# Patient Record
Sex: MALE | Race: WHITE | NOT HISPANIC OR LATINO | Employment: OTHER | URBAN - NONMETROPOLITAN AREA
[De-identification: names, ages, dates, MRNs, and addresses within clinical notes are randomized per-mention and may not be internally consistent; named-entity substitution may affect disease eponyms.]

---

## 2018-06-06 ENCOUNTER — OFFICE VISIT (OUTPATIENT)
Dept: INTERNAL MEDICINE | Facility: CLINIC | Age: 63
End: 2018-06-06

## 2018-06-06 VITALS
WEIGHT: 211.75 LBS | TEMPERATURE: 98.8 F | BODY MASS INDEX: 29.65 KG/M2 | RESPIRATION RATE: 17 BRPM | SYSTOLIC BLOOD PRESSURE: 138 MMHG | HEIGHT: 71 IN | OXYGEN SATURATION: 100 % | HEART RATE: 54 BPM | DIASTOLIC BLOOD PRESSURE: 70 MMHG

## 2018-06-06 DIAGNOSIS — E11.65 UNCONTROLLED TYPE 2 DIABETES MELLITUS WITH HYPERGLYCEMIA, WITH LONG-TERM CURRENT USE OF INSULIN (HCC): ICD-10-CM

## 2018-06-06 DIAGNOSIS — E78.2 MIXED HYPERLIPIDEMIA: ICD-10-CM

## 2018-06-06 DIAGNOSIS — M79.2 NEURALGIA: ICD-10-CM

## 2018-06-06 DIAGNOSIS — I25.10 CORONARY ARTERY DISEASE INVOLVING NATIVE CORONARY ARTERY OF NATIVE HEART WITHOUT ANGINA PECTORIS: ICD-10-CM

## 2018-06-06 DIAGNOSIS — Z79.4 UNCONTROLLED TYPE 2 DIABETES MELLITUS WITH HYPERGLYCEMIA, WITH LONG-TERM CURRENT USE OF INSULIN (HCC): ICD-10-CM

## 2018-06-06 DIAGNOSIS — M25.50 ARTHRALGIA OF MULTIPLE JOINTS: ICD-10-CM

## 2018-06-06 DIAGNOSIS — E03.8 ADULT ONSET HYPOTHYROIDISM: ICD-10-CM

## 2018-06-06 DIAGNOSIS — I10 BENIGN ESSENTIAL HYPERTENSION: Primary | ICD-10-CM

## 2018-06-06 DIAGNOSIS — F41.9 ANXIETY: ICD-10-CM

## 2018-06-06 DIAGNOSIS — R51.9 HEADACHE DISORDER: ICD-10-CM

## 2018-06-06 PROCEDURE — 99204 OFFICE O/P NEW MOD 45 MIN: CPT | Performed by: INTERNAL MEDICINE

## 2018-06-06 RX ORDER — HYDROCODONE BITARTRATE AND ACETAMINOPHEN 7.5; 325 MG/1; MG/1
1 TABLET ORAL 2 TIMES DAILY
COMMUNITY
Start: 2018-05-07 | End: 2018-06-06 | Stop reason: SDUPTHER

## 2018-06-06 RX ORDER — LOSARTAN POTASSIUM 100 MG/1
1 TABLET ORAL DAILY
Refills: 1 | COMMUNITY
Start: 2018-06-02 | End: 2019-10-25 | Stop reason: SDUPTHER

## 2018-06-06 RX ORDER — PANTOPRAZOLE SODIUM 40 MG/1
TABLET, DELAYED RELEASE ORAL
Refills: 2 | COMMUNITY
Start: 2018-04-09 | End: 2019-04-15 | Stop reason: SDUPTHER

## 2018-06-06 RX ORDER — ALPRAZOLAM 0.5 MG/1
0.5 TABLET ORAL DAILY
Qty: 30 TABLET | Refills: 0 | Status: SHIPPED | OUTPATIENT
Start: 2018-06-06 | End: 2018-06-18 | Stop reason: SDUPTHER

## 2018-06-06 RX ORDER — CITALOPRAM 20 MG/1
1 TABLET ORAL DAILY
Refills: 2 | COMMUNITY
Start: 2018-03-19 | End: 2019-03-26 | Stop reason: SDUPTHER

## 2018-06-06 RX ORDER — ATORVASTATIN CALCIUM 40 MG/1
40 TABLET, FILM COATED ORAL DAILY
Refills: 2 | COMMUNITY
Start: 2018-03-19 | End: 2019-04-15 | Stop reason: SDUPTHER

## 2018-06-06 RX ORDER — CETIRIZINE HYDROCHLORIDE 10 MG/1
10 TABLET ORAL DAILY
Refills: 11 | COMMUNITY
Start: 2018-06-02 | End: 2021-07-16

## 2018-06-06 RX ORDER — HYDROCODONE BITARTRATE AND ACETAMINOPHEN 7.5; 325 MG/1; MG/1
1 TABLET ORAL 2 TIMES DAILY
Qty: 60 TABLET | Refills: 0 | Status: SHIPPED | OUTPATIENT
Start: 2018-06-06 | End: 2018-06-18 | Stop reason: SDUPTHER

## 2018-06-06 RX ORDER — LEVOTHYROXINE SODIUM 0.07 MG/1
1 TABLET ORAL DAILY
Refills: 2 | COMMUNITY
Start: 2018-04-09 | End: 2019-04-15 | Stop reason: SDUPTHER

## 2018-06-06 RX ORDER — ALPRAZOLAM 0.5 MG/1
1 TABLET ORAL DAILY
COMMUNITY
Start: 2018-05-07 | End: 2018-06-06 | Stop reason: SDUPTHER

## 2018-06-06 RX ORDER — AMITRIPTYLINE HYDROCHLORIDE 10 MG/1
1 TABLET, FILM COATED ORAL NIGHTLY
COMMUNITY
Start: 2018-05-02 | End: 2018-06-20 | Stop reason: SDUPTHER

## 2018-06-06 RX ORDER — AMLODIPINE BESYLATE 5 MG/1
1 TABLET ORAL DAILY
COMMUNITY
Start: 2018-04-18 | End: 2019-09-17 | Stop reason: SDUPTHER

## 2018-06-06 NOTE — PROGRESS NOTES
Subjective   Ramsey Riley is a 63 y.o. male.     Chief Complaint   Patient presents with   • Establish Care   • Hypertension   • Hyperlipidemia   • Diabetes   • Hypothyroidism   • Anxiety   • Joint Pain   • Numbness   • Headache       History of Present Illness   Patient is here for an initial visit  .Patient is here to follow up  on the  blood pressure   The patient  is taking the blood pressure  medications as prescribed and  has had no side effects.  The patient  also needs refills on  medications . The patient  is also here to follow up  on  the  cholesterol  and  Sugar  is trying to follow a diet. The patient   Is also here to follow up on thyroid and is Due to get lab work done .  He complains of chronic pain in multiple joints and is on pain medications,  He also complains of numbness and is on elavil  , patient complains of anxiety and is on celexa and xanax, he complains of chronic headaches,  He has a history of cad/cabg  Hypertension   Pertinent negatives include no chest pain, headaches, palpitations or shortness of breath.   Hyperlipidemia   Pertinent negatives include no chest pain or shortness of breath    Review of Systems   Constitutional: Negative for chills, fatigue and fever.   HENT: Negative for ear pain, postnasal drip, rhinorrhea, sinus pain, sinus pressure and sore throat.    Eyes: Negative for pain and itching.   Respiratory: Negative for cough, chest tightness and shortness of breath.    Cardiovascular: Negative for chest pain and palpitations.   Gastrointestinal: Negative for blood in stool, constipation, diarrhea and nausea.   Endocrine: Negative for polydipsia, polyphagia and polyuria.   Genitourinary: Negative for dysuria, flank pain and frequency.   Musculoskeletal: Positive for arthralgias. Negative for back pain.   Skin: Negative for rash and wound.   Neurological: Positive for numbness and headaches. Negative for syncope and light-headedness.   Hematological: Negative for  "adenopathy.   Psychiatric/Behavioral: Negative for behavioral problems, decreased concentration, dysphoric mood and sleep disturbance. The patient is nervous/anxious.        Past Medical History:   Diagnosis Date   • Arthritis    • Diabetes mellitus    • Heart disease    • History of blood clots    • Hypertension    • Thyroid disease        Past Surgical History:   Procedure Laterality Date   • CARDIAC SURGERY      [facility] - 3 BY PASS    • CHOLECYSTECTOMY  2015   • COLONOSCOPY  2015   • ENDOSCOPY         History reviewed. No pertinent family history.     reports that he has never smoked. He has never used smokeless tobacco. He reports that he does not drink alcohol or use drugs.    No Known Allergies        Current Outpatient Prescriptions:   •  ALPRAZolam (XANAX) 0.5 MG tablet, Take 1 tablet by mouth Daily., Disp: 30 tablet, Rfl: 0  •  amitriptyline (ELAVIL) 10 MG tablet, Take 1 tablet by mouth Every Night., Disp: , Rfl:   •  amLODIPine (NORVASC) 5 MG tablet, Take 1 tablet by mouth Daily., Disp: , Rfl:   •  atorvastatin (LIPITOR) 40 MG tablet, Take 40 mg by mouth Daily., Disp: , Rfl: 2  •  cetirizine (zyrTEC) 10 MG tablet, Take 10 mg by mouth Daily., Disp: , Rfl: 11  •  citalopram (CeleXA) 20 MG tablet, Take 1 tablet by mouth Daily., Disp: , Rfl: 2  •  HYDROcodone-acetaminophen (NORCO) 7.5-325 MG per tablet, Take 1 tablet by mouth 2 (Two) Times a Day., Disp: 60 tablet, Rfl: 0  •  levothyroxine (SYNTHROID, LEVOTHROID) 75 MCG tablet, Take 1 tablet by mouth Daily., Disp: , Rfl: 2  •  losartan (COZAAR) 100 MG tablet, Take 1 tablet by mouth Daily., Disp: , Rfl: 1  •  pantoprazole (PROTONIX) 40 MG EC tablet, , Disp: , Rfl: 2  •  NOVOLOG 100 UNIT/ML injection, INJECT 15 UNITS UNDER SKIN 3 TIMES DAILY, Disp: , Rfl: 1      Objective   Blood pressure 138/70, pulse 54, temperature 98.8 °F (37.1 °C), temperature source Oral, resp. rate 17, height 180.3 cm (71\"), weight 96 kg (211 lb 12 oz), SpO2 100 %.    Physical Exam "   Constitutional: He is oriented to person, place, and time. He appears well-developed and well-nourished. No distress.   HENT:   Head: Normocephalic and atraumatic.   Right Ear: External ear normal.   Left Ear: External ear normal.   Nose: Nose normal.   Mouth/Throat: Oropharynx is clear and moist.   Eyes: Conjunctivae and EOM are normal. Pupils are equal, round, and reactive to light.   Neck: Normal range of motion. Neck supple. No thyromegaly present.   Cardiovascular: Normal rate, regular rhythm and normal heart sounds.    Pulmonary/Chest: Effort normal. No respiratory distress.   Abdominal: Soft. He exhibits no distension. There is no tenderness. There is no guarding.   Musculoskeletal: Normal range of motion. He exhibits no edema.   Lymphadenopathy:     He has no cervical adenopathy.   Neurological: He is alert and oriented to person, place, and time.   No gross motor or sensory deficits   Skin: Skin is warm and dry. He is not diaphoretic. No erythema.   Psychiatric: He has a normal mood and affect.   Nursing note and vitals reviewed.        Results for orders placed or performed in visit on 06/06/18   Comprehensive Metabolic Panel   Result Value Ref Range    Glucose 340 (H) 74 - 98 mg/dL    BUN 12 7 - 20 mg/dL    Creatinine 0.70 0.60 - 1.30 mg/dL    eGFR Non African Am 115 >60 mL/min/1.73    eGFR African Am 139 >60 mL/min/1.73    BUN/Creatinine Ratio 17.1 6.3 - 21.9    Sodium 139 137 - 145 mmol/L    Potassium 4.6 3.5 - 5.1 mmol/L    Chloride 100 98 - 107 mmol/L    Total CO2 28.0 26.0 - 30.0 mmol/L    Calcium 9.2 8.4 - 10.2 mg/dL    Total Protein 6.4 6.3 - 8.2 g/dL    Albumin 3.80 3.50 - 5.00 g/dL    Globulin 2.6 gm/dL    A/G Ratio 1.5 1.0 - 2.0 g/dL    Total Bilirubin 0.5 0.2 - 1.3 mg/dL    Alkaline Phosphatase 103 38 - 126 U/L    AST (SGOT) 34 15 - 46 U/L    ALT (SGPT) 39 13 - 69 U/L   Lipid Panel   Result Value Ref Range    Total Cholesterol 146 0 - 199 mg/dL    Triglycerides 127 <150 mg/dL    HDL  Cholesterol 44 40 - 60 mg/dL    VLDL Cholesterol 25.4 mg/dL    LDL Cholesterol  77 0 - 99 mg/dL   Hemoglobin A1c   Result Value Ref Range    Hemoglobin A1C 11.90 %   Microalbumin / Creatinine Urine Ratio - Urine, Clean Catch   Result Value Ref Range    Creatinine, Urine 62.7 Not Estab. mg/dL    Microalbumin, Urine 824.3 Not Estab. ug/mL    Microalbumin/Creatinine Ratio 1,314.7 (H) 0.0 - 30.0 mg/g creat   TSH   Result Value Ref Range    TSH 2.300 0.470 - 4.680 mIU/mL   CBC & Differential   Result Value Ref Range    WBC 6.01 4.80 - 10.80 10*3/mm3    RBC 5.10 4.70 - 6.10 10*6/mm3    Hemoglobin 15.2 14.0 - 18.0 g/dL    Hematocrit 44.5 42.0 - 52.0 %    MCV 87.3 80.0 - 94.0 fL    MCH 29.8 27.0 - 31.0 pg    MCHC 34.2 30.0 - 37.0 g/dL    RDW 12.3 11.5 - 14.5 %    Platelets 146 130 - 400 10*3/mm3    Neutrophil Rel % 56.6 37.0 - 80.0 %    Lymphocyte Rel % 34.8 10.0 - 50.0 %    Monocyte Rel % 6.5 0.0 - 12.0 %    Eosinophil Rel % 1.5 0.0 - 7.0 %    Basophil Rel % 0.3 0.0 - 2.5 %    Neutrophils Absolute 3.40 2.00 - 6.90 10*3/mm3    Lymphocytes Absolute 2.09 0.60 - 3.40 10*3/mm3    Monocytes Absolute 0.39 0.00 - 0.90 10*3/mm3    Eosinophils Absolute 0.09 0.00 - 0.70 10*3/mm3    Basophils Absolute 0.02 0.00 - 0.20 10*3/mm3    Immature Granulocyte Rel % 0.3 0.0 - 0.6 %    Immature Grans Absolute 0.02 0.00 - 0.06 10*3/mm3    nRBC 0.0 0.0 - 0.0 /100 WBC         Assessment/Plan   Ramsey was seen today for establish care, hypertension, hyperlipidemia, diabetes, hypothyroidism, anxiety, joint pain, numbness and headache.    Diagnoses and all orders for this visit:    Benign essential hypertension    Mixed hyperlipidemia  -     CBC & Differential  -     Comprehensive Metabolic Panel  -     Lipid Panel    Uncontrolled type 2 diabetes mellitus with hyperglycemia, with long-term current use of insulin  -     Hemoglobin A1c  -     Microalbumin / Creatinine Urine Ratio - Urine, Clean Catch    Adult onset hypothyroidism  -      TSH    Neuralgia    Anxiety  -     Urine Drug Screen - Urine, Clean Catch    Coronary artery disease involving native coronary artery of native heart without angina pectoris  -     Ambulatory Referral to Cardiology    Headache disorder  -     CT Head Without Contrast    Arthralgia of multiple joints  -     Urine Drug Screen - Urine, Clean Catch    Other orders  -     ALPRAZolam (XANAX) 0.5 MG tablet; Take 1 tablet by mouth Daily.  -     HYDROcodone-acetaminophen (NORCO) 7.5-325 MG per tablet; Take 1 tablet by mouth 2 (Two) Times a Day.        Plan:  1.  Benign essential hypertension: Will continue current medication, low-sodium diet advised  2.mixed hyperlipidemia: will obtain   fasting CMP and lipid panel.  Diet and excise counseled,    3. diabetes mellitus : will obtain   fasting CMP  and hba1c  , diet and exercise counseled , on insulin  4. Neuralgia: on elavil  5. Cad : will get labs , refer to cardiology  6. Headaches : on elavil , will get ct   7.multiple joint pain : on lortab  8. Anxiety : continue celexa 20 mg qd  9. Hypothyroid : will get tsh and continue levothyroxine  The patient has read and signed the [facility] Controlled Substance Contract.The patient is aware of the potential for addiction and dependence. This is a short-term use   A Enoc was reviewed and scanned into the chart today. Narcotic contract was signed by patient today . Will obtain Urine drug screen  [facility] Report   As part of this patient's treatment plan I am prescribing controlled substances. The patient has been made aware of appropriate use of such medications, including potential risk of somnolence, limited ability to drive and /or work safely, and potential for dependence or overdose. It has also been made clear that these medications are for use by this patient only, without concomitant use of alcohol or other substances unless prescribed.   Patient has completed prescribing agreement detailing terms of continued  prescribing of controlled substances, including monitoring [facility]LL reports, urine drug screening, and pill counts if necessary. The patient is aware that inappropriate use will result in cessation of prescribing such medications.   LA[facility]LL report has been reviewed    History and physical exam exhibit continued safe and appropriate use of controlled substances.                Margret Purvis MD

## 2018-06-07 LAB
ALBUMIN SERPL-MCNC: 3.8 G/DL (ref 3.5–5)
ALBUMIN/CREAT UR: 1314.7 MG/G CREAT (ref 0–30)
ALBUMIN/GLOB SERPL: 1.5 G/DL (ref 1–2)
ALP SERPL-CCNC: 103 U/L (ref 38–126)
ALT SERPL-CCNC: 39 U/L (ref 13–69)
AST SERPL-CCNC: 34 U/L (ref 15–46)
BASOPHILS # BLD AUTO: 0.02 10*3/MM3 (ref 0–0.2)
BASOPHILS NFR BLD AUTO: 0.3 % (ref 0–2.5)
BILIRUB SERPL-MCNC: 0.5 MG/DL (ref 0.2–1.3)
BUN SERPL-MCNC: 12 MG/DL (ref 7–20)
BUN/CREAT SERPL: 17.1 (ref 6.3–21.9)
CALCIUM SERPL-MCNC: 9.2 MG/DL (ref 8.4–10.2)
CHLORIDE SERPL-SCNC: 100 MMOL/L (ref 98–107)
CHOLEST SERPL-MCNC: 146 MG/DL (ref 0–199)
CO2 SERPL-SCNC: 28 MMOL/L (ref 26–30)
CREAT SERPL-MCNC: 0.7 MG/DL (ref 0.6–1.3)
CREAT UR-MCNC: 62.7 MG/DL
EOSINOPHIL # BLD AUTO: 0.09 10*3/MM3 (ref 0–0.7)
EOSINOPHIL NFR BLD AUTO: 1.5 % (ref 0–7)
ERYTHROCYTE [DISTWIDTH] IN BLOOD BY AUTOMATED COUNT: 12.3 % (ref 11.5–14.5)
GFR SERPLBLD CREATININE-BSD FMLA CKD-EPI: 115 ML/MIN/1.73
GFR SERPLBLD CREATININE-BSD FMLA CKD-EPI: 139 ML/MIN/1.73
GLOBULIN SER CALC-MCNC: 2.6 GM/DL
GLUCOSE SERPL-MCNC: 340 MG/DL (ref 74–98)
HBA1C MFR BLD: 11.9 %
HCT VFR BLD AUTO: 44.5 % (ref 42–52)
HDLC SERPL-MCNC: 44 MG/DL (ref 40–60)
HGB BLD-MCNC: 15.2 G/DL (ref 14–18)
IMM GRANULOCYTES # BLD: 0.02 10*3/MM3 (ref 0–0.06)
IMM GRANULOCYTES NFR BLD: 0.3 % (ref 0–0.6)
LDLC SERPL CALC-MCNC: 77 MG/DL (ref 0–99)
LYMPHOCYTES # BLD AUTO: 2.09 10*3/MM3 (ref 0.6–3.4)
LYMPHOCYTES NFR BLD AUTO: 34.8 % (ref 10–50)
MCH RBC QN AUTO: 29.8 PG (ref 27–31)
MCHC RBC AUTO-ENTMCNC: 34.2 G/DL (ref 30–37)
MCV RBC AUTO: 87.3 FL (ref 80–94)
MICROALBUMIN UR-MCNC: 824.3 UG/ML
MONOCYTES # BLD AUTO: 0.39 10*3/MM3 (ref 0–0.9)
MONOCYTES NFR BLD AUTO: 6.5 % (ref 0–12)
NEUTROPHILS # BLD AUTO: 3.4 10*3/MM3 (ref 2–6.9)
NEUTROPHILS NFR BLD AUTO: 56.6 % (ref 37–80)
NRBC BLD AUTO-RTO: 0 /100 WBC (ref 0–0)
PLATELET # BLD AUTO: 146 10*3/MM3 (ref 130–400)
POTASSIUM SERPL-SCNC: 4.6 MMOL/L (ref 3.5–5.1)
PROT SERPL-MCNC: 6.4 G/DL (ref 6.3–8.2)
RBC # BLD AUTO: 5.1 10*6/MM3 (ref 4.7–6.1)
SODIUM SERPL-SCNC: 139 MMOL/L (ref 137–145)
TRIGL SERPL-MCNC: 127 MG/DL
TSH SERPL DL<=0.005 MIU/L-ACNC: 2.3 MIU/ML (ref 0.47–4.68)
VLDLC SERPL CALC-MCNC: 25.4 MG/DL
WBC # BLD AUTO: 6.01 10*3/MM3 (ref 4.8–10.8)

## 2018-06-18 RX ORDER — HYDROCODONE BITARTRATE AND ACETAMINOPHEN 7.5; 325 MG/1; MG/1
1 TABLET ORAL 2 TIMES DAILY
Qty: 60 TABLET | Refills: 0 | Status: SHIPPED | OUTPATIENT
Start: 2018-06-18 | End: 2018-07-11 | Stop reason: SDUPTHER

## 2018-06-18 RX ORDER — ALPRAZOLAM 0.5 MG/1
0.5 TABLET ORAL DAILY
Qty: 30 TABLET | Refills: 0 | Status: SHIPPED | OUTPATIENT
Start: 2018-06-18 | End: 2018-07-06 | Stop reason: SDUPTHER

## 2018-06-19 ENCOUNTER — TELEPHONE (OUTPATIENT)
Dept: INTERNAL MEDICINE | Facility: CLINIC | Age: 63
End: 2018-06-19

## 2018-06-19 NOTE — TELEPHONE ENCOUNTER
[phone] Jacky in [place] calling . Stating his novolog has been changed. Patient told them it is not 15 units tid. I cant find any documentation that we changed it. He is out but insurance wont pay unless dose has been changed. Can you address this? thanks

## 2018-06-19 NOTE — TELEPHONE ENCOUNTER
Inform pt and pharmacy - changed to novolog pen 50 u tid and levemir pen 50 u bid , inform pt to increase dose slowly

## 2018-06-20 RX ORDER — AMITRIPTYLINE HYDROCHLORIDE 10 MG/1
10 TABLET, FILM COATED ORAL NIGHTLY
Qty: 30 TABLET | Refills: 2 | Status: SHIPPED | OUTPATIENT
Start: 2018-06-20 | End: 2018-11-17 | Stop reason: SDUPTHER

## 2018-06-21 ENCOUNTER — HOSPITAL ENCOUNTER (OUTPATIENT)
Dept: CT IMAGING | Facility: HOSPITAL | Age: 63
Discharge: HOME | End: 2018-06-21
Admitting: INTERNAL MEDICINE

## 2018-06-21 ENCOUNTER — CONSULT (OUTPATIENT)
Dept: CARDIOLOGY | Facility: CLINIC | Age: 63
End: 2018-06-21

## 2018-06-21 VITALS
BODY MASS INDEX: 29.65 KG/M2 | HEART RATE: 54 BPM | OXYGEN SATURATION: 98 % | SYSTOLIC BLOOD PRESSURE: 110 MMHG | DIASTOLIC BLOOD PRESSURE: 60 MMHG | HEIGHT: 71 IN | WEIGHT: 211.8 LBS

## 2018-06-21 DIAGNOSIS — I10 ESSENTIAL HYPERTENSION: ICD-10-CM

## 2018-06-21 DIAGNOSIS — I20.8 ANGINAL EQUIVALENT (HCC): ICD-10-CM

## 2018-06-21 DIAGNOSIS — I25.119 CORONARY ARTERY DISEASE INVOLVING NATIVE CORONARY ARTERY OF NATIVE HEART WITH ANGINA PECTORIS (HCC): Primary | ICD-10-CM

## 2018-06-21 DIAGNOSIS — R53.83 FATIGUE, UNSPECIFIED TYPE: ICD-10-CM

## 2018-06-21 DIAGNOSIS — R00.1 BRADYCARDIA, SINUS: ICD-10-CM

## 2018-06-21 DIAGNOSIS — R94.31 ABNORMAL ECG: ICD-10-CM

## 2018-06-21 PROBLEM — I20.89 ANGINAL EQUIVALENT: Status: ACTIVE | Noted: 2018-06-21

## 2018-06-21 PROCEDURE — 99204 OFFICE O/P NEW MOD 45 MIN: CPT | Performed by: INTERNAL MEDICINE

## 2018-06-21 PROCEDURE — 70450 CT HEAD/BRAIN W/O DYE: CPT

## 2018-06-21 PROCEDURE — 93000 ELECTROCARDIOGRAM COMPLETE: CPT | Performed by: INTERNAL MEDICINE

## 2018-06-21 NOTE — PROGRESS NOTES
Subjective:     Encounter Date:06/21/2018      Patient ID: Ramsey Riley is a 63 y.o. male.    Chief Complaint:Fatigue  HPI  This is a 63-year-old male patient who has known coronary disease with a previous 4 vessel bypass operation 12 years ago for angina pectoris.  The patient has no history of myocardial infarction.  He has had no ischemic evaluation since his initial revascularization procedure.  The patient has severe fatigue which he describes as limiting his overall physical activities.  This has been getting worse.  He reports that the fatigue is manifest as a weakness in his legs bilaterally.  He has a history of superficial thrombophlebitis of his lower extremities as well as fairly extensive varicose veins.  He has no history of DVT or pulmonary embolus.  The patient has no chest discomfort at rest or with activity.  There is no exertional chest arm neck jaw shoulder or back discomfort.  There is no orthopnea PND or lower extremity edema.  There is no dizziness palpitations or syncope.  He has a history of hypertension type 2 diabetes mellitus and elevated cholesterol all of which are appropriately treated.  His blood sugars have been running extremely high.  He is on insulin therapy.  He indicates it is not uncommon for his blood sugar to be greater than 600.  He also has a pins and needles sensation in his feet suggestive of diabetic neuropathy.  He has no intermittent claudication or rest pain.  There is no tissue loss.  There is no history of peripheral arterial occlusive disease, stroke or TIA.  He is a lifelong nonsmoker.  The following portions of the patient's history were reviewed and updated as appropriate: allergies, current medications, past family history, past medical history, past social history, past surgical history and problem  Review of Systems   Constitution: Positive for weakness and malaise/fatigue. Negative for chills, diaphoresis, fever, weight gain and weight loss.   HENT:  Negative for ear discharge, hearing loss, hoarse voice and nosebleeds.    Eyes: Negative for discharge, double vision, pain and photophobia.   Cardiovascular: Negative for chest pain, claudication, cyanosis, dyspnea on exertion, irregular heartbeat, leg swelling, near-syncope, orthopnea, palpitations, paroxysmal nocturnal dyspnea and syncope.   Respiratory: Negative for cough, hemoptysis, sputum production and wheezing.    Endocrine: Negative for cold intolerance, heat intolerance, polydipsia, polyphagia and polyuria.   Hematologic/Lymphatic: Negative for adenopathy and bleeding problem. Does not bruise/bleed easily.   Skin: Negative for color change, flushing, itching and rash.   Musculoskeletal: Negative for muscle cramps, muscle weakness, myalgias and stiffness.   Gastrointestinal: Negative for abdominal pain, diarrhea, hematemesis, hematochezia, nausea and vomiting.   Genitourinary: Negative for dysuria, frequency and nocturia.   Neurological: Negative for focal weakness, loss of balance, numbness, paresthesias and seizures.   Psychiatric/Behavioral: Negative for altered mental status, hallucinations and suicidal ideas.   Allergic/Immunologic: Negative for HIV exposure, hives and persistent infections.           Current Outpatient Prescriptions:   •  ALPRAZolam (XANAX) 0.5 MG tablet, Take 1 tablet by mouth Daily., Disp: 30 tablet, Rfl: 0  •  amitriptyline (ELAVIL) 10 MG tablet, Take 1 tablet by mouth Every Night., Disp: 30 tablet, Rfl: 2  •  amLODIPine (NORVASC) 5 MG tablet, Take 1 tablet by mouth Daily., Disp: , Rfl:   •  atorvastatin (LIPITOR) 40 MG tablet, Take 40 mg by mouth Daily., Disp: , Rfl: 2  •  cetirizine (zyrTEC) 10 MG tablet, Take 10 mg by mouth Daily., Disp: , Rfl: 11  •  citalopram (CeleXA) 20 MG tablet, Take 1 tablet by mouth Daily., Disp: , Rfl: 2  •  HYDROcodone-acetaminophen (NORCO) 7.5-325 MG per tablet, Take 1 tablet by mouth 2 (Two) Times a Day., Disp: 60 tablet, Rfl: 0  •  insulin aspart  "(novoLOG FLEXPEN) 100 UNIT/ML solution pen-injector sc pen, Inject 50 Units under the skin 3 (Three) Times a Day With Meals., Disp: 5 pen, Rfl: 11  •  insulin detemir (LEVEMIR FLEXPEN) 100 UNIT/ML injection, Inject 50 Units under the skin 2 (Two) Times a Day., Disp: 5 pen, Rfl: 11  •  levothyroxine (SYNTHROID, LEVOTHROID) 75 MCG tablet, Take 1 tablet by mouth Daily., Disp: , Rfl: 2  •  losartan (COZAAR) 100 MG tablet, Take 1 tablet by mouth Daily., Disp: , Rfl: 1  •  pantoprazole (PROTONIX) 40 MG EC tablet, , Disp: , Rfl: 2     Objective:     Physical Exam   Constitutional: He is oriented to person, place, and time. He appears well-developed and well-nourished.   HENT:   Head: Normocephalic and atraumatic.   Mouth/Throat: Oropharynx is clear and moist.   Eyes: Conjunctivae and EOM are normal. Pupils are equal, round, and reactive to light. No scleral icterus.   Neck: Normal range of motion. Neck supple. No JVD present. No tracheal deviation present. No thyromegaly present.   Cardiovascular: Normal rate, regular rhythm, S1 normal, S2 normal, normal heart sounds, intact distal pulses and normal pulses.  PMI is not displaced.  Exam reveals no gallop and no friction rub.    No murmur heard.  Pulmonary/Chest: Effort normal and breath sounds normal. No respiratory distress. He has no wheezes. He has no rales.   Abdominal: Soft. Bowel sounds are normal. He exhibits no distension and no mass. There is no tenderness. There is no rebound and no guarding.   Musculoskeletal: Normal range of motion. He exhibits no edema or deformity.   Neurological: He is alert and oriented to person, place, and time. He displays normal reflexes. No cranial nerve deficit. Coordination normal.   Skin: Skin is warm and dry. No rash noted. No erythema.   Psychiatric: He has a normal mood and affect. His behavior is normal. Thought content normal.     Blood pressure 110/60, pulse 54, height 180.3 cm (70.98\"), weight 96.1 kg (211 lb 12.8 oz), SpO2 98 " %.   Lab Review:       Assessment:         1. Coronary artery disease involving native coronary artery of native heart with angina pectoris  THE PATIENT INITIALLY PRESENTED WITH SHORTNESS OF BREATH WITH ACTIVITY.  HE IS NOW HAVING SEVERE FATIGUE WHICH COULD BE A MANIFESTATION OF ANGINA PECTORIS. He has not had an ischemic evaluation in 12 years.  He has multiple risk factors for atherosclerosis progression.  - Stress Test With Myocardial Perfusion One Day  - Adult Transthoracic Echo Complete W/ Cont if Necessary Per Protocol    2. Essential hypertension  Excellent blood pressure control.  - Adult Transthoracic Echo Complete W/ Cont if Necessary Per Protocol    3. Bradycardia, sinus  He is not on any medication that would cause bradycardia.  He indicates that he has had resting bradycardia since his bypass surgery.  - Stress Test With Myocardial Perfusion One Day  - Adult Transthoracic Echo Complete W/ Cont if Necessary Per Protocol    4. Fatigue, unspecified type  This is suspected to be an angina equivalent.  The patient may have developed an element of congestive heart failure.  - Stress Test With Myocardial Perfusion One Day  - Adult Transthoracic Echo Complete W/ Cont if Necessary Per Protocol    5. Anginal equivalent  Fatigue that is present with physical activities and gets better with rest is typical of the diagnosis of angina pectoris.  - Stress Test With Myocardial Perfusion One Day  - Adult Transthoracic Echo Complete W/ Cont if Necessary Per Protocol    6. Abnormal ECG  His 12-lead electrocardiogram is consistent with a diagnosis of an ischemic etiology.  - Stress Test With Myocardial Perfusion One Day  - Adult Transthoracic Echo Complete W/ Cont if Necessary Per Protocol      ECG 12 Lead  Date/Time: 6/21/2018 2:11 PM  Performed by: EMILY LACY  Authorized by: EMILY LACY   Rhythm: sinus bradycardia  Rate: bradycardic  BPM: 52  QRS axis: left  Clinical impression: abnormal ECG  Comments: QS  complexes are present in leads 3 and aVF as well as V1-V3.             Plan:       I have recommended a vasodilator nuclear stress test as well as an echocardiogram.  No changes in his medication therapy have been made at today's visit.  Further recommendations will be predicated on the results of his outpatient testing.

## 2018-07-06 RX ORDER — ALPRAZOLAM 0.5 MG/1
0.5 TABLET ORAL DAILY
Qty: 30 TABLET | Refills: 0 | Status: SHIPPED | OUTPATIENT
Start: 2018-07-06 | End: 2018-07-19 | Stop reason: SDUPTHER

## 2018-07-12 ENCOUNTER — APPOINTMENT (OUTPATIENT)
Dept: NUCLEAR MEDICINE | Facility: HOSPITAL | Age: 63
End: 2018-07-12
Attending: INTERNAL MEDICINE

## 2018-07-17 RX ORDER — HYDROCODONE BITARTRATE AND ACETAMINOPHEN 7.5; 325 MG/1; MG/1
1 TABLET ORAL 2 TIMES DAILY
Qty: 60 TABLET | Refills: 0 | Status: SHIPPED | OUTPATIENT
Start: 2018-07-17 | End: 2018-07-18 | Stop reason: SDUPTHER

## 2018-07-18 RX ORDER — HYDROCODONE BITARTRATE AND ACETAMINOPHEN 7.5; 325 MG/1; MG/1
1 TABLET ORAL 2 TIMES DAILY
Qty: 60 TABLET | Refills: 0 | Status: SHIPPED | OUTPATIENT
Start: 2018-07-18 | End: 2018-09-11 | Stop reason: SDUPTHER

## 2018-07-19 ENCOUNTER — HOSPITAL ENCOUNTER (OUTPATIENT)
Dept: NUCLEAR MEDICINE | Facility: HOSPITAL | Age: 63
Discharge: HOME | End: 2018-07-19
Attending: INTERNAL MEDICINE

## 2018-07-19 ENCOUNTER — OFFICE VISIT (OUTPATIENT)
Dept: INTERNAL MEDICINE | Facility: CLINIC | Age: 63
End: 2018-07-19

## 2018-07-19 VITALS
BODY MASS INDEX: 29.86 KG/M2 | TEMPERATURE: 98 F | OXYGEN SATURATION: 100 % | WEIGHT: 214 LBS | HEART RATE: 49 BPM | SYSTOLIC BLOOD PRESSURE: 112 MMHG | RESPIRATION RATE: 16 BRPM | DIASTOLIC BLOOD PRESSURE: 61 MMHG

## 2018-07-19 DIAGNOSIS — Z79.4 UNCONTROLLED TYPE 2 DIABETES MELLITUS WITH HYPERGLYCEMIA, WITH LONG-TERM CURRENT USE OF INSULIN (HCC): ICD-10-CM

## 2018-07-19 DIAGNOSIS — E11.65 UNCONTROLLED TYPE 2 DIABETES MELLITUS WITH HYPERGLYCEMIA, WITH LONG-TERM CURRENT USE OF INSULIN (HCC): ICD-10-CM

## 2018-07-19 DIAGNOSIS — B35.1 ONYCHOMYCOSIS: ICD-10-CM

## 2018-07-19 DIAGNOSIS — E78.2 MIXED HYPERLIPIDEMIA: ICD-10-CM

## 2018-07-19 DIAGNOSIS — E03.8 ADULT ONSET HYPOTHYROIDISM: ICD-10-CM

## 2018-07-19 DIAGNOSIS — I10 BENIGN ESSENTIAL HYPERTENSION: Primary | ICD-10-CM

## 2018-07-19 DIAGNOSIS — M25.50 ARTHRALGIA OF MULTIPLE JOINTS: ICD-10-CM

## 2018-07-19 DIAGNOSIS — F41.9 ANXIETY: ICD-10-CM

## 2018-07-19 PROCEDURE — 25010000002 REGADENOSON 0.4 MG/5ML SOLUTION: Performed by: INTERNAL MEDICINE

## 2018-07-19 PROCEDURE — 78452 HT MUSCLE IMAGE SPECT MULT: CPT | Performed by: INTERNAL MEDICINE

## 2018-07-19 PROCEDURE — 93018 CV STRESS TEST I&R ONLY: CPT | Performed by: INTERNAL MEDICINE

## 2018-07-19 PROCEDURE — 78452 HT MUSCLE IMAGE SPECT MULT: CPT

## 2018-07-19 PROCEDURE — 93017 CV STRESS TEST TRACING ONLY: CPT

## 2018-07-19 PROCEDURE — A9500 TC99M SESTAMIBI: HCPCS | Performed by: INTERNAL MEDICINE

## 2018-07-19 PROCEDURE — 0 TECHNETIUM SESTAMIBI: Performed by: INTERNAL MEDICINE

## 2018-07-19 PROCEDURE — 99214 OFFICE O/P EST MOD 30 MIN: CPT | Performed by: INTERNAL MEDICINE

## 2018-07-19 RX ORDER — ALPRAZOLAM 0.5 MG/1
0.5 TABLET ORAL DAILY
Qty: 30 TABLET | Refills: 3 | Status: SHIPPED | OUTPATIENT
Start: 2018-07-19 | End: 2018-09-11 | Stop reason: SDUPTHER

## 2018-07-19 RX ADMIN — TECHNETIUM TC 99M SESTAMIBI 1 DOSE: 1 INJECTION INTRAVENOUS at 07:15

## 2018-07-19 RX ADMIN — REGADENOSON 0.4 MG: 0.08 INJECTION, SOLUTION INTRAVENOUS at 08:38

## 2018-07-19 RX ADMIN — TECHNETIUM TC 99M SESTAMIBI 1 DOSE: 1 INJECTION INTRAVENOUS at 08:38

## 2018-07-19 NOTE — PROGRESS NOTES
Subjective   Ramsey Riley is a 63 y.o. male.     Chief Complaint   Patient presents with   • Hypertension   • Hyperlipidemia   • Diabetes   • Hypothyroidism   • Anxiety   • Joint Pain       History of Present Illness   HPI: Patient is here to follow up on the blood pressure  The patient is taking the blood pressure medications as prescribed and has had no side effects. The patient also needs refills on medications . The patient is also here to follow up on the cholesterol and is trying to follow a diet. The patient is also here to follow on sugar and thyroid and he  had lab work done .  He complains of anxiety and is on celexa and prn xanax,  He also complains of joint pain and is on prn lortab, he was seen by cardiology and had stress test today , he complains of toe nail - bilateral big toe infection , he sees nephrology - dr alejandra  Hyperlipidemia   Pertinent negatives include no chest pain or shortness of breath.   Hypertension   Pertinent negatives include no chest pain, palpitations or shortness of breath.   Diabetes   Pertinent negatives for hypoglycemia include no dizziness, nervousness/anxiousness or pallor. Pertinent negatives for diabetes include no chest pain, no fatigue and no weakness.    The following portions of the patient's history were reviewed and updated as appropriate: allergies, current medications, past family history, past medical history, past social history, past surgical history and problem list.    Review of Systems   Constitutional: Negative for appetite change, fatigue and fever.   HENT: Negative for congestion, ear discharge, ear pain, sinus pressure and sore throat.    Eyes: Negative for pain and discharge.   Respiratory: Negative for cough, shortness of breath and wheezing.    Cardiovascular: Negative for chest pain, palpitations and leg swelling.   Gastrointestinal: Negative for abdominal pain, constipation, diarrhea, nausea and vomiting.   Endocrine: Negative for cold  intolerance and heat intolerance.   Genitourinary: Negative for dysuria and flank pain.   Musculoskeletal: Positive for arthralgias. Negative for joint swelling.   Skin: Positive for color change. Negative for pallor and rash.   Allergic/Immunologic: Negative for environmental allergies and food allergies.   Neurological: Negative for dizziness and weakness.   Hematological: Negative for adenopathy. Does not bruise/bleed easily.   Psychiatric/Behavioral: Negative for behavioral problems and dysphoric mood. The patient is nervous/anxious.          Current Outpatient Prescriptions:   •  ALPRAZolam (XANAX) 0.5 MG tablet, Take 1 tablet by mouth Daily., Disp: 30 tablet, Rfl: 3  •  amitriptyline (ELAVIL) 10 MG tablet, Take 1 tablet by mouth Every Night., Disp: 30 tablet, Rfl: 2  •  amLODIPine (NORVASC) 5 MG tablet, Take 1 tablet by mouth Daily., Disp: , Rfl:   •  atorvastatin (LIPITOR) 40 MG tablet, Take 40 mg by mouth Daily., Disp: , Rfl: 2  •  cetirizine (zyrTEC) 10 MG tablet, Take 10 mg by mouth Daily., Disp: , Rfl: 11  •  citalopram (CeleXA) 20 MG tablet, Take 1 tablet by mouth Daily., Disp: , Rfl: 2  •  HYDROcodone-acetaminophen (NORCO) 7.5-325 MG per tablet, Take 1 tablet by mouth 2 (Two) Times a Day., Disp: 60 tablet, Rfl: 0  •  insulin aspart (novoLOG FLEXPEN) 100 UNIT/ML solution pen-injector sc pen, Inject 50 Units under the skin 3 (Three) Times a Day With Meals., Disp: 5 pen, Rfl: 11  •  insulin detemir (LEVEMIR FLEXPEN) 100 UNIT/ML injection, Inject 70 Units under the skin Every Night., Disp: 5 pen, Rfl: 11  •  levothyroxine (SYNTHROID, LEVOTHROID) 75 MCG tablet, Take 1 tablet by mouth Daily., Disp: , Rfl: 2  •  losartan (COZAAR) 100 MG tablet, Take 1 tablet by mouth Daily., Disp: , Rfl: 1  •  pantoprazole (PROTONIX) 40 MG EC tablet, , Disp: , Rfl: 2  No current facility-administered medications for this visit.     Objective     Blood pressure 112/61, pulse (!) 49, temperature 98 °F (36.7 °C), temperature  source Oral, resp. rate 16, weight 97.1 kg (214 lb), SpO2 100 %.    Physical Exam   Constitutional: He is oriented to person, place, and time. He appears well-developed and well-nourished. No distress.   HENT:   Head: Normocephalic and atraumatic.   Right Ear: External ear normal.   Left Ear: External ear normal.   Nose: Nose normal.   Mouth/Throat: Oropharynx is clear and moist.   Eyes: Pupils are equal, round, and reactive to light. Conjunctivae and EOM are normal.   Neck: Normal range of motion. Neck supple. No thyromegaly present.   Cardiovascular: Normal rate, regular rhythm and normal heart sounds.    Pulmonary/Chest: Effort normal. No respiratory distress.   Abdominal: Soft. He exhibits no distension. There is no tenderness. There is no guarding.   Musculoskeletal: He exhibits tenderness and deformity. He exhibits no edema.   Lymphadenopathy:     He has no cervical adenopathy.   Neurological: He is alert and oriented to person, place, and time.   No gross motor or sensory deficits   Skin: Skin is warm and dry. He is not diaphoretic. No erythema.   onychomycosis   Psychiatric: He has a normal mood and affect.   Nursing note and vitals reviewed.      Results for orders placed or performed in visit on 06/21/18   Stress Test With Myocardial Perfusion One Day   Result Value Ref Range    BH CV STRESS PROTOCOL 1 Pharmacologic     Stage 1 1     Duration Min Stage 1 0     Duration Sec Stage 1 10     Stress Dose Regadenoson Stage 1 0.4     Stress Comments Stage 1 10 sec bolus injection     Baseline HR 42 bpm    Baseline /75 mmHg    Peak HR 55 bpm    Percent Max Pred HR 34.59 %    Percent Target HR 41 %    Peak /75 mmHg    Recovery HR 45 bpm    Recovery /74 mmHg    Target HR (85%) 135 bpm    Max. Pred. HR (100%) 159 bpm    Nuclear Prior Study 3          Assessment/Plan   Ramsey was seen today for hypertension, hyperlipidemia, diabetes, hypothyroidism, anxiety and joint pain.    Diagnoses and all  orders for this visit:    Benign essential hypertension    Mixed hyperlipidemia  -     CBC & Differential  -     Comprehensive Metabolic Panel  -     Lipid Panel    Uncontrolled type 2 diabetes mellitus with hyperglycemia, with long-term current use of insulin (CMS/[facility])  -     Hemoglobin A1c  -     Microalbumin / Creatinine Urine Ratio - Urine, Clean Catch    Adult onset hypothyroidism    Anxiety  -     Urine Drug Screen - Urine, Clean Catch    Arthralgia of multiple joints  -     Urine Drug Screen - Urine, Clean Catch    Onychomycosis  -     Ambulatory Referral to Podiatry    Other orders  -     ALPRAZolam (XANAX) 0.5 MG tablet; Take 1 tablet by mouth Daily.  -     Discontinue: insulin detemir (LEVEMIR FLEXPEN) 100 UNIT/ML injection; Inject 70 Units under the skin 2 (Two) Times a Day.  -     insulin detemir (LEVEMIR FLEXPEN) 100 UNIT/ML injection; Inject 70 Units under the skin Every Night.        Plan:  1.  Benign essential hypertension: Will continue current medication, low-sodium diet advised  2.mixed hyperlipidemia:  Reviewed    fasting CMP and lipid panel.  Diet and excise counseled,    3. diabetes mellitus :  Reviewed    fasting CMP  and hba1c  , diet and exercise counseled , on insulin -  levemir 70 u qhs and novolo 25 u tid , he has seen nephrologist - dr alejandra  4.  .multiple joint pain : on lortab  5. Anxiety : continue celexa 20 mg qd , prn alprazolam , uds today , shaun reviewed   6. Hypothyroid :  Reviewed  tsh and continue levothyroxine  7. Onychomycosis  :  rfer to podiatry - also for diabetic foot exam   The patient has read and signed the [facility] Controlled Substance Contract.The patient is aware of the potential for addiction and dependence. This is a short-term use   A Shaun was reviewed and scanned into the chart today. Narcotic contract was signed by patient today . Will obtain Urine drug screen  Shaun Report   As part of this patient's treatment plan I am prescribing controlled  substances. The patient has been made aware of appropriate use of such medications, including potential risk of somnolence, limited ability to drive and /or work safely, and potential for dependence or overdose. It has also been made clear that these medications are for use by this patient only, without concomitant use of alcohol or other substances unless prescribed.   Patient has completed prescribing agreement detailing terms of continued prescribing of controlled substances, including monitoring LAVELL reports, urine drug screening, and pill counts if necessary. The patient is aware that inappropriate use will result in cessation of prescribing such medications.   LAVELL report has been reviewed    History and physical exam exhibit continued safe and appropriate use of controlled substances.          Margret Purvis MD

## 2018-07-20 LAB
BH CV NUCLEAR PRIOR STUDY: 3
BH CV STRESS COMMENTS STAGE 1: NORMAL
BH CV STRESS DOSE REGADENOSON STAGE 1: 0.4
BH CV STRESS DURATION MIN STAGE 1: 0
BH CV STRESS DURATION SEC STAGE 1: 10
BH CV STRESS PROTOCOL 1: NORMAL
BH CV STRESS RECOVERY BP: NORMAL MMHG
BH CV STRESS RECOVERY HR: 45 BPM
BH CV STRESS STAGE 1: 1
LV EF NUC BP: 68 %
MAXIMAL PREDICTED HEART RATE: 159 BPM
PERCENT MAX PREDICTED HR: 34.59 %
STRESS BASELINE BP: NORMAL MMHG
STRESS BASELINE HR: 42 BPM
STRESS PERCENT HR: 41 %
STRESS POST PEAK BP: NORMAL MMHG
STRESS POST PEAK HR: 55 BPM
STRESS TARGET HR: 135 BPM

## 2018-08-07 LAB
BH CV ECHO MEAS - % IVS THICK: 58.3 %
BH CV ECHO MEAS - % LVPW THICK: 26.3 %
BH CV ECHO MEAS - AO MAX PG (FULL): 4.3 MMHG
BH CV ECHO MEAS - AO MAX PG: 11 MMHG
BH CV ECHO MEAS - AO MEAN PG (FULL): 3 MMHG
BH CV ECHO MEAS - AO MEAN PG: 6 MMHG
BH CV ECHO MEAS - AO ROOT AREA: 8.6 CM^2
BH CV ECHO MEAS - AO ROOT DIAM: 3.3 CM
BH CV ECHO MEAS - AO V2 MAX: 165.5 CM/SEC
BH CV ECHO MEAS - AO V2 MEAN: 120 CM/SEC
BH CV ECHO MEAS - AO V2 VTI: 34.9 CM
BH CV ECHO MEAS - AVA(I,A): 3.3 CM^2
BH CV ECHO MEAS - AVA(I,D): 3.3 CM^2
BH CV ECHO MEAS - AVA(V,A): 3 CM^2
BH CV ECHO MEAS - AVA(V,D): 3 CM^2
BH CV ECHO MEAS - CONTRAST EF 4CH: 73.9 ML/M^2
BH CV ECHO MEAS - EDV(CUBED): 166.4 ML
BH CV ECHO MEAS - EDV(MOD-SP4): 119 ML
BH CV ECHO MEAS - EDV(TEICH): 147.4 ML
BH CV ECHO MEAS - EF(CUBED): 80.3 %
BH CV ECHO MEAS - EF(MOD-BP): 78 %
BH CV ECHO MEAS - EF(MOD-SP4): 73.9 %
BH CV ECHO MEAS - EF(TEICH): 72.2 %
BH CV ECHO MEAS - ESV(CUBED): 32.8 ML
BH CV ECHO MEAS - ESV(MOD-SP4): 31 ML
BH CV ECHO MEAS - ESV(TEICH): 41 ML
BH CV ECHO MEAS - FS: 41.8 %
BH CV ECHO MEAS - IVS/LVPW: 1.3
BH CV ECHO MEAS - IVSD: 1.2 CM
BH CV ECHO MEAS - IVSS: 1.9 CM
BH CV ECHO MEAS - LA DIMENSION: 4.2 CM
BH CV ECHO MEAS - LA/AO: 1.3
BH CV ECHO MEAS - LV MASS(C)D: 234.7 GRAMS
BH CV ECHO MEAS - LV MASS(C)S: 181.4 GRAMS
BH CV ECHO MEAS - LV MAX PG: 6.7 MMHG
BH CV ECHO MEAS - LV MEAN PG: 3 MMHG
BH CV ECHO MEAS - LV V1 MAX: 129 CM/SEC
BH CV ECHO MEAS - LV V1 MEAN: 85.5 CM/SEC
BH CV ECHO MEAS - LV V1 VTI: 30.4 CM
BH CV ECHO MEAS - LVIDD: 5.5 CM
BH CV ECHO MEAS - LVIDS: 3.2 CM
BH CV ECHO MEAS - LVLD AP4: 8.7 CM
BH CV ECHO MEAS - LVLS AP4: 6.5 CM
BH CV ECHO MEAS - LVOT AREA (M): 3.8 CM^2
BH CV ECHO MEAS - LVOT AREA: 3.8 CM^2
BH CV ECHO MEAS - LVOT DIAM: 2.2 CM
BH CV ECHO MEAS - LVPWD: 1.2 CM
BH CV ECHO MEAS - LVPWS: 1.2 CM
BH CV ECHO MEAS - MV A MAX VEL: 86.6 CM/SEC
BH CV ECHO MEAS - MV DEC SLOPE: 282.5 CM/SEC^2
BH CV ECHO MEAS - MV DEC TIME: 0.28 SEC
BH CV ECHO MEAS - MV E MAX VEL: 78.4 CM/SEC
BH CV ECHO MEAS - MV E/A: 0.91
BH CV ECHO MEAS - MV MAX PG: 4.8 MMHG
BH CV ECHO MEAS - MV MEAN PG: 1 MMHG
BH CV ECHO MEAS - MV P1/2T MAX VEL: 80.4 CM/SEC
BH CV ECHO MEAS - MV P1/2T: 83.4 MSEC
BH CV ECHO MEAS - MV V2 MAX: 109 CM/SEC
BH CV ECHO MEAS - MV V2 MEAN: 52.6 CM/SEC
BH CV ECHO MEAS - MV V2 VTI: 35.3 CM
BH CV ECHO MEAS - MVA P1/2T LCG: 2.7 CM^2
BH CV ECHO MEAS - MVA(P1/2T): 2.6 CM^2
BH CV ECHO MEAS - MVA(VTI): 3.3 CM^2
BH CV ECHO MEAS - PA MAX PG: 5.6 MMHG
BH CV ECHO MEAS - PA V2 MAX: 118 CM/SEC
BH CV ECHO MEAS - RAP SYSTOLE: 10 MMHG
BH CV ECHO MEAS - RVSP: 25 MMHG
BH CV ECHO MEAS - SV(AO): 298.5 ML
BH CV ECHO MEAS - SV(CUBED): 133.6 ML
BH CV ECHO MEAS - SV(LVOT): 115.6 ML
BH CV ECHO MEAS - SV(MOD-SP4): 88 ML
BH CV ECHO MEAS - SV(TEICH): 106.5 ML
BH CV ECHO MEAS - TR MAX VEL: 193 CM/SEC
BH CV ECHO MEAS - TV MAX PG: 1.3 MMHG
BH CV ECHO MEAS - TV V2 MAX: 57.1 CM/SEC
LV EF 2D ECHO EST: 80 %

## 2018-08-10 ENCOUNTER — OFFICE VISIT (OUTPATIENT)
Dept: CARDIOLOGY | Facility: CLINIC | Age: 63
End: 2018-08-10

## 2018-08-10 VITALS
WEIGHT: 214 LBS | SYSTOLIC BLOOD PRESSURE: 120 MMHG | HEART RATE: 52 BPM | HEIGHT: 70 IN | OXYGEN SATURATION: 94 % | RESPIRATION RATE: 18 BRPM | DIASTOLIC BLOOD PRESSURE: 60 MMHG | BODY MASS INDEX: 30.64 KG/M2

## 2018-08-10 DIAGNOSIS — I25.119 CORONARY ARTERY DISEASE INVOLVING NATIVE CORONARY ARTERY OF NATIVE HEART WITH ANGINA PECTORIS (HCC): ICD-10-CM

## 2018-08-10 DIAGNOSIS — I10 ESSENTIAL HYPERTENSION: ICD-10-CM

## 2018-08-10 DIAGNOSIS — I20.8 ANGINAL EQUIVALENT (HCC): ICD-10-CM

## 2018-08-10 DIAGNOSIS — R00.1 BRADYCARDIA, SINUS: ICD-10-CM

## 2018-08-10 DIAGNOSIS — I11.9 LVH (LEFT VENTRICULAR HYPERTROPHY) DUE TO HYPERTENSIVE DISEASE, WITHOUT HEART FAILURE: ICD-10-CM

## 2018-08-10 DIAGNOSIS — R07.2 PRECORDIAL PAIN: Primary | ICD-10-CM

## 2018-08-10 PROCEDURE — 99214 OFFICE O/P EST MOD 30 MIN: CPT | Performed by: INTERNAL MEDICINE

## 2018-08-10 NOTE — PROGRESS NOTES
"    Subjective:     Encounter Date:08/10/2018      Patient ID: Ramsey Riley is a 63 y.o. male.    Chief Complaint: Chest pain  HPI  This is a 63-year-old male patient who underwent 2 vessel coronary artery bypass surgery in 2009 in [facility].  The patient had a left internal mammary artery graft to the mid left anterior descending as well as a reverse saphenous vein graft to the first obtuse marginal branch.  The patient had a high-grade stenosis in the proximal right coronary artery which was not bypass graft protected due to no distal targets amenable for surgical revascularization.  The patient has been experiencing atypical chest pain.  He underwent a vasodilator nuclear stress test which showed no evidence of ischemia or infarction.  The calculated ejection fraction was normal.  The patient underwent an echocardiogram which showed normal left ventricular systolic function globally and regionally.  There was mild concentric left ventricular hypertrophy with grade 1 diastolic dysfunction and mild left atrial enlargement with mildly elevated luminary artery pressures all consistent with hypertensive cardiac disease.  His blood pressure has been very well controlled over the last several visits.  He reports compliance with his medications.  He has a mild resting bradycardia which has been asymptomatic.  He is not on any medications which would result in bradycardia.  He has no shortness of breath at rest or with activity.  There is no orthopnea PND or lower extremity edema.  There is no dizziness palpitations or syncope.  He is a nonsmoker.  He remains active mowing his yard, using a weedeater as well as a \"[employer]ush[employer]og\".  The following portions of the patient's history were reviewed and updated as appropriate: allergies, current medications, past family history, past medical history, past social history, past surgical history and problem  Review of Systems   Constitution: Negative for chills, diaphoresis, " fever, weakness, malaise/fatigue, weight gain and weight loss.   HENT: Negative for ear discharge, hearing loss, hoarse voice and nosebleeds.    Eyes: Negative for discharge, double vision, pain and photophobia.   Cardiovascular: Positive for chest pain. Negative for claudication, cyanosis, dyspnea on exertion, irregular heartbeat, leg swelling, near-syncope, orthopnea, palpitations, paroxysmal nocturnal dyspnea and syncope.   Respiratory: Negative for cough, hemoptysis, shortness of breath, sputum production and wheezing.    Endocrine: Negative for cold intolerance, heat intolerance, polydipsia, polyphagia and polyuria.   Hematologic/Lymphatic: Negative for adenopathy and bleeding problem. Does not bruise/bleed easily.   Skin: Negative for color change, flushing, itching and rash.   Musculoskeletal: Negative for muscle cramps, muscle weakness, myalgias and stiffness.   Gastrointestinal: Negative for abdominal pain, diarrhea, hematemesis, hematochezia, nausea and vomiting.   Genitourinary: Negative for dysuria, frequency and nocturia.   Neurological: Negative for focal weakness, loss of balance, numbness, paresthesias and seizures.   Psychiatric/Behavioral: Negative for altered mental status, hallucinations and suicidal ideas.   Allergic/Immunologic: Negative for HIV exposure, hives and persistent infections.           Current Outpatient Prescriptions:   •  ALPRAZolam (XANAX) 0.5 MG tablet, Take 1 tablet by mouth Daily., Disp: 30 tablet, Rfl: 3  •  amitriptyline (ELAVIL) 10 MG tablet, Take 1 tablet by mouth Every Night., Disp: 30 tablet, Rfl: 2  •  amLODIPine (NORVASC) 5 MG tablet, Take 1 tablet by mouth Daily., Disp: , Rfl:   •  atorvastatin (LIPITOR) 40 MG tablet, Take 40 mg by mouth Daily., Disp: , Rfl: 2  •  cetirizine (zyrTEC) 10 MG tablet, Take 10 mg by mouth Daily., Disp: , Rfl: 11  •  citalopram (CeleXA) 20 MG tablet, Take 1 tablet by mouth Daily., Disp: , Rfl: 2  •  HYDROcodone-acetaminophen (NORCO) 7.5-325  MG per tablet, Take 1 tablet by mouth 2 (Two) Times a Day., Disp: 60 tablet, Rfl: 0  •  insulin aspart (novoLOG FLEXPEN) 100 UNIT/ML solution pen-injector sc pen, Inject 50 Units under the skin 3 (Three) Times a Day With Meals., Disp: 5 pen, Rfl: 11  •  insulin detemir (LEVEMIR FLEXPEN) 100 UNIT/ML injection, Inject 70 Units under the skin Every Night., Disp: 5 pen, Rfl: 11  •  levothyroxine (SYNTHROID, LEVOTHROID) 75 MCG tablet, Take 1 tablet by mouth Daily., Disp: , Rfl: 2  •  losartan (COZAAR) 100 MG tablet, Take 1 tablet by mouth Daily., Disp: , Rfl: 1  •  pantoprazole (PROTONIX) 40 MG EC tablet, , Disp: , Rfl: 2     Objective:     Physical Exam   Constitutional: He is oriented to person, place, and time. He appears well-developed and well-nourished.   HENT:   Head: Normocephalic and atraumatic.   Mouth/Throat: Oropharynx is clear and moist.   Eyes: Pupils are equal, round, and reactive to light. Conjunctivae and EOM are normal. No scleral icterus.   Neck: Normal range of motion. Neck supple. No JVD present. No tracheal deviation present. No thyromegaly present.   Cardiovascular: Normal rate, regular rhythm, S1 normal, S2 normal, normal heart sounds, intact distal pulses and normal pulses.  PMI is not displaced.  Exam reveals no gallop and no friction rub.    No murmur heard.  Pulmonary/Chest: Effort normal and breath sounds normal. No respiratory distress. He has no wheezes. He has no rales.   Abdominal: Soft. Bowel sounds are normal. He exhibits no distension and no mass. There is no tenderness. There is no rebound and no guarding.   Musculoskeletal: Normal range of motion. He exhibits no edema or deformity.   Neurological: He is alert and oriented to person, place, and time. He displays normal reflexes. No cranial nerve deficit. Coordination normal.   Skin: Skin is warm and dry. No rash noted. No erythema.   Psychiatric: He has a normal mood and affect. His behavior is normal. Thought content normal.  "    Blood pressure 120/60, pulse 52, resp. rate 18, height 177.8 cm (70\"), weight 97.1 kg (214 lb), SpO2 94 %.   Lab Review:       Assessment:         1. Anginal equivalent (CMS/HCC)  Evidence of inducible ischemia by recent nuclear stress testing.    2. Precordial pain  Noncardiac chest pain    3. Essential hypertension  Good blood pressure control    4. Bradycardia, sinus  Mild and asymptomatic    5. LVH (left ventricular hypertrophy) due to hypertensive disease, without heart failure  No signs or symptoms of congestive heart failure    6. Coronary artery disease involving native coronary artery of native heart without angina pectoris (CMS/HCC)  Stable and angina free.    Procedures     Plan:       No changes in his medication profile have been made at today's visit.  No additional cardiovascular testing is warranted.  The importance of diet exercise and weight management have been reinforced with the patient.  The patient is encouraged to maintain his active lifestyle.  The patient is congratulated on his smoking cessation and cautioned to never again resumed cigarette smoking.         "

## 2018-09-11 ENCOUNTER — OFFICE VISIT (OUTPATIENT)
Dept: INTERNAL MEDICINE | Facility: CLINIC | Age: 63
End: 2018-09-11

## 2018-09-11 VITALS
RESPIRATION RATE: 17 BRPM | HEART RATE: 56 BPM | DIASTOLIC BLOOD PRESSURE: 78 MMHG | BODY MASS INDEX: 30.13 KG/M2 | OXYGEN SATURATION: 98 % | SYSTOLIC BLOOD PRESSURE: 140 MMHG | TEMPERATURE: 98 F | WEIGHT: 210 LBS

## 2018-09-11 DIAGNOSIS — Z79.4 UNCONTROLLED TYPE 2 DIABETES MELLITUS WITH HYPERGLYCEMIA, WITH LONG-TERM CURRENT USE OF INSULIN (HCC): ICD-10-CM

## 2018-09-11 DIAGNOSIS — M25.50 ARTHRALGIA OF MULTIPLE JOINTS: ICD-10-CM

## 2018-09-11 DIAGNOSIS — E11.65 UNCONTROLLED TYPE 2 DIABETES MELLITUS WITH HYPERGLYCEMIA, WITH LONG-TERM CURRENT USE OF INSULIN (HCC): ICD-10-CM

## 2018-09-11 DIAGNOSIS — E78.2 MIXED HYPERLIPIDEMIA: ICD-10-CM

## 2018-09-11 DIAGNOSIS — E03.8 ADULT ONSET HYPOTHYROIDISM: ICD-10-CM

## 2018-09-11 DIAGNOSIS — I10 BENIGN ESSENTIAL HYPERTENSION: Primary | ICD-10-CM

## 2018-09-11 DIAGNOSIS — F41.9 ANXIETY: ICD-10-CM

## 2018-09-11 PROCEDURE — 99214 OFFICE O/P EST MOD 30 MIN: CPT | Performed by: INTERNAL MEDICINE

## 2018-09-11 RX ORDER — ALPRAZOLAM 0.5 MG/1
0.5 TABLET ORAL DAILY
Qty: 30 TABLET | Refills: 3 | Status: SHIPPED | OUTPATIENT
Start: 2018-09-11 | End: 2018-10-11 | Stop reason: SDUPTHER

## 2018-09-11 RX ORDER — HYDROCODONE BITARTRATE AND ACETAMINOPHEN 7.5; 325 MG/1; MG/1
1 TABLET ORAL 2 TIMES DAILY
Qty: 60 TABLET | Refills: 0 | Status: SHIPPED | OUTPATIENT
Start: 2018-09-11 | End: 2018-10-11 | Stop reason: SDUPTHER

## 2018-09-11 NOTE — PROGRESS NOTES
Subjective   Ramsey Riley is a 63 y.o. male.     Chief Complaint   Patient presents with   • Hyperlipidemia   • Hypertension   • Diabetes   • Hypothyroidism   • Anxiety   • Joint Pain       History of Present Illness   HPI: Patient is here to follow up on the blood pressure  The patient is taking the blood pressure medications as prescribed and has had no side effects. The patient also needs refills on medications . The patient is also here to follow up on the cholesterol and  Sugar  And is trying to follow a diet. The patient is also here to follow on thyroid and  due to get lab work done . He complains of anxiety and is on celexa and prn xanax,  He also complains of joint pain and is on prn lortab,  Hyperlipidemia   Pertinent negatives include no chest pain or shortness of breath.   Hypertension   Pertinent negatives include no chest pain, palpitations or shortness of breath.   Diabetes   Pertinent negatives for hypoglycemia include no dizziness, nervousness/anxiousness or pallor. Pertinent negatives for diabetes include no chest pain, no fatigue and no weakness.    The following portions of the patient's history were reviewed and updated as appropriate: allergies, current medications, past family history, past medical history, past social history, past surgical history and problem list.    Review of Systems   Constitutional: Negative for appetite change, fatigue and fever.   HENT: Negative for congestion, ear discharge, ear pain, sinus pressure and sore throat.    Eyes: Negative for pain and discharge.   Respiratory: Negative for cough, shortness of breath and wheezing.    Cardiovascular: Negative for chest pain, palpitations and leg swelling.   Gastrointestinal: Negative for abdominal pain, constipation, diarrhea, nausea and vomiting.   Endocrine: Negative for cold intolerance and heat intolerance.   Genitourinary: Negative for dysuria and flank pain.   Musculoskeletal: Positive for arthralgias. Negative for  joint swelling.   Skin: Negative for pallor and rash.   Allergic/Immunologic: Negative for environmental allergies and food allergies.   Neurological: Negative for dizziness, weakness and numbness.   Hematological: Negative for adenopathy. Does not bruise/bleed easily.   Psychiatric/Behavioral: Negative for behavioral problems and dysphoric mood. The patient is nervous/anxious.          Current Outpatient Prescriptions:   •  ALPRAZolam (XANAX) 0.5 MG tablet, Take 1 tablet by mouth Daily., Disp: 30 tablet, Rfl: 3  •  amitriptyline (ELAVIL) 10 MG tablet, Take 1 tablet by mouth Every Night., Disp: 30 tablet, Rfl: 2  •  amLODIPine (NORVASC) 5 MG tablet, Take 1 tablet by mouth Daily., Disp: , Rfl:   •  atorvastatin (LIPITOR) 40 MG tablet, Take 40 mg by mouth Daily., Disp: , Rfl: 2  •  cetirizine (zyrTEC) 10 MG tablet, Take 10 mg by mouth Daily., Disp: , Rfl: 11  •  citalopram (CeleXA) 20 MG tablet, Take 1 tablet by mouth Daily., Disp: , Rfl: 2  •  HYDROcodone-acetaminophen (NORCO) 7.5-325 MG per tablet, Take 1 tablet by mouth 2 (Two) Times a Day., Disp: 60 tablet, Rfl: 0  •  insulin aspart (novoLOG FLEXPEN) 100 UNIT/ML solution pen-injector sc pen, Inject 50 Units under the skin 3 (Three) Times a Day With Meals., Disp: 5 pen, Rfl: 11  •  insulin detemir (LEVEMIR FLEXPEN) 100 UNIT/ML injection, Inject 70 Units under the skin Every Night., Disp: 5 pen, Rfl: 11  •  levothyroxine (SYNTHROID, LEVOTHROID) 75 MCG tablet, Take 1 tablet by mouth Daily., Disp: , Rfl: 2  •  losartan (COZAAR) 100 MG tablet, Take 1 tablet by mouth Daily., Disp: , Rfl: 1  •  pantoprazole (PROTONIX) 40 MG EC tablet, , Disp: , Rfl: 2    Objective     Blood pressure 140/78, pulse 56, temperature 98 °F (36.7 °C), temperature source Temporal Artery , resp. rate 17, weight 95.3 kg (210 lb), SpO2 98 %.    Physical Exam   Constitutional: He is oriented to person, place, and time. He appears well-developed and well-nourished. No distress.   HENT:   Head:  Normocephalic and atraumatic.   Right Ear: External ear normal.   Left Ear: External ear normal.   Nose: Nose normal.   Mouth/Throat: Oropharynx is clear and moist.   Eyes: Pupils are equal, round, and reactive to light. Conjunctivae and EOM are normal.   Neck: Normal range of motion. Neck supple. No thyromegaly present.   Cardiovascular: Normal rate, regular rhythm and normal heart sounds.    Pulmonary/Chest: Effort normal and breath sounds normal. No respiratory distress. He has no wheezes.   Abdominal: Soft. Bowel sounds are normal. He exhibits no distension. There is no tenderness. There is no guarding.   Musculoskeletal: He exhibits tenderness and deformity. He exhibits no edema.   Lymphadenopathy:     He has no cervical adenopathy.   Neurological: He is alert and oriented to person, place, and time.   No gross  motor or sensory deficits   Skin: Skin is warm. He is not diaphoretic. No erythema.   Psychiatric: He has a normal mood and affect.   Nursing note and vitals reviewed.      Results for orders placed or performed in visit on 06/21/18   Stress Test With Myocardial Perfusion One Day   Result Value Ref Range     CV STRESS PROTOCOL 1 Pharmacologic     Stage 1 1     Duration Min Stage 1 0     Duration Sec Stage 1 10     Stress Dose Regadenoson Stage 1 0.4     Stress Comments Stage 1 10 sec bolus injection     Baseline HR 42 bpm    Baseline /75 mmHg    Peak HR 55 bpm    Percent Max Pred HR 34.59 %    Percent Target HR 41 %    Peak /75 mmHg    Recovery HR 45 bpm    Recovery /74 mmHg    Target HR (85%) 135 bpm    Max. Pred. HR (100%) 159 bpm    Nuclear Prior Study 3     Nuc Stress EF 68 %   Adult Transthoracic Echo Complete W/ Cont if Necessary Per Protocol   Result Value Ref Range    IVSd 1.2 cm    IVSs 1.9 cm    LVIDd 5.5 cm    LVIDs 3.2 cm    LVPWd 1.2 cm     CV ECHO HASMUKH - LVPWS 1.2 cm    IVS/LVPW 1.3     FS 41.8 %    EDV(Teich) 147.4 ml    ESV(Teich) 41.0 ml    EF(Teich) 72.2 %     EDV(cubed) 166.4 ml    ESV(cubed) 32.8 ml    EF(cubed) 80.3 %    % IVS thick 58.3 %    % LVPW thick 26.3 %    LV mass(C)d 234.7 grams    LV mass(C)s 181.4 grams    SV(Teich) 106.5 ml    SV(cubed) 133.6 ml    Ao root diam 3.3 cm    Ao root area 8.6 cm^2    LA dimension 4.2 cm    LA/Ao 1.3     LVOT diam 2.2 cm    LVOT area 3.8 cm^2    LVOT area(traced) 3.8 cm^2    LVLd ap4 8.7 cm    EDV(MOD-sp4) 119.0 ml    LVLs ap4 6.5 cm    ESV(MOD-sp4) 31.0 ml    EF(MOD-sp4) 73.9 %    SV(MOD-sp4) 88.0 ml    EF - Contrast (4Ch) 73.9 ml/m^2    MV E max florence 78.4 cm/sec    MV A max florence 86.6 cm/sec    MV E/A 0.91     MV V2 max 109.0 cm/sec    MV max PG 4.8 mmHg    MV V2 mean 52.6 cm/sec    MV mean PG 1.0 mmHg    MV V2 VTI 35.3 cm    MVA(VTI) 3.3 cm^2    MV P1/2t max florence 80.4 cm/sec    MV P1/2t 83.4 msec    MVA(P1/2t) 2.6 cm^2    MV dec slope 282.5 cm/sec^2    MV dec time 0.28 sec    Ao pk florence 165.5 cm/sec    Ao max PG 11.0 mmHg    Ao max PG (full) 4.3 mmHg    Ao V2 mean 120.0 cm/sec    Ao mean PG 6.0 mmHg    Ao mean PG (full) 3.0 mmHg    Ao V2 VTI 34.9 cm    IRWIN(I,A) 3.3 cm^2    IRWIN(I,D) 3.3 cm^2    IRWIN(V,A) 3.0 cm^2    IRWIN(V,D) 3.0 cm^2    LV V1 max PG 6.7 mmHg    LV V1 mean PG 3.0 mmHg    LV V1 max 129.0 cm/sec    LV V1 mean 85.5 cm/sec    LV V1 VTI 30.4 cm    SV(Ao) 298.5 ml    SV(LVOT) 115.6 ml    TV V2 max 57.1 cm/sec    TV max PG 1.3 mmHg    PA V2 max 118.0 cm/sec    PA max PG 5.6 mmHg    TR max florence 193.0 cm/sec    RVSP(TR) 25.0 mmHg    RAP systole 10.0 mmHg    MVA P1/2T LCG 2.7 cm^2    Echo EF Estimated 80 %    EF(MOD-bp) 78 %         Assessment/Plan   Ramsey was seen today for hyperlipidemia, hypertension, diabetes, hypothyroidism, anxiety and joint pain.    Diagnoses and all orders for this visit:    Benign essential hypertension    Mixed hyperlipidemia    Uncontrolled type 2 diabetes mellitus with hyperglycemia, with long-term current use of insulin (CMS/[facility])    Adult onset hypothyroidism    Anxiety    Arthralgia of multiple  joints    Other orders  -     ALPRAZolam (XANAX) 0.5 MG tablet; Take 1 tablet by mouth Daily.  -     HYDROcodone-acetaminophen (NORCO) 7.5-325 MG per tablet; Take 1 tablet by mouth 2 (Two) Times a Day.        Plan:  1.  Benign essential hypertension: Will continue current medication, low-sodium diet advised  2.mixed hyperlipidemia: will obtain   fasting CMP and lipid panel.  Diet and excise counseled,  Will continue current medications  3. Diabetes  Mellitus  : will obtain   fasting CMP  and hba1c  , diet and exercise counseled , Will continue current medications - insulin novolog and levemir  4. hypothyroidism: will obtain tsh , and continue levothyroxine  5.multiple joint pain : on lortab  6. Anxiety : continue celexa 20 mg qd , prn alprazolam , uds today , lavell reviewed   The patient has read and signed the [facility] Controlled Substance Contract.The patient is aware of the potential for addiction and dependence. This is a short-term use   A Lavell was reviewed and scanned into the chart today. Narcotic contract was signed by patient ,reviewed Urine drug screen   Lavell Report   As part of this patient's treatment plan I am prescribing controlled substances. The patient has been made aware of appropriate use of such medications, including potential risk of somnolence, limited ability to drive and /or work safely, and potential for dependence or overdose. It has also been made clear that these medications are for use by this patient only, without concomitant use of alcohol or other substances unless prescribed.   Patient has completed prescribing agreement detailing terms of continued prescribing of controlled substances, including monitoring LAVELL reports, urine drug screening, and pill counts if necessary. The patient is aware that inappropriate use will result in cessation of prescribing such medications.   LAVELL report has been reviewed   History and physical exam exhibit continued safe and  appropriate use of controlled substances.              Margret Purvis MD

## 2018-10-11 ENCOUNTER — TELEPHONE (OUTPATIENT)
Dept: INTERNAL MEDICINE | Facility: CLINIC | Age: 63
End: 2018-10-11

## 2018-10-11 ENCOUNTER — OFFICE VISIT (OUTPATIENT)
Dept: INTERNAL MEDICINE | Facility: CLINIC | Age: 63
End: 2018-10-11

## 2018-10-11 VITALS
WEIGHT: 206 LBS | OXYGEN SATURATION: 97 % | SYSTOLIC BLOOD PRESSURE: 121 MMHG | RESPIRATION RATE: 16 BRPM | HEART RATE: 63 BPM | BODY MASS INDEX: 29.56 KG/M2 | TEMPERATURE: 97.9 F | DIASTOLIC BLOOD PRESSURE: 71 MMHG

## 2018-10-11 DIAGNOSIS — E03.8 ADULT ONSET HYPOTHYROIDISM: ICD-10-CM

## 2018-10-11 DIAGNOSIS — M25.50 ARTHRALGIA OF MULTIPLE JOINTS: ICD-10-CM

## 2018-10-11 DIAGNOSIS — Z79.4 UNCONTROLLED TYPE 2 DIABETES MELLITUS WITH HYPERGLYCEMIA, WITH LONG-TERM CURRENT USE OF INSULIN (HCC): ICD-10-CM

## 2018-10-11 DIAGNOSIS — F41.9 ANXIETY: ICD-10-CM

## 2018-10-11 DIAGNOSIS — E11.65 UNCONTROLLED TYPE 2 DIABETES MELLITUS WITH HYPERGLYCEMIA, WITH LONG-TERM CURRENT USE OF INSULIN (HCC): ICD-10-CM

## 2018-10-11 DIAGNOSIS — I10 BENIGN ESSENTIAL HYPERTENSION: Primary | ICD-10-CM

## 2018-10-11 DIAGNOSIS — E78.2 MIXED HYPERLIPIDEMIA: ICD-10-CM

## 2018-10-11 PROCEDURE — 99214 OFFICE O/P EST MOD 30 MIN: CPT | Performed by: INTERNAL MEDICINE

## 2018-10-11 RX ORDER — HYDROCODONE BITARTRATE AND ACETAMINOPHEN 7.5; 325 MG/1; MG/1
1 TABLET ORAL 2 TIMES DAILY
Qty: 60 TABLET | Refills: 0 | Status: SHIPPED | OUTPATIENT
Start: 2018-10-11 | End: 2018-10-11 | Stop reason: SDUPTHER

## 2018-10-11 RX ORDER — ALPRAZOLAM 0.5 MG/1
0.5 TABLET ORAL DAILY
Qty: 30 TABLET | Refills: 3 | Status: SHIPPED | OUTPATIENT
Start: 2018-10-11 | End: 2019-01-15 | Stop reason: SDUPTHER

## 2018-10-11 RX ORDER — HYDROCODONE BITARTRATE AND ACETAMINOPHEN 7.5; 325 MG/1; MG/1
1 TABLET ORAL 2 TIMES DAILY
Qty: 60 TABLET | Refills: 0 | Status: SHIPPED | OUTPATIENT
Start: 2018-10-11 | End: 2018-11-20 | Stop reason: SDUPTHER

## 2018-10-11 NOTE — PROGRESS NOTES
Subjective   Ramsey Riley is a 63 y.o. male.     Chief Complaint   Patient presents with   • Hyperlipidemia   • Hypertension   • Diabetes   • Hypothyroidism   • Joint Pain   • Anxiety       History of Present Illness   HPI: Patient is here to follow up on the blood pressure  The patient is taking the blood pressure medications as prescribed and has had no side effects. The patient is also here to follow up on the cholesterol and is trying to follow a diet. The patient is also here to follow on sugar and  due to get lab work done .  He also is here to follow op joint and anxiety  And The patient also needs refills on medications .   Hypertension   Pertinent negatives include no chest pain, palpitations or shortness of breath.   Hyperlipidemia   Pertinent negatives include no chest pain or shortness of breath.   Diabetes   Pertinent negatives for hypoglycemia include no dizziness, nervousness/anxiousness or pallor. Pertinent negatives for diabetes include no chest pain, no shortness of breath  Patient is on acei/arb       The following portions of the patient's history were reviewed and updated as appropriate: allergies, current medications, past family history, past medical history, past social history, past surgical history and problem list.    Review of Systems   Constitutional: Negative for appetite change, fatigue and fever.   HENT: Negative for congestion, ear discharge, ear pain, sinus pressure and sore throat.    Eyes: Negative for pain and discharge.   Respiratory: Negative for cough, shortness of breath and wheezing.    Cardiovascular: Negative for chest pain, palpitations and leg swelling.   Gastrointestinal: Negative for abdominal pain, constipation, diarrhea, nausea and vomiting.   Endocrine: Negative for cold intolerance and heat intolerance.   Genitourinary: Negative for dysuria and flank pain.   Musculoskeletal: Positive for arthralgias. Negative for joint swelling.   Skin: Negative for pallor and  rash.   Allergic/Immunologic: Negative for environmental allergies and food allergies.   Neurological: Negative for dizziness, weakness and numbness.   Hematological: Negative for adenopathy. Does not bruise/bleed easily.   Psychiatric/Behavioral: Negative for behavioral problems and dysphoric mood. The patient is nervous/anxious.          Current Outpatient Prescriptions:   •  ALPRAZolam (XANAX) 0.5 MG tablet, Take 1 tablet by mouth Daily., Disp: 30 tablet, Rfl: 3  •  amitriptyline (ELAVIL) 10 MG tablet, Take 1 tablet by mouth Every Night., Disp: 30 tablet, Rfl: 2  •  amLODIPine (NORVASC) 5 MG tablet, Take 1 tablet by mouth Daily., Disp: , Rfl:   •  atorvastatin (LIPITOR) 40 MG tablet, Take 40 mg by mouth Daily., Disp: , Rfl: 2  •  cetirizine (zyrTEC) 10 MG tablet, Take 10 mg by mouth Daily., Disp: , Rfl: 11  •  citalopram (CeleXA) 20 MG tablet, Take 1 tablet by mouth Daily., Disp: , Rfl: 2  •  HYDROcodone-acetaminophen (NORCO) 7.5-325 MG per tablet, Take 1 tablet by mouth 2 (Two) Times a Day., Disp: 60 tablet, Rfl: 0  •  insulin detemir (LEVEMIR FLEXPEN) 100 UNIT/ML injection, Inject 70 Units under the skin Every Night., Disp: 5 pen, Rfl: 11  •  levothyroxine (SYNTHROID, LEVOTHROID) 75 MCG tablet, Take 1 tablet by mouth Daily., Disp: , Rfl: 2  •  losartan (COZAAR) 100 MG tablet, Take 1 tablet by mouth Daily., Disp: , Rfl: 1  •  pantoprazole (PROTONIX) 40 MG EC tablet, , Disp: , Rfl: 2  •  insulin aspart (NOVOLOG) 100 UNIT/ML injection, Inject 50 Units under the skin into the appropriate area as directed 3 (Three) Times a Day Before Meals. Dispense 5 vials with 12r, Disp: 5 each, Rfl: 12    Objective     Blood pressure 121/71, pulse 63, temperature 97.9 °F (36.6 °C), temperature source Temporal Artery , resp. rate 16, weight 93.4 kg (206 lb), SpO2 97 %.    Physical Exam   Constitutional: He is oriented to person, place, and time. He appears well-developed and well-nourished. No distress.   HENT:   Head:  Normocephalic and atraumatic.   Right Ear: External ear normal.   Left Ear: External ear normal.   Nose: Nose normal.   Mouth/Throat: Oropharynx is clear and moist.   Eyes: Pupils are equal, round, and reactive to light. Conjunctivae and EOM are normal.   Neck: Normal range of motion. Neck supple. No thyromegaly present.   Cardiovascular: Normal rate, regular rhythm and normal heart sounds.    Pulmonary/Chest: Effort normal. No respiratory distress.   Abdominal: Soft. He exhibits no distension. There is no tenderness. There is no guarding.   Musculoskeletal: He exhibits tenderness and deformity. He exhibits no edema.   Lymphadenopathy:     He has no cervical adenopathy.   Neurological: He is alert and oriented to person, place, and time.   No gross motor or sensory deficits   Skin: Skin is warm and dry. He is not diaphoretic. No erythema.   Psychiatric: He has a normal mood and affect.   Nursing note and vitals reviewed.      Results for orders placed or performed in visit on 06/21/18   Stress Test With Myocardial Perfusion One Day   Result Value Ref Range     CV STRESS PROTOCOL 1 Pharmacologic     Stage 1 1     Duration Min Stage 1 0     Duration Sec Stage 1 10     Stress Dose Regadenoson Stage 1 0.4     Stress Comments Stage 1 10 sec bolus injection     Baseline HR 42 bpm    Baseline /75 mmHg    Peak HR 55 bpm    Percent Max Pred HR 34.59 %    Percent Target HR 41 %    Peak /75 mmHg    Recovery HR 45 bpm    Recovery /74 mmHg    Target HR (85%) 135 bpm    Max. Pred. HR (100%) 159 bpm    Nuclear Prior Study 3     Nuc Stress EF 68 %   Adult Transthoracic Echo Complete W/ Cont if Necessary Per Protocol   Result Value Ref Range    IVSd 1.2 cm    IVSs 1.9 cm    LVIDd 5.5 cm    LVIDs 3.2 cm    LVPWd 1.2 cm     CV ECHO HASMUKH - LVPWS 1.2 cm    IVS/LVPW 1.3     FS 41.8 %    EDV(Teich) 147.4 ml    ESV(Teich) 41.0 ml    EF(Teich) 72.2 %    EDV(cubed) 166.4 ml    ESV(cubed) 32.8 ml    EF(cubed) 80.3 %    %  IVS thick 58.3 %    % LVPW thick 26.3 %    LV mass(C)d 234.7 grams    LV mass(C)s 181.4 grams    SV(Teich) 106.5 ml    SV(cubed) 133.6 ml    Ao root diam 3.3 cm    Ao root area 8.6 cm^2    LA dimension 4.2 cm    LA/Ao 1.3     LVOT diam 2.2 cm    LVOT area 3.8 cm^2    LVOT area(traced) 3.8 cm^2    LVLd ap4 8.7 cm    EDV(MOD-sp4) 119.0 ml    LVLs ap4 6.5 cm    ESV(MOD-sp4) 31.0 ml    EF(MOD-sp4) 73.9 %    SV(MOD-sp4) 88.0 ml    EF - Contrast (4Ch) 73.9 ml/m^2    MV E max florence 78.4 cm/sec    MV A max florence 86.6 cm/sec    MV E/A 0.91     MV V2 max 109.0 cm/sec    MV max PG 4.8 mmHg    MV V2 mean 52.6 cm/sec    MV mean PG 1.0 mmHg    MV V2 VTI 35.3 cm    MVA(VTI) 3.3 cm^2    MV P1/2t max florence 80.4 cm/sec    MV P1/2t 83.4 msec    MVA(P1/2t) 2.6 cm^2    MV dec slope 282.5 cm/sec^2    MV dec time 0.28 sec    Ao pk florence 165.5 cm/sec    Ao max PG 11.0 mmHg    Ao max PG (full) 4.3 mmHg    Ao V2 mean 120.0 cm/sec    Ao mean PG 6.0 mmHg    Ao mean PG (full) 3.0 mmHg    Ao V2 VTI 34.9 cm    IRWIN(I,A) 3.3 cm^2    IRWIN(I,D) 3.3 cm^2    IRWIN(V,A) 3.0 cm^2    IRWIN(V,D) 3.0 cm^2    LV V1 max PG 6.7 mmHg    LV V1 mean PG 3.0 mmHg    LV V1 max 129.0 cm/sec    LV V1 mean 85.5 cm/sec    LV V1 VTI 30.4 cm    SV(Ao) 298.5 ml    SV(LVOT) 115.6 ml    TV V2 max 57.1 cm/sec    TV max PG 1.3 mmHg    PA V2 max 118.0 cm/sec    PA max PG 5.6 mmHg    TR max florence 193.0 cm/sec    RVSP(TR) 25.0 mmHg    RAP systole 10.0 mmHg    MVA P1/2T LCG 2.7 cm^2    Echo EF Estimated 80 %    EF(MOD-bp) 78 %         Assessment/Plan   Ramsey was seen today for hyperlipidemia, hypertension, diabetes, hypothyroidism, joint pain and anxiety.    Diagnoses and all orders for this visit:    Benign essential hypertension    Mixed hyperlipidemia  -     CBC & Differential  -     Comprehensive Metabolic Panel  -     Lipid Panel    Uncontrolled type 2 diabetes mellitus with hyperglycemia, with long-term current use of insulin (CMS/[facility])  -     Hemoglobin A1c  -     Microalbumin /  Creatinine Urine Ratio - Urine, Clean Catch    Adult onset hypothyroidism  -     TSH    Arthralgia of multiple joints    Anxiety    Other orders  -     insulin aspart (NOVOLOG) 100 UNIT/ML injection; Inject 50 Units under the skin into the appropriate area as directed 3 (Three) Times a Day Before Meals. Dispense 5 vials with 12r  -     Discontinue: HYDROcodone-acetaminophen (NORCO) 7.5-325 MG per tablet; Take 1 tablet by mouth 2 (Two) Times a Day.  -     Discontinue: HYDROcodone-acetaminophen (NORCO) 7.5-325 MG per tablet; Take 1 tablet by mouth 2 (Two) Times a Day.  -     Discontinue: HYDROcodone-acetaminophen (NORCO) 7.5-325 MG per tablet; Take 1 tablet by mouth 2 (Two) Times a Day.  -     HYDROcodone-acetaminophen (NORCO) 7.5-325 MG per tablet; Take 1 tablet by mouth 2 (Two) Times a Day.  -     ALPRAZolam (XANAX) 0.5 MG tablet; Take 1 tablet by mouth Daily.        Plan:  1.  Benign essential hypertension: Will continue current medication, low-sodium diet advised  2.mixed hyperlipidemia: will obtain   fasting CMP and lipid panel.  Diet and excise counseled,  Will continue current medications - lipitor   3. Diabetes  Mellitus  : will obtain   fasting CMP  and hba1c  , diet and exercise counseled , Will continue current medications - insulin , last hba1c 11.9   4. hypothyroidism: will obtain tsh , and continue levothyroxine  5. Multiple joint pain : refilled lortab  6. Anxiety : refilled alprazolam   The patient has read and signed the [facility] Controlled Substance Contract.The patient is aware of the potential for addiction and dependence. This is a short-term use   A Enoc and Urine drug screen was reviewed and scanned into the chart today. Narcotic contract was signed by patient   Enoc Report   As part of this patient's treatment plan I am prescribing controlled substances. The patient has been made aware of appropriate use of such medications, including potential risk of somnolence, limited ability to  drive and /or work safely, and potential for dependence or overdose. It has also been made clear that these medications are for use by this patient only, without concomitant use of alcohol or other substances unless prescribed.   Patient has completed prescribing agreement detailing terms of continued prescribing of controlled substances, including monitoring LAVELL reports, urine drug screening, and pill counts if necessary. The patient is aware that inappropriate use will result in cessation of prescribing such medications.   LAVELL report has been reviewed   History and physical exam exhibit continued safe and appropriate use of controlled substances.              Margret Purvis MD

## 2018-10-12 LAB
ALBUMIN SERPL-MCNC: 4.3 G/DL (ref 3.5–5)
ALBUMIN/CREAT UR: 1206.1 MG/G CREAT (ref 0–30)
ALBUMIN/GLOB SERPL: 1.6 G/DL (ref 1–2)
ALP SERPL-CCNC: 87 U/L (ref 38–126)
ALT SERPL-CCNC: 42 U/L (ref 13–69)
AST SERPL-CCNC: 38 U/L (ref 15–46)
BASOPHILS # BLD AUTO: 0.03 10*3/MM3 (ref 0–0.2)
BASOPHILS NFR BLD AUTO: 0.5 % (ref 0–2.5)
BILIRUB SERPL-MCNC: 1 MG/DL (ref 0.2–1.3)
BUN SERPL-MCNC: 15 MG/DL (ref 7–20)
BUN/CREAT SERPL: 18.8 (ref 6.3–21.9)
CALCIUM SERPL-MCNC: 9.6 MG/DL (ref 8.4–10.2)
CHLORIDE SERPL-SCNC: 102 MMOL/L (ref 98–107)
CHOLEST SERPL-MCNC: 164 MG/DL (ref 0–199)
CO2 SERPL-SCNC: 23 MMOL/L (ref 26–30)
CREAT SERPL-MCNC: 0.8 MG/DL (ref 0.6–1.3)
CREAT UR-MCNC: 55.5 MG/DL
EOSINOPHIL # BLD AUTO: 0.11 10*3/MM3 (ref 0–0.7)
EOSINOPHIL NFR BLD AUTO: 2 % (ref 0–7)
ERYTHROCYTE [DISTWIDTH] IN BLOOD BY AUTOMATED COUNT: 12.2 % (ref 11.5–14.5)
GLOBULIN SER CALC-MCNC: 2.7 GM/DL
GLUCOSE SERPL-MCNC: 393 MG/DL (ref 74–98)
HBA1C MFR BLD: 13.1 %
HCT VFR BLD AUTO: 45.9 % (ref 42–52)
HDLC SERPL-MCNC: 42 MG/DL (ref 40–60)
HGB BLD-MCNC: 15.9 G/DL (ref 14–18)
IMM GRANULOCYTES # BLD: 0.02 10*3/MM3 (ref 0–0.06)
IMM GRANULOCYTES NFR BLD: 0.4 % (ref 0–0.6)
LDLC SERPL CALC-MCNC: 85 MG/DL (ref 0–99)
LYMPHOCYTES # BLD AUTO: 2.34 10*3/MM3 (ref 0.6–3.4)
LYMPHOCYTES NFR BLD AUTO: 41.7 % (ref 10–50)
MCH RBC QN AUTO: 30.1 PG (ref 27–31)
MCHC RBC AUTO-ENTMCNC: 34.6 G/DL (ref 30–37)
MCV RBC AUTO: 86.9 FL (ref 80–94)
MICROALBUMIN UR-MCNC: 669.4 UG/ML
MONOCYTES # BLD AUTO: 0.46 10*3/MM3 (ref 0–0.9)
MONOCYTES NFR BLD AUTO: 8.2 % (ref 0–12)
NEUTROPHILS # BLD AUTO: 2.65 10*3/MM3 (ref 2–6.9)
NEUTROPHILS NFR BLD AUTO: 47.2 % (ref 37–80)
NRBC BLD AUTO-RTO: 0 /100 WBC (ref 0–0)
PLATELET # BLD AUTO: 147 10*3/MM3 (ref 130–400)
POTASSIUM SERPL-SCNC: 4.4 MMOL/L (ref 3.5–5.1)
PROT SERPL-MCNC: 7 G/DL (ref 6.3–8.2)
RBC # BLD AUTO: 5.28 10*6/MM3 (ref 4.7–6.1)
SODIUM SERPL-SCNC: 134 MMOL/L (ref 137–145)
TRIGL SERPL-MCNC: 185 MG/DL
TSH SERPL DL<=0.005 MIU/L-ACNC: 4.29 MIU/ML (ref 0.47–4.68)
VLDLC SERPL CALC-MCNC: 37 MG/DL
WBC # BLD AUTO: 5.61 10*3/MM3 (ref 4.8–10.8)

## 2018-11-19 RX ORDER — AMITRIPTYLINE HYDROCHLORIDE 10 MG/1
10 TABLET, FILM COATED ORAL NIGHTLY
Qty: 30 TABLET | Refills: 2 | Status: SHIPPED | OUTPATIENT
Start: 2018-11-19 | End: 2019-02-13 | Stop reason: SDUPTHER

## 2018-11-20 RX ORDER — HYDROCODONE BITARTRATE AND ACETAMINOPHEN 7.5; 325 MG/1; MG/1
TABLET ORAL
Qty: 60 TABLET | Refills: 0 | Status: SHIPPED | OUTPATIENT
Start: 2018-11-20 | End: 2019-01-15 | Stop reason: SDUPTHER

## 2018-11-27 ENCOUNTER — TELEPHONE (OUTPATIENT)
Dept: INTERNAL MEDICINE | Facility: CLINIC | Age: 63
End: 2018-11-27

## 2018-11-27 NOTE — TELEPHONE ENCOUNTER
Patients wife called requesting a refill on the patients pain medication.    She would like a call back when this has been completed.

## 2019-01-15 ENCOUNTER — OFFICE VISIT (OUTPATIENT)
Dept: INTERNAL MEDICINE | Facility: CLINIC | Age: 64
End: 2019-01-15

## 2019-01-15 ENCOUNTER — RESULTS ENCOUNTER (OUTPATIENT)
Dept: INTERNAL MEDICINE | Facility: CLINIC | Age: 64
End: 2019-01-15

## 2019-01-15 VITALS
WEIGHT: 206 LBS | SYSTOLIC BLOOD PRESSURE: 128 MMHG | BODY MASS INDEX: 29.56 KG/M2 | DIASTOLIC BLOOD PRESSURE: 75 MMHG | HEART RATE: 68 BPM | TEMPERATURE: 98.1 F | OXYGEN SATURATION: 100 % | RESPIRATION RATE: 16 BRPM

## 2019-01-15 DIAGNOSIS — M25.50 ARTHRALGIA OF MULTIPLE JOINTS: ICD-10-CM

## 2019-01-15 DIAGNOSIS — E03.8 ADULT ONSET HYPOTHYROIDISM: ICD-10-CM

## 2019-01-15 DIAGNOSIS — E11.65 UNCONTROLLED TYPE 2 DIABETES MELLITUS WITH HYPERGLYCEMIA, WITH LONG-TERM CURRENT USE OF INSULIN (HCC): ICD-10-CM

## 2019-01-15 DIAGNOSIS — Z79.4 UNCONTROLLED TYPE 2 DIABETES MELLITUS WITH HYPERGLYCEMIA, WITH LONG-TERM CURRENT USE OF INSULIN (HCC): ICD-10-CM

## 2019-01-15 DIAGNOSIS — F41.9 ANXIETY: ICD-10-CM

## 2019-01-15 DIAGNOSIS — I10 BENIGN ESSENTIAL HYPERTENSION: Primary | ICD-10-CM

## 2019-01-15 DIAGNOSIS — Z23 NEED FOR INFLUENZA VACCINATION: ICD-10-CM

## 2019-01-15 DIAGNOSIS — E78.2 MIXED HYPERLIPIDEMIA: ICD-10-CM

## 2019-01-15 DIAGNOSIS — Z23 NEED FOR VACCINATION: ICD-10-CM

## 2019-01-15 PROCEDURE — 90670 PCV13 VACCINE IM: CPT | Performed by: INTERNAL MEDICINE

## 2019-01-15 PROCEDURE — G0008 ADMIN INFLUENZA VIRUS VAC: HCPCS | Performed by: INTERNAL MEDICINE

## 2019-01-15 PROCEDURE — 99214 OFFICE O/P EST MOD 30 MIN: CPT | Performed by: INTERNAL MEDICINE

## 2019-01-15 PROCEDURE — G0009 ADMIN PNEUMOCOCCAL VACCINE: HCPCS | Performed by: INTERNAL MEDICINE

## 2019-01-15 PROCEDURE — 90674 CCIIV4 VAC NO PRSV 0.5 ML IM: CPT | Performed by: INTERNAL MEDICINE

## 2019-01-15 RX ORDER — HYDROCODONE BITARTRATE AND ACETAMINOPHEN 7.5; 325 MG/1; MG/1
1 TABLET ORAL 2 TIMES DAILY
Qty: 60 TABLET | Refills: 0 | Status: SHIPPED | OUTPATIENT
Start: 2019-01-15 | End: 2019-02-21 | Stop reason: SDUPTHER

## 2019-01-15 RX ORDER — ALPRAZOLAM 0.5 MG/1
0.5 TABLET ORAL NIGHTLY PRN
Qty: 30 TABLET | Refills: 4 | Status: SHIPPED | OUTPATIENT
Start: 2019-01-15 | End: 2019-04-15 | Stop reason: SDUPTHER

## 2019-01-15 NOTE — PROGRESS NOTES
Subjective   Ramsey Riley is a 63 y.o. male.     Chief Complaint   Patient presents with   • Hypertension   • Hyperlipidemia   • Diabetes   • Hypothyroidism   • Joint Pain   • Anxiety       History of Present Illness   HPI: Patient is here to follow up on the blood pressure  The patient is taking the blood pressure medications as prescribed and has had no side effects. The patient is also here to follow up on the cholesterol and is trying to follow a diet. The patient is also here to follow on sugar and thyroid  And   due to get lab work done . The patient also complains of chronic multiple joint pain and   Anxiety and needs refills on medications .  He also needs flu and prevnar shots   Hypertension   Pertinent negatives include no chest pain, palpitations or shortness of breath.   Hyperlipidemia   Pertinent negatives include no chest pain or shortness of breath.   Diabetes   Pertinent negatives for hypoglycemia include no dizziness, nervousness/anxiousness or pallor. Pertinent negatives for diabetes include no chest pain, no shortness of breath  Patient is on acei/arb     The following portions of the patient's history were reviewed and updated as appropriate: allergies, current medications, past family history, past medical history, past social history, past surgical history and problem list.    Review of Systems   Constitutional: Negative for appetite change, fatigue and fever.   HENT: Negative for congestion, ear discharge, ear pain, sinus pressure and sore throat.    Eyes: Negative for pain and discharge.   Respiratory: Negative for cough, shortness of breath and wheezing.    Cardiovascular: Negative for chest pain, palpitations and leg swelling.   Gastrointestinal: Negative for abdominal pain, constipation, diarrhea, nausea and vomiting.   Endocrine: Negative for cold intolerance and heat intolerance.   Genitourinary: Negative for dysuria and flank pain.   Musculoskeletal: Positive for arthralgias.  Negative for joint swelling.   Skin: Negative for pallor and rash.   Allergic/Immunologic: Negative for environmental allergies and food allergies.   Neurological: Negative for dizziness, weakness and numbness.   Hematological: Negative for adenopathy. Does not bruise/bleed easily.   Psychiatric/Behavioral: Negative for behavioral problems and dysphoric mood. The patient is nervous/anxious.          Current Outpatient Medications:   •  ALPRAZolam (XANAX) 0.5 MG tablet, Take 1 tablet by mouth At Night As Needed for Anxiety., Disp: 30 tablet, Rfl: 4  •  amitriptyline (ELAVIL) 10 MG tablet, TAKE 1 TABLET BY MOUTH EVERY NIGHT., Disp: 30 tablet, Rfl: 2  •  amLODIPine (NORVASC) 5 MG tablet, Take 1 tablet by mouth Daily., Disp: , Rfl:   •  atorvastatin (LIPITOR) 40 MG tablet, Take 40 mg by mouth Daily., Disp: , Rfl: 2  •  cetirizine (zyrTEC) 10 MG tablet, Take 10 mg by mouth Daily., Disp: , Rfl: 11  •  citalopram (CeleXA) 20 MG tablet, Take 1 tablet by mouth Daily., Disp: , Rfl: 2  •  HYDROcodone-acetaminophen (NORCO) 7.5-325 MG per tablet, Take 1 tablet by mouth 2 (Two) Times a Day., Disp: 60 tablet, Rfl: 0  •  insulin aspart (NOVOLOG) 100 UNIT/ML injection, Inject 50 Units under the skin into the appropriate area as directed 3 (Three) Times a Day Before Meals. Dispense 5 vials with 12r, Disp: 5 each, Rfl: 12  •  insulin detemir (LEVEMIR FLEXPEN) 100 UNIT/ML injection, Inject 70 Units under the skin Every Night., Disp: 5 pen, Rfl: 11  •  levothyroxine (SYNTHROID, LEVOTHROID) 75 MCG tablet, Take 1 tablet by mouth Daily., Disp: , Rfl: 2  •  losartan (COZAAR) 100 MG tablet, Take 1 tablet by mouth Daily., Disp: , Rfl: 1  •  pantoprazole (PROTONIX) 40 MG EC tablet, , Disp: , Rfl: 2    Objective     Blood pressure 128/75, pulse 68, temperature 98.1 °F (36.7 °C), temperature source Temporal, resp. rate 16, weight 93.4 kg (206 lb), SpO2 100 %.    Physical Exam   Constitutional: He is oriented to person, place, and time. He  appears well-developed and well-nourished. No distress.   HENT:   Head: Normocephalic and atraumatic.   Right Ear: External ear normal.   Left Ear: External ear normal.   Nose: Nose normal.   Mouth/Throat: Oropharynx is clear and moist.   Eyes: Conjunctivae and EOM are normal. Pupils are equal, round, and reactive to light.   Neck: Normal range of motion. Neck supple. No thyromegaly present.   Cardiovascular: Normal rate, regular rhythm and normal heart sounds.   Pulmonary/Chest: Effort normal. No respiratory distress.   Abdominal: Soft. He exhibits no distension. There is no tenderness. There is no guarding.   Musculoskeletal: He exhibits tenderness and deformity. He exhibits no edema.   Lymphadenopathy:     He has no cervical adenopathy.   Neurological: He is alert and oriented to person, place, and time.   No gross motor or sensory deficits   Skin: Skin is warm and dry. He is not diaphoretic. No erythema.   Psychiatric: He has a normal mood and affect.   Nursing note and vitals reviewed.      Results for orders placed or performed in visit on 10/11/18   Comprehensive Metabolic Panel   Result Value Ref Range    Glucose 393 (C) 74 - 98 mg/dL    BUN 15 7 - 20 mg/dL    Creatinine 0.80 0.60 - 1.30 mg/dL    eGFR Non African Am 98 >60 mL/min/1.73    eGFR African Am 119 >60 mL/min/1.73    BUN/Creatinine Ratio 18.8 6.3 - 21.9    Sodium 134 (L) 137 - 145 mmol/L    Potassium 4.4 3.5 - 5.1 mmol/L    Chloride 102 98 - 107 mmol/L    Total CO2 23.0 (L) 26.0 - 30.0 mmol/L    Calcium 9.6 8.4 - 10.2 mg/dL    Total Protein 7.0 6.3 - 8.2 g/dL    Albumin 4.30 3.50 - 5.00 g/dL    Globulin 2.7 gm/dL    A/G Ratio 1.6 1.0 - 2.0 g/dL    Total Bilirubin 1.0 0.2 - 1.3 mg/dL    Alkaline Phosphatase 87 38 - 126 U/L    AST (SGOT) 38 15 - 46 U/L    ALT (SGPT) 42 13 - 69 U/L   Lipid Panel   Result Value Ref Range    Total Cholesterol 164 0 - 199 mg/dL    Triglycerides 185 (H) <150 mg/dL    HDL Cholesterol 42 40 - 60 mg/dL    VLDL Cholesterol 37  mg/dL    LDL Cholesterol  85 0 - 99 mg/dL   Hemoglobin A1c   Result Value Ref Range    Hemoglobin A1C 13.10 %   Microalbumin / Creatinine Urine Ratio - Urine, Clean Catch   Result Value Ref Range    Creatinine, Urine 55.5 Not Estab. mg/dL    Microalbumin, Urine 669.4 Not Estab. ug/mL    Microalbumin/Creatinine Ratio 1,206.1 (H) 0.0 - 30.0 mg/g creat   TSH   Result Value Ref Range    TSH 4.290 0.470 - 4.680 mIU/mL   CBC & Differential   Result Value Ref Range    WBC 5.61 4.80 - 10.80 10*3/mm3    RBC 5.28 4.70 - 6.10 10*6/mm3    Hemoglobin 15.9 14.0 - 18.0 g/dL    Hematocrit 45.9 42.0 - 52.0 %    MCV 86.9 80.0 - 94.0 fL    MCH 30.1 27.0 - 31.0 pg    MCHC 34.6 30.0 - 37.0 g/dL    RDW 12.2 11.5 - 14.5 %    Platelets 147 130 - 400 10*3/mm3    Neutrophil Rel % 47.2 37.0 - 80.0 %    Lymphocyte Rel % 41.7 10.0 - 50.0 %    Monocyte Rel % 8.2 0.0 - 12.0 %    Eosinophil Rel % 2.0 0.0 - 7.0 %    Basophil Rel % 0.5 0.0 - 2.5 %    Neutrophils Absolute 2.65 2.00 - 6.90 10*3/mm3    Lymphocytes Absolute 2.34 0.60 - 3.40 10*3/mm3    Monocytes Absolute 0.46 0.00 - 0.90 10*3/mm3    Eosinophils Absolute 0.11 0.00 - 0.70 10*3/mm3    Basophils Absolute 0.03 0.00 - 0.20 10*3/mm3    Immature Granulocyte Rel % 0.4 0.0 - 0.6 %    Immature Grans Absolute 0.02 0.00 - 0.06 10*3/mm3    nRBC 0.0 0.0 - 0.0 /100 WBC         Assessment/Plan   Ramsey was seen today for hypertension, hyperlipidemia, diabetes, hypothyroidism, joint pain and anxiety.    Diagnoses and all orders for this visit:    Benign essential hypertension    Mixed hyperlipidemia  -     CBC & Differential  -     Comprehensive Metabolic Panel  -     Lipid Panel    Uncontrolled type 2 diabetes mellitus with hyperglycemia, with long-term current use of insulin (CMS/[facility])  -     Ambulatory Referral to Podiatry  -     Hemoglobin A1c    Adult onset hypothyroidism  -     TSH    Arthralgia of multiple joints  -     Urine Drug Screen - Urine, Clean Catch; Future    Anxiety    Need for  influenza vaccination  -     Flucelvax Quad (Vial) =>4 yrs ([id number])    Need for vaccination  -     pneumococcal conj. 13-valent (PREVNAR-13) vaccine 0.5 mL; Inject 0.5 mL into the appropriate muscle as directed by prescriber 1 (One) Time.    Other orders  -     HYDROcodone-acetaminophen (NORCO) 7.5-325 MG per tablet; Take 1 tablet by mouth 2 (Two) Times a Day.  -     ALPRAZolam (XANAX) 0.5 MG tablet; Take 1 tablet by mouth At Night As Needed for Anxiety.          Plan:  1.  Benign essential hypertension: Will continue current medication, low-sodium diet advised  2.mixed hyperlipidemia: will obtain   fasting CMP and lipid panel.  Diet and exercise counseled,  Will continue current medications  3. Diabetes  Mellitus  : will obtain   fasting CMP  and hba1c  , diet and exercise counseled , Will continue current medications , refer to podiatry for diabetic foot exam  4. hypothyroidism: will obtain tsh , and continue levothyroxine  5. Need for flu vaccine : given today  6. Need for prevnar : given today  7. Multiple joint pain :  Refilled lortab  8.  Anxiety : refilled alprazolam   The patient has read and signed the [facility] Controlled Substance Contract.The patient is aware of the potential for addiction and dependence. This is a short-term use   A Enoc was reviewed and scanned into the chart today. Narcotic contract was signed by patient today . Will obtain Urine drug screen   Enoc Report   As part of this patient's treatment plan I am prescribing controlled substances. The patient has been made aware of appropriate use of such medications, including potential risk of somnolence, limited ability to drive and /or work safely, and potential for dependence or overdose. It has also been made clear that these medications are for use by this patient only, without concomitant use of alcohol or other substances unless prescribed.   Patient has completed prescribing agreement detailing terms of continued  prescribing of controlled substances, including monitoring LAVELL reports, urine drug screening, and pill counts if necessary. The patient is aware that inappropriate use will result in cessation of prescribing such medications.   LAVELL report has been reviewed   History and physical exam exhibit continued safe and appropriate use of controlled substances.                Margret Purvis MD

## 2019-02-13 RX ORDER — AMITRIPTYLINE HYDROCHLORIDE 10 MG/1
10 TABLET, FILM COATED ORAL NIGHTLY
Qty: 30 TABLET | Refills: 2 | Status: SHIPPED | OUTPATIENT
Start: 2019-02-13 | End: 2019-10-14 | Stop reason: SDUPTHER

## 2019-02-19 ENCOUNTER — TELEPHONE (OUTPATIENT)
Dept: INTERNAL MEDICINE | Facility: CLINIC | Age: 64
End: 2019-02-19

## 2019-02-19 RX ORDER — AZITHROMYCIN 250 MG/1
TABLET, FILM COATED ORAL
Qty: 6 TABLET | Refills: 0 | Status: SHIPPED | OUTPATIENT
Start: 2019-02-19 | End: 2019-03-26

## 2019-02-19 NOTE — TELEPHONE ENCOUNTER
Patient called and states he has a sinus infection and is asking if he can get a zpack called in.

## 2019-02-20 RX ORDER — HYDROCODONE BITARTRATE AND ACETAMINOPHEN 7.5; 325 MG/1; MG/1
1 TABLET ORAL 2 TIMES DAILY
Qty: 60 TABLET | Refills: 0 | Status: CANCELLED | OUTPATIENT
Start: 2019-02-20

## 2019-02-21 ENCOUNTER — TELEPHONE (OUTPATIENT)
Dept: INTERNAL MEDICINE | Facility: CLINIC | Age: 64
End: 2019-02-21

## 2019-02-21 RX ORDER — HYDROCODONE BITARTRATE AND ACETAMINOPHEN 7.5; 325 MG/1; MG/1
1 TABLET ORAL 2 TIMES DAILY
Qty: 60 TABLET | Refills: 0 | Status: SHIPPED | OUTPATIENT
Start: 2019-02-21 | End: 2019-04-11 | Stop reason: SDUPTHER

## 2019-02-21 NOTE — TELEPHONE ENCOUNTER
Pt states that her pharmacy is telling her that they have not received a fax from our office to get her prescriptions refilled and is requesting that we refax this information back to the pharmacy. Please advise. Confirmed [facility]

## 2019-02-21 NOTE — TELEPHONE ENCOUNTER
Patients wife called back about this, I clarified with her that pt is checking about pain medication request, not zpack. I let her know we sent zpack and she was aware of that, but hydrocodone request from previous message does not look like it was sent yet. She will wait for this    Phone # [phone]

## 2019-03-26 ENCOUNTER — TELEPHONE (OUTPATIENT)
Dept: INTERNAL MEDICINE | Facility: CLINIC | Age: 64
End: 2019-03-26

## 2019-03-26 RX ORDER — CITALOPRAM 20 MG/1
20 TABLET ORAL DAILY
Qty: 90 TABLET | Refills: 1 | Status: SHIPPED | OUTPATIENT
Start: 2019-03-26 | End: 2019-10-02 | Stop reason: SDUPTHER

## 2019-03-26 NOTE — TELEPHONE ENCOUNTER
Patients wife called requesting a refill of his Celexa.  [facility] pharmacy verified.  Please call to update patient.  Thank you.

## 2019-04-11 RX ORDER — HYDROCODONE BITARTRATE AND ACETAMINOPHEN 7.5; 325 MG/1; MG/1
1 TABLET ORAL 2 TIMES DAILY
Qty: 60 TABLET | Refills: 0 | Status: SHIPPED | OUTPATIENT
Start: 2019-04-11 | End: 2019-04-15 | Stop reason: SDUPTHER

## 2019-04-15 ENCOUNTER — OFFICE VISIT (OUTPATIENT)
Dept: INTERNAL MEDICINE | Facility: CLINIC | Age: 64
End: 2019-04-15

## 2019-04-15 VITALS
BODY MASS INDEX: 30.64 KG/M2 | RESPIRATION RATE: 16 BRPM | HEART RATE: 68 BPM | HEIGHT: 70 IN | DIASTOLIC BLOOD PRESSURE: 80 MMHG | TEMPERATURE: 96.8 F | OXYGEN SATURATION: 96 % | WEIGHT: 214 LBS | SYSTOLIC BLOOD PRESSURE: 132 MMHG

## 2019-04-15 DIAGNOSIS — M25.50 ARTHRALGIA OF MULTIPLE JOINTS: ICD-10-CM

## 2019-04-15 DIAGNOSIS — I10 BENIGN ESSENTIAL HYPERTENSION: Primary | ICD-10-CM

## 2019-04-15 DIAGNOSIS — Z79.4 UNCONTROLLED TYPE 2 DIABETES MELLITUS WITH HYPERGLYCEMIA, WITH LONG-TERM CURRENT USE OF INSULIN (HCC): ICD-10-CM

## 2019-04-15 DIAGNOSIS — E78.2 MIXED HYPERLIPIDEMIA: ICD-10-CM

## 2019-04-15 DIAGNOSIS — M79.2 NEURALGIA: ICD-10-CM

## 2019-04-15 DIAGNOSIS — E11.65 UNCONTROLLED TYPE 2 DIABETES MELLITUS WITH HYPERGLYCEMIA, WITH LONG-TERM CURRENT USE OF INSULIN (HCC): ICD-10-CM

## 2019-04-15 DIAGNOSIS — E03.8 ADULT ONSET HYPOTHYROIDISM: ICD-10-CM

## 2019-04-15 PROCEDURE — 99214 OFFICE O/P EST MOD 30 MIN: CPT | Performed by: INTERNAL MEDICINE

## 2019-04-15 RX ORDER — ATORVASTATIN CALCIUM 40 MG/1
40 TABLET, FILM COATED ORAL NIGHTLY
Qty: 90 TABLET | Refills: 2 | Status: SHIPPED | OUTPATIENT
Start: 2019-04-15 | End: 2020-01-09 | Stop reason: SDUPTHER

## 2019-04-15 RX ORDER — PANTOPRAZOLE SODIUM 40 MG/1
40 TABLET, DELAYED RELEASE ORAL DAILY
Qty: 90 TABLET | Refills: 2 | Status: SHIPPED | OUTPATIENT
Start: 2019-04-15 | End: 2020-01-09 | Stop reason: SDUPTHER

## 2019-04-15 RX ORDER — LEVOTHYROXINE SODIUM 0.07 MG/1
75 TABLET ORAL DAILY
Qty: 90 TABLET | Refills: 2 | Status: SHIPPED | OUTPATIENT
Start: 2019-04-15 | End: 2019-10-25 | Stop reason: SDUPTHER

## 2019-04-15 RX ORDER — ALPRAZOLAM 0.5 MG/1
0.5 TABLET ORAL NIGHTLY PRN
Qty: 30 TABLET | Refills: 4 | Status: SHIPPED | OUTPATIENT
Start: 2019-04-15 | End: 2019-12-05 | Stop reason: SDUPTHER

## 2019-04-15 RX ORDER — GABAPENTIN 300 MG/1
1 CAPSULE ORAL 2 TIMES DAILY
Refills: 1 | COMMUNITY
Start: 2019-04-11 | End: 2022-06-01

## 2019-04-15 RX ORDER — HYDROCODONE BITARTRATE AND ACETAMINOPHEN 7.5; 325 MG/1; MG/1
1 TABLET ORAL 2 TIMES DAILY
Qty: 60 TABLET | Refills: 0 | Status: SHIPPED | OUTPATIENT
Start: 2019-04-15 | End: 2019-06-12 | Stop reason: SDUPTHER

## 2019-04-15 NOTE — PROGRESS NOTES
Subjective   Ramsey Riley is a 64 y.o. male.     Chief Complaint   Patient presents with   • Hypertension   • Diabetes   • Hyperlipidemia   • Numbness   • Joint Pain   • Hypothyroidism       History of Present Illness   HPI: Patient is here to follow up on the blood pressure  The patient is taking the blood pressure medications as prescribed and has had no side effects. The patient is also here to follow up on the cholesterol and is trying to follow a diet. The patient is also here to follow on sugar and thyroid and had due to get lab work done . The patient also complains of multiple joint pain and needs refills on medications .  He saw podiatry and is now on neurontin for DM neuropathy after he had a nerve biopsy 2 months ago .  Hypertension   Pertinent negatives include no chest pain, palpitations or shortness of breath.   Hyperlipidemia   Pertinent negatives include no chest pain or shortness of breath.   Diabetes   Pertinent negatives for hypoglycemia include no dizziness, nervousness/anxiousness or pallor. Pertinent negatives for diabetes include no chest pain, no shortness of breath  Patient is on acei/arb     The following portions of the patient's history were reviewed and updated as appropriate: allergies, current medications, past family history, past medical history, past social history, past surgical history and problem list.    Review of Systems   Constitutional: Negative for appetite change, fatigue and fever.   HENT: Negative for congestion, ear discharge, ear pain, sinus pressure and sore throat.    Eyes: Negative for pain and discharge.   Respiratory: Negative for cough, shortness of breath and wheezing.    Cardiovascular: Negative for chest pain, palpitations and leg swelling.   Gastrointestinal: Negative for abdominal pain, constipation, diarrhea, nausea and vomiting.   Endocrine: Negative for cold intolerance and heat intolerance.   Genitourinary: Negative for dysuria and flank pain.    Musculoskeletal: Positive for arthralgias. Negative for joint swelling.   Skin: Negative for pallor and rash.   Allergic/Immunologic: Negative for environmental allergies and food allergies.   Neurological: Positive for numbness. Negative for dizziness and weakness.   Hematological: Negative for adenopathy. Does not bruise/bleed easily.   Psychiatric/Behavioral: Negative for behavioral problems and dysphoric mood. The patient is not nervous/anxious.          Current Outpatient Medications:   •  amitriptyline (ELAVIL) 10 MG tablet, TAKE 1 TABLET BY MOUTH EVERY NIGHT., Disp: 30 tablet, Rfl: 2  •  amLODIPine (NORVASC) 5 MG tablet, Take 1 tablet by mouth Daily., Disp: , Rfl:   •  atorvastatin (LIPITOR) 40 MG tablet, Take 1 tablet by mouth Every Night., Disp: 90 tablet, Rfl: 2  •  cetirizine (zyrTEC) 10 MG tablet, Take 10 mg by mouth Daily., Disp: , Rfl: 11  •  citalopram (CeleXA) 20 MG tablet, Take 1 tablet by mouth Daily., Disp: 90 tablet, Rfl: 1  •  insulin aspart (NOVOLOG) 100 UNIT/ML injection, Inject 50 Units under the skin into the appropriate area as directed 3 (Three) Times a Day Before Meals. Dispense 5 vials with 12r, Disp: 5 each, Rfl: 12  •  insulin detemir (LEVEMIR FLEXPEN) 100 UNIT/ML injection, Inject 70 Units under the skin Every Night., Disp: 5 pen, Rfl: 11  •  levothyroxine (SYNTHROID, LEVOTHROID) 75 MCG tablet, Take 1 tablet by mouth Daily., Disp: 90 tablet, Rfl: 2  •  losartan (COZAAR) 100 MG tablet, Take 1 tablet by mouth Daily., Disp: , Rfl: 1  •  pantoprazole (PROTONIX) 40 MG EC tablet, Take 1 tablet by mouth Daily., Disp: 90 tablet, Rfl: 2  •  ALPRAZolam (XANAX) 0.5 MG tablet, Take 1 tablet by mouth At Night As Needed for Anxiety., Disp: 30 tablet, Rfl: 4  •  gabapentin (NEURONTIN) 300 MG capsule, Take 1 capsule by mouth 2 (Two) Times a Day., Disp: , Rfl: 1  •  HYDROcodone-acetaminophen (NORCO) 7.5-325 MG per tablet, Take 1 tablet by mouth 2 (Two) Times a Day., Disp: 60 tablet, Rfl:  "0    Objective     Blood pressure 132/80, pulse 68, temperature 96.8 °F (36 °C), resp. rate 16, height 177.8 cm (70\"), weight 97.1 kg (214 lb), SpO2 96 %.    Physical Exam   Constitutional: He is oriented to person, place, and time. He appears well-developed and well-nourished. No distress.   HENT:   Head: Normocephalic and atraumatic.   Right Ear: External ear normal.   Left Ear: External ear normal.   Nose: Nose normal.   Mouth/Throat: Oropharynx is clear and moist.   Eyes: Conjunctivae and EOM are normal. Pupils are equal, round, and reactive to light.   Neck: Normal range of motion. Neck supple. No thyromegaly present.   Cardiovascular: Normal rate, regular rhythm and normal heart sounds.   Pulmonary/Chest: Effort normal. No respiratory distress.   Abdominal: Soft. He exhibits no distension. There is no tenderness. There is no guarding.   Musculoskeletal: Normal range of motion. He exhibits no edema.   Lymphadenopathy:     He has no cervical adenopathy.   Neurological: He is alert and oriented to person, place, and time.   No gross motor or sensory deficits   Skin: Skin is warm and dry. He is not diaphoretic. No erythema.   Psychiatric: He has a normal mood and affect.   Nursing note and vitals reviewed.    Patient's Body mass index is 30.71 kg/m². BMI is above normal parameters. Recommendations include: educational material, exercise counseling and nutrition counseling.      Results for orders placed or performed in visit on 04/15/19   Compliance Drug Analysis, Ur - Urine, Clean Catch   Result Value Ref Range    Report Summary FINAL          Assessment/Plan   Ramsey was seen today for hypertension, diabetes, hyperlipidemia, numbness, joint pain and hypothyroidism.    Diagnoses and all orders for this visit:    Benign essential hypertension    Mixed hyperlipidemia  -     CBC & Differential  -     Comprehensive Metabolic Panel  -     Lipid Panel    Uncontrolled type 2 diabetes mellitus with hyperglycemia, with " long-term current use of insulin (CMS/[facility])  -     Hemoglobin A1c  -     Microalbumin / Creatinine Urine Ratio - Urine, Clean Catch    Adult onset hypothyroidism  -     TSH    Neuralgia    Arthralgia of multiple joints  -     Compliance Drug Analysis, Ur - Urine, Clean Catch    Other orders  -     pantoprazole (PROTONIX) 40 MG EC tablet; Take 1 tablet by mouth Daily.  -     atorvastatin (LIPITOR) 40 MG tablet; Take 1 tablet by mouth Every Night.  -     levothyroxine (SYNTHROID, LEVOTHROID) 75 MCG tablet; Take 1 tablet by mouth Daily.      Plan:  1.  Benign essential hypertension: Will continue current medication, low-sodium diet advised  2.mixed hyperlipidemia: will obtain   fasting CMP and lipid panel.  Diet and exercise counseled,  Will continue current medications  3. Diabetes  Mellitus ,uncontrolled  : will obtain   fasting CMP  and hba1c  13.1  , diet and exercise counseled , Will continue current medications  4. hypothyroidism: will obtain tsh , and continue levothyroxine  5. Neuralgia: on gabapentin   6. Multiple joint pain : refilled lortab  The patient has read and signed the [facility] Controlled Substance Contract.The patient is aware of the potential for addiction and dependence. This is a short-term use   A Enoc was reviewed and scanned into the chart today. Narcotic contract was signed by patient today . Will obtain Urine drug screen  [facility] Report   As part of this patient's treatment plan I am prescribing controlled substances. The patient has been made aware of appropriate use of such medications, including potential risk of somnolence, limited ability to drive and /or work safely, and potential for dependence or overdose. It has also been made clear that these medications are for use by this patient only, without concomitant use of alcohol or other substances unless prescribed.   Patient has completed prescribing agreement detailing terms of continued prescribing of controlled substances,  including monitoring LAVELL reports, urine drug screening, and pill counts if necessary. The patient is aware that inappropriate use will result in cessation of prescribing such medications.   LAVELL report has been reviewed   History and physical exam exhibit continued safe and appropriate use of controlled substances. Patient is advised to start a comprehensive exercise program including and not limited to physical therapy program for therapeutic exercise, upper body strengthening/posture correction, core strengthening, gait and balance training and  exercise program such as yoga, Maurisio Chi, water therapy and daily walks for fitness  The patient/ her family have been instructed to contact my office with any questions or difficulties. The patient understands the plan , patient has verbalized an understanding and agrees to proceed accordingly             Margret Purvis MD

## 2019-04-19 LAB — DRUGS UR: NORMAL

## 2019-05-02 LAB
ALBUMIN SERPL-MCNC: 3.9 G/DL (ref 3.5–5)
ALBUMIN/CREAT UR: 1457.4 MG/G CREAT (ref 0–30)
ALBUMIN/GLOB SERPL: 1.4 G/DL (ref 1–2)
ALP SERPL-CCNC: 107 U/L (ref 38–126)
ALT SERPL-CCNC: 41 U/L (ref 13–69)
AST SERPL-CCNC: 41 U/L (ref 15–46)
BASOPHILS # BLD AUTO: 0.03 10*3/MM3 (ref 0–0.2)
BASOPHILS NFR BLD AUTO: 0.6 % (ref 0–1.5)
BILIRUB SERPL-MCNC: 0.5 MG/DL (ref 0.2–1.3)
BUN SERPL-MCNC: 14 MG/DL (ref 7–20)
BUN/CREAT SERPL: 17.5 (ref 6.3–21.9)
CALCIUM SERPL-MCNC: 9.5 MG/DL (ref 8.4–10.2)
CHLORIDE SERPL-SCNC: 99 MMOL/L (ref 98–107)
CHOLEST SERPL-MCNC: 226 MG/DL (ref 0–199)
CO2 SERPL-SCNC: 28 MMOL/L (ref 26–30)
CREAT SERPL-MCNC: 0.8 MG/DL (ref 0.6–1.3)
CREAT UR-MCNC: 73.4 MG/DL
EOSINOPHIL # BLD AUTO: 0.12 10*3/MM3 (ref 0–0.4)
EOSINOPHIL NFR BLD AUTO: 2.4 % (ref 0.3–6.2)
ERYTHROCYTE [DISTWIDTH] IN BLOOD BY AUTOMATED COUNT: 11.9 % (ref 12.3–15.4)
GLOBULIN SER CALC-MCNC: 2.7 GM/DL
GLUCOSE SERPL-MCNC: 348 MG/DL (ref 74–98)
HBA1C MFR BLD: 12.2 % (ref 4.8–5.6)
HCT VFR BLD AUTO: 49.9 % (ref 37.5–51)
HDLC SERPL-MCNC: 46 MG/DL (ref 40–60)
HGB BLD-MCNC: 17.2 G/DL (ref 13–17.7)
IMM GRANULOCYTES # BLD AUTO: 0.02 10*3/MM3 (ref 0–0.05)
IMM GRANULOCYTES NFR BLD AUTO: 0.4 % (ref 0–0.5)
LDLC SERPL CALC-MCNC: 147 MG/DL (ref 0–99)
LYMPHOCYTES # BLD AUTO: 1.88 10*3/MM3 (ref 0.7–3.1)
LYMPHOCYTES NFR BLD AUTO: 37.8 % (ref 19.6–45.3)
MCH RBC QN AUTO: 30.3 PG (ref 26.6–33)
MCHC RBC AUTO-ENTMCNC: 34.5 G/DL (ref 31.5–35.7)
MCV RBC AUTO: 87.9 FL (ref 79–97)
MICROALBUMIN UR-MCNC: 1069.7 UG/ML
MONOCYTES # BLD AUTO: 0.37 10*3/MM3 (ref 0.1–0.9)
MONOCYTES NFR BLD AUTO: 7.4 % (ref 5–12)
NEUTROPHILS # BLD AUTO: 2.56 10*3/MM3 (ref 1.7–7)
NEUTROPHILS NFR BLD AUTO: 51.4 % (ref 42.7–76)
NRBC BLD AUTO-RTO: 0 /100 WBC (ref 0–0.2)
PLATELET # BLD AUTO: 143 10*3/MM3 (ref 140–450)
POTASSIUM SERPL-SCNC: 4.8 MMOL/L (ref 3.5–5.1)
PROT SERPL-MCNC: 6.6 G/DL (ref 6.3–8.2)
RBC # BLD AUTO: 5.68 10*6/MM3 (ref 4.14–5.8)
SODIUM SERPL-SCNC: 135 MMOL/L (ref 137–145)
TRIGL SERPL-MCNC: 166 MG/DL
TSH SERPL DL<=0.005 MIU/L-ACNC: 1.74 MIU/ML (ref 0.47–4.68)
VLDLC SERPL CALC-MCNC: 33.2 MG/DL
WBC # BLD AUTO: 4.98 10*3/MM3 (ref 3.4–10.8)

## 2019-06-12 ENCOUNTER — TELEPHONE (OUTPATIENT)
Dept: INTERNAL MEDICINE | Facility: CLINIC | Age: 64
End: 2019-06-12

## 2019-06-12 RX ORDER — HYDROCODONE BITARTRATE AND ACETAMINOPHEN 7.5; 325 MG/1; MG/1
1 TABLET ORAL 2 TIMES DAILY
Qty: 60 TABLET | Refills: 0 | Status: SHIPPED | OUTPATIENT
Start: 2019-06-12 | End: 2019-07-15 | Stop reason: SDUPTHER

## 2019-06-12 NOTE — TELEPHONE ENCOUNTER
Patient will be in town tomorrow and is wondering if his prescription for his pain medicine will be ready for .  Thank you.

## 2019-06-19 ENCOUNTER — TELEPHONE (OUTPATIENT)
Dept: INTERNAL MEDICINE | Facility: CLINIC | Age: 64
End: 2019-06-19

## 2019-06-19 DIAGNOSIS — E11.65 UNCONTROLLED TYPE 2 DIABETES MELLITUS WITH HYPERGLYCEMIA (HCC): Primary | ICD-10-CM

## 2019-06-19 RX ORDER — BLOOD-GLUCOSE METER
1 KIT MISCELLANEOUS DAILY
Qty: 1 EACH | Refills: 1 | Status: SHIPPED | OUTPATIENT
Start: 2019-06-19

## 2019-06-19 NOTE — TELEPHONE ENCOUNTER
Patient's wife called stating that the patient needed a new blood sugar test machine as it is not operating properly.    Also states the patient needs new test strips.    Please advise.

## 2019-07-15 ENCOUNTER — OFFICE VISIT (OUTPATIENT)
Dept: INTERNAL MEDICINE | Facility: CLINIC | Age: 64
End: 2019-07-15

## 2019-07-15 VITALS
BODY MASS INDEX: 30.92 KG/M2 | WEIGHT: 216 LBS | OXYGEN SATURATION: 97 % | DIASTOLIC BLOOD PRESSURE: 83 MMHG | TEMPERATURE: 96.6 F | SYSTOLIC BLOOD PRESSURE: 152 MMHG | RESPIRATION RATE: 16 BRPM | HEART RATE: 46 BPM | HEIGHT: 70 IN

## 2019-07-15 DIAGNOSIS — E03.8 ADULT ONSET HYPOTHYROIDISM: ICD-10-CM

## 2019-07-15 DIAGNOSIS — M25.50 ARTHRALGIA OF MULTIPLE JOINTS: ICD-10-CM

## 2019-07-15 DIAGNOSIS — Z79.4 UNCONTROLLED TYPE 2 DIABETES MELLITUS WITH HYPERGLYCEMIA, WITH LONG-TERM CURRENT USE OF INSULIN (HCC): ICD-10-CM

## 2019-07-15 DIAGNOSIS — E11.65 UNCONTROLLED TYPE 2 DIABETES MELLITUS WITH HYPERGLYCEMIA, WITH LONG-TERM CURRENT USE OF INSULIN (HCC): ICD-10-CM

## 2019-07-15 DIAGNOSIS — I10 BENIGN ESSENTIAL HYPERTENSION: Primary | ICD-10-CM

## 2019-07-15 DIAGNOSIS — E78.2 MIXED HYPERLIPIDEMIA: ICD-10-CM

## 2019-07-15 PROCEDURE — 99214 OFFICE O/P EST MOD 30 MIN: CPT | Performed by: INTERNAL MEDICINE

## 2019-07-15 RX ORDER — BLOOD-GLUCOSE METER
1 KIT MISCELLANEOUS DAILY
Qty: 1 EACH | Refills: 1 | Status: SHIPPED | OUTPATIENT
Start: 2019-07-15

## 2019-07-15 RX ORDER — HYDROCODONE BITARTRATE AND ACETAMINOPHEN 7.5; 325 MG/1; MG/1
1 TABLET ORAL 2 TIMES DAILY
Qty: 60 TABLET | Refills: 0 | Status: SHIPPED | OUTPATIENT
Start: 2019-07-15 | End: 2019-08-12 | Stop reason: SDUPTHER

## 2019-07-15 NOTE — PROGRESS NOTES
Subjective   Ramsey Riley is a 64 y.o. male.     Chief Complaint   Patient presents with   • Hypertension     fasting   • Hyperlipidemia   • Diabetes   • Hypothyroidism   • Joint Pain       History of Present Illness   HPI: Patient is here to follow up on the blood pressure  The patient is taking the blood pressure medications as prescribed and has had no side effects. The patient is also here to follow up on the cholesterol and is trying to follow a diet. The patient is also here to follow on sugar and thyroid and due to get lab work done . The patient also  Is here to follow up on multiple joint pain and  needs refills on medications .  He is taking the novolog once a day  Hypertension   Pertinent negatives include no chest pain, palpitations or shortness of breath.   Hyperlipidemia   Pertinent negatives include no chest pain or shortness of breath.   Diabetes   Pertinent negatives for hypoglycemia include no dizziness, nervousness/anxiousness or pallor. Pertinent negatives for diabetes include no chest pain, no shortness of breath  Patient is on acei/arb     The following portions of the patient's history were reviewed and updated as appropriate: allergies, current medications, past family history, past medical history, past social history, past surgical history and problem list.    Review of Systems   Constitutional: Negative for appetite change, fatigue and fever.   HENT: Negative for congestion, ear discharge, ear pain, sinus pressure and sore throat.    Eyes: Negative for pain and discharge.   Respiratory: Negative for cough, shortness of breath and wheezing.    Cardiovascular: Negative for chest pain, palpitations and leg swelling.   Gastrointestinal: Negative for abdominal pain, constipation, diarrhea, nausea and vomiting.   Endocrine: Negative for cold intolerance and heat intolerance.   Genitourinary: Negative for dysuria and flank pain.   Musculoskeletal: Positive for arthralgias. Negative for joint  swelling.   Skin: Negative for pallor and rash.   Allergic/Immunologic: Negative for environmental allergies and food allergies.   Neurological: Negative for dizziness, weakness and numbness.   Hematological: Negative for adenopathy. Does not bruise/bleed easily.   Psychiatric/Behavioral: Negative for behavioral problems and dysphoric mood. The patient is not nervous/anxious.          Current Outpatient Medications:   •  ALPRAZolam (XANAX) 0.5 MG tablet, Take 1 tablet by mouth At Night As Needed for Anxiety., Disp: 30 tablet, Rfl: 4  •  amitriptyline (ELAVIL) 10 MG tablet, TAKE 1 TABLET BY MOUTH EVERY NIGHT., Disp: 30 tablet, Rfl: 2  •  amLODIPine (NORVASC) 5 MG tablet, Take 1 tablet by mouth Daily., Disp: , Rfl:   •  atorvastatin (LIPITOR) 40 MG tablet, Take 1 tablet by mouth Every Night., Disp: 90 tablet, Rfl: 2  •  cetirizine (zyrTEC) 10 MG tablet, Take 10 mg by mouth Daily., Disp: , Rfl: 11  •  citalopram (CeleXA) 20 MG tablet, Take 1 tablet by mouth Daily., Disp: 90 tablet, Rfl: 1  •  gabapentin (NEURONTIN) 300 MG capsule, Take 1 capsule by mouth 2 (Two) Times a Day., Disp: , Rfl: 1  •  glucose blood test strip, Check sugar 3 times a day, Disp: 30 each, Rfl: 12  •  glucose monitor monitoring kit, 1 each Daily., Disp: 1 each, Rfl: 1  •  HYDROcodone-acetaminophen (NORCO) 7.5-325 MG per tablet, Take 1 tablet by mouth 2 (Two) Times a Day., Disp: 60 tablet, Rfl: 0  •  insulin aspart (NOVOLOG) 100 UNIT/ML injection, Inject 50 Units under the skin into the appropriate area as directed 3 (Three) Times a Day Before Meals. Dispense 5 vials with 12r, Disp: 5 each, Rfl: 12  •  insulin detemir (LEVEMIR FLEXPEN) 100 UNIT/ML injection, Inject 70 Units under the skin into the appropriate area as directed Every Night., Disp: 5 pen, Rfl: 11  •  Lancets 30G misc, Check sugar 3 times daily, Disp: 30 each, Rfl: 12  •  levothyroxine (SYNTHROID, LEVOTHROID) 75 MCG tablet, Take 1 tablet by mouth Daily., Disp: 90 tablet, Rfl: 2  •   "losartan (COZAAR) 100 MG tablet, Take 1 tablet by mouth Daily., Disp: , Rfl: 1  •  pantoprazole (PROTONIX) 40 MG EC tablet, Take 1 tablet by mouth Daily., Disp: 90 tablet, Rfl: 2  •  glucose monitor monitoring kit, 1 each Daily., Disp: 1 each, Rfl: 1    Objective     Blood pressure 152/83, pulse (!) 46, temperature 96.6 °F (35.9 °C), resp. rate 16, height 177.8 cm (70\"), weight 98 kg (216 lb), SpO2 97 %.    Physical Exam   Constitutional: He is oriented to person, place, and time. He appears well-developed and well-nourished. No distress.   HENT:   Head: Normocephalic and atraumatic.   Right Ear: External ear normal.   Left Ear: External ear normal.   Nose: Nose normal.   Mouth/Throat: Oropharynx is clear and moist.   Eyes: Conjunctivae and EOM are normal. Pupils are equal, round, and reactive to light.   Neck: Normal range of motion. Neck supple. No thyromegaly present.   Cardiovascular: Normal rate, regular rhythm and normal heart sounds.   Pulmonary/Chest: Effort normal. No respiratory distress.   Abdominal: Soft. He exhibits no distension. There is no tenderness. There is no guarding.   Musculoskeletal: He exhibits tenderness and deformity. He exhibits no edema.   Lymphadenopathy:     He has no cervical adenopathy.   Neurological: He is alert and oriented to person, place, and time.   No gross motor or sensory deficits   Skin: Skin is warm and dry. He is not diaphoretic. No erythema.   Psychiatric: He has a normal mood and affect.   Nursing note and vitals reviewed.    Patient's Body mass index is 30.99 kg/m². BMI is above normal parameters. Recommendations include: educational material, exercise counseling and nutrition counseling.      Results for orders placed or performed in visit on 04/15/19   Comprehensive Metabolic Panel   Result Value Ref Range    Glucose 348 (H) 74 - 98 mg/dL    BUN 14 7 - 20 mg/dL    Creatinine 0.80 0.60 - 1.30 mg/dL    eGFR Non African Am 98 >60 mL/min/1.73    eGFR African Am 119 >60 " mL/min/1.73    BUN/Creatinine Ratio 17.5 6.3 - 21.9    Sodium 135 (L) 137 - 145 mmol/L    Potassium 4.8 3.5 - 5.1 mmol/L    Chloride 99 98 - 107 mmol/L    Total CO2 28.0 26.0 - 30.0 mmol/L    Calcium 9.5 8.4 - 10.2 mg/dL    Total Protein 6.6 6.3 - 8.2 g/dL    Albumin 3.90 3.50 - 5.00 g/dL    Globulin 2.7 gm/dL    A/G Ratio 1.4 1.0 - 2.0 g/dL    Total Bilirubin 0.5 0.2 - 1.3 mg/dL    Alkaline Phosphatase 107 38 - 126 U/L    AST (SGOT) 41 15 - 46 U/L    ALT (SGPT) 41 13 - 69 U/L   Lipid Panel   Result Value Ref Range    Total Cholesterol 226 (H) 0 - 199 mg/dL    Triglycerides 166 (H) <150 mg/dL    HDL Cholesterol 46 40 - 60 mg/dL    VLDL Cholesterol 33.2 mg/dL    LDL Cholesterol  147 (H) 0 - 99 mg/dL   Hemoglobin A1c   Result Value Ref Range    Hemoglobin A1C 12.20 (H) 4.80 - 5.60 %   Microalbumin / Creatinine Urine Ratio - Urine, Clean Catch   Result Value Ref Range    Creatinine, Urine 73.4 Not Estab. mg/dL    Microalbumin, Urine 1,069.7 Not Estab. ug/mL    Microalbumin/Creatinine Ratio 1,457.4 (H) 0.0 - 30.0 mg/g creat   TSH   Result Value Ref Range    TSH 1.740 0.470 - 4.680 mIU/mL   Compliance Drug Analysis, Ur - Urine, Clean Catch   Result Value Ref Range    Report Summary FINAL    CBC & Differential   Result Value Ref Range    WBC 4.98 3.40 - 10.80 10*3/mm3    RBC 5.68 4.14 - 5.80 10*6/mm3    Hemoglobin 17.2 13.0 - 17.7 g/dL    Hematocrit 49.9 37.5 - 51.0 %    MCV 87.9 79.0 - 97.0 fL    MCH 30.3 26.6 - 33.0 pg    MCHC 34.5 31.5 - 35.7 g/dL    RDW 11.9 (L) 12.3 - 15.4 %    Platelets 143 140 - 450 10*3/mm3    Neutrophil Rel % 51.4 42.7 - 76.0 %    Lymphocyte Rel % 37.8 19.6 - 45.3 %    Monocyte Rel % 7.4 5.0 - 12.0 %    Eosinophil Rel % 2.4 0.3 - 6.2 %    Basophil Rel % 0.6 0.0 - 1.5 %    Neutrophils Absolute 2.56 1.70 - 7.00 10*3/mm3    Lymphocytes Absolute 1.88 0.70 - 3.10 10*3/mm3    Monocytes Absolute 0.37 0.10 - 0.90 10*3/mm3    Eosinophils Absolute 0.12 0.00 - 0.40 10*3/mm3    Basophils Absolute 0.03 0.00 -  0.20 10*3/mm3    Immature Granulocyte Rel % 0.4 0.0 - 0.5 %    Immature Grans Absolute 0.02 0.00 - 0.05 10*3/mm3    nRBC 0.0 0.0 - 0.2 /100 WBC         Assessment/Plan       Plan:  1.  Benign essential hypertension: Will continue current medication, low-sodium diet advised  2.mixed hyperlipidemia: will obtain   fasting CMP and lipid panel.  Diet and exercise counseled,  Will continue current medications  3. Diabetes  Mellitus  : will obtain   fasting CMP  and hba1c 12.2   , diet and exercise counseled , Will continue current medications - novolog 50 u tid and levemir 70 u qhs   4. hypothyroidism: will obtain tsh , and continue levothyroxine  5. Multiple joint pain : prn lortab , uds today  The patient has read and signed the [facility] Controlled Substance Contract.The patient is aware of the potential for addiction and dependence. This is a short-term use   A Lavell was reviewed and scanned into the chart today. Narcotic contract was signed by patient . Will obtain Urine drug screen  Lavell Report   As part of this patient's treatment plan I am prescribing controlled substances. The patient has been made aware of appropriate use of such medications, including potential risk of somnolence, limited ability to drive and /or work safely, and potential for dependence or overdose. It has also been made clear that these medications are for use by this patient only, without concomitant use of alcohol or other substances unless prescribed.   Patient has completed prescribing agreement detailing terms of continued prescribing of controlled substances, including monitoring LAVELL reports, urine drug screening, and pill counts if necessary. The patient is aware that inappropriate use will result in cessation of prescribing such medications.   LAVELL report has been reviewed   History and physical exam exhibit continued safe and appropriate use of controlled substances. Patient is advised to start a comprehensive exercise  program including and not limited to physical therapy program for therapeutic exercise, upper body strengthening/posture correction, core strengthening, gait and balance training and  exercise program such as yoga, Maurisio Chi, water therapy and daily walks for fitness  The patient/ her family have been instructed to contact my office with any questions or difficulties. The patient understands the plan , patient has verbalized an understanding and agrees to proceed accordingly           Margret Purvis MD

## 2019-07-17 LAB — DRUGS UR: NORMAL

## 2019-08-12 ENCOUNTER — OFFICE VISIT (OUTPATIENT)
Dept: INTERNAL MEDICINE | Facility: CLINIC | Age: 64
End: 2019-08-12

## 2019-08-12 VITALS
OXYGEN SATURATION: 98 % | WEIGHT: 214 LBS | HEART RATE: 50 BPM | TEMPERATURE: 97.1 F | RESPIRATION RATE: 16 BRPM | HEIGHT: 70 IN | DIASTOLIC BLOOD PRESSURE: 79 MMHG | SYSTOLIC BLOOD PRESSURE: 170 MMHG | BODY MASS INDEX: 30.64 KG/M2

## 2019-08-12 DIAGNOSIS — E03.8 ADULT ONSET HYPOTHYROIDISM: ICD-10-CM

## 2019-08-12 DIAGNOSIS — M25.50 ARTHRALGIA OF MULTIPLE JOINTS: ICD-10-CM

## 2019-08-12 DIAGNOSIS — I10 BENIGN ESSENTIAL HYPERTENSION: Primary | ICD-10-CM

## 2019-08-12 DIAGNOSIS — E78.2 MIXED HYPERLIPIDEMIA: ICD-10-CM

## 2019-08-12 DIAGNOSIS — R25.1 TREMOR: ICD-10-CM

## 2019-08-12 DIAGNOSIS — E11.65 UNCONTROLLED TYPE 2 DIABETES MELLITUS WITH HYPERGLYCEMIA (HCC): ICD-10-CM

## 2019-08-12 PROCEDURE — 99214 OFFICE O/P EST MOD 30 MIN: CPT | Performed by: INTERNAL MEDICINE

## 2019-08-12 RX ORDER — HYDROCODONE BITARTRATE AND ACETAMINOPHEN 7.5; 325 MG/1; MG/1
1 TABLET ORAL 2 TIMES DAILY
Qty: 60 TABLET | Refills: 0 | Status: SHIPPED | OUTPATIENT
Start: 2019-08-12 | End: 2019-09-17 | Stop reason: SDUPTHER

## 2019-08-12 NOTE — PROGRESS NOTES
Subjective   Ramsey Riley is a 64 y.o. male.     Chief Complaint   Patient presents with   • Hypertension   • Diabetes   • Hyperlipidemia   • Tremors   • Joint Pain       History of Present Illness   HPI: Patient is here to follow up on the blood pressure  The patient is taking the blood pressure medications as prescribed and has had no side effects. The patient is also here to follow up on the cholesterol and is trying to follow a diet. The patient is also here to follow on sugar and  due to get lab work done . The patient also complains of multiple joint pain and  needs refills on medications .  He also complains of tremors which is has noticed for the past few months intermittent , not associated  With any other symptoms or activities   Hypertension   Pertinent negatives include no chest pain, palpitations or shortness of breath.   Hyperlipidemia   Pertinent negatives include no chest pain or shortness of breath.   Diabetes   Pertinent negatives for hypoglycemia include no dizziness, nervousness/anxiousness or pallor. Pertinent negatives for diabetes include no chest pain, no shortness of breath  Patient is on acei/arb     The following portions of the patient's history were reviewed and updated as appropriate: allergies, current medications, past family history, past medical history, past social history, past surgical history and problem list.    Review of Systems   Constitutional: Negative for appetite change, fatigue and fever.   HENT: Negative for congestion, ear discharge, ear pain, sinus pressure and sore throat.    Eyes: Negative for pain and discharge.   Respiratory: Negative for cough, shortness of breath and wheezing.    Cardiovascular: Negative for chest pain, palpitations and leg swelling.   Gastrointestinal: Negative for abdominal pain, constipation, diarrhea, nausea and vomiting.   Endocrine: Negative for cold intolerance and heat intolerance.   Genitourinary: Negative for dysuria and flank  pain.   Musculoskeletal: Positive for arthralgias. Negative for joint swelling.   Skin: Negative for pallor and rash.   Allergic/Immunologic: Negative for environmental allergies and food allergies.   Neurological: Positive for tremors. Negative for dizziness, weakness and numbness.   Hematological: Negative for adenopathy. Does not bruise/bleed easily.   Psychiatric/Behavioral: Negative for behavioral problems and dysphoric mood. The patient is not nervous/anxious.          Current Outpatient Medications:   •  ALPRAZolam (XANAX) 0.5 MG tablet, Take 1 tablet by mouth At Night As Needed for Anxiety., Disp: 30 tablet, Rfl: 4  •  amitriptyline (ELAVIL) 10 MG tablet, TAKE 1 TABLET BY MOUTH EVERY NIGHT., Disp: 30 tablet, Rfl: 2  •  amLODIPine (NORVASC) 5 MG tablet, Take 1 tablet by mouth Daily., Disp: , Rfl:   •  atorvastatin (LIPITOR) 40 MG tablet, Take 1 tablet by mouth Every Night., Disp: 90 tablet, Rfl: 2  •  cetirizine (zyrTEC) 10 MG tablet, Take 10 mg by mouth Daily., Disp: , Rfl: 11  •  citalopram (CeleXA) 20 MG tablet, Take 1 tablet by mouth Daily., Disp: 90 tablet, Rfl: 1  •  gabapentin (NEURONTIN) 300 MG capsule, Take 1 capsule by mouth 2 (Two) Times a Day., Disp: , Rfl: 1  •  glucose blood test strip, Check sugar 3 times a day, Disp: 30 each, Rfl: 12  •  glucose monitor monitoring kit, 1 each Daily., Disp: 1 each, Rfl: 1  •  glucose monitor monitoring kit, 1 each Daily., Disp: 1 each, Rfl: 1  •  HYDROcodone-acetaminophen (NORCO) 7.5-325 MG per tablet, Take 1 tablet by mouth 2 (Two) Times a Day., Disp: 60 tablet, Rfl: 0  •  insulin aspart (NOVOLOG) 100 UNIT/ML injection, Inject 50 Units under the skin into the appropriate area as directed 3 (Three) Times a Day Before Meals. Dispense 5 vials with 12r, Disp: 5 each, Rfl: 12  •  insulin detemir (LEVEMIR FLEXPEN) 100 UNIT/ML injection, Inject 70 Units under the skin into the appropriate area as directed Every Night., Disp: 5 pen, Rfl: 11  •  Lancets 30G misc, Check  "sugar 3 times daily, Disp: 30 each, Rfl: 12  •  levothyroxine (SYNTHROID, LEVOTHROID) 75 MCG tablet, Take 1 tablet by mouth Daily., Disp: 90 tablet, Rfl: 2  •  losartan (COZAAR) 100 MG tablet, Take 1 tablet by mouth Daily., Disp: , Rfl: 1  •  pantoprazole (PROTONIX) 40 MG EC tablet, Take 1 tablet by mouth Daily., Disp: 90 tablet, Rfl: 2    Objective     Blood pressure 170/79, pulse 50, temperature 97.1 °F (36.2 °C), resp. rate 16, height 177.8 cm (70\"), weight 97.1 kg (214 lb), SpO2 98 %.    Physical Exam   Constitutional: He is oriented to person, place, and time. He appears well-developed and well-nourished. No distress.   HENT:   Head: Normocephalic and atraumatic.   Right Ear: External ear normal.   Left Ear: External ear normal.   Nose: Nose normal.   Mouth/Throat: Oropharynx is clear and moist.   Eyes: Conjunctivae and EOM are normal. Pupils are equal, round, and reactive to light.   Neck: Normal range of motion. Neck supple. No thyromegaly present.   Cardiovascular: Normal rate, regular rhythm and normal heart sounds.   Pulmonary/Chest: Effort normal. No respiratory distress.   Abdominal: Soft. He exhibits no distension. There is no tenderness. There is no guarding.   Musculoskeletal: He exhibits deformity. He exhibits no edema.   Lymphadenopathy:     He has no cervical adenopathy.   Neurological: He is alert and oriented to person, place, and time.   No gross motor or sensory deficits   Skin: Skin is warm and dry. He is not diaphoretic. No erythema.   Psychiatric: He has a normal mood and affect.   Nursing note and vitals reviewed.    Patient's Body mass index is 30.71 kg/m². BMI is within normal parameters. No follow-up required..      Results for orders placed or performed in visit on 08/12/19   Comprehensive Metabolic Panel   Result Value Ref Range    Glucose 221 (H) 65 - 99 mg/dL    BUN 14 8 - 23 mg/dL    Creatinine 0.95 0.76 - 1.27 mg/dL    eGFR Non African Am 80 >60 mL/min/1.73    eGFR African Am 97 " >60 mL/min/1.73    BUN/Creatinine Ratio 14.7 7.0 - 25.0    Sodium 141 136 - 145 mmol/L    Potassium 4.6 3.5 - 5.2 mmol/L    Chloride 99 98 - 107 mmol/L    Total CO2 26.2 22.0 - 29.0 mmol/L    Calcium 9.0 8.6 - 10.5 mg/dL    Total Protein 6.8 6.0 - 8.5 g/dL    Albumin 4.40 3.50 - 5.20 g/dL    Globulin 2.4 gm/dL    A/G Ratio 1.8 g/dL    Total Bilirubin 0.7 0.2 - 1.2 mg/dL    Alkaline Phosphatase 65 39 - 117 U/L    AST (SGOT) 30 1 - 40 U/L    ALT (SGPT) 27 1 - 41 U/L   Lipid Panel   Result Value Ref Range    Total Cholesterol 176 0 - 200 mg/dL    Triglycerides 172 (H) 0 - 150 mg/dL    HDL Cholesterol 42 40 - 60 mg/dL    VLDL Cholesterol 34.4 mg/dL    LDL Cholesterol  100 0 - 100 mg/dL   Microalbumin / Creatinine Urine Ratio - Urine, Clean Catch   Result Value Ref Range    Creatinine, Urine 179.9 Not Estab. mg/dL    Microalbumin, Urine 3,540.1 Not Estab. ug/mL    Microalbumin/Creatinine Ratio 1,967.8 (H) 0.0 - 30.0 mg/g creat   TSH   Result Value Ref Range    TSH 2.950 0.270 - 4.200 mIU/mL   Hemoglobin A1c   Result Value Ref Range    Hemoglobin A1C 10.10 (H) 4.80 - 5.60 %   Manual Differential   Result Value Ref Range    Neutrophil Rel % 58.3 42.7 - 76.0 %    Lymphocyte Rel % 36.1 19.6 - 45.3 %    Monocyte Rel % 4.6 (L) 5.0 - 12.0 %    Eosinophil Rel % 0.9 0.3 - 6.2 %    Neutrophils Absolute 3.86 1.70 - 7.00 10*3/mm3    Lymphocytes Absolute 2.39 0.70 - 3.10 10*3/mm3    Monocytes Absolute 0.30 0.10 - 0.90 10*3/mm3    Eosinophil Abs 0.06 0.00 - 0.40 10*3/mm3    Differential Comment Comment     Comment Comment     Plt Comment Comment    CBC & Differential   Result Value Ref Range    WBC 6.62 3.40 - 10.80 10*3/mm3    RBC 5.78 4.14 - 5.80 10*6/mm3    Hemoglobin 16.9 13.0 - 17.7 g/dL    Hematocrit 52.6 (H) 37.5 - 51.0 %    MCV 91.0 79.0 - 97.0 fL    MCH 29.2 26.6 - 33.0 pg    MCHC 32.1 31.5 - 35.7 g/dL    RDW 13.1 12.3 - 15.4 %    Platelets 132 (L) 140 - 450 10*3/mm3         Assessment/Plan   Ramsey was seen today for  hypertension, diabetes, hyperlipidemia, tremors and joint pain.    Diagnoses and all orders for this visit:    Benign essential hypertension    Mixed hyperlipidemia  -     CBC & Differential  -     Comprehensive Metabolic Panel  -     Lipid Panel    Uncontrolled type 2 diabetes mellitus with hyperglycemia (CMS/[facility])  -     Microalbumin / Creatinine Urine Ratio - Urine, Clean Catch  -     Hemoglobin A1c    Adult onset hypothyroidism  -     TSH    Tremor  -     CT Head Without Contrast    Arthralgia of multiple joints    Other orders  -     HYDROcodone-acetaminophen (NORCO) 7.5-325 MG per tablet; Take 1 tablet by mouth 2 (Two) Times a Day.  -     Manual Differential      Plan:  1.  Benign essential hypertension: Will continue current medication, low-sodium diet advised  2.mixed hyperlipidemia: will obtain   fasting CMP and lipid panel.  Diet and exercise counseled,  Will continue current medications  3. Diabetes  Mellitus  : will obtain   fasting CMP  and hba1c  , diet and exercise counseled , Will continue current medications  4. hypothyroidism: will obtain tsh , and continue levothyroxine  5. Tremors : will get ct head and labs  6. Multiple joint pain : refilled lortab  The patient has read and signed the [facility] Controlled Substance Contract.The patient is aware of the potential for addiction and dependence. This is a short-term use   A Enoc was reviewed and scanned into the chart today. Narcotic contract was signed by patient   . Will obtain Urine drug screen  [facility] Report   As part of this patient's treatment plan I am prescribing controlled substances. The patient has been made aware of appropriate use of such medications, including potential risk of somnolence, limited ability to drive and /or work safely, and potential for dependence or overdose. It has also been made clear that these medications are for use by this patient only, without concomitant use of alcohol or other substances unless  prescribed.   Patient has completed prescribing agreement detailing terms of continued prescribing of controlled substances, including monitoring LAVELL reports, urine drug screening, and pill counts if necessary. The patient is aware that inappropriate use will result in cessation of prescribing such medications.   LAVELL report has been reviewed   History and physical exam exhibit continued safe and appropriate use of controlled substances. Patient is advised to start a comprehensive exercise program including and not limited to physical therapy program for therapeutic exercise, upper body strengthening/posture correction, core strengthening, gait and balance training and  exercise program such as yoga, Maurisio Chi, water therapy and daily walks for fitness  The patient/ her family have been instructed to contact my office with any questions or difficulties. The patient understands the plan , patient has verbalized an understanding and agrees to proceed accordingly               Margret Purvis MD

## 2019-08-13 LAB
ALBUMIN SERPL-MCNC: 4.4 G/DL (ref 3.5–5.2)
ALBUMIN/CREAT UR: 1967.8 MG/G CREAT (ref 0–30)
ALBUMIN/GLOB SERPL: 1.8 G/DL
ALP SERPL-CCNC: 65 U/L (ref 39–117)
ALT SERPL-CCNC: 27 U/L (ref 1–41)
AST SERPL-CCNC: 30 U/L (ref 1–40)
BILIRUB SERPL-MCNC: 0.7 MG/DL (ref 0.2–1.2)
BUN SERPL-MCNC: 14 MG/DL (ref 8–23)
BUN/CREAT SERPL: 14.7 (ref 7–25)
CALCIUM SERPL-MCNC: 9 MG/DL (ref 8.6–10.5)
CHLORIDE SERPL-SCNC: 99 MMOL/L (ref 98–107)
CHOLEST SERPL-MCNC: 176 MG/DL (ref 0–200)
CO2 SERPL-SCNC: 26.2 MMOL/L (ref 22–29)
CREAT SERPL-MCNC: 0.95 MG/DL (ref 0.76–1.27)
CREAT UR-MCNC: 179.9 MG/DL
DIFFERENTIAL COMMENT: ABNORMAL
EOSINOPHIL # BLD MANUAL: 0.06 10*3/MM3 (ref 0–0.4)
EOSINOPHIL NFR BLD MANUAL: 0.9 % (ref 0.3–6.2)
ERYTHROCYTE [DISTWIDTH] IN BLOOD BY AUTOMATED COUNT: 13.1 % (ref 12.3–15.4)
GLOBULIN SER CALC-MCNC: 2.4 GM/DL
GLUCOSE SERPL-MCNC: 221 MG/DL (ref 65–99)
HBA1C MFR BLD: 10.1 % (ref 4.8–5.6)
HCT VFR BLD AUTO: 52.6 % (ref 37.5–51)
HDLC SERPL-MCNC: 42 MG/DL (ref 40–60)
HGB BLD-MCNC: 16.9 G/DL (ref 13–17.7)
LDLC SERPL CALC-MCNC: 100 MG/DL (ref 0–100)
LYMPHOCYTES # BLD MANUAL: 2.39 10*3/MM3 (ref 0.7–3.1)
LYMPHOCYTES NFR BLD MANUAL: 36.1 % (ref 19.6–45.3)
MCH RBC QN AUTO: 29.2 PG (ref 26.6–33)
MCHC RBC AUTO-ENTMCNC: 32.1 G/DL (ref 31.5–35.7)
MCV RBC AUTO: 91 FL (ref 79–97)
MICROALBUMIN UR-MCNC: 3540.1 UG/ML
MONOCYTES # BLD MANUAL: 0.3 10*3/MM3 (ref 0.1–0.9)
MONOCYTES NFR BLD MANUAL: 4.6 % (ref 5–12)
NEUTROPHILS # BLD MANUAL: 3.86 10*3/MM3 (ref 1.7–7)
NEUTROPHILS NFR BLD MANUAL: 58.3 % (ref 42.7–76)
PLATELET # BLD AUTO: 132 10*3/MM3 (ref 140–450)
PLATELET BLD QL SMEAR: ABNORMAL
POTASSIUM SERPL-SCNC: 4.6 MMOL/L (ref 3.5–5.2)
PROT SERPL-MCNC: 6.8 G/DL (ref 6–8.5)
RBC # BLD AUTO: 5.78 10*6/MM3 (ref 4.14–5.8)
RBC MORPH BLD: ABNORMAL
SODIUM SERPL-SCNC: 141 MMOL/L (ref 136–145)
TRIGL SERPL-MCNC: 172 MG/DL (ref 0–150)
TSH SERPL DL<=0.005 MIU/L-ACNC: 2.95 MIU/ML (ref 0.27–4.2)
VLDLC SERPL CALC-MCNC: 34.4 MG/DL
WBC # BLD AUTO: 6.62 10*3/MM3 (ref 3.4–10.8)

## 2019-08-23 ENCOUNTER — HOSPITAL ENCOUNTER (OUTPATIENT)
Dept: CT IMAGING | Facility: HOSPITAL | Age: 64
Discharge: HOME | End: 2019-08-23
Admitting: INTERNAL MEDICINE

## 2019-08-23 PROCEDURE — 70450 CT HEAD/BRAIN W/O DYE: CPT

## 2019-09-12 ENCOUNTER — TELEPHONE (OUTPATIENT)
Dept: INTERNAL MEDICINE | Facility: CLINIC | Age: 64
End: 2019-09-12

## 2019-09-12 NOTE — TELEPHONE ENCOUNTER
Patient's wife called to move around some appts for her  because he had an appt that she was unaware of today.    She rescheduled the patient's Wellness visit to 10/23/19, and she was wanting to know if they could have labs sent ahead of time to a different location because they live in [place].    Patient has active labs from 07/15/19 in case those can be transferred.      Location:  [facility]  [address] [place] [address]      Please advise.

## 2019-09-17 RX ORDER — AMLODIPINE BESYLATE 5 MG/1
5 TABLET ORAL DAILY
Qty: 90 TABLET | Refills: 2 | Status: SHIPPED | OUTPATIENT
Start: 2019-09-17 | End: 2020-01-09 | Stop reason: SDUPTHER

## 2019-09-17 RX ORDER — HYDROCODONE BITARTRATE AND ACETAMINOPHEN 7.5; 325 MG/1; MG/1
1 TABLET ORAL 2 TIMES DAILY
Qty: 60 TABLET | Refills: 0 | Status: SHIPPED | OUTPATIENT
Start: 2019-09-17 | End: 2019-10-23 | Stop reason: SDUPTHER

## 2019-09-30 ENCOUNTER — TELEPHONE (OUTPATIENT)
Dept: INTERNAL MEDICINE | Facility: CLINIC | Age: 64
End: 2019-09-30

## 2019-10-04 RX ORDER — CITALOPRAM 20 MG/1
20 TABLET ORAL DAILY
Qty: 90 TABLET | Refills: 1 | Status: SHIPPED | OUTPATIENT
Start: 2019-10-04 | End: 2019-10-25 | Stop reason: SDUPTHER

## 2019-10-15 RX ORDER — AMITRIPTYLINE HYDROCHLORIDE 10 MG/1
10 TABLET, FILM COATED ORAL NIGHTLY
Qty: 30 TABLET | Refills: 2 | Status: SHIPPED | OUTPATIENT
Start: 2019-10-15 | End: 2020-03-18

## 2019-10-23 ENCOUNTER — OFFICE VISIT (OUTPATIENT)
Dept: INTERNAL MEDICINE | Facility: CLINIC | Age: 64
End: 2019-10-23

## 2019-10-23 VITALS
RESPIRATION RATE: 16 BRPM | HEIGHT: 70 IN | SYSTOLIC BLOOD PRESSURE: 148 MMHG | BODY MASS INDEX: 29.92 KG/M2 | DIASTOLIC BLOOD PRESSURE: 83 MMHG | TEMPERATURE: 97.5 F | OXYGEN SATURATION: 97 % | WEIGHT: 209 LBS | HEART RATE: 53 BPM

## 2019-10-23 DIAGNOSIS — Z00.00 ROUTINE GENERAL MEDICAL EXAMINATION AT A HEALTH CARE FACILITY: Primary | ICD-10-CM

## 2019-10-23 DIAGNOSIS — Z79.4 UNCONTROLLED TYPE 2 DIABETES MELLITUS WITH HYPERGLYCEMIA, WITH LONG-TERM CURRENT USE OF INSULIN (HCC): ICD-10-CM

## 2019-10-23 DIAGNOSIS — R19.7 DIARRHEA, UNSPECIFIED TYPE: ICD-10-CM

## 2019-10-23 DIAGNOSIS — N40.1 BPH ASSOCIATED WITH NOCTURIA: ICD-10-CM

## 2019-10-23 DIAGNOSIS — M25.50 ARTHRALGIA OF MULTIPLE JOINTS: ICD-10-CM

## 2019-10-23 DIAGNOSIS — R35.1 BPH ASSOCIATED WITH NOCTURIA: ICD-10-CM

## 2019-10-23 DIAGNOSIS — I10 BENIGN ESSENTIAL HYPERTENSION: ICD-10-CM

## 2019-10-23 DIAGNOSIS — Z23 NEED FOR INFLUENZA VACCINATION: ICD-10-CM

## 2019-10-23 DIAGNOSIS — E11.65 UNCONTROLLED TYPE 2 DIABETES MELLITUS WITH HYPERGLYCEMIA, WITH LONG-TERM CURRENT USE OF INSULIN (HCC): ICD-10-CM

## 2019-10-23 DIAGNOSIS — E78.2 MIXED HYPERLIPIDEMIA: ICD-10-CM

## 2019-10-23 PROCEDURE — G0439 PPPS, SUBSEQ VISIT: HCPCS | Performed by: INTERNAL MEDICINE

## 2019-10-23 PROCEDURE — 90674 CCIIV4 VAC NO PRSV 0.5 ML IM: CPT | Performed by: INTERNAL MEDICINE

## 2019-10-23 PROCEDURE — G0008 ADMIN INFLUENZA VIRUS VAC: HCPCS | Performed by: INTERNAL MEDICINE

## 2019-10-23 PROCEDURE — 96160 PT-FOCUSED HLTH RISK ASSMT: CPT | Performed by: INTERNAL MEDICINE

## 2019-10-23 PROCEDURE — 99396 PREV VISIT EST AGE 40-64: CPT | Performed by: INTERNAL MEDICINE

## 2019-10-23 RX ORDER — HYDROCODONE BITARTRATE AND ACETAMINOPHEN 7.5; 325 MG/1; MG/1
1 TABLET ORAL 2 TIMES DAILY
Qty: 60 TABLET | Refills: 0 | Status: SHIPPED | OUTPATIENT
Start: 2019-10-23 | End: 2019-11-22 | Stop reason: SDUPTHER

## 2019-10-23 NOTE — PROGRESS NOTES
The ABCs of the Annual Wellness Visit  Initial Medicare Wellness Visit    Chief Complaint   Patient presents with   • Medicare Wellness-Initial Visit   • Hypertension   • Hyperlipidemia   • Diabetes   • Joint Pain   • Diarrhea       Subjective   History of Present Illness:  Ramsey Riley is a 64 y.o. male who presents for an Initial Medicare Wellness Visit  And  physical  And human[employer] paf .  Patient is here to follow up on the blood pressure  The patient is taking the blood pressure medications as prescribed and has had no side effects. The patient is also here to follow up on the cholesterol and is trying to follow a diet. The patient is also here to follow on sugar and  due to get lab work done . The patient also complains of chronic multiple joint pain and needs refills on medications and flu shot  .  He complains of diarrhea since several weeks, he also complains of bph symptoms with nocturua  Hypertension   Pertinent negatives include no chest pain, palpitations or shortness of breath.   Hyperlipidemia   Pertinent negatives include no chest pain or shortness of breath.   Diabetes   Pertinent negatives for hypoglycemia include no dizziness, nervousness/anxiousness or pallor. Pertinent negatives for diabetes include no chest pain, no shortness of breath  Patient is on acei/arb       HEALTH RISK ASSESSMENT    Recent Hospitalizations:  No hospitalization(s) within the last year.    Current Medical Providers:  Patient Care Team:  Margret Purvis MD as PCP - General (Internal Medicine)    Smoking Status:  Social History     Tobacco Use   Smoking Status Never Smoker   Smokeless Tobacco Never Used       Alcohol Consumption:  Social History     Substance and Sexual Activity   Alcohol Use No       Depression Screen:   PHQ-2/PHQ-9 Depression Screening 10/23/2019   Little interest or pleasure in doing things 0   Feeling down, depressed, or hopeless 0   Total Score 0       Fall Risk Screen:  JONATANADI Fall Risk Assessment has  not been completed.    Health Habits and Functional and Cognitive Screening:  Functional & Cognitive Status 10/23/2019   Do you have difficulty preparing food and eating? No   Do you have difficulty bathing yourself, getting dressed or grooming yourself? No   Do you have difficulty using the toilet? No   Do you have difficulty moving around from place to place? No   Do you have trouble with steps or getting out of a bed or a chair? No   Current Diet Well Balanced Diet   Dental Exam Up to date   Eye Exam Not up to date   Exercise (times per week) 6 times per week   Current Exercise Activities Include Yard Work   Do you need help using the phone?  No   Are you deaf or do you have serious difficulty hearing?  No   Do you need help with transportation? No   Do you need help shopping? No   Do you need help preparing meals?  No   Do you need help with housework?  No   Do you need help with laundry? No   Do you need help taking your medications? No   Do you need help managing money? No   Do you ever drive or ride in a car without wearing a seat belt? No   Have you felt unusual stress, anger or loneliness in the last month? No   Who do you live with? Spouse   If you need help, do you have trouble finding someone available to you? No   Have you been bothered in the last four weeks by sexual problems? No   Do you have difficulty concentrating, remembering or making decisions? No         Does the patient have evidence of cognitive impairment? No    Asprin use counseling:Taking ASA appropriately as indicated    Age-appropriate Screening Schedule:  Refer to the list below for future screening recommendations based on patient's age, sex and/or medical conditions. Orders for these recommended tests are listed in the plan section. The patient has been provided with a written plan.    Health Maintenance   Topic Date Due   • TDAP/TD VACCINES (1 - Tdap) 03/11/1976   • ZOSTER VACCINE (1 of 2) 03/11/2007   • DIABETIC FOOT EXAM   06/06/2018   • DIABETIC EYE EXAM  06/06/2018   • COLONOSCOPY  06/06/2018   • PNEUMOCOCCAL VACCINE (19-64 MEDIUM RISK) (1 of 1 - PPSV23) 03/12/2019   • HEMOGLOBIN A1C  02/12/2020   • LIPID PANEL  08/12/2020   • URINE MICROALBUMIN  08/12/2020   • INFLUENZA VACCINE  Completed          The following portions of the patient's history were reviewed and updated as appropriate: allergies, current medications, past family history, past medical history, past social history, past surgical history and problem list.    Outpatient Medications Prior to Visit   Medication Sig Dispense Refill   • ALPRAZolam (XANAX) 0.5 MG tablet Take 1 tablet by mouth At Night As Needed for Anxiety. 30 tablet 4   • amitriptyline (ELAVIL) 10 MG tablet TAKE 1 TABLET BY MOUTH EVERY NIGHT. 30 tablet 2   • amLODIPine (NORVASC) 5 MG tablet Take 1 tablet by mouth Daily. 90 tablet 2   • atorvastatin (LIPITOR) 40 MG tablet Take 1 tablet by mouth Every Night. 90 tablet 2   • cetirizine (zyrTEC) 10 MG tablet Take 10 mg by mouth Daily.  11   • gabapentin (NEURONTIN) 300 MG capsule Take 1 capsule by mouth 2 (Two) Times a Day.  1   • glucose blood test strip Check sugar 3 times a day 30 each 12   • glucose monitor monitoring kit 1 each Daily. 1 each 1   • glucose monitor monitoring kit 1 each Daily. 1 each 1   • insulin aspart (NOVOLOG) 100 UNIT/ML injection Inject 50 Units under the skin into the appropriate area as directed 3 (Three) Times a Day Before Meals. Dispense 5 vials with 12r 5 each 12   • insulin detemir (LEVEMIR FLEXPEN) 100 UNIT/ML injection Inject 70 Units under the skin into the appropriate area as directed Every Night. 5 pen 11   • Lancets 30G misc Check sugar 3 times daily 30 each 12   • pantoprazole (PROTONIX) 40 MG EC tablet Take 1 tablet by mouth Daily. 90 tablet 2   • citalopram (CeleXA) 20 MG tablet Take 1 tablet by mouth Daily. 90 tablet 1   • HYDROcodone-acetaminophen (NORCO) 7.5-325 MG per tablet Take 1 tablet by mouth 2 (Two) Times a  Day. 60 tablet 0   • levothyroxine (SYNTHROID, LEVOTHROID) 75 MCG tablet Take 1 tablet by mouth Daily. 90 tablet 2   • losartan (COZAAR) 100 MG tablet Take 1 tablet by mouth Daily.  1     No facility-administered medications prior to visit.        Patient Active Problem List   Diagnosis   • Coronary artery disease involving native coronary artery of native heart with angina pectoris (CMS/HCC)   • Essential hypertension   • Bradycardia, sinus   • Fatigue   • Anginal equivalent (CMS/HCC)   • Abnormal ECG   • Precordial pain   • LVH (left ventricular hypertrophy) due to hypertensive disease, without heart failure       Advanced Care Planning:  Patient does not have an advance directive - information provided to the patient today    Review of Systems   Constitutional: Negative for appetite change, fatigue and fever.   HENT: Negative for congestion, ear discharge, ear pain, sinus pressure and sore throat.    Eyes: Negative for pain and discharge.   Respiratory: Negative for cough, shortness of breath and wheezing.    Cardiovascular: Negative for chest pain, palpitations and leg swelling.   Gastrointestinal: Positive for diarrhea. Negative for abdominal pain, constipation, nausea and vomiting.   Endocrine: Negative for cold intolerance and heat intolerance.   Genitourinary: Negative for dysuria and flank pain.        Nocturia   Musculoskeletal: Positive for arthralgias. Negative for joint swelling.   Skin: Negative for pallor and rash.   Allergic/Immunologic: Negative for environmental allergies and food allergies.   Neurological: Negative for dizziness, weakness and numbness.   Hematological: Negative for adenopathy. Does not bruise/bleed easily.   Psychiatric/Behavioral: Negative for behavioral problems and dysphoric mood. The patient is not nervous/anxious.        Compared to one year ago, the patient feels his physical health is the same.  Compared to one year ago, the patient feels his mental health is  "better.    Reviewed chart for potential of high risk medication in the elderly: no  Reviewed chart for potential of harmful drug interactions in the elderly:no    Objective         Vitals:    10/23/19 1606 10/23/19 1640   BP: 148/83    Pulse: 53    Resp: 16    Temp: 97.5 °F (36.4 °C)    SpO2: 97%    Weight: 94.8 kg (209 lb)    Height: 177.8 cm (70\")    PainSc: 0-No pain   8       Body mass index is 29.99 kg/m².  Discussed the patient's BMI with him. The BMI is in the acceptable range.    Physical Exam   Constitutional: He is oriented to person, place, and time. He appears well-developed and well-nourished. No distress.   HENT:   Head: Normocephalic and atraumatic.   Right Ear: External ear normal.   Left Ear: External ear normal.   Nose: Nose normal.   Mouth/Throat: Oropharynx is clear and moist.   Eyes: Conjunctivae and EOM are normal. Pupils are equal, round, and reactive to light.   Neck: Normal range of motion. Neck supple. No thyromegaly present.   Cardiovascular: Normal rate, regular rhythm and normal heart sounds.   Pulmonary/Chest: Effort normal. No respiratory distress.   Abdominal: Soft. He exhibits no distension. There is no tenderness. There is no guarding.   Musculoskeletal: Normal range of motion. He exhibits deformity. He exhibits no edema.   Lymphadenopathy:     He has no cervical adenopathy.   Neurological: He is alert and oriented to person, place, and time.   No gross motor or sensory deficits   Skin: Skin is warm and dry. He is not diaphoretic. No erythema.   Psychiatric: He has a normal mood and affect.   Nursing note and vitals reviewed.      Lab Results   Component Value Date     (H) 08/12/2019    CHLPL 176 08/12/2019    TRIG 172 (H) 08/12/2019    HDL 42 08/12/2019     08/12/2019    VLDL 34.4 08/12/2019    HGBA1C 10.10 (H) 08/12/2019        Assessment/Plan   Medicare Risks and Personalized Health Plan  CMS Preventative Services Quick Reference  Advance Directive " Discussion  Cardiovascular risk  Diabetic Lab Screening   Fall Risk  Glaucoma Risk  Immunizations Discussed/Encouraged (specific immunizations; Influenza )    The above risks/problems have been discussed with the patient.  Pertinent information has been shared with the patient in the After Visit Summary.  Follow up plans and orders are seen below in the Assessment/Plan Section.    Diagnoses and all orders for this visit:    1. Routine general medical examination at a health care facility (Primary)    2. Benign essential hypertension    3. Mixed hyperlipidemia    4. Uncontrolled type 2 diabetes mellitus with hyperglycemia, with long-term current use of insulin (CMS/[facility])    5. Diarrhea, unspecified type  -     Ambulatory Referral to Gastroenterology    6. BPH associated with nocturia  -     Ambulatory Referral to Urology    7. Arthralgia of multiple joints    8. Need for influenza vaccination  -     Flucelvax Quad=>4Years (0128-3421)    Other orders  -     HYDROcodone-acetaminophen (NORCO) 7.5-325 MG per tablet; Take 1 tablet by mouth 2 (Two) Times a Day.  Dispense: 60 tablet; Refill: 0      Plan:  1.physical: Physical exam conducted today, reviewed fasting lab work,   Impression: healthy adult Patient. Currently, patient eats a healthy diet. Screening lab work includes glucose, lipid profile and complete blood count . The risks and benefits of immunizations were discussed and immunizations are up to date. Advice and education were given regarding nutrition and aerobic exercise. Patient discussion: discussed with the patient.  Annual Wellness Visit  , physical  And humana paf  with preventive exam as well as age and risk appropriate counseling completed.   Advance Directive Planning: paperwork and instructions were given to the patient.   Patient Discussion: plan discussed with the patient, follow-up visit needed in one year.   Medication Review and medication list updated  2.  Benign essential hypertension: Will  continue current medication, low-sodium diet advised  3.mixed hyperlipidemia: will obtain   fasting CMP and lipid panel.  Diet and exercise counseled,  Will continue current medications  4. Diabetes  Mellitus  : will obtain   fasting CMP  and hba1c  , diet and exercise counseled , Will continue current medications  5. Diarrhea : refer to GI  6. Multiple joint pain : refilled lortab  7. bph with nocturia : refer to urology  8. Need for flu vaccine : given today   The patient has read and signed the [facility] Controlled Substance Contract.The patient is aware of the potential for addiction and dependence. This is a short-term use   A Lavell was reviewed and scanned into the chart today. Narcotic contract was signed by patient   . Will obtain Urine drug screen  Lavell Report   As part of this patient's treatment plan I am prescribing controlled substances. The patient has been made aware of appropriate use of such medications, including potential risk of somnolence, limited ability to drive and /or work safely, and potential for dependence or overdose. It has also been made clear that these medications are for use by this patient only, without concomitant use of alcohol or other substances unless prescribed.   Patient has completed prescribing agreement detailing terms of continued prescribing of controlled substances, including monitoring LAVELL reports, urine drug screening, and pill counts if necessary. The patient is aware that inappropriate use will result in cessation of prescribing such medications.   LAVELL report has been reviewed   History and physical exam exhibit continued safe and appropriate use of controlled substances. Patient is advised to start a comprehensive exercise program including and not limited to physical therapy program for therapeutic exercise, upper body strengthening/posture correction, core strengthening, gait and balance training and  exercise program such as yoga, Maurisio Chi, water  therapy and daily walks for fitness  The patient/ her family have been instructed to contact my office with any questions or difficulties. The patient understands the plan , patient has verbalized an understanding and agrees to proceed accordingly      Follow Up:  Return in 1 year (on 10/23/2020).     An After Visit Summary and PPPS were given to the patient.

## 2019-10-23 NOTE — PATIENT INSTRUCTIONS
Medicare Wellness  Personal Prevention Plan of Service     Date of Office Visit:  10/23/2019  Encounter Provider:  Margret Purvis MD  Place of Service:  [facility]  Patient Name: Ramsey Riley  :  1955    As part of the Medicare Wellness portion of your visit today, we are providing you with this personalized preventive plan of services (PPPS). This plan is based upon recommendations of the [place] Preventive Services Task Force (USPSTF) and the Advisory Committee on Immunization Practices (ACIP).    This lists the preventive care services that should be considered, and provides dates of when you are due. Items listed as completed are up-to-date and do not require any further intervention.    Health Maintenance   Topic Date Due   • TDAP/TD VACCINES (1 - Tdap) 1976   • ZOSTER VACCINE (1 of 2) 2007   • HEPATITIS C SCREENING  2018   • MEDICARE ANNUAL WELLNESS  2018   • DIABETIC FOOT EXAM  2018   • DIABETIC EYE EXAM  2018   • COLONOSCOPY  2018   • PNEUMOCOCCAL VACCINE (19-64 MEDIUM RISK) (1 of 1 - PPSV23) 2019   • INFLUENZA VACCINE  2019   • HEMOGLOBIN A1C  2020   • LIPID PANEL  2020   • URINE MICROALBUMIN  2020       Orders Placed This Encounter   Procedures   • Ambulatory Referral to Urology     Referral Priority:   Routine     Referral Type:   Consultation     Referral Reason:   Specialty Services Required     Requested Specialty:   Urology     Number of Visits Requested:   1   • Ambulatory Referral to Gastroenterology     Referral Priority:   Routine     Referral Type:   Consultation     Referral Reason:   Specialty Services Required     Requested Specialty:   Gastroenterology     Number of Visits Requested:   1       No Follow-up on file.          Exercising to Stay Healthy  To become healthy and stay healthy, it is recommended that you do moderate-intensity and vigorous-intensity exercise. You  can tell that you are exercising at a moderate intensity if your heart starts beating faster and you start breathing faster but can still hold a conversation. You can tell that you are exercising at a vigorous intensity if you are breathing much harder and faster and cannot hold a conversation while exercising.  Exercising regularly is important. It has many health benefits, such as:  · Improving overall fitness, flexibility, and endurance.  · Increasing bone density.  · Helping with weight control.  · Decreasing body fat.  · Increasing muscle strength.  · Reducing stress and tension.  · Improving overall health.  How often should I exercise?  Choose an activity that you enjoy, and set realistic goals. Your health care provider can help you make an activity plan that works for you.  Exercise regularly as told by your health care provider. This may include:  · Doing strength training two times a week, such as:  ? Lifting weights.  ? Using resistance bands.  ? Push-ups.  ? Sit-ups.  ? Yoga.  · Doing a certain intensity of exercise for a given amount of time. Choose from these options:  ? A total of 150 minutes of moderate-intensity exercise every week.  ? A total of 75 minutes of vigorous-intensity exercise every week.  ? A mix of moderate-intensity and vigorous-intensity exercise every week.  Children, pregnant women, people who have not exercised regularly, people who are overweight, and older adults may need to talk with a health care provider about what activities are safe to do. If you have a medical condition, be sure to talk with your health care provider before you start a new exercise program.  What are some exercise ideas?  Moderate-intensity exercise ideas include:  · Walking 1 mile (1.6 km) in about 15 minutes.  · Biking.  · Hiking.  · Golfing.  · Dancing.  · Water aerobics.  Vigorous-intensity exercise ideas include:  · Walking 4.5 miles (7.2 km) or more in about 1 hour.  · Jogging or running 5 miles (8  km) in about 1 hour.  · Biking 10 miles (16.1 km) or more in about 1 hour.  · Lap swimming.  · Roller-skating or in-line skating.  · Cross-country skiing.  · Vigorous competitive sports, such as football, basketball, and soccer.  · Jumping rope.  · Aerobic dancing.  What are some everyday activities that can help me to get exercise?  · Yard work, such as:  ? Pushing a .  ? Raking and bagging leaves.  · Washing your car.  · Pushing a stroller.  · Shoveling snow.  · Gardening.  · Washing windows or floors.  How can I be more active in my day-to-day activities?  · Use stairs instead of an elevator.  · Take a walk during your lunch break.  · If you drive, park your car farther away from your work or school.  · If you take public transportation, get off one stop early and walk the rest of the way.  · Stand up or walk around during all of your indoor phone calls.  · Get up, stretch, and walk around every 30 minutes throughout the day.  · Enjoy exercise with a friend. Support to continue exercising will help you keep a regular routine of activity.  What guidelines can I follow while exercising?  · Before you start a new exercise program, talk with your health care provider.  · Do not exercise so much that you hurt yourself, feel dizzy, or get very short of breath.  · Wear comfortable clothes and wear shoes with good support.  · Drink plenty of water while you exercise to prevent dehydration or heat stroke.  · Work out until your breathing and your heartbeat get faster.  Where to find more information  · [place] Department of Health and Human Services: www.hhs.gov  · Centers for Disease Control and Prevention (CDC): www.cdc.gov  Summary  · Exercising regularly is important. It will improve your overall fitness, flexibility, and endurance.  · Regular exercise also will improve your overall health. It can help you control your weight, reduce stress, and improve your bone density.  · Do not exercise so much that you  hurt yourself, feel dizzy, or get very short of breath.  · Before you start a new exercise program, talk with your health care provider.  This information is not intended to replace advice given to you by your health care provider. Make sure you discuss any questions you have with your health care provider.  Document Released: 01/20/2012 Document Revised: 11/08/2018 Document Reviewed: 11/08/2018  [employer] Interactive Patient Education © 2019 [employer]

## 2019-10-25 ENCOUNTER — TELEPHONE (OUTPATIENT)
Dept: INTERNAL MEDICINE | Facility: CLINIC | Age: 64
End: 2019-10-25

## 2019-10-25 DIAGNOSIS — E03.8 ADULT ONSET HYPOTHYROIDISM: Primary | ICD-10-CM

## 2019-10-25 RX ORDER — CITALOPRAM 40 MG/1
40 TABLET ORAL DAILY
Qty: 30 TABLET | Refills: 5 | Status: SHIPPED | OUTPATIENT
Start: 2019-10-25 | End: 2020-01-09 | Stop reason: SDUPTHER

## 2019-10-25 RX ORDER — LOSARTAN POTASSIUM 100 MG/1
100 TABLET ORAL DAILY
Qty: 90 TABLET | Refills: 1 | Status: SHIPPED | OUTPATIENT
Start: 2019-10-25 | End: 2020-01-09 | Stop reason: SDUPTHER

## 2019-10-25 RX ORDER — LEVOTHYROXINE SODIUM 88 UG/1
88 TABLET ORAL DAILY
Qty: 30 TABLET | Refills: 0 | Status: SHIPPED | OUTPATIENT
Start: 2019-10-25 | End: 2019-11-23 | Stop reason: SDUPTHER

## 2019-10-25 NOTE — TELEPHONE ENCOUNTER
Patients wife called in wanting to know if her  is supposed to continue taking the losartan medication. She noticed her  hasn't been taking it for a while, and didn't know if he was supposed to. If he is, patients wife would like a refill sent to [facility], in [place], [place]. Also, patients wife is requesting an increase in the dosage for the CELEXA medication. She stated her  has been taking it for a while, but she doesn't believe it is helping. If there are any issues, please contact patients wife.

## 2019-11-22 ENCOUNTER — TELEPHONE (OUTPATIENT)
Dept: INTERNAL MEDICINE | Facility: CLINIC | Age: 64
End: 2019-11-22

## 2019-11-22 RX ORDER — HYDROCODONE BITARTRATE AND ACETAMINOPHEN 7.5; 325 MG/1; MG/1
1 TABLET ORAL 2 TIMES DAILY
Qty: 60 TABLET | Refills: 0 | Status: SHIPPED | OUTPATIENT
Start: 2019-11-22 | End: 2020-01-09 | Stop reason: SDUPTHER

## 2019-11-22 NOTE — TELEPHONE ENCOUNTER
Patients wife called requesting a refill. Stated she has to  written Rx and would like it done as soon as possible to be picked up today.    Pt call back   [phone]

## 2019-11-22 NOTE — TELEPHONE ENCOUNTER
Attempted to contact pt to notify per Dr. NATHANIEL Rock that Rx will not be ready until Monday due to her not being in office today, unable to speak with anyone no answer and VM full could not leave a message

## 2019-11-25 RX ORDER — LEVOTHYROXINE SODIUM 88 UG/1
88 TABLET ORAL DAILY
Qty: 30 TABLET | Refills: 0 | Status: SHIPPED | OUTPATIENT
Start: 2019-11-25 | End: 2019-12-26

## 2019-12-02 ENCOUNTER — TELEPHONE (OUTPATIENT)
Dept: INTERNAL MEDICINE | Facility: CLINIC | Age: 64
End: 2019-12-02

## 2019-12-05 RX ORDER — ALPRAZOLAM 0.5 MG/1
0.5 TABLET ORAL NIGHTLY PRN
Qty: 30 TABLET | Refills: 2 | Status: SHIPPED | OUTPATIENT
Start: 2019-12-05 | End: 2020-01-09 | Stop reason: SDUPTHER

## 2019-12-10 NOTE — TELEPHONE ENCOUNTER
Patient was wondering why his prescription has not been called in.  He says the pharmacy has been calling to get his refill.    [facility]

## 2019-12-12 ENCOUNTER — OFFICE VISIT (OUTPATIENT)
Dept: UROLOGY | Facility: CLINIC | Age: 64
End: 2019-12-12

## 2019-12-12 VITALS
TEMPERATURE: 97.6 F | HEART RATE: 57 BPM | BODY MASS INDEX: 29.92 KG/M2 | OXYGEN SATURATION: 99 % | RESPIRATION RATE: 18 BRPM | WEIGHT: 209 LBS | HEIGHT: 70 IN | SYSTOLIC BLOOD PRESSURE: 125 MMHG | DIASTOLIC BLOOD PRESSURE: 78 MMHG

## 2019-12-12 DIAGNOSIS — R79.89 LOW TESTOSTERONE: Primary | ICD-10-CM

## 2019-12-12 DIAGNOSIS — N52.9 ERECTILE DYSFUNCTION, UNSPECIFIED ERECTILE DYSFUNCTION TYPE: ICD-10-CM

## 2019-12-12 DIAGNOSIS — R35.1 NOCTURIA: ICD-10-CM

## 2019-12-12 PROCEDURE — 99204 OFFICE O/P NEW MOD 45 MIN: CPT | Performed by: UROLOGY

## 2019-12-12 RX ORDER — ALPRAZOLAM 0.5 MG/1
TABLET ORAL
Qty: 30 TABLET | Refills: 4 | OUTPATIENT
Start: 2019-12-12

## 2019-12-12 RX ORDER — SILDENAFIL CITRATE 20 MG/1
100 TABLET ORAL DAILY PRN
Qty: 60 TABLET | Refills: 11 | Status: SHIPPED | OUTPATIENT
Start: 2019-12-12 | End: 2020-12-13

## 2019-12-12 NOTE — PROGRESS NOTES
Chief Complaint  Fatigue  Low libido    Referring Provider  Margret Purvis MD    HPI  Mr. Riley is a 64 y.o. male with history of severe DM who presents with low testosterone.  The serum test was collected due to the following complaints: low libido.    Low libido   yes  Low energy   yes  Poor sleep   yes  Mental clarity issues  no    History of depression? no  Erectile dysfunction?  yes   + CABG 15 yrs ago  Erections are slightly hard enough to penetrate  No nitroglycerin usage    Any potential interest in conception?  no     + Hx of DVTs 10 yrs ago    Dm is poorly controlled; Hgb A1c 10 recently    + Hx of difficulty sleeping, unsure if he snores. He had a sleep test many years ago    Past Medical History  Past Medical History:   Diagnosis Date   • Arthritis    • Coronary artery disease    • Diabetes mellitus (CMS/HCC)    • Heart disease    • History of blood clots    • Hypertension    • Thyroid disease        Past Surgical History  Past Surgical History:   Procedure Laterality Date   • CHOLECYSTECTOMY  2015   • COLONOSCOPY  2015    dr srivastava   • CORONARY ARTERY BYPASS GRAFT      Smilax -  BY PASS    • ENDOSCOPY         Medications    Current Outpatient Medications:   •  ALPRAZolam (XANAX) 0.5 MG tablet, Take 1 tablet by mouth At Night As Needed for Anxiety., Disp: 30 tablet, Rfl: 2  •  amitriptyline (ELAVIL) 10 MG tablet, TAKE 1 TABLET BY MOUTH EVERY NIGHT., Disp: 30 tablet, Rfl: 2  •  amLODIPine (NORVASC) 5 MG tablet, Take 1 tablet by mouth Daily., Disp: 90 tablet, Rfl: 2  •  atorvastatin (LIPITOR) 40 MG tablet, Take 1 tablet by mouth Every Night., Disp: 90 tablet, Rfl: 2  •  cetirizine (zyrTEC) 10 MG tablet, Take 10 mg by mouth Daily., Disp: , Rfl: 11  •  citalopram (CeleXA) 40 MG tablet, Take 1 tablet by mouth Daily., Disp: 30 tablet, Rfl: 5  •  gabapentin (NEURONTIN) 300 MG capsule, Take 1 capsule by mouth 2 (Two) Times a Day., Disp: , Rfl: 1  •  glucose blood test strip, Check sugar 3 times a day,  Disp: 30 each, Rfl: 12  •  glucose monitor monitoring kit, 1 each Daily., Disp: 1 each, Rfl: 1  •  glucose monitor monitoring kit, 1 each Daily., Disp: 1 each, Rfl: 1  •  HYDROcodone-acetaminophen (NORCO) 7.5-325 MG per tablet, Take 1 tablet by mouth 2 (Two) Times a Day., Disp: 60 tablet, Rfl: 0  •  insulin aspart (NOVOLOG) 100 UNIT/ML injection, Inject 50 Units under the skin into the appropriate area as directed 3 (Three) Times a Day Before Meals. Dispense 5 vials with 12r, Disp: 5 each, Rfl: 12  •  insulin detemir (LEVEMIR FLEXPEN) 100 UNIT/ML injection, Inject 70 Units under the skin into the appropriate area as directed Every Night., Disp: 5 pen, Rfl: 11  •  Lancets 30G misc, Check sugar 3 times daily, Disp: 30 each, Rfl: 12  •  levothyroxine (SYNTHROID, LEVOTHROID) 88 MCG tablet, TAKE 1 TABLET BY MOUTH DAILY., Disp: 30 tablet, Rfl: 0  •  losartan (COZAAR) 100 MG tablet, Take 1 tablet by mouth Daily., Disp: 90 tablet, Rfl: 1  •  pantoprazole (PROTONIX) 40 MG EC tablet, Take 1 tablet by mouth Daily., Disp: 90 tablet, Rfl: 2    Allergies  No Known Allergies    Social History  Social History     Socioeconomic History   • Marital status:      Spouse name: Not on file   • Number of children: Not on file   • Years of education: Not on file   • Highest education level: Not on file   Tobacco Use   • Smoking status: Never Smoker   • Smokeless tobacco: Never Used   Substance and Sexual Activity   • Alcohol use: No   • Drug use: No   • Sexual activity: Defer     Family History  He has no family history of prostate cancer  Family History   Problem Relation Age of Onset   • Diabetes Mother    • Heart disease Mother         STENTING   • COPD Mother    • Heart attack Father    • Diabetes Father      Review of Systems  Constitutional: No fevers or chills  Skin: Negative for rash  Endocrine: No heat/cold intolerance   Cardiovascular: Negative for chest pain or dyspnea on exertion  Respiratory: Negative for shortness of  "breath or wheezing  Gastrointestinal: No constipation, nausea or vomiting  Genitourinary: Negative for new lower urinary tract symptoms, current gross hematuria or dysuria.  Musculoskeletal: No flank pain  Neurological:  Negative for frequent headaches or dizziness  Lymph/Heme: Negative for leg swelling or calf pain.    Physical Exam  Visit Vitals  /78   Pulse 57   Temp 97.6 °F (36.4 °C)   Resp 18   Ht 177.8 cm (70\")   Wt 94.8 kg (209 lb)   SpO2 99%   BMI 29.99 kg/m²     Constitutional: NAD, WDWN.   HEENT: NCAT. Conjunctivae normal.  MMM.    Cardiovascular: Regular rate.  Pulmonary/Chest: Respirations are even and non-labored bilaterally.  Abdominal: Soft. No distension, tenderness, masses or guarding. No CVA tenderness.  Neurological: A + O x 3.  Cranial Nerves II-XII grossly intact. Normal gait.  Extremities: ARINA x 4, Warm. No clubbing.  No cyanosis.    Skin: Pink, warm and dry.  No rashes noted.  Psychiatric:  Normal mood and affect    Genitourinary  Penis: uncircumcised penis, glans normal, no penile discharge.  No rashes/lesions.    Testes: descended bilaterally, no masses, nontender to palpation. Remainder of scrotal contents normal  Perineum:  No perineal pain with palpation  Rectal:  Normal tone, no masses  Prostate:  40 grams, symmetric, non-tender, anodular and no induration    Labs  No results found for: TESTOSTERONE    No results found for: PSA    Lab Results   Component Value Date    WBC 6.62 08/12/2019    HGB 16.9 08/12/2019    HCT 52.6 (H) 08/12/2019    MCV 91.0 08/12/2019     (L) 08/12/2019     Assessment  Mr. Riley is a 64 y.o. male with low libido and fatigue, as well as erectile dysfunction.  He has multiple medical problems.    Notably he has a history of DVT, which is a relative contraindication to perform testosterone replacement therapy.  I told him that I was uncomfortable performing testosterone replacement therapy for him given this history.  Regardless, he wanted to check his " testosterone.    Regarding his erectile dysfunction, we discussed that his diabetes and coronary artery disease play a large role in this.  He does not take a nitroglycerin.  I warned him of the potential side effects of sildenafil and told him to seek medical care for chest pain or erections lasting longer than 3 hours.    Plan  1. Sildenafil   2. T labs, even though I told him I would not replace it           Peewee Larsen MD

## 2019-12-26 RX ORDER — LEVOTHYROXINE SODIUM 88 UG/1
88 TABLET ORAL DAILY
Qty: 90 TABLET | Refills: 2 | Status: SHIPPED | OUTPATIENT
Start: 2019-12-26 | End: 2020-07-08 | Stop reason: SDUPTHER

## 2020-01-09 ENCOUNTER — OFFICE VISIT (OUTPATIENT)
Dept: INTERNAL MEDICINE | Facility: CLINIC | Age: 65
End: 2020-01-09

## 2020-01-09 VITALS
HEIGHT: 70 IN | RESPIRATION RATE: 16 BRPM | WEIGHT: 221 LBS | TEMPERATURE: 97.5 F | SYSTOLIC BLOOD PRESSURE: 123 MMHG | HEART RATE: 59 BPM | BODY MASS INDEX: 31.64 KG/M2 | DIASTOLIC BLOOD PRESSURE: 76 MMHG | OXYGEN SATURATION: 98 %

## 2020-01-09 DIAGNOSIS — M25.50 ARTHRALGIA OF MULTIPLE JOINTS: ICD-10-CM

## 2020-01-09 DIAGNOSIS — E78.2 MIXED HYPERLIPIDEMIA: ICD-10-CM

## 2020-01-09 DIAGNOSIS — I10 BENIGN ESSENTIAL HYPERTENSION: Primary | ICD-10-CM

## 2020-01-09 DIAGNOSIS — E11.65 UNCONTROLLED TYPE 2 DIABETES MELLITUS WITH HYPERGLYCEMIA, WITH LONG-TERM CURRENT USE OF INSULIN (HCC): ICD-10-CM

## 2020-01-09 DIAGNOSIS — R19.7 DIARRHEA, UNSPECIFIED TYPE: ICD-10-CM

## 2020-01-09 DIAGNOSIS — F41.9 ANXIETY: ICD-10-CM

## 2020-01-09 DIAGNOSIS — E03.8 ADULT ONSET HYPOTHYROIDISM: ICD-10-CM

## 2020-01-09 DIAGNOSIS — Z79.4 UNCONTROLLED TYPE 2 DIABETES MELLITUS WITH HYPERGLYCEMIA, WITH LONG-TERM CURRENT USE OF INSULIN (HCC): ICD-10-CM

## 2020-01-09 PROCEDURE — 99214 OFFICE O/P EST MOD 30 MIN: CPT | Performed by: INTERNAL MEDICINE

## 2020-01-09 RX ORDER — ATORVASTATIN CALCIUM 40 MG/1
40 TABLET, FILM COATED ORAL NIGHTLY
Qty: 90 TABLET | Refills: 2 | Status: SHIPPED | OUTPATIENT
Start: 2020-01-09 | End: 2020-07-08 | Stop reason: SDUPTHER

## 2020-01-09 RX ORDER — AMLODIPINE BESYLATE 5 MG/1
5 TABLET ORAL DAILY
Qty: 90 TABLET | Refills: 2 | Status: SHIPPED | OUTPATIENT
Start: 2020-01-09 | End: 2020-07-08 | Stop reason: SDUPTHER

## 2020-01-09 RX ORDER — CITALOPRAM 40 MG/1
40 TABLET ORAL DAILY
Qty: 90 TABLET | Refills: 3 | Status: SHIPPED | OUTPATIENT
Start: 2020-01-09 | End: 2020-07-08 | Stop reason: SDUPTHER

## 2020-01-09 RX ORDER — HYDROCODONE BITARTRATE AND ACETAMINOPHEN 7.5; 325 MG/1; MG/1
1 TABLET ORAL 2 TIMES DAILY
Qty: 60 TABLET | Refills: 0 | Status: SHIPPED | OUTPATIENT
Start: 2020-01-09 | End: 2020-02-12 | Stop reason: SDUPTHER

## 2020-01-09 RX ORDER — ALPRAZOLAM 0.5 MG/1
0.5 TABLET ORAL NIGHTLY PRN
Qty: 30 TABLET | Refills: 3 | Status: SHIPPED | OUTPATIENT
Start: 2020-01-09 | End: 2020-07-08 | Stop reason: SDUPTHER

## 2020-01-09 RX ORDER — PANTOPRAZOLE SODIUM 40 MG/1
40 TABLET, DELAYED RELEASE ORAL DAILY
Qty: 90 TABLET | Refills: 2 | Status: SHIPPED | OUTPATIENT
Start: 2020-01-09 | End: 2020-07-08 | Stop reason: SDUPTHER

## 2020-01-09 RX ORDER — LOSARTAN POTASSIUM 100 MG/1
100 TABLET ORAL DAILY
Qty: 90 TABLET | Refills: 2 | Status: SHIPPED | OUTPATIENT
Start: 2020-01-09 | End: 2020-07-08 | Stop reason: SDUPTHER

## 2020-01-09 NOTE — PROGRESS NOTES
Subjective   Ramsey Riley is a 64 y.o. male.     Chief Complaint   Patient presents with   • Hypertension   • Diabetes   • Hyperlipidemia   • Hypothyroidism   • Diarrhea   • Anxiety   • Joint Pain       History of Present Illness   HPI: Patient is here to follow up on the blood pressure  The patient is taking the blood pressure medications as prescribed and has had no side effects. The patient is also here to follow up on the cholesterol and is trying to follow a diet. The patient is also here to follow on sugar and thyroid and due to get lab work done . The patient also complains of multiple joint pain and chronic anxiety and needs refills on medications .  Patient also complains of chronic diarrhea and requests GI referral  Hypertension   Pertinent negatives include no chest pain, palpitations or shortness of breath.   Hyperlipidemia   Pertinent negatives include no chest pain or shortness of breath.   Diabetes   Pertinent negatives for hypoglycemia include no dizziness, nervousness/anxiousness or pallor. Pertinent negatives for diabetes include no chest pain, no shortness of breath  Patient is on acei/arb     The following portions of the patient's history were reviewed and updated as appropriate: allergies, current medications, past family history, past medical history, past social history, past surgical history and problem list.    Review of Systems   Constitutional: Negative for appetite change, fatigue and fever.   HENT: Negative for congestion, ear discharge, ear pain, sinus pressure and sore throat.    Eyes: Negative for pain and discharge.   Respiratory: Negative for cough, shortness of breath and wheezing.    Cardiovascular: Negative for chest pain, palpitations and leg swelling.   Gastrointestinal: Positive for diarrhea. Negative for abdominal pain, constipation, nausea and vomiting.   Endocrine: Negative for cold intolerance and heat intolerance.   Genitourinary: Negative for dysuria and flank pain.    Musculoskeletal: Positive for arthralgias. Negative for joint swelling.   Skin: Negative for pallor and rash.   Allergic/Immunologic: Negative for environmental allergies and food allergies.   Neurological: Negative for dizziness, weakness and numbness.   Hematological: Negative for adenopathy. Does not bruise/bleed easily.   Psychiatric/Behavioral: Negative for behavioral problems and dysphoric mood. The patient is nervous/anxious.          Current Outpatient Medications:   •  ALPRAZolam (XANAX) 0.5 MG tablet, Take 1 tablet by mouth At Night As Needed for Anxiety., Disp: 30 tablet, Rfl: 3  •  amitriptyline (ELAVIL) 10 MG tablet, TAKE 1 TABLET BY MOUTH EVERY NIGHT., Disp: 30 tablet, Rfl: 2  •  amLODIPine (NORVASC) 5 MG tablet, Take 1 tablet by mouth Daily., Disp: 90 tablet, Rfl: 2  •  atorvastatin (LIPITOR) 40 MG tablet, Take 1 tablet by mouth Every Night., Disp: 90 tablet, Rfl: 2  •  cetirizine (zyrTEC) 10 MG tablet, Take 10 mg by mouth Daily., Disp: , Rfl: 11  •  citalopram (CeleXA) 40 MG tablet, Take 1 tablet by mouth Daily., Disp: 90 tablet, Rfl: 3  •  gabapentin (NEURONTIN) 300 MG capsule, Take 1 capsule by mouth 2 (Two) Times a Day., Disp: , Rfl: 1  •  glucose blood test strip, Check sugar 3 times a day, Disp: 30 each, Rfl: 12  •  glucose monitor monitoring kit, 1 each Daily., Disp: 1 each, Rfl: 1  •  glucose monitor monitoring kit, 1 each Daily., Disp: 1 each, Rfl: 1  •  HYDROcodone-acetaminophen (NORCO) 7.5-325 MG per tablet, Take 1 tablet by mouth 2 (Two) Times a Day., Disp: 60 tablet, Rfl: 0  •  insulin aspart (NOVOLOG) 100 UNIT/ML injection, Inject 50 Units under the skin into the appropriate area as directed 3 (Three) Times a Day Before Meals. Dispense 5 vials with 12r, Disp: 5 each, Rfl: 12  •  insulin detemir (LEVEMIR FLEXPEN) 100 UNIT/ML injection, Inject 70 Units under the skin into the appropriate area as directed Every Night., Disp: 5 pen, Rfl: 11  •  Lancets 30G misc, Check sugar 3 times daily,  "Disp: 30 each, Rfl: 12  •  levothyroxine (SYNTHROID, LEVOTHROID) 88 MCG tablet, TAKE 1 TABLET BY MOUTH DAILY., Disp: 90 tablet, Rfl: 2  •  losartan (COZAAR) 100 MG tablet, Take 1 tablet by mouth Daily., Disp: 90 tablet, Rfl: 2  •  pantoprazole (PROTONIX) 40 MG EC tablet, Take 1 tablet by mouth Daily., Disp: 90 tablet, Rfl: 2  •  sildenafil (REVATIO) 20 MG tablet, Take 5 tablets by mouth Daily As Needed (1 hr prior to sexual activity). 1 hr prior to sexual activity, Disp: 60 tablet, Rfl: 11    Objective     Blood pressure 123/76, pulse 59, temperature 97.5 °F (36.4 °C), resp. rate 16, height 177.8 cm (70\"), weight 100 kg (221 lb), SpO2 98 %.    Physical Exam   Constitutional: He is oriented to person, place, and time. He appears well-developed and well-nourished. No distress.   HENT:   Head: Normocephalic and atraumatic.   Right Ear: External ear normal.   Left Ear: External ear normal.   Nose: Nose normal.   Mouth/Throat: Oropharynx is clear and moist.   Eyes: Pupils are equal, round, and reactive to light. Conjunctivae and EOM are normal.   Neck: Normal range of motion. Neck supple. No thyromegaly present.   Cardiovascular: Normal rate, regular rhythm and normal heart sounds.   Pulmonary/Chest: Effort normal. No respiratory distress.   Abdominal: Soft. He exhibits no distension. There is no tenderness. There is no guarding.   Musculoskeletal: Normal range of motion. He exhibits no edema.   Lymphadenopathy:     He has no cervical adenopathy.   Neurological: He is alert and oriented to person, place, and time.   No gross motor or sensory deficits   Skin: Skin is warm and dry. He is not diaphoretic. No erythema.   Psychiatric: He has a normal mood and affect.   Nursing note and vitals reviewed.    Patient's Body mass index is 31.71 kg/m². BMI is above normal parameters. Recommendations include: educational material, exercise counseling and nutrition counseling.      Results for orders placed or performed in visit on " 08/12/19   Comprehensive Metabolic Panel   Result Value Ref Range    Glucose 221 (H) 65 - 99 mg/dL    BUN 14 8 - 23 mg/dL    Creatinine 0.95 0.76 - 1.27 mg/dL    eGFR Non African Am 80 >60 mL/min/1.73    eGFR African Am 97 >60 mL/min/1.73    BUN/Creatinine Ratio 14.7 7.0 - 25.0    Sodium 141 136 - 145 mmol/L    Potassium 4.6 3.5 - 5.2 mmol/L    Chloride 99 98 - 107 mmol/L    Total CO2 26.2 22.0 - 29.0 mmol/L    Calcium 9.0 8.6 - 10.5 mg/dL    Total Protein 6.8 6.0 - 8.5 g/dL    Albumin 4.40 3.50 - 5.20 g/dL    Globulin 2.4 gm/dL    A/G Ratio 1.8 g/dL    Total Bilirubin 0.7 0.2 - 1.2 mg/dL    Alkaline Phosphatase 65 39 - 117 U/L    AST (SGOT) 30 1 - 40 U/L    ALT (SGPT) 27 1 - 41 U/L   Lipid Panel   Result Value Ref Range    Total Cholesterol 176 0 - 200 mg/dL    Triglycerides 172 (H) 0 - 150 mg/dL    HDL Cholesterol 42 40 - 60 mg/dL    VLDL Cholesterol 34.4 mg/dL    LDL Cholesterol  100 0 - 100 mg/dL   Microalbumin / Creatinine Urine Ratio - Urine, Clean Catch   Result Value Ref Range    Creatinine, Urine 179.9 Not Estab. mg/dL    Microalbumin, Urine 3,540.1 Not Estab. ug/mL    Microalbumin/Creatinine Ratio 1,967.8 (H) 0.0 - 30.0 mg/g creat   TSH   Result Value Ref Range    TSH 2.950 0.270 - 4.200 mIU/mL   Hemoglobin A1c   Result Value Ref Range    Hemoglobin A1C 10.10 (H) 4.80 - 5.60 %   Manual Differential   Result Value Ref Range    Neutrophil Rel % 58.3 42.7 - 76.0 %    Lymphocyte Rel % 36.1 19.6 - 45.3 %    Monocyte Rel % 4.6 (L) 5.0 - 12.0 %    Eosinophil Rel % 0.9 0.3 - 6.2 %    Neutrophils Absolute 3.86 1.70 - 7.00 10*3/mm3    Lymphocytes Absolute 2.39 0.70 - 3.10 10*3/mm3    Monocytes Absolute 0.30 0.10 - 0.90 10*3/mm3    Eosinophil Abs 0.06 0.00 - 0.40 10*3/mm3    Differential Comment Comment     Comment Comment     Plt Comment Comment    CBC & Differential   Result Value Ref Range    WBC 6.62 3.40 - 10.80 10*3/mm3    RBC 5.78 4.14 - 5.80 10*6/mm3    Hemoglobin 16.9 13.0 - 17.7 g/dL    Hematocrit 52.6 (H)  37.5 - 51.0 %    MCV 91.0 79.0 - 97.0 fL    MCH 29.2 26.6 - 33.0 pg    MCHC 32.1 31.5 - 35.7 g/dL    RDW 13.1 12.3 - 15.4 %    Platelets 132 (L) 140 - 450 10*3/mm3         Assessment/Plan   Ramsey was seen today for hypertension, diabetes, hyperlipidemia, hypothyroidism, diarrhea, anxiety and joint pain.    Diagnoses and all orders for this visit:    Benign essential hypertension    Mixed hyperlipidemia  -     CBC & Differential  -     Comprehensive Metabolic Panel  -     Lipid Panel    Uncontrolled type 2 diabetes mellitus with hyperglycemia, with long-term current use of insulin (CMS/[facility])  -     Hemoglobin A1c  -     Microalbumin / Creatinine Urine Ratio - Urine, Clean Catch    Adult onset hypothyroidism  -     TSH    Diarrhea, unspecified type  -     Ambulatory Referral to Gastroenterology    Arthralgia of multiple joints  -     Urine Drug Screen - Urine, Clean Catch; Future    Anxiety  -     Urine Drug Screen - Urine, Clean Catch; Future    Other orders  -     pantoprazole (PROTONIX) 40 MG EC tablet; Take 1 tablet by mouth Daily.  -     losartan (COZAAR) 100 MG tablet; Take 1 tablet by mouth Daily.  -     atorvastatin (LIPITOR) 40 MG tablet; Take 1 tablet by mouth Every Night.  -     amLODIPine (NORVASC) 5 MG tablet; Take 1 tablet by mouth Daily.  -     citalopram (CeleXA) 40 MG tablet; Take 1 tablet by mouth Daily.  -     insulin aspart (NOVOLOG) 100 UNIT/ML injection; Inject 50 Units under the skin into the appropriate area as directed 3 (Three) Times a Day Before Meals. Dispense 5 vials with 12r  -     insulin detemir (LEVEMIR FLEXPEN) 100 UNIT/ML injection; Inject 70 Units under the skin into the appropriate area as directed Every Night.  -     ALPRAZolam (XANAX) 0.5 MG tablet; Take 1 tablet by mouth At Night As Needed for Anxiety.  -     HYDROcodone-acetaminophen (NORCO) 7.5-325 MG per tablet; Take 1 tablet by mouth 2 (Two) Times a Day.        Plan:  1.  Benign essential hypertension: Will continue current  medication, low-sodium diet advised  2.mixed hyperlipidemia: will obtain   fasting CMP and lipid panel.  Diet and exercise counseled,  Will continue current medications  3. Diabetes  Mellitus  : will obtain   fasting CMP  and hba1c  , diet and exercise counseled , Will continue current medications  4. hypothyroidism: will obtain tsh , and continue levothyroxine  5.  Multiple joint pain: Refilled hydrocodone, UDS and Lavell today  6.  Anxiety disorder: Refilled Celexa, PRN alprazolam  7.  Diarrhea: We will refer patient to GI  The patient has read and signed the [facility] Controlled Substance Contract.The patient is aware of the potential for addiction and dependence. This is a short-term use   A Lavell was reviewed and scanned into the chart today. Narcotic contract was signed by patient today . Will obtain Urine drug screen  Lavell Report   As part of this patient's treatment plan I am prescribing controlled substances. The patient has been made aware of appropriate use of such medications, including potential risk of somnolence, limited ability to drive and /or work safely, and potential for dependence or overdose. It has also been made clear that these medications are for use by this patient only, without concomitant use of alcohol or other substances unless prescribed.   Patient has completed prescribing agreement detailing terms of continued prescribing of controlled substances, including monitoring LAVELL reports, urine drug screening, and pill counts if necessary. The patient is aware that inappropriate use will result in cessation of prescribing such medications.   LAVELL report has been reviewed   History and physical exam exhibit continued safe and appropriate use of controlled substances. Patient is advised to start a comprehensive exercise program including and not limited to physical therapy program for therapeutic exercise, upper body strengthening/posture correction, core strengthening, gait and  balance training and  exercise program such as yoga, Maurisio Chi, water therapy and daily walks for fitness  The patient/ her family have been instructed to contact my office with any questions or difficulties. The patient understands the plan , patient has verbalized an understanding and agrees to proceed accordingly           Margret Purvis MD

## 2020-01-16 DIAGNOSIS — F41.9 ANXIETY: ICD-10-CM

## 2020-01-16 DIAGNOSIS — M25.50 ARTHRALGIA OF MULTIPLE JOINTS: ICD-10-CM

## 2020-02-13 ENCOUNTER — TELEPHONE (OUTPATIENT)
Dept: INTERNAL MEDICINE | Facility: CLINIC | Age: 65
End: 2020-02-13

## 2020-02-13 RX ORDER — HYDROCODONE BITARTRATE AND ACETAMINOPHEN 7.5; 325 MG/1; MG/1
1 TABLET ORAL 2 TIMES DAILY
Qty: 60 TABLET | Refills: 0 | Status: SHIPPED | OUTPATIENT
Start: 2020-02-13 | End: 2020-02-13 | Stop reason: SDUPTHER

## 2020-02-13 RX ORDER — HYDROCODONE BITARTRATE AND ACETAMINOPHEN 7.5; 325 MG/1; MG/1
1 TABLET ORAL 2 TIMES DAILY
Qty: 60 TABLET | Refills: 0 | Status: SHIPPED | OUTPATIENT
Start: 2020-02-13 | End: 2020-03-05 | Stop reason: SDUPTHER

## 2020-02-13 NOTE — TELEPHONE ENCOUNTER
Patient's wife requesting refill on HYDROcodone-acetaminophen (NORCO) 7.5-325 MG per tablet    She stated that it is a written prescription, please call and advise. [phone]

## 2020-03-03 ENCOUNTER — TELEPHONE (OUTPATIENT)
Dept: INTERNAL MEDICINE | Facility: CLINIC | Age: 65
End: 2020-03-03

## 2020-03-03 NOTE — TELEPHONE ENCOUNTER
Patient's wife Jina requested lab orders be sent to [facility] for patient prior to his appointment on 3/5/20.     Please call and advise. Jina's call back  [phone] or [phone]

## 2020-03-05 ENCOUNTER — OFFICE VISIT (OUTPATIENT)
Dept: INTERNAL MEDICINE | Facility: CLINIC | Age: 65
End: 2020-03-05

## 2020-03-05 VITALS
HEART RATE: 55 BPM | DIASTOLIC BLOOD PRESSURE: 78 MMHG | BODY MASS INDEX: 31.78 KG/M2 | SYSTOLIC BLOOD PRESSURE: 137 MMHG | OXYGEN SATURATION: 98 % | TEMPERATURE: 97.1 F | WEIGHT: 222 LBS | RESPIRATION RATE: 16 BRPM | HEIGHT: 70 IN

## 2020-03-05 DIAGNOSIS — E03.8 ADULT ONSET HYPOTHYROIDISM: ICD-10-CM

## 2020-03-05 DIAGNOSIS — E78.2 MIXED HYPERLIPIDEMIA: ICD-10-CM

## 2020-03-05 DIAGNOSIS — E11.65 UNCONTROLLED TYPE 2 DIABETES MELLITUS WITH HYPERGLYCEMIA, WITH LONG-TERM CURRENT USE OF INSULIN (HCC): ICD-10-CM

## 2020-03-05 DIAGNOSIS — R74.8 ELEVATED LIVER ENZYMES: ICD-10-CM

## 2020-03-05 DIAGNOSIS — I10 BENIGN ESSENTIAL HYPERTENSION: Primary | ICD-10-CM

## 2020-03-05 DIAGNOSIS — Z79.4 UNCONTROLLED TYPE 2 DIABETES MELLITUS WITH HYPERGLYCEMIA, WITH LONG-TERM CURRENT USE OF INSULIN (HCC): ICD-10-CM

## 2020-03-05 PROCEDURE — 99214 OFFICE O/P EST MOD 30 MIN: CPT | Performed by: INTERNAL MEDICINE

## 2020-03-05 RX ORDER — HYDROCODONE BITARTRATE AND ACETAMINOPHEN 7.5; 325 MG/1; MG/1
1 TABLET ORAL 2 TIMES DAILY
Qty: 60 TABLET | Refills: 0 | Status: SHIPPED | OUTPATIENT
Start: 2020-03-05 | End: 2020-04-20

## 2020-03-05 NOTE — PROGRESS NOTES
Subjective   Ramsey Riley is a 64 y.o. male.     Chief Complaint   Patient presents with   • Hypertension   • Hyperlipidemia   • Diabetes   • Hypothyroidism   • Joint Pain       History of Present Illness   HPI: Patient is here to follow up on the blood pressure  The patient is taking the blood pressure medications as prescribed and has had no side effects. The patient is also here to follow up on the cholesterol and is trying to follow a diet. The patient is also here to follow on sugar and thyroid and had lab work done .  He was seen by GI and underwent colonoscopy, the patient also complains of multiple joint pain and needs refills on medications .   Hypertension   Pertinent negatives include no chest pain, palpitations or shortness of breath.   Hyperlipidemia   Pertinent negatives include no chest pain or shortness of breath.   Diabetes   Pertinent negatives for hypoglycemia include no dizziness, nervousness/anxiousness or pallor. Pertinent negatives for diabetes include no chest pain, no shortness of breath  Patient is on acei/arb     The following portions of the patient's history were reviewed and updated as appropriate: allergies, current medications, past family history, past medical history, past social history, past surgical history and problem list.    Review of Systems   Constitutional: Negative for appetite change, fatigue and fever.   HENT: Negative for congestion, ear discharge, ear pain, sinus pressure and sore throat.    Eyes: Negative for pain and discharge.   Respiratory: Negative for cough, shortness of breath and wheezing.    Cardiovascular: Negative for chest pain, palpitations and leg swelling.   Gastrointestinal: Negative for abdominal pain, constipation, diarrhea, nausea and vomiting.   Endocrine: Negative for cold intolerance and heat intolerance.   Genitourinary: Negative for dysuria and flank pain.   Musculoskeletal: Positive for arthralgias. Negative for joint swelling.   Skin:  Negative for pallor and rash.   Allergic/Immunologic: Negative for environmental allergies and food allergies.   Neurological: Negative for dizziness, weakness and numbness.   Hematological: Negative for adenopathy. Does not bruise/bleed easily.   Psychiatric/Behavioral: Negative for behavioral problems and dysphoric mood. The patient is not nervous/anxious.          Current Outpatient Medications:   •  ALPRAZolam (XANAX) 0.5 MG tablet, Take 1 tablet by mouth At Night As Needed for Anxiety., Disp: 30 tablet, Rfl: 3  •  amitriptyline (ELAVIL) 10 MG tablet, TAKE 1 TABLET BY MOUTH EVERY NIGHT., Disp: 30 tablet, Rfl: 2  •  amLODIPine (NORVASC) 5 MG tablet, Take 1 tablet by mouth Daily., Disp: 90 tablet, Rfl: 2  •  atorvastatin (LIPITOR) 40 MG tablet, Take 1 tablet by mouth Every Night., Disp: 90 tablet, Rfl: 2  •  cetirizine (zyrTEC) 10 MG tablet, Take 10 mg by mouth Daily., Disp: , Rfl: 11  •  citalopram (CeleXA) 40 MG tablet, Take 1 tablet by mouth Daily., Disp: 90 tablet, Rfl: 3  •  gabapentin (NEURONTIN) 300 MG capsule, Take 1 capsule by mouth 2 (Two) Times a Day., Disp: , Rfl: 1  •  glucose blood test strip, Check sugar 3 times a day, Disp: 30 each, Rfl: 12  •  glucose monitor monitoring kit, 1 each Daily., Disp: 1 each, Rfl: 1  •  glucose monitor monitoring kit, 1 each Daily., Disp: 1 each, Rfl: 1  •  HYDROcodone-acetaminophen (NORCO) 7.5-325 MG per tablet, Take 1 tablet by mouth 2 (Two) Times a Day., Disp: 60 tablet, Rfl: 0  •  insulin aspart (NOVOLOG) 100 UNIT/ML injection, Inject 50 Units under the skin into the appropriate area as directed 3 (Three) Times a Day Before Meals. Dispense 5 vials with 12r, Disp: 5 each, Rfl: 12  •  insulin detemir (LEVEMIR FLEXPEN) 100 UNIT/ML injection, Inject 70 Units under the skin into the appropriate area as directed Every Night., Disp: 5 pen, Rfl: 11  •  Lancets 30G misc, Check sugar 3 times daily, Disp: 30 each, Rfl: 12  •  levothyroxine (SYNTHROID, LEVOTHROID) 88 MCG  "tablet, TAKE 1 TABLET BY MOUTH DAILY., Disp: 90 tablet, Rfl: 2  •  losartan (COZAAR) 100 MG tablet, Take 1 tablet by mouth Daily., Disp: 90 tablet, Rfl: 2  •  pantoprazole (PROTONIX) 40 MG EC tablet, Take 1 tablet by mouth Daily., Disp: 90 tablet, Rfl: 2  •  sildenafil (REVATIO) 20 MG tablet, Take 5 tablets by mouth Daily As Needed (1 hr prior to sexual activity). 1 hr prior to sexual activity, Disp: 60 tablet, Rfl: 11    Objective     Blood pressure 137/78, pulse 55, temperature 97.1 °F (36.2 °C), resp. rate 16, height 177.8 cm (70\"), weight 101 kg (222 lb), SpO2 98 %.    Physical Exam   Constitutional: He is oriented to person, place, and time. He appears well-developed and well-nourished. No distress.   HENT:   Head: Normocephalic and atraumatic.   Right Ear: External ear normal.   Left Ear: External ear normal.   Nose: Nose normal.   Mouth/Throat: Oropharynx is clear and moist.   Eyes: Pupils are equal, round, and reactive to light. Conjunctivae and EOM are normal.   Neck: Normal range of motion. Neck supple. No thyromegaly present.   Cardiovascular: Normal rate, regular rhythm and normal heart sounds.   Pulmonary/Chest: Effort normal. No respiratory distress.   Abdominal: Soft. He exhibits no distension. There is no tenderness. There is no guarding.   Musculoskeletal: He exhibits deformity. He exhibits no edema.   Lymphadenopathy:     He has no cervical adenopathy.   Neurological: He is alert and oriented to person, place, and time.   No gross motor or sensory deficits   Skin: Skin is warm and dry. He is not diaphoretic. No erythema.   Psychiatric: He has a normal mood and affect.   Nursing note and vitals reviewed.    Patient's Body mass index is 31.85 kg/m². BMI is above normal parameters. Recommendations include: educational material, exercise counseling and nutrition counseling.      Results for orders placed or performed in visit on 08/12/19   Comprehensive Metabolic Panel   Result Value Ref Range    " Glucose 221 (H) 65 - 99 mg/dL    BUN 14 8 - 23 mg/dL    Creatinine 0.95 0.76 - 1.27 mg/dL    eGFR Non African Am 80 >60 mL/min/1.73    eGFR African Am 97 >60 mL/min/1.73    BUN/Creatinine Ratio 14.7 7.0 - 25.0    Sodium 141 136 - 145 mmol/L    Potassium 4.6 3.5 - 5.2 mmol/L    Chloride 99 98 - 107 mmol/L    Total CO2 26.2 22.0 - 29.0 mmol/L    Calcium 9.0 8.6 - 10.5 mg/dL    Total Protein 6.8 6.0 - 8.5 g/dL    Albumin 4.40 3.50 - 5.20 g/dL    Globulin 2.4 gm/dL    A/G Ratio 1.8 g/dL    Total Bilirubin 0.7 0.2 - 1.2 mg/dL    Alkaline Phosphatase 65 39 - 117 U/L    AST (SGOT) 30 1 - 40 U/L    ALT (SGPT) 27 1 - 41 U/L   Lipid Panel   Result Value Ref Range    Total Cholesterol 176 0 - 200 mg/dL    Triglycerides 172 (H) 0 - 150 mg/dL    HDL Cholesterol 42 40 - 60 mg/dL    VLDL Cholesterol 34.4 mg/dL    LDL Cholesterol  100 0 - 100 mg/dL   Microalbumin / Creatinine Urine Ratio - Urine, Clean Catch   Result Value Ref Range    Creatinine, Urine 179.9 Not Estab. mg/dL    Microalbumin, Urine 3,540.1 Not Estab. ug/mL    Microalbumin/Creatinine Ratio 1,967.8 (H) 0.0 - 30.0 mg/g creat   TSH   Result Value Ref Range    TSH 2.950 0.270 - 4.200 mIU/mL   Hemoglobin A1c   Result Value Ref Range    Hemoglobin A1C 10.10 (H) 4.80 - 5.60 %   Manual Differential   Result Value Ref Range    Neutrophil Rel % 58.3 42.7 - 76.0 %    Lymphocyte Rel % 36.1 19.6 - 45.3 %    Monocyte Rel % 4.6 (L) 5.0 - 12.0 %    Eosinophil Rel % 0.9 0.3 - 6.2 %    Neutrophils Absolute 3.86 1.70 - 7.00 10*3/mm3    Lymphocytes Absolute 2.39 0.70 - 3.10 10*3/mm3    Monocytes Absolute 0.30 0.10 - 0.90 10*3/mm3    Eosinophil Abs 0.06 0.00 - 0.40 10*3/mm3    Differential Comment Comment     Comment Comment     Plt Comment Comment    CBC & Differential   Result Value Ref Range    WBC 6.62 3.40 - 10.80 10*3/mm3    RBC 5.78 4.14 - 5.80 10*6/mm3    Hemoglobin 16.9 13.0 - 17.7 g/dL    Hematocrit 52.6 (H) 37.5 - 51.0 %    MCV 91.0 79.0 - 97.0 fL    MCH 29.2 26.6 - 33.0 pg     MCHC 32.1 31.5 - 35.7 g/dL    RDW 13.1 12.3 - 15.4 %    Platelets 132 (L) 140 - 450 10*3/mm3         Assessment/Plan   Ramsey was seen today for hypertension, hyperlipidemia, diabetes, hypothyroidism and joint pain.    Diagnoses and all orders for this visit:    Benign essential hypertension    Mixed hyperlipidemia  -     CBC & Differential  -     Comprehensive Metabolic Panel  -     Lipid Panel    Uncontrolled type 2 diabetes mellitus with hyperglycemia, with long-term current use of insulin (CMS/[facility])  -     Hemoglobin A1c  -     Microalbumin / Creatinine Urine Ratio - Urine, Clean Catch  -     Ambulatory Referral to Endocrinology    Adult onset hypothyroidism  -     TSH    Elevated liver enzymes    Other orders  -     HYDROcodone-acetaminophen (NORCO) 7.5-325 MG per tablet; Take 1 tablet by mouth 2 (Two) Times a Day.      Plan:  1.  Benign essential hypertension: Will continue current medication, low-sodium diet advised  2.mixed hyperlipidemia: Reviewed fasting CMP and lipid panel.  Diet and exercise counseled,  Will continue current medications, LDL 63  3. Diabetes  Mellitus  : Reviewed   fasting CMP  and hba1c 8.4, diet and exercise counseled , Will continue current medications  4. hypothyroidism: will obtain tsh , and continue levothyroxine  5. Elevated LFT : We will monitor, labs reviewed with patient  6. Multiple joint pain : refilled lortab, UDS and Enoc reviewed  The patient has read and signed the [facility] Controlled Substance Contract.The patient is aware of the potential for addiction and dependence. This is a short-term use   A Enoc was reviewed and scanned into the chart today. Narcotic contract was signed by patient , reviewed Enoc and urine drug screen  Enoc Report   As part of this patient's treatment plan I am prescribing controlled substances. The patient has been made aware of appropriate use of such medications, including potential risk of somnolence, limited ability to drive  and /or work safely, and potential for dependence or overdose. It has also been made clear that these medications are for use by this patient only, without concomitant use of alcohol or other substances unless prescribed.   Patient has completed prescribing agreement detailing terms of continued prescribing of controlled substances, including monitoring LAVELL reports, urine drug screening, and pill counts if necessary. The patient is aware that inappropriate use will result in cessation of prescribing such medications.   LAVELL report has been reviewed   History and physical exam exhibit continued safe and appropriate use of controlled substances. Patient is advised to start a comprehensive exercise program including and not limited to physical therapy program for therapeutic exercise, upper body strengthening/posture correction, core strengthening, gait and balance training and  exercise program such as yoga, Maurisio Chi, water therapy and daily walks for fitness  The patient/ her family have been instructed to contact my office with any questions or difficulties. The patient understands the plan , patient has verbalized an understanding and agrees to proceed accordingly           Margret Purvis MD

## 2020-03-09 ENCOUNTER — TELEPHONE (OUTPATIENT)
Dept: INTERNAL MEDICINE | Facility: CLINIC | Age: 65
End: 2020-03-09

## 2020-03-09 DIAGNOSIS — E11.65 UNCONTROLLED TYPE 2 DIABETES MELLITUS WITH HYPERGLYCEMIA (HCC): ICD-10-CM

## 2020-03-09 NOTE — TELEPHONE ENCOUNTER
Pt needs a refill of     glucose blood test strip      Pharmacy: [facility]  PH; [phone]  FAX: [phone]

## 2020-03-11 ENCOUNTER — PRIOR AUTHORIZATION (OUTPATIENT)
Dept: INTERNAL MEDICINE | Facility: CLINIC | Age: 65
End: 2020-03-11

## 2020-03-11 NOTE — TELEPHONE ENCOUNTER
Patient's insurance is needing PA on [employer] Blood Glucose Test Strips or change. Please advise      KEY: [id number]   Last name: Rosemary  : 1955

## 2020-03-12 DIAGNOSIS — R79.89 LOW TESTOSTERONE: Primary | ICD-10-CM

## 2020-03-16 DIAGNOSIS — R79.89 LOW TESTOSTERONE: ICD-10-CM

## 2020-03-18 RX ORDER — AMITRIPTYLINE HYDROCHLORIDE 10 MG/1
10 TABLET, FILM COATED ORAL NIGHTLY
Qty: 30 TABLET | Refills: 2 | Status: SHIPPED | OUTPATIENT
Start: 2020-03-18 | End: 2020-07-08 | Stop reason: SDUPTHER

## 2020-04-20 RX ORDER — HYDROCODONE BITARTRATE AND ACETAMINOPHEN 7.5; 325 MG/1; MG/1
TABLET ORAL
Qty: 60 TABLET | Refills: 0 | Status: SHIPPED | OUTPATIENT
Start: 2020-04-20 | End: 2020-06-12 | Stop reason: SDUPTHER

## 2020-06-12 NOTE — TELEPHONE ENCOUNTER
PATIENT WIFE CALLED BECAUSE THEY DIDN'T REALISE THEY MISSED AN APPT ON 6/9 AND HE IS OUT OF HIS    HYDROcodone-acetaminophen (NORCO) 7.5-325 MG per tablet    CAN THAT BE CALLED IN UNTIL HE CAN BE SEEN AGAIN ON 7/8    [phone]    PATIENT CALL BACK [phone]

## 2020-06-15 RX ORDER — HYDROCODONE BITARTRATE AND ACETAMINOPHEN 7.5; 325 MG/1; MG/1
1 TABLET ORAL 2 TIMES DAILY
Qty: 60 TABLET | Refills: 0 | Status: SHIPPED | OUTPATIENT
Start: 2020-06-15 | End: 2020-07-08 | Stop reason: SDUPTHER

## 2020-07-08 ENCOUNTER — OFFICE VISIT (OUTPATIENT)
Dept: INTERNAL MEDICINE | Facility: CLINIC | Age: 65
End: 2020-07-08

## 2020-07-08 ENCOUNTER — RESULTS ENCOUNTER (OUTPATIENT)
Dept: INTERNAL MEDICINE | Facility: CLINIC | Age: 65
End: 2020-07-08

## 2020-07-08 VITALS
WEIGHT: 231 LBS | HEIGHT: 70 IN | TEMPERATURE: 97.5 F | RESPIRATION RATE: 16 BRPM | BODY MASS INDEX: 33.07 KG/M2 | HEART RATE: 50 BPM | SYSTOLIC BLOOD PRESSURE: 139 MMHG | OXYGEN SATURATION: 98 % | DIASTOLIC BLOOD PRESSURE: 74 MMHG

## 2020-07-08 DIAGNOSIS — F51.04 INSOMNIA, PSYCHOPHYSIOLOGICAL: ICD-10-CM

## 2020-07-08 DIAGNOSIS — M25.50 ARTHRALGIA OF MULTIPLE JOINTS: ICD-10-CM

## 2020-07-08 DIAGNOSIS — E78.2 MIXED HYPERLIPIDEMIA: ICD-10-CM

## 2020-07-08 DIAGNOSIS — Z79.4 UNCONTROLLED TYPE 2 DIABETES MELLITUS WITH HYPERGLYCEMIA, WITH LONG-TERM CURRENT USE OF INSULIN (HCC): ICD-10-CM

## 2020-07-08 DIAGNOSIS — F41.9 ANXIETY: ICD-10-CM

## 2020-07-08 DIAGNOSIS — E11.65 UNCONTROLLED TYPE 2 DIABETES MELLITUS WITH HYPERGLYCEMIA, WITH LONG-TERM CURRENT USE OF INSULIN (HCC): ICD-10-CM

## 2020-07-08 DIAGNOSIS — I10 BENIGN ESSENTIAL HYPERTENSION: Primary | ICD-10-CM

## 2020-07-08 DIAGNOSIS — E03.8 ADULT ONSET HYPOTHYROIDISM: ICD-10-CM

## 2020-07-08 PROCEDURE — 99214 OFFICE O/P EST MOD 30 MIN: CPT | Performed by: INTERNAL MEDICINE

## 2020-07-08 RX ORDER — AMLODIPINE BESYLATE 5 MG/1
5 TABLET ORAL DAILY
Qty: 90 TABLET | Refills: 2 | Status: SHIPPED | OUTPATIENT
Start: 2020-07-08 | End: 2021-01-04 | Stop reason: SDUPTHER

## 2020-07-08 RX ORDER — ALPRAZOLAM 0.5 MG/1
0.5 TABLET ORAL NIGHTLY PRN
Qty: 30 TABLET | Refills: 3 | Status: SHIPPED | OUTPATIENT
Start: 2020-07-08 | End: 2020-12-14 | Stop reason: SDUPTHER

## 2020-07-08 RX ORDER — LOSARTAN POTASSIUM 100 MG/1
100 TABLET ORAL DAILY
Qty: 90 TABLET | Refills: 2 | Status: SHIPPED | OUTPATIENT
Start: 2020-07-08 | End: 2021-01-04 | Stop reason: SDUPTHER

## 2020-07-08 RX ORDER — PANTOPRAZOLE SODIUM 40 MG/1
40 TABLET, DELAYED RELEASE ORAL DAILY
Qty: 90 TABLET | Refills: 2 | Status: SHIPPED | OUTPATIENT
Start: 2020-07-08 | End: 2021-06-11

## 2020-07-08 RX ORDER — LEVOTHYROXINE SODIUM 88 UG/1
88 TABLET ORAL DAILY
Qty: 90 TABLET | Refills: 2 | Status: SHIPPED | OUTPATIENT
Start: 2020-07-08 | End: 2021-01-04 | Stop reason: SDUPTHER

## 2020-07-08 RX ORDER — AMITRIPTYLINE HYDROCHLORIDE 10 MG/1
10 TABLET, FILM COATED ORAL NIGHTLY
Qty: 30 TABLET | Refills: 2 | Status: SHIPPED | OUTPATIENT
Start: 2020-07-08 | End: 2021-03-01

## 2020-07-08 RX ORDER — CITALOPRAM 40 MG/1
40 TABLET ORAL DAILY
Qty: 90 TABLET | Refills: 2 | Status: SHIPPED | OUTPATIENT
Start: 2020-07-08 | End: 2021-03-24 | Stop reason: SDUPTHER

## 2020-07-08 RX ORDER — HYDROCODONE BITARTRATE AND ACETAMINOPHEN 7.5; 325 MG/1; MG/1
1 TABLET ORAL 2 TIMES DAILY
Qty: 60 TABLET | Refills: 0 | Status: SHIPPED | OUTPATIENT
Start: 2020-07-08 | End: 2020-08-18 | Stop reason: SDUPTHER

## 2020-07-08 RX ORDER — ATORVASTATIN CALCIUM 40 MG/1
40 TABLET, FILM COATED ORAL NIGHTLY
Qty: 90 TABLET | Refills: 2 | Status: SHIPPED | OUTPATIENT
Start: 2020-07-08 | End: 2021-01-04 | Stop reason: SDUPTHER

## 2020-07-08 NOTE — PROGRESS NOTES
Subjective   Ramsey Riley is a 65 y.o. male.     Chief Complaint   Patient presents with   • Hypertension   • Hyperlipidemia   • Diabetes   • Hypothyroidism   • Anxiety   • Joint Pain   • Insomnia       History of Present Illness   HPI: Patient is here to follow up on the blood pressure  The patient is taking the blood pressure medications as prescribed and has had no side effects. The patient is also here to follow up on the cholesterol and is trying to follow a diet. The patient is also here to follow on sugar and   Thyroid and had  lab work done . The patient also  Complains of anxiety   And sleep And  multiple joint pain and needs refills on medications .   Hypertension   Pertinent negatives include no chest pain, palpitations or shortness of breath.   Hyperlipidemia   Pertinent negatives include no chest pain or shortness of breath.   Diabetes   Pertinent negatives for hypoglycemia include no dizziness, nervousness/anxiousness or pallor. Pertinent negatives for diabetes include no chest pain, no shortness of breath  Patient is on acei/arb     The following portions of the patient's history were reviewed and updated as appropriate: allergies, current medications, past family history, past medical history, past social history, past surgical history and problem list.    Review of Systems   Constitutional: Negative for appetite change, fatigue and fever.   HENT: Negative for congestion, ear discharge, ear pain, sinus pressure and sore throat.    Eyes: Negative for pain and discharge.   Respiratory: Negative for cough, shortness of breath and wheezing.    Cardiovascular: Negative for chest pain, palpitations and leg swelling.   Gastrointestinal: Negative for abdominal pain, constipation, diarrhea, nausea and vomiting.   Endocrine: Negative for cold intolerance and heat intolerance.   Genitourinary: Negative for dysuria and flank pain.   Musculoskeletal: Positive for arthralgias. Negative for joint swelling.    Skin: Negative for pallor and rash.   Allergic/Immunologic: Negative for environmental allergies and food allergies.   Neurological: Negative for dizziness, weakness and numbness.   Hematological: Negative for adenopathy. Does not bruise/bleed easily.   Psychiatric/Behavioral: Positive for sleep disturbance. Negative for behavioral problems and dysphoric mood. The patient is nervous/anxious.          Current Outpatient Medications:   •  ALPRAZolam (XANAX) 0.5 MG tablet, Take 1 tablet by mouth At Night As Needed for Anxiety., Disp: 30 tablet, Rfl: 3  •  amitriptyline (ELAVIL) 10 MG tablet, Take 1 tablet by mouth Every Night., Disp: 30 tablet, Rfl: 2  •  amLODIPine (NORVASC) 5 MG tablet, Take 1 tablet by mouth Daily., Disp: 90 tablet, Rfl: 2  •  atorvastatin (LIPITOR) 40 MG tablet, Take 1 tablet by mouth Every Night., Disp: 90 tablet, Rfl: 2  •  cetirizine (zyrTEC) 10 MG tablet, Take 10 mg by mouth Daily., Disp: , Rfl: 11  •  citalopram (CeleXA) 40 MG tablet, Take 1 tablet by mouth Daily., Disp: 90 tablet, Rfl: 2  •  gabapentin (NEURONTIN) 300 MG capsule, Take 1 capsule by mouth 2 (Two) Times a Day., Disp: , Rfl: 1  •  glucose blood test strip, Check sugar 3 times a day, Disp: 30 each, Rfl: 12  •  glucose monitor monitoring kit, 1 each Daily., Disp: 1 each, Rfl: 1  •  glucose monitor monitoring kit, 1 each Daily., Disp: 1 each, Rfl: 1  •  HYDROcodone-acetaminophen (NORCO) 7.5-325 MG per tablet, Take 1 tablet by mouth 2 (Two) Times a Day., Disp: 60 tablet, Rfl: 0  •  insulin aspart (NovoLOG) 100 UNIT/ML injection, Inject 50 Units under the skin into the appropriate area as directed 3 (Three) Times a Day Before Meals. Dispense 5 vials with 12r, Disp: 5 each, Rfl: 12  •  insulin detemir (Levemir FlexPen) 100 UNIT/ML injection, Inject 70 Units under the skin into the appropriate area as directed Every Night., Disp: 5 pen, Rfl: 11  •  Lancets 30G misc, Check sugar 3 times daily, Disp: 30 each, Rfl: 12  •  levothyroxine  "(SYNTHROID, LEVOTHROID) 88 MCG tablet, Take 1 tablet by mouth Daily., Disp: 90 tablet, Rfl: 2  •  losartan (COZAAR) 100 MG tablet, Take 1 tablet by mouth Daily., Disp: 90 tablet, Rfl: 2  •  pantoprazole (PROTONIX) 40 MG EC tablet, Take 1 tablet by mouth Daily., Disp: 90 tablet, Rfl: 2  •  sildenafil (REVATIO) 20 MG tablet, Take 5 tablets by mouth Daily As Needed (1 hr prior to sexual activity). 1 hr prior to sexual activity, Disp: 60 tablet, Rfl: 11    Objective     Blood pressure 139/74, pulse 50, temperature 97.5 °F (36.4 °C), resp. rate 16, height 177.8 cm (70\"), weight 105 kg (231 lb), SpO2 98 %.    Physical Exam   Constitutional: He is oriented to person, place, and time. He appears well-developed and well-nourished. No distress.   HENT:   Head: Normocephalic and atraumatic.   Right Ear: External ear normal.   Left Ear: External ear normal.   Nose: Nose normal.   Mouth/Throat: Oropharynx is clear and moist.   Eyes: Pupils are equal, round, and reactive to light. Conjunctivae and EOM are normal.   Neck: Normal range of motion. Neck supple. No thyromegaly present.   Cardiovascular: Normal rate, regular rhythm and normal heart sounds.   Pulmonary/Chest: Effort normal. No respiratory distress.   Abdominal: Soft. He exhibits no distension. There is no tenderness. There is no guarding.   Musculoskeletal: Normal range of motion. He exhibits no edema.   Lymphadenopathy:     He has no cervical adenopathy.   Neurological: He is alert and oriented to person, place, and time.   No gross motor or sensory deficits   Skin: Skin is warm and dry. He is not diaphoretic. No erythema.   Psychiatric: He has a normal mood and affect.   Nursing note and vitals reviewed.    Patient's Body mass index is 33.15 kg/m². BMI is above normal parameters. Recommendations include: educational material, exercise counseling and nutrition counseling.      Results for orders placed or performed in visit on 08/12/19   Comprehensive Metabolic Panel "   Result Value Ref Range    Glucose 221 (H) 65 - 99 mg/dL    BUN 14 8 - 23 mg/dL    Creatinine 0.95 0.76 - 1.27 mg/dL    eGFR Non African Am 80 >60 mL/min/1.73    eGFR African Am 97 >60 mL/min/1.73    BUN/Creatinine Ratio 14.7 7.0 - 25.0    Sodium 141 136 - 145 mmol/L    Potassium 4.6 3.5 - 5.2 mmol/L    Chloride 99 98 - 107 mmol/L    Total CO2 26.2 22.0 - 29.0 mmol/L    Calcium 9.0 8.6 - 10.5 mg/dL    Total Protein 6.8 6.0 - 8.5 g/dL    Albumin 4.40 3.50 - 5.20 g/dL    Globulin 2.4 gm/dL    A/G Ratio 1.8 g/dL    Total Bilirubin 0.7 0.2 - 1.2 mg/dL    Alkaline Phosphatase 65 39 - 117 U/L    AST (SGOT) 30 1 - 40 U/L    ALT (SGPT) 27 1 - 41 U/L   Lipid Panel   Result Value Ref Range    Total Cholesterol 176 0 - 200 mg/dL    Triglycerides 172 (H) 0 - 150 mg/dL    HDL Cholesterol 42 40 - 60 mg/dL    VLDL Cholesterol 34.4 mg/dL    LDL Cholesterol  100 0 - 100 mg/dL   Microalbumin / Creatinine Urine Ratio - Urine, Clean Catch   Result Value Ref Range    Creatinine, Urine 179.9 Not Estab. mg/dL    Microalbumin, Urine 3,540.1 Not Estab. ug/mL    Microalbumin/Creatinine Ratio 1,967.8 (H) 0.0 - 30.0 mg/g creat   TSH   Result Value Ref Range    TSH 2.950 0.270 - 4.200 mIU/mL   Hemoglobin A1c   Result Value Ref Range    Hemoglobin A1C 10.10 (H) 4.80 - 5.60 %   Manual Differential   Result Value Ref Range    Neutrophil Rel % 58.3 42.7 - 76.0 %    Lymphocyte Rel % 36.1 19.6 - 45.3 %    Monocyte Rel % 4.6 (L) 5.0 - 12.0 %    Eosinophil Rel % 0.9 0.3 - 6.2 %    Neutrophils Absolute 3.86 1.70 - 7.00 10*3/mm3    Lymphocytes Absolute 2.39 0.70 - 3.10 10*3/mm3    Monocytes Absolute 0.30 0.10 - 0.90 10*3/mm3    Eosinophil Abs 0.06 0.00 - 0.40 10*3/mm3    Differential Comment Comment     Comment Comment     Plt Comment Comment    CBC & Differential   Result Value Ref Range    WBC 6.62 3.40 - 10.80 10*3/mm3    RBC 5.78 4.14 - 5.80 10*6/mm3    Hemoglobin 16.9 13.0 - 17.7 g/dL    Hematocrit 52.6 (H) 37.5 - 51.0 %    MCV 91.0 79.0 - 97.0 fL     MCH 29.2 26.6 - 33.0 pg    MCHC 32.1 31.5 - 35.7 g/dL    RDW 13.1 12.3 - 15.4 %    Platelets 132 (L) 140 - 450 10*3/mm3         Assessment/Plan   Ramsey was seen today for hypertension, hyperlipidemia, diabetes, hypothyroidism, anxiety, joint pain and insomnia.    Diagnoses and all orders for this visit:    Benign essential hypertension    Mixed hyperlipidemia  -     CBC & Differential  -     Comprehensive Metabolic Panel  -     Lipid Panel    Uncontrolled type 2 diabetes mellitus with hyperglycemia, with long-term current use of insulin (CMS/[facility])  -     Hemoglobin A1c  -     Microalbumin / Creatinine Urine Ratio - Urine, Clean Catch    Adult onset hypothyroidism  -     TSH    Anxiety  -     Urine Drug Screen - Urine, Clean Catch; Future    Arthralgia of multiple joints  -     Urine Drug Screen - Urine, Clean Catch; Future    Insomnia, psychophysiological  -     Urine Drug Screen - Urine, Clean Catch; Future    Other orders  -     amitriptyline (ELAVIL) 10 MG tablet; Take 1 tablet by mouth Every Night.  -     amLODIPine (NORVASC) 5 MG tablet; Take 1 tablet by mouth Daily.  -     atorvastatin (LIPITOR) 40 MG tablet; Take 1 tablet by mouth Every Night.  -     citalopram (CeleXA) 40 MG tablet; Take 1 tablet by mouth Daily.  -     insulin aspart (NovoLOG) 100 UNIT/ML injection; Inject 50 Units under the skin into the appropriate area as directed 3 (Three) Times a Day Before Meals. Dispense 5 vials with 12r  -     insulin detemir (Levemir FlexPen) 100 UNIT/ML injection; Inject 70 Units under the skin into the appropriate area as directed Every Night.  -     levothyroxine (SYNTHROID, LEVOTHROID) 88 MCG tablet; Take 1 tablet by mouth Daily.  -     losartan (COZAAR) 100 MG tablet; Take 1 tablet by mouth Daily.  -     pantoprazole (PROTONIX) 40 MG EC tablet; Take 1 tablet by mouth Daily.  -     HYDROcodone-acetaminophen (NORCO) 7.5-325 MG per tablet; Take 1 tablet by mouth 2 (Two) Times a Day.  -     ALPRAZolam (XANAX)  0.5 MG tablet; Take 1 tablet by mouth At Night As Needed for Anxiety.    Plan:  1.  Benign essential hypertension: Will continue current medication, low-sodium diet advised, Counseled to regularly check BP at home with goal averaging <130/80.   2.mixed hyperlipidemia: Reviewed   fasting CMP and lipid panel.  Diet and exercise counseled,  Will continue current medications , ldl 68  3. Diabetes  Mellitus  : Reviewed fasting CMP  and hba1c  7.8  , diet and exercise counseled , Will continue current medications   4. hypothyroidism: Reviewed Tsh , and continue levothyroxine  5.  Anxiety disorder: Refilled alprazolam and Celexa  6.  Insomnia: Refilled alprazolam, UDS and Lavell today  7.  Multiple joint pain: Refilled hydrocodone, UDS and Lavell today  The patient has read and signed the [facility] Controlled Substance Contract.The patient is aware of the potential for addiction and dependence. This is a short-term use   A Lavell was reviewed and scanned into the chart today. Narcotic contract was signed by patient  . Will obtain Urine drug screen  Lavell Report   As part of this patient's treatment plan I am prescribing controlled substances. The patient has been made aware of appropriate use of such medications, including potential risk of somnolence, limited ability to drive and /or work safely, and potential for dependence or overdose. It has also been made clear that these medications are for use by this patient only, without concomitant use of alcohol or other substances unless prescribed.   Patient has completed prescribing agreement detailing terms of continued prescribing of controlled substances, including monitoring LAVELL reports, urine drug screening, and pill counts if necessary. The patient is aware that inappropriate use will result in cessation of prescribing such medications.   LAVELL report has been reviewed   History and physical exam exhibit continued safe and appropriate use of controlled  substances. Patient is advised to start a comprehensive exercise program including and not limited to physical therapy program for therapeutic exercise, upper body strengthening/posture correction, core strengthening, gait and balance training and  exercise program such as yoga, Maurisio Chi, water therapy and daily walks for fitness  The patient/ her family have been instructed to contact my office with any questions or difficulties. The patient understands the plan , patient has verbalized an understanding and agrees to proceed accordingly             Margret Purvis MD

## 2020-07-17 RX ORDER — HYDROCODONE BITARTRATE AND ACETAMINOPHEN 7.5; 325 MG/1; MG/1
1 TABLET ORAL 2 TIMES DAILY
Qty: 60 TABLET | Refills: 0 | OUTPATIENT
Start: 2020-07-17

## 2020-07-17 NOTE — TELEPHONE ENCOUNTER
.    Caller: Ramsey Riley    Relationship: Self    Best call back number: [phone]    Medication needed:   Requested Prescriptions     Pending Prescriptions Disp Refills   • HYDROcodone-acetaminophen (NORCO) 7.5-325 MG per tablet 60 tablet 0     Sig: Take 1 tablet by mouth 2 (Two) Times a Day.       When do you need the refill by: As soon as possible    What details did the patient provide when requesting the medication: he is out of the medication    Does the patient have less than a 3 day supply:  [x] Yes  [] No    What is the patient's preferred pharmacy: [address] [phone] [facility] [phone]

## 2020-08-18 RX ORDER — HYDROCODONE BITARTRATE AND ACETAMINOPHEN 7.5; 325 MG/1; MG/1
1 TABLET ORAL 2 TIMES DAILY
Qty: 60 TABLET | Refills: 0 | Status: SHIPPED | OUTPATIENT
Start: 2020-08-18 | End: 2020-09-21 | Stop reason: SDUPTHER

## 2020-08-18 NOTE — TELEPHONE ENCOUNTER
Caller: Ramsey Riley    Relationship: Self    Best call back number: [phone]    Medication needed:   Requested Prescriptions     Pending Prescriptions Disp Refills   • HYDROcodone-acetaminophen (NORCO) 7.5-325 MG per tablet 60 tablet 0     Sig: Take 1 tablet by mouth 2 (Two) Times a Day.       When do you need the refill by: 08/21/20    What details did the patient provide when requesting the medication: Not sure how many are available    Does the patient have less than a 3 day supply:  [] Yes  [x] No    What is the patient's preferred pharmacy: [address] [phone] [facility] [phone]

## 2020-09-11 ENCOUNTER — APPOINTMENT (OUTPATIENT)
Dept: GENERAL RADIOLOGY | Facility: HOSPITAL | Age: 65
End: 2020-09-11

## 2020-09-11 ENCOUNTER — APPOINTMENT (OUTPATIENT)
Dept: ULTRASOUND IMAGING | Facility: HOSPITAL | Age: 65
End: 2020-09-11

## 2020-09-11 ENCOUNTER — HOSPITAL ENCOUNTER (EMERGENCY)
Facility: HOSPITAL | Age: 65
Discharge: HOME | End: 2020-09-11
Attending: EMERGENCY MEDICINE | Admitting: EMERGENCY MEDICINE

## 2020-09-11 VITALS
RESPIRATION RATE: 18 BRPM | WEIGHT: 227.6 LBS | HEART RATE: 73 BPM | HEIGHT: 71 IN | TEMPERATURE: 99.5 F | DIASTOLIC BLOOD PRESSURE: 116 MMHG | BODY MASS INDEX: 31.86 KG/M2 | SYSTOLIC BLOOD PRESSURE: 180 MMHG | OXYGEN SATURATION: 95 %

## 2020-09-11 DIAGNOSIS — L03.90 CELLULITIS, UNSPECIFIED CELLULITIS SITE: Primary | ICD-10-CM

## 2020-09-11 LAB
ALBUMIN SERPL-MCNC: 3.9 G/DL (ref 3.5–5.2)
ALBUMIN/GLOB SERPL: 1.3 G/DL
ALP SERPL-CCNC: 95 U/L (ref 39–117)
ALT SERPL W P-5'-P-CCNC: 33 U/L (ref 1–41)
ANION GAP SERPL CALCULATED.3IONS-SCNC: 15.1 MMOL/L (ref 5–15)
AST SERPL-CCNC: 30 U/L (ref 1–40)
BACTERIA UR QL AUTO: ABNORMAL /HPF
BASOPHILS # BLD AUTO: 0.02 10*3/MM3 (ref 0–0.2)
BASOPHILS NFR BLD AUTO: 0.2 % (ref 0–1.5)
BILIRUB SERPL-MCNC: 0.7 MG/DL (ref 0–1.2)
BILIRUB UR QL STRIP: NEGATIVE
BUN SERPL-MCNC: 21 MG/DL (ref 8–23)
BUN/CREAT SERPL: 18.8 (ref 7–25)
CALCIUM SPEC-SCNC: 9.3 MG/DL (ref 8.6–10.5)
CHLORIDE SERPL-SCNC: 94 MMOL/L (ref 98–107)
CLARITY UR: CLEAR
CO2 SERPL-SCNC: 22.9 MMOL/L (ref 22–29)
COLOR UR: YELLOW
CREAT SERPL-MCNC: 1.12 MG/DL (ref 0.76–1.27)
D-LACTATE SERPL-SCNC: 2 MMOL/L (ref 0.5–2)
DEPRECATED RDW RBC AUTO: 38 FL (ref 37–54)
EOSINOPHIL # BLD AUTO: 0.02 10*3/MM3 (ref 0–0.4)
EOSINOPHIL NFR BLD AUTO: 0.2 % (ref 0.3–6.2)
ERYTHROCYTE [DISTWIDTH] IN BLOOD BY AUTOMATED COUNT: 11.9 % (ref 12.3–15.4)
GFR SERPL CREATININE-BSD FRML MDRD: 66 ML/MIN/1.73
GLOBULIN UR ELPH-MCNC: 3.1 GM/DL
GLUCOSE BLDC GLUCOMTR-MCNC: 376 MG/DL (ref 70–130)
GLUCOSE BLDC GLUCOMTR-MCNC: 535 MG/DL (ref 70–130)
GLUCOSE SERPL-MCNC: 569 MG/DL (ref 65–99)
GLUCOSE UR STRIP-MCNC: ABNORMAL MG/DL
HCT VFR BLD AUTO: 42.6 % (ref 37.5–51)
HGB BLD-MCNC: 14.7 G/DL (ref 13–17.7)
HGB UR QL STRIP.AUTO: ABNORMAL
HYALINE CASTS UR QL AUTO: ABNORMAL /LPF
IMM GRANULOCYTES # BLD AUTO: 0.05 10*3/MM3 (ref 0–0.05)
IMM GRANULOCYTES NFR BLD AUTO: 0.6 % (ref 0–0.5)
KETONES UR QL STRIP: ABNORMAL
LEUKOCYTE ESTERASE UR QL STRIP.AUTO: NEGATIVE
LYMPHOCYTES # BLD AUTO: 0.69 10*3/MM3 (ref 0.7–3.1)
LYMPHOCYTES NFR BLD AUTO: 7.9 % (ref 19.6–45.3)
MCH RBC QN AUTO: 30.3 PG (ref 26.6–33)
MCHC RBC AUTO-ENTMCNC: 34.5 G/DL (ref 31.5–35.7)
MCV RBC AUTO: 87.8 FL (ref 79–97)
MONOCYTES # BLD AUTO: 0.55 10*3/MM3 (ref 0.1–0.9)
MONOCYTES NFR BLD AUTO: 6.3 % (ref 5–12)
NEUTROPHILS NFR BLD AUTO: 7.37 10*3/MM3 (ref 1.7–7)
NEUTROPHILS NFR BLD AUTO: 84.8 % (ref 42.7–76)
NITRITE UR QL STRIP: NEGATIVE
NRBC BLD AUTO-RTO: 0 /100 WBC (ref 0–0.2)
PH UR STRIP.AUTO: 5.5 [PH] (ref 5–8)
PLATELET # BLD AUTO: 158 10*3/MM3 (ref 140–450)
PMV BLD AUTO: 10.3 FL (ref 6–12)
POTASSIUM SERPL-SCNC: 4.7 MMOL/L (ref 3.5–5.2)
PROCALCITONIN SERPL-MCNC: 0.14 NG/ML (ref 0–0.25)
PROT SERPL-MCNC: 7 G/DL (ref 6–8.5)
PROT UR QL STRIP: ABNORMAL
RBC # BLD AUTO: 4.85 10*6/MM3 (ref 4.14–5.8)
RBC # UR: ABNORMAL /HPF
REF LAB TEST METHOD: ABNORMAL
SODIUM SERPL-SCNC: 132 MMOL/L (ref 136–145)
SP GR UR STRIP: >=1.03 (ref 1–1.03)
SQUAMOUS #/AREA URNS HPF: ABNORMAL /HPF
UROBILINOGEN UR QL STRIP: ABNORMAL
WBC # BLD AUTO: 8.7 10*3/MM3 (ref 3.4–10.8)
WBC UR QL AUTO: ABNORMAL /HPF

## 2020-09-11 PROCEDURE — 73630 X-RAY EXAM OF FOOT: CPT

## 2020-09-11 PROCEDURE — 81001 URINALYSIS AUTO W/SCOPE: CPT | Performed by: EMERGENCY MEDICINE

## 2020-09-11 PROCEDURE — 83605 ASSAY OF LACTIC ACID: CPT | Performed by: EMERGENCY MEDICINE

## 2020-09-11 PROCEDURE — 85025 COMPLETE CBC W/AUTO DIFF WBC: CPT | Performed by: EMERGENCY MEDICINE

## 2020-09-11 PROCEDURE — 99284 EMERGENCY DEPT VISIT MOD MDM: CPT

## 2020-09-11 PROCEDURE — 87040 BLOOD CULTURE FOR BACTERIA: CPT | Performed by: EMERGENCY MEDICINE

## 2020-09-11 PROCEDURE — 84145 PROCALCITONIN (PCT): CPT | Performed by: EMERGENCY MEDICINE

## 2020-09-11 PROCEDURE — 25010000002 MORPHINE PER 10 MG: Performed by: EMERGENCY MEDICINE

## 2020-09-11 PROCEDURE — 63710000001 INSULIN REGULAR HUMAN PER 5 UNITS: Performed by: EMERGENCY MEDICINE

## 2020-09-11 PROCEDURE — 82962 GLUCOSE BLOOD TEST: CPT

## 2020-09-11 PROCEDURE — 63710000001 INSULIN DETEMIR PER 5 UNITS: Performed by: EMERGENCY MEDICINE

## 2020-09-11 PROCEDURE — 96374 THER/PROPH/DIAG INJ IV PUSH: CPT

## 2020-09-11 PROCEDURE — 93971 EXTREMITY STUDY: CPT

## 2020-09-11 PROCEDURE — 96375 TX/PRO/DX INJ NEW DRUG ADDON: CPT

## 2020-09-11 PROCEDURE — 80053 COMPREHEN METABOLIC PANEL: CPT | Performed by: EMERGENCY MEDICINE

## 2020-09-11 RX ORDER — MORPHINE SULFATE 4 MG/ML
4 INJECTION, SOLUTION INTRAMUSCULAR; INTRAVENOUS ONCE
Status: COMPLETED | OUTPATIENT
Start: 2020-09-11 | End: 2020-09-11

## 2020-09-11 RX ORDER — HYDROCODONE BITARTRATE AND ACETAMINOPHEN 5; 325 MG/1; MG/1
1 TABLET ORAL EVERY 6 HOURS PRN
Qty: 10 TABLET | Refills: 0 | Status: SHIPPED | OUTPATIENT
Start: 2020-09-11 | End: 2020-09-21

## 2020-09-11 RX ORDER — CLINDAMYCIN HYDROCHLORIDE 150 MG/1
450 CAPSULE ORAL 3 TIMES DAILY
Qty: 90 CAPSULE | Refills: 0 | Status: SHIPPED | OUTPATIENT
Start: 2020-09-11 | End: 2020-09-21

## 2020-09-11 RX ADMIN — HUMAN INSULIN 10 UNITS: 100 INJECTION, SOLUTION SUBCUTANEOUS at 17:10

## 2020-09-11 RX ADMIN — INSULIN DETEMIR 70 UNITS: 100 INJECTION, SOLUTION SUBCUTANEOUS at 17:10

## 2020-09-11 RX ADMIN — SODIUM CHLORIDE 1000 ML: 9 INJECTION, SOLUTION INTRAVENOUS at 14:32

## 2020-09-11 RX ADMIN — SODIUM CHLORIDE 450 MG: 9 INJECTION, SOLUTION INTRAVENOUS at 16:31

## 2020-09-11 RX ADMIN — MORPHINE SULFATE 4 MG: 4 INJECTION, SOLUTION INTRAMUSCULAR; INTRAVENOUS at 14:32

## 2020-09-11 RX ADMIN — SODIUM CHLORIDE 1000 ML: 9 INJECTION, SOLUTION INTRAVENOUS at 14:00

## 2020-09-11 RX ADMIN — HUMAN INSULIN 10 UNITS: 100 INJECTION, SOLUTION SUBCUTANEOUS at 14:34

## 2020-09-11 NOTE — ED PROVIDER NOTES
"TRIAGE CHIEF COMPLAINT:     Nursing and triage notes reviewed    Chief Complaint   Patient presents with   • Leg Swelling      HPI: Ramsey Riley is a 65 y.o. male who presents to the emergency department complaining of swelling and redness in his right lower extremity.  Symptoms have been ongoing for the past month however he states it is been more painful during the last few days.  Patient saw a \"foot doctor\" several days previously and had a biopsy of his toe on Tuesday of this week, 3 days ago.  Patient states the redness and pain have worsened in that area.  He denies any fevers.  His wife states he has been slightly confused over the past few days.  He has not had any chest pain or shortness of breath.    REVIEW OF SYSTEMS: All other systems reviewed and are negative     PAST MEDICAL HISTORY:   Past Medical History:   Diagnosis Date   • Arthritis    • Coronary artery disease    • Diabetes mellitus (CMS/HCC)    • Heart disease    • History of blood clots    • Hypertension    • Thyroid disease         FAMILY HISTORY:   Family History   Problem Relation Age of Onset   • Diabetes Mother    • Heart disease Mother         STENTING   • COPD Mother    • Heart attack Father    • Diabetes Father         SOCIAL HISTORY:   Social History     Socioeconomic History   • Marital status:      Spouse name: Not on file   • Number of children: Not on file   • Years of education: Not on file   • Highest education level: Not on file   Tobacco Use   • Smoking status: Never Smoker   • Smokeless tobacco: Never Used   Substance and Sexual Activity   • Alcohol use: No   • Drug use: No   • Sexual activity: Defer        SURGICAL HISTORY:   Past Surgical History:   Procedure Laterality Date   • CHOLECYSTECTOMY  2015   • COLONOSCOPY  2015    dr srivastava   • CORONARY ARTERY BYPASS GRAFT      [place] [id number] BY PASS    • ENDOSCOPY          CURRENT MEDICATIONS:      Medication List      CONTINUE taking these medications    glucose " monitor monitoring kit  1 each Daily.     glucose monitor monitoring kit  1 each Daily.     Lancets 30G misc  Check sugar 3 times daily        ASK your doctor about these medications    ALPRAZolam 0.5 MG tablet  Commonly known as:  XANAX  Take 1 tablet by mouth At Night As Needed for Anxiety.     amitriptyline 10 MG tablet  Commonly known as:  ELAVIL  Take 1 tablet by mouth Every Night.     amLODIPine 5 MG tablet  Commonly known as:  NORVASC  Take 1 tablet by mouth Daily.     atorvastatin 40 MG tablet  Commonly known as:  LIPITOR  Take 1 tablet by mouth Every Night.     cetirizine 10 MG tablet  Commonly known as:  zyrTEC     citalopram 40 MG tablet  Commonly known as:  CeleXA  Take 1 tablet by mouth Daily.     gabapentin 300 MG capsule  Commonly known as:  NEURONTIN     glucose blood test strip  Check sugar 3 times a day     HYDROcodone-acetaminophen 7.5-325 MG per tablet  Commonly known as:  NORCO  Take 1 tablet by mouth 2 (Two) Times a Day.     insulin aspart 100 UNIT/ML injection  Commonly known as:  NovoLOG  Inject 50 Units under the skin into the appropriate area as directed 3   (Three) Times a Day Before Meals. Dispense 5 vials with 12r     insulin detemir 100 UNIT/ML injection  Commonly known as:  Levemir FlexPen  Inject 70 Units under the skin into the appropriate area as directed Every   Night.     levothyroxine 88 MCG tablet  Commonly known as:  SYNTHROID, LEVOTHROID  Take 1 tablet by mouth Daily.     losartan 100 MG tablet  Commonly known as:  COZAAR  Take 1 tablet by mouth Daily.     pantoprazole 40 MG EC tablet  Commonly known as:  PROTONIX  Take 1 tablet by mouth Daily.     sildenafil 20 MG tablet  Commonly known as:  REVATIO  Take 5 tablets by mouth Daily As Needed (1 hr prior to sexual activity). 1   hr prior to sexual activity           ALLERGIES: Patient has no known allergies.     PHYSICAL EXAM:   VITAL SIGNS:   Vitals:    09/11/20 1222   BP: 159/86   Pulse: 83   Resp: 18   Temp: 99.5 °F (37.5 °C)    SpO2: 93%      CONSTITUTIONAL: Awake, oriented, appears non-toxic   HENT: Atraumatic, normocephalic, oral mucosa pink and moist, airway patent. Nares patent without drainage. External ears normal.   EYES: Conjunctiva clear   NECK: Trachea midline   CARDIOVASCULAR: Normal heart rate, Normal rhythm, No murmurs, rubs, gallops   PULMONARY/CHEST: Clear to auscultation, no rhonchi, wheezes, or rales. Symmetrical breath sounds.  ABDOMINAL: Non-distended, soft, non-tender - no rebound or guarding.  NEUROLOGIC: Non-focal, moving all four extremities, no gross sensory or motor deficits.   EXTREMITIES: No clubbing, cyanosis.  There is mild redness on the dorsum of the right foot extending up midway through the right shin.  There is a small puncture wound on the pad of the right great toe where a biopsy had been taken.  Minimal erythema in this area.  SKIN: Warm, Dry, No erythema, No rash     ED COURSE / MEDICAL DECISION MAKING:   Ramsey Riley is a 65 y.o. male who presents to the emergency department for evaluation of pain and swelling in his right lower extremity.  Patient has a low-grade temperature of 99.5 on arrival in the emergency department.  Other vital signs are stable.  Exam does reveal some redness the dorsum of the right foot extending up the right leg.  The area is most concerning for cellulitis.  Patient does have a history of DVT.    Ultrasound per radiology interpretation does not reveal any obvious evidence of a DVT.  X-ray did not show any acute abnormalities.  Patient's laboratory testing did reveal elevated blood glucose but otherwise was largely unremarkable.  White blood cell count within normal limits as well as lactic acid and procalcitonin.  Did initiate antibiotic therapy as well as some IV fluids and insulin as patient admits he did not take any insulin today.  He did have improvement in his pain and his blood glucose level.  Patient requesting discharge home at this point time.  Will begin  antibiotic therapy at home but advised him to follow-up to ensure that he does not have any worsening infection as I am concerned this initiated from his toe.  DECISION TO DISCHARGE/ADMIT: see ED care timeline     FINAL IMPRESSION:   1 --cellulitis  2 --   3 --     Electronically signed by: Jeana Oden MD, 9/11/2020 13:25       Jeana Oden MD  09/11/20 [id number]

## 2020-09-11 NOTE — ED NOTES
Normal saline still infusing; patient encouraged to keep arm straight. Fluids on tall IV pole to promote infusion. . Dr. Oden notified.      Cuca Alvarado RN  09/11/20 2645

## 2020-09-14 ENCOUNTER — EPISODE CHANGES (OUTPATIENT)
Dept: CASE MANAGEMENT | Facility: OTHER | Age: 65
End: 2020-09-14

## 2020-09-16 LAB
BACTERIA SPEC AEROBE CULT: NORMAL
BACTERIA SPEC AEROBE CULT: NORMAL

## 2020-09-17 ENCOUNTER — EPISODE CHANGES (OUTPATIENT)
Dept: CASE MANAGEMENT | Facility: OTHER | Age: 65
End: 2020-09-17

## 2020-09-18 ENCOUNTER — TELEPHONE (OUTPATIENT)
Dept: INTERNAL MEDICINE | Facility: CLINIC | Age: 65
End: 2020-09-18

## 2020-09-18 ENCOUNTER — EPISODE CHANGES (OUTPATIENT)
Dept: CASE MANAGEMENT | Facility: OTHER | Age: 65
End: 2020-09-18

## 2020-09-18 NOTE — TELEPHONE ENCOUNTER
Caller: Jina Riley    Relationship: Emergency Contact    Best call back number: [phone]    Medication needed: HYDROcodone-acetaminophen (NORCO) 7.5-325 MG per tablet   HYDROcodone-acetaminophen (NORCO) 5-325 MG per tablet       When do you need the refill by: 09/18/2020    What details did the patient provide when requesting the medication: Patient is completely out of medication.    Does the patient have less than a 3 day supply:  [x] Yes  [] No    What is the patient's preferred pharmacy: [address] [phone] [facility] [phone]

## 2020-09-21 RX ORDER — HYDROCODONE BITARTRATE AND ACETAMINOPHEN 7.5; 325 MG/1; MG/1
1 TABLET ORAL 2 TIMES DAILY
Qty: 60 TABLET | Refills: 0 | Status: SHIPPED | OUTPATIENT
Start: 2020-09-21 | End: 2020-11-02 | Stop reason: SDUPTHER

## 2020-09-24 ENCOUNTER — PATIENT OUTREACH (OUTPATIENT)
Dept: CASE MANAGEMENT | Facility: OTHER | Age: 65
End: 2020-09-24

## 2020-09-24 NOTE — OUTREACH NOTE
"Patient Outreach Note    Called patient, 3rd attempt since ED visit 9-11-20.  Harrison, \"like a son\", answered patient's phone, said patient was not at home currently.  Left RN-ACM contact information for patient to return the call.      Debbie Kapoor RN  Ambulatory     9/24/2020, 14:40 EDT      "

## 2020-09-26 DIAGNOSIS — E11.65 UNCONTROLLED TYPE 2 DIABETES MELLITUS WITH HYPERGLYCEMIA (HCC): ICD-10-CM

## 2020-09-27 ENCOUNTER — EPISODE CHANGES (OUTPATIENT)
Dept: CASE MANAGEMENT | Facility: OTHER | Age: 65
End: 2020-09-27

## 2020-09-28 RX ORDER — BLOOD SUGAR DIAGNOSTIC
STRIP MISCELLANEOUS
Qty: 100 EACH | Refills: 12 | Status: SHIPPED | OUTPATIENT
Start: 2020-09-28

## 2020-11-02 RX ORDER — CLOPIDOGREL BISULFATE 75 MG/1
75 TABLET ORAL DAILY
COMMUNITY
Start: 2020-09-26 | End: 2020-11-02 | Stop reason: SDUPTHER

## 2020-11-02 NOTE — TELEPHONE ENCOUNTER
Patient's wife called back, states a doctor placed him on the medication when he was in the hospital.   He needs a refill on that, his syringes and his hydrocodone.

## 2020-11-03 RX ORDER — HYDROCODONE BITARTRATE AND ACETAMINOPHEN 7.5; 325 MG/1; MG/1
1 TABLET ORAL 2 TIMES DAILY
Qty: 60 TABLET | Refills: 0 | Status: SHIPPED | OUTPATIENT
Start: 2020-11-03 | End: 2020-12-02 | Stop reason: SDUPTHER

## 2020-11-03 RX ORDER — CLOPIDOGREL BISULFATE 75 MG/1
75 TABLET ORAL DAILY
Qty: 90 TABLET | Refills: 1 | Status: SHIPPED | OUTPATIENT
Start: 2020-11-03 | End: 2021-06-24 | Stop reason: SDUPTHER

## 2020-12-02 ENCOUNTER — OFFICE VISIT (OUTPATIENT)
Dept: INTERNAL MEDICINE | Facility: CLINIC | Age: 65
End: 2020-12-02

## 2020-12-02 DIAGNOSIS — M54.2 CERVICALGIA: ICD-10-CM

## 2020-12-02 DIAGNOSIS — Z79.4 UNCONTROLLED TYPE 2 DIABETES MELLITUS WITH HYPERGLYCEMIA, WITH LONG-TERM CURRENT USE OF INSULIN (HCC): ICD-10-CM

## 2020-12-02 DIAGNOSIS — I10 BENIGN ESSENTIAL HYPERTENSION: Primary | ICD-10-CM

## 2020-12-02 DIAGNOSIS — E78.2 MIXED HYPERLIPIDEMIA: ICD-10-CM

## 2020-12-02 DIAGNOSIS — F09 MILD COGNITIVE DISORDER: ICD-10-CM

## 2020-12-02 DIAGNOSIS — E03.8 ADULT ONSET HYPOTHYROIDISM: ICD-10-CM

## 2020-12-02 DIAGNOSIS — E11.65 UNCONTROLLED TYPE 2 DIABETES MELLITUS WITH HYPERGLYCEMIA, WITH LONG-TERM CURRENT USE OF INSULIN (HCC): ICD-10-CM

## 2020-12-02 PROCEDURE — 99443 PR PHYS/QHP TELEPHONE EVALUATION 21-30 MIN: CPT | Performed by: INTERNAL MEDICINE

## 2020-12-02 RX ORDER — HYDROCODONE BITARTRATE AND ACETAMINOPHEN 7.5; 325 MG/1; MG/1
1 TABLET ORAL 2 TIMES DAILY
Qty: 60 TABLET | Refills: 0 | Status: SHIPPED | OUTPATIENT
Start: 2020-12-02 | End: 2021-01-04 | Stop reason: SDUPTHER

## 2020-12-02 NOTE — PROGRESS NOTES
Subjective   Ramsey Riley is a 65 y.o. male.   you have chosen to receive care through a telephone visit. Do you consent to use a telephone visit for your medical care today? Yes  Margret Peterson CMA  Ramsey Riley Patient  Jina Riley Spouse    Chief Complaint   Patient presents with   • Hypertension   • Hyperlipidemia   • Diabetes   • Memory Loss   • Neck Pain       History of Present Illness   HPI: Patient is following up on the blood pressure  The patient is taking the blood pressure medications as prescribed and has had no side effects. The patient is following up on the cholesterol and is trying to follow a diet. The patient is also following on sugar and thyroid and is  due to get lab work done . The patient also needs refills on medications .  Patient's wife Jina states that patient was seen recently by podiatry in [place] and he has chronic osteomyelitis of the right ankle and foot and was hospitalized at [facility] for 8 days and was put on IV antibiotics, he had follows with podiatry, he also needs diabetic shoes, he states also since his hospitalization she has noticed that he has been having issues with his memory, he also has chronic neck pain and is on pain medications,telephone visit with patient today  Hypertension   Pertinent negatives include no chest pain, palpitations or shortness of breath.   Hyperlipidemia   Pertinent negatives include no chest pain or shortness of breath.   Diabetes   Pertinent negatives for hypoglycemia include no dizziness, nervousness/anxiousness or pallor. Pertinent negatives for diabetes include no chest pain, no shortness of breath  Patient is on acei/arb   The following portions of the patient's history were reviewed and updated as appropriate: allergies, current medications, past family history, past medical history, past social history, past surgical history and problem list.    Review of Systems  Memory issues  Neck  pain  Afebrile      Current Outpatient Medications:   •  ALPRAZolam (XANAX) 0.5 MG tablet, Take 1 tablet by mouth At Night As Needed for Anxiety., Disp: 30 tablet, Rfl: 3  •  amitriptyline (ELAVIL) 10 MG tablet, Take 1 tablet by mouth Every Night., Disp: 30 tablet, Rfl: 2  •  atorvastatin (LIPITOR) 40 MG tablet, Take 1 tablet by mouth Every Night., Disp: 90 tablet, Rfl: 2  •  cetirizine (zyrTEC) 10 MG tablet, Take 10 mg by mouth Daily., Disp: , Rfl: 11  •  citalopram (CeleXA) 40 MG tablet, Take 1 tablet by mouth Daily., Disp: 90 tablet, Rfl: 2  •  clopidogrel (PLAVIX) 75 MG tablet, Take 1 tablet by mouth Daily., Disp: 90 tablet, Rfl: 1  •  gabapentin (NEURONTIN) 300 MG capsule, Take 1 capsule by mouth 2 (Two) Times a Day., Disp: , Rfl: 1  •  glucose blood (Accu-Chek Caryn Plus) test strip, USE AS DIRECTED 3 TIMES DAILY, Disp: 100 each, Rfl: 12  •  glucose monitor monitoring kit, 1 each Daily., Disp: 1 each, Rfl: 1  •  glucose monitor monitoring kit, 1 each Daily., Disp: 1 each, Rfl: 1  •  HYDROcodone-acetaminophen (NORCO) 7.5-325 MG per tablet, Take 1 tablet by mouth 2 (Two) Times a Day., Disp: 60 tablet, Rfl: 0  •  insulin aspart (NovoLOG) 100 UNIT/ML injection, Inject 50 Units under the skin into the appropriate area as directed 3 (Three) Times a Day Before Meals. Dispense 5 vials with 12r, Disp: 5 each, Rfl: 12  •  insulin detemir (Levemir FlexPen) 100 UNIT/ML injection, Inject 70 Units under the skin into the appropriate area as directed Every Night., Disp: 5 pen, Rfl: 11  •  Insulin Syringes, Disposable, U-100 1 ML misc, 50 Units 3 (Three) Times a Day As Needed (50 units in the morning, and then with meals PRN)., Disp: 200 each, Rfl: 2  •  Lancets 30G misc, Check sugar 3 times daily, Disp: 30 each, Rfl: 12  •  levothyroxine (SYNTHROID, LEVOTHROID) 88 MCG tablet, Take 1 tablet by mouth Daily., Disp: 90 tablet, Rfl: 2  •  losartan (COZAAR) 100 MG tablet, Take 1 tablet by mouth Daily., Disp: 90 tablet, Rfl: 2  •   pantoprazole (PROTONIX) 40 MG EC tablet, Take 1 tablet by mouth Daily., Disp: 90 tablet, Rfl: 2  •  sildenafil (REVATIO) 20 MG tablet, Take 5 tablets by mouth Daily As Needed (1 hr prior to sexual activity). 1 hr prior to sexual activity, Disp: 60 tablet, Rfl: 11  •  amLODIPine (NORVASC) 5 MG tablet, Take 1 tablet by mouth Daily., Disp: 90 tablet, Rfl: 2    Objective        Physical Exam       Results for orders placed or performed during the hospital encounter of 09/11/20   Blood Culture - Blood, Arm, Right    Specimen: Arm, Right; Blood   Result Value Ref Range    Blood Culture No growth at 5 days    Blood Culture - Blood, Arm, Left    Specimen: Arm, Left; Blood   Result Value Ref Range    Blood Culture No growth at 5 days    Comprehensive Metabolic Panel    Specimen: Blood   Result Value Ref Range    Glucose 569 (C) 65 - 99 mg/dL    BUN 21 8 - 23 mg/dL    Creatinine 1.12 0.76 - 1.27 mg/dL    Sodium 132 (L) 136 - 145 mmol/L    Potassium 4.7 3.5 - 5.2 mmol/L    Chloride 94 (L) 98 - 107 mmol/L    CO2 22.9 22.0 - 29.0 mmol/L    Calcium 9.3 8.6 - 10.5 mg/dL    Total Protein 7.0 6.0 - 8.5 g/dL    Albumin 3.90 3.50 - 5.20 g/dL    ALT (SGPT) 33 1 - 41 U/L    AST (SGOT) 30 1 - 40 U/L    Alkaline Phosphatase 95 39 - 117 U/L    Total Bilirubin 0.7 0.0 - 1.2 mg/dL    eGFR Non African Amer 66 >60 mL/min/1.73    Globulin 3.1 gm/dL    A/G Ratio 1.3 g/dL    BUN/Creatinine Ratio 18.8 7.0 - 25.0    Anion Gap 15.1 (H) 5.0 - 15.0 mmol/L   Lactic Acid, Plasma    Specimen: Blood   Result Value Ref Range    Lactate 2.0 0.5 - 2.0 mmol/L   Procalcitonin    Specimen: Blood   Result Value Ref Range    Procalcitonin 0.14 0.00 - 0.25 ng/mL   CBC Auto Differential    Specimen: Blood   Result Value Ref Range    WBC 8.70 3.40 - 10.80 10*3/mm3    RBC 4.85 4.14 - 5.80 10*6/mm3    Hemoglobin 14.7 13.0 - 17.7 g/dL    Hematocrit 42.6 37.5 - 51.0 %    MCV 87.8 79.0 - 97.0 fL    MCH 30.3 26.6 - 33.0 pg    MCHC 34.5 31.5 - 35.7 g/dL    RDW 11.9 (L)  12.3 - 15.4 %    RDW-SD 38.0 37.0 - 54.0 fl    MPV 10.3 6.0 - 12.0 fL    Platelets 158 140 - 450 10*3/mm3    Neutrophil % 84.8 (H) 42.7 - 76.0 %    Lymphocyte % 7.9 (L) 19.6 - 45.3 %    Monocyte % 6.3 5.0 - 12.0 %    Eosinophil % 0.2 (L) 0.3 - 6.2 %    Basophil % 0.2 0.0 - 1.5 %    Immature Grans % 0.6 (H) 0.0 - 0.5 %    Neutrophils, Absolute 7.37 (H) 1.70 - 7.00 10*3/mm3    Lymphocytes, Absolute 0.69 (L) 0.70 - 3.10 10*3/mm3    Monocytes, Absolute 0.55 0.10 - 0.90 10*3/mm3    Eosinophils, Absolute 0.02 0.00 - 0.40 10*3/mm3    Basophils, Absolute 0.02 0.00 - 0.20 10*3/mm3    Immature Grans, Absolute 0.05 0.00 - 0.05 10*3/mm3    nRBC 0.0 0.0 - 0.2 /100 WBC   Urinalysis With Culture If Indicated - Urine, Clean Catch    Specimen: Urine, Clean Catch   Result Value Ref Range    Color, UA Yellow Yellow, Straw    Appearance, UA Clear Clear    pH, UA 5.5 5.0 - 8.0    Specific Gravity, UA >=1.030 1.005 - 1.030    Glucose, UA >=1000 mg/dL (3+) (A) Negative    Ketones, UA Trace (A) Negative    Bilirubin, UA Negative Negative    Blood, UA Trace (A) Negative    Protein,  mg/dL (2+) (A) Negative    Leuk Esterase, UA Negative Negative    Nitrite, UA Negative Negative    Urobilinogen, UA 0.2 E.U./dL 0.2 - 1.0 E.U./dL   Urinalysis, Microscopic Only - Urine, Clean Catch    Specimen: Urine, Clean Catch   Result Value Ref Range    RBC, UA 0-2 (A) None Seen /HPF    WBC, UA None Seen None Seen /HPF    Bacteria, UA Trace (A) None Seen /HPF    Squamous Epithelial Cells, UA 0-2 None Seen, 0-2 /HPF    Hyaline Casts, UA None Seen None Seen /LPF    Methodology Manual Light Microscopy    POC Glucose Once    Specimen: Blood   Result Value Ref Range    Glucose 535 (C) 70 - 130 mg/dL   POC Glucose Once    Specimen: Blood   Result Value Ref Range    Glucose 376 (H) 70 - 130 mg/dL         Assessment/Plan   Diagnoses and all orders for this visit:    1. Benign essential hypertension (Primary)    2. Mixed hyperlipidemia  -     CBC &  Differential  -     Comprehensive Metabolic Panel  -     Lipid Panel    3. Uncontrolled type 2 diabetes mellitus with hyperglycemia, with long-term current use of insulin (CMS/[facility])  -     Hemoglobin A1c  -     Microalbumin / Creatinine Urine Ratio - Urine, Clean Catch  -     XR Spine Cervical 2 or 3 View    4. Adult onset hypothyroidism  -     TSH    5. Mild cognitive disorder  -     CT Head Without Contrast    6. Cervicalgia    Other orders  -     HYDROcodone-acetaminophen (NORCO) 7.5-325 MG per tablet; Take 1 tablet by mouth 2 (Two) Times a Day.  Dispense: 60 tablet; Refill: 0        Plan:  1.  Benign essential hypertension: Will continue current medication, low-sodium diet advised, Counseled to regularly check BP at home with goal averaging <130/80.   2.mixed hyperlipidemia: will obtain   fasting CMP and lipid panel.  Diet and exercise counseled,  Will continue current medications  3. Diabetes  Mellitus  : will obtain   fasting CMP  and hba1c  , diet and exercise counseled , Will continue current medications, patient is under my care for comprehensive diabetic plan of care and he will benefit from wearing diabetic shoes and diabetic inserts per podiatry  4. Cervicalgia: will get xrays, refilled lortab , uds and shaun reviewed  5. Cognitive dysfunction : will get labs and ct head  6. hypothyroidism: will obtain tsh , and continue levothyroxine  This was an audio   enabled telemedicine encounter.  This visit has been rescheduled as a phone visit to comply with patient safety concerns in accordance with CDC recommendations. Total time of discussion was 22 minutes.             Margret Purvis MD

## 2020-12-13 DIAGNOSIS — R79.89 LOW TESTOSTERONE: ICD-10-CM

## 2020-12-13 RX ORDER — SILDENAFIL CITRATE 20 MG/1
TABLET ORAL
Qty: 60 TABLET | Refills: 10 | Status: SHIPPED | OUTPATIENT
Start: 2020-12-13 | End: 2021-07-16

## 2020-12-14 ENCOUNTER — HOSPITAL ENCOUNTER (OUTPATIENT)
Dept: CT IMAGING | Facility: HOSPITAL | Age: 65
Discharge: HOME | End: 2020-12-14
Admitting: INTERNAL MEDICINE

## 2020-12-14 PROCEDURE — 70450 CT HEAD/BRAIN W/O DYE: CPT

## 2020-12-14 NOTE — TELEPHONE ENCOUNTER
Caller: Jina Riley    Relationship: Emergency Contact    Best call back number: [phone]    Medication needed:   Requested Prescriptions     Pending Prescriptions Disp Refills   • ALPRAZolam (XANAX) 0.5 MG tablet 30 tablet 3     Sig: Take 1 tablet by mouth At Night As Needed for Anxiety.       When do you need the refill by: 12/14/2020    Does the patient have less than a 3 day supply:  [x] Yes  [] No    What is the patient's preferred pharmacy: [address] [phone] [facility] [phone]

## 2020-12-15 ENCOUNTER — TELEPHONE (OUTPATIENT)
Dept: INTERNAL MEDICINE | Facility: CLINIC | Age: 65
End: 2020-12-15

## 2020-12-15 DIAGNOSIS — F09 MILD COGNITIVE DISORDER: Primary | ICD-10-CM

## 2020-12-15 RX ORDER — ALPRAZOLAM 0.5 MG/1
0.5 TABLET ORAL NIGHTLY PRN
Qty: 30 TABLET | Refills: 3 | Status: SHIPPED | OUTPATIENT
Start: 2020-12-15 | End: 2021-01-04 | Stop reason: SDUPTHER

## 2020-12-28 ENCOUNTER — OFFICE VISIT (OUTPATIENT)
Dept: NEUROLOGY | Facility: CLINIC | Age: 65
End: 2020-12-28

## 2020-12-28 ENCOUNTER — TELEPHONE (OUTPATIENT)
Dept: INTERNAL MEDICINE | Facility: CLINIC | Age: 65
End: 2020-12-28

## 2020-12-28 ENCOUNTER — LAB (OUTPATIENT)
Dept: LAB | Facility: HOSPITAL | Age: 65
End: 2020-12-28

## 2020-12-28 VITALS
HEART RATE: 76 BPM | DIASTOLIC BLOOD PRESSURE: 72 MMHG | TEMPERATURE: 97.1 F | HEIGHT: 70 IN | BODY MASS INDEX: 32.73 KG/M2 | WEIGHT: 228.6 LBS | SYSTOLIC BLOOD PRESSURE: 138 MMHG | OXYGEN SATURATION: 98 %

## 2020-12-28 DIAGNOSIS — G31.84 MILD COGNITIVE IMPAIRMENT WITH MEMORY LOSS: Primary | ICD-10-CM

## 2020-12-28 DIAGNOSIS — G31.84 MILD COGNITIVE IMPAIRMENT WITH MEMORY LOSS: ICD-10-CM

## 2020-12-28 DIAGNOSIS — G25.0 BENIGN ESSENTIAL TREMOR: ICD-10-CM

## 2020-12-28 LAB
FOLATE SERPL-MCNC: 11.6 NG/ML (ref 4.78–24.2)
VIT B12 BLD-MCNC: 313 PG/ML (ref 211–946)

## 2020-12-28 PROCEDURE — 83921 ORGANIC ACID SINGLE QUANT: CPT

## 2020-12-28 PROCEDURE — 36415 COLL VENOUS BLD VENIPUNCTURE: CPT

## 2020-12-28 PROCEDURE — 86592 SYPHILIS TEST NON-TREP QUAL: CPT

## 2020-12-28 PROCEDURE — 99203 OFFICE O/P NEW LOW 30 MIN: CPT | Performed by: NURSE PRACTITIONER

## 2020-12-28 PROCEDURE — 82607 VITAMIN B-12: CPT

## 2020-12-28 PROCEDURE — 82746 ASSAY OF FOLIC ACID SERUM: CPT

## 2020-12-28 RX ORDER — ASPIRIN 81 MG/1
81 TABLET, CHEWABLE ORAL DAILY
COMMUNITY

## 2020-12-28 NOTE — PROGRESS NOTES
New Neurology Patient Office Visit      Patient Name: Ramsey Riley    Referring Physician: Margret Purvis MD    Chief Complaint:    Chief Complaint   Patient presents with   • Consult     NP, in office today for memory loss.  Patient has had memory loss since September with no changes.  MMSE score today is 25/30.       History of Present Illness: Ramsey Riley is a 65 y.o. male who is here today to establish care with Neurology.  He is accompanied by his wife today, c/o frequent confusion and occasional short term forgetfulness. Wife notes that it became worse after hospitalization in September, reports that he had infection in his toe in September. He was noted to have 'clogged arteries' in his leg at the time, and is scheduled to have a carotid ultrasound on 1/12/2021 at [facility] center in [place].   Taking Asa and Plavix due to cardiac disease, Plavix was initiated in September by Dr. Huffman.    Wife also notes shaking in head, hands.  Denies difficulty walking or falls.    The following portions of the patient's history were reviewed and updated as appropriate: allergies, current medications, past family history, past medical history, past social history, past surgical history and problem list.    Subjective      Review of Systems:   Review of Systems   Constitutional: Negative for activity change and fatigue.   HENT: Negative for drooling and voice change.    Eyes: Negative for blurred vision, double vision, photophobia and visual disturbance.   Respiratory: Negative for shortness of breath.    Cardiovascular: Negative for chest pain and palpitations.   Gastrointestinal: Negative for nausea and vomiting.   Genitourinary: Negative for urinary incontinence.   Musculoskeletal: Negative for arthralgias, back pain, gait problem, myalgias and neck pain.   Allergic/Immunologic: Negative for immunocompromised state.   Neurological: Positive for memory problem. Negative for dizziness, tremors, seizures,  syncope, facial asymmetry, speech difficulty, weakness, light-headedness, numbness, headache and confusion.   Hematological: Does not bruise/bleed easily.   Psychiatric/Behavioral: Negative for decreased concentration, dysphoric mood, hallucinations, sleep disturbance, depressed mood and stress. The patient is not nervous/anxious.        Past Medical History:   Past Medical History:   Diagnosis Date   • Arthritis    • Coronary artery disease    • Diabetes mellitus (CMS/HCC)    • Heart disease    • History of blood clots    • Hypertension    • Thyroid disease        Past Surgical History:   Past Surgical History:   Procedure Laterality Date   • CHOLECYSTECTOMY  2015   • COLONOSCOPY  2015    dr srivastava   • CORONARY ARTERY BYPASS GRAFT      [place] [id number] BY PASS    • ENDOSCOPY         Family History:   Family History   Problem Relation Age of Onset   • Diabetes Mother    • Heart disease Mother         STENTING   • COPD Mother    • Heart attack Father    • Diabetes Father        Social History:   Social History     Socioeconomic History   • Marital status:      Spouse name: Not on file   • Number of children: Not on file   • Years of education: Not on file   • Highest education level: Not on file   Tobacco Use   • Smoking status: Never Smoker   • Smokeless tobacco: Never Used   Substance and Sexual Activity   • Alcohol use: No   • Drug use: No   • Sexual activity: Defer       Medications:     Current Outpatient Medications:   •  ALPRAZolam (XANAX) 0.5 MG tablet, Take 1 tablet by mouth At Night As Needed for Anxiety., Disp: 30 tablet, Rfl: 3  •  amitriptyline (ELAVIL) 10 MG tablet, Take 1 tablet by mouth Every Night., Disp: 30 tablet, Rfl: 2  •  amLODIPine (NORVASC) 5 MG tablet, Take 1 tablet by mouth Daily., Disp: 90 tablet, Rfl: 2  •  aspirin 81 MG chewable tablet, Chew 81 mg Daily., Disp: , Rfl:   •  atorvastatin (LIPITOR) 40 MG tablet, Take 1 tablet by mouth Every Night., Disp: 90 tablet, Rfl: 2  •  cetirizine  (zyrTEC) 10 MG tablet, Take 10 mg by mouth Daily., Disp: , Rfl: 11  •  citalopram (CeleXA) 40 MG tablet, Take 1 tablet by mouth Daily., Disp: 90 tablet, Rfl: 2  •  clopidogrel (PLAVIX) 75 MG tablet, Take 1 tablet by mouth Daily., Disp: 90 tablet, Rfl: 1  •  gabapentin (NEURONTIN) 300 MG capsule, Take 1 capsule by mouth 2 (Two) Times a Day., Disp: , Rfl: 1  •  glucose blood (Accu-Chek Caryn Plus) test strip, USE AS DIRECTED 3 TIMES DAILY, Disp: 100 each, Rfl: 12  •  glucose monitor monitoring kit, 1 each Daily., Disp: 1 each, Rfl: 1  •  glucose monitor monitoring kit, 1 each Daily., Disp: 1 each, Rfl: 1  •  HYDROcodone-acetaminophen (NORCO) 7.5-325 MG per tablet, Take 1 tablet by mouth 2 (Two) Times a Day., Disp: 60 tablet, Rfl: 0  •  insulin aspart (NovoLOG) 100 UNIT/ML injection, Inject 50 Units under the skin into the appropriate area as directed 3 (Three) Times a Day Before Meals. Dispense 5 vials with 12r, Disp: 5 each, Rfl: 12  •  insulin detemir (Levemir FlexPen) 100 UNIT/ML injection, Inject 70 Units under the skin into the appropriate area as directed Every Night., Disp: 5 pen, Rfl: 11  •  Insulin Syringes, Disposable, U-100 1 ML misc, 50 Units 3 (Three) Times a Day As Needed (50 units in the morning, and then with meals PRN)., Disp: 200 each, Rfl: 2  •  Lancets 30G misc, Check sugar 3 times daily, Disp: 30 each, Rfl: 12  •  levothyroxine (SYNTHROID, LEVOTHROID) 88 MCG tablet, Take 1 tablet by mouth Daily., Disp: 90 tablet, Rfl: 2  •  losartan (COZAAR) 100 MG tablet, Take 1 tablet by mouth Daily., Disp: 90 tablet, Rfl: 2  •  pantoprazole (PROTONIX) 40 MG EC tablet, Take 1 tablet by mouth Daily., Disp: 90 tablet, Rfl: 2  •  sildenafil (REVATIO) 20 MG tablet, TAKE FIVE TABLETS BY MOUTH DAILY AS NEEDED ONE HOUR PRIOR TO SEXUAL ACTIVITY, Disp: 60 tablet, Rfl: 10    Allergies:   No Known Allergies    Objective     Physical Exam:  Vital Signs:   Vitals:    12/28/20 1259   BP: 138/72   BP Location: Left arm  "  Patient Position: Sitting   Cuff Size: Adult   Pulse: 76   Temp: 97.1 °F (36.2 °C)   TempSrc: Temporal   SpO2: 98%   Weight: 104 kg (228 lb 9.6 oz)   Height: 177.8 cm (70\")   PainSc: 0-No pain       Physical Exam  Vitals signs and nursing note reviewed. Chaperone present: wife Nery in exam room.   Eyes:      Extraocular Movements: EOM normal.      Pupils: Pupils are equal, round, and reactive to light.   Neck:      Vascular: No carotid bruit.   Pulmonary:      Effort: Pulmonary effort is normal.      Breath sounds: Normal breath sounds.   Skin:     General: Skin is warm.   Neurological:      General: No focal deficit present.      Mental Status: He is oriented to person, place, and time.      Coordination: Finger-Nose-Finger Test, Heel to Shin Test and Romberg Test normal.      Gait: Gait is intact.      Deep Tendon Reflexes: Strength normal.   Psychiatric:         Speech: Speech normal.         Thought Content: Thought content normal.         Judgment: Judgment normal.         Neurologic Exam     Mental Status   Oriented to person, place, and time.   Recall at 5 minutes: recalls 1 of 3 objects. Follows 3 step commands.   Attention: normal. Concentration: normal.   Speech: speech is normal   Level of consciousness: alert  Knowledge: consistent with education.   Able to name object. Able to read. Able to repeat. Able to write.     MMSE 25/30 (poor baseline spelling)     Cranial Nerves     CN II   Visual fields full to confrontation.   Visual acuity: normal    CN III, IV, VI   Pupils are equal, round, and reactive to light.  Extraocular motions are normal.   Right pupil: Shape: regular. Reactivity: brisk.   Left pupil: Shape: regular. Reactivity: brisk.   Diplopia: none  Ophthalmoparesis: none  Upgaze: normal  Downgaze: normal    CN V   Facial sensation intact.     CN VII   Facial expression full, symmetric.     CN VIII   CN VIII normal.     CN IX, X   CN IX normal.   CN X normal.     CN XI   CN XI normal.     CN " XII   CN XII normal.     Motor Exam   Muscle bulk: normal  Overall muscle tone: normal    Strength   Strength 5/5 throughout.     Sensory Exam   Light touch normal.     Gait, Coordination, and Reflexes     Gait  Gait: normal    Coordination   Romberg: negative  Finger to nose coordination: normal  Heel to shin coordination: normal    Tremor   Resting tremor: absent    Reflexes   Reflexes 2+ except as noted.      PROCEDURE: CT HEAD WO CONTRAST December 2020     HISTORY: memory loss; F09-Unspecified mental disorder due to known  physiological condition     TECHNIQUE: Noncontrast exam     FINDINGS:  Mild atrophy and chronic ischemic white matter changes are  noted. Chronic lacunar infarct is seen of the right basal ganglia.     No cortical edema is present. There is no mass or hemorrhage. Ventricles  are normal.     Bone windows show no skull fracture or obvious destructive lesion.     IMPRESSION:  1. No acute intracranial abnormality or obvious mass.   2. Atrophy and chronic ischemic white matter changes as above.   3. Interval development of a lacunar infarct of the right basal ganglia,  chronic in appearance    Results Review:   I reviewed the patient's new clinical results.  I have reviewed the patient's other medical records to include, labs, radiology and referrals.     Assessment / Plan      Assessment/Plan:   Diagnoses and all orders for this visit:    1. Mild cognitive impairment with memory loss (Primary)  -     Methylmalonic Acid, Serum; Future  -     Vitamin B12; Future  -     Folate; Future  -     RPR; Future    2. Benign essential tremor    Discussed with patient and his wife that his MMSE does show some mild cognitive impairment, likely due to underlying vascular risk factors.  Agree with plan for carotid ultrasound, I have requested that patient provide copy of results.  We spoke at length about cognitive impairment, patient was offered use of memory aids such as donepezil.  He is functioning very well  and is independent with ADLs, he declined this at this visit but may reconsider in the future.  I have reassured him that this is completely appropriate and if he becomes worse or has experiences new symptoms he should contact provider and we can reevaluate.  He will undergo B vitamin studies and RPR to assess for metabolic or infectious causes of impaired memory.  Patient is wife decayed that they are unaware of stroke warning signs, and have reviewed this with them today.  Discussed signs and symptoms of stroke and when to call 911. Instructed to follow a low fat diet including the Mediterranean diet. Instructed to take all medications daily as prescribed. Encouraged 30 minutes of exercise 3-4 times a week as tolerated. Stay well hydrated. Discussed potential side effects of new medications and signs and symptoms to report. If patient is currently using tobacco products, smoking cessation was encouraged. Reviewed stroke risk factors and stroke prevention plan. Patient and/or family verbalizes understanding and agrees with plan.     No tremor or head shaking observed on exam today, patient has no other associated symptoms suspicious for Parkinson's or other movement disorder.  I discussed possible medication for tremor with patient and his wife, they declined this today.  I have encouraged him to contact this provider if he experiences any new or worsening movement symptoms.    Follow Up:   Return in about 6 months (around 6/28/2021).    Jossie Spangler APRADAMA  [facility]   AS THE PROVIDER, I PERSONALLY WORE PPE DURING ENTIRE FACE TO FACE ENCOUNTER IN CLINIC WITH THE PATIENT. PATIENT ALSO WORE PPE DURING ENTIRE FACE TO FACE ENCOUNTER EXCEPT FOR A MAX OF 30 SECONDS DURING NEUROLOGICAL EVALUATION OF CRANIAL NERVES AND THEN MASK WAS PLACED BACK OVER PATIENT FACE FOR REMAINDER OF VISIT. I WASHED MY HANDS BEFORE AND AFTER VISIT.    Please note that portions of this note may have been completed with  a voice recognition program. Efforts were made to edit the dictations, but occasionally words are mistranscribed.

## 2020-12-28 NOTE — PATIENT INSTRUCTIONS
Mild Neurocognitive Disorder  Mild neurocognitive disorder, formerly known as mild cognitive impairment, is a disorder in which memory does not work as well as it should. This disorder may also cause problems with other mental functions, including thought, communication, behavior, and completion of tasks. These problems can be noticed and measured, but they usually do not interfere with daily activities or the ability to live independently.  Mild neurocognitive disorder typically develops after 60 years of age, but it can also develop at younger ages. It is not as serious as major neurocognitive disorder, formerly known as dementia, but it may be the first sign of it. Generally, symptoms of this condition get worse over time. In rare cases, symptoms can get better.  What are the causes?  This condition may be caused by:  · Brain disorders like Alzheimer's disease, Parkinson's disease, and other conditions that gradually damage nerve cells (neurodegenerative conditions).  · Diseases that affect blood vessels in the brain and result in small strokes.  · Certain infections, such as HIV.  · Traumatic brain injury.  · Other medical conditions, such as brain tumors, underactive thyroid (hypothyroidism), and vitamin B12 deficiency.  · Use of certain drugs or prescription medicines.  What increases the risk?  The following factors may make you more likely to develop this condition:  · Being older than 65.  · Being male.  · Low education level.  · Diabetes, high blood pressure, high cholesterol, and other conditions that increase the risk for blood vessel diseases.  · Untreated or undertreated sleep apnea.  · Having a certain type of gene that can be passed from parent to child (inherited).  · Chronic health problems such as heart disease, lung disease, liver disease, kidney disease, or depression.  What are the signs or symptoms?  Symptoms of this condition include:  · Difficulty remembering. You may:  ? Forget names, phone  numbers, or details of recent events.  ? Forget social events and appointments.  ? Repeatedly forget where you put your car keys or other items.  · Difficulty thinking and solving problems. You may have trouble with complex tasks, such as:  ? Paying bills.  ? Driving in unfamiliar places.  · Difficulty communicating. You may have trouble:  ? Finding the right word or naming an object.  ? Forming a sentence that makes sense, or understanding what you read or hear.  · Changes in your behavior or personality. When this happens, you may:  ? Lose interest in the things that you used to enjoy.  ? Withdraw from social situations.  ? Get angry more easily than usual.  ? Act before thinking.  How is this diagnosed?  This condition is diagnosed based on:  · Your symptoms. Your health care provider may ask you and the people you spend time with, such as family and friends, about your symptoms.  · Evaluation of mental functions (neuropsychological testing). Your health care provider may refer you to a neurologist or mental health specialist to evaluate your mental functions in detail.  To identify the cause of your condition, your health care provider may:  · Get a detailed medical history.  · Ask about use of alcohol, drugs, and prescription medicines.  · Do a physical exam.  · Order blood tests and brain imaging exams.  How is this treated?  Mild neurocognitive disorder that is caused by medicine use, drug use, infection, or another medical condition may improve when the cause is treated, or when medicines or drugs are stopped. If this disorder has another cause, it generally does not improve and may get worse. In these cases, the goal of treatment is to help you manage the loss of mental function. Treatments in these cases include:  · Medicine. Medicine mainly helps memory and behavior symptoms.  · Talk therapy. Talk therapy provides education, emotional support, memory aids, and other ways of making up for problems with  mental function.  · Lifestyle changes, including:  ? Getting regular exercise.  ? Eating a healthy diet that includes omega-3 fatty acids.  ? Challenging your thinking and memory skills.  ? Having more social interaction.  Follow these instructions at home:  Eating and drinking    · Drink enough fluid to keep your urine pale yellow.  · Eat a healthy diet that includes omega-3 fatty acids. These can be found in:  ? Fish.  ? Nuts.  ? Leafy vegetables.  ? Vegetable oils.  · If you drink alcohol:  ? Limit how much you use to:  § 0-1 drink a day for women.  § 0-2 drinks a day for men.  ? Be aware of how much alcohol is in your drink. In the [place] one drink equals one 12 oz bottle of beer (355 mL), one 5 oz glass of wine (148 mL), or one 1½ oz glass of hard liquor (44 mL).  Lifestyle    · Get regular exercise as told by your health care provider.  · Do not use any products that contain nicotine or tobacco, such as cigarettes, e-cigarettes, and chewing tobacco. If you need help quitting, ask your health care provider.  · Practice ways to manage stress. If you need help managing stress, ask your health care provider.  · Continue to have social interaction.  · Keep your mind active with stimulating activities you enjoy, such as reading or playing games.  · Make sure to get quality sleep. Follow these tips:  ? Avoid napping during the day.  ? Keep your sleeping area dark and cool.  ? Avoid exercising during the few hours before you go to bed.  ? Avoid caffeine products in the evening.  General instructions  · Take over-the-counter and prescription medicines only as told by your health care provider. Your health care provider may recommend that you avoid taking medicines that can affect thinking, such as pain medicines or sleep medicines.  · Work with your health care provider to find out what you need help with and what your safety needs are.  · Keep all follow-up visits as told by your health care provider. This is  "important.  Where to find more information  · National [place] [employer] Aging: www.valery.nih.gov  Contact a health care provider if:  · You have any new symptoms.  Get help right away if:  · You develop new confusion or your confusion gets worse.  · You act in ways that place you or your family in danger.  Summary  · Mild neurocognitive disorder is a disorder in which memory does not work as well as it should.  · Mild neurocognitive disorder can have many causes. It may be the first stage of dementia.  · To manage your condition, get regular exercise, keep your mind active, get quality sleep, and eat a healthy diet.  This information is not intended to replace advice given to you by your health care provider. Make sure you discuss any questions you have with your health care provider.  Document Revised: 07/20/2020 Document Reviewed: 07/20/2020  Elsevier Patient Education © 2020 [employer]    Warning Signs of a Stroke    A stroke is a medical emergency and should be treated right away--every second counts. A stroke is caused by a decrease or block in blood flow to the brain. When this occurs, certain areas of the brain do not get enough oxygen, and brain cells begin to die.  A stroke can lead to brain damage and can sometimes be life-threatening. However, if someone having a stroke gets medical treatment right away, he or she has better chances of surviving and recovering from the stroke. Being able to recognize the symptoms of a stroke is very important.  Types of strokes  There are two main types of strokes:  · Ischemic strokes. This is the most common type of stroke. These strokes happen when a blood vessel that supplies blood to the brain is being blocked.  · Hemorrhagic strokes. These strokes result from bleeding in the brain due to a blood vessel leaking or bursting (rupturing).  A transient ischemic attack (TIA) is a \"warning stroke\" that causes stroke-like symptoms that go away quickly. Unlike a stroke, a TIA " "does not cause permanent damage to the brain. However, the symptoms of a TIA are the same as a stroke, and they also require medical treatment right away. Having a TIA is a sign that you are at higher risk for a permanent stroke.  Warning signs of a stroke  The symptoms of stroke may vary and will reflect the part of the brain that is involved. Symptoms usually happen suddenly. \"BE FAST\" is an easy way to remember the main warning signs of a stroke.  B - Balance  Signs are dizziness, sudden trouble walking, or loss of balance.  E - Eyes  Signs are trouble seeing or a sudden change in vision.  F - Face  Signs are sudden weakness or numbness of the face, or the face or eyelid drooping on one side.  A - Arms  Signs are weakness or numbness in an arm. This happens suddenly and usually on one side of the body.  S - Speech  Signs are sudden trouble speaking, slurred speech, or trouble understanding what people say.  T - Time  Time to call emergency services. Write down what time symptoms started.  Other signs of a stroke  Some less common signs of a stroke include:  · A sudden, severe headache with no known cause.  · Nausea or vomiting.  · Seizure.  A stroke may be happening even if only one \"BE FAST\" symptoms is present.  These symptoms may represent a serious problem that is an emergency. Do not wait to see if the symptoms will go away. Get medical help right away. Call your local emergency services (911 in the [place] Do not drive yourself to the hospital.  Summary  · A stroke is a medical emergency and should be treated right away--every second counts.  · \"BE FAST\" is an easy way to remember the main warning signs of a stroke.  · Call local emergency services right away if you or someone else has any stroke symptoms, even if the symptoms go away.  · Make note of what time the first symptoms appeared. Emergency responders or emergency room staff will need to know this information.  · Do not wait to see if symptoms will " "go away. Call 911 even if only one of the \"BE FAST\" symptoms appears.  This information is not intended to replace advice given to you by your health care provider. Make sure you discuss any questions you have with your health care provider.  Document Revised: 11/30/2018 Document Reviewed: 04/06/2018  Elsevier Patient Education © 2020 [employer] Inc.    "

## 2020-12-28 NOTE — TELEPHONE ENCOUNTER
PT WIFE CALLED  AND WOULD LIKE TO KNOW IF PT XRAY RESULTS ARE IN YET.    PLEASE ADVISE.  CALL BACK:[phone]

## 2020-12-29 LAB — RPR SER QL: NORMAL

## 2021-01-04 ENCOUNTER — OFFICE VISIT (OUTPATIENT)
Dept: INTERNAL MEDICINE | Facility: CLINIC | Age: 66
End: 2021-01-04

## 2021-01-04 ENCOUNTER — OFFICE VISIT (OUTPATIENT)
Dept: CARDIOLOGY | Facility: CLINIC | Age: 66
End: 2021-01-04

## 2021-01-04 ENCOUNTER — HOSPITAL ENCOUNTER (OUTPATIENT)
Dept: GENERAL RADIOLOGY | Facility: HOSPITAL | Age: 66
Discharge: HOME | End: 2021-01-04
Admitting: INTERNAL MEDICINE

## 2021-01-04 VITALS
RESPIRATION RATE: 16 BRPM | SYSTOLIC BLOOD PRESSURE: 162 MMHG | TEMPERATURE: 97.5 F | WEIGHT: 229 LBS | DIASTOLIC BLOOD PRESSURE: 98 MMHG | BODY MASS INDEX: 32.78 KG/M2 | OXYGEN SATURATION: 97 % | HEIGHT: 70 IN | HEART RATE: 72 BPM

## 2021-01-04 VITALS
SYSTOLIC BLOOD PRESSURE: 140 MMHG | DIASTOLIC BLOOD PRESSURE: 80 MMHG | WEIGHT: 229 LBS | HEIGHT: 70 IN | HEART RATE: 71 BPM | OXYGEN SATURATION: 96 % | BODY MASS INDEX: 32.78 KG/M2

## 2021-01-04 DIAGNOSIS — R91.8 OPACITY OF LUNG ON IMAGING STUDY: ICD-10-CM

## 2021-01-04 DIAGNOSIS — I11.9 LVH (LEFT VENTRICULAR HYPERTROPHY) DUE TO HYPERTENSIVE DISEASE, WITHOUT HEART FAILURE: ICD-10-CM

## 2021-01-04 DIAGNOSIS — Z79.4 UNCONTROLLED TYPE 2 DIABETES MELLITUS WITH HYPERGLYCEMIA, WITH LONG-TERM CURRENT USE OF INSULIN (HCC): ICD-10-CM

## 2021-01-04 DIAGNOSIS — I73.9 PAD (PERIPHERAL ARTERY DISEASE) (HCC): ICD-10-CM

## 2021-01-04 DIAGNOSIS — E03.8 ADULT ONSET HYPOTHYROIDISM: ICD-10-CM

## 2021-01-04 DIAGNOSIS — E78.2 MIXED HYPERLIPIDEMIA: ICD-10-CM

## 2021-01-04 DIAGNOSIS — F51.04 INSOMNIA, PSYCHOPHYSIOLOGICAL: ICD-10-CM

## 2021-01-04 DIAGNOSIS — M54.2 CERVICALGIA: ICD-10-CM

## 2021-01-04 DIAGNOSIS — F09 MILD COGNITIVE DISORDER: ICD-10-CM

## 2021-01-04 DIAGNOSIS — Z79.4 TYPE 2 DIABETES MELLITUS WITH HYPERGLYCEMIA, WITH LONG-TERM CURRENT USE OF INSULIN (HCC): ICD-10-CM

## 2021-01-04 DIAGNOSIS — I25.10 CORONARY ARTERY DISEASE INVOLVING NATIVE CORONARY ARTERY OF NATIVE HEART WITHOUT ANGINA PECTORIS: ICD-10-CM

## 2021-01-04 DIAGNOSIS — E11.65 TYPE 2 DIABETES MELLITUS WITH HYPERGLYCEMIA, WITH LONG-TERM CURRENT USE OF INSULIN (HCC): ICD-10-CM

## 2021-01-04 DIAGNOSIS — I10 BENIGN ESSENTIAL HYPERTENSION: Primary | ICD-10-CM

## 2021-01-04 DIAGNOSIS — I10 ESSENTIAL HYPERTENSION: ICD-10-CM

## 2021-01-04 DIAGNOSIS — I20.8 ANGINAL EQUIVALENT (HCC): Primary | ICD-10-CM

## 2021-01-04 DIAGNOSIS — E11.65 UNCONTROLLED TYPE 2 DIABETES MELLITUS WITH HYPERGLYCEMIA, WITH LONG-TERM CURRENT USE OF INSULIN (HCC): ICD-10-CM

## 2021-01-04 DIAGNOSIS — Z51.81 ENCOUNTER FOR THERAPEUTIC DRUG LEVEL MONITORING: ICD-10-CM

## 2021-01-04 DIAGNOSIS — M25.50 ARTHRALGIA OF MULTIPLE JOINTS: ICD-10-CM

## 2021-01-04 PROBLEM — I25.119 CORONARY ARTERY DISEASE INVOLVING NATIVE CORONARY ARTERY OF NATIVE HEART WITH ANGINA PECTORIS (HCC): Status: RESOLVED | Noted: 2018-06-21 | Resolved: 2021-01-04

## 2021-01-04 PROBLEM — I20.89 ANGINAL EQUIVALENT: Status: RESOLVED | Noted: 2018-06-21 | Resolved: 2021-01-04

## 2021-01-04 PROCEDURE — 99214 OFFICE O/P EST MOD 30 MIN: CPT | Performed by: INTERNAL MEDICINE

## 2021-01-04 PROCEDURE — 99215 OFFICE O/P EST HI 40 MIN: CPT | Performed by: INTERNAL MEDICINE

## 2021-01-04 PROCEDURE — 71046 X-RAY EXAM CHEST 2 VIEWS: CPT

## 2021-01-04 PROCEDURE — 93000 ELECTROCARDIOGRAM COMPLETE: CPT | Performed by: INTERNAL MEDICINE

## 2021-01-04 RX ORDER — AMLODIPINE BESYLATE 5 MG/1
5 TABLET ORAL DAILY
Qty: 90 TABLET | Refills: 2 | Status: SHIPPED | OUTPATIENT
Start: 2021-01-04 | End: 2021-12-03

## 2021-01-04 RX ORDER — LOSARTAN POTASSIUM 100 MG/1
100 TABLET ORAL DAILY
Qty: 90 TABLET | Refills: 2 | Status: SHIPPED | OUTPATIENT
Start: 2021-01-04 | End: 2021-10-02

## 2021-01-04 RX ORDER — ATORVASTATIN CALCIUM 40 MG/1
40 TABLET, FILM COATED ORAL NIGHTLY
Qty: 90 TABLET | Refills: 2 | Status: SHIPPED | OUTPATIENT
Start: 2021-01-04 | End: 2021-08-17

## 2021-01-04 RX ORDER — ALPRAZOLAM 0.5 MG/1
0.5 TABLET ORAL NIGHTLY PRN
Qty: 30 TABLET | Refills: 3 | Status: SHIPPED | OUTPATIENT
Start: 2021-01-04 | End: 2021-04-29 | Stop reason: SDUPTHER

## 2021-01-04 RX ORDER — LEVOTHYROXINE SODIUM 88 UG/1
88 TABLET ORAL DAILY
Qty: 90 TABLET | Refills: 2 | Status: SHIPPED | OUTPATIENT
Start: 2021-01-04 | End: 2021-10-28 | Stop reason: SDUPTHER

## 2021-01-04 RX ORDER — HYDROCODONE BITARTRATE AND ACETAMINOPHEN 7.5; 325 MG/1; MG/1
1 TABLET ORAL 2 TIMES DAILY
Qty: 60 TABLET | Refills: 0 | Status: SHIPPED | OUTPATIENT
Start: 2021-01-04 | End: 2021-01-07 | Stop reason: SDUPTHER

## 2021-01-04 NOTE — PROGRESS NOTES
"Chief Complaint  Hypertension, Hyperlipidemia, Diabetes, Memory Loss, Joint Pain, Insomnia, Neck Pain, and Hypothyroidism    Subjective    History of Present Illness      Ramsey Riley presents to [facility] for   History of Present Illness   History from wife Jina   HPI: Patient is here to follow up on the blood pressure which is noted be elevated, the patient is taking the blood pressure medications as prescribed and has had no side effects.  While in the hospital in [place] his Norvasc was discontinued, the patient is also here to follow up on the cholesterol and is trying to follow a diet. The patient is also here to follow on sugar and thyroid and had  lab work done .  He is currently only taking NovoLin as he cannot afford Levemir, the patient also complains of anxiety and multiple joint pain and needs refills on medications .  He also complains of neck pain and had x-rays done in [place], he states his pain is now worsening, he was seen by Dr. Huffman in [place] for his peripheral vascular disease and underwent balloon angioplasty and is currently on Plavix per wife, he was also seen for old stroke on CAT scan and cognitive dysfunction by neurology and had labs ordered  Hypertension   Pertinent negatives include no chest pain, palpitations or shortness of breath.   Hyperlipidemia   Pertinent negatives include no chest pain or shortness of breath.   Diabetes   Pertinent negatives for hypoglycemia include no dizziness, nervousness/anxiousness or pallor. Pertinent negatives for diabetes include no chest pain, no shortness of breath  Patient is on acei/arb     Objective   Vital Signs:   /98   Pulse 72   Temp 97.5 °F (36.4 °C)   Resp 16   Ht 177.8 cm (70\")   Wt 104 kg (229 lb)   SpO2 97%   BMI 32.86 kg/m²     Physical Exam  Vitals signs and nursing note reviewed.   Constitutional:       General: He is not in acute distress.     Appearance: Normal appearance. He is not " diaphoretic.   HENT:      Head: Normocephalic and atraumatic.      Right Ear: External ear normal.      Left Ear: External ear normal.      Nose: Nose normal.   Eyes:      Extraocular Movements: Extraocular movements intact.      Conjunctiva/sclera: Conjunctivae normal.   Neck:      Musculoskeletal: Neck supple.      Trachea: Trachea normal.   Cardiovascular:      Rate and Rhythm: Normal rate and regular rhythm.      Heart sounds: Normal heart sounds.   Pulmonary:      Effort: Pulmonary effort is normal. No respiratory distress.   Abdominal:      General: Abdomen is flat.   Musculoskeletal:         General: Tenderness and deformity present.      Comments: Moves all limbs   Skin:     General: Skin is warm and dry.      Findings: No erythema.   Neurological:      Mental Status: He is alert. Mental status is at baseline.      Comments: No gross motor or sensory deficits        Result Review :     CMP    CMP 9/11/20   BUN 21   Creatinine 1.12   eGFR Non African Am 66   Sodium 132 (A)   Potassium 4.7   Chloride 94 (A)   Calcium 9.3   Albumin 3.90   Total Bilirubin 0.7   Alkaline Phosphatase 95   AST (SGOT) 30   ALT (SGPT) 33   (A) Abnormal value            CBC    CBC 9/11/20   WBC 8.70   RBC 4.85   Hemoglobin 14.7   Hematocrit 42.6   MCV 87.8   MCH 30.3   MCHC 34.5   RDW 11.9 (A)   Platelets 158   (A) Abnormal value                    Data reviewed: Radiologic studies ct head   CT Head Without Contrast (12/14/2020 15:43)  Neck xray - [facility] -12-9-2020 reviewed, copy made and scanned into chart  Labs done [facility] on December 9, 2020 reviewed made and scanned into chart         Assessment and Plan    Problem List Items Addressed This Visit     None      Visit Diagnoses     Benign essential hypertension    -  Primary    Relevant Medications    amLODIPine (NORVASC) 5 MG tablet    losartan (COZAAR) 100 MG tablet    Mixed hyperlipidemia        Relevant Medications    atorvastatin (LIPITOR) 40 MG tablet     Other Relevant Orders    CBC & Differential    Comprehensive Metabolic Panel    Lipid Panel    Uncontrolled type 2 diabetes mellitus with hyperglycemia, with long-term current use of insulin (CMS/[facility])        Relevant Medications    insulin NPH-insulin regular (humuLIN 70/30,novoLIN 70/30) (70-30) 100 UNIT/ML injection    Other Relevant Orders    Hemoglobin A1c    Microalbumin / Creatinine Urine Ratio - Urine, Clean Catch    Adult onset hypothyroidism        Relevant Medications    levothyroxine (SYNTHROID, LEVOTHROID) 88 MCG tablet    Other Relevant Orders    TSH    Cervicalgia        Relevant Orders    MRI Cervical Spine Without Contrast    Arthralgia of multiple joints        Relevant Medications    HYDROcodone-acetaminophen (NORCO) 7.5-325 MG per tablet    Other Relevant Orders    Compliance Drug Analysis, Ur - Urine, Clean Catch    Insomnia, psychophysiological        Relevant Medications    ALPRAZolam (XANAX) 0.5 MG tablet    Other Relevant Orders    Compliance Drug Analysis, Ur - Urine, Clean Catch    Encounter for therapeutic drug level monitoring         Relevant Orders    Compliance Drug Analysis, Ur - Urine, Clean Catch    Opacity of lung on imaging study        Relevant Orders    XR Chest PA & Lateral (Completed)    Mild cognitive disorder          Plan:  1.  Benign essential hypertension: Will continue current medication, low-sodium diet advised, Counseled to regularly check BP at home with goal averaging <130/80.   2.mixed hyperlipidemia: Reviewed fasting CMP and lipid panel.  Diet and exercise counseled,  Will continue current medications  3. Diabetes  [place] reviewed fasting CMP  and hba1c  , diet and exercise counseled , Will start NovoLog and Levemir as patient cannot afford the same and is not covered on his insurance, he is willing to get NPH 70/30 vials from [facility], will prescribe this insulin at 70 units twice daily  4. hypothyroidism: Reviewed tsh , and continue levothyroxine  5.  Insomnia:  Refilled alprazolam, UDS and narcotic contract signed today, Lavell reviewed  6.  Multiple joint pain: Refilled hydrocodone: UDS and Lavell reviewed, narcotic contract signed today  7.  Mild cognitive dysfunction: Per neurology, labs reviewed  8 left lung opacity: Opacity noted on cervical spine x-ray, this has been reviewed with patient and wife today, non-smoker but worked [place] the [place], will obtain chest x-ray  9. Cervicalgia : X-ray reviewed, will obtain MRI  The patient has read and signed the [facility] Controlled Substance Contract.The patient is aware of the potential for addiction and dependence. This is a short-term use   A Lavell was reviewed  in the chart today. Narcotic contract was signed by patient today . Will obtain Urine drug screen  Lavell Report   As part of this patient's treatment plan I am prescribing controlled substances. The patient has been made aware of appropriate use of such medications, including potential risk of somnolence, limited ability to drive and /or work safely, and potential for dependence or overdose. It has also been made clear that these medications are for use by this patient only, without concomitant use of alcohol or other substances unless prescribed.   Patient has completed prescribing agreement detailing terms of continued prescribing of controlled substances, including monitoring LAVELL reports, urine drug screening, and pill counts if necessary. The patient is aware that inappropriate use will result in cessation of prescribing such medications.   LAVELL report has been reviewed   History and physical exam exhibit continued safe and appropriate use of controlled substances.  The patient/ her family have been instructed to contact my office with any questions or difficulties. The patient understands the plan , patient has verbalized an understanding and agrees to proceed accordingly    I spent 50  minutes caring for Ramsey on this date of service.  This time includes time spent by me in the following activities:preparing for the visit, reviewing tests, obtaining and/or reviewing a separately obtained history, performing a medically appropriate examination and/or evaluation , counseling and educating the patient/family/caregiver, ordering medications, tests, or procedures, documenting information in the medical record and care coordination  Follow Up   Return in about 4 weeks (around 2/1/2021).  Patient was given instructions and counseling regarding his condition or for health maintenance advice. Please see specific information pulled into the AVS if appropriate.

## 2021-01-04 NOTE — PATIENT INSTRUCTIONS
MyPlate from USDA    MyPlate is an outline of a general healthy diet based on the 2010 Dietary Guidelines for Americans, from the [place] Department of Agriculture (USDA). It sets guidelines for how much food you should eat from each food group based on your age, sex, and level of physical activity.  What are tips for following MyPlate?  To follow MyPlate recommendations:  · Eat a wide variety of fruits and vegetables, grains, and protein foods.  · Serve smaller portions and eat less food throughout the day.  · Limit portion sizes to avoid overeating.  · Enjoy your food.  · Get at least 150 minutes of exercise every week. This is about 30 minutes each day, 5 or more days per week.  It can be difficult to have every meal look like MyPlate. Think about MyPlate as eating guidelines for an entire day, rather than each individual meal.  Fruits and vegetables  · Make half of your plate fruits and vegetables.  · Eat many different colors of fruits and vegetables each day.  · For a 2,000 calorie daily food plan, eat:  ? 2½ cups of vegetables every day.  ? 2 cups of fruit every day.  · 1 cup is equal to:  ? 1 cup raw or cooked vegetables.  ? 1 cup raw fruit.  ? 1 medium-sized orange, apple, or banana.  ? 1 cup 100% fruit or vegetable juice.  ? 2 cups raw leafy greens, such as lettuce, spinach, or kale.  ? ½ cup dried fruit.  Grains  · One fourth of your plate should be grains.  · Make at least half of the grains you eat each day whole grains.  · For a 2,000 calorie daily food plan, eat 6 oz of grains every day.  · 1 oz is equal to:  ? 1 slice bread.  ? 1 cup cereal.  ? ½ cup cooked rice, cereal, or pasta.  Protein  · One fourth of your plate should be protein.  · Eat a wide variety of protein foods, including meat, poultry, fish, eggs, beans, nuts, and tofu.  · For a 2,000 calorie daily food plan, eat 5½ oz of protein every day.  · 1 oz is equal to:  ? 1 oz meat, poultry, or fish.  ? ¼ cup cooked beans.  ? 1 egg.  ? ½ oz nuts  or seeds.  ? 1 Tbsp peanut butter.  Dairy  · Drink fat-free or low-fat (1%) milk.  · Eat or drink dairy as a side to meals.  · For a 2,000 calorie daily food plan, eat or drink 3 cups of dairy every day.  · 1 cup is equal to:  ? 1 cup milk, yogurt, cottage cheese, or soy milk (soy beverage).  ? 2 oz processed cheese.  ? 1½ oz natural cheese.  Fats, oils, salt, and sugars  · Only small amounts of oils are recommended.  · Avoid foods that are high in calories and low in nutritional value (empty calories), like foods high in fat or added sugars.  · Choose foods that are low in salt (sodium). Choose foods that have less than 140 milligrams (mg) of sodium per serving.  · Drink water instead of sugary drinks. Drink enough water each day to keep your urine pale yellow.  Where to find support  · Work with your health care provider or a nutrition specialist (dietitian) to develop a customized eating plan that is right for you.  · Download an frandy (mobile application) to help you track your daily food intake.  Where to find more information  · Go to ChooseMyPlate.gov for more information.  Summary  · MyPlate is a general guideline for healthy eating from the USDA. It is based on the 2010 Dietary Guidelines for Americans.  · In general, fruits and vegetables should take up ½ of your plate, grains should take up ¼ of your plate, and protein should take up ¼ of your plate.  This information is not intended to replace advice given to you by your health care provider. Make sure you discuss any questions you have with your health care provider.  Document Revised: 05/21/2020 Document Reviewed: 03/19/2018  Elsevier Patient Education © 2020 [employer]

## 2021-01-04 NOTE — PROGRESS NOTES
"    Subjective:     Encounter Date:01/04/2021      Patient ID: Ramsey Riley is a 65 y.o. male.    Chief Complaint: Fatigue  HPI  This is a 65-year-old male patient with known coronary artery disease who presents to cardiology clinic for follow-up after not being seen for over 2 years.  The patient indicates that he recently underwent right lower extremity catheter-based revascularization by vascular specialist in [facility] for a nonhealing ischemic wound on his right great toe.  At one point there was consideration for amputation.  The patient's daughter indicates that 2 out of 3 arteries to the right lower extremity below the knee were blocked and were opened with catheter-based techniques.  His right toe wound has subsequently healed.  The patient has severe diabetes.  His blood sugar was previously extremely high with a hemoglobin A1c of 13%.  He was able to get his hemoglobin A1c down to 7% but this is now increased back to 10%.  He has typical bilateral lower extremity neuropathy symptoms with burning as well as a \"pins-and-needles\" sensation in both feet.  The patient complains of worsening fatigue.  He reports profound exhaustion after doing light physical activities.  He reports effort intolerance.  He has had no chest discomfort per se.  He has had no orthopnea PND or lower extremity edema.  There is no dizziness palpitations or syncope.  He is a non-smoker.  He reports compliance with his medications with no perceived side effects.  He has not had an ischemic evaluation in over 2 years.  The following portions of the patient's history were reviewed and updated as appropriate: allergies, current medications, past family history, past medical history, past social history, past surgical history and problem  Review of Systems   Constitution: Positive for malaise/fatigue. Negative for chills, diaphoresis, fever, weight gain and weight loss.   HENT: Negative for ear discharge, hearing loss, hoarse " voice and nosebleeds.    Eyes: Negative for discharge, double vision, pain and photophobia.   Cardiovascular: Negative for chest pain, claudication, cyanosis, dyspnea on exertion, irregular heartbeat, leg swelling, near-syncope, orthopnea, palpitations, paroxysmal nocturnal dyspnea and syncope.   Respiratory: Negative for cough, hemoptysis, shortness of breath, sputum production and wheezing.    Endocrine: Negative for cold intolerance, heat intolerance, polydipsia, polyphagia and polyuria.   Hematologic/Lymphatic: Negative for adenopathy and bleeding problem. Does not bruise/bleed easily.   Skin: Negative for color change, flushing, itching and rash.   Musculoskeletal: Negative for muscle cramps, muscle weakness, myalgias and stiffness.   Gastrointestinal: Negative for abdominal pain, diarrhea, hematemesis, hematochezia, nausea and vomiting.   Genitourinary: Negative for dysuria, frequency and nocturia.   Neurological: Negative for focal weakness, loss of balance, numbness, paresthesias and seizures.   Psychiatric/Behavioral: Negative for altered mental status, hallucinations and suicidal ideas.   Allergic/Immunologic: Negative for HIV exposure, hives and persistent infections.           Current Outpatient Medications:   •  ALPRAZolam (XANAX) 0.5 MG tablet, Take 1 tablet by mouth At Night As Needed for Anxiety., Disp: 30 tablet, Rfl: 3  •  amitriptyline (ELAVIL) 10 MG tablet, Take 1 tablet by mouth Every Night., Disp: 30 tablet, Rfl: 2  •  amLODIPine (NORVASC) 5 MG tablet, Take 1 tablet by mouth Daily., Disp: 90 tablet, Rfl: 2  •  aspirin 81 MG chewable tablet, Chew 81 mg Daily., Disp: , Rfl:   •  atorvastatin (LIPITOR) 40 MG tablet, Take 1 tablet by mouth Every Night., Disp: 90 tablet, Rfl: 2  •  cetirizine (zyrTEC) 10 MG tablet, Take 10 mg by mouth Daily., Disp: , Rfl: 11  •  citalopram (CeleXA) 40 MG tablet, Take 1 tablet by mouth Daily., Disp: 90 tablet, Rfl: 2  •  clopidogrel (PLAVIX) 75 MG tablet, Take 1  tablet by mouth Daily., Disp: 90 tablet, Rfl: 1  •  gabapentin (NEURONTIN) 300 MG capsule, Take 1 capsule by mouth 2 (Two) Times a Day., Disp: , Rfl: 1  •  glucose blood (Accu-Chek Caryn Plus) test strip, USE AS DIRECTED 3 TIMES DAILY, Disp: 100 each, Rfl: 12  •  glucose monitor monitoring kit, 1 each Daily., Disp: 1 each, Rfl: 1  •  glucose monitor monitoring kit, 1 each Daily., Disp: 1 each, Rfl: 1  •  HYDROcodone-acetaminophen (NORCO) 7.5-325 MG per tablet, Take 1 tablet by mouth 2 (Two) Times a Day., Disp: 60 tablet, Rfl: 0  •  insulin NPH-insulin regular (humuLIN 70/30,novoLIN 70/30) (70-30) 100 UNIT/ML injection, Inject 70 Units under the skin into the appropriate area as directed 2 (Two) Times a Day With Meals., Disp: 5 each, Rfl: 12  •  Insulin Syringes, Disposable, U-100 1 ML misc, 50 Units 3 (Three) Times a Day As Needed (50 units in the morning, and then with meals PRN)., Disp: 200 each, Rfl: 2  •  Lancets 30G misc, Check sugar 3 times daily, Disp: 30 each, Rfl: 12  •  levothyroxine (SYNTHROID, LEVOTHROID) 88 MCG tablet, Take 1 tablet by mouth Daily., Disp: 90 tablet, Rfl: 2  •  losartan (COZAAR) 100 MG tablet, Take 1 tablet by mouth Daily., Disp: 90 tablet, Rfl: 2  •  pantoprazole (PROTONIX) 40 MG EC tablet, Take 1 tablet by mouth Daily., Disp: 90 tablet, Rfl: 2  •  sildenafil (REVATIO) 20 MG tablet, TAKE FIVE TABLETS BY MOUTH DAILY AS NEEDED ONE HOUR PRIOR TO SEXUAL ACTIVITY, Disp: 60 tablet, Rfl: 10    Objective:   Vitals signs and nursing note reviewed.   Constitutional:       Appearance: Healthy appearance. Not in distress.   Neck:      Vascular: No JVR. JVD normal.   Pulmonary:      Effort: Pulmonary effort is normal.      Breath sounds: Normal breath sounds. No wheezing. No rhonchi. No rales.   Chest:      Chest wall: Not tender to palpatation.   Cardiovascular:      PMI at left midclavicular line. Normal rate. Regular rhythm. Normal S1. Normal S2.      Murmurs: There is no murmur.      No gallop.  "No click. No rub.   Pulses:     Intact distal pulses.   Edema:     Peripheral edema absent.   Abdominal:      General: Bowel sounds are normal.      Palpations: Abdomen is soft.      Tenderness: There is no abdominal tenderness.   Musculoskeletal: Normal range of motion.         General: No tenderness.   Skin:     General: Skin is warm and dry.   Neurological:      General: No focal deficit present.      Mental Status: Alert and oriented to person, place and time.       Blood pressure 140/80, pulse 71, height 177.8 cm (70\"), weight 104 kg (229 lb), SpO2 96 %.   Lab Review:     Assessment:       1. Coronary artery disease involving native coronary artery of native heart without angina pectoris  Possible angina equivalent.  Four-vessel coronary artery bypass surgery in 2001 in [facility]    2. Anginal equivalent (CMS/HCC)  Post exertion exhaustion and fatigue.  Poor effort tolerance.  Decreasing stamina.  These are well-recognized angina equivalents.    3. Essential hypertension  Less than ideal blood pressure control.    4. LVH (left ventricular hypertrophy) due to hypertensive disease, without heart failure  Current symptoms could be explained by worsening hypertension.    5. Type 2 diabetes mellitus with hyperglycemia, with long-term current use of insulin (CMS/HCC)  The patient's hemoglobin A1c has increased to 10%.    6. PAD (peripheral artery disease) (CMS/HCC)  The patient recently underwent below the knee right lower extremity revascularization for a nonhealing vascular wound on his right great toe.  He is followed by vascular specialist in [facility].  He is scheduled to have evaluation of the circulation to his left lower extremity as well as bilateral carotid artery ultrasound with duplex Doppler at that location.      ECG 12 Lead    Date/Time: 1/4/2021 2:07 PM  Performed by: Adilson Whittaker MD  Authorized by: Adilson Whittaker MD   Comparison: compared with previous ECG   Similar to " previous ECG  Rhythm: sinus rhythm  Rate: normal  QRS axis: normal  Other findings: poor R wave progression    Clinical impression: abnormal EKG            Plan:     All PAD management considerations will be deferred to his vascular specialist in [facility].  I have recommended a vasodilator nuclear stress test utilizing a 2-day imaging protocol with both supine and prone stress images in order to mitigate for attenuation artifact which is anticipated given his body habitus.  I have recommended an echocardiogram.  Further recommendations will be predicated on the results of his outpatient testing.

## 2021-01-05 DIAGNOSIS — F51.04 INSOMNIA, PSYCHOPHYSIOLOGICAL: ICD-10-CM

## 2021-01-05 DIAGNOSIS — M25.50 ARTHRALGIA OF MULTIPLE JOINTS: ICD-10-CM

## 2021-01-05 RX ORDER — ALPRAZOLAM 0.5 MG/1
0.5 TABLET ORAL NIGHTLY PRN
Qty: 30 TABLET | Refills: 3 | OUTPATIENT
Start: 2021-01-05

## 2021-01-05 RX ORDER — HYDROCODONE BITARTRATE AND ACETAMINOPHEN 7.5; 325 MG/1; MG/1
1 TABLET ORAL 2 TIMES DAILY
Qty: 60 TABLET | Refills: 0 | OUTPATIENT
Start: 2021-01-05

## 2021-01-05 NOTE — TELEPHONE ENCOUNTER
Caller: Ramsey Riley    Relationship: Self    Best call back number: [phone]  Medication needed:   Requested Prescriptions     Pending Prescriptions Disp Refills   • HYDROcodone-acetaminophen (NORCO) 7.5-325 MG per tablet 60 tablet 0     Sig: Take 1 tablet by mouth 2 (Two) Times a Day.   • ALPRAZolam (XANAX) 0.5 MG tablet 30 tablet 3     Sig: Take 1 tablet by mouth At Night As Needed for Anxiety.       When do you need the refill by: ASAP    What details did the patient provide when requesting the medication: OUT OF MEDICATION, WIFE SAID  TOLD THEM SHE WOULD CALL IN WHILE THEY WERE IN OFFICE YESTERDAY. MAKE SURE IT GOES TO [facility].    Does the patient have less than a 3 day supply:  [x] Yes  [] No    What is the patient's preferred pharmacy: [address] [phone] [facility] [phone]

## 2021-01-06 ENCOUNTER — TELEPHONE (OUTPATIENT)
Dept: INTERNAL MEDICINE | Facility: CLINIC | Age: 66
End: 2021-01-06

## 2021-01-06 DIAGNOSIS — M25.50 ARTHRALGIA OF MULTIPLE JOINTS: ICD-10-CM

## 2021-01-06 RX ORDER — HYDROCODONE BITARTRATE AND ACETAMINOPHEN 7.5; 325 MG/1; MG/1
1 TABLET ORAL 2 TIMES DAILY
Qty: 60 TABLET | Refills: 0 | Status: CANCELLED | OUTPATIENT
Start: 2021-01-06

## 2021-01-06 RX ORDER — HYDROCODONE BITARTRATE AND ACETAMINOPHEN 7.5; 325 MG/1; MG/1
TABLET ORAL
Qty: 60 TABLET | Refills: 0 | OUTPATIENT
Start: 2021-01-06

## 2021-01-06 NOTE — TELEPHONE ENCOUNTER
Caller: Jina Riley    Relationship: Emergency Contact    Best call back number: [phone]    Caller requesting test results: SPOUSE    What test was performed: XRAY     When was the test performed:     Where was the test performed:     Additional notes:     AND     Caller: Jina Riley    Relationship: Emergency Contact    Best call back number: [phone]    Medication needed:   Requested Prescriptions     Pending Prescriptions Disp Refills   • HYDROcodone-acetaminophen (NORCO) 7.5-325 MG per tablet 60 tablet 0     Sig: Take 1 tablet by mouth 2 (Two) Times a Day.       When do you need the refill by: 1/6/21    What details did the patient provide when requesting the medication: [facility] does not have any of this prescription.  Patient's spouse is asking that this be sent to [facility]    Does the patient have less than a 3 day supply:  [x] Yes  [] No    What is the patient's preferred pharmacy: [facility] [address] [phone] [facility] [phone] FX

## 2021-01-07 DIAGNOSIS — M25.50 ARTHRALGIA OF MULTIPLE JOINTS: ICD-10-CM

## 2021-01-07 LAB
DRUGS UR: NORMAL
Lab: NORMAL
METHYLMALONATE SERPL-SCNC: 205 NMOL/L (ref 0–378)

## 2021-01-07 RX ORDER — HYDROCODONE BITARTRATE AND ACETAMINOPHEN 7.5; 325 MG/1; MG/1
1 TABLET ORAL 2 TIMES DAILY
Qty: 60 TABLET | Refills: 0 | Status: SHIPPED | OUTPATIENT
Start: 2021-01-07 | End: 2021-02-05 | Stop reason: SDUPTHER

## 2021-01-08 NOTE — TELEPHONE ENCOUNTER
Patients wife would like to know what the next steps are or what happens now?  Could you please give her a call back when you get a chance.  Thank you.

## 2021-01-11 ENCOUNTER — TELEPHONE (OUTPATIENT)
Dept: INTERNAL MEDICINE | Facility: CLINIC | Age: 66
End: 2021-01-11

## 2021-01-11 NOTE — TELEPHONE ENCOUNTER
Caller: Ramsey Riley    Relationship: Self    Best call back number: [phone]  What orders are you requesting (i.e. lab or imaging): MRI    In what timeframe would the patient need to come in:    Where will you receive your lab/imaging services:     Additional notes: PATIENT HAS MRI SCHEDULED AT [facility] IN [place] ON 01/25.  REQUESTING ORDERS TO BE SENT TO IMAGE CENTER IN [place] INSTEAD      PATIENT IS ALSO REQUESTING A CALLED REGARDING XRAY RESULTS

## 2021-01-11 NOTE — TELEPHONE ENCOUNTER
"Xray results relayed to wife  Wants to Know what the \"spot\" is that was seen on previous Xray  "

## 2021-01-18 ENCOUNTER — APPOINTMENT (OUTPATIENT)
Dept: LAB | Facility: HOSPITAL | Age: 66
End: 2021-01-18

## 2021-01-21 ENCOUNTER — HOSPITAL ENCOUNTER (OUTPATIENT)
Dept: NUCLEAR MEDICINE | Facility: HOSPITAL | Age: 66
Discharge: HOME | End: 2021-01-21

## 2021-01-21 LAB
BH CV ECHO MEAS - % IVS THICK: 63.6 %
BH CV ECHO MEAS - AO MAX PG (FULL): 4 MMHG
BH CV ECHO MEAS - AO MAX PG: 11 MMHG
BH CV ECHO MEAS - AO MEAN PG (FULL): 2 MMHG
BH CV ECHO MEAS - AO MEAN PG: 6 MMHG
BH CV ECHO MEAS - AO ROOT AREA (BSA CORRECTED): 0.95
BH CV ECHO MEAS - AO ROOT AREA: 3.5 CM^2
BH CV ECHO MEAS - AO ROOT DIAM: 2.1 CM
BH CV ECHO MEAS - AO V2 MAX: 165 CM/SEC
BH CV ECHO MEAS - AO V2 MEAN: 118 CM/SEC
BH CV ECHO MEAS - AO V2 VTI: 28.8 CM
BH CV ECHO MEAS - AVA(I,A): 3.1 CM^2
BH CV ECHO MEAS - AVA(I,D): 3.1 CM^2
BH CV ECHO MEAS - AVA(V,A): 2.5 CM^2
BH CV ECHO MEAS - AVA(V,D): 2.5 CM^2
BH CV ECHO MEAS - BSA(HAYCOCK): 2.3 M^2
BH CV ECHO MEAS - BSA: 2.2 M^2
BH CV ECHO MEAS - BZI_BMI: 32.9 KILOGRAMS/M^2
BH CV ECHO MEAS - BZI_METRIC_HEIGHT: 177.8 CM
BH CV ECHO MEAS - BZI_METRIC_WEIGHT: 103.9 KG
BH CV ECHO MEAS - EDV(CUBED): 195.1 ML
BH CV ECHO MEAS - EDV(MOD-SP4): 140 ML
BH CV ECHO MEAS - EDV(TEICH): 166.6 ML
BH CV ECHO MEAS - EF(CUBED): 79.9 %
BH CV ECHO MEAS - EF(MOD-SP4): 69.3 %
BH CV ECHO MEAS - EF(TEICH): 71.5 %
BH CV ECHO MEAS - ESV(CUBED): 39.3 ML
BH CV ECHO MEAS - ESV(MOD-SP4): 43 ML
BH CV ECHO MEAS - ESV(TEICH): 47.4 ML
BH CV ECHO MEAS - FS: 41.4 %
BH CV ECHO MEAS - IVS/LVPW: 1.2
BH CV ECHO MEAS - IVSD: 1.1 CM
BH CV ECHO MEAS - IVSS: 1.8 CM
BH CV ECHO MEAS - LA DIMENSION: 4.1 CM
BH CV ECHO MEAS - LA/AO: 2
BH CV ECHO MEAS - LAD MAJOR: 6 CM
BH CV ECHO MEAS - LAT PEAK E' VEL: 10.3 CM/SEC
BH CV ECHO MEAS - LATERAL E/E' RATIO: 7.2
BH CV ECHO MEAS - LV DIASTOLIC VOL/BSA (35-75): 63.3 ML/M^2
BH CV ECHO MEAS - LV MASS(C)D: 233.1 GRAMS
BH CV ECHO MEAS - LV MASS(C)DI: 105.4 GRAMS/M^2
BH CV ECHO MEAS - LV MAX PG: 7 MMHG
BH CV ECHO MEAS - LV MEAN PG: 4 MMHG
BH CV ECHO MEAS - LV SYSTOLIC VOL/BSA (12-30): 19.4 ML/M^2
BH CV ECHO MEAS - LV V1 MAX: 132.5 CM/SEC
BH CV ECHO MEAS - LV V1 MEAN: 94.8 CM/SEC
BH CV ECHO MEAS - LV V1 VTI: 28.5 CM
BH CV ECHO MEAS - LVIDD: 5.8 CM
BH CV ECHO MEAS - LVIDS: 3.4 CM
BH CV ECHO MEAS - LVLD AP4: 9.2 CM
BH CV ECHO MEAS - LVLS AP4: 7.3 CM
BH CV ECHO MEAS - LVOT AREA (M): 3.1 CM^2
BH CV ECHO MEAS - LVOT AREA: 3.1 CM^2
BH CV ECHO MEAS - LVOT DIAM: 2 CM
BH CV ECHO MEAS - LVPWD: 0.9 CM
BH CV ECHO MEAS - MED PEAK E' VEL: 8.1 CM/SEC
BH CV ECHO MEAS - MEDIAL E/E' RATIO: 9.2
BH CV ECHO MEAS - MV A MAX VEL: 100 CM/SEC
BH CV ECHO MEAS - MV DEC SLOPE: 305.5 CM/SEC^2
BH CV ECHO MEAS - MV DEC TIME: 0.23 SEC
BH CV ECHO MEAS - MV E MAX VEL: 74.3 CM/SEC
BH CV ECHO MEAS - MV E/A: 0.74
BH CV ECHO MEAS - MV MAX PG: 8.1 MMHG
BH CV ECHO MEAS - MV MEAN PG: 2 MMHG
BH CV ECHO MEAS - MV P1/2T MAX VEL: 72.9 CM/SEC
BH CV ECHO MEAS - MV P1/2T: 69.9 MSEC
BH CV ECHO MEAS - MV V2 MAX: 142 CM/SEC
BH CV ECHO MEAS - MV V2 MEAN: 66.1 CM/SEC
BH CV ECHO MEAS - MV V2 VTI: 28.4 CM
BH CV ECHO MEAS - MVA P1/2T LCG: 3 CM^2
BH CV ECHO MEAS - MVA(P1/2T): 3.1 CM^2
BH CV ECHO MEAS - MVA(VTI): 3.2 CM^2
BH CV ECHO MEAS - PA MAX PG: 7.2 MMHG
BH CV ECHO MEAS - PA V2 MAX: 134 CM/SEC
BH CV ECHO MEAS - RAP SYSTOLE: 3 MMHG
BH CV ECHO MEAS - RVSP: 27 MMHG
BH CV ECHO MEAS - SI(AO): 45.1 ML/M^2
BH CV ECHO MEAS - SI(CUBED): 70.5 ML/M^2
BH CV ECHO MEAS - SI(LVOT): 40.5 ML/M^2
BH CV ECHO MEAS - SI(MOD-SP4): 43.9 ML/M^2
BH CV ECHO MEAS - SI(TEICH): 53.9 ML/M^2
BH CV ECHO MEAS - SV(AO): 99.8 ML
BH CV ECHO MEAS - SV(CUBED): 155.8 ML
BH CV ECHO MEAS - SV(LVOT): 89.5 ML
BH CV ECHO MEAS - SV(MOD-SP4): 97 ML
BH CV ECHO MEAS - SV(TEICH): 119.1 ML
BH CV ECHO MEAS - TR MAX PG: 17 MMHG
BH CV ECHO MEAS - TR MAX VEL: 206 CM/SEC
BH CV ECHO MEAS - TV MAX PG: 1.5 MMHG
BH CV ECHO MEAS - TV V2 MAX: 61.9 CM/SEC
BH CV ECHO MEASUREMENTS AVERAGE E/E' RATIO: 8.08
LEFT ATRIUM VOLUME INDEX: 33 ML/M^2
LEFT ATRIUM VOLUME: 73 ML
LV EF 2D ECHO EST: 66 %

## 2021-01-21 PROCEDURE — 78452 HT MUSCLE IMAGE SPECT MULT: CPT

## 2021-01-21 PROCEDURE — 78452 HT MUSCLE IMAGE SPECT MULT: CPT | Performed by: INTERNAL MEDICINE

## 2021-01-21 PROCEDURE — 25010000002 REGADENOSON 0.4 MG/5ML SOLUTION: Performed by: INTERNAL MEDICINE

## 2021-01-21 PROCEDURE — 93018 CV STRESS TEST I&R ONLY: CPT | Performed by: INTERNAL MEDICINE

## 2021-01-21 PROCEDURE — A9500 TC99M SESTAMIBI: HCPCS | Performed by: INTERNAL MEDICINE

## 2021-01-21 PROCEDURE — 93017 CV STRESS TEST TRACING ONLY: CPT

## 2021-01-21 PROCEDURE — 0 TECHNETIUM SESTAMIBI: Performed by: INTERNAL MEDICINE

## 2021-01-21 RX ADMIN — REGADENOSON 0.4 MG: 0.08 INJECTION, SOLUTION INTRAVENOUS at 09:30

## 2021-01-21 RX ADMIN — TECHNETIUM TC 99M SESTAMIBI 1 DOSE: 1 INJECTION INTRAVENOUS at 09:30

## 2021-01-22 ENCOUNTER — HOSPITAL ENCOUNTER (OUTPATIENT)
Dept: NUCLEAR MEDICINE | Facility: HOSPITAL | Age: 66
Discharge: HOME | End: 2021-01-22

## 2021-01-22 LAB
BH CV STRESS COMMENTS STAGE 1: NORMAL
BH CV STRESS DOSE REGADENOSON STAGE 1: 0.4
BH CV STRESS DURATION MIN STAGE 1: 0
BH CV STRESS DURATION SEC STAGE 1: 10
BH CV STRESS PROTOCOL 1: NORMAL
BH CV STRESS RECOVERY BP: NORMAL MMHG
BH CV STRESS RECOVERY HR: 67 BPM
BH CV STRESS STAGE 1: 1
LV EF NUC BP: 76 %
MAXIMAL PREDICTED HEART RATE: 157 BPM
PERCENT MAX PREDICTED HR: 45.22 %
STRESS BASELINE BP: NORMAL MMHG
STRESS BASELINE HR: 65 BPM
STRESS PERCENT HR: 53 %
STRESS POST PEAK BP: NORMAL MMHG
STRESS POST PEAK HR: 71 BPM
STRESS TARGET HR: 133 BPM

## 2021-01-22 PROCEDURE — A9500 TC99M SESTAMIBI: HCPCS | Performed by: INTERNAL MEDICINE

## 2021-01-22 PROCEDURE — 0 TECHNETIUM SESTAMIBI: Performed by: INTERNAL MEDICINE

## 2021-01-22 RX ADMIN — TECHNETIUM TC 99M SESTAMIBI 1 DOSE: 1 INJECTION INTRAVENOUS at 07:50

## 2021-01-25 ENCOUNTER — HOSPITAL ENCOUNTER (OUTPATIENT)
Dept: MRI IMAGING | Facility: HOSPITAL | Age: 66
End: 2021-01-25

## 2021-01-29 ENCOUNTER — TELEPHONE (OUTPATIENT)
Dept: CARDIOLOGY | Facility: CLINIC | Age: 66
End: 2021-01-29

## 2021-02-05 DIAGNOSIS — M25.50 ARTHRALGIA OF MULTIPLE JOINTS: ICD-10-CM

## 2021-02-05 NOTE — TELEPHONE ENCOUNTER
Caller: Ramsey Riley    Relationship: Self    Best call back number: [phone] (M)    Medication needed:   Requested Prescriptions     Pending Prescriptions Disp Refills   • HYDROcodone-acetaminophen (NORCO) 7.5-325 MG per tablet 60 tablet 0     Sig: Take 1 tablet by mouth 2 (Two) Times a Day.       When do you need the refill by: TODAY    What details did the patient provide when requesting the medication: PATIENT HAS (1) DAY LEFT OF MEDICINE    Does the patient have less than a 3 day supply:  [x] Yes  [] No    What is the patient's preferred pharmacy: [address] [phone] [facility] [phone]

## 2021-02-08 RX ORDER — HYDROCODONE BITARTRATE AND ACETAMINOPHEN 7.5; 325 MG/1; MG/1
1 TABLET ORAL 2 TIMES DAILY
Qty: 60 TABLET | Refills: 0 | Status: SHIPPED | OUTPATIENT
Start: 2021-02-08 | End: 2021-03-08 | Stop reason: SDUPTHER

## 2021-02-17 ENCOUNTER — OFFICE VISIT (OUTPATIENT)
Dept: INTERNAL MEDICINE | Facility: CLINIC | Age: 66
End: 2021-02-17

## 2021-02-17 ENCOUNTER — LAB (OUTPATIENT)
Dept: LAB | Facility: HOSPITAL | Age: 66
End: 2021-02-17

## 2021-02-17 VITALS
DIASTOLIC BLOOD PRESSURE: 81 MMHG | SYSTOLIC BLOOD PRESSURE: 126 MMHG | RESPIRATION RATE: 16 BRPM | TEMPERATURE: 97.5 F | BODY MASS INDEX: 32.64 KG/M2 | OXYGEN SATURATION: 95 % | WEIGHT: 228 LBS | HEART RATE: 79 BPM | HEIGHT: 70 IN

## 2021-02-17 DIAGNOSIS — Z79.4 UNCONTROLLED TYPE 2 DIABETES MELLITUS WITH HYPERGLYCEMIA, WITH LONG-TERM CURRENT USE OF INSULIN (HCC): ICD-10-CM

## 2021-02-17 DIAGNOSIS — E11.65 UNCONTROLLED TYPE 2 DIABETES MELLITUS WITH HYPERGLYCEMIA, WITH LONG-TERM CURRENT USE OF INSULIN (HCC): ICD-10-CM

## 2021-02-17 DIAGNOSIS — I10 BENIGN ESSENTIAL HYPERTENSION: Primary | ICD-10-CM

## 2021-02-17 DIAGNOSIS — Z20.822 SUSPECTED COVID-19 VIRUS INFECTION: ICD-10-CM

## 2021-02-17 DIAGNOSIS — M54.2 CERVICALGIA: ICD-10-CM

## 2021-02-17 DIAGNOSIS — J06.0 ACUTE LARYNGOPHARYNGITIS: ICD-10-CM

## 2021-02-17 DIAGNOSIS — J01.01 ACUTE RECURRENT MAXILLARY SINUSITIS: ICD-10-CM

## 2021-02-17 PROCEDURE — 99215 OFFICE O/P EST HI 40 MIN: CPT | Performed by: INTERNAL MEDICINE

## 2021-02-17 PROCEDURE — C9803 HOPD COVID-19 SPEC COLLECT: HCPCS

## 2021-02-17 PROCEDURE — U0004 COV-19 TEST NON-CDC HGH THRU: HCPCS | Performed by: INTERNAL MEDICINE

## 2021-02-17 RX ORDER — AZITHROMYCIN 250 MG/1
TABLET, FILM COATED ORAL
Qty: 6 TABLET | Refills: 0 | Status: SHIPPED | OUTPATIENT
Start: 2021-02-17 | End: 2021-03-24

## 2021-02-17 NOTE — PROGRESS NOTES
"Chief Complaint  Hypertension, Diabetes, Cough, Sore Throat, Nasal Congestion, and Sinus Problem    Subjective          Ramsey Riley presents to [facility]  History of Present Illness  HPI: Patient is here to follow up on the blood pressure  The patient is taking the blood pressure medications as prescribed and has had no side effects. The patient is also here to follow up on sugar and is trying to follow a diet. The patient is  also here complaining of sore throat, nasal congestion, sinus drainage and cough since the past 4 days, denies any fever, he also complains of increased pain in his neck area and had an x-ray done which has been reviewed with him today  Hypertension   Pertinent negatives include no chest pain, palpitations or shortness of breath.       Objective   Vital Signs:   /81   Pulse 79   Temp 97.5 °F (36.4 °C)   Resp 16   Ht 177.8 cm (70\")   Wt 103 kg (228 lb)   SpO2 95%   BMI 32.71 kg/m²     Physical Exam  Vitals signs and nursing note reviewed.   Constitutional:       General: He is not in acute distress.     Appearance: Normal appearance. He is not diaphoretic.   HENT:      Head: Normocephalic and atraumatic.      Right Ear: External ear normal.      Left Ear: External ear normal.      Nose:      Right Sinus: Maxillary sinus tenderness present.      Left Sinus: Maxillary sinus tenderness present.   Eyes:      Extraocular Movements: Extraocular movements intact.      Conjunctiva/sclera: Conjunctivae normal.   Neck:      Musculoskeletal: Neck supple.      Trachea: Trachea normal.   Cardiovascular:      Rate and Rhythm: Normal rate and regular rhythm.      Heart sounds: Normal heart sounds.   Pulmonary:      Effort: Pulmonary effort is normal. No respiratory distress.   Abdominal:      General: Abdomen is flat.   Musculoskeletal:         General: Tenderness present.      Comments: Moves all limbs  Mid cervical spine   Skin:     General: Skin is warm and " dry.      Findings: No erythema.   Neurological:      Mental Status: He is alert and oriented to person, place, and time.      Comments: No gross motor or sensory deficits          Result Review :     Common labs    Common Labsle 9/11/20 9/11/20    1322 1322   Glucose  569 (A)   BUN  21   Creatinine  1.12   eGFR Non African Am  66   Sodium  132 (A)   Potassium  4.7   Chloride  94 (A)   Calcium  9.3   Albumin  3.90   Total Bilirubin  0.7   Alkaline Phosphatase  95   AST (SGOT)  30   ALT (SGPT)  33   WBC 8.70    Hemoglobin 14.7    Hematocrit 42.6    Platelets 158    (A) Abnormal value                      Assessment and Plan    Diagnoses and all orders for this visit:    1. Benign essential hypertension (Primary)    2. Uncontrolled type 2 diabetes mellitus with hyperglycemia, with long-term current use of insulin (CMS/[facility])    3. Suspected COVID-19 virus infection  -     COVID-19 PCR, [facility], NP SWAB IN [employer] VIRAL TRANSPORT MEDIA 24-30 HR TAT - Swab, Nasopharynx    4. Acute recurrent maxillary sinusitis  -     azithromycin (Zithromax Z-Aaron) 250 MG tablet; Take 2 tablets the first day, then 1 tablet daily for 4 days.  Dispense: 6 tablet; Refill: 0    5. Acute laryngopharyngitis  -     azithromycin (Zithromax Z-Aaron) 250 MG tablet; Take 2 tablets the first day, then 1 tablet daily for 4 days.  Dispense: 6 tablet; Refill: 0    6. Cervicalgia  -     Ambulatory Referral to Neurosurgery  -     MRI Cervical Spine Without Contrast    Plan:  1.  Benign essential hypertension: Will continue current medication, low-sodium diet advised, Counseled to regularly check BP at home with goal averaging <130/80.   2.  Diabetes  Mellitus  : Reviewed fasting CMP  and hba1c  , diet and exercise counseled , Will continue current medications   3.  Suspected Covid: We will obtain Covid test  4.Acute maxillary sinusitis:   will   start oral antibiotics   5. Acute laryngopharyngitis:  will   Start  oral antibiotics  6.cervicalgia : X-ray  reviewed, will obtain MRI and refer patient to neurosurgery    I spent 43  minutes caring for Ramsey on this date of service. This time includes time spent by me in the following activities:preparing for the visit, reviewing tests, performing a medically appropriate examination and/or evaluation , counseling and educating the patient/family/caregiver, ordering medications, tests, or procedures and documenting information in the medical record  Follow Up    Patient was given instructions and counseling regarding his condition or for health maintenance advice. Please see specific information pulled into the AVS if appropriate.

## 2021-02-18 LAB — SARS-COV-2 RNA RESP QL NAA+PROBE: DETECTED

## 2021-02-18 NOTE — PROGRESS NOTES
Please inform patient and family-wife Jina mcfadden [facility] and completed paperwork and fax it to them as patient is Covid positive

## 2021-02-22 DIAGNOSIS — Z23 IMMUNIZATION DUE: ICD-10-CM

## 2021-03-01 RX ORDER — AMITRIPTYLINE HYDROCHLORIDE 10 MG/1
10 TABLET, FILM COATED ORAL NIGHTLY
Qty: 30 TABLET | Refills: 2 | Status: SHIPPED | OUTPATIENT
Start: 2021-03-01 | End: 2021-03-24

## 2021-03-08 DIAGNOSIS — M25.50 ARTHRALGIA OF MULTIPLE JOINTS: ICD-10-CM

## 2021-03-09 NOTE — TELEPHONE ENCOUNTER
PATIENT CALLED REQUESTING REFILL  FOR PAIN MEDICATION.  WAS OUT AS OF YESTERDAY WHEN HE CALLED IN TO REQUEST.    Ramsey Riley     [phone]       HYDROcodone-acetaminophen (NORCO) 7.5-325 MG per tablet        [address] [phone] [facility] [phone] FX

## 2021-03-10 RX ORDER — HYDROCODONE BITARTRATE AND ACETAMINOPHEN 7.5; 325 MG/1; MG/1
1 TABLET ORAL 2 TIMES DAILY
Qty: 60 TABLET | Refills: 0 | Status: SHIPPED | OUTPATIENT
Start: 2021-03-10 | End: 2021-04-07 | Stop reason: SDUPTHER

## 2021-03-19 ENCOUNTER — APPOINTMENT (OUTPATIENT)
Dept: MRI IMAGING | Facility: HOSPITAL | Age: 66
End: 2021-03-19

## 2021-03-19 ENCOUNTER — HOSPITAL ENCOUNTER (OUTPATIENT)
Dept: MRI IMAGING | Facility: HOSPITAL | Age: 66
Discharge: HOME | End: 2021-03-19
Admitting: INTERNAL MEDICINE

## 2021-03-19 PROCEDURE — 72141 MRI NECK SPINE W/O DYE: CPT

## 2021-03-24 ENCOUNTER — OFFICE VISIT (OUTPATIENT)
Dept: INTERNAL MEDICINE | Facility: CLINIC | Age: 66
End: 2021-03-24

## 2021-03-24 VITALS
HEIGHT: 70 IN | RESPIRATION RATE: 14 BRPM | HEART RATE: 58 BPM | SYSTOLIC BLOOD PRESSURE: 148 MMHG | OXYGEN SATURATION: 97 % | TEMPERATURE: 98 F | WEIGHT: 230 LBS | DIASTOLIC BLOOD PRESSURE: 74 MMHG | BODY MASS INDEX: 32.93 KG/M2

## 2021-03-24 DIAGNOSIS — E11.65 UNCONTROLLED TYPE 2 DIABETES MELLITUS WITH HYPERGLYCEMIA, WITH LONG-TERM CURRENT USE OF INSULIN (HCC): ICD-10-CM

## 2021-03-24 DIAGNOSIS — M54.2 CERVICALGIA: ICD-10-CM

## 2021-03-24 DIAGNOSIS — I10 BENIGN ESSENTIAL HYPERTENSION: Primary | ICD-10-CM

## 2021-03-24 DIAGNOSIS — F41.9 ANXIETY: ICD-10-CM

## 2021-03-24 DIAGNOSIS — G31.84 MILD COGNITIVE IMPAIRMENT: ICD-10-CM

## 2021-03-24 DIAGNOSIS — E78.2 MIXED HYPERLIPIDEMIA: ICD-10-CM

## 2021-03-24 DIAGNOSIS — Z79.4 UNCONTROLLED TYPE 2 DIABETES MELLITUS WITH HYPERGLYCEMIA, WITH LONG-TERM CURRENT USE OF INSULIN (HCC): ICD-10-CM

## 2021-03-24 DIAGNOSIS — F33.0 MILD EPISODE OF RECURRENT MAJOR DEPRESSIVE DISORDER (HCC): ICD-10-CM

## 2021-03-24 DIAGNOSIS — E03.8 ADULT ONSET HYPOTHYROIDISM: ICD-10-CM

## 2021-03-24 PROCEDURE — 99215 OFFICE O/P EST HI 40 MIN: CPT | Performed by: INTERNAL MEDICINE

## 2021-03-24 RX ORDER — CITALOPRAM 40 MG/1
40 TABLET ORAL DAILY
Qty: 90 TABLET | Refills: 2 | Status: SHIPPED | OUTPATIENT
Start: 2021-03-24 | End: 2022-02-10 | Stop reason: SDUPTHER

## 2021-03-24 NOTE — PROGRESS NOTES
"Chief Complaint  Hypertension, Hyperlipidemia, Diabetes, Hypothyroidism, Anxiety, Depression, and Neck Pain    Subjective          Ramsey Riley presents to [facility]  History of Present Illness  HPI:   History is from patient and his wife Jina who accompanies him today, patient is here to follow up on the blood pressure  The patient is taking the blood pressure medications as prescribed and has had no side effects. The patient is also here to follow up on the cholesterol and is trying to follow a diet. The patient is also here to follow on sugar and thyroid and is  due to get lab work done . The patient also complains of anxiety and depression and needs refills on medications .  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   Hyperlipidemia   Pertinent negatives include no chest pain or shortness of breath.   Diabetes   Pertinent negatives for hypoglycemia include no dizziness, nervousness/anxiousness or pallor. Pertinent negatives for diabetes include no chest pain, no shortness of breath  Patient is on acei/arb     Objective   Vital Signs:   /74   Pulse 58   Temp 98 °F (36.7 °C)   Resp 14   Ht 177.8 cm (70\")   Wt 104 kg (230 lb)   SpO2 97%   BMI 33.00 kg/m²     Physical Exam  Vitals and nursing note reviewed.   Constitutional:       General: He is not in acute distress.     Appearance: Normal appearance. He is not diaphoretic.   HENT:      Head: Normocephalic and atraumatic.      Right Ear: External ear normal.    "   Left Ear: External ear normal.      Nose: Nose normal.   Eyes:      Extraocular Movements: Extraocular movements intact.      Conjunctiva/sclera: Conjunctivae normal.   Neck:      Trachea: Trachea normal.   Cardiovascular:      Rate and Rhythm: Normal rate and regular rhythm.      Heart sounds: Normal heart sounds.   Pulmonary:      Effort: Pulmonary effort is normal. No respiratory distress.   Abdominal:      General: Abdomen is flat.   Musculoskeletal:         General: Deformity present.      Cervical back: Neck supple.      Comments: Moves all limbs   Skin:     General: Skin is warm and dry.      Findings: No erythema.   Neurological:      Mental Status: He is alert and oriented to person, place, and time.      Comments: No gross motor or sensory deficits        Result Review :     Common labs    Common Labsle 9/11/20 9/11/20    1322 1322   Glucose  569 (A)   BUN  21   Creatinine  1.12   eGFR Non African Am  66   Sodium  132 (A)   Potassium  4.7   Chloride  94 (A)   Calcium  9.3   Albumin  3.90   Total Bilirubin  0.7   Alkaline Phosphatase  95   AST (SGOT)  30   ALT (SGPT)  33   WBC 8.70    Hemoglobin 14.7    Hematocrit 42.6    Platelets 158    (A) Abnormal value                      Assessment and Plan    Diagnoses and all orders for this visit:    1. Benign essential hypertension (Primary)    2. Mixed hyperlipidemia  -     Cancel: CBC & Differential  -     Cancel: Comprehensive Metabolic Panel  -     Cancel: Lipid Panel  -     CBC & Differential  -     Comprehensive Metabolic Panel  -     Lipid Panel    3. Uncontrolled type 2 diabetes mellitus with hyperglycemia, with long-term current use of insulin (CMS/[facility])  -     Cancel: Microalbumin / Creatinine Urine Ratio - Urine, Clean Catch  -     Cancel: Hemoglobin A1c  -     Hemoglobin A1c  -     Microalbumin / Creatinine Urine Ratio - Urine, Clean Catch    4. Adult onset hypothyroidism  -     TSH    5. Anxiety  -     citalopram (CeleXA) 40 MG tablet; Take 1 tablet  by mouth Daily.  Dispense: 90 tablet; Refill: 2    6. Mild episode of recurrent major depressive disorder (CMS/HCC)  -     citalopram (CeleXA) 40 MG tablet; Take 1 tablet by mouth Daily.  Dispense: 90 tablet; Refill: 2    7. Mild cognitive impairment    8. Cervicalgia      Plan:  1.  Benign essential hypertension: Will continue current medication, low-sodium diet advised, Counseled to regularly check BP at home with goal averaging <130/80.   2.mixed hyperlipidemia: will obtain   fasting CMP and lipid panel.  Diet and exercise counseled,  Will continue current medications  3. Diabetes  Mellitus  : will obtain   fasting CMP  and hba1c  , diet and exercise counseled , Will continue current medications  4. hypothyroidism: will obtain tsh , and continue levothyroxine  5. Anxiety : Refill Celexa 40 mg daily  6. Major depression : Refill Celexa 40 mg daily  7.  Mild cognitive impairment: Seen by neurology, stop Elavil and alprazolam  8.cervicalgia: MRI reviewed, advised to follow-up with neurosurgery  I spent 40 minutes caring for Ramsey on this date of service. This time includes time spent by me in the following activities:preparing for the visit, reviewing tests, performing a medically appropriate examination and/or evaluation , counseling and educating the patient/family/caregiver, ordering medications, tests, or procedures and documenting information in the medical record  Follow Up   Return in about 3 months (around 6/24/2021).  Patient was given instructions and counseling regarding his condition or for health maintenance advice. Please see specific information pulled into the AVS if appropriate.

## 2021-03-26 ENCOUNTER — OFFICE VISIT (OUTPATIENT)
Dept: CARDIOLOGY | Facility: CLINIC | Age: 66
End: 2021-03-26

## 2021-03-26 VITALS
HEART RATE: 68 BPM | WEIGHT: 228 LBS | BODY MASS INDEX: 32.64 KG/M2 | DIASTOLIC BLOOD PRESSURE: 60 MMHG | OXYGEN SATURATION: 93 % | SYSTOLIC BLOOD PRESSURE: 154 MMHG | HEIGHT: 70 IN

## 2021-03-26 DIAGNOSIS — I10 ESSENTIAL HYPERTENSION: ICD-10-CM

## 2021-03-26 DIAGNOSIS — I11.9 LVH (LEFT VENTRICULAR HYPERTROPHY) DUE TO HYPERTENSIVE DISEASE, WITHOUT HEART FAILURE: ICD-10-CM

## 2021-03-26 DIAGNOSIS — I25.10 CORONARY ARTERY DISEASE INVOLVING NATIVE CORONARY ARTERY OF NATIVE HEART WITHOUT ANGINA PECTORIS: Primary | ICD-10-CM

## 2021-03-26 DIAGNOSIS — I73.9 PAD (PERIPHERAL ARTERY DISEASE) (HCC): ICD-10-CM

## 2021-03-26 PROCEDURE — 99214 OFFICE O/P EST MOD 30 MIN: CPT | Performed by: INTERNAL MEDICINE

## 2021-03-26 NOTE — PROGRESS NOTES
Subjective:     Encounter Date:03/26/2021      Patient ID: Ramsey Riley is a 66 y.o. male.    Chief Complaint: Coronary artery disease  HPI  This is a 66-year-old male patient who underwent a battery of outpatient testing to evaluate shortness of breath following Covid-19 associated pneumonia.  His echocardiogram shows normal left ventricular systolic function globally and regionally.  There was no evidence of valvular disease, pericardial disease or pulmonary hypertension.  The patient underwent a vasodilator nuclear stress test which showed no evidence of ischemia or infarction.  His symptoms have remained unchanged in quality, frequency, duration and intensity since his last visit.  He has had no orthopnea PND or lower extremity edema.  He has no chest discomfort at rest or with activity.  There is no exertional chest arm neck jaw shoulder back discomfort.  He is a non-smoker.  He reports compliance with his medications with no perceived side effects.  His lower extremity ulceration has healed after catheter-based revascularization in [facility].  He has no lifestyle limiting claudication, rest pain or new tissue loss.  The following portions of the patient's history were reviewed and updated as appropriate: allergies, current medications, past family history, past medical history, past social history, past surgical history and problem  Review of Systems   Constitutional: Positive for malaise/fatigue. Negative for chills, diaphoresis, fever, weight gain and weight loss.   HENT: Negative for ear discharge, hearing loss, hoarse voice and nosebleeds.    Eyes: Negative for discharge, double vision, pain and photophobia.   Cardiovascular: Positive for dyspnea on exertion. Negative for chest pain, claudication, cyanosis, irregular heartbeat, leg swelling, near-syncope, orthopnea, palpitations, paroxysmal nocturnal dyspnea and syncope.   Respiratory: Negative for cough, hemoptysis, shortness of breath,  sputum production and wheezing.    Endocrine: Negative for cold intolerance, heat intolerance, polydipsia, polyphagia and polyuria.   Hematologic/Lymphatic: Negative for adenopathy and bleeding problem. Does not bruise/bleed easily.   Skin: Negative for color change, flushing, itching and rash.   Musculoskeletal: Negative for muscle cramps, muscle weakness, myalgias and stiffness.   Gastrointestinal: Negative for abdominal pain, diarrhea, hematemesis, hematochezia, nausea and vomiting.   Genitourinary: Negative for dysuria, frequency and nocturia.   Neurological: Negative for focal weakness, loss of balance, numbness, paresthesias and seizures.   Psychiatric/Behavioral: Negative for altered mental status, hallucinations and suicidal ideas.   Allergic/Immunologic: Negative for HIV exposure, hives and persistent infections.           Current Outpatient Medications:   •  ALPRAZolam (XANAX) 0.5 MG tablet, Take 1 tablet by mouth At Night As Needed for Anxiety., Disp: 30 tablet, Rfl: 3  •  amLODIPine (NORVASC) 5 MG tablet, Take 1 tablet by mouth Daily., Disp: 90 tablet, Rfl: 2  •  aspirin 81 MG chewable tablet, Chew 81 mg Daily., Disp: , Rfl:   •  atorvastatin (LIPITOR) 40 MG tablet, Take 1 tablet by mouth Every Night., Disp: 90 tablet, Rfl: 2  •  cetirizine (zyrTEC) 10 MG tablet, Take 10 mg by mouth Daily., Disp: , Rfl: 11  •  citalopram (CeleXA) 40 MG tablet, Take 1 tablet by mouth Daily., Disp: 90 tablet, Rfl: 2  •  clopidogrel (PLAVIX) 75 MG tablet, Take 1 tablet by mouth Daily., Disp: 90 tablet, Rfl: 1  •  gabapentin (NEURONTIN) 300 MG capsule, Take 1 capsule by mouth 2 (Two) Times a Day., Disp: , Rfl: 1  •  glucose blood (Accu-Chek Caryn Plus) test strip, USE AS DIRECTED 3 TIMES DAILY, Disp: 100 each, Rfl: 12  •  glucose monitor monitoring kit, 1 each Daily., Disp: 1 each, Rfl: 1  •  glucose monitor monitoring kit, 1 each Daily., Disp: 1 each, Rfl: 1  •  HYDROcodone-acetaminophen (NORCO) 7.5-325 MG per tablet, Take  1 tablet by mouth 2 (Two) Times a Day., Disp: 60 tablet, Rfl: 0  •  insulin NPH-insulin regular (humuLIN 70/30,novoLIN 70/30) (70-30) 100 UNIT/ML injection, Inject 70 Units under the skin into the appropriate area as directed 2 (Two) Times a Day With Meals., Disp: 5 each, Rfl: 12  •  Insulin Syringes, Disposable, U-100 1 ML misc, 50 Units 3 (Three) Times a Day As Needed (50 units in the morning, and then with meals PRN)., Disp: 200 each, Rfl: 2  •  Lancets 30G misc, Check sugar 3 times daily, Disp: 30 each, Rfl: 12  •  levothyroxine (SYNTHROID, LEVOTHROID) 88 MCG tablet, Take 1 tablet by mouth Daily., Disp: 90 tablet, Rfl: 2  •  losartan (COZAAR) 100 MG tablet, Take 1 tablet by mouth Daily., Disp: 90 tablet, Rfl: 2  •  pantoprazole (PROTONIX) 40 MG EC tablet, Take 1 tablet by mouth Daily., Disp: 90 tablet, Rfl: 2  •  sildenafil (REVATIO) 20 MG tablet, TAKE FIVE TABLETS BY MOUTH DAILY AS NEEDED ONE HOUR PRIOR TO SEXUAL ACTIVITY, Disp: 60 tablet, Rfl: 10    Objective:   Vitals and nursing note reviewed.   Constitutional:       Appearance: Healthy appearance. Not in distress.   Neck:      Vascular: No JVR. JVD normal.   Pulmonary:      Effort: Pulmonary effort is normal.      Breath sounds: Normal breath sounds. No wheezing. No rhonchi. No rales.   Chest:      Chest wall: Not tender to palpatation.   Cardiovascular:      PMI at left midclavicular line. Normal rate. Regular rhythm. Normal S1. Normal S2.      Murmurs: There is no murmur.      No gallop. No click. No rub.   Pulses:     Intact distal pulses.   Edema:     Peripheral edema absent.   Abdominal:      General: Bowel sounds are normal.      Palpations: Abdomen is soft.      Tenderness: There is no abdominal tenderness.   Musculoskeletal: Normal range of motion.         General: No tenderness. Skin:     General: Skin is warm and dry.   Neurological:      General: No focal deficit present.      Mental Status: Alert and oriented to person, place and time.  "      Blood pressure 154/60, pulse 68, height 177.8 cm (70\"), weight 103 kg (228 lb), SpO2 93 %.   Lab Review:     Assessment:       1. Coronary artery disease involving native coronary artery of native heart without angina pectoris  No evidence of ischemic heart disease.    2. Essential hypertension  Acceptable blood pressure control.    3. LVH (left ventricular hypertrophy) due to hypertensive disease, without heart failure  Normal left ventricular systolic function.  Mild concentric left ventricular hypertrophy with grade 1 diastolic dysfunction typical of hypertensive cardiac disease.    4. PAD (peripheral artery disease) (CMS/[facility])  No evidence of chronic critical limb ischemia.  No lifestyle limiting claudication.    Procedures    Plan:     I suspect the patient has the Covid-19 associated \"Long-Haul\" syndrome.  No further cardiovascular testing is indicated.  No changes in his medications have been made at today's visit.      "

## 2021-04-07 ENCOUNTER — TRANSCRIBE ORDERS (OUTPATIENT)
Dept: ADMINISTRATIVE | Facility: HOSPITAL | Age: 66
End: 2021-04-07

## 2021-04-07 DIAGNOSIS — Z01.818 PRE-OPERATIVE CLEARANCE: Primary | ICD-10-CM

## 2021-04-07 DIAGNOSIS — M25.50 ARTHRALGIA OF MULTIPLE JOINTS: ICD-10-CM

## 2021-04-07 RX ORDER — HYDROCODONE BITARTRATE AND ACETAMINOPHEN 7.5; 325 MG/1; MG/1
1 TABLET ORAL 2 TIMES DAILY
Qty: 60 TABLET | Refills: 0 | Status: SHIPPED | OUTPATIENT
Start: 2021-04-07 | End: 2021-05-13 | Stop reason: SDUPTHER

## 2021-04-07 NOTE — TELEPHONE ENCOUNTER
Caller: Jina Riley    Relationship: Emergency Contact    Best call back number: [phone]    Medication needed:   Requested Prescriptions     Pending Prescriptions Disp Refills   • HYDROcodone-acetaminophen (NORCO) 7.5-325 MG per tablet 60 tablet 0     Sig: Take 1 tablet by mouth 2 (Two) Times a Day.       When do you need the refill by: 04-07-21    What additional details did the patient provide when requesting the medication: JINA STATED THAT PATIENT WAS OUT OF MEDICATION AND WOULD NEED A REFILL    Does the patient have less than a 3 day supply:  [x] Yes  [] No    What is the patient's preferred pharmacy: [address] [phone] [facility] [phone] FX

## 2021-04-07 NOTE — TELEPHONE ENCOUNTER
Last UDS completed on 7/8/2020, and controlled substance contract signed on 1/4/2021.    LOV 3/24/2021  Upcoming 4/27/2021

## 2021-04-09 ENCOUNTER — LAB (OUTPATIENT)
Dept: LAB | Facility: HOSPITAL | Age: 66
End: 2021-04-09

## 2021-04-09 DIAGNOSIS — Z01.818 PRE-OPERATIVE CLEARANCE: ICD-10-CM

## 2021-04-09 LAB — SARS-COV-2 RNA NOSE QL NAA+PROBE: NOT DETECTED

## 2021-04-09 PROCEDURE — C9803 HOPD COVID-19 SPEC COLLECT: HCPCS

## 2021-04-09 PROCEDURE — U0004 COV-19 TEST NON-CDC HGH THRU: HCPCS

## 2021-04-29 DIAGNOSIS — F51.04 INSOMNIA, PSYCHOPHYSIOLOGICAL: ICD-10-CM

## 2021-04-29 RX ORDER — ALPRAZOLAM 0.5 MG/1
0.5 TABLET ORAL NIGHTLY PRN
Qty: 30 TABLET | Refills: 3 | Status: SHIPPED | OUTPATIENT
Start: 2021-04-29 | End: 2021-06-24 | Stop reason: SDUPTHER

## 2021-04-29 NOTE — TELEPHONE ENCOUNTER
LOV 3/24/2021  UPCOMING 5/20/2021    UDS completed on 1/1/2021, controlled substance agreement signed on 1/4/2021

## 2021-04-29 NOTE — TELEPHONE ENCOUNTER
Caller: Jina Rilye    Relationship: Emergency Contact    Best call back number: [phone]    Medication needed:   Requested Prescriptions     Pending Prescriptions Disp Refills   • ALPRAZolam (XANAX) 0.5 MG tablet 30 tablet 3     Sig: Take 1 tablet by mouth At Night As Needed for Anxiety.       When do you need the refill by: TOMORROW    Does the patient have less than a 3 day supply:  [x] Yes  [] No    What is the patient's preferred pharmacy: [address] [phone] [facility] [phone]

## 2021-05-10 DIAGNOSIS — M25.50 ARTHRALGIA OF MULTIPLE JOINTS: ICD-10-CM

## 2021-05-10 NOTE — TELEPHONE ENCOUNTER
Caller: Jina Riley    Relationship: Emergency Contact    Best call back number: [phone]    Medication needed:   Requested Prescriptions     Pending Prescriptions Disp Refills   • HYDROcodone-acetaminophen (NORCO) 7.5-325 MG per tablet 60 tablet 0     Sig: Take 1 tablet by mouth 2 (Two) Times a Day.       When do you need the refill by: ASAP    Does the patient have less than a 3 day supply:  [x] Yes  [] No    What is the patient's preferred pharmacy:      [facility]

## 2021-05-11 RX ORDER — HYDROCODONE BITARTRATE AND ACETAMINOPHEN 7.5; 325 MG/1; MG/1
1 TABLET ORAL 2 TIMES DAILY
Qty: 60 TABLET | Refills: 0 | OUTPATIENT
Start: 2021-05-11

## 2021-05-11 NOTE — TELEPHONE ENCOUNTER
Caller: Jina Riley    Relationship: Emergency Contact    Best call back number: [phone]    Medication needed:   Requested Prescriptions     Refused Prescriptions Disp Refills   • HYDROcodone-acetaminophen (NORCO) 7.5-325 MG per tablet 60 tablet 0     Sig: Take 1 tablet by mouth 2 (Two) Times a Day.     Refused By: ABIGAIL COOK     Reason for Refusal: Patient needs an appointment       PATIENT IS OUT OF MEDICATION

## 2021-05-12 ENCOUNTER — TELEPHONE (OUTPATIENT)
Dept: INTERNAL MEDICINE | Facility: CLINIC | Age: 66
End: 2021-05-12

## 2021-05-12 ENCOUNTER — TRANSCRIBE ORDERS (OUTPATIENT)
Dept: ADMINISTRATIVE | Facility: HOSPITAL | Age: 66
End: 2021-05-12

## 2021-05-12 DIAGNOSIS — Z01.818 PREOPERATIVE CLEARANCE: Primary | ICD-10-CM

## 2021-05-12 DIAGNOSIS — M25.50 ARTHRALGIA OF MULTIPLE JOINTS: ICD-10-CM

## 2021-05-12 NOTE — TELEPHONE ENCOUNTER
Patients wife is wondering why his refill request was denied.  He has an up coming appointment on 5/20/2021.  Please advise.  Phone number verified.

## 2021-05-12 NOTE — TELEPHONE ENCOUNTER
Caller: RosemaryJina    Relationship: Emergency Contact    Best call back number: [phone] (M)    Medication needed:   Requested Prescriptions     Refused Prescriptions Disp Refills   • HYDROcodone-acetaminophen (NORCO) 7.5-325 MG per tablet 60 tablet 0     Sig: Take 1 tablet by mouth 2 (Two) Times a Day.     Refused By: ABIGAIL COOK     Reason for Refusal: Patient needs an appointment       When do you need the refill by: TODAY    What additional details did the patient provide when requesting the medication: PATIENT'S WIFE CALLED BACK TO CHECK THE STATUS OF THE LISTED MEDICATION REQUEST. PATIENT IS COMPLETELY OUT. PATIENT HAS BEEN OUT OPF THE MEDICATION SINCE 5/8/21.    Does the patient have less than a 3 day supply:  [x] Yes  [] No    What is the patient's preferred pharmacy:      [address] [phone] [place] [phone] FX    PATIENT HAS BEEN ADVISED THAT THIS REQUEST HAS BEEN MARKED AS A HIGH PRIORITY TO ALLOW 48 HOURS FOR THE CLINICAL TEAM TO FOLLOW UP ON THIS REQUEST,  IF SYMPTOMS WORSENS TO SEEK OUT EMERGENT CARE. PATIENT  FULLY UNDERSTANDS.

## 2021-05-13 RX ORDER — HYDROCODONE BITARTRATE AND ACETAMINOPHEN 7.5; 325 MG/1; MG/1
1 TABLET ORAL 2 TIMES DAILY
Qty: 60 TABLET | Refills: 0 | Status: SHIPPED | OUTPATIENT
Start: 2021-05-13 | End: 2021-06-10 | Stop reason: SDUPTHER

## 2021-05-20 ENCOUNTER — OFFICE VISIT (OUTPATIENT)
Dept: INTERNAL MEDICINE | Facility: CLINIC | Age: 66
End: 2021-05-20

## 2021-05-20 VITALS
RESPIRATION RATE: 14 BRPM | DIASTOLIC BLOOD PRESSURE: 75 MMHG | SYSTOLIC BLOOD PRESSURE: 135 MMHG | WEIGHT: 227 LBS | TEMPERATURE: 97.7 F | HEART RATE: 59 BPM | BODY MASS INDEX: 32.5 KG/M2 | OXYGEN SATURATION: 97 % | HEIGHT: 70 IN

## 2021-05-20 DIAGNOSIS — F41.9 ANXIETY: Primary | ICD-10-CM

## 2021-05-20 DIAGNOSIS — Z00.00 ROUTINE GENERAL MEDICAL EXAMINATION AT A HEALTH CARE FACILITY: Primary | ICD-10-CM

## 2021-05-20 DIAGNOSIS — E78.2 MIXED HYPERLIPIDEMIA: ICD-10-CM

## 2021-05-20 DIAGNOSIS — E11.65 TYPE 2 DIABETES MELLITUS WITH HYPERGLYCEMIA, WITH LONG-TERM CURRENT USE OF INSULIN (HCC): ICD-10-CM

## 2021-05-20 DIAGNOSIS — Z51.81 ENCOUNTER FOR THERAPEUTIC DRUG LEVEL MONITORING: ICD-10-CM

## 2021-05-20 DIAGNOSIS — I10 BENIGN ESSENTIAL HYPERTENSION: ICD-10-CM

## 2021-05-20 DIAGNOSIS — Z79.4 TYPE 2 DIABETES MELLITUS WITH HYPERGLYCEMIA, WITH LONG-TERM CURRENT USE OF INSULIN (HCC): ICD-10-CM

## 2021-05-20 PROCEDURE — 1160F RVW MEDS BY RX/DR IN RCRD: CPT | Performed by: INTERNAL MEDICINE

## 2021-05-20 PROCEDURE — 1125F AMNT PAIN NOTED PAIN PRSNT: CPT | Performed by: INTERNAL MEDICINE

## 2021-05-20 PROCEDURE — 1170F FXNL STATUS ASSESSED: CPT | Performed by: INTERNAL MEDICINE

## 2021-05-20 PROCEDURE — 99396 PREV VISIT EST AGE 40-64: CPT | Performed by: INTERNAL MEDICINE

## 2021-05-20 PROCEDURE — G0439 PPPS, SUBSEQ VISIT: HCPCS | Performed by: INTERNAL MEDICINE

## 2021-05-20 NOTE — PATIENT INSTRUCTIONS
Exercising to Stay Healthy  To become healthy and stay healthy, it is recommended that you do moderate-intensity and vigorous-intensity exercise. You can tell that you are exercising at a moderate intensity if your heart starts beating faster and you start breathing faster but can still hold a conversation. You can tell that you are exercising at a vigorous intensity if you are breathing much harder and faster and cannot hold a conversation while exercising.  Exercising regularly is important. It has many health benefits, such as:  · Improving overall fitness, flexibility, and endurance.  · Increasing bone density.  · Helping with weight control.  · Decreasing body fat.  · Increasing muscle strength.  · Reducing stress and tension.  · Improving overall health.  How often should I exercise?  Choose an activity that you enjoy, and set realistic goals. Your health care provider can help you make an activity plan that works for you.  Exercise regularly as told by your health care provider. This may include:  · Doing strength training two times a week, such as:  ? Lifting weights.  ? Using resistance bands.  ? Push-ups.  ? Sit-ups.  ? Yoga.  · Doing a certain intensity of exercise for a given amount of time. Choose from these options:  ? A total of 150 minutes of moderate-intensity exercise every week.  ? A total of 75 minutes of vigorous-intensity exercise every week.  ? A mix of moderate-intensity and vigorous-intensity exercise every week.  Children, pregnant women, people who have not exercised regularly, people who are overweight, and older adults may need to talk with a health care provider about what activities are safe to do. If you have a medical condition, be sure to talk with your health care provider before you start a new exercise program.  What are some exercise ideas?  Moderate-intensity exercise ideas include:  · Walking 1 mile (1.6 km) in about 15  minutes.  · Biking.  · Hiking.  · Golfing.  · Dancing.  · Water aerobics.  Vigorous-intensity exercise ideas include:  · Walking 4.5 miles (7.2 km) or more in about 1 hour.  · Jogging or running 5 miles (8 km) in about 1 hour.  · Biking 10 miles (16.1 km) or more in about 1 hour.  · Lap swimming.  · Roller-skating or in-line skating.  · Cross-country skiing.  · Vigorous competitive sports, such as football, basketball, and soccer.  · Jumping rope.  · Aerobic dancing.  What are some everyday activities that can help me to get exercise?  · Yard work, such as:  ? Pushing a .  ? Raking and bagging leaves.  · Washing your car.  · Pushing a stroller.  · Shoveling snow.  · Gardening.  · Washing windows or floors.  How can I be more active in my day-to-day activities?  · Use stairs instead of an elevator.  · Take a walk during your lunch break.  · If you drive, park your car farther away from your work or school.  · If you take public transportation, get off one stop early and walk the rest of the way.  · Stand up or walk around during all of your indoor phone calls.  · Get up, stretch, and walk around every 30 minutes throughout the day.  · Enjoy exercise with a friend. Support to continue exercising will help you keep a regular routine of activity.  What guidelines can I follow while exercising?  · Before you start a new exercise program, talk with your health care provider.  · Do not exercise so much that you hurt yourself, feel dizzy, or get very short of breath.  · Wear comfortable clothes and wear shoes with good support.  · Drink plenty of water while you exercise to prevent dehydration or heat stroke.  · Work out until your breathing and your heartbeat get faster.  Where to find more information  · [place] Department of Health and Human Services: www.hhs.gov  · Centers for Disease Control and Prevention (CDC): www.cdc.gov  Summary  · Exercising regularly is important. It will improve your overall fitness,  flexibility, and endurance.  · Regular exercise also will improve your overall health. It can help you control your weight, reduce stress, and improve your bone density.  · Do not exercise so much that you hurt yourself, feel dizzy, or get very short of breath.  · Before you start a new exercise program, talk with your health care provider.  This information is not intended to replace advice given to you by your health care provider. Make sure you discuss any questions you have with your health care provider.  Document Revised: 2018 Document Reviewed: 2018  [employer] Patient Education ©  [employer]    Medicare Wellness  Personal Prevention Plan of Service     Date of Office Visit:  2021  Encounter Provider:  Margret Purvis MD  Place of Service:  [facility]  Patient Name: Ramsey Riley  :  1955    As part of the Medicare Wellness portion of your visit today, we are providing you with this personalized preventive plan of services (PPPS). This plan is based upon recommendations of the [place] Preventive Services Task Force (USPSTF) and the Advisory Committee on Immunization Practices (ACIP).    This lists the preventive care services that should be considered, and provides dates of when you are due. Items listed as completed are up-to-date and do not require any further intervention.    Health Maintenance   Topic Date Due   • COLORECTAL CANCER SCREENING  Never done   • COVID-19 Vaccine (1) Never done   • TDAP/TD VACCINES (1 - Tdap) Never done   • ZOSTER VACCINE (1 of 2) Never done   • HEPATITIS C SCREENING  Never done   • DIABETIC EYE EXAM  Never done   • HEMOGLOBIN A1C  2020   • LIPID PANEL  2020   • URINE MICROALBUMIN  2020   • INFLUENZA VACCINE  2021   • DIABETIC FOOT EXAM  10/19/2021   • ANNUAL WELLNESS VISIT  2022   • Pneumococcal Vaccine 0-64  Completed       No orders of the defined types were placed in this  encounter.      Return in 1 year (on 5/20/2022) for Medicare Wellness.

## 2021-05-20 NOTE — PROGRESS NOTES
The ABCs of the Annual Wellness Visit  Subsequent Medicare Wellness Visit    Chief Complaint   Patient presents with   • Medicare Wellness-subsequent   • Hypertension   • Hyperlipidemia   • Diabetes       Subjective   History of Present Illness:  Ramsey Riley is a 66 y.o. male who presents for a Subsequent Medicare Wellness Visit and physical, Patient is here to follow up on the blood pressure  The patient is taking the blood pressure medications as prescribed and has had no side effects. The patient is also here to follow up on the cholesterol and is trying to follow a diet. The patient is also here to follow on sugar and is  due to get lab work done . The patient also needs refills on medications .   Hypertension   Pertinent negatives include no chest pain, palpitations or shortness of breath.   Hyperlipidemia   Pertinent negatives include no chest pain or shortness of breath.   Diabetes   Pertinent negatives for hypoglycemia include no dizziness, nervousness/anxiousness or pallor. Pertinent negatives for diabetes include no chest pain, no shortness of breath  Patient is on acei/arb       HEALTH RISK ASSESSMENT    Recent Hospitalizations:  No hospitalization(s) within the last year.    Current Medical Providers:  Patient Care Team:  Margret Purvis MD as PCP - General (Internal Medicine)    Smoking Status:  Social History     Tobacco Use   Smoking Status Never Smoker   Smokeless Tobacco Never Used       Alcohol Consumption:  Social History     Substance and Sexual Activity   Alcohol Use No       Depression Screen:   PHQ-2/PHQ-9 Depression Screening 5/20/2021   Little interest or pleasure in doing things 0   Feeling down, depressed, or hopeless 0   Total Score 0       Fall Risk Screen:  STEADI Fall Risk Assessment has not been completed.    Health Habits and Functional and Cognitive Screening:  Functional & Cognitive Status 5/20/2021   Do you have difficulty preparing food and eating? No   Do you have difficulty  bathing yourself, getting dressed or grooming yourself? No   Do you have difficulty using the toilet? No   Do you have difficulty moving around from place to place? No   Do you have trouble with steps or getting out of a bed or a chair? No   Current Diet Well Balanced Diet   Dental Exam Up to date   Eye Exam Up to date   Exercise (times per week) 5 times per week   Current Exercise Activities Include Yard Work   Do you need help using the phone?  No   Are you deaf or do you have serious difficulty hearing?  No   Do you need help with transportation? No   Do you need help shopping? No   Do you need help preparing meals?  No   Do you need help with housework?  No   Do you need help with laundry? No   Do you need help taking your medications? No   Do you need help managing money? No   Do you ever drive or ride in a car without wearing a seat belt? No   Have you felt unusual stress, anger or loneliness in the last month? No   Who do you live with? Spouse   If you need help, do you have trouble finding someone available to you? No   Have you been bothered in the last four weeks by sexual problems? No   Do you have difficulty concentrating, remembering or making decisions? No         Does the patient have evidence of cognitive impairment? No    Asprin use counseling:Taking ASA appropriately as indicated    Age-appropriate Screening Schedule:  Refer to the list below for future screening recommendations based on patient's age, sex and/or medical conditions. Orders for these recommended tests are listed in the plan section. The patient has been provided with a written plan.    Health Maintenance   Topic Date Due   • TDAP/TD VACCINES (1 - Tdap) Never done   • ZOSTER VACCINE (1 of 2) Never done   • DIABETIC EYE EXAM  Never done   • HEMOGLOBIN A1C  02/12/2020   • LIPID PANEL  08/12/2020   • URINE MICROALBUMIN  08/12/2020   • INFLUENZA VACCINE  08/01/2021   • DIABETIC FOOT EXAM  10/19/2021          The following portions of the  patient's history were reviewed and updated as appropriate: allergies, current medications, past family history, past medical history, past social history, past surgical history and problem list.    Outpatient Medications Prior to Visit   Medication Sig Dispense Refill   • ALPRAZolam (XANAX) 0.5 MG tablet Take 1 tablet by mouth At Night As Needed for Anxiety. 30 tablet 3   • amLODIPine (NORVASC) 5 MG tablet Take 1 tablet by mouth Daily. 90 tablet 2   • aspirin 81 MG chewable tablet Chew 81 mg Daily.     • atorvastatin (LIPITOR) 40 MG tablet Take 1 tablet by mouth Every Night. 90 tablet 2   • cetirizine (zyrTEC) 10 MG tablet Take 10 mg by mouth Daily.  11   • citalopram (CeleXA) 40 MG tablet Take 1 tablet by mouth Daily. 90 tablet 2   • clopidogrel (PLAVIX) 75 MG tablet Take 1 tablet by mouth Daily. 90 tablet 1   • gabapentin (NEURONTIN) 300 MG capsule Take 1 capsule by mouth 2 (Two) Times a Day.  1   • glucose blood (Accu-Chek Caryn Plus) test strip USE AS DIRECTED 3 TIMES DAILY 100 each 12   • glucose monitor monitoring kit 1 each Daily. 1 each 1   • glucose monitor monitoring kit 1 each Daily. 1 each 1   • HYDROcodone-acetaminophen (NORCO) 7.5-325 MG per tablet Take 1 tablet by mouth 2 (Two) Times a Day. 60 tablet 0   • insulin NPH-insulin regular (humuLIN 70/30,novoLIN 70/30) (70-30) 100 UNIT/ML injection Inject 70 Units under the skin into the appropriate area as directed 2 (Two) Times a Day With Meals. 5 each 12   • Insulin Syringes, Disposable, U-100 1 ML misc 50 Units 3 (Three) Times a Day As Needed (50 units in the morning, and then with meals PRN). 200 each 2   • Lancets 30G misc Check sugar 3 times daily 30 each 12   • levothyroxine (SYNTHROID, LEVOTHROID) 88 MCG tablet Take 1 tablet by mouth Daily. 90 tablet 2   • losartan (COZAAR) 100 MG tablet Take 1 tablet by mouth Daily. 90 tablet 2   • pantoprazole (PROTONIX) 40 MG EC tablet Take 1 tablet by mouth Daily. 90 tablet 2   • sildenafil (REVATIO) 20 MG  tablet TAKE FIVE TABLETS BY MOUTH DAILY AS NEEDED ONE HOUR PRIOR TO SEXUAL ACTIVITY 60 tablet 10     No facility-administered medications prior to visit.       Patient Active Problem List   Diagnosis   • Coronary artery disease involving native coronary artery of native heart without angina pectoris   • Essential hypertension   • Bradycardia, sinus   • Fatigue   • Anginal equivalent (CMS/[facility])   • Abnormal ECG   • Precordial pain   • LVH (left ventricular hypertrophy) due to hypertensive disease, without heart failure   • Diabetes mellitus (CMS/[facility])   • PAD (peripheral artery disease) (CMS/[facility])       Advanced Care Planning:  ACP discussion was held with the patient during this visit. Patient does not have an advance directive, declines further assistance.    Review of Systems   Constitutional: Negative for appetite change, fatigue and fever.   HENT: Negative for congestion, ear discharge, ear pain, sinus pressure and sore throat.    Eyes: Negative for pain and discharge.   Respiratory: Negative for cough, shortness of breath and wheezing.    Cardiovascular: Negative for chest pain, palpitations and leg swelling.   Gastrointestinal: Negative for abdominal pain, constipation, diarrhea, nausea and vomiting.   Endocrine: Negative for cold intolerance and heat intolerance.   Genitourinary: Negative for dysuria and flank pain.   Musculoskeletal: Negative for arthralgias and joint swelling.   Skin: Negative for pallor and rash.   Allergic/Immunologic: Negative for environmental allergies and food allergies.   Neurological: Negative for dizziness, weakness and numbness.   Hematological: Negative for adenopathy. Does not bruise/bleed easily.   Psychiatric/Behavioral: Negative for behavioral problems and dysphoric mood. The patient is not nervous/anxious.        Compared to one year ago, the patient feels his physical health is better.  Compared to one year ago, the patient feels his mental health is better.    Reviewed chart  "for potential of high risk medication in the elderly: not applicable  Reviewed chart for potential of harmful drug interactions in the elderly:not applicable    Objective         Vitals:    05/20/21 1447   BP: 135/75   Pulse: 59   Resp: 14   Temp: 97.7 °F (36.5 °C)   SpO2: 97%   Weight: 103 kg (227 lb)   Height: 177.8 cm (70\")   PainSc: 0-No pain       Body mass index is 32.57 kg/m².  Discussed the patient's BMI with him. The BMI is in the acceptable range.    Physical Exam  Vitals and nursing note reviewed.   Constitutional:       General: He is not in acute distress.     Appearance: Normal appearance. He is not diaphoretic.   HENT:      Head: Normocephalic and atraumatic.      Right Ear: External ear normal.      Left Ear: External ear normal.      Nose: Nose normal.   Eyes:      Extraocular Movements: Extraocular movements intact.      Conjunctiva/sclera: Conjunctivae normal.   Neck:      Trachea: Trachea normal.   Cardiovascular:      Rate and Rhythm: Normal rate and regular rhythm.      Heart sounds: Normal heart sounds.   Pulmonary:      Effort: Pulmonary effort is normal. No respiratory distress.   Abdominal:      General: Abdomen is flat.   Musculoskeletal:         General: Deformity present.      Cervical back: Neck supple.      Comments: Moves all limbs   Skin:     General: Skin is warm and dry.      Findings: No erythema.   Neurological:      Mental Status: He is alert and oriented to person, place, and time.      Comments: No gross motor or sensory deficits               Assessment/Plan   Medicare Risks and Personalized Health Plan  CMS Preventative Services Quick Reference  Advance Directive Discussion  Cardiovascular risk  Chronic Pain   Dementia/Memory   Diabetic Lab Screening   Fall Risk  Glaucoma Risk  Hearing Problem  Immunizations Discussed/Encouraged (specific immunizations; Pneumococcal 23 )    The above risks/problems have been discussed with the patient.  Pertinent information has been shared " with the patient in the After Visit Summary.  Follow up plans and orders are seen below in the Assessment/Plan Section.    Diagnoses and all orders for this visit:    1. Routine general medical examination at a health care facility (Primary)    2. Benign essential hypertension    3. Mixed hyperlipidemia    4. Type 2 diabetes mellitus with hyperglycemia, with long-term current use of insulin (CMS/[facility])       Plan:  1.physical: Physical exam conducted today, obtain fasting lab work,   Impression: healthy adult Patient. Currently, patient eats a healthy diet. Screening lab work includes glucose, lipid profile and complete blood count . The risks and benefits of immunizations were discussed and immunizations are up to date. Advice and education were given regarding nutrition and aerobic exercise. Patient discussion: discussed with the patient.  Annual Wellness Visit ,physical with preventive exam as well as age and risk appropriate counseling completed.   Advance Directive Planning: paperwork and instructions were given to the patient.   Patient Discussion: plan discussed with the patient, follow-up visit needed in one year. Medication Review and medication list updated  2.  Benign essential hypertension: Will continue current medication, low-sodium diet advised, Counseled to regularly check BP at home with goal averaging <130/80.   3.mixed hyperlipidemia: will obtain   fasting CMP and lipid panel.  Diet and exercise counseled,  Will continue current medications  4. Diabetes  Mellitus  : will obtain   fasting CMP  and hba1c  , diet and exercise counseled , Will continue current medications       Follow Up:  Return in 1 year (on 5/20/2022) for Medicare Wellness.     An After Visit Summary and PPPS were given to the patient.

## 2021-05-27 LAB — DRUGS UR: NORMAL

## 2021-06-10 DIAGNOSIS — M25.50 ARTHRALGIA OF MULTIPLE JOINTS: ICD-10-CM

## 2021-06-10 RX ORDER — HYDROCODONE BITARTRATE AND ACETAMINOPHEN 7.5; 325 MG/1; MG/1
1 TABLET ORAL 2 TIMES DAILY
Qty: 60 TABLET | Refills: 0 | Status: SHIPPED | OUTPATIENT
Start: 2021-06-10 | End: 2021-06-24 | Stop reason: SDUPTHER

## 2021-06-13 RX ORDER — PANTOPRAZOLE SODIUM 40 MG/1
40 TABLET, DELAYED RELEASE ORAL DAILY
Qty: 90 TABLET | Refills: 3 | Status: SHIPPED | OUTPATIENT
Start: 2021-06-13 | End: 2022-07-06

## 2021-06-24 ENCOUNTER — OFFICE VISIT (OUTPATIENT)
Dept: INTERNAL MEDICINE | Facility: CLINIC | Age: 66
End: 2021-06-24

## 2021-06-24 VITALS
TEMPERATURE: 97.1 F | WEIGHT: 233 LBS | SYSTOLIC BLOOD PRESSURE: 124 MMHG | HEART RATE: 55 BPM | HEIGHT: 70 IN | RESPIRATION RATE: 16 BRPM | OXYGEN SATURATION: 95 % | BODY MASS INDEX: 33.36 KG/M2 | DIASTOLIC BLOOD PRESSURE: 73 MMHG

## 2021-06-24 DIAGNOSIS — M54.50 LUMBOSACRAL PAIN: ICD-10-CM

## 2021-06-24 DIAGNOSIS — E11.65 TYPE 2 DIABETES MELLITUS WITH HYPERGLYCEMIA, WITH LONG-TERM CURRENT USE OF INSULIN (HCC): ICD-10-CM

## 2021-06-24 DIAGNOSIS — I10 BENIGN ESSENTIAL HYPERTENSION: Primary | ICD-10-CM

## 2021-06-24 DIAGNOSIS — E03.8 ADULT ONSET HYPOTHYROIDISM: ICD-10-CM

## 2021-06-24 DIAGNOSIS — F51.04 INSOMNIA, PSYCHOPHYSIOLOGICAL: ICD-10-CM

## 2021-06-24 DIAGNOSIS — E78.2 MIXED HYPERLIPIDEMIA: ICD-10-CM

## 2021-06-24 DIAGNOSIS — Z79.4 TYPE 2 DIABETES MELLITUS WITH HYPERGLYCEMIA, WITH LONG-TERM CURRENT USE OF INSULIN (HCC): ICD-10-CM

## 2021-06-24 PROCEDURE — 99214 OFFICE O/P EST MOD 30 MIN: CPT | Performed by: INTERNAL MEDICINE

## 2021-06-24 RX ORDER — CLOPIDOGREL BISULFATE 75 MG/1
75 TABLET ORAL DAILY
Qty: 90 TABLET | Refills: 2 | Status: SHIPPED | OUTPATIENT
Start: 2021-06-24 | End: 2021-10-28

## 2021-06-24 RX ORDER — HYDROCODONE BITARTRATE AND ACETAMINOPHEN 7.5; 325 MG/1; MG/1
1 TABLET ORAL 2 TIMES DAILY
Qty: 60 TABLET | Refills: 0 | Status: SHIPPED | OUTPATIENT
Start: 2021-06-24 | End: 2021-08-13 | Stop reason: SDUPTHER

## 2021-06-24 RX ORDER — ALPRAZOLAM 0.5 MG/1
0.5 TABLET ORAL NIGHTLY PRN
Qty: 30 TABLET | Refills: 4 | Status: SHIPPED | OUTPATIENT
Start: 2021-06-24 | End: 2021-10-28 | Stop reason: SDUPTHER

## 2021-06-24 NOTE — PATIENT INSTRUCTIONS
MyPlate from USDA    MyPlate is an outline of a general healthy diet based on the 2010 Dietary Guidelines for Americans, from the U.S. Department of Agriculture (USDA). It sets guidelines for how much food you should eat from each food group based on your age, sex, and level of physical activity.  What are tips for following MyPlate?  To follow MyPlate recommendations:  · Eat a wide variety of fruits and vegetables, grains, and protein foods.  · Serve smaller portions and eat less food throughout the day.  · Limit portion sizes to avoid overeating.  · Enjoy your food.  · Get at least 150 minutes of exercise every week. This is about 30 minutes each day, 5 or more days per week.  It can be difficult to have every meal look like MyPlate. Think about MyPlate as eating guidelines for an entire day, rather than each individual meal.  Fruits and vegetables  · Make half of your plate fruits and vegetables.  · Eat many different colors of fruits and vegetables each day.  · For a 2,000 calorie daily food plan, eat:  ? 2½ cups of vegetables every day.  ? 2 cups of fruit every day.  · 1 cup is equal to:  ? 1 cup raw or cooked vegetables.  ? 1 cup raw fruit.  ? 1 medium-sized orange, apple, or banana.  ? 1 cup 100% fruit or vegetable juice.  ? 2 cups raw leafy greens, such as lettuce, spinach, or kale.  ? ½ cup dried fruit.  Grains  · One fourth of your plate should be grains.  · Make at least half of the grains you eat each day whole grains.  · For a 2,000 calorie daily food plan, eat 6 oz of grains every day.  · 1 oz is equal to:  ? 1 slice bread.  ? 1 cup cereal.  ? ½ cup cooked rice, cereal, or pasta.  Protein  · One fourth of your plate should be protein.  · Eat a wide variety of protein foods, including meat, poultry, fish, eggs, beans, nuts, and tofu.  · For a 2,000 calorie daily food plan, eat 5½ oz of protein every day.  · 1 oz is equal to:  ? 1 oz meat, poultry, or fish.  ? ¼ cup cooked beans.  ? 1 egg.  ? ½ oz nuts  or seeds.  ? 1 Tbsp peanut butter.  Dairy  · Drink fat-free or low-fat (1%) milk.  · Eat or drink dairy as a side to meals.  · For a 2,000 calorie daily food plan, eat or drink 3 cups of dairy every day.  · 1 cup is equal to:  ? 1 cup milk, yogurt, cottage cheese, or soy milk (soy beverage).  ? 2 oz processed cheese.  ? 1½ oz natural cheese.  Fats, oils, salt, and sugars  · Only small amounts of oils are recommended.  · Avoid foods that are high in calories and low in nutritional value (empty calories), like foods high in fat or added sugars.  · Choose foods that are low in salt (sodium). Choose foods that have less than 140 milligrams (mg) of sodium per serving.  · Drink water instead of sugary drinks. Drink enough water each day to keep your urine pale yellow.  Where to find support  · Work with your health care provider or a nutrition specialist (dietitian) to develop a customized eating plan that is right for you.  · Download an frandy (mobile application) to help you track your daily food intake.  Where to find more information  · Go to ChooseMyPlate.gov for more information.  Summary  · MyPlate is a general guideline for healthy eating from the USDA. It is based on the 2010 Dietary Guidelines for Americans.  · In general, fruits and vegetables should take up ½ of your plate, grains should take up ¼ of your plate, and protein should take up ¼ of your plate.  This information is not intended to replace advice given to you by your health care provider. Make sure you discuss any questions you have with your health care provider.  Document Revised: 05/21/2020 Document Reviewed: 03/19/2018  Elsevier Patient Education © 2021 [employer] Inc.

## 2021-06-24 NOTE — PROGRESS NOTES
"Chief Complaint  Hypertension, Hyperlipidemia, Hypothyroidism, and Diabetes    Subjective          Ramsey Riley presents to [facility]  History of Present Illness  HPI: Patient is here to follow up on the blood pressure  The patient is taking the blood pressure medications as prescribed and has had no side effects. The patient is also here to follow up on the cholesterol and is trying to follow a diet. The patient is also here to follow on sugar and thyroid and is  due to get lab work done . The patient also complains of chronic back pain and insomnia and needs refills on medications .   Hypertension   Pertinent negatives include no chest pain, palpitations or shortness of breath.   Hyperlipidemia   Pertinent negatives include no chest pain or shortness of breath.   Diabetes   Pertinent negatives for hypoglycemia include no dizziness, nervousness/anxiousness or pallor. Pertinent negatives for diabetes include no chest pain, no shortness of breath  Patient is on acei/arb     Objective   Vital Signs:   /73   Pulse 55   Temp 97.1 °F (36.2 °C)   Resp 16   Ht 177.8 cm (70\")   Wt 106 kg (233 lb)   SpO2 95%   BMI 33.43 kg/m²     Physical Exam  Vitals and nursing note reviewed.   Constitutional:       General: He is not in acute distress.     Appearance: Normal appearance. He is not diaphoretic.   HENT:      Head: Normocephalic and atraumatic.      Right Ear: External ear normal.      Left Ear: External ear normal.      Nose: Nose normal.   Eyes:      Extraocular Movements: Extraocular movements intact.      Conjunctiva/sclera: Conjunctivae normal.   Neck:      Trachea: Trachea normal.   Cardiovascular:      Rate and Rhythm: Normal rate and regular rhythm.      Heart sounds: Normal heart sounds.   Pulmonary:      Effort: Pulmonary effort is normal. No respiratory distress.   Abdominal:      General: Abdomen is flat.   Musculoskeletal:         General: Deformity present.      " Cervical back: Neck supple.      Comments: Moves all limbs   Skin:     General: Skin is warm and dry.      Findings: No erythema.   Neurological:      Mental Status: He is alert and oriented to person, place, and time.      Comments: No gross motor or sensory deficits        Result Review :     Common labs    Common Labsle 9/11/20 9/11/20    1322 1322   Glucose  569 (A)   BUN  21   Creatinine  1.12   eGFR Non African Am  66   Sodium  132 (A)   Potassium  4.7   Chloride  94 (A)   Calcium  9.3   Albumin  3.90   Total Bilirubin  0.7   Alkaline Phosphatase  95   AST (SGOT)  30   ALT (SGPT)  33   WBC 8.70    Hemoglobin 14.7    Hematocrit 42.6    Platelets 158    (A) Abnormal value                      Assessment and Plan    Diagnoses and all orders for this visit:    1. Benign essential hypertension (Primary)    2. Mixed hyperlipidemia  -     CBC & Differential  -     Comprehensive Metabolic Panel  -     Lipid Panel    3. Type 2 diabetes mellitus with hyperglycemia, with long-term current use of insulin (CMS/[facility])  -     Hemoglobin A1c  -     Microalbumin / Creatinine Urine Ratio - Urine, Clean Catch    4. Adult onset hypothyroidism  -     TSH    5. Lumbosacral pain  -     HYDROcodone-acetaminophen (NORCO) 7.5-325 MG per tablet; Take 1 tablet by mouth 2 (Two) Times a Day.  Dispense: 60 tablet; Refill: 0    6. Insomnia, psychophysiological  -     ALPRAZolam (XANAX) 0.5 MG tablet; Take 1 tablet by mouth At Night As Needed for Anxiety.  Dispense: 30 tablet; Refill: 4    Other orders  -     clopidogrel (PLAVIX) 75 MG tablet; Take 1 tablet by mouth Daily.  Dispense: 90 tablet; Refill: 2    Plan:  1.  Benign essential hypertension: Will continue current medication, low-sodium diet advised, Counseled to regularly check BP at home with goal averaging <130/80.   2.mixed hyperlipidemia: will obtain   fasting CMP and lipid panel.  Diet and exercise counseled,  Will continue current medications  3. Diabetes  Mellitus  : will obtain    fasting CMP  and hba1c  , diet and exercise counseled , Will continue current medications  4. hypothyroidism: will obtain tsh , and continue levothyroxine  5.  Insomnia: Refill alprazolam as needed ,UDS and Lavell reviewed and obtained  6.  Chronic back pain: Refill Lortab, UDS and Lavell reviewed and obtained  The patient has read and signed the [facility] Controlled Substance Contract.The patient is aware of the potential for addiction and dependence. This is a short-term use   A Lavell was reviewed  in the chart today. Narcotic contract was signed by patient   . Will obtain Urine drug screen  Lavell Report   As part of this patient's treatment plan I am prescribing controlled substances. The patient has been made aware of appropriate use of such medications, including potential risk of somnolence, limited ability to drive and /or work safely, and potential for dependence or overdose. It has also been made clear that these medications are for use by this patient only, without concomitant use of alcohol or other substances unless prescribed.   Patient has completed prescribing agreement detailing terms of continued prescribing of controlled substances, including monitoring LAVELL reports, urine drug screening, and pill counts if necessary. The patient is aware that inappropriate use will result in cessation of prescribing such medications.   LAVELL report has been reviewed   History and physical exam exhibit continued safe and appropriate use of controlled substances. Patient is advised to start a comprehensive exercise program including and not limited to physical therapy program for therapeutic exercise, upper body strengthening/posture correction, core strengthening, gait and balance training and  exercise program such as yoga, Maurisio Chi, water therapy and daily walks for fitness  The patient has been instructed to contact my office with any questions or difficulties. The patient understands the plan , patient  has verbalized an understanding and agrees to proceed accordingly  I spent 30 minutes caring for Ramsey on this date of service. This time includes time spent by me in the following activities:preparing for the visit, reviewing tests, performing a medically appropriate examination and/or evaluation , counseling and educating the patient/family/caregiver, ordering medications, tests, or procedures and documenting information in the medical record  Follow Up   Return in about 3 months (around 9/27/2021).  Patient was given instructions and counseling regarding his condition or for health maintenance advice. Please see specific information pulled into the AVS if appropriate.

## 2021-07-13 DIAGNOSIS — M54.50 LUMBOSACRAL PAIN: ICD-10-CM

## 2021-07-13 RX ORDER — HYDROCODONE BITARTRATE AND ACETAMINOPHEN 7.5; 325 MG/1; MG/1
1 TABLET ORAL 2 TIMES DAILY
Qty: 60 TABLET | Refills: 0 | Status: CANCELLED | OUTPATIENT
Start: 2021-07-13

## 2021-07-13 NOTE — TELEPHONE ENCOUNTER
Caller: Jina Riley    Relationship: Emergency Contact    Best call back number: [phone]     Medication needed:   Requested Prescriptions     Pending Prescriptions Disp Refills   • HYDROcodone-acetaminophen (NORCO) 7.5-325 MG per tablet 60 tablet 0     Sig: Take 1 tablet by mouth 2 (Two) Times a Day.       When do you need the refill by: TODAY    What additional details did the patient provide when requesting the medication: PATIENT IS OUT OF MEDICATION    Does the patient have less than a 3 day supply:  [x] Yes  [] No    What is the patient's preferred pharmacy: [address] [phone] [facility] [phone]

## 2021-07-13 NOTE — TELEPHONE ENCOUNTER
Script sent to pharmacy 6/24/21, confirmed with pharmacy. Jina advised script on file at pharmacy.

## 2021-07-16 ENCOUNTER — OFFICE VISIT (OUTPATIENT)
Dept: NEUROSURGERY | Facility: CLINIC | Age: 66
End: 2021-07-16

## 2021-07-16 VITALS
BODY MASS INDEX: 32.31 KG/M2 | HEIGHT: 71 IN | SYSTOLIC BLOOD PRESSURE: 160 MMHG | TEMPERATURE: 97.1 F | DIASTOLIC BLOOD PRESSURE: 90 MMHG | WEIGHT: 230.8 LBS

## 2021-07-16 DIAGNOSIS — M48.02 SPINAL STENOSIS IN CERVICAL REGION: Primary | ICD-10-CM

## 2021-07-16 PROCEDURE — 99204 OFFICE O/P NEW MOD 45 MIN: CPT | Performed by: PHYSICIAN ASSISTANT

## 2021-07-16 NOTE — PROGRESS NOTES
Patient: Ramsey Riley  : 1955  Gender: male    Primary Care Provider: Margret Purvis MD    Requesting Provider: As above    Chief Complaint: Neck pain    History of Present Illness:  Ramsey Riley is a 66-year-old disabled gentleman with a PMH difficult for diabetes mellitus, CAD (s/p 4x bypass), hypertension who presents today for evaluation of neck pain.  Patient reports onset of neck pain around 7 months ago without preceding event.  He notes pain that is in his neck with some radiation to his right shoulder.  He states that he had a fall in his right shoulder several years ago which may be the cause of this.  He also notes numbness into his right arm that is quite diffuse.  He has occasional numbness of the LUE in a nondermatomal distribution.  He has no radicular arm pain.  Patient's wife notes some difficulty with his balance as of late.  He is also having difficulty with his memory which she is concerned with.  Patient denies saddle distribution numbness or bowel or bladder dysfunction.  He has numbness of his feet that is attributed to diabetic peripheral neuropathy.  He has not been to any formal therapy or tried any pain management modalities.  He does take Norco.  He presents today with a cervical MRI.      Past Medical and Surgical History:  Past Medical History:   Diagnosis Date   • Arthritis    • Coronary artery disease    • Diabetes mellitus (CMS/HCC)    • Heart disease    • History of blood clots    • Hypertension    • Low back pain    • Thyroid disease      Past Surgical History:   Procedure Laterality Date   • CHOLECYSTECTOMY     • COLONOSCOPY      dr srivastava   • CORONARY ARTERY BYPASS GRAFT      [place] -  BY PASS    • ENDOSCOPY         Current Medications:    Current Outpatient Medications:   •  ALPRAZolam (XANAX) 0.5 MG tablet, Take 1 tablet by mouth At Night As Needed for Anxiety., Disp: 30 tablet, Rfl: 4  •  amLODIPine (NORVASC) 5 MG tablet, Take 1 tablet by mouth  Daily., Disp: 90 tablet, Rfl: 2  •  aspirin 81 MG chewable tablet, Chew 81 mg Daily., Disp: , Rfl:   •  atorvastatin (LIPITOR) 40 MG tablet, Take 1 tablet by mouth Every Night., Disp: 90 tablet, Rfl: 2  •  citalopram (CeleXA) 40 MG tablet, Take 1 tablet by mouth Daily., Disp: 90 tablet, Rfl: 2  •  clopidogrel (PLAVIX) 75 MG tablet, Take 1 tablet by mouth Daily., Disp: 90 tablet, Rfl: 2  •  gabapentin (NEURONTIN) 300 MG capsule, Take 1 capsule by mouth 2 (Two) Times a Day., Disp: , Rfl: 1  •  glucose blood (Accu-Chek Caryn Plus) test strip, USE AS DIRECTED 3 TIMES DAILY, Disp: 100 each, Rfl: 12  •  glucose monitor monitoring kit, 1 each Daily., Disp: 1 each, Rfl: 1  •  glucose monitor monitoring kit, 1 each Daily., Disp: 1 each, Rfl: 1  •  HYDROcodone-acetaminophen (NORCO) 7.5-325 MG per tablet, Take 1 tablet by mouth 2 (Two) Times a Day., Disp: 60 tablet, Rfl: 0  •  insulin NPH-insulin regular (humuLIN 70/30,novoLIN 70/30) (70-30) 100 UNIT/ML injection, Inject 70 Units under the skin into the appropriate area as directed 2 (Two) Times a Day With Meals., Disp: 5 each, Rfl: 12  •  Insulin Syringes, Disposable, U-100 1 ML misc, 50 Units 3 (Three) Times a Day As Needed (50 units in the morning, and then with meals PRN)., Disp: 200 each, Rfl: 2  •  Lancets 30G misc, Check sugar 3 times daily, Disp: 30 each, Rfl: 12  •  levothyroxine (SYNTHROID, LEVOTHROID) 88 MCG tablet, Take 1 tablet by mouth Daily., Disp: 90 tablet, Rfl: 2  •  losartan (COZAAR) 100 MG tablet, Take 1 tablet by mouth Daily., Disp: 90 tablet, Rfl: 2  •  pantoprazole (PROTONIX) 40 MG EC tablet, TAKE 1 TABLET BY MOUTH DAILY., Disp: 90 tablet, Rfl: 3  •  cetirizine (zyrTEC) 10 MG tablet, Take 10 mg by mouth Daily., Disp: , Rfl: 11  •  sildenafil (REVATIO) 20 MG tablet, TAKE FIVE TABLETS BY MOUTH DAILY AS NEEDED ONE HOUR PRIOR TO SEXUAL ACTIVITY, Disp: 60 tablet, Rfl: 10    Allergies:  No Known Allergies      Review of Systems   Constitutional: Negative for  activity change, appetite change, chills, diaphoresis, fatigue, fever, unexpected weight gain and unexpected weight loss.   HENT: Negative for congestion, dental problem, drooling, ear discharge, ear pain, facial swelling, hearing loss, mouth sores, nosebleeds, postnasal drip, rhinorrhea, sinus pressure, sneezing, sore throat, swollen glands, tinnitus, trouble swallowing and voice change.    Eyes: Negative for blurred vision, double vision, photophobia, pain, discharge, redness, itching and visual disturbance.   Respiratory: Negative for apnea, cough, choking, chest tightness, shortness of breath, wheezing and stridor.    Cardiovascular: Negative for chest pain, palpitations and leg swelling.   Gastrointestinal: Negative for abdominal distention, abdominal pain, anal bleeding, blood in stool, constipation, diarrhea, nausea, rectal pain, vomiting, GERD and indigestion.   Endocrine: Negative for cold intolerance, heat intolerance, polydipsia, polyphagia and polyuria.   Genitourinary: Negative for breast discharge, decreased libido, decreased urine volume, difficulty urinating, discharge, dysuria, flank pain, frequency, genital sores, hematuria, nocturia, penile pain, erectile dysfunction, penile swelling, scrotal swelling, testicular pain, urgency and urinary incontinence.   Musculoskeletal: Positive for back pain, neck pain and neck stiffness. Negative for arthralgias, gait problem, joint swelling, myalgias and bursitis.   Skin: Negative for color change, dry skin, pallor, rash, skin lesions and bruise.   Allergic/Immunologic: Negative for environmental allergies, food allergies and immunocompromised state.   Neurological: Positive for headache. Negative for dizziness, tremors, seizures, syncope, facial asymmetry, speech difficulty, weakness, light-headedness, numbness, memory problem and confusion.        Balance-losing   Hematological: Negative for adenopathy. Does not bruise/bleed easily.   Psychiatric/Behavioral:  "Positive for decreased concentration and sleep disturbance. Negative for agitation, behavioral problems, dysphoric mood, hallucinations, self-injury, suicidal ideas, negative for hyperactivity, depressed mood and stress. The patient is not nervous/anxious.    All other systems reviewed and are negative.        Physical Exam  Constitutional:       Appearance: Normal appearance.   HENT:      Head: Normocephalic and atraumatic.   Musculoskeletal:         General: Normal range of motion.      Cervical back: Normal range of motion and neck supple.   Skin:     General: Skin is warm and dry.   Neurological:      Mental Status: He is alert and oriented to person, place, and time.      Sensory: Sensation is intact.      Motor: Motor function is intact.      Coordination: Coordination is intact.      Gait: Gait is intact.      Deep Tendon Reflexes:      Reflex Scores:       Bicep reflexes are 0 on the right side and 0 on the left side.       Brachioradialis reflexes are 0 on the right side and 0 on the left side.       Patellar reflexes are 0 on the right side and 0 on the left side.       Achilles reflexes are 0 on the right side and 0 on the left side.     Comments: Jose Angel sign is negative bilaterally  Pinprick sensation intact throughout upper and lower extremities  Gait is normal   Psychiatric:         Mood and Affect: Mood normal.         Behavior: Behavior normal.           Vitals:    07/16/21 1053   BP: 160/90   BP Location: Right arm   Patient Position: Sitting   Cuff Size: Adult   Temp: 97.1 °F (36.2 °C)   Weight: 105 kg (230 lb 12.8 oz)   Height: 180.3 cm (71\")       Patient's Body mass index is 32.19 kg/m². indicating that he is obese (BMI >30).     Independent Review of Diagnostic Imaging:  MRI cervical spine performed 3/19/2021 demonstrates multilevel degenerative changes.  At C3-4 there is moderate-severe canal narrowing secondary to bilateral disc osteophyte complexes and facet hypertrophy.  There is " otherwise moderate narrowing diffusely C4-C7.  There is no abnormal cord signal.    Assessment:  1.  Cervical stenosis without myelopathy  2.  Neck pain    Plan:  Mr. Riley is seen today for evaluation of of neck pain.  Cervical MRI reviewed Dr. Chapin was notable for multilevel canal narrowing, most notable at C3-4.  There is no evidence of cord signal change at this time.  Given the degree of stenosis we are going to continue to monitor this.  He has no evidence of long track signs at this time.  He has not done much in the way of conservatism for his neck pain.  I have placed an order for physical therapy for him.  I discussed the signs and symptoms of cervical myelopathy with patient and his wife.  I discussed the importance of reducing his fall risk. They will call us with any new or progressive symptoms.  Otherwise we will arrange follow-up in around 3 months.        Jossy Glasgow PA-C

## 2021-07-23 ENCOUNTER — OFFICE VISIT (OUTPATIENT)
Dept: UROLOGY | Facility: CLINIC | Age: 66
End: 2021-07-23

## 2021-07-23 VITALS
TEMPERATURE: 98.7 F | SYSTOLIC BLOOD PRESSURE: 146 MMHG | DIASTOLIC BLOOD PRESSURE: 80 MMHG | HEART RATE: 66 BPM | OXYGEN SATURATION: 96 % | RESPIRATION RATE: 16 BRPM

## 2021-07-23 DIAGNOSIS — N52.9 ERECTILE DYSFUNCTION, UNSPECIFIED ERECTILE DYSFUNCTION TYPE: Primary | ICD-10-CM

## 2021-07-23 DIAGNOSIS — E11.65 POORLY CONTROLLED DIABETES MELLITUS (HCC): ICD-10-CM

## 2021-07-23 LAB
BILIRUB BLD-MCNC: NEGATIVE MG/DL
CLARITY, POC: CLEAR
COLOR UR: YELLOW
GLUCOSE UR STRIP-MCNC: ABNORMAL MG/DL
KETONES UR QL: NEGATIVE
LEUKOCYTE EST, POC: NEGATIVE
NITRITE UR-MCNC: NEGATIVE MG/ML
PH UR: 6.5 [PH] (ref 5–8)
PROT UR STRIP-MCNC: ABNORMAL MG/DL
RBC # UR STRIP: ABNORMAL /UL
SP GR UR: 1.02 (ref 1–1.03)
UROBILINOGEN UR QL: NORMAL

## 2021-07-23 PROCEDURE — 99214 OFFICE O/P EST MOD 30 MIN: CPT | Performed by: UROLOGY

## 2021-07-23 PROCEDURE — 81003 URINALYSIS AUTO W/O SCOPE: CPT | Performed by: UROLOGY

## 2021-07-23 NOTE — PROGRESS NOTES
Chief Complaint  ED    HPI  Mr. Riley is a 66 y.o. male with history of severe DM who presents with ED.  He was seen by me last in 2019.    Sildenafil did not work.     Low libido   yes  Low energy   yes  Poor sleep   yes  Mental clarity issues  no    History of depression? no  Erectile dysfunction?  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 He had a sleep test many years ago    Past Medical History  Past Medical History:   Diagnosis Date   • Arthritis    • Coronary artery disease    • Diabetes mellitus (CMS/HCC)    • Heart disease    • History of blood clots    • Hypertension    • Low back pain    • Thyroid disease        Past Surgical History  Past Surgical History:   Procedure Laterality Date   • CHOLECYSTECTOMY  2015   • COLONOSCOPY  2015    dr srivastava   • CORONARY ARTERY BYPASS GRAFT      [place] - 3 BY PASS    • ENDOSCOPY         Medications    Current Outpatient Medications:   •  ALPRAZolam (XANAX) 0.5 MG tablet, Take 1 tablet by mouth At Night As Needed for Anxiety., Disp: 30 tablet, Rfl: 4  •  amLODIPine (NORVASC) 5 MG tablet, Take 1 tablet by mouth Daily., Disp: 90 tablet, Rfl: 2  •  aspirin 81 MG chewable tablet, Chew 81 mg Daily., Disp: , Rfl:   •  atorvastatin (LIPITOR) 40 MG tablet, Take 1 tablet by mouth Every Night., Disp: 90 tablet, Rfl: 2  •  citalopram (CeleXA) 40 MG tablet, Take 1 tablet by mouth Daily., Disp: 90 tablet, Rfl: 2  •  clopidogrel (PLAVIX) 75 MG tablet, Take 1 tablet by mouth Daily., Disp: 90 tablet, Rfl: 2  •  gabapentin (NEURONTIN) 300 MG capsule, Take 1 capsule by mouth 2 (Two) Times a Day., Disp: , Rfl: 1  •  glucose blood (Accu-Chek Caryn Plus) test strip, USE AS DIRECTED 3 TIMES DAILY, Disp: 100 each, Rfl: 12  •  glucose monitor monitoring kit, 1 each Daily., Disp: 1 each, Rfl: 1  •  glucose monitor  monitoring kit, 1 each Daily., Disp: 1 each, Rfl: 1  •  HYDROcodone-acetaminophen (NORCO) 7.5-325 MG per tablet, Take 1 tablet by mouth 2 (Two) Times a Day., Disp: 60 tablet, Rfl: 0  •  insulin NPH-insulin regular (humuLIN 70/30,novoLIN 70/30) (70-30) 100 UNIT/ML injection, Inject 70 Units under the skin into the appropriate area as directed 2 (Two) Times a Day With Meals., Disp: 5 each, Rfl: 12  •  Insulin Syringes, Disposable, U-100 1 ML misc, 50 Units 3 (Three) Times a Day As Needed (50 units in the morning, and then with meals PRN)., Disp: 200 each, Rfl: 2  •  Lancets 30G misc, Check sugar 3 times daily, Disp: 30 each, Rfl: 12  •  levothyroxine (SYNTHROID, LEVOTHROID) 88 MCG tablet, Take 1 tablet by mouth Daily., Disp: 90 tablet, Rfl: 2  •  losartan (COZAAR) 100 MG tablet, Take 1 tablet by mouth Daily., Disp: 90 tablet, Rfl: 2  •  pantoprazole (PROTONIX) 40 MG EC tablet, TAKE 1 TABLET BY MOUTH DAILY., Disp: 90 tablet, Rfl: 3    Allergies  No Known Allergies    Social History  Social History     Socioeconomic History   • Marital status:      Spouse name: Not on file   • Number of children: Not on file   • Years of education: Not on file   • Highest education level: Not on file   Tobacco Use   • Smoking status: Never Smoker   • Smokeless tobacco: Current User     Types: Chew   Vaping Use   • Vaping Use: Never used   Substance and Sexual Activity   • Alcohol use: No   • Drug use: No   • Sexual activity: Defer     Family History  He has no family history of prostate cancer  Family History   Problem Relation Age of Onset   • Diabetes Mother    • Heart disease Mother         STENTING   • COPD Mother    • Heart attack Father    • Diabetes Father      Review of Systems  Constitutional: No fevers or chills  Skin: Negative for rash  Endocrine: No heat/cold intolerance   Cardiovascular: Negative for chest pain or dyspnea on exertion  Respiratory: Negative for shortness of breath or wheezing  Gastrointestinal: No  constipation, nausea or vomiting  Genitourinary: Negative for new lower urinary tract symptoms, current gross hematuria or dysuria.  Musculoskeletal: No flank pain  Neurological:  Negative for frequent headaches or dizziness  Lymph/Heme: Negative for leg swelling or calf pain.    Physical Exam  Visit Vitals  /80   Pulse 66   Temp 98.7 °F (37.1 °C) (Temporal)   Resp 16   SpO2 96%     Constitutional: NAD, WDWN.   HEENT: NCAT. Conjunctivae normal.  MMM.    Cardiovascular: Regular rate.  Pulmonary/Chest: Respirations are even and non-labored bilaterally.  Abdominal: Soft. No distension, tenderness, masses or guarding. No CVA tenderness.  Neurological: A + O x 3.  Cranial Nerves II-XII grossly intact. Normal gait.  Extremities: ARINA x 4, Warm. No clubbing.  No cyanosis.    Skin: Pink, warm and dry.  No rashes noted.  Psychiatric:  Normal mood and affect    Labs  No results found for: TESTOSTERONE    No results found for: PSA    Lab Results   Component Value Date    WBC 8.70 09/11/2020    HGB 14.7 09/11/2020    HCT 42.6 09/11/2020    MCV 87.8 09/11/2020     09/11/2020     Lab Results   Component Value Date    HGBA1C 10.10 (H) 08/12/2019       Assessment  Mr. Riley is a 66 y.o. male with low libido and fatigue, as well as erectile dysfunction.      Notably he has a history of DVT, which is a relative contraindication to perform testosterone replacement therapy.  I told him that I was uncomfortable performing testosterone replacement therapy for him given this history.  Regardless, he wanted to check his testosterone.    Regarding his erectile dysfunction, we discussed that his diabetes and coronary artery disease play a large role in this.  He does not take a nitroglycerin.  He has failed sildenafil.    Plan  1. A1c today  2. FU for ICI -risk factors are poorly controlled diabetes mellitus         Peewee Larsen MD

## 2021-07-24 LAB — HBA1C MFR BLD: 11.8 % (ref 4.8–5.6)

## 2021-08-12 ENCOUNTER — OFFICE VISIT (OUTPATIENT)
Dept: UROLOGY | Facility: CLINIC | Age: 66
End: 2021-08-12

## 2021-08-12 VITALS
HEIGHT: 71 IN | DIASTOLIC BLOOD PRESSURE: 74 MMHG | HEART RATE: 74 BPM | RESPIRATION RATE: 20 BRPM | WEIGHT: 230 LBS | SYSTOLIC BLOOD PRESSURE: 146 MMHG | BODY MASS INDEX: 32.2 KG/M2 | OXYGEN SATURATION: 98 %

## 2021-08-12 DIAGNOSIS — N52.9 ERECTILE DYSFUNCTION, UNSPECIFIED ERECTILE DYSFUNCTION TYPE: Primary | ICD-10-CM

## 2021-08-12 PROCEDURE — 99212 OFFICE O/P EST SF 10 MIN: CPT | Performed by: UROLOGY

## 2021-08-12 NOTE — PROGRESS NOTES
Chief Complaint  ED    HPI  Mr. Riley is a 66 y.o. male with history of severe DM who presents with ED.  He was seen by me last in 2019.    Sildenafil did not work.  Patient presents today for follow-up.  He did not get the Trimix medication.  His wife is now with him.  His A1c is below.      Past Medical History  Past Medical History:   Diagnosis Date   • Arthritis    • Coronary artery disease    • Diabetes mellitus (CMS/HCC)    • Heart disease    • History of blood clots    • Hypertension    • Low back pain    • Thyroid disease        Past Surgical History  Past Surgical History:   Procedure Laterality Date   • CHOLECYSTECTOMY  2015   • COLONOSCOPY  2015    dr srivastava   • CORONARY ARTERY BYPASS GRAFT      [place] -  BY PASS    • ENDOSCOPY         Medications    Current Outpatient Medications:   •  ALPRAZolam (XANAX) 0.5 MG tablet, Take 1 tablet by mouth At Night As Needed for Anxiety., Disp: 30 tablet, Rfl: 4  •  amLODIPine (NORVASC) 5 MG tablet, Take 1 tablet by mouth Daily., Disp: 90 tablet, Rfl: 2  •  aspirin 81 MG chewable tablet, Chew 81 mg Daily., Disp: , Rfl:   •  atorvastatin (LIPITOR) 40 MG tablet, Take 1 tablet by mouth Every Night., Disp: 90 tablet, Rfl: 2  •  citalopram (CeleXA) 40 MG tablet, Take 1 tablet by mouth Daily., Disp: 90 tablet, Rfl: 2  •  clopidogrel (PLAVIX) 75 MG tablet, Take 1 tablet by mouth Daily., Disp: 90 tablet, Rfl: 2  •  gabapentin (NEURONTIN) 300 MG capsule, Take 1 capsule by mouth 2 (Two) Times a Day., Disp: , Rfl: 1  •  glucose blood (Accu-Chek Caryn Plus) test strip, USE AS DIRECTED 3 TIMES DAILY, Disp: 100 each, Rfl: 12  •  glucose monitor monitoring kit, 1 each Daily., Disp: 1 each, Rfl: 1  •  glucose monitor monitoring kit, 1 each Daily., Disp: 1 each, Rfl: 1  •  HYDROcodone-acetaminophen (NORCO) 7.5-325 MG per tablet, Take 1 tablet by mouth 2 (Two) Times a Day., Disp: 60 tablet, Rfl: 0  •  insulin NPH-insulin regular (humuLIN 70/30,novoLIN 70/30) (70-30) 100 UNIT/ML  injection, Inject 70 Units under the skin into the appropriate area as directed 2 (Two) Times a Day With Meals., Disp: 5 each, Rfl: 12  •  Insulin Syringes, Disposable, U-100 1 ML misc, 50 Units 3 (Three) Times a Day As Needed (50 units in the morning, and then with meals PRN)., Disp: 200 each, Rfl: 2  •  Lancets 30G misc, Check sugar 3 times daily, Disp: 30 each, Rfl: 12  •  levothyroxine (SYNTHROID, LEVOTHROID) 88 MCG tablet, Take 1 tablet by mouth Daily., Disp: 90 tablet, Rfl: 2  •  losartan (COZAAR) 100 MG tablet, Take 1 tablet by mouth Daily., Disp: 90 tablet, Rfl: 2  •  pantoprazole (PROTONIX) 40 MG EC tablet, TAKE 1 TABLET BY MOUTH DAILY., Disp: 90 tablet, Rfl: 3    Allergies  No Known Allergies    Social History  Social History     Socioeconomic History   • Marital status:      Spouse name: Not on file   • Number of children: Not on file   • Years of education: Not on file   • Highest education level: Not on file   Tobacco Use   • Smoking status: Never Smoker   • Smokeless tobacco: Current User     Types: Chew   Vaping Use   • Vaping Use: Never used   Substance and Sexual Activity   • Alcohol use: No   • Drug use: No   • Sexual activity: Defer     Family History  He has no family history of prostate cancer  Family History   Problem Relation Age of Onset   • Diabetes Mother    • Heart disease Mother         STENTING   • COPD Mother    • Heart attack Father    • Diabetes Father      Review of Systems  Constitutional: No fevers or chills  Skin: Negative for rash  Endocrine: No heat/cold intolerance   Cardiovascular: Negative for chest pain or dyspnea on exertion  Respiratory: Negative for shortness of breath or wheezing  Gastrointestinal: No constipation, nausea or vomiting  Genitourinary: Negative for new lower urinary tract symptoms, current gross hematuria or dysuria.  Musculoskeletal: No flank pain  Neurological:  Negative for frequent headaches or dizziness  Lymph/Heme: Negative for leg swelling or  "calf pain.    Physical Exam  Visit Vitals  /74   Pulse 74   Resp 20   Ht 180.3 cm (70.98\")   Wt 104 kg (230 lb)   SpO2 98%   BMI 32.09 kg/m²     Constitutional: NAD, WDWN.   HEENT: NCAT. Conjunctivae normal.  MMM.    Cardiovascular: Regular rate.  Pulmonary/Chest: Respirations are even and non-labored bilaterally.  Abdominal: Soft. No distension, tenderness, masses or guarding. No CVA tenderness.  Neurological: A + O x 3.  Cranial Nerves II-XII grossly intact. Normal gait.  Extremities: ARINA x 4, Warm. No clubbing.  No cyanosis.    Skin: Pink, warm and dry.  No rashes noted.  Psychiatric:  Normal mood and affect    Labs  No results found for: TESTOSTERONE    No results found for: PSA    Lab Results   Component Value Date    WBC 8.70 09/11/2020    HGB 14.7 09/11/2020    HCT 42.6 09/11/2020    MCV 87.8 09/11/2020     09/11/2020     Lab Results   Component Value Date    HGBA1C 11.80 (H) 07/23/2021     I reviewed his updated lab studies.    Assessment  Mr. Riley is a 66 y.o. male with low libido and fatigue, as well as erectile dysfunction.      Notably he has a history of DVT, which is a relative contraindication to perform testosterone replacement therapy.  I told him that I was uncomfortable performing testosterone replacement therapy for him given this history.  Regardless, he wanted to check his testosterone.    Regarding his erectile dysfunction, we discussed that his diabetes and coronary artery disease play a large role in this.  He does not take  nitroglycerin.  He has failed sildenafil.    Plan  1. FU for ICI -risk factors are poorly controlled diabetes mellitus     I spent a total of 12 minutes with the patient and the chart engaging in data gathering and interpretation, patient interaction, as well as counseling on the risks, benefits, and alternatives of the therapy and coordinating care.     "

## 2021-08-13 DIAGNOSIS — M54.50 LUMBOSACRAL PAIN: ICD-10-CM

## 2021-08-13 RX ORDER — HYDROCODONE BITARTRATE AND ACETAMINOPHEN 7.5; 325 MG/1; MG/1
1 TABLET ORAL 2 TIMES DAILY
Qty: 60 TABLET | Refills: 0 | Status: SHIPPED | OUTPATIENT
Start: 2021-08-13 | End: 2021-09-13

## 2021-08-13 NOTE — TELEPHONE ENCOUNTER
Caller: Jina Riley    Relationship: Emergency Contact     Best call back number:  [phone]    Medication needed:   Requested Prescriptions     Pending Prescriptions Disp Refills   • HYDROcodone-acetaminophen (NORCO) 7.5-325 MG per tablet 60 tablet 0     Sig: Take 1 tablet by mouth 2 (Two) Times a Day.       When do you need the refill by: TODAY     What additional details did the patient provide when requesting the medication:   PATIENT IS OUT OF MEDICATION       Does the patient have less than a 3 day supply:  [x] Yes  [] No    What is the patient's preferred pharmacy: [address] [phone] [facility] [phone]

## 2021-08-13 NOTE — TELEPHONE ENCOUNTER
Rx Refill Note  Requested Prescriptions     Pending Prescriptions Disp Refills   • HYDROcodone-acetaminophen (NORCO) 7.5-325 MG per tablet 60 tablet 0     Sig: Take 1 tablet by mouth 2 (Two) Times a Day.      Last office visit with prescribing clinician: 6/24/2021      Next office visit with prescribing clinician: 9/30/2021  Last UDS: 05/21/2021  Last CSA:  01/10/2020            Joleen Faye MA  08/13/21, 08:56 EDT

## 2021-08-17 RX ORDER — ATORVASTATIN CALCIUM 40 MG/1
40 TABLET, FILM COATED ORAL NIGHTLY
Qty: 90 TABLET | Refills: 2 | Status: SHIPPED | OUTPATIENT
Start: 2021-08-17 | End: 2022-02-10 | Stop reason: SDUPTHER

## 2021-08-26 ENCOUNTER — OFFICE VISIT (OUTPATIENT)
Dept: UROLOGY | Facility: CLINIC | Age: 66
End: 2021-08-26

## 2021-08-26 VITALS
DIASTOLIC BLOOD PRESSURE: 82 MMHG | HEIGHT: 71 IN | WEIGHT: 230 LBS | OXYGEN SATURATION: 95 % | BODY MASS INDEX: 32.2 KG/M2 | SYSTOLIC BLOOD PRESSURE: 134 MMHG | HEART RATE: 69 BPM

## 2021-08-26 DIAGNOSIS — N52.9 ERECTILE DYSFUNCTION, UNSPECIFIED ERECTILE DYSFUNCTION TYPE: Primary | ICD-10-CM

## 2021-08-26 PROCEDURE — 54235 NJX CORPORA CAVERNOSA RX AGT: CPT | Performed by: UROLOGY

## 2021-08-26 NOTE — PROGRESS NOTES
Patient underwent ICI injection with 0.2 mL of Trimix.  He received 60% erection, though it was not quite hard enough to penetrate.    I therefore told him to try 0.3 mL of Trimix at home with sexual stimulation.  He was educated about how to inject properly.  He will call us if it works well for him and we will order a larger vial.

## 2021-09-11 DIAGNOSIS — M54.50 LUMBOSACRAL PAIN: ICD-10-CM

## 2021-09-13 ENCOUNTER — HOSPITAL ENCOUNTER (EMERGENCY)
Facility: HOSPITAL | Age: 66
Discharge: ANOTHER HOSPITAL | End: 2021-09-13
Attending: EMERGENCY MEDICINE | Admitting: EMERGENCY MEDICINE

## 2021-09-13 VITALS
BODY MASS INDEX: 32.14 KG/M2 | OXYGEN SATURATION: 98 % | SYSTOLIC BLOOD PRESSURE: 136 MMHG | DIASTOLIC BLOOD PRESSURE: 84 MMHG | HEART RATE: 78 BPM | RESPIRATION RATE: 18 BRPM | WEIGHT: 229.6 LBS | HEIGHT: 71 IN | TEMPERATURE: 98.6 F

## 2021-09-13 DIAGNOSIS — H40.212 ACUTE ANGLE-CLOSURE GLAUCOMA OF LEFT EYE: Primary | ICD-10-CM

## 2021-09-13 PROCEDURE — 99283 EMERGENCY DEPT VISIT LOW MDM: CPT

## 2021-09-13 RX ORDER — DORZOLAMIDE HYDROCHLORIDE AND TIMOLOL MALEATE 20; 5 MG/ML; MG/ML
SOLUTION/ DROPS OPHTHALMIC ONCE
Status: DISCONTINUED | OUTPATIENT
Start: 2021-09-13 | End: 2021-09-13

## 2021-09-13 RX ORDER — HYDROCODONE BITARTRATE AND ACETAMINOPHEN 7.5; 325 MG/1; MG/1
TABLET ORAL
Qty: 60 TABLET | Refills: 0 | Status: SHIPPED | OUTPATIENT
Start: 2021-09-13 | End: 2021-10-14 | Stop reason: SDUPTHER

## 2021-09-13 RX ORDER — DORZOLAMIDE HYDROCHLORIDE AND TIMOLOL MALEATE 20; 5 MG/ML; MG/ML
1 SOLUTION/ DROPS OPHTHALMIC ONCE
Status: COMPLETED | OUTPATIENT
Start: 2021-09-13 | End: 2021-09-13

## 2021-09-13 RX ADMIN — DORZOLAMIDE HYDROCHLORIDE AND TIMOLOL MALEATE 1 DROP: 20; 5 SOLUTION/ DROPS OPHTHALMIC at 11:05

## 2021-09-13 NOTE — ED NOTES
Opthalmology was called per Dr. Gusman. Dr. Perdue was transferred at this time.     Harini Zeng  09/13/21 1048

## 2021-09-16 NOTE — ED PROVIDER NOTES
Subjective   66-year-old male presents to the ED with chief complaint of left eye pain.  Patient is complaining of severe left eye pain and headache that has been present for 3 days.  The pain has been worsening since onset.  He has visual deficits in the left eye but states they are no worse than they have been for the last 5 months since he had his cataract surgery on that eye.  He notes redness to the left eye.  Also complains of some nausea with 1 episode of vomiting.  No chest pain or shortness of breath.  No cough or wheeze.  No prior treatments or limiting factors.  No other complaints at this time.          Review of Systems   Constitutional: Negative for fever.   Eyes: Positive for pain and redness.   Gastrointestinal: Positive for nausea and vomiting.   Neurological: Positive for headaches.   All other systems reviewed and are negative.      Past Medical History:   Diagnosis Date   • Arthritis    • Coronary artery disease    • Diabetes mellitus (CMS/HCC)    • Heart disease    • History of blood clots    • Hypertension    • Low back pain    • Thyroid disease        No Known Allergies    Past Surgical History:   Procedure Laterality Date   • CHOLECYSTECTOMY  2015   • COLONOSCOPY  2015    dr srivastava   • CORONARY ARTERY BYPASS GRAFT      [place] [id number] BY PASS    • ENDOSCOPY         Family History   Problem Relation Age of Onset   • Diabetes Mother    • Heart disease Mother         STENTING   • COPD Mother    • Heart attack Father    • Diabetes Father        Social History     Socioeconomic History   • Marital status:      Spouse name: Not on file   • Number of children: Not on file   • Years of education: Not on file   • Highest education level: Not on file   Tobacco Use   • Smoking status: Never Smoker   • Smokeless tobacco: Current User     Types: Chew   Vaping Use   • Vaping Use: Never used   Substance and Sexual Activity   • Alcohol use: No   • Drug use: No   • Sexual activity: Defer            Objective   Physical Exam  Vitals and nursing note reviewed.   Constitutional:       General: He is not in acute distress.     Appearance: He is well-developed. He is not diaphoretic.   HENT:      Head: Normocephalic and atraumatic.      Nose: Nose normal.   Eyes:      General:         Left eye: No foreign body, discharge or hordeolum.      Extraocular Movements:      Left eye: Left eye nystagmus: Left eye injected erythematous tender to palpation.      Conjunctiva/sclera:      Left eye: Left conjunctiva is injected.      Pupils: Pupils are equal, round, and reactive to light.      Comments: Left eye injected erythematous tender to palpation.   Cardiovascular:      Rate and Rhythm: Normal rate and regular rhythm.   Pulmonary:      Effort: Pulmonary effort is normal. No respiratory distress.      Breath sounds: Normal breath sounds.   Abdominal:      General: There is no distension.      Palpations: Abdomen is soft.      Tenderness: There is no abdominal tenderness.   Musculoskeletal:         General: No deformity.   Neurological:      Mental Status: He is alert and oriented to person, place, and time.      Cranial Nerves: No cranial nerve deficit.      Coordination: Coordination normal.         Procedures           ED Course  ED Course as of Sep 16 0715   Mon Sep 13, 2021   1041 Left intraocular pressure measurements were 71, 55, 48, 51, 51.  This gives an average pressure of 55.    [CG]   1047 Spoke with Dr. Perdue, ophthalmology at [facility].  They accept the patient for transfer/discharge and referral to immediately follow-up in the  ophthalmology clinic for possible laser procedure.  [facility] was on divert so they were unable to accept the patient to the ED but the clinic is aware that the patient is being sent over for immediate follow-up.    [CG]      ED Course User Index  [CG] Juan Alberto Gusman,                                             MDM      Patient presented to the ED with left eye pain, headache and nausea and vomiting.  Concern for glaucoma on initial evaluation.  His left eye was tender to palpation.  Intraocular pressures were significant elevated.  Spoke with Dr. Perdue at the [facility] who accepts patient for transfer to their clinic.  Drops were given that she recommended.  He was transferred to their facility via private vehicle.  His wife drove him.      Final diagnoses:   Acute angle-closure glaucoma of left eye       ED Disposition  ED Disposition     ED Disposition Condition Comment    Transfer to Another Facility             No follow-up provider specified.       Medication List      No changes were made to your prescriptions during this visit.          Juan Alberto Gusman, DO  09/16/21 0715

## 2021-10-02 RX ORDER — LOSARTAN POTASSIUM 100 MG/1
100 TABLET ORAL DAILY
Qty: 90 TABLET | Refills: 0 | Status: SHIPPED | OUTPATIENT
Start: 2021-10-02 | End: 2022-01-31

## 2021-10-14 ENCOUNTER — READMISSION MANAGEMENT (OUTPATIENT)
Dept: CALL CENTER | Facility: HOSPITAL | Age: 66
End: 2021-10-14

## 2021-10-14 DIAGNOSIS — M54.50 LUMBOSACRAL PAIN: ICD-10-CM

## 2021-10-14 NOTE — TELEPHONE ENCOUNTER
Rx Refill Note  Requested Prescriptions     Pending Prescriptions Disp Refills   • HYDROcodone-acetaminophen (NORCO) 7.5-325 MG per tablet 60 tablet 0     Sig: Take 1 tablet by mouth 2 (Two) Times a Day.      Last office visit with prescribing clinician: 6/24/2021      Next office visit with prescribing clinician: 10/18/2021        05/21/21  {TIP  Is Refill Pharmacy correct?:23}  Idalia Man MA  10/14/21, 12:51 EDT

## 2021-10-14 NOTE — TELEPHONE ENCOUNTER
/Incoming Refill Request      Medication requested (name and dose): HYDROcodone-acetaminophen (NORCO) 7.5-325 MG per tablet    Pharmacy where request should be sent: [facility]    Additional details provided by patient: PATIENT IS COMPLETELY OUT OF MEDICATION   Best call back number: [phone]    Does the patient have less than a 3 day supply:  [x] Yes  [] No    Neal Hayden Rep  10/14/21, 12:34 EDT  746}

## 2021-10-14 NOTE — OUTREACH NOTE
Prep Survey      Responses   [facility] facility patient discharged from? Non-BH   Is LACE score < 7 ? Non-BH Discharge   Emergency Room discharge w/ pulse ox? No   Eligibility TCM [facility]   Date of Admission 10/12/21   Date of Discharge 10/14/21   Discharge diagnosis DVT   Does the patient have one of the following disease processes/diagnoses(primary or secondary)? Non-BH Discharge   Prep survey completed? Yes          Joleen Santoyo RN

## 2021-10-15 ENCOUNTER — TRANSITIONAL CARE MANAGEMENT TELEPHONE ENCOUNTER (OUTPATIENT)
Dept: CALL CENTER | Facility: HOSPITAL | Age: 66
End: 2021-10-15

## 2021-10-15 ENCOUNTER — TELEPHONE (OUTPATIENT)
Dept: INTERNAL MEDICINE | Facility: CLINIC | Age: 66
End: 2021-10-15

## 2021-10-15 RX ORDER — HYDROCODONE BITARTRATE AND ACETAMINOPHEN 7.5; 325 MG/1; MG/1
1 TABLET ORAL 2 TIMES DAILY
Qty: 60 TABLET | Refills: 0 | Status: SHIPPED | OUTPATIENT
Start: 2021-10-15 | End: 2021-10-28 | Stop reason: SDUPTHER

## 2021-10-15 NOTE — TELEPHONE ENCOUNTER
Called, spoke to Felisha expressed it was fine to see patient on 10/19/2021 and that Dr. Purvis will follow orders.

## 2021-10-15 NOTE — TELEPHONE ENCOUNTER
Felisha with [facility] at home left  on referral line that they have a referral to see patient for pt/ot. Their first visit they can see patient is on Tuesday 10/19/21. They want to make sure this is ok and if dr kinney will follow orders.

## 2021-10-15 NOTE — OUTREACH NOTE
Call Center TCM Note      Responses   [facility] patient discharged from? Non-   Does the patient have one of the following disease processes/diagnoses(primary or secondary)? Non- Discharge   TCM attempt successful? Yes   Call start time 1057   Call end time 1100   Discharge diagnosis DVT   Meds reviewed with patient/caregiver? Yes   Is the patient having any side effects they believe may be caused by any medication additions or changes? No   Does the patient have all medications ordered at discharge? Yes   Is the patient taking all medications as directed (includes completed medication regime)? Yes   Comments regarding appointments EYE appt 10/15/21   Does the patient have a primary care provider?  Yes   Does the patient have an appointment with their PCP within 7 days of discharge? Yes   Comments regarding PCP HOSP DC FU APPT 10/18/21 @ 3pm.    Has the patient kept scheduled appointments due by today? N/A   Psychosocial issues? No   Did the patient receive a copy of their discharge instructions? Yes   Nursing interventions Reviewed instructions with patient   What is the patient's perception of their health status since discharge? Same  [Vomiting yesterday none today. Wife aware to call PCP office if vomiting continues. ]   Is the patient/caregiver able to teach back signs and symptoms related to disease process for when to call PCP? Yes   Is the patient/caregiver able to teach back signs and symptoms related to disease process for when to call 911? Yes   Is the patient/caregiver able to teach back the hierarchy of who to call/visit for symptoms/problems? PCP, Specialist, Home health nurse, Urgent Care, ED, 911 Yes   TCM call completed? Yes   Wrap up additional comments Wife reports he is about the same. Reports that he did get dc instructions and has all meds and taking as ordered. No needs at this time. Has a eye appt today.           Brook Marrero RN    10/15/2021, 11:01 EDT

## 2021-10-28 ENCOUNTER — OFFICE VISIT (OUTPATIENT)
Dept: INTERNAL MEDICINE | Facility: CLINIC | Age: 66
End: 2021-10-28

## 2021-10-28 VITALS
OXYGEN SATURATION: 96 % | RESPIRATION RATE: 16 BRPM | HEART RATE: 56 BPM | WEIGHT: 234 LBS | SYSTOLIC BLOOD PRESSURE: 157 MMHG | DIASTOLIC BLOOD PRESSURE: 81 MMHG | BODY MASS INDEX: 32.76 KG/M2 | TEMPERATURE: 97.5 F | HEIGHT: 71 IN

## 2021-10-28 DIAGNOSIS — E11.65 TYPE 2 DIABETES MELLITUS WITH HYPERGLYCEMIA, WITH LONG-TERM CURRENT USE OF INSULIN (HCC): ICD-10-CM

## 2021-10-28 DIAGNOSIS — M54.50 LUMBOSACRAL PAIN: ICD-10-CM

## 2021-10-28 DIAGNOSIS — I82.403 DEEP VEIN THROMBOSIS (DVT) OF BOTH LOWER EXTREMITIES, UNSPECIFIED CHRONICITY, UNSPECIFIED VEIN (HCC): ICD-10-CM

## 2021-10-28 DIAGNOSIS — F51.04 INSOMNIA, PSYCHOPHYSIOLOGICAL: ICD-10-CM

## 2021-10-28 DIAGNOSIS — E78.2 MIXED HYPERLIPIDEMIA: ICD-10-CM

## 2021-10-28 DIAGNOSIS — Z51.81 ENCOUNTER FOR THERAPEUTIC DRUG LEVEL MONITORING: ICD-10-CM

## 2021-10-28 DIAGNOSIS — I10 BENIGN ESSENTIAL HYPERTENSION: Primary | ICD-10-CM

## 2021-10-28 DIAGNOSIS — Z23 NEED FOR INFLUENZA VACCINATION: ICD-10-CM

## 2021-10-28 DIAGNOSIS — E03.8 ADULT ONSET HYPOTHYROIDISM: ICD-10-CM

## 2021-10-28 DIAGNOSIS — I26.99 PULMONARY EMBOLISM WITHOUT ACUTE COR PULMONALE, UNSPECIFIED CHRONICITY, UNSPECIFIED PULMONARY EMBOLISM TYPE (HCC): ICD-10-CM

## 2021-10-28 DIAGNOSIS — Z79.4 TYPE 2 DIABETES MELLITUS WITH HYPERGLYCEMIA, WITH LONG-TERM CURRENT USE OF INSULIN (HCC): ICD-10-CM

## 2021-10-28 PROCEDURE — G0008 ADMIN INFLUENZA VIRUS VAC: HCPCS | Performed by: INTERNAL MEDICINE

## 2021-10-28 PROCEDURE — 1111F DSCHRG MED/CURRENT MED MERGE: CPT | Performed by: INTERNAL MEDICINE

## 2021-10-28 PROCEDURE — 99495 TRANSJ CARE MGMT MOD F2F 14D: CPT | Performed by: INTERNAL MEDICINE

## 2021-10-28 PROCEDURE — 90662 IIV NO PRSV INCREASED AG IM: CPT | Performed by: INTERNAL MEDICINE

## 2021-10-28 RX ORDER — HYDROCODONE BITARTRATE AND ACETAMINOPHEN 7.5; 325 MG/1; MG/1
1 TABLET ORAL 2 TIMES DAILY
Qty: 60 TABLET | Refills: 0 | Status: SHIPPED | OUTPATIENT
Start: 2021-10-28 | End: 2022-01-04 | Stop reason: SDUPTHER

## 2021-10-28 RX ORDER — LEVOTHYROXINE SODIUM 0.1 MG/1
100 TABLET ORAL DAILY
Qty: 30 TABLET | Refills: 0 | Status: SHIPPED | OUTPATIENT
Start: 2021-10-28 | End: 2022-01-31

## 2021-10-28 RX ORDER — APIXABAN 5 MG/1
1 TABLET, FILM COATED ORAL EVERY 12 HOURS
COMMUNITY
Start: 2021-10-14 | End: 2022-04-25

## 2021-10-28 RX ORDER — ALPRAZOLAM 0.5 MG/1
0.5 TABLET ORAL NIGHTLY PRN
Qty: 30 TABLET | Refills: 4 | Status: SHIPPED | OUTPATIENT
Start: 2021-10-28 | End: 2022-02-10 | Stop reason: SDUPTHER

## 2021-10-28 NOTE — PROGRESS NOTES
Transitional Care Follow Up Visit  Subjective     Ramsey Riley is a 66 y.o. male who presents for a transitional care management visit.    Within 48 business hours after discharge our office contacted him via telephone to coordinate his care and needs.      I reviewed and discussed the details of that call along with the discharge summary, hospital problems, inpatient lab results, inpatient diagnostic studies, and consultation reports with Ramsey.     Current outpatient and discharge medications have been reconciled for the patient.  Reviewed by: Margret Purvis MD      Date of TCM Phone Call 10/14/2021   Hospital [facility]   Date of Admission 10/12/2021   Date of Discharge 10/14/2021     Risk for Readmission (LACE) No data recorded    Chief Complaint   Patient presents with   • Hypertension   • Hyperlipidemia   • Diabetes       History of Present Illness   Course During Hospital Stay: Patient is here to follow-up on hospitalization, he was admitted to  [facility]  On 10- and discharge don 10- , he had PE with both lower leg DVT and is currently taking Eliquis  5MG PO bid , he was seen by hematology  In the hospital  And then  Follows up with hematology on 1-, Patient is here to follow up on the blood pressure  The patient is taking the blood pressure medications as prescribed and has had no side effects. The patient is also here to follow up on sugar  and cholesterol and is trying to follow a diet.   He is also to follow-up on thyroid and had lab work done, the patient also complains of chronic back pain and insomnia and    needs refills on medications .   Hyperlipidemia   Pertinent negatives include no chest pain or shortness of breath.   Hypertension   Pertinent negatives include no chest pain, palpitations or shortness of breath.    The following portions of the patient's history were reviewed and updated as appropriate: allergies, current medications, past family  history, past medical history, past social history, past surgical history and problem list.    Review of Systems   Constitutional: Negative for appetite change, fatigue and fever.   HENT: Negative for congestion, ear discharge, ear pain, sinus pressure and sore throat.    Eyes: Negative for pain and discharge.   Respiratory: Negative for cough, shortness of breath and wheezing.    Cardiovascular: Negative for chest pain, palpitations and leg swelling.   Gastrointestinal: Negative for abdominal pain, constipation, diarrhea, nausea and vomiting.   Endocrine: Negative for cold intolerance and heat intolerance.   Genitourinary: Negative for dysuria and flank pain.   Musculoskeletal: Positive for back pain. Negative for arthralgias and joint swelling.   Skin: Negative for pallor and rash.   Allergic/Immunologic: Negative for environmental allergies and food allergies.   Neurological: Negative for dizziness, weakness and numbness.   Hematological: Negative for adenopathy. Does not bruise/bleed easily.   Psychiatric/Behavioral: Positive for sleep disturbance. Negative for behavioral problems and dysphoric mood. The patient is not nervous/anxious.        Objective   Physical Exam  Vitals and nursing note reviewed.   Constitutional:       General: He is not in acute distress.     Appearance: Normal appearance. He is not diaphoretic.   HENT:      Head: Normocephalic and atraumatic.      Right Ear: External ear normal.      Left Ear: External ear normal.      Nose: Nose normal.   Eyes:      Extraocular Movements: Extraocular movements intact.      Conjunctiva/sclera: Conjunctivae normal.   Neck:      Trachea: Trachea normal.   Cardiovascular:      Rate and Rhythm: Normal rate and regular rhythm.      Heart sounds: Normal heart sounds.   Pulmonary:      Effort: Pulmonary effort is normal. No respiratory distress.   Abdominal:      General: Abdomen is flat.   Musculoskeletal:      Cervical back: Neck supple.      Comments: Moves  all limbs   Skin:     General: Skin is warm and dry.      Findings: No erythema.   Neurological:      Mental Status: He is alert and oriented to person, place, and time.      Comments: No gross motor or sensory deficits         Assessment/Plan   Diagnoses and all orders for this visit:    1. Benign essential hypertension (Primary)    2. Mixed hyperlipidemia  -     CBC & Differential  -     Comprehensive Metabolic Panel  -     Lipid Panel    3. Type 2 diabetes mellitus with hyperglycemia, with long-term current use of insulin (HCC)  -     Ambulatory Referral to Endocrinology  -     Hemoglobin A1c  -     Microalbumin / Creatinine Urine Ratio - Urine, Clean Catch    4. Adult onset hypothyroidism  -     levothyroxine (SYNTHROID, LEVOTHROID) 100 MCG tablet; Take 1 tablet by mouth Daily.  Dispense: 30 tablet; Refill: 0  -     TSH  -     TSH    5. Pulmonary embolism without acute cor pulmonale, unspecified chronicity, unspecified pulmonary embolism type ([facility])    6. Deep vein thrombosis (DVT) of both lower extremities, unspecified chronicity, unspecified vein ([facility])    7. Insomnia, psychophysiological  -     ALPRAZolam (XANAX) 0.5 MG tablet; Take 1 tablet by mouth At Night As Needed for Anxiety.  Dispense: 30 tablet; Refill: 4    8. Lumbosacral pain  -     HYDROcodone-acetaminophen (NORCO) 7.5-325 MG per tablet; Take 1 tablet by mouth 2 (Two) Times a Day.  Dispense: 60 tablet; Refill: 0  -     Compliance Drug Analysis, Ur - Urine, Clean Catch    9. Encounter for therapeutic drug level monitoring   -     Compliance Drug Analysis, Ur - Urine, Clean Catch    10. Need for influenza vaccination  -     Fluzone High-Dose 65+yrs (5127-4370)    Plan:  1.  Benign essential hypertension: Will continue current medication, low-sodium diet advised, Counseled to regularly check BP at home with goal averaging <130/80.   2.mixed hyperlipidemia: Reviewed    fasting CMP and lipid panel.  Diet and exercise counseled,  Will continue current  medications  3. Diabetes  Mellitus  : Reviewed   fasting CMP  and hba1c 9.8 , diet and exercise counseled , Will continue current medications, refer patient to endocrinology  4. hypothyroidism: Reviewed tsh , and continue levothyroxine  5.  Pulmonary embolism: On Eliquis , keep follow-up with hematology  6.  DVT of bilateral lower extremities: On Eliquis, to follow-up with hematology  7.  Insomnia: UDS and Lavell reviewed and obtained and alprazolam refilled  8.  Lumbosacral pain: UDS and Lavell reviewed and obtained and Lortab refilled  9.  Need for flu vaccine: Given today  The patient has read and signed the [facility] Controlled Substance Contract.The patient is aware of the potential for addiction and dependence. This is a short-term use   A Lavell was reviewed  in the chart today. Narcotic contract was signed by patient today . Will obtain Urine drug screen  Lavell Report   As part of this patient's treatment plan I am prescribing controlled substances. The patient has been made aware of appropriate use of such medications, including potential risk of somnolence, limited ability to drive and /or work safely, and potential for dependence or overdose. It has also been made clear that these medications are for use by this patient only, without concomitant use of alcohol or other substances unless prescribed.   Patient has completed prescribing agreement detailing terms of continued prescribing of controlled substances, including monitoring LAVELL reports, urine drug screening, and pill counts if necessary. The patient is aware that inappropriate use will result in cessation of prescribing such medications.   LAVELL report has been reviewed   History and physical exam exhibit continued safe and appropriate use of controlled substances. Patient is advised to start a comprehensive exercise program   The patient has been instructed to contact my office with any questions or difficulties. The patient understands  the plan , patient has verbalized an understanding and agrees to proceed accordingly

## 2021-11-02 LAB — DRUGS UR: NORMAL

## 2021-11-08 ENCOUNTER — TELEPHONE (OUTPATIENT)
Dept: ONCOLOGY | Facility: OTHER | Age: 66
End: 2021-11-08

## 2021-11-08 NOTE — TELEPHONE ENCOUNTER
SPOUSE CALLED TO INQUIRE IF PATIENT HAD APPT THIS Thursday IN OUR OFFICE. PATIENT WAS IN HOSPITAL WHEN APPT WAS MADE. INFORMED SPOUSE THAT THERE ARE NO APPTS OR REFERRALS FOR PATIENT.

## 2021-11-16 ENCOUNTER — TELEPHONE (OUTPATIENT)
Dept: INTERNAL MEDICINE | Facility: CLINIC | Age: 66
End: 2021-11-16

## 2021-11-16 DIAGNOSIS — M54.50 LUMBOSACRAL PAIN: ICD-10-CM

## 2021-11-16 RX ORDER — HYDROCODONE BITARTRATE AND ACETAMINOPHEN 7.5; 325 MG/1; MG/1
1 TABLET ORAL 2 TIMES DAILY
Qty: 60 TABLET | Refills: 0 | Status: CANCELLED | OUTPATIENT
Start: 2021-11-16

## 2021-11-16 NOTE — TELEPHONE ENCOUNTER
Rx Refill Note  Requested Prescriptions     Pending Prescriptions Disp Refills   • HYDROcodone-acetaminophen (NORCO) 7.5-325 MG per tablet 60 tablet 0     Sig: Take 1 tablet by mouth 2 (Two) Times a Day.      Last office visit with prescribing clinician: 10/28/2021      Next office visit with prescribing clinician: 2/3/2022            Carlos Peterson CMA  11/16/21, 16:02 EST   Contract up to date, last UDS 10/28/2021

## 2021-11-16 NOTE — TELEPHONE ENCOUNTER
Caller: Jina Riley    Relationship: Emergency Contact    Best call back number:    [phone]    Requested Prescriptions:   Requested Prescriptions     Pending Prescriptions Disp Refills   • HYDROcodone-acetaminophen (NORCO) 7.5-325 MG per tablet 60 tablet 0     Sig: Take 1 tablet by mouth 2 (Two) Times a Day.      Pharmacy where request should be sent:      [place], [place]    TELEPHONE CONTACT:    [phone]    Additional details provided by patient:     CALLER STATED PATIENT IS OUT OF THE MEDICATION    Does the patient have less than a 3 day supply:  [x] Yes  [] No    Neal Arrington Rep   11/16/21 15:07 EST     DR COOK

## 2021-12-03 RX ORDER — AMLODIPINE BESYLATE 5 MG/1
5 TABLET ORAL DAILY
Qty: 90 TABLET | Refills: 2 | Status: SHIPPED | OUTPATIENT
Start: 2021-12-03 | End: 2022-02-10 | Stop reason: SDUPTHER

## 2021-12-03 NOTE — TELEPHONE ENCOUNTER
Rx Refill Note  Requested Prescriptions     Pending Prescriptions Disp Refills   • amLODIPine (NORVASC) 5 MG tablet [Pharmacy Med Name: AMLODIPINE BESYLATE 5 MG TA 5 Tablet] 90 tablet 2     Sig: TAKE 1 TABLET BY MOUTH DAILY.      Last office visit with prescribing clinician: 10/28/2021      Next office visit with prescribing clinician: 2/3/2022            RAN GRAHAM MA  12/03/21, 09:05 EST

## 2021-12-22 ENCOUNTER — PATIENT OUTREACH (OUTPATIENT)
Dept: CASE MANAGEMENT | Facility: OTHER | Age: 66
End: 2021-12-22

## 2021-12-22 NOTE — OUTREACH NOTE
Ambulatory Case Management Note    Patient Outreach    RN-ACM proactive outreach to patient, unsuccessful X4 attempts.  [facility] d/c for this episode.     Mia Fuller RN  Ambulatory Case Management    12/22/2021, 15:35 EST

## 2022-01-04 DIAGNOSIS — M54.50 LUMBOSACRAL PAIN: ICD-10-CM

## 2022-01-04 RX ORDER — HYDROCODONE BITARTRATE AND ACETAMINOPHEN 7.5; 325 MG/1; MG/1
1 TABLET ORAL 2 TIMES DAILY
Qty: 60 TABLET | Refills: 0 | Status: SHIPPED | OUTPATIENT
Start: 2022-01-04 | End: 2022-02-10 | Stop reason: SDUPTHER

## 2022-01-04 NOTE — TELEPHONE ENCOUNTER
Caller: Jina Riley    Relationship: Emergency Contact    Best call back number: [phone]    Requested Prescriptions:   Requested Prescriptions     Pending Prescriptions Disp Refills   • HYDROcodone-acetaminophen (NORCO) 7.5-325 MG per tablet 60 tablet 0     Sig: Take 1 tablet by mouth 2 (Two) Times a Day.        Pharmacy where request should be sent: [address] [phone] [facility] [phone] [facility]     Additional details provided by patient: PATIENT IS COMPLETELY OUT     Does the patient have less than a 3 day supply:  [x] Yes  [] No    Neal Thurston Rep   01/04/22 11:39 EST

## 2022-01-04 NOTE — TELEPHONE ENCOUNTER
Rx Refill Note  Requested Prescriptions     Pending Prescriptions Disp Refills   • HYDROcodone-acetaminophen (NORCO) 7.5-325 MG per tablet 60 tablet 0     Sig: Take 1 tablet by mouth 2 (Two) Times a Day.      Last office visit with prescribing clinician: 10/28/2021      Next office visit with prescribing clinician: 2/3/2022            RAN GRAHAM MA  01/04/22, 12:03 EST     CSA: 7/16/2021  UDS: 10/28/2021

## 2022-01-31 DIAGNOSIS — E03.8 ADULT ONSET HYPOTHYROIDISM: ICD-10-CM

## 2022-01-31 RX ORDER — LEVOTHYROXINE SODIUM 0.1 MG/1
100 TABLET ORAL DAILY
Qty: 30 TABLET | Refills: 0 | Status: SHIPPED | OUTPATIENT
Start: 2022-01-31 | End: 2022-02-10 | Stop reason: SDUPTHER

## 2022-01-31 RX ORDER — LOSARTAN POTASSIUM 100 MG/1
100 TABLET ORAL DAILY
Qty: 30 TABLET | Refills: 0 | Status: SHIPPED | OUTPATIENT
Start: 2022-01-31 | End: 2022-02-10 | Stop reason: SDUPTHER

## 2022-02-10 ENCOUNTER — OFFICE VISIT (OUTPATIENT)
Dept: INTERNAL MEDICINE | Facility: CLINIC | Age: 67
End: 2022-02-10

## 2022-02-10 VITALS
DIASTOLIC BLOOD PRESSURE: 101 MMHG | OXYGEN SATURATION: 95 % | HEART RATE: 67 BPM | RESPIRATION RATE: 14 BRPM | SYSTOLIC BLOOD PRESSURE: 177 MMHG | WEIGHT: 242 LBS | TEMPERATURE: 97.5 F | HEIGHT: 71 IN | BODY MASS INDEX: 33.88 KG/M2

## 2022-02-10 DIAGNOSIS — F33.0 MILD EPISODE OF RECURRENT MAJOR DEPRESSIVE DISORDER: ICD-10-CM

## 2022-02-10 DIAGNOSIS — E78.2 MIXED HYPERLIPIDEMIA: ICD-10-CM

## 2022-02-10 DIAGNOSIS — F41.9 ANXIETY: ICD-10-CM

## 2022-02-10 DIAGNOSIS — I10 BENIGN ESSENTIAL HYPERTENSION: Primary | ICD-10-CM

## 2022-02-10 DIAGNOSIS — Z79.4 TYPE 2 DIABETES MELLITUS WITH HYPERGLYCEMIA, WITH LONG-TERM CURRENT USE OF INSULIN: ICD-10-CM

## 2022-02-10 DIAGNOSIS — M54.50 LUMBOSACRAL PAIN: ICD-10-CM

## 2022-02-10 DIAGNOSIS — E11.65 TYPE 2 DIABETES MELLITUS WITH HYPERGLYCEMIA, WITH LONG-TERM CURRENT USE OF INSULIN: ICD-10-CM

## 2022-02-10 DIAGNOSIS — F51.04 INSOMNIA, PSYCHOPHYSIOLOGICAL: ICD-10-CM

## 2022-02-10 DIAGNOSIS — E03.8 ADULT ONSET HYPOTHYROIDISM: ICD-10-CM

## 2022-02-10 DIAGNOSIS — Z51.81 ENCOUNTER FOR THERAPEUTIC DRUG LEVEL MONITORING: ICD-10-CM

## 2022-02-10 PROCEDURE — 99214 OFFICE O/P EST MOD 30 MIN: CPT | Performed by: INTERNAL MEDICINE

## 2022-02-10 RX ORDER — HYDROCODONE BITARTRATE AND ACETAMINOPHEN 7.5; 325 MG/1; MG/1
1 TABLET ORAL 2 TIMES DAILY
Qty: 60 TABLET | Refills: 0 | Status: SHIPPED | OUTPATIENT
Start: 2022-02-10 | End: 2022-03-09 | Stop reason: SDUPTHER

## 2022-02-10 RX ORDER — CITALOPRAM 40 MG/1
40 TABLET ORAL DAILY
Qty: 90 TABLET | Refills: 2 | Status: SHIPPED | OUTPATIENT
Start: 2022-02-10 | End: 2022-09-28 | Stop reason: SDUPTHER

## 2022-02-10 RX ORDER — ATORVASTATIN CALCIUM 40 MG/1
40 TABLET, FILM COATED ORAL NIGHTLY
Qty: 90 TABLET | Refills: 2 | Status: SHIPPED | OUTPATIENT
Start: 2022-02-10 | End: 2022-06-22 | Stop reason: HOSPADM

## 2022-02-10 RX ORDER — AMLODIPINE BESYLATE 5 MG/1
5 TABLET ORAL DAILY
Qty: 90 TABLET | Refills: 2 | Status: SHIPPED | OUTPATIENT
Start: 2022-02-10 | End: 2022-07-06

## 2022-02-10 RX ORDER — LOSARTAN POTASSIUM 100 MG/1
100 TABLET ORAL DAILY
Qty: 90 TABLET | Refills: 2 | Status: SHIPPED | OUTPATIENT
Start: 2022-02-10 | End: 2022-09-28 | Stop reason: SDUPTHER

## 2022-02-10 RX ORDER — ALPRAZOLAM 0.5 MG/1
0.5 TABLET ORAL NIGHTLY PRN
Qty: 30 TABLET | Refills: 4 | Status: SHIPPED | OUTPATIENT
Start: 2022-02-10 | End: 2022-04-04 | Stop reason: SDUPTHER

## 2022-02-10 RX ORDER — LEVOTHYROXINE SODIUM 0.1 MG/1
100 TABLET ORAL DAILY
Qty: 90 TABLET | Refills: 2 | Status: SHIPPED | OUTPATIENT
Start: 2022-02-10 | End: 2022-06-23 | Stop reason: SDUPTHER

## 2022-02-10 NOTE — PROGRESS NOTES
"Chief Complaint  Hypertension, Hyperlipidemia, and Diabetes    Subjective          Ramsey Riley presents to [facility] PRIMARY [facility]ARE  History of Present Illness  HPI: Patient is here to follow up on the blood pressure which is noted to be elevated but he has not taken his medication today, the patient is taking the blood pressure medications as prescribed and has had no side effects. The patient is also here to follow up on the cholesterol and sugar is trying to follow a diet.  He gets Novolin R and Humulin 70/30 from [facility] vials as the pens expensive, he was not able to see the endocrinologist and he will be calling them back, the patient is  also here to follow up on thyroid and is  due to get lab work done .  The patient also complains of anxiety and insomnia and needs refills on medications .  He also complains of chronic back pain  Hyperlipidemia   Pertinent negatives include no chest pain or shortness of breath.   Hypertension   Pertinent negatives include no chest pain, palpitations or shortness of breath.    Objective   Vital Signs:   BP (!) 177/101   Pulse 67   Temp 97.5 °F (36.4 °C)   Resp 14   Ht 180.3 cm (70.98\")   Wt 110 kg (242 lb)   SpO2 95%   BMI 33.77 kg/m²     Physical Exam  Vitals and nursing note reviewed.   Constitutional:       General: He is not in acute distress.     Appearance: Normal appearance. He is not diaphoretic.   HENT:      Head: Normocephalic and atraumatic.      Right Ear: External ear normal.      Left Ear: External ear normal.      Nose: Nose normal.   Eyes:      Extraocular Movements: Extraocular movements intact.      Conjunctiva/sclera: Conjunctivae normal.   Neck:      Trachea: Trachea normal.   Cardiovascular:      Rate and Rhythm: Normal rate and regular rhythm.      Heart sounds: Normal heart sounds.   Pulmonary:      Effort: Pulmonary effort is normal. No respiratory distress.   Abdominal:      General: Abdomen is flat.   Musculoskeletal:     "  Cervical back: Neck supple.      Comments: Moves all limbs   Skin:     General: Skin is warm and dry.      Findings: No erythema.   Neurological:      Mental Status: He is alert and oriented to person, place, and time.      Comments: No gross motor or sensory deficits        Result Review :     Common labs    Common Labsle 7/23/21   Hemoglobin A1C 11.80 (A)   (A) Abnormal value       Comments are available for some flowsheets but are not being displayed.                     Assessment and Plan    Diagnoses and all orders for this visit:    1. Benign essential hypertension (Primary)  -     losartan (COZAAR) 100 MG tablet; Take 1 tablet by mouth Daily.  Dispense: 90 tablet; Refill: 2  -     amLODIPine (NORVASC) 5 MG tablet; Take 1 tablet by mouth Daily.  Dispense: 90 tablet; Refill: 2    2. Mixed hyperlipidemia  -     atorvastatin (LIPITOR) 40 MG tablet; Take 1 tablet by mouth Every Night.  Dispense: 90 tablet; Refill: 2    3. Type 2 diabetes mellitus with hyperglycemia, with long-term current use of insulin ([facility])    4. Adult onset hypothyroidism  -     levothyroxine (SYNTHROID, LEVOTHROID) 100 MCG tablet; Take 1 tablet by mouth Daily.  Dispense: 90 tablet; Refill: 2    5. Anxiety  -     citalopram (CeleXA) 40 MG tablet; Take 1 tablet by mouth Daily.  Dispense: 90 tablet; Refill: 2  -     Compliance Drug Analysis, Ur - Urine, Clean Catch  -     ALPRAZolam (XANAX) 0.5 MG tablet; Take 1 tablet by mouth At Night As Needed for Anxiety.  Dispense: 30 tablet; Refill: 4    6. Mild episode of recurrent major depressive disorder (HCC)  -     citalopram (CeleXA) 40 MG tablet; Take 1 tablet by mouth Daily.  Dispense: 90 tablet; Refill: 2    7. Insomnia, psychophysiological  -     ALPRAZolam (XANAX) 0.5 MG tablet; Take 1 tablet by mouth At Night As Needed for Anxiety.  Dispense: 30 tablet; Refill: 4    8. Lumbosacral pain  -     Compliance Drug Analysis, Ur - Urine, Clean Catch  -     HYDROcodone-acetaminophen (NORCO) 7.5-325 MG  per tablet; Take 1 tablet by mouth 2 (Two) Times a Day.  Dispense: 60 tablet; Refill: 0    9. Encounter for therapeutic drug level monitoring   -     Compliance Drug Analysis, Ur - Urine, Clean Catch    Plan:  1.  Benign essential hypertension: Will continue current medication, low-sodium diet advised, Counseled to regularly check BP at home with goal averaging <130/80.   2.mixed hyperlipidemia: will obtain   fasting CMP and lipid panel.  Diet and exercise counseled,  Will continue current medications  3. Diabetes  Mellitus  : will obtain   fasting CMP  and hba1c  , diet and exercise counseled , Will continue current medications, follow-up with endocrinology  4. hypothyroidism: will obtain tsh , and continue levothyroxine  5.  Anxiety disorder: Refill Celexa 40 mg daily, obtain labs, UDS and Enoc reviewed and obtained and narcotic contract signed, alprazolam refilled  6.  Major depression: Refill Celexa 40 mg daily  7.  Insomnia: Refill alprazolam as needed  8.  Lumbosacral pain: UDS and Enoc reviewed and obtained and narcotic contract signed, refill as needed Lortab  The patient has read and signed the [facility] Controlled Substance Contract.The patient is aware of the potential for addiction and dependence. This is a short-term use   A Enoc was reviewed  in the chart today. Narcotic contract was signed by patient today . Will obtain Urine drug screen  Enoc Report   As part of this patient's treatment plan I am prescribing controlled substances. The patient has been made aware of appropriate use of such medications, including potential risk of somnolence, limited ability to drive and /or work safely, and potential for dependence or overdose. It has also been made clear that these medications are for use by this patient only, without concomitant use of alcohol or other substances unless prescribed.   Patient has completed prescribing agreement detailing terms of continued prescribing of controlled  substances, including monitoring LAVELL reports, urine drug screening, and pill counts if necessary. The patient is aware that inappropriate use will result in cessation of prescribing such medications.   LAVELL report has been reviewed   History and physical exam exhibit continued safe and appropriate use of controlled substances. Patient is advised to start a comprehensive exercise program including   The patient has been instructed to contact my office with any questions or difficulties. The patient understands the plan , patient has verbalized an understanding and agrees to proceed accordingly    I spent 30 minutes caring for Ramsey on this date of service. This time includes time spent by me in the following activities:preparing for the visit, reviewing tests, performing a medically appropriate examination and/or evaluation , counseling and educating the patient/family/caregiver, ordering medications, tests, or procedures and documenting information in the medical record  Follow Up   Return in about 3 months (around 5/24/2022).  Patient was given instructions and counseling regarding his condition or for health maintenance advice. Please see specific information pulled into the AVS if appropriate.

## 2022-02-17 LAB — DRUGS UR: NORMAL

## 2022-03-09 DIAGNOSIS — M54.50 LUMBOSACRAL PAIN: ICD-10-CM

## 2022-03-09 RX ORDER — HYDROCODONE BITARTRATE AND ACETAMINOPHEN 7.5; 325 MG/1; MG/1
1 TABLET ORAL 2 TIMES DAILY
Qty: 60 TABLET | Refills: 0 | Status: SHIPPED | OUTPATIENT
Start: 2022-03-09 | End: 2022-04-04 | Stop reason: SDUPTHER

## 2022-03-09 NOTE — TELEPHONE ENCOUNTER
Caller: Jina Riley    Relationship: Emergency Contact    Best call back number: [phone]    Requested Prescriptions:   Requested Prescriptions     Pending Prescriptions Disp Refills   • HYDROcodone-acetaminophen (NORCO) 7.5-325 MG per tablet 60 tablet 0     Sig: Take 1 tablet by mouth 2 (Two) Times a Day.        Pharmacy where request should be sent: [address] [phone] [facility] [phone] [facility]     Additional details provided by patient: patient has been out for a day    Does the patient have less than a 3 day supply:  [x] Yes  [] No    Neal Horowitz Rep   03/09/22 11:22 EST

## 2022-03-25 ENCOUNTER — TELEPHONE (OUTPATIENT)
Dept: CARDIOLOGY | Facility: CLINIC | Age: 67
End: 2022-03-25

## 2022-04-04 ENCOUNTER — TELEPHONE (OUTPATIENT)
Dept: INTERNAL MEDICINE | Facility: CLINIC | Age: 67
End: 2022-04-04

## 2022-04-04 ENCOUNTER — OFFICE VISIT (OUTPATIENT)
Dept: INTERNAL MEDICINE | Facility: CLINIC | Age: 67
End: 2022-04-04

## 2022-04-04 VITALS
SYSTOLIC BLOOD PRESSURE: 157 MMHG | OXYGEN SATURATION: 98 % | BODY MASS INDEX: 33.46 KG/M2 | WEIGHT: 239 LBS | HEART RATE: 51 BPM | TEMPERATURE: 97.5 F | RESPIRATION RATE: 16 BRPM | DIASTOLIC BLOOD PRESSURE: 81 MMHG | HEIGHT: 71 IN

## 2022-04-04 DIAGNOSIS — M54.50 LUMBOSACRAL PAIN: ICD-10-CM

## 2022-04-04 DIAGNOSIS — F41.9 ANXIETY: ICD-10-CM

## 2022-04-04 DIAGNOSIS — I10 BENIGN ESSENTIAL HYPERTENSION: Primary | ICD-10-CM

## 2022-04-04 DIAGNOSIS — Z79.4 TYPE 2 DIABETES MELLITUS WITH HYPERGLYCEMIA, WITH LONG-TERM CURRENT USE OF INSULIN: ICD-10-CM

## 2022-04-04 DIAGNOSIS — F51.04 INSOMNIA, PSYCHOPHYSIOLOGICAL: ICD-10-CM

## 2022-04-04 DIAGNOSIS — E11.65 TYPE 2 DIABETES MELLITUS WITH HYPERGLYCEMIA, WITH LONG-TERM CURRENT USE OF INSULIN: ICD-10-CM

## 2022-04-04 PROCEDURE — 99214 OFFICE O/P EST MOD 30 MIN: CPT | Performed by: INTERNAL MEDICINE

## 2022-04-04 RX ORDER — ALPRAZOLAM 0.5 MG/1
0.5 TABLET ORAL NIGHTLY PRN
Qty: 30 TABLET | Refills: 4 | Status: SHIPPED | OUTPATIENT
Start: 2022-04-04 | End: 2022-09-28 | Stop reason: SDUPTHER

## 2022-04-04 RX ORDER — HYDROCODONE BITARTRATE AND ACETAMINOPHEN 7.5; 325 MG/1; MG/1
1 TABLET ORAL 2 TIMES DAILY
Qty: 60 TABLET | Refills: 0 | Status: SHIPPED | OUTPATIENT
Start: 2022-04-04 | End: 2022-05-10 | Stop reason: SDUPTHER

## 2022-04-04 NOTE — PROGRESS NOTES
"Chief Complaint  Hypertension and Diabetes    Subjective          Ramsey Riley presents to [facility]  History of Present Illness  Patient is here to follow-up on his blood pressure and sugar and is taking the but he is yet unable to get to the endocrinologist because of his other acute issues and specialist appointments wife who states she needs to take off and needs, he also needs refills on his medications for anxiety, sleep and chronic joint pain  Objective   Vital Signs:   /81   Pulse 51   Temp 97.5 °F (36.4 °C)   Resp 16   Ht 180.3 cm (70.98\")   Wt 108 kg (239 lb)   SpO2 98%   BMI 33.35 kg/m²     BMI is above normal parameters. Recommendations: educational material discussed/shared in after visit summary, exercise counseling/recommendations and nutrition counseling/recommendations       Physical Exam  Vitals and nursing note reviewed.   Constitutional:       General: He is not in acute distress.     Appearance: Normal appearance. He is not diaphoretic.   HENT:      Head: Normocephalic and atraumatic.      Right Ear: External ear normal.      Left Ear: External ear normal.      Nose: Nose normal.   Eyes:      Extraocular Movements: Extraocular movements intact.      Conjunctiva/sclera: Conjunctivae normal.   Neck:      Trachea: Trachea normal.   Cardiovascular:      Rate and Rhythm: Normal rate and regular rhythm.      Heart sounds: Normal heart sounds.   Pulmonary:      Effort: Pulmonary effort is normal. No respiratory distress.   Abdominal:      General: Abdomen is flat.   Musculoskeletal:         General: Deformity present.      Cervical back: Neck supple.      Comments: Moves all limbs   Skin:     General: Skin is warm and dry.      Findings: No erythema.   Neurological:      Mental Status: He is alert and oriented to person, place, and time.      Comments: No gross motor or sensory deficits        Result Review :                   Assessment and Plan  "   Diagnoses and all orders for this visit:    1. Benign essential hypertension (Primary)    2. Type 2 diabetes mellitus with hyperglycemia, with long-term current use of insulin (HCC)    3. Anxiety  -     ALPRAZolam (XANAX) 0.5 MG tablet; Take 1 tablet by mouth At Night As Needed for Anxiety.  Dispense: 30 tablet; Refill: 4    4. Insomnia, psychophysiological  -     ALPRAZolam (XANAX) 0.5 MG tablet; Take 1 tablet by mouth At Night As Needed for Anxiety.  Dispense: 30 tablet; Refill: 4    5. Lumbosacral pain  -     HYDROcodone-acetaminophen (NORCO) 7.5-325 MG per tablet; Take 1 tablet by mouth 2 (Two) Times a Day.  Dispense: 60 tablet; Refill: 0    Plan:  1.  Benign essential hypertension: Will continue current medication, low-sodium diet advised, Counseled to regularly check BP at home with goal averaging <130/80.   2.  Diabetes  Mellitus  : Reviewed   fasting CMP  and hba1c 11.6, diet and exercise counseled , Will continue current medications, advised to follow-up with endocrinology  3.  Insomnia: ANNAMARIA and Enoc reviewed and obtained and narcotic contract signed, refilled alprazolam  4.  Anxiety:ANNAMARIA and Enoc reviewed and obtained and narcotic contract signed, refilled alprazolam  5.  Lumbosacral pain: ANNAMARIA and Enoc reviewed and obtained and narcotic contract signed, refill Lortab  The patient has read and signed the [facility] Controlled Substance Contract.The patient is aware of the potential for addiction and dependence. This is a short-term use   A Enoc was reviewed  in the chart today. Narcotic contract was signed by patient today . Will obtain Urine drug screen  Enoc Report   As part of this patient's treatment plan I am prescribing controlled substances. The patient has been made aware of appropriate use of such medications, including potential risk of somnolence, limited ability to drive and /or work safely, and potential for dependence or overdose. It has also been made clear that these  medications are for use by this patient only, without concomitant use of alcohol or other substances unless prescribed.   Patient has completed prescribing agreement detailing terms of continued prescribing of controlled substances, including monitoring LAVELL reports, urine drug screening, and pill counts if necessary. The patient is aware that inappropriate use will result in cessation of prescribing such medications.   LAVELL report has been reviewed   History and physical exam exhibit continued safe and appropriate use of controlled substances. Patient is advised to start a comprehensive exercise program   The patient has been instructed to contact my office with any questions or difficulties. The patient understands the plan , patient has verbalized an understanding and agrees to proceed accordingly  Work excuse provided to wife, await [place] papers    I spent 30 minutes caring for Ramsey on this date of service. This time includes time spent by me in the following activities:preparing for the visit, reviewing tests, performing a medically appropriate examination and/or evaluation , counseling and educating the patient/family/caregiver, ordering medications, tests, or procedures and documenting information in the medical record  Follow Up   No follow-ups on file.  Patient was given instructions and counseling regarding his condition or for health maintenance advice. Please see specific information pulled into the AVS if appropriate.

## 2022-04-04 NOTE — TELEPHONE ENCOUNTER
Caller: Jina Riley    Relationship to patient: Emergency Contact    Best call back number: [phone]    Patient is needing: JINA STATED THAT PATIENT JUST LEFT OFFICE BUT WANTED TO GIVE MORE INFORMATION TO ROBI NURSE    PLEASE ADVISE

## 2022-04-25 ENCOUNTER — OFFICE VISIT (OUTPATIENT)
Dept: CARDIOLOGY | Facility: CLINIC | Age: 67
End: 2022-04-25

## 2022-04-25 VITALS
RESPIRATION RATE: 18 BRPM | BODY MASS INDEX: 32.2 KG/M2 | DIASTOLIC BLOOD PRESSURE: 60 MMHG | HEART RATE: 77 BPM | OXYGEN SATURATION: 98 % | SYSTOLIC BLOOD PRESSURE: 98 MMHG | HEIGHT: 71 IN | WEIGHT: 230 LBS

## 2022-04-25 DIAGNOSIS — I25.10 CORONARY ARTERY DISEASE INVOLVING NATIVE CORONARY ARTERY OF NATIVE HEART WITHOUT ANGINA PECTORIS: Primary | ICD-10-CM

## 2022-04-25 DIAGNOSIS — R06.83 SNORING: ICD-10-CM

## 2022-04-25 DIAGNOSIS — R40.0 DAYTIME SOMNOLENCE: ICD-10-CM

## 2022-04-25 DIAGNOSIS — I10 ESSENTIAL HYPERTENSION: ICD-10-CM

## 2022-04-25 DIAGNOSIS — R06.09 DOE (DYSPNEA ON EXERTION): ICD-10-CM

## 2022-04-25 DIAGNOSIS — I11.9 LVH (LEFT VENTRICULAR HYPERTROPHY) DUE TO HYPERTENSIVE DISEASE, WITHOUT HEART FAILURE: ICD-10-CM

## 2022-04-25 DIAGNOSIS — E78.2 MIXED HYPERLIPIDEMIA: ICD-10-CM

## 2022-04-25 DIAGNOSIS — I73.9 PAD (PERIPHERAL ARTERY DISEASE): ICD-10-CM

## 2022-04-25 PROCEDURE — 99214 OFFICE O/P EST MOD 30 MIN: CPT | Performed by: NURSE PRACTITIONER

## 2022-04-25 NOTE — PROGRESS NOTES
[facility] Follow Up Note    Ramsey Riley  [id number]  04/25/2022    Primary Care Provider: Margret Purvis MD   Referring Provider: No ref. provider found    Chief Complaint: Regular follow-up    History of Present Illness:   Mr. Ramsey Riley is a 67 y.o. male who presents to the Cardiology Clinic for regular follow-up.  The patient has a past medical history of coronary artery disease with previous three-vessel CABG, hypertension, left ventricular hypertrophy, peripheral artery disease, diabetes mellitus, and current snuff tobacco use.  He presents today for regular follow-up.  The patient reports a several month history of worsening shortness of breath.  The patient reports that this has limited his activity to the point where he needs assistance dressing himself and performing his activities of daily living.  He has been using a walker to get around, his wife states, but he also has a wheelchair at home.  He also reports marked fatigue and generalized weakness.  In addition, he reports a several month history of nausea and unintentional weight loss (12-14 pounds in the past couple months).  The patient states he does get hungry, but he just becomes nauseated when he eats.  He denies difficulty swallowing.  Finally, he reports bilateral leg pain and numbness.  A review of records demonstrates a HgA1c of 11.6 which was drawn last month.  The patient is a lifelong nonsmoker.  He denies any wounds or ulcerations to his legs or feet.  He continues to take his medications as prescribed.  He offers no other complaints or concerns at this time.       Review of Systems:   Review of Systems   Constitutional: Positive for fatigue and unexpected weight loss. Negative for activity change, chills, diaphoresis, fever and unexpected weight gain.   Eyes: Negative for blurred vision and visual disturbance.   Respiratory: Positive for shortness of breath. Negative for apnea,  cough, chest tightness and wheezing.    Cardiovascular: Negative for chest pain, palpitations and leg swelling.   Gastrointestinal: Positive for nausea. Negative for abdominal distention, blood in stool, GERD and indigestion.   Endocrine: Negative for cold intolerance and heat intolerance.   Genitourinary: Negative for hematuria.   Musculoskeletal: Negative for gait problem, joint swelling and myalgias.   Skin: Negative for color change, pallor and wound.   Neurological: Positive for weakness. Negative for dizziness, seizures, syncope, light-headedness, numbness, headache and confusion.   Hematological: Does not bruise/bleed easily.   Psychiatric/Behavioral: Negative for behavioral problems, sleep disturbance, suicidal ideas and depressed mood.     I have reviewed and confirmed the accuracy of the ROS as documented by the MA/LPN/RN BOUCHRA Colon    I have reviewed and/or updated the patient's past medical, past surgical, family, social history, problem list and allergies as appropriate.     Medications:     Current Outpatient Medications:   •  ALPRAZolam (XANAX) 0.5 MG tablet, Take 1 tablet by mouth At Night As Needed for Anxiety., Disp: 30 tablet, Rfl: 4  •  amLODIPine (NORVASC) 5 MG tablet, Take 1 tablet by mouth Daily., Disp: 90 tablet, Rfl: 2  •  aspirin 81 MG chewable tablet, Chew 81 mg Daily., Disp: , Rfl:   •  atorvastatin (LIPITOR) 40 MG tablet, Take 1 tablet by mouth Every Night., Disp: 90 tablet, Rfl: 2  •  citalopram (CeleXA) 40 MG tablet, Take 1 tablet by mouth Daily., Disp: 90 tablet, Rfl: 2  •  gabapentin (NEURONTIN) 300 MG capsule, Take 1 capsule by mouth 2 (Two) Times a Day., Disp: , Rfl: 1  •  glucose blood (Accu-Chek Caryn Plus) test strip, USE AS DIRECTED 3 TIMES DAILY, Disp: 100 each, Rfl: 12  •  glucose monitor monitoring kit, 1 each Daily., Disp: 1 each, Rfl: 1  •  glucose monitor monitoring kit, 1 each Daily., Disp: 1 each, Rfl: 1  •  HYDROcodone-acetaminophen (NORCO) 7.5-325 MG  "per tablet, Take 1 tablet by mouth 2 (Two) Times a Day., Disp: 60 tablet, Rfl: 0  •  insulin NPH-insulin regular (humuLIN 70/30,novoLIN 70/30) (70-30) 100 UNIT/ML injection, Inject 70 Units under the skin into the appropriate area as directed 2 (Two) Times a Day With Meals., Disp: 5 each, Rfl: 12  •  Insulin Syringes, Disposable, U-100 1 ML misc, 50 Units 3 (Three) Times a Day As Needed (50 units in the morning, and then with meals PRN)., Disp: 200 each, Rfl: 2  •  Lancets 30G misc, Check sugar 3 times daily, Disp: 30 each, Rfl: 12  •  levothyroxine (SYNTHROID, LEVOTHROID) 100 MCG tablet, Take 1 tablet by mouth Daily., Disp: 90 tablet, Rfl: 2  •  losartan (COZAAR) 100 MG tablet, Take 1 tablet by mouth Daily., Disp: 90 tablet, Rfl: 2  •  pantoprazole (PROTONIX) 40 MG EC tablet, TAKE 1 TABLET BY MOUTH DAILY., Disp: 90 tablet, Rfl: 3    Physical Exam:  Vital Signs:   Vitals:    04/25/22 1538   BP: 98/60   BP Location: Left arm   Patient Position: Sitting   Pulse: 77   Resp: 18   SpO2: 98%   Weight: 104 kg (230 lb)   Height: 180.3 cm (71\")     Body mass index is 32.08 kg/m².    Physical Exam  Vitals and nursing note reviewed.   Constitutional:       General: He is not in acute distress.     Appearance: He is well-developed. He is obese. He is ill-appearing.      Comments: Patient is using a bench walker   HENT:      Head: Normocephalic and atraumatic.   Eyes:      General: No scleral icterus.     Extraocular Movements: Extraocular movements intact.   Neck:      Trachea: Trachea normal.   Cardiovascular:      Rate and Rhythm: Normal rate and regular rhythm.      Pulses: Normal pulses.      Heart sounds: Normal heart sounds. No murmur heard.    No friction rub. No gallop.   Pulmonary:      Effort: Pulmonary effort is normal.      Breath sounds: Normal breath sounds. No wheezing, rhonchi or rales.   Abdominal:      Palpations: Abdomen is soft.      Tenderness: There is no abdominal tenderness.   Musculoskeletal:         " General: Normal range of motion.      Cervical back: Neck supple.      Right lower leg: Edema present.      Left lower leg: Edema present.      Comments: Trace to 1+ edema to BLE   Skin:     General: Skin is warm and dry.      Findings: No bruising, lesion or rash.   Neurological:      Mental Status: He is alert and oriented to person, place, and time.      Motor: Weakness present.   Psychiatric:         Mood and Affect: Mood normal.         Behavior: Behavior normal. Behavior is cooperative.         Thought Content: Thought content does not include suicidal ideation.         Results Review:   I reviewed the patient's new clinical results.      ECG 12 Lead    Date/Time: 4/27/2022 10:20 AM  Performed by: Farrah Mcdermott APRN  Authorized by: Farrah Mcdermott APRN   Comparison: compared with previous ECG from 1/4/2021  Rhythm: sinus rhythm  Rate: normal  BPM: 74  QRS axis: normal  Other findings: non-specific ST-T wave changes and left ventricular hypertrophy    Clinical impression: abnormal EKG        Assessment / Plan:     1. Coronary artery disease involving native coronary artery of native heart without angina pectoris (Primary)  --Currently denies chest pain, but dyspnea on exertion is concerning for anginal equivalent  --EKG today shows sinus rhythm  --1/21, Normal MPS  --Plan for 2-day vasodilator nuclear stress test  --Continue daily low-dose aspirin  --Continue statin  --Follow-up with Dr. Whittaker in 1 month or sooner if needed    2. Essential hypertension  --Mildly hypotensive, but appears asymptomatic with this    3. LVH (left ventricular hypertrophy) due to hypertensive disease, without heart failure  --1/21, Estimated left ventricular EF = 66% and grade I diastolic dysfunction  --Patient reports debilitating shortness of breath  --Plan for echocardiogram to reassess systolic and diastolic function     4. Hyperlipidemia  --3/22, , triglycerides 206, HDL 47, LDL 95  --LDL goal <  70  --Continue statin and consider up-titration on follow-up if patient can tolerate it    5. PAD (peripheral artery disease) (HCC)  --No evidence of critical limb ischemia    6. History of blood clots?  --Previously anticoagulated with Eliquis, but patient states another provider discontinued this because it was no longer needed    7. At risk for sleep apnea  --BMI 32 kg/m²  --Daytime somnolence and snoring  --Referral to Dr. Brown for sleep evaluation      Preventative Cardiology:   Tobacco Cessation: Cessation Counseling Provided    Advance Care Planning: ACP discussion was declined by the patient. Patient does not have an advance directive, declines further assistance.     Follow Up:   Return in about 1 month (around 5/25/2022) for Follow-up with Dr. Whittaker.      Thank you for allowing me to participate in the care of your patient. Please to not hesitate to contact me with additional questions or concerns.     Farrah Mcdermott APRN

## 2022-04-27 PROCEDURE — 93000 ELECTROCARDIOGRAM COMPLETE: CPT | Performed by: NURSE PRACTITIONER

## 2022-05-09 ENCOUNTER — APPOINTMENT (OUTPATIENT)
Dept: NUCLEAR MEDICINE | Facility: HOSPITAL | Age: 67
End: 2022-05-09

## 2022-05-09 ENCOUNTER — HOSPITAL ENCOUNTER (OUTPATIENT)
Dept: NUCLEAR MEDICINE | Facility: HOSPITAL | Age: 67
End: 2022-05-09

## 2022-05-10 ENCOUNTER — APPOINTMENT (OUTPATIENT)
Dept: NUCLEAR MEDICINE | Facility: HOSPITAL | Age: 67
End: 2022-05-10

## 2022-05-10 DIAGNOSIS — M54.50 LUMBOSACRAL PAIN: ICD-10-CM

## 2022-05-10 NOTE — TELEPHONE ENCOUNTER
Rx Refill Note  Requested Prescriptions     Pending Prescriptions Disp Refills   • HYDROcodone-acetaminophen (NORCO) 7.5-325 MG per tablet 60 tablet 0     Sig: Take 1 tablet by mouth 2 (Two) Times a Day.      Last office visit with prescribing clinician: 4/4/2022      Next office visit with prescribing clinician: 5/23/2022            RAN GRAHAM MA  05/10/22, 11:15 EDT     CSA: 2/10/2022  UDS: 2/11/2022

## 2022-05-10 NOTE — TELEPHONE ENCOUNTER
Caller: Jina Riley    Relationship: Emergency Contact    Best call back number: [phone]    Requested Prescriptions:   Requested Prescriptions     Pending Prescriptions Disp Refills   • HYDROcodone-acetaminophen (NORCO) 7.5-325 MG per tablet 60 tablet 0     Sig: Take 1 tablet by mouth 2 (Two) Times a Day.        Pharmacy where request should be sent: [address] [phone] [facility] [phone] FX     Additional details provided by patient: [facility] HAS NO DAYS LEFT     Does the patient have less than a 3 day supply:  [x] Yes  [] No    Neal Jhaveri Rep   05/10/22 10:53 EDT

## 2022-05-11 RX ORDER — HYDROCODONE BITARTRATE AND ACETAMINOPHEN 7.5; 325 MG/1; MG/1
1 TABLET ORAL 2 TIMES DAILY
Qty: 60 TABLET | Refills: 0 | Status: SHIPPED | OUTPATIENT
Start: 2022-05-11 | End: 2022-06-16 | Stop reason: SDUPTHER

## 2022-06-01 ENCOUNTER — OFFICE VISIT (OUTPATIENT)
Dept: NEUROLOGY | Facility: CLINIC | Age: 67
End: 2022-06-01

## 2022-06-01 VITALS
DIASTOLIC BLOOD PRESSURE: 60 MMHG | HEART RATE: 52 BPM | TEMPERATURE: 97.1 F | HEIGHT: 71 IN | BODY MASS INDEX: 32.9 KG/M2 | WEIGHT: 235 LBS | SYSTOLIC BLOOD PRESSURE: 122 MMHG | OXYGEN SATURATION: 98 %

## 2022-06-01 DIAGNOSIS — R26.9 GAIT DIFFICULTY: ICD-10-CM

## 2022-06-01 DIAGNOSIS — G31.84 MILD COGNITIVE IMPAIRMENT WITH MEMORY LOSS: Primary | ICD-10-CM

## 2022-06-01 DIAGNOSIS — M48.02 NEURAL FORAMINAL STENOSIS OF CERVICAL SPINE: ICD-10-CM

## 2022-06-01 PROCEDURE — 99214 OFFICE O/P EST MOD 30 MIN: CPT | Performed by: NURSE PRACTITIONER

## 2022-06-01 RX ORDER — LATANOPROST 50 UG/ML
SOLUTION/ DROPS OPHTHALMIC
COMMUNITY
Start: 2022-04-28 | End: 2022-06-22 | Stop reason: HOSPADM

## 2022-06-01 RX ORDER — DONEPEZIL HYDROCHLORIDE 5 MG/1
5 TABLET, FILM COATED ORAL NIGHTLY
Qty: 30 TABLET | Refills: 0 | Status: SHIPPED | OUTPATIENT
Start: 2022-06-01 | End: 2023-06-01

## 2022-06-01 RX ORDER — CLOPIDOGREL BISULFATE 75 MG/1
75 TABLET ORAL DAILY
COMMUNITY
Start: 2022-05-18 | End: 2022-08-24

## 2022-06-01 RX ORDER — DONEPEZIL HYDROCHLORIDE 10 MG/1
10 TABLET, FILM COATED ORAL NIGHTLY
Qty: 90 TABLET | Refills: 3 | Status: SHIPPED | OUTPATIENT
Start: 2022-06-29 | End: 2022-06-22 | Stop reason: HOSPADM

## 2022-06-01 NOTE — PROGRESS NOTES
Follow Up Neurology Office Visit      Patient Name: Ramsey Riley    Referring Physician: No ref. provider found    Chief Complaint:    Chief Complaint   Patient presents with   • Follow-up     Pt in office to follow up on memory loss       History of Present Illness: Ramsey Riley is a 67 y.o. male who is here to follow up with Neurology for cognitive impairment.  He is accompanied by his wife today.  'He just stays confused a lot'    He has had several blood clots, in his lungs and his legs. Stopped Eliquis in March. Has poorly controlled DM, on ASA and Plavix d/t     No longer taking gabapentin. Takes Xanax every other night. Also taking Norco prn d/t back pain.   Also endorses chronic insomnia. Admits to sleeping during daytime for long stretches due to difficulty falling asleep at night.     The following portions of the patient's history were reviewed and updated as appropriate: allergies, current medications, past family history, past medical history, past social history, past surgical history and problem list.    Subjective     Review of Systems:   Review of Systems   Musculoskeletal: Positive for gait problem.   Neurological: Positive for memory problem and confusion.   Psychiatric/Behavioral: Positive for behavioral problems, decreased concentration and sleep disturbance.   All other systems reviewed and are negative.    Medications:     Current Outpatient Medications:   •  ALPRAZolam (XANAX) 0.5 MG tablet, Take 1 tablet by mouth At Night As Needed for Anxiety., Disp: 30 tablet, Rfl: 4  •  amLODIPine (NORVASC) 5 MG tablet, Take 1 tablet by mouth Daily., Disp: 90 tablet, Rfl: 2  •  aspirin 81 MG chewable tablet, Chew 81 mg Daily., Disp: , Rfl:   •  atorvastatin (LIPITOR) 40 MG tablet, Take 1 tablet by mouth Every Night., Disp: 90 tablet, Rfl: 2  •  citalopram (CeleXA) 40 MG tablet, Take 1 tablet by mouth Daily., Disp: 90 tablet, Rfl: 2  •  clopidogrel (PLAVIX) 75 MG tablet, Take 75 mg by mouth  "Daily., Disp: , Rfl:   •  glucose blood (Accu-Chek Caryn Plus) test strip, USE AS DIRECTED 3 TIMES DAILY, Disp: 100 each, Rfl: 12  •  glucose monitor monitoring kit, 1 each Daily., Disp: 1 each, Rfl: 1  •  glucose monitor monitoring kit, 1 each Daily., Disp: 1 each, Rfl: 1  •  HYDROcodone-acetaminophen (NORCO) 7.5-325 MG per tablet, Take 1 tablet by mouth 2 (Two) Times a Day., Disp: 60 tablet, Rfl: 0  •  insulin NPH-insulin regular (humuLIN 70/30,novoLIN 70/30) (70-30) 100 UNIT/ML injection, Inject 70 Units under the skin into the appropriate area as directed 2 (Two) Times a Day With Meals., Disp: 5 each, Rfl: 12  •  Insulin Syringes, Disposable, U-100 1 ML misc, 50 Units 3 (Three) Times a Day As Needed (50 units in the morning, and then with meals PRN)., Disp: 200 each, Rfl: 2  •  Lancets 30G misc, Check sugar 3 times daily, Disp: 30 each, Rfl: 12  •  latanoprost (XALATAN) 0.005 % ophthalmic solution, USE 1 DROP IN THE AFFECTED EYE AT BEDTIME, Disp: , Rfl:   •  levothyroxine (SYNTHROID, LEVOTHROID) 100 MCG tablet, Take 1 tablet by mouth Daily., Disp: 90 tablet, Rfl: 2  •  losartan (COZAAR) 100 MG tablet, Take 1 tablet by mouth Daily., Disp: 90 tablet, Rfl: 2  •  pantoprazole (PROTONIX) 40 MG EC tablet, TAKE 1 TABLET BY MOUTH DAILY., Disp: 90 tablet, Rfl: 3  •  [START ON 6/29/2022] donepezil (Aricept) 10 MG tablet, Take 1 tablet by mouth Every Night., Disp: 90 tablet, Rfl: 3  •  donepezil (Aricept) 5 MG tablet, Take 1 tablet by mouth Every Night., Disp: 30 tablet, Rfl: 0    Allergies:   No Known Allergies    Objective     Physical Exam:  Vital Signs:   Vitals:    06/01/22 1548   BP: 122/60   Pulse: 52   Temp: 97.1 °F (36.2 °C)   SpO2: 98%   Weight: 107 kg (235 lb)   Height: 180.3 cm (71\")   PainSc: 0-No pain     Physical Exam  Vitals and nursing note reviewed. Exam conducted with a chaperone present (wife).   Neck:      Vascular: No carotid bruit.   Cardiovascular:      Rate and Rhythm: Regular rhythm.      Heart " sounds: Normal heart sounds.   Pulmonary:      Effort: Pulmonary effort is normal.      Breath sounds: Normal breath sounds.   Skin:     General: Skin is warm.   Neurological:      Mental Status: He is oriented to person, place, and time.      Coordination: Romberg Test abnormal. Heel to Page Test normal.      Gait: Tandem walk abnormal.      Deep Tendon Reflexes: Strength normal.   Psychiatric:         Mood and Affect: Mood normal.         Speech: Speech normal.         Behavior: Behavior normal.       Neurologic Exam     Mental Status   Oriented to person, place, and time.   Disoriented to city.   Disoriented to year, month, date, day and season.   Registration: recalls 3 of 3 objects. Recall at 5 minutes: recalls 2 of 3 objects. Follows 3 step commands.   Speech: speech is normal   Level of consciousness: alert  Knowledge: consistent with education. Unable to perform simple calculations: did not attempt.   Able to name object. Able to read. Able to repeat. Able to write ('I went to doctor today'). Normal comprehension.     Cranial Nerves   Cranial nerves II through XII intact.     Motor Exam   Muscle bulk: normal  Overall muscle tone: normal  Right arm pronator drift: absent  Left arm pronator drift: absent    Strength   Strength 5/5 throughout.     Sensory Exam   Light touch normal.   Right leg vibration: decreased from ankle  Left leg vibration: decreased from ankle    Gait, Coordination, and Reflexes     Gait  Gait: (antalgic d/t back pain)    Coordination   Romberg: positive  Heel to shin coordination: normal  Tandem walking coordination: abnormal    Tremor   Resting tremor: absent    CT HEAD WO CONTRAST-   HISTORY: memory loss; F09-Unspecified mental disorder due to known  physiological condition     TECHNIQUE: Noncontrast exam     FINDINGS:  Mild atrophy and chronic ischemic white matter changes are  noted. Chronic lacunar infarct is seen of the right basal ganglia.     No cortical edema is present. There  is no mass or hemorrhage. Ventricles  are normal.     Bone windows show no skull fracture or obvious destructive lesion.     IMPRESSION:  1. No acute intracranial abnormality or obvious mass.   2. Atrophy and chronic ischemic white matter changes as above.   3. Interval development of a lacunar infarct of the right basal ganglia,  chronic in appearance.    MRI CERVICAL SPINE WO CONTRAST-   HISTORY: pain; M54.2-Cervicalgia     COMPARISON: None.     TECHNIQUE: Multiplanar multisequence imaging of the cervical spine was  performed without intravenous contrast.     FINDINGS: The cervical vertebral bodies maintain a normal height,  alignment and signal intensity. The posterior elements are intact.      At the C2/3 level there is mild right facet arthropathy, however there  is no significant spinal stenosis or neural foraminal narrowing.     At the C3/4 level there is a posterior disc osteophyte complex and facet  arthropathy producing moderate to severe central stenosis and mild  bilateral neural foraminal narrowing.     At the C4/5 level there is a posterior disc osteophyte complex and facet  arthropathy which produces mild central stenosis, mild left neural  foraminal narrowing and moderate right neural foraminal narrowing.     At the C5/6 level there is a posterior disc osteophyte complex and facet  arthropathy which produces moderate central stenosis and moderate  bilateral neural foraminal narrowing.     At the C6/7 level there is a posterior disc osteophyte complex and facet  arthropathy which produces moderate central stenosis and severe left  neural foraminal narrowing.     The cervical portions of the spinal cord maintains a normal caliber and  signal intensity. The visualized posterior fossa is normal. The  paraspinal soft tissues are unremarkable.     IMPRESSION:  Multilevel degenerative change as discussed above.    Results Review:   I reviewed the patient's new clinical results.  I have reviewed the  patient's other medical records to include, labs, radiology and referrals.     Assessment / Plan      Assessment/Plan:   Diagnoses and all orders for this visit:    1. Mild cognitive impairment with memory loss (Primary)  -     donepezil (Aricept) 5 MG tablet; Take 1 tablet by mouth Every Night.  Dispense: 30 tablet; Refill: 0  -     donepezil (Aricept) 10 MG tablet; Take 1 tablet by mouth Every Night.  Dispense: 90 tablet; Refill: 3  -     MRI Brain Without Contrast; Future    2. Neural foraminal stenosis of cervical spine  -     Ambulatory Referral to Neurosurgery  -     Ambulatory Referral to Physical Therapy Evaluate and treat    3. Gait difficulty  -     Ambulatory Referral to Physical Therapy Evaluate and treat    Patient has been experiencing increasing difficulty with cognition since previous visit, I have discussed with him that I believe it is time to initiate medication and patient and his wife agree.  We reviewed dosing schedule for donepezil.  He has multiple vascular risk factors including hyperlipidemia, diabetes, and hypertension, I have ordered brain MRI upon consideration of remote infarct as well as other vascular or structural abnormalities which may be contributing to cognitive decline.    He is complaining of difficulty with walking and balance today, previously had C-spine MRI which did show significant multilevel degenerative changes as well as moderate canal stenosis, I have referred him to our neurosurgery team for further evaluation and possible treatment.  We discussed that his diabetic neuropathy may also be contributing to his difficulties with gait and balance, and I have referred him to physical therapy for evaluation and treatment.    Follow Up:   Return in about 3 months (around 9/1/2022).     BOUCHRA Lopez  [facility]   AS THE PROVIDER, I PERSONALLY WORE PPE DURING ENTIRE FACE TO FACE ENCOUNTER IN CLINIC WITH THE PATIENT. PATIENT ALSO WORE PPE DURING  ENTIRE FACE TO FACE ENCOUNTER EXCEPT FOR A MAX OF 30 SECONDS DURING NEUROLOGICAL EVALUATION OF CRANIAL NERVES AND THEN MASK WAS PLACED BACK OVER PATIENT FACE FOR REMAINDER OF VISIT. I WASHED MY HANDS BEFORE AND AFTER VISIT.    Please note that portions of this note may have been completed with a voice recognition program. Efforts were made to edit the dictations, but occasionally words are mistranscribed.

## 2022-06-02 ENCOUNTER — TELEPHONE (OUTPATIENT)
Dept: UROLOGY | Facility: CLINIC | Age: 67
End: 2022-06-02

## 2022-06-02 NOTE — TELEPHONE ENCOUNTER
Caller: AISLINN LOPEZ      Relationship: WIFE    Best call back number: [phone]    What is your medical concern? PT WIFE CALLED SAME DAY CANCELLATION, WOULD ALSO LIKE TO SPEAK TO THE NURSE RE: MEDICAL QUESTIONS.

## 2022-06-09 ENCOUNTER — TELEPHONE (OUTPATIENT)
Dept: UROLOGY | Facility: CLINIC | Age: 67
End: 2022-06-09

## 2022-06-09 NOTE — TELEPHONE ENCOUNTER
Caller: AISLINN LOPEZ    Relationship: WIFE    Best call back number: [phone]    What is the best time to reach you: ANYTIME    Who are you requesting to speak with (clinical staff, provider,  specific staff member): CLINICAL     Do you know the name of the person who called:     What was the call regarding: RECEIVED A CALL FORM PT'S WIFE MS. LOPEZ. SHE INFORMED THAT PT NEEDS TO BE SHOWN HOW TO USE THE 0.3ML OF INJECTION WITH A LARGER VIAL. PT WOULD NEED A REFILL FOR THE FOLLOW UP APPT ON 7/25/22 OR SOONER.

## 2022-06-09 NOTE — TELEPHONE ENCOUNTER
I left VM with pt's wife to let her know we called in refill of Trimix and to call us back with instructions on larger vial.  Please forward to clinical staff when patients call back.

## 2022-06-14 ENCOUNTER — APPOINTMENT (OUTPATIENT)
Dept: NUCLEAR MEDICINE | Facility: HOSPITAL | Age: 67
End: 2022-06-14

## 2022-06-14 ENCOUNTER — HOSPITAL ENCOUNTER (OUTPATIENT)
Dept: NUCLEAR MEDICINE | Facility: HOSPITAL | Age: 67
End: 2022-06-14

## 2022-06-15 ENCOUNTER — APPOINTMENT (OUTPATIENT)
Dept: NUCLEAR MEDICINE | Facility: HOSPITAL | Age: 67
End: 2022-06-15

## 2022-06-16 DIAGNOSIS — M54.50 LUMBOSACRAL PAIN: ICD-10-CM

## 2022-06-16 RX ORDER — HYDROCODONE BITARTRATE AND ACETAMINOPHEN 7.5; 325 MG/1; MG/1
1 TABLET ORAL 2 TIMES DAILY
Qty: 60 TABLET | Refills: 0 | Status: SHIPPED | OUTPATIENT
Start: 2022-06-16 | End: 2022-07-26 | Stop reason: SDUPTHER

## 2022-06-16 NOTE — TELEPHONE ENCOUNTER
Caller: Jina Riley    Relationship: Emergency Contact    Best call back number: [phone]    Requested Prescriptions:   Requested Prescriptions     Pending Prescriptions Disp Refills   • HYDROcodone-acetaminophen (NORCO) 7.5-325 MG per tablet 60 tablet 0     Sig: Take 1 tablet by mouth 2 (Two) Times a Day.        Pharmacy where request should be sent: [address] [phone] [facility] [phone] [facility]     Additional details provided by patient: WIFE OF PATIENT HAS CALLED REQUESTING A REFILL ON ABOVE MEDICATION    Does the patient have less than a 3 day supply:  [x] Yes  [] No    Chrissy Singh   06/16/22 10:36 EDT

## 2022-06-21 ENCOUNTER — APPOINTMENT (OUTPATIENT)
Dept: GENERAL RADIOLOGY | Facility: HOSPITAL | Age: 67
End: 2022-06-21

## 2022-06-21 ENCOUNTER — APPOINTMENT (OUTPATIENT)
Dept: CT IMAGING | Facility: HOSPITAL | Age: 67
End: 2022-06-21

## 2022-06-21 ENCOUNTER — HOSPITAL ENCOUNTER (OUTPATIENT)
Facility: HOSPITAL | Age: 67
Setting detail: OBSERVATION
Discharge: HOME | End: 2022-06-22
Attending: EMERGENCY MEDICINE | Admitting: FAMILY MEDICINE

## 2022-06-21 ENCOUNTER — HOSPITAL ENCOUNTER (OUTPATIENT)
Dept: MRI IMAGING | Facility: HOSPITAL | Age: 67
Discharge: HOME | End: 2022-06-21

## 2022-06-21 DIAGNOSIS — G31.84 MILD COGNITIVE IMPAIRMENT WITH MEMORY LOSS: ICD-10-CM

## 2022-06-21 DIAGNOSIS — I63.9 ISCHEMIC STROKE: Primary | ICD-10-CM

## 2022-06-21 DIAGNOSIS — I67.850 CEREBRAL AUTOSOMAL DOMINANT ARTERIOPATHY WITH SUBCORTICAL INFARCTS AND LEUKOENCEPHALOPATHY: ICD-10-CM

## 2022-06-21 LAB
ALBUMIN SERPL-MCNC: 3.7 G/DL (ref 3.5–5.2)
ALBUMIN/GLOB SERPL: 1.7 G/DL
ALP SERPL-CCNC: 88 U/L (ref 39–117)
ALT SERPL W P-5'-P-CCNC: 22 U/L (ref 1–41)
ANION GAP SERPL CALCULATED.3IONS-SCNC: 12.6 MMOL/L (ref 5–15)
AST SERPL-CCNC: 23 U/L (ref 1–40)
BACTERIA UR QL AUTO: ABNORMAL /HPF
BASOPHILS # BLD AUTO: 0.02 10*3/MM3 (ref 0–0.2)
BASOPHILS NFR BLD AUTO: 0.4 % (ref 0–1.5)
BILIRUB SERPL-MCNC: 0.4 MG/DL (ref 0–1.2)
BILIRUB UR QL STRIP: NEGATIVE
BUN SERPL-MCNC: 18 MG/DL (ref 8–23)
BUN/CREAT SERPL: 15.7 (ref 7–25)
CALCIUM SPEC-SCNC: 9 MG/DL (ref 8.6–10.5)
CHLORIDE SERPL-SCNC: 99 MMOL/L (ref 98–107)
CLARITY UR: CLEAR
CO2 SERPL-SCNC: 22.4 MMOL/L (ref 22–29)
COLOR UR: YELLOW
CREAT SERPL-MCNC: 1.15 MG/DL (ref 0.76–1.27)
DEPRECATED RDW RBC AUTO: 40.2 FL (ref 37–54)
EGFRCR SERPLBLD CKD-EPI 2021: 70.6 ML/MIN/1.73
EOSINOPHIL # BLD AUTO: 0.16 10*3/MM3 (ref 0–0.4)
EOSINOPHIL NFR BLD AUTO: 3.3 % (ref 0.3–6.2)
ERYTHROCYTE [DISTWIDTH] IN BLOOD BY AUTOMATED COUNT: 12.4 % (ref 12.3–15.4)
GLOBULIN UR ELPH-MCNC: 2.2 GM/DL
GLUCOSE BLDC GLUCOMTR-MCNC: 243 MG/DL (ref 70–130)
GLUCOSE SERPL-MCNC: 304 MG/DL (ref 65–99)
GLUCOSE UR STRIP-MCNC: ABNORMAL MG/DL
HBA1C MFR BLD: 10.4 % (ref 4.8–5.6)
HCT VFR BLD AUTO: 39.4 % (ref 37.5–51)
HGB BLD-MCNC: 13.5 G/DL (ref 13–17.7)
HGB UR QL STRIP.AUTO: NEGATIVE
HYALINE CASTS UR QL AUTO: ABNORMAL /LPF
IMM GRANULOCYTES # BLD AUTO: 0.02 10*3/MM3 (ref 0–0.05)
IMM GRANULOCYTES NFR BLD AUTO: 0.4 % (ref 0–0.5)
KETONES UR QL STRIP: NEGATIVE
LEUKOCYTE ESTERASE UR QL STRIP.AUTO: NEGATIVE
LYMPHOCYTES # BLD AUTO: 1.68 10*3/MM3 (ref 0.7–3.1)
LYMPHOCYTES NFR BLD AUTO: 34.5 % (ref 19.6–45.3)
MCH RBC QN AUTO: 30.2 PG (ref 26.6–33)
MCHC RBC AUTO-ENTMCNC: 34.3 G/DL (ref 31.5–35.7)
MCV RBC AUTO: 88.1 FL (ref 79–97)
MONOCYTES # BLD AUTO: 0.41 10*3/MM3 (ref 0.1–0.9)
MONOCYTES NFR BLD AUTO: 8.4 % (ref 5–12)
MUCOUS THREADS URNS QL MICRO: ABNORMAL /HPF
NEUTROPHILS NFR BLD AUTO: 2.58 10*3/MM3 (ref 1.7–7)
NEUTROPHILS NFR BLD AUTO: 53 % (ref 42.7–76)
NITRITE UR QL STRIP: NEGATIVE
NRBC BLD AUTO-RTO: 0 /100 WBC (ref 0–0.2)
NT-PROBNP SERPL-MCNC: 134.4 PG/ML (ref 0–900)
PH UR STRIP.AUTO: 6 [PH] (ref 5–8)
PLATELET # BLD AUTO: 136 10*3/MM3 (ref 140–450)
PMV BLD AUTO: 10.5 FL (ref 6–12)
POTASSIUM SERPL-SCNC: 4 MMOL/L (ref 3.5–5.2)
PROT SERPL-MCNC: 5.9 G/DL (ref 6–8.5)
PROT UR QL STRIP: ABNORMAL
RBC # BLD AUTO: 4.47 10*6/MM3 (ref 4.14–5.8)
RBC # UR STRIP: ABNORMAL /HPF
REF LAB TEST METHOD: ABNORMAL
SARS-COV-2 RNA PNL SPEC NAA+PROBE: NOT DETECTED
SODIUM SERPL-SCNC: 134 MMOL/L (ref 136–145)
SP GR UR STRIP: >=1.03 (ref 1–1.03)
SQUAMOUS #/AREA URNS HPF: ABNORMAL /HPF
TROPONIN T SERPL-MCNC: <0.01 NG/ML (ref 0–0.03)
TSH SERPL DL<=0.05 MIU/L-ACNC: 5.41 UIU/ML (ref 0.27–4.2)
UROBILINOGEN UR QL STRIP: ABNORMAL
WBC # UR STRIP: ABNORMAL /HPF
WBC NRBC COR # BLD: 4.87 10*3/MM3 (ref 3.4–10.8)

## 2022-06-21 PROCEDURE — 25010000002 ENOXAPARIN PER 10 MG: Performed by: FAMILY MEDICINE

## 2022-06-21 PROCEDURE — 70498 CT ANGIOGRAPHY NECK: CPT

## 2022-06-21 PROCEDURE — G0378 HOSPITAL OBSERVATION PER HR: HCPCS

## 2022-06-21 PROCEDURE — C9803 HOPD COVID-19 SPEC COLLECT: HCPCS

## 2022-06-21 PROCEDURE — 99285 EMERGENCY DEPT VISIT HI MDM: CPT

## 2022-06-21 PROCEDURE — 81001 URINALYSIS AUTO W/SCOPE: CPT | Performed by: EMERGENCY MEDICINE

## 2022-06-21 PROCEDURE — 99220 PR INITIAL OBSERVATION CARE/DAY 70 MINUTES: CPT | Performed by: FAMILY MEDICINE

## 2022-06-21 PROCEDURE — 93005 ELECTROCARDIOGRAM TRACING: CPT | Performed by: EMERGENCY MEDICINE

## 2022-06-21 PROCEDURE — 83880 ASSAY OF NATRIURETIC PEPTIDE: CPT | Performed by: EMERGENCY MEDICINE

## 2022-06-21 PROCEDURE — 96372 THER/PROPH/DIAG INJ SC/IM: CPT

## 2022-06-21 PROCEDURE — 85025 COMPLETE CBC W/AUTO DIFF WBC: CPT | Performed by: EMERGENCY MEDICINE

## 2022-06-21 PROCEDURE — 84484 ASSAY OF TROPONIN QUANT: CPT | Performed by: EMERGENCY MEDICINE

## 2022-06-21 PROCEDURE — 84443 ASSAY THYROID STIM HORMONE: CPT | Performed by: FAMILY MEDICINE

## 2022-06-21 PROCEDURE — 87635 SARS-COV-2 COVID-19 AMP PRB: CPT | Performed by: FAMILY MEDICINE

## 2022-06-21 PROCEDURE — 71045 X-RAY EXAM CHEST 1 VIEW: CPT

## 2022-06-21 PROCEDURE — 80053 COMPREHEN METABOLIC PANEL: CPT | Performed by: EMERGENCY MEDICINE

## 2022-06-21 PROCEDURE — 70496 CT ANGIOGRAPHY HEAD: CPT

## 2022-06-21 PROCEDURE — 82962 GLUCOSE BLOOD TEST: CPT

## 2022-06-21 PROCEDURE — 63710000001 INSULIN DETEMIR PER 5 UNITS: Performed by: FAMILY MEDICINE

## 2022-06-21 PROCEDURE — 83036 HEMOGLOBIN GLYCOSYLATED A1C: CPT | Performed by: FAMILY MEDICINE

## 2022-06-21 PROCEDURE — 70551 MRI BRAIN STEM W/O DYE: CPT

## 2022-06-21 PROCEDURE — 25010000002 IOPAMIDOL 61 % SOLUTION: Performed by: EMERGENCY MEDICINE

## 2022-06-21 RX ORDER — CLOPIDOGREL BISULFATE 75 MG/1
75 TABLET ORAL DAILY
Status: DISCONTINUED | OUTPATIENT
Start: 2022-06-22 | End: 2022-06-22 | Stop reason: HOSPADM

## 2022-06-21 RX ORDER — HYDROCODONE BITARTRATE AND ACETAMINOPHEN 7.5; 325 MG/1; MG/1
1 TABLET ORAL 2 TIMES DAILY PRN
Status: DISCONTINUED | OUTPATIENT
Start: 2022-06-21 | End: 2022-06-22 | Stop reason: HOSPADM

## 2022-06-21 RX ORDER — AMLODIPINE BESYLATE 5 MG/1
5 TABLET ORAL DAILY
Status: DISCONTINUED | OUTPATIENT
Start: 2022-06-22 | End: 2022-06-22 | Stop reason: HOSPADM

## 2022-06-21 RX ORDER — ENOXAPARIN SODIUM 100 MG/ML
60 INJECTION SUBCUTANEOUS EVERY 24 HOURS
Status: DISCONTINUED | OUTPATIENT
Start: 2022-06-21 | End: 2022-06-22 | Stop reason: HOSPADM

## 2022-06-21 RX ORDER — ACETAMINOPHEN 325 MG/1
650 TABLET ORAL EVERY 4 HOURS PRN
Status: DISCONTINUED | OUTPATIENT
Start: 2022-06-21 | End: 2022-06-22 | Stop reason: HOSPADM

## 2022-06-21 RX ORDER — ACETAMINOPHEN 650 MG/1
650 SUPPOSITORY RECTAL EVERY 4 HOURS PRN
Status: DISCONTINUED | OUTPATIENT
Start: 2022-06-21 | End: 2022-06-22 | Stop reason: HOSPADM

## 2022-06-21 RX ORDER — ATORVASTATIN CALCIUM 40 MG/1
40 TABLET, FILM COATED ORAL NIGHTLY
Status: DISCONTINUED | OUTPATIENT
Start: 2022-06-21 | End: 2022-06-22 | Stop reason: HOSPADM

## 2022-06-21 RX ORDER — DEXTROSE MONOHYDRATE 25 G/50ML
25 INJECTION, SOLUTION INTRAVENOUS
Status: DISCONTINUED | OUTPATIENT
Start: 2022-06-21 | End: 2022-06-22 | Stop reason: HOSPADM

## 2022-06-21 RX ORDER — CITALOPRAM 20 MG/1
40 TABLET ORAL DAILY
Status: DISCONTINUED | OUTPATIENT
Start: 2022-06-22 | End: 2022-06-22 | Stop reason: HOSPADM

## 2022-06-21 RX ORDER — ASPIRIN 81 MG/1
81 TABLET, CHEWABLE ORAL DAILY
Status: DISCONTINUED | OUTPATIENT
Start: 2022-06-22 | End: 2022-06-22

## 2022-06-21 RX ORDER — PANTOPRAZOLE SODIUM 40 MG/1
40 TABLET, DELAYED RELEASE ORAL DAILY
Status: DISCONTINUED | OUTPATIENT
Start: 2022-06-22 | End: 2022-06-22 | Stop reason: HOSPADM

## 2022-06-21 RX ORDER — NICOTINE POLACRILEX 4 MG
1 LOZENGE BUCCAL
Status: DISCONTINUED | OUTPATIENT
Start: 2022-06-21 | End: 2022-06-22 | Stop reason: HOSPADM

## 2022-06-21 RX ORDER — INSULIN ASPART 100 [IU]/ML
0-14 INJECTION, SOLUTION INTRAVENOUS; SUBCUTANEOUS
Status: DISCONTINUED | OUTPATIENT
Start: 2022-06-22 | End: 2022-06-22 | Stop reason: HOSPADM

## 2022-06-21 RX ORDER — SODIUM CHLORIDE 0.9 % (FLUSH) 0.9 %
10 SYRINGE (ML) INJECTION EVERY 12 HOURS SCHEDULED
Status: DISCONTINUED | OUTPATIENT
Start: 2022-06-21 | End: 2022-06-22 | Stop reason: HOSPADM

## 2022-06-21 RX ORDER — ASPIRIN 81 MG/1
324 TABLET, CHEWABLE ORAL ONCE
Status: COMPLETED | OUTPATIENT
Start: 2022-06-21 | End: 2022-06-21

## 2022-06-21 RX ORDER — ALPRAZOLAM 0.5 MG/1
0.5 TABLET ORAL NIGHTLY PRN
Status: DISCONTINUED | OUTPATIENT
Start: 2022-06-21 | End: 2022-06-22 | Stop reason: HOSPADM

## 2022-06-21 RX ORDER — SODIUM CHLORIDE 0.9 % (FLUSH) 0.9 %
10 SYRINGE (ML) INJECTION AS NEEDED
Status: DISCONTINUED | OUTPATIENT
Start: 2022-06-21 | End: 2022-06-22 | Stop reason: HOSPADM

## 2022-06-21 RX ORDER — LEVOTHYROXINE SODIUM 0.1 MG/1
100 TABLET ORAL DAILY
Status: DISCONTINUED | OUTPATIENT
Start: 2022-06-22 | End: 2022-06-22 | Stop reason: HOSPADM

## 2022-06-21 RX ADMIN — INSULIN DETEMIR 20 UNITS: 100 INJECTION, SOLUTION SUBCUTANEOUS at 23:25

## 2022-06-21 RX ADMIN — ATORVASTATIN CALCIUM 40 MG: 40 TABLET, FILM COATED ORAL at 23:23

## 2022-06-21 RX ADMIN — IOPAMIDOL 100 ML: 612 INJECTION, SOLUTION INTRAVENOUS at 19:21

## 2022-06-21 RX ADMIN — ASPIRIN 324 MG: 81 TABLET, CHEWABLE ORAL at 19:08

## 2022-06-21 RX ADMIN — ENOXAPARIN SODIUM 60 MG: 100 INJECTION SUBCUTANEOUS at 23:22

## 2022-06-21 RX ADMIN — Medication 10 ML: at 23:24

## 2022-06-21 RX ADMIN — ALPRAZOLAM 0.5 MG: 0.5 TABLET ORAL at 23:23

## 2022-06-21 NOTE — ED PROVIDER NOTES
Subjective   67-year-old male presents to the ED with a chief complaint of abnormal imaging and altered mental status.  Patient presents to the ED with his wife who provides most the history.  She states that the patient has been having waxing and waning mental/neurologic issues for at least 6 months.  She states that he has intermittent episodes of confusion difficulty with ambulation and difficulty with remembering things.  Again this is been ongoing for months he saw his neurologist few weeks ago and they had an MRI scheduled that was performed today.  On the MRI today there was a subacute lacunar infarct noted they were called and instructed to come to the ED immediately.  On arrival to the ED the patient and wife deny any acute changes in the last few days.  No focal neurological deficits.  No chest pain or shortness of breath.  No cough or wheeze.  No other complaints at this time.          Review of Systems   Neurological: Negative for speech difficulty.        Difficulty walking, confusion   All other systems reviewed and are negative.      Past Medical History:   Diagnosis Date   • Arthritis    • Clot    • Coronary artery disease    • Diabetes mellitus (HCC)    • Heart disease    • History of blood clots    • Hypertension    • Low back pain    • Thyroid disease        No Known Allergies    Past Surgical History:   Procedure Laterality Date   • CHOLECYSTECTOMY  2015   • COLONOSCOPY  2015    dr srivastava   • CORONARY ARTERY BYPASS GRAFT      [place] [id number] BY PASS    • ENDOSCOPY         Family History   Problem Relation Age of Onset   • Diabetes Mother    • Heart disease Mother         STENTING   • COPD Mother    • Heart attack Father    • Diabetes Father        Social History     Socioeconomic History   • Marital status:    Tobacco Use   • Smoking status: Never Smoker   • Smokeless tobacco: Current User     Types: Chew   Vaping Use   • Vaping Use: Never used   Substance and Sexual Activity   • Alcohol  use: No   • Drug use: No   • Sexual activity: Defer           Objective   Physical Exam  Vitals and nursing note reviewed.   Constitutional:       General: He is not in acute distress.     Appearance: He is well-developed. He is not diaphoretic.   HENT:      Head: Normocephalic and atraumatic.      Nose: Nose normal.   Eyes:      Conjunctiva/sclera: Conjunctivae normal.      Pupils: Pupils are equal, round, and reactive to light.   Cardiovascular:      Rate and Rhythm: Normal rate and regular rhythm.   Pulmonary:      Effort: Pulmonary effort is normal. No respiratory distress.      Breath sounds: Normal breath sounds.   Abdominal:      General: There is no distension.      Palpations: Abdomen is soft.      Tenderness: There is no abdominal tenderness.   Musculoskeletal:         General: No deformity.   Neurological:      Mental Status: He is alert and oriented to person, place, and time.      Cranial Nerves: No cranial nerve deficit.      Coordination: Coordination normal.      Comments: Oriented x3.  No focal neurological deficits.  Strength and sensation in tact in all 4 extremities.         Procedures           ED Course  ED Course as of 06/21/22 2103   e Jun 21, 2022 1940 EKG interpreted by me reveals sinus bradycardia rate of 54.  Low voltage EKG with nonspecific T wave change.  No ectopy no ischemic changes. [PF]   2101 Troponin T: <0.010 [PF]   2101 proBNP: 134.4  Glucose 304   BUN 18   Creatinine 1.15   Sodium 134   Potassium 4.0   Chloride 99   CO2 22.4   Calcium 9.0   Total Protein 5.9   Albumin 3.70   ALT (SGPT) 22   AST (SGOT) 23   Alkaline Phosphatase 88   Total Bilirubin 0.4   RBC, UA 3-5   WBC, UA 0-2   Bacteria, UA Trace   Squamous Epithelial Cells, UA 0-2   Hyaline Casts, UA None Seen   Mucus, UA Trace   Methodology: Manual Light     [PF]   2102 WBC 4.87   RBC 4.47   Hemoglobin 13.5   Hematocrit 39.4   MCV 88.1   MCH 30.2   MCHC 34.3   RDW 12.4   RDW-SD 40.2   MPV 10.5   Platelets 136    [PF]    2102 Color, UA Yellow   Appearance, UA Clear   pH, UA 6.0   Specific Gravity, UA >=1.030   Glucose >=1000 mg/dL (3+)   Ketones, UA Negative   Bilirubin, UA Negative   Blood, UA Negative   Protein, UA >=300 mg/dL (3+)   Leukocytes, UA Negative   Nitrite, UA Negative   Urobilinogen, UA 1.0 E.U./dL    [PF]      ED Course User Index  [PF] Zia Hall W, DO          CT Angiogram Neck    Result Date: 6/21/2022  FINAL REPORT TECHNIQUE: Axial images through the neck were obtained following IV contrast administration. CLINICAL HISTORY: ams FINDINGS: The aortic arch and the carotid arteries are normal as are the bilateral vertebral arteries.     Impression: Unremarkable. Authenticated and Electronically Signed by Shine Enriquez M.D. on 06/21/2022 08:32:02 PM    MRI Brain Without Contrast    Result Date: 6/21/2022  PROCEDURE: MRI BRAIN WO CONTRAST-  HISTORY: Dementia, vascular etiology suspected; G31.84-Mild cognitive impairment, so stated  TECHNIQUE: Multiplanar imaging was performed of the brain without gadolinium infusion.  Diffusion sequences show a small focus of restricted diffusion involving the subcortical white matter of the left temporal lobe best seen on image 15 of series 9. This is compatible with a tiny acute lacunar infarct of the white matter. Periventricular white matter signal changes are present. These are likely due to mild chronic microvascular change. Chronic lacunar infarct is noted of the right anterior thalamus. Moderate generalized atrophy is noted. There is no evidence of mass or hemorrhage. No extra-axial abnormal findings are identified. The ventricles and cisterns appear normal.      Impression: 1. Tiny acute/subacute lacunar infarct of the subcortical posterior left temporal lobe white matter. No associated hemorrhage. 2. Mild chronic microvascular changes.  This report was signed and finalized on 6/21/2022 3:51 PM by Golden Gonzalez MD.    CT Angiogram Head    Result Date: 6/21/2022  FINAL REPORT  CLINICAL HISTORY: cva FINDINGS: The noncontrast CT of the head is unremarkable. The intracranial portions of the internal carotid arteries are unremarkable. The anterior and middle cerebral arteries are unremarkable. The vertebral and basilar arteries are within normal limits.     Impression: No acute arterial abnormality. Authenticated and Electronically Signed by Shine Enriquez M.D. on 2022 08:32:00 PM      Preliminary Report  NAME: JESSICACICI / SEX: 1955 / Male  MRN / ACC#: [id number] / [id number]  ORDERING PHYSICIAN: TA [facility] Physician,   EXAM REQUESTED: [id number]--SVM HEAD/BRAIN   FACILITY: [facility]  DATE: 2022  RADIOLOGIST NAME: Shine Enriquez  CLINICAL HISTORY:  cva  FINDINGS:  The noncontrast CT of the head is unremarkable. The intracranial portions of the internal carotid arteries are   unremarkable. The anterior and middle cerebral arteries are unremarkable. The vertebral and basilar arteries are   within normal limits.  IMPRESSION:  No acute arterial abnormality.  Reviewed, Interpreted and Dictated by Shine Enriquez, Transcribed by Jalen Mayfield  Electronically signed by Shine Enriquez on 2022 7:55:08 PM, Eastern Time JL  This is a preliminary report. This report is not an official interpretation. This report may contain limited or incomplete information and it is subject to   review and change by the interpreting physician. A final report with the official interpretation of the imaging examination or procedure ordered will   be communicated at a later time. The final report may contain additional or different information, findings, and conclusions than this preliminary   repor      Preliminary Report  NAME: CICI LOPEZ / SEX: 1955 / Male  MRN / ACC#: [id number] / [id number]  ORDERING PHYSICIAN: TA Arreola  Physician,   EXAM REQUESTED: [id number]--BAW NECK SOFT TISSUE   FACILITY: [facility]  DATE: 2022  RADIOLOGIST NAME: Mina  Shine  TECHNIQUE:  Axial images through the neck were obtained following IV contrast administration.  CLINICAL HISTORY:  ams  FINDINGS:  The aortic arch and the carotid arteries are normal as are the bilateral vertebral arteries.  IMPRESSION:  Unremarkable.                       LAVELL reviewed by Rafael Andre MD, Juan Alberto Gusman, , Zia Hall, DO       [facility]  67-year-old male presents to the ED with MRI that showed up on outpatient imaging.  Discussed with neurology on-call recommends CTA head and neck.  Recommends admission for echo and further testing.  Recommends aspirin.  Patient will be signed out to the oncoming physician with labs and further imaging pending.  Disposition will likely to be admitted.      21:03 EDT  I received care from Dr. Gusman in regards to follow-up of CT angiogram of the head neck.  These are without acute findings per radiologist.  Discussed with Dr. Yeh, [facility] stroke neurology service,to determine who to place consult to for rounding.     I discussed with Dr. Andre, hospitalist will admit.  Final diagnoses:   Ischemic stroke (HCC)       ED Disposition  ED Disposition     ED Disposition   Decision to Admit    Condition   --    Comment   Level of Care: Telemetry [5]   Diagnosis: Ischemic stroke (HCC) [2799067]   Admitting Physician: RAFAEL ANDRE [[id number]]               No follow-up provider specified.       Medication List      No changes were made to your prescriptions during this visit.          Zia Hall DO  06/21/22 6265

## 2022-06-22 ENCOUNTER — APPOINTMENT (OUTPATIENT)
Dept: CARDIOLOGY | Facility: HOSPITAL | Age: 67
End: 2022-06-22

## 2022-06-22 ENCOUNTER — READMISSION MANAGEMENT (OUTPATIENT)
Dept: CALL CENTER | Facility: HOSPITAL | Age: 67
End: 2022-06-22

## 2022-06-22 ENCOUNTER — APPOINTMENT (OUTPATIENT)
Dept: MRI IMAGING | Facility: HOSPITAL | Age: 67
End: 2022-06-22

## 2022-06-22 VITALS
BODY MASS INDEX: 33.64 KG/M2 | TEMPERATURE: 98.5 F | HEIGHT: 71 IN | RESPIRATION RATE: 16 BRPM | SYSTOLIC BLOOD PRESSURE: 152 MMHG | HEART RATE: 54 BPM | WEIGHT: 240.3 LBS | OXYGEN SATURATION: 95 % | DIASTOLIC BLOOD PRESSURE: 91 MMHG

## 2022-06-22 LAB
BH CV ECHO MEAS - ACS: 1.9 CM
BH CV ECHO MEAS - AO MAX PG: 8.1 MMHG
BH CV ECHO MEAS - AO MEAN PG: 4 MMHG
BH CV ECHO MEAS - AO ROOT DIAM: 3.1 CM
BH CV ECHO MEAS - AO V2 MAX: 142 CM/SEC
BH CV ECHO MEAS - AO V2 VTI: 32.2 CM
BH CV ECHO MEAS - AVA(I,D): 2.6 CM2
BH CV ECHO MEAS - EDV(CUBED): 146.4 ML
BH CV ECHO MEAS - EDV(MOD-SP2): 77.6 ML
BH CV ECHO MEAS - EDV(MOD-SP4): 94.6 ML
BH CV ECHO MEAS - EF(MOD-BP): 59.4 %
BH CV ECHO MEAS - EF(MOD-SP2): 51.5 %
BH CV ECHO MEAS - EF(MOD-SP4): 66.4 %
BH CV ECHO MEAS - ESV(CUBED): 58.4 ML
BH CV ECHO MEAS - ESV(MOD-SP2): 37.6 ML
BH CV ECHO MEAS - ESV(MOD-SP4): 31.8 ML
BH CV ECHO MEAS - FS: 26.4 %
BH CV ECHO MEAS - IVS/LVPW: 1.24 CM
BH CV ECHO MEAS - IVSD: 1.47 CM
BH CV ECHO MEAS - LA DIMENSION: 4.4 CM
BH CV ECHO MEAS - LAT PEAK E' VEL: 7.1 CM/SEC
BH CV ECHO MEAS - LV DIASTOLIC VOL/BSA (35-75): 41.4 CM2
BH CV ECHO MEAS - LV MASS(C)D: 293.7 GRAMS
BH CV ECHO MEAS - LV MAX PG: 6.4 MMHG
BH CV ECHO MEAS - LV MEAN PG: 3 MMHG
BH CV ECHO MEAS - LV SYSTOLIC VOL/BSA (12-30): 13.9 CM2
BH CV ECHO MEAS - LV V1 MAX: 126 CM/SEC
BH CV ECHO MEAS - LV V1 VTI: 29.5 CM
BH CV ECHO MEAS - LVIDD: 5.3 CM
BH CV ECHO MEAS - LVIDS: 3.9 CM
BH CV ECHO MEAS - LVOT AREA: 2.9 CM2
BH CV ECHO MEAS - LVOT DIAM: 1.91 CM
BH CV ECHO MEAS - LVPWD: 1.4 CM
BH CV ECHO MEAS - MED PEAK E' VEL: 4.1 CM/SEC
BH CV ECHO MEAS - MV A MAX VEL: 87.8 CM/SEC
BH CV ECHO MEAS - MV DEC SLOPE: 320 CM/SEC2
BH CV ECHO MEAS - MV DEC TIME: 0.22 MSEC
BH CV ECHO MEAS - MV E MAX VEL: 94.1 CM/SEC
BH CV ECHO MEAS - MV E/A: 1.07
BH CV ECHO MEAS - MV MAX PG: 4.3 MMHG
BH CV ECHO MEAS - MV MEAN PG: 2 MMHG
BH CV ECHO MEAS - MV P1/2T: 81 MSEC
BH CV ECHO MEAS - MV V2 VTI: 39.1 CM
BH CV ECHO MEAS - MVA(P1/2T): 2.7 CM2
BH CV ECHO MEAS - MVA(VTI): 2.16 CM2
BH CV ECHO MEAS - PA ACC TIME: 0.09 SEC
BH CV ECHO MEAS - PA PR(ACCEL): 40.8 MMHG
BH CV ECHO MEAS - PA V2 MAX: 118 CM/SEC
BH CV ECHO MEAS - RV MAX PG: 3.6 MMHG
BH CV ECHO MEAS - RV V1 MAX: 95.3 CM/SEC
BH CV ECHO MEAS - RV V1 VTI: 19.8 CM
BH CV ECHO MEAS - SI(MOD-SP2): 17.5 ML/M2
BH CV ECHO MEAS - SI(MOD-SP4): 27.5 ML/M2
BH CV ECHO MEAS - SV(LVOT): 84.5 ML
BH CV ECHO MEAS - SV(MOD-SP2): 40 ML
BH CV ECHO MEAS - SV(MOD-SP4): 62.8 ML
BH CV ECHO MEAS - TAPSE (>1.6): 3 CM
BH CV ECHO MEASUREMENTS AVERAGE E/E' RATIO: 16.8
BH CV XLRA - RV BASE: 4.1 CM
BH CV XLRA - RV MID: 3.6 CM
BH CV XLRA - TDI S': 10.4 CM/SEC
CHOLEST SERPL-MCNC: 159 MG/DL (ref 0–200)
GLUCOSE BLDC GLUCOMTR-MCNC: 106 MG/DL (ref 70–130)
GLUCOSE BLDC GLUCOMTR-MCNC: 201 MG/DL (ref 70–130)
GLUCOSE BLDC GLUCOMTR-MCNC: 312 MG/DL (ref 70–130)
HDLC SERPL-MCNC: 33 MG/DL (ref 40–60)
LDLC SERPL CALC-MCNC: 85 MG/DL (ref 0–100)
LDLC/HDLC SERPL: 2.32 {RATIO}
LV EF 2D ECHO EST: 66 %
MAXIMAL PREDICTED HEART RATE: 155 BPM
STRESS TARGET HR: 132 BPM
TRIGL SERPL-MCNC: 248 MG/DL (ref 0–150)
VLDLC SERPL-MCNC: 41 MG/DL (ref 5–40)

## 2022-06-22 PROCEDURE — 93306 TTE W/DOPPLER COMPLETE: CPT | Performed by: INTERNAL MEDICINE

## 2022-06-22 PROCEDURE — G0378 HOSPITAL OBSERVATION PER HR: HCPCS

## 2022-06-22 PROCEDURE — 82962 GLUCOSE BLOOD TEST: CPT

## 2022-06-22 PROCEDURE — 70553 MRI BRAIN STEM W/O & W/DYE: CPT

## 2022-06-22 PROCEDURE — 92610 EVALUATE SWALLOWING FUNCTION: CPT

## 2022-06-22 PROCEDURE — 93306 TTE W/DOPPLER COMPLETE: CPT

## 2022-06-22 PROCEDURE — 99217 PR OBSERVATION CARE DISCHARGE MANAGEMENT: CPT | Performed by: NURSE PRACTITIONER

## 2022-06-22 PROCEDURE — 63710000001 INSULIN ASPART PER 5 UNITS: Performed by: FAMILY MEDICINE

## 2022-06-22 PROCEDURE — 97161 PT EVAL LOW COMPLEX 20 MIN: CPT

## 2022-06-22 PROCEDURE — 0 GADOBENATE DIMEGLUMINE 529 MG/ML SOLUTION: Performed by: FAMILY MEDICINE

## 2022-06-22 PROCEDURE — 97165 OT EVAL LOW COMPLEX 30 MIN: CPT

## 2022-06-22 PROCEDURE — 80061 LIPID PANEL: CPT | Performed by: FAMILY MEDICINE

## 2022-06-22 PROCEDURE — A9577 INJ MULTIHANCE: HCPCS | Performed by: FAMILY MEDICINE

## 2022-06-22 PROCEDURE — 92523 SPEECH SOUND LANG COMPREHEN: CPT

## 2022-06-22 RX ORDER — ATORVASTATIN CALCIUM 80 MG/1
80 TABLET, FILM COATED ORAL DAILY
Qty: 30 TABLET | Refills: 0 | Status: SHIPPED | OUTPATIENT
Start: 2022-06-22 | End: 2022-07-22

## 2022-06-22 RX ORDER — DORZOLAMIDE HYDROCHLORIDE AND TIMOLOL MALEATE 20; 5 MG/ML; MG/ML
1 SOLUTION/ DROPS OPHTHALMIC 2 TIMES DAILY
COMMUNITY

## 2022-06-22 RX ADMIN — LEVOTHYROXINE SODIUM 100 MCG: 100 TABLET ORAL at 06:44

## 2022-06-22 RX ADMIN — Medication 10 ML: at 10:26

## 2022-06-22 RX ADMIN — GADOBENATE DIMEGLUMINE 21 ML: 529 INJECTION, SOLUTION INTRAVENOUS at 11:33

## 2022-06-22 RX ADMIN — AMLODIPINE BESYLATE 5 MG: 5 TABLET ORAL at 10:26

## 2022-06-22 RX ADMIN — CLOPIDOGREL BISULFATE 75 MG: 75 TABLET ORAL at 10:26

## 2022-06-22 RX ADMIN — CITALOPRAM HYDROBROMIDE 40 MG: 20 TABLET ORAL at 10:26

## 2022-06-22 RX ADMIN — INSULIN ASPART 10 UNITS: 100 INJECTION, SOLUTION INTRAVENOUS; SUBCUTANEOUS at 16:56

## 2022-06-22 RX ADMIN — PANTOPRAZOLE SODIUM 40 MG: 40 TABLET, DELAYED RELEASE ORAL at 06:44

## 2022-06-22 RX ADMIN — INSULIN ASPART 5 UNITS: 100 INJECTION, SOLUTION INTRAVENOUS; SUBCUTANEOUS at 11:46

## 2022-06-22 NOTE — CONSULTS
Teleneurology Phone Consultation Note    Date of Consultation: 6/21/2022  Requesting Attending: Seth Andre MD  Chief Complaint: Imaging finding +Outpatient MRI  Location of patient: Emergency Room  Last known well date/Time if applicable: N/A  Date/Time Telestroke Doctor on phone: 6/21/2022 @ 1816    Assessment and Plan:  TNK decision: No Outside window for tPA  Intervention Decision: No No LVO    Impression: Patient with HTN, DM, DLD, CAD, and hypothyroidism presenting to the ED for further evaluation and management after outpatient MRI ordered for cognitive decline / encephalopathy demonstrated an area of acute diffusion abnormality in the left posterior temporal region (periatrial WM) c/w acute to subacute stroke. There are scattered WM abnormalities. GRE sequences remarkable for multiple focal areas of susceptibility abnormality in the bilateral cerebral hemispheres (20-25 lesions w/o superficial siderosis) that are predominantly in the cortex. Findings of multiple CMBs are concerning for the possibility of smoldering cerebral amyloid angiopathy (CAA) in a patient > 60 years of age with progressive cognitive decline. While less likely, radiographic differential considerations include metastatic disease - would benefit from post-contrast MRI evaluation. Patient off Eliquis on Plavix monotherapy for the past two months for unclear reasons. Single dose ASA in ED. Given concerns for possible CAA with > 10 CMBs, would refrain from resuming systemic AC therapy for now pending further diagnostic evaluation and risk/benefit conversation.     Recommendations:  - Continue neuro assessments and vital signs Q4 hrs with continuous telemetry. Please notify on-call Neurology with any abrupt change in exam.  - Recommend repeat brain MRI w/ contrast to r/o clustered CMBs due to metastatic disease.    - Mild permissive HTN to SBPs 160s with MAP > 65 mmHg over 24 hours followed by slow titration of oral antihypertensive  medications titrated to goal BP < 130/80 mmHg as tolerated.   - Given concerns for possible CAA, recommend single antiplatelet therapy with either  mg OR home Plavix 75 mg PO daily with atorvastatin 80 mg PO QHS for the secondary prevention of stroke.   - Confirm prior indication for Eliquis.   - Stroke labs: Hgb A1c / lipid panel / TSH, free T4 / Vit B12 / folate / Vit D level.   - Transthoracic echo pending.   - MADONNA screen, referral to Sleep Medicine Clinic as indicated.   - Delirium precautions: Lights on, shades open from 0700 to 1900 daily with frequent reorientation. Typical risk factors for delirium include advanced age, premorbid neurological disease, significant impairment in hearing or vision, critical illness, or prolonged hospital admission.   - Consider Seroquel 12.5 to 25 mg QHS (scheduled at 1800) for any nocturnal agitation, day-night reversal, or further concern for delirium complicating admission. Trend QTc on EKG, telemetry with prolonged use, dose escalation.   - Continued PT/OT/SLP evaluation for any post-discharge rehab needs. Recommend MOCA Cognitive Screen with SLP if time permits.   - Will need follow-up evaluation in Neurology Clinic within 4-6 weeks of discharge.     Thank you for the opportunity to participate in the care of this patient. We will follow along with you.     If you have any questions about the patient recommendations, please call [phone] at anytime and ask to speak with the neurologist on call. Press 1 for an emergent consult and 2 for a non-emergent consult.    Lucia Snyder MD    I, Lucia Snyder MD, was consulted about this patient on 6/21/2022 for discussion over the phone regarding management of the patient's care via a provider to provider discussion for 10 minutes. The location of the patient was at [facility] My location was [place], [place]. The recommendations are based off information I received from the consulting  provider.

## 2022-06-22 NOTE — DISCHARGE INSTRUCTIONS
You are being discharged home today.    Take your insulin as prescribed.  Keep a blood sugar log and check your sugar three times daily.  Take log to PCP follow up.  You may need an endocrinology referral.    Check your blood pressure twice daily and take this log to your PCP follow up as well.    Continue with ASA and Plavix.  Start taking Lipitor 80mg daily.    Follow up with Neurology in 4-6 weeks for a recheck    Follow up with Dr. Whittaker to discuss anticoagulation, and cardiology clinic should call to place a holter monitor to check for any rhythm abnormalities.    Follow up with PCP as scheduled tomorrow    Return to the hospital immediately with any unilateral weakness, trouble talking, trouble swallowing, worsening trouble walking or gait issues.  Will order speech therapy at discharge as it was recommended for cognition with evaluation.

## 2022-06-22 NOTE — CASE MANAGEMENT/SOCIAL WORK
Discharge Planning Assessment  [facility]     Patient Name: Ramsey Camp  MRN: [id number]  Today's Date: 6/22/2022    Admit Date: 6/21/2022     Discharge Needs Assessment     Row Name 06/22/22 0917       Living Environment    People in Home spouse    Name(s) of People in Home Jina camp spouse    Current Living Arrangements home    Potentially Unsafe Housing Conditions --  no issues    Primary Care Provided by self    Family Caregiver if Needed spouse    Family Caregiver Names spouse   Bey       Resource/Environmental Concerns    Resource/Environmental Concerns none    Transportation Concerns none       Transition Planning    Patient/Family Anticipates Transition to home    Patient/Family Anticipated Services at Transition none    Transportation Anticipated family or friend will provide       Discharge Needs Assessment    Readmission Within the Last 30 Days no previous admission in last 30 days    Equipment Currently Used at Home cane, straight    Concerns Comments rehab   home or outpatient    Anticipated Changes Related to Illness none    Equipment Needed After Discharge none    Outpatient/Agency/Support Group Needs outpatient therapy    Provided Post Acute Provider List? Yes    Post Acute Provider List Home Health    Delivered To Patient    Method of Delivery In person    Patient's Choice of Community Agency(s) lives in [facility]               Discharge Plan     Row Name 06/22/22 0921       Plan    Plan home with family    Plan Comments independent of ADL's,  lives with spouse  Jina Camp  [phone],   no oxygen, no HH,  own POA, no advance directives,  plan s on returning home at discharge,  spouse will transport,   discussed   with patient   HH PT/OT  versus   outpaitent  PT   pt states will decide later              Continued Care and Services - Admitted Since 6/21/2022    Coordination has not been started for this encounter.       Expected Discharge Date and Time     Expected Discharge Date  Expected Discharge Time    Jun 23, 2022          Demographic Summary     Row Name 06/22/22 0916       General Information    Admission Type observation    Required Notices Provided Observation Status Notice    Referral Source admission list    Reason for Consult discharge planning    General Information Comments Jina Riley  spouse               Functional Status     Row Name 06/22/22 0917       Functional Status    Usual Activity Tolerance good    Current Activity Tolerance moderate       Assessment of Health Literacy    How often do you have someone help you read hospital materials? Sometimes    How often do you have problems learning about your medical condition because of difficulty understanding written information? Sometimes    How often do you have a problem understanding what is told to you about your medical condition? Sometimes    How confident are you filling out medical forms by yourself? A little bit    Health Literacy Good       Functional Status, IADL    Medications independent    Meal Preparation independent    Housekeeping independent    Laundry independent    Shopping independent       Employment/    Employment Status disabled               Psychosocial    No documentation.                Abuse/Neglect    No documentation.                Legal     Row Name 06/22/22 0917       Financial/Legal    Source of Income disability    Financial/Environmental Concerns --  denie issues               Substance Abuse    No documentation.                Patient Forms    No documentation.                   Nery Hunter RN

## 2022-06-22 NOTE — DISCHARGE SUMMARY
[facility]      Name:  Ramsey Riley   Age:  67 y.o.  Sex:  male  :  1955  MRN:  [id number]   Visit Number:  [id number]    Admission Date:  2022  Date of Discharge:  2022  Primary Care Physician:  Margret Purvis MD    Important issues to note:    1.  Admitted to the hospital for acute CVA.  Neurology consulted on admission.      2.  PT/OT consulted with no discharge needs noted.  Speech therapy did note patient would benefit from cognition therapy at discharge.      3.  Repeat MRI requested by neurology was negative for any metastatic diseases.  Noted same acute/ subacute lacunar infarct of subcortical posterior left temporal lobe.      4.  Stable for discharge home today.  Will continue with home ASA 81mg and Plavix 75mg at discharge.  Blood pressure in 150s currently.  Will have patient keep blood pressure log and follow up with PCP Friday.  Will discharge with Lipitor 80mg for secondary prevention.  Echo noted EF-66% with normal LV systolic function and LV diastolic function consistent with grade II pseudonormalization.  Hemoglobin A1c noted to be >10 on admission which is less than last check.  Would consider Endocrinology referral with PCP to get A1c down.  Neurology recommends screening for MADONNA with referral to Sleep Medicine if indicated.  Follow up with Neurology in 4-6 weeks for a recheck.  Neurology recommends follow up MRI in 4-6 weeks.  Recommends holter monitor with ambulatory order placed at discharge.  Recommends checking B12 and thiamine level with supplements as needed.  Would recommend increasing Levothyroxine as well with elevated TSH.        Discharge Diagnoses:     Acute/subacute lacunar infarct of the subcortical posterior left  temporal lobe, POA  Sinus bradycardia, Asymptomatic  Hypertension  Hyperlipidemia  Diabetes mellitus insulin-dependent  CAD status post CABG  Hypothyroidism    Problem List:     Active Hospital  "Problems    Diagnosis  POA   • **Ischemic stroke (HCC) [I63.9]  Yes   • Diabetes mellitus (HCC) [E11.9]  Yes   • Essential hypertension [I10]  Yes   • Bradycardia, sinus [R00.1]  Yes   • Coronary artery disease involving native coronary artery of native heart without angina pectoris [I25.10]  Yes      Resolved Hospital Problems   No resolved problems to display.     Presenting Problem:    Chief Complaint   Patient presents with   • Altered Mental Status      Consults:     Consulting Physician(s)  Chat With All Active Members    Provider Relationship Specialty    Soila Wilhelm MD  Consulting Physician Neurology    Lucia Snyder MD  Consulting Physician Neurology        History of presenting illness/Hospital Course:    Patient is a 67 year old male with a past medical history of CAD status post CABG, diabetes mellitus insulin-dependent, hypertension and hypothyroidism who presented to the ER with his wife 6/21/2022 with a chief complaint of memory and balance issues that has been going on for \"months\".  Patient and his wife does not really remember exactly for how long the patient has had those symptoms however reports that it is probably less than a year.  Patient has been having issues while walking with balance as well as having episodes of memory loss. Patient was seen by neurology around a month ago for further evaluation on those symptoms and he was referred for an MRI. Patient had an MRI done as an outpatient 6/21 and came back as tiny acute/subacute lacunar infarct of the subcortical posterior left temporal lobe white matter with no associated hemorrhage. Patient was notified about the result and was told to go to the ER for further evaluation.      Upon ER evaluation, Patient was bradycardic with a rate of 40s however asymptomatic, blood pressure of 169/77.  Patient otherwise afebrile.  Labs significant for glucose of 304, sodium 134 otherwise unremarkable CBC and CMP. Troponin and proBNP " WNL.  Chest x-ray with chronic changes and no acute disease.  Neurology were consulted by ER provider, recommended CTA head and neck.  CTA head with no acute arterial abnormality.  CTA neck unremarkable.  Patient received aspirin 324 mg x 1 in the ER.     Patient admitted for observation.  No overnight events noted.  No focal neurological deficits present.  Discussed with neurology who advised patient was stable for discharge home.   Will continue with home ASA 81mg and Plavix 75mg at discharge.  Blood pressure in 150s systolic currently.  Will have patient keep blood pressure log and follow up with PCP Friday.  Patient may need additional anti-hypertensive medication started at that time.  Will discharge with Lipitor 80mg for secondary prevention.  Echo noted EF-66% with normal LV systolic function and LV diastolic function consistent with grade II pseudonormalization.  Hemoglobin A1c noted to be >10 on admission which is less than last check but still quite elevated with patient noted to be taking a lot of insulin at home when he remembers.  Would consider Endocrinology referral with PCP to get A1c down.  Neurology recommends screening for MADONNA with referral to Sleep Medicine if indicated.  Follow up with Neurology in 4-6 weeks for a recheck.  Return to the hospital with any unilateral weakness, trouble talking, trouble swallowing, worsening trouble walking or gait issues.  Will order speech therapy at discharge as it was recommended for cognition with evaluation.  No further therapy needs identified.  Neurology recommends holter monitor at discharge which is ordered ambulatory today.  Recommends follow up with cardiology to discuss anticoagulation risks vs benefits.  Recommends checking B12 and thiamine level.  Would recommend increasing Levothyroxine as well.      Vital Signs:    Temp:  [97.6 °F (36.4 °C)-98.6 °F (37 °C)] 98.5 °F (36.9 °C)  Heart Rate:  [43-70] 54  Resp:  [16-18] 16  BP: (128-173)/()  152/91    Physical Exam:    General Appearance:  Alert and cooperative, pleasant middle aged male, no acute distress on exam.   Head:  Atraumatic and normocephalic.   Eyes: Conjunctivae and sclerae normal, no icterus. No pallor.   Ears:  Ears with no abnormalities noted.   Throat: No oral lesions, no thrush, oral mucosa moist.   Neck: Supple, trachea midline   Back:   No kyphoscoliosis present. No tenderness to palpation.   Lungs:   Breath sounds heard bilaterally equally.  No crackles or wheezing. Unlabored on room air.   Heart:  Normal S1 and S2, no murmur, no gallop, no rub. No JVD.   Abdomen:   Normal bowel sounds, no masses, no organomegaly. Soft, nontender, nondistended, no rebound tenderness.   Extremities: Supple, no edema, no cyanosis, no clubbing.   Pulses: Pulses palpable bilaterally.   Skin: No bleeding or rash.   Neurologic: Alert and oriented x 3. No focal neurological symptoms.  No facial asymmetry. Speech is clear.  Equal .  Moves all four limbs. No tremors.     Pertinent Lab Results:     Results from last 7 days   Lab Units 06/21/22  1837   SODIUM mmol/L 134*   POTASSIUM mmol/L 4.0   CHLORIDE mmol/L 99   CO2 mmol/L 22.4   BUN mg/dL 18   CREATININE mg/dL 1.15   CALCIUM mg/dL 9.0   BILIRUBIN mg/dL 0.4   ALK PHOS U/L 88   ALT (SGPT) U/L 22   AST (SGOT) U/L 23   GLUCOSE mg/dL 304*     Results from last 7 days   Lab Units 06/21/22  1837   WBC 10*3/mm3 4.87   HEMOGLOBIN g/dL 13.5   HEMATOCRIT % 39.4   PLATELETS 10*3/mm3 136*         Results from last 7 days   Lab Units 06/21/22  1837   TROPONIN T ng/mL <0.010     Results from last 7 days   Lab Units 06/21/22  1837   PROBNP pg/mL 134.4                       Pertinent Radiology Results:    Imaging Results (All)     Procedure Component Value Units Date/Time    MRI Brain With & Without Contrast [[id number]] Collected: 06/22/22 1145     Updated: 06/22/22 1228    Narrative:      MRI BRAIN WITH/WITHOUT CONTRAST     HISTORY: Rule out clustered CMB due to  metastatic disease;  I63.9-Cerebral infarction, unspecified.      COMPARISON: 06/21/2022     TECHNIQUE: Multiplanar imaging was performed of the brain with and  without Gadolinium infusion.     FINDINGS: Diffusion sequences show tiny rounded focus of restricted  diffusion in the subcortical posterior left temporal lobe, similar from  prior exam. This is compatible with acute/early subacute lacunar  infarct.     Brain parenchyma displays mild chronic microvascular change. Chronic  lacunar infarct is again noted involving the anterior right thalamus. No  mass or hemorrhage is identified. No extra-axial abnormal findings are  seen. The ventricles and cisterns appear normal. No abnormal enhancing  lesions are identified on the post infusion images.       Impression:      1. Stable appearance of tiny acute/subacute nonhemorrhagic lacunar  infarct in the subcortical left temporal lobe white matter.  2. Mild chronic marked vascular changes.  3. No mass or abnormal enhancement or evidence of metastatic disease.     This report was signed and finalized on 6/22/2022 12:26 PM by Golden Gonzalez MD.    XR Chest 1 View [[id number]] Collected: 06/22/22 0746     Updated: 06/22/22 0801    Narrative:      PROCEDURE: XR CHEST 1 VW-     HISTORY: ams, hypertension. Abnormal cardiovascular exam.     COMPARISON: None     FINDINGS: No acute pulmonary opacity is present. There is no evidence of  effusion or pneumothorax.        There is evidence of prior median sternotomy, presumably from CABG.   Otherwise, mediastinum is unremarkable.        Heart size is normal.       Impression:      Unremarkable chest, status post CABG.           This report was signed and finalized on 6/22/2022 7:59 AM by Golden Gonzalez MD.    CT Angiogram Neck [[id number]] Collected: 06/21/22 2032     Updated: 06/21/22 2033    Narrative:      FINAL REPORT    TECHNIQUE:  Axial images through the neck were obtained following IV  contrast administration.    CLINICAL  HISTORY:  ams    FINDINGS:  The aortic arch and the carotid arteries are normal as are the  bilateral vertebral arteries.      Impression:      Unremarkable.    Authenticated and Electronically Signed by Shine Enriquez M.D. on  06/21/2022 08:32:02 PM    CT Angiogram Head [[id number]] Collected: 06/21/22 2032     Updated: 06/21/22 2032    Narrative:      FINAL REPORT    CLINICAL HISTORY:  cva    FINDINGS:  The noncontrast CT of the head is unremarkable. The intracranial  portions of the internal carotid arteries are unremarkable. The  anterior and middle cerebral arteries are unremarkable. The  vertebral and basilar arteries are within normal limits.      Impression:      No acute arterial abnormality.    Authenticated and Electronically Signed by Shine Enriquez M.D. on  06/21/2022 08:32:00 PM          Echo:    Results for orders placed during the hospital encounter of 06/21/22    Adult Transthoracic Echo Complete W/ Cont if Necessary Per Protocol (With Agitated Saline)    Interpretation Summary  · Saline test results are negative.  · Left ventricular wall thickness is consistent with mild concentric hypertrophy.  · Estimated left ventricular EF = 66% Left ventricular ejection fraction appears to be 66 - 70%. Left ventricular systolic function is normal.  · Left ventricular diastolic function is consistent with (grade II w/high LAP) pseudonormalization.    Condition on Discharge:      Stable.    Code status during the hospital stay:    Code Status and Medical Interventions:   Ordered at: 06/21/22 2139     Code Status (Patient has no pulse and is not breathing):    CPR (Attempt to Resuscitate)     Medical Interventions (Patient has pulse or is breathing):    Full Support     Discharge Disposition:    Home     Discharge Medications:       Discharge Medications      Changes to Medications      Instructions Start Date   atorvastatin 80 MG tablet  Commonly known as: Lipitor  What changed:   · medication strength  · how much to  take  · when to take this   80 mg, Oral, Daily      donepezil 5 MG tablet  Commonly known as: Aricept  What changed: Another medication with the same name was removed. Continue taking this medication, and follow the directions you see here.   5 mg, Oral, Nightly         Continue These Medications      Instructions Start Date   Accu-Chek Caryn Plus test strip  Generic drug: glucose blood   USE AS DIRECTED 3 TIMES DAILY      ALPRAZolam 0.5 MG tablet  Commonly known as: XANAX   0.5 mg, Oral, Nightly PRN      amLODIPine 5 MG tablet  Commonly known as: NORVASC   5 mg, Oral, Daily      aspirin 81 MG chewable tablet   81 mg, Oral, Daily      citalopram 40 MG tablet  Commonly known as: CeleXA   40 mg, Oral, Daily      clopidogrel 75 MG tablet  Commonly known as: PLAVIX   75 mg, Oral, Daily      dorzolamide-timolol 22.3-6.8 MG/ML ophthalmic solution  Commonly known as: COSOPT   1 drop, Both Eyes, 2 Times Daily, Per wife      glucose monitor monitoring kit   1 each, Does not apply, Daily      glucose monitor monitoring kit   1 each, Does not apply, Daily      HYDROcodone-acetaminophen 7.5-325 MG per tablet  Commonly known as: NORCO   1 tablet, Oral, 2 Times Daily      insulin NPH-insulin regular (70-30) 100 UNIT/ML injection  Commonly known as: humuLIN 70/30,novoLIN 70/30   70 Units, Subcutaneous, 2 Times Daily With Meals      Insulin Syringes (Disposable) U-100 1 ML misc   50 Units, Does not apply, 3 Times Daily PRN      Lancets 30G misc   Check sugar 3 times daily      levothyroxine 100 MCG tablet  Commonly known as: SYNTHROID, LEVOTHROID   100 mcg, Oral, Daily      losartan 100 MG tablet  Commonly known as: COZAAR   100 mg, Oral, Daily      pantoprazole 40 MG EC tablet  Commonly known as: PROTONIX   40 mg, Oral, Daily         Stop These Medications    latanoprost 0.005 % ophthalmic solution  Commonly known as: XALATAN          Discharge Diet:     Diet Instructions     Advance Diet As Tolerated          Activity at  Discharge:     Activity Instructions     Activity as Tolerated      Up WIth Assist          Follow-up Appointments:    Additional Instructions for the Follow-ups that You Need to Schedule     Ambulatory Referral to Speech Therapy   As directed         Follow-up Information     Margret Purvis MD Follow up.    Specialties: Internal Medicine, Geriatric Medicine  Why: Hospital Follow up on Thursday June 23, 2022 at 3:45PM.  Contact information:  [address]  [address]  [facility] [id number] [phone]             [facility] NEUROLOGY Follow up.    Specialty: Neurology  Why: Please schedule appointment prior to d/c for 4-6 weeks for stroke follow up  Contact information:  [address]  [address]  [facility] [address] [phone]  Additional information:  Phone Number: [phone]      Back Entrance of Main Hospital- Take elevators to the right located upon entrance to the second floor.                     Future Appointments   Date Time Provider Department Center   6/23/2022  3:45 PM Margret Purvis MD [facility] PC RI MR CORY   7/14/2022  2:30 PM Ben Brown MD [facility] PCC MAR MAR   7/25/2022 11:40 AM Peewee Larsen MD [facility] U MA[facility]INA [facility] (Cl   7/27/2022  2:00 PM [facility] BG MAR ECHO 16 [facility] BG C R P None   7/28/2022  3:00 PM Adilson Whittaker MD [facility] CD BG R MAR   9/1/2022  3:00 PM Jossie Spangler APRN [facility] N [facility] BOUCHRA Matos  06/22/22  16:32 EDT    Time: I spent 33 minutes on this discharge activity which included: face-to-face encounter with the patient, reviewing the data in the system, coordination of the care with the nursing staff as well as consultants, documentation, and entering orders.     Dictated utilizing Dragon dictation.

## 2022-06-22 NOTE — PLAN OF CARE
Goal Outcome Evaluation:              Outcome Evaluation: Pt admitted this shift from ER.  VSS.  NIH stroke scale per protocol.  Pt alert and oriented x 4.  FSBS with insulin given per protocol.

## 2022-06-22 NOTE — PLAN OF CARE
Goal Outcome Evaluation:  Plan of Care Reviewed With: patient, spouse        Progress: improving  Outcome Evaluation: Bedside eval of swallow completed and communication assessment completed per stroke protocol.  Regarding swallowing, oral phase was WFL with all trials with no overt s/s aspiration or other pharyngeal phase dysphagia exhibited.  Pt. denied dysphagia.  Communication assessment completed with pt. exhibiting mild auditory comprehension deficit with following 3 step directions, comprehension of more more complex sentences and paragraphs. Mild memory impairment affecting immediate memory and memory with a distractor.  Semantic cues were effective  in 2/3 trials and reducing distrators was somewhat effective as well.  Pt. would benefit from skilled ST for auditory comprehension tx and memory re-training.  Would recommend he also receive once discharged from hospital.  Recommend:  1. regular diet with thin liq as jasiel,  2. meds whole with thin liq as jasiel,  3. aspiration precautions, 4. ST for auditory comprehension and memory re-training.  D/W pt., spouse, and RN.

## 2022-06-22 NOTE — CASE MANAGEMENT/SOCIAL WORK
Discharge Planning Assessment   [facility]     Patient Name: Ramsey Riley  MRN: [id number]  Today's Date: 6/22/2022    Admit Date: 6/21/2022     Discharge Needs Assessment    No documentation.                Discharge Plan     Row Name 06/22/22 1534       Plan    Plan Comments Spoke with Dr Snyder    states still doing  teley rounds for [place]  that it would be later,  states the nurse or Dr may contact her for any concerns   and wishes family in room when she  does contact  patient later today.   Pt nurse notified              Continued Care and Services - Admitted Since 6/21/2022    Coordination has not been started for this encounter.       Expected Discharge Date and Time     Expected Discharge Date Expected Discharge Time    Jun 23, 2022          Demographic Summary    No documentation.                Functional Status    No documentation.                Psychosocial    No documentation.                Abuse/Neglect    No documentation.                Legal    No documentation.                Substance Abuse    No documentation.                Patient Forms    No documentation.                   Nery Hunter RN

## 2022-06-22 NOTE — PROGRESS NOTES
"Adult Nutrition  Assessment/PES    Patient Name:  Ramsey Riley  YOB: 1955  MRN: [id number]  Admit Date:  6/21/2022    Assessment Date:  6/22/2022    Comments:    Pt A1C 10.4%, RD to provide DM diet education. Pt current diet order regular w/ cardiac diet restriction. RD to add consistent carbohydrate diet restriction to diet order to help better manage blood sugar levels.     1. Add consistent carbohydrate diet restriction to diet order.   2. Encourage PO intake to meet 50% of estimated needs.   3. RD to provide DM diet education.   4. Consider a MVI w/ minerals daily.   5. Monitor and replace electrolytes PRN.     RD to follow-up and available PRN.      Reason for Assessment     Row Name 06/22/22 1303          Reason for Assessment    Reason For Assessment diagnosis/disease state;per organizational policy     Diagnosis diabetes diagnosis/complications;cardiac disease     Identified At Risk by Screening Criteria need for education                  Anthropometrics     Row Name 06/22/22 1327 06/22/22 1325       Anthropometrics    Height 180.3 cm (71\") 180.3 cm (71\")    Weight -- 109 kg (240 lb 4.8 oz)    Age for Calculations -- 67    Height for Calculation -- 1.803 m (5' 11\")    Weight for Calculation -- 109 kg (240 lb 4.8 oz)    Row Name 06/22/22 0857          Anthropometrics    Height 180.3 cm (71\")     Weight 109 kg (241 lb)                Labs/Tests/Procedures/Meds     Row Name 06/22/22 1303          Labs/Procedures/Meds    Lab Results Reviewed reviewed, pertinent     Lab Results Comments Low: Na  High: A1C, glucose, triglycerides            Medications    Pertinent Medications Reviewed reviewed, pertinent     Pertinent Medications Comments lipitor, lovenox, insulin, levothyroxine, protonix                Physical Findings     Row Name 06/22/22 1308          Physical Findings    Overall Physical Appearance obese, 1+ trace L&R ankle edema                Estimated/Assessed Needs - Anthropometrics " "    Row Name 06/22/22 1327 06/22/22 1325       Anthropometrics    Height 180.3 cm (71\") 180.3 cm (71\")    Weight -- 109 kg (240 lb 4.8 oz)    Age for Calculations -- 67    Height for Calculation -- 1.803 m (5' 11\")    Weight for Calculation -- 109 kg (240 lb 4.8 oz)       Estimated/Assessed Needs    Additional Documentation -- Estimated Calorie Needs (Group);KCAL/KG (Group);Protein Requirements (Group);Fluid Requirements (Group);Robins-St. Jeor Equation (Group)       Estimated Calorie Needs    Estimated Calorie Requirement (kcal/day) -- 2276    Estimated Calorie Need Method -- Robins-St Jeor       KCAL/KG    KCAL/KG -- 14 Kcal/Kg (kcal);Kcal/KG (kcal) comment  11 kcal/kg    14 Kcal/Kg (kcal) -- 1526    KCAL/KG (kcal) -- 1199       Robins-St. Jeor Equation    RMR (Robins-St. Jeor Equation) -- 1897.125    Robins-St. Jeor Activity Factors -- 1.2    Activity Factors (Robins-St. Jeor) -- 2276.55       Protein Requirements    Weight Used For Protein Calculations -- 109 kg (241 lb)    Est Protein Requirement Amount (gms/kg) -- 1.0 gm protein    Estimated Protein Requirements (gms/day) -- 109.32       Fluid Requirements    Fluid Requirements (mL/day) -- 2276  1mL/kcal    RDA Method (mL) -- 2276    Row Name 06/22/22 0857          Anthropometrics    Height 180.3 cm (71\")     Weight 109 kg (241 lb)                Nutrition Prescription Ordered     Row Name 06/22/22 1327          Nutrition Prescription PO    Current PO Diet Regular     Supplement Frequency Snack time     Common Modifiers Cardiac                Evaluation of Received Nutrient/Fluid Intake     Row Name 06/22/22 1327          PO Evaluation    Number of Days PO Intake Evaluated Insufficient Data     Number of Meals 1     % PO Intake 25            Vitals    Height 180.3 cm (71\")                     Problem/Interventions:   Problem 1     Row Name 06/22/22 1327          Nutrition Diagnoses Problem 1    Problem 1 Altered Nutrition Related to Labs     Etiology " (related to) Medical Diagnosis     Endocrine DM2     Signs/Symptoms (evidenced by) Other (comment)  A1C of 10.4%                Problem 2     Row Name 06/22/22 1328          Nutrition Diagnoses Problem 2    Problem 2 Overweight/Obesity     Etiology (related to) Other (comment)  diet and physical activity imbalance     Signs/Symptoms (evidenced by) BMI     BMI 30 - 34.9                    Intervention Goal     Row Name 06/22/22 1329          Intervention Goal    General Maintain nutrition;Improved nutrition related lab(s);Meet nutritional needs for age/condition;Disease management/therapy;Provide information regarding MNT for treatment/condition     PO Establish PO;Tolerate PO;Increase intake;Maintain intake;Meet estimated needs     Weight Maintain weight                Nutrition Intervention     Row Name 06/22/22 1329          Nutrition Intervention    RD/Tech Action Follow Tx progress;Care plan reviewd;Await begin PO;Encourage intake;Recommend/ordered;Advise available snack     Recommended/Ordered Snack                Nutrition Prescription     Row Name 06/22/22 1327          Nutrition Prescription PO    PO Prescription Begin/change diet     Common Modifiers Consistent Carbohydrate     New PO Prescription Ordered? Yes            Other Orders    Obtain Weight Daily     Obtain Weight Ordered? No, recommended     Supplement Vitamin mineral supplement     Supplement Ordered? No, recommended     Labs Mg++;Na+;K+     Labs Ordered? No, recommended     Other Monitor and replace electrolytes PRN.                Education/Evaluation     Row Name 06/22/22 2130          Education    Education Will Instruct as appropriate            Monitor/Evaluation    Monitor Per protocol;PO intake;Pertinent labs;Weight;Skin status;Symptoms                 Electronically signed by:  SILVIA KIRBY RD  06/22/22 13:30 EDT

## 2022-06-22 NOTE — H&P
"  [facility]   [facility]      Name:  Ramsey Riley   Age:  67 y.o.  Sex:  male  :  1955  MRN:  [id number]   Visit Number:  [id number]  Admission Date:  2022  Date Of Service:  22  Primary Care Physician:  Margret Purvis MD    Chief Complaint:     Abnormal imaging, balance issues/memory issues    History Of Presenting Illness:      Patient is a 67 years old male with a past medical history of CAD status post CABG, diabetes mellitus insulin-dependent, hypertension and hypothyroidism who presented to the ER with his wife with a chief complaint of memory and balance issues that has been going on for \"months\".  Patient and his wife does not really remember exactly for how long the patient has had those symptoms however reports that it is probably less than a year.  Patient has been having issues while walking with balance.  And he has been having episodes of memory loss. Patient was seen by neurology around a month ago for further evaluation on those symptoms and he was referred for an MRI. Patient had an MRI done as an outpatient today came back as Tiny acute/subacute lacunar infarct of the subcortical posterior left temporal lobe white matter with no associated hemorrhage. Patient was notified about the result and was told to go to the ER for further evaluation. Patient otherwise denies any recent neurological symptoms including weaknesses or numbness.  He also denied any recent sickness or illness.  He denies headaches, vision changes, chest pain, cough, fever, chills, abdominal pain, nausea, vomiting or urinary symptoms.    Upon ER evaluation, Patient was bradycardic with a rate of 40s however asymptomatic, blood pressure of 169/77.  Patient otherwise afebrile.  Labs significant for glucose of 304, sodium 134 otherwise unremarkable CBC and CMP. Troponin and proBNP WNL.  Chest x-ray with chronic changes and no acute disease.  Neurology were consulted by ER " provider, recommended CTA head and neck.  CTA head with no acute arterial abnormality.  CTA neck unremarkable.  Patient received aspirin 324 mg x 1 in the ER. Case discussed with neurology by ER provider again and recommended admission for observation.  Hospitalist consulted for admission, further evaluation and treatment.    Review Of Systems:    All systems were reviewed and negative except as mentioned in history of presenting illness, assessment and plan.    Past Medical History: Patient  has a past medical history of Arthritis, Clot, Coronary artery disease, Diabetes mellitus (HCC), Heart disease, History of blood clots, Hypertension, Low back pain, and Thyroid disease.    Past Surgical History: Patient  has a past surgical history that includes Esophagogastroduodenoscopy; Colonoscopy (2015); Cholecystectomy (2015); and Coronary artery bypass graft.    Social History: Patient  reports that he has never smoked. His smokeless tobacco use includes chew. He reports that he does not drink alcohol and does not use drugs.    Family History: Patient's family history includes COPD in his mother; Diabetes in his father and mother; Heart attack in his father; Heart disease in his mother.    Allergies:      Patient has no known allergies.    Home Medications:    Prior to Admission Medications     Prescriptions Last Dose Informant Patient Reported? Taking?    ALPRAZolam (XANAX) 0.5 MG tablet   No No    Take 1 tablet by mouth At Night As Needed for Anxiety.    amLODIPine (NORVASC) 5 MG tablet   No No    Take 1 tablet by mouth Daily.    aspirin 81 MG chewable tablet   Yes No    Chew 81 mg Daily.    atorvastatin (LIPITOR) 40 MG tablet   No No    Take 1 tablet by mouth Every Night.    citalopram (CeleXA) 40 MG tablet   No No    Take 1 tablet by mouth Daily.    clopidogrel (PLAVIX) 75 MG tablet   Yes No    Take 75 mg by mouth Daily.    donepezil (Aricept) 10 MG tablet   No No    Take 1 tablet by mouth Every Night.    donepezil  "(Aricept) 5 MG tablet   No No    Take 1 tablet by mouth Every Night.    glucose blood (Accu-Chek Caryn Plus) test strip   No No    USE AS DIRECTED 3 TIMES DAILY    glucose monitor monitoring kit   No No    1 each Daily.    glucose monitor monitoring kit   No No    1 each Daily.    HYDROcodone-acetaminophen (NORCO) 7.5-325 MG per tablet   No No    Take 1 tablet by mouth 2 (Two) Times a Day.    insulin NPH-insulin regular (humuLIN 70/30,novoLIN 70/30) (70-30) 100 UNIT/ML injection   No No    Inject 70 Units under the skin into the appropriate area as directed 2 (Two) Times a Day With Meals.    Insulin Syringes, Disposable, U-100 1 ML misc   No No    50 Units 3 (Three) Times a Day As Needed (50 units in the morning, and then with meals PRN).    Lancets 30G misc   No No    Check sugar 3 times daily    latanoprost (XALATAN) 0.005 % ophthalmic solution   Yes No    USE 1 DROP IN THE AFFECTED EYE AT BEDTIME    levothyroxine (SYNTHROID, LEVOTHROID) 100 MCG tablet   No No    Take 1 tablet by mouth Daily.    losartan (COZAAR) 100 MG tablet   No No    Take 1 tablet by mouth Daily.    pantoprazole (PROTONIX) 40 MG EC tablet   No No    TAKE 1 TABLET BY MOUTH DAILY.        ED Medications:    Medications   aspirin chewable tablet 324 mg (324 mg Oral Given 6/21/22 1908)   iopamidol (ISOVUE-300) 61 % injection 100 mL (100 mL Intravenous Given 6/21/22 1921)     Vital Signs:  Temp:  [97.6 °F (36.4 °C)] 97.6 °F (36.4 °C)  Heart Rate:  [46-59] 50  Resp:  [18] 18  BP: (128-169)/() 128/82        06/21/22  1749   Weight: 109 kg (241 lb)     Body mass index is 33.61 kg/m².    Physical Exam:     Most recent vital Signs: /82   Pulse 50   Temp 97.6 °F (36.4 °C) (Oral)   Resp 18   Ht 180.3 cm (71\")   Wt 109 kg (241 lb)   SpO2 97%   BMI 33.61 kg/m²     Physical Exam  Vitals and nursing note reviewed.   Constitutional:       General: He is not in acute distress.     Appearance: Normal appearance. He is obese.   HENT:      Head: " Normocephalic and atraumatic.      Right Ear: External ear normal.      Left Ear: External ear normal.      Nose: Nose normal.      Mouth/Throat:      Mouth: Mucous membranes are moist.   Eyes:      Extraocular Movements: Extraocular movements intact.      Conjunctiva/sclera: Conjunctivae normal.      Pupils: Pupils are equal, round, and reactive to light.   Cardiovascular:      Rate and Rhythm: Normal rate and regular rhythm.      Pulses: Normal pulses.      Heart sounds: Normal heart sounds.   Pulmonary:      Effort: Pulmonary effort is normal.      Breath sounds: Normal breath sounds. No wheezing or rhonchi.   Abdominal:      General: Bowel sounds are normal. There is no distension.      Palpations: Abdomen is soft.      Tenderness: There is no abdominal tenderness.   Musculoskeletal:         General: Normal range of motion.      Cervical back: Normal range of motion and neck supple.   Skin:     General: Skin is warm and dry.      Findings: No rash.   Neurological:      General: No focal deficit present.      Mental Status: He is alert and oriented to person, place, and time. Mental status is at baseline.      Cranial Nerves: No cranial nerve deficit.      Sensory: No sensory deficit.      Motor: No weakness.   Psychiatric:         Mood and Affect: Mood normal.         Behavior: Behavior normal.         Laboratory data:    I have reviewed the labs done in the emergency room.    Results from last 7 days   Lab Units 06/21/22  1837   SODIUM mmol/L 134*   POTASSIUM mmol/L 4.0   CHLORIDE mmol/L 99   CO2 mmol/L 22.4   BUN mg/dL 18   CREATININE mg/dL 1.15   CALCIUM mg/dL 9.0   BILIRUBIN mg/dL 0.4   ALK PHOS U/L 88   ALT (SGPT) U/L 22   AST (SGOT) U/L 23   GLUCOSE mg/dL 304*     Results from last 7 days   Lab Units 06/21/22  1837   WBC 10*3/mm3 4.87   HEMOGLOBIN g/dL 13.5   HEMATOCRIT % 39.4   PLATELETS 10*3/mm3 136*         Results from last 7 days   Lab Units 06/21/22  1837   TROPONIN T ng/mL <0.010     Results from  last 7 days   Lab Units 06/21/22  1837   PROBNP pg/mL 134.4                 Results from last 7 days   Lab Units 06/21/22 2005   COLOR UA  Yellow   GLUCOSE UA  >=1000 mg/dL (3+)*   KETONES UA  Negative   LEUKOCYTES UA  Negative   PH, URINE  6.0   BILIRUBIN UA  Negative   UROBILINOGEN UA  1.0 E.U./dL   RBC UA /HPF 3-5*   WBC UA /HPF 0-2*       Pain Management Panel     Pain Management Panel Latest Ref Rng & Units 8/12/2019 5/1/2019    CREATININE UR Not Estab. mg/dL 179.9 73.4          EKG:      Sinus bradycardia, heart rate 54, nonspecific ST/T wave changes.    Radiology:    CT Angiogram Neck    Result Date: 6/21/2022  FINAL REPORT TECHNIQUE: Axial images through the neck were obtained following IV contrast administration. CLINICAL HISTORY: ams FINDINGS: The aortic arch and the carotid arteries are normal as are the bilateral vertebral arteries.     Unremarkable. Authenticated and Electronically Signed by Shine Enriquez M.D. on 06/21/2022 08:32:02 PM    MRI Brain Without Contrast    Result Date: 6/21/2022  PROCEDURE: MRI BRAIN WO CONTRAST-  HISTORY: Dementia, vascular etiology suspected; G31.84-Mild cognitive impairment, so stated  TECHNIQUE: Multiplanar imaging was performed of the brain without gadolinium infusion.  Diffusion sequences show a small focus of restricted diffusion involving the subcortical white matter of the left temporal lobe best seen on image 15 of series 9. This is compatible with a tiny acute lacunar infarct of the white matter. Periventricular white matter signal changes are present. These are likely due to mild chronic microvascular change. Chronic lacunar infarct is noted of the right anterior thalamus. Moderate generalized atrophy is noted. There is no evidence of mass or hemorrhage. No extra-axial abnormal findings are identified. The ventricles and cisterns appear normal.      1. Tiny acute/subacute lacunar infarct of the subcortical posterior left temporal lobe white matter. No associated  hemorrhage. 2. Mild chronic microvascular changes.  This report was signed and finalized on 6/21/2022 3:51 PM by Golden Gonzalez MD.    CT Angiogram Head    Result Date: 6/21/2022  FINAL REPORT CLINICAL HISTORY: cva FINDINGS: The noncontrast CT of the head is unremarkable. The intracranial portions of the internal carotid arteries are unremarkable. The anterior and middle cerebral arteries are unremarkable. The vertebral and basilar arteries are within normal limits.     No acute arterial abnormality. Authenticated and Electronically Signed by Shine Enriquez M.D. on 06/21/2022 08:32:00 PM      Assessment:    Acute/subacute lacunar infarct of the subcortical posterior left  temporal lobe, POA  Sinus bradycardia, Asymptomatic  Hypertension  Hyperlipidemia  Diabetes mellitus insulin-dependent  CAD status post CABG  Hypothyroidism    Plan:    Patient is admitted for observation telemetry for further evaluation treatment.    Acute/subacute lacunar infarct of the subcortical posterior left  temporal lobe  -Appreciate Neurology consulting and making recommendations.  -Telemetry monitoring  -On aspirin, Plavix and statin.  Secondary  - ECHO ordered  - A1c, Lipid panel, TSH ordered.  -PT/OT consulted.  -Neuro checks.     Insulin-dependent diabetes mellitus  -Sliding scale insulin  -Levemir 20 units nightly  -Carbohydrate Diet  -Check hemoglobin A1c    Hypertension  Hyperlipidemia  Hypothyroidism  CAD  -Continue home meds    Risk Assessment: Moderate to high  DVT Prophylaxis: Lovenox prophylaxis (benefit> risk)  Code Status: Full  Diet: Cardiac    Advance Care Planning   ACP discussion was held with the patient during this visit. Patient has an advance directive in EMR which is still valid.            Seth Andre MD  06/21/22  21:00 EDT    Dictated utilizing Dragon dictation.

## 2022-06-22 NOTE — THERAPY DISCHARGE NOTE
Patient Name: Ramsey Riley  : 1955    MRN: [id number]                              Today's Date: 2022       Admit Date: 2022    Visit Dx:     ICD-10-CM ICD-9-CM   1. Ischemic stroke (HCC)  I63.9 434.91     Patient Active Problem List   Diagnosis   • Coronary artery disease involving native coronary artery of native heart without angina pectoris   • Essential hypertension   • Bradycardia, sinus   • Fatigue   • Anginal equivalent (HCC)   • Abnormal ECG   • Precordial pain   • LVH (left ventricular hypertrophy) due to hypertensive disease, without heart failure   • Diabetes mellitus (HCC)   • PAD (peripheral artery disease) (HCC)   • Ischemic stroke (HCC)     Past Medical History:   Diagnosis Date   • Arthritis    • Clot    • Coronary artery disease    • Diabetes mellitus (HCC)    • Heart disease    • History of blood clots    • Hypertension    • Low back pain    • Thyroid disease      Past Surgical History:   Procedure Laterality Date   • CHOLECYSTECTOMY     • COLONOSCOPY      dr srivastava   • CORONARY ARTERY BYPASS GRAFT      [place] [id number] BY PASS    • ENDOSCOPY        General Information     Row Name 22 1033          Physical Therapy Time and Intention    Document Type discharge evaluation/summary  -JR     Mode of Treatment physical therapy  -     Row Name 22 1033          General Information    Patient Profile Reviewed yes  -JR     Prior Level of Function independent:;all household mobility;community mobility  -JR     Existing Precautions/Restrictions fall;other (see comments)  vision deficit  -JR     Barriers to Rehab visual deficit  -JR     Row Name 22 1033          Living Environment    People in Home spouse  -JR     Row Name 22 1033          Home Main Entrance    Number of Stairs, Main Entrance none  -JR     Row Name 22 1033          Stairs Within Home, Primary    Number of Stairs, Within Home, Primary twelve  -JR     Stair Railings, Within Home,  Primary railings on both sides of stairs  -JR     Row Name 06/22/22 1033          Cognition    Orientation Status (Cognition) oriented x 4  -JR     Row Name 06/22/22 1033          Safety Issues, Functional Mobility    Safety Issues Affecting Function (Mobility) safety precautions follow-through/compliance;awareness of need for assistance  -JR     Impairments Affecting Function (Mobility) visual/perceptual  -JR           User Key  (r) = Recorded By, (t) = Taken By, (c) = Cosigned By    Initials Name Provider Type    Poonam Costello, PT Physical Therapist               Mobility     Row Name 06/22/22 1033          Bed Mobility    Bed Mobility bed mobility (all) activities  -JR     All Activities, Rooks (Bed Mobility) independent  -JR     Row Name 06/22/22 1033          Sit-Stand Transfer    Sit-Stand Rooks (Transfers) supervision  -     Row Name 06/22/22 1033          Gait/Stairs (Locomotion)    Rooks Level (Gait) supervision;verbal cues  -JR     Assistive Device (Gait) walker, front-wheeled  -JR     Distance in Feet (Gait) 412  -JR     Comment, (Gait/Stairs) patient had vision deficits and requires cues in unfamiliar setting  -JR           User Key  (r) = Recorded By, (t) = Taken By, (c) = Cosigned By    Initials Name Provider Type    Poonam Costello, PT Physical Therapist               Obj/Interventions     Row Name 06/22/22 1033          Range of Motion Comprehensive    General Range of Motion bilateral lower extremity ROM WFL  -     Row Name 06/22/22 1033          Strength Comprehensive (MMT)    General Manual Muscle Testing (MMT) Assessment no strength deficits identified  -     Row Name 06/22/22 1033          Balance    Balance Assessment sitting static balance;sitting dynamic balance;sit to stand dynamic balance;standing static balance;standing dynamic balance  -JR     Static Sitting Balance independent  -JR     Dynamic Sitting Balance independent  -JR     Position, Sitting Balance  unsupported;sitting edge of bed  -JR     Sit to Stand Dynamic Balance independent  -JR     Static Standing Balance independent  -JR     Dynamic Standing Balance supervision  due to vision  -JR     Position/Device Used, Standing Balance unsupported  sighted guide  -           User Key  (r) = Recorded By, (t) = Taken By, (c) = Cosigned By    Initials Name Provider Type    Poonam Costello, PT Physical Therapist               Goals/Plan    No documentation.                Clinical Impression     Row Name 06/22/22 1033          Pain    Pretreatment Pain Rating 0/10 - no pain  -JR     Posttreatment Pain Rating 0/10 - no pain  -JR     Additional Documentation Pain Scale: Numbers Pre/Post-Treatment (Group)  -     Row Name 06/22/22 1033          Plan of Care Review    Plan of Care Reviewed With patient  -     Progress no change  -     Outcome Evaluation Patient participates in PT evaluation and demonstrates safe functional mobility.  He has vision deficits, that were present prior to this hospitalization, that cause him to require verbal cues for safety.  He is able to walk 412 feet with no more than supervision and verbal cues due to vision.  He reports he thinks he is at his baseline of mobility and does not require the skills of PT at this time.  -     Row Name 06/22/22 1033          Therapy Assessment/Plan (PT)    Patient/Family Therapy Goals Statement (PT) Patient is eager to go home  -     Criteria for Skilled Interventions Met (PT) no;no problems identified which require skilled intervention  -     Therapy Frequency (PT) evaluation only  -     Row Name 06/22/22 1033          Positioning and Restraints    Pre-Treatment Position in bed  -JR     Post Treatment Position chair  -JR     In Chair sitting;call light within reach;encouraged to call for assist;with family/caregiver  -           User Key  (r) = Recorded By, (t) = Taken By, (c) = Cosigned By    Initials Name Provider Type    Poonam Costello, PT  Physical Therapist               Outcome Measures     Row Name 06/22/22 1333          Functional Assessment    Outcome Measure Options AM-PAC 6 Clicks Daily Activity (OT)  -SD           User Key  (r) = Recorded By, (t) = Taken By, (c) = Cosigned By    Initials Name Provider Type    La Nena Dial, OT Occupational Therapist              Physical Therapy Education                 Title: PT OT SLP Therapies (In Progress)     Topic: Physical Therapy (In Progress)     Point: Mobility training (Done)     Learning Progress Summary           Patient Acceptance, E,TB, VU by  at 6/22/2022 4123    Comment: Importance of mobility to recovery                   Point: Home exercise program (Not Started)     Learner Progress:  Not documented in this visit.          Point: Body mechanics (Not Started)     Learner Progress:  Not documented in this visit.          Point: Precautions (Not Started)     Learner Progress:  Not documented in this visit.                      User Key     Initials Effective Dates Name Provider Type Alondra     03/23/22 -  Poonam Cortez, PT Physical Therapist PT              PT Recommendation and Plan     Plan of Care Reviewed With: patient  Progress: no change  Outcome Evaluation: Patient participates in PT evaluation and demonstrates safe functional mobility.  He has vision deficits, that were present prior to this hospitalization, that cause him to require verbal cues for safety.  He is able to walk 412 feet with no more than supervision and verbal cues due to vision.  He reports he thinks he is at his baseline of mobility and does not require the skills of PT at this time.     Time Calculation:    PT Charges     Row Name 06/22/22 1521             Time Calculation    Start Time 1033  -JR      PT Received On 06/22/22  -JR              Untimed Charges    PT Eval/Re-eval Minutes 40  -JR              Total Minutes    Untimed Charges Total Minutes 40  -JR       Total Minutes 40  -JR            User  Key  (r) = Recorded By, (t) = Taken By, (c) = Cosigned By    Initials Name Provider Type    JR Poonam Cortez, PT Physical Therapist              Therapy Charges for Today     Code Description Service Date Service Provider Modifiers Qty    [id number]  PT EVAL LOW COMPLEXITY 3 6/22/2022 Poonam Cortez, PT GP 1          PT G-Codes  Outcome Measure Options: AM-PAC 6 Clicks Daily Activity (OT)  AM-PAC 6 Clicks Score (OT): 23    PT Discharge Summary  Anticipated Discharge Disposition (PT): home    Poonam Cortez, PT  6/22/2022

## 2022-06-22 NOTE — PLAN OF CARE
Goal Outcome Evaluation:  Plan of Care Reviewed With: patient        Progress: improving  Outcome Evaluation: OT eval completed. Patient demonstrates adequate strength, balance, coordination during all functional mobility and ADLs. Patient completed bed mobility ind, transfer and functional mobility x 412' without AD, sup for VCs for safety d/t visual deficits, near vision impaired more than distance vision. Patient can perform all ADLs with S/U-Sup. No further OT needs identified at this time.

## 2022-06-22 NOTE — PLAN OF CARE
Goal Outcome Evaluation:  Plan of Care Reviewed With: patient        Progress: no change  Outcome Evaluation: Patient participates in PT evaluation and demonstrates safe functional mobility.  He has vision deficits, that were present prior to this hospitalization, that cause him to require verbal cues for safety.  He is able to walk 412 feet with no more than supervision and verbal cues due to vision.  He reports he thinks he is at his baseline of mobility and does not require the skills of PT at this time.

## 2022-06-22 NOTE — PROGRESS NOTES
"Teleneurology Progress Note    Date of follow up: 6/22/2022  Attending Physician: Seth Andre MD  Current Hospital day: Hospital Day: 2  Location: Floor  Date/Time Telestroke doctor on video: 6/22/2022       Impression: Patient with HTN, DM, DLD, CAD s/p CABG, and hypothyroidism presenting to the ED for further evaluation and management after outpatient MRI ordered for cognitive decline and encephalopathy demonstrated an area of acute diffusion abnormality in the left posterior temporal region (periatrial WM) c/w acute to subacute stroke. Embolic source > SVID mechanistically by imaging evaluation. There are scattered WM abnormalities. GRE sequences remarkable for multiple focal areas of susceptibility abnormality in the bilateral cerebral hemispheres (20-25 lesions w/o superficial siderosis) that are predominantly in the cortex. Findings of multiple CMBs are concerning for the possibility of smoldering cerebral amyloid angiopathy (CAA) in a patient > 60 years of age with progressive cognitive decline. CMBs may also be seen after open-heart surgery and incidentally in patients on chronic systemic AC therapy (although this number of CMBs is unexpected for these alt etiologies). Patient off Eliquis on Plavix monotherapy for the past two months for unclear reasons. Prior history of \"blood clots.\" Single dose ASA in ED. Given concerns for possible CAA with > 10 CMBs, would refrain from resuming systemic AC therapy for now pending further diagnostic evaluation and risk/benefit conversation.    Recommendations  - Continue neuro assessments and vital signs Q4 hrs with continuous telemetry. Please notify on-call Neurology with any abrupt change in exam.  - Recommend repeat brain MRI w/ contrast to r/o clustered CMBs due to metastatic disease.    - Mild permissive HTN to SBPs 160s with MAP > 65 mmHg over 24 hours followed by slow titration of oral antihypertensive medications titrated to goal BP < 130/80 mmHg as " tolerated.   - Given concerns for possible CAA, antiplatelet monotherapy (ASA or Plavix alone) is the safest antithrombotic approach, but given significant CAD, plan to continue DAPT with ASA and Plavix with atorvastatin 80 mg PO QHS for the secondary prevention of stroke, CAD pending further outpatient evaluation with PCP, Neurology (as per primary team).    - Stroke labs: Hgb A1c 10.40% (goal < 7.0%) / LDL 85 / TSH 5.41. Would also send Vit B12 & Vit D levels. This may be done in the outpatient setting if preferred.   - Transthoracic echo this admission with LVEF 66-70%, grade II DD with elevated left atrial pressures, bubble study negative. LA diameter 4.4 cm.   - Patient with a significant degree of LA cardiopathy (grade II DD, increased LAP, and enlarged LA with LAD > 4 cm), which is strongly associated with occult atrial dysrhythmia (and possibly an independent etiology for CE stroke). Patient would benefit from prolonged outpatient cardiac telemetry for at least 30 days (MCOT/EVENT) to evaluate for occult atrial dysrhythmia if no prior history of PAF. If PAF is identified or prior history established, patient may benefit from referral to Cardiology to evaluate candidacy for LA appendage closure (Watchman device) to reduce risk for recurrent stroke due to PAF, LA cardiopathy.   - MADONNA screen, referral to Sleep Medicine Clinic as indicated.   - Delirium precautions: Lights on, shades open from 0700 to 1900 daily with frequent reorientation. Typical risk factors for delirium include advanced age, premorbid neurological disease, significant impairment in hearing or vision, critical illness, or prolonged hospital admission.   - Continued PT/OT/SLP evaluation for any post-discharge rehab needs. Recommend MOCA Cognitive Screen with SLP if time permits.   - Will need follow-up evaluation in Neurology Clinic within 4-6 weeks of discharge.      Thank you for the opportunity to participate in the care of this patient. We  will follow along with you.      If you have any questions about the patient recommendations, please call [phone] at anytime and ask to speak with the neurologist on call. Press 1 for an emergent consult and 2 for a non-emergent consult.    Lucia Snyder MD      Interval History:   HD #2. No acute events overnight. No acute complaints. Tolerating diet and medications w/o issues.   - Post-contrast brain MRI w/o evidence for abnormal contrast enhancement to suggest metastatic disease.       Medication:   Current Facility-Administered Medications   Medication Dose Route Frequency Provider Last Rate Last Admin   • acetaminophen (TYLENOL) tablet 650 mg  650 mg Oral Q4H PRN Seth Andre MD        Or   • acetaminophen (TYLENOL) suppository 650 mg  650 mg Rectal Q4H PRN Seth Andre MD       • ALPRAZolam (XANAX) tablet 0.5 mg  0.5 mg Oral Nightly PRN Seth Andre MD   0.5 mg at 06/21/22 2323   • amLODIPine (NORVASC) tablet 5 mg  5 mg Oral Daily Seth Andre MD   5 mg at 06/22/22 1026   • atorvastatin (LIPITOR) tablet 40 mg  40 mg Oral Nightly Seth Andre MD   40 mg at 06/21/22 2323   • citalopram (CeleXA) tablet 40 mg  40 mg Oral Daily Seth Andre MD   40 mg at 06/22/22 1026   • clopidogrel (PLAVIX) tablet 75 mg  75 mg Oral Daily Seth Andre MD   75 mg at 06/22/22 1026   • dextrose (D50W) (25 g/50 mL) IV injection 25 g  25 g Intravenous Q15 Min PRN Seth Andre MD       • dextrose (GLUTOSE) oral gel 1 tube  1 tube Oral Q15 Min PRN Seth Andre MD       • Enoxaparin Sodium (LOVENOX) syringe 60 mg  60 mg Subcutaneous Q24H Seth Andre MD   60 mg at 06/21/22 2322   • glucagon (human recombinant) (GLUCAGEN DIAGNOSTIC) injection 1 mg  1 mg Subcutaneous PRN Seth Andre MD       • HYDROcodone-acetaminophen (NORCO) 7.5-325 MG per tablet 1 tablet  1 tablet Oral BID PRN Seth Andre MD       • Insulin Aspart (novoLOG) injection 0-14 Units  0-14 Units Subcutaneous TID AC  "Seth Andre MD   10 Units at 22 1656   • insulin detemir (LEVEMIR) injection 20 Units  20 Units Subcutaneous Nightly Seth Andre MD   20 Units at 22 2325   • levothyroxine (SYNTHROID, LEVOTHROID) tablet 100 mcg  100 mcg Oral Daily Seth Andre MD   100 mcg at 22 0644   • pantoprazole (PROTONIX) EC tablet 40 mg  40 mg Oral Daily Seth Andre MD   40 mg at 22 0644   • Pharmacy to Dose enoxaparin (LOVENOX)   Does not apply Continuous PRN Seth Andre MD       • Pharmacy to Dose enoxaparin (LOVENOX)   Does not apply Continuous PRN Seth Andre MD       • sodium chloride 0.9 % flush 10 mL  10 mL Intravenous Q12H Seth Andre MD   10 mL at 22 1026   • sodium chloride 0.9 % flush 10 mL  10 mL Intravenous PRN Seth Andre MD           EXAM    /91 (BP Location: Left arm, Patient Position: Lying)   Pulse 54   Temp 98.5 °F (36.9 °C) (Oral)   Resp 16   Ht 180.3 cm (71\")   Wt 109 kg (240 lb 4.8 oz)   SpO2 95%   BMI 33.52 kg/m²      General: No acute distress. Semi-upright in bed at the time of evaluation.   Pulm: Good inspiratory effort.     Neuro   - Mental Status: Awake, alert. Oriented to self, age, , month, hospital, and partially to reason for admission.    - Speech: Speech fluent, no dysarthria. Occasional paraphasic errors. Follows simple commands without visual cues. Using stroke cards, patient is able to read phrases, name objects (6/6 items correct), and describe cookie theft scene without significant difficulty. Repeats a complex sentence with several errors.    - CN: EOMI. Visual fields full to confrontation (counts fingers correctly in four quadrants). Sensation intact to LT  in V1-V3 distribution. Asymmetric face w/o marino weakness on activation. Tongue midline.  - Motor: There is normal antigravity effort w/o drift.   - Sensory: Reports symmetric sensation to LT bilaterally.   - Coordination: Adequate FTN x 2. No dysmetria. "     Results:  Lab Results   Component Value Date    CREATININE 1.15 06/21/2022     Lab Results   Component Value Date    WBC 4.87 06/21/2022     No components found for: INR;3,  PROTIME   Lab Results   Component Value Date    LDL 85 06/22/2022      Lab Results   Component Value Date    HGBA1C 10.40 (H) 06/21/2022        Teleneurology Patient Verification & Consent    I have proceeded with this evaluation at the direct request of the referring practitioner as there is felt to be no appropriate specialist available at bedside to perform these evaluations. The [facility] ([facility]) practitioner has reported to me this is the correct patient and has obtained verbal consent from the patient/surrogate to perform his voluntary telemedicine evaluation (including obtaining history, performing examination and reviewing data provided by the patient and/or [facility] care provider). I attest that I introduced myself to the patient, provided my credentials, disclosed my location, and determined that, based on review of the patient's chart and discussion with the patient's primary team, telemedicine via a HIPAA-compliant, real-time, face-to-face, two-way, interactive audio and video platform is an appropriate and effective means of providing this service.  The patient/surrogate has the right to refuse this evaluation as they have been explained risks (including potential loss of confidentiality), benefits, alternatives, and the potential need for subsequent face to face care. Patient/surrogate understands that there is a risk of medical inaccuracies given that our recommendations will be made based on reported data (and we must therefore assume this information is accurate). Knowing that there is a risk that this information is not reported accurately, and that the telemedicine video, audio, or data feed may be incomplete, the patient agrees to proceed with evaluation and holds us harmless  knowing these risks. In this evaluation, we will be providing recommendations only. The ultimate decision to follow, or not follow, these recommendations will be left to the bedside treating/requesting practitioner. The patient/surrogate has been notified that other healthcare professionals (including technical personnel) may be involved in this audio-video evaluation. All laws concerning confidentiality and patient access to medical records and copies of medical records apply to telemedicine. The patient/surrogate has received the originating site's Health Notice of Privacy Practices.    Medical Data Reviewed:   1. Data reviewed include clinical labs, radiology, medical test;  2. Obtaining/reviewing old medical records;  3. Obtaining case history from another source;  4. Independent review of CNS images    ILucia MD, saw patient on 6/22/2022 for an initial in-patient or emergency room telemedicine face-to-face consult using interactive technology for 50 minutes. The location of the patient was at [facility]. My location was [place], [place]. I was assisted with the exam by RN.

## 2022-06-22 NOTE — PROGRESS NOTES
"Pharmacy Consult - Enoxaparin Dosing    Ramsey Riley is a 67 y.o. male who has been consulted to dose enoxaparin for VTE prophylaxis.     Allergies    Patient has no known allergies.    Relevant clinical data and objective history reviewed:     [Ht: 180.3 cm (71\"); Wt: 109 kg (241 lb 2.9 oz)]  Body mass index is 33.64 kg/m².    Estimated Creatinine Clearance: 80.4 mL/min (by C-G formula based on SCr of 1.15 mg/dL).    Results from last 7 days   Lab Units 06/21/22  1837   HEMOGLOBIN g/dL 13.5   HEMATOCRIT % 39.4   PLATELETS 10*3/mm3 136*   CREATININE mg/dL 1.15       Asessment/Plan    Initiate Enoxaparin 60 mg SQ every 24 hours  Pharmacy will monitor Mr. Riley's renal function and clinical status and adjust the enoxaparin dose and/or frequency as needed.    Thank you,    Unique Rodriguez RPH, PharmD  6/21/2022  22:59 EDT      "

## 2022-06-22 NOTE — THERAPY EVALUATION
Acute Care - Speech Language Pathology Initial Evaluation  [facility]     Patient Name: Ramsey Riley  : 1955  MRN: [id number]  Today's Date: 2022               Admit Date: 2022     Visit Dx:    ICD-10-CM ICD-9-CM   1. Ischemic stroke (HCC)  I63.9 434.91     Patient Active Problem List   Diagnosis   • Coronary artery disease involving native coronary artery of native heart without angina pectoris   • Essential hypertension   • Bradycardia, sinus   • Fatigue   • Anginal equivalent (HCC)   • Abnormal ECG   • Precordial pain   • LVH (left ventricular hypertrophy) due to hypertensive disease, without heart failure   • Diabetes mellitus (HCC)   • PAD (peripheral artery disease) (HCC)   • Ischemic stroke (HCC)     Past Medical History:   Diagnosis Date   • Arthritis    • Clot    • Coronary artery disease    • Diabetes mellitus (HCC)    • Heart disease    • History of blood clots    • Hypertension    • Low back pain    • Thyroid disease      Past Surgical History:   Procedure Laterality Date   • CHOLECYSTECTOMY     • COLONOSCOPY      dr srivastava   • CORONARY ARTERY BYPASS GRAFT      [place] [id number] BY PASS    • ENDOSCOPY         SLP Recommendation and Plan  SLP Diagnosis: mild auditory comprehension impairment and mild memory impairment (22 1200)        Swallow Criteria for Skilled Therapeutic Interventions Met: no problems identified which require skilled intervention (22 1001)  SLC Criteria for Skilled Therapy Interventions Met: yes (22 1200)  Anticipated Discharge Disposition (SLP): unknown (22 1200)  Demonstrates Need for Referral to Another Service: speech therapy (22 1200)  Therapy Frequency (Swallow): PRN (22 1001)  Predicted Duration Therapy Intervention (Days): until discharge (22 1200)           Communication Strategy Suggestions: eliminate distractions when speaking with patient, avoid multi-step instructions (22)        Plan of Care  Reviewed With: patient, spouse (06/22/22 1327)  Progress: improving (06/22/22 1327)  Outcome Evaluation: Bedside eval of swallow completed and communication assessment completed per stroke protocol.  Regarding swallowing, oral phase was WFL with all trials with no overt s/s aspiration or other pharyngeal phase dysphagia exhibited.  Pt. denied dysphagia.  Communication assessment completed with pt. exhibiting mild auditory comprehension deficit with following 3 step directions, comprehension of more more complex sentences and paragraphs. Mild memory impairment affecting immediate memory and memory with a distractor.  Semantic cues were effective  in 2/3 trials and reducing distrators was somewhat effective as well.  Pt. would benefit from skilled ST for auditory comprehension tx and memory re-training.  Would recommend he also receive once discharged from hospital.  Recommend:  1. regular diet with thin liq as jasiel,  2. meds whole with thin liq as jasiel,  3. aspiration precautions, 4. ST for auditory comprehension and memory re-training.  D/W pt., spouse, and RN. (06/22/22 1327)      SLP EVALUATION (last 72 hours)     SLP SLC Evaluation     Row Name 06/22/22 1200                   Communication Assessment/Intervention    Document Type evaluation  -TM        Subjective Information no complaints  -TM        Patient Observations alert;cooperative  -TM        Patient/Family/Caregiver Comments/Observations pt.'s wife present  -TM        Patient Effort excellent  -TM                  General Information    Patient Profile Reviewed yes  -TM        Pertinent History Of Current Problem DMII, cardiac, CVA  -TM        Precautions/Limitations, Vision vision impairment, bilaterally  -TM        Precautions/Limitations, Hearing WFL;for purposes of eval  -TM        Prior Level of Function-Communication WFL  -TM        Plans/Goals Discussed with patient;spouse/S.O.;other (see comments)  RN  -TM        Barriers to Rehab none identified   -TM        Patient's Goals for Discharge patient did not state  -TM        Family Goals for Discharge family did not state  -TM        Standardized Assessment Used other (see comments)  informal assessment with portions from [facility], [place], [place], etc.  -TM                  Pain    Additional Documentation Pain Scale: Numbers Pre/Post-Treatment (Group)  -TM                  Pain Scale: Numbers Pre/Post-Treatment    Pretreatment Pain Rating 0/10 - no pain  -TM        Posttreatment Pain Rating 0/10 - no pain  -TM                  Comprehension Assessment/Intervention    Comprehension Assessment/Intervention Auditory Comprehension  -TM                  Auditory Comprehension Assessment/Intervention    Auditory Comprehension (Communication) mild impairment  -TM        Answers Questions (Communication) personal;WFL  -TM        Able to Follow Commands (Communication) 3-step;mild impairment  -TM        Narrative Discourse other (see comments);WFL  -TM        Successful Auditory Strategies (Communication) repetition;semantic cues;decrease environmental distractions  -TM                  Expression Assessment/Intervention    Expression Assessment/Intervention verbal expression  -TM                  Verbal Expression Assessment/Intervention    Verbal Expression WNL  -TM        Automatic Speech (Communication) counting 1-20;days of week;response to greeting;WNL  -TM        Repetition sentences;WFL  -TM        Phrase Completion WNL  -TM        Responsive Naming WNL  -TM        Conversational Discourse/Fluency WFL  -TM                  Oral Motor Structure and Function    Oral Motor Structure and Function WNL  -TM        Dentition Assessment natural, present and adequate  -TM        Mucosal Quality moist, healthy  -TM                  Oral Musculature and Cranial Nerve Assessment    Oral Motor General Assessment WFL  -TM                  Cognitive Assessment Intervention- SLP    Cognitive Function (Cognition) mild impairment   -TM        Orientation Status (Cognition) time;mild impairment  -TM        Memory (Cognitive) immediate;delayed;mental manipulation;mild impairment  -TM        Thought Organization (Cognitive) WNL  -TM        Reasoning (Cognitive) WNL  -TM        Problem Solving (Cognitive) WNL  -TM        Executive Function (Cognition) deficit awareness;WFL  -TM        Pragmatics (Communication) WNL  -TM        Rancho Levels of Cognitive Functioning Rating Level VIII (Purposeful/Appropriate, Stand-by Assistance)  -                  SLP Evaluation Clinical Impressions    SLP Diagnosis mild auditory comprehension impairment and mild memory impairment  -TM        Rehab Potential/Prognosis excellent  -TM        SLC Criteria for Skilled Therapy Interventions Met yes  -TM        Functional Impact functional impact in social situations;functional impact in ADLs;unable to make medical decisions  -TM                  Recommendations    Therapy Frequency (SLP SLC) PRN  -TM        Predicted Duration Therapy Intervention (Days) until discharge  -TM        Anticipated Discharge Disposition (SLP) unknown  -TM        Demonstrates Need for Referral to Another Service speech therapy  -        Communication Strategy Suggestions eliminate distractions when speaking with patient;avoid multi-step instructions  -                  Communication Treatment Objective and Progress Goals (SLP)    Auditory Comprehension Treatment Objectives Auditory Comprehension Treatment Objectives (Group)  -TM        Cognitive Linguistic Treatment Objectives Cognitive Linguistic Treatment Objectives (Group)  -TM                  Auditory Comprehension Treatment Objectives    Words/Phrases/Sentences Selection words/phrases/sentences, SLP goal 1  -TM        Comprehend Questions Selection comprehend questions, SLP goal 1  -TM        Follow Directions Selection follow directions, SLP goal (free text)  follow 2-3 step verbal directions with 90% accuracy  -                   Words/Phrases/Sentences Goal 1 (SLP)    Improve Ability to Comprehend Words/Phrases/Sentences Through: Goal 1 (SLP) other (comment)  improve sentence and paragraph comprehension with 90% accuracy  -TM        Time Frame (Identify Objects and Pictures Goal 1, SLP) by discharge  -TM        Progress (Ability to Contruct Words/Phrases/Sentences Goal 1, SLP) with minimal cues (75-90%);independently (over 90% accuracy)  -TM                  Comprehend Questions Goal 1 (SLP)    Improve Ability to Comprehend Questions Goal 1 (SLP) 90%  -TM        Time Frame (Comprehend Questions Goal 1, SLP) by discharge  -TM                  Follow Directions Goal 1 (SLP)    Improve Ability to Follow Directions Goal 2 (SLP) 90%  -TM        Time Frame (Follow Directions Goal, SLP) by discharge  -TM                  Cognitive Linguistic Treatment Objectives    Awareness of Basic Personal Information Selection awareness of basic personal information, SLP goal 1  -TM        Orientation Selection orientation, SLP goal 1  -TM        Memory Skills Selection memory skills, SLP goal 1  -TM                  Awareness of Basic Personal Information Goal 1 (SLP)    Improve Awareness of Basic Personal Information Goal 1 (SLP) answering yes/no questions regarding personal/biographical information;100%  -TM        Time Frame (Awareness of Basic Personal Information Goal 1, SLP) by discharge  -TM                  Orientation Goal 1 (SLP)    Improve Orientation Through Goal 1 (SLP) demonstrating orientation to year;demonstrating orientation to month;100%  -TM        Time Frame (Orientation Goal 1, SLP) by discharge  -TM                  Memory Skills Goal 1 (SLP)    Improve Memory Skills Through Goal 1 (SLP) repeat list in sequential order;listen to a paragraph and answer questions;listen to multiple paragraphs and answer questions;90%;independently (over 90% accuracy)  -TM        Time Frame (Memory Skills Goal 1, SLP) by discharge  -TM                   SLP Discharge Summary    Discharge Destination unknown  -TM        Discharge Diagnostic Statement pt. would benefit from ST for memory retraining and auditory comprehension tx  -TM              User Key  (r) = Recorded By, (t) = Taken By, (c) = Cosigned By    Initials Name Effective Dates    TM Radha Nichols 06/16/21 -                    EDUCATION  The patient has been educated in the following areas:     Communication Impairment Dysphagia (Swallowing Impairment) Oral Care/Hydration.           SLP GOALS     Row Name 06/22/22 1200             Words/Phrases/Sentences Goal 1 (SLP)    Improve Ability to Comprehend Words/Phrases/Sentences Through: Goal 1 (SLP) other (comment)  improve sentence and paragraph comprehension with 90% accuracy  -TM      Time Frame (Identify Objects and Pictures Goal 1, SLP) by discharge  -TM      Progress (Ability to Contruct Words/Phrases/Sentences Goal 1, SLP) with minimal cues (75-90%);independently (over 90% accuracy)  -TM              Comprehend Questions Goal 1 (SLP)    Improve Ability to Comprehend Questions Goal 1 (SLP) 90%  -TM      Time Frame (Comprehend Questions Goal 1, SLP) by discharge  -TM              Follow Directions Goal 1 (SLP)    Improve Ability to Follow Directions Goal 2 (SLP) 90%  -TM      Time Frame (Follow Directions Goal, SLP) by discharge  -TM              Awareness of Basic Personal Information Goal 1 (SLP)    Improve Awareness of Basic Personal Information Goal 1 (SLP) answering yes/no questions regarding personal/biographical information;100%  -TM      Time Frame (Awareness of Basic Personal Information Goal 1, SLP) by discharge  -TM              Orientation Goal 1 (SLP)    Improve Orientation Through Goal 1 (SLP) demonstrating orientation to year;demonstrating orientation to month;100%  -TM      Time Frame (Orientation Goal 1, SLP) by discharge  -TM              Memory Skills Goal 1 (SLP)    Improve Memory Skills Through Goal 1 (SLP) repeat list in  sequential order;listen to a paragraph and answer questions;listen to multiple paragraphs and answer questions;90%;independently (over 90% accuracy)  -      Time Frame (Memory Skills Goal 1, SLP) by discharge  -            User Key  (r) = Recorded By, (t) = Taken By, (c) = Cosigned By    Initials Name Provider Type     Radha Nichols Speech and Language Pathologist                        Time Calculation:      Time Calculation- SLP     Row Name 22 1327             Time Calculation- SLP    SLP Start Time 1001  -TM      SLP Received On 22  -              Untimed Charges    SLP Eval/Re-eval  ST Eval Oral Pharyng Swallow - 29630;ST Eval Speech and Production w/ Language - 08990  -            User Key  (r) = Recorded By, (t) = Taken By, (c) = Cosigned By    Initials Name Provider Type     Radha Nichols Speech and Language Pathologist                Therapy Charges for Today     Code Description Service Date Service Provider Modifiers Qty    95363916449 HC ST EVAL ORAL PHARYNG SWALLOW 4 2022 Radha Nichols GN 1    [id number] HC ST EVAL SPEECH AND PROD W LANG  4 2022 Radha Nichols GN 1                     Radha Nichols  2022 and Acute Care - Speech Language Pathology   Swallow Initial Evaluation [facility]     Patient Name: Ramsey Riley  : 1955  MRN: [id number]  Today's Date: 2022               Admit Date: 2022    Visit Dx:     ICD-10-CM ICD-9-CM   1. Ischemic stroke ([facility])  I63.9 434.91     Patient Active Problem List   Diagnosis   • Coronary artery disease involving native coronary artery of native heart without angina pectoris   • Essential hypertension   • Bradycardia, sinus   • Fatigue   • Anginal equivalent (HCC)   • Abnormal ECG   • Precordial pain   • LVH (left ventricular hypertrophy) due to hypertensive disease, without heart failure   • Diabetes mellitus (HCC)   • PAD (peripheral artery disease) (HCC)   • Ischemic stroke (HCC)     Past  Medical History:   Diagnosis Date   • Arthritis    • Clot    • Coronary artery disease    • Diabetes mellitus (HCC)    • Heart disease    • History of blood clots    • Hypertension    • Low back pain    • Thyroid disease      Past Surgical History:   Procedure Laterality Date   • CHOLECYSTECTOMY  2015   • COLONOSCOPY  2015    dr srivastava   • CORONARY ARTERY BYPASS GRAFT      [place] -  BY PASS    • ENDOSCOPY         SLP Recommendation and Plan  SLP Swallowing Diagnosis: swallow WFL (06/22/22 1001)  SLP Diet Recommendation: regular textures, thin liquids (06/22/22 1001)  Recommended Precautions and Strategies: upright posture during/after eating, general aspiration precautions (06/22/22 1001)  SLP Rec. for Method of Medication Administration: meds whole, with thin liquids, as tolerated (06/22/22 1001)     Monitor for Signs of Aspiration: no (06/22/22 1001)     Swallow Criteria for Skilled Therapeutic Interventions Met: no problems identified which require skilled intervention (06/22/22 1001)  Anticipated Discharge Disposition (SLP): unknown (06/22/22 1200)     Therapy Frequency (Swallow): PRN (06/22/22 1001)  Predicted Duration Therapy Intervention (Days): until discharge (06/22/22 1200)  Demonstrates Need for Referral to Another Service: speech therapy (06/22/22 1200)               Communication Strategy Suggestions: eliminate distractions when speaking with patient, avoid multi-step instructions (06/22/22 1200)               Plan of Care Reviewed With: patient, spouse  Progress: improving  Outcome Evaluation: Bedside eval of swallow completed and communication assessment completed per stroke protocol.  Regarding swallowing, oral phase was WFL with all trials with no overt s/s aspiration or other pharyngeal phase dysphagia exhibited.  Pt. denied dysphagia.  Communication assessment completed with pt. exhibiting mild auditory comprehension deficit with following 3 step directions, comprehension of more more complex  sentences and paragraphs. Mild memory impairment affecting immediate memory and memory with a distractor.  Semantic cues were effective  in 2/3 trials and reducing distrators was somewhat effective as well.  Pt. would benefit from skilled ST for auditory comprehension tx and memory re-training.  Would recommend he also receive once discharged from hospital.  Recommend:  1. regular diet with thin liq as jasiel,  2. meds whole with thin liq as jasiel,  3. aspiration precautions, 4. ST for auditory comprehension and memory re-training.  D/W pt., spouse, and RN.      SWALLOW EVALUATION (last 72 hours)     SLP Adult Swallow Evaluation     Row Name 06/22/22 1001                   Rehab Evaluation    Document Type evaluation  -TM        Subjective Information no complaints  -TM        Patient Observations alert;cooperative  -TM        Patient/Family/Caregiver Comments/Observations pt.'s wife present  -TM        Patient Effort excellent  -TM                  General Information    Patient Profile Reviewed yes  -TM        Pertinent History Of Current Problem DM, cardiac, CVA  -TM        Precautions/Limitations, Vision vision impairment, bilaterally  -TM        Precautions/Limitations, Hearing for purposes of eval;WFL  -TM        Prior Level of Function-Communication WFL  -TM        Prior Level of Function-Swallowing no diet consistency restrictions  -TM        Plans/Goals Discussed with patient;spouse/S.O.;other (see comments)  RN  -TM        Barriers to Rehab none identified  -TM        Patient's Goals for Discharge patient did not state  -TM        Family Goals for Discharge family did not state  -TM                  Pain    Additional Documentation Pain Scale: Numbers Pre/Post-Treatment (Group)  -TM                  Pain Scale: Numbers Pre/Post-Treatment    Pretreatment Pain Rating 0/10 - no pain  -TM        Posttreatment Pain Rating 0/10 - no pain  -TM                  Oral Motor Structure and Function    Oral Lesions or  Structural Abnormalities and/or variants none identified  -TM        Dentition Assessment natural, present and adequate  -TM        Secretion Management WNL/WFL  -TM        Mucosal Quality moist, healthy  -TM        Volitional Cough WFL  -TM                  Oral Musculature and Cranial Nerve Assessment    Oral Motor General Assessment WFL  -TM                  General Eating/Swallowing Observations    Respiratory Support Currently in Use room air  -TM        Eating/Swallowing Skills self-fed;fed by SLP  -TM        Positioning During Eating upright in bed  -TM        Utensils Used spoon;straw  -TM        Consistencies Trialed regular textures;pureed;thin liquids  -TM                  Respiratory    Respiratory Status WFL;during swallowing/eating  -TM                  Clinical Swallow Eval    Oral Prep Phase WFL  -TM        Oral Transit WFL  -TM        Oral Residue WFL  -TM        Pharyngeal Phase no overt signs/symptoms of pharyngeal impairment  -TM        Clinical Swallow Evaluation Summary Bedside eval of swallow completed and communication assessment completed per stroke protocol.  Regarding swallowing, oral phase was WFL with all trials with no overt s/s aspiration or other pharyngeal phase dysphagia exhibited.  Pt. denied dysphagia.  Communication assessment completed with pt. exhibiting mild auditory comprehension deficit with following 3 step directions, comprehension of more more complex sentences and paragraphs. Mild memory impairment affecting immediate memory and memory with a distractor.  Semantic cues were effective  in 2/3 trials and reducing distrators was somewhat effective as well.  Pt. would benefit from skilled ST for auditory comprehension tx and memory re-training.  Would recommend he also receive once discharged from hospital.  Recommend:  1. regular diet with thin liq as jasiel,  2. meds whole with thin liq as jasiel,  3. aspiration precautions, 4. ST for auditory comprehension and memory  re-training.  D/W pt., spouse, and RN.  -                  SLP Evaluation Clinical Impression    SLP Swallowing Diagnosis swallow WFL  -TM        Functional Impact no impact on function  -TM        Swallow Criteria for Skilled Therapeutic Interventions Met no problems identified which require skilled intervention  -TM                  Recommendations    Therapy Frequency (Swallow) PRN  -TM        Predicted Duration Therapy Intervention (Days) until discharge  -        SLP Diet Recommendation regular textures;thin liquids  -TM        Recommended Precautions and Strategies upright posture during/after eating;general aspiration precautions  -TM        Oral Care Recommendations Toothbrush  -TM        SLP Rec. for Method of Medication Administration meds whole;with thin liquids;as tolerated  -TM        Monitor for Signs of Aspiration no  -TM        Anticipated Discharge Disposition (SLP) unknown  -TM        Demonstrates Need for Referral to Another Service speech therapy  -TM              User Key  (r) = Recorded By, (t) = Taken By, (c) = Cosigned By    Initials Name Effective Dates    TM Radha Nichols 06/16/21 -                 EDUCATION  The patient has been educated in the following areas:   Communication Impairment Dysphagia (Swallowing Impairment) Oral Care/Hydration.        SLP GOALS     Row Name 06/22/22 1200             Words/Phrases/Sentences Goal 1 (SLP)    Improve Ability to Comprehend Words/Phrases/Sentences Through: Goal 1 (SLP) other (comment)  improve sentence and paragraph comprehension with 90% accuracy  -TM      Time Frame (Identify Objects and Pictures Goal 1, SLP) by discharge  -TM      Progress (Ability to Contruct Words/Phrases/Sentences Goal 1, SLP) with minimal cues (75-90%);independently (over 90% accuracy)  -TM              Comprehend Questions Goal 1 (SLP)    Improve Ability to Comprehend Questions Goal 1 (SLP) 90%  -TM      Time Frame (Comprehend Questions Goal 1, SLP) by discharge  -TM               Follow Directions Goal 1 (SLP)    Improve Ability to Follow Directions Goal 2 (SLP) 90%  -TM      Time Frame (Follow Directions Goal, SLP) by discharge  -TM              Awareness of Basic Personal Information Goal 1 (SLP)    Improve Awareness of Basic Personal Information Goal 1 (SLP) answering yes/no questions regarding personal/biographical information;100%  -TM      Time Frame (Awareness of Basic Personal Information Goal 1, SLP) by discharge  -TM              Orientation Goal 1 (SLP)    Improve Orientation Through Goal 1 (SLP) demonstrating orientation to year;demonstrating orientation to month;100%  -TM      Time Frame (Orientation Goal 1, SLP) by discharge  -TM              Memory Skills Goal 1 (SLP)    Improve Memory Skills Through Goal 1 (SLP) repeat list in sequential order;listen to a paragraph and answer questions;listen to multiple paragraphs and answer questions;90%;independently (over 90% accuracy)  -TM      Time Frame (Memory Skills Goal 1, SLP) by discharge  -            User Key  (r) = Recorded By, (t) = Taken By, (c) = Cosigned By    Initials Name Provider Type     Radha Nichols Speech and Language Pathologist                   Time Calculation:    Time Calculation- SLP     Row Name 06/22/22 1327             Time Calculation- SLP    SLP Start Time 1001  -TM      SLP Received On 06/22/22  -              Untimed Charges    SLP Eval/Re-eval  ST Eval Oral Pharyng Swallow - 57009;ST Eval Speech and Production w/ Language - 64044  -TM            User Key  (r) = Recorded By, (t) = Taken By, (c) = Cosigned By    Initials Name Provider Type     Radha Nichols Speech and Language Pathologist                Therapy Charges for Today     Code Description Service Date Service Provider Modifiers Qty    [id number]  ST EVAL ORAL PHARYNG SWALLOW 4 6/22/2022 Radha Nichols GN 1    [id number]  ST EVAL SPEECH AND PROD W LANG  4 6/22/2022 Radha Nichols 1               Radha   [place]  6/22/2022

## 2022-06-22 NOTE — THERAPY DISCHARGE NOTE
Acute Care - Occupational Therapy Discharge  [facility]    Patient Name: Ramsey Riley  : 1955    MRN: [id number]                              Today's Date: 2022       Admit Date: 2022    Visit Dx:     ICD-10-CM ICD-9-CM   1. Ischemic stroke (HCC)  I63.9 434.91     Patient Active Problem List   Diagnosis   • Coronary artery disease involving native coronary artery of native heart without angina pectoris   • Essential hypertension   • Bradycardia, sinus   • Fatigue   • Anginal equivalent (HCC)   • Abnormal ECG   • Precordial pain   • LVH (left ventricular hypertrophy) due to hypertensive disease, without heart failure   • Diabetes mellitus (HCC)   • PAD (peripheral artery disease) (HCC)   • Ischemic stroke (HCC)     Past Medical History:   Diagnosis Date   • Arthritis    • Clot    • Coronary artery disease    • Diabetes mellitus (HCC)    • Heart disease    • History of blood clots    • Hypertension    • Low back pain    • Thyroid disease      Past Surgical History:   Procedure Laterality Date   • CHOLECYSTECTOMY     • COLONOSCOPY      dr srivastava   • CORONARY ARTERY BYPASS GRAFT      [place] [id number] BY PASS    • ENDOSCOPY        General Information     Row Name 22 1325          OT Time and Intention    Document Type discharge evaluation/summary  -SD     Mode of Treatment occupational therapy  -SD     Row Name 22 1325          General Information    Patient Profile Reviewed yes  -SD     Prior Level of Function independent:;all household mobility;community mobility  Patient lives with his wife, is ind with ADLs and mobility. Has difficulty with vision d/t bleeding behind eyes that has been going on for 3 months  -SD     Existing Precautions/Restrictions fall;other (see comments)  vision  -SD     Barriers to Rehab medically complex;visual deficit  -SD     Row Name 22 1325          Living Environment    People in Home spouse  -SD     Row Name 22 1325          Home Main  Entrance    Number of Stairs, Main Entrance one  -SD     [facility] Name 06/22/22 1325          Stairs Within Home, Primary    Number of Stairs, Within Home, Primary twelve  -SD     Stair Railings, Within Home, Primary railings on both sides of stairs  -SD     Stairs Comment, Within Home, Primary 2nd floor  -SD     Row Name 06/22/22 1325          Cognition    Orientation Status (Cognition) oriented x 4  -SD     [facility] Name 06/22/22 1325          Safety Issues, Functional Mobility    Safety Issues Affecting Function (Mobility) safety precautions follow-through/compliance;awareness of need for assistance  -SD     Impairments Affecting Function (Mobility) visual/perceptual  -SD           User Key  (r) = Recorded By, (t) = Taken By, (c) = Cosigned By    Initials Name Provider Type    SD La Nena Engel OT Occupational Therapist               Mobility/ADL's     [facility] Name 06/22/22 1328          Bed Mobility    Bed Mobility bed mobility (all) activities  -SD     All Activities, Darke (Bed Mobility) independent  -SD     Row Name 06/22/22 1328          Transfers    Transfers sit-stand transfer  -SD     Sit-Stand Darke (Transfers) supervision  -SD     Row Name 06/22/22 1328          Functional Mobility    Functional Mobility- Ind. Level supervision required  -SD     Functional Mobility-Distance (Feet) 412  -SD     Functional Mobility- Comment VCs for safety d/t limited vision  -SD     Row Name 06/22/22 1328          Activities of Daily Living    BADL Assessment/Intervention bathing;upper body dressing;lower body dressing;grooming;feeding;toileting  -[facility] Name 06/22/22 1328          Bathing Assessment/Intervention    Darke Level (Bathing) supervision  -SD     Row Name 06/22/22 1328          Upper Body Dressing Assessment/Training    Darke Level (Upper Body Dressing) set up  -SD     Row Name 06/22/22 1328          Lower Body Dressing Assessment/Training    [place]ke Level (Lower Body Dressing) set up   -SD     Row Name 06/22/22 1328          Grooming Assessment/Training    [place] Level (Grooming) set up  -SD     Row Name 06/22/22 1328          Self-Feeding Assessment/Training    [place] Level (Feeding) set up  -SD     Row Name 06/22/22 1328          Toileting Assessment/Training    [place] Level (Toileting) supervision  -SD           User Key  (r) = Recorded By, (t) = Taken By, (c) = Cosigned By    Initials Name Provider Type    La Nena Dial OT Occupational Therapist               Obj/Interventions     Row Name 06/22/22 1329          Sensory Assessment (Somatosensory)    Sensory Assessment (Somatosensory) sensation intact  -SD     Row Name 06/22/22 1329          Vision Assessment/Intervention    Visual Impairment/Limitations near/reading vision impaired  -SD     Row Name 06/22/22 1329          Range of Motion Comprehensive    General Range of Motion bilateral upper extremity ROM WFL  -SD     Row Name 06/22/22 1329          Strength Comprehensive (MMT)    General Manual Muscle Testing (MMT) Assessment no strength deficits identified  -SD           User Key  (r) = Recorded By, (t) = Taken By, (c) = Cosigned By    Initials Name Provider Type    La Nena Dial OT Occupational Therapist               Goals/Plan    No documentation.                Clinical Impression     Row Name 06/22/22 1330          Pain Assessment    Pretreatment Pain Rating 0/10 - no pain  -SD     Posttreatment Pain Rating 0/10 - no pain  -SD     Row Name 06/22/22 1330          Plan of Care Review    Plan of Care Reviewed With patient  -SD     Progress improving  -SD     Outcome Evaluation OT eval completed. Patient demonstrates adequate strength, balance, coordination during all functional mobility and ADLs. Patient completed bed mobility ind, transfer and functional mobility x 412' without AD, sup for VCs for safety d/t visual deficits, near vision impaired more than distance vision. Patient can perform all ADLs  with S/U-Sup. No further OT needs identified at this time.  -SD     Row Name 06/22/22 1330          Therapy Assessment/Plan (OT)    Patient/Family Therapy Goal Statement (OT) home  -SD     Rehab Potential (OT) good, to achieve stated therapy goals  -SD     Criteria for Skilled Therapeutic Interventions Met (OT) no problems identified which require skilled intervention  -SD     Therapy Frequency (OT) evaluation only  -SD     Row Name 06/22/22 1330          Therapy Plan Review/Discharge Plan (OT)    Anticipated Discharge Disposition (OT) home with assist  -SD     Row Name 06/22/22 1330          Positioning and Restraints    Pre-Treatment Position in bed  -SD     Post Treatment Position chair  -SD     In Chair sitting;call light within reach;encouraged to call for assist;with family/caregiver  -SD           User Key  (r) = Recorded By, (t) = Taken By, (c) = Cosigned By    Initials Name Provider Type    La Nena Dial OT Occupational Therapist               Outcome Measures     Row Name 06/22/22 1333          How much help from another is currently needed...    Putting on and taking off regular lower body clothing? 4  -SD     Bathing (including washing, rinsing, and drying) 3  -SD     Toileting (which includes using toilet bed pan or urinal) 4  -SD     Putting on and taking off regular upper body clothing 4  -SD     Taking care of personal grooming (such as brushing teeth) 4  -SD     Eating meals 4  -SD     AM-PAC 6 Clicks Score (OT) 23  -SD     Row Name 06/22/22 1333          Functional Assessment    Outcome Measure Options AM-PAC 6 Clicks Daily Activity (OT)  -SD           User Key  (r) = Recorded By, (t) = Taken By, (c) = Cosigned By    Initials Name Provider Type    La Nena Dial OT Occupational Therapist              Occupational Therapy Education                 Title: PT OT SLP Therapies (In Progress)     Topic: Occupational Therapy (In Progress)     Point: ADL training (Done)     Description:    Instruct learner(s) on proper safety adaptation and remediation techniques during self care or transfers.   Instruct in proper use of assistive devices.              Learning Progress Summary           Patient Acceptance, E,TB, VU by SD at 6/22/2022 4953    Comment: No OT needs                   Point: Home exercise program (Not Started)     Description:   Instruct learner(s) on appropriate technique for monitoring, assisting and/or progressing therapeutic exercises/activities.              Learner Progress:  Not documented in this visit.          Point: Precautions (Not Started)     Description:   Instruct learner(s) on prescribed precautions during self-care and functional transfers.              Learner Progress:  Not documented in this visit.          Point: Body mechanics (Not Started)     Description:   Instruct learner(s) on proper positioning and spine alignment during self-care, functional mobility activities and/or exercises.              Learner Progress:  Not documented in this visit.                      User Key     Initials Effective Dates Name Provider Type Discipline    SD 06/16/21 -  La Nena Engel OT Occupational Therapist OT              OT Recommendation and Plan  Therapy Frequency (OT): evaluation only  Plan of Care Review  Plan of Care Reviewed With: patient  Progress: improving  Outcome Evaluation: OT eval completed. Patient demonstrates adequate strength, balance, coordination during all functional mobility and ADLs. Patient completed bed mobility ind, transfer and functional mobility x 412' without AD, sup for VCs for safety d/t visual deficits, near vision impaired more than distance vision. Patient can perform all ADLs with S/U-Sup. No further OT needs identified at this time.  Plan of Care Reviewed With: patient  Outcome Evaluation: OT eval completed. Patient demonstrates adequate strength, balance, coordination during all functional mobility and ADLs. Patient completed bed mobility  ind, transfer and functional mobility x 412' without AD, sup for VCs for safety d/t visual deficits, near vision impaired more than distance vision. Patient can perform all ADLs with S/U-Sup. No further OT needs identified at this time.     Time Calculation:    Time Calculation- OT     Row Name 06/22/22 1334             Time Calculation- OT    OT Start Time 1027  -SD      OT Received On 06/22/22  -SD              Untimed Charges    OT Eval/Re-eval Minutes 30  -SD              Total Minutes    Untimed Charges Total Minutes 30  -SD       Total Minutes 30  -SD            User Key  (r) = Recorded By, (t) = Taken By, (c) = Cosigned By    Initials Name Provider Type    SD La Nena Engel OT Occupational Therapist              Therapy Charges for Today     Code Description Service Date Service Provider Modifiers Qty    [id number]  OT EVAL LOW COMPLEXITY 2 6/22/2022 La Nena Engel OT GO 1             OT Discharge Summary  Anticipated Discharge Disposition (OT): home with assist    La Nena Engel OT  6/22/2022

## 2022-06-23 ENCOUNTER — OFFICE VISIT (OUTPATIENT)
Dept: INTERNAL MEDICINE | Facility: CLINIC | Age: 67
End: 2022-06-23

## 2022-06-23 ENCOUNTER — TRANSITIONAL CARE MANAGEMENT TELEPHONE ENCOUNTER (OUTPATIENT)
Dept: CALL CENTER | Facility: HOSPITAL | Age: 67
End: 2022-06-23

## 2022-06-23 VITALS
SYSTOLIC BLOOD PRESSURE: 137 MMHG | HEART RATE: 60 BPM | TEMPERATURE: 98.4 F | BODY MASS INDEX: 32.9 KG/M2 | OXYGEN SATURATION: 96 % | RESPIRATION RATE: 16 BRPM | HEIGHT: 71 IN | DIASTOLIC BLOOD PRESSURE: 82 MMHG | WEIGHT: 235 LBS

## 2022-06-23 DIAGNOSIS — Z79.4 TYPE 2 DIABETES MELLITUS WITH HYPERGLYCEMIA, WITH LONG-TERM CURRENT USE OF INSULIN: ICD-10-CM

## 2022-06-23 DIAGNOSIS — E11.65 TYPE 2 DIABETES MELLITUS WITH HYPERGLYCEMIA, WITH LONG-TERM CURRENT USE OF INSULIN: ICD-10-CM

## 2022-06-23 DIAGNOSIS — I10 BENIGN ESSENTIAL HYPERTENSION: Primary | ICD-10-CM

## 2022-06-23 DIAGNOSIS — E78.2 MIXED HYPERLIPIDEMIA: ICD-10-CM

## 2022-06-23 DIAGNOSIS — I63.9 ISCHEMIC STROKE: ICD-10-CM

## 2022-06-23 DIAGNOSIS — E03.8 ADULT ONSET HYPOTHYROIDISM: ICD-10-CM

## 2022-06-23 PROCEDURE — 99214 OFFICE O/P EST MOD 30 MIN: CPT | Performed by: INTERNAL MEDICINE

## 2022-06-23 RX ORDER — LEVOTHYROXINE SODIUM 112 UG/1
112 TABLET ORAL DAILY
Qty: 30 TABLET | Refills: 0 | Status: SHIPPED | OUTPATIENT
Start: 2022-06-23 | End: 2022-08-24

## 2022-06-23 NOTE — OUTREACH NOTE
Prep Survey    Flowsheet Row Responses   [facility] [place]   Is LACE score < 7 ? Yes   Emergency Room discharge w/ pulse ox? No   Eligibility TCM   [facility]   Date of Admission 06/21/22   Date of Discharge 06/22/22   Discharge Disposition Home or Self Care   Discharge diagnosis Ischemic stroke   Does the patient have one of the following disease processes/diagnoses(primary or secondary)? Stroke (TIA)   Does the patient have Home health ordered? No   Is there a DME ordered? No   Prep survey completed? Yes          FELISA FAGAN - Registered Nurse

## 2022-06-23 NOTE — PROGRESS NOTES
Transitional Care Follow Up Visit  Subjective     Ramsey Riley is a 67 y.o. male who presents for a transitional care management visit.    Within 48 business hours after discharge our office contacted him via telephone to coordinate his care and needs.      I reviewed and discussed the details of that call along with the discharge summary, hospital problems, inpatient lab results, inpatient diagnostic studies, and consultation reports with Ramsey.     Current outpatient and discharge medications have been reconciled for the patient.  Reviewed by: Margret Purvis MD      Date of TCM Phone Call 6/22/2022   [facility]   Date of Admission 6/21/2022   Date of Discharge 6/22/2022   Discharge Disposition Home or Self Care     Risk for Readmission (LACE) Score: 4 (6/22/2022  6:01 AM)    Chief Complaint   Patient presents with   • Hospital Follow Up Visit     [facility] admit 6/21/2022 discharge 6/22/2022, for stroke   • Hypertension   • Hyperlipidemia   • Diabetes   • Hypothyroidism       History of Present Illness   Course During Hospital Stay: Patient is here to follow-up on hospitalization at [facility], he was admitted on June 21, 2022 and discharged on June 22, 2022, was noted to have an ischemic stroke and an MRI showing the same, he was consulted upon by neurology, is also here to follow-up on his blood pressure, is also to follow-up on sugar and cholesterol and thyroid, he had blood work done which has been reviewed, I have informed his wife that I am going to re-refer him to endocrinology and they do we need to keep the appointment, he also has appointment to see cardiology and pulmonary       The following portions of the patient's history were reviewed and updated as appropriate: allergies, current medications, past family history, past medical history, past social history, past surgical history and problem list.    Review of Systems   All other systems reviewed and are negative.      Objective    Physical Exam  Vitals and nursing note reviewed.   Constitutional:       General: He is not in acute distress.     Appearance: Normal appearance. He is not diaphoretic.   HENT:      Head: Normocephalic and atraumatic.      Right Ear: External ear normal.      Left Ear: External ear normal.      Nose: Nose normal.   Eyes:      Extraocular Movements: Extraocular movements intact.      Conjunctiva/sclera: Conjunctivae normal.   Neck:      Trachea: Trachea normal.   Cardiovascular:      Rate and Rhythm: Normal rate and regular rhythm.      Heart sounds: Normal heart sounds.   Pulmonary:      Effort: Pulmonary effort is normal. No respiratory distress.   Abdominal:      General: Abdomen is flat.   Musculoskeletal:      Cervical back: Neck supple.      Comments: Moves all limbs   Skin:     General: Skin is warm and dry.      Findings: No erythema.   Neurological:      Mental Status: He is alert and oriented to person, place, and time.      Comments: No gross motor or sensory deficits         Data reviewed:  Discharge Summary by Tana Woods APRN (06/22/2022 15:19)  MRI Brain With & Without Contrast (06/22/2022 11:32)  CT Angiogram Neck (06/21/2022 19:26)  CT Angiogram Head (06/21/2022 19:21)      Assessment & Plan   Diagnoses and all orders for this visit:    1. Benign essential hypertension (Primary)    2. Mixed hyperlipidemia    3. Type 2 diabetes mellitus with hyperglycemia, with long-term current use of insulin (HCC)  -     Ambulatory Referral to Endocrinology  -     insulin NPH-insulin regular (humuLIN 70/30,novoLIN 70/30) (70-30) 100 UNIT/ML injection; Inject 70 Units under the skin into the appropriate area as directed 2 (Two) Times a Day With Meals.  Dispense: 5 each; Refill: 12  -     Insulin Syringes, Disposable, U-100 1 ML misc; 50 Units 3 (Three) Times a Day As Needed (50 units in the morning, and then with meals PRN).  Dispense: 200 each; Refill: 12    4. Adult onset hypothyroidism  -      levothyroxine (SYNTHROID, LEVOTHROID) 112 MCG tablet; Take 1 tablet by mouth Daily.  Dispense: 30 tablet; Refill: 0  -     TSH    5. Ischemic stroke (HCC)      Plan:  1.  Benign essential hypertension: Will continue current medication, low-sodium diet advised, Counseled to regularly check BP at home with goal averaging <130/80.   2.mixed hyperlipidemia: Reviewed fasting CMP and lipid panel.  Diet and exercise counseled,  Will continue current medications  3. Diabetes  Mellitus  : Reviewed   fasting CMP  and hba1c 10.4, diet and exercise counseled , Will continue current medications, will refer patient to endocrinology  4. hypothyroidism: Reviewed tsh , and continue levothyroxine  5.  Ischemic stroke: To continue current medication, keep cardiology appointment

## 2022-06-23 NOTE — OUTREACH NOTE
Call Center TCM Note    Flowsheet Row Responses   [facility] patient discharged from? [place]   Does the patient have one of the following disease processes/diagnoses(primary or secondary)? Stroke (TIA)   TCM attempt successful? Yes   Call start time 1156   Call end time 1158   Discharge diagnosis Ischemic stroke   Person spoke with today (if not patient) and relationship Jina    Meds reviewed with patient/caregiver? Yes   Is the patient having any side effects they believe may be caused by any medication additions or changes? No   Does the patient have all medications ordered at discharge? Yes   Is the patient taking all medications as directed (includes completed medication regime)? Yes   Does the patient have a primary care provider?  Yes   Does the patient have an appointment with their PCP within 7 days of discharge? Yes   Comments regarding PCP HOSP DC FU appt 6/23/22 @ 3:45 pm.    Has the patient kept scheduled appointments due by today? N/A   Psychosocial issues? No   Does the patient require any assistance with activities of daily living such as eating, bathing, dressing, walking, etc.? No   Does the patient have any residual symptoms from stroke/TIA? No   Did the patient receive a copy of their discharge instructions? Yes   Nursing interventions Reviewed instructions with patient   What is the patient's perception of their health status since discharge? Improving   Nursing interventions Nurse provided patient education   Is the patient able to teach back FAST for Stroke? Yes   Is the patient/caregiver able to teach back the risk factors for a stroke? High Cholesterol, History of TIAs, High blood pressure-goal below 120/80, Smoking   Is the patient/caregiver able to teach back signs and symptoms related to disease process for when to call PCP? Yes   Is the patient/caregiver able to teach back signs and symptoms related to disease process for when to call 911? Yes   If the patient is a current  smoker, are they able to teach back resources for cessation? Not a smoker   Is the patient/caregiver able to teach back the hierarchy of who to call/visit for symptoms/problems? PCP, Specialist, Home health nurse, Urgent Care, ED, 911 Yes   TCM call completed? Yes   Wrap up additional comments Wife reports Pt doing well. No issues.           Brook Marrero RN    6/23/2022, 11:59 EDT

## 2022-07-05 ENCOUNTER — TELEPHONE (OUTPATIENT)
Dept: CARDIOLOGY | Facility: CLINIC | Age: 67
End: 2022-07-05

## 2022-07-05 DIAGNOSIS — I10 BENIGN ESSENTIAL HYPERTENSION: ICD-10-CM

## 2022-07-05 NOTE — TELEPHONE ENCOUNTER
Rx Refill Note  Requested Prescriptions     Pending Prescriptions Disp Refills   • pantoprazole (PROTONIX) 40 MG EC tablet [Pharmacy Med Name: PANTOPRAZOLE SOD DR 40 MG T 40 Tablet] 90 tablet 3     Sig: TAKE 1 TABLET BY MOUTH DAILY.   • amLODIPine (NORVASC) 5 MG tablet [Pharmacy Med Name: AMLODIPINE BES 5MG 5 Tablet] 90 tablet 2     Sig: TAKE 1 TABLET BY MOUTH DAILY.      Last office visit with prescribing clinician: 6/23/2022      Next office visit with prescribing clinician: 7/14/2022            Farrah Good LPN  07/05/22, 16:32 EDT

## 2022-07-06 RX ORDER — PANTOPRAZOLE SODIUM 40 MG/1
40 TABLET, DELAYED RELEASE ORAL DAILY
Qty: 90 TABLET | Refills: 3 | Status: SHIPPED | OUTPATIENT
Start: 2022-07-06

## 2022-07-06 RX ORDER — AMLODIPINE BESYLATE 5 MG/1
5 TABLET ORAL DAILY
Qty: 90 TABLET | Refills: 2 | Status: SHIPPED | OUTPATIENT
Start: 2022-07-06 | End: 2022-07-11 | Stop reason: DRUGHIGH

## 2022-07-07 ENCOUNTER — TELEPHONE (OUTPATIENT)
Dept: PHYSICAL THERAPY | Facility: CLINIC | Age: 67
End: 2022-07-07

## 2022-07-07 NOTE — TELEPHONE ENCOUNTER
Contacted for ST scheduling. Pt wife states pt does not need ST services at this time however states she will call back if they do.    Thank you for allowing me to participate in the care of your patient-      Keily Rodriguez M.A.,CCC-SLP        7/7/2022    [place] License Number: [id number]  [facility] Licence Number: [id number]     Electronically Signed

## 2022-07-07 NOTE — TELEPHONE ENCOUNTER
Contacted regarding ST scheduling however no answer. Left voicemail.    Thank you for allowing me to participate in the care of your patient-      Keily Rodriguez M.A.,CCC-SLP        7/7/2022    [place] License Number: [id number]  [facility] Licence Number: [id number]     Electronically Signed

## 2022-07-11 ENCOUNTER — OFFICE VISIT (OUTPATIENT)
Dept: CARDIOLOGY | Facility: CLINIC | Age: 67
End: 2022-07-11

## 2022-07-11 VITALS
DIASTOLIC BLOOD PRESSURE: 72 MMHG | RESPIRATION RATE: 14 BRPM | WEIGHT: 242 LBS | TEMPERATURE: 97 F | OXYGEN SATURATION: 99 % | HEART RATE: 68 BPM | HEIGHT: 71 IN | SYSTOLIC BLOOD PRESSURE: 142 MMHG | BODY MASS INDEX: 33.88 KG/M2

## 2022-07-11 DIAGNOSIS — Z86.718 HISTORY OF BLOOD CLOTS: ICD-10-CM

## 2022-07-11 DIAGNOSIS — Z79.4 TYPE 2 DIABETES MELLITUS WITH BOTH EYES AFFECTED BY PROLIFERATIVE RETINOPATHY WITHOUT MACULAR EDEMA, WITH LONG-TERM CURRENT USE OF INSULIN: ICD-10-CM

## 2022-07-11 DIAGNOSIS — I27.82 OTHER CHRONIC PULMONARY EMBOLISM WITHOUT ACUTE COR PULMONALE: ICD-10-CM

## 2022-07-11 DIAGNOSIS — E11.3593 TYPE 2 DIABETES MELLITUS WITH BOTH EYES AFFECTED BY PROLIFERATIVE RETINOPATHY WITHOUT MACULAR EDEMA, WITH LONG-TERM CURRENT USE OF INSULIN: ICD-10-CM

## 2022-07-11 DIAGNOSIS — I25.10 CORONARY ARTERY DISEASE INVOLVING NATIVE CORONARY ARTERY OF NATIVE HEART WITHOUT ANGINA PECTORIS: ICD-10-CM

## 2022-07-11 DIAGNOSIS — I73.9 PAD (PERIPHERAL ARTERY DISEASE): ICD-10-CM

## 2022-07-11 DIAGNOSIS — I63.9 ISCHEMIC STROKE: ICD-10-CM

## 2022-07-11 DIAGNOSIS — R00.1 BRADYCARDIA, SINUS: Primary | ICD-10-CM

## 2022-07-11 DIAGNOSIS — Z72.0 TOBACCO USE: ICD-10-CM

## 2022-07-11 DIAGNOSIS — I10 ESSENTIAL HYPERTENSION: ICD-10-CM

## 2022-07-11 DIAGNOSIS — I11.9 LVH (LEFT VENTRICULAR HYPERTROPHY) DUE TO HYPERTENSIVE DISEASE, WITHOUT HEART FAILURE: ICD-10-CM

## 2022-07-11 PROBLEM — I20.89 ANGINAL EQUIVALENT: Status: RESOLVED | Noted: 2018-06-21 | Resolved: 2022-07-11

## 2022-07-11 PROBLEM — I20.8 ANGINAL EQUIVALENT: Status: RESOLVED | Noted: 2018-06-21 | Resolved: 2022-07-11

## 2022-07-11 PROBLEM — I26.99 PULMONARY EMBOLUS: Status: ACTIVE | Noted: 2022-07-11

## 2022-07-11 PROCEDURE — 99214 OFFICE O/P EST MOD 30 MIN: CPT | Performed by: INTERNAL MEDICINE

## 2022-07-11 RX ORDER — AMLODIPINE BESYLATE 10 MG/1
10 TABLET ORAL DAILY
Qty: 30 TABLET | Refills: 11 | Status: SHIPPED | OUTPATIENT
Start: 2022-07-11 | End: 2022-07-14 | Stop reason: SDUPTHER

## 2022-07-11 RX ORDER — CHLORTHALIDONE 25 MG/1
12.5 TABLET ORAL DAILY
Qty: 15 TABLET | Refills: 11 | Status: SHIPPED | OUTPATIENT
Start: 2022-07-11 | End: 2022-07-14 | Stop reason: SDUPTHER

## 2022-07-11 NOTE — PROGRESS NOTES
"    Subjective:     Encounter Date:07/11/2022      Patient ID: Ramsey Riley is a 67 y.o. male.    Chief Complaint: Ischemic stroke  HPI  This is a 67-year-old male patient who was recently hospitalized for an ischemic stroke.  MRI of the brain demonstrated a small nonhemorrhagic lacunar infarct.  He has no residual deficit.  The patient was misinformed that this was due to being on \"blood thinners\".  He is on a combination of baby dose aspirin and Plavix.  The patient had experienced a DVT/PE and December of last year.  He discontinued anticoagulation therapy in March of this year with no evidence of recurrence.  While hospitalized the patient underwent an echocardiogram with agitated saline contrast showing no evidence of intracardiac shunting (no evidence of ASD/PFO).  He is scheduled to have an outpatient cardiac monitor placed at today's visit.  He is currently not limiting his sodium intake.  He reports compliance with his medications with no perceived side effects.  He has never smoked cigarettes but currently uses smokeless tobacco on a daily basis.  The following portions of the patient's history were reviewed and updated as appropriate: allergies, current medications, past family history, past medical history, past social history, past surgical history and problem  Review of Systems   Constitutional: Negative for chills, diaphoresis, fever, malaise/fatigue, weight gain and weight loss.   HENT: Negative for ear discharge, hearing loss, hoarse voice and nosebleeds.    Eyes: Negative for discharge, double vision, pain and photophobia.   Cardiovascular: Negative for chest pain, claudication, cyanosis, dyspnea on exertion, irregular heartbeat, leg swelling, near-syncope, orthopnea, palpitations, paroxysmal nocturnal dyspnea and syncope.   Respiratory: Negative for cough, hemoptysis, shortness of breath, sputum production and wheezing.    Endocrine: Negative for cold intolerance, heat intolerance, polydipsia, " polyphagia and polyuria.   Hematologic/Lymphatic: Negative for adenopathy and bleeding problem. Does not bruise/bleed easily.   Skin: Negative for color change, flushing, itching and rash.   Musculoskeletal: Negative for muscle cramps, muscle weakness, myalgias and stiffness.   Gastrointestinal: Negative for abdominal pain, diarrhea, hematemesis, hematochezia, nausea and vomiting.   Genitourinary: Negative for dysuria, frequency and nocturia.   Neurological: Negative for focal weakness, loss of balance, numbness, paresthesias and seizures.   Psychiatric/Behavioral: Negative for altered mental status, hallucinations and suicidal ideas.   Allergic/Immunologic: Negative for HIV exposure, hives and persistent infections.           Current Outpatient Medications:   •  ALPRAZolam (XANAX) 0.5 MG tablet, Take 1 tablet by mouth At Night As Needed for Anxiety., Disp: 30 tablet, Rfl: 4  •  aspirin 81 MG chewable tablet, Chew 81 mg Daily., Disp: , Rfl:   •  atorvastatin (Lipitor) 80 MG tablet, Take 1 tablet by mouth Daily, Disp: 30 tablet, Rfl: 0  •  citalopram (CeleXA) 40 MG tablet, Take 1 tablet by mouth Daily., Disp: 90 tablet, Rfl: 2  •  clopidogrel (PLAVIX) 75 MG tablet, Take 75 mg by mouth Daily., Disp: , Rfl:   •  donepezil (Aricept) 5 MG tablet, Take 1 tablet by mouth Every Night., Disp: 30 tablet, Rfl: 0  •  dorzolamide-timolol (COSOPT) 22.3-6.8 MG/ML ophthalmic solution, Administer 1 drop to both eyes 2 (Two) Times a Day. Per wife, Disp: , Rfl:   •  glucose blood (Accu-Chek Caryn Plus) test strip, USE AS DIRECTED 3 TIMES DAILY, Disp: 100 each, Rfl: 12  •  glucose monitor monitoring kit, 1 each Daily., Disp: 1 each, Rfl: 1  •  glucose monitor monitoring kit, 1 each Daily., Disp: 1 each, Rfl: 1  •  HYDROcodone-acetaminophen (NORCO) 7.5-325 MG per tablet, Take 1 tablet by mouth 2 (Two) Times a Day., Disp: 60 tablet, Rfl: 0  •  insulin NPH-insulin regular (humuLIN 70/30,novoLIN 70/30) (70-30) 100 UNIT/ML injection, Inject  "70 Units under the skin into the appropriate area as directed 2 (Two) Times a Day With Meals., Disp: 5 each, Rfl: 12  •  Insulin Syringes, Disposable, U-100 1 ML misc, 50 Units 3 (Three) Times a Day As Needed (50 units in the morning, and then with meals PRN)., Disp: 200 each, Rfl: 12  •  Lancets 30G misc, Check sugar 3 times daily, Disp: 30 each, Rfl: 12  •  levothyroxine (SYNTHROID, LEVOTHROID) 112 MCG tablet, Take 1 tablet by mouth Daily., Disp: 30 tablet, Rfl: 0  •  losartan (COZAAR) 100 MG tablet, Take 1 tablet by mouth Daily., Disp: 90 tablet, Rfl: 2  •  pantoprazole (PROTONIX) 40 MG EC tablet, TAKE 1 TABLET BY MOUTH DAILY., Disp: 90 tablet, Rfl: 3  •  amLODIPine (NORVASC) 10 MG tablet, Take 1 tablet by mouth Daily., Disp: 30 tablet, Rfl: 11  •  chlorthalidone (HYGROTON) 25 MG tablet, Take 0.5 tablets by mouth Daily., Disp: 15 tablet, Rfl: 11    Objective:   Vitals and nursing note reviewed.   Constitutional:       Appearance: Healthy appearance. Not in distress.   Neck:      Vascular: No JVR. JVD normal.   Pulmonary:      Effort: Pulmonary effort is normal.      Breath sounds: Normal breath sounds. No wheezing. No rhonchi. No rales.   Chest:      Chest wall: Not tender to palpatation.   Cardiovascular:      PMI at left midclavicular line. Normal rate. Regular rhythm. Normal S1. Normal S2.      Murmurs: There is no murmur.      No gallop. No click. No rub.   Pulses:     Intact distal pulses.   Edema:     Peripheral edema absent.   Abdominal:      General: Bowel sounds are normal.      Palpations: Abdomen is soft.      Tenderness: There is no abdominal tenderness.   Musculoskeletal: Normal range of motion.         General: No tenderness. Skin:     General: Skin is warm and dry.   Neurological:      General: No focal deficit present.      Mental Status: Alert and oriented to person, place and time.       Blood pressure 142/72, pulse 68, temperature 97 °F (36.1 °C), resp. rate 14, height 180.3 cm (71\"), weight " 110 kg (242 lb), SpO2 99 %.   Lab Review:     Assessment:       1. Bradycardia, sinus  Resolved with discontinuation of beta-blocker therapy.    2. Coronary artery disease involving native coronary artery of native heart without angina pectoris  Stable and angina free.    3. Essential hypertension  Poor blood pressure control.    4. LVH (left ventricular hypertrophy) due to hypertensive disease, without heart failure      5. PAD (peripheral artery disease) ([facility])  No lifestyle limiting claudication or evidence of chronic critical limb ischemia.    6. Ischemic stroke ([facility])  Small lacunar infarct.  The patient was misinformed that this was due to antiplatelet therapy.  This is the consequence of poorly controlled hypertension and poorly controlled diabetes.    7. History of blood clots  No evidence of recurrence.    8. Other chronic pulmonary embolism without acute cor pulmonale ([facility])  The patient was treated with anticoagulation therapy from December of last year to March of this year.    9. Type 2 diabetes mellitus with both eyes affected by proliferative retinopathy without macular edema, with long-term current use of insulin ([facility])  Typical obesity related insulin resistance.    10. Tobacco use  Ongoing daily use of smokeless tobacco.    Procedures    Plan:     Advance Care Planning   ACP discussion was held with the patient during this visit. Patient has an advance directive (not in EMR), copy requested.     The patient has been advised to continue his aspirin and Plavix as this is considered drug of choice for lacunar infarct.  The patient has been educated that he was misinformed regarding the fact that aspirin and/or Plavix resulted in his ischemic lacunar infarct.    No indication to restart anticoagulation therapy at this time.    I have recommended increasing his amlodipine to 10 mg orally once per day.  I have recommended starting chlorthalidone 12.5 mg orally once in the a.m.  The patient has been counseled  regarding the importance of fluid and sodium restriction when using diuretic therapy to treat hypertension.  I have recommended a 1.5 L/day fluid restriction and less than 1500 mg/day sodium restriction.  The patient has been educated that diuretic therapy for treatment of hypertension does not work except in the context of fluid and sodium restriction.  The patient has been educated as to foods and beverages that are high in sodium content with instructions to avoid these.  We are making arrangements for the patient to meet with an outpatient dietitian to discuss strategies as to how to maintain these restrictions at home.    The patient has been advised to discontinue all forms of tobacco use.  The patient has been educated that smokeless tobacco use is associated with worsening hypertension.    The importance of diet exercise and weight management has been stressed to the patient.  The patient has been advised to adopt either a DASH diet or Mediterranean-style diet.  The patient has been educated that as little as a 10% reduction in body weight could lead to significant improvement in blood pressure.    The patient has been counseled to adopt a regular walking program.  He has been advised to ultimately achieve at minimum 150 minutes/week of moderate intensity activity which includes walking at a brisk but comfortable pace.    We are making arrangements for the patient to wear an outpatient cardiac monitor to screen for possible atrial fibrillation/atrial flutter as an etiology to his lacunar infarct/ischemic stroke.

## 2022-07-14 RX ORDER — CHLORTHALIDONE 25 MG/1
12.5 TABLET ORAL DAILY
Qty: 15 TABLET | Refills: 11 | Status: SHIPPED | OUTPATIENT
Start: 2022-07-14

## 2022-07-14 RX ORDER — AMLODIPINE BESYLATE 10 MG/1
10 TABLET ORAL DAILY
Qty: 30 TABLET | Refills: 11 | Status: SHIPPED | OUTPATIENT
Start: 2022-07-14

## 2022-07-25 ENCOUNTER — OFFICE VISIT (OUTPATIENT)
Dept: UROLOGY | Facility: CLINIC | Age: 67
End: 2022-07-25

## 2022-07-25 VITALS
HEART RATE: 63 BPM | DIASTOLIC BLOOD PRESSURE: 86 MMHG | HEIGHT: 71 IN | OXYGEN SATURATION: 96 % | TEMPERATURE: 97.2 F | WEIGHT: 242 LBS | RESPIRATION RATE: 18 BRPM | BODY MASS INDEX: 33.88 KG/M2 | SYSTOLIC BLOOD PRESSURE: 128 MMHG

## 2022-07-25 DIAGNOSIS — N52.1 ERECTILE DYSFUNCTION DUE TO DISEASES CLASSIFIED ELSEWHERE: ICD-10-CM

## 2022-07-25 DIAGNOSIS — N47.1 PHIMOSIS: Primary | ICD-10-CM

## 2022-07-25 PROCEDURE — 99214 OFFICE O/P EST MOD 30 MIN: CPT | Performed by: PHYSICIAN ASSISTANT

## 2022-07-25 RX ORDER — CLOTRIMAZOLE AND BETAMETHASONE DIPROPIONATE 10; .64 MG/G; MG/G
CREAM TOPICAL
Qty: 45 G | Refills: 1 | Status: SHIPPED | OUTPATIENT
Start: 2022-07-25

## 2022-07-25 NOTE — PROGRESS NOTES
Chief Complaint   Patient presents with   • Erectile Dysfunction        HPI  Mr. Riley is a 67 y.o. male with history of DM, CAD, ED who presents for follow up.     At this visit, no longer sees erectile response from Trimix.  He is curious if his technique is incorrect and has brought a new vial of Trimix with him today.    Past Medical History:   Diagnosis Date   • Arthritis    • Clot    • Coronary artery disease    • Diabetes mellitus (HCC)    • Heart disease    • History of blood clots    • Hypertension    • Low back pain    • Thyroid disease        Past Surgical History:   Procedure Laterality Date   • CHOLECYSTECTOMY  2015   • COLONOSCOPY  2015    dr srivastava   • CORONARY ARTERY BYPASS GRAFT      [facility] - 3 BY PASS    • ENDOSCOPY           Current Outpatient Medications:   •  ALPRAZolam (XANAX) 0.5 MG tablet, Take 1 tablet by mouth At Night As Needed for Anxiety., Disp: 30 tablet, Rfl: 4  •  amLODIPine (NORVASC) 10 MG tablet, Take 1 tablet by mouth Daily., Disp: 30 tablet, Rfl: 11  •  aspirin 81 MG chewable tablet, Chew 81 mg Daily., Disp: , Rfl:   •  chlorthalidone (HYGROTON) 25 MG tablet, Take 0.5 tablets by mouth Daily., Disp: 15 tablet, Rfl: 11  •  citalopram (CeleXA) 40 MG tablet, Take 1 tablet by mouth Daily., Disp: 90 tablet, Rfl: 2  •  clopidogrel (PLAVIX) 75 MG tablet, Take 75 mg by mouth Daily., Disp: , Rfl:   •  donepezil (Aricept) 5 MG tablet, Take 1 tablet by mouth Every Night., Disp: 30 tablet, Rfl: 0  •  dorzolamide-timolol (COSOPT) 22.3-6.8 MG/ML ophthalmic solution, Administer 1 drop to both eyes 2 (Two) Times a Day. Per wife, Disp: , Rfl:   •  glucose blood (Accu-Chek Caryn Plus) test strip, USE AS DIRECTED 3 TIMES DAILY, Disp: 100 each, Rfl: 12  •  glucose monitor monitoring kit, 1 each Daily., Disp: 1 each, Rfl: 1  •  glucose monitor monitoring kit, 1 each Daily., Disp: 1 each, Rfl: 1  •  HYDROcodone-acetaminophen (NORCO) 7.5-325 MG per tablet, Take 1 tablet by mouth 2 (Two) Times a  "Day., Disp: 60 tablet, Rfl: 0  •  insulin NPH-insulin regular (humuLIN 70/30,novoLIN 70/30) (70-30) 100 UNIT/ML injection, Inject 70 Units under the skin into the appropriate area as directed 2 (Two) Times a Day With Meals., Disp: 5 each, Rfl: 12  •  Insulin Syringes, Disposable, U-100 1 ML misc, 50 Units 3 (Three) Times a Day As Needed (50 units in the morning, and then with meals PRN)., Disp: 200 each, Rfl: 12  •  Lancets 30G misc, Check sugar 3 times daily, Disp: 30 each, Rfl: 12  •  levothyroxine (SYNTHROID, LEVOTHROID) 112 MCG tablet, Take 1 tablet by mouth Daily., Disp: 30 tablet, Rfl: 0  •  losartan (COZAAR) 100 MG tablet, Take 1 tablet by mouth Daily., Disp: 90 tablet, Rfl: 2  •  pantoprazole (PROTONIX) 40 MG EC tablet, TAKE 1 TABLET BY MOUTH DAILY., Disp: 90 tablet, Rfl: 3     Physical Exam  Visit Vitals  /86   Pulse 63   Temp 97.2 °F (36.2 °C)   Resp 18   Ht 180.3 cm (70.98\")   Wt 110 kg (242 lb)   SpO2 96%   BMI 33.77 kg/m²       Labs  Brief Urine Lab Results  (Last result in the past 365 days)      Color   Clarity   Blood   Leuk Est   Nitrite   Protein   CREAT   Urine HCG        06/21/22 2005 Yellow   Clear   Negative   Negative   Negative   >=300 mg/dL (3+)                 Lab Results   Component Value Date    GLUCOSE 304 (H) 06/21/2022    CALCIUM 9.0 06/21/2022     (L) 06/21/2022    K 4.0 06/21/2022    CO2 22.4 06/21/2022    CL 99 06/21/2022    BUN 18 06/21/2022    CREATININE 1.15 06/21/2022    EGFRIFAFRI 97 08/12/2019    EGFRIFNONA 66 09/11/2020    BCR 15.7 06/21/2022    ANIONGAP 12.6 06/21/2022       Lab Results   Component Value Date    WBC 4.87 06/21/2022    HGB 13.5 06/21/2022    HCT 39.4 06/21/2022    MCV 88.1 06/21/2022     (L) 06/21/2022            Assessment  67 y.o. male with history of CAD, DM, ED presenting for follow-up of Trimix.  He had previously seen benefit from Trimix in office, but did not see any benefit after 3 tries at home.  He states that the medication was " new and kept cold prior to use.  0.3 cc of Trimix is injected today with mild to modest erectile response.  Notably, the patient has tight phimosis and the meatus is not able to be visualized.  There is cracking of the foreskin with scant bleeding and trapped cloudy urine present.  I will treat him for phimosis with Lotrisone and we will increase Trimix concentration to 30-2-20.    Plan  1.  Start Lotrisone, applied under the foreskin to relieve phimosis  2.  Test dose Trimix as above

## 2022-07-26 DIAGNOSIS — M54.50 LUMBOSACRAL PAIN: ICD-10-CM

## 2022-07-26 RX ORDER — HYDROCODONE BITARTRATE AND ACETAMINOPHEN 7.5; 325 MG/1; MG/1
1 TABLET ORAL 2 TIMES DAILY
Qty: 60 TABLET | Refills: 0 | Status: SHIPPED | OUTPATIENT
Start: 2022-07-26 | End: 2022-08-29

## 2022-07-26 NOTE — TELEPHONE ENCOUNTER
Caller: Jina Riley    Relationship: Emergency Contact    Best call back number: [phone]    Requested Prescriptions:   Requested Prescriptions     Pending Prescriptions Disp Refills   • HYDROcodone-acetaminophen (NORCO) 7.5-325 MG per tablet 60 tablet 0     Sig: Take 1 tablet by mouth 2 (Two) Times a Day.        Pharmacy where request should be sent: [address] [phone] [facility] [phone] FX     Does the patient have less than a 3 day supply:  [x] Yes  [] No    Neal Hampton Rep   07/26/22 10:25 EDT

## 2022-07-26 NOTE — TELEPHONE ENCOUNTER
Rx Refill Note  Requested Prescriptions     Pending Prescriptions Disp Refills   • HYDROcodone-acetaminophen (NORCO) 7.5-325 MG per tablet 60 tablet 0     Sig: Take 1 tablet by mouth 2 (Two) Times a Day.      Last office visit with prescribing clinician: 6/23/2022      Next office visit with prescribing clinician: Visit date not found            Carlos Peterson CMA  07/26/22, 11:16 EDT

## 2022-08-13 DIAGNOSIS — E03.8 ADULT ONSET HYPOTHYROIDISM: ICD-10-CM

## 2022-08-15 RX ORDER — LEVOTHYROXINE SODIUM 112 UG/1
112 TABLET ORAL DAILY
Qty: 30 TABLET | Refills: 0 | OUTPATIENT
Start: 2022-08-15

## 2022-08-16 ENCOUNTER — TELEPHONE (OUTPATIENT)
Dept: INTERNAL MEDICINE | Facility: CLINIC | Age: 67
End: 2022-08-16

## 2022-08-16 DIAGNOSIS — E78.2 MIXED HYPERLIPIDEMIA: Primary | ICD-10-CM

## 2022-08-16 RX ORDER — ATORVASTATIN CALCIUM 80 MG/1
80 TABLET, FILM COATED ORAL DAILY
Qty: 90 TABLET | Refills: 1 | Status: SHIPPED | OUTPATIENT
Start: 2022-08-16 | End: 2022-09-28 | Stop reason: SDUPTHER

## 2022-08-16 NOTE — TELEPHONE ENCOUNTER
Incoming Refill Request      Medication requested (name and dose): ATORVASTATIN    Pharmacy where request should be sent: [facility], [place]    Additional details provided by patient: PATIENT IS OUT OF THE MEDICATION    Best call back number: [phone]    Does the patient have less than a 3 day supply:  [x] Yes  [] No    Neal Johnson Rep  08/16/22, 15:09 EDT

## 2022-08-24 DIAGNOSIS — E03.8 ADULT ONSET HYPOTHYROIDISM: ICD-10-CM

## 2022-08-24 RX ORDER — CLOPIDOGREL BISULFATE 75 MG/1
TABLET ORAL
Qty: 90 TABLET | Refills: 2 | Status: SHIPPED | OUTPATIENT
Start: 2022-08-24

## 2022-08-24 RX ORDER — LEVOTHYROXINE SODIUM 112 UG/1
112 TABLET ORAL DAILY
Qty: 7 TABLET | Refills: 0 | Status: SHIPPED | OUTPATIENT
Start: 2022-08-24 | End: 2022-09-22 | Stop reason: SDUPTHER

## 2022-08-24 NOTE — TELEPHONE ENCOUNTER
Inform wife that he needs to recheck tsh tomorrow when he sees cariology [facility] the hospital tomorrow   I sent 7 days for thyroid and I need the labs  Sent plavix

## 2022-08-25 ENCOUNTER — OFFICE VISIT (OUTPATIENT)
Dept: UROLOGY | Facility: CLINIC | Age: 67
End: 2022-08-25

## 2022-08-25 VITALS
SYSTOLIC BLOOD PRESSURE: 138 MMHG | TEMPERATURE: 96.5 F | DIASTOLIC BLOOD PRESSURE: 80 MMHG | HEART RATE: 60 BPM | OXYGEN SATURATION: 94 %

## 2022-08-25 DIAGNOSIS — M54.50 LUMBOSACRAL PAIN: ICD-10-CM

## 2022-08-25 DIAGNOSIS — N52.9 ERECTILE DYSFUNCTION, UNSPECIFIED ERECTILE DYSFUNCTION TYPE: Primary | ICD-10-CM

## 2022-08-25 PROCEDURE — 99214 OFFICE O/P EST MOD 30 MIN: CPT | Performed by: PHYSICIAN ASSISTANT

## 2022-08-25 NOTE — TELEPHONE ENCOUNTER
Caller: Jina Riley    Relationship: Emergency Contact    Best call back number: [phone]    Requested Prescriptions:   Requested Prescriptions     Pending Prescriptions Disp Refills   • HYDROcodone-acetaminophen (NORCO) 7.5-325 MG per tablet 60 tablet 0     Sig: Take 1 tablet by mouth 2 (Two) Times a Day.        Pharmacy where request should be sent: [address] [phone] [facility] [phone] FX     Additional details provided by patient: [facility] HAS NO DAYS LEFT.    Does the patient have less than a 3 day supply:  [x] Yes  [] No    Neal Jhaveri Rep   08/25/22 15:10 EDT

## 2022-08-25 NOTE — PROGRESS NOTES
Chief Complaint   Patient presents with   • Follow-up     FU Phimosis, ED        HPI  Mr. Riley is a 67 y.o. male with history of DM, CAD, ED, phimosis who presents for follow up.     At this visit, has used Lotrisone every night and believes his phimosis has improved.  He states that the skin feels quite a bit more comfortable, is no longer cracking or bleeding.  He presents with a new dose of Trimix.    Past Medical History:   Diagnosis Date   • Arthritis    • Clot    • Coronary artery disease    • Diabetes mellitus (HCC)    • Heart disease    • History of blood clots    • Hypertension    • Low back pain    • Thyroid disease        Past Surgical History:   Procedure Laterality Date   • CHOLECYSTECTOMY  2015   • COLONOSCOPY  2015    dr srivastava   • CORONARY ARTERY BYPASS GRAFT      [facility] -  BY PASS    • ENDOSCOPY           Current Outpatient Medications:   •  ALPRAZolam (XANAX) 0.5 MG tablet, Take 1 tablet by mouth At Night As Needed for Anxiety., Disp: 30 tablet, Rfl: 4  •  amLODIPine (NORVASC) 10 MG tablet, Take 1 tablet by mouth Daily., Disp: 30 tablet, Rfl: 11  •  aspirin 81 MG chewable tablet, Chew 81 mg Daily., Disp: , Rfl:   •  atorvastatin (Lipitor) 80 MG tablet, Take 1 tablet by mouth Daily., Disp: 90 tablet, Rfl: 1  •  chlorthalidone (HYGROTON) 25 MG tablet, Take 0.5 tablets by mouth Daily., Disp: 15 tablet, Rfl: 11  •  citalopram (CeleXA) 40 MG tablet, Take 1 tablet by mouth Daily., Disp: 90 tablet, Rfl: 2  •  clopidogrel (PLAVIX) 75 MG tablet, TAKE 1 TABLET BY MOUTH DAILY, Disp: 90 tablet, Rfl: 2  •  clotrimazole-betamethasone (Lotrisone) 1-0.05 % cream, Apply to affected area 2 times daily, Disp: 45 g, Rfl: 1  •  donepezil (Aricept) 5 MG tablet, Take 1 tablet by mouth Every Night., Disp: 30 tablet, Rfl: 0  •  dorzolamide-timolol (COSOPT) 22.3-6.8 MG/ML ophthalmic solution, Administer 1 drop to both eyes 2 (Two) Times a Day. Per wife, Disp: , Rfl:   •  glucose blood (Accu-Chek Caryn Plus) test  strip, USE AS DIRECTED 3 TIMES DAILY, Disp: 100 each, Rfl: 12  •  glucose monitor monitoring kit, 1 each Daily., Disp: 1 each, Rfl: 1  •  glucose monitor monitoring kit, 1 each Daily., Disp: 1 each, Rfl: 1  •  HYDROcodone-acetaminophen (NORCO) 7.5-325 MG per tablet, Take 1 tablet by mouth 2 (Two) Times a Day., Disp: 60 tablet, Rfl: 0  •  insulin NPH-insulin regular (humuLIN 70/30,novoLIN 70/30) (70-30) 100 UNIT/ML injection, Inject 70 Units under the skin into the appropriate area as directed 2 (Two) Times a Day With Meals., Disp: 5 each, Rfl: 12  •  Insulin Syringes, Disposable, U-100 1 ML misc, 50 Units 3 (Three) Times a Day As Needed (50 units in the morning, and then with meals PRN)., Disp: 200 each, Rfl: 12  •  Lancets 30G misc, Check sugar 3 times daily, Disp: 30 each, Rfl: 12  •  levothyroxine (SYNTHROID, LEVOTHROID) 112 MCG tablet, TAKE 1 TABLET BY MOUTH DAILY, Disp: 7 tablet, Rfl: 0  •  losartan (COZAAR) 100 MG tablet, Take 1 tablet by mouth Daily., Disp: 90 tablet, Rfl: 2  •  pantoprazole (PROTONIX) 40 MG EC tablet, TAKE 1 TABLET BY MOUTH DAILY., Disp: 90 tablet, Rfl: 3     Physical Exam  Visit Vitals  /80 (BP Location: Right arm, Patient Position: Sitting, Cuff Size: Adult)   Pulse 60   Temp 96.5 °F (35.8 °C) (Temporal)   SpO2 94%       Labs  Brief Urine Lab Results  (Last result in the past 365 days)      Color   Clarity   Blood   Leuk Est   Nitrite   Protein   CREAT   Urine HCG        06/21/22 2005 Yellow   Clear   Negative   Negative   Negative   >=300 mg/dL (3+)                 Lab Results   Component Value Date    GLUCOSE 304 (H) 06/21/2022    CALCIUM 9.0 06/21/2022     (L) 06/21/2022    K 4.0 06/21/2022    CO2 22.4 06/21/2022    CL 99 06/21/2022    BUN 18 06/21/2022    CREATININE 1.15 06/21/2022    EGFRIFAFRI 97 08/12/2019    EGFRIFNONA 66 09/11/2020    BCR 15.7 06/21/2022    ANIONGAP 12.6 06/21/2022       Lab Results   Component Value Date    WBC 4.87 06/21/2022    HGB 13.5 06/21/2022     HCT 39.4 06/21/2022    MCV 88.1 06/21/2022     (L) 06/21/2022         Assessment  67 y.o. male with history of erectile dysfunction and phimosis presenting for follow-up.  New Trimix of 30/2/20 has been brought with the patient.  Upon initial inspection, the patient's phimosis is markedly improved.  The foreskin is no longer tight, with debris or cracking of the skin.  0.3 cc of Trimix injected with modest erectile response.  It is repeated for a total of 0.5 cc, no change in modest erectile response.  There is not enough axial rigidity to safely penetrate and he will require further dose titration.    Plan  1.  Phimosis   -Continue Lotrisone nightly   -Continue retracting the foreskin daily as tolerated for urination and bathing  2.  Erectile dysfunction   -New Trimix 30/3/30

## 2022-08-26 DIAGNOSIS — M54.50 LUMBOSACRAL PAIN: ICD-10-CM

## 2022-08-26 NOTE — TELEPHONE ENCOUNTER
Caller: Jina Riley    Relationship to patient: Emergency Contact    Best call back number: [phone]    Patient is needing: PATIENTS WIFE CALLED BACK ABOUT HUSBANDS HYDROCODONE  EXPLAINED THEY HAVE 48 HOURS TO RESPOND

## 2022-08-29 LAB
Lab: 31
MAXIMAL PREDICTED HEART RATE: 155 BPM
STRESS TARGET HR: 132 BPM
TOAL ENROLLMENT DAYS: 30

## 2022-08-29 RX ORDER — HYDROCODONE BITARTRATE AND ACETAMINOPHEN 7.5; 325 MG/1; MG/1
TABLET ORAL
Qty: 60 TABLET | Refills: 0 | Status: SHIPPED | OUTPATIENT
Start: 2022-08-29 | End: 2022-09-28 | Stop reason: SDUPTHER

## 2022-08-29 RX ORDER — HYDROCODONE BITARTRATE AND ACETAMINOPHEN 7.5; 325 MG/1; MG/1
1 TABLET ORAL 2 TIMES DAILY
Qty: 60 TABLET | Refills: 0 | OUTPATIENT
Start: 2022-08-29

## 2022-08-29 NOTE — TELEPHONE ENCOUNTER
...Rx Refill Note  Requested Prescriptions     Pending Prescriptions Disp Refills    HYDROcodone-acetaminophen (NORCO) 7.5-325 MG per tablet [Pharmacy Med Name: HYDROCODON-APAP 7.5-325 7.5-325 Tablet] 60 tablet 0     Sig: TAKE 1 TABLET BY MOUTH TWICE DAILY      Last office visit with prescribing clinician: 6/23/2022      Next office visit with prescribing clinician: 9/28/2022            Kingsley Hess MA  08/29/22, 14:12 EDT

## 2022-08-30 DIAGNOSIS — E03.8 ADULT ONSET HYPOTHYROIDISM: ICD-10-CM

## 2022-09-02 ENCOUNTER — HOSPITAL ENCOUNTER (EMERGENCY)
Dept: HOSPITAL 79 - ER1 | Age: 67
Discharge: HOME | End: 2022-09-02
Payer: MEDICARE

## 2022-09-02 DIAGNOSIS — I11.9: ICD-10-CM

## 2022-09-02 DIAGNOSIS — Z95.1: ICD-10-CM

## 2022-09-02 DIAGNOSIS — S32.029A: ICD-10-CM

## 2022-09-02 DIAGNOSIS — S30.1XXA: ICD-10-CM

## 2022-09-02 DIAGNOSIS — S32.019A: ICD-10-CM
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
## 2022-09-02 DIAGNOSIS — R91.1: ICD-10-CM
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
## 2022-09-02 DIAGNOSIS — E11.9: ICD-10-CM

## 2022-09-02 DIAGNOSIS — Z86.73: ICD-10-CM

## 2022-09-02 DIAGNOSIS — S22.31XA: Primary | ICD-10-CM

## 2022-09-02 LAB
BUN/CREATININE RATIO: 15 (ref 0–10)
HGB BLD-MCNC: 15.6 GM/DL (ref 14–17.5)
RED BLOOD COUNT: 5.19 M/UL (ref 4.2–5.5)
WHITE BLOOD COUNT: 8.6 K/UL (ref 4.5–11)

## 2022-09-03 ENCOUNTER — HOSPITAL ENCOUNTER (EMERGENCY)
Dept: HOSPITAL 79 - ER1 | Age: 67
Discharge: HOME | End: 2022-09-03
Payer: MEDICARE

## 2022-09-03 DIAGNOSIS — N39.0: ICD-10-CM

## 2022-09-03 DIAGNOSIS — Z79.02: ICD-10-CM

## 2022-09-03 DIAGNOSIS — Z95.1: ICD-10-CM

## 2022-09-03 DIAGNOSIS — E03.9: ICD-10-CM

## 2022-09-03 DIAGNOSIS — N18.9: ICD-10-CM

## 2022-09-03 DIAGNOSIS — U07.1: Primary | ICD-10-CM

## 2022-09-03 DIAGNOSIS — E11.22: ICD-10-CM

## 2022-09-03 DIAGNOSIS — I13.10: ICD-10-CM

## 2022-09-03 DIAGNOSIS — Z86.711: ICD-10-CM

## 2022-09-03 LAB
BUN/CREATININE RATIO: 18 (ref 0–10)
HGB BLD-MCNC: 15.2 GM/DL (ref 14–17.5)
RED BLOOD COUNT: 5.02 M/UL (ref 4.2–5.5)
WHITE BLOOD COUNT: 7.1 K/UL (ref 4.5–11)

## 2022-09-03 PROCEDURE — U0002 COVID-19 LAB TEST NON-CDC: HCPCS

## 2022-09-21 ENCOUNTER — HOSPITAL ENCOUNTER (OUTPATIENT)
Dept: HOSPITAL 79 - LAB | Age: 67
End: 2022-09-21
Attending: INTERNAL MEDICINE
Payer: MEDICARE

## 2022-09-21 DIAGNOSIS — E11.65: Primary | ICD-10-CM

## 2022-09-21 DIAGNOSIS — R06.00: ICD-10-CM

## 2022-09-21 DIAGNOSIS — E11.51: ICD-10-CM

## 2022-09-21 LAB
BUN/CREATININE RATIO: 14 (ref 0–10)
HGB BLD-MCNC: 14.8 GM/DL (ref 14–17.5)
RED BLOOD COUNT: 4.73 M/UL (ref 4.2–5.5)
WHITE BLOOD COUNT: 7.5 K/UL (ref 4.5–11)

## 2022-09-22 ENCOUNTER — TELEPHONE (OUTPATIENT)
Dept: INTERNAL MEDICINE | Facility: CLINIC | Age: 67
End: 2022-09-22

## 2022-09-22 DIAGNOSIS — F41.9 ANXIETY: ICD-10-CM

## 2022-09-22 DIAGNOSIS — F51.04 INSOMNIA, PSYCHOPHYSIOLOGICAL: ICD-10-CM

## 2022-09-22 LAB
CREATININE, URINE: 122.8 MG/DL
MICROALB/CREAT RATIO: 2493 (ref 0–29)

## 2022-09-22 RX ORDER — LEVOTHYROXINE SODIUM 112 UG/1
112 TABLET ORAL DAILY
Qty: 7 TABLET | Refills: 0 | Status: SHIPPED | OUTPATIENT
Start: 2022-09-22 | End: 2022-09-28 | Stop reason: SDUPTHER

## 2022-09-22 RX ORDER — LEVOTHYROXINE SODIUM 112 UG/1
112 TABLET ORAL DAILY
Qty: 7 TABLET | Refills: 0 | OUTPATIENT
Start: 2022-09-22

## 2022-09-22 RX ORDER — ALPRAZOLAM 0.5 MG/1
0.5 TABLET ORAL NIGHTLY PRN
Qty: 30 TABLET | Refills: 4 | Status: CANCELLED | OUTPATIENT
Start: 2022-09-22

## 2022-09-22 NOTE — TELEPHONE ENCOUNTER
Caller: CICI LOPEZ    Relationship: SELF    Best call back number: [phone]    Requested Prescriptions:   Requested Prescriptions     Pending Prescriptions Disp Refills   • levothyroxine (SYNTHROID, LEVOTHROID) 112 MCG tablet [Pharmacy Med Name: LEVOTHYROXINE   Tablet] 7 tablet 0     Sig: TAKE 1 TABLET BY MOUTH DAILY        Pharmacy where request should be sent: [facility] [place], [address] [phone] [facility] [phone]     Additional details provided by patient: PATIENT HAD LAB WORK DONE ON 9/21/2022 AT [facility].     Does the patient have less than a 3 day supply:  [x] Yes  [] No    Neal Martinez Rep   09/22/22 10:39 EDT

## 2022-09-22 NOTE — TELEPHONE ENCOUNTER
Rx Refill Note  Requested Prescriptions     Pending Prescriptions Disp Refills   • levothyroxine (SYNTHROID, LEVOTHROID) 112 MCG tablet [Pharmacy Med Name: LEVOTHYROXINE   Tablet] 7 tablet 0     Sig: TAKE 1 TABLET BY MOUTH DAILY      Last office visit with prescribing clinician: 6/23/2022      Next office visit with prescribing clinician: 9/22/2022            Farrah Good LPN  09/22/22, 15:46 EDT

## 2022-09-22 NOTE — TELEPHONE ENCOUNTER
Caller: Ramsey Riley    Relationship: Self    Best call back number: [phone]    Requested Prescriptions:   Requested Prescriptions     Pending Prescriptions Disp Refills   • ALPRAZolam (XANAX) 0.5 MG tablet 30 tablet 4     Sig: Take 1 tablet by mouth At Night As Needed for Anxiety.        Pharmacy where request should be sent: [address] [phone] [facility] [phone]      Additional details provided by patient: PATIENT IS OUT OF MEDICATION.    Does the patient have less than a 3 day supply:  [x] Yes  [] No    Neal Martinez Rep   09/22/22 10:43 EDT

## 2022-09-22 NOTE — TELEPHONE ENCOUNTER
Rx Refill Note  Requested Prescriptions     Pending Prescriptions Disp Refills   • ALPRAZolam (XANAX) 0.5 MG tablet 30 tablet 4     Sig: Take 1 tablet by mouth At Night As Needed for Anxiety.      Last office visit with prescribing clinician: 6/23/2022      Next office visit with prescribing clinician: 9/28/2022            Farrah Good LPN  09/22/22, 10:56 EDT

## 2022-09-22 NOTE — TELEPHONE ENCOUNTER
Patient's wife called to find out why levothyroxine 112 MCG was discontinued. She states that patient has been out of medication for several days and needs it refilled today. She states that patient attempted to have TSH lab work done but PCP did not sign order so it could not be done, so patient did TSH lab at [facility] on 9/21/22 and states that the hospital faxed the lab results to PCP. Phone number verified.

## 2022-09-28 ENCOUNTER — OFFICE VISIT (OUTPATIENT)
Dept: INTERNAL MEDICINE | Facility: CLINIC | Age: 67
End: 2022-09-28

## 2022-09-28 VITALS
OXYGEN SATURATION: 97 % | SYSTOLIC BLOOD PRESSURE: 139 MMHG | TEMPERATURE: 97.3 F | RESPIRATION RATE: 16 BRPM | WEIGHT: 244 LBS | HEIGHT: 71 IN | BODY MASS INDEX: 34.16 KG/M2 | DIASTOLIC BLOOD PRESSURE: 75 MMHG | HEART RATE: 46 BPM

## 2022-09-28 DIAGNOSIS — F33.0 MILD EPISODE OF RECURRENT MAJOR DEPRESSIVE DISORDER: ICD-10-CM

## 2022-09-28 DIAGNOSIS — E11.65 TYPE 2 DIABETES MELLITUS WITH HYPERGLYCEMIA, WITH LONG-TERM CURRENT USE OF INSULIN: ICD-10-CM

## 2022-09-28 DIAGNOSIS — I10 BENIGN ESSENTIAL HYPERTENSION: Primary | ICD-10-CM

## 2022-09-28 DIAGNOSIS — Z79.4 TYPE 2 DIABETES MELLITUS WITH HYPERGLYCEMIA, WITH LONG-TERM CURRENT USE OF INSULIN: ICD-10-CM

## 2022-09-28 DIAGNOSIS — M54.50 LUMBOSACRAL PAIN: ICD-10-CM

## 2022-09-28 DIAGNOSIS — E78.2 MIXED HYPERLIPIDEMIA: ICD-10-CM

## 2022-09-28 DIAGNOSIS — M13.0 CHRONIC POLYARTHRITIS: ICD-10-CM

## 2022-09-28 DIAGNOSIS — E03.8 ADULT ONSET HYPOTHYROIDISM: ICD-10-CM

## 2022-09-28 DIAGNOSIS — F41.9 ANXIETY: ICD-10-CM

## 2022-09-28 DIAGNOSIS — Z23 NEED FOR IMMUNIZATION AGAINST INFLUENZA: ICD-10-CM

## 2022-09-28 DIAGNOSIS — Z51.81 ENCOUNTER FOR THERAPEUTIC DRUG LEVEL MONITORING: ICD-10-CM

## 2022-09-28 PROCEDURE — G0008 ADMIN INFLUENZA VIRUS VAC: HCPCS | Performed by: INTERNAL MEDICINE

## 2022-09-28 PROCEDURE — 90662 IIV NO PRSV INCREASED AG IM: CPT | Performed by: INTERNAL MEDICINE

## 2022-09-28 PROCEDURE — 99214 OFFICE O/P EST MOD 30 MIN: CPT | Performed by: INTERNAL MEDICINE

## 2022-09-28 RX ORDER — HYDROCODONE BITARTRATE AND ACETAMINOPHEN 7.5; 325 MG/1; MG/1
1 TABLET ORAL 2 TIMES DAILY
Qty: 60 TABLET | Refills: 0 | Status: SHIPPED | OUTPATIENT
Start: 2022-09-28 | End: 2022-10-27 | Stop reason: SDUPTHER

## 2022-09-28 RX ORDER — LOSARTAN POTASSIUM 100 MG/1
100 TABLET ORAL DAILY
Qty: 90 TABLET | Refills: 2 | Status: SHIPPED | OUTPATIENT
Start: 2022-09-28

## 2022-09-28 RX ORDER — ATORVASTATIN CALCIUM 80 MG/1
80 TABLET, FILM COATED ORAL DAILY
Qty: 90 TABLET | Refills: 1 | Status: SHIPPED | OUTPATIENT
Start: 2022-09-28

## 2022-09-28 RX ORDER — CITALOPRAM 40 MG/1
40 TABLET ORAL DAILY
Qty: 90 TABLET | Refills: 2 | Status: SHIPPED | OUTPATIENT
Start: 2022-09-28

## 2022-09-28 RX ORDER — LEVOTHYROXINE SODIUM 0.12 MG/1
125 TABLET ORAL DAILY
Qty: 90 TABLET | Refills: 2 | Status: SHIPPED | OUTPATIENT
Start: 2022-09-28

## 2022-09-28 RX ORDER — ALPRAZOLAM 0.5 MG/1
0.5 TABLET ORAL NIGHTLY PRN
Qty: 30 TABLET | Refills: 4 | Status: SHIPPED | OUTPATIENT
Start: 2022-09-28 | End: 2023-01-12 | Stop reason: SDUPTHER

## 2022-09-28 NOTE — PROGRESS NOTES
"Chief Complaint  Hypertension, Hyperlipidemia, Diabetes, and Hypothyroidism    Subjective        Ramsey Riley presents to [facility]  History of Present Illness  HPI: Patient is here to follow up on the blood pressure  The patient is taking the blood pressure medications as prescribed and has had no side effects. The patient is also here to follow up on the cholesterol and is trying to follow a diet. The patient is also here to follow on sugar and thyroid and is  due to get lab work done . The patient also complains of chronic joint pain and anxiety , he needs refills on medications and flu vaccine.  Patient is accompanied by his wife today who is also the historian, he saw the pulmonologist and the endocrinologist recently at [facility]  Hypertension   Pertinent negatives include no chest pain, palpitations or shortness of breath.   Hyperlipidemia   Pertinent negatives include no chest pain or shortness of breath.   Diabetes   Pertinent negatives for hypoglycemia include no dizziness, nervousness/anxiousness or pallor. Pertinent negatives for diabetes include no chest pain, no shortness of breath  Patient is on acei/arb     Objective   Vital Signs:  /75   Pulse (!) 46   Temp 97.3 °F (36.3 °C)   Resp 16   Ht 180.3 cm (70.98\")   Wt 111 kg (244 lb)   SpO2 97%   BMI 34.05 kg/m²   Estimated body mass index is 34.05 kg/m² as calculated from the following:    Height as of this encounter: 180.3 cm (70.98\").    Weight as of this encounter: 111 kg (244 lb).    BMI is >= 30 and <35. (Class 1 Obesity). The following options were offered after discussion;: weight loss educational material (shared in after visit summary), exercise counseling/recommendations and nutrition counseling/recommendations      Physical Exam  Vitals and nursing note reviewed.   Constitutional:       General: He is not in acute distress.     Appearance: Normal appearance. He is not diaphoretic.   HENT:      " Head: Normocephalic and atraumatic.      Right Ear: External ear normal.      Left Ear: External ear normal.      Nose: Nose normal.   Eyes:      Extraocular Movements: Extraocular movements intact.      Conjunctiva/sclera: Conjunctivae normal.   Neck:      Trachea: Trachea normal.   Cardiovascular:      Rate and Rhythm: Normal rate and regular rhythm.      Heart sounds: Normal heart sounds.   Pulmonary:      Effort: Pulmonary effort is normal. No respiratory distress.   Abdominal:      General: Abdomen is flat.   Musculoskeletal:         General: Deformity present.      Cervical back: Neck supple.      Comments: Moves all limbs   Skin:     General: Skin is warm and dry.      Findings: No erythema.   Neurological:      Mental Status: He is alert and oriented to person, place, and time.      Comments: No gross motor or sensory deficits        Result Review :    Common labs    Common Labs 6/21/22 6/21/22 6/21/22 6/22/22    1837 1837 1837    Glucose  304 (A)     BUN  18     Creatinine  1.15     Sodium  134 (A)     Potassium  4.0     Chloride  99     Calcium  9.0     Albumin  3.70     Total Bilirubin  0.4     Alkaline Phosphatase  88     AST (SGOT)  23     ALT (SGPT)  22     WBC 4.87      Hemoglobin 13.5      Hematocrit 39.4      Platelets 136 (A)      Total Cholesterol    159   Triglycerides    248 (A)   HDL Cholesterol    33 (A)   LDL Cholesterol     85   Hemoglobin A1C   10.40 (A)    (A) Abnormal value       Comments are available for some flowsheets but are not being displayed.                     Assessment and Plan   Diagnoses and all orders for this visit:    1. Benign essential hypertension (Primary)  -     losartan (COZAAR) 100 MG tablet; Take 1 tablet by mouth Daily.  Dispense: 90 tablet; Refill: 2    2. Mixed hyperlipidemia  -     atorvastatin (Lipitor) 80 MG tablet; Take 1 tablet by mouth Daily.  Dispense: 90 tablet; Refill: 1  -     CBC & Differential  -     Comprehensive Metabolic Panel  -     Lipid  Panel    3. Type 2 diabetes mellitus with hyperglycemia, with long-term current use of insulin (HCC)  -     Hemoglobin A1c  -     Microalbumin / Creatinine Urine Ratio - Urine, Clean Catch    4. Adult onset hypothyroidism  -     TSH  -     levothyroxine (SYNTHROID, LEVOTHROID) 125 MCG tablet; Take 1 tablet by mouth Daily.  Dispense: 90 tablet; Refill: 2    5. Anxiety  -     Compliance Drug Analysis, Ur - Urine, Clean Catch  -     citalopram (CeleXA) 40 MG tablet; Take 1 tablet by mouth Daily.  Dispense: 90 tablet; Refill: 2  -     ALPRAZolam (XANAX) 0.5 MG tablet; Take 1 tablet by mouth At Night As Needed for Anxiety.  Dispense: 30 tablet; Refill: 4    6. Mild episode of recurrent major depressive disorder (HCC)  -     citalopram (CeleXA) 40 MG tablet; Take 1 tablet by mouth Daily.  Dispense: 90 tablet; Refill: 2    7. Chronic polyarthritis  -     Compliance Drug Analysis, Ur - Urine, Clean Catch    8. Need for immunization against influenza  -     Fluzone High-Dose 65+yrs (2735-6289)    9. Encounter for therapeutic drug level monitoring   -     Compliance Drug Analysis, Ur - Urine, Clean Catch    10. Lumbosacral pain  -     HYDROcodone-acetaminophen (NORCO) 7.5-325 MG per tablet; Take 1 tablet by mouth 2 (Two) Times a Day.  Dispense: 60 tablet; Refill: 0    Plan:  1.  Benign essential hypertension: Will continue current medication, low-sodium diet advised, Counseled to regularly check BP at home with goal averaging <130/80.   2.mixed hyperlipidemia: will obtain   fasting CMP and lipid panel.  Diet and exercise counseled,  Will continue current medications  3. Diabetes  Mellitus  : will obtain   fasting CMP  and hba1c  , diet and exercise counseled , Will continue current medications, per endocrinologist  4. hypothyroidism: Reviewed tsh , and refilled levothyroxine 125 mcg daily  5.  Chronic polyarthritis: UDS and Enoc reviewed and obtained and refill hydrocodone  6.  Anxiety disorder: Refill Celexa and as needed  alprazolam  7.  Major depression: Refill Celexa 40 mg daily  8.  Need for flu vaccine: Given today  The patient has read and signed the [facility] Controlled Substance Contract.The patient is aware of the potential for addiction and dependence. This is a short-term use   A Lavell was reviewed  in the chart today. Narcotic contract was signed by patient  . Will obtain Urine drug screen  Lavell Report   As part of this patient's treatment plan I am prescribing controlled substances. The patient has been made aware of appropriate use of such medications, including potential risk of somnolence, limited ability to drive and /or work safely, and potential for dependence or overdose. It has also been made clear that these medications are for use by this patient only, without concomitant use of alcohol or other substances unless prescribed.   Patient has completed prescribing agreement detailing terms of continued prescribing of controlled substances, including monitoring LAVELL reports, urine drug screening, and pill counts if necessary. The patient is aware that inappropriate use will result in cessation of prescribing such medications.   LAVELL report has been reviewed   History and physical exam exhibit continued safe and appropriate use of controlled substances. Patient is advised to start a comprehensive exercise    The patient has been instructed to contact my office with any questions or difficulties. The patient understands the plan , patient has verbalized an understanding and agrees to proceed accordingly         I spent 30 minutes caring for Ramsey on this date of service. This time includes time spent by me in the following activities:preparing for the visit, reviewing tests, performing a medically appropriate examination and/or evaluation , counseling and educating the patient/family/caregiver, ordering medications, tests, or procedures and documenting information in the medical record  Follow Up   No  follow-ups on file.  Patient was given instructions and counseling regarding his condition or for health maintenance advice. Please see specific information pulled into the AVS if appropriate.

## 2022-10-05 ENCOUNTER — APPOINTMENT (OUTPATIENT)
Dept: NUTRITION | Facility: HOSPITAL | Age: 67
End: 2022-10-05

## 2022-10-05 LAB — DRUGS UR: NORMAL

## 2022-10-27 ENCOUNTER — TELEPHONE (OUTPATIENT)
Dept: INTERNAL MEDICINE | Facility: CLINIC | Age: 67
End: 2022-10-27

## 2022-10-27 DIAGNOSIS — M54.50 LUMBOSACRAL PAIN: ICD-10-CM

## 2022-10-27 RX ORDER — HYDROCODONE BITARTRATE AND ACETAMINOPHEN 7.5; 325 MG/1; MG/1
1 TABLET ORAL 2 TIMES DAILY
Qty: 60 TABLET | Refills: 0 | Status: SHIPPED | OUTPATIENT
Start: 2022-10-27 | End: 2022-11-09 | Stop reason: SDUPTHER

## 2022-10-27 NOTE — TELEPHONE ENCOUNTER
Jina states that Ramsey has a spot on the left arm, wanting to know what dermatologist you recommend?

## 2022-10-27 NOTE — TELEPHONE ENCOUNTER
Rx Refill Note  Requested Prescriptions     Pending Prescriptions Disp Refills   • HYDROcodone-acetaminophen (NORCO) 7.5-325 MG per tablet 60 tablet 0     Sig: Take 1 tablet by mouth 2 (Two) Times a Day.      Last office visit with prescribing clinician: 9/28/2022      Next office visit with prescribing clinician: 1/12/2023            Kartik Santos MA  10/27/22, 10:55 EDT

## 2022-10-27 NOTE — TELEPHONE ENCOUNTER
Caller: Jina Riley    Relationship: Emergency Contact    Best call back number: [phone]    Requested Prescriptions:   Requested Prescriptions     Pending Prescriptions Disp Refills   • HYDROcodone-acetaminophen (NORCO) 7.5-325 MG per tablet 60 tablet 0     Sig: Take 1 tablet by mouth 2 (Two) Times a Day.        Pharmacy where request should be sent: [address] [phone] [facility] [phone]      Additional details provided by patient: PATIENT IS COMPLETELY OUT OF THE MEDICATION AFTER TODAY     Does the patient have less than a 3 day supply:  [x] Yes  [] No    Neal Good Rep   10/27/22 10:43 EDT

## 2022-11-09 DIAGNOSIS — M54.50 LUMBOSACRAL PAIN: ICD-10-CM

## 2022-11-09 RX ORDER — HYDROCODONE BITARTRATE AND ACETAMINOPHEN 7.5; 325 MG/1; MG/1
1 TABLET ORAL 2 TIMES DAILY
Qty: 60 TABLET | Refills: 0 | Status: SHIPPED | OUTPATIENT
Start: 2022-11-09 | End: 2022-12-02 | Stop reason: SDUPTHER

## 2022-11-28 ENCOUNTER — TELEPHONE (OUTPATIENT)
Dept: UROLOGY | Facility: CLINIC | Age: 67
End: 2022-11-28

## 2022-11-28 ENCOUNTER — TELEPHONE (OUTPATIENT)
Dept: INTERNAL MEDICINE | Facility: CLINIC | Age: 67
End: 2022-11-28

## 2022-11-28 DIAGNOSIS — M54.50 LUMBOSACRAL PAIN: ICD-10-CM

## 2022-11-28 RX ORDER — HYDROCODONE BITARTRATE AND ACETAMINOPHEN 7.5; 325 MG/1; MG/1
1 TABLET ORAL 2 TIMES DAILY
Qty: 60 TABLET | Refills: 0 | Status: CANCELLED | OUTPATIENT
Start: 2022-11-28

## 2022-11-28 NOTE — TELEPHONE ENCOUNTER
Caller: AISLINN    Relationship to patient: WIFE    Best call back number: [phone]    Patient is needing: WIFE IS STATING THAT MEDICATION WAS TO BE DELIVERED TO THE OFFICE FOR THE PT APPT TOMORROW AND SHE IS WANTED TO CHECK IF IT IS AT THE OFFICE.

## 2022-12-01 NOTE — TELEPHONE ENCOUNTER
Rx Refill Note  Requested Prescriptions     Pending Prescriptions Disp Refills   • HYDROcodone-acetaminophen (NORCO) 7.5-325 MG per tablet 60 tablet 0     Sig: Take 1 tablet by mouth 2 (Two) Times a Day.      Last office visit with prescribing clinician: 9/28/2022   Last telemedicine visit with prescribing clinician: 1/12/2023   Next office visit with prescribing clinician: 1/12/2023                         Would you like a call back once the refill request has been completed: [] Yes [] No    If the office needs to give you a call back, can they leave a voicemail: [] Yes [] No    Kelsea Hanna LPN  12/01/22, 13:59 EST

## 2022-12-02 DIAGNOSIS — M54.50 LUMBOSACRAL PAIN: ICD-10-CM

## 2022-12-02 RX ORDER — HYDROCODONE BITARTRATE AND ACETAMINOPHEN 7.5; 325 MG/1; MG/1
1 TABLET ORAL 2 TIMES DAILY
Qty: 30 TABLET | Refills: 0 | Status: SHIPPED | OUTPATIENT
Start: 2022-12-02 | End: 2022-12-05 | Stop reason: SDUPTHER

## 2022-12-02 RX ORDER — HYDROCODONE BITARTRATE AND ACETAMINOPHEN 7.5; 325 MG/1; MG/1
1 TABLET ORAL 2 TIMES DAILY
Qty: 30 TABLET | Refills: 0 | OUTPATIENT
Start: 2022-12-02

## 2022-12-02 NOTE — TELEPHONE ENCOUNTER
Caller: Jina Riley    Relationship: Emergency Contact    Best call back number: [phone]    Requested Prescriptions:   Requested Prescriptions     Pending Prescriptions Disp Refills   • HYDROcodone-acetaminophen (NORCO) 7.5-325 MG per tablet 30 tablet 0     Sig: Take 1 tablet by mouth 2 (Two) Times a Day.      [place] [facility] [place] [address] [phone] [facility] [phone] FX     Additional details provided by patient:     CALLER ADVISED PATIENT HAS BEEN OUT OF THE MEDICATION.     CALLER ADVISED PATIENT HAS BEEN TRYING TO GET THIS MEDICATION REFILLED FOR ONE WEEK.    Does the patient have less than a 3 day supply:  [x] Yes  [] No    Would you like a call back once the refill request has been completed: [x] Yes [] No    If the office needs to give you a call back, can they leave a voicemail: [x] Yes [] No    Neal Stratton Rep   12/02/22 15:59 EST

## 2022-12-05 DIAGNOSIS — M54.50 LUMBOSACRAL PAIN: ICD-10-CM

## 2022-12-05 RX ORDER — HYDROCODONE BITARTRATE AND ACETAMINOPHEN 7.5; 325 MG/1; MG/1
1 TABLET ORAL 2 TIMES DAILY
Qty: 30 TABLET | Refills: 0 | Status: SHIPPED | OUTPATIENT
Start: 2022-12-05 | End: 2022-12-12 | Stop reason: SDUPTHER

## 2022-12-07 ENCOUNTER — TELEPHONE (OUTPATIENT)
Dept: INTERNAL MEDICINE | Facility: CLINIC | Age: 67
End: 2022-12-07

## 2022-12-07 NOTE — TELEPHONE ENCOUNTER
Caller: Jina Riley    Relationship: Emergency Contact    Best call back number:   [phone]          What is the best time to reach you: ANY TIME    Who are you requesting to speak with (clinical staff, provider,  specific staff member): CLINICAL  What was the call regarding:     REGARDING THE HYDROCODONE  PAIN MEDICATION ONLY BEING CALLED IN FOR 30 TABLETS,   Do you require a callback: YES

## 2022-12-07 NOTE — TELEPHONE ENCOUNTER
Attempted to contact patient, left message for patient to call the office back to clarify if he is in need of medication now or if he can wait until Dr. ANTON Rock returns in office on 12/12/2022

## 2022-12-12 DIAGNOSIS — M54.50 LUMBOSACRAL PAIN: ICD-10-CM

## 2022-12-12 RX ORDER — HYDROCODONE BITARTRATE AND ACETAMINOPHEN 7.5; 325 MG/1; MG/1
1 TABLET ORAL 2 TIMES DAILY
Qty: 30 TABLET | Refills: 0 | Status: SHIPPED | OUTPATIENT
Start: 2022-12-12 | End: 2022-12-19 | Stop reason: SDUPTHER

## 2022-12-19 ENCOUNTER — OFFICE VISIT (OUTPATIENT)
Dept: UROLOGY | Facility: CLINIC | Age: 67
End: 2022-12-19

## 2022-12-19 VITALS
BODY MASS INDEX: 34.16 KG/M2 | SYSTOLIC BLOOD PRESSURE: 122 MMHG | OXYGEN SATURATION: 94 % | DIASTOLIC BLOOD PRESSURE: 78 MMHG | TEMPERATURE: 97.3 F | WEIGHT: 244 LBS | HEART RATE: 59 BPM | HEIGHT: 71 IN

## 2022-12-19 DIAGNOSIS — N47.1 PHIMOSIS: ICD-10-CM

## 2022-12-19 DIAGNOSIS — N52.9 ERECTILE DYSFUNCTION, UNSPECIFIED ERECTILE DYSFUNCTION TYPE: Primary | ICD-10-CM

## 2022-12-19 DIAGNOSIS — M54.50 LUMBOSACRAL PAIN: ICD-10-CM

## 2022-12-19 PROCEDURE — 54235 NJX CORPORA CAVERNOSA RX AGT: CPT | Performed by: PHYSICIAN ASSISTANT

## 2022-12-19 PROCEDURE — 99214 OFFICE O/P EST MOD 30 MIN: CPT | Performed by: PHYSICIAN ASSISTANT

## 2022-12-19 RX ORDER — PREDNISOLONE SODIUM PHOSPHATE 10 MG/ML
SOLUTION/ DROPS OPHTHALMIC
COMMUNITY
Start: 2022-12-05

## 2022-12-19 RX ORDER — LATANOPROST 50 UG/ML
SOLUTION/ DROPS OPHTHALMIC
COMMUNITY
Start: 2022-11-04 | End: 2022-12-22

## 2022-12-19 RX ORDER — LATANOPROST 50 UG/ML
SOLUTION/ DROPS OPHTHALMIC
COMMUNITY
Start: 2022-12-05 | End: 2022-12-22

## 2022-12-19 RX ORDER — TIRZEPATIDE 2.5 MG/.5ML
INJECTION, SOLUTION SUBCUTANEOUS
COMMUNITY
Start: 2022-09-06

## 2022-12-19 RX ORDER — BRIMONIDINE TARTRATE 2 MG/ML
SOLUTION/ DROPS OPHTHALMIC
COMMUNITY
Start: 2022-12-05

## 2022-12-19 NOTE — TELEPHONE ENCOUNTER
Caller: Nimo Rileyty    Relationship: Emergency Contact    Best call back number: [phone]    Requested Prescriptions:   Requested Prescriptions     Pending Prescriptions Disp Refills   • HYDROcodone-acetaminophen (NORCO) 7.5-325 MG per tablet 30 tablet 0     Sig: Take 1 tablet by mouth 2 (Two) Times a Day.        [place] where [place]  [facility] [place] [address] [place] [address] - [phone]  - [phone]  P: [phone]      Additional details provided by patient: COMPLETELY OUT    Does the patient have less than a 3 day supply:  [x] Yes  [] No    Would you like a call back once the refill request has been completed: [x] Yes [] No    If the office needs to give you a call back, can they leave a voicemail: [x] Yes [] No    Neal Muñoz Rep   12/19/22 15:19 EST

## 2022-12-19 NOTE — PROGRESS NOTES
Chief Complaint   Patient presents with   • Erectile Dysfunction     3 month Follow-up ED        HPI  Mr. Riley is a 67 y.o. male with history of DM, CAD, ED, phimosis who presents for follow up.     At this visit, is ready to continue Trimix titration.    Past Medical History:   Diagnosis Date   • Arthritis    • Clot    • Coronary artery disease    • Diabetes mellitus (HCC)    • Heart disease    • History of blood clots    • Hypertension    • Low back pain    • Thyroid disease        Past Surgical History:   Procedure Laterality Date   • CHOLECYSTECTOMY  2015   • COLONOSCOPY  2015    dr srivastava   • CORONARY ARTERY BYPASS GRAFT      [place] -  BY PASS    • ENDOSCOPY           Current Outpatient Medications:   •  ALPRAZolam (XANAX) 0.5 MG tablet, Take 1 tablet by mouth At Night As Needed for Anxiety., Disp: 30 tablet, Rfl: 4  •  amLODIPine (NORVASC) 10 MG tablet, Take 1 tablet by mouth Daily., Disp: 30 tablet, Rfl: 11  •  aspirin 81 MG chewable tablet, Chew 81 mg Daily., Disp: , Rfl:   •  atorvastatin (Lipitor) 80 MG tablet, Take 1 tablet by mouth Daily., Disp: 90 tablet, Rfl: 1  •  brimonidine (ALPHAGAN) 0.2 % ophthalmic solution, USE 1 DROP IN RIGHT EYE 2 TIMES A DAY, Disp: , Rfl:   •  chlorthalidone (HYGROTON) 25 MG tablet, Take 0.5 tablets by mouth Daily., Disp: 15 tablet, Rfl: 11  •  citalopram (CeleXA) 40 MG tablet, Take 1 tablet by mouth Daily., Disp: 90 tablet, Rfl: 2  •  clopidogrel (PLAVIX) 75 MG tablet, TAKE 1 TABLET BY MOUTH DAILY, Disp: 90 tablet, Rfl: 2  •  clotrimazole-betamethasone (Lotrisone) 1-0.05 % cream, Apply to affected area 2 times daily, Disp: 45 g, Rfl: 1  •  dorzolamide-timolol (COSOPT) 22.3-6.8 MG/ML ophthalmic solution, Administer 1 drop to both eyes 2 (Two) Times a Day. Per wife, Disp: , Rfl:   •  glucose blood (Accu-Chek Caryn Plus) test strip, USE AS DIRECTED 3 TIMES DAILY, Disp: 100 each, Rfl: 12  •  glucose monitor monitoring kit, 1 each Daily., Disp: 1 each, Rfl: 1  •  glucose  "monitor monitoring kit, 1 each Daily., Disp: 1 each, Rfl: 1  •  HYDROcodone-acetaminophen (NORCO) 7.5-325 MG per tablet, Take 1 tablet by mouth 2 (Two) Times a Day., Disp: 30 tablet, Rfl: 0  •  insulin NPH-insulin regular (humuLIN 70/30,novoLIN 70/30) (70-30) 100 UNIT/ML injection, Inject 70 Units under the skin into the appropriate area as directed 2 (Two) Times a Day With Meals., Disp: 5 each, Rfl: 12  •  Insulin Syringes, Disposable, U-100 1 ML misc, 50 Units 3 (Three) Times a Day As Needed (50 units in the morning, and then with meals PRN)., Disp: 200 each, Rfl: 12  •  Lancets 30G misc, Check sugar 3 times daily, Disp: 30 each, Rfl: 12  •  latanoprost (XALATAN) 0.005 % ophthalmic solution, USE 1 DROP IN RIGHT EYE AT BEDTIME, Disp: , Rfl:   •  latanoprost (XALATAN) 0.005 % ophthalmic solution, USE 1 DROP IN RIGHT EYE AT BEDTIME, Disp: , Rfl:   •  levothyroxine (SYNTHROID, LEVOTHROID) 125 MCG tablet, Take 1 tablet by mouth Daily., Disp: 90 tablet, Rfl: 2  •  losartan (COZAAR) 100 MG tablet, Take 1 tablet by mouth Daily., Disp: 90 tablet, Rfl: 2  •  pantoprazole (PROTONIX) 40 MG EC tablet, TAKE 1 TABLET BY MOUTH DAILY., Disp: 90 tablet, Rfl: 3  •  prednisoLONE sodium phosphate (INFLAMASE FORTE) 1 % ophthalmic solution, USE 1 DROP IN LEFT EYE 4 TIMES A DAY, Disp: , Rfl:   •  Tirzepatide (Mounjaro) 2.5 MG/0.5ML solution pen-injector, , Disp: , Rfl:   •  donepezil (Aricept) 5 MG tablet, Take 1 tablet by mouth Every Night., Disp: 30 tablet, Rfl: 0     Physical Exam  Visit Vitals  /78   Pulse 59   Temp 97.3 °F (36.3 °C)   Ht 180.3 cm (71\")   Wt 111 kg (244 lb)   SpO2 94%   BMI 34.03 kg/m²       Labs  Brief Urine Lab Results  (Last result in the past 365 days)      Color   Clarity   Blood   Leuk Est   Nitrite   Protein   CREAT   Urine HCG        06/21/22 2005 Yellow   Clear   Negative   Negative   Negative   >=300 mg/dL (3+)                 Lab Results   Component Value Date    GLUCOSE 304 (H) 06/21/2022    CALCIUM " 9.0 06/21/2022     (L) 06/21/2022    K 4.0 06/21/2022    CO2 22.4 06/21/2022    CL 99 06/21/2022    BUN 18 06/21/2022    CREATININE 1.15 06/21/2022    EGFRIFAFRI 97 08/12/2019    EGFRIFNONA 66 09/11/2020    BCR 15.7 06/21/2022    ANIONGAP 12.6 06/21/2022       Lab Results   Component Value Date    WBC 4.87 06/21/2022    HGB 13.5 06/21/2022    HCT 39.4 06/21/2022    MCV 88.1 06/21/2022     (L) 06/21/2022         Assessment  67 y.o. male with DM, CAD, ED, phimosis presenting for Trimix.    His new dose is 30/3/30.  0.35 cc is injected into the shaft with modest erectile response after 15 minutes.  His phimosis remains quite tight with purulent and cloudy discharge present.  The patient denies any pain, but does admit that he stopped using the cream to help with phimosis sometime ago.  I did advise him that he should consider definitive procedures such as dorsal slit or circumcision in order to fully relieve the glans as continuing improvement in erectile function may become painful with phimosis.  He states understanding and will resume topical agent for now.  He would like to continue Trimix dosing.    Plan  1.  Trimix 30/4/50 prescribed  2.  Patient to continue Lotrisone at least twice per day

## 2022-12-20 RX ORDER — HYDROCODONE BITARTRATE AND ACETAMINOPHEN 7.5; 325 MG/1; MG/1
1 TABLET ORAL 2 TIMES DAILY
Qty: 30 TABLET | Refills: 0 | Status: SHIPPED | OUTPATIENT
Start: 2022-12-20 | End: 2023-01-09 | Stop reason: SDUPTHER

## 2022-12-20 NOTE — TELEPHONE ENCOUNTER
Rx Refill Note  Requested Prescriptions     Pending Prescriptions Disp Refills   • HYDROcodone-acetaminophen (NORCO) 7.5-325 MG per tablet 30 tablet 0     Sig: Take 1 tablet by mouth 2 (Two) Times a Day.      Last office visit with prescribing clinician: 9/28/2022   Last telemedicine visit with prescribing clinician: 1/12/2023   Next office visit with prescribing clinician: 1/12/2023                         Would you like a call back once the refill request has been completed: [] Yes [] No    If the office needs to give you a call back, can they leave a voicemail: [] Yes [] No    Brenda Peres LPN  12/20/22, 10:28 EST

## 2022-12-22 ENCOUNTER — LAB (OUTPATIENT)
Dept: LAB | Facility: HOSPITAL | Age: 67
End: 2022-12-22

## 2022-12-22 ENCOUNTER — OFFICE VISIT (OUTPATIENT)
Dept: NEUROLOGY | Facility: CLINIC | Age: 67
End: 2022-12-22

## 2022-12-22 VITALS
DIASTOLIC BLOOD PRESSURE: 70 MMHG | WEIGHT: 248.2 LBS | BODY MASS INDEX: 34.75 KG/M2 | OXYGEN SATURATION: 94 % | HEART RATE: 57 BPM | HEIGHT: 71 IN | SYSTOLIC BLOOD PRESSURE: 138 MMHG | TEMPERATURE: 97.5 F

## 2022-12-22 DIAGNOSIS — R51.9 HEADACHE, TEMPORAL: ICD-10-CM

## 2022-12-22 DIAGNOSIS — I63.9 ISCHEMIC STROKE: Primary | ICD-10-CM

## 2022-12-22 DIAGNOSIS — I63.9 ISCHEMIC STROKE: ICD-10-CM

## 2022-12-22 LAB
25(OH)D3 SERPL-MCNC: 10.9 NG/ML (ref 30–100)
CRP SERPL-MCNC: <0.3 MG/DL (ref 0–0.5)
ERYTHROCYTE [SEDIMENTATION RATE] IN BLOOD: 12 MM/HR (ref 0–20)
VIT B12 BLD-MCNC: 292 PG/ML (ref 211–946)

## 2022-12-22 PROCEDURE — 86140 C-REACTIVE PROTEIN: CPT

## 2022-12-22 PROCEDURE — 99215 OFFICE O/P EST HI 40 MIN: CPT | Performed by: NURSE PRACTITIONER

## 2022-12-22 PROCEDURE — 36415 COLL VENOUS BLD VENIPUNCTURE: CPT

## 2022-12-22 PROCEDURE — 85652 RBC SED RATE AUTOMATED: CPT

## 2022-12-22 PROCEDURE — 82306 VITAMIN D 25 HYDROXY: CPT

## 2022-12-22 PROCEDURE — 82607 VITAMIN B-12: CPT

## 2022-12-22 RX ORDER — TOPIRAMATE 25 MG/1
TABLET ORAL
Qty: 70 TABLET | Refills: 0 | Status: SHIPPED | OUTPATIENT
Start: 2022-12-22 | End: 2023-01-12

## 2022-12-22 NOTE — PROGRESS NOTES
Follow Up Neurology Office Visit      Patient Name: Ramsey Riley    Referring Physician: No ref. provider found    Chief Complaint:    Chief Complaint   Patient presents with   • Follow-up     Patient in office to follow up on stroke. Patient c/o increased headaches        History of Present Illness: Ramsey Riley is a 67 y.o. male who is here to follow up with Neurology following stroke.  This was noted on brain MRI ordered as outpatient, acute/subacute infarcts noted and patient was called at home by this provider and instructed to present to ER.  He was hospitalized for approximately 2 days, stroke work-up completed.  He has been compliant with aspirin and statin, has been following with cardiology      Chief complaint: headaches. These have been present since July 2022. He describes this as 'deep compression' headache, 'not like regular headache'. He typically takes Tylenol, which does not provide adequate relief.  Bitemporal.  He states this has been present since hospitalization, but has not mentioned it to any other healthcare providers who has been seeing regularly.    He has been followed closely with ophthalmology, he has been experiencing significantly increased IOP. Using drops, no PO Carbonic Anhydrase Inhibitor    Seen cardiology? Breeding, 7/11  Outpatient tele? yes    Watchman? no    The following portions of the patient's history were reviewed and updated as appropriate: allergies, current medications, past family history, past medical history, past social history, past surgical history and problem list.    Subjective     Review of Systems:   Review of Systems   Eyes: Positive for blurred vision and visual disturbance.   Musculoskeletal: Positive for back pain.   Neurological: Positive for headache and memory problem. Negative for weakness.     Medications:     Current Outpatient Medications:   •  ALPRAZolam (XANAX) 0.5 MG tablet, Take 1 tablet by mouth At Night As Needed for Anxiety.,  Disp: 30 tablet, Rfl: 4  •  amLODIPine (NORVASC) 10 MG tablet, Take 1 tablet by mouth Daily., Disp: 30 tablet, Rfl: 11  •  aspirin 81 MG chewable tablet, Chew 81 mg Daily., Disp: , Rfl:   •  atorvastatin (Lipitor) 80 MG tablet, Take 1 tablet by mouth Daily., Disp: 90 tablet, Rfl: 1  •  brimonidine (ALPHAGAN) 0.2 % ophthalmic solution, USE 1 DROP IN RIGHT EYE 2 TIMES A DAY, Disp: , Rfl:   •  chlorthalidone (HYGROTON) 25 MG tablet, Take 0.5 tablets by mouth Daily., Disp: 15 tablet, Rfl: 11  •  citalopram (CeleXA) 40 MG tablet, Take 1 tablet by mouth Daily., Disp: 90 tablet, Rfl: 2  •  clopidogrel (PLAVIX) 75 MG tablet, TAKE 1 TABLET BY MOUTH DAILY, Disp: 90 tablet, Rfl: 2  •  clotrimazole-betamethasone (Lotrisone) 1-0.05 % cream, Apply to affected area 2 times daily, Disp: 45 g, Rfl: 1  •  dorzolamide-timolol (COSOPT) 22.3-6.8 MG/ML ophthalmic solution, Administer 1 drop to both eyes 2 (Two) Times a Day. Per wife, Disp: , Rfl:   •  glucose blood (Accu-Chek Caryn Plus) test strip, USE AS DIRECTED 3 TIMES DAILY, Disp: 100 each, Rfl: 12  •  glucose monitor monitoring kit, 1 each Daily., Disp: 1 each, Rfl: 1  •  glucose monitor monitoring kit, 1 each Daily., Disp: 1 each, Rfl: 1  •  HYDROcodone-acetaminophen (NORCO) 7.5-325 MG per tablet, Take 1 tablet by mouth 2 (Two) Times a Day., Disp: 30 tablet, Rfl: 0  •  insulin NPH-insulin regular (humuLIN 70/30,novoLIN 70/30) (70-30) 100 UNIT/ML injection, Inject 70 Units under the skin into the appropriate area as directed 2 (Two) Times a Day With Meals., Disp: 5 each, Rfl: 12  •  Insulin Syringes, Disposable, U-100 1 ML misc, 50 Units 3 (Three) Times a Day As Needed (50 units in the morning, and then with meals PRN)., Disp: 200 each, Rfl: 12  •  Lancets 30G misc, Check sugar 3 times daily, Disp: 30 each, Rfl: 12  •  levothyroxine (SYNTHROID, LEVOTHROID) 125 MCG tablet, Take 1 tablet by mouth Daily., Disp: 90 tablet, Rfl: 2  •  losartan (COZAAR) 100 MG tablet, Take 1 tablet by  "mouth Daily., Disp: 90 tablet, Rfl: 2  •  pantoprazole (PROTONIX) 40 MG EC tablet, TAKE 1 TABLET BY MOUTH DAILY., Disp: 90 tablet, Rfl: 3  •  prednisoLONE sodium phosphate (INFLAMASE FORTE) 1 % ophthalmic solution, USE 1 DROP IN LEFT EYE 4 TIMES A DAY, Disp: , Rfl:   •  Tirzepatide (Mounjaro) 2.5 MG/0.5ML solution pen-injector, , Disp: , Rfl:   •  donepezil (Aricept) 5 MG tablet, Take 1 tablet by mouth Every Night., Disp: 30 tablet, Rfl: 0  •  topiramate (Topamax) 25 MG tablet, Week 1: 1 tab night Week 2: 1 tab AM, 1 tab PM Week 3: 1 tab AM, 2 tabs PM Week 4: 2 tabs twice daily, Disp: 70 tablet, Rfl: 0    Allergies:   No Known Allergies    Objective     Physical Exam:  Vital Signs:   Vitals:    12/22/22 1054   BP: 138/70   BP Location: Left arm   Patient Position: Sitting   Cuff Size: Adult   Pulse: 57   Temp: 97.5 °F (36.4 °C)   SpO2: 94%   Weight: 113 kg (248 lb 3.2 oz)   Height: 180.3 cm (71\")   PainSc:   7   PainLoc: Head       Physical Exam  Vitals and nursing note reviewed. Exam conducted with a chaperone present (Wife).   HENT:      Head:      Jaw: Tenderness present. No trismus or pain on movement.   Eyes:      Extraocular Movements: EOM normal.      Pupils: Pupils are equal, round, and reactive to light.   Neck:      Vascular: No carotid bruit.   Cardiovascular:      Rate and Rhythm: Regular rhythm.      Heart sounds: Normal heart sounds.   Pulmonary:      Effort: Pulmonary effort is normal.   Neurological:      Mental Status: He is oriented to person, place, and time. Mental status is at baseline.      Motor: Motor strength is normal.      Gait: Gait is intact.   Psychiatric:         Mood and Affect: Mood normal.         Speech: Speech normal.         Behavior: Behavior normal.         Thought Content: Thought content normal.         Judgment: Judgment normal.         Neurologic Exam     Mental Status   Oriented to person, place, and time.   Attention: normal. Concentration: decreased (Due to discomfort). "   Speech: speech is normal   Level of consciousness: alert  Normal comprehension.     Cranial Nerves     CN II   Visual acuity: decreased    CN III, IV, VI   Pupils are equal, round, and reactive to light.  Extraocular motions are normal.   CN III: no CN III palsy  CN VI: no CN VI palsy    CN V   Facial sensation intact.     CN VII   Facial expression full, symmetric.     CN VIII   CN VIII normal.     CN IX, X   CN IX normal.   CN X normal.     CN XI   CN XI normal.     CN XII   CN XII normal.     Motor Exam   Muscle bulk: normal  Overall muscle tone: normal  Right arm pronator drift: absent  Left arm pronator drift: absent    Strength   Strength 5/5 throughout.     Gait, Coordination, and Reflexes     Gait  Gait: normal    Tremor   Action tremor: left arm and right arm     Results Review:   I reviewed the patient's new clinical results.  I have reviewed the patient's other medical records to include, labs, radiology and referrals.   I reviewed the patient's other test results and agree with the interpretation  Interval medical Hx from June 2022 through present reviewed    Assessment / Plan      Assessment/Plan:   Diagnoses and all orders for this visit:    1. Ischemic stroke (HCC) (Primary)  -     Vitamin B12; Future  -     Vitamin D,25-Hydroxy; Future    2. Headache, temporal  -     Sedimentation Rate; Future  -     C-reactive Protein; Future  -     topiramate (Topamax) 25 MG tablet; Week 1: 1 tab night Week 2: 1 tab AM, 1 tab PM Week 3: 1 tab AM, 2 tabs PM Week 4: 2 tabs twice daily  Dispense: 70 tablet; Refill: 0    3. Body mass index (BMI) 34.0-34.9, adult  -     Vitamin D,25-Hydroxy; Future      Recommendations  - Continue neuro assessments and vital signs Q4 hrs with continuous telemetry. Please notify on-call Neurology with any abrupt change in exam.  - Recommend repeat brain MRI w/ contrast to r/o clustered CMBs due to metastatic disease.    - Mild permissive HTN to SBPs 160s with MAP > 65 mmHg over 24  hours followed by slow titration of oral antihypertensive medications titrated to goal BP < 130/80 mmHg as tolerated.   - Given concerns for possible CAA, antiplatelet monotherapy (ASA or Plavix alone) is the safest antithrombotic approach, but given significant CAD, plan to continue DAPT with ASA and Plavix with atorvastatin 80 mg PO QHS for the secondary prevention of stroke, CAD pending further outpatient evaluation with PCP, Neurology (as per primary team).    - Stroke labs: Hgb A1c 10.40% (goal < 7.0%) / LDL 85 / TSH 5.41. Would also send Vit B12 & Vit D levels. This may be done in the outpatient setting if preferred.   - Transthoracic echo this admission with LVEF 66-70%, grade II DD with elevated left atrial pressures, bubble study negative. LA diameter 4.4 cm.   - Patient with a significant degree of LA cardiopathy (grade II DD, increased LAP, and enlarged LA with LAD > 4 cm), which is strongly associated with occult atrial dysrhythmia (and possibly an independent etiology for CE stroke). Patient would benefit from prolonged outpatient cardiac telemetry for at least 30 days (MCOT/EVENT) to evaluate for occult atrial dysrhythmia if no prior history of PAF. If PAF is identified or prior history established, patient may benefit from referral to Cardiology to evaluate candidacy for LA appendage closure (Watchman device) to reduce risk for recurrent stroke due to PAF, LA cardiopathy.   - MADONNA screen, referral to Sleep Medicine Clinic as indicated.   - Delirium precautions: Lights on, shades open from 0700 to 1900 daily with frequent reorientation. Typical risk factors for delirium include advanced age, premorbid neurological disease, significant impairment in hearing or vision, critical illness, or prolonged hospital admission.   - Continued PT/OT/SLP evaluation for any post-discharge rehab needs. Recommend MOCA Cognitive Screen with SLP if time permits.   - Will need follow-up evaluation in Neurology  Clinic within 4-6 weeks of discharge.       Follow Up:   Return in about 4 weeks (around 1/19/2023) for Next scheduled follow up,tele ok.     BOUCHRA Lopez  [facility]   AS THE PROVIDER, I PERSONALLY WORE PPE DURING ENTIRE FACE TO FACE ENCOUNTER IN CLINIC WITH THE PATIENT. PATIENT ALSO WORE PPE DURING ENTIRE FACE TO FACE ENCOUNTER EXCEPT FOR A MAX OF 30 SECONDS DURING NEUROLOGICAL EVALUATION OF CRANIAL NERVES AND THEN MASK WAS PLACED BACK OVER PATIENT FACE FOR REMAINDER OF VISIT. I WASHED MY HANDS BEFORE AND AFTER VISIT.  > 40 minutes total time spent in visit today reviewing interval medical history, stroke records, obtaining HPI, examining patient, discussing diagnoses, and developing plan of care.  Please note that portions of this note may have been completed with a voice recognition program. Efforts were made to edit the dictations, but occasionally words are mistranscribed.

## 2022-12-27 DIAGNOSIS — E55.9 VITAMIN D DEFICIENCY: Primary | ICD-10-CM

## 2022-12-27 RX ORDER — ERGOCALCIFEROL 1.25 MG/1
50000 CAPSULE ORAL WEEKLY
Qty: 8 CAPSULE | Refills: 0 | Status: SHIPPED | OUTPATIENT
Start: 2022-12-27 | End: 2023-02-15

## 2023-01-09 DIAGNOSIS — M54.50 LUMBOSACRAL PAIN: ICD-10-CM

## 2023-01-09 RX ORDER — HYDROCODONE BITARTRATE AND ACETAMINOPHEN 7.5; 325 MG/1; MG/1
1 TABLET ORAL 2 TIMES DAILY
Qty: 60 TABLET | Refills: 0 | Status: SHIPPED | OUTPATIENT
Start: 2023-01-09 | End: 2023-02-09 | Stop reason: SDUPTHER

## 2023-01-09 NOTE — TELEPHONE ENCOUNTER
Rx Refill Note  Requested Prescriptions     Pending Prescriptions Disp Refills   • HYDROcodone-acetaminophen (NORCO) 7.5-325 MG per tablet 30 tablet 0     Sig: Take 1 tablet by mouth 2 (Two) Times a Day.      Last office visit with prescribing clinician: 9/28/2022   Last telemedicine visit with prescribing clinician: 1/12/2023   Next office visit with prescribing clinician: 1/12/2023                         Would you like a call back once the refill request has been completed: [] Yes [] No    If the office needs to give you a call back, can they leave a voicemail: [] Yes [] No    Ruby Huerta MA  01/09/23, 14:31 EST

## 2023-01-12 ENCOUNTER — OFFICE VISIT (OUTPATIENT)
Dept: INTERNAL MEDICINE | Facility: CLINIC | Age: 68
End: 2023-01-12
Payer: MEDICARE

## 2023-01-12 VITALS
BODY MASS INDEX: 34.72 KG/M2 | HEART RATE: 67 BPM | HEIGHT: 71 IN | DIASTOLIC BLOOD PRESSURE: 80 MMHG | SYSTOLIC BLOOD PRESSURE: 130 MMHG | OXYGEN SATURATION: 96 % | RESPIRATION RATE: 15 BRPM | TEMPERATURE: 97.5 F | WEIGHT: 248 LBS

## 2023-01-12 DIAGNOSIS — M13.0 CHRONIC POLYARTHRITIS: ICD-10-CM

## 2023-01-12 DIAGNOSIS — F51.04 INSOMNIA, PSYCHOPHYSIOLOGICAL: ICD-10-CM

## 2023-01-12 DIAGNOSIS — F33.0 MILD EPISODE OF RECURRENT MAJOR DEPRESSIVE DISORDER: ICD-10-CM

## 2023-01-12 DIAGNOSIS — E11.65 TYPE 2 DIABETES MELLITUS WITH HYPERGLYCEMIA, WITH LONG-TERM CURRENT USE OF INSULIN: ICD-10-CM

## 2023-01-12 DIAGNOSIS — E03.8 ADULT ONSET HYPOTHYROIDISM: ICD-10-CM

## 2023-01-12 DIAGNOSIS — R29.6 RECURRENT FALLS: ICD-10-CM

## 2023-01-12 DIAGNOSIS — I10 BENIGN ESSENTIAL HYPERTENSION: Primary | ICD-10-CM

## 2023-01-12 DIAGNOSIS — F41.9 ANXIETY: ICD-10-CM

## 2023-01-12 DIAGNOSIS — E78.2 MIXED HYPERLIPIDEMIA: ICD-10-CM

## 2023-01-12 DIAGNOSIS — Z51.81 ENCOUNTER FOR THERAPEUTIC DRUG LEVEL MONITORING: ICD-10-CM

## 2023-01-12 DIAGNOSIS — Z79.4 TYPE 2 DIABETES MELLITUS WITH HYPERGLYCEMIA, WITH LONG-TERM CURRENT USE OF INSULIN: ICD-10-CM

## 2023-01-12 PROCEDURE — 99214 OFFICE O/P EST MOD 30 MIN: CPT | Performed by: INTERNAL MEDICINE

## 2023-01-12 RX ORDER — TRAZODONE HYDROCHLORIDE 50 MG/1
50 TABLET ORAL NIGHTLY
Qty: 90 TABLET | Refills: 1 | Status: SHIPPED | OUTPATIENT
Start: 2023-01-12 | End: 2023-04-12

## 2023-01-12 RX ORDER — ALPRAZOLAM 0.5 MG/1
0.5 TABLET ORAL NIGHTLY PRN
Qty: 30 TABLET | Refills: 4 | Status: SHIPPED | OUTPATIENT
Start: 2023-01-12

## 2023-01-12 NOTE — PROGRESS NOTES
"Chief Complaint  Hypertension, Hyperlipidemia, Diabetes, and Fall (Needs a shower chair. He is terrible at his balance. Fell at zaheer time in the shower. /Not happy with Neurology (katina) was not happy at all. He is weak. And swim headed. History of strokes )    Subjective        Ramsey Riley presents to [facility]  History of Present Illness  HPI: Patient is here to follow up on the blood pressure  The patient is taking the blood pressure medications as prescribed and has had no side effects. The patient is also here to follow up on the cholesterol and is trying to follow a diet. The patient is also here to follow on sugar and thyroid and is  due to get lab work done . The patient also complains of chronic multiple joint pain needs refills on medications .  He is accompanied by his wife today who is also the historian who states that he fell down recently and he has issues with his balance, he does need a shower chair, patient also complains of anxiety with depression and insomnia   hypertension   Pertinent negatives include no chest pain, palpitations or shortness of breath.   Hyperlipidemia   Pertinent negatives include no chest pain or shortness of breath.   Diabetes   Pertinent negatives for hypoglycemia include no dizziness, nervousness/anxiousness or pallor. Pertinent negatives for diabetes include no chest pain, no shortness of breath  Patient is on acei/arb     Objective   Vital Signs:  /80   Pulse 67   Temp 97.5 °F (36.4 °C)   Resp 15   Ht 180.3 cm (70.98\")   Wt 112 kg (248 lb)   SpO2 96%   BMI 34.60 kg/m²   Estimated body mass index is 34.6 kg/m² as calculated from the following:    Height as of this encounter: 180.3 cm (70.98\").    Weight as of this encounter: 112 kg (248 lb).       BMI is >= 30 and <35. (Class 1 Obesity). The following options were offered after discussion;: weight loss educational material (shared in after visit summary), exercise " counseling/recommendations and nutrition counseling/recommendations      Physical Exam  Vitals and nursing note reviewed.   Constitutional:       General: He is not in acute distress.     Appearance: Normal appearance. He is not diaphoretic.   HENT:      Head: Normocephalic and atraumatic.      Right Ear: External ear normal.      Left Ear: External ear normal.      Nose: Nose normal.   Eyes:      Extraocular Movements: Extraocular movements intact.      Conjunctiva/sclera: Conjunctivae normal.   Neck:      Trachea: Trachea normal.   Cardiovascular:      Rate and Rhythm: Normal rate and regular rhythm.      Heart sounds: Normal heart sounds.   Pulmonary:      Effort: Pulmonary effort is normal. No respiratory distress.   Abdominal:      General: Abdomen is flat.   Musculoskeletal:      Cervical back: Neck supple.      Comments: Moves all limbs   Skin:     General: Skin is warm and dry.      Findings: No erythema.   Neurological:      Mental Status: He is alert and oriented to person, place, and time.      Comments: No gross motor or sensory deficits        Result Review :    Common labs    Common Labs 6/21/22 6/21/22 6/21/22 6/22/22    1837 1837 1837    Glucose  304 (A)     BUN  18     Creatinine  1.15     Sodium  134 (A)     Potassium  4.0     Chloride  99     Calcium  9.0     Albumin  3.70     Total Bilirubin  0.4     Alkaline Phosphatase  88     AST (SGOT)  23     ALT (SGPT)  22     WBC 4.87      Hemoglobin 13.5      Hematocrit 39.4      Platelets 136 (A)      Total Cholesterol    159   Triglycerides    248 (A)   HDL Cholesterol    33 (A)   LDL Cholesterol     85   Hemoglobin A1C   10.40 (A)    (A) Abnormal value       Comments are available for some flowsheets but are not being displayed.                        Assessment and Plan   Diagnoses and all orders for this visit:    1. Benign essential hypertension (Primary)    2. Mixed hyperlipidemia    3. Type 2 diabetes mellitus with hyperglycemia, with long-term  current use of insulin (HCC)    4. Adult onset hypothyroidism    5. Anxiety  -     Compliance Drug Analysis, Ur - Urine, Clean Catch  -     ALPRAZolam (XANAX) 0.5 MG tablet; Take 1 tablet by mouth At Night As Needed for Anxiety.  Dispense: 30 tablet; Refill: 4    6. Mild episode of recurrent major depressive disorder (HCC)    7. Insomnia, psychophysiological  -     traZODone (DESYREL) 50 MG tablet; Take 1 tablet by mouth Every Night for 90 days.  Dispense: 90 tablet; Refill: 1    8. Chronic polyarthritis  -     Compliance Drug Analysis, Ur - Urine, Clean Catch    9. Recurrent falls  -     MRI Brain Without Contrast  -     Ambulatory Referral to Physical Therapy  -     Ambulatory Referral to Neurology    10. Encounter for therapeutic drug level monitoring  -     Compliance Drug Analysis, Ur - Urine, Clean Catch    Plan:  1.  Benign essential hypertension: Will continue current medication, low-sodium diet advised, Counseled to regularly check BP at home with goal averaging <130/80.   2.mixed hyperlipidemia: will obtain   fasting CMP and lipid panel.  Diet and exercise counseled,  Will continue current medications  3. Diabetes  Mellitus  : will obtain   fasting CMP  and hba1c  , diet and exercise counseled , Will continue current medications  4. hypothyroidism: will obtain tsh , and continue levothyroxine  5.  Anxiety disorder: Refilled as needed alprazolam, ANNAMARIA and Enoc reviewed and obtained  6. Major  Depression: continue celexa  7.  Insomnia: Refill trazodone  8.  Chronic polyarthritis: ARIESS and Enoc reviewed and obtained and refill hydrocodone  9.  Recurrent falls: MRI brain scheduled, shower chair prescribed, will refer patient to neurology and physical therapy  The patient has read and signed the [facility] Controlled Substance Contract.The patient is aware of the potential for addiction and dependence. This is a short-term use   A Enoc was reviewed  in the chart today. Narcotic contract was signed by  patient today . Will obtain Urine drug screen  Lvaell Report   As part of this patient's treatment plan I am prescribing controlled substances. The patient has been made aware of appropriate use of such medications, including potential risk of somnolence, limited ability to drive and /or work safely, and potential for dependence or overdose. It has also been made clear that these medications are for use by this patient only, without concomitant use of alcohol or other substances unless prescribed.   Patient has completed prescribing agreement detailing terms of continued prescribing of controlled substances, including monitoring LAVELL reports, urine drug screening, and pill counts if necessary. The patient is aware that inappropriate use will result in cessation of prescribing such medications.   LAVELL report has been reviewed   History and physical exam exhibit continued safe and appropriate use of controlled substances. Patient is advised to start a comprehensive exercise program including and not limited to physical therapy program for therapeutic exercise, upper body strengthening/posture correction, core strengthening, gait and balance training and  exercise program such as yoga, Maurisio Chi, water therapy and daily walks for fitness  The patient has been instructed to contact my office with any questions or difficulties. The patient understands the plan , patient has verbalized an understanding and agrees to proceed accordingly         I spent 30 minutes caring for Ramsey on this date of service. This time includes time spent by me in the following activities:preparing for the visit, reviewing tests, performing a medically appropriate examination and/or evaluation , counseling and educating the patient/family/caregiver, ordering medications, tests, or procedures and documenting information in the medical record  Follow Up   Return in about 15 weeks (around 4/27/2023) for Medicare Wellness.  Patient was given  instructions and counseling regarding his condition or for health maintenance advice. Please see specific information pulled into the AVS if appropriate.

## 2023-01-23 LAB — DRUGS UR: NORMAL

## 2023-02-09 ENCOUNTER — HOSPITAL ENCOUNTER (OUTPATIENT)
Dept: MRI IMAGING | Facility: HOSPITAL | Age: 68
Discharge: HOME | End: 2023-02-09
Admitting: INTERNAL MEDICINE
Payer: MEDICARE

## 2023-02-09 DIAGNOSIS — M54.50 LUMBOSACRAL PAIN: ICD-10-CM

## 2023-02-09 PROCEDURE — 70551 MRI BRAIN STEM W/O DYE: CPT

## 2023-02-09 RX ORDER — HYDROCODONE BITARTRATE AND ACETAMINOPHEN 7.5; 325 MG/1; MG/1
1 TABLET ORAL 2 TIMES DAILY
Qty: 60 TABLET | Refills: 0 | Status: SHIPPED | OUTPATIENT
Start: 2023-02-09 | End: 2023-03-13 | Stop reason: SDUPTHER

## 2023-02-09 NOTE — TELEPHONE ENCOUNTER
Rx Refill Note  Requested Prescriptions     Pending Prescriptions Disp Refills   • HYDROcodone-acetaminophen (NORCO) 7.5-325 MG per tablet 60 tablet 0     Sig: Take 1 tablet by mouth 2 (Two) Times a Day.      Last office visit with prescribing clinician: 1/12/2023   Last telemedicine visit with prescribing clinician: 4/12/2023   Next office visit with prescribing clinician: 4/12/2023                         Would you like a call back once the refill request has been completed: [] Yes [] No    If the office needs to give you a call back, can they leave a voicemail: [] Yes [] No    Carlos Peterson, [facility]  02/09/23, 16:42 EST

## 2023-02-09 NOTE — TELEPHONE ENCOUNTER
Caller: Jina Riley    Relationship: Emergency Contact    Best call back number: [phone]    Requested Prescriptions:   Requested Prescriptions     Pending Prescriptions Disp Refills   • HYDROcodone-acetaminophen (NORCO) 7.5-325 MG per tablet 60 tablet 0     Sig: Take 1 tablet by mouth 2 (Two) Times a Day.        Pharmacy where request should be sent: [address] [phone] [facility] [phone]      Additional details provided by patient: PATIENT IS OUT OF MEDICATION    Does the patient have less than a 3 day supply:  [x] Yes  [] No      Neal Vargas Rep   02/09/23 15:53 EST

## 2023-02-24 ENCOUNTER — TELEPHONE (OUTPATIENT)
Dept: INTERNAL MEDICINE | Facility: CLINIC | Age: 68
End: 2023-02-24
Payer: MEDICARE

## 2023-02-24 NOTE — TELEPHONE ENCOUNTER
Caller: Jina Riley    Relationship to patient: Emergency Contact    Best call back number: [phone]    Patient is needing: JINA STATED THAT SHE WOULD LIKE TO SPEAK TO CLINICAL ABOUT PATIENT'S MEDICATION    PLEASE ADVISE

## 2023-02-24 NOTE — TELEPHONE ENCOUNTER
Spoke with patients wife who states that patients anxiety and depression has gotten worse since eye sight has gotten worse. Wife is wanting to know if celexa can be changed to something else? Upcoming appt in April.

## 2023-02-27 NOTE — TELEPHONE ENCOUNTER
Left vm for wife asking if patient can come in on wed march 1st at 3pm. Ok for hub to read message.

## 2023-03-13 ENCOUNTER — TELEPHONE (OUTPATIENT)
Dept: INTERNAL MEDICINE | Facility: CLINIC | Age: 68
End: 2023-03-13

## 2023-03-13 DIAGNOSIS — M54.50 LUMBOSACRAL PAIN: ICD-10-CM

## 2023-03-13 RX ORDER — HYDROCODONE BITARTRATE AND ACETAMINOPHEN 7.5; 325 MG/1; MG/1
1 TABLET ORAL 2 TIMES DAILY
Qty: 60 TABLET | Refills: 0 | Status: SHIPPED | OUTPATIENT
Start: 2023-03-13

## 2023-03-13 NOTE — TELEPHONE ENCOUNTER
Caller: [facility], [place] - [address] [address] - [phone]  - [phone] FX    Relationship: Pharmacy    Best call back number: [phone]    What is the best time to reach you: ANYTIME AND THEY CLOSE AT 6PM EST.    Who are you requesting to speak with (clinical staff, provider,  specific staff member): CLINICAL STAFF    Do you know the name of the person who called: GERTRUDIS    What was the call regarding: GERTRUDIS ADVISES THAT THE NORCO 7.5 THAT WAS SENT IN SAID THAT IT WAS A 2 WEEKS SUPPLY ON THE INSTRUCTIONS, BUT THEY QUANTITY IS FOR 60 WHICH I S A 30 DAY SUPPLE FOR THE PATIENT. PLEASE CALL BACK AND VERIFY IF THE PATIENT NEEDS A 30 DAY SUPPLY OR ONLY A TWO WEEK SUPPLY.    Do you require a callback: YES

## 2023-03-13 NOTE — TELEPHONE ENCOUNTER
Caller: Jina Riley    Relationship: Emergency Contact    Best call back number: [phone]    Requested Prescriptions:   Requested Prescriptions     Pending Prescriptions Disp Refills   • HYDROcodone-acetaminophen (NORCO) 7.5-325 MG per tablet 60 tablet 0     Sig: Take 1 tablet by mouth 2 (Two) Times a Day.        Pharmacy where request should be sent: [address] [phone] [facility] [phone] FX     Additional details provided by patient: COMPLETELY OUT OF MEDICATION    Does the patient have less than a 3 day supply:  [x] Yes  [] No    Would you like a call back once the refill request has been completed: [x] Yes [] No    If the office needs to give you a call back, can they leave a voicemail: [x] Yes [] No    Neal Tavares Rep   03/13/23 09:23 EDT

## 2023-03-13 NOTE — TELEPHONE ENCOUNTER
Rx Refill Note  Requested Prescriptions     Pending Prescriptions Disp Refills   • HYDROcodone-acetaminophen (NORCO) 7.5-325 MG per tablet 60 tablet 0     Sig: Take 1 tablet by mouth 2 (Two) Times a Day.      Last office visit with prescribing clinician: 1/12/2023   Last telemedicine visit with prescribing clinician: 4/12/2023   Next office visit with prescribing clinician: 4/12/2023                         Would you like a call back once the refill request has been completed: [] Yes [] No    If the office needs to give you a call back, can they leave a voicemail: [] Yes [] No    Carlos Peterson, [facility]  03/13/23, 09:33 EDT

## 2023-03-17 ENCOUNTER — TELEPHONE (OUTPATIENT)
Dept: CARDIOLOGY | Facility: CLINIC | Age: 68
End: 2023-03-17

## 2023-04-12 DIAGNOSIS — M54.50 LUMBOSACRAL PAIN: ICD-10-CM

## 2023-04-12 RX ORDER — HYDROCODONE BITARTRATE AND ACETAMINOPHEN 7.5; 325 MG/1; MG/1
1 TABLET ORAL 2 TIMES DAILY
Qty: 60 TABLET | Refills: 0 | Status: SHIPPED | OUTPATIENT
Start: 2023-04-12 | End: 2023-05-24 | Stop reason: SDUPTHER

## 2023-04-12 NOTE — TELEPHONE ENCOUNTER
Rx Refill Note  Requested Prescriptions     Pending Prescriptions Disp Refills   • HYDROcodone-acetaminophen (NORCO) 7.5-325 MG per tablet 60 tablet 0     Sig: Take 1 tablet by mouth 2 (Two) Times a Day.      Last office visit with prescribing clinician: 1/12/2023   Last telemedicine visit with prescribing clinician:   Next office visit with prescribing clinician: 4/27/2023     Ruby Huerta MA  04/12/23, 13:23 EDT

## 2023-04-12 NOTE — TELEPHONE ENCOUNTER
Caller: Jina Riley    Relationship: Emergency Contact    Best call back number:      [phone]      Requested Prescriptions:   Requested Prescriptions     Pending Prescriptions Disp Refills   • HYDROcodone-acetaminophen (NORCO) 7.5-325 MG per tablet 60 tablet 0     Sig: Take 1 tablet by mouth 2 (Two) Times a Day.      Pharmacy where request should be sent: [address] [phone] [facility] [phone]      Last office visit with prescribing clinician: 1/12/2023   Last telemedicine visit with prescribing clinician: 4/27/2023   Next office visit with prescribing clinician: 4/27/2023     Additional details provided by patient:     CALLER STATED PATIENT IS OUT OF THE MEDICATION    Does the patient have less than a 3 day supply:  [x] Yes  [] No    Would you like a call back once the refill request has been completed: [] Yes [] No    If the office needs to give you a call back, can they leave a voicemail: [] Yes [] No    Neal Arrington Rep   04/12/23 12:00 EDT     DR COOK

## 2023-04-27 ENCOUNTER — OFFICE VISIT (OUTPATIENT)
Dept: INTERNAL MEDICINE | Facility: CLINIC | Age: 68
End: 2023-04-27
Payer: MEDICARE

## 2023-04-27 VITALS
OXYGEN SATURATION: 94 % | HEART RATE: 67 BPM | SYSTOLIC BLOOD PRESSURE: 133 MMHG | DIASTOLIC BLOOD PRESSURE: 81 MMHG | HEIGHT: 71 IN | TEMPERATURE: 97.9 F | RESPIRATION RATE: 16 BRPM | BODY MASS INDEX: 33.18 KG/M2 | WEIGHT: 237 LBS

## 2023-04-27 DIAGNOSIS — Z00.00 MEDICARE ANNUAL WELLNESS VISIT, SUBSEQUENT: Primary | ICD-10-CM

## 2023-04-27 DIAGNOSIS — F41.9 ANXIETY: ICD-10-CM

## 2023-04-27 DIAGNOSIS — E11.65 TYPE 2 DIABETES MELLITUS WITH HYPERGLYCEMIA, WITH LONG-TERM CURRENT USE OF INSULIN: ICD-10-CM

## 2023-04-27 DIAGNOSIS — E78.2 MIXED HYPERLIPIDEMIA: ICD-10-CM

## 2023-04-27 DIAGNOSIS — Z91.81 AT HIGH RISK FOR FALLS: ICD-10-CM

## 2023-04-27 DIAGNOSIS — I10 BENIGN ESSENTIAL HYPERTENSION: ICD-10-CM

## 2023-04-27 DIAGNOSIS — E03.8 ADULT ONSET HYPOTHYROIDISM: ICD-10-CM

## 2023-04-27 DIAGNOSIS — Z79.4 TYPE 2 DIABETES MELLITUS WITH HYPERGLYCEMIA, WITH LONG-TERM CURRENT USE OF INSULIN: ICD-10-CM

## 2023-04-27 DIAGNOSIS — F51.04 INSOMNIA, PSYCHOPHYSIOLOGICAL: ICD-10-CM

## 2023-04-27 DIAGNOSIS — Z51.81 ENCOUNTER FOR THERAPEUTIC DRUG LEVEL MONITORING: ICD-10-CM

## 2023-04-27 RX ORDER — QUETIAPINE FUMARATE 25 MG/1
25 TABLET, FILM COATED ORAL NIGHTLY
Qty: 30 TABLET | Refills: 4 | Status: SHIPPED | OUTPATIENT
Start: 2023-04-27

## 2023-04-27 NOTE — PATIENT INSTRUCTIONS
Advance Care Planning and Advance Directives     You make decisions on a daily basis - decisions about where you want to live, your career, your home, your life. Perhaps one of the most important decisions you face is your choice for future medical care. Take time to talk with your family and your healthcare team and start planning today.  Advance Care Planning is a process that can help you:  · Understand possible future healthcare decisions in light of your own experiences  · Reflect on those decision in light of your goals and values  · Discuss your decisions with those closest to you and the healthcare professionals that care for you  · Make a plan by creating a document that reflects your wishes    Surrogate Decision Maker  In the event of a medical emergency, which has left you unable to communicate or to make your own decisions, you would need someone to make decisions for you.  It is important to discuss your preferences for medical treatment with this person while you are in good health.     Qualities of a surrogate decision maker:  • Willing to take on this role and responsibility  • Knows what you want for future medical care  • Willing to follow your wishes even if they don't agree with them  • Able to make difficult medical decisions under stressful circumstances    Advance Directives  These are legal documents you can create that will guide your healthcare team and decision maker(s) when needed. These documents can be stored in the electronic medical record.    · Living Will - a legal document to guide your care if you have a terminal condition or a serious illness and are unable to communicate. States vary by statute in document names/types, but most forms may include one or more of the following:        -  Directions regarding life-prolonging treatments        -  Directions regarding artificially provided nutrition/hydration        -  Choosing a healthcare decision maker        -  Direction  regarding organ/tissue donation    · Durable Power of  for Healthcare - this document names an -in-fact to make medical decisions for you, but it may also allow this person to make personal and financial decisions for you. Please seek the advice of an  if you need this type of document.    **Advance Directives are not required and no one may discriminate against you if you do not sign one.    Medical Orders  Many states allow specific forms/orders signed by your physician to record your wishes for medical treatment in your current state of health. This form, signed in personal communication with your physician, addresses resuscitation and other medical interventions that you may or may not want.      For more information or to schedule a time with a [facility] Advance Care Planning Facilitator contact: [facility] or call [phone] and someone will contact you directly.    Fall Prevention in the Home, Adult  Falls can cause injuries and affect people of all ages. There are many simple things that you can do to make your home safe and to help prevent falls. Ask for help when making these changes, if needed.  What actions can I take to prevent falls?  General instructions  • Use good lighting in all rooms. Replace any light bulbs that burn out, turn on lights if it is dark, and use night-lights.  • Place frequently used items in easy-to-reach places. Lower the shelves around your home if necessary.  • Set up furniture so that there are clear paths around it. Avoid moving your furniture around.  • Remove throw rugs and other tripping hazards from the floor.  • Avoid walking on wet floors.  • Fix any uneven floor surfaces.  • Add color or contrast paint or tape to grab bars and handrails in your home. Place contrasting color strips on the first and last steps of staircases.  • When you use a stepladder, make sure that it is completely opened and that the sides and supports are  firmly locked. Have someone hold the ladder while you are using it. Do not climb a closed stepladder.  • Know where your pets are when moving through your home.  What can I do in the bathroom?     • Keep the floor dry. Immediately clean up any water that is on the floor.  • Remove soap buildup in the tub or shower regularly.  • Use nonskid mats or decals on the floor of the tub or shower.  • Attach bath mats securely with double-sided, nonslip rug tape.  • If you need to sit down while you are in the shower, use a plastic, nonslip stool.  • Install grab bars by the toilet and in the tub and shower. Do not use towel bars as grab bars.  What can I do in the bedroom?  • Make sure that a bedside light is easy to reach.  • Do not use oversized bedding that reaches the floor.  • Have a firm chair that has side arms to use for getting dressed.  What can I do in the kitchen?  • Clean up any spills right away.  • If you need to reach for something above you, use a sturdy step stool that has a grab bar.  • Keep electrical cables out of the way.  • Do not use floor polish or wax that makes floors slippery. If you must use wax, make sure that it is non-skid floor wax.  What can I do with my stairs?  • Do not leave any items on the stairs.  • Make sure that you have a light switch at the top and the bottom of the stairs. Have them installed if you do not have them.  • Make sure that there are handrails on both sides of the stairs. Fix handrails that are broken or loose. Make sure that handrails are as long as the staircases.  • Install non-slip stair treads on all stairs in your home.  • Avoid having throw rugs at the top or bottom of stairs, or secure the rugs with carpet tape to prevent them from moving.  • Choose a carpet design that does not hide the edge of steps on the stairs.  • Check any carpeting to make sure that it is firmly attached to the stairs. Fix any carpet that is loose or worn.  What can I do on the outside of  my home?  • Use bright outdoor lighting.  • Regularly repair the edges of walkways and driveways and fix any cracks.  • Remove high doorway thresholds.  • Trim any shrubbery on the main path into your home.  • Regularly check that handrails are securely fastened and in good repair. Both sides of all steps should have handrails.  • Install guardrails along the edges of any raised decks or porches.  • Clear walkways of debris and clutter, including tools and rocks.  • Have leaves, snow, and ice cleared regularly.  • Use sand or salt on walkways during winter months.  • In the garage, clean up any spills right away, including grease or oil spills.  What other actions can I take?  • Wear closed-toe shoes that fit well and support your feet. Wear shoes that have rubber soles or low heels.  • Use mobility aids as needed, such as canes, walkers, scooters, and crutches.  • Review your medicines with your health care provider. Some medicines can cause dizziness or changes in blood pressure, which increase your risk of falling.  Talk with your health care provider about other ways that you can decrease your risk of falls. This may include working with a physical therapist or  to improve your strength, balance, and endurance.  Where to find more information  • Centers for Disease Control and Prevention, [place]: www.cdc.gov  • National [place] [employer] Aging: www.valery.nih.gov  Contact a health care provider if:  • You are afraid of falling at home.  • You feel weak, drowsy, or dizzy at home.  • You fall at home.  Summary  • There are many simple things that you can do to make your home safe and to help prevent falls.  • Ways to make your home safe include removing tripping hazards and installing grab bars in the bathroom.  • Ask for help when making these changes in your home.  This information is not intended to replace advice given to you by your health care provider. Make sure you discuss any questions you have with your  health care provider.  Document Revised: 09/19/2022 Document Reviewed: 07/21/2021  Elsevier Patient Education © 2022 [employer]      Sit-to-Stand Exercise  The sit-to-stand exercise (also known as the chair stand or chair rise exercise) strengthens your lower body and helps you maintain or improve your mobility and independence. The end goal is to do the sit-to-stand exercise without using your hands. This will be easier as you become stronger. You should always talk with your health care provider before starting any exercise program, especially if you have had recent surgery.  Do the exercise exactly as told by your health care provider and adjust it as directed. It is normal to feel mild stretching, pulling, tightness, or discomfort as you do this exercise, but you should stop right away if you feel sudden pain or your pain gets worse. Do not begin doing this exercise until told by your health care provider.  What the sit-to-stand exercise does  The sit-to-stand exercise helps to strengthen the muscles in your thighs and the muscles in the center of your body that give you stability (core muscles). This exercise is especially helpful if:  • You have had knee or hip surgery.  • You have trouble getting up from a chair, out of a car, or off the toilet due to muscle weakness.  How to do the sit-to-stand exercise  1. Sit toward the front edge of a sturdy chair without armrests. Your knees should be bent and your feet should be flat on the floor and shoulder-width apart and underneath your hips.  2. Place your hands lightly on each side of the seat. Keep your back and neck as straight as possible, with your chest slightly forward.  3. Breathe in slowly. Lean forward and slightly shift your weight to the front of your feet.  4. Breathe out as you slowly stand up. Try not to support any weight with your hands.  5. Stand and pause for a full breath in and out.  6. Breathe in as you sit down slowly. Tighten your core  and abdominal muscles to control your lowering as much as possible. You should lower yourself back to the chair slowly, not just drop back into the seat.  7. Breathe out slowly.  8. Do this exercise 10-15 times. If needed, do it fewer times until you build up strength.  9. Rest for 1 minute, then do another set of 10-15 repetitions.  To change the difficulty of the sit-to-stand exercise  • If the exercise is too difficult, use a chair with sturdy armrests, and push off the armrests to help you come to the standing position. You can also use the armrests to help slowly lower yourself back to sitting. As this gets easier, try to use your arms less. You can also place a firm cushion or pillow on the chair to make the surface higher.  • If this exercise is too easy, do not use your arms to help raise or lower yourself. You can also wear a weighted vest, use hand weights, increase your repetitions, or try a lower chair.  General tips  • You may feel tired when starting an exercise routine. This is normal.  • You may have muscle soreness that lasts a few days. This is normal. As you get stronger, you may not feel muscle soreness.  • Use smooth, steady movements.  • Do not  hold your breath during strength exercises. This can cause unsafe changes in your blood pressure.  • Breathe in slowly through your nose, and breathe out slowly through your mouth.  Summary  • Strengthening your lower body is an important step to help you move safely and independently.  • The sit-to-stand exercise helps strengthen the muscles in your thighs and core.  • You should always talk with your health care provider before starting any exercise program, especially if you have had recent surgery.  This information is not intended to replace advice given to you by your health care provider. Make sure you discuss any questions you have with your health care provider.  Document Revised: 04/10/2022 Document Reviewed: 04/10/2022  Elsevier Patient  Education ©  [employer]    You are due for adacel Tdap vaccination. (provides protection against tetanus, diptheria and whooping cough) Please  get the immunization at your local pharmacy at your earliest convenience. This immunization will next be due in 10 years. Please click on the link for more information about this vaccine.    [facility] Tdap Vaccine Information    You are due for Shingrix vaccination series ( the newest shingles vaccine).  It is a two shot series spaced 2-6 months apart. Please get this vaccine series started at your earliest convenience at your local pharmacy to help avoid shingles outbreak. It is more effective than the old Zostavax vaccine and is recommended even if you have had the Zostavax vaccine in the past.  Once the Shingrix series is completed, it does not need to be repeated.   For more information, please look at the website below:  [facility] Shingrix Vaccine Information      Medicare Wellness  Personal Prevention Plan of Service     Date of Office Visit:    Encounter Provider:  Margret Purvis MD  Place of Service:  [facility]  Patient Name: Ramsey Riley  :  1955    As part of the Medicare Wellness portion of your visit today, we are providing you with this personalized preventive plan of services (PPPS). This plan is based upon recommendations of the [place] Preventive Services Task Force (USPSTF) and the Advisory Committee on Immunization Practices (ACIP).    This lists the preventive care services that should be considered, and provides dates of when you are due. Items listed as completed are up-to-date and do not require any further intervention.    Health Maintenance   Topic Date Due   • COLORECTAL CANCER SCREENING  Never done   • TDAP/TD VACCINES (1 - Tdap) Never done   • ZOSTER VACCINE (1 of 2) Never done   • HEPATITIS C SCREENING  Never done   • DIABETIC EYE EXAM  Never done   • URINE MICROALBUMIN  2020   • DIABETIC FOOT EXAM   10/19/2021   • Pneumococcal Vaccine 65+ (3 - PPSV23 if available, else PCV20) 03/11/2022   • COVID-19 Vaccine (1) 04/29/2023 (Originally 1955)   • HEMOGLOBIN A1C  07/12/2023   • INFLUENZA VACCINE  08/01/2023   • LIPID PANEL  01/12/2024   • ANNUAL WELLNESS VISIT  04/27/2024       No orders of the defined types were placed in this encounter.      Return in about 1 year (around 4/27/2024) for Medicare Wellness.

## 2023-04-27 NOTE — PROGRESS NOTES
The ABCs of the Annual Wellness Visit  Subsequent Medicare Wellness Visit    Chief Complaint   Patient presents with   • Medicare Wellness-subsequent   • Hypertension   • Hyperlipidemia   • Diabetes       Subjective      Ramsey Riley is a 68 y.o. male who presents for a Subsequent Medicare Wellness Visit  and physical ,  Patient is here to follow up on the blood pressure  The patient is taking the blood pressure medications as prescribed and has had no side effects. The patient is also here to follow up on the cholesterol and is trying to follow a diet. The patient is also here to follow on sugar and thyroid and had lab work done  And sees endocrinology . The patient also complains of insomnia and  needs refills on medications .  He is with his wfe today  Hypertension   Pertinent negatives include no chest pain, palpitations or shortness of breath.   Hyperlipidemia   Pertinent negatives include no chest pain or shortness of breath.   Diabetes   Pertinent negatives for hypoglycemia include no dizziness, nervousness/anxiousness or pallor. Pertinent negatives for diabetes include no chest pain, no shortness of breath  Patient is on acei/arb       The following portions of the patient's history were reviewed and   updated as appropriate: allergies, current medications, past family history, past medical history, past social history, past surgical history and problem list.    Compared to one year ago, the patient feels his physical   health is worse.    Compared to one year ago, the patient feels his mental   health is the same.    Recent Hospitalizations:  This patient has had a [facility] admission record on file within the last 365 days.    Current Medical Providers:  Patient Care Team:  Margret Purvis MD as PCP - General (Internal Medicine)  Margret Purvis MD as Referring Physician (Internal Medicine)    Outpatient Medications Prior to Visit   Medication Sig Dispense Refill   • amLODIPine (NORVASC) 10 MG  tablet Take 1 tablet by mouth Daily. 30 tablet 11   • aspirin 81 MG chewable tablet Chew 1 tablet Daily.     • atorvastatin (Lipitor) 80 MG tablet Take 1 tablet by mouth Daily. 90 tablet 1   • brimonidine (ALPHAGAN) 0.2 % ophthalmic solution USE 1 DROP IN RIGHT EYE 2 TIMES A DAY     • chlorthalidone (HYGROTON) 25 MG tablet Take 0.5 tablets by mouth Daily. 15 tablet 11   • citalopram (CeleXA) 40 MG tablet Take 1 tablet by mouth Daily. 90 tablet 2   • clopidogrel (PLAVIX) 75 MG tablet TAKE 1 TABLET BY MOUTH DAILY 90 tablet 2   • clotrimazole-betamethasone (Lotrisone) 1-0.05 % cream Apply to affected area 2 times daily 45 g 1   • donepezil (Aricept) 5 MG tablet Take 1 tablet by mouth Every Night. 30 tablet 0   • dorzolamide-timolol (COSOPT) 22.3-6.8 MG/ML ophthalmic solution Administer 1 drop to both eyes 2 (Two) Times a Day. Per wife     • glucose blood (Accu-Chek Caryn Plus) test strip USE AS DIRECTED 3 TIMES DAILY 100 each 12   • glucose monitor monitoring kit 1 each Daily. 1 each 1   • glucose monitor monitoring kit 1 each Daily. 1 each 1   • HYDROcodone-acetaminophen (NORCO) 7.5-325 MG per tablet Take 1 tablet by mouth 2 (Two) Times a Day. 60 tablet 0   • insulin NPH-insulin regular (humuLIN 70/30,novoLIN 70/30) (70-30) 100 UNIT/ML injection Inject 70 Units under the skin into the appropriate area as directed 2 (Two) Times a Day With Meals. 5 each 12   • Insulin Syringes, Disposable, U-100 1 ML misc 50 Units 3 (Three) Times a Day As Needed (50 units in the morning, and then with meals PRN). 200 each 12   • Lancets 30G misc Check sugar 3 times daily 30 each 12   • levothyroxine (SYNTHROID, LEVOTHROID) 125 MCG tablet Take 1 tablet by mouth Daily. 90 tablet 2   • losartan (COZAAR) 100 MG tablet Take 1 tablet by mouth Daily. 90 tablet 2   • pantoprazole (PROTONIX) 40 MG EC tablet TAKE 1 TABLET BY MOUTH DAILY. 90 tablet 3   • prednisoLONE sodium phosphate (INFLAMASE FORTE) 1 % ophthalmic solution USE 1 DROP IN LEFT EYE  "4 TIMES A DAY     • Tirzepatide (Mounjaro) 2.5 MG/0.5ML solution pen-injector      • ALPRAZolam (XANAX) 0.5 MG tablet Take 1 tablet by mouth At Night As Needed for Anxiety. 30 tablet 4   • traZODone (DESYREL) 50 MG tablet Take 1 tablet by mouth Every Night for 90 days. 90 tablet 1     No facility-administered medications prior to visit.       Opioid medication/s are on active medication list.  and I have evaluated his active treatment plan and pain score trends (see table).  Vitals:    04/27/23 1625   PainSc: 0-No pain     I have reviewed the chart for potential of high risk medication and harmful drug interactions in the elderly.            Aspirin is on active medication list. Aspirin use is indicated based on review of current medical condition/s. Pros and cons of this therapy have been discussed today. Benefits of this medication outweigh potential harm.  Patient has been encouraged to continue taking this medication.  .      Patient Active Problem List   Diagnosis   • Coronary artery disease involving native coronary artery of native heart without angina pectoris   • Essential hypertension   • Bradycardia, sinus   • Fatigue   • Abnormal ECG   • Precordial pain   • LVH (left ventricular hypertrophy) due to hypertensive disease, without heart failure   • Diabetes mellitus (HCC)   • PAD (peripheral artery disease)   • Ischemic stroke   • History of blood clots   • Pulmonary embolus     Advance Care Planning   Advance Care Planning     Advance Directive is not on file.  ACP discussion was held with the patient during this visit. Patient has an advance directive (not in EMR), copy requested.     Objective    Vitals:    04/27/23 1625   BP: 133/81   Pulse: 67   Resp: 16   Temp: 97.9 °F (36.6 °C)   SpO2: 94%   Weight: 108 kg (237 lb)   Height: 180.3 cm (70.98\")   PainSc: 0-No pain     Estimated body mass index is 33.07 kg/m² as calculated from the following:    Height as of this encounter: 180.3 cm (70.98\").    Weight " as of this encounter: 108 kg (237 lb).    BMI is >= 30 and <35. (Class 1 Obesity). The following options were offered after discussion;: weight loss educational material (shared in after visit summary), exercise counseling/recommendations and nutrition counseling/recommendations       Physical Examination  Vitals and nursing note reviewed  General appearance: Normocephalic and nontraumatic  HEENT: External inspection of eyes, ears and nose appears benign, hearing appears intact  Neck: Neck appears supple, trachea in midline, thyroid appears not enlarged  Respiratory: Respiratory effort appears normal  Musculoskeletal: Moving all limbs  Range of motion of visible joints appears within normal  CNS: No gross motor or sensory deficits  No gross cranial nerve deficits  No tremors  Psychiatry: Alert and oriented x3  Memory appears intact  Mood and affect appears normal  Insight appears normal    Does the patient have evidence of cognitive impairment?   No            HEALTH RISK ASSESSMENT    Smoking Status:  Social History     Tobacco Use   Smoking Status Never   Smokeless Tobacco Current   • Types: Chew     Alcohol Consumption:  Social History     Substance and Sexual Activity   Alcohol Use No     Fall Risk Screen:    JONATANADI Fall Risk Assessment was completed, and patient is at HIGH risk for falls. Assessment completed on:2023    Depression Screenin/27/2023     4:32 PM   PHQ-2/PHQ-9 Depression Screening   Little Interest or Pleasure in Doing Things 0-->not at all   Feeling Down, Depressed or Hopeless 0-->not at all   PHQ-9: Brief Depression Severity Measure Score 0       Health Habits and Functional and Cognitive Screenin/27/2023     4:30 PM   Functional & Cognitive Status   Do you have difficulty preparing food and eating? No   Do you have difficulty bathing yourself, getting dressed or grooming yourself? No   Do you have difficulty using the toilet? No   Do you have difficulty moving around from  place to place? No   Do you have trouble with steps or getting out of a bed or a chair? No   Current Diet Well Balanced Diet   Dental Exam Up to date   Eye Exam Up to date   Exercise (times per week) 5 times per week   Current Exercises Include Gardening;Walking;Yard Work   Do you need help using the phone?  No   Are you deaf or do you have serious difficulty hearing?  No   Do you need help with transportation? Yes   Do you need help shopping? No   Do you need help preparing meals?  No   Do you need help with housework?  No   Do you need help with laundry? No   Do you need help taking your medications? No   Do you need help managing money? No   Do you ever drive or ride in a car without wearing a seat belt? No   Have you felt unusual stress, anger or loneliness in the last month? No   Who do you live with? Spouse   If you need help, do you have trouble finding someone available to you? No   Have you been bothered in the last four weeks by sexual problems? No   Do you have difficulty concentrating, remembering or making decisions? No       Age-appropriate Screening Schedule:  Refer to the list below for future screening recommendations based on patient's age, sex and/or medical conditions. Orders for these recommended tests are listed in the plan section. The patient has been provided with a written plan.    Health Maintenance   Topic Date Due   • COLORECTAL CANCER SCREENING  Never done   • TDAP/TD VACCINES (1 - Tdap) Never done   • ZOSTER VACCINE (1 of 2) Never done   • HEPATITIS C SCREENING  Never done   • DIABETIC EYE EXAM  Never done   • URINE MICROALBUMIN  08/12/2020   • DIABETIC FOOT EXAM  10/19/2021   • Pneumococcal Vaccine 65+ (3 - PPSV23 if available, else PCV20) 03/11/2022   • COVID-19 Vaccine (1) 04/29/2023 (Originally 1955)   • HEMOGLOBIN A1C  07/12/2023   • INFLUENZA VACCINE  08/01/2023   • LIPID PANEL  01/12/2024   • ANNUAL WELLNESS VISIT  04/27/2024                  CMS Preventative Services Quick  Reference  Risk Factors Identified During Encounter:    Fall Risk-High or Moderate: Discussed Fall Prevention in the home, Information on Fall Prevention Shared in After Visit Summary and Sit to Stand Exercise Information Shared in After Visit Summary    The above risks/problems have been discussed with the patient.  Pertinent information has been shared with the patient in the After Visit Summary.    Diagnoses and all orders for this visit:    1. Medicare annual wellness visit, subsequent (Primary)    2. At high risk for falls    3. Benign essential hypertension    4. Mixed hyperlipidemia  -     CBC & Differential  -     Comprehensive Metabolic Panel  -     Lipid Panel    5. Type 2 diabetes mellitus with hyperglycemia, with long-term current use of insulin    6. Adult onset hypothyroidism  -     Cancel: TSH    7. Insomnia, psychophysiological  -     QUEtiapine (SEROquel) 25 MG tablet; Take 1 tablet by mouth Every Night.  Dispense: 30 tablet; Refill: 4    8. Anxiety  -     Compliance Drug Analysis, Ur - Urine, Clean Catch  -     ALPRAZolam (XANAX) 0.5 MG tablet; Take 1 tablet by mouth At Night As Needed for Anxiety.  Dispense: 30 tablet; Refill: 5    9. Encounter for therapeutic drug level monitoring  -     Compliance Drug Analysis, Ur - Urine, Clean Catch    Plan:  1.physical: Physical exam conducted today, reviewed fasting lab work,   Impression: healthy adult Patient. Currently, patient eats a healthy diet. Screening lab work includes glucose, lipid profile and complete blood count . The risks and benefits of immunizations were discussed and immunizations are up to date. Advice and education were given regarding nutrition and aerobic exercise. Patient discussion: discussed with the patient.  Annual Wellness Visit  ,physical with preventive exam as well as age and risk appropriate counseling completed.   Advance Directive Planning: paperwork and instructions were given to the patient.   Patient Discussion: plan  discussed with the patient, follow-up visit needed in one year. Medication Review and medication list updated  2.  Benign essential hypertension: Will continue current medication, low-sodium diet advised, Counseled to regularly check BP at home with goal averaging <130/80.   3.mixed hyperlipidemia:   fasting CMP and lipid panel.  Diet and exercise counseled,  Will continue current medications  4. Diabetes  Mellitus  :   Reviewed fasting CMP  and hba1c  , diet and exercise counseled , Will continue current medications  5 . hypothyroidism: reviewed   tsh , and continue levothyroxine  6.  Insomnia : trial with seroquel  7.anxiety : uds and lavell reviewed and obtained, refill alprazolam  The patient has read and signed the [facility] Controlled Substance Contract.The patient is aware of the potential for addiction and dependence. This is a short-term use   A Lavell was reviewed  in the chart today. Narcotic contract was signed by patient today . Will obtain Urine drug screen  Lavell Report   As part of this patient's treatment plan I am prescribing controlled substances. The patient has been made aware of appropriate use of such medications, including potential risk of somnolence, limited ability to drive and /or work safely, and potential for dependence or overdose. It has also been made clear that these medications are for use by this patient only, without concomitant use of alcohol or other substances unless prescribed.   Patient has completed prescribing agreement detailing terms of continued prescribing of controlled substances, including monitoring LAVELL reports, urine drug screening, and pill counts if necessary. The patient is aware that inappropriate use will result in cessation of prescribing such medications.   LAVELL report has been reviewed   History and physical exam exhibit continued safe and appropriate use of controlled substances. Patient is advised to start a comprehensive exercise program The  patient has been instructed to contact my office with any questions or difficulties. The patient understands the plan , patient has verbalized an understanding and agrees to proceed accordingly      Follow Up:   Next Medicare Wellness visit to be scheduled in 1 year.      An After Visit Summary and PPPS were made available to the patient.

## 2023-04-28 RX ORDER — ALPRAZOLAM 0.5 MG/1
0.5 TABLET ORAL NIGHTLY PRN
Qty: 30 TABLET | Refills: 5 | Status: SHIPPED | OUTPATIENT
Start: 2023-04-28

## 2023-05-07 LAB — DRUGS UR: NORMAL

## 2023-05-24 DIAGNOSIS — M54.50 LUMBOSACRAL PAIN: ICD-10-CM

## 2023-05-24 NOTE — TELEPHONE ENCOUNTER
Caller: Jina Riley    Relationship: Emergency Contact    Best call back number: [phone]    Requested Prescriptions:   Requested Prescriptions     Pending Prescriptions Disp Refills   • HYDROcodone-acetaminophen (NORCO) 7.5-325 MG per tablet 60 tablet 0     Sig: Take 1 tablet by mouth 2 (Two) Times a Day.        Pharmacy where request should be sent:      Last office visit with prescribing clinician: 4/27/2023   Last telemedicine visit with prescribing clinician: Visit date not found   Next office visit with prescribing clinician: 7/24/2023     Additional details provided by patient: HAS NONE LEFT    Does the patient have less than a 3 day supply:  [x] Yes  [] No    Would you like a call back once the refill request has been completed: [x] Yes [] No    If the office needs to give you a call back, can they leave a voicemail: [] Yes [] No    Neal Jhaveri Rep   05/24/23 11:24 EDT

## 2023-05-24 NOTE — TELEPHONE ENCOUNTER
Rx Refill Note  Requested Prescriptions     Pending Prescriptions Disp Refills   • HYDROcodone-acetaminophen (NORCO) 7.5-325 MG per tablet 60 tablet 0     Sig: Take 1 tablet by mouth 2 (Two) Times a Day.      Last office visit with prescribing clinician: 4/27/2023   Last telemedicine visit with prescribing clinician: Visit date not found   Next office visit with prescribing clinician: 7/24/2023

## 2023-05-25 RX ORDER — HYDROCODONE BITARTRATE AND ACETAMINOPHEN 7.5; 325 MG/1; MG/1
1 TABLET ORAL 2 TIMES DAILY
Qty: 60 TABLET | Refills: 0 | Status: SHIPPED | OUTPATIENT
Start: 2023-05-25 | End: 2023-05-26 | Stop reason: SDUPTHER

## 2023-05-26 ENCOUNTER — APPOINTMENT (OUTPATIENT)
Dept: MRI IMAGING | Facility: HOSPITAL | Age: 68
End: 2023-05-26
Payer: MEDICARE

## 2023-05-26 ENCOUNTER — HOSPITAL ENCOUNTER (EMERGENCY)
Facility: HOSPITAL | Age: 68
Discharge: HOME | End: 2023-05-26
Attending: EMERGENCY MEDICINE
Payer: MEDICARE

## 2023-05-26 ENCOUNTER — APPOINTMENT (OUTPATIENT)
Dept: CT IMAGING | Facility: HOSPITAL | Age: 68
End: 2023-05-26
Payer: MEDICARE

## 2023-05-26 VITALS
TEMPERATURE: 97.1 F | BODY MASS INDEX: 33.6 KG/M2 | SYSTOLIC BLOOD PRESSURE: 109 MMHG | OXYGEN SATURATION: 92 % | HEART RATE: 46 BPM | HEIGHT: 71 IN | RESPIRATION RATE: 18 BRPM | DIASTOLIC BLOOD PRESSURE: 72 MMHG | WEIGHT: 240 LBS

## 2023-05-26 DIAGNOSIS — M48.02 CERVICAL SPINAL STENOSIS: ICD-10-CM

## 2023-05-26 DIAGNOSIS — M50.30 DDD (DEGENERATIVE DISC DISEASE), CERVICAL: ICD-10-CM

## 2023-05-26 DIAGNOSIS — M54.2 NECK PAIN: Primary | ICD-10-CM

## 2023-05-26 DIAGNOSIS — M54.50 LUMBOSACRAL PAIN: ICD-10-CM

## 2023-05-26 LAB
ALBUMIN SERPL-MCNC: 3.6 G/DL (ref 3.5–5.2)
ALBUMIN/GLOB SERPL: 1.3 G/DL
ALP SERPL-CCNC: 103 U/L (ref 39–117)
ALT SERPL W P-5'-P-CCNC: 17 U/L (ref 1–41)
ANION GAP SERPL CALCULATED.3IONS-SCNC: 7.9 MMOL/L (ref 5–15)
AST SERPL-CCNC: 18 U/L (ref 1–40)
BASOPHILS # BLD AUTO: 0.03 10*3/MM3 (ref 0–0.2)
BASOPHILS NFR BLD AUTO: 0.4 % (ref 0–1.5)
BILIRUB SERPL-MCNC: 0.5 MG/DL (ref 0–1.2)
BUN SERPL-MCNC: 19 MG/DL (ref 8–23)
BUN/CREAT SERPL: 16.4 (ref 7–25)
CALCIUM SPEC-SCNC: 9.1 MG/DL (ref 8.6–10.5)
CHLORIDE SERPL-SCNC: 103 MMOL/L (ref 98–107)
CO2 SERPL-SCNC: 27.1 MMOL/L (ref 22–29)
CREAT SERPL-MCNC: 1.16 MG/DL (ref 0.76–1.27)
CRP SERPL-MCNC: <0.3 MG/DL (ref 0–0.5)
DEPRECATED RDW RBC AUTO: 42.9 FL (ref 37–54)
EGFRCR SERPLBLD CKD-EPI 2021: 69.5 ML/MIN/1.73
EOSINOPHIL # BLD AUTO: 0.15 10*3/MM3 (ref 0–0.4)
EOSINOPHIL NFR BLD AUTO: 1.8 % (ref 0.3–6.2)
ERYTHROCYTE [DISTWIDTH] IN BLOOD BY AUTOMATED COUNT: 12.9 % (ref 12.3–15.4)
ERYTHROCYTE [SEDIMENTATION RATE] IN BLOOD: 10 MM/HR (ref 0–20)
GLOBULIN UR ELPH-MCNC: 2.7 GM/DL
GLUCOSE SERPL-MCNC: 272 MG/DL (ref 65–99)
HCT VFR BLD AUTO: 45.6 % (ref 37.5–51)
HGB BLD-MCNC: 15.1 G/DL (ref 13–17.7)
IMM GRANULOCYTES # BLD AUTO: 0.03 10*3/MM3 (ref 0–0.05)
IMM GRANULOCYTES NFR BLD AUTO: 0.4 % (ref 0–0.5)
LYMPHOCYTES # BLD AUTO: 1.67 10*3/MM3 (ref 0.7–3.1)
LYMPHOCYTES NFR BLD AUTO: 20.4 % (ref 19.6–45.3)
MCH RBC QN AUTO: 30.1 PG (ref 26.6–33)
MCHC RBC AUTO-ENTMCNC: 33.1 G/DL (ref 31.5–35.7)
MCV RBC AUTO: 90.8 FL (ref 79–97)
MONOCYTES # BLD AUTO: 0.72 10*3/MM3 (ref 0.1–0.9)
MONOCYTES NFR BLD AUTO: 8.8 % (ref 5–12)
NEUTROPHILS NFR BLD AUTO: 5.59 10*3/MM3 (ref 1.7–7)
NEUTROPHILS NFR BLD AUTO: 68.2 % (ref 42.7–76)
NRBC BLD AUTO-RTO: 0 /100 WBC (ref 0–0.2)
PLATELET # BLD AUTO: 128 10*3/MM3 (ref 140–450)
PMV BLD AUTO: 11.1 FL (ref 6–12)
POTASSIUM SERPL-SCNC: 4.3 MMOL/L (ref 3.5–5.2)
PROT SERPL-MCNC: 6.3 G/DL (ref 6–8.5)
RBC # BLD AUTO: 5.02 10*6/MM3 (ref 4.14–5.8)
SODIUM SERPL-SCNC: 138 MMOL/L (ref 136–145)
WBC NRBC COR # BLD: 8.19 10*3/MM3 (ref 3.4–10.8)

## 2023-05-26 PROCEDURE — 72125 CT NECK SPINE W/O DYE: CPT | Performed by: RADIOLOGY

## 2023-05-26 PROCEDURE — 0 GADOBENATE DIMEGLUMINE 529 MG/ML SOLUTION: Performed by: EMERGENCY MEDICINE

## 2023-05-26 PROCEDURE — 87040 BLOOD CULTURE FOR BACTERIA: CPT | Performed by: EMERGENCY MEDICINE

## 2023-05-26 PROCEDURE — 96375 TX/PRO/DX INJ NEW DRUG ADDON: CPT

## 2023-05-26 PROCEDURE — 80053 COMPREHEN METABOLIC PANEL: CPT | Performed by: EMERGENCY MEDICINE

## 2023-05-26 PROCEDURE — 25010000002 MORPHINE PER 10 MG: Performed by: EMERGENCY MEDICINE

## 2023-05-26 PROCEDURE — 72156 MRI NECK SPINE W/O & W/DYE: CPT | Performed by: RADIOLOGY

## 2023-05-26 PROCEDURE — 72125 CT NECK SPINE W/O DYE: CPT

## 2023-05-26 PROCEDURE — 99283 EMERGENCY DEPT VISIT LOW MDM: CPT

## 2023-05-26 PROCEDURE — 85652 RBC SED RATE AUTOMATED: CPT | Performed by: EMERGENCY MEDICINE

## 2023-05-26 PROCEDURE — 70450 CT HEAD/BRAIN W/O DYE: CPT | Performed by: RADIOLOGY

## 2023-05-26 PROCEDURE — 25010000002 ONDANSETRON PER 1 MG: Performed by: EMERGENCY MEDICINE

## 2023-05-26 PROCEDURE — 72156 MRI NECK SPINE W/O & W/DYE: CPT

## 2023-05-26 PROCEDURE — 25010000002 KETOROLAC TROMETHAMINE PER 15 MG: Performed by: EMERGENCY MEDICINE

## 2023-05-26 PROCEDURE — 70450 CT HEAD/BRAIN W/O DYE: CPT

## 2023-05-26 PROCEDURE — 36415 COLL VENOUS BLD VENIPUNCTURE: CPT

## 2023-05-26 PROCEDURE — A9577 INJ MULTIHANCE: HCPCS | Performed by: EMERGENCY MEDICINE

## 2023-05-26 PROCEDURE — 86140 C-REACTIVE PROTEIN: CPT | Performed by: EMERGENCY MEDICINE

## 2023-05-26 PROCEDURE — 96374 THER/PROPH/DIAG INJ IV PUSH: CPT

## 2023-05-26 PROCEDURE — 85025 COMPLETE CBC W/AUTO DIFF WBC: CPT | Performed by: EMERGENCY MEDICINE

## 2023-05-26 PROCEDURE — 25010000002 ORPHENADRINE CITRATE PER 60 MG: Performed by: EMERGENCY MEDICINE

## 2023-05-26 RX ORDER — NALOXONE HYDROCHLORIDE 4 MG/.1ML
SPRAY NASAL
Qty: 2 EACH | Refills: 0 | Status: SHIPPED | OUTPATIENT
Start: 2023-05-26

## 2023-05-26 RX ORDER — KETOROLAC TROMETHAMINE 30 MG/ML
15 INJECTION, SOLUTION INTRAMUSCULAR; INTRAVENOUS ONCE
Status: COMPLETED | OUTPATIENT
Start: 2023-05-26 | End: 2023-05-26

## 2023-05-26 RX ORDER — SODIUM CHLORIDE 0.9 % (FLUSH) 0.9 %
10 SYRINGE (ML) INJECTION AS NEEDED
Status: DISCONTINUED | OUTPATIENT
Start: 2023-05-26 | End: 2023-05-26 | Stop reason: HOSPADM

## 2023-05-26 RX ORDER — ONDANSETRON 2 MG/ML
4 INJECTION INTRAMUSCULAR; INTRAVENOUS ONCE
Status: COMPLETED | OUTPATIENT
Start: 2023-05-26 | End: 2023-05-26

## 2023-05-26 RX ORDER — HYDROCODONE BITARTRATE AND ACETAMINOPHEN 7.5; 325 MG/1; MG/1
1 TABLET ORAL EVERY 6 HOURS PRN
Qty: 12 TABLET | Refills: 0 | Status: SHIPPED | OUTPATIENT
Start: 2023-05-26

## 2023-05-26 RX ORDER — MORPHINE SULFATE 2 MG/ML
2 INJECTION, SOLUTION INTRAMUSCULAR; INTRAVENOUS ONCE
Status: COMPLETED | OUTPATIENT
Start: 2023-05-26 | End: 2023-05-26

## 2023-05-26 RX ORDER — ORPHENADRINE CITRATE 30 MG/ML
60 INJECTION INTRAMUSCULAR; INTRAVENOUS ONCE
Status: COMPLETED | OUTPATIENT
Start: 2023-05-26 | End: 2023-05-26

## 2023-05-26 RX ADMIN — MORPHINE SULFATE 2 MG: 2 INJECTION, SOLUTION INTRAMUSCULAR; INTRAVENOUS at 11:50

## 2023-05-26 RX ADMIN — GADOBENATE DIMEGLUMINE 20 ML: 529 INJECTION, SOLUTION INTRAVENOUS at 12:55

## 2023-05-26 RX ADMIN — ORPHENADRINE CITRATE 60 MG: 60 INJECTION INTRAMUSCULAR; INTRAVENOUS at 08:56

## 2023-05-26 RX ADMIN — KETOROLAC TROMETHAMINE 15 MG: 30 INJECTION, SOLUTION INTRAMUSCULAR; INTRAVENOUS at 08:56

## 2023-05-26 RX ADMIN — ONDANSETRON 4 MG: 2 INJECTION INTRAMUSCULAR; INTRAVENOUS at 11:50

## 2023-05-26 RX ADMIN — SODIUM CHLORIDE 1000 ML: 9 INJECTION, SOLUTION INTRAVENOUS at 08:55

## 2023-05-26 NOTE — ED PROVIDER NOTES
Subjective   History of Present Illness  68-year-old white male complains of neck pain.  Patient states that yesterday his neck felt somewhat stiff.  Today his neck is much more painful and the pain radiates from his posterior neck to the top of his head.  He denied any trauma.  He also complains of dry mouth.  No fever, chills.  He had a tick bite yesterday.  Pain is worse with movement.  He has a history of CAD, CVA, diabetes.        Review of Systems   All other systems reviewed and are negative.      Past Medical History:   Diagnosis Date   • Arthritis    • Clot    • Coronary artery disease    • Diabetes mellitus    • Heart disease    • History of blood clots    • Hypertension    • Low back pain    • Thyroid disease        Allergies   Allergen Reactions   • Trazodone Other (See Comments)     agitation       Past Surgical History:   Procedure Laterality Date   • CHOLECYSTECTOMY  2015   • COLONOSCOPY  2015    dr srivastava   • CORONARY ARTERY BYPASS GRAFT      [place] [id number] BY PASS    • ENDOSCOPY         Family History   Problem Relation Age of Onset   • Diabetes Mother    • Heart disease Mother         STENTING   • COPD Mother    • Heart attack Father    • Diabetes Father        Social History     Socioeconomic History   • Marital status:    Tobacco Use   • Smoking status: Never   • Smokeless tobacco: Current     Types: Chew   Vaping Use   • Vaping Use: Never used   Substance and Sexual Activity   • Alcohol use: No   • Drug use: No   • Sexual activity: Defer           Objective   Physical Exam  Vitals and nursing note reviewed. Exam conducted with a chaperone present.   Constitutional:       Appearance: Normal appearance. He is normal weight.   HENT:      Head: Normocephalic and atraumatic.   Cardiovascular:      Rate and Rhythm: Normal rate and regular rhythm.      Heart sounds: Normal heart sounds. No murmur heard.    No friction rub. No gallop.   Pulmonary:      Effort: Pulmonary effort is normal. No  respiratory distress.      Breath sounds: Normal breath sounds. No wheezing, rhonchi or rales.   Abdominal:      General: Abdomen is flat. Bowel sounds are normal. There is no distension.      Palpations: Abdomen is soft.      Tenderness: There is no abdominal tenderness.   Musculoskeletal:      Cervical back: Tenderness present. No swelling, deformity or bony tenderness. Decreased range of motion.      Right lower leg: No edema.      Left lower leg: No edema.   Skin:     General: Skin is warm and dry.   Neurological:      General: No focal deficit present.      Mental Status: He is alert and oriented to person, place, and time.      Comments: Negative Kernig's and Brudzinski signs.   Psychiatric:         Mood and Affect: Mood normal.         Behavior: Behavior normal.         Procedures  Results for orders placed or performed during the hospital encounter of 05/26/23   Comprehensive Metabolic Panel    Specimen: Blood   Result Value Ref Range    Glucose 272 (H) 65 - 99 mg/dL    BUN 19 8 - 23 mg/dL    Creatinine 1.16 0.76 - 1.27 mg/dL    Sodium 138 136 - 145 mmol/L    Potassium 4.3 3.5 - 5.2 mmol/L    Chloride 103 98 - 107 mmol/L    CO2 27.1 22.0 - 29.0 mmol/L    Calcium 9.1 8.6 - 10.5 mg/dL    Total Protein 6.3 6.0 - 8.5 g/dL    Albumin 3.6 3.5 - 5.2 g/dL    ALT (SGPT) 17 1 - 41 U/L    AST (SGOT) 18 1 - 40 U/L    Alkaline Phosphatase 103 39 - 117 U/L    Total Bilirubin 0.5 0.0 - 1.2 mg/dL    Globulin 2.7 gm/dL    A/G Ratio 1.3 g/dL    BUN/Creatinine Ratio 16.4 7.0 - 25.0    Anion Gap 7.9 5.0 - 15.0 mmol/L    eGFR 69.5 >60.0 mL/min/1.73   C-reactive Protein    Specimen: Blood   Result Value Ref Range    C-Reactive Protein <0.30 0.00 - 0.50 mg/dL   Sedimentation Rate    Specimen: Blood   Result Value Ref Range    Sed Rate 10 0 - 20 mm/hr   CBC Auto Differential    Specimen: Blood   Result Value Ref Range    WBC 8.19 3.40 - 10.80 10*3/mm3    RBC 5.02 4.14 - 5.80 10*6/mm3    Hemoglobin 15.1 13.0 - 17.7 g/dL     Hematocrit 45.6 37.5 - 51.0 %    MCV 90.8 79.0 - 97.0 fL    MCH 30.1 26.6 - 33.0 pg    MCHC 33.1 31.5 - 35.7 g/dL    RDW 12.9 12.3 - 15.4 %    RDW-SD 42.9 37.0 - 54.0 fl    MPV 11.1 6.0 - 12.0 fL    Platelets 128 (L) 140 - 450 10*3/mm3    Neutrophil % 68.2 42.7 - 76.0 %    Lymphocyte % 20.4 19.6 - 45.3 %    Monocyte % 8.8 5.0 - 12.0 %    Eosinophil % 1.8 0.3 - 6.2 %    Basophil % 0.4 0.0 - 1.5 %    Immature Grans % 0.4 0.0 - 0.5 %    Neutrophils, Absolute 5.59 1.70 - 7.00 10*3/mm3    Lymphocytes, Absolute 1.67 0.70 - 3.10 10*3/mm3    Monocytes, Absolute 0.72 0.10 - 0.90 10*3/mm3    Eosinophils, Absolute 0.15 0.00 - 0.40 10*3/mm3    Basophils, Absolute 0.03 0.00 - 0.20 10*3/mm3    Immature Grans, Absolute 0.03 0.00 - 0.05 10*3/mm3    nRBC 0.0 0.0 - 0.2 /100 WBC     CT Head Without Contrast    Result Date: 5/26/2023  Narrative: EXAM:   CT Head Without Intravenous Contrast  EXAM DATE:   5/26/2023 8:10 AM  CLINICAL HISTORY:   HA  TECHNIQUE:   Axial computed tomography images of the head/brain without intravenous contrast.  Sagittal and coronal reformatted images were created and reviewed.  This CT exam was performed using one or more of the following dose reduction techniques:  automated exposure control, adjustment of the mA and/or kV according to patient size, and/or use of iterative reconstruction technique.  COMPARISON:   No relevant prior studies available.  FINDINGS:   BRAIN AND EXTRA-AXIAL SPACES:  Unremarkable.  No hemorrhage.  No significant white matter disease.  No edema.  No ventriculomegaly.   BONES/JOINTS:  Unremarkable.  No acute fracture.   SOFT TISSUES:  Unremarkable.   SINUSES:  Unremarkable as visualized.  No acute sinusitis.   MASTOID AIR CELLS:  Unremarkable as visualized.  No mastoid effusion.      Impression:   Unremarkable exam demonstrating no CT evidence of acute intracranial findings.  This report was finalized on 5/26/2023 8:42 AM by Dr. Chadd Mcconnell MD.      CT Cervical Spine Without  Contrast    Result Date: 5/26/2023  Narrative: EXAM:   CT Cervical Spine Without Intravenous Contrast  EXAM DATE:   5/26/2023 8:11 AM  CLINICAL HISTORY:   neck pain  TECHNIQUE:   Axial computed tomography images of the cervical spine without intravenous contrast.  Sagittal and coronal reformatted images were created and reviewed.  This CT exam was performed using one or more of the following dose reduction techniques:  automated exposure control, adjustment of the mA and/or kV according to patient size, and/or use of iterative reconstruction technique.  COMPARISON:   No relevant prior studies available.  FINDINGS:   VERTEBRAE:  Degenerative facet arthropathy throughout the cervical spine.  No acute fracture.   DISCS/SPINAL CANAL/NEURAL FORAMINA:  Degenerative disc disease throughout the cervical spine.  No spinal canal stenosis.   SOFT TISSUES:  Unremarkable.      Impression:   Degenerative changes cervical spine as described.  This report was finalized on 5/26/2023 8:41 AM by Dr. Chadd Mcconnell MD.      MRI Cervical Spine With & Without Contrast    Result Date: 5/26/2023  Narrative: EXAM:   MR Cervical Spine Without and With Intravenous Contrast  EXAM DATE:   5/26/2023 12:13 PM  CLINICAL HISTORY:   Cervical pain  TECHNIQUE:   Magnetic resonance images of the cervical spine without and with intravenous contrast in multiple planes.  COMPARISON:   03/19/2021  FINDINGS:   VERTEBRAE:  Again there is multilevel disc disease of the cervical spine causing variable level varying degrees of stenosis.  No acute fracture.   SPINAL CORD:  Unremarkable.  Normal signal.  No abnormal enhancement.   SOFT TISSUES:  Unremarkable.   DISCS/SPINAL CANAL/NEURAL FORAMINA:   C2-C3:  Unremarkable.  No significant disc disease.  No stenosis.   C3-C4:  C3-C4 disc desiccation with broad-based herniation causing moderate to severe central canal stenosis and bilateral neural foraminal stenosis.   C4-C5:  C4-C5 disc desiccation with broad-based  protrusion causing moderate central canal stenosis and right greater than left neural foraminal stenosis.   C5-C6:  C5-C6 disc desiccation with broad-based posterior protrusion causing moderate central canal stenosis and right greater than left neural foraminal stenosis.   C6-C7:  C6-C7 disc desiccation with posterior protrusion and osteophyte formation that is left paracentral causing moderate to severe central canal stenosis and bilateral left greater than right neural foraminal stenosis.   C7-T1:  Unremarkable.  No significant disc disease.  No stenosis.      Impression: 1.  C3-C4 disc desiccation with broad-based herniation causing moderate to severe central canal stenosis and bilateral neural foraminal stenosis. 2.  C4-C5 disc desiccation with broad-based protrusion causing moderate central canal stenosis and right greater than left neural foraminal stenosis. 3.  C5-C6 disc desiccation with broad-based posterior protrusion causing moderate central canal stenosis and right greater than left neural foraminal stenosis. 4.  C6-C7 disc desiccation with posterior protrusion and osteophyte formation that is left paracentral causing moderate to severe central canal stenosis and bilateral left greater than right neural foraminal stenosis. 5.  Again there is multilevel disc disease of the cervical spine causing variable level varying degrees of stenosis.  This report was finalized on 5/26/2023 1:02 PM by Dr. Chadd Mcconnell MD.      Intravitreal Injection, Pharmacologic Agent - OU - Both Eyes    Result Date: 5/2/2023  Narrative: Time Out 5/2/2023. 10:16 AM. Confirmed correct patient, procedure, site, and patient consented. Anesthesia Right Eye Subconjunctival anesthesia was used. Anesthetic medications included Lidocaine 2%. Left Eye Subconjunctival anesthesia was used. Anesthetic medications included Lidocaine 2%. Procedure Right Eye Preparation included 5% betadine to ocular surface. Injection: 1.25 mg bevacizumab 1.25  MG/0.05ML   Route: Intravitreal, Site: Right Eye   NDC: 79405-191-13, Lot: see consent, Waste: 3.95 mL Left Eye Preparation included 5% betadine to ocular surface. Injection: 1.25 mg bevacizumab 1.25 MG/0.05ML   Route: Intravitreal, Site: Left Eye   NDC: 43692-405-09, Lot: see consent, Waste: 3.95 mL Post-op Right Eye Post injection exam found visual acuity of at least counting fingers, no retinal detachment, perfused optic nerve. The patient tolerated the procedure well. There were no complications. The patient received written and verbal post procedure care education. Left Eye Post injection exam found visual acuity of at least counting fingers, no retinal detachment, perfused optic nerve. The patient tolerated the procedure well. There were no complications. The patient received written and verbal post procedure care education.     OCT, Retina - OU - Both Eyes    Result Date: 5/2/2023  Narrative: Right Eye Progression has worsened. Left Eye Quality was good. Scan locations included subfoveal. Progression has been stable. Findings include epiretinal membrane. Notes Right eye (OD): moderate thickening Left eye (OS): stable ERM             ED Course  ED Course as of 05/26/23 1416   Fri May 26, 2023   1351 Discussed results of exam with patient including MRI.  Have also discussed MRI findings with Dr. Mcconnell.  He does have significant multilevel disc disease and spinal stenosis.  This is not significantly changed since 2021 according to radiology.  This is likely etiology of his neck pain.  Does not have any evidence of meningitis by exam afebrile (no nuchal rigidity, negative Kernig's and Brudzinski signs), or labs, normal WBC, normal inflammatory markers).  Discussed with him regarding LP for confirmation.  The patient states he does not want to have an LP done at this time, and understands the risk of this including meningitis, neurologic disability, death.  He agrees to return for worsening or new symptoms a  fever, confusion, nausea, rash, etc.  His wife is present during this discussion. [BC]      ED Course User Index  [BC] Mikhail Turner MD                                           [facility]    Final diagnoses:   Neck pain   Cervical spinal stenosis   DDD (degenerative disc disease), cervical       ED Disposition  ED Disposition     ED Disposition   Discharge    Condition   Stable    Comment   --             Margret Purvis MD  [address] [facility] [address]  [place] [id number] [phone]    In 1 week           Medication List      New Prescriptions    naloxone 4 MG/0.1ML nasal spray  Commonly known as: NARCAN  Call 911. Don't prime. Carlton in 1 nostril for overdose. Repeat in 2-3 minutes in other nostril if no or minimal breathing/responsiveness.        Changed    HYDROcodone-acetaminophen 7.5-325 MG per tablet  Commonly known as: NORCO  Take 1 tablet by mouth Every 6 (Six) Hours As Needed for Moderate Pain.  What changed:   · when to take this  · reasons to take this           Where to Get Your Medications      These medications were sent to [address] [phone] [facility] [phone] [place] [id number]    Phone: [phone]   · HYDROcodone-acetaminophen 7.5-325 MG per tablet  · naloxone 4 MG/0.1ML nasal spray          Mikhail Turner MD  05/26/23 5668

## 2023-05-31 LAB
BACTERIA SPEC AEROBE CULT: NORMAL
BACTERIA SPEC AEROBE CULT: NORMAL

## 2023-07-24 ENCOUNTER — OFFICE VISIT (OUTPATIENT)
Dept: INTERNAL MEDICINE | Facility: CLINIC | Age: 68
End: 2023-07-24
Payer: MEDICARE

## 2023-07-24 VITALS
OXYGEN SATURATION: 97 % | HEART RATE: 49 BPM | WEIGHT: 236 LBS | SYSTOLIC BLOOD PRESSURE: 132 MMHG | RESPIRATION RATE: 18 BRPM | DIASTOLIC BLOOD PRESSURE: 81 MMHG | HEIGHT: 71 IN | TEMPERATURE: 96.9 F | BODY MASS INDEX: 33.04 KG/M2

## 2023-07-24 DIAGNOSIS — E11.65 TYPE 2 DIABETES MELLITUS WITH HYPERGLYCEMIA, WITH LONG-TERM CURRENT USE OF INSULIN: ICD-10-CM

## 2023-07-24 DIAGNOSIS — F51.04 INSOMNIA, PSYCHOPHYSIOLOGICAL: ICD-10-CM

## 2023-07-24 DIAGNOSIS — Z79.4 TYPE 2 DIABETES MELLITUS WITH HYPERGLYCEMIA, WITH LONG-TERM CURRENT USE OF INSULIN: ICD-10-CM

## 2023-07-24 DIAGNOSIS — E78.2 MIXED HYPERLIPIDEMIA: ICD-10-CM

## 2023-07-24 DIAGNOSIS — F41.9 ANXIETY: ICD-10-CM

## 2023-07-24 DIAGNOSIS — F33.0 MILD EPISODE OF RECURRENT MAJOR DEPRESSIVE DISORDER: ICD-10-CM

## 2023-07-24 DIAGNOSIS — M54.50 LUMBOSACRAL PAIN: ICD-10-CM

## 2023-07-24 DIAGNOSIS — E03.8 ADULT ONSET HYPOTHYROIDISM: ICD-10-CM

## 2023-07-24 DIAGNOSIS — Z51.81 ENCOUNTER FOR THERAPEUTIC DRUG LEVEL MONITORING: ICD-10-CM

## 2023-07-24 DIAGNOSIS — I10 BENIGN ESSENTIAL HYPERTENSION: Primary | ICD-10-CM

## 2023-07-24 RX ORDER — CITALOPRAM 40 MG/1
40 TABLET ORAL DAILY
Qty: 90 TABLET | Refills: 1 | Status: SHIPPED | OUTPATIENT
Start: 2023-07-24

## 2023-07-24 RX ORDER — ATORVASTATIN CALCIUM 80 MG/1
80 TABLET, FILM COATED ORAL DAILY
Qty: 90 TABLET | Refills: 1 | Status: SHIPPED | OUTPATIENT
Start: 2023-07-24

## 2023-07-24 RX ORDER — CLOPIDOGREL BISULFATE 75 MG/1
75 TABLET ORAL DAILY
Qty: 90 TABLET | Refills: 3 | Status: SHIPPED | OUTPATIENT
Start: 2023-07-24

## 2023-07-24 RX ORDER — LOSARTAN POTASSIUM 100 MG/1
100 TABLET ORAL DAILY
Qty: 90 TABLET | Refills: 2 | Status: SHIPPED | OUTPATIENT
Start: 2023-07-24

## 2023-07-24 RX ORDER — QUETIAPINE FUMARATE 25 MG/1
25 TABLET, FILM COATED ORAL NIGHTLY
Qty: 90 TABLET | Refills: 1 | Status: SHIPPED | OUTPATIENT
Start: 2023-07-24

## 2023-07-24 NOTE — PROGRESS NOTES
"Chief Complaint  Hypertension, Hyperlipidemia, and Diabetes    Subjective        Ramsey Riley presents to [facility]  HPI: Patient is here to follow up on the blood pressure  The patient is taking the blood pressure medications as prescribed and has had no side effects. The patient is also here to follow up on the cholesterol and is trying to follow a diet. The patient is also here to follow on sugar and thyroid and is  due to get lab work done . He sees endocrinology ,  The patient also complains of chronic back pain, patient also complains of anxiety and depression and insomnia and needs refills on medications .   Hypertension   Pertinent negatives include no chest pain, palpitations or shortness of breath.   Hyperlipidemia   Pertinent negatives include no chest pain or shortness of breath.   Diabetes   Pertinent negatives for hypoglycemia include no dizziness, nervousness/anxiousness or pallor. Pertinent negatives for diabetes include no chest pain, no shortness of breath  Patient is on acei/arb   Hypertension    Hyperlipidemia    Diabetes      Objective   Vital Signs:  /81   Pulse (!) 49   Temp 96.9 °F (36.1 °C)   Resp 18   Ht 180.3 cm (70.98\")   Wt 107 kg (236 lb)   SpO2 97%   BMI 32.93 kg/m²   Estimated body mass index is 32.93 kg/m² as calculated from the following:    Height as of this encounter: 180.3 cm (70.98\").    Weight as of this encounter: 107 kg (236 lb).               Physical Exam  Vitals and nursing note reviewed.   Constitutional:       General: He is not in acute distress.     Appearance: Normal appearance. He is obese. He is not diaphoretic.   HENT:      Head: Normocephalic and atraumatic.      Right Ear: External ear normal.      Left Ear: External ear normal.      Nose: Nose normal.   Eyes:      Extraocular Movements: Extraocular movements intact.      Conjunctiva/sclera: Conjunctivae normal.   Neck:      Trachea: Trachea normal. "   Cardiovascular:      Rate and Rhythm: Normal rate and regular rhythm.      Heart sounds: Normal heart sounds.   Pulmonary:      Effort: Pulmonary effort is normal. No respiratory distress.   Abdominal:      General: Abdomen is flat.   Musculoskeletal:         General: Deformity present.      Cervical back: Neck supple.      Comments: Moves all limbs   Skin:     General: Skin is warm and dry.      Findings: No erythema.   Neurological:      Mental Status: He is alert and oriented to person, place, and time.      Comments: No gross motor or sensory deficits      Result Review :  The following data was reviewed by: Margret Purvis MD on 07/24/2023:  Common labs          5/26/2023    08:30 7/24/2023    12:14   Common Labs   Glucose 272  338    BUN 19  20    Creatinine 1.16  1.51    Sodium 138  138    Potassium 4.3  4.4    Chloride 103  100    Calcium 9.1  9.0    Total Protein  5.8    Albumin 3.6  4.1    Total Bilirubin 0.5  0.3    Alkaline Phosphatase 103  103    AST (SGOT) 18  23    ALT (SGPT) 17  32    WBC 8.19  6.08    Hemoglobin 15.1  15.4    Hematocrit 45.6  45.5    Platelets 128  141    Total Cholesterol  247    Triglycerides  476    HDL Cholesterol  38    LDL Cholesterol   124                   Assessment and Plan   Diagnoses and all orders for this visit:    1. Benign essential hypertension (Primary)  -     losartan (COZAAR) 100 MG tablet; Take 1 tablet by mouth Daily.  Dispense: 90 tablet; Refill: 2    2. Mixed hyperlipidemia  -     atorvastatin (Lipitor) 80 MG tablet; Take 1 tablet by mouth Daily.  Dispense: 90 tablet; Refill: 1  -     CBC & Differential  -     Comprehensive Metabolic Panel  -     Lipid Panel    3. Type 2 diabetes mellitus with hyperglycemia, with long-term current use of insulin    4. Adult onset hypothyroidism    5. Insomnia, psychophysiological  -     QUEtiapine (SEROquel) 25 MG tablet; Take 1 tablet by mouth Every Night.  Dispense: 90 tablet; Refill: 1    6. Lumbosacral pain  -      Compliance Drug Analysis, Ur - Urine, Clean Catch  -     HYDROcodone-acetaminophen (NORCO) 7.5-325 MG per tablet; Take 1 tablet by mouth Every 12 (Twelve) Hours As Needed for Moderate Pain.  Dispense: 60 tablet; Refill: 0    7. Anxiety  -     Compliance Drug Analysis, Ur - Urine, Clean Catch  -     citalopram (CeleXA) 40 MG tablet; Take 1 tablet by mouth Daily.  Dispense: 90 tablet; Refill: 1  -     ALPRAZolam (XANAX) 0.5 MG tablet; Take 1 tablet by mouth At Night As Needed for Anxiety.  Dispense: 30 tablet; Refill: 5    8. Mild episode of recurrent major depressive disorder  -     citalopram (CeleXA) 40 MG tablet; Take 1 tablet by mouth Daily.  Dispense: 90 tablet; Refill: 1    9. Encounter for therapeutic drug level monitoring  -     Compliance Drug Analysis, Ur - Urine, Clean Catch    Other orders  -     clopidogrel (PLAVIX) 75 MG tablet; Take 1 tablet by mouth Daily.  Dispense: 90 tablet; Refill: 3    Plan:  1.  Benign essential hypertension: Will continue current medication, low-sodium diet advised, Counseled to regularly check BP at home with goal averaging <130/80.   2.mixed hyperlipidemia: will obtain   fasting CMP and lipid panel.  Diet and exercise counseled,  Will continue current medications  3. Diabetes  Mellitus  : will obtain   fasting CMP  and hba1c  , diet and exercise counseled , Will continue current medications -per endocrinology  4. hypothyroidism: will obtain tsh , and continue levothyroxine, follow-up with endocrinology  5.  Anxiety disorder: Refill Celexa, as needed alprazolam, ARIESS and Enoc reviewed and obtained  6.  Major depression: Refill Celexa, will obtain lab  7.  Insomnia: Refill Seroquel  8.  Lumbosacral pain: Refill Lortab, UDS and Enoc reviewed and obtained  The patient has read and signed the [facility] Controlled Substance Contract.The patient is aware of the potential for addiction and dependence. This is a short-term use   A Enoc was reviewed  in the chart today.  Narcotic contract was signed by patient  . Will obtain Urine drug screen  Lavell Report   As part of this patient's treatment plan I am prescribing controlled substances. The patient has been made aware of appropriate use of such medications, including potential risk of somnolence, limited ability to drive and /or work safely, and potential for dependence or overdose. It has also been made clear that these medications are for use by this patient only, without concomitant use of alcohol or other substances unless prescribed.   Patient has completed prescribing agreement detailing terms of continued prescribing of controlled substances, including monitoring LAVELL reports, urine drug screening, and pill counts if necessary. The patient is aware that inappropriate use will result in cessation of prescribing such medications.   LAVELL report has been reviewed   History and physical exam exhibit continued safe and appropriate use of controlled substances. Patient is advised to start a comprehensive exercise program I   The patient has been instructed to contact my office with any questions or difficulties. The patient understands the plan , patient has verbalized an understanding and agrees to proceed accordingly         Follow Up   Return in about 15 weeks (around 11/6/2023).  Patient was given instructions and counseling regarding his condition or for health maintenance advice. Please see specific information pulled into the AVS if appropriate.

## 2023-07-25 LAB
ALBUMIN SERPL-MCNC: 4.1 G/DL (ref 3.5–5.2)
ALBUMIN/GLOB SERPL: 2.4 G/DL
ALP SERPL-CCNC: 103 U/L (ref 39–117)
ALT SERPL-CCNC: 32 U/L (ref 1–41)
AST SERPL-CCNC: 23 U/L (ref 1–40)
BASOPHILS # BLD AUTO: 0.03 10*3/MM3 (ref 0–0.2)
BASOPHILS NFR BLD AUTO: 0.5 % (ref 0–1.5)
BILIRUB SERPL-MCNC: 0.3 MG/DL (ref 0–1.2)
BUN SERPL-MCNC: 20 MG/DL (ref 8–23)
BUN/CREAT SERPL: 13.2 (ref 7–25)
CALCIUM SERPL-MCNC: 9 MG/DL (ref 8.6–10.5)
CHLORIDE SERPL-SCNC: 100 MMOL/L (ref 98–107)
CHOLEST SERPL-MCNC: 247 MG/DL (ref 0–200)
CO2 SERPL-SCNC: 26.4 MMOL/L (ref 22–29)
CREAT SERPL-MCNC: 1.51 MG/DL (ref 0.76–1.27)
EGFRCR SERPLBLD CKD-EPI 2021: 50.6 ML/MIN/1.73
EOSINOPHIL # BLD AUTO: 0.14 10*3/MM3 (ref 0–0.4)
EOSINOPHIL NFR BLD AUTO: 2.3 % (ref 0.3–6.2)
ERYTHROCYTE [DISTWIDTH] IN BLOOD BY AUTOMATED COUNT: 12.5 % (ref 12.3–15.4)
GLOBULIN SER CALC-MCNC: 1.7 GM/DL
GLUCOSE SERPL-MCNC: 338 MG/DL (ref 65–99)
HCT VFR BLD AUTO: 45.5 % (ref 37.5–51)
HDLC SERPL-MCNC: 38 MG/DL (ref 40–60)
HGB BLD-MCNC: 15.4 G/DL (ref 13–17.7)
IMM GRANULOCYTES # BLD AUTO: 0.03 10*3/MM3 (ref 0–0.05)
IMM GRANULOCYTES NFR BLD AUTO: 0.5 % (ref 0–0.5)
LDLC SERPL CALC-MCNC: 124 MG/DL (ref 0–100)
LYMPHOCYTES # BLD AUTO: 2.79 10*3/MM3 (ref 0.7–3.1)
LYMPHOCYTES NFR BLD AUTO: 45.9 % (ref 19.6–45.3)
MCH RBC QN AUTO: 30.6 PG (ref 26.6–33)
MCHC RBC AUTO-ENTMCNC: 33.8 G/DL (ref 31.5–35.7)
MCV RBC AUTO: 90.3 FL (ref 79–97)
MONOCYTES # BLD AUTO: 0.48 10*3/MM3 (ref 0.1–0.9)
MONOCYTES NFR BLD AUTO: 7.9 % (ref 5–12)
NEUTROPHILS # BLD AUTO: 2.61 10*3/MM3 (ref 1.7–7)
NEUTROPHILS NFR BLD AUTO: 42.9 % (ref 42.7–76)
PLATELET # BLD AUTO: 141 10*3/MM3 (ref 140–450)
POTASSIUM SERPL-SCNC: 4.4 MMOL/L (ref 3.5–5.2)
PROT SERPL-MCNC: 5.8 G/DL (ref 6–8.5)
RBC # BLD AUTO: 5.04 10*6/MM3 (ref 4.14–5.8)
SODIUM SERPL-SCNC: 138 MMOL/L (ref 136–145)
TRIGL SERPL-MCNC: 476 MG/DL (ref 0–150)
VLDLC SERPL CALC-MCNC: 85 MG/DL (ref 5–40)
WBC # BLD AUTO: 6.08 10*3/MM3 (ref 3.4–10.8)

## 2023-07-30 RX ORDER — HYDROCODONE BITARTRATE AND ACETAMINOPHEN 7.5; 325 MG/1; MG/1
1 TABLET ORAL EVERY 12 HOURS PRN
Qty: 60 TABLET | Refills: 0 | Status: SHIPPED | OUTPATIENT
Start: 2023-07-30

## 2023-07-30 RX ORDER — ALPRAZOLAM 0.5 MG/1
0.5 TABLET ORAL NIGHTLY PRN
Qty: 30 TABLET | Refills: 5 | Status: SHIPPED | OUTPATIENT
Start: 2023-07-30

## 2023-07-31 LAB — DRUGS UR: NORMAL

## 2023-08-10 DIAGNOSIS — E03.8 ADULT ONSET HYPOTHYROIDISM: ICD-10-CM

## 2023-08-10 RX ORDER — PANTOPRAZOLE SODIUM 40 MG/1
40 TABLET, DELAYED RELEASE ORAL DAILY
Qty: 90 TABLET | Refills: 1 | Status: SHIPPED | OUTPATIENT
Start: 2023-08-10

## 2023-08-10 RX ORDER — LEVOTHYROXINE SODIUM 0.12 MG/1
125 TABLET ORAL DAILY
Qty: 90 TABLET | Refills: 2 | OUTPATIENT
Start: 2023-08-10

## 2023-08-10 NOTE — TELEPHONE ENCOUNTER
Rx Refill Note  Requested Prescriptions     Pending Prescriptions Disp Refills    levothyroxine (SYNTHROID, LEVOTHROID) 125 MCG tablet [Pharmacy Med Name: LEVOTHY SODIUM 125  Tablet] 90 tablet 2     Sig: TAKE 1 TABLET BY MOUTH DAILY    pantoprazole (PROTONIX) 40 MG EC tablet [Pharmacy Med Name: PANTOPRAZOLE 40 MG TAB 40 Tablet] 90 tablet 3     Sig: TAKE 1 TABLET BY MOUTH DAILY.      Last office visit with prescribing clinician: 7/24/2023   Last telemedicine visit with prescribing clinician: Visit date not found   Next office visit with prescribing clinician: 11/6/2023   Last lab 07/2023  Last UDS 07/2023

## 2023-08-12 DIAGNOSIS — E03.8 ADULT ONSET HYPOTHYROIDISM: ICD-10-CM

## 2023-08-14 RX ORDER — LEVOTHYROXINE SODIUM 0.12 MG/1
125 TABLET ORAL DAILY
Qty: 90 TABLET | Refills: 2 | OUTPATIENT
Start: 2023-08-14

## 2023-08-17 ENCOUNTER — TELEPHONE (OUTPATIENT)
Dept: INTERNAL MEDICINE | Facility: CLINIC | Age: 68
End: 2023-08-17
Payer: MEDICARE

## 2023-08-17 NOTE — TELEPHONE ENCOUNTER
Left VM per verbal release advising Jina to reach out to [facility] for further refills of Levothyroxine.   
Levothyroxine not sent with the Pantoprazole. Okay to send?   
Patient wife called and states they have requested levothyroxine twice now and gets a message back saying refill not appropriate. She would like a call back from a nurse to explain.   
Wife is aware that patient sees endo and that is the prescriber  , they need to contact endo  
No

## 2023-08-21 ENCOUNTER — TELEPHONE (OUTPATIENT)
Dept: INTERNAL MEDICINE | Facility: CLINIC | Age: 68
End: 2023-08-21
Payer: MEDICARE

## 2023-08-21 DIAGNOSIS — E03.8 ADULT ONSET HYPOTHYROIDISM: ICD-10-CM

## 2023-08-21 RX ORDER — LEVOTHYROXINE SODIUM 0.12 MG/1
125 TABLET ORAL DAILY
Qty: 30 TABLET | Refills: 1 | Status: SHIPPED | OUTPATIENT
Start: 2023-08-21

## 2023-08-21 NOTE — TELEPHONE ENCOUNTER
Patients wife states that ENDO does not prescribe his thyroid medication, they only see them for his diabetes.  Dr. Purvis has always prescribed it in the past.  Please advise.  Phone number verified.

## 2023-09-05 DIAGNOSIS — M54.50 LUMBOSACRAL PAIN: ICD-10-CM

## 2023-09-05 RX ORDER — AMLODIPINE BESYLATE 10 MG/1
10 TABLET ORAL DAILY
Qty: 90 TABLET | Refills: 1 | Status: SHIPPED | OUTPATIENT
Start: 2023-09-05

## 2023-09-05 RX ORDER — HYDROCODONE BITARTRATE AND ACETAMINOPHEN 7.5; 325 MG/1; MG/1
1 TABLET ORAL EVERY 12 HOURS PRN
Qty: 60 TABLET | Refills: 0 | Status: SHIPPED | OUTPATIENT
Start: 2023-09-05

## 2023-09-05 NOTE — TELEPHONE ENCOUNTER
Caller: Jina Riley    Relationship: Emergency Contact    Best call back number: [phone]     Requested Prescriptions:   Requested Prescriptions     Pending Prescriptions Disp Refills    HYDROcodone-acetaminophen (NORCO) 7.5-325 MG per tablet 60 tablet 0     Sig: Take 1 tablet by mouth Every 12 (Twelve) Hours As Needed for Moderate Pain.    amLODIPine (NORVASC) 10 MG tablet 30 tablet 11     Sig: Take 1 tablet by mouth Daily.        Pharmacy where request should be sent: [address] [phone] [place] [phone]      Last office visit with prescribing clinician: 7/24/2023   Last telemedicine visit with prescribing clinician: Visit date not found   Next office visit with prescribing clinician: 11/6/2023     Additional details provided by patient: HAS NONE LEFT.    Does the patient have less than a 3 day supply:  [x] Yes  [] No    Would you like a call back once the refill request has been completed: [x] Yes [] No    If the office needs to give you a call back, can they leave a voicemail: [] Yes [] No    Neal Jhaveri Rep   09/05/23 12:30 EDT

## 2023-09-05 NOTE — TELEPHONE ENCOUNTER
Rx Refill Note  Requested Prescriptions     Pending Prescriptions Disp Refills    HYDROcodone-acetaminophen (NORCO) 7.5-325 MG per tablet 60 tablet 0     Sig: Take 1 tablet by mouth Every 12 (Twelve) Hours As Needed for Moderate Pain.    amLODIPine (NORVASC) 10 MG tablet 30 tablet 11     Sig: Take 1 tablet by mouth Daily.      Last office visit with prescribing clinician: 7/24/2023   Last telemedicine visit with prescribing clinician: Visit date not found   Next office visit with prescribing clinician: 11/6/2023   Last lab 07/24/2023  Last UDS 07/2023

## 2023-09-14 NOTE — PROGRESS NOTES
[facility] Cardiology Office Follow Up Note    Ramsey Riley  [id number]  2023    Primary Care Provider: Margret Purvis MD   Referring Provider: No ref. provider found    Chief Complaint: Routine follow-up     History of Present Illness:   Mr. Ramsey Riley is a 68 y.o. male who presents to the Cardiology Clinic for routine follow-up.  The patient has a past medical history of coronary artery disease with previous three-vessel CABG, hypertension, left ventricular hypertrophy, peripheral artery disease, diabetes mellitus, and current snuff tobacco use.  He presents today for regular follow-up.  The patient reports feeling well from a cardiac standpoint.  He specifically denies chest pain but does report dyspnea on occasion with walking long distances.  He denies palpitations, dizziness, syncope.  He does struggle with balance post a stroke and has fallen a few times.  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  He denies orthopnea, PND, but reports intermittent lower extremity edema due to prolonged sitting.  He continues to take aspirin and Plavix without significant bruising or bleeding.  This is currently on hold per oral surgeon for the extraction of several teeth.  He offers no other complaints or concerns at this time.    Past Cardiac Testin. Last Coronary Angio: None  2. Prior Stress Testin2021   Normal Lexiscan   3. Last Echo: 2022  Saline test results are negative.  Left ventricular wall thickness is consistent with mild concentric hypertrophy.  Estimated left ventricular EF = 66% Left ventricular ejection fraction appears to be 66 - 70%. Left ventricular systolic function is normal.  Left ventricular diastolic function is consistent with (grade II w/high LAP) pseudonormalization.  4. Prior Holter Monitor: 2022   A relatively benign monitor study.  Symptoms show inconsistent (approximately 50% concordance) correlation to presence of  isolated monomorphic PVCs  1 brief 17 beat of nonsustained ventricular tachycardia.  Asymptomatic.  Overall PVC burden 1%.  1 asymptomatic episode of sinus bradycardia with 3-second pause during sleeping hours.  No sustained supraventricular ventricular tachycardia.    Review of Systems:   Review of Systems  As Noted in HPI.   I have reviewed and confirmed the accuracy of the ROS as documented by the MA/LPN/RN BOUCHRA Colon    I have reviewed and/or updated the patient's past medical, past surgical, family, social history, problem list and allergies as appropriate.     Medications:     Current Outpatient Medications:     ALPRAZolam (XANAX) 0.5 MG tablet, Take 1 tablet by mouth At Night As Needed for Anxiety., Disp: 30 tablet, Rfl: 5    amLODIPine (NORVASC) 10 MG tablet, Take 1 tablet by mouth Daily., Disp: 90 tablet, Rfl: 3    aspirin 81 MG chewable tablet, Chew 1 tablet Daily., Disp: , Rfl:     atorvastatin (Lipitor) 80 MG tablet, Take 1 tablet by mouth Daily., Disp: 90 tablet, Rfl: 3    brimonidine (ALPHAGAN) 0.2 % ophthalmic solution, USE 1 DROP IN RIGHT EYE 2 TIMES A DAY, Disp: , Rfl:     chlorthalidone (HYGROTON) 25 MG tablet, Take 0.5 tablets by mouth Daily., Disp: 45 tablet, Rfl: 3    citalopram (CeleXA) 40 MG tablet, Take 1 tablet by mouth Daily., Disp: 90 tablet, Rfl: 1    clopidogrel (PLAVIX) 75 MG tablet, Take 1 tablet by mouth Daily., Disp: 90 tablet, Rfl: 3    clotrimazole-betamethasone (Lotrisone) 1-0.05 % cream, Apply to affected area 2 times daily, Disp: 45 g, Rfl: 1    cyclopentolate (CYCLOGYL) 1 % ophthalmic solution, Apply 1 drop to eye(s) as directed by provider 3 (Three) Times a Day., Disp: , Rfl:     dorzolamide-timolol (COSOPT) 22.3-6.8 MG/ML ophthalmic solution, Administer 1 drop to both eyes 2 (Two) Times a Day. Per wife, Disp: , Rfl:     glucose blood (Accu-Chek Caryn Plus) test strip, USE AS DIRECTED 3 TIMES DAILY, Disp: 100 each, Rfl: 12    glucose monitor monitoring kit, 1  "each Daily., Disp: 1 each, Rfl: 1    glucose monitor monitoring kit, 1 each Daily., Disp: 1 each, Rfl: 1    HYDROcodone-acetaminophen (NORCO) 7.5-325 MG per tablet, Take 1 tablet by mouth Every 12 (Twelve) Hours As Needed for Moderate Pain., Disp: 60 tablet, Rfl: 0    insulin NPH-insulin regular (HumuLIN 70/30) (70-30) 100 UNIT/ML injection, INJECT 70 UNITS SUBCUTANEOUSLY TWICE DAILY WITH MEALS AS DIRECTED, Disp: 30 mL, Rfl: 5    Insulin Syringes, Disposable, U-100 1 ML misc, 50 Units 3 (Three) Times a Day As Needed (50 units in the morning, and then with meals PRN)., Disp: 200 each, Rfl: 12    Lancets 30G misc, Check sugar 3 times daily, Disp: 30 each, Rfl: 12    levothyroxine (SYNTHROID, LEVOTHROID) 125 MCG tablet, Take 1 tablet by mouth Daily., Disp: 30 tablet, Rfl: 1    losartan (COZAAR) 100 MG tablet, Take 1 tablet by mouth Daily., Disp: 90 tablet, Rfl: 3    pantoprazole (PROTONIX) 40 MG EC tablet, TAKE 1 TABLET BY MOUTH DAILY., Disp: 90 tablet, Rfl: 1    prednisoLONE acetate (PRED FORTE) 1 % ophthalmic suspension, Apply 1 drop to eye(s) as directed by provider 4 (Four) Times a Day., Disp: 3 mL, Rfl: 0    prednisoLONE sodium phosphate (INFLAMASE FORTE) 1 % ophthalmic solution, USE 1 DROP IN LEFT EYE 4 TIMES A DAY, Disp: , Rfl:     QUEtiapine (SEROquel) 25 MG tablet, Take 1 tablet by mouth Every Night., Disp: 90 tablet, Rfl: 1    donepezil (Aricept) 5 MG tablet, Take 1 tablet by mouth Every Night., Disp: 30 tablet, Rfl: 0    Physical Exam:  Vital Signs:   Vitals:    09/27/23 1251   BP: 122/70   BP Location: Right arm   Patient Position: Sitting   Cuff Size: Adult   Pulse: (!) 42   SpO2: 95%   Weight: 111 kg (244 lb)   Height: 177.8 cm (70\")     Body mass index is 35.01 kg/m².    Physical Exam  Vitals and nursing note reviewed.   Constitutional:       General: He is not in acute distress.     Appearance: He is obese.   HENT:      Head: Normocephalic and atraumatic.   Neck:      Trachea: Trachea normal. "   Cardiovascular:      Rate and Rhythm: Normal rate and regular rhythm. Bradycardia present.      Pulses: Normal pulses.      Heart sounds: Normal heart sounds. No murmur heard.    No friction rub. No gallop.   Pulmonary:      Effort: Pulmonary effort is normal.      Breath sounds: Normal breath sounds.   Musculoskeletal:      Cervical back: Neck supple.      Right lower leg: No edema.      Left lower leg: No edema.   Skin:     General: Skin is warm and dry.   Neurological:      Mental Status: He is alert and oriented to person, place, and time.   Psychiatric:         Mood and Affect: Mood normal.         Behavior: Behavior normal. Behavior is cooperative.         Thought Content: Thought content does not include suicidal ideation.       Results Review:   I reviewed the patient's new clinical results.    Procedures    Assessment / Plan:     1. Coronary artery disease involving native coronary artery of native heart without angina pectoris (Primary)  Stable and angina free     2. Essential hypertension  Acceptable blood pressure control     3. LVH (left ventricular hypertrophy) due to hypertensive disease, without heart failure  6/22, Estimated left ventricular EF = 66% and grade II diastolic dysfunction    4. Hyperlipidemia  7/23, triglycerides 476, HDL 38,   LDL goal less than 70 mg/dL  Ongoing management by PCP    5. PAD (peripheral artery disease) ([facility])  No evidence of critical limb ischemia    6. History of ischemic stroke  Patient understands he will resume aspirin and Plavix after oral surgery  He and his wife would like referral to neurology to resume care    Preventative Cardiology:   Tobacco Cessation: N/A   Advance Care Planning: ACP discussion was declined by the patient. Patient does not have an advance directive, declines further assistance.     Follow Up:   Return in about 1 year (around 9/27/2024) for Follow-up with Dr. Whittaker.      Thank you for allowing me to participate in the care of your  patient. Please do not hesitate to contact me with additional questions or concerns.     BOUCHRA Sherwood

## 2023-09-27 ENCOUNTER — OFFICE VISIT (OUTPATIENT)
Dept: CARDIOLOGY | Facility: CLINIC | Age: 68
End: 2023-09-27
Payer: MEDICARE

## 2023-09-27 VITALS
HEIGHT: 70 IN | WEIGHT: 244 LBS | BODY MASS INDEX: 34.93 KG/M2 | DIASTOLIC BLOOD PRESSURE: 70 MMHG | OXYGEN SATURATION: 95 % | SYSTOLIC BLOOD PRESSURE: 122 MMHG | HEART RATE: 42 BPM

## 2023-09-27 DIAGNOSIS — I10 BENIGN ESSENTIAL HYPERTENSION: ICD-10-CM

## 2023-09-27 DIAGNOSIS — E78.2 MIXED HYPERLIPIDEMIA: ICD-10-CM

## 2023-09-27 DIAGNOSIS — I63.9 ISCHEMIC STROKE: Primary | ICD-10-CM

## 2023-09-27 RX ORDER — CLOPIDOGREL BISULFATE 75 MG/1
75 TABLET ORAL DAILY
Qty: 90 TABLET | Refills: 3 | Status: SHIPPED | OUTPATIENT
Start: 2023-09-27

## 2023-09-27 RX ORDER — PREDNISOLONE ACETATE 10 MG/ML
1 SUSPENSION/ DROPS OPHTHALMIC 4 TIMES DAILY
Qty: 3 ML | Refills: 0 | COMMUNITY
Start: 2023-09-18 | End: 2023-10-02

## 2023-09-27 RX ORDER — CYCLOPENTOLATE HYDROCHLORIDE 10 MG/ML
1 SOLUTION/ DROPS OPHTHALMIC 3 TIMES DAILY
COMMUNITY
Start: 2023-09-18

## 2023-09-27 RX ORDER — LOSARTAN POTASSIUM 100 MG/1
100 TABLET ORAL DAILY
Qty: 90 TABLET | Refills: 3 | Status: SHIPPED | OUTPATIENT
Start: 2023-09-27

## 2023-09-27 RX ORDER — CHLORTHALIDONE 25 MG/1
12.5 TABLET ORAL DAILY
Qty: 45 TABLET | Refills: 3 | Status: SHIPPED | OUTPATIENT
Start: 2023-09-27

## 2023-09-27 RX ORDER — ATORVASTATIN CALCIUM 80 MG/1
80 TABLET, FILM COATED ORAL DAILY
Qty: 90 TABLET | Refills: 3 | Status: SHIPPED | OUTPATIENT
Start: 2023-09-27

## 2023-09-27 RX ORDER — AMLODIPINE BESYLATE 10 MG/1
10 TABLET ORAL DAILY
Qty: 90 TABLET | Refills: 3 | Status: SHIPPED | OUTPATIENT
Start: 2023-09-27

## 2023-10-05 DIAGNOSIS — M54.50 LUMBOSACRAL PAIN: ICD-10-CM

## 2023-10-05 NOTE — TELEPHONE ENCOUNTER
Caller: Jina Riley    Relationship: Emergency Contact    Best call back number: [phone]     Requested Prescriptions:   Requested Prescriptions     Pending Prescriptions Disp Refills    HYDROcodone-acetaminophen (NORCO) 7.5-325 MG per tablet 60 tablet 0     Sig: Take 1 tablet by mouth Every 12 (Twelve) Hours As Needed for Moderate Pain.        Pharmacy where request should be sent: [facility] [address] [place] [address] [phone] [facility] [phone]      Last office visit with prescribing clinician: 7/24/2023   Last telemedicine visit with prescribing clinician: Visit date not found   Next office visit with prescribing clinician: 11/6/2023     Additional details provided by patient:  PATIENT IS COMPLETELY OUT OF MEDICATION     Does the patient have less than a 3 day supply:  [x] Yes  [] No    Would you like a call back once the refill request has been completed: [] Yes [x] No    If the office needs to give you a call back, can they leave a voicemail: [] Yes [x] No    Neal Solis Rep   10/05/23 13:05 EDT

## 2023-10-06 RX ORDER — HYDROCODONE BITARTRATE AND ACETAMINOPHEN 7.5; 325 MG/1; MG/1
1 TABLET ORAL EVERY 12 HOURS PRN
Qty: 60 TABLET | Refills: 0 | Status: SHIPPED | OUTPATIENT
Start: 2023-10-06

## 2023-11-01 NOTE — TELEPHONE ENCOUNTER
Caller: Jina Riley    Relationship: Emergency Contact    Best call back number: [phone]    What is the best time to reach you: ANYTIME OK TO LEAVE VM  Who are you requesting to speak with (clinical staff, provider,  specific staff member): CLINICAL        What was the call regarding:  PATIENT IS NEEDING DENTAL WORK DONE AND DENTIST WANTS A CARDIAC CLEARANCE AND WANTS TO KNOW HOW LONG HE CAN BE OFF BLOOD THINNER MEDS.  PLEASE REACH OUT TO PATIENT.  Do you require a callback: YES           Rooming staff notes reviewed and agree    HPI:  Johnson Jones presents for his routine physical exam. He also wishes refills on his psych meds , as unable to see psych for 6 mon     Current Outpatient Medications   Medication Sig Dispense Refill   • ARIPiprazole (Abilify) 10 MG tablet Take 1 tablet by mouth daily. 90 tablet 1   • rosuvastatin (CRESTOR) 5 MG tablet Take 1 tablet by mouth daily. 90 tablet 0   • sertraline (ZOLOFT) 100 MG tablet Take 1 tablet by mouth daily. 90 tablet 0   • traZODone (DESYREL) 50 MG tablet Take 1 tablet by mouth nightly. 90 tablet 0   • Sodium Sulfate-Mag Sulfate-KCl 7891-934-484 MG Tab Take 12 tablets by mouth as directed. See Colonoscopy Instructions. 24 tablet 0     No current facility-administered medications for this visit.       Past Medical History:   Diagnosis Date   • Depression, major 2019       Past Surgical History:   Procedure Laterality Date   • Excise varicocele         Social History     Socioeconomic History   • Marital status: /Civil Union     Spouse name: Not on file   • Number of children: Not on file   • Years of education: Not on file   • Highest education level: Not on file   Occupational History   • Not on file   Tobacco Use   • Smoking status: Former     Current packs/day: 0.00     Types: Cigarettes     Quit date:      Years since quittin.8   • Smokeless tobacco: Never   Substance and Sexual Activity   • Alcohol use: Yes     Comment: occ   • Drug use: Not on file   • Sexual activity: Not on file   Other Topics Concern   • Not on file   Social History Narrative   • Not on file     Social Determinants of Health     Financial Resource Strain: Not on file   Food Insecurity: Not on file   Transportation Needs: Not on file   Physical Activity: Not on file   Stress: Not on file   Social Connections: Not on file   Intimate Partner Violence: Not on file           Family History   Problem Relation Age of Onset   • Hypertension Mother           Review of Systems:  ALL OTHER NEG       Immunization History   Administered Date(s) Administered   • COVID Pfizer 12Y+ (Requires Dilution) 04/12/2021, 05/03/2021   • Influenza, Unspecified Formulation 10/03/2014, 10/20/2015, 10/27/2016, 11/16/2017   • Influenza, quadrivalent, preserve-free 09/25/2021, 09/12/2022   • Tdap 10/03/2014         PHYSICAL EXAM:    VITAL SIGNS:   Visit Vitals  /77   Pulse 78   Temp 98.1 °F (36.7 °C)   Resp 18   Ht 5' 9\" (1.753 m)   Wt 96.2 kg (212 lb)   BMI 31.31 kg/m²     GENERAL APPEARANCE:Healthy, alert, in no distress.  Affect is varied and appropriate   SKIN:Skin color, texture, turgor are normal. There are no bruises, rashes or lesions.  HEAD:normocephalic, atraumatic  EARS:Negative  EYES:EOMI  THROAT:Negative  NECK:symmetric and supple  BACK:straight and symmetric  LUNGS:clear to auscultation  HEART: regular rate and rhythm  ABDOMEN: Soft, non-tender, bowel sounds normal,   EXTREMITIES:no  edema     NEUROLOGIC:Negative    ASSESSMENT: Healthy 48 year old male.                                1. Well adult exam    2. ASTON (generalized anxiety disorder)    3. Other hyperlipidemia          Diagnoses and all orders for this visit:  Well adult exam  -     rosuvastatin (CRESTOR) 5 MG tablet; Take 1 tablet by mouth daily.  ASTON (generalized anxiety disorder)  -     ARIPiprazole (Abilify) 10 MG tablet; Take 1 tablet by mouth daily.  Other hyperlipidemia  -     rosuvastatin (CRESTOR) 5 MG tablet; Take 1 tablet by mouth daily.          Discussed his current diet and exercise requirements and encouraged healthy lifestyle to reduce potential for serious health complications.    RTC in  3 mon / prn as needed   Please call back if not better or getting worse.       Call or return to clinic as needed if these symptoms worsen, fail to improve as anticipated, or if new symptoms develop.    PLAN: Follow up in 1 year for cpx.

## 2023-11-06 ENCOUNTER — OFFICE VISIT (OUTPATIENT)
Dept: INTERNAL MEDICINE | Facility: CLINIC | Age: 68
End: 2023-11-06
Payer: MEDICARE

## 2023-11-06 VITALS
SYSTOLIC BLOOD PRESSURE: 145 MMHG | OXYGEN SATURATION: 96 % | WEIGHT: 235 LBS | DIASTOLIC BLOOD PRESSURE: 77 MMHG | RESPIRATION RATE: 16 BRPM | HEIGHT: 70 IN | BODY MASS INDEX: 33.64 KG/M2 | TEMPERATURE: 97.8 F | HEART RATE: 57 BPM

## 2023-11-06 DIAGNOSIS — F33.0 MILD EPISODE OF RECURRENT MAJOR DEPRESSIVE DISORDER: ICD-10-CM

## 2023-11-06 DIAGNOSIS — F41.9 ANXIETY: ICD-10-CM

## 2023-11-06 DIAGNOSIS — I10 BENIGN ESSENTIAL HYPERTENSION: Primary | ICD-10-CM

## 2023-11-06 DIAGNOSIS — F51.04 INSOMNIA, PSYCHOPHYSIOLOGICAL: ICD-10-CM

## 2023-11-06 DIAGNOSIS — E03.8 ADULT ONSET HYPOTHYROIDISM: ICD-10-CM

## 2023-11-06 DIAGNOSIS — Z51.81 ENCOUNTER FOR THERAPEUTIC DRUG LEVEL MONITORING: ICD-10-CM

## 2023-11-06 DIAGNOSIS — E11.9 DIABETES MELLITUS WITHOUT COMPLICATION: ICD-10-CM

## 2023-11-06 DIAGNOSIS — M54.50 LUMBOSACRAL PAIN: ICD-10-CM

## 2023-11-06 DIAGNOSIS — M13.0 CHRONIC POLYARTHRITIS: ICD-10-CM

## 2023-11-06 DIAGNOSIS — E78.2 MIXED HYPERLIPIDEMIA: ICD-10-CM

## 2023-11-06 DIAGNOSIS — R29.6 RECURRENT FALLS: ICD-10-CM

## 2023-11-06 DIAGNOSIS — Z23 NEED FOR IMMUNIZATION AGAINST INFLUENZA: ICD-10-CM

## 2023-11-06 DIAGNOSIS — K21.00 GASTROESOPHAGEAL REFLUX DISEASE WITH ESOPHAGITIS WITHOUT HEMORRHAGE: ICD-10-CM

## 2023-11-06 PROCEDURE — 1160F RVW MEDS BY RX/DR IN RCRD: CPT | Performed by: INTERNAL MEDICINE

## 2023-11-06 PROCEDURE — 99214 OFFICE O/P EST MOD 30 MIN: CPT | Performed by: INTERNAL MEDICINE

## 2023-11-06 PROCEDURE — 3078F DIAST BP <80 MM HG: CPT | Performed by: INTERNAL MEDICINE

## 2023-11-06 PROCEDURE — 3077F SYST BP >= 140 MM HG: CPT | Performed by: INTERNAL MEDICINE

## 2023-11-06 PROCEDURE — 1159F MED LIST DOCD IN RCRD: CPT | Performed by: INTERNAL MEDICINE

## 2023-11-06 RX ORDER — HYDROCODONE BITARTRATE AND ACETAMINOPHEN 7.5; 325 MG/1; MG/1
1 TABLET ORAL EVERY 12 HOURS PRN
Qty: 60 TABLET | Refills: 0 | Status: SHIPPED | OUTPATIENT
Start: 2023-11-06

## 2023-11-06 RX ORDER — QUETIAPINE FUMARATE 50 MG/1
50 TABLET, FILM COATED ORAL NIGHTLY
Qty: 90 TABLET | Refills: 1 | Status: SHIPPED | OUTPATIENT
Start: 2023-11-06

## 2023-11-06 RX ORDER — PANTOPRAZOLE SODIUM 40 MG/1
40 TABLET, DELAYED RELEASE ORAL DAILY
Qty: 90 TABLET | Refills: 1 | Status: SHIPPED | OUTPATIENT
Start: 2023-11-06

## 2023-11-06 RX ORDER — CITALOPRAM 40 MG/1
40 TABLET ORAL DAILY
Qty: 90 TABLET | Refills: 1 | Status: SHIPPED | OUTPATIENT
Start: 2023-11-06

## 2023-11-06 RX ORDER — ALPRAZOLAM 0.5 MG/1
0.5 TABLET ORAL NIGHTLY PRN
Qty: 30 TABLET | Refills: 5 | Status: SHIPPED | OUTPATIENT
Start: 2023-11-06

## 2023-11-06 RX ORDER — LEVOTHYROXINE SODIUM 0.12 MG/1
125 TABLET ORAL DAILY
Qty: 90 TABLET | Refills: 1 | Status: SHIPPED | OUTPATIENT
Start: 2023-11-06

## 2023-11-06 NOTE — PROGRESS NOTES
"Chief Complaint  Hypertension, Hyperlipidemia, and Diabetes    Subjective        Ramsey Riley presents to [facility] PRIMARY CARE    HPI: Patient is here to follow up on the blood pressure  The patient is taking the blood pressure medications as prescribed and has had no side effects. The patient is also here to follow up on the cholesterol and is trying to follow a diet. The patient is also here to follow on sugar and thyroid and is  due to get lab work done .  He was following up with endocrinologist but has not recently, he is currently taking regular NovoLog vials from [facility] at 30u and NPH 70 units twice daily, he was given Mounjaro by endocrinology but it was too expensive, the patient also complains of anxiety and depression and insomnia and needs refills on medications .  He complains of recurrent falls, he also has chronic lumbosacral and multiple joint pain and needs refills on medication, he is accompanied by his wife who is also the historian  Hypertension   Pertinent negatives include no chest pain, palpitations or shortness of breath.   Hyperlipidemia   Pertinent negatives include no chest pain or shortness of breath.   Diabetes   Pertinent negatives for hypoglycemia include no dizziness, nervousness/anxiousness or pallor. Pertinent negatives for diabetes include no chest pain, no shortness of breath  Patient is on acei/arb     Objective   Vital Signs:  /77   Pulse 57   Temp 97.8 °F (36.6 °C)   Resp 16   Ht 177.8 cm (70\")   Wt 107 kg (235 lb)   SpO2 96%   BMI 33.72 kg/m²   Estimated body mass index is 33.72 kg/m² as calculated from the following:    Height as of this encounter: 177.8 cm (70\").    Weight as of this encounter: 107 kg (235 lb).               Physical Exam  Vitals and nursing note reviewed.   Constitutional:       General: He is not in acute distress.     Appearance: Normal appearance. He is obese. He is not diaphoretic.   HENT:      Head: Normocephalic " and atraumatic.      Right Ear: External ear normal.      Left Ear: External ear normal.      Nose: Nose normal.   Eyes:      Extraocular Movements: Extraocular movements intact.      Conjunctiva/sclera: Conjunctivae normal.   Neck:      Trachea: Trachea normal.   Cardiovascular:      Rate and Rhythm: Normal rate and regular rhythm.      Heart sounds: Normal heart sounds.   Pulmonary:      Effort: Pulmonary effort is normal. No respiratory distress.   Abdominal:      General: Abdomen is flat.   Musculoskeletal:         General: Deformity present.      Cervical back: Neck supple.      Comments: Moves all limbs   Skin:     General: Skin is warm and dry.      Findings: No erythema.   Neurological:      Mental Status: He is alert and oriented to person, place, and time.      Comments: No gross motor or sensory deficits        Result Review :  The following data was reviewed by: Margret Purvis MD on 11/06/2023:  Common labs          5/26/2023    08:30 7/24/2023    12:14   Common Labs   Glucose 272  338    BUN 19  20    Creatinine 1.16  1.51    Sodium 138  138    Potassium 4.3  4.4    Chloride 103  100    Calcium 9.1  9.0    Total Protein  5.8    Albumin 3.6  4.1    Total Bilirubin 0.5  0.3    Alkaline Phosphatase 103  103    AST (SGOT) 18  23    ALT (SGPT) 17  32    WBC 8.19  6.08    Hemoglobin 15.1  15.4    Hematocrit 45.6  45.5    Platelets 128  141    Total Cholesterol  247    Triglycerides  476    HDL Cholesterol  38    LDL Cholesterol   124                   Assessment and Plan   Diagnoses and all orders for this visit:    1. Benign essential hypertension (Primary)    2. Mixed hyperlipidemia  -     CBC & Differential  -     Comprehensive Metabolic Panel  -     Lipid Panel    3. Diabetes mellitus without complication  -     Hemoglobin A1c  -     Microalbumin / Creatinine Urine Ratio - Urine, Clean Catch  -     Ambulatory Referral to Endocrinology    4. Adult onset hypothyroidism  -     TSH  -     Ambulatory Referral  to Endocrinology  -     levothyroxine (SYNTHROID, LEVOTHROID) 125 MCG tablet; Take 1 tablet by mouth Daily.  Dispense: 90 tablet; Refill: 1    5. Insomnia, psychophysiological  -     QUEtiapine (SEROquel) 50 MG tablet; Take 1 tablet by mouth Every Night.  Dispense: 90 tablet; Refill: 1    6. Anxiety  -     citalopram (CeleXA) 40 MG tablet; Take 1 tablet by mouth Daily.  Dispense: 90 tablet; Refill: 1  -     Compliance Drug Analysis, Ur - Urine, Clean Catch  -     ALPRAZolam (XANAX) 0.5 MG tablet; Take 1 tablet by mouth At Night As Needed for Anxiety.  Dispense: 30 tablet; Refill: 5    7. Mild episode of recurrent major depressive disorder  -     citalopram (CeleXA) 40 MG tablet; Take 1 tablet by mouth Daily.  Dispense: 90 tablet; Refill: 1    8. Recurrent falls  -     Ambulatory Referral to Neurology    9. Gastroesophageal reflux disease with esophagitis without hemorrhage    10. Chronic polyarthritis  -     Compliance Drug Analysis, Ur - Urine, Clean Catch    11. Lumbosacral pain  -     Compliance Drug Analysis, Ur - Urine, Clean Catch  -     HYDROcodone-acetaminophen (NORCO) 7.5-325 MG per tablet; Take 1 tablet by mouth Every 12 (Twelve) Hours As Needed for Moderate Pain.  Dispense: 60 tablet; Refill: 0    12. Encounter for therapeutic drug level monitoring  -     Compliance Drug Analysis, Ur - Urine, Clean Catch    Other orders  -     pantoprazole (PROTONIX) 40 MG EC tablet; Take 1 tablet by mouth Daily.  Dispense: 90 tablet; Refill: 1      Plan:  1.  Benign essential hypertension: Will continue current medication, low-sodium diet advised, Counseled to regularly check BP at home with goal averaging <130/80.   2.mixed hyperlipidemia: will obtain   fasting CMP and lipid panel.  Diet and exercise counseled,  Will continue current medications  3. Diabetes  Mellitus  : will obtain   fasting CMP  and hba1c  , diet and exercise counseled , Will continue current medications.  Referred to endocrinology  4. hypothyroidism:  will obtain tsh , and continue levothyroxine, refer to endocrinology  5.  Insomnia: We will increase to Seroquel 50 mg daily  6.  Anxiety disorder: We will continue Celexa and as needed alprazolam, UDS and Lavell reviewed and obtained  7.  Major depression: Refill Celexa 40 mg daily and obtain labs  8.  Chronic polyarthritis: Refill Lortab, UDS and Lavell reviewed and obtained  9.  Lumbosacral pain: Refill Lortab, UDS and Lavell reviewed and obtained  10. GERD: Refill Protonix  11.  Recurrent falls: We will refer patient to neurology  The patient has read and signed the [facility] Controlled Substance Contract.The patient is aware of the potential for addiction and dependence. This is a short-term use   A Lavell was reviewed  in the chart  . Narcotic contract was signed by patient  . Will obtain Urine drug screen  Lavell Report   As part of this patient's treatment plan I am prescribing controlled substances. The patient has been made aware of appropriate use of such medications, including potential risk of somnolence, limited ability to drive and /or work safely, and potential for dependence or overdose. It has also been made clear that these medications are for use by this patient only, without concomitant use of alcohol or other substances unless prescribed.   Patient has completed prescribing agreement detailing terms of continued prescribing of controlled substances, including monitoring LAVELL reports, urine drug screening, and pill counts if necessary. The patient is aware that inappropriate use will result in cessation of prescribing such medications.   LAVELL report has been reviewed   History and physical exam exhibit continued safe and appropriate use of controlled substances. Patient is advised to start a comprehensive exercise    The patient has been instructed to contact my office with any questions or difficulties. The patient understands the plan , patient has verbalized an understanding and  agrees to proceed accordingly         Follow Up   Return in about 3 months (around 2/13/2024).  Patient was given instructions and counseling regarding his condition or for health maintenance advice. Please see specific information pulled into the AVS if appropriate.

## 2023-11-07 PROCEDURE — 90662 IIV NO PRSV INCREASED AG IM: CPT | Performed by: INTERNAL MEDICINE

## 2023-11-07 PROCEDURE — G0008 ADMIN INFLUENZA VIRUS VAC: HCPCS | Performed by: INTERNAL MEDICINE

## 2023-11-14 LAB — DRUGS UR: NORMAL

## 2023-12-07 ENCOUNTER — TELEPHONE (OUTPATIENT)
Dept: INTERNAL MEDICINE | Facility: CLINIC | Age: 68
End: 2023-12-07

## 2023-12-07 DIAGNOSIS — M54.50 LUMBOSACRAL PAIN: ICD-10-CM

## 2023-12-07 RX ORDER — HYDROCODONE BITARTRATE AND ACETAMINOPHEN 7.5; 325 MG/1; MG/1
1 TABLET ORAL EVERY 12 HOURS PRN
Qty: 60 TABLET | Refills: 0 | Status: SHIPPED | OUTPATIENT
Start: 2023-12-07

## 2023-12-07 NOTE — TELEPHONE ENCOUNTER
Caller: Jina Riley    Relationship: Emergency Contact    Best call back number: [phone]     What is the best time to reach you: ANY    Who are you requesting to speak with (clinical staff, provider,  specific staff member): CLINICAL STAFF    What was the call regarding: PATIENTS WIFE WOULD LIKE A CALL TO FURTHER DISCUSS PAPERWORK SHE WILL BE DROPPING OFF AT OFFICE.

## 2023-12-07 NOTE — TELEPHONE ENCOUNTER
Caller: Jina Riley    Relationship: Emergency Contact    Best call back number:       [phone]       Requested Prescriptions:   Requested Prescriptions     Pending Prescriptions Disp Refills    HYDROcodone-acetaminophen (NORCO) 7.5-325 MG per tablet 60 tablet 0     Sig: Take 1 tablet by mouth Every 12 (Twelve) Hours As Needed for Moderate Pain.        Pharmacy where request should be sent: [address] [phone] [facility] [phone]      Last office visit with prescribing clinician: 11/6/2023   Last telemedicine visit with prescribing clinician: Visit date not found   Next office visit with prescribing clinician: 2/14/2024     Additional details provided by patient:      Does the patient have less than a 3 day supply:  [x] Yes  [] No

## 2023-12-07 NOTE — TELEPHONE ENCOUNTER
Rx Refill Note  Requested Prescriptions     Pending Prescriptions Disp Refills    HYDROcodone-acetaminophen (NORCO) 7.5-325 MG per tablet 60 tablet 0     Sig: Take 1 tablet by mouth Every 12 (Twelve) Hours As Needed for Moderate Pain.      Last office visit with prescribing clinician: 11/6/2023   Last telemedicine visit with prescribing clinician: Visit date not found   Next office visit with prescribing clinician: 2/14/2024                         Would you like a call back once the refill request has been completed: [] Yes [] No    If the office needs to give you a call back, can they leave a voicemail: [] Yes [] No    Michelle Gilliland MA  12/07/23, 13:28 EST

## 2023-12-08 NOTE — TELEPHONE ENCOUNTER
Patient wife is returning a call about paperwork,Pt wife Jina would like a call back at [phone]

## 2023-12-15 ENCOUNTER — TELEPHONE (OUTPATIENT)
Dept: INTERNAL MEDICINE | Facility: CLINIC | Age: 68
End: 2023-12-15

## 2023-12-15 NOTE — TELEPHONE ENCOUNTER
Patient wife is wanting a call back regarding paperwork that was filled out would like a call on [phone]

## 2023-12-18 NOTE — TELEPHONE ENCOUNTER
Needs to get from his outpatient provider, let me know if he doesn't have access   Pts wife called and wants to know if paperwork is ready for pickup or if it can be mailed to her. Please advise.

## 2024-01-08 DIAGNOSIS — M54.50 LUMBOSACRAL PAIN: ICD-10-CM

## 2024-01-08 RX ORDER — HYDROCODONE BITARTRATE AND ACETAMINOPHEN 7.5; 325 MG/1; MG/1
1 TABLET ORAL EVERY 12 HOURS PRN
Qty: 60 TABLET | Refills: 0 | Status: SHIPPED | OUTPATIENT
Start: 2024-01-08

## 2024-01-08 NOTE — TELEPHONE ENCOUNTER
Caller: RosemaryJina    Relationship: Emergency Contact    Best call back number: [phone]    Requested Prescriptions:   Requested Prescriptions     Pending Prescriptions Disp Refills    HYDROcodone-acetaminophen (NORCO) 7.5-325 MG per tablet 60 tablet 0     Sig: Take 1 tablet by mouth Every 12 (Twelve) Hours As Needed for Moderate Pain.        Pharmacy where request should be sent: [address] [phone] [facility] [phone]      Last office visit with prescribing clinician: 11/6/2023   Last telemedicine visit with prescribing clinician: Visit date not found   Next office visit with prescribing clinician: 2/14/2024     Additional details provided by patient: OUT OF MEDICATION SINCE LAST WEEK    Does the patient have less than a 3 day supply:  [x] Yes  [] No    Would you like a call back once the refill request has been completed: [] Yes [x] No    If the office needs to give you a call back, can they leave a voicemail: [x] Yes [] No    iNurka Esteban MA   01/08/24 12:57 EST

## 2024-01-15 DIAGNOSIS — E11.65 TYPE 2 DIABETES MELLITUS WITH HYPERGLYCEMIA, WITH LONG-TERM CURRENT USE OF INSULIN: ICD-10-CM

## 2024-01-15 DIAGNOSIS — Z79.4 TYPE 2 DIABETES MELLITUS WITH HYPERGLYCEMIA, WITH LONG-TERM CURRENT USE OF INSULIN: ICD-10-CM

## 2024-01-15 RX ORDER — INSULIN HUMAN 100 [IU]/ML
INJECTION, SUSPENSION SUBCUTANEOUS
Qty: 30 ML | Refills: 0 | Status: SHIPPED | OUTPATIENT
Start: 2024-01-15

## 2024-01-19 ENCOUNTER — TRANSITIONAL CARE MANAGEMENT TELEPHONE ENCOUNTER (OUTPATIENT)
Dept: CALL CENTER | Facility: HOSPITAL | Age: 69
End: 2024-01-19
Payer: MEDICARE

## 2024-01-19 ENCOUNTER — READMISSION MANAGEMENT (OUTPATIENT)
Dept: CALL CENTER | Facility: HOSPITAL | Age: 69
End: 2024-01-19
Payer: MEDICARE

## 2024-01-19 NOTE — OUTREACH NOTE
Call Center TCM Note      Flowsheet Row Responses   [facility] patient discharged from? Non-BH   Does the patient have one of the following disease processes/diagnoses(primary or secondary)? Other   TCM attempt successful? No   Unsuccessful attempts Attempt 1            Clover Loza LPN    1/19/2024, 13:57 EST

## 2024-01-19 NOTE — OUTREACH NOTE
Call Center TCM Note      Flowsheet Row Responses   [facility] patient discharged from? Non-BH   Does the patient have one of the following disease processes/diagnoses(primary or secondary)? Other   TCM attempt successful? No   Unsuccessful attempts Attempt 2            Clover Loza LPN    1/19/2024, 15:42 EST

## 2024-01-19 NOTE — OUTREACH NOTE
Prep Survey      Flowsheet Row Responses   [facility] facility patient discharged from? Non-BH   Is LACE score < 7 ? Non-BH Discharge   Eligibility TCM [facility]   Date of Discharge 01/18/24   Discharge Disposition Home or Self Care   Discharge diagnosis pulmonary embolism   Does the patient have one of the following disease processes/diagnoses(primary or secondary)? Other   Prep survey completed? Yes            Soila DIEZ - Registered Nurse

## 2024-01-20 ENCOUNTER — TRANSITIONAL CARE MANAGEMENT TELEPHONE ENCOUNTER (OUTPATIENT)
Dept: CALL CENTER | Facility: HOSPITAL | Age: 69
End: 2024-01-20
Payer: MEDICARE

## 2024-01-20 NOTE — OUTREACH NOTE
Call Center TCM Note      Flowsheet Row Responses   [facility] facility patient discharged from? Non-BH   Does the patient have one of the following disease processes/diagnoses(primary or secondary)? Other   TCM attempt successful? No   Unsuccessful attempts Attempt 3  [no answer.]            Ramila Alan RN    1/20/2024, 11:49 EST

## 2024-02-08 ENCOUNTER — OUTSIDE FACILITY SERVICE (OUTPATIENT)
Dept: INTERNAL MEDICINE | Facility: CLINIC | Age: 69
End: 2024-02-08
Payer: MEDICARE

## 2024-02-12 ENCOUNTER — TELEPHONE (OUTPATIENT)
Dept: INTERNAL MEDICINE | Facility: CLINIC | Age: 69
End: 2024-02-12
Payer: MEDICARE

## 2024-02-12 DIAGNOSIS — M54.50 LUMBOSACRAL PAIN: ICD-10-CM

## 2024-02-12 RX ORDER — HYDROCODONE BITARTRATE AND ACETAMINOPHEN 7.5; 325 MG/1; MG/1
1 TABLET ORAL EVERY 12 HOURS PRN
Qty: 60 TABLET | Refills: 0 | Status: SHIPPED | OUTPATIENT
Start: 2024-02-12

## 2024-02-14 ENCOUNTER — OFFICE VISIT (OUTPATIENT)
Dept: INTERNAL MEDICINE | Facility: CLINIC | Age: 69
End: 2024-02-14
Payer: MEDICARE

## 2024-02-14 VITALS
HEART RATE: 60 BPM | RESPIRATION RATE: 18 BRPM | TEMPERATURE: 97.3 F | HEIGHT: 70 IN | WEIGHT: 234 LBS | OXYGEN SATURATION: 97 % | SYSTOLIC BLOOD PRESSURE: 150 MMHG | BODY MASS INDEX: 33.5 KG/M2 | DIASTOLIC BLOOD PRESSURE: 81 MMHG

## 2024-02-14 DIAGNOSIS — E03.8 ADULT ONSET HYPOTHYROIDISM: ICD-10-CM

## 2024-02-14 DIAGNOSIS — M25.512 CHRONIC LEFT SHOULDER PAIN: ICD-10-CM

## 2024-02-14 DIAGNOSIS — Z79.4 TYPE 2 DIABETES MELLITUS WITH HYPERGLYCEMIA, WITH LONG-TERM CURRENT USE OF INSULIN: ICD-10-CM

## 2024-02-14 DIAGNOSIS — I26.99 ACUTE PULMONARY EMBOLISM, UNSPECIFIED PULMONARY EMBOLISM TYPE, UNSPECIFIED WHETHER ACUTE COR PULMONALE PRESENT: ICD-10-CM

## 2024-02-14 DIAGNOSIS — M13.0 CHRONIC POLYARTHRITIS: ICD-10-CM

## 2024-02-14 DIAGNOSIS — E78.2 MIXED HYPERLIPIDEMIA: ICD-10-CM

## 2024-02-14 DIAGNOSIS — E11.65 TYPE 2 DIABETES MELLITUS WITH HYPERGLYCEMIA, WITH LONG-TERM CURRENT USE OF INSULIN: ICD-10-CM

## 2024-02-14 DIAGNOSIS — G89.29 CHRONIC LEFT SHOULDER PAIN: ICD-10-CM

## 2024-02-14 DIAGNOSIS — F33.0 MILD EPISODE OF RECURRENT MAJOR DEPRESSIVE DISORDER: ICD-10-CM

## 2024-02-14 DIAGNOSIS — F41.9 ANXIETY: ICD-10-CM

## 2024-02-14 DIAGNOSIS — Z51.81 ENCOUNTER FOR THERAPEUTIC DRUG LEVEL MONITORING: ICD-10-CM

## 2024-02-14 DIAGNOSIS — F51.04 INSOMNIA, PSYCHOPHYSIOLOGICAL: ICD-10-CM

## 2024-02-14 DIAGNOSIS — I10 BENIGN ESSENTIAL HYPERTENSION: Primary | ICD-10-CM

## 2024-02-14 RX ORDER — LOSARTAN POTASSIUM 100 MG/1
100 TABLET ORAL DAILY
Qty: 90 TABLET | Refills: 1 | Status: SHIPPED | OUTPATIENT
Start: 2024-02-14

## 2024-02-14 RX ORDER — CITALOPRAM 40 MG/1
40 TABLET ORAL DAILY
Qty: 90 TABLET | Refills: 1 | Status: SHIPPED | OUTPATIENT
Start: 2024-02-14

## 2024-02-14 RX ORDER — LEVOTHYROXINE SODIUM 0.12 MG/1
125 TABLET ORAL DAILY
Qty: 90 TABLET | Refills: 1 | Status: SHIPPED | OUTPATIENT
Start: 2024-02-14

## 2024-02-14 RX ORDER — QUETIAPINE FUMARATE 50 MG/1
50 TABLET, FILM COATED ORAL 2 TIMES DAILY
Qty: 180 TABLET | Refills: 1 | Status: SHIPPED | OUTPATIENT
Start: 2024-02-14

## 2024-02-14 RX ORDER — ATORVASTATIN CALCIUM 80 MG/1
80 TABLET, FILM COATED ORAL NIGHTLY
Qty: 90 TABLET | Refills: 1 | Status: SHIPPED | OUTPATIENT
Start: 2024-02-14

## 2024-02-17 RX ORDER — ALPRAZOLAM 0.5 MG/1
0.5 TABLET ORAL NIGHTLY PRN
Qty: 30 TABLET | Refills: 5 | Status: SHIPPED | OUTPATIENT
Start: 2024-02-17

## 2024-02-20 LAB — DRUGS UR: NORMAL

## 2024-03-06 DIAGNOSIS — E11.65 TYPE 2 DIABETES MELLITUS WITH HYPERGLYCEMIA, WITH LONG-TERM CURRENT USE OF INSULIN: ICD-10-CM

## 2024-03-06 DIAGNOSIS — Z79.4 TYPE 2 DIABETES MELLITUS WITH HYPERGLYCEMIA, WITH LONG-TERM CURRENT USE OF INSULIN: ICD-10-CM

## 2024-03-07 RX ORDER — INSULIN HUMAN 100 [IU]/ML
INJECTION, SUSPENSION SUBCUTANEOUS
Qty: 30 ML | Refills: 0 | OUTPATIENT
Start: 2024-03-07

## 2024-03-07 NOTE — TELEPHONE ENCOUNTER
Rx Refill Note  Requested Prescriptions     Pending Prescriptions Disp Refills    HumuLIN 70/30 (70-30) 100 UNIT/ML injection [Pharmacy Med Name: HumuLIN 70/30 (70-30) 100 UNIT/ML Subcutaneous Suspension] 30 mL 0     Sig: INJECT 70 UNITS SUBCUTANEOUSLY TWICE DAILY WITH MEALS AS DIRECTED      Last office visit with prescribing clinician: 2/14/2024   Last telemedicine visit with prescribing clinician: Visit date not found   Next office visit with prescribing clinician: 4/30/2024                         Would you like a call back once the refill request has been completed: [] Yes [] No    If the office needs to give you a call back, can they leave a voicemail: [] Yes [] No    Carlos Peterson, [facility]  03/07/24, 15:04 EST

## 2024-03-08 RX ORDER — INSULIN HUMAN 100 [IU]/ML
70 INJECTION, SUSPENSION SUBCUTANEOUS 2 TIMES DAILY WITH MEALS
Qty: 30 ML | Refills: 1 | Status: SHIPPED | OUTPATIENT
Start: 2024-03-08

## 2024-03-11 DIAGNOSIS — M54.50 LUMBOSACRAL PAIN: ICD-10-CM

## 2024-03-11 NOTE — TELEPHONE ENCOUNTER
Rx Refill Note  Requested Prescriptions     Pending Prescriptions Disp Refills    HYDROcodone-acetaminophen (NORCO) 7.5-325 MG per tablet 60 tablet 0     Sig: Take 1 tablet by mouth Every 12 (Twelve) Hours As Needed for Moderate Pain.      Last office visit with prescribing clinician: 2/14/2024   Last telemedicine visit with prescribing clinician: Visit date not found   Next office visit with prescribing clinician: 4/30/2024                         Would you like a call back once the refill request has been completed: [] Yes [] No    If the office needs to give you a call back, can they leave a voicemail: [] Yes [] No    Carlos Peterson, T[facility]IR  03/11/24, 13:04 EDT

## 2024-03-11 NOTE — TELEPHONE ENCOUNTER
Caller: RosemaryJina    Relationship: Emergency Contact    Best call back number: [phone]    Requested Prescriptions:   Requested Prescriptions     Pending Prescriptions Disp Refills    HYDROcodone-acetaminophen (NORCO) 7.5-325 MG per tablet 60 tablet 0     Sig: Take 1 tablet by mouth Every 12 (Twelve) Hours As Needed for Moderate Pain.        [place] [facility] [place] [address] [place] [address] [phone] [facility] [phone]      Last office visit with prescribing clinician: 2/14/2024   Last telemedicine visit with prescribing clinician: Visit date not found   Next office visit with prescribing clinician: 4/30/2024     Additional details provided by patient: PATIENT HAS ENOUGH FOR TODAY    Does the patient have less than a 3 day supply:  [x] Yes  [] No    Would you like a call back once the refill request has been completed: [x] Yes [] No    If the office needs to give you a call back, can they leave a voicemail: [x] Yes [] No    Neal Carl Rep   03/11/24 09:46 EDT

## 2024-03-12 RX ORDER — HYDROCODONE BITARTRATE AND ACETAMINOPHEN 7.5; 325 MG/1; MG/1
1 TABLET ORAL EVERY 12 HOURS PRN
Qty: 60 TABLET | Refills: 0 | Status: SHIPPED | OUTPATIENT
Start: 2024-03-12

## 2024-03-15 DIAGNOSIS — E11.65 TYPE 2 DIABETES MELLITUS WITH HYPERGLYCEMIA, WITH LONG-TERM CURRENT USE OF INSULIN: ICD-10-CM

## 2024-03-15 DIAGNOSIS — Z79.4 TYPE 2 DIABETES MELLITUS WITH HYPERGLYCEMIA, WITH LONG-TERM CURRENT USE OF INSULIN: ICD-10-CM

## 2024-03-15 RX ORDER — INSULIN HUMAN 100 [IU]/ML
INJECTION, SUSPENSION SUBCUTANEOUS
Qty: 30 ML | Refills: 2 | Status: SHIPPED | OUTPATIENT
Start: 2024-03-15

## 2024-04-16 LAB
ALBUMIN SERPL-MCNC: 3.7 G/DL (ref 3.9–4.9)
ALBUMIN/CREAT UR: 2794 MG/G CREAT (ref 0–29)
ALBUMIN/GLOB SERPL: 1.6 {RATIO} (ref 1.2–2.2)
ALP SERPL-CCNC: 112 IU/L (ref 44–121)
ALT SERPL-CCNC: 22 IU/L (ref 0–44)
AST SERPL-CCNC: 24 IU/L (ref 0–40)
BASOPHILS # BLD AUTO: 0 X10E3/UL (ref 0–0.2)
BASOPHILS NFR BLD AUTO: 0 %
BILIRUB SERPL-MCNC: 0.4 MG/DL (ref 0–1.2)
BUN SERPL-MCNC: 15 MG/DL (ref 8–27)
BUN/CREAT SERPL: 11 (ref 10–24)
CALCIUM SERPL-MCNC: 8.9 MG/DL (ref 8.6–10.2)
CHLORIDE SERPL-SCNC: 100 MMOL/L (ref 96–106)
CHOLEST SERPL-MCNC: 153 MG/DL (ref 100–199)
CO2 SERPL-SCNC: 24 MMOL/L (ref 20–29)
CREAT SERPL-MCNC: 1.39 MG/DL (ref 0.76–1.27)
CREAT UR-MCNC: 126.1 MG/DL
EGFRCR SERPLBLD CKD-EPI 2021: 56 ML/MIN/1.73
EOSINOPHIL # BLD AUTO: 0.2 X10E3/UL (ref 0–0.4)
EOSINOPHIL NFR BLD AUTO: 4 %
ERYTHROCYTE [DISTWIDTH] IN BLOOD BY AUTOMATED COUNT: 12.6 % (ref 11.6–15.4)
GLOBULIN SER CALC-MCNC: 2.3 G/DL (ref 1.5–4.5)
GLUCOSE SERPL-MCNC: 298 MG/DL (ref 70–99)
HBA1C MFR BLD: 10.5 % (ref 4.8–5.6)
HCT VFR BLD AUTO: 45.5 % (ref 37.5–51)
HDLC SERPL-MCNC: 36 MG/DL
HGB BLD-MCNC: 14.6 G/DL (ref 13–17.7)
IMM GRANULOCYTES # BLD AUTO: 0 X10E3/UL (ref 0–0.1)
IMM GRANULOCYTES NFR BLD AUTO: 0 %
LDLC SERPL CALC-MCNC: 82 MG/DL (ref 0–99)
LYMPHOCYTES # BLD AUTO: 2.2 X10E3/UL (ref 0.7–3.1)
LYMPHOCYTES NFR BLD AUTO: 42 %
MCH RBC QN AUTO: 29 PG (ref 26.6–33)
MCHC RBC AUTO-ENTMCNC: 32.1 G/DL (ref 31.5–35.7)
MCV RBC AUTO: 91 FL (ref 79–97)
MICROALBUMIN UR-MCNC: 3522.7 UG/ML
MONOCYTES # BLD AUTO: 0.4 X10E3/UL (ref 0.1–0.9)
MONOCYTES NFR BLD AUTO: 8 %
NEUTROPHILS # BLD AUTO: 2.3 X10E3/UL (ref 1.4–7)
NEUTROPHILS NFR BLD AUTO: 46 %
PLATELET # BLD AUTO: 147 X10E3/UL (ref 150–450)
POTASSIUM SERPL-SCNC: 4.1 MMOL/L (ref 3.5–5.2)
PROT SERPL-MCNC: 6 G/DL (ref 6–8.5)
RBC # BLD AUTO: 5.03 X10E6/UL (ref 4.14–5.8)
SODIUM SERPL-SCNC: 138 MMOL/L (ref 134–144)
TRIGL SERPL-MCNC: 210 MG/DL (ref 0–149)
TSH SERPL DL<=0.005 MIU/L-ACNC: 3.09 UIU/ML (ref 0.45–4.5)
VLDLC SERPL CALC-MCNC: 35 MG/DL (ref 5–40)
WBC # BLD AUTO: 5.2 X10E3/UL (ref 3.4–10.8)

## 2024-04-17 DIAGNOSIS — M54.50 LUMBOSACRAL PAIN: ICD-10-CM

## 2024-04-17 RX ORDER — HYDROCODONE BITARTRATE AND ACETAMINOPHEN 7.5; 325 MG/1; MG/1
1 TABLET ORAL EVERY 12 HOURS PRN
Qty: 60 TABLET | Refills: 0 | Status: SHIPPED | OUTPATIENT
Start: 2024-04-17

## 2024-04-17 NOTE — TELEPHONE ENCOUNTER
Caller: Jina Riley    Relationship: Emergency Contact    Best call back number:       [phone] (Mobile)     Requested Prescriptions:   Requested Prescriptions     Pending Prescriptions Disp Refills    HYDROcodone-acetaminophen (NORCO) 7.5-325 MG per tablet 60 tablet 0     Sig: Take 1 tablet by mouth Every 12 (Twelve) Hours As Needed for Moderate Pain.      [place]     [facility] [place] [address] [place] [address] [phone] [facility] [phone]      Last office visit with prescribing clinician: 2/14/2024   Last telemedicine visit with prescribing clinician: Visit date not found   Next office visit with prescribing clinician: 4/30/2024     Additional details provided by patient:     CALLER STATED PATIENT IS OUT OF THE MEDICATION CURRENTLY    Does the patient have less than a 3 day supply:  [x] Yes  [] No    Would you like a call back once the refill request has been completed: [] Yes [] No    If the office needs to give you a call back, can they leave a voicemail: [] Yes [] No    Neal Arrington Rep   04/17/24 12:12 EDT

## 2024-04-17 NOTE — TELEPHONE ENCOUNTER
Rx Refill Note  Requested Prescriptions     Pending Prescriptions Disp Refills    HYDROcodone-acetaminophen (NORCO) 7.5-325 MG per tablet 60 tablet 0     Sig: Take 1 tablet by mouth Every 12 (Twelve) Hours As Needed for Moderate Pain.      Last office visit with prescribing clinician: 2/14/2024   Last telemedicine visit with prescribing clinician: Visit date not found   Next office visit with prescribing clinician: 4/30/2024  UDS: 02/14/2024    Virginia Harrell MA  04/17/24, 12:23 EDT

## 2024-04-30 ENCOUNTER — OFFICE VISIT (OUTPATIENT)
Dept: INTERNAL MEDICINE | Facility: CLINIC | Age: 69
End: 2024-04-30
Payer: MEDICARE

## 2024-04-30 VITALS
HEART RATE: 65 BPM | WEIGHT: 243 LBS | SYSTOLIC BLOOD PRESSURE: 146 MMHG | DIASTOLIC BLOOD PRESSURE: 91 MMHG | HEIGHT: 70 IN | BODY MASS INDEX: 34.79 KG/M2 | OXYGEN SATURATION: 95 % | TEMPERATURE: 97.5 F | RESPIRATION RATE: 20 BRPM

## 2024-04-30 DIAGNOSIS — Z00.00 MEDICARE ANNUAL WELLNESS VISIT, SUBSEQUENT: Primary | ICD-10-CM

## 2024-04-30 DIAGNOSIS — I10 BENIGN ESSENTIAL HYPERTENSION: ICD-10-CM

## 2024-04-30 DIAGNOSIS — E78.2 MIXED HYPERLIPIDEMIA: ICD-10-CM

## 2024-04-30 DIAGNOSIS — E11.65 TYPE 2 DIABETES MELLITUS WITH HYPERGLYCEMIA, WITH LONG-TERM CURRENT USE OF INSULIN: ICD-10-CM

## 2024-04-30 DIAGNOSIS — Z79.4 TYPE 2 DIABETES MELLITUS WITH HYPERGLYCEMIA, WITH LONG-TERM CURRENT USE OF INSULIN: ICD-10-CM

## 2024-04-30 DIAGNOSIS — Z91.81 AT MODERATE RISK FOR FALL: ICD-10-CM

## 2024-04-30 DIAGNOSIS — Z12.11 ENCOUNTER FOR SCREENING FOR MALIGNANT NEOPLASM OF COLON: ICD-10-CM

## 2024-04-30 DIAGNOSIS — Z23 NEED FOR PNEUMOCOCCAL VACCINATION: ICD-10-CM

## 2024-04-30 PROCEDURE — G0439 PPPS, SUBSEQ VISIT: HCPCS | Performed by: INTERNAL MEDICINE

## 2024-04-30 PROCEDURE — 3046F HEMOGLOBIN A1C LEVEL >9.0%: CPT | Performed by: INTERNAL MEDICINE

## 2024-04-30 PROCEDURE — 1160F RVW MEDS BY RX/DR IN RCRD: CPT | Performed by: INTERNAL MEDICINE

## 2024-04-30 PROCEDURE — 3080F DIAST BP >= 90 MM HG: CPT | Performed by: INTERNAL MEDICINE

## 2024-04-30 PROCEDURE — 3077F SYST BP >= 140 MM HG: CPT | Performed by: INTERNAL MEDICINE

## 2024-04-30 PROCEDURE — 90677 PCV20 VACCINE IM: CPT | Performed by: INTERNAL MEDICINE

## 2024-04-30 PROCEDURE — 1170F FXNL STATUS ASSESSED: CPT | Performed by: INTERNAL MEDICINE

## 2024-04-30 PROCEDURE — G0009 ADMIN PNEUMOCOCCAL VACCINE: HCPCS | Performed by: INTERNAL MEDICINE

## 2024-04-30 PROCEDURE — 99397 PER PM REEVAL EST PAT 65+ YR: CPT | Performed by: INTERNAL MEDICINE

## 2024-04-30 PROCEDURE — 1159F MED LIST DOCD IN RCRD: CPT | Performed by: INTERNAL MEDICINE

## 2024-04-30 NOTE — PROGRESS NOTES
The ABCs of the Annual Wellness Visit  Subsequent Medicare Wellness Visit    Chief Complaint   Patient presents with    Medicare Wellness-subsequent    Hypertension    Hyperlipidemia    Diabetes       Subjective      Ramsey Riley is a 69 y.o. male who presents for a Subsequent Medicare Wellness Visit and physical.  Patient is here to follow up on the blood pressure  The patient is taking the blood pressure medications as prescribed and has had no side effects. The patient is also here to follow up on the cholesterol and   sugar and had lab work done .  The patient also is due for Cologuard and Prevnar 20.  He is accompanied by his wife today  Hyperlipidemia   Pertinent negatives include no chest pain or shortness of breath.   Hypertension   Pertinent negatives include no chest pain, palpitations or shortness of breath.      The following portions of the patient's history were reviewed and   updated as appropriate: allergies, current medications, past family history, past medical history, past social history, past surgical history, and problem list.    Compared to one year ago, the patient feels his physical   health is worse.    Compared to one year ago, the patient feels his mental   health is the same.    Recent Hospitalizations:  He was admitted within the past 365 days at [facility].       Current Medical Providers:  Patient Care Team:  Margret Purvis MD as PCP - General (Internal Medicine)  Margret Purvis MD as Referring Physician (Internal Medicine)  Adilson Whittaker MD as Consulting Physician (Cardiology)    Outpatient Medications Prior to Visit   Medication Sig Dispense Refill    ALPRAZolam (XANAX) 0.5 MG tablet Take 1 tablet by mouth At Night As Needed for Anxiety. 30 tablet 5    amLODIPine (NORVASC) 10 MG tablet Take 1 tablet by mouth Daily. 90 tablet 3    apixaban (ELIQUIS) 5 MG tablet tablet Take 1 tablet by mouth Every 12 (Twelve) Hours. 60 tablet 5    aspirin 81 MG chewable tablet Chew 1  tablet Daily.      atorvastatin (Lipitor) 80 MG tablet Take 1 tablet by mouth Every Night. 90 tablet 1    chlorthalidone (HYGROTON) 25 MG tablet Take 0.5 tablets by mouth Daily. 45 tablet 3    citalopram (CeleXA) 40 MG tablet Take 1 tablet by mouth Daily. 90 tablet 1    clotrimazole-betamethasone (Lotrisone) 1-0.05 % cream Apply to affected area 2 times daily 45 g 1    cyclopentolate (CYCLOGYL) 1 % ophthalmic solution Apply 1 drop to eye(s) as directed by provider 3 (Three) Times a Day.      glucose blood (Accu-Chek Caryn Plus) test strip USE AS DIRECTED 3 TIMES DAILY 100 each 12    glucose monitor monitoring kit 1 each Daily. 1 each 1    glucose monitor monitoring kit 1 each Daily. 1 each 1    HumuLIN 70/30 (70-30) 100 UNIT/ML injection INJECT 70 UNITS SUBCUTANEOUSLY TWICE DAILY WITH MEALS AS DIRECTED 30 mL 2    HYDROcodone-acetaminophen (NORCO) 7.5-325 MG per tablet Take 1 tablet by mouth Every 12 (Twelve) Hours As Needed for Moderate Pain. 60 tablet 0    Insulin Syringes, Disposable, U-100 1 ML misc 50 Units 3 (Three) Times a Day As Needed (50 units in the morning, and then with meals PRN). 200 each 12    Lancets 30G misc Check sugar 3 times daily 30 each 12    levothyroxine (SYNTHROID, LEVOTHROID) 125 MCG tablet Take 1 tablet by mouth Daily. 90 tablet 1    losartan (COZAAR) 100 MG tablet Take 1 tablet by mouth Daily. 90 tablet 1    pantoprazole (PROTONIX) 40 MG EC tablet Take 1 tablet by mouth Daily. 90 tablet 1    prednisoLONE sodium phosphate (INFLAMASE FORTE) 1 % ophthalmic solution USE 1 DROP IN LEFT EYE 4 TIMES A DAY      QUEtiapine (SEROquel) 50 MG tablet Take 1 tablet by mouth 2 (Two) Times a Day. 180 tablet 1    donepezil (Aricept) 5 MG tablet Take 1 tablet by mouth Every Night. 30 tablet 0     No facility-administered medications prior to visit.       Opioid medication/s are on active medication list.  and I have evaluated his active treatment plan and pain score trends (see table).  Vitals:    04/30/24  "1502   PainSc:   9   PainLoc: Leg     I have reviewed the chart for potential of high risk medication and harmful drug interactions in the elderly.          Aspirin is on active medication list. Aspirin use is indicated based on review of current medical condition/s. Pros and cons of this therapy have been discussed today. Benefits of this medication outweigh potential harm.  Patient has been encouraged to continue taking this medication.  .      Patient Active Problem List   Diagnosis    Coronary artery disease involving native coronary artery of native heart without angina pectoris    Essential hypertension    Bradycardia, sinus    Fatigue    Abnormal ECG    Precordial pain    LVH (left ventricular hypertrophy) due to hypertensive disease, without heart failure    Diabetes mellitus    PAD (peripheral artery disease)    Ischemic stroke    History of blood clots    Pulmonary embolus     Advance Care Planning   Advance Care Planning     Advance Directive is not on file.  ACP discussion was declined by the patient. Patient does not have an advance directive, declines further assistance.     Objective    Vitals:    04/30/24 1502   BP: 146/91  Comment: patient has not taken medication today   Pulse: 65   Resp: 20   Temp: 97.5 °F (36.4 °C)   SpO2: 95%   Weight: 110 kg (243 lb)   Height: 177.8 cm (70\")   PainSc:   9   PainLoc: Leg     Estimated body mass index is 34.87 kg/m² as calculated from the following:    Height as of this encounter: 177.8 cm (70\").    Weight as of this encounter: 110 kg (243 lb).    BMI is >= 30 and <35. (Class 1 Obesity). The following options were offered after discussion;: weight loss educational material (shared in after visit summary), exercise counseling/recommendations, and nutrition counseling/recommendations    All vitals recorded within this visit are reported by the patient.  Physical Examination  Vitals and nursing note reviewed  General appearance: Normocephalic and nontraumatic " obese,  HEENT: External inspection of eyes, ears and nose appears benign, hearing appears intact  Neck: Neck appears supple, trachea in midline, thyroid appears not enlarged  Respiratory: Respiratory effort appears normal  Musculoskeletal: Moving all limbs, walks with cane  Range of motion of visible joints appears within normal  CNS: No gross motor or sensory deficits  No gross cranial nerve deficits  No tremors  Psychiatry: Alert and oriented x3  Memory appears intact  Mood and affect appears normal  Insight appears normal    Does the patient have evidence of cognitive impairment?   No    Lab Results   Component Value Date    CHLPL 153 04/15/2024    TRIG 210 (H) 04/15/2024    HDL 36 (L) 04/15/2024    LDL 82 04/15/2024    VLDL 35 04/15/2024    HGBA1C 10.5 (H) 04/15/2024          HEALTH RISK ASSESSMENT    Smoking Status:  Social History     Tobacco Use   Smoking Status Never   Smokeless Tobacco Current    Types: Chew     Alcohol Consumption:  Social History     Substance and Sexual Activity   Alcohol Use No     Fall Risk Screen:    STEADI Fall Risk Assessment was completed, and patient is at MODERATE risk for falls. Assessment completed on:2024    Depression Screenin/30/2024     3:11 PM   PHQ-2/PHQ-9 Depression Screening   Little Interest or Pleasure in Doing Things 0-->not at all   Feeling Down, Depressed or Hopeless 0-->not at all   PHQ-9: Brief Depression Severity Measure Score 0       Health Habits and Functional and Cognitive Screenin/30/2024     3:09 PM   Functional & Cognitive Status   Do you have difficulty preparing food and eating? No   Do you have difficulty bathing yourself, getting dressed or grooming yourself? No   Do you have difficulty using the toilet? No   Do you have difficulty moving around from place to place? Yes   Do you have trouble with steps or getting out of a bed or a chair? Yes   Current Diet Well Balanced Diet   Dental Exam Not up to date   Eye Exam Up to date    Current Exercises Include Other;House Cleaning;Walking   Do you need help using the phone?  No   Are you deaf or do you have serious difficulty hearing?  No   Do you need help to go to places out of walking distance? Yes   Do you need help shopping? No   Do you need help preparing meals?  No   Do you need help with housework?  No   Do you need help with laundry? No   Do you need help taking your medications? Yes   Do you need help managing money? Yes   Do you ever drive or ride in a car without wearing a seat belt? No   Have you felt unusual stress, anger or loneliness in the last month? No   Who do you live with? Spouse   If you need help, do you have trouble finding someone available to you? No   Have you been bothered in the last four weeks by sexual problems? No   Do you have difficulty concentrating, remembering or making decisions? Yes       Age-appropriate Screening Schedule:  Refer to the list below for future screening recommendations based on patient's age, sex and/or medical conditions. Orders for these recommended tests are listed in the plan section. The patient has been provided with a written plan.    Health Maintenance   Topic Date Due    COLORECTAL CANCER SCREENING  Never done    DIABETIC EYE EXAM  Never done    ZOSTER VACCINE (1 of 2) Never done    HEPATITIS C SCREENING  Never done    DIABETIC FOOT EXAM  10/19/2021    COVID-19 Vaccine (1 - 2023-24 season) 07/20/2024 (Originally 9/1/2023)    RSV Vaccine - Adults (1 - 1-dose 60+ series) 02/14/2025 (Originally 3/11/2017)    TDAP/TD VACCINES (1 - Tdap) 02/14/2025 (Originally 3/11/1974)    INFLUENZA VACCINE  08/01/2024    HEMOGLOBIN A1C  10/15/2024    LIPID PANEL  04/15/2025    URINE MICROALBUMIN  04/15/2025    ANNUAL WELLNESS VISIT  04/30/2025    BMI FOLLOWUP  04/30/2025    Pneumococcal Vaccine 65+  Completed                  CMS Preventative Services Quick Reference  Risk Factors Identified During Encounter:    Fall Risk-High or Moderate: Discussed  Fall Prevention in the home, Information on Fall Prevention Shared in After Visit Summary, and Sit to Stand Exercise Information Shared in After Visit Summary    The above risks/problems have been discussed with the patient.  Pertinent information has been shared with the patient in the After Visit Summary.    Diagnoses and all orders for this visit:    1. Medicare annual wellness visit, subsequent (Primary)    2. Encounter for screening for malignant neoplasm of colon  -     Cologuard - Stool, Per Rectum; Future    3. At moderate risk for fall    4. Benign essential hypertension    5. Mixed hyperlipidemia    6. Type 2 diabetes mellitus with hyperglycemia, with long-term current use of insulin    7. Need for pneumococcal vaccination  -     Pneumococcal Conjugate Vaccine 20-Valent (PCV20)    Plan:  1.physical: Physical exam conducted today, reviewed fasting lab work,   Impression: healthy adult Patient. Currently, patient eats a healthy diet. Screening lab work includes glucose, lipid profile and complete blood count . The risks and benefits of immunizations were discussed and immunizations are up to date. Advice and education were given regarding nutrition and aerobic exercise. Patient discussion: discussed with the patient.  Annual Wellness Visit , physical  with preventive exam as well as age and risk appropriate counseling completed.   Advance Directive Planning: paperwork and instructions were given to the patient.   Patient Discussion: plan discussed with the patient, follow-up visit needed in one year. Medication Review and medication list updated  2.  Benign essential hypertension: Will continue current medication, low-sodium diet advised, Counseled to regularly check BP at home with goal averaging <130/80.   3.mixed hyperlipidemia: Reviewed fasting CMP and lipid panel.  Diet and exercise counseled,  Will continue current medications  4. Diabetes  Mellitus  : Reviewed   fasting CMP  and hba1c  , diet and  exercise counseled , Will continue current medications  5.  Need for Prevnar 20: Given today and well-tolerated  6.  Screening for colon cancer: We will obtain Cologuard      Follow Up:   Next Medicare Wellness visit to be scheduled in 1 year.      An After Visit Summary and PPPS were made available to the patient.

## 2024-04-30 NOTE — PATIENT INSTRUCTIONS
Advance Care Planning and Advance Directives     You make decisions on a daily basis - decisions about where you want to live, your career, your home, your life. Perhaps one of the most important decisions you face is your choice for future medical care. Take time to talk with your family and your healthcare team and start planning today.  Advance Care Planning is a process that can help you:  Understand possible future healthcare decisions in light of your own experiences  Reflect on those decision in light of your goals and values  Discuss your decisions with those closest to you and the healthcare professionals that care for you  Make a plan by creating a document that reflects your wishes    Surrogate Decision Maker  In the event of a medical emergency, which has left you unable to communicate or to make your own decisions, you would need someone to make decisions for you.  It is important to discuss your preferences for medical treatment with this person while you are in good health.     Qualities of a surrogate decision maker:  Willing to take on this role and responsibility  Knows what you want for future medical care  Willing to follow your wishes even if they don't agree with them  Able to make difficult medical decisions under stressful circumstances    Advance Directives  These are legal documents you can create that will guide your healthcare team and decision maker(s) when needed. These documents can be stored in the electronic medical record.    Living Will - a legal document to guide your care if you have a terminal condition or a serious illness and are unable to communicate. States vary by statute in document names/types, but most forms may include one or more of the following:        -  Directions regarding life-prolonging treatments        -  Directions regarding artificially provided nutrition/hydration        -  Choosing a healthcare decision maker        -  Direction regarding organ/tissue  donation    Durable Power of  for Healthcare - this document names an -in-fact to make medical decisions for you, but it may also allow this person to make personal and financial decisions for you. Please seek the advice of an  if you need this type of document.    **Advance Directives are not required and no one may discriminate against you if you do not sign one.    Medical Orders  Many states allow specific forms/orders signed by your physician to record your wishes for medical treatment in your current state of health. This form, signed in personal communication with your physician, addresses resuscitation and other medical interventions that you may or may not want.      For more information or to schedule a time with a [facility] Advance Care Planning Facilitator contact: [facility] or call [phone] and someone will contact you directly.  Fall Prevention in the Home, Adult  Falls can cause injuries and affect people of all ages. There are many simple things that you can do to make your home safe and to help prevent falls.  If you need it, ask for help making these changes.  What actions can I take to prevent falls?  General information  Use good lighting in all rooms. Make sure to:  Replace any light bulbs that burn out.  Turn on lights if it is dark and use night-lights.  Keep items that you use often in easy-to-reach places. Lower the shelves around your home if needed.  Move furniture so that there are clear paths around it.  Do not keep throw rugs or other things on the floor that can make you trip.  If any of your floors are uneven, fix them.  Add color or contrast paint or tape to clearly dameon and help you see:  Grab bars or handrails.  First and last steps of staircases.  Where the edge of each step is.  If you use a ladder or stepladder:  Make sure that it is fully opened. Do not climb a closed ladder.  Make sure the sides of the ladder are locked in  place.  Have someone hold the ladder while you use it.  Know where your pets are as you move through your home.  What can I do in the bathroom?         Keep the floor dry. Clean up any water that is on the floor right away.  Remove soap buildup in the bathtub or shower. Buildup makes bathtubs and showers slippery.  Use non-skid mats or decals on the floor of the bathtub or shower.  Attach bath mats securely with double-sided, non-slip rug tape.  If you need to sit down while you are in the shower, use a non-slip stool.  Install grab bars by the toilet and in the bathtub and shower. Do not use towel bars as grab bars.  What can I do in the bedroom?  Make sure that you have a light by your bed that is easy to reach.  Do not use any sheets or blankets on your bed that hang to the floor.  Have a firm bench or chair with side arms that you can use for support when you get dressed.  What can I do in the kitchen?  Clean up any spills right away.  If you need to reach something above you, use a sturdy step stool that has a grab bar.  Keep electrical cables out of the way.  Do not use floor polish or wax that makes floors slippery.  What can I do with my stairs?  Do not leave anything on the stairs.  Make sure that you have a light switch at the top and the bottom of the stairs. Have them installed if you do not have them.  Make sure that there are handrails on both sides of the stairs. Fix handrails that are broken or loose. Make sure that handrails are as long as the staircases.  Install non-slip stair treads on all stairs in your home if they do not have carpet.  Avoid having throw rugs at the top or bottom of stairs, or secure the rugs with carpet tape to prevent them from moving.  Choose a carpet design that does not hide the edge of steps on the stairs. Make sure that carpet is firmly attached to the stairs. Fix any carpet that is loose or worn.  What can I do on the outside of my home?  Use bright outdoor  lighting.  Repair the edges of walkways and driveways and fix any cracks. Clear paths of anything that can make you trip, such as tools or rocks.  Add color or contrast paint or tape to clearly dameon and help you see high doorway thresholds.  Trim any bushes or trees on the main path into your home.  Check that handrails are securely fastened and in good repair. Both sides of all steps should have handrails.  Install guardrails along the edges of any raised decks or porches.  Have leaves, snow, and ice cleared regularly. Use sand, salt, or ice melt on walkways during winter months if you live where there is ice and snow.  In the garage, clean up any spills right away, including grease or oil spills.  What other actions can I take?  Review your medicines with your health care provider. Some medicines can make you confused or feel dizzy. This can increase your chance of falling.  Wear closed-toe shoes that fit well and support your feet. Wear shoes that have rubber soles and low heels.  Use a cane, walker, scooter, or crutches that help you move around if needed.  Talk with your provider about other ways that you can decrease your risk of falls. This may include seeing a physical therapist to learn to do exercises to improve movement and strength.  Where to find more information  Centers for Disease Control and PreventionSHEREEN: cdc.gov  National [place] on Aging: valery.nih.gov  [employer] [place] on Aging: valery.nih.gov  Contact a health care provider if:  You are afraid of falling at home.  You feel weak, drowsy, or dizzy at home.  You fall at home.  Get help right away if you:  Lose consciousness or have trouble moving after a fall.  Have a fall that causes a head injury.  These symptoms may be an emergency. Get help right away. Call 911.  Do not wait to see if the symptoms will go away.  Do not drive yourself to the hospital.  This information is not intended to replace advice given to you by your health care  provider. Make sure you discuss any questions you have with your health care provider.  Document Revised: 08/21/2023 Document Reviewed: 08/21/2023  Elsevier Patient Education © 2024 [employer]    Sit-to-Stand Exercise    The sit-to-stand exercise (also known as the chair stand or chair rise exercise) strengthens your lower body and helps you maintain or improve your mobility and independence. The end goal is to do the sit-to-stand exercise without using your hands. This will be easier as you become stronger. You should always talk with your health care provider before starting any exercise program, especially if you have had recent surgery.  Do the exercise exactly as told by your health care provider and adjust it as directed. It is normal to feel mild stretching, pulling, tightness, or discomfort as you do this exercise, but you should stop right away if you feel sudden pain or your pain gets worse. Do not begin doing this exercise until told by your health care provider.  What the sit-to-stand exercise does  The sit-to-stand exercise helps to strengthen the muscles in your thighs and the muscles in the center of your body that give you stability (core muscles). This exercise is especially helpful if:  You have had knee or hip surgery.  You have trouble getting up from a chair, out of a car, or off the toilet due to muscle weakness.  How to do the sit-to-stand exercise  Sit toward the front edge of a sturdy chair without armrests. Your knees should be bent and your feet should be flat on the floor and shoulder-width apart and underneath your hips.  Place your hands lightly on each side of the seat. Keep your back and neck as straight as possible, with your chest slightly forward.  Breathe in slowly. Lean forward and slightly shift your weight to the front of your feet.  Breathe out as you slowly stand up. Try not to support any weight with your hands.  Stand and pause for a full breath in and out.  Breathe in  as you sit down slowly. Tighten your core and abdominal muscles to control your lowering as much as possible. You should lower yourself back to the chair slowly, not just drop back into the seat.  Breathe out slowly.  Do this exercise 10-15 times. If needed, do it fewer times until you build up strength.  Rest for 1 minute, then do another set of 10-15 repetitions.  To change the difficulty of the sit-to-stand exercise  If the exercise is too difficult, use a chair with sturdy armrests, and push off the armrests to help you come to the standing position. You can also use the armrests to help slowly lower yourself back to sitting. As this gets easier, try to use your arms less. You can also place a firm cushion or pillow on the chair to make the surface higher.  If this exercise is too easy, do not use your arms to help raise or lower yourself. You can also wear a weighted vest, use hand weights, increase your repetitions, or try a lower chair.  General tips  You may feel tired when starting an exercise routine. This is normal.  You may have muscle soreness that lasts a few days. This is normal. As you get stronger, you may not feel muscle soreness.  Use smooth, steady movements.  Do not  hold your breath during strength exercises. This can cause unsafe changes in your blood pressure.  Breathe in slowly through your nose, and breathe out slowly through your mouth.  Summary  Strengthening your lower body is an important step to help you move safely and independently.  The sit-to-stand exercise helps strengthen the muscles in your thighs and core.  You should always talk with your health care provider before starting any exercise program, especially if you have had recent surgery.  This information is not intended to replace advice given to you by your health care provider. Make sure you discuss any questions you have with your health care provider.  Document Revised: 04/10/2022 Document Reviewed: 04/10/2022  Jennie  Patient Education 2024 Elsevier Inc.      Medicare Wellness  Personal Prevention Plan of Service     Date of Office Visit:    Encounter Provider:  Margret Purvis MD  Place of Service:  [facility]  Patient Name: Ramsey Riley  :  1955    As part of the Medicare Wellness portion of your visit today, we are providing you with this personalized preventive plan of services (PPPS). This plan is based upon recommendations of the [place] Preventive Services Task Force (USPSTF) and the Advisory Committee on Immunization Practices (ACIP).    This lists the preventive care services that should be considered, and provides dates of when you are due. Items listed as completed are up-to-date and do not require any further intervention.    Health Maintenance   Topic Date Due   • COLORECTAL CANCER SCREENING  Never done   • DIABETIC EYE EXAM  Never done   • HEPATITIS C SCREENING  Never done   • DIABETIC FOOT EXAM  10/19/2021   • BMI FOLLOWUP  2024   • ZOSTER VACCINE (1 of 2) 2024 (Originally 3/11/2007)   • COVID-19 Vaccine (1 - -24 season) 2024 (Originally 2023)   • RSV Vaccine - Adults (1 - 1-dose 60+ series) 2025 (Originally 3/11/2017)   • Pneumococcal Vaccine 65+ (3 of 3 - PPSV23 or PCV20) 2025 (Originally 3/11/2022)   • TDAP/TD VACCINES (1 - Tdap) 2025 (Originally 3/11/1976)   • INFLUENZA VACCINE  2024   • HEMOGLOBIN A1C  10/15/2024   • LIPID PANEL  04/15/2025   • URINE MICROALBUMIN  04/15/2025   • ANNUAL WELLNESS VISIT  2025       No orders of the defined types were placed in this encounter.      Return in about 1 year (around 2025) for Medicare Wellness.

## 2024-05-23 DIAGNOSIS — M54.50 LUMBOSACRAL PAIN: ICD-10-CM

## 2024-05-23 RX ORDER — HYDROCODONE BITARTRATE AND ACETAMINOPHEN 7.5; 325 MG/1; MG/1
1 TABLET ORAL EVERY 12 HOURS PRN
Qty: 60 TABLET | Refills: 0 | Status: SHIPPED | OUTPATIENT
Start: 2024-05-23

## 2024-05-23 NOTE — TELEPHONE ENCOUNTER
Caller: RosemaryJina    Relationship: Emergency Contact    Best call back number: [phone]    Requested Prescriptions:   Requested Prescriptions     Pending Prescriptions Disp Refills    HYDROcodone-acetaminophen (NORCO) 7.5-325 MG per tablet 60 tablet 0     Sig: Take 1 tablet by mouth Every 12 (Twelve) Hours As Needed for Moderate Pain.        Pharmacy where request should be sent: [facility] [place] [address] [place] [address] [phone] [facility] [phone]      Last office visit with prescribing clinician: 4/30/2024   Last telemedicine visit with prescribing clinician: Visit date not found   Next office visit with prescribing clinician: 6/3/2024     Additional details provided by patient: PATIENT IS OUT OF MEDICATION    Does the patient have less than a 3 day supply:  [x] Yes  [] No    Would you like a call back once the refill request has been completed: [] Yes [x] No    If the office needs to give you a call back, can they leave a voicemail: [] Yes [x] No    Neal Carl Rep   05/23/24 12:45 EDT

## 2024-05-27 DIAGNOSIS — Z79.4 TYPE 2 DIABETES MELLITUS WITH HYPERGLYCEMIA, WITH LONG-TERM CURRENT USE OF INSULIN: ICD-10-CM

## 2024-05-27 DIAGNOSIS — E11.65 TYPE 2 DIABETES MELLITUS WITH HYPERGLYCEMIA, WITH LONG-TERM CURRENT USE OF INSULIN: ICD-10-CM

## 2024-05-28 RX ORDER — INSULIN HUMAN 100 [IU]/ML
INJECTION, SUSPENSION SUBCUTANEOUS
Qty: 30 ML | Refills: 0 | Status: SHIPPED | OUTPATIENT
Start: 2024-05-28

## 2024-06-11 ENCOUNTER — OFFICE VISIT (OUTPATIENT)
Dept: INTERNAL MEDICINE | Facility: CLINIC | Age: 69
End: 2024-06-11
Payer: MEDICARE

## 2024-06-11 VITALS
DIASTOLIC BLOOD PRESSURE: 84 MMHG | HEIGHT: 70 IN | BODY MASS INDEX: 34.07 KG/M2 | OXYGEN SATURATION: 95 % | SYSTOLIC BLOOD PRESSURE: 137 MMHG | TEMPERATURE: 97.3 F | RESPIRATION RATE: 20 BRPM | WEIGHT: 238 LBS | HEART RATE: 52 BPM

## 2024-06-11 DIAGNOSIS — F33.0 MILD EPISODE OF RECURRENT MAJOR DEPRESSIVE DISORDER: ICD-10-CM

## 2024-06-11 DIAGNOSIS — I26.99 ACUTE PULMONARY EMBOLISM, UNSPECIFIED PULMONARY EMBOLISM TYPE, UNSPECIFIED WHETHER ACUTE COR PULMONALE PRESENT: ICD-10-CM

## 2024-06-11 DIAGNOSIS — Z51.81 THERAPEUTIC DRUG MONITORING: ICD-10-CM

## 2024-06-11 DIAGNOSIS — E03.8 ADULT ONSET HYPOTHYROIDISM: ICD-10-CM

## 2024-06-11 DIAGNOSIS — I10 BENIGN ESSENTIAL HYPERTENSION: Primary | ICD-10-CM

## 2024-06-11 DIAGNOSIS — K21.00 GASTROESOPHAGEAL REFLUX DISEASE WITH ESOPHAGITIS WITHOUT HEMORRHAGE: ICD-10-CM

## 2024-06-11 DIAGNOSIS — F51.04 INSOMNIA, PSYCHOPHYSIOLOGICAL: ICD-10-CM

## 2024-06-11 DIAGNOSIS — F41.9 ANXIETY: ICD-10-CM

## 2024-06-11 DIAGNOSIS — E78.2 MIXED HYPERLIPIDEMIA: ICD-10-CM

## 2024-06-11 DIAGNOSIS — N18.31 STAGE 3A CHRONIC KIDNEY DISEASE: ICD-10-CM

## 2024-06-11 DIAGNOSIS — Z79.4 TYPE 2 DIABETES MELLITUS WITH HYPERGLYCEMIA, WITH LONG-TERM CURRENT USE OF INSULIN: ICD-10-CM

## 2024-06-11 DIAGNOSIS — E11.65 TYPE 2 DIABETES MELLITUS WITH HYPERGLYCEMIA, WITH LONG-TERM CURRENT USE OF INSULIN: ICD-10-CM

## 2024-06-11 PROCEDURE — 3046F HEMOGLOBIN A1C LEVEL >9.0%: CPT | Performed by: INTERNAL MEDICINE

## 2024-06-11 PROCEDURE — 3079F DIAST BP 80-89 MM HG: CPT | Performed by: INTERNAL MEDICINE

## 2024-06-11 PROCEDURE — 1159F MED LIST DOCD IN RCRD: CPT | Performed by: INTERNAL MEDICINE

## 2024-06-11 PROCEDURE — 3075F SYST BP GE 130 - 139MM HG: CPT | Performed by: INTERNAL MEDICINE

## 2024-06-11 PROCEDURE — 1125F AMNT PAIN NOTED PAIN PRSNT: CPT | Performed by: INTERNAL MEDICINE

## 2024-06-11 PROCEDURE — 99214 OFFICE O/P EST MOD 30 MIN: CPT | Performed by: INTERNAL MEDICINE

## 2024-06-11 PROCEDURE — G2211 COMPLEX E/M VISIT ADD ON: HCPCS | Performed by: INTERNAL MEDICINE

## 2024-06-11 PROCEDURE — 1160F RVW MEDS BY RX/DR IN RCRD: CPT | Performed by: INTERNAL MEDICINE

## 2024-06-11 RX ORDER — ATORVASTATIN CALCIUM 80 MG/1
80 TABLET, FILM COATED ORAL NIGHTLY
Qty: 90 TABLET | Refills: 1 | Status: SHIPPED | OUTPATIENT
Start: 2024-06-11

## 2024-06-11 RX ORDER — INSULIN HUMAN 100 [IU]/ML
30 INJECTION, SUSPENSION SUBCUTANEOUS 2 TIMES DAILY WITH MEALS
Qty: 30 ML | Refills: 5 | Status: SHIPPED | OUTPATIENT
Start: 2024-06-11

## 2024-06-11 RX ORDER — QUETIAPINE FUMARATE 50 MG/1
50 TABLET, FILM COATED ORAL 2 TIMES DAILY
Qty: 180 TABLET | Refills: 1 | Status: SHIPPED | OUTPATIENT
Start: 2024-06-11

## 2024-06-11 RX ORDER — FAMOTIDINE 20 MG/1
20 TABLET, FILM COATED ORAL 2 TIMES DAILY
Qty: 180 TABLET | Refills: 1 | Status: SHIPPED | OUTPATIENT
Start: 2024-06-11

## 2024-06-11 RX ORDER — CITALOPRAM 40 MG/1
40 TABLET ORAL DAILY
Qty: 90 TABLET | Refills: 1 | Status: SHIPPED | OUTPATIENT
Start: 2024-06-11

## 2024-06-11 RX ORDER — LOSARTAN POTASSIUM 100 MG/1
100 TABLET ORAL DAILY
Qty: 90 TABLET | Refills: 1 | Status: SHIPPED | OUTPATIENT
Start: 2024-06-11

## 2024-06-11 RX ORDER — PANTOPRAZOLE SODIUM 40 MG/1
40 TABLET, DELAYED RELEASE ORAL DAILY
Qty: 90 TABLET | Refills: 1 | Status: SHIPPED | OUTPATIENT
Start: 2024-06-11

## 2024-06-11 RX ORDER — ALPRAZOLAM 0.5 MG/1
0.5 TABLET ORAL NIGHTLY PRN
Qty: 30 TABLET | Refills: 5 | Status: SHIPPED | OUTPATIENT
Start: 2024-06-11

## 2024-06-11 RX ORDER — LEVOTHYROXINE SODIUM 0.12 MG/1
125 TABLET ORAL DAILY
Qty: 90 TABLET | Refills: 1 | Status: SHIPPED | OUTPATIENT
Start: 2024-06-11

## 2024-06-22 LAB — DRUGS UR: NORMAL

## 2024-06-24 DIAGNOSIS — M54.50 LUMBOSACRAL PAIN: ICD-10-CM

## 2024-06-24 DIAGNOSIS — E11.65 TYPE 2 DIABETES MELLITUS WITH HYPERGLYCEMIA, WITH LONG-TERM CURRENT USE OF INSULIN: ICD-10-CM

## 2024-06-24 DIAGNOSIS — Z79.4 TYPE 2 DIABETES MELLITUS WITH HYPERGLYCEMIA, WITH LONG-TERM CURRENT USE OF INSULIN: ICD-10-CM

## 2024-06-24 RX ORDER — INSULIN HUMAN 100 [IU]/ML
INJECTION, SUSPENSION SUBCUTANEOUS
Qty: 30 ML | Refills: 0 | OUTPATIENT
Start: 2024-06-24

## 2024-06-24 RX ORDER — HYDROCODONE BITARTRATE AND ACETAMINOPHEN 7.5; 325 MG/1; MG/1
1 TABLET ORAL EVERY 12 HOURS PRN
Qty: 14 TABLET | Refills: 0 | Status: SHIPPED | OUTPATIENT
Start: 2024-06-24

## 2024-06-24 NOTE — TELEPHONE ENCOUNTER
Rx Refill Note  Requested Prescriptions     Pending Prescriptions Disp Refills    HYDROcodone-acetaminophen (NORCO) 7.5-325 MG per tablet 60 tablet 0     Sig: Take 1 tablet by mouth Every 12 (Twelve) Hours As Needed for Moderate Pain.      Last office visit with prescribing clinician: 6/11/2024   Last telemedicine visit with prescribing clinician: Visit date not found   Next office visit with prescribing clinician: 10/1/2024  UDS: 06/11/2024- appropriate.     Virginia Harrell MA  06/24/24, 12:02 EDT

## 2024-06-24 NOTE — TELEPHONE ENCOUNTER
Caller: Jina Riley    Relationship: Emergency Contact    Best call back number: [phone]     Requested Prescriptions:   Requested Prescriptions     Pending Prescriptions Disp Refills    HYDROcodone-acetaminophen (NORCO) 7.5-325 MG per tablet 60 tablet 0     Sig: Take 1 tablet by mouth Every 12 (Twelve) Hours As Needed for Moderate Pain.        Pharmacy where request should be sent: [facility] [place] [address] [place] [address] [phone] [facility] [phone]      Last office visit with prescribing clinician: 6/11/2024   Last telemedicine visit with prescribing clinician: Visit date not found   Next office visit with prescribing clinician: 10/1/2024     Additional details provided by patient: SPOUSE OF PATIENT HAS CALLED REQUESTING A REFILL ON ABOVE MEDICATION.    Does the patient have less than a 3 day supply:  [x] Yes  [] No    Would you like a call back once the refill request has been completed: [] Yes [x] No    If the office needs to give you a call back, can they leave a voicemail: [] Yes [x] No    Chrissy Singh   06/24/24 11:38 EDT

## 2024-06-28 ENCOUNTER — OFFICE VISIT (OUTPATIENT)
Dept: ENDOCRINOLOGY | Facility: CLINIC | Age: 69
End: 2024-06-28
Payer: MEDICARE

## 2024-06-28 VITALS
WEIGHT: 239 LBS | OXYGEN SATURATION: 95 % | DIASTOLIC BLOOD PRESSURE: 84 MMHG | HEART RATE: 60 BPM | SYSTOLIC BLOOD PRESSURE: 158 MMHG | BODY MASS INDEX: 34.22 KG/M2 | HEIGHT: 70 IN

## 2024-06-28 DIAGNOSIS — E78.5 HYPERLIPIDEMIA, UNSPECIFIED HYPERLIPIDEMIA TYPE: ICD-10-CM

## 2024-06-28 DIAGNOSIS — Z79.4 TYPE 2 DIABETES MELLITUS WITH HYPERGLYCEMIA, WITH LONG-TERM CURRENT USE OF INSULIN: Primary | ICD-10-CM

## 2024-06-28 DIAGNOSIS — E11.65 TYPE 2 DIABETES MELLITUS WITH HYPERGLYCEMIA, WITH LONG-TERM CURRENT USE OF INSULIN: Primary | ICD-10-CM

## 2024-06-28 LAB
EXPIRATION DATE: ABNORMAL
GLUCOSE BLDC GLUCOMTR-MCNC: 450 MG/DL (ref 70–130)
Lab: ABNORMAL

## 2024-06-28 PROCEDURE — 3079F DIAST BP 80-89 MM HG: CPT | Performed by: INTERNAL MEDICINE

## 2024-06-28 PROCEDURE — 3046F HEMOGLOBIN A1C LEVEL >9.0%: CPT | Performed by: INTERNAL MEDICINE

## 2024-06-28 PROCEDURE — 3077F SYST BP >= 140 MM HG: CPT | Performed by: INTERNAL MEDICINE

## 2024-06-28 PROCEDURE — 99204 OFFICE O/P NEW MOD 45 MIN: CPT | Performed by: INTERNAL MEDICINE

## 2024-06-28 PROCEDURE — 82947 ASSAY GLUCOSE BLOOD QUANT: CPT | Performed by: INTERNAL MEDICINE

## 2024-06-28 RX ORDER — BLOOD-GLUCOSE SENSOR
1 EACH MISCELLANEOUS
Qty: 3 EACH | Refills: 11 | Status: SHIPPED | OUTPATIENT
Start: 2024-06-28

## 2024-06-28 RX ORDER — INSULIN HUMAN 100 [IU]/ML
70 INJECTION, SUSPENSION SUBCUTANEOUS 2 TIMES DAILY
Qty: 45 ML | Refills: 5 | Status: CANCELLED | OUTPATIENT
Start: 2024-06-28

## 2024-06-28 RX ORDER — KETOROLAC TROMETHAMINE 30 MG/ML
1 INJECTION, SOLUTION INTRAMUSCULAR; INTRAVENOUS ONCE
Qty: 1 EACH | Refills: 0 | Status: SHIPPED | OUTPATIENT
Start: 2024-06-28 | End: 2024-06-28

## 2024-06-28 NOTE — PROGRESS NOTES
Chief Complaint   Patient presents with    Diabetes        New patient who is being seen in consultation regarding diabetes at the request of Margret Purvis MD     HPI   Ramsey Riley is a 69 y.o. male who presents for evaluation of diabetes.    Patient has a history of type 2 diabetes.      Of note, patient reported medication is significantly different from what is listed in the chart.  He reports he is taking 70/30 70 units twice daily.  He does take this generally after meals.  He does report that he is running out of insulin per prescription and when he does this he buys regular insulin over-the-counter at [facility] and generally will use 50 units twice daily when he is using only regular insulin.  He does report this generally results in poor glucose control.  He does report missed doses of medication.    This regimen was last changed a few years ago.      Patient has also previously taken the following medications which were discontinued:  Metformin which was stopped due to poor control    Patient reports no hypoglycemia or symptoms of hypoglycemia.  Patient monitors blood glucose intermittently via fingerstick, no home data is available for review.  He inquires about CGM  Patient does not follow a diabetic diet.  Patient does not exercise regularly. Patient is sedentary.    History of dyslipidemia: Yes, taking atorvastatin 80 mg daily  History of renal dysfunction: Yes    Patient reports that eye exam is up-to-date.  He is seeing a retina specialist at .      Glucose was noted to be elevated during office visit.  Patient reports he has not taken any medication today.  He reports he feels like he is in his usual state of health, no acute symptoms.  Past Medical History:   Diagnosis Date    Arthritis     Clot     Coronary artery disease     Diabetes mellitus     Heart disease     History of blood clots     Hypertension     Low back pain     Thyroid disease     Type 2 diabetes mellitus      Past Surgical  History:   Procedure Laterality Date    CHOLECYSTECTOMY  2015    COLONOSCOPY  2015    dr srivastava    CORONARY ARTERY BYPASS GRAFT      [place] - 3 BY PASS     ENDOSCOPY        Family History   Problem Relation Age of Onset    Diabetes Mother     Heart disease Mother         STENTING    COPD Mother     Heart attack Father     Diabetes Father     Heart defect Son         Ross procedure    Heart murmur Son     Hypertension Son       Social History     Socioeconomic History    Marital status:    Tobacco Use    Smoking status: Never     Passive exposure: Never    Smokeless tobacco: Current     Types: Chew   Vaping Use    Vaping status: Never Used   Substance and Sexual Activity    Alcohol use: No    Drug use: No    Sexual activity: Defer      Allergies   Allergen Reactions    Trazodone Other (See Comments)     agitation      Current Outpatient Medications on File Prior to Visit   Medication Sig Dispense Refill    ALPRAZolam (XANAX) 0.5 MG tablet Take 1 tablet by mouth At Night As Needed for Anxiety. 30 tablet 5    amLODIPine (NORVASC) 10 MG tablet Take 1 tablet by mouth Daily. 90 tablet 3    apixaban (ELIQUIS) 5 MG tablet tablet Take 1 tablet by mouth Every 12 (Twelve) Hours. 60 tablet 5    aspirin 81 MG chewable tablet Chew 1 tablet Daily.      atorvastatin (Lipitor) 80 MG tablet Take 1 tablet by mouth Every Night. 90 tablet 1    chlorthalidone (HYGROTON) 25 MG tablet Take 0.5 tablets by mouth Daily. 45 tablet 3    citalopram (CeleXA) 40 MG tablet Take 1 tablet by mouth Daily. 90 tablet 1    clotrimazole-betamethasone (Lotrisone) 1-0.05 % cream Apply to affected area 2 times daily 45 g 1    cyclopentolate (CYCLOGYL) 1 % ophthalmic solution Apply 1 drop to eye(s) as directed by provider 3 (Three) Times a Day.      famotidine (Pepcid) 20 MG tablet Take 1 tablet by mouth 2 (Two) Times a Day. 180 tablet 1    glucose blood (Accu-Chek Caryn Plus) test strip USE AS DIRECTED 3 TIMES DAILY 100 each 12    glucose monitor  "monitoring kit 1 each Daily. 1 each 1    glucose monitor monitoring kit 1 each Daily. 1 each 1    HYDROcodone-acetaminophen (NORCO) 7.5-325 MG per tablet Take 1 tablet by mouth Every 12 (Twelve) Hours As Needed for Moderate Pain. 14 tablet 0    Insulin Syringes, Disposable, U-100 1 ML misc 50 Units 3 (Three) Times a Day As Needed (50 units in the morning, and then with meals PRN). 200 each 12    Lancets 30G misc Check sugar 3 times daily 30 each 12    levothyroxine (SYNTHROID, LEVOTHROID) 125 MCG tablet Take 1 tablet by mouth Daily. 90 tablet 1    losartan (COZAAR) 100 MG tablet Take 1 tablet by mouth Daily. 90 tablet 1    pantoprazole (PROTONIX) 40 MG EC tablet Take 1 tablet by mouth Daily. 90 tablet 1    prednisoLONE sodium phosphate (INFLAMASE FORTE) 1 % ophthalmic solution USE 1 DROP IN LEFT EYE 4 TIMES A DAY      QUEtiapine (SEROquel) 50 MG tablet Take 1 tablet by mouth 2 (Two) Times a Day. 180 tablet 1    [DISCONTINUED] insulin NPH-insulin regular (HumuLIN 70/30) (70-30) 100 UNIT/ML injection Inject 30 Units under the skin into the appropriate area as directed 2 (Two) Times a Day With Meals. 30 mL 5     No current facility-administered medications on file prior to visit.        Review of Systems   Constitutional:  Positive for activity change and fatigue.   Eyes:  Positive for visual disturbance.   Endocrine: Positive for polydipsia and polyuria.        Vitals:    06/28/24 1407   BP: 158/84   BP Location: Left arm   Patient Position: Sitting   Cuff Size: Adult   Pulse: 60   SpO2: 95%   Weight: 108 kg (239 lb)   Height: 177.8 cm (70\")   Body mass index is 34.29 kg/m².     Physical Exam  Vitals reviewed.   Cardiovascular:      Rate and Rhythm: Normal rate and regular rhythm.   Pulmonary:      Effort: Pulmonary effort is normal.      Breath sounds: Normal breath sounds.   Neurological:      Mental Status: He is alert.   Psychiatric:         Mood and Affect: Mood and affect normal.         Behavior: Behavior is " cooperative.         Labs/Imaging  Glucose:  Lab Results   Component Value Date    POCGLU 450 (A) 06/28/2024     HbA1c:  Lab Results   Component Value Date    HGBA1C 10.5 (H) 04/15/2024    HGBA1C 10.40 (H) 06/21/2022     Microalbumin:  Lab Results   Component Value Date    MICROALBUR 3,522.7 04/15/2024     Lipid Panel:  Lab Results   Component Value Date    CHOL 159 06/22/2022    TRIG 210 (H) 04/15/2024    HDL 36 (L) 04/15/2024    LDL 82 04/15/2024    VLDL 35 04/15/2024    LDLHDL 2.32 06/22/2022     CMP:  Lab Results   Component Value Date    BUN 15 04/15/2024    CREATININE 1.39 (H) 04/15/2024    EGFRIFNONA 66 09/11/2020    EGFRIFAFRI 97 08/12/2019    BCR 11 04/15/2024     04/15/2024    K 4.1 04/15/2024    CO2 24 04/15/2024    CALCIUM 8.9 04/15/2024    PROTENTOTREF 6.0 04/15/2024    ALBUMIN 3.7 (L) 04/15/2024    LABGLOBREF 2.3 04/15/2024    LABIL2 1.6 04/15/2024    BILITOT 0.4 04/15/2024    ALKPHOS 112 04/15/2024    AST 24 04/15/2024    ALT 22 04/15/2024        Assessment and Plan    Diagnoses and all orders for this visit:    1. Type 2 diabetes mellitus with hyperglycemia, with long-term current use of insulin (Primary)  -     POC Glucose, Blood  Diabetes is poorly controlled with hemoglobin A1c 10.5%, glucose is too high to read on point-of-care testing indicating value greater than 450.  Discussed complications that could occur due to hyperglycemia.  Discussed the importance of maintaining hydration when glucose is elevated and when to seek care if patient were to develop symptoms in the setting of hyperglycemia.  Discussed importance of taking medications regularly without missed doses.  Discussed that 70/30 should be taken prior to and not following meals.  Discussed with patient that it is difficult to safely make changes to insulin regimen in the absence of glycemic data and if he is not taking medications routinely.  Prescription was sent for 70/30 70 units twice daily to be taken prior to meals.   Patient was instructed to log glucose data and contact the clinic with values after 2 weeks for potential adjustment.  We discussed recommendations for dietary changes including avoiding sugary drinks and limitation of dietary carbohydrates.  CMP, lipid panel, urine microalbumin reviewed from April 2024  I discussed with patient that he is currently at high risk of complications secondary to poorly controlled diabetes.  We discussed potential complications of suboptimal glycemic control including organ dysfunction and death.    2. Hyperlipidemia, unspecified hyperlipidemia type  Most recent LDL 82 in April 2024, patient will continue current atorvastatin    Other orders  -     Continuous Glucose Sensor (FreeStyle Rina 3 Sensor) misc; Use 1 each Every 14 (Fourteen) Days.  Dispense: 3 each; Refill: 11  -     Continuous Glucose  (FreeStyle Rina 3 [place]) device; Use 1 each 1 (One) Time for 1 dose.  Dispense: 1 each; Refill: 0  -     Insulin Pen Needle 32G X 4 MM misc; For use with insulin injections, daily  Dispense: 100 each; Refill: 11         Return in about 3 months (around 9/28/2024) for Next scheduled follow up. The patient was instructed to contact the clinic with any interval questions or concerns.    Electronically signed by: Michelle King MD     Dictated Utilizing Dragon Dictation

## 2024-07-10 NOTE — TELEPHONE ENCOUNTER
Caller: Ramsey Riley    Relationship: Self    Best call back number: [phone]    Medication needed:   Requested Prescriptions     Pending Prescriptions Disp Refills   • HYDROcodone-acetaminophen (NORCO) 7.5-325 MG per tablet 60 tablet 0     Sig: Take 1 tablet by mouth 2 (Two) Times a Day.       When do you need the refill by: 3/8/21    What details did the patient provide when requesting the medication:     Does the patient have less than a 3 day supply:  [x] Yes  [] No    What is the patient's preferred pharmacy: [address] [phone] [facility] [phone]                 The patient location is: work  The chief complaint leading to consultation is: follow up for weight loss    Visit type: audiovisual    Face to Face time with patient: 15 minutes  20 minutes of total time spent on the encounter, which includes face to face time and non-face to face time preparing to see the patient (eg, review of tests), Obtaining and/or reviewing separately obtained history, Documenting clinical information in the electronic or other health record, Independently interpreting results (not separately reported) and communicating results to the patient/family/caregiver, or Care coordination (not separately reported).         Each patient to whom he or she provides medical services by telemedicine is:  (1) informed of the relationship between the physician and patient and the respective role of any other health care provider with respect to management of the patient; and (2) notified that he or she may decline to receive medical services by telemedicine and may withdraw from such care at any time.    Notes:    Subjective:      Teagan Griffith is a 47 y.o. female with history of renal cell carcinoma s/p right nephrectomy on 9/14/22 who presents for weight loss follow up. She has been working on dietary changes. Increasing her protein throughout the day. However, she continues to struggle with sugar cravings. She is not gaining weight but has not lost either. Does feel like her clothes are fitting better. She now ready to try a medication to help. Interested in GLP-1.    Routine Screening Labs: 3/27/2024    Admission on 04/29/2024, Discharged on 04/29/2024   Component Date Value Ref Range Status    QRS Duration 04/29/2024 108  ms Final    OHS QTC Calculation 04/29/2024 442  ms Final    WBC 04/29/2024 9.80  3.90 - 12.70 K/uL Final    RBC 04/29/2024 3.97 (L)  4.00 - 5.40 M/uL Final    Hemoglobin 04/29/2024 11.6 (L)  12.0 - 16.0 g/dL Final    Hematocrit 04/29/2024 36.1 (L)  37.0 - 48.5 % Final    MCV  04/29/2024 91  82 - 98 fL Final    MCH 04/29/2024 29.2  27.0 - 31.0 pg Final    MCHC 04/29/2024 32.1  32.0 - 36.0 g/dL Final    RDW 04/29/2024 12.8  11.5 - 14.5 % Final    Platelets 04/29/2024 346  150 - 450 K/uL Final    MPV 04/29/2024 10.4  9.2 - 12.9 fL Final    Immature Granulocytes 04/29/2024 0.4  0.0 - 0.5 % Final    Gran # (ANC) 04/29/2024 6.1  1.8 - 7.7 K/uL Final    Immature Grans (Abs) 04/29/2024 0.04  0.00 - 0.04 K/uL Final    Lymph # 04/29/2024 2.7  1.0 - 4.8 K/uL Final    Mono # 04/29/2024 0.6  0.3 - 1.0 K/uL Final    Eos # 04/29/2024 0.3  0.0 - 0.5 K/uL Final    Baso # 04/29/2024 0.06  0.00 - 0.20 K/uL Final    nRBC 04/29/2024 0  0 /100 WBC Final    Gran % 04/29/2024 62.6  38.0 - 73.0 % Final    Lymph % 04/29/2024 27.4  18.0 - 48.0 % Final    Mono % 04/29/2024 6.0  4.0 - 15.0 % Final    Eosinophil % 04/29/2024 3.0  0.0 - 8.0 % Final    Basophil % 04/29/2024 0.6  0.0 - 1.9 % Final    Differential Method 04/29/2024 Automated   Final    Sodium 04/29/2024 136  136 - 145 mmol/L Final    Potassium 04/29/2024 4.7  3.5 - 5.1 mmol/L Final    Chloride 04/29/2024 105  95 - 110 mmol/L Final    CO2 04/29/2024 23  23 - 29 mmol/L Final    Glucose 04/29/2024 96  70 - 110 mg/dL Final    BUN 04/29/2024 21 (H)  6 - 20 mg/dL Final    Creatinine 04/29/2024 1.1  0.5 - 1.4 mg/dL Final    Calcium 04/29/2024 9.9  8.7 - 10.5 mg/dL Final    Total Protein 04/29/2024 6.8  6.0 - 8.4 g/dL Final    Albumin 04/29/2024 3.6  3.5 - 5.2 g/dL Final    Total Bilirubin 04/29/2024 0.2  0.1 - 1.0 mg/dL Final    Alkaline Phosphatase 04/29/2024 74  55 - 135 U/L Final    AST 04/29/2024 18  10 - 40 U/L Final    ALT 04/29/2024 26  10 - 44 U/L Final    eGFR 04/29/2024 >60.0  >60 mL/min/1.73 m^2 Final    Anion Gap 04/29/2024 8  8 - 16 mmol/L Final    Specimen UA 04/29/2024 Urine, Clean Catch   Final    Color, UA 04/29/2024 Yellow  Yellow, Straw, Lucero Final    Appearance, UA 04/29/2024 Clear  Clear Final    pH, UA 04/29/2024 5.0  5.0 - 8.0 Final     Specific Gravity, UA 04/29/2024 1.020  1.005 - 1.030 Final    Protein, UA 04/29/2024 Negative  Negative Final    Glucose, UA 04/29/2024 Negative  Negative Final    Ketones, UA 04/29/2024 Negative  Negative Final    Bilirubin (UA) 04/29/2024 Negative  Negative Final    Occult Blood UA 04/29/2024 Negative  Negative Final    Nitrite, UA 04/29/2024 Negative  Negative Final    Leukocytes, UA 04/29/2024 Negative  Negative Final    Preg Test, Ur 04/29/2024 Negative   Final    Lipase 04/29/2024 27  4 - 60 U/L Final    Magnesium 04/29/2024 1.9  1.6 - 2.6 mg/dL Final    Blood Culture, Routine 04/29/2024 No growth after 5 days.   Final    Blood Culture, Routine 04/29/2024 No growth after 5 days.   Final   Lab Visit on 04/24/2024   Component Date Value Ref Range Status    Specimen UA 04/24/2024 Urine, Clean Catch   Final    Color, UA 04/24/2024 Yellow  Yellow, Straw, Lucero Final    Appearance, UA 04/24/2024 Hazy (A)  Clear Final    pH, UA 04/24/2024 5.0  5.0 - 8.0 Final    Specific Gravity, UA 04/24/2024 1.015  1.005 - 1.030 Final    Protein, UA 04/24/2024 Negative  Negative Final    Glucose, UA 04/24/2024 Negative  Negative Final    Ketones, UA 04/24/2024 Negative  Negative Final    Bilirubin (UA) 04/24/2024 Negative  Negative Final    Occult Blood UA 04/24/2024 1+ (A)  Negative Final    Nitrite, UA 04/24/2024 Negative  Negative Final    Leukocytes, UA 04/24/2024 3+ (A)  Negative Final    Urine Culture, Routine 04/24/2024  (A)   Final                    Value:STREPTOCOCCUS AGALACTIAE (GROUP B)  > 100,000 cfu/ml  Susceptibility testing not routinely performed.  In case of Penicillin allergy, call lab for further testing.  Beta-hemolytic streptococci are routinely susceptible to   penicillins,cephalosporins and carbapenems.      RBC, UA 04/24/2024 1  0 - 4 /hpf Final    WBC, UA 04/24/2024 33 (H)  0 - 5 /hpf Final    Bacteria 04/24/2024 Occasional  None-Occ /hpf Final    Squam Epithel, UA 04/24/2024 2  /hpf Final    Microscopic  Comment 2024 SEE COMMENT   Final   Lab Visit on 2024   Component Date Value Ref Range Status    Drug Study Test Name 2024 Drug Study   Final    Drug Study Specimen Type 2024 blood   Final    Drug Study Test Result 2024 Result sent directly to physician   Final    Sodium 2024 138  136 - 145 mmol/L Final    Potassium 2024 4.4  3.5 - 5.1 mmol/L Final    Chloride 2024 105  95 - 110 mmol/L Final    CO2 2024 25  23 - 29 mmol/L Final    Glucose 2024 124 (H)  70 - 110 mg/dL Final    BUN 2024 15  6 - 20 mg/dL Final    Creatinine 2024 1.1  0.5 - 1.4 mg/dL Final    Calcium 2024 9.4  8.7 - 10.5 mg/dL Final    Total Protein 2024 6.9  6.0 - 8.4 g/dL Final    Albumin 2024 3.7  3.5 - 5.2 g/dL Final    Total Bilirubin 2024 0.2  0.1 - 1.0 mg/dL Final    Alkaline Phosphatase 2024 71  55 - 135 U/L Final    AST 2024 20  10 - 40 U/L Final    ALT 2024 29  10 - 44 U/L Final    eGFR 2024 >60.0  >60 mL/min/1.73 m^2 Final    Anion Gap 2024 8  8 - 16 mmol/L Final       Past Medical History:   Diagnosis Date    JEN (acute kidney injury) 2022    Depression     Renal cell carcinoma 2022     Past Surgical History:   Procedure Laterality Date     SECTION      LAPAROSCOPIC ROBOT-ASSISTED SURGICAL REMOVAL OF KIDNEY USING DA CAESAR XI Right 2022    Procedure: XI ROBOTIC NEPHRECTOMY;  Surgeon: Justin Riley MD;  Location: [address];  Service: Urology;  Laterality: Right;  4 hours     Social History     Tobacco Use    Smoking status: Former     Current packs/day: 0.00     Types: Cigarettes     Start date: 1998     Quit date: 2018     Years since quittin.5    Smokeless tobacco: Never   Substance Use Topics    Alcohol use: Yes     Alcohol/week: 42.0 standard drinks of alcohol     Types: 42 Cans of beer per week     Family History   Problem Relation Name Age of Onset    Drug abuse Mother       Prostate cancer Father      Lymphoma Paternal Grandfather      Kidney cancer Neg Hx       OB History   No obstetric history on file.       Current Outpatient Medications:     acetaminophen (TYLENOL) 500 MG tablet, Take 2 tablets (1,000 mg total) by mouth every 8 (eight) hours as needed for Pain., Disp: 90 tablet, Rfl: 0    ALPRAZolam (XANAX) 1 MG tablet, Take 1 tablet (1 mg total) by mouth 2 (two) times a day., Disp: 60 tablet, Rfl: 0    cariprazine (VRAYLAR) 4.5 mg Cap, Take 1 capsule (4.5 mg total) by mouth once daily., Disp: 30 capsule, Rfl: 1    cloNIDine (CATAPRES) 0.1 MG tablet, Take 1 tablet (0.1 mg total) by mouth 2 (two) times daily., Disp: 60 tablet, Rfl: 1    dextroamphetamine-amphetamine 30 mg Tab, Take 1 tablet (30 mg total) by mouth 2 (two) times a day., Disp: 60 tablet, Rfl: 0    lamoTRIgine (LAMICTAL) 150 MG Tab, Take 1 tablet (150 mg total) by mouth once daily., Disp: 30 tablet, Rfl: 1    semaglutide, weight loss, (WEGOVY) 0.25 mg/0.5 mL PnIj, Inject 0.25 mg into the skin every 7 days., Disp: 2 mL, Rfl: 0    sertraline (ZOLOFT) 100 MG tablet, Take 1 tablet (100 mg total) by mouth once daily., Disp: 30 tablet, Rfl: 1    sertraline (ZOLOFT) 50 MG tablet, Take 1 tablet (50 mg total) by mouth once daily., Disp: 30 tablet, Rfl: 1    simvastatin (ZOCOR) 10 MG tablet, Take 1 tablet (10 mg total) by mouth every evening., Disp: 30 tablet, Rfl: 11  No current facility-administered medications for this visit.    Facility-Administered Medications Ordered in Other Visits:     diphenhydrAMINE injection 25 mg, 25 mg, Intravenous, Q6H PRN, Elias Doss MD    fentaNYL 50 mcg/mL injection 25 mcg, 25 mcg, Intravenous, Q5 Min PRN, Elias Doss MD    fentaNYL 50 mcg/mL injection  mcg,  mcg, Intravenous, PRN, Matt Ellison DO, 50 mcg at 09/14/22 0735    HYDROmorphone injection 0.2 mg, 0.2 mg, Intravenous, Q5 Min PRN, Elias Doss MD, 0.2 mg at 09/14/22 1786    midazolam (VERSED) 1 mg/mL  "injection 2 mg, 2 mg, Intravenous, PRN, Matt Ellison, DO, 2 mg at 09/14/22 0735    prochlorperazine injection Soln 5 mg, 5 mg, Intravenous, Q30 Min PRN, Elias Doss MD    sodium chloride 0.9% flush 10 mL, 10 mL, Intravenous, PRN, Elias Doss MD    Review of Systems:  General: No fever, chills.  Chest: No chest pain, shortness of breath, or palpitations.  Breast: No pain, masses, or nipple discharge.  Vulva: No pain, lesions, or itching.  Vagina: No relaxation, itching, discharge, or lesions.  Abdomen: No pain, nausea, vomiting, diarrhea, or constipation.  Urinary: No incontinence, nocturia, frequency, or dysuria.  Extremities:  No leg cramps, edema, or calf pain.  Neurologic: No headaches, dizziness, or visual changes.    Objective:     Vitals:    07/10/24 1002   Weight: 117.9 kg (260 lb)   Height: 5' 5" (1.651 m)     Body mass index is 43.27 kg/m².    PHYSICAL EXAM:  APPEARANCE: Well nourished, well developed, in no acute distress.  AFFECT: WNL, alert and oriented x 3      Assessment:      Renal cell carcinoma of right kidney    Morbid obesity with BMI of 40.0-44.9, adult  -     semaglutide, weight loss, (WEGOVY) 0.25 mg/0.5 mL PnIj; Inject 0.25 mg into the skin every 7 days.  Dispense: 2 mL; Refill: 0    Pre-diabetes  -     semaglutide, weight loss, (WEGOVY) 0.25 mg/0.5 mL PnIj; Inject 0.25 mg into the skin every 7 days.  Dispense: 2 mL; Refill: 0        Plan:   Continue low glycemic diet with lean protein at each meal.  Maintain hydration.  Stressed the importance of maintaining hydration and how these medications can effect thirst.  Start Wegovy 2.5mg SC weekly. Counseled on use  Reviewed side effects and risks of medications. No family or personal history of thyroid cancer. Denies personal history of pancreatitis or underlying problems with gallbladder.  Discussed that these are long term options for weight loss and risk of gaining weight back if discontinued.   Follow up in   We did discuss the " option of compounded semaglutide if not covered by her insurance.  Reviewed the differences between Wegovy/Ozempic and compounded Semaglutide-L-carnitine and that these medications are not FDA regulated.  Follow up in 6 weeks.    Instructed patient to call if she experiences any side effects or has any questions.    I spent a total of 20 minutes on the day of the visit.This includes face to face time and non-face to face time preparing to see the patient (eg, review of tests), obtaining and/or reviewing separately obtained history, documenting clinical information in the electronic or other health record, independently interpreting results and communicating results to the patient/family/caregiver, or care coordinator.

## 2024-07-23 DIAGNOSIS — M54.50 LUMBOSACRAL PAIN: ICD-10-CM

## 2024-07-23 RX ORDER — HYDROCODONE BITARTRATE AND ACETAMINOPHEN 7.5; 325 MG/1; MG/1
1 TABLET ORAL EVERY 12 HOURS PRN
Qty: 60 TABLET | Refills: 0 | Status: SHIPPED | OUTPATIENT
Start: 2024-07-23

## 2024-07-23 NOTE — TELEPHONE ENCOUNTER
Rx Refill Note  Requested Prescriptions     Pending Prescriptions Disp Refills    HYDROcodone-acetaminophen (NORCO) 7.5-325 MG per tablet 14 tablet 0     Sig: Take 1 tablet by mouth Every 12 (Twelve) Hours As Needed for Moderate Pain.      Last office visit with prescribing clinician: 6/11/2024   Last telemedicine visit with prescribing clinician: Visit date not found   Next office visit with prescribing clinician: 10/1/2024                         Would you like a call back once the refill request has been completed: [] Yes [] No    If the office needs to give you a call back, can they leave a voicemail: [] Yes [] No    Carlos Peterson, TAHIR  07/23/24, 10:48 EDT

## 2024-07-23 NOTE — TELEPHONE ENCOUNTER
Caller: RosemaryJina    Relationship: Emergency Contact    Best call back number: [phone]     Requested Prescriptions:   Requested Prescriptions     Pending Prescriptions Disp Refills    HYDROcodone-acetaminophen (NORCO) 7.5-325 MG per tablet 14 tablet 0     Sig: Take 1 tablet by mouth Every 12 (Twelve) Hours As Needed for Moderate Pain.        Pharmacy where request should be sent: [facility] [address] [place] [address] [phone] [facility] [phone]      Last office visit with prescribing clinician: 6/11/2024   Last telemedicine visit with prescribing clinician: Visit date not found   Next office visit with prescribing clinician: 10/1/2024     Additional details provided by patient: PATIENT IS OUT OF THE MEDICATION    Does the patient have less than a 3 day supply:  [] Yes  [] No    Would you like a call back once the refill request has been completed: [x] Yes [] No    If the office needs to give you a call back, can they leave a voicemail: [x] Yes [] No    Neal Johnson Rep   07/23/24 10:31 EDT

## 2024-08-22 RX ORDER — INSULIN HUMAN 100 [IU]/ML
INJECTION, SUSPENSION SUBCUTANEOUS
Qty: 45 ML | Refills: 1 | Status: SHIPPED | OUTPATIENT
Start: 2024-08-22

## 2024-08-22 NOTE — TELEPHONE ENCOUNTER
PATIENT NEEDS PRESCRIPTION FOR HUMALIN 70/30 MIX. PATIENT IS COMPLETELY OUT OF INSULIN AND NEEDS THIS AS SOON AS POSSIBLE.

## 2024-08-22 NOTE — TELEPHONE ENCOUNTER
Rx Refill Note    Requested Prescriptions      No prescriptions requested or ordered in this encounter        Last office visit with prescribing clinician: 6/28/2024       Next office visit with prescribing clinician: 10/8/2024     {    Charisse Ordonez MA  08/22/24, 15:54 EDT

## 2024-09-06 DIAGNOSIS — I26.99 ACUTE PULMONARY EMBOLISM, UNSPECIFIED PULMONARY EMBOLISM TYPE, UNSPECIFIED WHETHER ACUTE COR PULMONALE PRESENT: ICD-10-CM

## 2024-09-06 RX ORDER — APIXABAN 5 MG/1
5 TABLET, FILM COATED ORAL EVERY 12 HOURS
Qty: 60 TABLET | Refills: 5 | Status: SHIPPED | OUTPATIENT
Start: 2024-09-06

## 2024-09-10 ENCOUNTER — TELEPHONE (OUTPATIENT)
Dept: INTERNAL MEDICINE | Facility: CLINIC | Age: 69
End: 2024-09-10
Payer: MEDICARE

## 2024-09-10 DIAGNOSIS — M54.50 LUMBOSACRAL PAIN: ICD-10-CM

## 2024-09-12 ENCOUNTER — TRANSCRIBE ORDERS (OUTPATIENT)
Dept: ADMINISTRATIVE | Facility: HOSPITAL | Age: 69
End: 2024-09-12
Payer: MEDICARE

## 2024-09-12 DIAGNOSIS — N18.31 STAGE 3A CHRONIC KIDNEY DISEASE: Primary | ICD-10-CM

## 2024-09-19 DIAGNOSIS — M54.50 LUMBOSACRAL PAIN: ICD-10-CM

## 2024-09-19 RX ORDER — HYDROCODONE BITARTRATE AND ACETAMINOPHEN 7.5; 325 MG/1; MG/1
1 TABLET ORAL EVERY 12 HOURS PRN
Qty: 60 TABLET | Refills: 0 | OUTPATIENT
Start: 2024-09-19

## 2024-09-19 RX ORDER — HYDROCODONE BITARTRATE AND ACETAMINOPHEN 7.5; 325 MG/1; MG/1
1 TABLET ORAL EVERY 12 HOURS PRN
Qty: 60 TABLET | Refills: 0 | Status: SHIPPED | OUTPATIENT
Start: 2024-09-19

## 2024-10-01 ENCOUNTER — OFFICE VISIT (OUTPATIENT)
Dept: INTERNAL MEDICINE | Facility: CLINIC | Age: 69
End: 2024-10-01
Payer: MEDICARE

## 2024-10-01 VITALS
SYSTOLIC BLOOD PRESSURE: 123 MMHG | BODY MASS INDEX: 34.65 KG/M2 | HEIGHT: 70 IN | HEART RATE: 54 BPM | RESPIRATION RATE: 20 BRPM | DIASTOLIC BLOOD PRESSURE: 79 MMHG | TEMPERATURE: 97.1 F | OXYGEN SATURATION: 97 % | WEIGHT: 242 LBS

## 2024-10-01 DIAGNOSIS — I26.99 ACUTE PULMONARY EMBOLISM, UNSPECIFIED PULMONARY EMBOLISM TYPE, UNSPECIFIED WHETHER ACUTE COR PULMONALE PRESENT: ICD-10-CM

## 2024-10-01 DIAGNOSIS — M25.562 CHRONIC PAIN OF BOTH KNEES: ICD-10-CM

## 2024-10-01 DIAGNOSIS — Z51.81 THERAPEUTIC DRUG MONITORING: ICD-10-CM

## 2024-10-01 DIAGNOSIS — E03.8 ADULT ONSET HYPOTHYROIDISM: ICD-10-CM

## 2024-10-01 DIAGNOSIS — N18.31 STAGE 3A CHRONIC KIDNEY DISEASE: ICD-10-CM

## 2024-10-01 DIAGNOSIS — M25.561 CHRONIC PAIN OF BOTH KNEES: ICD-10-CM

## 2024-10-01 DIAGNOSIS — M54.50 LUMBOSACRAL PAIN: ICD-10-CM

## 2024-10-01 DIAGNOSIS — Z23 NEED FOR INFLUENZA VACCINATION: ICD-10-CM

## 2024-10-01 DIAGNOSIS — F33.0 MILD EPISODE OF RECURRENT MAJOR DEPRESSIVE DISORDER: ICD-10-CM

## 2024-10-01 DIAGNOSIS — G89.29 CHRONIC PAIN OF BOTH KNEES: ICD-10-CM

## 2024-10-01 DIAGNOSIS — F41.9 ANXIETY: ICD-10-CM

## 2024-10-01 DIAGNOSIS — E11.65 TYPE 2 DIABETES MELLITUS WITH HYPERGLYCEMIA, WITH LONG-TERM CURRENT USE OF INSULIN: ICD-10-CM

## 2024-10-01 DIAGNOSIS — I10 BENIGN ESSENTIAL HYPERTENSION: Primary | ICD-10-CM

## 2024-10-01 DIAGNOSIS — Z79.4 TYPE 2 DIABETES MELLITUS WITH HYPERGLYCEMIA, WITH LONG-TERM CURRENT USE OF INSULIN: ICD-10-CM

## 2024-10-01 DIAGNOSIS — K21.00 GASTROESOPHAGEAL REFLUX DISEASE WITH ESOPHAGITIS WITHOUT HEMORRHAGE: ICD-10-CM

## 2024-10-01 DIAGNOSIS — E78.2 MIXED HYPERLIPIDEMIA: ICD-10-CM

## 2024-10-01 DIAGNOSIS — F51.04 INSOMNIA, PSYCHOPHYSIOLOGICAL: ICD-10-CM

## 2024-10-01 RX ORDER — LEVOTHYROXINE SODIUM 125 UG/1
125 TABLET ORAL DAILY
Qty: 90 TABLET | Refills: 1 | Status: SHIPPED | OUTPATIENT
Start: 2024-10-01

## 2024-10-01 RX ORDER — ATORVASTATIN CALCIUM 80 MG/1
80 TABLET, FILM COATED ORAL NIGHTLY
Qty: 90 TABLET | Refills: 1 | Status: SHIPPED | OUTPATIENT
Start: 2024-10-01

## 2024-10-01 RX ORDER — QUETIAPINE FUMARATE 50 MG/1
50 TABLET, FILM COATED ORAL 2 TIMES DAILY
Qty: 180 TABLET | Refills: 1 | Status: SHIPPED | OUTPATIENT
Start: 2024-10-01

## 2024-10-01 RX ORDER — HYDROCODONE BITARTRATE AND ACETAMINOPHEN 7.5; 325 MG/1; MG/1
1 TABLET ORAL EVERY 12 HOURS PRN
Qty: 60 TABLET | Refills: 0 | Status: SHIPPED | OUTPATIENT
Start: 2024-10-01

## 2024-10-01 RX ORDER — ALPRAZOLAM 0.5 MG
0.5 TABLET ORAL NIGHTLY PRN
Qty: 30 TABLET | Refills: 5 | Status: SHIPPED | OUTPATIENT
Start: 2024-10-01

## 2024-10-01 RX ORDER — FAMOTIDINE 20 MG/1
20 TABLET, FILM COATED ORAL 2 TIMES DAILY
Qty: 180 TABLET | Refills: 1 | Status: SHIPPED | OUTPATIENT
Start: 2024-10-01

## 2024-10-01 RX ORDER — LOSARTAN POTASSIUM 100 MG/1
100 TABLET ORAL DAILY
Qty: 90 TABLET | Refills: 1 | Status: SHIPPED | OUTPATIENT
Start: 2024-10-01

## 2024-10-01 RX ORDER — PANTOPRAZOLE SODIUM 40 MG/1
40 TABLET, DELAYED RELEASE ORAL DAILY
Qty: 90 TABLET | Refills: 1 | Status: SHIPPED | OUTPATIENT
Start: 2024-10-01

## 2024-10-01 RX ORDER — CITALOPRAM HYDROBROMIDE 40 MG/1
40 TABLET ORAL DAILY
Qty: 90 TABLET | Refills: 1 | Status: SHIPPED | OUTPATIENT
Start: 2024-10-01

## 2024-10-01 NOTE — PROGRESS NOTES
"Chief Complaint  Hypertension (Follow up), Hyperlipidemia, Diabetes, Hypothyroidism, and Joint Pain    Subjective        Ramsey Riley presents to [facility]  HPI: Patient is here to follow up on the blood pressure  and pulmonary embolism and   is taking   medications as prescribed and has had no side effects. The patient is also here to follow up on the cholesterol and on sugar and thyroid and is  due to get lab work done .  He sees endocrinology , he complains of chronic knee pain  and back pain,The patient also complains of anxiety and depression and insomnia and  needs refills on medications .  He also complains of gerd, he is due for a flu shot , history is also from wife who accompanies him today  Hypertension   Pertinent negatives include no chest pain, palpitations or shortness of breath.   Hyperlipidemia   Pertinent negatives include no chest pain or shortness of breath.   Diabetes   Pertinent negatives for hypoglycemia include no dizziness, nervousness/anxiousness or pallor. Pertinent negatives for diabetes include no chest pain, no shortness of breath  Patient is on acei/arb       Objective   Vital Signs:  /79   Pulse 54   Temp 97.1 °F (36.2 °C)   Resp 20   Ht 177.8 cm (70\")   Wt 110 kg (242 lb)   SpO2 97%   BMI 34.72 kg/m²   Estimated body mass index is 34.72 kg/m² as calculated from the following:    Height as of this encounter: 177.8 cm (70\").    Weight as of this encounter: 110 kg (242 lb).            Physical Exam  Vitals and nursing note reviewed.   Constitutional:       General: He is not in acute distress.     Appearance: Normal appearance. He is obese. He is not diaphoretic.   HENT:      Head: Normocephalic and atraumatic.      Right Ear: External ear normal.      Left Ear: External ear normal.      Nose: Nose normal.   Eyes:      Extraocular Movements: Extraocular movements intact.      Conjunctiva/sclera: Conjunctivae normal.   Neck:      Trachea: " Trachea normal.   Cardiovascular:      Rate and Rhythm: Normal rate and regular rhythm.      Heart sounds: Normal heart sounds.   Pulmonary:      Effort: Pulmonary effort is normal. No respiratory distress.   Abdominal:      General: Abdomen is flat.   Musculoskeletal:         General: Deformity present.      Cervical back: Neck supple.      Comments: Moves all limbs   Skin:     General: Skin is warm and dry.      Findings: No erythema.   Neurological:      Mental Status: He is alert and oriented to person, place, and time.      Comments: No gross motor or sensory deficits        Result Review :  The following data was reviewed by: Margret Purvis MD on 10/01/2024:  Common labs          4/15/2024    14:40 7/25/2024    21:29 9/3/2024    13:31   Common Labs   Glucose 298      BUN 15      Creatinine 1.39      Sodium 138      Potassium 4.1      Chloride 100      Calcium 8.9      Total Protein 6.0      Albumin 3.7      Total Bilirubin 0.4      Alkaline Phosphatase 112      AST (SGOT) 24      ALT (SGPT) 22      WBC 5.2  6.17        Hemoglobin 14.6  15.5        Hematocrit 45.5  45.9        Platelets 147  174        Total Cholesterol 153      Triglycerides 210      HDL Cholesterol 36      LDL Cholesterol  82      Hemoglobin A1C 10.5   10.8       Microalbumin, Urine 3,522.7      Uric Acid   5.7          Details          This result is from an external source.                       Assessment and Plan   Diagnoses and all orders for this visit:    1. Benign essential hypertension (Primary)  -     losartan (COZAAR) 100 MG tablet; Take 1 tablet by mouth Daily.  Dispense: 90 tablet; Refill: 1    2. Mixed hyperlipidemia  -     atorvastatin (Lipitor) 80 MG tablet; Take 1 tablet by mouth Every Night.  Dispense: 90 tablet; Refill: 1  -     CBC (No Diff)  -     Comprehensive Metabolic Panel  -     Lipid Panel    3. Type 2 diabetes mellitus with hyperglycemia, with long-term current use of insulin    4. Adult onset hypothyroidism  -      levothyroxine (SYNTHROID, LEVOTHROID) 125 MCG tablet; Take 1 tablet by mouth Daily.  Dispense: 90 tablet; Refill: 1    5. Stage 3a chronic kidney disease  -     Comprehensive Metabolic Panel    6. Acute pulmonary embolism, unspecified pulmonary embolism type, unspecified whether acute cor pulmonale present  -     apixaban (Eliquis) 5 MG tablet tablet; Take 1 tablet by mouth Every 12 (Twelve) Hours.  Dispense: 60 tablet; Refill: 5    7. Anxiety  -     citalopram (CeleXA) 40 MG tablet; Take 1 tablet by mouth Daily.  Dispense: 90 tablet; Refill: 1  -     ALPRAZolam (XANAX) 0.5 MG tablet; Take 1 tablet by mouth At Night As Needed for Anxiety.  Dispense: 30 tablet; Refill: 5  -     Compliance Drug Analysis, Ur - Urine, Clean Catch    8. Mild episode of recurrent major depressive disorder  -     citalopram (CeleXA) 40 MG tablet; Take 1 tablet by mouth Daily.  Dispense: 90 tablet; Refill: 1    9. Insomnia, psychophysiological  -     QUEtiapine (SEROquel) 50 MG tablet; Take 1 tablet by mouth 2 (Two) Times a Day.  Dispense: 180 tablet; Refill: 1    10. Gastroesophageal reflux disease with esophagitis without hemorrhage  -     famotidine (Pepcid) 20 MG tablet; Take 1 tablet by mouth 2 (Two) Times a Day.  Dispense: 180 tablet; Refill: 1  -     pantoprazole (PROTONIX) 40 MG EC tablet; Take 1 tablet by mouth Daily.  Dispense: 90 tablet; Refill: 1    11. Chronic pain of both knees  -     Ambulatory Referral to Orthopedic Surgery    12. Lumbosacral pain  -     HYDROcodone-acetaminophen (NORCO) 7.5-325 MG per tablet; Take 1 tablet by mouth Every 12 (Twelve) Hours As Needed for Moderate Pain.  Dispense: 60 tablet; Refill: 0  -     Compliance Drug Analysis, Ur - Urine, Clean Catch    13. Therapeutic drug monitoring  -     Compliance Drug Analysis, Ur - Urine, Clean Catch    14. Need for influenza vaccination  -     Fluzone High-Dose 65+yrs ([id number])    Plan:  1.  Benign essential hypertension: Will continue current medication,  low-sodium diet advised, Counseled to regularly check BP at home with goal averaging <130/80.   2.mixed hyperlipidemia: will obtain   fasting CMP and lipid panel.  Diet and exercise counseled,  Will continue current medications  3. Diabetes  Mellitus  : will obtain   fasting CMP  and hba1c  10.8  , diet and exercise counseled , Will continue current medications- per endocrinology  4. hypothyroidism: will obtain tsh , and continue levothyroxine  5. Ckd stage 3 b : oral hydration , monitor bmp  6. Anxiety : refill celexa 40 mg qd and seroquel 50 mg qhs and prn alprazolam , lyndas and shaun reviewed and obtained  7. Major depression : refill celexa 40 mg qd and seroquel  8. Insomnia :refill  seroquel 50 mg qhs    9. Lumbosacral pain : refill lortab  prn   , jennifer and shaun reviewed and obtained  10.  Chronic knee pain: refer to orthopedist  11. Pulmonary embolism  : refill eliquis  12. Gerd  :refill pantoprazole and pepcid  13. Need for flu vaccine : given today and well tolerated           Follow Up   Return in about 3 months (around 12/19/2024).  Patient was given instructions and counseling regarding his condition or for health maintenance advice. Please see specific information pulled into the AVS if appropriate.

## 2024-10-09 LAB — DRUGS UR: NORMAL

## 2024-10-17 ENCOUNTER — OFFICE VISIT (OUTPATIENT)
Dept: CARDIOLOGY | Facility: CLINIC | Age: 69
End: 2024-10-17
Payer: MEDICARE

## 2024-10-17 VITALS
HEART RATE: 61 BPM | SYSTOLIC BLOOD PRESSURE: 120 MMHG | OXYGEN SATURATION: 95 % | DIASTOLIC BLOOD PRESSURE: 70 MMHG | HEIGHT: 70 IN | BODY MASS INDEX: 33.93 KG/M2 | WEIGHT: 237 LBS

## 2024-10-17 DIAGNOSIS — Z79.4 TYPE 2 DIABETES MELLITUS WITHOUT COMPLICATION, WITH LONG-TERM CURRENT USE OF INSULIN: ICD-10-CM

## 2024-10-17 DIAGNOSIS — I73.9 PAD (PERIPHERAL ARTERY DISEASE): ICD-10-CM

## 2024-10-17 DIAGNOSIS — E11.9 TYPE 2 DIABETES MELLITUS WITHOUT COMPLICATION, WITH LONG-TERM CURRENT USE OF INSULIN: ICD-10-CM

## 2024-10-17 DIAGNOSIS — E66.811 CLASS 1 OBESITY DUE TO EXCESS CALORIES WITH SERIOUS COMORBIDITY AND BODY MASS INDEX (BMI) OF 34.0 TO 34.9 IN ADULT: ICD-10-CM

## 2024-10-17 DIAGNOSIS — I10 ESSENTIAL HYPERTENSION: ICD-10-CM

## 2024-10-17 DIAGNOSIS — I27.82 OTHER CHRONIC PULMONARY EMBOLISM WITHOUT ACUTE COR PULMONALE: ICD-10-CM

## 2024-10-17 DIAGNOSIS — E66.09 CLASS 1 OBESITY DUE TO EXCESS CALORIES WITH SERIOUS COMORBIDITY AND BODY MASS INDEX (BMI) OF 34.0 TO 34.9 IN ADULT: ICD-10-CM

## 2024-10-17 DIAGNOSIS — I25.10 CORONARY ARTERY DISEASE INVOLVING NATIVE CORONARY ARTERY OF NATIVE HEART WITHOUT ANGINA PECTORIS: Primary | ICD-10-CM

## 2024-10-17 DIAGNOSIS — E78.5 DYSLIPIDEMIA: ICD-10-CM

## 2024-10-17 DIAGNOSIS — I11.9 LVH (LEFT VENTRICULAR HYPERTROPHY) DUE TO HYPERTENSIVE DISEASE, WITHOUT HEART FAILURE: ICD-10-CM

## 2024-10-17 NOTE — PROGRESS NOTES
Subjective:     Encounter Date:10/17/2024      Patient ID: Ramsey Riley is a 69 y.o. male.    Chief Complaint: Coronary artery disease  HPI  This is a 69-year-old male patient who presents to cardiology clinic for routine follow-up.  Since his last visit he has had no active cardiovascular issues, symptoms or hospitalizations.  He reports compliance with his medications with no perceived side effects.  He is a non-smoker.  He has had no bleeding complications related to anticoagulation therapy with Eliquis.  The following portions of the patient's history were reviewed and updated as appropriate: allergies, current medications, past family history, past medical history, past social history, past surgical history and problem  Review of Systems   Constitutional: Negative for chills, diaphoresis, fever, malaise/fatigue, weight gain and weight loss.   HENT:  Negative for ear discharge, hearing loss, hoarse voice and nosebleeds.    Eyes:  Negative for discharge, double vision, pain and photophobia.   Cardiovascular:  Negative for chest pain, claudication, cyanosis, dyspnea on exertion, irregular heartbeat, leg swelling, near-syncope, orthopnea, palpitations, paroxysmal nocturnal dyspnea and syncope.   Respiratory:  Negative for cough, hemoptysis, sputum production and wheezing.    Endocrine: Negative for cold intolerance, heat intolerance, polydipsia, polyphagia and polyuria.   Hematologic/Lymphatic: Negative for adenopathy and bleeding problem. Does not bruise/bleed easily.   Skin:  Negative for color change, flushing, itching and rash.   Musculoskeletal:  Negative for muscle cramps, muscle weakness, myalgias and stiffness.   Gastrointestinal:  Negative for abdominal pain, diarrhea, hematemesis, hematochezia, nausea and vomiting.   Genitourinary:  Negative for dysuria, frequency and nocturia.   Neurological:  Negative for focal weakness, loss of balance, numbness, paresthesias and seizures.    Psychiatric/Behavioral:  Negative for altered mental status, hallucinations and suicidal ideas.    Allergic/Immunologic: Negative for HIV exposure, hives and persistent infections.           Current Outpatient Medications:     ALPRAZolam (XANAX) 0.5 MG tablet, Take 1 tablet by mouth At Night As Needed for Anxiety., Disp: 30 tablet, Rfl: 5    amLODIPine (NORVASC) 10 MG tablet, Take 1 tablet by mouth Daily., Disp: 90 tablet, Rfl: 3    apixaban (Eliquis) 5 MG tablet tablet, Take 1 tablet by mouth Every 12 (Twelve) Hours., Disp: 60 tablet, Rfl: 5    aspirin 81 MG chewable tablet, Chew 1 tablet Daily., Disp: , Rfl:     atorvastatin (Lipitor) 80 MG tablet, Take 1 tablet by mouth Every Night., Disp: 90 tablet, Rfl: 1    chlorthalidone (HYGROTON) 25 MG tablet, Take 0.5 tablets by mouth Daily., Disp: 45 tablet, Rfl: 3    citalopram (CeleXA) 40 MG tablet, Take 1 tablet by mouth Daily., Disp: 90 tablet, Rfl: 1    Continuous Glucose Sensor (FreeStyle Rina 3 Sensor) misc, Use 1 each Every 14 (Fourteen) Days., Disp: 3 each, Rfl: 11    cyclopentolate (CYCLOGYL) 1 % ophthalmic solution, Apply 1 drop to eye(s) as directed by provider 3 (Three) Times a Day., Disp: , Rfl:     famotidine (Pepcid) 20 MG tablet, Take 1 tablet by mouth 2 (Two) Times a Day., Disp: 180 tablet, Rfl: 1    glucose blood (Accu-Chek Caryn Plus) test strip, USE AS DIRECTED 3 TIMES DAILY, Disp: 100 each, Rfl: 12    glucose monitor monitoring kit, 1 each Daily., Disp: 1 each, Rfl: 1    glucose monitor monitoring kit, 1 each Daily., Disp: 1 each, Rfl: 1    HYDROcodone-acetaminophen (NORCO) 7.5-325 MG per tablet, Take 1 tablet by mouth Every 12 (Twelve) Hours As Needed for Moderate Pain., Disp: 60 tablet, Rfl: 0    Insulin NPH Isophane & Regular (HumuLIN 70/30 KwikPen) (70-30) 100 UNIT/ML suspension pen-injector, Inject 70 units twice daily E11.65, Disp: 45 mL, Rfl: 1    Insulin Pen Needle 32G X 4 MM misc, For use with insulin injections, daily, Disp: 100 each,  "Rfl: 11    Insulin Syringes, Disposable, U-100 1 ML misc, 50 Units 3 (Three) Times a Day As Needed (50 units in the morning, and then with meals PRN)., Disp: 200 each, Rfl: 12    Lancets 30G misc, Check sugar 3 times daily, Disp: 30 each, Rfl: 12    levothyroxine (SYNTHROID, LEVOTHROID) 125 MCG tablet, Take 1 tablet by mouth Daily., Disp: 90 tablet, Rfl: 1    losartan (COZAAR) 100 MG tablet, Take 1 tablet by mouth Daily., Disp: 90 tablet, Rfl: 1    pantoprazole (PROTONIX) 40 MG EC tablet, Take 1 tablet by mouth Daily., Disp: 90 tablet, Rfl: 1    QUEtiapine (SEROquel) 50 MG tablet, Take 1 tablet by mouth 2 (Two) Times a Day., Disp: 180 tablet, Rfl: 1    Objective:   Vitals and nursing note reviewed.   Constitutional:       Appearance: Healthy appearance. Not in distress.   Eyes:      Conjunctiva/sclera: Conjunctivae normal.   HENT:      Ears: Tympanic membranes normal.   Neck:      Thyroid: No thyromegaly.      Vascular: No JVR. JVD normal.      Lymphadenopathy: No cervical adenopathy.   Pulmonary:      Effort: Pulmonary effort is normal.      Breath sounds: Normal breath sounds. No wheezing. No rhonchi. No rales.   Chest:      Chest wall: Not tender to palpatation.   Cardiovascular:      PMI at left midclavicular line. Normal rate. Regular rhythm. Normal S1. Normal S2.       Murmurs: There is no murmur.      No gallop.  No click. No rub.   Pulses:     Intact distal pulses.   Edema:     Peripheral edema absent.   Abdominal:      General: Bowel sounds are normal.      Palpations: Abdomen is soft.      Tenderness: There is no abdominal tenderness.   Musculoskeletal: Normal range of motion.         General: No tenderness. Skin:     General: Skin is warm and dry.   Neurological:      General: No focal deficit present.      Mental Status: Alert and oriented to person, place and time.       Blood pressure 120/70, pulse 61, height 177.8 cm (70\"), weight 108 kg (237 lb), SpO2 95%.   Lab Review:     Assessment:       1. " Coronary artery disease involving native coronary artery of native heart without angina pectoris      2. Essential hypertension  Acceptable blood pressure control.    3. LVH (left ventricular hypertrophy) due to hypertensive disease, without heart failure  Asymptomatic.    4. PAD (peripheral artery disease)  No lifestyle limiting claudication or evidence of chronic critical limb ischemia.    5. Other chronic pulmonary embolism without acute cor pulmonale  Lifelong anticoagulation therapy with Eliquis.    6. Type 2 diabetes mellitus without complication, with long-term current use of insulin  Typical obesity related insulin resistance.    7. Dyslipidemia  Tolerating statin based cholesterol-lowering therapy without side effects.    8. Class 1 obesity due to excess calories with serious comorbidity and body mass index (BMI) of 34.0 to 34.9 in adult  Central obesity pattern.  The metabolic syndrome is present.    Procedures    Plan:     Advance Care Planning   ACP discussion was held with the patient during this visit. Patient has an advance directive (not in EMR), copy requested.     No changes in medications made at today's visit.    No cardiovascular testing indicated at this time.

## 2024-10-24 RX ORDER — INSULIN HUMAN 100 [IU]/ML
INJECTION, SUSPENSION SUBCUTANEOUS
Qty: 45 ML | Refills: 1 | Status: SHIPPED | OUTPATIENT
Start: 2024-10-24

## 2024-10-24 NOTE — TELEPHONE ENCOUNTER
Rx request    Insulin NPH Isophane & Regular (HumuLIN 70/30 KwikPen) (70-30) 100 UNIT/ML suspension pen-injector     To send to:  [facility], [place] - [address] [place] [address] [phone] [facility] [phone] FX

## 2024-10-24 NOTE — TELEPHONE ENCOUNTER
Last office visit with prescribing clinician: 6/28/2024       Next office visit with prescribing clinician: 2/6/2025     {

## 2024-11-01 ENCOUNTER — HOSPITAL ENCOUNTER (OUTPATIENT)
Dept: ULTRASOUND IMAGING | Facility: HOSPITAL | Age: 69
Discharge: HOME | End: 2024-11-01
Payer: MEDICARE

## 2024-11-01 DIAGNOSIS — N18.31 STAGE 3A CHRONIC KIDNEY DISEASE: ICD-10-CM

## 2024-11-01 PROCEDURE — 76775 US EXAM ABDO BACK WALL LIM: CPT

## 2024-11-01 PROCEDURE — 76775 US EXAM ABDO BACK WALL LIM: CPT | Performed by: RADIOLOGY

## 2024-11-01 NOTE — ED NOTES
Patient due for BP meds; will take at home and reassess blood pressure tonight. Will follow up with PCP.      Cuca Alvarado RN  09/11/20 [id number]     [FreeTextEntry1] : Ms. RODRIGUEZ REEDER is a 68-year-old female, referred by Dr. Brar, who presents for consultation regarding hiatal hernia.   Past medical history includes CAD s/p PCI last 3/2022, HTN, HLD, obesity, pre-DM 2, asthma/COPD overlap with environmental allergies, microcytic anemia, hernia. Prior history of sleeve gastrectomy and hiatal hernia repair.  2/27/24 Xray UGI single contrast study through [facility] - No acute findings and no change, in particular normal configuration of the stomach post sleeve and post hiatal hernia repair.  10/18/24 Office note - Dr. Brar - Consider for hiatal hernia repair

## 2024-12-10 ENCOUNTER — APPOINTMENT (OUTPATIENT)
Dept: CT IMAGING | Facility: HOSPITAL | Age: 69
End: 2024-12-10
Payer: MEDICARE

## 2024-12-10 ENCOUNTER — APPOINTMENT (OUTPATIENT)
Dept: MRI IMAGING | Facility: HOSPITAL | Age: 69
End: 2024-12-10
Payer: MEDICARE

## 2024-12-10 ENCOUNTER — HOSPITAL ENCOUNTER (OUTPATIENT)
Facility: HOSPITAL | Age: 69
Setting detail: OBSERVATION
Discharge: HOME HEALTH | End: 2024-12-18
Attending: STUDENT IN AN ORGANIZED HEALTH CARE EDUCATION/TRAINING PROGRAM | Admitting: FAMILY MEDICINE
Payer: MEDICARE

## 2024-12-10 ENCOUNTER — APPOINTMENT (OUTPATIENT)
Dept: CARDIOLOGY | Facility: HOSPITAL | Age: 69
End: 2024-12-10
Payer: MEDICARE

## 2024-12-10 ENCOUNTER — APPOINTMENT (OUTPATIENT)
Dept: GENERAL RADIOLOGY | Facility: HOSPITAL | Age: 69
End: 2024-12-10
Payer: MEDICARE

## 2024-12-10 ENCOUNTER — APPOINTMENT (OUTPATIENT)
Dept: SLEEP MEDICINE | Facility: HOSPITAL | Age: 69
End: 2024-12-10
Payer: MEDICARE

## 2024-12-10 DIAGNOSIS — I68.0 CEREBRAL AMYLOID ANGIOPATHY: ICD-10-CM

## 2024-12-10 DIAGNOSIS — R53.81 DEBILITY: ICD-10-CM

## 2024-12-10 DIAGNOSIS — G45.9 TIA (TRANSIENT ISCHEMIC ATTACK): Primary | ICD-10-CM

## 2024-12-10 DIAGNOSIS — E85.4 CEREBRAL AMYLOID ANGIOPATHY: ICD-10-CM

## 2024-12-10 DIAGNOSIS — R53.1 WEAKNESS: ICD-10-CM

## 2024-12-10 LAB
ALBUMIN SERPL-MCNC: 3.4 G/DL (ref 3.5–5.2)
ALBUMIN/GLOB SERPL: 1.1 G/DL
ALP SERPL-CCNC: 89 U/L (ref 39–117)
ALT SERPL W P-5'-P-CCNC: 20 U/L (ref 1–41)
AMMONIA BLD-SCNC: 16 UMOL/L (ref 16–60)
AMPHET+METHAMPHET UR QL: NEGATIVE
AMPHETAMINES UR QL: NEGATIVE
ANION GAP SERPL CALCULATED.3IONS-SCNC: 10.8 MMOL/L (ref 5–15)
AST SERPL-CCNC: 29 U/L (ref 1–40)
BACTERIA UR QL AUTO: NORMAL /HPF
BARBITURATES UR QL SCN: NEGATIVE
BASOPHILS # BLD AUTO: 0.03 10*3/MM3 (ref 0–0.2)
BASOPHILS NFR BLD AUTO: 0.3 % (ref 0–1.5)
BENZODIAZ UR QL SCN: POSITIVE
BH CV ECHO MEAS - AO MAX PG: 8.9 MMHG
BH CV ECHO MEAS - AO MEAN PG: 5 MMHG
BH CV ECHO MEAS - AO ROOT DIAM: 3.1 CM
BH CV ECHO MEAS - AO V2 MAX: 149 CM/SEC
BH CV ECHO MEAS - AO V2 VTI: 31.1 CM
BH CV ECHO MEAS - AVA(I,D): 2.6 CM2
BH CV ECHO MEAS - EDV(CUBED): 74.6 ML
BH CV ECHO MEAS - EDV(MOD-SP2): 86.2 ML
BH CV ECHO MEAS - EDV(MOD-SP4): 87 ML
BH CV ECHO MEAS - EF(MOD-BP): 67.8 %
BH CV ECHO MEAS - EF(MOD-SP2): 71.7 %
BH CV ECHO MEAS - EF(MOD-SP4): 62.4 %
BH CV ECHO MEAS - EF_3D-VOL: 67 %
BH CV ECHO MEAS - ESV(CUBED): 19.5 ML
BH CV ECHO MEAS - ESV(MOD-SP2): 24.4 ML
BH CV ECHO MEAS - ESV(MOD-SP4): 32.7 ML
BH CV ECHO MEAS - FS: 36.1 %
BH CV ECHO MEAS - IVS/LVPW: 0.91 CM
BH CV ECHO MEAS - IVSD: 1.09 CM
BH CV ECHO MEAS - LA DIMENSION: 4.5 CM
BH CV ECHO MEAS - LAT PEAK E' VEL: 8.3 CM/SEC
BH CV ECHO MEAS - LV DIASTOLIC VOL/BSA (35-75): 38.2 CM2
BH CV ECHO MEAS - LV MASS(C)D: 167 GRAMS
BH CV ECHO MEAS - LV MAX PG: 4.8 MMHG
BH CV ECHO MEAS - LV MEAN PG: 3 MMHG
BH CV ECHO MEAS - LV SYSTOLIC VOL/BSA (12-30): 14.4 CM2
BH CV ECHO MEAS - LV V1 MAX: 109 CM/SEC
BH CV ECHO MEAS - LV V1 VTI: 24.7 CM
BH CV ECHO MEAS - LVIDD: 4.2 CM
BH CV ECHO MEAS - LVIDS: 2.7 CM
BH CV ECHO MEAS - LVOT AREA: 3.2 CM2
BH CV ECHO MEAS - LVOT DIAM: 2.03 CM
BH CV ECHO MEAS - LVPWD: 1.2 CM
BH CV ECHO MEAS - MED PEAK E' VEL: 6 CM/SEC
BH CV ECHO MEAS - MV A MAX VEL: 110 CM/SEC
BH CV ECHO MEAS - MV DEC SLOPE: 155 CM/SEC2
BH CV ECHO MEAS - MV DEC TIME: 0.35 SEC
BH CV ECHO MEAS - MV E MAX VEL: 53.4 CM/SEC
BH CV ECHO MEAS - MV E/A: 0.49
BH CV ECHO MEAS - MV MAX PG: 7 MMHG
BH CV ECHO MEAS - MV MEAN PG: 2 MMHG
BH CV ECHO MEAS - MV V2 VTI: 26.9 CM
BH CV ECHO MEAS - MVA(VTI): 3 CM2
BH CV ECHO MEAS - PA ACC TIME: 0.12 SEC
BH CV ECHO MEAS - PA V2 MAX: 120 CM/SEC
BH CV ECHO MEAS - RV MAX PG: 3.9 MMHG
BH CV ECHO MEAS - RV V1 MAX: 99.2 CM/SEC
BH CV ECHO MEAS - RV V1 VTI: 20.5 CM
BH CV ECHO MEAS - SV(LVOT): 79.9 ML
BH CV ECHO MEAS - SV(MOD-SP2): 61.8 ML
BH CV ECHO MEAS - SV(MOD-SP4): 54.3 ML
BH CV ECHO MEAS - SVI(LVOT): 35.1 ML/M2
BH CV ECHO MEAS - SVI(MOD-SP2): 27.2 ML/M2
BH CV ECHO MEAS - SVI(MOD-SP4): 23.9 ML/M2
BH CV ECHO MEAS - TAPSE (>1.6): 1.58 CM
BH CV ECHO MEASUREMENTS AVERAGE E/E' RATIO: 7.47
BH CV ECHO SHUNT ASSESSMENT PERFORMED (HIDDEN SCRIPTING): 1
BH CV XLRA - RV BASE: 3.5 CM
BH CV XLRA - RV LENGTH: 8.5 CM
BH CV XLRA - RV MID: 3.1 CM
BH CV XLRA - TDI S': 9.7 CM/SEC
BILIRUB SERPL-MCNC: 0.8 MG/DL (ref 0–1.2)
BILIRUB UR QL STRIP: NEGATIVE
BUN SERPL-MCNC: 16 MG/DL (ref 8–23)
BUN/CREAT SERPL: 10.3 (ref 7–25)
BUPRENORPHINE SERPL-MCNC: NEGATIVE NG/ML
CALCIUM SPEC-SCNC: 8.9 MG/DL (ref 8.6–10.5)
CANNABINOIDS SERPL QL: NEGATIVE
CHLORIDE SERPL-SCNC: 101 MMOL/L (ref 98–107)
CK SERPL-CCNC: 437 U/L (ref 20–200)
CLARITY UR: CLEAR
CO2 SERPL-SCNC: 26.2 MMOL/L (ref 22–29)
COCAINE UR QL: NEGATIVE
COLOR UR: YELLOW
CREAT SERPL-MCNC: 1.55 MG/DL (ref 0.76–1.27)
CRP SERPL-MCNC: 0.73 MG/DL (ref 0–0.5)
D-LACTATE SERPL-SCNC: 1.6 MMOL/L (ref 0.5–2)
DEPRECATED RDW RBC AUTO: 41.3 FL (ref 37–54)
EGFRCR SERPLBLD CKD-EPI 2021: 48.8 ML/MIN/1.73
EOSINOPHIL # BLD AUTO: 0.06 10*3/MM3 (ref 0–0.4)
EOSINOPHIL NFR BLD AUTO: 0.7 % (ref 0.3–6.2)
ERYTHROCYTE [DISTWIDTH] IN BLOOD BY AUTOMATED COUNT: 12.7 % (ref 12.3–15.4)
FENTANYL UR-MCNC: NEGATIVE NG/ML
GEN 5 1HR TROPONIN T REFLEX: 50 NG/L
GLOBULIN UR ELPH-MCNC: 3 GM/DL
GLUCOSE BLDC GLUCOMTR-MCNC: 164 MG/DL (ref 70–130)
GLUCOSE BLDC GLUCOMTR-MCNC: 254 MG/DL (ref 70–130)
GLUCOSE SERPL-MCNC: 175 MG/DL (ref 65–99)
GLUCOSE UR STRIP-MCNC: ABNORMAL MG/DL
HCT VFR BLD AUTO: 48.1 % (ref 37.5–51)
HGB BLD-MCNC: 16 G/DL (ref 13–17.7)
HGB UR QL STRIP.AUTO: ABNORMAL
HYALINE CASTS UR QL AUTO: NORMAL /LPF
IMM GRANULOCYTES # BLD AUTO: 0.02 10*3/MM3 (ref 0–0.05)
IMM GRANULOCYTES NFR BLD AUTO: 0.2 % (ref 0–0.5)
KETONES UR QL STRIP: NEGATIVE
LEUKOCYTE ESTERASE UR QL STRIP.AUTO: NEGATIVE
LYMPHOCYTES # BLD AUTO: 1.99 10*3/MM3 (ref 0.7–3.1)
LYMPHOCYTES NFR BLD AUTO: 23 % (ref 19.6–45.3)
MAGNESIUM SERPL-MCNC: 1.4 MG/DL (ref 1.6–2.4)
MCH RBC QN AUTO: 29.6 PG (ref 26.6–33)
MCHC RBC AUTO-ENTMCNC: 33.3 G/DL (ref 31.5–35.7)
MCV RBC AUTO: 89.1 FL (ref 79–97)
METHADONE UR QL SCN: NEGATIVE
MONOCYTES # BLD AUTO: 0.71 10*3/MM3 (ref 0.1–0.9)
MONOCYTES NFR BLD AUTO: 8.2 % (ref 5–12)
MUCOUS THREADS URNS QL MICRO: NORMAL /HPF
NEUTROPHILS NFR BLD AUTO: 5.84 10*3/MM3 (ref 1.7–7)
NEUTROPHILS NFR BLD AUTO: 67.6 % (ref 42.7–76)
NITRITE UR QL STRIP: NEGATIVE
NRBC BLD AUTO-RTO: 0 /100 WBC (ref 0–0.2)
NT-PROBNP SERPL-MCNC: 290.6 PG/ML (ref 0–900)
OPIATES UR QL: POSITIVE
OXYCODONE UR QL SCN: NEGATIVE
PCP UR QL SCN: NEGATIVE
PH UR STRIP.AUTO: 7 [PH] (ref 5–8)
PLATELET # BLD AUTO: 146 10*3/MM3 (ref 140–450)
PMV BLD AUTO: 11 FL (ref 6–12)
POTASSIUM SERPL-SCNC: 3.8 MMOL/L (ref 3.5–5.2)
PROCALCITONIN SERPL-MCNC: 0.11 NG/ML (ref 0–0.25)
PROT SERPL-MCNC: 6.4 G/DL (ref 6–8.5)
PROT UR QL STRIP: ABNORMAL
RBC # BLD AUTO: 5.4 10*6/MM3 (ref 4.14–5.8)
RBC # UR STRIP: NORMAL /HPF
REF LAB TEST METHOD: NORMAL
SODIUM SERPL-SCNC: 138 MMOL/L (ref 136–145)
SP GR UR STRIP: >=1.03 (ref 1–1.03)
SQUAMOUS #/AREA URNS HPF: NORMAL /HPF
T4 FREE SERPL-MCNC: 1.24 NG/DL (ref 0.92–1.68)
TRICYCLICS UR QL SCN: POSITIVE
TROPONIN T % DELTA: -4 %
TROPONIN T NUMERIC DELTA: -2 NG/L
TROPONIN T SERPL HS-MCNC: 52 NG/L
TSH SERPL DL<=0.05 MIU/L-ACNC: 5.45 UIU/ML (ref 0.27–4.2)
UROBILINOGEN UR QL STRIP: ABNORMAL
WBC # UR STRIP: NORMAL /HPF
WBC NRBC COR # BLD AUTO: 8.65 10*3/MM3 (ref 3.4–10.8)

## 2024-12-10 PROCEDURE — 73060 X-RAY EXAM OF HUMERUS: CPT

## 2024-12-10 PROCEDURE — 63710000001 INSULIN GLARGINE PER 5 UNITS: Performed by: FAMILY MEDICINE

## 2024-12-10 PROCEDURE — 82550 ASSAY OF CK (CPK): CPT | Performed by: STUDENT IN AN ORGANIZED HEALTH CARE EDUCATION/TRAINING PROGRAM

## 2024-12-10 PROCEDURE — 96375 TX/PRO/DX INJ NEW DRUG ADDON: CPT

## 2024-12-10 PROCEDURE — 84439 ASSAY OF FREE THYROXINE: CPT | Performed by: STUDENT IN AN ORGANIZED HEALTH CARE EDUCATION/TRAINING PROGRAM

## 2024-12-10 PROCEDURE — 84484 ASSAY OF TROPONIN QUANT: CPT | Performed by: PHYSICIAN ASSISTANT

## 2024-12-10 PROCEDURE — 86140 C-REACTIVE PROTEIN: CPT | Performed by: STUDENT IN AN ORGANIZED HEALTH CARE EDUCATION/TRAINING PROGRAM

## 2024-12-10 PROCEDURE — 70498 CT ANGIOGRAPHY NECK: CPT

## 2024-12-10 PROCEDURE — 85025 COMPLETE CBC W/AUTO DIFF WBC: CPT | Performed by: PHYSICIAN ASSISTANT

## 2024-12-10 PROCEDURE — G0378 HOSPITAL OBSERVATION PER HR: HCPCS

## 2024-12-10 PROCEDURE — 95816 EEG AWAKE AND DROWSY: CPT

## 2024-12-10 PROCEDURE — 81001 URINALYSIS AUTO W/SCOPE: CPT | Performed by: PHYSICIAN ASSISTANT

## 2024-12-10 PROCEDURE — 99214 OFFICE O/P EST MOD 30 MIN: CPT | Performed by: PSYCHIATRY & NEUROLOGY

## 2024-12-10 PROCEDURE — 71045 X-RAY EXAM CHEST 1 VIEW: CPT

## 2024-12-10 PROCEDURE — 82948 REAGENT STRIP/BLOOD GLUCOSE: CPT | Performed by: FAMILY MEDICINE

## 2024-12-10 PROCEDURE — 93306 TTE W/DOPPLER COMPLETE: CPT

## 2024-12-10 PROCEDURE — 63710000001 INSULIN LISPRO (HUMAN) PER 5 UNITS: Performed by: FAMILY MEDICINE

## 2024-12-10 PROCEDURE — 83735 ASSAY OF MAGNESIUM: CPT | Performed by: PHYSICIAN ASSISTANT

## 2024-12-10 PROCEDURE — 80307 DRUG TEST PRSMV CHEM ANLYZR: CPT | Performed by: FAMILY MEDICINE

## 2024-12-10 PROCEDURE — 73030 X-RAY EXAM OF SHOULDER: CPT

## 2024-12-10 PROCEDURE — 25010000002 LABETALOL 5 MG/ML SOLUTION: Performed by: FAMILY MEDICINE

## 2024-12-10 PROCEDURE — 84145 PROCALCITONIN (PCT): CPT | Performed by: STUDENT IN AN ORGANIZED HEALTH CARE EDUCATION/TRAINING PROGRAM

## 2024-12-10 PROCEDURE — 96365 THER/PROPH/DIAG IV INF INIT: CPT

## 2024-12-10 PROCEDURE — 82948 REAGENT STRIP/BLOOD GLUCOSE: CPT

## 2024-12-10 PROCEDURE — 80053 COMPREHEN METABOLIC PANEL: CPT | Performed by: PHYSICIAN ASSISTANT

## 2024-12-10 PROCEDURE — 99285 EMERGENCY DEPT VISIT HI MDM: CPT

## 2024-12-10 PROCEDURE — 83605 ASSAY OF LACTIC ACID: CPT | Performed by: STUDENT IN AN ORGANIZED HEALTH CARE EDUCATION/TRAINING PROGRAM

## 2024-12-10 PROCEDURE — 72125 CT NECK SPINE W/O DYE: CPT

## 2024-12-10 PROCEDURE — 25510000001 IOPAMIDOL 61 % SOLUTION: Performed by: STUDENT IN AN ORGANIZED HEALTH CARE EDUCATION/TRAINING PROGRAM

## 2024-12-10 PROCEDURE — 70496 CT ANGIOGRAPHY HEAD: CPT

## 2024-12-10 PROCEDURE — 99291 CRITICAL CARE FIRST HOUR: CPT | Performed by: STUDENT IN AN ORGANIZED HEALTH CARE EDUCATION/TRAINING PROGRAM

## 2024-12-10 PROCEDURE — 82140 ASSAY OF AMMONIA: CPT | Performed by: PHYSICIAN ASSISTANT

## 2024-12-10 PROCEDURE — 84443 ASSAY THYROID STIM HORMONE: CPT | Performed by: STUDENT IN AN ORGANIZED HEALTH CARE EDUCATION/TRAINING PROGRAM

## 2024-12-10 PROCEDURE — 70450 CT HEAD/BRAIN W/O DYE: CPT

## 2024-12-10 PROCEDURE — 36415 COLL VENOUS BLD VENIPUNCTURE: CPT

## 2024-12-10 PROCEDURE — 93306 TTE W/DOPPLER COMPLETE: CPT | Performed by: INTERNAL MEDICINE

## 2024-12-10 PROCEDURE — 70551 MRI BRAIN STEM W/O DYE: CPT

## 2024-12-10 PROCEDURE — 95816 EEG AWAKE AND DROWSY: CPT | Performed by: PSYCHIATRY & NEUROLOGY

## 2024-12-10 PROCEDURE — 99223 1ST HOSP IP/OBS HIGH 75: CPT | Performed by: FAMILY MEDICINE

## 2024-12-10 PROCEDURE — 51701 INSERT BLADDER CATHETER: CPT

## 2024-12-10 PROCEDURE — 83880 ASSAY OF NATRIURETIC PEPTIDE: CPT | Performed by: STUDENT IN AN ORGANIZED HEALTH CARE EDUCATION/TRAINING PROGRAM

## 2024-12-10 PROCEDURE — 93005 ELECTROCARDIOGRAM TRACING: CPT | Performed by: PHYSICIAN ASSISTANT

## 2024-12-10 PROCEDURE — 25010000002 MAGNESIUM SULFATE 2 GM/50ML SOLUTION: Performed by: STUDENT IN AN ORGANIZED HEALTH CARE EDUCATION/TRAINING PROGRAM

## 2024-12-10 RX ORDER — INSULIN LISPRO 100 [IU]/ML
3-14 INJECTION, SOLUTION INTRAVENOUS; SUBCUTANEOUS
Status: DISCONTINUED | OUTPATIENT
Start: 2024-12-10 | End: 2024-12-18 | Stop reason: HOSPADM

## 2024-12-10 RX ORDER — LOSARTAN POTASSIUM 50 MG/1
100 TABLET ORAL DAILY
Status: DISCONTINUED | OUTPATIENT
Start: 2024-12-10 | End: 2024-12-18 | Stop reason: HOSPADM

## 2024-12-10 RX ORDER — CYCLOPENTOLATE HYDROCHLORIDE 10 MG/ML
1 SOLUTION/ DROPS OPHTHALMIC 3 TIMES DAILY
Status: DISCONTINUED | OUTPATIENT
Start: 2024-12-10 | End: 2024-12-18 | Stop reason: HOSPADM

## 2024-12-10 RX ORDER — IOPAMIDOL 612 MG/ML
100 INJECTION, SOLUTION INTRAVASCULAR
Status: COMPLETED | OUTPATIENT
Start: 2024-12-10 | End: 2024-12-10

## 2024-12-10 RX ORDER — ACETAMINOPHEN 325 MG/1
650 TABLET ORAL ONCE
Status: COMPLETED | OUTPATIENT
Start: 2024-12-10 | End: 2024-12-10

## 2024-12-10 RX ORDER — MAGNESIUM SULFATE HEPTAHYDRATE 40 MG/ML
2 INJECTION, SOLUTION INTRAVENOUS ONCE
Status: COMPLETED | OUTPATIENT
Start: 2024-12-10 | End: 2024-12-10

## 2024-12-10 RX ORDER — CHLORTHALIDONE 25 MG/1
12.5 TABLET ORAL DAILY
Status: DISCONTINUED | OUTPATIENT
Start: 2024-12-10 | End: 2024-12-18 | Stop reason: HOSPADM

## 2024-12-10 RX ORDER — DEXTROSE MONOHYDRATE 25 G/50ML
25 INJECTION, SOLUTION INTRAVENOUS
Status: DISCONTINUED | OUTPATIENT
Start: 2024-12-10 | End: 2024-12-18 | Stop reason: HOSPADM

## 2024-12-10 RX ORDER — CITALOPRAM HYDROBROMIDE 20 MG/1
20 TABLET ORAL DAILY
Status: DISCONTINUED | OUTPATIENT
Start: 2024-12-11 | End: 2024-12-18 | Stop reason: HOSPADM

## 2024-12-10 RX ORDER — ALPRAZOLAM 0.5 MG
0.5 TABLET ORAL NIGHTLY PRN
Status: DISCONTINUED | OUTPATIENT
Start: 2024-12-10 | End: 2024-12-12

## 2024-12-10 RX ORDER — FAMOTIDINE 20 MG/1
20 TABLET, FILM COATED ORAL 2 TIMES DAILY
Status: DISCONTINUED | OUTPATIENT
Start: 2024-12-10 | End: 2024-12-18 | Stop reason: HOSPADM

## 2024-12-10 RX ORDER — NICOTINE POLACRILEX 4 MG
15 LOZENGE BUCCAL
Status: DISCONTINUED | OUTPATIENT
Start: 2024-12-10 | End: 2024-12-18 | Stop reason: HOSPADM

## 2024-12-10 RX ORDER — QUETIAPINE FUMARATE 25 MG/1
25 TABLET, FILM COATED ORAL 2 TIMES DAILY
Status: DISCONTINUED | OUTPATIENT
Start: 2024-12-10 | End: 2024-12-11

## 2024-12-10 RX ORDER — AMLODIPINE BESYLATE 5 MG/1
10 TABLET ORAL DAILY
Status: DISCONTINUED | OUTPATIENT
Start: 2024-12-10 | End: 2024-12-18 | Stop reason: HOSPADM

## 2024-12-10 RX ORDER — LABETALOL HYDROCHLORIDE 5 MG/ML
10 INJECTION, SOLUTION INTRAVENOUS ONCE
Status: COMPLETED | OUTPATIENT
Start: 2024-12-10 | End: 2024-12-10

## 2024-12-10 RX ORDER — ATORVASTATIN CALCIUM 80 MG/1
80 TABLET, FILM COATED ORAL NIGHTLY
Status: DISCONTINUED | OUTPATIENT
Start: 2024-12-10 | End: 2024-12-18 | Stop reason: HOSPADM

## 2024-12-10 RX ORDER — PANTOPRAZOLE SODIUM 40 MG/1
40 TABLET, DELAYED RELEASE ORAL DAILY
Status: DISCONTINUED | OUTPATIENT
Start: 2024-12-11 | End: 2024-12-18 | Stop reason: HOSPADM

## 2024-12-10 RX ORDER — LEVOTHYROXINE SODIUM 125 UG/1
125 TABLET ORAL
Status: DISCONTINUED | OUTPATIENT
Start: 2024-12-11 | End: 2024-12-18 | Stop reason: HOSPADM

## 2024-12-10 RX ORDER — HYDROCODONE BITARTRATE AND ACETAMINOPHEN 7.5; 325 MG/1; MG/1
1 TABLET ORAL EVERY 12 HOURS PRN
Status: DISCONTINUED | OUTPATIENT
Start: 2024-12-10 | End: 2024-12-11

## 2024-12-10 RX ADMIN — MAGNESIUM SULFATE HEPTAHYDRATE 2 G: 40 INJECTION, SOLUTION INTRAVENOUS at 11:32

## 2024-12-10 RX ADMIN — HYDROCODONE BITARTRATE AND ACETAMINOPHEN 1 TABLET: 7.5; 325 TABLET ORAL at 18:39

## 2024-12-10 RX ADMIN — INSULIN LISPRO 8 UNITS: 100 INJECTION, SOLUTION INTRAVENOUS; SUBCUTANEOUS at 21:00

## 2024-12-10 RX ADMIN — CHLORTHALIDONE 12.5 MG: 25 TABLET ORAL at 18:36

## 2024-12-10 RX ADMIN — LOSARTAN POTASSIUM 100 MG: 50 TABLET, FILM COATED ORAL at 18:35

## 2024-12-10 RX ADMIN — CYCLOPENTOLATE HYDROCHLORIDE 1 DROP: 10 SOLUTION/ DROPS OPHTHALMIC at 18:36

## 2024-12-10 RX ADMIN — INSULIN LISPRO 3 UNITS: 100 INJECTION, SOLUTION INTRAVENOUS; SUBCUTANEOUS at 17:43

## 2024-12-10 RX ADMIN — QUETIAPINE FUMARATE 25 MG: 25 TABLET ORAL at 20:59

## 2024-12-10 RX ADMIN — IOPAMIDOL 100 ML: 612 INJECTION, SOLUTION INTRAVENOUS at 10:47

## 2024-12-10 RX ADMIN — DICLOFENAC SODIUM 4 G: 10 GEL TOPICAL at 14:26

## 2024-12-10 RX ADMIN — APIXABAN 5 MG: 5 TABLET, FILM COATED ORAL at 21:04

## 2024-12-10 RX ADMIN — ACETAMINOPHEN 650 MG: 325 TABLET, FILM COATED ORAL at 13:54

## 2024-12-10 RX ADMIN — ATORVASTATIN CALCIUM 80 MG: 80 TABLET, FILM COATED ORAL at 20:59

## 2024-12-10 RX ADMIN — LABETALOL HYDROCHLORIDE 10 MG: 5 INJECTION, SOLUTION INTRAVENOUS at 17:42

## 2024-12-10 RX ADMIN — APIXABAN 5 MG: 5 TABLET, FILM COATED ORAL at 13:54

## 2024-12-10 RX ADMIN — ALPRAZOLAM 0.5 MG: 0.5 TABLET ORAL at 20:59

## 2024-12-10 RX ADMIN — AMLODIPINE BESYLATE 10 MG: 5 TABLET ORAL at 18:36

## 2024-12-10 RX ADMIN — FAMOTIDINE 20 MG: 20 TABLET, FILM COATED ORAL at 20:59

## 2024-12-10 RX ADMIN — DICLOFENAC SODIUM 4 G: 10 GEL TOPICAL at 21:00

## 2024-12-10 RX ADMIN — INSULIN GLARGINE 15 UNITS: 100 INJECTION, SOLUTION SUBCUTANEOUS at 21:00

## 2024-12-10 NOTE — SIGNIFICANT NOTE
Patient currently having EEG and other procedures completed.  Unable to talk with patient at this time.  Spoke with patient's wife to initiate DC plans.  Will attempt SDOH assessment at later time.

## 2024-12-10 NOTE — THERAPY EVALUATION
Attempted to see pt for OT evaluation, however held today awaiting further testing.  Will follow up with pt tomorrow.

## 2024-12-10 NOTE — CASE MANAGEMENT/SOCIAL WORK
Discharge Planning Assessment   [place]     Patient Name: Ramsey Riley  MRN: [id number]  Today's Date: 12/10/2024    Admit Date: 12/10/2024    Plan: Patient currently having EEG and other procedures completed. Unable to talk with patient at this time. Spoke with patient's wife to initiate DC plans.  He is a current patient of Dr. Purvis and gets his medications from [facility].  He uses a cane and shower chair at home.  Patient's wife denies need for additional DME.  Wife does request referral for STR (#1 [facility] in [place], #2  [facility]). At the time of DC the patient's wife plans for the patient to transfer to a facility for STR prior to returning home with her. Questions and concerns were addressed, SUMMERS delivered at the time of this phone conversation with the patient's wife.  Will provide additional resources and information upon request.   Discharge Needs Assessment       Row Name 12/10/24 1556       Living Environment    People in Home spouse    Name(s) of People in Home Jina Riley, Wife    Current Living Arrangements home    Duration at Residence 2 1/2 years    Potentially Unsafe Housing Conditions patient unable to answer    In the past 12 months has the electric, gas, oil, or water company threatened to shut off services in your home? Pt Unable    Primary Care Provided by self;spouse/significant other    Provides Primary Care For no one, unable/limited ability to care for self    Family Caregiver if Needed none    Quality of Family Relationships helpful;involved    Able to Return to Prior Arrangements other (see comments)    Living Arrangement Comments Patient's wife requests STR prior to patient returning home       Resource/Environmental Concerns    Resource/Environmental Concerns none    Transportation Concerns none       Transportation Needs    In the past 12 months, has lack of transportation kept you from medical appointments or from getting medications? Pt Unable     In the past 12 months, has lack of transportation kept you from meetings, work, or from getting things needed for daily living? Pt Unable       Food Insecurity    Within the past 12 months, you worried that your food would run out before you got the money to buy more. Pt Unable    Within the past 12 months, the food you bought just didn't last and you didn't have money to get more. Pt Unable       Transition Planning    Patient/Family Anticipates Transition to long-term care facility    Patient/Family Anticipated Services at Transition skilled nursing    Transportation Anticipated family or friend will provide       Discharge Needs Assessment    Readmission Within the Last 30 Days no previous admission in last 30 days    Equipment Currently Used at Home cane, straight;shower chair    Concerns to be Addressed discharge planning    Do you want help finding or keeping work or a job? Patient unable to answer    Do you want help with school or training? For example, starting or completing job training or getting a high school diploma, GED or equivalent Patient unable to answer    Anticipated Changes Related to Illness inability to care for self    Equipment Needed After Discharge none    Outpatient/Agency/Support Group Needs skilled nursing facility    Discharge Facility/Level of Care Needs nursing facility, skilled    Provided Post Acute Provider List? Yes    Post Acute Provider List Nursing Home    Provided Post Acute Provider Quality & Resource List? Yes    Post Acute Provider Quality and Resource List Nursing Home    Delivered To Support Person    Support Person Jina Riley, Wife    Method of Delivery Telephone    Offered/Gave Vendor List yes    Patient's Choice of Community Agency(s) #1 [facility] in [place].  #2 [facility]    Current Discharge Risk physical impairment                   Discharge Plan       Row Name 12/10/24 0470       Plan    Plan Patient currently having EEG and other procedures  completed. Unable to talk with patient at this time. Spoke with patient's wife to initiate DC plans.  He is a current patient of Dr. Purvis and gets his medications from [facility].  He uses a cane and shower chair at home.  Patient's wife denies need for additional DME.  Wife does request referral for STR (#1 [facility] in [place], #2 [facility]). At the time of DC the patient's wife plans for the patient to transfer to a facility for STR prior to returning home with her. Questions and concerns were addressed, SUMMERS delivered at the time of this phone conversation with the patient's wife.  Will provide additional resources and information upon request.    Patient/Family in Agreement with Plan yes    Provided Post Acute Provider List? Yes    Post Acute Provider List Nursing Home    Provided Post Acute Provider Quality & Resource List? Yes    Post Acute Provider Quality and Resource List Nursing Home    Delivered To Support Person    Support Person Jina Riley, Wife    Method of Delivery Telephone  [phone]    Plan Comments Referral for STR needed.  #1 [facility] in [place], #2 [facility]    Final Discharge Disposition Code 03 - skilled nursing facility (SNF)    Final Note Plans for patient to transfer to facility for STR prior to returning home with wife                  Continued Care and Services - Admitted Since 12/10/2024    No active coordination exists for this encounter.          Demographic Summary       Row Name 12/10/24 1322       General Information    Admission Type observation    Arrived From emergency department    Required Notices Provided Observation Status Notice    Referral Source admission list    Reason for Consult discharge planning    Preferred Language English       Contact Information    Contact Information Comments Patient currently having EEG and other procedures completed.  Unable to talk with patient at this time.  Spoke with patient's wife to initiate  DC plans.  Will attempt SDOH assessment at later time.                   Functional Status       Row Name 12/10/24 1555       Functional Status    Usual Activity Tolerance good    Current Activity Tolerance fair       Physical Activity    On average, how many days per week do you engage in moderate to strenuous exercise (like a brisk walk)? Pt Unable    On average, how many minutes do you engage in exercise at this level? Pt Unable       Assessment of Health Literacy    How often do you have someone help you read hospital materials? --  Patient currently having EEG and other procedures completed.  Unable to talk with patient at this time.  Spoke with patient's wife to initiate DC plans.       Functional Status, IADL    Medications assistive person    Meal Preparation assistive person    Housekeeping assistive person    Laundry assistive person    Shopping assistive person    If for any reason you need help with day-to-day activities such as bathing, preparing meals, shopping, managing finances, etc., do you get the help you need? Patient unable to answer       Mental Status    General Appearance WDL WDL       Mental Status Summary    Recent Changes in Mental Status/Cognitive Functioning unable to assess    Mental Status Comments Patient currently having EEG and other procedures completed. Unable to talk with patient at this time. Spoke with patient's wife to initiate DC plans.                   Psychosocial    No documentation.                  Abuse/Neglect       Row Name 12/10/24 9289       Personal Safety    Feels Unsafe at Home or Work/School unable to answer (comment required)    Feels Threatened by Someone unable to answer (comment required)    Does Anyone Try to Keep You From Having Contact with Others or Doing Things Outside Your Home? unable to answer (comment required)    Physical Signs of Abuse Present patient unable to answer                   Legal       Row Name 12/10/24 1080       Financial Resource  Strain    How hard is it for you to pay for the very basics like food, housing, medical care, and heating? Pt Unable                   Substance Abuse       Row Name 12/10/24 1915       Substance Use    Substance Use Status current tobacco use    Last Tobacco Use Date --  Patient's wife reports the patient chews smokeless tobacco daily                   Patient Forms       Row Name 12/10/24 1602       Patient Forms    Patient Observation Letter Delivered    Delivered to Support person  Jina Riley, Wife    Method of delivery Telephone  [phone]                      Lolita Singh RN

## 2024-12-10 NOTE — CONSULTS
DOS: 12/10/2024  NAME: Ramsey Riley   : 1955  PCP: Margret Purvis MD  CC: New onset confusion that was more prominent today but started yesterday after a nasty fall.  Referring MD: Rinku Mckeon MD    Neurological Problem and Interval History:  69 y.o. right-handed white male with a Hx of poorly controlled diabetes with severe peripheral diabetic neuropathy who tends to fall numerous times in his home and he only walks with a cane and outside his home his wife usually takes in a wheelchair had a nasty fall yesterday and struck the upper part of his right shoulder.  His wife denies any closed head injury as his hat on his head was still on.  However since this fall he has been in a lot of pain and has been acting strange and today he was appearing more confused and sleepy.  He has not been throwing up.  No seizure activity has been noted so far.  He was not incontinent.  He was able to tell me his name and the date of birth and he was able to recognize his wife at the bedside and tell me her name.  He knew that he was in the hospital but could not tell me the name of the place and after some cueing he was able to tell me it was December but could not tell me the year.  When requested to read aloud words and phrases because of his problems with peripheral vision including diabetic retinopathy he could tell me most of the simple pictures but had some difficulty with interpreting the complex picture.  He read aloud a few words and phrases but difficulty in reading because of lack of vision from diabetic retinopathy was present.  He followed one-step and two-step commands well.  He has very limited range of motion of the right upper extremity because of the severe shoulder pain from the recent fall.  Although the x-ray of the right shoulder did not show any fracture it commented on severe rotator cuff tear.  No facial asymmetry noted and no drift in the upper and lower extremities noted although there is  limited range of motion of the right upper extremity from the recent fall.  No sensory loss noted anywhere except for the length-dependent sensory neuropathy in both lower extremities and no extinction of double simultaneous presentation of noxious stimulation bilaterally.  His vision is compromised from diabetic retinopathy.  He passed the bedside swallow.  Gait and Romberg could not be tested because of his balance issues and falls.  The initial CT angiogram of the head and neck with contrast was unremarkable and the MRI of the brain did not show any acute stroke but did show chronic subcortical white matter disease and possible central amyloid angiopathy with the number of microhemorrhages which have increased in number compared to the 1 done in February 2023.    Past Medical/Surgical Hx:  Past Medical History:   Diagnosis Date    Arthritis     Clot     Coronary artery disease     Diabetes mellitus     Heart disease     History of blood clots     Hypertension     Low back pain     Thyroid disease     Type 2 diabetes mellitus      Past Surgical History:   Procedure Laterality Date    CHOLECYSTECTOMY  2015    COLONOSCOPY  2015    dr srivastava    CORONARY ARTERY BYPASS GRAFT      [facility] [place] - 3 BY PASS     ENDOSCOPY         Review of Systems:    Constitutional: Obese gentleman currently laying in bed in a lot of pain from the recent fall affecting the right shoulder  Cardiovascular: No chest pain or palpitations noted.  However he was found to be hypertensive.  Respiratory: No shortness of breath noted.  Gastrointestinal: No nausea and vomiting noted.  Genitourinary: No bladder incontinence noted.  Musculoskeletal: Limited range of motion of the right shoulder from the recent fall  Dermatological: Has bruises in his legs because of the recent fall  Neurological: Except for the confusion noted no other neurological deficits noted with NIH stroke scale of 0  Psychiatric: No major anxiety or depression  noted.  Ophthalmological: Severe visual problems secondary to diabetic retinopathy          Medications On Admission  (Not in a hospital admission)      Prior to Admission medications    Medication Sig Start Date End Date Taking? Authorizing Provider   ALPRAZolam (XANAX) 0.5 MG tablet Take 1 tablet by mouth At Night As Needed for Anxiety. 10/1/24   Margret Purvis MD   amLODIPine (NORVASC) 10 MG tablet Take 1 tablet by mouth Daily. 9/27/23   Farrah Mcdermott APRN   apixaban (Eliquis) 5 MG tablet tablet Take 1 tablet by mouth Every 12 (Twelve) Hours. 10/1/24   Margret Purvis MD   aspirin 81 MG chewable tablet Chew 1 tablet Daily.    ProviderMiri MD   atorvastatin (Lipitor) 80 MG tablet Take 1 tablet by mouth Every Night. 10/1/24   Margret Purvis MD   chlorthalidone (HYGROTON) 25 MG tablet Take 0.5 tablets by mouth Daily. 9/27/23   Farrah Mcdermott APRN   citalopram (CeleXA) 40 MG tablet Take 1 tablet by mouth Daily. 10/1/24   Margret Purvis MD   Continuous Glucose Sensor (FreeStyle Rina 3 Sensor) [facility] 1 each Every 14 (Fourteen) Days. 6/28/24   Michelle King MD   cyclopentolate (CYCLOGYL) 1 % ophthalmic solution Apply 1 drop to eye(s) as directed by provider 3 (Three) Times a Day. 9/18/23   Miri Marion MD   famotidine (Pepcid) 20 MG tablet Take 1 tablet by mouth 2 (Two) Times a Day. 10/1/24   Margret Purvis MD   glucose blood (Accu-Chek Caryn Plus) test strip USE AS DIRECTED 3 TIMES DAILY 9/28/20   Margret Purvis MD   glucose monitor monitoring kit 1 each Daily. 6/19/19   Margret Purvis MD   glucose monitor monitoring kit 1 each Daily. 7/15/19   Margret Purvis MD   HYDROcodone-acetaminophen (NORCO) 7.5-325 MG per tablet Take 1 tablet by mouth Every 12 (Twelve) Hours As Needed for Moderate Pain. 10/1/24   Margret Purvis MD   Insulin NPH Isophane & Regular (HumuLIN 70/30 KwikPen) (70-30) 100 UNIT/ML suspension pen-injector Inject 70 units twice daily E11.65 10/24/24   Michelle King,  MD   Insulin Pen Needle 32G X 4 MM misc For use with insulin injections, daily 6/28/24   Michelle King MD   Insulin Syringes, Disposable, U-100 1 ML misc 50 Units 3 (Three) Times a Day As Needed (50 units in the morning, and then with meals PRN). 6/23/22   Margret Purvis MD   Lancets 30G misc Check sugar 3 times daily 6/19/19   Margret Purvis MD   levothyroxine (SYNTHROID, LEVOTHROID) 125 MCG tablet Take 1 tablet by mouth Daily. 10/1/24   Margret Purvis MD   losartan (COZAAR) 100 MG tablet Take 1 tablet by mouth Daily. 10/1/24   Margret Purvis MD   pantoprazole (PROTONIX) 40 MG EC tablet Take 1 tablet by mouth Daily. 10/1/24   Margret Purvis MD   QUEtiapine (SEROquel) 50 MG tablet Take 1 tablet by mouth 2 (Two) Times a Day. 10/1/24   Margret Purvis MD        Allergies:  Allergies   Allergen Reactions    Trazodone Other (See Comments)     agitation       Social Hx:  Social History     Socioeconomic History    Marital status:    Tobacco Use    Smoking status: Never     Passive exposure: Never    Smokeless tobacco: Current     Types: Chew   Vaping Use    Vaping status: Never Used   Substance and Sexual Activity    Alcohol use: No    Drug use: No    Sexual activity: Defer       Family Hx:  Family History   Problem Relation Age of Onset    Diabetes Mother     Heart disease Mother         STENTING    COPD Mother     Heart attack Father     Diabetes Father     Heart defect Son         Ross procedure    Heart murmur Son     Hypertension Son        Review of Imaging (Interpretation of images not reports): The MRI of the brain performed on February 9, 2023 was reported as follows:    HISTORY: Recurrent falls; R29.6-Repeated falls, abnormal MRI. Abnormal  gait.     COMPARISON: 06/22/2022     TECHNIQUE: Multiplanar imaging was performed of the brain without  gadolinium infusion.     Diffusion sequences show no signal abnormalities to indicate acute  infarct or other process.     Scattered white matter signal changes  are present. These are likely due  to mild chronic microvascular change. Chronic lacunar infarct is seen of  the right thalamus. Moderate generalized atrophy is noted. There is no  evidence of mass or hemorrhage. No extra-axial abnormal findings are  identified. The ventricles and cisterns appear normal.      IMPRESSION:  1. Chronic microvascular changes.  2. No acute findings.     This report was signed and finalized on 2/9/2023 2:04 PM by Golden Gonzalez MD.    CT Head Without Contrast    Result Date: 12/10/2024  PROCEDURE: CT HEAD WO CONTRAST-  [facility]  COMPARISON: May 2023.  TECHNIQUE: Multiple axial CT images were performed from the foramen magnum to the vertex without enhancement.  FINDINGS: There is no CT evidence of acute intracranial hemorrhage. Moderate atrophy, pronounced for the patient's age, is stable. There is a stable small area of encephalomalacia in the right basal ganglia consistent with remote CVA. There is no mass, mass effect or midline shift.  There is no hydrocephalus. The paranasal sinuses are clear. Bone windows reveal no acute osseous abnormalities.      Impression: Stable chronic changes without acute intracranial process.     DLP: 749.41 mGy.cm,748.94 mGy.cm CTDI: 22.7 mGy   This study was performed with techniques to keep radiation doses as low as reasonably achievable (ALARA). Individualized dose reduction techniques using automated exposure control or adjustment of mA and/or kV according to the patient size were employed.     Images were reviewed, interpreted, and dictated by Dr. Kristi Roblero MD Transcribed by Anna Beasley PA-C.  This report was signed and finalized on 12/10/2024 11:20 AM by Kristi Roblero MD.        CT Angiogram Head    Result Date: 12/10/2024  PROCEDURE: CT ANGIOGRAM NECK-, CT ANGIOGRAM HEAD-  HISTORY: Concern for stroke, altered mental status  TECHNIQUE: Thin section axial CT with IV contrast supplemented  with multiplanar reconstruction of the head and neck.  FINDINGS: Exam is limited by significant patient motion artifact.  CTA:  Aortic arch:  Arch shows no significant narrowing. Great vessel origins are widely patent.  Right carotid: There is mild plaque in the right carotid bulb with less than 50% stenosis. Right common carotid artery is unremarkable..  Left carotid: There is minimal calcified plaque in the left carotid bulb with less than 50% stenosis. Carotid artery is unremarkable.  Vertebrals: Left vertebral artery is dominant. No significant stenosis is present. Distal vertebral arteries are suboptimally visualized.  The cranial circulation is grossly unremarkable. ACAs are unremarkable. Motion artifact limits visualization of MCAs and PCAs. No definite stenosis or aneurysm is seen, but small lesions could be missed. Basilar arteries are unremarkable.  Calcification in the right palatine tonsil suggests previous tonsillitis.      Impression: Suboptimal exam secondary to motion artifact on multiple images.  Less than 50% stenosis of the internal carotid arteries bilaterally.  No significant intracranial stenosis or aneurysm identified, however, small lesions could be missed secondary to motion artifact.    DLP: 749.41 mGy.cm,748.94 mGy.cm CTDI: 22.7 mGy  This study was performed with techniques to keep radiation doses as low as reasonably achievable (ALARA). Individualized dose reduction techniques using automated exposure control or adjustment of mA and/or kV according to the patient size were employed.      Images were reviewed, interpreted, and dictated by Dr. Kristi Roblero MD Transcribed by Anna Beasley PA-C.  This report was signed and finalized on 12/10/2024 11:28 AM by Kristi Roblero MD.       MRI Brain Without Contrast    Result Date: 12/10/2024  PROCEDURE: MRI BRAIN WO CONTRAST-  HISTORY: concern for possible posterior stroke,  PROCEDURE: Multiplanar MR imaging of the brain was performed in multiple  MR sequences .  FINDINGS: Brain parenchyma displays normal signal without evidence of mass, hemorrhage or edema.  Limited images the proximal cord are unremarkable. The ventricles are normal in size. There is a small area of encephalomalacia in the right basal ganglia consistent with small remote CVA. There is no extra-axial fluid or midline shift. Flow-voids are appropriate. Diffusion weighted images demonstrate no evidence of acute CVA although a small area could be missed secondary to the motion artifact. Limited images of the paranasal sinuses are unremarkable. Motion artifact noted on multiple images significantly decreasing the sensitivity of this exam. There is mild small vessel ischemic disease. T2 coronal images are almost nondiagnostic. Same is true of the T1 axial images.      Impression: Gross motion artifact on multiple images and motion artifact present on all images.  No acute CVA identified though a small area could be missed secondary to motion artifact.  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            Additional Tests Performed: X-ray of the right shoulder shows the following:    PROCEDURE: XR SHOULDER 2+ VW RIGHT-     HISTORY: Fall, yesterday, knot noted to right shoulder     COMPARISON: March 24, 2016.     FINDINGS:  A three view exam demonstrates no acute fracture or  dislocation. The joint spaces demonstrate moderate narrowing of the  right acromioclavicular joint. There is also decreased subacromial  distance suggesting chronic rotator cuff tear. No soft tissue  abnormality is seen. On a single view there is a linear lucency in the  superior lateral right acromion. Borders appear smooth and mildly  sclerotic. If this is a fracture appears to be remote. This is not  identified on the prior study.        IMPRESSION:  No acute bony abnormality.     Degenerative changes described.    Results for orders placed during the hospital encounter of 06/21/22    Adult  Transthoracic Echo Complete W/ Cont if Necessary Per Protocol (With Agitated Saline)    Interpretation Summary  · Saline test results are negative.  · Left ventricular wall thickness is consistent with mild concentric hypertrophy.  · Estimated left ventricular EF = 66% Left ventricular ejection fraction appears to be 66 - 70%. Left ventricular systolic function is normal.  · Left ventricular diastolic function is consistent with (grade II w/high LAP) pseudonormalization.     The transthoracic echocardiogram with bubble study performed today showed the following:        Left ventricular systolic function is normal. Calculated left ventricular EF = 67.8% Left ventricular ejection fraction appears to be 66 - 70%.    Left ventricular wall thickness is consistent with mild concentric hypertrophy. Left ventricular diastolic function was indeterminate.    The right ventricular cavity is borderline dilated with borderline systolic function.    The left atrial cavity is mildly dilated.    Mild aortic valve regurgitation is present.    Saline test results are negative for right to left atrial level shunt.      The EEG performed in the emergency room at the bedside and interpreted by my colleague Dr. Dony Hope is as follows:    Technical Summary:      A 19 channel digital EEG was performed using the international 10-20 placement system, including eye leads and EKG leads.     Duration: 20 minutes     Findings:     The patient is drowsy at the beginning of the study.  The background shows diffuse low to medium amplitude 3-5 Hz intermixed delta and theta activity present symmetrically over both hemispheres.  Photic stimulation does not change the background.  Hyperventilation was attempted but the patient was too drowsy to complete this.  No focal features or epileptiform activity are present.      Video: None     Technical quality: Good     EKG: Regular, 60 bpm     SUMMARY:     Excess drowsiness     Moderate generalized slow    "  No focal features or epileptiform activity     IMPRESSION:     Diffuse cerebral dysfunction of moderate degree, nonspecific.  This is most commonly seen due to toxic/metabolic cause     No evidence for epilepsy is seen    Laboratory Results:   Lab Results   Component Value Date    GLUCOSE 175 (H) 12/10/2024    CALCIUM 8.9 12/10/2024     12/10/2024    K 3.8 12/10/2024    CO2 26.2 12/10/2024     12/10/2024    BUN 16 12/10/2024    CREATININE 1.55 (H) 12/10/2024    EGFRIFAFRI 97 08/12/2019    EGFRIFNONA 66 09/11/2020    BCR 10.3 12/10/2024    ANIONGAP 10.8 12/10/2024     Lab Results   Component Value Date    WBC 8.65 12/10/2024    HGB 16.0 12/10/2024    HCT 48.1 12/10/2024    MCV 89.1 12/10/2024     12/10/2024     Lab Results   Component Value Date    CHOL 159 06/22/2022     Lab Results   Component Value Date    HDL 36 (L) 04/15/2024    HDL 38 (L) 07/24/2023    HDL 33 (L) 06/22/2022     Lab Results   Component Value Date    LDL 82 04/15/2024     (H) 07/24/2023    LDL 85 06/22/2022     Lab Results   Component Value Date    TRIG 210 (H) 04/15/2024    TRIG 476 (H) 07/24/2023    TRIG 248 (H) 06/22/2022     Lab Results   Component Value Date    HGBA1C 10.8 (H) 09/03/2024     Lab Results   Component Value Date    INR 1.12 (H) 07/27/2024    INR 1.2 (H) 07/25/2024    INR 1.08 01/17/2024    PROTIME 12.0 07/27/2024    PROTIME 11.7 01/17/2024    PROTIME 12.1 01/15/2024     Lab Results   Component Value Date    AOUPMRYR00 292 12/22/2022     Lab Results   Component Value Date    FOLATE 11.60 12/28/2020     TSH          4/15/2024    14:40 12/10/2024    09:29   TSH   TSH 3.090  5.450           Physical Examination:  /95   Pulse 76   Temp 98.3 °F (36.8 °C) (Oral)   Resp 20   Ht 180.3 cm (71\")   Wt 108 kg (239 lb)   SpO2 98%   BMI 33.33 kg/m²   General Appearance:   Well developed, well nourished, well groomed, alert, and cooperative.  HEENT: Normocephalic.    Neck and Spine: Normal range of " motion.  Normal alignment. No mass or tenderness. No bruits.    Extremities:    No edema or deformities. Normal joint ROM.   Skin:    No rashes or birth marks.    Neurological examination:  Higher Integrative  Function: Oriented to time, place and person. Normal registration, recall, attention span and concentration. Normal language including comprehension, spontaneous speech, repetition, reading, writing, naming and vocabulary. No neglect with normal visual-spatial function and construction. Normal fund of knowledge and higher integrative function.  CN II:  Pupils are equal, round, and reactive to light. Normal visual acuity and visual fields.    CN III IV VI: Extraocular movements are full without nystagmus.   CN V:  Normal facial sensation and strength of muscles of mastication.  CN VII:  Facial movements are symmetric. No weakness.  CN VIII: Auditory acuity is normal.  CN IX & X: Symmetric palatal movement.  CN XI:  Sternocleidomastoid and trapezius are normal.  No weakness.  CN XII:  The tongue is midline.  No atrophy or fasciculations.  Motor:  Normal muscle strength, bulk and tone in upper and lower extremities, except for the right upper extremity which has limited range of motion because of the severe injury to the right shoulder from the recent fall.  No fasciculations, rigidity, spasticity, or abnormal movements.  Sensation: Normal to light touch, pinprick, vibration, temperature, and proprioception in arms and legs. Normal graphesthesia and no extinction on DSS.  Station and Gait: Could not be tested as he tends to fall so often..    Coordination:  Finger to nose test shows no dysmetria.   Heel to shin normal.    NIHSS:    Baseline  0-->Alert: keenly responsive  0-->Answers both questions correctly  0-->Performs both tasks correctly  0=normal  0=No visual loss  0=Normal symmetric movement  0-->No drift: limb holds 90 (or 45) degrees for full 10 secs  0-->No drift: limb holds 90 (or 45) degrees for full  10 secs  0-->No drift: limb holds 90 (or 45) degrees for full 10 secs  0-->No drift: limb holds 90 (or 45) degrees for full 10 secs  0=Absent  0=Normal; no sensory loss  0=No aphasia, normal  0=Normal  0=No abnormality    Total score: 0    Diagnoses / Discussion:  69 y.o. who presents with Sx of postconcussive syndrome from the recent fall along with possible progressive central amyloid angiopathy.  Metabolic encephalopathy has also been entertained from the EEG report.  Patient might be in severe pain injuring his right shoulder when he fell resulting in hypertensive encephalopathy.  Patient has history of numerous blood clots in his lungs for which he is on Eliquis 5 mg twice daily with food.    Plan:  Continue the Eliquis at 5 mg twice daily with food and not to add any further antiplatelet agents as the patient is falling numerous times at home and has a high risk of intracerebral hemorrhage from this plus he has progressive central amyloid angiopathy.  Hydrate  Neuro checks  Check lipid panel, Hgb A1C, TSH, sed rate, vitamin B12, folic acid levels  Lipitor 80 mg, continue the home dose as before  Non-pharmacological DVT prophylaxis  MRI of the right shoulder to look for acute tear of the right rotator cuff as well as acromial joint displacement  Telemetry Unit admission/observation to look for cardiac arrhythmias  PT/OT/ST  Blood pressure control : Keep the systolic blood pressure between 1 20-1 40 and the diastolic between 70-80.  The patient needs to restart his home medications as he passed his bedside swallow.  Goal LDL <70-recommend high dose statins-   Serum glucose < 140  Body mass index: 33.33 kg/m² which puts him at a high risk for underlying obstructive sleep apnea.  Many years ago he had an overnight polysomnogram study which was unremarkable but since then he has gained weight and has not had a repeat study.  Obstructive sleep apnea screening with STOP-BANG questionnaire.  Voltaren gel for the right  shoulder joint.     I have discussed the above with the patient, Dr. Rinku Mckeon, the emergency room physician and family.  He will be followed up by our inpatient teleneurology service tomorrow.  Time spent with patient: 70 minutes in face-to-face evaluation and management of the patient using the dedicated telemedicine device without any interruption with the help of the emergency room nurse with the patient located at the [facility] and myself at a remote location.    Electronically signed by Asuncion Cruz MD, 12/10/24, 1:38 PM EST.    Dictated using Dragon dictation.

## 2024-12-10 NOTE — H&P
[facility]   HISTORY AND PHYSICAL      Name:  Ramsey Riley   Age:  69 y.o.  Sex:  male  :  1955  MRN:  [id number]   Visit Number:  [id number]  Admission Date:  12/10/2024  Date Of Service:  12/10/24  Primary Care Physician:  Margret Purvis MD    Chief Complaint:     Fall, confusion    History Of Presenting Illness:      Chronically ill 69-year-old with a history of peripheral neuropathy, diabetes, hypothyroidism, CAD, prior CVA, vision loss, diabetic retinopathy, chronic pain, history of multiple falls, who had presented to the emergency room due to concerns of confusion.  History obtained primarily from ER record, somewhat from the patient.  No family currently at bedside at time of visit.  Patient notes he had had a fall he thinks yesterday, had hurt his shoulder, actually went to an outlying emergency room where he had no fractures.  He had taken some pain medicine at home, cannot remember the name, apparently his wife had noticed him being more confused overnight into today.  He is currently awake and alert that he is in the emergency room, however he is overall fairly poor historian.  Notes shoulder is feeling better.  He denies any dizziness or headache.    In the ER the patient is overall stable other than mild hypertension.  His labs were largely nonactionable.  X-rays unremarkable for any acute fracture.  CT angiogram of the head neck with contrast was unremarkable, MRI of the brain did not show any acute CVA, showed white matter disease.  Patient was seen by teleneurology who recommended admission for observation.    Review Of Systems:    All systems were reviewed and negative except as mentioned in history of presenting illness, assessment and plan.    Past Medical History: Patient  has a past medical history of Arthritis, Clot, Coronary artery disease, Diabetes mellitus, Heart disease, History of blood clots, Hypertension, Low back pain, Thyroid disease, and Type 2  diabetes mellitus.    Past Surgical History: Patient  has a past surgical history that includes Esophagogastroduodenoscopy; Colonoscopy (2015); Cholecystectomy (2015); and Coronary artery bypass graft.    Social History: Patient  reports that he has never smoked. He has never been exposed to tobacco smoke. His smokeless tobacco use includes chew. He reports that he does not drink alcohol and does not use drugs.    Family History:  Patient's family history has been reviewed and found to be noncontributory.     Allergies:      Trazodone    Home Medications:    Prior to Admission Medications       Prescriptions Last Dose Informant Patient Reported? Taking?    ALPRAZolam (XANAX) 0.5 MG tablet   No No    Take 1 tablet by mouth At Night As Needed for Anxiety.    amLODIPine (NORVASC) 10 MG tablet   No No    Take 1 tablet by mouth Daily.    apixaban (Eliquis) 5 MG tablet tablet   No No    Take 1 tablet by mouth Every 12 (Twelve) Hours.    aspirin 81 MG chewable tablet   Yes No    Chew 1 tablet Daily.    atorvastatin (Lipitor) 80 MG tablet   No No    Take 1 tablet by mouth Every Night.    chlorthalidone (HYGROTON) 25 MG tablet   No No    Take 0.5 tablets by mouth Daily.    citalopram (CeleXA) 40 MG tablet   No No    Take 1 tablet by mouth Daily.    Continuous Glucose Sensor (FreeStyle Rina 3 Sensor) misc   No No    Use 1 each Every 14 (Fourteen) Days.    cyclopentolate (CYCLOGYL) 1 % ophthalmic solution   Yes No    Apply 1 drop to eye(s) as directed by provider 3 (Three) Times a Day.    famotidine (Pepcid) 20 MG tablet   No No    Take 1 tablet by mouth 2 (Two) Times a Day.    glucose blood (Accu-Chek Caryn Plus) test strip   No No    USE AS DIRECTED 3 TIMES DAILY    glucose monitor monitoring kit   No No    1 each Daily.    glucose monitor monitoring kit   No No    1 each Daily.    HYDROcodone-acetaminophen (NORCO) 7.5-325 MG per tablet   No No    Take 1 tablet by mouth Every 12 (Twelve) Hours As Needed for Moderate Pain.  "   Insulin NPH Isophane & Regular (HumuLIN 70/30 KwikPen) (70-30) 100 UNIT/ML suspension pen-injector   No No    Inject 70 units twice daily E11.65    Insulin Pen Needle 32G X 4 MM misc   No No    For use with insulin injections, daily    Insulin Syringes, Disposable, U-100 1 ML misc   No No    50 Units 3 (Three) Times a Day As Needed (50 units in the morning, and then with meals PRN).    Lancets 30G misc   No No    Check sugar 3 times daily    levothyroxine (SYNTHROID, LEVOTHROID) 125 MCG tablet   No No    Take 1 tablet by mouth Daily.    losartan (COZAAR) 100 MG tablet   No No    Take 1 tablet by mouth Daily.    pantoprazole (PROTONIX) 40 MG EC tablet   No No    Take 1 tablet by mouth Daily.    QUEtiapine (SEROquel) 50 MG tablet   No No    Take 1 tablet by mouth 2 (Two) Times a Day.          ED Medications:    Medications   apixaban (ELIQUIS) tablet 5 mg (5 mg Oral Given 12/10/24 1354)   atorvastatin (LIPITOR) tablet 80 mg (has no administration in time range)   labetalol (NORMODYNE,TRANDATE) injection 10 mg (has no administration in time range)   Diclofenac Sodium (VOLTAREN) 1 % gel 4 g (4 g Topical Given 12/10/24 1426)   iopamidol (ISOVUE-300) 61 % injection 100 mL (100 mL Intravenous Given 12/10/24 1047)   magnesium sulfate 2g/50 mL (PREMIX) infusion (0 g Intravenous Stopped 12/10/24 1233)   acetaminophen (TYLENOL) tablet 650 mg (650 mg Oral Given 12/10/24 1354)     Vital Signs:  Temp:  [98.3 °F (36.8 °C)] 98.3 °F (36.8 °C)  Heart Rate:  [75-86] 77  Resp:  [20-22] 20  BP: (129-180)/() 157/97        12/10/24  0822   Weight: 108 kg (239 lb)     Body mass index is 33.33 kg/m².    Physical Exam:     Most recent vital Signs: /97   Pulse 77   Temp 98.3 °F (36.8 °C) (Oral)   Resp 20   Ht 180.3 cm (71\")   Wt 108 kg (239 lb)   SpO2 95%   BMI 33.33 kg/m²     Physical Exam  Constitutional:       General: He is not in acute distress.     Appearance: He is not toxic-appearing.   HENT:      Ears:      " Comments: Hearing loss     Mouth/Throat:      Mouth: Mucous membranes are moist.      Pharynx: Oropharynx is clear.   Eyes:      Pupils: Pupils are equal, round, and reactive to light.   Cardiovascular:      Rate and Rhythm: Normal rate and regular rhythm.      Pulses: Normal pulses.      Heart sounds: No murmur heard.     No gallop.   Pulmonary:      Effort: No respiratory distress.      Breath sounds: No wheezing or rales.   Abdominal:      General: Bowel sounds are normal. There is no distension.      Tenderness: There is no abdominal tenderness. There is no guarding.   Skin:     General: Skin is warm.      Capillary Refill: Capillary refill takes less than 2 seconds.   Neurological:      General: No focal deficit present.      Mental Status: He is alert.      Motor: Weakness present.   Psychiatric:         Mood and Affect: Mood normal.         Thought Content: Thought content normal.         Laboratory data:    I have reviewed the labs done in the emergency room.    Results from last 7 days   Lab Units 12/10/24  0929   SODIUM mmol/L 138   POTASSIUM mmol/L 3.8   CHLORIDE mmol/L 101   CO2 mmol/L 26.2   BUN mg/dL 16   CREATININE mg/dL 1.55*   CALCIUM mg/dL 8.9   BILIRUBIN mg/dL 0.8   ALK PHOS U/L 89   ALT (SGPT) U/L 20   AST (SGOT) U/L 29   GLUCOSE mg/dL 175*     Results from last 7 days   Lab Units 12/10/24  1128   WBC 10*3/mm3 8.65   HEMOGLOBIN g/dL 16.0   HEMATOCRIT % 48.1   PLATELETS 10*3/mm3 146         Results from last 7 days   Lab Units 12/10/24  1128 12/10/24  0929   CK TOTAL U/L  --  437*   HSTROP T ng/L 50* 52*     Results from last 7 days   Lab Units 12/10/24  0929   PROBNP pg/mL 290.6                 Results from last 7 days   Lab Units 12/10/24  1119   COLOR UA  Yellow   GLUCOSE UA  250 mg/dL (1+)*   KETONES UA  Negative   BLOOD UA  Trace*   LEUKOCYTES UA  Negative   PH, URINE  7.0   BILIRUBIN UA  Negative   UROBILINOGEN UA  1.0 E.U./dL   RBC UA /HPF 0-2   WBC UA /HPF None Seen       Pain Management  Panel  More data exists         Latest Ref Rng & Units 12/10/2024 4/15/2024   Pain Management Panel   Creatinine, Urine Not Estab. mg/dL - 126.1    Amphetamine, Urine Qual Negative Negative  -   Barbiturates Screen, Urine Negative Negative  -   Benzodiazepine Screen, Urine Negative Positive  -   Buprenorphine, Screen, Urine Negative Negative  -   Cocaine Screen, Urine Negative Negative  -   Methadone Screen , Urine Negative Negative  -   Methamphetamine, Ur Negative Negative  -      Details                   EKG:          Radiology:    MRI Brain Without Contrast    Result Date: 12/10/2024  PROCEDURE: MRI BRAIN WO CONTRAST-  HISTORY: concern for possible posterior stroke,  PROCEDURE: Multiplanar MR imaging of the brain was performed in multiple MR sequences .  FINDINGS: Brain parenchyma displays normal signal without evidence of mass, hemorrhage or edema.  Limited images the proximal cord are unremarkable. The ventricles are normal in size. There is a small area of encephalomalacia in the right basal ganglia consistent with small remote CVA. There is no extra-axial fluid or midline shift. Flow-voids are appropriate. Diffusion weighted images demonstrate no evidence of acute CVA although a small area could be missed secondary to the motion artifact. Limited images of the paranasal sinuses are unremarkable. Motion artifact noted on multiple images significantly decreasing the sensitivity of this exam. There is mild small vessel ischemic disease. T2 coronal images are almost nondiagnostic. Same is true of the T1 axial images.      Gross motion artifact on multiple images and motion artifact present on all images.  No acute CVA identified though a small area could be missed secondary to motion artifact.  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      This report was signed and finalized on 12/10/2024 1:38 PM by Kristi Roblero MD.      CT Angiogram Head    Result Date: 12/10/2024  PROCEDURE: CT  ANGIOGRAM NECK-, CT ANGIOGRAM HEAD-  HISTORY: Concern for stroke, altered mental status  TECHNIQUE: Thin section axial CT with IV contrast supplemented with multiplanar reconstruction of the head and neck.  FINDINGS: Exam is limited by significant patient motion artifact.  CTA:  Aortic arch:  Arch shows no significant narrowing. Great vessel origins are widely patent.  Right carotid: There is mild plaque in the right carotid bulb with less than 50% stenosis. Right common carotid artery is unremarkable..  Left carotid: There is minimal calcified plaque in the left carotid bulb with less than 50% stenosis. Carotid artery is unremarkable.  Vertebrals: Left vertebral artery is dominant. No significant stenosis is present. Distal vertebral arteries are suboptimally visualized.  The cranial circulation is grossly unremarkable. ACAs are unremarkable. Motion artifact limits visualization of MCAs and PCAs. No definite stenosis or aneurysm is seen, but small lesions could be missed. Basilar arteries are unremarkable.  Calcification in the right palatine tonsil suggests previous tonsillitis.      Suboptimal exam secondary to motion artifact on multiple images.  Less than 50% stenosis of the internal carotid arteries bilaterally.  No significant intracranial stenosis or aneurysm identified, however, small lesions could be missed secondary to motion artifact.    DLP: 749.41 mGy.cm,748.94 mGy.cm CTDI: 22.7 mGy  This study was performed with techniques to keep radiation doses as low as reasonably achievable (ALARA). Individualized dose reduction techniques using automated exposure control or adjustment of mA and/or kV according to the patient size were employed.      Images were reviewed, interpreted, and dictated by Dr. Kristi Roblero MD Transcribed by Anna Beasley PA-C.  This report was signed and finalized on 12/10/2024 11:28 AM by Kristi Roblero MD.      CT Angiogram Neck    Result Date: 12/10/2024  PROCEDURE: CT ANGIOGRAM  NECK-, CT ANGIOGRAM HEAD-  HISTORY: Concern for stroke, altered mental status  TECHNIQUE: Thin section axial CT with IV contrast supplemented with multiplanar reconstruction of the head and neck.  FINDINGS: Exam is limited by significant patient motion artifact.  CTA:  Aortic arch:  Arch shows no significant narrowing. Great vessel origins are widely patent.  Right carotid: There is mild plaque in the right carotid bulb with less than 50% stenosis. Right common carotid artery is unremarkable..  Left carotid: There is minimal calcified plaque in the left carotid bulb with less than 50% stenosis. Carotid artery is unremarkable.  Vertebrals: Left vertebral artery is dominant. No significant stenosis is present. Distal vertebral arteries are suboptimally visualized.  The cranial circulation is grossly unremarkable. ACAs are unremarkable. Motion artifact limits visualization of MCAs and PCAs. No definite stenosis or aneurysm is seen, but small lesions could be missed. Basilar arteries are unremarkable.  Calcification in the right palatine tonsil suggests previous tonsillitis.      Suboptimal exam secondary to motion artifact on multiple images.  Less than 50% stenosis of the internal carotid arteries bilaterally.  No significant intracranial stenosis or aneurysm identified, however, small lesions could be missed secondary to motion artifact.    DLP: 749.41 mGy.cm,748.94 mGy.cm CTDI: 22.7 mGy  This study was performed with techniques to keep radiation doses as low as reasonably achievable (ALARA). Individualized dose reduction techniques using automated exposure control or adjustment of mA and/or kV according to the patient size were employed.      Images were reviewed, interpreted, and dictated by Dr. Kristi Roblero MD Transcribed by Anna Beasley PA-C.  This report was signed and finalized on 12/10/2024 11:28 AM by Kristi Roblero MD.      CT Cervical Spine Without Contrast    Result Date: 12/10/2024  PROCEDURE: CT  CERVICAL SPINE WO CONTRAST-  HISTORY: Fall yesterday, AMS, neck pain  COMPARISON: May 2023.  PROCEDURE: Multiple axial CT images were obtained through the cervical spine using bone window algorithms. Coronal and sagittal images were reconstructed from the original axial data set.  FINDINGS: Facets are normally aligned. There is no acute fracture.. There is straightening of the normal cervical adenosis. There is minimal retrolisthesis of C3 on C4, stable. There is mild to moderate disc space narrowing at all levels with small osteophytes. Multiple levels of spinal stenosis and/or neuroforaminal narrowing secondary to degenerative change, but not secondary to acute abnormality. Bilateral carotid artery calcifications are noted.  Limited images of the lung apices are unremarkable.      No acute fracture or malalignment.  Degenerative changes.   CTDI: 22.7 mGy DLP: 749.41 mGy.cm,748.94 mGy.cm   This study was performed with techniques to keep radiation doses as low as reasonably achievable (ALARA). Individualized dose reduction techniques using automated exposure control or adjustment of mA and/or kV according to the patient size were employed.     Images were reviewed, interpreted, and dictated by Dr. Kristi Roblero MD Transcribed by Anna Beasley PA-C.  This report was signed and finalized on 12/10/2024 11:20 AM by Kristi Roblero MD.      CT Head Without Contrast    Result Date: 12/10/2024  PROCEDURE: CT HEAD WO CONTRAST-  HISTORY: ground level fall yesterday on Eliquis, [facility] since fall  COMPARISON: May 2023.  TECHNIQUE: Multiple axial CT images were performed from the foramen magnum to the vertex without enhancement.  FINDINGS: There is no CT evidence of acute intracranial hemorrhage. Moderate atrophy, pronounced for the patient's age, is stable. There is a stable small area of encephalomalacia in the right basal ganglia consistent with remote CVA. There is no mass, mass effect or midline shift.  There is no  hydrocephalus. The paranasal sinuses are clear. Bone windows reveal no acute osseous abnormalities.      Stable chronic changes without acute intracranial process.     DLP: 749.41 mGy.cm,748.94 mGy.cm CTDI: 22.7 mGy   This study was performed with techniques to keep radiation doses as low as reasonably achievable (ALARA). Individualized dose reduction techniques using automated exposure control or adjustment of mA and/or kV according to the patient size were employed.     Images were reviewed, interpreted, and dictated by Dr. Kristi Roblero MD Transcribed by Anna Beasley PA-C.  This report was signed and finalized on 12/10/2024 11:20 AM by Kristi Roblero MD.      XR Shoulder 2+ View Right    Result Date: 12/10/2024   PROCEDURE: XR SHOULDER 2+ VW RIGHT-  HISTORY: Fall, yesterday, knot noted to right shoulder  COMPARISON: March 24, 2016.  FINDINGS:  A three view exam demonstrates no acute fracture or dislocation. The joint spaces demonstrate moderate narrowing of the right acromioclavicular joint. There is also decreased subacromial distance suggesting chronic rotator cuff tear. No soft tissue abnormality is seen. On a single view there is a linear lucency in the superior lateral right acromion. Borders appear smooth and mildly sclerotic. If this is a fracture appears to be remote. This is not identified on the prior study.       No acute bony abnormality.  Degenerative changes described.      This report was signed and finalized on 12/10/2024 11:03 AM by Kristi Roblero MD.      XR Humerus Right    Result Date: 12/10/2024  PROCEDURE: XR HUMERUS RIGHT-  HISTORY: Fall yesterday, pain, inability to abduct arm  COMPARISON: None.  FINDINGS:  A two view exam demonstrates no acute fracture or dislocation. The joint spaces appear unremarkable. No soft tissue abnormality is seen.      No acute bony abnormality.      This report was signed and finalized on 12/10/2024 10:56 AM by Kristi Roblero MD.      XR Chest 1 View    Result  Date: 12/10/2024  PROCEDURE: XR CHEST 1 VW-  HISTORY: AMS, fell last night, right shoulder pain.  COMPARISON: June 21, 2022..  FINDINGS: The heart is normal in size. The lungs are clear. The mediastinum is unremarkable. There is no pneumothorax.  There are no acute osseous abnormalities. Status post median sternotomy. Apical lordotic positioning noted. Small density is again projected over the left apex, stable. There is evidence of old calcified granulomatous disease.      No acute cardiopulmonary process.     This report was signed and finalized on 12/10/2024 10:56 AM by Kristi Roblero MD.       Assessment:    Weakness, POA  Encephalopathy, POA  Diabetes with neuropathy and retinopathy  Vision loss  Hearing loss  CAD  History of prior PE on Eliquis  Impaired mobility and ADL    Plan:    Admitted as observation    Weakness/confusion:  Appears to have chronic weakness, history of falls, lot of cares provided by spouse.  In regards to confusion, may have been exacerbated by pain medication needed been taking for the shoulder.  He is also on benzodiazepines and Seroquel.  Echocardiogram was ordered.  Lipitor has been ordered.  Telemetry monitoring.  Neurology to follow-up.  Will continue his Eliquis.    Will have PT and OT evaluations.    Diabetes/vision loss/hearing loss/CAD/prior PE/impaired mobility:  Will resume home medications once able to reconcile with spouse.    Likely home in 24 to 48 hours.  Plan of care discussed with the patient.    Risk Assessment: High  DVT Prophylaxis: Eliquis  Code Status: Full  Diet: As tolerated    Advance Care Planning   ACP discussion was held with the patient during this visit. Patient does not have an advance directive, declines further assistance.           Ines Sim DO  12/10/24  15:11 EST    Dictated utilizing Dragon dictation.

## 2024-12-10 NOTE — ED PROVIDER NOTES
Subjective:  History of Present Illness:    Patient is a six 9-year-[age] male with history of CAD, diabetes mellitus, hypertension, type 2 diabetes who presents today with altered mental status and right shoulder pain.  Reports that he was evaluated outside ER yesterday after having recurrent falls.  Reports that he has inability to walk last night and now has had inability to speak prior to arrival.  Is having intermittent speech but is following commands.  Reports has been ongoing since last night.  Patient does take Eliquis.  No fevers, no dysuria.  He denies any chest pain or shortness of breath.  Reports that he did fall last night.  Having pain to the right shoulder.  No focal weakness on my exam.      Nurses Notes reviewed and agree, including vitals, allergies, social history and prior medical history.     REVIEW OF SYSTEMS: All systems reviewed and not pertinent unless noted.  Review of Systems   Constitutional:  Positive for activity change. Negative for appetite change, chills, fatigue and fever.   HENT:  Negative for congestion, sinus pressure, sneezing and trouble swallowing.    Eyes:  Negative for discharge and itching.   Respiratory:  Negative for cough and shortness of breath.    Cardiovascular:  Negative for chest pain and palpitations.   Gastrointestinal:  Negative for abdominal distention and abdominal pain.   Endocrine: Negative for cold intolerance and heat intolerance.   Genitourinary:  Negative for decreased urine volume, dysuria and urgency.   Musculoskeletal:  Negative for gait problem, neck pain and neck stiffness.   Skin:  Negative for color change and rash.   Allergic/Immunologic: Negative for immunocompromised state.   Neurological:  Positive for speech difficulty. Negative for facial asymmetry and headaches.   Hematological:  Negative for adenopathy.   Psychiatric/Behavioral:  Positive for confusion. Negative for self-injury and suicidal ideas.        Past Medical History:   Diagnosis  "Date    Arthritis     Clot     Coronary artery disease     Diabetes mellitus     Heart disease     History of blood clots     Hypertension     Low back pain     Thyroid disease     Type 2 diabetes mellitus        Allergies:    Trazodone      Past Surgical History:   Procedure Laterality Date    CHOLECYSTECTOMY  2015    COLONOSCOPY  2015    dr srivastava    CORONARY ARTERY BYPASS GRAFT      [place] -  BY PASS     ENDOSCOPY           Social History     Socioeconomic History    Marital status:    Tobacco Use    Smoking status: Never     Passive exposure: Never    Smokeless tobacco: Current     Types: Chew   Vaping Use    Vaping status: Never Used   Substance and Sexual Activity    Alcohol use: No    Drug use: No    Sexual activity: Defer         Family History   Problem Relation Age of Onset    Diabetes Mother     Heart disease Mother         STENTING    COPD Mother     Heart attack Father     Diabetes Father     Heart defect Son         Ross procedure    Heart murmur Son     Hypertension Son        Objective  Physical Exam:  /97   Pulse 62   Temp 98.3 °F (36.8 °C) (Oral)   Resp 20   Ht 180.3 cm (70.98\")   Wt 108 kg (238 lb 1.6 oz)   SpO2 94%   BMI 33.22 kg/m²      Physical Exam  Constitutional:       General: He is not in acute distress.     Appearance: Normal appearance. He is normal weight. He is not ill-appearing.   HENT:      Head: Normocephalic and atraumatic.      Nose: Nose normal. No congestion or rhinorrhea.      Mouth/Throat:      Mouth: Mucous membranes are moist.      Pharynx: Oropharynx is clear.   Eyes:      Extraocular Movements: Extraocular movements intact.      Conjunctiva/sclera: Conjunctivae normal.      Pupils: Pupils are equal, round, and reactive to light.   Cardiovascular:      Rate and Rhythm: Normal rate and regular rhythm.      Pulses: Normal pulses.   Pulmonary:      Effort: Pulmonary effort is normal. No respiratory distress.      Breath sounds: Normal breath sounds. "   Abdominal:      General: Abdomen is flat. Bowel sounds are normal. There is no distension.      Palpations: Abdomen is soft.      Tenderness: There is no abdominal tenderness.   Musculoskeletal:         General: No swelling or tenderness. Normal range of motion.      Cervical back: Normal range of motion and neck supple. No rigidity or tenderness.   Skin:     General: Skin is warm and dry.      Capillary Refill: Capillary refill takes less than 2 seconds.   Neurological:      General: No focal deficit present.      Mental Status: He is disoriented.      GCS: GCS eye subscore is 4. GCS verbal subscore is 4. GCS motor subscore is 6.      Cranial Nerves: No cranial nerve deficit.      Sensory: No sensory deficit.      Motor: No weakness.   Psychiatric:         Mood and Affect: Mood normal.         Behavior: Behavior normal.         Thought Content: Thought content normal.         Judgment: Judgment normal.         Critical Care    Performed by: Rinku Mckeon MD  Authorized by: Ines Sim DO    Critical care provider statement:     Critical care time (minutes):  30    Critical care time was exclusive of:  Separately billable procedures and treating other patients    Critical care was necessary to treat or prevent imminent or life-threatening deterioration of the following conditions:  CNS failure or compromise (TIA)    Critical care was time spent personally by me on the following activities:  Blood draw for specimens, discussions with consultants, discussions with primary provider, examination of patient, obtaining history from patient or surrogate, ordering and review of laboratory studies, ordering and review of radiographic studies, pulse oximetry, re-evaluation of patient's condition and review of old charts    Care discussed with: admitting provider        ED Course:    ED Course as of 12/10/24 1627   Tue Dec 10, 2024   1004 EKG interpreted by me, normal sinus rhythm with no concerning ST  changes noted, rate of 81 [JE]      ED Course User Index  [JE] Rinku Mckeon MD       Lab Results (last 24 hours)       Procedure Component Value Units Date/Time    Comprehensive Metabolic Panel [[id number]]  (Abnormal) Collected: 12/10/24 0929    Specimen: Blood Updated: 12/10/24 1006     Glucose 175 mg/dL      BUN 16 mg/dL      Creatinine 1.55 mg/dL      Sodium 138 mmol/L      Potassium 3.8 mmol/L      Chloride 101 mmol/L      CO2 26.2 mmol/L      Calcium 8.9 mg/dL      Total Protein 6.4 g/dL      Albumin 3.4 g/dL      ALT (SGPT) 20 U/L      AST (SGOT) 29 U/L      Alkaline Phosphatase 89 U/L      Total Bilirubin 0.8 mg/dL      Globulin 3.0 gm/dL      A/G Ratio 1.1 g/dL      BUN/Creatinine Ratio 10.3     Anion Gap 10.8 mmol/L      eGFR 48.8 mL/min/1.73     Narrative:      GFR Categories in Chronic Kidney Disease (CKD)      GFR Category          GFR (mL/min/1.73)    Interpretation  G1                     90 or greater         Normal or high (1)  G2                      60-89                Mild decrease (1)  G3a                   45-59                Mild to moderate decrease  G3b                   30-44                Moderate to severe decrease  G4                    15-29                Severe decrease  G5                    14 or less           Kidney failure          (1)In the absence of evidence of kidney disease, neither GFR category G1 or G2 fulfill the criteria for CKD.    eGFR calculation 2021 CKD-EPI creatinine equation, which does not include race as a factor    High Sensitivity Troponin T [[id number]]  (Abnormal) Collected: 12/10/24 0929    Specimen: Blood Updated: 12/10/24 1011     HS Troponin T 52 ng/L     Narrative:      High Sensitive Troponin T Reference Range:  <14.0 ng/L- Negative Female for AMI  <22.0 ng/L- Negative Male for AMI  >=14 - Abnormal Female indicating possible myocardial injury.  >=22 - Abnormal Male indicating possible myocardial injury.   Clinicians would have to utilize  clinical acumen, EKG, Troponin, and serial changes to determine if it is an Acute Myocardial Infarction or myocardial injury due to an underlying chronic condition.         Magnesium [[id number]]  (Abnormal) Collected: 12/10/24 0929    Specimen: Blood Updated: 12/10/24 1006     Magnesium 1.4 mg/dL     Ammonia [[id number]]  (Normal) Collected: 12/10/24 0929    Specimen: Blood Updated: 12/10/24 1000     Ammonia 16 umol/L     BNP [[id number]]  (Normal) Collected: 12/10/24 0929    Specimen: Blood Updated: 12/10/24 1011     proBNP 290.6 pg/mL     Narrative:      This assay is used as an aid in the diagnosis of individuals suspected of having heart failure. It can be used as an aid in the diagnosis of acute decompensated heart failure (ADHF) in patients presenting with signs and symptoms of ADHF to the emergency department (ED). In addition, NT-proBNP of <300 pg/mL indicates ADHF is not likely.    Age Range Result Interpretation  NT-proBNP Concentration (pg/mL:      <50             Positive            >450                   Gray                 300-450                    Negative             <300    50-75           Positive            >900                  Gray                300-900                  Negative            <300      >75             Positive            >1800                  Gray                300-1800                  Negative            <300    C-reactive Protein [[id number]]  (Abnormal) Collected: 12/10/24 0929    Specimen: Blood Updated: 12/10/24 1006     C-Reactive Protein 0.73 mg/dL     Procalcitonin [[id number]]  (Normal) Collected: 12/10/24 0929    Specimen: Blood Updated: 12/10/24 1013     Procalcitonin 0.11 ng/mL     Narrative:      As a Marker for Sepsis (Non-Neonates):    1. <0.5 ng/mL represents a low risk of severe sepsis and/or septic shock.  2. >2 ng/mL represents a high risk of severe sepsis and/or septic shock.    As a Marker for Lower Respiratory Tract Infections that require antibiotic  "therapy:    PCT on Admission    Antibiotic Therapy       6-12 Hrs later    >0.5                Strongly Recommended  >0.25 - <0.5        Recommended   0.1 - 0.25          Discouraged              Remeasure/reassess PCT  <0.1                Strongly Discouraged     Remeasure/reassess PCT    As 28 day mortality risk marker: \"Change in Procalcitonin Result\" (>80% or <=80%) if Day 0 (or Day 1) and Day 4 values are available. Refer to [email or web address]    Change in PCT <=80%  A decrease of PCT levels below or equal to 80% defines a positive change in PCT test result representing a higher risk for 28-day all-cause mortality of patients diagnosed with severe sepsis for septic shock.    Change in PCT >80%  A decrease of PCT levels of more than 80% defines a negative change in PCT result representing a lower risk for 28-day all-cause mortality of patients diagnosed with severe sepsis or septic shock.       Lactic Acid, Plasma [[id number]]  (Normal) Collected: 12/10/24 0929    Specimen: Blood Updated: 12/10/24 1002     Lactate 1.6 mmol/L     CK [[id number]]  (Abnormal) Collected: 12/10/24 0929    Specimen: Blood Updated: 12/10/24 1006     Creatine Kinase 437 U/L     TSH Rfx On Abnormal To Free T4 [[id number]]  (Abnormal) Collected: 12/10/24 0929    Specimen: Blood Updated: 12/10/24 1011     TSH 5.450 uIU/mL     T4, Free [[id number]]  (Normal) Collected: 12/10/24 0929    Specimen: Blood Updated: 12/10/24 1148     Free T4 1.24 ng/dL     Urinalysis With Microscopic If Indicated (No Culture) - Urine, Clean Catch [[id number]]  (Abnormal) Collected: 12/10/24 1119    Specimen: Urine, Clean Catch Updated: 12/10/24 1145     Color, UA Yellow     Appearance, UA Clear     pH, UA 7.0     Specific Gravity, UA >=1.030     Glucose,  mg/dL (1+)     Ketones, UA Negative     Bilirubin, UA Negative     Blood, UA Trace     Protein, UA >=300 mg/dL (3+)     Leuk Esterase, UA Negative     Nitrite, UA Negative     Urobilinogen, UA " 1.0 E.U./dL    Urinalysis, Microscopic Only - Urine, Clean Catch [[id number]] Collected: 12/10/24 1119    Specimen: Urine, Clean Catch Updated: 12/10/24 1155     RBC, UA 0-2 /HPF      WBC, UA None Seen /HPF      Bacteria, UA None Seen /HPF      Squamous Epithelial Cells, UA None Seen /HPF      Hyaline Casts, UA None Seen /LPF      Mucus, UA Trace /HPF      Methodology Manual Light Microscopy    Urine Drug Screen - Urine, Clean Catch [[id number]]  (Abnormal) Collected: 12/10/24 1119    Specimen: Urine, Clean Catch Updated: 12/10/24 1500     THC, Screen, Urine Negative     Phencyclidine (PCP), Urine Negative     Cocaine Screen, Urine Negative     Methamphetamine, Ur Negative     Opiate Screen Positive     Amphetamine Screen, Urine Negative     Benzodiazepine Screen, Urine Positive     Tricyclic Antidepressants Screen Positive     Methadone Screen, Urine Negative     Barbiturates Screen, Urine Negative     Oxycodone Screen, Urine Negative     Buprenorphine, Screen, Urine Negative    Narrative:      Cutoff For Drugs Screened:    Amphetamines               500 ng/ml  Barbiturates               200 ng/ml  Benzodiazepines            150 ng/ml  Cocaine                    150 ng/ml  Methadone                  200 ng/ml  Opiates                    100 ng/ml  Phencyclidine               25 ng/ml  THC                         50 ng/ml  Methamphetamine            500 ng/ml  Tricyclic Antidepressants  300 ng/ml  Oxycodone                  100 ng/ml  Buprenorphine               10 ng/ml    The normal value for all drugs tested is negative. This report includes unconfirmed screening results, with the cutoff values listed, to be used for medical treatment purposes only.  Unconfirmed results must not be used for non-medical purposes such as employment or legal testing.  Clinical consideration should be applied to any drug of abuse test, particularly when unconfirmed results are used.      Fentanyl, Urine - Urine, Clean Catch [[id number]]   (Normal) Collected: 12/10/24 1119    Specimen: Urine, Clean Catch Updated: 12/10/24 1514     Fentanyl, Urine Negative    Narrative:      Negative Threshold:      Fentanyl 5 ng/mL     The normal value for the drug tested is negative. This report includes final unconfirmed screening results to be used for medical treatment purposes only. Unconfirmed results must not be used for non-medical purposes such as employment or legal testing. Clinical consideration should be applied to any drug of abuse test, particularly when unconfirmed results are used.           CBC & Differential [[id number]] Collected: 12/10/24 1128    Specimen: Blood Updated: 12/10/24 1146    Narrative:      The following orders were created for panel order CBC & Differential.  Procedure                               Abnormality         Status                     ---------                               -----------         ------                     CBC Auto Differential[[id number]]        Normal              Final result               Scan Slide[[id number]]                                                                    Please view results for these tests on the individual orders.    CBC Auto Differential [[id number]]  (Normal) Collected: 12/10/24 1128    Specimen: Blood Updated: 12/10/24 1146     WBC 8.65 10*3/mm3      RBC 5.40 10*6/mm3      Hemoglobin 16.0 g/dL      Hematocrit 48.1 %      MCV 89.1 fL      MCH 29.6 pg      MCHC 33.3 g/dL      RDW 12.7 %      RDW-SD 41.3 fl      MPV 11.0 fL      Platelets 146 10*3/mm3      Neutrophil % 67.6 %      Lymphocyte % 23.0 %      Monocyte % 8.2 %      Eosinophil % 0.7 %      Basophil % 0.3 %      Immature Grans % 0.2 %      Neutrophils, Absolute 5.84 10*3/mm3      Lymphocytes, Absolute 1.99 10*3/mm3      Monocytes, Absolute 0.71 10*3/mm3      Eosinophils, Absolute 0.06 10*3/mm3      Basophils, Absolute 0.03 10*3/mm3      Immature Grans, Absolute 0.02 10*3/mm3      nRBC 0.0 /100 WBC     High Sensitivity  Troponin T 1Hr [[id number]]  (Abnormal) Collected: 12/10/24 1128    Specimen: Blood Updated: 12/10/24 1200     HS Troponin T 50 ng/L      Troponin T Delta -2 ng/L      Troponin T % Change -4 %     Narrative:      High Sensitive Troponin T Reference Range:  <14.0 ng/L- Negative Female for AMI  <22.0 ng/L- Negative Male for AMI  >=14 - Abnormal Female indicating possible myocardial injury.  >=22 - Abnormal Male indicating possible myocardial injury.   Clinicians would have to utilize clinical acumen, EKG, Troponin, and serial changes to determine if it is an Acute Myocardial Infarction or myocardial injury due to an underlying chronic condition.                  EEG    Result Date: 12/10/2024  Reason for referral: 69 y.o.male with altered mental status Technical Summary:  A 19 channel digital EEG was performed using the international 10-20 placement system, including eye leads and EKG leads. Duration: 20 minutes Findings: The patient is drowsy at the beginning of the study.  The background shows diffuse low to medium amplitude 3-5 Hz intermixed delta and theta activity present symmetrically over both hemispheres.  Photic stimulation does not change the background.  Hyperventilation was attempted but the patient was too drowsy to complete this.  No focal features or epileptiform activity are present. Video: None Technical quality: Good EKG: Regular, 60 bpm SUMMARY: Excess drowsiness Moderate generalized slow No focal features or epileptiform activity     Impression: Diffuse cerebral dysfunction of moderate degree, nonspecific.  This is most commonly seen due to toxic/metabolic cause No evidence for epilepsy is seen This repo most commonly seen due to toxic/metabolic causeThis isrt is transcribed using the Dragon dictation system.      MRI Brain Without Contrast    Result Date: 12/10/2024  PROCEDURE: MRI BRAIN WO CONTRAST-  HISTORY: concern for possible posterior stroke,  PROCEDURE: Multiplanar MR imaging of the brain  was performed in multiple MR sequences .  FINDINGS: Brain parenchyma displays normal signal without evidence of mass, hemorrhage or edema.  Limited images the proximal cord are unremarkable. The ventricles are normal in size. There is a small area of encephalomalacia in the right basal ganglia consistent with small remote CVA. There is no extra-axial fluid or midline shift. Flow-voids are appropriate. Diffusion weighted images demonstrate no evidence of acute CVA although a small area could be missed secondary to the motion artifact. Limited images of the paranasal sinuses are unremarkable. Motion artifact noted on multiple images significantly decreasing the sensitivity of this exam. There is mild small vessel ischemic disease. T2 coronal images are almost nondiagnostic. Same is true of the T1 axial images.      Impression: Gross motion artifact on multiple images and motion artifact present on all images.  No acute CVA identified though a small area could be missed secondary to motion artifact.  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      This report was signed and finalized on 12/10/2024 1:38 PM by Kristi Roblero MD.      CT Angiogram Head    Result Date: 12/10/2024  PROCEDURE: CT ANGIOGRAM NECK-, CT ANGIOGRAM HEAD-  HISTORY: Concern for stroke, altered mental status  TECHNIQUE: Thin section axial CT with IV contrast supplemented with multiplanar reconstruction of the head and neck.  FINDINGS: Exam is limited by significant patient motion artifact.  CTA:  Aortic arch:  Arch shows no significant narrowing. Great vessel origins are widely patent.  Right carotid: There is mild plaque in the right carotid bulb with less than 50% stenosis. Right common carotid artery is unremarkable..  Left carotid: There is minimal calcified plaque in the left carotid bulb with less than 50% stenosis. Carotid artery is unremarkable.  Vertebrals: Left vertebral artery is dominant. No significant stenosis is  present. Distal vertebral arteries are suboptimally visualized.  The cranial circulation is grossly unremarkable. ACAs are unremarkable. Motion artifact limits visualization of MCAs and PCAs. No definite stenosis or aneurysm is seen, but small lesions could be missed. Basilar arteries are unremarkable.  Calcification in the right palatine tonsil suggests previous tonsillitis.      Impression: Suboptimal exam secondary to motion artifact on multiple images.  Less than 50% stenosis of the internal carotid arteries bilaterally.  No significant intracranial stenosis or aneurysm identified, however, small lesions could be missed secondary to motion artifact.    DLP: 749.41 mGy.cm,748.94 mGy.cm CTDI: 22.7 mGy  This study was performed with techniques to keep radiation doses as low as reasonably achievable (ALARA). Individualized dose reduction techniques using automated exposure control or adjustment of mA and/or kV according to the patient size were employed.      Images were reviewed, interpreted, and dictated by Dr. Kristi Roblero MD Transcribed by Anna Beasley PA-C.  This report was signed and finalized on 12/10/2024 11:28 AM by Kristi Roblero MD.      CT Angiogram Neck    Result Date: 12/10/2024  PROCEDURE: CT ANGIOGRAM NECK-, CT ANGIOGRAM HEAD-  HISTORY: Concern for stroke, altered mental status  TECHNIQUE: Thin section axial CT with IV contrast supplemented with multiplanar reconstruction of the head and neck.  FINDINGS: Exam is limited by significant patient motion artifact.  CTA:  Aortic arch:  Arch shows no significant narrowing. Great vessel origins are widely patent.  Right carotid: There is mild plaque in the right carotid bulb with less than 50% stenosis. Right common carotid artery is unremarkable..  Left carotid: There is minimal calcified plaque in the left carotid bulb with less than 50% stenosis. Carotid artery is unremarkable.  Vertebrals: Left vertebral artery is dominant. No significant stenosis is  present. Distal vertebral arteries are suboptimally visualized.  The cranial circulation is grossly unremarkable. ACAs are unremarkable. Motion artifact limits visualization of MCAs and PCAs. No definite stenosis or aneurysm is seen, but small lesions could be missed. Basilar arteries are unremarkable.  Calcification in the right palatine tonsil suggests previous tonsillitis.      Impression: Suboptimal exam secondary to motion artifact on multiple images.  Less than 50% stenosis of the internal carotid arteries bilaterally.  No significant intracranial stenosis or aneurysm identified, however, small lesions could be missed secondary to motion artifact.    DLP: 749.41 mGy.cm,748.94 mGy.cm CTDI: 22.7 mGy  This study was performed with techniques to keep radiation doses as low as reasonably achievable (ALARA). Individualized dose reduction techniques using automated exposure control or adjustment of mA and/or kV according to the patient size were employed.      Images were reviewed, interpreted, and dictated by Dr. Kristi Roblero MD Transcribed by Anna Beasley PA-C.  This report was signed and finalized on 12/10/2024 11:28 AM by Kristi Roblero MD.      CT Cervical Spine Without Contrast    Result Date: 12/10/2024  PROCEDURE: CT CERVICAL SPINE WO CONTRAST-  HISTORY: Fall yesterday, AMS, neck pain  COMPARISON: May 2023.  PROCEDURE: Multiple axial CT images were obtained through the cervical spine using bone window algorithms. Coronal and sagittal images were reconstructed from the original axial data set.  FINDINGS: Facets are normally aligned. There is no acute fracture.. There is straightening of the normal cervical adenosis. There is minimal retrolisthesis of C3 on C4, stable. There is mild to moderate disc space narrowing at all levels with small osteophytes. Multiple levels of spinal stenosis and/or neuroforaminal narrowing secondary to degenerative change, but not secondary to acute abnormality. Bilateral carotid  artery calcifications are noted.  Limited images of the lung apices are unremarkable.      Impression: No acute fracture or malalignment.  Degenerative changes.   CTDI: 22.7 mGy DLP: 749.41 mGy.cm,748.94 mGy.cm   This study was performed with techniques to keep radiation doses as low as reasonably achievable (ALARA). Individualized dose reduction techniques using automated exposure control or adjustment of mA and/or kV according to the patient size were employed.     Images were reviewed, interpreted, and dictated by Dr. Kristi Roblero MD Transcribed by Anna Beasley PA-C.  This report was signed and finalized on 12/10/2024 11:20 AM by Kristi Roblero MD.      CT Head Without Contrast    Result Date: 12/10/2024  PROCEDURE: CT HEAD WO CONTRAST-  HISTORY: ground level fall yesterday on [facility] since fall  COMPARISON: May 2023.  TECHNIQUE: Multiple axial CT images were performed from the foramen magnum to the vertex without enhancement.  FINDINGS: There is no CT evidence of acute intracranial hemorrhage. Moderate atrophy, pronounced for the patient's age, is stable. There is a stable small area of encephalomalacia in the right basal ganglia consistent with remote CVA. There is no mass, mass effect or midline shift.  There is no hydrocephalus. The paranasal sinuses are clear. Bone windows reveal no acute osseous abnormalities.      Impression: Stable chronic changes without acute intracranial process.     DLP: 749.41 mGy.cm,748.94 mGy.cm CTDI: 22.7 mGy   This study was performed with techniques to keep radiation doses as low as reasonably achievable (ALARA). Individualized dose reduction techniques using automated exposure control or adjustment of mA and/or kV according to the patient size were employed.     Images were reviewed, interpreted, and dictated by Dr. Kristi Roblero MD Transcribed by Anna Beasley PA-C.  This report was signed and finalized on 12/10/2024 11:20 AM by Kristi Roblero MD.      XR Shoulder 2+  View Right    Result Date: 12/10/2024   PROCEDURE: XR SHOULDER 2+ VW RIGHT-  HISTORY: Fall, yesterday, knot noted to right shoulder  COMPARISON: March 24, 2016.  FINDINGS:  A three view exam demonstrates no acute fracture or dislocation. The joint spaces demonstrate moderate narrowing of the right acromioclavicular joint. There is also decreased subacromial distance suggesting chronic rotator cuff tear. No soft tissue abnormality is seen. On a single view there is a linear lucency in the superior lateral right acromion. Borders appear smooth and mildly sclerotic. If this is a fracture appears to be remote. This is not identified on the prior study.       Impression: No acute bony abnormality.  Degenerative changes described.      This report was signed and finalized on 12/10/2024 11:03 AM by Kristi Roblero MD.      XR Humerus Right    Result Date: 12/10/2024  PROCEDURE: XR HUMERUS RIGHT-  HISTORY: Fall yesterday, pain, inability to abduct arm  COMPARISON: None.  FINDINGS:  A two view exam demonstrates no acute fracture or dislocation. The joint spaces appear unremarkable. No soft tissue abnormality is seen.      Impression: No acute bony abnormality.      This report was signed and finalized on 12/10/2024 10:56 AM by Kristi Roblero MD.      XR Chest 1 View    Result Date: 12/10/2024  PROCEDURE: XR CHEST 1 VW-  HISTORY: AMS, fell last night, right shoulder pain.  COMPARISON: June 21, 2022..  FINDINGS: The heart is normal in size. The lungs are clear. The mediastinum is unremarkable. There is no pneumothorax.  There are no acute osseous abnormalities. Status post median sternotomy. Apical lordotic positioning noted. Small density is again projected over the left apex, stable. There is evidence of old calcified granulomatous disease.      Impression: No acute cardiopulmonary process.     This report was signed and finalized on 12/10/2024 10:56 AM by Kristi Roblero MD.          MDM      Initial impression of presenting  illness: Altered mental status, ambulatory difficulty, difficulty with speech    DDX: includes but is not limited to: Intracranial hemorrhage, CVA, TIA, urinary tract infection, sepsis    Patient arrives guarded with vitals interpreted by myself.     Pertinent features from physical exam: Patient with difficulties with speech but is following commands, no focal weakness noted but diffuse weakness, clear to auscultation, regular rate and rhythm with no murmur, nontender to abdominal palpation.    Initial diagnostic plan: CBC, CMP, troponin, BNP, EKG, chest x-ray, CRP, Pro-Rafael, lactic acid, magnesium, x-ray right shoulder, CT head, CTA head neck    Results from initial plan were reviewed and interpreted by me revealing concern for possible amyloid process on MRI per Dr. Cruz.  No concern for intracranial hemorrhage or vascular occlusion, no concern for recrudescence given lack of infectious etiology on workup    Diagnostic information from other sources: Reviewed past medical records and discussed with wife at bedside    Interventions / Re-evaluation: Observed in emergency department for several hours with no change in vital signs, patient's mental status is improved during his ER course and he is back to baseline    Results/clinical rationale were discussed with patient's wife at bedside    Consultations/Discussion of results with other physicians: Given my concern for possible TIA discussed with Dr. Cruz who recommends admission for continued workup and treatment.  Given this, discussed with Dr. Kerley who accepts patient onto his service for further management.    Disposition plan: Admit  -----        Final diagnoses:   TIA (transient ischemic attack)          Rinku Mckeon MD  12/10/24 7811

## 2024-12-11 LAB
ANION GAP SERPL CALCULATED.3IONS-SCNC: 8.3 MMOL/L (ref 5–15)
BUN SERPL-MCNC: 18 MG/DL (ref 8–23)
BUN/CREAT SERPL: 12.6 (ref 7–25)
CALCIUM SPEC-SCNC: 8.5 MG/DL (ref 8.6–10.5)
CHLORIDE SERPL-SCNC: 101 MMOL/L (ref 98–107)
CHOLEST SERPL-MCNC: 149 MG/DL (ref 0–200)
CO2 SERPL-SCNC: 26.7 MMOL/L (ref 22–29)
CREAT SERPL-MCNC: 1.43 MG/DL (ref 0.76–1.27)
EGFRCR SERPLBLD CKD-EPI 2021: 53.7 ML/MIN/1.73
ERYTHROCYTE [SEDIMENTATION RATE] IN BLOOD: 23 MM/HR (ref 0–20)
FOLATE SERPL-MCNC: 8.53 NG/ML (ref 4.78–24.2)
GLUCOSE BLDC GLUCOMTR-MCNC: 211 MG/DL (ref 70–130)
GLUCOSE BLDC GLUCOMTR-MCNC: 269 MG/DL (ref 70–130)
GLUCOSE BLDC GLUCOMTR-MCNC: 281 MG/DL (ref 70–130)
GLUCOSE BLDC GLUCOMTR-MCNC: 284 MG/DL (ref 70–130)
GLUCOSE SERPL-MCNC: 258 MG/DL (ref 65–99)
HBA1C MFR BLD: 9.8 % (ref 4.8–5.6)
HDLC SERPL-MCNC: 37 MG/DL (ref 40–60)
LDLC SERPL CALC-MCNC: 81 MG/DL (ref 0–100)
LDLC/HDLC SERPL: 2.06 {RATIO}
MAGNESIUM SERPL-MCNC: 1.8 MG/DL (ref 1.6–2.4)
POTASSIUM SERPL-SCNC: 4.1 MMOL/L (ref 3.5–5.2)
SODIUM SERPL-SCNC: 136 MMOL/L (ref 136–145)
TRIGL SERPL-MCNC: 179 MG/DL (ref 0–150)
VIT B12 BLD-MCNC: 252 PG/ML (ref 211–946)
VLDLC SERPL-MCNC: 31 MG/DL (ref 5–40)

## 2024-12-11 PROCEDURE — 99232 SBSQ HOSP IP/OBS MODERATE 35: CPT | Performed by: FAMILY MEDICINE

## 2024-12-11 PROCEDURE — 85652 RBC SED RATE AUTOMATED: CPT | Performed by: FAMILY MEDICINE

## 2024-12-11 PROCEDURE — 99233 SBSQ HOSP IP/OBS HIGH 50: CPT | Performed by: STUDENT IN AN ORGANIZED HEALTH CARE EDUCATION/TRAINING PROGRAM

## 2024-12-11 PROCEDURE — 83036 HEMOGLOBIN GLYCOSYLATED A1C: CPT | Performed by: FAMILY MEDICINE

## 2024-12-11 PROCEDURE — 63710000001 INSULIN LISPRO (HUMAN) PER 5 UNITS: Performed by: FAMILY MEDICINE

## 2024-12-11 PROCEDURE — 82607 VITAMIN B-12: CPT | Performed by: FAMILY MEDICINE

## 2024-12-11 PROCEDURE — 83735 ASSAY OF MAGNESIUM: CPT | Performed by: FAMILY MEDICINE

## 2024-12-11 PROCEDURE — 80048 BASIC METABOLIC PNL TOTAL CA: CPT | Performed by: FAMILY MEDICINE

## 2024-12-11 PROCEDURE — 82746 ASSAY OF FOLIC ACID SERUM: CPT | Performed by: FAMILY MEDICINE

## 2024-12-11 PROCEDURE — 80061 LIPID PANEL: CPT | Performed by: FAMILY MEDICINE

## 2024-12-11 PROCEDURE — 63710000001 INSULIN GLARGINE PER 5 UNITS: Performed by: FAMILY MEDICINE

## 2024-12-11 PROCEDURE — 82948 REAGENT STRIP/BLOOD GLUCOSE: CPT

## 2024-12-11 PROCEDURE — G0378 HOSPITAL OBSERVATION PER HR: HCPCS

## 2024-12-11 PROCEDURE — 82948 REAGENT STRIP/BLOOD GLUCOSE: CPT | Performed by: FAMILY MEDICINE

## 2024-12-11 RX ORDER — ACETAMINOPHEN 325 MG/1
650 TABLET ORAL EVERY 6 HOURS PRN
Status: DISCONTINUED | OUTPATIENT
Start: 2024-12-11 | End: 2024-12-18 | Stop reason: HOSPADM

## 2024-12-11 RX ORDER — NITROGLYCERIN 0.4 MG/1
0.4 TABLET SUBLINGUAL
Status: DISCONTINUED | OUTPATIENT
Start: 2024-12-11 | End: 2024-12-18 | Stop reason: HOSPADM

## 2024-12-11 RX ORDER — HYDROCODONE BITARTRATE AND ACETAMINOPHEN 5; 325 MG/1; MG/1
1 TABLET ORAL EVERY 12 HOURS PRN
Status: DISCONTINUED | OUTPATIENT
Start: 2024-12-11 | End: 2024-12-16

## 2024-12-11 RX ORDER — QUETIAPINE FUMARATE 25 MG/1
25 TABLET, FILM COATED ORAL NIGHTLY
Status: DISCONTINUED | OUTPATIENT
Start: 2024-12-11 | End: 2024-12-12

## 2024-12-11 RX ORDER — DORZOLAMIDE HYDROCHLORIDE AND TIMOLOL MALEATE 20; 5 MG/ML; MG/ML
1 SOLUTION/ DROPS OPHTHALMIC 2 TIMES DAILY
COMMUNITY

## 2024-12-11 RX ADMIN — ALPRAZOLAM 0.5 MG: 0.5 TABLET ORAL at 21:51

## 2024-12-11 RX ADMIN — CHLORTHALIDONE 12.5 MG: 25 TABLET ORAL at 08:03

## 2024-12-11 RX ADMIN — CYCLOPENTOLATE HYDROCHLORIDE 1 DROP: 10 SOLUTION/ DROPS OPHTHALMIC at 21:52

## 2024-12-11 RX ADMIN — CYCLOPENTOLATE HYDROCHLORIDE 1 DROP: 10 SOLUTION/ DROPS OPHTHALMIC at 08:04

## 2024-12-11 RX ADMIN — CYCLOPENTOLATE HYDROCHLORIDE 1 DROP: 10 SOLUTION/ DROPS OPHTHALMIC at 16:35

## 2024-12-11 RX ADMIN — APIXABAN 5 MG: 5 TABLET, FILM COATED ORAL at 21:51

## 2024-12-11 RX ADMIN — INSULIN GLARGINE 15 UNITS: 100 INJECTION, SOLUTION SUBCUTANEOUS at 21:51

## 2024-12-11 RX ADMIN — APIXABAN 5 MG: 5 TABLET, FILM COATED ORAL at 08:03

## 2024-12-11 RX ADMIN — FAMOTIDINE 20 MG: 20 TABLET, FILM COATED ORAL at 08:03

## 2024-12-11 RX ADMIN — FAMOTIDINE 20 MG: 20 TABLET, FILM COATED ORAL at 21:51

## 2024-12-11 RX ADMIN — QUETIAPINE FUMARATE 25 MG: 25 TABLET ORAL at 21:51

## 2024-12-11 RX ADMIN — QUETIAPINE FUMARATE 25 MG: 25 TABLET ORAL at 08:03

## 2024-12-11 RX ADMIN — AMLODIPINE BESYLATE 10 MG: 5 TABLET ORAL at 08:03

## 2024-12-11 RX ADMIN — DICLOFENAC SODIUM 4 G: 10 GEL TOPICAL at 08:04

## 2024-12-11 RX ADMIN — INSULIN GLARGINE 15 UNITS: 100 INJECTION, SOLUTION SUBCUTANEOUS at 08:03

## 2024-12-11 RX ADMIN — CITALOPRAM HYDROBROMIDE 20 MG: 20 TABLET ORAL at 08:03

## 2024-12-11 RX ADMIN — ATORVASTATIN CALCIUM 80 MG: 80 TABLET, FILM COATED ORAL at 21:51

## 2024-12-11 RX ADMIN — DICLOFENAC SODIUM 4 G: 10 GEL TOPICAL at 21:51

## 2024-12-11 RX ADMIN — INSULIN LISPRO 8 UNITS: 100 INJECTION, SOLUTION INTRAVENOUS; SUBCUTANEOUS at 08:03

## 2024-12-11 RX ADMIN — LOSARTAN POTASSIUM 100 MG: 50 TABLET, FILM COATED ORAL at 08:03

## 2024-12-11 RX ADMIN — HYDROCODONE BITARTRATE AND ACETAMINOPHEN 1 TABLET: 5; 325 TABLET ORAL at 16:35

## 2024-12-11 RX ADMIN — INSULIN LISPRO 8 UNITS: 100 INJECTION, SOLUTION INTRAVENOUS; SUBCUTANEOUS at 17:23

## 2024-12-11 RX ADMIN — DICLOFENAC SODIUM 4 G: 10 GEL TOPICAL at 17:47

## 2024-12-11 RX ADMIN — INSULIN LISPRO 5 UNITS: 100 INJECTION, SOLUTION INTRAVENOUS; SUBCUTANEOUS at 12:17

## 2024-12-11 RX ADMIN — DICLOFENAC SODIUM 4 G: 10 GEL TOPICAL at 12:19

## 2024-12-11 RX ADMIN — PANTOPRAZOLE SODIUM 40 MG: 40 TABLET, DELAYED RELEASE ORAL at 08:03

## 2024-12-11 RX ADMIN — INSULIN LISPRO 8 UNITS: 100 INJECTION, SOLUTION INTRAVENOUS; SUBCUTANEOUS at 21:51

## 2024-12-11 RX ADMIN — LEVOTHYROXINE SODIUM 125 MCG: 125 TABLET ORAL at 06:16

## 2024-12-11 NOTE — CASE MANAGEMENT/SOCIAL WORK
DCP; Family would like STR. Pt has not been able to work with P.T. as of yet. Met with wife at bedside, pt slept through our conversation. [facility] [place] or  [facility] [place] are pts choices for STR. Wife educated that referrals will be send over once he has been evaluated by PT and recommendations are made. No immediate needs. CM continues to follow.

## 2024-12-11 NOTE — PROGRESS NOTES
Stroke Progress Note       Chief Complaint:  confusion    Subjective    Subjective     Subjective:  Feels sleepy        Objective      Temp:  [97 °F (36.1 °C)-98.7 °F (37.1 °C)] 97 °F (36.1 °C)  Heart Rate:  [50-82] 56  Resp:  [18-20] 18  BP: (131-180)/() 131/75        Equal  bilaterally   Not holding legs up- no antigravity strength     Results Review:    I reviewed the patient's new clinical results.    Lab Results (last 24 hours)       Procedure Component Value Units Date/Time    POC Glucose 4x Daily Before Meals & at Bedtime [[id number]]  (Abnormal) Collected: 12/11/24 0740    Specimen: Blood Updated: 12/11/24 0742     Glucose 281 mg/dL      Comment: Serial Number: [id number]:  [id number]       POC Glucose Once [[id number]]  (Abnormal) Collected: 12/10/24 2008    Specimen: Blood Updated: 12/10/24 2010     Glucose 254 mg/dL      Comment: Serial Number: [id number]:  [id number]       POC Glucose 4x Daily Before Meals & at Bedtime [[id number]]  (Abnormal) Collected: 12/10/24 1732    Specimen: Blood Updated: 12/10/24 1737     Glucose 164 mg/dL      Comment: Serial Number: [id number]:  [id number]       Fentanyl, Urine - Urine, Clean Catch [[id number]]  (Normal) Collected: 12/10/24 1119    Specimen: Urine, Clean Catch Updated: 12/10/24 1514     Fentanyl, Urine Negative    Narrative:      Negative Threshold:      Fentanyl 5 ng/mL     The normal value for the drug tested is negative. This report includes final unconfirmed screening results to be used for medical treatment purposes only. Unconfirmed results must not be used for non-medical purposes such as employment or legal testing. Clinical consideration should be applied to any drug of abuse test, particularly when unconfirmed results are used.           Urine Drug Screen - Urine, Clean Catch [[id number]]  (Abnormal) Collected: 12/10/24 1119    Specimen: Urine, Clean Catch Updated: 12/10/24 1500     THC, Screen, Urine Negative     Phencyclidine (PCP),  Urine Negative     Cocaine Screen, Urine Negative     Methamphetamine, Ur Negative     Opiate Screen Positive     Amphetamine Screen, Urine Negative     Benzodiazepine Screen, Urine Positive     Tricyclic Antidepressants Screen Positive     Methadone Screen, Urine Negative     Barbiturates Screen, Urine Negative     Oxycodone Screen, Urine Negative     Buprenorphine, Screen, Urine Negative    Narrative:      Cutoff For Drugs Screened:    Amphetamines               500 ng/ml  Barbiturates               200 ng/ml  Benzodiazepines            150 ng/ml  Cocaine                    150 ng/ml  Methadone                  200 ng/ml  Opiates                    100 ng/ml  Phencyclidine               25 ng/ml  THC                         50 ng/ml  Methamphetamine            500 ng/ml  Tricyclic Antidepressants  300 ng/ml  Oxycodone                  100 ng/ml  Buprenorphine               10 ng/ml    The normal value for all drugs tested is negative. This report includes unconfirmed screening results, with the cutoff values listed, to be used for medical treatment purposes only.  Unconfirmed results must not be used for non-medical purposes such as employment or legal testing.  Clinical consideration should be applied to any drug of abuse test, particularly when unconfirmed results are used.      High Sensitivity Troponin T 1Hr [[id number]]  (Abnormal) Collected: 12/10/24 1128    Specimen: Blood Updated: 12/10/24 1200     HS Troponin T 50 ng/L      Troponin T Delta -2 ng/L      Troponin T % Change -4 %     Narrative:      High Sensitive Troponin T Reference Range:  <14.0 ng/L- Negative Female for AMI  <22.0 ng/L- Negative Male for AMI  >=14 - Abnormal Female indicating possible myocardial injury.  >=22 - Abnormal Male indicating possible myocardial injury.   Clinicians would have to utilize clinical acumen, EKG, Troponin, and serial changes to determine if it is an Acute Myocardial Infarction or myocardial injury due to an  underlying chronic condition.         Urinalysis, Microscopic Only - Urine, Clean Catch [[id number]] Collected: 12/10/24 1119    Specimen: Urine, Clean Catch Updated: 12/10/24 1155     RBC, UA 0-2 /HPF      WBC, UA None Seen /HPF      Bacteria, UA None Seen /HPF      Squamous Epithelial Cells, UA None Seen /HPF      Hyaline Casts, UA None Seen /LPF      Mucus, UA Trace /HPF      Methodology Manual Light Microscopy    T4, Free [[id number]]  (Normal) Collected: 12/10/24 0929    Specimen: Blood Updated: 12/10/24 1148     Free T4 1.24 ng/dL     CBC & Differential [[id number]] Collected: 12/10/24 1128    Specimen: Blood Updated: 12/10/24 1146    Narrative:      The following orders were created for panel order CBC & Differential.  Procedure                               Abnormality         Status                     ---------                               -----------         ------                     CBC Auto Differential[[id number]]        Normal              Final result               Scan Slide[[id number]]                                                                    Please view results for these tests on the individual orders.    CBC Auto Differential [[id number]]  (Normal) Collected: 12/10/24 1128    Specimen: Blood Updated: 12/10/24 1146     WBC 8.65 10*3/mm3      RBC 5.40 10*6/mm3      Hemoglobin 16.0 g/dL      Hematocrit 48.1 %      MCV 89.1 fL      MCH 29.6 pg      MCHC 33.3 g/dL      RDW 12.7 %      RDW-SD 41.3 fl      MPV 11.0 fL      Platelets 146 10*3/mm3      Neutrophil % 67.6 %      Lymphocyte % 23.0 %      Monocyte % 8.2 %      Eosinophil % 0.7 %      Basophil % 0.3 %      Immature Grans % 0.2 %      Neutrophils, Absolute 5.84 10*3/mm3      Lymphocytes, Absolute 1.99 10*3/mm3      Monocytes, Absolute 0.71 10*3/mm3      Eosinophils, Absolute 0.06 10*3/mm3      Basophils, Absolute 0.03 10*3/mm3      Immature Grans, Absolute 0.02 10*3/mm3      nRBC 0.0 /100 WBC     Urinalysis With Microscopic If Indicated  "(No Culture) - Urine, Clean Catch [[id number]]  (Abnormal) Collected: 12/10/24 1119    Specimen: Urine, Clean Catch Updated: 12/10/24 1145     Color, UA Yellow     Appearance, UA Clear     pH, UA 7.0     Specific Gravity, UA >=1.030     Glucose,  mg/dL (1+)     Ketones, UA Negative     Bilirubin, UA Negative     Blood, UA Trace     Protein, UA >=300 mg/dL (3+)     Leuk Esterase, UA Negative     Nitrite, UA Negative     Urobilinogen, UA 1.0 E.U./dL    Procalcitonin [[id number]]  (Normal) Collected: 12/10/24 0929    Specimen: Blood Updated: 12/10/24 1013     Procalcitonin 0.11 ng/mL     Narrative:      As a Marker for Sepsis (Non-Neonates):    1. <0.5 ng/mL represents a low risk of severe sepsis and/or septic shock.  2. >2 ng/mL represents a high risk of severe sepsis and/or septic shock.    As a Marker for Lower Respiratory Tract Infections that require antibiotic therapy:    PCT on Admission    Antibiotic Therapy       6-12 Hrs later    >0.5                Strongly Recommended  >0.25 - <0.5        Recommended   0.1 - 0.25          Discouraged              Remeasure/reassess PCT  <0.1                Strongly Discouraged     Remeasure/reassess PCT    As 28 day mortality risk marker: \"Change in Procalcitonin Result\" (>80% or <=80%) if Day 0 (or Day 1) and Day 4 values are available. Refer to http://www.[facility]-pct-calculator.com    Change in PCT <=80%  A decrease of PCT levels below or equal to 80% defines a positive change in PCT test result representing a higher risk for 28-day all-cause mortality of patients diagnosed with severe sepsis for septic shock.    Change in PCT >80%  A decrease of PCT levels of more than 80% defines a negative change in PCT result representing a lower risk for 28-day all-cause mortality of patients diagnosed with severe sepsis or septic shock.       High Sensitivity Troponin T [[id number]]  (Abnormal) Collected: 12/10/24 0929    Specimen: Blood Updated: 12/10/24 1011     HS Troponin T 52 " ng/L     Narrative:      High Sensitive Troponin T Reference Range:  <14.0 ng/L- Negative Female for AMI  <22.0 ng/L- Negative Male for AMI  >=14 - Abnormal Female indicating possible myocardial injury.  >=22 - Abnormal Male indicating possible myocardial injury.   Clinicians would have to utilize clinical acumen, EKG, Troponin, and serial changes to determine if it is an Acute Myocardial Infarction or myocardial injury due to an underlying chronic condition.         BNP [[id number]]  (Normal) Collected: 12/10/24 0929    Specimen: Blood Updated: 12/10/24 1011     proBNP 290.6 pg/mL     Narrative:      This assay is used as an aid in the diagnosis of individuals suspected of having heart failure. It can be used as an aid in the diagnosis of acute decompensated heart failure (ADHF) in patients presenting with signs and symptoms of ADHF to the emergency department (ED). In addition, NT-proBNP of <300 pg/mL indicates ADHF is not likely.    Age Range Result Interpretation  NT-proBNP Concentration (pg/mL:      <50             Positive            >450                   Gray                 300-450                    Negative             <300    50-75           Positive            >900                  Gray                300-900                  Negative            <300      >75             Positive            >1800                  Gray                300-1800                  Negative            <300    TSH Rfx On Abnormal To Free T4 [[id number]]  (Abnormal) Collected: 12/10/24 0929    Specimen: Blood Updated: 12/10/24 1011     TSH 5.450 uIU/mL     Comprehensive Metabolic Panel [[id number]]  (Abnormal) Collected: 12/10/24 0929    Specimen: Blood Updated: 12/10/24 1006     Glucose 175 mg/dL      BUN 16 mg/dL      Creatinine 1.55 mg/dL      Sodium 138 mmol/L      Potassium 3.8 mmol/L      Chloride 101 mmol/L      CO2 26.2 mmol/L      Calcium 8.9 mg/dL      Total Protein 6.4 g/dL      Albumin 3.4 g/dL      ALT (SGPT) 20 U/L       AST (SGOT) 29 U/L      Alkaline Phosphatase 89 U/L      Total Bilirubin 0.8 mg/dL      Globulin 3.0 gm/dL      A/G Ratio 1.1 g/dL      BUN/Creatinine Ratio 10.3     Anion Gap 10.8 mmol/L      eGFR 48.8 mL/min/1.73     Narrative:      GFR Categories in Chronic Kidney Disease (CKD)      GFR Category          GFR (mL/min/1.73)    Interpretation  G1                     90 or greater         Normal or high (1)  G2                      60-89                Mild decrease (1)  G3a                   45-59                Mild to moderate decrease  G3b                   30-44                Moderate to severe decrease  G4                    15-29                Severe decrease  G5                    14 or less           Kidney failure          (1)In the absence of evidence of kidney disease, neither GFR category G1 or G2 fulfill the criteria for CKD.    eGFR calculation 2021 CKD-EPI creatinine equation, which does not include race as a factor    Magnesium [[id number]]  (Abnormal) Collected: 12/10/24 0929    Specimen: Blood Updated: 12/10/24 1006     Magnesium 1.4 mg/dL     C-reactive Protein [[id number]]  (Abnormal) Collected: 12/10/24 0929    Specimen: Blood Updated: 12/10/24 1006     C-Reactive Protein 0.73 mg/dL     CK [[id number]]  (Abnormal) Collected: 12/10/24 0929    Specimen: Blood Updated: 12/10/24 1006     Creatine Kinase 437 U/L     Lactic Acid, Plasma [[id number]]  (Normal) Collected: 12/10/24 0929    Specimen: Blood Updated: 12/10/24 1002     Lactate 1.6 mmol/L     Ammonia [[id number]]  (Normal) Collected: 12/10/24 0929    Specimen: Blood Updated: 12/10/24 1000     Ammonia 16 umol/L           EEG    Result Date: 12/10/2024  Diffuse cerebral dysfunction of moderate degree, nonspecific.  This is most commonly seen due to toxic/metabolic cause No evidence for epilepsy is seen This repo most commonly seen due to toxic/metabolic causeThis isrt is transcribed using the Dragon dictation system.      MRI Brain Without  Contrast    Result Date: 12/10/2024  Gross motion artifact on multiple images and motion artifact present on all images.  No acute CVA identified though a small area could be missed secondary to motion artifact.  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      This report was signed and finalized on 12/10/2024 1:38 PM by Kristi Roblero MD.      CT Angiogram Head    Result Date: 12/10/2024  Suboptimal exam secondary to motion artifact on multiple images.  Less than 50% stenosis of the internal carotid arteries bilaterally.  No significant intracranial stenosis or aneurysm identified, however, small lesions could be missed secondary to motion artifact.    DLP: 749.41 mGy.cm,748.94 mGy.cm CTDI: 22.7 mGy  This study was performed with techniques to keep radiation doses as low as reasonably achievable (ALARA). Individualized dose reduction techniques using automated exposure control or adjustment of mA and/or kV according to the patient size were employed.      Images were reviewed, interpreted, and dictated by Dr. Kristi Roblero MD Transcribed by Anna Beasley PA-C.  This report was signed and finalized on 12/10/2024 11:28 AM by Kristi Roblero MD.      CT Angiogram Neck    Result Date: 12/10/2024  Suboptimal exam secondary to motion artifact on multiple images.  Less than 50% stenosis of the internal carotid arteries bilaterally.  No significant intracranial stenosis or aneurysm identified, however, small lesions could be missed secondary to motion artifact.    DLP: 749.41 mGy.cm,748.94 mGy.cm CTDI: 22.7 mGy  This study was performed with techniques to keep radiation doses as low as reasonably achievable (ALARA). Individualized dose reduction techniques using automated exposure control or adjustment of mA and/or kV according to the patient size were employed.      Images were reviewed, interpreted, and dictated by Dr. Kristi Roblero MD Transcribed by Anna Beasley PA-C.  This report was signed and  finalized on 12/10/2024 11:28 AM by Kristi Roblero MD.      CT Cervical Spine Without Contrast    Result Date: 12/10/2024  No acute fracture or malalignment.  Degenerative changes.   CTDI: 22.7 mGy DLP: 749.41 mGy.cm,748.94 mGy.cm   This study was performed with techniques to keep radiation doses as low as reasonably achievable (ALARA). Individualized dose reduction techniques using automated exposure control or adjustment of mA and/or kV according to the patient size were employed.     Images were reviewed, interpreted, and dictated by Dr. Kristi Roblero MD Transcribed by Anna Beasley PA-C.  This report was signed and finalized on 12/10/2024 11:20 AM by Kristi Roblero MD.      CT Head Without Contrast    Result Date: 12/10/2024  Stable chronic changes without acute intracranial process.     DLP: 749.41 mGy.cm,748.94 mGy.cm CTDI: 22.7 mGy   This study was performed with techniques to keep radiation doses as low as reasonably achievable (ALARA). Individualized dose reduction techniques using automated exposure control or adjustment of mA and/or kV according to the patient size were employed.     Images were reviewed, interpreted, and dictated by Dr. Kristi Roblero MD Transcribed by Anna Beasley PA-C.  This report was signed and finalized on 12/10/2024 11:20 AM by Kristi Roblero MD.      XR Shoulder 2+ View Right    Result Date: 12/10/2024  No acute bony abnormality.  Degenerative changes described.      This report was signed and finalized on 12/10/2024 11:03 AM by Kristi Roblero MD.      XR Humerus Right    Result Date: 12/10/2024  No acute bony abnormality.      This report was signed and finalized on 12/10/2024 10:56 AM by Kristi Roblero MD.      XR Chest 1 View    Result Date: 12/10/2024  No acute cardiopulmonary process.     This report was signed and finalized on 12/10/2024 10:56 AM by Kristi Roblero MD.     Results for orders placed during the hospital encounter of 12/10/24    Adult Transthoracic Echo Complete W/ Cont  if Necessary Per Protocol    Interpretation Summary    Left ventricular systolic function is normal. Calculated left ventricular EF = 67.8% Left ventricular ejection fraction appears to be 66 - 70%.    Left ventricular wall thickness is consistent with mild concentric hypertrophy. Left ventricular diastolic function was indeterminate.    The right ventricular cavity is borderline dilated with borderline systolic function.    The left atrial cavity is mildly dilated.    Mild aortic valve regurgitation is present.    Saline test results are negative for right to left atrial level shunt.            Assessment/Plan     Assessment/Plan:  69 y.o. who presents with Sx of postconcussive syndrome from the recent fall along with possible progressive central amyloid angiopathy.      Weakness in his legs for month   No bowel bladder issues  Wife states he is day sleeper.     Diagnostics   EEG - no seizures, diffuse cerebral dysfunction.   MRI irina - limited- no acute infarct   CTAs- no flow limiting stenosis extracranially and intracranially         Hypertensive encephalopathy  Falls secondary to Polypharmacy   Cerebral amyloid angiopathy   Pulmonary embolism- taking eliquis  Chronic bilateral leg weakness      Plan  -eliquis continue   -supplement vitamin B12 and folate.   -need PT/OT, and rehab   -nerve conduction studies outpatient for chronic bilateral leg weakness.     Neurology will be available for any questions.     Pete Ibanez MD  12/11/24  08:49 EST

## 2024-12-11 NOTE — PROGRESS NOTES
[facility]    Name:  Ramsey Riley   Age:  69 y.o.  Sex:  male  :  1955  MRN:  [id number]   Visit Number:  [id number]  Admission Date:  12/10/2024  Date Of Service:  24  Primary Care Physician:  Margret Purvis MD     LOS: 0 days :    Chief Complaint:      Follow-up weakness    Subjective:    Patient seen at bedside.  Somewhat lethargic but arousable.  No family currently at bedside, I did try to call wife on phone number listed in chart but no response.  He has been seen by neurology.  PT and OT is pending    Hospital Course:    Chronically ill 69-year-old with a history of peripheral neuropathy, diabetes, hypothyroidism, CAD, prior CVA, vision loss, diabetic retinopathy, chronic pain, history of multiple falls, who had presented to the emergency room due to concerns of confusion.  History obtained primarily from ER record, somewhat from the patient.  No family currently at bedside at time of visit.  Patient notes he had had a fall he thinks yesterday, had hurt his shoulder, actually went to an outlying emergency room where he had no fractures.  He had taken some pain medicine at home, cannot remember the name, apparently his wife had noticed him being more confused overnight into today.  He is currently awake and alert that he is in the emergency room, however he is overall fairly poor historian.  Notes shoulder is feeling better.  He denies any dizziness or headache.     In the ER the patient is overall stable other than mild hypertension.  His labs were largely nonactionable.  X-rays unremarkable for any acute fracture.  CT angiogram of the head neck with contrast was unremarkable, MRI of the brain did not show any acute CVA, showed white matter disease.  Patient was seen by teleneurology who recommended admission for observation.    Review of Systems:     All systems were reviewed and negative except as mentioned in subjective, assessment and  "plan.    Vital Signs:    Temp:  [97 °F (36.1 °C)-98.7 °F (37.1 °C)] 97.3 °F (36.3 °C)  Heart Rate:  [50-66] 56  Resp:  [18-20] 18  BP: (131-168)/() 137/73    Intake and output:    No intake/output data recorded.  No intake/output data recorded.    Physical Examination:    General Appearance:  Alert and cooperative.  Somewhat lethargic   Head:  Atraumatic and normocephalic.   Eyes: Conjunctivae and sclerae normal, no icterus. No pallor.   Throat: No oral lesions, no thrush, oral mucosa moist.   Neck: Supple, trachea midline, no thyromegaly.   Lungs:   Breath sounds heard bilaterally equally.  No wheezing or crackles. No Pleural rub or bronchial breathing.   Heart:  Normal S1 and S2, no murmur, no gallop, no rub. No JVD.   Abdomen:   Normal bowel sounds, no masses, no organomegaly. Soft, nontender, nondistended, no rebound tenderness.   Extremities: Supple, no edema, no cyanosis, no clubbing.  Decreased range of motion right shoulder   Skin: No bleeding or rash.   Neurologic: Alert and oriented x person and place.  Weakness     Laboratory results:    Results from last 7 days   Lab Units 12/11/24  0907 12/10/24  0929   SODIUM mmol/L 136 138   POTASSIUM mmol/L 4.1 3.8   CHLORIDE mmol/L 101 101   CO2 mmol/L 26.7 26.2   BUN mg/dL 18 16   CREATININE mg/dL 1.43* 1.55*   CALCIUM mg/dL 8.5* 8.9   BILIRUBIN mg/dL  --  0.8   ALK PHOS U/L  --  89   ALT (SGPT) U/L  --  20   AST (SGOT) U/L  --  29   GLUCOSE mg/dL 258* 175*     Results from last 7 days   Lab Units 12/10/24  1128   WBC 10*3/mm3 8.65   HEMOGLOBIN g/dL 16.0   HEMATOCRIT % 48.1   PLATELETS 10*3/mm3 146         Results from last 7 days   Lab Units 12/10/24  1128 12/10/24  0929   CK TOTAL U/L  --  437*   HSTROP T ng/L 50* 52*         No results for input(s): \"PHART\", \"HIZ4QZH\", \"PO2ART\", \"CRG6UTZ\", \"BASEEXCESS\" in the last 8760 hours.   I have reviewed the patient's laboratory results.    Radiology results:    EEG    Result Date: 12/10/2024  Reason for referral: 69 " y.o.male with altered mental status Technical Summary:  A 19 channel digital EEG was performed using the international 10-20 placement system, including eye leads and EKG leads. Duration: 20 minutes Findings: The patient is drowsy at the beginning of the study.  The background shows diffuse low to medium amplitude 3-5 Hz intermixed delta and theta activity present symmetrically over both hemispheres.  Photic stimulation does not change the background.  Hyperventilation was attempted but the patient was too drowsy to complete this.  No focal features or epileptiform activity are present. Video: None Technical quality: Good EKG: Regular, 60 bpm SUMMARY: Excess drowsiness Moderate generalized slow No focal features or epileptiform activity     Impression: Diffuse cerebral dysfunction of moderate degree, nonspecific.  This is most commonly seen due to toxic/metabolic cause No evidence for epilepsy is seen This repo most commonly seen due to toxic/metabolic causeThis isrt is transcribed using the Dragon dictation system.      MRI Brain Without Contrast    Result Date: 12/10/2024  PROCEDURE: MRI BRAIN WO CONTRAST-  HISTORY: concern for possible posterior stroke,  PROCEDURE: Multiplanar MR imaging of the brain was performed in multiple MR sequences .  FINDINGS: Brain parenchyma displays normal signal without evidence of mass, hemorrhage or edema.  Limited images the proximal cord are unremarkable. The ventricles are normal in size. There is a small area of encephalomalacia in the right basal ganglia consistent with small remote CVA. There is no extra-axial fluid or midline shift. Flow-voids are appropriate. Diffusion weighted images demonstrate no evidence of acute CVA although a small area could be missed secondary to the motion artifact. Limited images of the paranasal sinuses are unremarkable. Motion artifact noted on multiple images significantly decreasing the sensitivity of this exam. There is mild small vessel  ischemic disease. T2 coronal images are almost nondiagnostic. Same is true of the T1 axial images.      Impression: Gross motion artifact on multiple images and motion artifact present on all images.  No acute CVA identified though a small area could be missed secondary to motion artifact.  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      This report was signed and finalized on 12/10/2024 1:38 PM by Kristi Roblero MD.      CT Angiogram Head    Result Date: 12/10/2024  PROCEDURE: CT ANGIOGRAM NECK-, CT ANGIOGRAM HEAD-  HISTORY: Concern for stroke, altered mental status  TECHNIQUE: Thin section axial CT with IV contrast supplemented with multiplanar reconstruction of the head and neck.  FINDINGS: Exam is limited by significant patient motion artifact.  CTA:  Aortic arch:  Arch shows no significant narrowing. Great vessel origins are widely patent.  Right carotid: There is mild plaque in the right carotid bulb with less than 50% stenosis. Right common carotid artery is unremarkable..  Left carotid: There is minimal calcified plaque in the left carotid bulb with less than 50% stenosis. Carotid artery is unremarkable.  Vertebrals: Left vertebral artery is dominant. No significant stenosis is present. Distal vertebral arteries are suboptimally visualized.  The cranial circulation is grossly unremarkable. ACAs are unremarkable. Motion artifact limits visualization of MCAs and PCAs. No definite stenosis or aneurysm is seen, but small lesions could be missed. Basilar arteries are unremarkable.  Calcification in the right palatine tonsil suggests previous tonsillitis.      Impression: Suboptimal exam secondary to motion artifact on multiple images.  Less than 50% stenosis of the internal carotid arteries bilaterally.  No significant intracranial stenosis or aneurysm identified, however, small lesions could be missed secondary to motion artifact.    DLP: 749.41 mGy.cm,748.94 mGy.cm CTDI: 22.7 mGy  This study  was performed with techniques to keep radiation doses as low as reasonably achievable (ALARA). Individualized dose reduction techniques using automated exposure control or adjustment of mA and/or kV according to the patient size were employed.      Images were reviewed, interpreted, and dictated by Dr. Kristi Roblero MD Transcribed by Anna Beasley PA-C.  This report was signed and finalized on 12/10/2024 11:28 AM by Kristi Roblero MD.      CT Angiogram Neck    Result Date: 12/10/2024  PROCEDURE: CT ANGIOGRAM NECK-, CT ANGIOGRAM HEAD-  HISTORY: Concern for stroke, altered mental status  TECHNIQUE: Thin section axial CT with IV contrast supplemented with multiplanar reconstruction of the head and neck.  FINDINGS: Exam is limited by significant patient motion artifact.  CTA:  Aortic arch:  Arch shows no significant narrowing. Great vessel origins are widely patent.  Right carotid: There is mild plaque in the right carotid bulb with less than 50% stenosis. Right common carotid artery is unremarkable..  Left carotid: There is minimal calcified plaque in the left carotid bulb with less than 50% stenosis. Carotid artery is unremarkable.  Vertebrals: Left vertebral artery is dominant. No significant stenosis is present. Distal vertebral arteries are suboptimally visualized.  The cranial circulation is grossly unremarkable. ACAs are unremarkable. Motion artifact limits visualization of MCAs and PCAs. No definite stenosis or aneurysm is seen, but small lesions could be missed. Basilar arteries are unremarkable.  Calcification in the right palatine tonsil suggests previous tonsillitis.      Impression: Suboptimal exam secondary to motion artifact on multiple images.  Less than 50% stenosis of the internal carotid arteries bilaterally.  No significant intracranial stenosis or aneurysm identified, however, small lesions could be missed secondary to motion artifact.    DLP: 749.41 mGy.cm,748.94 mGy.cm CTDI: 22.7 mGy  This study  was performed with techniques to keep radiation doses as low as reasonably achievable (ALARA). Individualized dose reduction techniques using automated exposure control or adjustment of mA and/or kV according to the patient size were employed.      Images were reviewed, interpreted, and dictated by Dr. Kristi Roblero MD Transcribed by Anna Beasley PA-C.  This report was signed and finalized on 12/10/2024 11:28 AM by Kristi Roblero MD.      XR Chest 1 View    Result Date: 12/10/2024  PORTABLE CHEST HISTORY: Precordial chest pain, fall injury. COMPARISON: July 2024. FINDINGS: The patient is rotated to the left. The heart is normal in size. The mediastinum is unremarkable. The lungs are clear. There is no pneumothorax. The patient is status post median sternotomy for prior CABG.    Impression: No acute cardiopulmonary process. Images reviewed, interpreted, and dictated by Dr. Osiel Eng. Transcribed by Tatiana Bennett PA-C.    CT Cervical Spine Without Contrast    Result Date: 12/10/2024  PROCEDURE: CT CERVICAL SPINE WO CONTRAST-  HISTORY: Fall yesterday, AMS, neck pain  COMPARISON: May 2023.  PROCEDURE: Multiple axial CT images were obtained through the cervical spine using bone window algorithms. Coronal and sagittal images were reconstructed from the original axial data set.  FINDINGS: Facets are normally aligned. There is no acute fracture.. There is straightening of the normal cervical adenosis. There is minimal retrolisthesis of C3 on C4, stable. There is mild to moderate disc space narrowing at all levels with small osteophytes. Multiple levels of spinal stenosis and/or neuroforaminal narrowing secondary to degenerative change, but not secondary to acute abnormality. Bilateral carotid artery calcifications are noted.  Limited images of the lung apices are unremarkable.      Impression: No acute fracture or malalignment.  Degenerative changes.   CTDI: 22.7 mGy DLP: 749.41 mGy.cm,748.94 mGy.cm   This  study was performed with techniques to keep radiation doses as low as reasonably achievable (ALARA). Individualized dose reduction techniques using automated exposure control or adjustment of mA and/or kV according to the patient size were employed.     Images were reviewed, interpreted, and dictated by Dr. Kristi Roblero MD Transcribed by Anna Beasley PA-C.  This report was signed and finalized on 12/10/2024 11:20 AM by Kristi Roblero MD.      CT Head Without Contrast    Result Date: 12/10/2024  PROCEDURE: CT HEAD WO CONTRAST-  HISTORY: ground level fall yesterday on [facility] since fall  COMPARISON: May 2023.  TECHNIQUE: Multiple axial CT images were performed from the foramen magnum to the vertex without enhancement.  FINDINGS: There is no CT evidence of acute intracranial hemorrhage. Moderate atrophy, pronounced for the patient's age, is stable. There is a stable small area of encephalomalacia in the right basal ganglia consistent with remote CVA. There is no mass, mass effect or midline shift.  There is no hydrocephalus. The paranasal sinuses are clear. Bone windows reveal no acute osseous abnormalities.      Impression: Stable chronic changes without acute intracranial process.     DLP: 749.41 mGy.cm,748.94 mGy.cm CTDI: 22.7 mGy   This study was performed with techniques to keep radiation doses as low as reasonably achievable (ALARA). Individualized dose reduction techniques using automated exposure control or adjustment of mA and/or kV according to the patient size were employed.     Images were reviewed, interpreted, and dictated by Dr. Kristi Roblero MD Transcribed by Anna Beasley PA-C.  This report was signed and finalized on 12/10/2024 11:20 AM by Kristi Roblero MD.      XR Shoulder 2+ View Right    Result Date: 12/10/2024   PROCEDURE: XR SHOULDER 2+ VW RIGHT-  HISTORY: Fall, yesterday, knot noted to right shoulder  COMPARISON: March 24, 2016.  FINDINGS:  A three view exam demonstrates no acute  fracture or dislocation. The joint spaces demonstrate moderate narrowing of the right acromioclavicular joint. There is also decreased subacromial distance suggesting chronic rotator cuff tear. No soft tissue abnormality is seen. On a single view there is a linear lucency in the superior lateral right acromion. Borders appear smooth and mildly sclerotic. If this is a fracture appears to be remote. This is not identified on the prior study.       Impression: No acute bony abnormality.  Degenerative changes described.      This report was signed and finalized on 12/10/2024 11:03 AM by Kristi Roblero MD.      XR Humerus Right    Result Date: 12/10/2024  PROCEDURE: XR HUMERUS RIGHT-  HISTORY: Fall yesterday, pain, inability to abduct arm  COMPARISON: None.  FINDINGS:  A two view exam demonstrates no acute fracture or dislocation. The joint spaces appear unremarkable. No soft tissue abnormality is seen.      Impression: No acute bony abnormality.      This report was signed and finalized on 12/10/2024 10:56 AM by Kristi Roblero MD.      XR Chest 1 View    Result Date: 12/10/2024  PROCEDURE: XR CHEST 1 VW-  HISTORY: AMS, fell last night, right shoulder pain.  COMPARISON: June 21, 2022..  FINDINGS: The heart is normal in size. The lungs are clear. The mediastinum is unremarkable. There is no pneumothorax.  There are no acute osseous abnormalities. Status post median sternotomy. Apical lordotic positioning noted. Small density is again projected over the left apex, stable. There is evidence of old calcified granulomatous disease.      Impression: No acute cardiopulmonary process.     This report was signed and finalized on 12/10/2024 10:56 AM by Kristi Roblero MD.      XR Humerus Right    Result Date: 12/10/2024  RIGHT SHOULDER SERIES HISTORY: Right shoulder pain, fall. FINDINGS: Three views show no evidence of an acute, displaced fracture or dislocation of the visualized bony architecture.   The joint spaces appear normal. No  soft tissue abnormality is seen.     Impression: No acute fracture. RIGHT HUMERUS SERIES HISTORY: Right arm pain, fall. FINDINGS: Two views of the humerus show no evidence of an acute, displaced fracture or dislocation of the visualized bony architecture. The joint spaces appear normal. No soft tissue abnormality is seen. No effusion identified. IMPRESSION: No acute fracture. Images reviewed, interpreted, and dictated by Dr. Golden Gonzalez. Transcribed by Cooper Siddiqi PA-C.    XR Shoulder 2+ View Right    Result Date: 12/10/2024  RIGHT SHOULDER SERIES HISTORY: Right shoulder pain, fall. FINDINGS: Three views show no evidence of an acute, displaced fracture or dislocation of the visualized bony architecture.   The joint spaces appear normal. No soft tissue abnormality is seen.     Impression: No acute fracture. RIGHT HUMERUS SERIES HISTORY: Right arm pain, fall. FINDINGS: Two views of the humerus show no evidence of an acute, displaced fracture or dislocation of the visualized bony architecture. The joint spaces appear normal. No soft tissue abnormality is seen. No effusion identified. IMPRESSION: No acute fracture. Images reviewed, interpreted, and dictated by Dr. Golden Gonzalez. Transcribed by Cooper Siddiqi PA-C.   I have reviewed the patient's radiology reports.    Medication Review:     I have reviewed the patient's active and prn medications.     Problem List:      Weakness      Assessment:    Weakness, POA  Encephalopathy, POA  Diabetes with neuropathy and retinopathy  Vision loss  Hearing loss  CAD  History of prior PE on Eliquis  Impaired mobility and ADL    Plan:    Weakness/confusion:  Appears to have chronic weakness, history of falls, lot of cares provided by spouse.  Suspect confusion related to polypharmacy as he is on pain medication, benzodiazepines and Seroquel.  I am going to decrease his Seroquel to only nightly dosing on the hospital.  Will cut back on pain medication and avoid high doses  of benzos.    In addition, some concern for postconcussive syndrome with a recent fall.    Cerebral amyloid angiopathy  Appears to have been progressive on imaging when compared to prior MRIs.  Based off symptomatology with the weakness, confusion, is fairly consistent with this.  Elevated blood pressures may have contributed and or fall exacerbated.  Would like to benefit from PT and OT and rehab.  Will need outpatient conduction studies as well.     Diabetes/vision loss/hearing loss/CAD/prior PE/impaired mobility:  Continue home medications.  Continue with Eliquis.    Will likely need short-term rehab    DVT Prophylaxis: Eliquis  Code Status: Full  Diet: As tolerated  Discharge Plan: RIGOBERTO Sim DO  12/11/24  14:38 EST    Dictated utilizing Dragon dictation.

## 2024-12-11 NOTE — THERAPY EVALUATION
Attempted OT evaluation today, pt unable to stay awake enough to safely participate in skilled eval.  Will follow up with pt as able.

## 2024-12-11 NOTE — PAYOR COMM NOTE
"TO:BC  FROM:ISSAC MCCANN RN PHONE [phone]  OBSERVATION NOTIFICATION  TAX ID [id number] [facility] [id number]    Cici Lopez (69 y.o. Male)       Date of Birth   1955    Social Security Number       Address   [address] [place] [address]    Home Phone   [phone]    MRN   [id number]       Buddhism   Patient Refused    Marital Status                               Admission Date   12/10/24    Admission Type   Emergency    Admitting Provider   Ines Sim DO    Attending Provider   Ines Sim DO    Department, Room/Bed   [facility] TELEMETRY 3, 304/1       Discharge Date       Discharge Disposition       Discharge Destination                                 Attending Provider: Ines Sim DO    Allergies: Penicillins, Trazodone    Isolation: None   Infection: None   Code Status: CPR    Ht: 180.3 cm (70.98\")   Wt: 106 kg (233 lb 7.5 oz)    Admission Cmt: None   Principal Problem: Weakness [R53.1]                   Active Insurance as of 12/10/2024       Primary Coverage       Payor Plan Insurance Group Employer/Plan Group    ANTHEM MEDICARE REPLACEMENT ANTHEM MED ADV HMO [id number]       Payor Plan Address Payor Plan Phone Number Payor Plan Fax Number Effective Dates    [address] [phone]  2024 - None Entered    [facility] [phone]         Subscriber Name Subscriber Birth Date Member ID       CICI LOPEZ 1955 [id number]                     Emergency Contacts        (Rel.) Home Phone Work Phone Mobile Phone    Jina Lopez (Spouse) [phone] -- [phone]                 History & Physical        Ines Sim DO at 12/10/24 [facility]            [facility]   HISTORY AND PHYSICAL      Name:  Cici Lopez   Age:  69 y.o.  Sex:  male  :  1955  MRN:  [id number]   Visit Number:  [id number]  Admission Date:  12/10/2024  Date Of Service:  " 12/10/24  Primary Care Physician:  Margret Purvis MD    Chief Complaint:     Fall, confusion    History Of Presenting Illness:      Chronically ill 69-year-old with a history of peripheral neuropathy, diabetes, hypothyroidism, CAD, prior CVA, vision loss, diabetic retinopathy, chronic pain, history of multiple falls, who had presented to the emergency room due to concerns of confusion.  History obtained primarily from ER record, somewhat from the patient.  No family currently at bedside at time of visit.  Patient notes he had had a fall he thinks yesterday, had hurt his shoulder, actually went to an outlying emergency room where he had no fractures.  He had taken some pain medicine at home, cannot remember the name, apparently his wife had noticed him being more confused overnight into today.  He is currently awake and alert that he is in the emergency room, however he is overall fairly poor historian.  Notes shoulder is feeling better.  He denies any dizziness or headache.    In the ER the patient is overall stable other than mild hypertension.  His labs were largely nonactionable.  X-rays unremarkable for any acute fracture.  CT angiogram of the head neck with contrast was unremarkable, MRI of the brain did not show any acute CVA, showed white matter disease.  Patient was seen by teleneurology who recommended admission for observation.    Review Of Systems:    All systems were reviewed and negative except as mentioned in history of presenting illness, assessment and plan.    Past Medical History: Patient  has a past medical history of Arthritis, Clot, Coronary artery disease, Diabetes mellitus, Heart disease, History of blood clots, Hypertension, Low back pain, Thyroid disease, and Type 2 diabetes mellitus.    Past Surgical History: Patient  has a past surgical history that includes Esophagogastroduodenoscopy; Colonoscopy (2015); Cholecystectomy (2015); and Coronary artery bypass graft.    Social History: Patient   reports that he has never smoked. He has never been exposed to tobacco smoke. His smokeless tobacco use includes chew. He reports that he does not drink alcohol and does not use drugs.    Family History:  Patient's family history has been reviewed and found to be noncontributory.     Allergies:      Trazodone    Home Medications:    Prior to Admission Medications       Prescriptions Last Dose Informant Patient Reported? Taking?    ALPRAZolam (XANAX) 0.5 MG tablet   No No    Take 1 tablet by mouth At Night As Needed for Anxiety.    amLODIPine (NORVASC) 10 MG tablet   No No    Take 1 tablet by mouth Daily.    apixaban (Eliquis) 5 MG tablet tablet   No No    Take 1 tablet by mouth Every 12 (Twelve) Hours.    aspirin 81 MG chewable tablet   Yes No    Chew 1 tablet Daily.    atorvastatin (Lipitor) 80 MG tablet   No No    Take 1 tablet by mouth Every Night.    chlorthalidone (HYGROTON) 25 MG tablet   No No    Take 0.5 tablets by mouth Daily.    citalopram (CeleXA) 40 MG tablet   No No    Take 1 tablet by mouth Daily.    Continuous Glucose Sensor (FreeStyle Rina 3 Sensor) misc   No No    Use 1 each Every 14 (Fourteen) Days.    cyclopentolate (CYCLOGYL) 1 % ophthalmic solution   Yes No    Apply 1 drop to eye(s) as directed by provider 3 (Three) Times a Day.    famotidine (Pepcid) 20 MG tablet   No No    Take 1 tablet by mouth 2 (Two) Times a Day.    glucose blood (Accu-Chek Caryn Plus) test strip   No No    USE AS DIRECTED 3 TIMES DAILY    glucose monitor monitoring kit   No No    1 each Daily.    glucose monitor monitoring kit   No No    1 each Daily.    HYDROcodone-acetaminophen (NORCO) 7.5-325 MG per tablet   No No    Take 1 tablet by mouth Every 12 (Twelve) Hours As Needed for Moderate Pain.    Insulin NPH Isophane & Regular (HumuLIN 70/30 KwikPen) (70-30) 100 UNIT/ML suspension pen-injector   No No    Inject 70 units twice daily E11.65    Insulin Pen Needle 32G X 4 MM misc   No No    For use with insulin injections,  "daily    Insulin Syringes, Disposable, U-100 1 ML misc   No No    50 Units 3 (Three) Times a Day As Needed (50 units in the morning, and then with meals PRN).    Lancets 30G misc   No No    Check sugar 3 times daily    levothyroxine (SYNTHROID, LEVOTHROID) 125 MCG tablet   No No    Take 1 tablet by mouth Daily.    losartan (COZAAR) 100 MG tablet   No No    Take 1 tablet by mouth Daily.    pantoprazole (PROTONIX) 40 MG EC tablet   No No    Take 1 tablet by mouth Daily.    QUEtiapine (SEROquel) 50 MG tablet   No No    Take 1 tablet by mouth 2 (Two) Times a Day.          ED Medications:    Medications   apixaban (ELIQUIS) tablet 5 mg (5 mg Oral Given 12/10/24 1354)   atorvastatin (LIPITOR) tablet 80 mg (has no administration in time range)   labetalol (NORMODYNE,TRANDATE) injection 10 mg (has no administration in time range)   Diclofenac Sodium (VOLTAREN) 1 % gel 4 g (4 g Topical Given 12/10/24 1426)   iopamidol (ISOVUE-300) 61 % injection 100 mL (100 mL Intravenous Given 12/10/24 1047)   magnesium sulfate 2g/50 mL (PREMIX) infusion (0 g Intravenous Stopped 12/10/24 1233)   acetaminophen (TYLENOL) tablet 650 mg (650 mg Oral Given 12/10/24 1354)     Vital Signs:  Temp:  [98.3 °F (36.8 °C)] 98.3 °F (36.8 °C)  Heart Rate:  [75-86] 77  Resp:  [20-22] 20  BP: (129-180)/() 157/97        12/10/24  0822   Weight: 108 kg (239 lb)     Body mass index is 33.33 kg/m².    Physical Exam:     Most recent vital Signs: /97   Pulse 77   Temp 98.3 °F (36.8 °C) (Oral)   Resp 20   Ht 180.3 cm (71\")   Wt 108 kg (239 lb)   SpO2 95%   BMI 33.33 kg/m²     Physical Exam  Constitutional:       General: He is not in acute distress.     Appearance: He is not toxic-appearing.   HENT:      Ears:      Comments: Hearing loss     Mouth/Throat:      Mouth: Mucous membranes are moist.      Pharynx: Oropharynx is clear.   Eyes:      Pupils: Pupils are equal, round, and reactive to light.   Cardiovascular:      Rate and Rhythm: Normal " rate and regular rhythm.      Pulses: Normal pulses.      Heart sounds: No murmur heard.     No gallop.   Pulmonary:      Effort: No respiratory distress.      Breath sounds: No wheezing or rales.   Abdominal:      General: Bowel sounds are normal. There is no distension.      Tenderness: There is no abdominal tenderness. There is no guarding.   Skin:     General: Skin is warm.      Capillary Refill: Capillary refill takes less than 2 seconds.   Neurological:      General: No focal deficit present.      Mental Status: He is alert.      Motor: Weakness present.   Psychiatric:         Mood and Affect: Mood normal.         Thought Content: Thought content normal.         Laboratory data:    I have reviewed the labs done in the emergency room.    Results from last 7 days   Lab Units 12/10/24  0929   SODIUM mmol/L 138   POTASSIUM mmol/L 3.8   CHLORIDE mmol/L 101   CO2 mmol/L 26.2   BUN mg/dL 16   CREATININE mg/dL 1.55*   CALCIUM mg/dL 8.9   BILIRUBIN mg/dL 0.8   ALK PHOS U/L 89   ALT (SGPT) U/L 20   AST (SGOT) U/L 29   GLUCOSE mg/dL 175*     Results from last 7 days   Lab Units 12/10/24  1128   WBC 10*3/mm3 8.65   HEMOGLOBIN g/dL 16.0   HEMATOCRIT % 48.1   PLATELETS 10*3/mm3 146         Results from last 7 days   Lab Units 12/10/24  1128 12/10/24  0929   CK TOTAL U/L  --  437*   HSTROP T ng/L 50* 52*     Results from last 7 days   Lab Units 12/10/24  0929   PROBNP pg/mL 290.6                 Results from last 7 days   Lab Units 12/10/24  1119   COLOR UA  Yellow   GLUCOSE UA  250 mg/dL (1+)*   KETONES UA  Negative   BLOOD UA  Trace*   LEUKOCYTES UA  Negative   PH, URINE  7.0   BILIRUBIN UA  Negative   UROBILINOGEN UA  1.0 E.U./dL   RBC UA /HPF 0-2   WBC UA /HPF None Seen       Pain Management Panel  More data exists         Latest Ref Rng & Units 12/10/2024 4/15/2024   Pain Management Panel   Creatinine, Urine Not Estab. mg/dL - 126.1    Amphetamine, Urine Qual Negative Negative  -   Barbiturates Screen, Urine Negative  Negative  -   Benzodiazepine Screen, Urine Negative Positive  -   Buprenorphine, Screen, Urine Negative Negative  -   Cocaine Screen, Urine Negative Negative  -   Methadone Screen , Urine Negative Negative  -   Methamphetamine, Ur Negative Negative  -      Details                   EKG:          Radiology:    MRI Brain Without Contrast    Result Date: 12/10/2024  PROCEDURE: MRI BRAIN WO CONTRAST-  HISTORY: concern for possible posterior stroke,  PROCEDURE: Multiplanar MR imaging of the brain was performed in multiple MR sequences .  FINDINGS: Brain parenchyma displays normal signal without evidence of mass, hemorrhage or edema.  Limited images the proximal cord are unremarkable. The ventricles are normal in size. There is a small area of encephalomalacia in the right basal ganglia consistent with small remote CVA. There is no extra-axial fluid or midline shift. Flow-voids are appropriate. Diffusion weighted images demonstrate no evidence of acute CVA although a small area could be missed secondary to the motion artifact. Limited images of the paranasal sinuses are unremarkable. Motion artifact noted on multiple images significantly decreasing the sensitivity of this exam. There is mild small vessel ischemic disease. T2 coronal images are almost nondiagnostic. Same is true of the T1 axial images.      Gross motion artifact on multiple images and motion artifact present on all images.  No acute CVA identified though a small area could be missed secondary to motion artifact.  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      This report was signed and finalized on 12/10/2024 1:38 PM by Kristi Roblero MD.      CT Angiogram Head    Result Date: 12/10/2024  PROCEDURE: CT ANGIOGRAM NECK-, CT ANGIOGRAM HEAD-  HISTORY: Concern for stroke, altered mental status  TECHNIQUE: Thin section axial CT with IV contrast supplemented with multiplanar reconstruction of the head and neck.  FINDINGS: Exam is limited by  significant patient motion artifact.  CTA:  Aortic arch:  Arch shows no significant narrowing. Great vessel origins are widely patent.  Right carotid: There is mild plaque in the right carotid bulb with less than 50% stenosis. Right common carotid artery is unremarkable..  Left carotid: There is minimal calcified plaque in the left carotid bulb with less than 50% stenosis. Carotid artery is unremarkable.  Vertebrals: Left vertebral artery is dominant. No significant stenosis is present. Distal vertebral arteries are suboptimally visualized.  The cranial circulation is grossly unremarkable. ACAs are unremarkable. Motion artifact limits visualization of MCAs and PCAs. No definite stenosis or aneurysm is seen, but small lesions could be missed. Basilar arteries are unremarkable.  Calcification in the right palatine tonsil suggests previous tonsillitis.      Suboptimal exam secondary to motion artifact on multiple images.  Less than 50% stenosis of the internal carotid arteries bilaterally.  No significant intracranial stenosis or aneurysm identified, however, small lesions could be missed secondary to motion artifact.    DLP: 749.41 mGy.cm,748.94 mGy.cm CTDI: 22.7 mGy  This study was performed with techniques to keep radiation doses as low as reasonably achievable (ALARA). Individualized dose reduction techniques using automated exposure control or adjustment of mA and/or kV according to the patient size were employed.      Images were reviewed, interpreted, and dictated by Dr. Kristi Roblero MD Transcribed by Anna Beasley PA-C.  This report was signed and finalized on 12/10/2024 11:28 AM by Kristi Roblero MD.      CT Angiogram Neck    Result Date: 12/10/2024  PROCEDURE: CT ANGIOGRAM NECK-, CT ANGIOGRAM HEAD-  HISTORY: Concern for stroke, altered mental status  TECHNIQUE: Thin section axial CT with IV contrast supplemented with multiplanar reconstruction of the head and neck.  FINDINGS: Exam is limited by significant  patient motion artifact.  CTA:  Aortic arch:  Arch shows no significant narrowing. Great vessel origins are widely patent.  Right carotid: There is mild plaque in the right carotid bulb with less than 50% stenosis. Right common carotid artery is unremarkable..  Left carotid: There is minimal calcified plaque in the left carotid bulb with less than 50% stenosis. Carotid artery is unremarkable.  Vertebrals: Left vertebral artery is dominant. No significant stenosis is present. Distal vertebral arteries are suboptimally visualized.  The cranial circulation is grossly unremarkable. ACAs are unremarkable. Motion artifact limits visualization of MCAs and PCAs. No definite stenosis or aneurysm is seen, but small lesions could be missed. Basilar arteries are unremarkable.  Calcification in the right palatine tonsil suggests previous tonsillitis.      Suboptimal exam secondary to motion artifact on multiple images.  Less than 50% stenosis of the internal carotid arteries bilaterally.  No significant intracranial stenosis or aneurysm identified, however, small lesions could be missed secondary to motion artifact.    DLP: 749.41 mGy.cm,748.94 mGy.cm CTDI: 22.7 mGy  This study was performed with techniques to keep radiation doses as low as reasonably achievable (ALARA). Individualized dose reduction techniques using automated exposure control or adjustment of mA and/or kV according to the patient size were employed.      Images were reviewed, interpreted, and dictated by Dr. Kristi Roblero MD Transcribed by Anna Beasley PA-C.  This report was signed and finalized on 12/10/2024 11:28 AM by Kristi Roblero MD.      CT Cervical Spine Without Contrast    Result Date: 12/10/2024  PROCEDURE: CT CERVICAL SPINE WO CONTRAST-  HISTORY: Fall yesterday, AMS, neck pain  COMPARISON: May 2023.  PROCEDURE: Multiple axial CT images were obtained through the cervical spine using bone window algorithms. Coronal and sagittal images were  reconstructed from the original axial data set.  FINDINGS: Facets are normally aligned. There is no acute fracture.. There is straightening of the normal cervical adenosis. There is minimal retrolisthesis of C3 on C4, stable. There is mild to moderate disc space narrowing at all levels with small osteophytes. Multiple levels of spinal stenosis and/or neuroforaminal narrowing secondary to degenerative change, but not secondary to acute abnormality. Bilateral carotid artery calcifications are noted.  Limited images of the lung apices are unremarkable.      No acute fracture or malalignment.  Degenerative changes.   CTDI: 22.7 mGy DLP: 749.41 mGy.cm,748.94 mGy.cm   This study was performed with techniques to keep radiation doses as low as reasonably achievable (ALARA). Individualized dose reduction techniques using automated exposure control or adjustment of mA and/or kV according to the patient size were employed.     Images were reviewed, interpreted, and dictated by Dr. Kristi Roblero MD Transcribed by Anna Beasley PA-C.  This report was signed and finalized on 12/10/2024 11:20 AM by Kristi Roblero MD.      CT Head Without Contrast    Result Date: 12/10/2024  PROCEDURE: CT HEAD WO CONTRAST-  HISTORY: ground level fall yesterday on [facility] since fall  COMPARISON: May 2023.  TECHNIQUE: Multiple axial CT images were performed from the foramen magnum to the vertex without enhancement.  FINDINGS: There is no CT evidence of acute intracranial hemorrhage. Moderate atrophy, pronounced for the patient's age, is stable. There is a stable small area of encephalomalacia in the right basal ganglia consistent with remote CVA. There is no mass, mass effect or midline shift.  There is no hydrocephalus. The paranasal sinuses are clear. Bone windows reveal no acute osseous abnormalities.      Stable chronic changes without acute intracranial process.     DLP: 749.41 mGy.cm,748.94 mGy.cm CTDI: 22.7 mGy   This study was performed  with techniques to keep radiation doses as low as reasonably achievable (ALARA). Individualized dose reduction techniques using automated exposure control or adjustment of mA and/or kV according to the patient size were employed.     Images were reviewed, interpreted, and dictated by Dr. Kristi Roblero MD Transcribed by Anna Beasley PA-C.  This report was signed and finalized on 12/10/2024 11:20 AM by Kristi Roblero MD.      XR Shoulder 2+ View Right    Result Date: 12/10/2024   PROCEDURE: XR SHOULDER 2+ VW RIGHT-  HISTORY: Fall, yesterday, knot noted to right shoulder  COMPARISON: March 24, 2016.  FINDINGS:  A three view exam demonstrates no acute fracture or dislocation. The joint spaces demonstrate moderate narrowing of the right acromioclavicular joint. There is also decreased subacromial distance suggesting chronic rotator cuff tear. No soft tissue abnormality is seen. On a single view there is a linear lucency in the superior lateral right acromion. Borders appear smooth and mildly sclerotic. If this is a fracture appears to be remote. This is not identified on the prior study.       No acute bony abnormality.  Degenerative changes described.      This report was signed and finalized on 12/10/2024 11:03 AM by Kristi Roblero MD.      XR Humerus Right    Result Date: 12/10/2024  PROCEDURE: XR HUMERUS RIGHT-  HISTORY: Fall yesterday, pain, inability to abduct arm  COMPARISON: None.  FINDINGS:  A two view exam demonstrates no acute fracture or dislocation. The joint spaces appear unremarkable. No soft tissue abnormality is seen.      No acute bony abnormality.      This report was signed and finalized on 12/10/2024 10:56 AM by Kristi Roblero MD.      XR Chest 1 View    Result Date: 12/10/2024  PROCEDURE: XR CHEST 1 VW-  HISTORY: AMS, fell last night, right shoulder pain.  COMPARISON: June 21, 2022..  FINDINGS: The heart is normal in size. The lungs are clear. The mediastinum is unremarkable. There is no pneumothorax.   There are no acute osseous abnormalities. Status post median sternotomy. Apical lordotic positioning noted. Small density is again projected over the left apex, stable. There is evidence of old calcified granulomatous disease.      No acute cardiopulmonary process.     This report was signed and finalized on 12/10/2024 10:56 AM by Kristi Roblero MD.       Assessment:    Weakness, POA  Encephalopathy, POA  Diabetes with neuropathy and retinopathy  Vision loss  Hearing loss  CAD  History of prior PE on Eliquis  Impaired mobility and ADL    Plan:    Admitted as observation    Weakness/confusion:  Appears to have chronic weakness, history of falls, lot of cares provided by spouse.  In regards to confusion, may have been exacerbated by pain medication needed been taking for the shoulder.  He is also on benzodiazepines and Seroquel.  Echocardiogram was ordered.  Lipitor has been ordered.  Telemetry monitoring.  Neurology to follow-up.  Will continue his Eliquis.    Will have PT and OT evaluations.    Diabetes/vision loss/hearing loss/CAD/prior PE/impaired mobility:  Will resume home medications once able to reconcile with spouse.    Likely home in 24 to 48 hours.  Plan of care discussed with the patient.    Risk Assessment: High  DVT Prophylaxis: Eliquis  Code Status: Full  Diet: As tolerated    Advance Care Planning  ACP discussion was held with the patient during this visit. Patient does not have an advance directive, declines further assistance.           Ines Sim DO  12/10/24  15:11 EST    Dictated utilizing Dragon dictation.    Electronically signed by Ines Sim DO at 12/10/24 1653          Emergency Department Notes        Rinku Mckeon MD at 12/10/24 1111        Procedure Orders    1. Critical Care [[id number]] ordered by Rinku Mckeon MD                 Subjective:  History of Present Illness:    Patient is a six 9-year-old male with history of CAD, diabetes mellitus,  hypertension, type 2 diabetes who presents today with altered mental status and right shoulder pain.  Reports that he was evaluated outside ER yesterday after having recurrent falls.  Reports that he has inability to walk last night and now has had inability to speak prior to arrival.  Is having intermittent speech but is following commands.  Reports has been ongoing since last night.  Patient does take Eliquis.  No fevers, no dysuria.  He denies any chest pain or shortness of breath.  Reports that he did fall last night.  Having pain to the right shoulder.  No focal weakness on my exam.      Nurses Notes reviewed and agree, including vitals, allergies, social history and prior medical history.     REVIEW OF SYSTEMS: All systems reviewed and not pertinent unless noted.  Review of Systems   Constitutional:  Positive for activity change. Negative for appetite change, chills, fatigue and fever.   HENT:  Negative for congestion, sinus pressure, sneezing and trouble swallowing.    Eyes:  Negative for discharge and itching.   Respiratory:  Negative for cough and shortness of breath.    Cardiovascular:  Negative for chest pain and palpitations.   Gastrointestinal:  Negative for abdominal distention and abdominal pain.   Endocrine: Negative for cold intolerance and heat intolerance.   Genitourinary:  Negative for decreased urine volume, dysuria and urgency.   Musculoskeletal:  Negative for gait problem, neck pain and neck stiffness.   Skin:  Negative for color change and rash.   Allergic/Immunologic: Negative for immunocompromised state.   Neurological:  Positive for speech difficulty. Negative for facial asymmetry and headaches.   Hematological:  Negative for adenopathy.   Psychiatric/Behavioral:  Positive for confusion. Negative for self-injury and suicidal ideas.        Past Medical History:   Diagnosis Date    Arthritis     Clot     Coronary artery disease     Diabetes mellitus     Heart disease     History of blood  "clots     Hypertension     Low back pain     Thyroid disease     Type 2 diabetes mellitus        Allergies:    Trazodone      Past Surgical History:   Procedure Laterality Date    CHOLECYSTECTOMY  2015    COLONOSCOPY  2015    dr srivastava    CORONARY ARTERY BYPASS GRAFT      [place] -  BY PASS     ENDOSCOPY           Social History     Socioeconomic History    Marital status:    Tobacco Use    Smoking status: Never     Passive exposure: Never    Smokeless tobacco: Current     Types: Chew   Vaping Use    Vaping status: Never Used   Substance and Sexual Activity    Alcohol use: No    Drug use: No    Sexual activity: Defer         Family History   Problem Relation Age of Onset    Diabetes Mother     Heart disease Mother         STENTING    COPD Mother     Heart attack Father     Diabetes Father     Heart defect Son         Ross procedure    Heart murmur Son     Hypertension Son        Objective  Physical Exam:  /97   Pulse 62   Temp 98.3 °F (36.8 °C) (Oral)   Resp 20   Ht 180.3 cm (70.98\")   Wt 108 kg (238 lb 1.6 oz)   SpO2 94%   BMI 33.22 kg/m²      Physical Exam  Constitutional:       General: He is not in acute distress.     Appearance: Normal appearance. He is normal weight. He is not ill-appearing.   HENT:      Head: Normocephalic and atraumatic.      Nose: Nose normal. No congestion or rhinorrhea.      Mouth/Throat:      Mouth: Mucous membranes are moist.      Pharynx: Oropharynx is clear.   Eyes:      Extraocular Movements: Extraocular movements intact.      Conjunctiva/sclera: Conjunctivae normal.      Pupils: Pupils are equal, round, and reactive to light.   Cardiovascular:      Rate and Rhythm: Normal rate and regular rhythm.      Pulses: Normal pulses.   Pulmonary:      Effort: Pulmonary effort is normal. No respiratory distress.      Breath sounds: Normal breath sounds.   Abdominal:      General: Abdomen is flat. Bowel sounds are normal. There is no distension.      Palpations: Abdomen " is soft.      Tenderness: There is no abdominal tenderness.   Musculoskeletal:         General: No swelling or tenderness. Normal range of motion.      Cervical back: Normal range of motion and neck supple. No rigidity or tenderness.   Skin:     General: Skin is warm and dry.      Capillary Refill: Capillary refill takes less than 2 seconds.   Neurological:      General: No focal deficit present.      Mental Status: He is disoriented.      GCS: GCS eye subscore is 4. GCS verbal subscore is 4. GCS motor subscore is 6.      Cranial Nerves: No cranial nerve deficit.      Sensory: No sensory deficit.      Motor: No weakness.   Psychiatric:         Mood and Affect: Mood normal.         Behavior: Behavior normal.         Thought Content: Thought content normal.         Judgment: Judgment normal.         Critical Care    Performed by: Rinku Mckeon MD  Authorized by: Ines Sim DO    Critical care provider statement:     Critical care time (minutes):  30    Critical care time was exclusive of:  Separately billable procedures and treating other patients    Critical care was necessary to treat or prevent imminent or life-threatening deterioration of the following conditions:  CNS failure or compromise (TIA)    Critical care was time spent personally by me on the following activities:  Blood draw for specimens, discussions with consultants, discussions with primary provider, examination of patient, obtaining history from patient or surrogate, ordering and review of laboratory studies, ordering and review of radiographic studies, pulse oximetry, re-evaluation of patient's condition and review of old charts    Care discussed with: admitting provider        ED Course:    ED Course as of 12/10/24 1627   Tue Dec 10, 2024   1004 EKG interpreted by me, normal sinus rhythm with no concerning ST changes noted, rate of 81 [JE]      ED Course User Index  [JE] Rinku Mckeon MD       Lab Results (last 24 hours)        Procedure Component Value Units Date/Time    Comprehensive Metabolic Panel [[id number]]  (Abnormal) Collected: 12/10/24 0929    Specimen: Blood Updated: 12/10/24 1006     Glucose 175 mg/dL      BUN 16 mg/dL      Creatinine 1.55 mg/dL      Sodium 138 mmol/L      Potassium 3.8 mmol/L      Chloride 101 mmol/L      CO2 26.2 mmol/L      Calcium 8.9 mg/dL      Total Protein 6.4 g/dL      Albumin 3.4 g/dL      ALT (SGPT) 20 U/L      AST (SGOT) 29 U/L      Alkaline Phosphatase 89 U/L      Total Bilirubin 0.8 mg/dL      Globulin 3.0 gm/dL      A/G Ratio 1.1 g/dL      BUN/Creatinine Ratio 10.3     Anion Gap 10.8 mmol/L      eGFR 48.8 mL/min/1.73     Narrative:      GFR Categories in Chronic Kidney Disease (CKD)      GFR Category          GFR (mL/min/1.73)    Interpretation  G1                     90 or greater         Normal or high (1)  G2                      60-89                Mild decrease (1)  G3a                   45-59                Mild to moderate decrease  G3b                   30-44                Moderate to severe decrease  G4                    15-29                Severe decrease  G5                    14 or less           Kidney failure          (1)In the absence of evidence of kidney disease, neither GFR category G1 or G2 fulfill the criteria for CKD.    eGFR calculation 2021 CKD-EPI creatinine equation, which does not include race as a factor    High Sensitivity Troponin T [[id number]]  (Abnormal) Collected: 12/10/24 0929    Specimen: Blood Updated: 12/10/24 1011     HS Troponin T 52 ng/L     Narrative:      High Sensitive Troponin T Reference Range:  <14.0 ng/L- Negative Female for AMI  <22.0 ng/L- Negative Male for AMI  >=14 - Abnormal Female indicating possible myocardial injury.  >=22 - Abnormal Male indicating possible myocardial injury.   Clinicians would have to utilize clinical acumen, EKG, Troponin, and serial changes to determine if it is an Acute Myocardial Infarction or myocardial injury  due to an underlying chronic condition.         Magnesium [[id number]]  (Abnormal) Collected: 12/10/24 0929    Specimen: Blood Updated: 12/10/24 1006     Magnesium 1.4 mg/dL     Ammonia [[id number]]  (Normal) Collected: 12/10/24 0929    Specimen: Blood Updated: 12/10/24 1000     Ammonia 16 umol/L     BNP [[id number]]  (Normal) Collected: 12/10/24 0929    Specimen: Blood Updated: 12/10/24 1011     proBNP 290.6 pg/mL     Narrative:      This assay is used as an aid in the diagnosis of individuals suspected of having heart failure. It can be used as an aid in the diagnosis of acute decompensated heart failure (ADHF) in patients presenting with signs and symptoms of ADHF to the emergency department (ED). In addition, NT-proBNP of <300 pg/mL indicates ADHF is not likely.    Age Range Result Interpretation  NT-proBNP Concentration (pg/mL:      <50             Positive            >450                   Gray                 300-450                    Negative             <300    50-75           Positive            >900                  Gray                300-900                  Negative            <300      >75             Positive            >1800                  Gray                300-1800                  Negative            <300    C-reactive Protein [[id number]]  (Abnormal) Collected: 12/10/24 0929    Specimen: Blood Updated: 12/10/24 1006     C-Reactive Protein 0.73 mg/dL     Procalcitonin [[id number]]  (Normal) Collected: 12/10/24 0929    Specimen: Blood Updated: 12/10/24 1013     Procalcitonin 0.11 ng/mL     Narrative:      As a Marker for Sepsis (Non-Neonates):    1. <0.5 ng/mL represents a low risk of severe sepsis and/or septic shock.  2. >2 ng/mL represents a high risk of severe sepsis and/or septic shock.    As a Marker for Lower Respiratory Tract Infections that require antibiotic therapy:    PCT on Admission    Antibiotic Therapy       6-12 Hrs later    >0.5                Strongly Recommended  >0.25 - <0.5     "    Recommended   0.1 - 0.25          Discouraged              Remeasure/reassess PCT  <0.1                Strongly Discouraged     Remeasure/reassess PCT    As 28 day mortality risk marker: \"Change in Procalcitonin Result\" (>80% or <=80%) if Day 0 (or Day 1) and Day 4 values are available. Refer to http://[email or web address]-pct-calculator.com    Change in PCT <=80%  A decrease of PCT levels below or equal to 80% defines a positive change in PCT test result representing a higher risk for 28-day all-cause mortality of patients diagnosed with severe sepsis for septic shock.    Change in PCT >80%  A decrease of PCT levels of more than 80% defines a negative change in PCT result representing a lower risk for 28-day all-cause mortality of patients diagnosed with severe sepsis or septic shock.       Lactic Acid, Plasma [[id number]]  (Normal) Collected: 12/10/24 0929    Specimen: Blood Updated: 12/10/24 1002     Lactate 1.6 mmol/L     CK [[id number]]  (Abnormal) Collected: 12/10/24 0929    Specimen: Blood Updated: 12/10/24 1006     Creatine Kinase 437 U/L     TSH Rfx On Abnormal To Free T4 [[id number]]  (Abnormal) Collected: 12/10/24 0929    Specimen: Blood Updated: 12/10/24 1011     TSH 5.450 uIU/mL     T4, Free [[id number]]  (Normal) Collected: 12/10/24 0929    Specimen: Blood Updated: 12/10/24 1148     Free T4 1.24 ng/dL     Urinalysis With Microscopic If Indicated (No Culture) - Urine, Clean Catch [[id number]]  (Abnormal) Collected: 12/10/24 1119    Specimen: Urine, Clean Catch Updated: 12/10/24 1145     Color, UA Yellow     Appearance, UA Clear     pH, UA 7.0     Specific Gravity, UA >=1.030     Glucose,  mg/dL (1+)     Ketones, UA Negative     Bilirubin, UA Negative     Blood, UA Trace     Protein, UA >=300 mg/dL (3+)     Leuk Esterase, UA Negative     Nitrite, UA Negative     Urobilinogen, UA 1.0 E.U./dL    Urinalysis, Microscopic Only - Urine, Clean Catch [[id number]] Collected: 12/10/24 1119    Specimen: Urine, Clean " Catch Updated: 12/10/24 1155     RBC, UA 0-2 /HPF      WBC, UA None Seen /HPF      Bacteria, UA None Seen /HPF      Squamous Epithelial Cells, UA None Seen /HPF      Hyaline Casts, UA None Seen /LPF      Mucus, UA Trace /HPF      Methodology Manual Light Microscopy    Urine Drug Screen - Urine, Clean Catch [[id number]]  (Abnormal) Collected: 12/10/24 1119    Specimen: Urine, Clean Catch Updated: 12/10/24 1500     THC, Screen, Urine Negative     Phencyclidine (PCP), Urine Negative     Cocaine Screen, Urine Negative     Methamphetamine, Ur Negative     Opiate Screen Positive     Amphetamine Screen, Urine Negative     Benzodiazepine Screen, Urine Positive     Tricyclic Antidepressants Screen Positive     Methadone Screen, Urine Negative     Barbiturates Screen, Urine Negative     Oxycodone Screen, Urine Negative     Buprenorphine, Screen, Urine Negative    Narrative:      Cutoff For Drugs Screened:    Amphetamines               500 ng/ml  Barbiturates               200 ng/ml  Benzodiazepines            150 ng/ml  Cocaine                    150 ng/ml  Methadone                  200 ng/ml  Opiates                    100 ng/ml  Phencyclidine               25 ng/ml  THC                         50 ng/ml  Methamphetamine            500 ng/ml  Tricyclic Antidepressants  300 ng/ml  Oxycodone                  100 ng/ml  Buprenorphine               10 ng/ml    The normal value for all drugs tested is negative. This report includes unconfirmed screening results, with the cutoff values listed, to be used for medical treatment purposes only.  Unconfirmed results must not be used for non-medical purposes such as employment or legal testing.  Clinical consideration should be applied to any drug of abuse test, particularly when unconfirmed results are used.      Fentanyl, Urine - Urine, Clean Catch [[id number]]  (Normal) Collected: 12/10/24 1119    Specimen: Urine, Clean Catch Updated: 12/10/24 1514     Fentanyl, Urine Negative     Narrative:      Negative Threshold:      Fentanyl 5 ng/mL     The normal value for the drug tested is negative. This report includes final unconfirmed screening results to be used for medical treatment purposes only. Unconfirmed results must not be used for non-medical purposes such as employment or legal testing. Clinical consideration should be applied to any drug of abuse test, particularly when unconfirmed results are used.           CBC & Differential [[id number]] Collected: 12/10/24 1128    Specimen: Blood Updated: 12/10/24 1146    Narrative:      The following orders were created for panel order CBC & Differential.  Procedure                               Abnormality         Status                     ---------                               -----------         ------                     CBC Auto Differential[[id number]]        Normal              Final result               Scan Slide[[id number]]                                                                    Please view results for these tests on the individual orders.    CBC Auto Differential [[id number]]  (Normal) Collected: 12/10/24 1128    Specimen: Blood Updated: 12/10/24 1146     WBC 8.65 10*3/mm3      RBC 5.40 10*6/mm3      Hemoglobin 16.0 g/dL      Hematocrit 48.1 %      MCV 89.1 fL      MCH 29.6 pg      MCHC 33.3 g/dL      RDW 12.7 %      RDW-SD 41.3 fl      MPV 11.0 fL      Platelets 146 10*3/mm3      Neutrophil % 67.6 %      Lymphocyte % 23.0 %      Monocyte % 8.2 %      Eosinophil % 0.7 %      Basophil % 0.3 %      Immature Grans % 0.2 %      Neutrophils, Absolute 5.84 10*3/mm3      Lymphocytes, Absolute 1.99 10*3/mm3      Monocytes, Absolute 0.71 10*3/mm3      Eosinophils, Absolute 0.06 10*3/mm3      Basophils, Absolute 0.03 10*3/mm3      Immature Grans, Absolute 0.02 10*3/mm3      nRBC 0.0 /100 WBC     High Sensitivity Troponin T 1Hr [[id number]]  (Abnormal) Collected: 12/10/24 1128    Specimen: Blood Updated: 12/10/24 1200     HS Troponin T 50  ng/L      Troponin T Delta -2 ng/L      Troponin T % Change -4 %     Narrative:      High Sensitive Troponin T Reference Range:  <14.0 ng/L- Negative Female for AMI  <22.0 ng/L- Negative Male for AMI  >=14 - Abnormal Female indicating possible myocardial injury.  >=22 - Abnormal Male indicating possible myocardial injury.   Clinicians would have to utilize clinical acumen, EKG, Troponin, and serial changes to determine if it is an Acute Myocardial Infarction or myocardial injury due to an underlying chronic condition.                  EEG    Result Date: 12/10/2024  Reason for referral: 69 y.o.male with altered mental status Technical Summary:  A 19 channel digital EEG was performed using the international 10-20 placement system, including eye leads and EKG leads. Duration: 20 minutes Findings: The patient is drowsy at the beginning of the study.  The background shows diffuse low to medium amplitude 3-5 Hz intermixed delta and theta activity present symmetrically over both hemispheres.  Photic stimulation does not change the background.  Hyperventilation was attempted but the patient was too drowsy to complete this.  No focal features or epileptiform activity are present. Video: None Technical quality: Good EKG: Regular, 60 bpm SUMMARY: Excess drowsiness Moderate generalized slow No focal features or epileptiform activity     Impression: Diffuse cerebral dysfunction of moderate degree, nonspecific.  This is most commonly seen due to toxic/metabolic cause No evidence for epilepsy is seen This repo most commonly seen due to toxic/metabolic causeThis isrt is transcribed using the Dragon dictation system.      MRI Brain Without Contrast    Result Date: 12/10/2024  PROCEDURE: MRI BRAIN WO CONTRAST-  HISTORY: concern for possible posterior stroke,  PROCEDURE: Multiplanar MR imaging of the brain was performed in multiple MR sequences .  FINDINGS: Brain parenchyma displays normal signal without evidence of mass, hemorrhage  or edema.  Limited images the proximal cord are unremarkable. The ventricles are normal in size. There is a small area of encephalomalacia in the right basal ganglia consistent with small remote CVA. There is no extra-axial fluid or midline shift. Flow-voids are appropriate. Diffusion weighted images demonstrate no evidence of acute CVA although a small area could be missed secondary to the motion artifact. Limited images of the paranasal sinuses are unremarkable. Motion artifact noted on multiple images significantly decreasing the sensitivity of this exam. There is mild small vessel ischemic disease. T2 coronal images are almost nondiagnostic. Same is true of the T1 axial images.      Impression: Gross motion artifact on multiple images and motion artifact present on all images.  No acute CVA identified though a small area could be missed secondary to motion artifact.  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      This report was signed and finalized on 12/10/2024 1:38 PM by Kristi Roblero MD.      CT Angiogram Head    Result Date: 12/10/2024  PROCEDURE: CT ANGIOGRAM NECK-, CT ANGIOGRAM HEAD-  HISTORY: Concern for stroke, altered mental status  TECHNIQUE: Thin section axial CT with IV contrast supplemented with multiplanar reconstruction of the head and neck.  FINDINGS: Exam is limited by significant patient motion artifact.  CTA:  Aortic arch:  Arch shows no significant narrowing. Great vessel origins are widely patent.  Right carotid: There is mild plaque in the right carotid bulb with less than 50% stenosis. Right common carotid artery is unremarkable..  Left carotid: There is minimal calcified plaque in the left carotid bulb with less than 50% stenosis. Carotid artery is unremarkable.  Vertebrals: Left vertebral artery is dominant. No significant stenosis is present. Distal vertebral arteries are suboptimally visualized.  The cranial circulation is grossly unremarkable. ACAs are  unremarkable. Motion artifact limits visualization of MCAs and PCAs. No definite stenosis or aneurysm is seen, but small lesions could be missed. Basilar arteries are unremarkable.  Calcification in the right palatine tonsil suggests previous tonsillitis.      Impression: Suboptimal exam secondary to motion artifact on multiple images.  Less than 50% stenosis of the internal carotid arteries bilaterally.  No significant intracranial stenosis or aneurysm identified, however, small lesions could be missed secondary to motion artifact.    DLP: 749.41 mGy.cm,748.94 mGy.cm CTDI: 22.7 mGy  This study was performed with techniques to keep radiation doses as low as reasonably achievable (ALARA). Individualized dose reduction techniques using automated exposure control or adjustment of mA and/or kV according to the patient size were employed.      Images were reviewed, interpreted, and dictated by Dr. Kristi Roblero MD Transcribed by Anna Beasley PA-C.  This report was signed and finalized on 12/10/2024 11:28 AM by Kristi Roblero MD.      CT Angiogram Neck    Result Date: 12/10/2024  PROCEDURE: CT ANGIOGRAM NECK-, CT ANGIOGRAM HEAD-  HISTORY: Concern for stroke, altered mental status  TECHNIQUE: Thin section axial CT with IV contrast supplemented with multiplanar reconstruction of the head and neck.  FINDINGS: Exam is limited by significant patient motion artifact.  CTA:  Aortic arch:  Arch shows no significant narrowing. Great vessel origins are widely patent.  Right carotid: There is mild plaque in the right carotid bulb with less than 50% stenosis. Right common carotid artery is unremarkable..  Left carotid: There is minimal calcified plaque in the left carotid bulb with less than 50% stenosis. Carotid artery is unremarkable.  Vertebrals: Left vertebral artery is dominant. No significant stenosis is present. Distal vertebral arteries are suboptimally visualized.  The cranial circulation is grossly unremarkable. ACAs are  unremarkable. Motion artifact limits visualization of MCAs and PCAs. No definite stenosis or aneurysm is seen, but small lesions could be missed. Basilar arteries are unremarkable.  Calcification in the right palatine tonsil suggests previous tonsillitis.      Impression: Suboptimal exam secondary to motion artifact on multiple images.  Less than 50% stenosis of the internal carotid arteries bilaterally.  No significant intracranial stenosis or aneurysm identified, however, small lesions could be missed secondary to motion artifact.    DLP: 749.41 mGy.cm,748.94 mGy.cm CTDI: 22.7 mGy  This study was performed with techniques to keep radiation doses as low as reasonably achievable (ALARA). Individualized dose reduction techniques using automated exposure control or adjustment of mA and/or kV according to the patient size were employed.      Images were reviewed, interpreted, and dictated by Dr. Kristi Roblero MD Transcribed by Anna Beasley PA-C.  This report was signed and finalized on 12/10/2024 11:28 AM by Kristi Roblero MD.      CT Cervical Spine Without Contrast    Result Date: 12/10/2024  PROCEDURE: CT CERVICAL SPINE WO CONTRAST-  HISTORY: Fall yesterday, AMS, neck pain  COMPARISON: May 2023.  PROCEDURE: Multiple axial CT images were obtained through the cervical spine using bone window algorithms. Coronal and sagittal images were reconstructed from the original axial data set.  FINDINGS: Facets are normally aligned. There is no acute fracture.. There is straightening of the normal cervical adenosis. There is minimal retrolisthesis of C3 on C4, stable. There is mild to moderate disc space narrowing at all levels with small osteophytes. Multiple levels of spinal stenosis and/or neuroforaminal narrowing secondary to degenerative change, but not secondary to acute abnormality. Bilateral carotid artery calcifications are noted.  Limited images of the lung apices are unremarkable.      Impression: No acute fracture or  malalignment.  Degenerative changes.   CTDI: 22.7 mGy DLP: 749.41 mGy.cm,748.94 mGy.cm   This study was performed with techniques to keep radiation doses as low as reasonably achievable (ALARA). Individualized dose reduction techniques using automated exposure control or adjustment of mA and/or kV according to the patient size were employed.     Images were reviewed, interpreted, and dictated by Dr. Kristi Roblero MD Transcribed by Anna Beasley PA-C.  This report was signed and finalized on 12/10/2024 11:20 AM by Kristi Roblero MD.      CT Head Without Contrast    Result Date: 12/10/2024  PROCEDURE: CT HEAD WO CONTRAST-  HISTORY: ground level fall yesterday on [facility] since fall  COMPARISON: May 2023.  TECHNIQUE: Multiple axial CT images were performed from the foramen magnum to the vertex without enhancement.  FINDINGS: There is no CT evidence of acute intracranial hemorrhage. Moderate atrophy, pronounced for the patient's age, is stable. There is a stable small area of encephalomalacia in the right basal ganglia consistent with remote CVA. There is no mass, mass effect or midline shift.  There is no hydrocephalus. The paranasal sinuses are clear. Bone windows reveal no acute osseous abnormalities.      Impression: Stable chronic changes without acute intracranial process.     DLP: 749.41 mGy.cm,748.94 mGy.cm CTDI: 22.7 mGy   This study was performed with techniques to keep radiation doses as low as reasonably achievable (ALARA). Individualized dose reduction techniques using automated exposure control or adjustment of mA and/or kV according to the patient size were employed.     Images were reviewed, interpreted, and dictated by Dr. Kristi Roblero MD Transcribed by Anna Beasley PA-C.  This report was signed and finalized on 12/10/2024 11:20 AM by Kristi Roblero MD.      XR Shoulder 2+ View Right    Result Date: 12/10/2024   PROCEDURE: XR SHOULDER 2+ VW RIGHT-  HISTORY: Fall, yesterday, knot noted to right  shoulder  COMPARISON: March 24, 2016.  FINDINGS:  A three view exam demonstrates no acute fracture or dislocation. The joint spaces demonstrate moderate narrowing of the right acromioclavicular joint. There is also decreased subacromial distance suggesting chronic rotator cuff tear. No soft tissue abnormality is seen. On a single view there is a linear lucency in the superior lateral right acromion. Borders appear smooth and mildly sclerotic. If this is a fracture appears to be remote. This is not identified on the prior study.       Impression: No acute bony abnormality.  Degenerative changes described.      This report was signed and finalized on 12/10/2024 11:03 AM by Kristi Roblero MD.      XR Humerus Right    Result Date: 12/10/2024  PROCEDURE: XR HUMERUS RIGHT-  HISTORY: Fall yesterday, pain, inability to abduct arm  COMPARISON: None.  FINDINGS:  A two view exam demonstrates no acute fracture or dislocation. The joint spaces appear unremarkable. No soft tissue abnormality is seen.      Impression: No acute bony abnormality.      This report was signed and finalized on 12/10/2024 10:56 AM by Kristi Roblero MD.      XR Chest 1 View    Result Date: 12/10/2024  PROCEDURE: XR CHEST 1 VW-  HISTORY: AMS, fell last night, right shoulder pain.  COMPARISON: June 21, 2022..  FINDINGS: The heart is normal in size. The lungs are clear. The mediastinum is unremarkable. There is no pneumothorax.  There are no acute osseous abnormalities. Status post median sternotomy. Apical lordotic positioning noted. Small density is again projected over the left apex, stable. There is evidence of old calcified granulomatous disease.      Impression: No acute cardiopulmonary process.     This report was signed and finalized on 12/10/2024 10:56 AM by Kristi Roblero MD.          MDM      Initial impression of presenting illness: Altered mental status, ambulatory difficulty, difficulty with speech    DDX: includes but is not limited to:  Intracranial hemorrhage, CVA, TIA, urinary tract infection, sepsis    Patient arrives guarded with vitals interpreted by myself.     Pertinent features from physical exam: Patient with difficulties with speech but is following commands, no focal weakness noted but diffuse weakness, clear to auscultation, regular rate and rhythm with no murmur, nontender to abdominal palpation.    Initial diagnostic plan: CBC, CMP, troponin, BNP, EKG, chest x-ray, CRP, Pro-Rafael, lactic acid, magnesium, x-ray right shoulder, CT head, CTA head neck    Results from initial plan were reviewed and interpreted by me revealing concern for possible amyloid process on MRI per Dr. Cruz.  No concern for intracranial hemorrhage or vascular occlusion, no concern for recrudescence given lack of infectious etiology on workup    Diagnostic information from other sources: Reviewed past medical records and discussed with wife at bedside    Interventions / Re-evaluation: Observed in emergency department for several hours with no change in vital signs, patient's mental status is improved during his ER course and he is back to baseline    Results/clinical rationale were discussed with patient's wife at bedside    Consultations/Discussion of results with other physicians: Given my concern for possible TIA discussed with Dr. Cruz who recommends admission for continued workup and treatment.  Given this, discussed with Dr. Kerley who accepts patient onto his service for further management.    Disposition plan: Admit  -----        Final diagnoses:   TIA (transient ischemic attack)          Rinku Mckeon MD  12/10/24 1628      Electronically signed by Rinku Mckeon MD at 12/10/24 1628       Vital Signs (last day)       Date/Time Temp Temp src Pulse Resp BP Patient Position SpO2    12/11/24 0700 97 (36.1) Oral -- 18 131/75 Lying --    12/11/24 0315 97.8 (36.6) Oral 56 18 147/85 Lying 91    12/10/24 2308 97.6 (36.4) Oral 51 20 168/86 Lying 90     12/10/24 2003 98.7 (37.1) Oral 54 20 160/80 Lying 92    12/10/24 1835 -- -- 53 -- 162/78 -- --    12/10/24 1825 -- -- 60 -- -- -- 92    12/10/24 1800 -- -- 50 -- 147/86 -- 91    12/10/24 1742 -- -- 66 -- 165/94 -- --    12/10/24 1719 -- -- 61 -- -- -- 95    12/10/24 1659 -- -- 64 -- 167/119 -- 93    12/10/24 1618 -- -- 62 -- -- -- 94    12/10/24 1614 -- -- 65 -- 157/97 -- 93    12/10/24 1413 -- -- 77 -- -- -- 95    12/10/24 1400 -- -- 80 -- 157/97 -- 96    12/10/24 1358 -- -- 76 -- 180/97 -- 97    12/10/24 1331 -- -- 75 -- 164/101 -- 95    12/10/24 1327 -- -- 75 -- 153/112 -- 97    12/10/24 1257 -- -- 78 -- 165/121 -- 96    12/10/24 1231 -- -- 76 -- -- -- --    12/10/24 1205 -- -- 78 -- -- -- --    12/10/24 11:37:53 -- -- 78 20 156/95 -- 98    12/10/24 1100 -- -- 82 -- 178/98 -- 94    12/10/24 0822 98.3 (36.8) Oral 86 22 129/85 Sitting 93          Current Facility-Administered Medications   Medication Dose Route Frequency Provider Last Rate Last Admin    acetaminophen (TYLENOL) tablet 650 mg  650 mg Oral Q6H PRN Ines Sim DO        ALPRAZolam (XANAX) tablet 0.5 mg  0.5 mg Oral Nightly PRN Ines Sim DO   0.5 mg at 12/10/24 2059    amLODIPine (NORVASC) tablet 10 mg  10 mg Oral Daily Ines Sim DO   10 mg at 12/11/24 0803    apixaban (ELIQUIS) tablet 5 mg  5 mg Oral Q12H Ines Sim DO   5 mg at 12/11/24 0803    atorvastatin (LIPITOR) tablet 80 mg  80 mg Oral Nightly Ines Sim DO   80 mg at 12/10/24 2059    chlorthalidone (HYGROTON) tablet 12.5 mg  12.5 mg Oral Daily Ines Sim DO   12.5 mg at 12/11/24 0803    citalopram (CeleXA) tablet 20 mg  20 mg Oral Daily Ines Sim DO   20 mg at 12/11/24 0803    cyclopentolate (CYCLOGYL) 1 % ophthalmic solution 1 drop  1 drop Both Eyes TID Ines Sim DO   1 drop at 12/11/24 0804    dextrose (D50W) (25 g/50 mL) IV injection 25 g  25 g Intravenous Q15 Min PRN Ines Sim  Ryley,         dextrose (GLUTOSE) oral gel 15 g  15 g Oral Q15 Min PRN Ines Sim DO        Diclofenac Sodium (VOLTAREN) 1 % gel 4 g  4 g Topical 4x Daily Ines Sim DO   4 g at 12/11/24 0804    famotidine (PEPCID) tablet 20 mg  20 mg Oral BID Ines Sim, DO   20 mg at 12/11/24 0803    glucagon (GLUCAGEN) injection 1 mg  1 mg Intramuscular Q15 Min PRN Ines Sim DO        HYDROcodone-acetaminophen (NORCO) 7.5-325 MG per tablet 1 tablet  1 tablet Oral Q12H PRN Ines Sim DO   1 tablet at 12/10/24 1839    insulin glargine (LANTUS, SEMGLEE) injection 15 Units  15 Units Subcutaneous Q12H Ines Sim DO   15 Units at 12/11/24 0803    Insulin Lispro (humaLOG) injection 3-14 Units  3-14 Units Subcutaneous 4x Daily AC & at Bedtime Ines Sim DO   8 Units at 12/11/24 0803    levothyroxine (SYNTHROID, LEVOTHROID) tablet 125 mcg  125 mcg Oral Q AM Ines Sim DO   125 mcg at 12/11/24 0616    losartan (COZAAR) tablet 100 mg  100 mg Oral Daily Ines Sim, DO   100 mg at 12/11/24 0803    nitroglycerin (NITROSTAT) SL tablet 0.4 mg  0.4 mg Sublingual Q5 Min PRN Ines Sim DO        pantoprazole (PROTONIX) EC tablet 40 mg  40 mg Oral Daily Ines Sim DO   40 mg at 12/11/24 0803    QUEtiapine (SEROquel) tablet 25 mg  25 mg Oral BID Ines Sim, DO   25 mg at 12/11/24 0803     Lab Results (last 24 hours)       Procedure Component Value Units Date/Time    POC Glucose 4x Daily Before Meals & at Bedtime [[id number]]  (Abnormal) Collected: 12/11/24 0740    Specimen: Blood Updated: 12/11/24 0742     Glucose 281 mg/dL      Comment: Serial Number: [id number]:  [id number]       POC Glucose Once [[id number]]  (Abnormal) Collected: 12/10/24 2008    Specimen: Blood Updated: 12/10/24 2010     Glucose 254 mg/dL      Comment: Serial Number: [id number]:  [id number]       POC Glucose 4x Daily  Before Meals & at Bedtime [[id number]]  (Abnormal) Collected: 12/10/24 1732    Specimen: Blood Updated: 12/10/24 1737     Glucose 164 mg/dL      Comment: Serial Number: [id number]:  [id number]       Fentanyl, Urine - Urine, Clean Catch [[id number]]  (Normal) Collected: 12/10/24 1119    Specimen: Urine, Clean Catch Updated: 12/10/24 1514     Fentanyl, Urine Negative    Narrative:      Negative Threshold:      Fentanyl 5 ng/mL     The normal value for the drug tested is negative. This report includes final unconfirmed screening results to be used for medical treatment purposes only. Unconfirmed results must not be used for non-medical purposes such as employment or legal testing. Clinical consideration should be applied to any drug of abuse test, particularly when unconfirmed results are used.           Urine Drug Screen - Urine, Clean Catch [[id number]]  (Abnormal) Collected: 12/10/24 1119    Specimen: Urine, Clean Catch Updated: 12/10/24 1500     THC, Screen, Urine Negative     Phencyclidine (PCP), Urine Negative     Cocaine Screen, Urine Negative     Methamphetamine, Ur Negative     Opiate Screen Positive     Amphetamine Screen, Urine Negative     Benzodiazepine Screen, Urine Positive     Tricyclic Antidepressants Screen Positive     Methadone Screen, Urine Negative     Barbiturates Screen, Urine Negative     Oxycodone Screen, Urine Negative     Buprenorphine, Screen, Urine Negative    Narrative:      Cutoff For Drugs Screened:    Amphetamines               500 ng/ml  Barbiturates               200 ng/ml  Benzodiazepines            150 ng/ml  Cocaine                    150 ng/ml  Methadone                  200 ng/ml  Opiates                    100 ng/ml  Phencyclidine               25 ng/ml  THC                         50 ng/ml  Methamphetamine            500 ng/ml  Tricyclic Antidepressants  300 ng/ml  Oxycodone                  100 ng/ml  Buprenorphine               10 ng/ml    The normal value for all drugs  tested is negative. This report includes unconfirmed screening results, with the cutoff values listed, to be used for medical treatment purposes only.  Unconfirmed results must not be used for non-medical purposes such as employment or legal testing.  Clinical consideration should be applied to any drug of abuse test, particularly when unconfirmed results are used.      High Sensitivity Troponin T 1Hr [[id number]]  (Abnormal) Collected: 12/10/24 1128    Specimen: Blood Updated: 12/10/24 1200     HS Troponin T 50 ng/L      Troponin T Delta -2 ng/L      Troponin T % Change -4 %     Narrative:      High Sensitive Troponin T Reference Range:  <14.0 ng/L- Negative Female for AMI  <22.0 ng/L- Negative Male for AMI  >=14 - Abnormal Female indicating possible myocardial injury.  >=22 - Abnormal Male indicating possible myocardial injury.   Clinicians would have to utilize clinical acumen, EKG, Troponin, and serial changes to determine if it is an Acute Myocardial Infarction or myocardial injury due to an underlying chronic condition.         Urinalysis, Microscopic Only - Urine, Clean Catch [[id number]] Collected: 12/10/24 1119    Specimen: Urine, Clean Catch Updated: 12/10/24 1155     RBC, UA 0-2 /HPF      WBC, UA None Seen /HPF      Bacteria, UA None Seen /HPF      Squamous Epithelial Cells, UA None Seen /HPF      Hyaline Casts, UA None Seen /LPF      Mucus, UA Trace /HPF      Methodology Manual Light Microscopy    T4, Free [[id number]]  (Normal) Collected: 12/10/24 0929    Specimen: Blood Updated: 12/10/24 1148     Free T4 1.24 ng/dL     CBC & Differential [[id number]] Collected: 12/10/24 1128    Specimen: Blood Updated: 12/10/24 1146    Narrative:      The following orders were created for panel order CBC & Differential.  Procedure                               Abnormality         Status                     ---------                               -----------         ------                     CBC Auto  Differential[[id number]]        Normal              Final result               Scan Slide[[id number]]                                                                    Please view results for these tests on the individual orders.    CBC Auto Differential [[id number]]  (Normal) Collected: 12/10/24 1128    Specimen: Blood Updated: 12/10/24 1146     WBC 8.65 10*3/mm3      RBC 5.40 10*6/mm3      Hemoglobin 16.0 g/dL      Hematocrit 48.1 %      MCV 89.1 fL      MCH 29.6 pg      MCHC 33.3 g/dL      RDW 12.7 %      RDW-SD 41.3 fl      MPV 11.0 fL      Platelets 146 10*3/mm3      Neutrophil % 67.6 %      Lymphocyte % 23.0 %      Monocyte % 8.2 %      Eosinophil % 0.7 %      Basophil % 0.3 %      Immature Grans % 0.2 %      Neutrophils, Absolute 5.84 10*3/mm3      Lymphocytes, Absolute 1.99 10*3/mm3      Monocytes, Absolute 0.71 10*3/mm3      Eosinophils, Absolute 0.06 10*3/mm3      Basophils, Absolute 0.03 10*3/mm3      Immature Grans, Absolute 0.02 10*3/mm3      nRBC 0.0 /100 WBC     Urinalysis With Microscopic If Indicated (No Culture) - Urine, Clean Catch [[id number]]  (Abnormal) Collected: 12/10/24 1119    Specimen: Urine, Clean Catch Updated: 12/10/24 1145     Color, UA Yellow     Appearance, UA Clear     pH, UA 7.0     Specific Gravity, UA >=1.030     Glucose,  mg/dL (1+)     Ketones, UA Negative     Bilirubin, UA Negative     Blood, UA Trace     Protein, UA >=300 mg/dL (3+)     Leuk Esterase, UA Negative     Nitrite, UA Negative     Urobilinogen, UA 1.0 E.U./dL    Procalcitonin [[id number]]  (Normal) Collected: 12/10/24 0929    Specimen: Blood Updated: 12/10/24 1013     Procalcitonin 0.11 ng/mL     Narrative:      As a Marker for Sepsis (Non-Neonates):    1. <0.5 ng/mL represents a low risk of severe sepsis and/or septic shock.  2. >2 ng/mL represents a high risk of severe sepsis and/or septic shock.    As a Marker for Lower Respiratory Tract Infections that require antibiotic therapy:    PCT on Admission     "Antibiotic Therapy       6-12 Hrs later    >0.5                Strongly Recommended  >0.25 - <0.5        Recommended   0.1 - 0.25          Discouraged              Remeasure/reassess PCT  <0.1                Strongly Discouraged     Remeasure/reassess PCT    As 28 day mortality risk marker: \"Change in Procalcitonin Result\" (>80% or <=80%) if Day 0 (or Day 1) and Day 4 values are available. Refer to [email or web address]    Change in PCT <=80%  A decrease of PCT levels below or equal to 80% defines a positive change in PCT test result representing a higher risk for 28-day all-cause mortality of patients diagnosed with severe sepsis for septic shock.    Change in PCT >80%  A decrease of PCT levels of more than 80% defines a negative change in PCT result representing a lower risk for 28-day all-cause mortality of patients diagnosed with severe sepsis or septic shock.       High Sensitivity Troponin T [[id number]]  (Abnormal) Collected: 12/10/24 0929    Specimen: Blood Updated: 12/10/24 1011     HS Troponin T 52 ng/L     Narrative:      High Sensitive Troponin T Reference Range:  <14.0 ng/L- Negative Female for AMI  <22.0 ng/L- Negative Male for AMI  >=14 - Abnormal Female indicating possible myocardial injury.  >=22 - Abnormal Male indicating possible myocardial injury.   Clinicians would have to utilize clinical acumen, EKG, Troponin, and serial changes to determine if it is an Acute Myocardial Infarction or myocardial injury due to an underlying chronic condition.         BNP [[id number]]  (Normal) Collected: 12/10/24 0929    Specimen: Blood Updated: 12/10/24 1011     proBNP 290.6 pg/mL     Narrative:      This assay is used as an aid in the diagnosis of individuals suspected of having heart failure. It can be used as an aid in the diagnosis of acute decompensated heart failure (ADHF) in patients presenting with signs and symptoms of ADHF to the emergency department (ED). In addition, NT-proBNP of <300 " pg/mL indicates ADHF is not likely.    Age Range Result Interpretation  NT-proBNP Concentration (pg/mL:      <50             Positive            >450                   Gray                 300-450                    Negative             <300    50-75           Positive            >900                  Gray                300-900                  Negative            <300      >75             Positive            >1800                  Gray                300-1800                  Negative            <300    TSH Rfx On Abnormal To Free T4 [[id number]]  (Abnormal) Collected: 12/10/24 0929    Specimen: Blood Updated: 12/10/24 1011     TSH 5.450 uIU/mL     Comprehensive Metabolic Panel [[id number]]  (Abnormal) Collected: 12/10/24 0929    Specimen: Blood Updated: 12/10/24 1006     Glucose 175 mg/dL      BUN 16 mg/dL      Creatinine 1.55 mg/dL      Sodium 138 mmol/L      Potassium 3.8 mmol/L      Chloride 101 mmol/L      CO2 26.2 mmol/L      Calcium 8.9 mg/dL      Total Protein 6.4 g/dL      Albumin 3.4 g/dL      ALT (SGPT) 20 U/L      AST (SGOT) 29 U/L      Alkaline Phosphatase 89 U/L      Total Bilirubin 0.8 mg/dL      Globulin 3.0 gm/dL      A/G Ratio 1.1 g/dL      BUN/Creatinine Ratio 10.3     Anion Gap 10.8 mmol/L      eGFR 48.8 mL/min/1.73     Narrative:      GFR Categories in Chronic Kidney Disease (CKD)      GFR Category          GFR (mL/min/1.73)    Interpretation  G1                     90 or greater         Normal or high (1)  G2                      60-89                Mild decrease (1)  G3a                   45-59                Mild to moderate decrease  G3b                   30-44                Moderate to severe decrease  G4                    15-29                Severe decrease  G5                    14 or less           Kidney failure          (1)In the absence of evidence of kidney disease, neither GFR category G1 or G2 fulfill the criteria for CKD.    eGFR calculation 2021 CKD-EPI creatinine equation,  which does not include race as a factor    Magnesium [[id number]]  (Abnormal) Collected: 12/10/24 0929    Specimen: Blood Updated: 12/10/24 1006     Magnesium 1.4 mg/dL     C-reactive Protein [[id number]]  (Abnormal) Collected: 12/10/24 0929    Specimen: Blood Updated: 12/10/24 1006     C-Reactive Protein 0.73 mg/dL     CK [[id number]]  (Abnormal) Collected: 12/10/24 0929    Specimen: Blood Updated: 12/10/24 1006     Creatine Kinase 437 U/L     Lactic Acid, Plasma [[id number]]  (Normal) Collected: 12/10/24 0929    Specimen: Blood Updated: 12/10/24 1002     Lactate 1.6 mmol/L     Ammonia [[id number]]  (Normal) Collected: 12/10/24 0929    Specimen: Blood Updated: 12/10/24 1000     Ammonia 16 umol/L           Imaging Results (Last 24 Hours)       Procedure Component Value Units Date/Time    MRI Brain Without Contrast [[id number]] Collected: 12/10/24 1335     Updated: 12/10/24 1340    Narrative:      PROCEDURE: MRI BRAIN WO CONTRAST-     HISTORY: concern for possible posterior stroke,     PROCEDURE: Multiplanar MR imaging of the brain was performed in multiple  MR sequences .     FINDINGS: Brain parenchyma displays normal signal without evidence of  mass, hemorrhage or edema.  Limited images the proximal cord are  unremarkable. The ventricles are normal in size. There is a small area  of encephalomalacia in the right basal ganglia consistent with small  remote CVA. There is no extra-axial fluid or midline shift. Flow-voids  are appropriate. Diffusion weighted images demonstrate no evidence of  acute CVA although a small area could be missed secondary to the motion  artifact. Limited images of the paranasal sinuses are unremarkable.  Motion artifact noted on multiple images significantly decreasing the  sensitivity of this exam. There is mild small vessel ischemic disease.  T2 coronal images are almost nondiagnostic. Same is true of the T1 axial  images.       Impression:      Gross motion artifact on multiple images and  motion artifact  present on all images.     No acute CVA identified though a small area could be missed secondary to  motion artifact.     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                 This report was signed and finalized on 12/10/2024 1:38 PM by Kristi Roblero MD.       CT Angiogram Neck [[id number]] Collected: 12/10/24 1124     Updated: 12/10/24 1130    Narrative:      PROCEDURE: CT ANGIOGRAM NECK-, CT ANGIOGRAM HEAD-     HISTORY: Concern for stroke, altered mental status     TECHNIQUE: Thin section axial CT with IV contrast supplemented with  multiplanar reconstruction of the head and neck.     FINDINGS: Exam is limited by significant patient motion artifact.     CTA:     Aortic arch:  Arch shows no significant narrowing. Great vessel origins  are widely patent.     Right carotid: There is mild plaque in the right carotid bulb with less  than 50% stenosis. Right common carotid artery is unremarkable..     Left carotid: There is minimal calcified plaque in the left carotid bulb  with less than 50% stenosis. Carotid artery is unremarkable.     Vertebrals: Left vertebral artery is dominant. No significant stenosis  is present. Distal vertebral arteries are suboptimally visualized.     The cranial circulation is grossly unremarkable. ACAs are unremarkable.  Motion artifact limits visualization of MCAs and PCAs. No definite  stenosis or aneurysm is seen, but small lesions could be missed. Basilar  arteries are unremarkable.     Calcification in the right palatine tonsil suggests previous  tonsillitis.       Impression:      Suboptimal exam secondary to motion artifact on multiple  images.     Less than 50% stenosis of the internal carotid arteries bilaterally.     No significant intracranial stenosis or aneurysm identified, however,  small lesions could be missed secondary to motion artifact.           DLP: 749.41 mGy.cm,748.94 mGy.cm  CTDI: 22.7 mGy     This study was performed with  techniques to keep radiation doses as low  as reasonably achievable (ALARA). Individualized dose reduction  techniques using automated exposure control or adjustment of mA and/or  kV according to the patient size were employed.                 Images were reviewed, interpreted, and dictated by Dr. Kristi Roblero MD  Transcribed by Anna Beasley PA-C.     This report was signed and finalized on 12/10/2024 11:28 AM by Kristi Roblero MD.       CT Angiogram Head [[id number]] Collected: 12/10/24 1124     Updated: 12/10/24 1130    Narrative:      PROCEDURE: CT ANGIOGRAM NECK-, CT ANGIOGRAM HEAD-     HISTORY: Concern for stroke, altered mental status     TECHNIQUE: Thin section axial CT with IV contrast supplemented with  multiplanar reconstruction of the head and neck.     FINDINGS: Exam is limited by significant patient motion artifact.     CTA:     Aortic arch:  Arch shows no significant narrowing. Great vessel origins  are widely patent.     Right carotid: There is mild plaque in the right carotid bulb with less  than 50% stenosis. Right common carotid artery is unremarkable..     Left carotid: There is minimal calcified plaque in the left carotid bulb  with less than 50% stenosis. Carotid artery is unremarkable.     Vertebrals: Left vertebral artery is dominant. No significant stenosis  is present. Distal vertebral arteries are suboptimally visualized.     The cranial circulation is grossly unremarkable. ACAs are unremarkable.  Motion artifact limits visualization of MCAs and PCAs. No definite  stenosis or aneurysm is seen, but small lesions could be missed. Basilar  arteries are unremarkable.     Calcification in the right palatine tonsil suggests previous  tonsillitis.       Impression:      Suboptimal exam secondary to motion artifact on multiple  images.     Less than 50% stenosis of the internal carotid arteries bilaterally.     No significant intracranial stenosis or aneurysm identified, however,  small lesions  could be missed secondary to motion artifact.           DLP: 749.41 mGy.cm,748.94 mGy.cm  CTDI: 22.7 mGy     This study was performed with techniques to keep radiation doses as low  as reasonably achievable (ALARA). Individualized dose reduction  techniques using automated exposure control or adjustment of mA and/or  kV according to the patient size were employed.                 Images were reviewed, interpreted, and dictated by Dr. Kristi Roblero MD  Transcribed by Anna Beasley PA-C.     This report was signed and finalized on 12/10/2024 11:28 AM by Kristi Roblero MD.       CT Cervical Spine Without Contrast [[id number]] Collected: 12/10/24 1120     Updated: 12/10/24 1122    Narrative:      PROCEDURE: CT CERVICAL SPINE WO CONTRAST-     HISTORY: Fall yesterday, AMS, neck pain     COMPARISON: May 2023.     PROCEDURE: Multiple axial CT images were obtained through the cervical  spine using bone window algorithms. Coronal and sagittal images were  reconstructed from the original axial data set.     FINDINGS: Facets are normally aligned. There is no acute fracture..  There is straightening of the normal cervical adenosis. There is minimal  retrolisthesis of C3 on C4, stable. There is mild to moderate disc space  narrowing at all levels with small osteophytes. Multiple levels of  spinal stenosis and/or neuroforaminal narrowing secondary to  degenerative change, but not secondary to acute abnormality. Bilateral  carotid artery calcifications are noted.  Limited images of the lung  apices are unremarkable.       Impression:      No acute fracture or malalignment.     Degenerative changes.        CTDI: 22.7 mGy  DLP: 749.41 mGy.cm,748.94 mGy.cm        This study was performed with techniques to keep radiation doses as low  as reasonably achievable (ALARA). Individualized dose reduction  techniques using automated exposure control or adjustment of mA and/or  kV according to the patient size were employed.               Images were reviewed, interpreted, and dictated by Dr. Kristi Roblero MD  Transcribed by Anna Beasley PA-C.     This report was signed and finalized on 12/10/2024 11:20 AM by Kristi Roblero MD.       CT Head Without Contrast [[id number]] Collected: 12/10/24 1118     Updated: 12/10/24 1122    Narrative:      PROCEDURE: CT HEAD WO CONTRAST-     HISTORY: ground level fall yesterday on [facility] since fall     COMPARISON: May 2023.     TECHNIQUE: Multiple axial CT images were performed from the foramen  magnum to the vertex without enhancement.     FINDINGS: There is no CT evidence of acute intracranial hemorrhage.  Moderate atrophy, pronounced for the patient's age, is stable. There is  a stable small area of encephalomalacia in the right basal ganglia  consistent with remote CVA. There is no mass, mass effect or midline  shift.  There is no hydrocephalus. The paranasal sinuses are clear. Bone  windows reveal no acute osseous abnormalities.       Impression:      Stable chronic changes without acute intracranial process.              DLP: 749.41 mGy.cm,748.94 mGy.cm  CTDI: 22.7 mGy        This study was performed with techniques to keep radiation doses as low  as reasonably achievable (ALARA). Individualized dose reduction  techniques using automated exposure control or adjustment of mA and/or  kV according to the patient size were employed.              Images were reviewed, interpreted, and dictated by Dr. Kristi Roblero MD  Transcribed by Anna Beasley PA-C.     This report was signed and finalized on 12/10/2024 11:20 AM by Kristi Roblero MD.       XR Shoulder 2+ View Right [[id number]] Collected: 12/10/24 1059     Updated: 12/10/24 1105    Narrative:         PROCEDURE: XR SHOULDER 2+ VW RIGHT-     HISTORY: Fall, yesterday, knot noted to right shoulder     COMPARISON: March 24, 2016.     FINDINGS:  A three view exam demonstrates no acute fracture or  dislocation. The joint spaces demonstrate moderate narrowing  of the  right acromioclavicular joint. There is also decreased subacromial  distance suggesting chronic rotator cuff tear. No soft tissue  abnormality is seen. On a single view there is a linear lucency in the  superior lateral right acromion. Borders appear smooth and mildly  sclerotic. If this is a fracture appears to be remote. This is not  identified on the prior study.          Impression:      No acute bony abnormality.     Degenerative changes described.                 This report was signed and finalized on 12/10/2024 11:03 AM by Kristi Roblero MD.       XR Humerus Right [[id number]] Collected: 12/10/24 1056     Updated: 12/10/24 1058    Narrative:      PROCEDURE: XR HUMERUS RIGHT-     HISTORY: Fall yesterday, pain, inability to abduct arm     COMPARISON: None.     FINDINGS:  A two view exam demonstrates no acute fracture or  dislocation. The joint spaces appear unremarkable. No soft tissue  abnormality is seen.       Impression:      No acute bony abnormality.                 This report was signed and finalized on 12/10/2024 10:56 AM by Kristi Roblero MD.       XR Chest 1 View [[id number]] Collected: 12/10/24 1055     Updated: 12/10/24 1058    Narrative:      PROCEDURE: XR CHEST 1 VW-     HISTORY: AMS, fell last night, right shoulder pain.     COMPARISON: June 21, 2022..     FINDINGS: The heart is normal in size. The lungs are clear. The  mediastinum is unremarkable. There is no pneumothorax.  There are no  acute osseous abnormalities. Status post median sternotomy. Apical  lordotic positioning noted. Small density is again projected over the  left apex, stable. There is evidence of old calcified granulomatous  disease.       Impression:      No acute cardiopulmonary process.              This report was signed and finalized on 12/10/2024 10:56 AM by Kristi Roblero MD.             Physician Progress Notes (last 24 hours)  Notes from 12/10/24 0931 through 12/11/24 0931   No notes of this type exist for this  encounter.          Consult Notes (last 24 hours)        Asuncion Cruz MD at 12/10/24 1338          DOS: 12/10/2024  NAME: Ramsey Riley   : 1955  PCP: Margret Purvis MD  CC: New onset confusion that was more prominent today but started yesterday after a nasty fall.  Referring MD: Rinku Mckeon MD    Neurological Problem and Interval History:  69 y.o. right-handed white male with a Hx of poorly controlled diabetes with severe peripheral diabetic neuropathy who tends to fall numerous times in his home and he only walks with a cane and outside his home his wife usually takes in a wheelchair had a nasty fall yesterday and struck the upper part of his right shoulder.  His wife denies any closed head injury as his hat on his head was still on.  However since this fall he has been in a lot of pain and has been acting strange and today he was appearing more confused and sleepy.  He has not been throwing up.  No seizure activity has been noted so far.  He was not incontinent.  He was able to tell me his name and the date of birth and he was able to recognize his wife at the bedside and tell me her name.  He knew that he was in the hospital but could not tell me the name of the place and after some cueing he was able to tell me it was December but could not tell me the year.  When requested to read aloud words and phrases because of his problems with peripheral vision including diabetic retinopathy he could tell me most of the simple pictures but had some difficulty with interpreting the complex picture.  He read aloud a few words and phrases but difficulty in reading because of lack of vision from diabetic retinopathy was present.  He followed one-step and two-step commands well.  He has very limited range of motion of the right upper extremity because of the severe shoulder pain from the recent fall.  Although the x-ray of the right shoulder did not show any fracture it commented on severe rotator cuff  tear.  No facial asymmetry noted and no drift in the upper and lower extremities noted although there is limited range of motion of the right upper extremity from the recent fall.  No sensory loss noted anywhere except for the length-dependent sensory neuropathy in both lower extremities and no extinction of double simultaneous presentation of noxious stimulation bilaterally.  His vision is compromised from diabetic retinopathy.  He passed the bedside swallow.  Gait and Romberg could not be tested because of his balance issues and falls.  The initial CT angiogram of the head and neck with contrast was unremarkable and the MRI of the brain did not show any acute stroke but did show chronic subcortical white matter disease and possible central amyloid angiopathy with the number of microhemorrhages which have increased in number compared to the 1 done in February 2023.    Past Medical/Surgical Hx:  Past Medical History:   Diagnosis Date    Arthritis     Clot     Coronary artery disease     Diabetes mellitus     Heart disease     History of blood clots     Hypertension     Low back pain     Thyroid disease     Type 2 diabetes mellitus      Past Surgical History:   Procedure Laterality Date    CHOLECYSTECTOMY  2015    COLONOSCOPY  2015    dr srivastava    CORONARY ARTERY BYPASS GRAFT      Mouthcard - 3 BY PASS     ENDOSCOPY         Review of Systems:    Constitutional: Obese gentleman currently laying in bed in a lot of pain from the recent fall affecting the right shoulder  Cardiovascular: No chest pain or palpitations noted.  However he was found to be hypertensive.  Respiratory: No shortness of breath noted.  Gastrointestinal: No nausea and vomiting noted.  Genitourinary: No bladder incontinence noted.  Musculoskeletal: Limited range of motion of the right shoulder from the recent fall  Dermatological: Has bruises in his legs because of the recent fall  Neurological: Except for the confusion noted no other neurological  deficits noted with NIH stroke scale of 0  Psychiatric: No major anxiety or depression noted.  Ophthalmological: Severe visual problems secondary to diabetic retinopathy          Medications On Admission  (Not in a hospital admission)      Prior to Admission medications    Medication Sig Start Date End Date Taking? Authorizing Provider   ALPRAZolam (XANAX) 0.5 MG tablet Take 1 tablet by mouth At Night As Needed for Anxiety. 10/1/24   Margret Purvis MD   amLODIPine (NORVASC) 10 MG tablet Take 1 tablet by mouth Daily. 9/27/23   Farrah Mcdermott APRN   apixaban (Eliquis) 5 MG tablet tablet Take 1 tablet by mouth Every 12 (Twelve) Hours. 10/1/24   Margret Purvis MD   aspirin 81 MG chewable tablet Chew 1 tablet Daily.    ProviderMiri MD   atorvastatin (Lipitor) 80 MG tablet Take 1 tablet by mouth Every Night. 10/1/24   Margret Purvis MD   chlorthalidone (HYGROTON) 25 MG tablet Take 0.5 tablets by mouth Daily. 9/27/23   Farrah Mcdermott APRN   citalopram (CeleXA) 40 MG tablet Take 1 tablet by mouth Daily. 10/1/24   Margret Purvis MD   Continuous Glucose Sensor (FreeStyle Rina 3 Sensor) [facility] Use 1 each Every 14 (Fourteen) Days. 6/28/24   Michelle King MD   cyclopentolate (CYCLOGYL) 1 % ophthalmic solution Apply 1 drop to eye(s) as directed by provider 3 (Three) Times a Day. 9/18/23   Miri Marion MD   famotidine (Pepcid) 20 MG tablet Take 1 tablet by mouth 2 (Two) Times a Day. 10/1/24   Margret Purvis MD   glucose blood (Accu-Chek Caryn Plus) test strip USE AS DIRECTED 3 TIMES DAILY 9/28/20   Margret Purvis MD   glucose monitor monitoring kit 1 each Daily. 6/19/19   Margret Purvis MD   glucose monitor monitoring kit 1 each Daily. 7/15/19   Margret Purvis MD   HYDROcodone-acetaminophen (NORCO) 7.5-325 MG per tablet Take 1 tablet by mouth Every 12 (Twelve) Hours As Needed for Moderate Pain. 10/1/24   Margret Purvis MD   Insulin NPH Isophane & Regular (HumuLIN 70/30 KwikPen) (70-30) 100 UNIT/ML  suspension pen-injector Inject 70 units twice daily E11.65 10/24/24   Michelle King MD   Insulin Pen Needle 32G X 4 MM misc For use with insulin injections, daily 6/28/24   Michelle King MD   Insulin Syringes, Disposable, U-100 1 ML misc 50 Units 3 (Three) Times a Day As Needed (50 units in the morning, and then with meals PRN). 6/23/22   Margret Purvis MD   Lancets 30G misc Check sugar 3 times daily 6/19/19   Margret Purvis MD   levothyroxine (SYNTHROID, LEVOTHROID) 125 MCG tablet Take 1 tablet by mouth Daily. 10/1/24   Margret Purvis MD   losartan (COZAAR) 100 MG tablet Take 1 tablet by mouth Daily. 10/1/24   Margret Purvis MD   pantoprazole (PROTONIX) 40 MG EC tablet Take 1 tablet by mouth Daily. 10/1/24   Margret Purvis MD   QUEtiapine (SEROquel) 50 MG tablet Take 1 tablet by mouth 2 (Two) Times a Day. 10/1/24   Margret Purvis MD        Allergies:  Allergies   Allergen Reactions    Trazodone Other (See Comments)     agitation       Social Hx:  Social History     Socioeconomic History    Marital status:    Tobacco Use    Smoking status: Never     Passive exposure: Never    Smokeless tobacco: Current     Types: Chew   Vaping Use    Vaping status: Never Used   Substance and Sexual Activity    Alcohol use: No    Drug use: No    Sexual activity: Defer       Family Hx:  Family History   Problem Relation Age of Onset    Diabetes Mother     Heart disease Mother         STENTING    COPD Mother     Heart attack Father     Diabetes Father     Heart defect Son         Ross procedure    Heart murmur Son     Hypertension Son        Review of Imaging (Interpretation of images not reports): The MRI of the brain performed on February 9, 2023 was reported as follows:    HISTORY: Recurrent falls; R29.6-Repeated falls, abnormal MRI. Abnormal  gait.     COMPARISON: 06/22/2022     TECHNIQUE: Multiplanar imaging was performed of the brain without  gadolinium infusion.     Diffusion sequences show no signal abnormalities to  indicate acute  infarct or other process.     Scattered white matter signal changes are present. These are likely due  to mild chronic microvascular change. Chronic lacunar infarct is seen of  the right thalamus. Moderate generalized atrophy is noted. There is no  evidence of mass or hemorrhage. No extra-axial abnormal findings are  identified. The ventricles and cisterns appear normal.      IMPRESSION:  1. Chronic microvascular changes.  2. No acute findings.     This report was signed and finalized on 2/9/2023 2:04 PM by Golden Gonzalez MD.    CT Head Without Contrast    Result Date: 12/10/2024  PROCEDURE: CT HEAD WO CONTRAST-  [facility]  COMPARISON: May 2023.  TECHNIQUE: Multiple axial CT images were performed from the foramen magnum to the vertex without enhancement.  FINDINGS: There is no CT evidence of acute intracranial hemorrhage. Moderate atrophy, pronounced for the patient's age, is stable. There is a stable small area of encephalomalacia in the right basal ganglia consistent with remote CVA. There is no mass, mass effect or midline shift.  There is no hydrocephalus. The paranasal sinuses are clear. Bone windows reveal no acute osseous abnormalities.      Impression: Stable chronic changes without acute intracranial process.     DLP: 749.41 mGy.cm,748.94 mGy.cm CTDI: 22.7 mGy   This study was performed with techniques to keep radiation doses as low as reasonably achievable (ALARA). Individualized dose reduction techniques using automated exposure control or adjustment of mA and/or kV according to the patient size were employed.     Images were reviewed, interpreted, and dictated by Dr. Kristi Roblero MD Transcribed by Anna Beasley PA-C.  This report was signed and finalized on 12/10/2024 11:20 AM by Kristi Roblero MD.        CT Angiogram Head    Result Date: 12/10/2024  PROCEDURE: CT ANGIOGRAM NECK-, CT ANGIOGRAM HEAD-  HISTORY: Concern for stroke,  altered mental status  TECHNIQUE: Thin section axial CT with IV contrast supplemented with multiplanar reconstruction of the head and neck.  FINDINGS: Exam is limited by significant patient motion artifact.  CTA:  Aortic arch:  Arch shows no significant narrowing. Great vessel origins are widely patent.  Right carotid: There is mild plaque in the right carotid bulb with less than 50% stenosis. Right common carotid artery is unremarkable..  Left carotid: There is minimal calcified plaque in the left carotid bulb with less than 50% stenosis. Carotid artery is unremarkable.  Vertebrals: Left vertebral artery is dominant. No significant stenosis is present. Distal vertebral arteries are suboptimally visualized.  The cranial circulation is grossly unremarkable. ACAs are unremarkable. Motion artifact limits visualization of MCAs and PCAs. No definite stenosis or aneurysm is seen, but small lesions could be missed. Basilar arteries are unremarkable.  Calcification in the right palatine tonsil suggests previous tonsillitis.      Impression: Suboptimal exam secondary to motion artifact on multiple images.  Less than 50% stenosis of the internal carotid arteries bilaterally.  No significant intracranial stenosis or aneurysm identified, however, small lesions could be missed secondary to motion artifact.    DLP: 749.41 mGy.cm,748.94 mGy.cm CTDI: 22.7 mGy  This study was performed with techniques to keep radiation doses as low as reasonably achievable (ALARA). Individualized dose reduction techniques using automated exposure control or adjustment of mA and/or kV according to the patient size were employed.      Images were reviewed, interpreted, and dictated by Dr. Kristi Roblero MD Transcribed by Anna Beasley PA-C.  This report was signed and finalized on 12/10/2024 11:28 AM by Kristi Roblero MD.       MRI Brain Without Contrast    Result Date: 12/10/2024  PROCEDURE: MRI BRAIN WO CONTRAST-  HISTORY: concern for possible  posterior stroke,  PROCEDURE: Multiplanar MR imaging of the brain was performed in multiple MR sequences .  FINDINGS: Brain parenchyma displays normal signal without evidence of mass, hemorrhage or edema.  Limited images the proximal cord are unremarkable. The ventricles are normal in size. There is a small area of encephalomalacia in the right basal ganglia consistent with small remote CVA. There is no extra-axial fluid or midline shift. Flow-voids are appropriate. Diffusion weighted images demonstrate no evidence of acute CVA although a small area could be missed secondary to the motion artifact. Limited images of the paranasal sinuses are unremarkable. Motion artifact noted on multiple images significantly decreasing the sensitivity of this exam. There is mild small vessel ischemic disease. T2 coronal images are almost nondiagnostic. Same is true of the T1 axial images.      Impression: Gross motion artifact on multiple images and motion artifact present on all images.  No acute CVA identified though a small area could be missed secondary to motion artifact.  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            Additional Tests Performed: X-ray of the right shoulder shows the following:    PROCEDURE: XR SHOULDER 2+ VW RIGHT-     HISTORY: Fall, yesterday, knot noted to right shoulder     COMPARISON: March 24, 2016.     FINDINGS:  A three view exam demonstrates no acute fracture or  dislocation. The joint spaces demonstrate moderate narrowing of the  right acromioclavicular joint. There is also decreased subacromial  distance suggesting chronic rotator cuff tear. No soft tissue  abnormality is seen. On a single view there is a linear lucency in the  superior lateral right acromion. Borders appear smooth and mildly  sclerotic. If this is a fracture appears to be remote. This is not  identified on the prior study.        IMPRESSION:  No acute bony abnormality.     Degenerative changes  described.    Results for orders placed during the hospital encounter of 06/21/22    Adult Transthoracic Echo Complete W/ Cont if Necessary Per Protocol (With Agitated Saline)    Interpretation Summary  · Saline test results are negative.  · Left ventricular wall thickness is consistent with mild concentric hypertrophy.  · Estimated left ventricular EF = 66% Left ventricular ejection fraction appears to be 66 - 70%. Left ventricular systolic function is normal.  · Left ventricular diastolic function is consistent with (grade II w/high LAP) pseudonormalization.     The transthoracic echocardiogram with bubble study performed today showed the following:        Left ventricular systolic function is normal. Calculated left ventricular EF = 67.8% Left ventricular ejection fraction appears to be 66 - 70%.    Left ventricular wall thickness is consistent with mild concentric hypertrophy. Left ventricular diastolic function was indeterminate.    The right ventricular cavity is borderline dilated with borderline systolic function.    The left atrial cavity is mildly dilated.    Mild aortic valve regurgitation is present.    Saline test results are negative for right to left atrial level shunt.      The EEG performed in the emergency room at the bedside and interpreted by my colleague Dr. Dony Hope is as follows:    Technical Summary:      A 19 channel digital EEG was performed using the international 10-20 placement system, including eye leads and EKG leads.     Duration: 20 minutes     Findings:     The patient is drowsy at the beginning of the study.  The background shows diffuse low to medium amplitude 3-5 Hz intermixed delta and theta activity present symmetrically over both hemispheres.  Photic stimulation does not change the background.  Hyperventilation was attempted but the patient was too drowsy to complete this.  No focal features or epileptiform activity are present.      Video: None     Technical quality:  "Good     EKG: Regular, 60 bpm     SUMMARY:     Excess drowsiness     Moderate generalized slow     No focal features or epileptiform activity     IMPRESSION:     Diffuse cerebral dysfunction of moderate degree, nonspecific.  This is most commonly seen due to toxic/metabolic cause     No evidence for epilepsy is seen    Laboratory Results:   Lab Results   Component Value Date    GLUCOSE 175 (H) 12/10/2024    CALCIUM 8.9 12/10/2024     12/10/2024    K 3.8 12/10/2024    CO2 26.2 12/10/2024     12/10/2024    BUN 16 12/10/2024    CREATININE 1.55 (H) 12/10/2024    EGFRIFAFRI 97 08/12/2019    EGFRIFNONA 66 09/11/2020    BCR 10.3 12/10/2024    ANIONGAP 10.8 12/10/2024     Lab Results   Component Value Date    WBC 8.65 12/10/2024    HGB 16.0 12/10/2024    HCT 48.1 12/10/2024    MCV 89.1 12/10/2024     12/10/2024     Lab Results   Component Value Date    CHOL 159 06/22/2022     Lab Results   Component Value Date    HDL 36 (L) 04/15/2024    HDL 38 (L) 07/24/2023    HDL 33 (L) 06/22/2022     Lab Results   Component Value Date    LDL 82 04/15/2024     (H) 07/24/2023    LDL 85 06/22/2022     Lab Results   Component Value Date    TRIG 210 (H) 04/15/2024    TRIG 476 (H) 07/24/2023    TRIG 248 (H) 06/22/2022     Lab Results   Component Value Date    HGBA1C 10.8 (H) 09/03/2024     Lab Results   Component Value Date    INR 1.12 (H) 07/27/2024    INR 1.2 (H) 07/25/2024    INR 1.08 01/17/2024    PROTIME 12.0 07/27/2024    PROTIME 11.7 01/17/2024    PROTIME 12.1 01/15/2024     Lab Results   Component Value Date    ECDLFOFG69 292 12/22/2022     Lab Results   Component Value Date    FOLATE 11.60 12/28/2020     TSH          4/15/2024    14:40 12/10/2024    09:29   TSH   TSH 3.090  5.450           Physical Examination:  /95   Pulse 76   Temp 98.3 °F (36.8 °C) (Oral)   Resp 20   Ht 180.3 cm (71\")   Wt 108 kg (239 lb)   SpO2 98%   BMI 33.33 kg/m²   General Appearance:   Well developed, well nourished, " well groomed, alert, and cooperative.  HEENT: Normocephalic.    Neck and Spine: Normal range of motion.  Normal alignment. No mass or tenderness. No bruits.    Extremities:    No edema or deformities. Normal joint ROM.   Skin:    No rashes or birth marks.    Neurological examination:  Higher Integrative  Function: Oriented to time, place and person. Normal registration, recall, attention span and concentration. Normal language including comprehension, spontaneous speech, repetition, reading, writing, naming and vocabulary. No neglect with normal visual-spatial function and construction. Normal fund of knowledge and higher integrative function.  CN II:  Pupils are equal, round, and reactive to light. Normal visual acuity and visual fields.    CN III IV VI: Extraocular movements are full without nystagmus.   CN V:  Normal facial sensation and strength of muscles of mastication.  CN VII:  Facial movements are symmetric. No weakness.  CN VIII: Auditory acuity is normal.  CN IX & X: Symmetric palatal movement.  CN XI:  Sternocleidomastoid and trapezius are normal.  No weakness.  CN XII:  The tongue is midline.  No atrophy or fasciculations.  Motor:  Normal muscle strength, bulk and tone in upper and lower extremities, except for the right upper extremity which has limited range of motion because of the severe injury to the right shoulder from the recent fall.  No fasciculations, rigidity, spasticity, or abnormal movements.  Sensation: Normal to light touch, pinprick, vibration, temperature, and proprioception in arms and legs. Normal graphesthesia and no extinction on DSS.  Station and Gait: Could not be tested as he tends to fall so often..    Coordination:  Finger to nose test shows no dysmetria.   Heel to shin normal.    NIHSS:    Baseline  0-->Alert: keenly responsive  0-->Answers both questions correctly  0-->Performs both tasks correctly  0=normal  0=No visual loss  0=Normal symmetric movement  0-->No drift: limb  holds 90 (or 45) degrees for full 10 secs  0-->No drift: limb holds 90 (or 45) degrees for full 10 secs  0-->No drift: limb holds 90 (or 45) degrees for full 10 secs  0-->No drift: limb holds 90 (or 45) degrees for full 10 secs  0=Absent  0=Normal; no sensory loss  0=No aphasia, normal  0=Normal  0=No abnormality    Total score: 0    Diagnoses / Discussion:  69 y.o. who presents with Sx of postconcussive syndrome from the recent fall along with possible progressive central amyloid angiopathy.  Metabolic encephalopathy has also been entertained from the EEG report.  Patient might be in severe pain injuring his right shoulder when he fell resulting in hypertensive encephalopathy.  Patient has history of numerous blood clots in his lungs for which he is on Eliquis 5 mg twice daily with food.    Plan:  Continue the Eliquis at 5 mg twice daily with food and not to add any further antiplatelet agents as the patient is falling numerous times at home and has a high risk of intracerebral hemorrhage from this plus he has progressive central amyloid angiopathy.  Hydrate  Neuro checks  Check lipid panel, Hgb A1C, TSH, sed rate, vitamin B12, folic acid levels  Lipitor 80 mg, continue the home dose as before  Non-pharmacological DVT prophylaxis  MRI of the right shoulder to look for acute tear of the right rotator cuff as well as acromial joint displacement  Telemetry Unit admission/observation to look for cardiac arrhythmias  PT/OT/ST  Blood pressure control : Keep the systolic blood pressure between 1 20-1 40 and the diastolic between 70-80.  The patient needs to restart his home medications as he passed his bedside swallow.  Goal LDL <70-recommend high dose statins-   Serum glucose < 140  Body mass index: 33.33 kg/m² which puts him at a high risk for underlying obstructive sleep apnea.  Many years ago he had an overnight polysomnogram study which was unremarkable but since then he has gained weight and has not had a repeat  study.  Obstructive sleep apnea screening with STOP-BANG questionnaire.  Voltaren gel for the right shoulder joint.     I have discussed the above with the patient, Dr. Rinku Mckeon, the emergency room physician and family.  He will be followed up by our inpatient teleneurology service tomorrow.  Time spent with patient: 70 minutes in face-to-face evaluation and management of the patient using the dedicated telemedicine device without any interruption with the help of the emergency room nurse with the patient located at the [facility] and myself at a remote location.    Electronically signed by Asuncion Cruz MD, 12/10/24, 1:38 PM EST.    Dictated using Dragon dictation.                Electronically signed by Asuncion Cruz MD at 12/10/24 4910

## 2024-12-11 NOTE — THERAPY EVALUATION
PT lisa held this date, pt was too drowsy and unable to stay awake during conversation. PT will follow-up as appropriate.

## 2024-12-11 NOTE — PLAN OF CARE
Goal Outcome Evaluation:   New admit to floor this shift. VSS. No neuro changes this shift. Patient unable to lift right arm due to soreness from fall.

## 2024-12-12 LAB
GLUCOSE BLDC GLUCOMTR-MCNC: 251 MG/DL (ref 70–130)
GLUCOSE BLDC GLUCOMTR-MCNC: 265 MG/DL (ref 70–130)
GLUCOSE BLDC GLUCOMTR-MCNC: 301 MG/DL (ref 70–130)
GLUCOSE BLDC GLUCOMTR-MCNC: 328 MG/DL (ref 70–130)

## 2024-12-12 PROCEDURE — G0378 HOSPITAL OBSERVATION PER HR: HCPCS

## 2024-12-12 PROCEDURE — 82948 REAGENT STRIP/BLOOD GLUCOSE: CPT | Performed by: FAMILY MEDICINE

## 2024-12-12 PROCEDURE — 63710000001 INSULIN LISPRO (HUMAN) PER 5 UNITS: Performed by: FAMILY MEDICINE

## 2024-12-12 PROCEDURE — 97166 OT EVAL MOD COMPLEX 45 MIN: CPT

## 2024-12-12 PROCEDURE — 82948 REAGENT STRIP/BLOOD GLUCOSE: CPT

## 2024-12-12 PROCEDURE — 63710000001 INSULIN GLARGINE PER 5 UNITS: Performed by: FAMILY MEDICINE

## 2024-12-12 PROCEDURE — 97161 PT EVAL LOW COMPLEX 20 MIN: CPT

## 2024-12-12 PROCEDURE — 25010000002 LORAZEPAM PER 2 MG: Performed by: INTERNAL MEDICINE

## 2024-12-12 PROCEDURE — 96375 TX/PRO/DX INJ NEW DRUG ADDON: CPT

## 2024-12-12 PROCEDURE — 99232 SBSQ HOSP IP/OBS MODERATE 35: CPT | Performed by: FAMILY MEDICINE

## 2024-12-12 RX ORDER — QUETIAPINE FUMARATE 25 MG/1
50 TABLET, FILM COATED ORAL NIGHTLY
Status: DISCONTINUED | OUTPATIENT
Start: 2024-12-12 | End: 2024-12-18 | Stop reason: HOSPADM

## 2024-12-12 RX ORDER — LORAZEPAM 2 MG/ML
0.5 INJECTION INTRAMUSCULAR EVERY 4 HOURS PRN
Status: DISCONTINUED | OUTPATIENT
Start: 2024-12-12 | End: 2024-12-13

## 2024-12-12 RX ORDER — QUETIAPINE FUMARATE 25 MG/1
25 TABLET, FILM COATED ORAL ONCE
Status: COMPLETED | OUTPATIENT
Start: 2024-12-12 | End: 2024-12-12

## 2024-12-12 RX ORDER — ALPRAZOLAM 0.5 MG
0.5 TABLET ORAL NIGHTLY
Status: DISCONTINUED | OUTPATIENT
Start: 2024-12-12 | End: 2024-12-18 | Stop reason: HOSPADM

## 2024-12-12 RX ADMIN — FAMOTIDINE 20 MG: 20 TABLET, FILM COATED ORAL at 08:17

## 2024-12-12 RX ADMIN — INSULIN GLARGINE 25 UNITS: 100 INJECTION, SOLUTION SUBCUTANEOUS at 20:45

## 2024-12-12 RX ADMIN — HYDROCODONE BITARTRATE AND ACETAMINOPHEN 1 TABLET: 5; 325 TABLET ORAL at 20:44

## 2024-12-12 RX ADMIN — AMLODIPINE BESYLATE 10 MG: 5 TABLET ORAL at 08:18

## 2024-12-12 RX ADMIN — INSULIN LISPRO 10 UNITS: 100 INJECTION, SOLUTION INTRAVENOUS; SUBCUTANEOUS at 08:17

## 2024-12-12 RX ADMIN — APIXABAN 5 MG: 5 TABLET, FILM COATED ORAL at 08:18

## 2024-12-12 RX ADMIN — QUETIAPINE FUMARATE 50 MG: 25 TABLET ORAL at 20:45

## 2024-12-12 RX ADMIN — DICLOFENAC SODIUM 4 G: 10 GEL TOPICAL at 08:18

## 2024-12-12 RX ADMIN — ALPRAZOLAM 0.5 MG: 0.5 TABLET ORAL at 20:45

## 2024-12-12 RX ADMIN — DICLOFENAC SODIUM 4 G: 10 GEL TOPICAL at 20:46

## 2024-12-12 RX ADMIN — ATORVASTATIN CALCIUM 80 MG: 80 TABLET, FILM COATED ORAL at 20:45

## 2024-12-12 RX ADMIN — CYCLOPENTOLATE HYDROCHLORIDE 1 DROP: 10 SOLUTION/ DROPS OPHTHALMIC at 15:00

## 2024-12-12 RX ADMIN — INSULIN LISPRO 8 UNITS: 100 INJECTION, SOLUTION INTRAVENOUS; SUBCUTANEOUS at 17:24

## 2024-12-12 RX ADMIN — DICLOFENAC SODIUM 4 G: 10 GEL TOPICAL at 12:23

## 2024-12-12 RX ADMIN — CYCLOPENTOLATE HYDROCHLORIDE 1 DROP: 10 SOLUTION/ DROPS OPHTHALMIC at 20:46

## 2024-12-12 RX ADMIN — LORAZEPAM 0.5 MG: 2 INJECTION INTRAMUSCULAR; INTRAVENOUS at 23:56

## 2024-12-12 RX ADMIN — INSULIN LISPRO 10 UNITS: 100 INJECTION, SOLUTION INTRAVENOUS; SUBCUTANEOUS at 12:23

## 2024-12-12 RX ADMIN — CYCLOPENTOLATE HYDROCHLORIDE 1 DROP: 10 SOLUTION/ DROPS OPHTHALMIC at 08:19

## 2024-12-12 RX ADMIN — INSULIN GLARGINE 15 UNITS: 100 INJECTION, SOLUTION SUBCUTANEOUS at 08:17

## 2024-12-12 RX ADMIN — CHLORTHALIDONE 12.5 MG: 25 TABLET ORAL at 08:18

## 2024-12-12 RX ADMIN — QUETIAPINE FUMARATE 25 MG: 25 TABLET ORAL at 14:59

## 2024-12-12 RX ADMIN — APIXABAN 5 MG: 5 TABLET, FILM COATED ORAL at 20:44

## 2024-12-12 RX ADMIN — INSULIN LISPRO 8 UNITS: 100 INJECTION, SOLUTION INTRAVENOUS; SUBCUTANEOUS at 20:45

## 2024-12-12 RX ADMIN — LEVOTHYROXINE SODIUM 125 MCG: 125 TABLET ORAL at 05:54

## 2024-12-12 RX ADMIN — LOSARTAN POTASSIUM 100 MG: 50 TABLET, FILM COATED ORAL at 08:17

## 2024-12-12 RX ADMIN — FAMOTIDINE 20 MG: 20 TABLET, FILM COATED ORAL at 20:44

## 2024-12-12 RX ADMIN — DICLOFENAC SODIUM 4 G: 10 GEL TOPICAL at 17:25

## 2024-12-12 RX ADMIN — CITALOPRAM HYDROBROMIDE 20 MG: 20 TABLET ORAL at 08:18

## 2024-12-12 RX ADMIN — PANTOPRAZOLE SODIUM 40 MG: 40 TABLET, DELAYED RELEASE ORAL at 08:18

## 2024-12-12 NOTE — CASE MANAGEMENT/SOCIAL WORK
Case Management/Social Work    Patient Name:  Ramsey Riley  YOB: 1955  MRN: [id number]  Admit Date:  12/10/2024        SW sent referrals for STR to facilities in [place] per request. SW following.       Electronically signed by:  TAN Cheng  12/12/24 13:39 EST

## 2024-12-12 NOTE — THERAPY EVALUATION
"Attempted/initiated communication eval, but pt. Was holding blanket over his mouth and nose upon SLP arrival and throughout time in room reasoning that there was \"too much dust\" in the room.  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  He was articulate, fluent, and exhibited functional expressive language for sentences and simple conversation, but confusion significantly affected orientation, oriented to self only, and cognition.  Pt.'s wife reported that he has had some intermittent occurrences of cognitive changes at home, but today is a greater extent.  Will f/u tomorrow to see if pt. able to participate more effectively with eval.   "

## 2024-12-12 NOTE — PLAN OF CARE
Goal Outcome Evaluation:  Plan of Care Reviewed With: patient        Progress: no change  Outcome Evaluation: Pt participated in PT initial evaluation this date. Pt presents supine in bed with wife at side, agreeable to PT evaluation. Pt AO to person. Pt reports 8/10 R shoulder pain at rest. Pt's wife assisted to provide subjective history. At baseline, pt lives at home with his wife. Pt's wife reports that there are 3 JONATAN home and 2 JONATAN pt's bedroom within home. Pt normally ambulates household distances with a walking stick and uses a manual w/c for community mobility. Pt's wife reports frequent falls at home. Pt performed supine > sit on EOB with mod A to assist with placing LE's off EOB and moving trunk upright. Pt performed STS transfer with min A x2 and use of RW. Pt was able to take x2 sidesteps toward HOB with min A x2 and RW. Pt required frequent VC for safe management of the RW during. Pt required max A to perform sit to supine. Following evaluation, pt left supine in bed with bed alarm active, call light and all needs within reach. Wife at side. Recommend skilled PT intervention during IP admission to address identified deficits, reduce risk of falls and prevent functional decline. Once medically stable, recommend pt to d/c to STR to address remaining deficits.    Anticipated Discharge Disposition (PT): inpatient rehabilitation facility

## 2024-12-12 NOTE — PLAN OF CARE
"Goal Outcome Evaluation: VSS. See communication flowsheet. Patient found on floor beside recliner chair after chair alarm went off. Patient stated he \"slid\" off the chair. Patient reported no pain or injury. Dr. Blackwood notified.                                             "

## 2024-12-12 NOTE — THERAPY EVALUATION
Patient Name: Ramsey Riley  : 1955    MRN: [id number]                              Today's Date: 2024       Admit Date: 12/10/2024    Visit Dx:     ICD-10-CM ICD-9-CM   1. TIA (transient ischemic attack)  G45.9 435.9     Patient Active Problem List   Diagnosis    Coronary artery disease involving native coronary artery of native heart without angina pectoris    Essential hypertension    Bradycardia, sinus    Fatigue    Abnormal ECG    Precordial pain    LVH (left ventricular hypertrophy) due to hypertensive disease, without heart failure    Diabetes mellitus    PAD (peripheral artery disease)    Ischemic stroke    History of blood clots    Pulmonary embolus    Dyslipidemia    Class 1 obesity due to excess calories with serious comorbidity and body mass index (BMI) of 34.0 to 34.9 in adult    Weakness     Past Medical History:   Diagnosis Date    Arthritis     Clot     Coronary artery disease     Diabetes mellitus     Heart disease     History of blood clots     Hypertension     Impaired mobility     Low back pain     Thyroid disease     Type 2 diabetes mellitus      Past Surgical History:   Procedure Laterality Date    CHOLECYSTECTOMY      COLONOSCOPY      dr srivastava    CORONARY ARTERY BYPASS GRAFT      [place] -  BY PASS     ENDOSCOPY        General Information       Row Name 24 1154          Physical Therapy Time and Intention    Document Type evaluation  -     Mode of Treatment physical therapy  -       Row Name 24 1154          General Information    Patient Profile Reviewed yes  -HW     Prior Level of Function min assist:;all household mobility;community mobility  -     Existing Precautions/Restrictions fall  -     Barriers to Rehab medically complex;previous functional deficit;cognitive status;visual deficit  -       Row Name 24 1154          Living Environment    People in Home spouse  -       Row Name 24 1154          Home Main Entrance     Number of Stairs, Main Entrance three  -     Stair Railings, Main Entrance railings on both sides of stairs  -       Row Name 12/12/24 1154          Stairs Within Home, Primary    Stairs, Within Home, Primary 2 JONATAN bedroom  -     Number of Stairs, Within Home, Primary two  -       Row Name 12/12/24 1154          Cognition    Orientation Status (Cognition) oriented to;person  -       Row Name 12/12/24 1255 12/12/24 1154       Safety Issues/Impairments Affecting Functional Mobility    Safety Issues Affecting Function (Mobility) -- ability to follow commands;awareness of need for assistance;impulsivity;insight into deficits/self-awareness;judgment;positioning of assistive device;safety precautions follow-through/compliance;safety precaution awareness  -    Impairments Affecting Function (Mobility) visual/perceptual  - balance;cognition;endurance/activity tolerance;pain;range of motion (ROM);postural/trunk control;strength  -    Cognitive Impairments, Mobility Safety/Performance -- attention;insight into deficits/self-awareness;safety precaution awareness;safety precaution follow-through  -              User Key  (r) = Recorded By, (t) = Taken By, (c) = Cosigned By      Initials Name Provider Type     Kay Castano PT Physical Therapist                   Mobility       Row Name 12/12/24 1156          Bed Mobility    Bed Mobility supine-sit;sit-supine  -     Supine-Sit Vaughn (Bed Mobility) moderate assist (50% patient effort);verbal cues;nonverbal cues (demo/gesture)  -     Sit-Supine Vaughn (Bed Mobility) maximum assist (25% patient effort);verbal cues;nonverbal cues (demo/gesture)  -     Assistive Device (Bed Mobility) head of bed elevated;repositioning sheet  -       Row Name 12/12/24 1156          Sit-Stand Transfer    Sit-Stand Vaughn (Transfers) minimum assist (75% patient effort);verbal cues;nonverbal cues (demo/gesture)  -     Assistive Device (Sit-Stand Transfers)  walker, front-wheeled  -HW       Row Name 12/12/24 1156          Gait/Stairs (Locomotion)    Green Level (Gait) minimum assist (75% patient effort);verbal cues;nonverbal cues (demo/gesture)  -     Assistive Device (Gait) walker, front-wheeled  -HW     Patient was able to Ambulate yes  -HW     Distance in Feet (Gait) 2  -HW     Deviations/Abnormal Patterns (Gait) bilateral deviations;nraa decreased;festinating/shuffling;base of support, wide  -HW     Bilateral Gait Deviations heel strike decreased;forward flexed posture  -HW     Green Level (Stairs) not tested  -               User Key  (r) = Recorded By, (t) = Taken By, (c) = Cosigned By      Initials Name Provider Type    Kay Evans PT Physical Therapist                   Obj/Interventions       Row Name 12/12/24 1243          Range of Motion Comprehensive    General Range of Motion bilateral lower extremity ROM WFL  -       Row Name 12/12/24 1243          Strength Comprehensive (MMT)    Comment, General Manual Muscle Testing (MMT) Assessment BLE MMT grossly 4-/5  -HW       Row Name 12/12/24 1243          Balance    Balance Assessment sitting static balance;sitting dynamic balance;standing static balance;standing dynamic balance  -HW     Static Sitting Balance contact guard  -HW     Dynamic Sitting Balance minimal assist  -HW     Position, Sitting Balance supported;sitting edge of bed  -HW     Static Standing Balance minimal assist  -HW     Dynamic Standing Balance minimal assist  -HW     Position/Device Used, Standing Balance supported;walker, front-wheeled  -HW       Row Name 12/12/24 1243          Sensory Assessment (Somatosensory)    Sensory Assessment (Somatosensory) other (see comments)  impaired light touch sensation to BLE's  -               User Key  (r) = Recorded By, (t) = Taken By, (c) = Cosigned By      Initials Name Provider Type    Kay Evans PT Physical Therapist                   Goals/Plan       Row Name  12/12/24 1300          Bed Mobility Goal 1 (PT)    Activity/Assistive Device (Bed Mobility Goal 1, PT) bed mobility activities, all  -HW     [place] Level/Cues Needed (Bed Mobility Goal 1, PT) minimum assist (75% or more patient effort)  -HW     Time Frame (Bed Mobility Goal 1, PT) long term goal (LTG);10 days  -HW     Progress/Outcomes (Bed Mobility Goal 1, PT) new goal  -HW       Row Name 12/12/24 1300          Transfer Goal 1 (PT)    Activity/Assistive Device (Transfer Goal 1, PT) transfers, all  -HW     [place] Level/Cues Needed (Transfer Goal 1, PT) contact guard required  -HW     Time Frame (Transfer Goal 1, PT) long term goal (LTG);10 days  -HW     Progress/Outcome (Transfer Goal 1, PT) new goal  -HW       Row Name 12/12/24 1300          Gait Training Goal 1 (PT)    Activity/Assistive Device (Gait Training Goal 1, PT) gait (walking locomotion)  -HW     [place] Level (Gait Training Goal 1, PT) contact guard required  -HW     Distance (Gait Training Goal 1, PT) 100  -HW     Time Frame (Gait Training Goal 1, PT) long term goal (LTG);10 days  -HW     Progress/Outcome (Gait Training Goal 1, PT) new goal  -HW       Row Name 12/12/24 1300          Patient Education Goal (PT)    Activity (Patient Education Goal, PT) Pt will perform 1x10 BLE therex with min A to allow for improved BLE strength and endurance.  -HW     [place]/Cues/Accuracy (Memory Goal 2, PT) demonstrates adequately  -HW     Time Frame (Patient Education Goal, PT) short term goal (STG);5 days  -HW     Progress/Outcome (Patient Education Goal, PT) new goal  -HW       Row Name 12/12/24 1300          Therapy Assessment/Plan (PT)    Planned Therapy Interventions (PT) balance training;bed mobility training;gait training;home exercise program;patient/family education;postural re-education;stretching;strengthening;ROM (range of motion);transfer training;neuromuscular re-education  -HW               User Key  (r) = Recorded By, (t) = Taken  By, (c) = Cosigned By      Initials Name Provider Type     Kay Castano, PT Physical Therapist                   Clinical Impression       Row Name 12/12/24 1247          Pain    Pretreatment Pain Rating 8/10  -     Posttreatment Pain Rating 8/10  -     Pain Location shoulder  -     Pain Side/Orientation right  -HW     Pain Management Interventions exercise or physical activity utilized  -     Response to Pain Interventions activity level improved;mobility function improved;activity participation with tolerable pain  -       Row Name 12/12/24 1247          Plan of Care Review    Plan of Care Reviewed With patient  -     Progress no change  -     Outcome Evaluation Pt participated in PT initial evaluation this date. Pt presents supine in bed with wife at side, agreeable to PT evaluation. Pt AO to person. Pt reports 8/10 R shoulder pain at rest. Pt's wife assisted to provide subjective history. At baseline, pt lives at home with his wife. Pt's wife reports that there are 3 JONATAN home and 2 JONATAN pt's bedroom within home. Pt normally ambulates household distances with a walking stick and uses a manual w/c for community mobility. Pt's wife reports frequent falls at home. Pt performed supine > sit on EOB with mod A to assist with placing LE's off EOB and moving trunk upright. Pt performed STS transfer with min A x2 and use of RW. Pt was able to take x2 sidesteps toward HOB with min A x2 and RW. Pt required frequent VC for safe management of the RW during. Pt required max A to perform sit to supine. Following evaluation, pt left supine in bed with bed alarm active, call light and all needs within reach. Wife at side. Recommend skilled PT intervention during IP admission to address identified deficits, reduce risk of falls and prevent functional decline. Once medically stable, recommend pt to d/c to STR to address remaining deficits.  -       Row Name 12/12/24 1247          Therapy Assessment/Plan (PT)     Patient/Family Therapy Goals Statement (PT) Pt's wife would like pt to get stronger before returning home.  -HW     Rehab Potential (PT) good  -HW     Criteria for Skilled Interventions Met (PT) yes  -HW     Therapy Frequency (PT) 5 times/wk  -HW     Predicted Duration of Therapy Intervention (PT) 10 days  -       Row Name 12/12/24 1247          Vital Signs    Pre Systolic BP Rehab 168  -HW     Pre Treatment Diastolic BP 93  -HW     Pretreatment Heart Rate (beats/min) 87  -HW     Pre SpO2 (%) 92  -HW     O2 Delivery Pre Treatment room air  -HW     O2 Delivery Intra Treatment room air  -HW     O2 Delivery Post Treatment room air  -HW     Pre Patient Position Supine  -HW     Intra Patient Position Standing  -HW     Post Patient Position Supine  -HW       Row Name 12/12/24 1247          Positioning and Restraints    Pre-Treatment Position in bed  -HW     Post Treatment Position bed  -HW     In Bed supine;call light within reach;encouraged to call for assist;exit alarm on;with family/caregiver  -               User Key  (r) = Recorded By, (t) = Taken By, (c) = Cosigned By      Initials Name Provider Type     Kay Castano, PT Physical Therapist                   Outcome Measures       Row Name 12/12/24 1301 12/12/24 0817       How much help from another person do you currently need...    Turning from your back to your side while in flat bed without using bedrails? 3  -HW 3  -HH    Moving from lying on back to sitting on the side of a flat bed without bedrails? 2  -HW 3  -HH    Moving to and from a bed to a chair (including a wheelchair)? 3  -HW 2  -HH    Standing up from a chair using your arms (e.g., wheelchair, bedside chair)? 3  -HW 2  -HH    Climbing 3-5 steps with a railing? 2  -HW 1  -HH    To walk in hospital room? 2  -HW 2  -HH    AM-PAC 6 Clicks Score (PT) 15  -HW 13  -HH    Highest Level of Mobility Goal 4 --> Transfer to chair/commode  - 4 --> Transfer to chair/commode  -HH      Row Name 12/12/24 1301  12/12/24 1205       Functional Assessment    Outcome Measure Options AM-PAC 6 Clicks Basic Mobility (PT)  - AM-PAC 6 Clicks Daily Activity (OT)  -              User Key  (r) = Recorded By, (t) = Taken By, (c) = Cosigned By      Initials Name Provider Type     Marsha Morrow Occupational Therapist    Kay Gomez, RN Registered Nurse     Kay Castano PT Physical Therapist                                 Physical Therapy Education       Title: PT OT SLP Therapies (In Progress)       Topic: Physical Therapy (In Progress)       Point: Mobility training (Done)       Learning Progress Summary            Patient Acceptance, E,TB, VU by  at 12/12/2024 1301    Comment: role of PT during IP admission                      Point: Home exercise program (Not Started)       Learner Progress:  Not documented in this visit.              Point: Body mechanics (Not Started)       Learner Progress:  Not documented in this visit.              Point: Precautions (Not Started)       Learner Progress:  Not documented in this visit.                              User Key       Initials Effective Dates Name Provider Type Discipline     11/29/23 -  Kay Castano PT Physical Therapist PT                  PT Recommendation and Plan  Planned Therapy Interventions (PT): balance training, bed mobility training, gait training, home exercise program, patient/family education, postural re-education, stretching, strengthening, ROM (range of motion), transfer training, neuromuscular re-education  Progress: no change  Outcome Evaluation: Pt participated in PT initial evaluation this date. Pt presents supine in bed with wife at side, agreeable to PT evaluation. Pt AO to person. Pt reports 8/10 R shoulder pain at rest. Pt's wife assisted to provide subjective history. At baseline, pt lives at home with his wife. Pt's wife reports that there are 3 JONATAN home and 2 JONATAN pt's bedroom within home. Pt normally ambulates household distances with a  walking stick and uses a manual w/c for community mobility. Pt's wife reports frequent falls at home. Pt performed supine > sit on EOB with mod A to assist with placing LE's off EOB and moving trunk upright. Pt performed STS transfer with min A x2 and use of RW. Pt was able to take x2 sidesteps toward HOB with min A x2 and RW. Pt required frequent VC for safe management of the RW during. Pt required max A to perform sit to supine. Following evaluation, pt left supine in bed with bed alarm active, call light and all needs within reach. Wife at side. Recommend skilled PT intervention during IP admission to address identified deficits, reduce risk of falls and prevent functional decline. Once medically stable, recommend pt to d/c to STR to address remaining deficits.     Time Calculation:   PT Evaluation Complexity  History, PT Evaluation Complexity: 1-2 personal factors and/or comorbidities  Examination of Body Systems (PT Eval Complexity): 1-2 elements  Clinical Presentation (PT Evaluation Complexity): stable  Clinical Decision Making (PT Evaluation Complexity): low complexity  Overall Complexity (PT Evaluation Complexity): low complexity     PT Charges       Row Name 12/12/24 1302             Time Calculation    Start Time 0940  -HW      PT Received On 12/12/24  -      PT Goal Re-Cert Due Date 12/22/24  -         Untimed Charges    PT Eval/Re-eval Minutes 57  -HW         Total Minutes    Untimed Charges Total Minutes 57  -HW       Total Minutes 57  -HW                User Key  (r) = Recorded By, (t) = Taken By, (c) = Cosigned By      Initials Name Provider Type     Kay Castano, CARLOS Physical Therapist                  Therapy Charges for Today       Code Description Service Date Service Provider Modifiers Qty    [id number]  PT EVAL LOW COMPLEXITY 4 12/12/2024 Kay Castano PT  1            PT G-Codes  Outcome Measure Options: AM-PAC 6 Clicks Basic Mobility (PT)  AM-PAC 6 Clicks Score (PT): 15  AM-PAC 6  Clicks Score (OT): 12  PT Discharge Summary  Anticipated Discharge Disposition (PT): inpatient rehabilitation facility    Kay Castano, CARLOS  12/12/2024

## 2024-12-12 NOTE — PLAN OF CARE
Goal Outcome Evaluation:  Plan of Care Reviewed With: patient        Progress: no change  Outcome Evaluation: Pt seen for OT evaluation today.  Pt presents with increased confusion and decreased independence with self care and functional mobility tasks.  Pt lives at home with his wife.  He has been requiring assistance with dressing and bathing from his wife for a little while now.  Pt uses a cane at home, but has had frequent falls.  Wife reports worsening vision in B eyes.  Pt able to sit eob with mod assist, stood with min assist of 2 and walked 2' to head of bed with min assist of 2.  Pt required max assist to lay back down.  Pt is expected to benefit from skilled OT to improve his strength and independence with ADL tasks.    Anticipated Discharge Disposition (OT): sub acute care setting (sub acute rehab)

## 2024-12-12 NOTE — PLAN OF CARE
Goal Outcome Evaluation:     Problem: Adult Inpatient Plan of Care  Goal: Plan of Care Review  12/11/2024 1940 by Kay Chen RN  Outcome: Progressing  12/11/2024 1933 by Kay Chen RN  Outcome: Progressing  Goal: Patient-Specific Goal (Individualized)  12/11/2024 1940 by Kay Chen RN  Outcome: Progressing  12/11/2024 1933 by Kay Chen RN  Outcome: Progressing  Goal: Absence of Hospital-Acquired Illness or Injury  12/11/2024 1940 by Kay Chen RN  Outcome: Progressing  12/11/2024 1933 by Kay Chen RN  Outcome: Progressing  Intervention: Identify and Manage Fall Risk  Recent Flowsheet Documentation  Taken 12/11/2024 1800 by Kay Chen RN  Safety Promotion/Fall Prevention: safety round/check completed  Taken 12/11/2024 1600 by Kay Chen RN  Safety Promotion/Fall Prevention:   assistive device/personal items within reach   clutter free environment maintained   nonskid shoes/slippers when out of bed   room organization consistent   safety round/check completed  Taken 12/11/2024 1400 by Kay Chen RN  Safety Promotion/Fall Prevention: safety round/check completed  Taken 12/11/2024 1219 by Kay Chen RN  Safety Promotion/Fall Prevention: safety round/check completed  Taken 12/11/2024 1000 by Kay Chen RN  Safety Promotion/Fall Prevention: safety round/check completed  Taken 12/11/2024 0803 by Kay Chen RN  Safety Promotion/Fall Prevention:   activity supervised   assistive device/personal items within reach   clutter free environment maintained   fall prevention program maintained   nonskid shoes/slippers when out of bed   room organization consistent  Intervention: Prevent Skin Injury  Recent Flowsheet Documentation  Taken 12/11/2024 1800 by Kay Chen RN  Body Position:   supine   position changed independently  Taken 12/11/2024 1600 by Kay Chen RN  Body Position:   supine   tilted   right   position changed independently  Taken 12/11/2024 1400  by Kay Chen RN  Body Position:   supine   position changed independently   neutral body alignment   neutral head position  Taken 12/11/2024 1219 by Kay Chen RN  Body Position:   supine   neutral body alignment   neutral head position  Taken 12/11/2024 1000 by Kay Chen RN  Body Position:   supine   tilted   right  Taken 12/11/2024 0803 by Kay Chen RN  Body Position:   supine   neutral body alignment   neutral head position  Intervention: Prevent Infection  Recent Flowsheet Documentation  Taken 12/11/2024 1800 by Kay Chen RN  Infection Prevention: environmental surveillance performed  Taken 12/11/2024 1600 by Kay Chen RN  Infection Prevention:   cohorting utilized   environmental surveillance performed   hand hygiene promoted   rest/sleep promoted   single patient room provided  Taken 12/11/2024 1400 by Kay Chen RN  Infection Prevention: environmental surveillance performed  Taken 12/11/2024 1000 by Kay Chen RN  Infection Prevention: environmental surveillance performed  Taken 12/11/2024 0803 by Kay Chen RN  Infection Prevention: environmental surveillance performed  Goal: Optimal Comfort and Wellbeing  12/11/2024 1940 by Kay Chen RN  Outcome: Progressing  12/11/2024 1933 by Kay Chen RN  Outcome: Progressing  Intervention: Monitor Pain and Promote Comfort  Recent Flowsheet Documentation  Taken 12/11/2024 1800 by Kay Chen RN  Pain Management Interventions: around-the-clock dosing utilized  Taken 12/11/2024 1219 by Kay Chen RN  Pain Management Interventions:   pain medication given   unnecessary movement minimized  Taken 12/11/2024 0803 by Kay Chen RN  Pain Management Interventions: no interventions per patient request  Goal: Readiness for Transition of Care  12/11/2024 1940 by Kay Chen RN  Outcome: Progressing  12/11/2024 1933 by Kay Chen RN  Outcome: Progressing

## 2024-12-12 NOTE — PROGRESS NOTES
[facility]    Name:  Ramsey Riley   Age:  69 y.o.  Sex:  male  :  1955  MRN:  [id number]   Visit Number:  [id number]  Admission Date:  12/10/2024  Date Of Service:  24  Primary Care Physician:  Margret Purvis MD     LOS: 0 days :    Chief Complaint:      Follow-up weakness    Subjective:    Patient seen today.  He was awake and alert, does have some confused speech at times.  Wife was at bedside we reviewed further the plan of care, current workup.    Hospital Course:    Chronically ill 69-year-old with a history of peripheral neuropathy, diabetes, hypothyroidism, CAD, prior CVA, vision loss, diabetic retinopathy, chronic pain, history of multiple falls, who had presented to the emergency room due to concerns of confusion.  History obtained primarily from ER record, somewhat from the patient.  No family currently at bedside at time of visit.  Patient notes he had had a fall he thinks yesterday, had hurt his shoulder, actually went to an outlying emergency room where he had no fractures.  He had taken some pain medicine at home, cannot remember the name, apparently his wife had noticed him being more confused overnight into today.  He is currently awake and alert that he is in the emergency room, however he is overall fairly poor historian.  Notes shoulder is feeling better.  He denies any dizziness or headache.     In the ER the patient is overall stable other than mild hypertension.  His labs were largely nonactionable.  X-rays unremarkable for any acute fracture.  CT angiogram of the head neck with contrast was unremarkable, MRI of the brain did not show any acute CVA, showed white matter disease.  Patient was seen by teleneurology who recommended admission for observation.    Concern is for underlying cerebral amyloid angiopathy, possible dementia contributing.  Would likely benefit from short-term rehab after discussion with PT and OT.    Review of  "Systems:     All systems were reviewed and negative except as mentioned in subjective, assessment and plan.    Vital Signs:    Temp:  [97.7 °F (36.5 °C)-98.7 °F (37.1 °C)] 98.7 °F (37.1 °C)  Heart Rate:  [77-94] 84  Resp:  [16-18] 16  BP: (136-172)/(76-93) 145/83    Intake and output:    I/O last 3 completed shifts:  In: -   Out: 1350 [Urine:1350]  I/O this shift:  In: 360 [P.O.:360]  Out: 600 [Urine:600]    Physical Examination:    General Appearance:  Alert and cooperative.     Head:  Atraumatic and normocephalic.   Eyes: Conjunctivae and sclerae normal, no icterus. No pallor.   Throat: No oral lesions, no thrush, oral mucosa moist.   Neck: Supple, trachea midline, no thyromegaly.   Lungs:   Breath sounds heard bilaterally equally.  No wheezing or crackles. No Pleural rub or bronchial breathing.   Heart:  Normal S1 and S2, no murmur, no gallop, no rub. No JVD.   Abdomen:   Normal bowel sounds, no masses, no organomegaly. Soft, nontender, nondistended, no rebound tenderness.   Extremities: Supple, no edema, no cyanosis, no clubbing.  Decreased range of motion right shoulder   Skin: No bleeding or rash.   Neurologic: Alert and oriented x person and place.  Weakness noted     Laboratory results:    Results from last 7 days   Lab Units 12/11/24  0907 12/10/24  0929   SODIUM mmol/L 136 138   POTASSIUM mmol/L 4.1 3.8   CHLORIDE mmol/L 101 101   CO2 mmol/L 26.7 26.2   BUN mg/dL 18 16   CREATININE mg/dL 1.43* 1.55*   CALCIUM mg/dL 8.5* 8.9   BILIRUBIN mg/dL  --  0.8   ALK PHOS U/L  --  89   ALT (SGPT) U/L  --  20   AST (SGOT) U/L  --  29   GLUCOSE mg/dL 258* 175*     Results from last 7 days   Lab Units 12/10/24  1128   WBC 10*3/mm3 8.65   HEMOGLOBIN g/dL 16.0   HEMATOCRIT % 48.1   PLATELETS 10*3/mm3 146         Results from last 7 days   Lab Units 12/10/24  1128 12/10/24  0929   CK TOTAL U/L  --  437*   HSTROP T ng/L 50* 52*         No results for input(s): \"PHART\", \"IVL4YZI\", \"PO2ART\", \"NZY0OGI\", \"BASEEXCESS\" in the " last 8760 hours.   I have reviewed the patient's laboratory results.    Radiology results:    EEG    Result Date: 12/10/2024  Reason for referral: 69 y.o.male with altered mental status Technical Summary:  A 19 channel digital EEG was performed using the international 10-20 placement system, including eye leads and EKG leads. Duration: 20 minutes Findings: The patient is drowsy at the beginning of the study.  The background shows diffuse low to medium amplitude 3-5 Hz intermixed delta and theta activity present symmetrically over both hemispheres.  Photic stimulation does not change the background.  Hyperventilation was attempted but the patient was too drowsy to complete this.  No focal features or epileptiform activity are present. Video: None Technical quality: Good EKG: Regular, 60 bpm SUMMARY: Excess drowsiness Moderate generalized slow No focal features or epileptiform activity     Impression: Diffuse cerebral dysfunction of moderate degree, nonspecific.  This is most commonly seen due to toxic/metabolic cause No evidence for epilepsy is seen This repo most commonly seen due to toxic/metabolic causeThis isrt is transcribed using the Dragon dictation system.     I have reviewed the patient's radiology reports.    Medication Review:     I have reviewed the patient's active and prn medications.     Problem List:      Weakness      Assessment:    Weakness, POA  Encephalopathy, POA  Diabetes with neuropathy and retinopathy  Vision loss  Hearing loss  CAD  History of prior PE on Eliquis  Impaired mobility and ADL    Plan:    Weakness/confusion:  Appears to have off-and-on confusion and weakness at home.  However recent increased amount of falls, not sleeping well, wife thinks some medication issues may be at play.  Notes patient sometimes doubles up on medicines because he sleeps throughout the morning and takes them all at nighttime.  Will schedule to Seroquel and Xanax only at night for now to help with mixing of  nights and days.    In addition, discussed MRI findings and neurology thoughts about possible protein/plaque issues that have been progressive in nature which could be contributing.    Cerebral amyloid angiopathy  Appears to have been progressive on imaging when compared to prior MRIs.  Based off symptomatology with the weakness, confusion, is fairly consistent with this.  Elevated blood pressures may have contributed and or fall exacerbated.  He will benefit from short-term rehab after discussion with PT and OT.  Will need outpatient nerve conduction testing.  Does have diabetic neuropathy     Diabetes/vision loss/hearing loss/CAD/prior PE/impaired mobility:  Continue home medications.  Continue with Eliquis.    Short-term rehab    DVT Prophylaxis: Eliquis  Code Status: Full  Diet: As tolerated  Discharge Plan: RIGOBERTO Sim DO  12/12/24  12:36 EST    Dictated utilizing Dragon dictation.

## 2024-12-12 NOTE — DISCHARGE PLACEMENT REQUEST
"STR Referral     Cici Lopez (69 y.o. Male)       Date of Birth   1955    Social Security Number       Address   [address] [place] [address]    Home Phone   [phone]    MRN   [id number]       Holiness   Patient Refused    Marital Status                               Admission Date   12/10/24    Admission Type   Emergency    Admitting Provider   Ines Sim DO    Attending Provider   Ines Sim DO    Department, Room/Bed   [facility] TELEMETRY 3, 304/1       Discharge Date       Discharge Disposition       Discharge Destination                                 Attending Provider: Ines Sim DO    Allergies: Penicillins, Trazodone    Isolation: None   Infection: None   Code Status: CPR    Ht: 180.3 cm (70.98\")   Wt: 106 kg (233 lb 7.5 oz)    Admission Cmt: None   Principal Problem: Weakness [R53.1]                   Active Insurance as of 12/10/2024       Primary Coverage       Payor Plan Insurance Group Employer/Plan Group    ANTHEM MEDICARE REPLACEMENT ANTHEM MED ADV HMO [id number]       Payor Plan Address Payor Plan Phone Number Payor Plan Fax Number Effective Dates    [address] [phone]  2024 - None Entered    [facility] [id number]         Subscriber Name Subscriber Birth Date Member ID       CICI LOPEZ 1955 [id number]                     Emergency Contacts        (Rel.) Home Phone Work Phone Mobile Phone    Jina Lopez (Spouse) [phone] -- [phone]                 History & Physical        Ines Sim DO at 12/10/24 [address] [place] [address] HOSPITALIST   HISTORY AND PHYSICAL      Name:  Cici Lopez   Age:  69 y.o.  Sex:  male  :  1955  MRN:  [id number]   Visit Number:  [id number]  Admission Date:  12/10/2024  Date Of Service:  12/10/24  Primary Care Physician:  Margret Purvis MD    Chief Complaint:     Fall, confusion    History Of " Presenting Illness:      Chronically ill 69-year-old with a history of peripheral neuropathy, diabetes, hypothyroidism, CAD, prior CVA, vision loss, diabetic retinopathy, chronic pain, history of multiple falls, who had presented to the emergency room due to concerns of confusion.  History obtained primarily from ER record, somewhat from the patient.  No family currently at bedside at time of visit.  Patient notes he had had a fall he thinks yesterday, had hurt his shoulder, actually went to an outlying emergency room where he had no fractures.  He had taken some pain medicine at home, cannot remember the name, apparently his wife had noticed him being more confused overnight into today.  He is currently awake and alert that he is in the emergency room, however he is overall fairly poor historian.  Notes shoulder is feeling better.  He denies any dizziness or headache.    In the ER the patient is overall stable other than mild hypertension.  His labs were largely nonactionable.  X-rays unremarkable for any acute fracture.  CT angiogram of the head neck with contrast was unremarkable, MRI of the brain did not show any acute CVA, showed white matter disease.  Patient was seen by teleneurology who recommended admission for observation.    Review Of Systems:    All systems were reviewed and negative except as mentioned in history of presenting illness, assessment and plan.    Past Medical History: Patient  has a past medical history of Arthritis, Clot, Coronary artery disease, Diabetes mellitus, Heart disease, History of blood clots, Hypertension, Low back pain, Thyroid disease, and Type 2 diabetes mellitus.    Past Surgical History: Patient  has a past surgical history that includes Esophagogastroduodenoscopy; Colonoscopy (2015); Cholecystectomy (2015); and Coronary artery bypass graft.    Social History: Patient  reports that he has never smoked. He has never been exposed to tobacco smoke. His smokeless tobacco use  includes chew. He reports that he does not drink alcohol and does not use drugs.    Family History:  Patient's family history has been reviewed and found to be noncontributory.     Allergies:      Trazodone    Home Medications:    Prior to Admission Medications       Prescriptions Last Dose Informant Patient Reported? Taking?    ALPRAZolam (XANAX) 0.5 MG tablet   No No    Take 1 tablet by mouth At Night As Needed for Anxiety.    amLODIPine (NORVASC) 10 MG tablet   No No    Take 1 tablet by mouth Daily.    apixaban (Eliquis) 5 MG tablet tablet   No No    Take 1 tablet by mouth Every 12 (Twelve) Hours.    aspirin 81 MG chewable tablet   Yes No    Chew 1 tablet Daily.    atorvastatin (Lipitor) 80 MG tablet   No No    Take 1 tablet by mouth Every Night.    chlorthalidone (HYGROTON) 25 MG tablet   No No    Take 0.5 tablets by mouth Daily.    citalopram (CeleXA) 40 MG tablet   No No    Take 1 tablet by mouth Daily.    Continuous Glucose Sensor (FreeStyle Rina 3 Sensor) misc   No No    Use 1 each Every 14 (Fourteen) Days.    cyclopentolate (CYCLOGYL) 1 % ophthalmic solution   Yes No    Apply 1 drop to eye(s) as directed by provider 3 (Three) Times a Day.    famotidine (Pepcid) 20 MG tablet   No No    Take 1 tablet by mouth 2 (Two) Times a Day.    glucose blood (Accu-Chek Caryn Plus) test strip   No No    USE AS DIRECTED 3 TIMES DAILY    glucose monitor monitoring kit   No No    1 each Daily.    glucose monitor monitoring kit   No No    1 each Daily.    HYDROcodone-acetaminophen (NORCO) 7.5-325 MG per tablet   No No    Take 1 tablet by mouth Every 12 (Twelve) Hours As Needed for Moderate Pain.    Insulin NPH Isophane & Regular (HumuLIN 70/30 KwikPen) (70-30) 100 UNIT/ML suspension pen-injector   No No    Inject 70 units twice daily E11.65    Insulin Pen Needle 32G X 4 MM misc   No No    For use with insulin injections, daily    Insulin Syringes, Disposable, U-100 1 ML [facility]   No No    50 Units 3 (Three) Times a Day As  "Needed (50 units in the morning, and then with meals PRN).    Lancets 30G misc   No No    Check sugar 3 times daily    levothyroxine (SYNTHROID, LEVOTHROID) 125 MCG tablet   No No    Take 1 tablet by mouth Daily.    losartan (COZAAR) 100 MG tablet   No No    Take 1 tablet by mouth Daily.    pantoprazole (PROTONIX) 40 MG EC tablet   No No    Take 1 tablet by mouth Daily.    QUEtiapine (SEROquel) 50 MG tablet   No No    Take 1 tablet by mouth 2 (Two) Times a Day.          ED Medications:    Medications   apixaban (ELIQUIS) tablet 5 mg (5 mg Oral Given 12/10/24 1354)   atorvastatin (LIPITOR) tablet 80 mg (has no administration in time range)   labetalol (NORMODYNE,TRANDATE) injection 10 mg (has no administration in time range)   Diclofenac Sodium (VOLTAREN) 1 % gel 4 g (4 g Topical Given 12/10/24 1426)   iopamidol (ISOVUE-300) 61 % injection 100 mL (100 mL Intravenous Given 12/10/24 1047)   magnesium sulfate 2g/50 mL (PREMIX) infusion (0 g Intravenous Stopped 12/10/24 1233)   acetaminophen (TYLENOL) tablet 650 mg (650 mg Oral Given 12/10/24 1354)     Vital Signs:  Temp:  [98.3 °F (36.8 °C)] 98.3 °F (36.8 °C)  Heart Rate:  [75-86] 77  Resp:  [20-22] 20  BP: (129-180)/() 157/97        12/10/24  0822   Weight: 108 kg (239 lb)     Body mass index is 33.33 kg/m².    Physical Exam:     Most recent vital Signs: /97   Pulse 77   Temp 98.3 °F (36.8 °C) (Oral)   Resp 20   Ht 180.3 cm (71\")   Wt 108 kg (239 lb)   SpO2 95%   BMI 33.33 kg/m²     Physical Exam  Constitutional:       General: He is not in acute distress.     Appearance: He is not toxic-appearing.   HENT:      Ears:      Comments: Hearing loss     Mouth/Throat:      Mouth: Mucous membranes are moist.      Pharynx: Oropharynx is clear.   Eyes:      Pupils: Pupils are equal, round, and reactive to light.   Cardiovascular:      Rate and Rhythm: Normal rate and regular rhythm.      Pulses: Normal pulses.      Heart sounds: No murmur heard.     No " gallop.   Pulmonary:      Effort: No respiratory distress.      Breath sounds: No wheezing or rales.   Abdominal:      General: Bowel sounds are normal. There is no distension.      Tenderness: There is no abdominal tenderness. There is no guarding.   Skin:     General: Skin is warm.      Capillary Refill: Capillary refill takes less than 2 seconds.   Neurological:      General: No focal deficit present.      Mental Status: He is alert.      Motor: Weakness present.   Psychiatric:         Mood and Affect: Mood normal.         Thought Content: Thought content normal.         Laboratory data:    I have reviewed the labs done in the emergency room.    Results from last 7 days   Lab Units 12/10/24  0929   SODIUM mmol/L 138   POTASSIUM mmol/L 3.8   CHLORIDE mmol/L 101   CO2 mmol/L 26.2   BUN mg/dL 16   CREATININE mg/dL 1.55*   CALCIUM mg/dL 8.9   BILIRUBIN mg/dL 0.8   ALK PHOS U/L 89   ALT (SGPT) U/L 20   AST (SGOT) U/L 29   GLUCOSE mg/dL 175*     Results from last 7 days   Lab Units 12/10/24  1128   WBC 10*3/mm3 8.65   HEMOGLOBIN g/dL 16.0   HEMATOCRIT % 48.1   PLATELETS 10*3/mm3 146         Results from last 7 days   Lab Units 12/10/24  1128 12/10/24  0929   CK TOTAL U/L  --  437*   HSTROP T ng/L 50* 52*     Results from last 7 days   Lab Units 12/10/24  0929   PROBNP pg/mL 290.6                 Results from last 7 days   Lab Units 12/10/24  1119   COLOR UA  Yellow   GLUCOSE UA  250 mg/dL (1+)*   KETONES UA  Negative   BLOOD UA  Trace*   LEUKOCYTES UA  Negative   PH, URINE  7.0   BILIRUBIN UA  Negative   UROBILINOGEN UA  1.0 E.U./dL   RBC UA /HPF 0-2   WBC UA /HPF None Seen       Pain Management Panel  More data exists         Latest Ref Rng & Units 12/10/2024 4/15/2024   Pain Management Panel   Creatinine, Urine Not Estab. mg/dL - 126.1    Amphetamine, Urine Qual Negative Negative  -   Barbiturates Screen, Urine Negative Negative  -   Benzodiazepine Screen, Urine Negative Positive  -   Buprenorphine, Screen, Urine  Negative Negative  -   Cocaine Screen, Urine Negative Negative  -   Methadone Screen , Urine Negative Negative  -   Methamphetamine, Ur Negative Negative  -      Details                   EKG:          Radiology:    MRI Brain Without Contrast    Result Date: 12/10/2024  PROCEDURE: MRI BRAIN WO CONTRAST-  HISTORY: concern for possible posterior stroke,  PROCEDURE: Multiplanar MR imaging of the brain was performed in multiple MR sequences .  FINDINGS: Brain parenchyma displays normal signal without evidence of mass, hemorrhage or edema.  Limited images the proximal cord are unremarkable. The ventricles are normal in size. There is a small area of encephalomalacia in the right basal ganglia consistent with small remote CVA. There is no extra-axial fluid or midline shift. Flow-voids are appropriate. Diffusion weighted images demonstrate no evidence of acute CVA although a small area could be missed secondary to the motion artifact. Limited images of the paranasal sinuses are unremarkable. Motion artifact noted on multiple images significantly decreasing the sensitivity of this exam. There is mild small vessel ischemic disease. T2 coronal images are almost nondiagnostic. Same is true of the T1 axial images.      Gross motion artifact on multiple images and motion artifact present on all images.  No acute CVA identified though a small area could be missed secondary to motion artifact.  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      This report was signed and finalized on 12/10/2024 1:38 PM by Kristi Roblero MD.      CT Angiogram Head    Result Date: 12/10/2024  PROCEDURE: CT ANGIOGRAM NECK-, CT ANGIOGRAM HEAD-  HISTORY: Concern for stroke, altered mental status  TECHNIQUE: Thin section axial CT with IV contrast supplemented with multiplanar reconstruction of the head and neck.  FINDINGS: Exam is limited by significant patient motion artifact.  CTA:  Aortic arch:  Arch shows no significant narrowing.  Great vessel origins are widely patent.  Right carotid: There is mild plaque in the right carotid bulb with less than 50% stenosis. Right common carotid artery is unremarkable..  Left carotid: There is minimal calcified plaque in the left carotid bulb with less than 50% stenosis. Carotid artery is unremarkable.  Vertebrals: Left vertebral artery is dominant. No significant stenosis is present. Distal vertebral arteries are suboptimally visualized.  The cranial circulation is grossly unremarkable. ACAs are unremarkable. Motion artifact limits visualization of MCAs and PCAs. No definite stenosis or aneurysm is seen, but small lesions could be missed. Basilar arteries are unremarkable.  Calcification in the right palatine tonsil suggests previous tonsillitis.      Suboptimal exam secondary to motion artifact on multiple images.  Less than 50% stenosis of the internal carotid arteries bilaterally.  No significant intracranial stenosis or aneurysm identified, however, small lesions could be missed secondary to motion artifact.    DLP: 749.41 mGy.cm,748.94 mGy.cm CTDI: 22.7 mGy  This study was performed with techniques to keep radiation doses as low as reasonably achievable (ALARA). Individualized dose reduction techniques using automated exposure control or adjustment of mA and/or kV according to the patient size were employed.      Images were reviewed, interpreted, and dictated by Dr. Kristi Roblero MD Transcribed by Anna Beasley PA-C.  This report was signed and finalized on 12/10/2024 11:28 AM by Kristi Roblero MD.      CT Angiogram Neck    Result Date: 12/10/2024  PROCEDURE: CT ANGIOGRAM NECK-, CT ANGIOGRAM HEAD-  HISTORY: Concern for stroke, altered mental status  TECHNIQUE: Thin section axial CT with IV contrast supplemented with multiplanar reconstruction of the head and neck.  FINDINGS: Exam is limited by significant patient motion artifact.  CTA:  Aortic arch:  Arch shows no significant narrowing. Great vessel  origins are widely patent.  Right carotid: There is mild plaque in the right carotid bulb with less than 50% stenosis. Right common carotid artery is unremarkable..  Left carotid: There is minimal calcified plaque in the left carotid bulb with less than 50% stenosis. Carotid artery is unremarkable.  Vertebrals: Left vertebral artery is dominant. No significant stenosis is present. Distal vertebral arteries are suboptimally visualized.  The cranial circulation is grossly unremarkable. ACAs are unremarkable. Motion artifact limits visualization of MCAs and PCAs. No definite stenosis or aneurysm is seen, but small lesions could be missed. Basilar arteries are unremarkable.  Calcification in the right palatine tonsil suggests previous tonsillitis.      Suboptimal exam secondary to motion artifact on multiple images.  Less than 50% stenosis of the internal carotid arteries bilaterally.  No significant intracranial stenosis or aneurysm identified, however, small lesions could be missed secondary to motion artifact.    DLP: 749.41 mGy.cm,748.94 mGy.cm CTDI: 22.7 mGy  This study was performed with techniques to keep radiation doses as low as reasonably achievable (ALARA). Individualized dose reduction techniques using automated exposure control or adjustment of mA and/or kV according to the patient size were employed.      Images were reviewed, interpreted, and dictated by Dr. Kristi Roblero MD Transcribed by Anna Beasley PA-C.  This report was signed and finalized on 12/10/2024 11:28 AM by Kristi Roblero MD.      CT Cervical Spine Without Contrast    Result Date: 12/10/2024  PROCEDURE: CT CERVICAL SPINE WO CONTRAST-  HISTORY: Fall yesterday, AMS, neck pain  COMPARISON: May 2023.  PROCEDURE: Multiple axial CT images were obtained through the cervical spine using bone window algorithms. Coronal and sagittal images were reconstructed from the original axial data set.  FINDINGS: Facets are normally aligned. There is no acute  fracture.. There is straightening of the normal cervical adenosis. There is minimal retrolisthesis of C3 on C4, stable. There is mild to moderate disc space narrowing at all levels with small osteophytes. Multiple levels of spinal stenosis and/or neuroforaminal narrowing secondary to degenerative change, but not secondary to acute abnormality. Bilateral carotid artery calcifications are noted.  Limited images of the lung apices are unremarkable.      No acute fracture or malalignment.  Degenerative changes.   CTDI: 22.7 mGy DLP: 749.41 mGy.cm,748.94 mGy.cm   This study was performed with techniques to keep radiation doses as low as reasonably achievable (ALARA). Individualized dose reduction techniques using automated exposure control or adjustment of mA and/or kV according to the patient size were employed.     Images were reviewed, interpreted, and dictated by Dr. Kristi Roblero MD Transcribed by Anna Beasley PA-C.  This report was signed and finalized on 12/10/2024 11:20 AM by Kristi Roblero MD.      CT Head Without Contrast    Result Date: 12/10/2024  PROCEDURE: CT HEAD WO CONTRAST-  HISTORY: ground level fall yesterday on [facility] since fall  COMPARISON: May 2023.  TECHNIQUE: Multiple axial CT images were performed from the foramen magnum to the vertex without enhancement.  FINDINGS: There is no CT evidence of acute intracranial hemorrhage. Moderate atrophy, pronounced for the patient's age, is stable. There is a stable small area of encephalomalacia in the right basal ganglia consistent with remote CVA. There is no mass, mass effect or midline shift.  There is no hydrocephalus. The paranasal sinuses are clear. Bone windows reveal no acute osseous abnormalities.      Stable chronic changes without acute intracranial process.     DLP: 749.41 mGy.cm,748.94 mGy.cm CTDI: 22.7 mGy   This study was performed with techniques to keep radiation doses as low as reasonably achievable (ALARA). Individualized dose  reduction techniques using automated exposure control or adjustment of mA and/or kV according to the patient size were employed.     Images were reviewed, interpreted, and dictated by Dr. Kristi Roblero MD Transcribed by Anna Beasley PA-C.  This report was signed and finalized on 12/10/2024 11:20 AM by Kristi Roblero MD.      XR Shoulder 2+ View Right    Result Date: 12/10/2024   PROCEDURE: XR SHOULDER 2+ VW RIGHT-  HISTORY: Fall, yesterday, knot noted to right shoulder  COMPARISON: March 24, 2016.  FINDINGS:  A three view exam demonstrates no acute fracture or dislocation. The joint spaces demonstrate moderate narrowing of the right acromioclavicular joint. There is also decreased subacromial distance suggesting chronic rotator cuff tear. No soft tissue abnormality is seen. On a single view there is a linear lucency in the superior lateral right acromion. Borders appear smooth and mildly sclerotic. If this is a fracture appears to be remote. This is not identified on the prior study.       No acute bony abnormality.  Degenerative changes described.      This report was signed and finalized on 12/10/2024 11:03 AM by Kristi Roblero MD.      XR Humerus Right    Result Date: 12/10/2024  PROCEDURE: XR HUMERUS RIGHT-  HISTORY: Fall yesterday, pain, inability to abduct arm  COMPARISON: None.  FINDINGS:  A two view exam demonstrates no acute fracture or dislocation. The joint spaces appear unremarkable. No soft tissue abnormality is seen.      No acute bony abnormality.      This report was signed and finalized on 12/10/2024 10:56 AM by Kristi Roblero MD.      XR Chest 1 View    Result Date: 12/10/2024  PROCEDURE: XR CHEST 1 VW-  HISTORY: AMS, fell last night, right shoulder pain.  COMPARISON: June 21, 2022..  FINDINGS: The heart is normal in size. The lungs are clear. The mediastinum is unremarkable. There is no pneumothorax.  There are no acute osseous abnormalities. Status post median sternotomy. Apical lordotic positioning  noted. Small density is again projected over the left apex, stable. There is evidence of old calcified granulomatous disease.      No acute cardiopulmonary process.     This report was signed and finalized on 12/10/2024 10:56 AM by Kristi Roblero MD.       Assessment:    Weakness, POA  Encephalopathy, POA  Diabetes with neuropathy and retinopathy  Vision loss  Hearing loss  CAD  History of prior PE on Eliquis  Impaired mobility and ADL    Plan:    Admitted as observation    Weakness/confusion:  Appears to have chronic weakness, history of falls, lot of cares provided by spouse.  In regards to confusion, may have been exacerbated by pain medication needed been taking for the shoulder.  He is also on benzodiazepines and Seroquel.  Echocardiogram was ordered.  Lipitor has been ordered.  Telemetry monitoring.  Neurology to follow-up.  Will continue his Eliquis.    Will have PT and OT evaluations.    Diabetes/vision loss/hearing loss/CAD/prior PE/impaired mobility:  Will resume home medications once able to reconcile with spouse.    Likely home in 24 to 48 hours.  Plan of care discussed with the patient.    Risk Assessment: High  DVT Prophylaxis: Eliquis  Code Status: Full  Diet: As tolerated    Advance Care Planning  ACP discussion was held with the patient during this visit. Patient does not have an advance directive, declines further assistance.           Ines Sim DO  12/10/24  15:11 EST    Dictated utilizing Dragon dictation.    Electronically signed by Ines Sim DO at 12/10/24 1652       Current Facility-Administered Medications   Medication Dose Route Frequency Provider Last Rate Last Admin    acetaminophen (TYLENOL) tablet 650 mg  650 mg Oral Q6H PRN Ines Sim DO        ALPRAZolam (XANAX) tablet 0.5 mg  0.5 mg Oral Nightly Ines Sim DO        amLODIPine (NORVASC) tablet 10 mg  10 mg Oral Daily Ines Sim DO   10 mg at 12/12/24 0818    apixaban (ELIQUIS)  tablet 5 mg  5 mg Oral Q12H Ines Sim DO   5 mg at 12/12/24 0818    atorvastatin (LIPITOR) tablet 80 mg  80 mg Oral Nightly Ines Sim DO   80 mg at 12/11/24 2151    chlorthalidone (HYGROTON) tablet 12.5 mg  12.5 mg Oral Daily Ines Sim DO   12.5 mg at 12/12/24 0818    citalopram (CeleXA) tablet 20 mg  20 mg Oral Daily Ines Sim DO   20 mg at 12/12/24 0818    cyclopentolate (CYCLOGYL) 1 % ophthalmic solution 1 drop  1 drop Both Eyes TID Ines Sim DO   1 drop at 12/12/24 0819    dextrose (D50W) (25 g/50 mL) IV injection 25 g  25 g Intravenous Q15 Min PRN Ines Sim DO        dextrose (GLUTOSE) oral gel 15 g  15 g Oral Q15 Min PRN Ines Sim DO        Diclofenac Sodium (VOLTAREN) 1 % gel 4 g  4 g Topical 4x Daily Ines Sim DO   4 g at 12/12/24 1223    famotidine (PEPCID) tablet 20 mg  20 mg Oral BID Ines Sim DO   20 mg at 12/12/24 0817    glucagon (GLUCAGEN) injection 1 mg  1 mg Intramuscular Q15 Min PRN Ines Sim DO        HYDROcodone-acetaminophen (NORCO) 5-325 MG per tablet 1 tablet  1 tablet Oral Q12H PRN Ines Sim DO   1 tablet at 12/11/24 1635    insulin glargine (LANTUS, SEMGLEE) injection 15 Units  15 Units Subcutaneous Q12H Ines Sim DO   15 Units at 12/12/24 0817    Insulin Lispro (humaLOG) injection 3-14 Units  3-14 Units Subcutaneous 4x Daily AC & at Bedtime Ines Sim DO   10 Units at 12/12/24 1223    levothyroxine (SYNTHROID, LEVOTHROID) tablet 125 mcg  125 mcg Oral Q AM Ines Sim DO   125 mcg at 12/12/24 0554    losartan (COZAAR) tablet 100 mg  100 mg Oral Daily Ines Sim DO   100 mg at 12/12/24 0817    nitroglycerin (NITROSTAT) SL tablet 0.4 mg  0.4 mg Sublingual Q5 Min PRN Ines Sim DO        pantoprazole (PROTONIX) EC tablet 40 mg  40 mg Oral Daily Ines Sim DO   40 mg  at 24 0818    QUEtiapine (SEROquel) tablet 50 mg  50 mg Oral Nightly Ines Sim DO            Physical Therapy Notes (most recent note)        Kay Castano, PT at 24 1302  Version 1 of 1         Patient Name: Ramsey Riley  : 1955    MRN: [id number]                              Today's Date: 2024       Admit Date: 12/10/2024    Visit Dx:     ICD-10-CM ICD-9-CM   1. TIA (transient ischemic attack)  G45.9 435.9     Patient Active Problem List   Diagnosis    Coronary artery disease involving native coronary artery of native heart without angina pectoris    Essential hypertension    Bradycardia, sinus    Fatigue    Abnormal ECG    Precordial pain    LVH (left ventricular hypertrophy) due to hypertensive disease, without heart failure    Diabetes mellitus    PAD (peripheral artery disease)    Ischemic stroke    History of blood clots    Pulmonary embolus    Dyslipidemia    Class 1 obesity due to excess calories with serious comorbidity and body mass index (BMI) of 34.0 to 34.9 in adult    Weakness     Past Medical History:   Diagnosis Date    Arthritis     Clot     Coronary artery disease     Diabetes mellitus     Heart disease     History of blood clots     Hypertension     Impaired mobility     Low back pain     Thyroid disease     Type 2 diabetes mellitus      Past Surgical History:   Procedure Laterality Date    CHOLECYSTECTOMY      COLONOSCOPY      dr srivastava    CORONARY ARTERY BYPASS GRAFT      [place] - 3 BY PASS     ENDOSCOPY        General Information       Row Name 24 1154          Physical Therapy Time and Intention    Document Type evaluation  -HW     Mode of Treatment physical therapy  -HW       Row Name 24 1154          General Information    Patient Profile Reviewed yes  -HW     Prior Level of Function min assist:;all household mobility;community mobility  -HW     Existing Precautions/Restrictions fall  -HW     Barriers to Rehab medically  complex;previous functional deficit;cognitive status;visual deficit  -       Row Name 12/12/24 1154          Living Environment    People in Home spouse  -       Row Name 12/12/24 1154          Home Main Entrance    Number of Stairs, Main Entrance three  -     Stair Railings, Main Entrance railings on both sides of stairs  -       Row Name 12/12/24 1154          Stairs Within Home, Primary    Stairs, Within Home, Primary 2 JONATAN bedroom  -     Number of Stairs, Within Home, Primary two  -       Row Name 12/12/24 1154          Cognition    Orientation Status (Cognition) oriented to;person  -       Row Name 12/12/24 1255 12/12/24 1154       Safety Issues/Impairments Affecting Functional Mobility    Safety Issues Affecting Function (Mobility) -- ability to follow commands;awareness of need for assistance;impulsivity;insight into deficits/self-awareness;judgment;positioning of assistive device;safety precautions follow-through/compliance;safety precaution awareness  -    Impairments Affecting Function (Mobility) visual/perceptual  - balance;cognition;endurance/activity tolerance;pain;range of motion (ROM);postural/trunk control;strength  -    Cognitive Impairments, Mobility Safety/Performance -- attention;insight into deficits/self-awareness;safety precaution awareness;safety precaution follow-through  -              User Key  (r) = Recorded By, (t) = Taken By, (c) = Cosigned By      Initials Name Provider Type     Kay Castano PT Physical Therapist                   Mobility       Row Name 12/12/24 1156          Bed Mobility    Bed Mobility supine-sit;sit-supine  -     Supine-Sit [place] (Bed Mobility) moderate assist (50% patient effort);verbal cues;nonverbal cues (demo/gesture)  -     Sit-Supine [place] (Bed Mobility) maximum assist (25% patient effort);verbal cues;nonverbal cues (demo/gesture)  -     Assistive Device (Bed Mobility) head of bed elevated;repositioning sheet  -        Row Name 12/12/24 1156          Sit-Stand Transfer    Sit-Stand [place] (Transfers) minimum assist (75% patient effort);verbal cues;nonverbal cues (demo/gesture)  -     Assistive Device (Sit-Stand Transfers) walker, front-wheeled  -HW       Row Name 12/12/24 1156          Gait/Stairs (Locomotion)    [place] Level (Gait) minimum assist (75% patient effort);verbal cues;nonverbal cues (demo/gesture)  -     Assistive Device (Gait) walker, front-wheeled  -HW     Patient was able to Ambulate yes  -HW     Distance in Feet (Gait) 2  -HW     Deviations/Abnormal Patterns (Gait) bilateral deviations;nara decreased;festinating/shuffling;base of support, wide  -HW     Bilateral Gait Deviations heel strike decreased;forward flexed posture  -     [place] Level (Stairs) not tested  -               User Key  (r) = Recorded By, (t) = Taken By, (c) = Cosigned By      Initials Name Provider Type     Kay Castano PT Physical Therapist                   Obj/Interventions       [facility] Name 12/12/24 1243          Range of Motion Comprehensive    General Range of Motion bilateral lower extremity ROM WFL  -[facility] Name 12/12/24 1243          Strength Comprehensive (MMT)    Comment, General Manual Muscle Testing (MMT) Assessment BLE MMT grossly 4-/5  -HW       [facility] Name 12/12/24 1243          Balance    Balance Assessment sitting static balance;sitting dynamic balance;standing static balance;standing dynamic balance  -     Static Sitting Balance contact guard  -     Dynamic Sitting Balance minimal assist  -HW     Position, Sitting Balance supported;sitting edge of bed  -     Static Standing Balance minimal assist  -     Dynamic Standing Balance minimal assist  -HW     Position/Device Used, Standing Balance supported;walker, front-wheeled  -       Row Name 12/12/24 1243          Sensory Assessment (Somatosensory)    Sensory Assessment (Somatosensory) other (see comments)  impaired light touch  sensation to BLE's  -HW               User Key  (r) = Recorded By, (t) = Taken By, (c) = Cosigned By      Initials Name Provider Type    HW Kay Castano, PT Physical Therapist                   Goals/Plan       Row Name 12/12/24 1300          Bed Mobility Goal 1 (PT)    Activity/Assistive Device (Bed Mobility Goal 1, PT) bed mobility activities, all  -HW     [place] Level/Cues Needed (Bed Mobility Goal 1, PT) minimum assist (75% or more patient effort)  -HW     Time Frame (Bed Mobility Goal 1, PT) long term goal (LTG);10 days  -HW     Progress/Outcomes (Bed Mobility Goal 1, PT) new goal  -HW       Row Name 12/12/24 1300          Transfer Goal 1 (PT)    Activity/Assistive Device (Transfer Goal 1, PT) transfers, all  -HW     [place] Level/Cues Needed (Transfer Goal 1, PT) contact guard required  -HW     Time Frame (Transfer Goal 1, PT) long term goal (LTG);10 days  -HW     Progress/Outcome (Transfer Goal 1, PT) new goal  -       Row Name 12/12/24 1300          Gait Training Goal 1 (PT)    Activity/Assistive Device (Gait Training Goal 1, PT) gait (walking locomotion)  -HW     [place] Level (Gait Training Goal 1, PT) contact guard required  -HW     Distance (Gait Training Goal 1, PT) 100  -HW     Time Frame (Gait Training Goal 1, PT) long term goal (LTG);10 days  -HW     Progress/Outcome (Gait Training Goal 1, PT) new goal  -       Row Name 12/12/24 1300          Patient Education Goal (PT)    Activity (Patient Education Goal, PT) Pt will perform 1x10 BLE therex with min A to allow for improved BLE strength and endurance.  -HW     [place]/Cues/Accuracy (Memory Goal 2, PT) demonstrates adequately  -HW     Time Frame (Patient Education Goal, PT) short term goal (STG);5 days  -HW     Progress/Outcome (Patient Education Goal, PT) new goal  -       Row Name 12/12/24 1300          Therapy Assessment/Plan (PT)    Planned Therapy Interventions (PT) balance training;bed mobility training;gait  training;home exercise program;patient/family education;postural re-education;stretching;strengthening;ROM (range of motion);transfer training;neuromuscular re-education  -               User Key  (r) = Recorded By, (t) = Taken By, (c) = Cosigned By      Initials Name Provider Type    HW Kay Castano, CARLOS Physical Therapist                   Clinical Impression       Row Name 12/12/24 1247          Pain    Pretreatment Pain Rating 8/10  -HW     Posttreatment Pain Rating 8/10  -     Pain Location shoulder  -     Pain Side/Orientation right  -     Pain Management Interventions exercise or physical activity utilized  -     Response to Pain Interventions activity level improved;mobility function improved;activity participation with tolerable pain  -       Row Name 12/12/24 1247          Plan of Care Review    Plan of Care Reviewed With patient  -     Progress no change  -     Outcome Evaluation Pt participated in PT initial evaluation this date. Pt presents supine in bed with wife at side, agreeable to PT evaluation. Pt AO to person. Pt reports 8/10 R shoulder pain at rest. Pt's wife assisted to provide subjective history. At baseline, pt lives at home with his wife. Pt's wife reports that there are 3 JONATAN home and 2 JONATAN pt's bedroom within home. Pt normally ambulates household distances with a walking stick and uses a manual w/c for community mobility. Pt's wife reports frequent falls at home. Pt performed supine > sit on EOB with mod A to assist with placing LE's off EOB and moving trunk upright. Pt performed STS transfer with min A x2 and use of RW. Pt was able to take x2 sidesteps toward HOB with min A x2 and RW. Pt required frequent VC for safe management of the RW during. Pt required max A to perform sit to supine. Following evaluation, pt left supine in bed with bed alarm active, call light and all needs within reach. Wife at side. Recommend skilled PT intervention during IP admission to address  identified deficits, reduce risk of falls and prevent functional decline. Once medically stable, recommend pt to d/c to STR to address remaining deficits.  -       Row Name 12/12/24 1247          Therapy Assessment/Plan (PT)    Patient/Family Therapy Goals Statement (PT) Pt's wife would like pt to get stronger before returning home.  -     Rehab Potential (PT) good  -     Criteria for Skilled Interventions Met (PT) yes  -     Therapy Frequency (PT) 5 times/wk  -     Predicted Duration of Therapy Intervention (PT) 10 days  -       Row Name 12/12/24 1247          Vital Signs    Pre Systolic BP Rehab 168  -HW     Pre Treatment Diastolic BP 93  -HW     Pretreatment Heart Rate (beats/min) 87  -     Pre SpO2 (%) 92  -HW     O2 Delivery Pre Treatment room air  -     O2 Delivery Intra Treatment room air  -     O2 Delivery Post Treatment room air  -HW     Pre Patient Position Supine  -HW     Intra Patient Position Standing  -HW     Post Patient Position Supine  -       Row Name 12/12/24 1247          Positioning and Restraints    Pre-Treatment Position in bed  -     Post Treatment Position bed  -HW     In Bed supine;call light within reach;encouraged to call for assist;exit alarm on;with family/caregiver  -               User Key  (r) = Recorded By, (t) = Taken By, (c) = Cosigned By      Initials Name Provider Type     Kay Castano, PT Physical Therapist                   Outcome Measures       Row Name 12/12/24 1301 12/12/24 0817       How much help from another person do you currently need...    Turning from your back to your side while in flat bed without using bedrails? 3  -HW 3  -HH    Moving from lying on back to sitting on the side of a flat bed without bedrails? 2  -HW 3  -HH    Moving to and from a bed to a chair (including a wheelchair)? 3  -HW 2  -HH    Standing up from a chair using your arms (e.g., wheelchair, bedside chair)? 3  -HW 2  -HH    Climbing 3-5 steps with a railing? 2  -HW 1   -    To walk in hospital room? 2  - 2  -    AM-PAC 6 Clicks Score (PT) 15  - 13  -    Highest Level of Mobility Goal 4 --> Transfer to chair/commode  - 4 --> Transfer to chair/commode  -      Row Name 12/12/24 1301 12/12/24 1205       Functional Assessment    Outcome Measure Options AM-PAC 6 Clicks Basic Mobility (PT)  - AM-PAC 6 Clicks Daily Activity (OT)  -              User Key  (r) = Recorded By, (t) = Taken By, (c) = Cosigned By      Initials Name Provider Type     Marsha Morrow Occupational Therapist     Kay Chen RN Registered Nurse     Kay Castano PT Physical Therapist                                 Physical Therapy Education       Title: PT OT SLP Therapies (In Progress)       Topic: Physical Therapy (In Progress)       Point: Mobility training (Done)       Learning Progress Summary            Patient Acceptance, E,TB, VU by  at 12/12/2024 1301    Comment: role of PT during IP admission                      Point: Home exercise program (Not Started)       Learner Progress:  Not documented in this visit.              Point: Body mechanics (Not Started)       Learner Progress:  Not documented in this visit.              Point: Precautions (Not Started)       Learner Progress:  Not documented in this visit.                              User Key       Initials Effective Dates Name Provider Type Discipline     11/29/23 -  Kay Castano PT Physical Therapist PT                  PT Recommendation and Plan  Planned Therapy Interventions (PT): balance training, bed mobility training, gait training, home exercise program, patient/family education, postural re-education, stretching, strengthening, ROM (range of motion), transfer training, neuromuscular re-education  Progress: no change  Outcome Evaluation: Pt participated in PT initial evaluation this date. Pt presents supine in bed with wife at side, agreeable to PT evaluation. Pt AO to person. Pt reports 8/10 R shoulder pain at  rest. Pt's wife assisted to provide subjective history. At baseline, pt lives at home with his wife. Pt's wife reports that there are 3 JONATAN home and 2 JONATAN pt's bedroom within home. Pt normally ambulates household distances with a walking stick and uses a manual w/c for community mobility. Pt's wife reports frequent falls at home. Pt performed supine > sit on EOB with mod A to assist with placing LE's off EOB and moving trunk upright. Pt performed STS transfer with min A x2 and use of RW. Pt was able to take x2 sidesteps toward HOB with min A x2 and RW. Pt required frequent VC for safe management of the RW during. Pt required max A to perform sit to supine. Following evaluation, pt left supine in bed with bed alarm active, call light and all needs within reach. Wife at side. Recommend skilled PT intervention during IP admission to address identified deficits, reduce risk of falls and prevent functional decline. Once medically stable, recommend pt to d/c to STR to address remaining deficits.     Time Calculation:   PT Evaluation Complexity  History, PT Evaluation Complexity: 1-2 personal factors and/or comorbidities  Examination of Body Systems (PT Eval Complexity): 1-2 elements  Clinical Presentation (PT Evaluation Complexity): stable  Clinical Decision Making (PT Evaluation Complexity): low complexity  Overall Complexity (PT Evaluation Complexity): low complexity     PT Charges       Row Name 12/12/24 1302             Time Calculation    Start Time 0940  -HW      PT Received On 12/12/24  -      PT Goal Re-Cert Due Date 12/22/24  -         Untimed Charges    PT Eval/Re-eval Minutes 57  -HW         Total Minutes    Untimed Charges Total Minutes 57  -HW       Total Minutes 57  -HW                User Key  (r) = Recorded By, (t) = Taken By, (c) = Cosigned By      Initials Name Provider Type    HW Kay Castano PT Physical Therapist                  Therapy Charges for Today       Code Description Service Date  Service Provider Modifiers Qty    [id number] HC PT EVAL LOW COMPLEXITY 4 2024 Kay Castano, PT  1            PT G-Codes  Outcome Measure Options: AM-PAC 6 Clicks Basic Mobility (PT)  AM-PAC 6 Clicks Score (PT): 15  AM-PAC 6 Clicks Score (OT): 12  PT Discharge Summary  Anticipated Discharge Disposition (PT): inpatient rehabilitation facility    Kay Castano PT  2024      Electronically signed by Kay Castano, PT at 24 1303          Occupational Therapy Notes (most recent note)        Marsha Morrow at 24 1206          Patient Name: Ramsey Riley  : 1955    MRN: [id number]                              Today's Date: 2024       Admit Date: 12/10/2024    Visit Dx:     ICD-10-CM ICD-9-CM   1. TIA (transient ischemic attack)  G45.9 435.9     Patient Active Problem List   Diagnosis    Coronary artery disease involving native coronary artery of native heart without angina pectoris    Essential hypertension    Bradycardia, sinus    Fatigue    Abnormal ECG    Precordial pain    LVH (left ventricular hypertrophy) due to hypertensive disease, without heart failure    Diabetes mellitus    PAD (peripheral artery disease)    Ischemic stroke    History of blood clots    Pulmonary embolus    Dyslipidemia    Class 1 obesity due to excess calories with serious comorbidity and body mass index (BMI) of 34.0 to 34.9 in adult    Weakness     Past Medical History:   Diagnosis Date    Arthritis     Clot     Coronary artery disease     Diabetes mellitus     Heart disease     History of blood clots     Hypertension     Impaired mobility     Low back pain     Thyroid disease     Type 2 diabetes mellitus      Past Surgical History:   Procedure Laterality Date    CHOLECYSTECTOMY  2015    COLONOSCOPY      dr srivastava    CORONARY ARTERY BYPASS GRAFT      [place] [id number] BY PASS     ENDOSCOPY        General Information       Row Name 24 1151          OT Time and Intention    Subjective Information  complains of;pain  -     Document Type evaluation  -     Mode of Treatment occupational therapy  -     Patient Effort adequate  -     Symptoms Noted During/After Treatment other (see comments)  -     Comment increased confusion today  -       Row Name 12/12/24 1151          General Information    Patient Profile Reviewed yes  -     Prior Level of Function min assist:;ADL's;all household mobility  pt has been using a cane at home, but has been having frequent falls.  -     Existing Precautions/Restrictions fall  -     Barriers to Rehab medically complex;previous functional deficit;cognitive status  -       Row Name 12/12/24 1151          Occupational Profile    Reason for Services/Referral (Occupational Profile) ADL decline  -       Row Name 12/12/24 1151          Living Environment    People in Home spouse  -       Row Name 12/12/24 1151          Home Main Entrance    Number of Stairs, Main Entrance three  -     Stair Railings, Main Entrance railings on both sides of stairs  -       Row Name 12/12/24 1151          Stairs Within Home, Primary    Stairs, Within Home, Primary to bedroom  -     Number of Stairs, Within Home, Primary two  -       Row Name 12/12/24 1151          Cognition    Orientation Status (Cognition) oriented to;person  -       Row Name 12/12/24 1151          Safety Issues/Impairments Affecting Functional Mobility    Safety Issues Affecting Function (Mobility) ability to follow commands;awareness of need for assistance;impulsivity;insight into deficits/self-awareness;judgment;safety precaution awareness;safety precautions follow-through/compliance  -     Impairments Affecting Function (Mobility) balance;cognition;endurance/activity tolerance;pain;range of motion (ROM)  -     Cognitive Impairments, Mobility Safety/Performance attention;insight into deficits/self-awareness;safety precaution awareness;safety precaution follow-through  -               User Key  (r)  = Recorded By, (t) = Taken By, (c) = Cosigned By      Initials Name Provider Type     Marsha Morrow Occupational Therapist                     Mobility/ADL's       Row Name 12/12/24 1154          Bed Mobility    Bed Mobility supine-sit;sit-supine  -     Supine-Sit [place] (Bed Mobility) moderate assist (50% patient effort)  -     Sit-Supine [place] (Bed Mobility) maximum assist (25% patient effort)  -     Bed Mobility, Safety Issues decreased use of arms for pushing/pulling;cognitive deficits limit understanding  -     Assistive Device (Bed Mobility) head of bed elevated  -[facility] Name 12/12/24 1154          Transfers    Transfers sit-stand transfer  -[facility] Name 12/12/24 [facility]          Sit-Stand Transfer    Sit-Stand [place] (Transfers) minimum assist (75% patient effort);2 person assist;verbal cues  -     Assistive Device (Sit-Stand Transfers) walker, front-wheeled  -[facility] Name 12/12/24 115          Functional Mobility    Functional Mobility- [place]. Level minimum assist (75% patient effort);2 person assist required;verbal cues required  -     Functional Mobility- Device walker, front-wheeled  -     Functional Mobility-Distance (Feet) 2  -     Functional Mobility- Safety Issues step length decreased;weight-shifting ability decreased;loses balance backward  -     Patient was able to Ambulate yes  -[facility] Name 12/12/24 115          Activities of Daily Living    BADL Assessment/Intervention bathing;upper body dressing;lower body dressing;grooming;feeding;toileting  -[facility] Name 12/12/24 115          Bathing Assessment/Intervention    [place] Level (Bathing) maximum assist (25% patient effort)  -     Comment, (Bathing) d/t increased confusion and decreased vision  -[facility] Name 12/12/24 115          Upper Body Dressing Assessment/Training    [place] Level (Upper Body Dressing) maximum assist (25% patient effort)  -     Comment, (Upper  Body Dressing) d/t increased confusion and decreased vision and pain in R shoulder  -[facility] Name 12/12/24 1154          Lower Body Dressing Assessment/Training    [place] Level (Lower Body Dressing) maximum assist (25% patient effort)  -     Comment, (Lower Body Dressing) d/t increased confusion and decreased vision  -AH       Row Name 12/12/24 1154          Grooming Assessment/Training    [place] Level (Grooming) moderate assist (50% patient effort)  -     Comment, (Grooming) d/t increased confusion and decreased vision  -AH       Row Name 12/12/24 1154          Self-Feeding Assessment/Training    [place] Level (Feeding) minimum assist (75% patient effort);moderate assist (50% patient effort)  -     Comment, (Feeding) d/t increased confusion and decreased vision  -AH       Row Name 12/12/24 1154          Toileting Assessment/Training    [place] Level (Toileting) maximum assist (25% patient effort)  -     Comment, (Toileting) d/t increased confusion and decreased vision  -               User Key  (r) = Recorded By, (t) = Taken By, (c) = Cosigned By      Initials Name Provider Type    Marsha Wall Occupational Therapist                   Obj/Interventions       [facility] Name 12/12/24 1156          Sensory Interventions    Comment, Sensory Intervention wife reports sensory changes in BLEs from his knees to his feet from neuropathy, pt has been having frequent falls  -[facility] Name 12/12/24 1156          Vision Assessment/Intervention    Visual Impairment/Limitations other (see comments)  pt has poor vision d/t diabetic retinopathy  -AH       Row Name 12/12/24 1156          Range of Motion Comprehensive    Comment, General Range of Motion LUE WFL, RUE limited d/t rotator cuff tear  -[facility] Name 12/12/24 1156          Strength Comprehensive (MMT)    Comment, General Manual Muscle Testing (MMT) Assessment LUE strength WFL, RUE elbow WFL, but pt reports pain in his shoulder  with resistance of his elbow  -               User Key  (r) = Recorded By, (t) = Taken By, (c) = Cosigned By      Initials Name Provider Type    Marsha Wall Occupational Therapist                   Goals/Plan       Row Name 12/12/24 1204          Bed Mobility Goal 1 (OT)    Activity/Assistive Device (Bed Mobility Goal 1, OT) bed mobility activities, all  -     [place] Level/Cues Needed (Bed Mobility Goal 1, OT) minimum assist (75% or more patient effort)  -     Time Frame (Bed Mobility Goal 1, OT) by discharge  -     Progress/Outcomes (Bed Mobility Goal 1, OT) goal ongoing  -       Row Name 12/12/24 1204          Transfer Goal 1 (OT)    Activity/Assistive Device (Transfer Goal 1, OT) sit-to-stand/stand-to-sit;walker, rolling  -     [place] Level/Cues Needed (Transfer Goal 1, OT) contact guard required  -     Time Frame (Transfer Goal 1, OT) by discharge  -     Progress/Outcome (Transfer Goal 1, OT) goal ongoing  -       Row Name 12/12/24 1204          Bathing Goal 1 (OT)    Activity/Device (Bathing Goal 1, OT) bathing skills, all  -     [place] Level/Cues Needed (Bathing Goal 1, OT) minimum assist (75% or more patient effort)  -     Time Frame (Bathing Goal 1, OT) long term goal (LTG);1 week  -     Progress/Outcomes (Bathing Goal 1, OT) goal ongoing  -       Row Name 12/12/24 1204          Dressing Goal 1 (OT)    Activity/Device (Dressing Goal 1, OT) upper body dressing  -     [place]/Cues Needed (Dressing Goal 1, OT) minimum assist (75% or more patient effort)  -     Time Frame (Dressing Goal 1, OT) long term goal (LTG);5 days  -     Progress/Outcome (Dressing Goal 1, OT) goal ongoing  -       Row Name 12/12/24 1204          ROM Goal 1 (OT)    ROM Goal 1 (OT) Pt will perform AROM ex for RUE to within tolerable pain  -     Time Frame (ROM Goal 1, OT) by discharge  -     Progress/Outcome (ROM Goal 1, OT) goal ongoing  -       Row Name 12/12/24 1201           Therapy Assessment/Plan (OT)    Planned Therapy Interventions (OT) activity tolerance training;BADL retraining;patient/caregiver education/training;transfer/mobility retraining;ROM/therapeutic exercise  -               User Key  (r) = Recorded By, (t) = Taken By, (c) = Cosigned By      Initials Name Provider Type     Marsha Morrow Occupational Therapist                   Clinical Impression       [facility] Name 12/12/24 1200          Pain Assessment    Pretreatment Pain Rating 8/10  -     Posttreatment Pain Rating 8/10  -     Pain Location shoulder  -     Pain Side/Orientation right  -     Pain Management Interventions exercise or physical activity utilized  -     Response to Pain Interventions activity level improved;mobility function improved;activity participation with tolerable pain  -[facility] Name 12/12/24 1200          Plan of Care Review    Plan of Care Reviewed With patient  -     Progress no change  -     Outcome Evaluation Pt seen for OT evaluation today.  Pt presents with increased confusion and decreased independence with self care and functional mobility tasks.  Pt lives at home with his wife.  He has been requiring assistance with dressing and bathing from his wife for a little while now.  Pt uses a cane at home, but has had frequent falls.  Wife reports worsening vision in B eyes.  Pt able to sit eob with mod assist, stood with min assist of 2 and walked 2' to head of bed with min assist of 2.  Pt required max assist to lay back down.  Pt is expected to benefit from skilled OT to improve his strength and independence with ADL tasks.  -       Row Name 12/12/24 1200          Therapy Assessment/Plan (OT)    Patient/Family Therapy Goal Statement (OT) d/c rehab  -     Rehab Potential (OT) fair  -     Criteria for Skilled Therapeutic Interventions Met (OT) yes;skilled treatment is necessary  -     Therapy Frequency (OT) 3 times/wk  -       Row Name 12/12/24 1200           Therapy Plan Review/Discharge Plan (OT)    Anticipated Discharge Disposition (OT) sub acute care setting  sub acute rehab  -       Row Name 12/12/24 1200          Positioning and Restraints    Pre-Treatment Position in bed  -     Post Treatment Position bed  -     In Bed supine;call light within reach;encouraged to call for assist;exit alarm on;with family/caregiver  -               User Key  (r) = Recorded By, (t) = Taken By, (c) = Cosigned By      Initials Name Provider Type    Marsha Wall Occupational Therapist                   Outcome Measures       Row Name 12/12/24 1205          How much help from another is currently needed...    Putting on and taking off regular lower body clothing? 2  -AH     Bathing (including washing, rinsing, and drying) 2  -AH     Toileting (which includes using toilet bed pan or urinal) 2  -AH     Putting on and taking off regular upper body clothing 2  -AH     Taking care of personal grooming (such as brushing teeth) 2  -AH     Eating meals 2  -     AM-PAC 6 Clicks Score (OT) 12  [facility]       Row Name 12/12/24 0817          How much help from another person do you currently need...    Turning from your back to your side while in flat bed without using bedrails? 3  -HH     Moving from lying on back to sitting on the side of a flat bed without bedrails? 3  -HH     Moving to and from a bed to a chair (including a wheelchair)? 2  -HH     Standing up from a chair using your arms (e.g., wheelchair, bedside chair)? 2  -HH     Climbing 3-5 steps with a railing? 1  -HH     To walk in hospital room? 2  -     AM-PAC 6 Clicks Score (PT) 13  -       Row Name 12/12/24 1205          Functional Assessment    Outcome Measure Options AM-PAC 6 Clicks Daily Activity (OT)  -               User Key  (r) = Recorded By, (t) = Taken By, (c) = Cosigned By      Initials Name Provider Type    Marsha Wall Occupational Therapist     Kay Chen, RN Registered Nurse                     Occupational Therapy Education       Title: PT OT SLP Therapies (In Progress)       Topic: Occupational Therapy (In Progress)       Point: ADL training (Done)       Description:   Instruct learner(s) on proper safety adaptation and remediation techniques during self care or transfers.   Instruct in proper use of assistive devices.                  Learning Progress Summary            Patient Acceptance, E,TB, VU by  at 12/12/2024 7760    Comment: Role of OT/POC                      Point: Home exercise program (Not Started)       Description:   Instruct learner(s) on appropriate technique for monitoring, assisting and/or progressing therapeutic exercises/activities.                  Learner Progress:  Not documented in this visit.              Point: Precautions (Not Started)       Description:   Instruct learner(s) on prescribed precautions during self-care and functional transfers.                  Learner Progress:  Not documented in this visit.              Point: Body mechanics (Not Started)       Description:   Instruct learner(s) on proper positioning and spine alignment during self-care, functional mobility activities and/or exercises.                  Learner Progress:  Not documented in this visit.                              User Key       Initials Effective Dates Name Provider Type Discipline     06/16/21 -  Marsha Morrow Occupational Therapist OT                  OT Recommendation and Plan  Planned Therapy Interventions (OT): activity tolerance training, BADL retraining, patient/caregiver education/training, transfer/mobility retraining, ROM/therapeutic exercise  Therapy Frequency (OT): 3 times/wk  Plan of Care Review  Plan of Care Reviewed With: patient  Progress: no change  Outcome Evaluation: Pt seen for OT evaluation today.  Pt presents with increased confusion and decreased independence with self care and functional mobility tasks.  Pt lives at home with his wife.  He has been requiring  assistance with dressing and bathing from his wife for a little while now.  Pt uses a cane at home, but has had frequent falls.  Wife reports worsening vision in B eyes.  Pt able to sit eob with mod assist, stood with min assist of 2 and walked 2' to head of bed with min assist of 2.  Pt required max assist to lay back down.  Pt is expected to benefit from skilled OT to improve his strength and independence with ADL tasks.     Time Calculation:   Evaluation Complexity (OT)  Review Occupational Profile/Medical/Therapy History Complexity: expanded/moderate complexity  Assessment, Occupational Performance/Identification of Deficit Complexity: 3-5 performance deficits  Clinical Decision Making Complexity (OT): detailed assessment/moderate complexity  Overall Complexity of Evaluation (OT): moderate complexity     Time Calculation- OT       Row Name 12/12/24 1206             Time Calculation- OT    OT Start Time 0945  -      OT Received On 12/12/24  -      OT Goal Re-Cert Due Date 12/22/24  -         Untimed Charges    OT Eval/Re-eval Minutes 60  -AH         Total Minutes    Untimed Charges Total Minutes 60  -AH       Total Minutes 60  -AH                User Key  (r) = Recorded By, (t) = Taken By, (c) = Cosigned By      Initials Name Provider Type    Marsha Wall Occupational Therapist                  Therapy Charges for Today       Code Description Service Date Service Provider Modifiers Qty    [id number]  OT EVAL MOD COMPLEXITY 4 12/12/2024 Marsha Morrow GO 1                 Marsha Morrow  12/12/2024    Electronically signed by Marsha Morrow at 12/12/24 1206

## 2024-12-12 NOTE — THERAPY EVALUATION
Patient Name: Ramsey Riley  : 1955    MRN: [id number]                              Today's Date: 2024       Admit Date: 12/10/2024    Visit Dx:     ICD-10-CM ICD-9-CM   1. TIA (transient ischemic attack)  G45.9 435.9     Patient Active Problem List   Diagnosis    Coronary artery disease involving native coronary artery of native heart without angina pectoris    Essential hypertension    Bradycardia, sinus    Fatigue    Abnormal ECG    Precordial pain    LVH (left ventricular hypertrophy) due to hypertensive disease, without heart failure    Diabetes mellitus    PAD (peripheral artery disease)    Ischemic stroke    History of blood clots    Pulmonary embolus    Dyslipidemia    Class 1 obesity due to excess calories with serious comorbidity and body mass index (BMI) of 34.0 to 34.9 in adult    Weakness     Past Medical History:   Diagnosis Date    Arthritis     Clot     Coronary artery disease     Diabetes mellitus     Heart disease     History of blood clots     Hypertension     Impaired mobility     Low back pain     Thyroid disease     Type 2 diabetes mellitus      Past Surgical History:   Procedure Laterality Date    CHOLECYSTECTOMY      COLONOSCOPY      dr srivastava    CORONARY ARTERY BYPASS GRAFT      [place] -  BY PASS     ENDOSCOPY        General Information       Row Name 24 1151          OT Time and Intention    Subjective Information complains of;pain  -AH     Document Type evaluation  -     Mode of Treatment occupational therapy  -AH     Patient Effort adequate  -AH     Symptoms Noted During/After Treatment other (see comments)  -     Comment increased confusion today  -       Row Name 24 1151          General Information    Patient Profile Reviewed yes  -AH     Prior Level of Function min assist:;ADL's;all household mobility  pt has been using a cane at home, but has been having frequent falls.  -     Existing Precautions/Restrictions fall  -     Barriers  to Rehab medically complex;previous functional deficit;cognitive status  -       Row Name 12/12/24 1151          Occupational Profile    Reason for Services/Referral (Occupational Profile) ADL decline  -       Row Name 12/12/24 1151          Living Environment    People in Home spouse  -       Row Name 12/12/24 1151          Home Main Entrance    Number of Stairs, Main Entrance three  -     Stair Railings, Main Entrance railings on both sides of stairs  -       Row Name 12/12/24 1151          Stairs Within Home, Primary    Stairs, Within Home, Primary to bedroom  -     Number of Stairs, Within Home, Primary two  -       Row Name 12/12/24 1151          Cognition    Orientation Status (Cognition) oriented to;person  -       Row Name 12/12/24 1151          Safety Issues/Impairments Affecting Functional Mobility    Safety Issues Affecting Function (Mobility) ability to follow commands;awareness of need for assistance;impulsivity;insight into deficits/self-awareness;judgment;safety precaution awareness;safety precautions follow-through/compliance  -     Impairments Affecting Function (Mobility) balance;cognition;endurance/activity tolerance;pain;range of motion (ROM)  -     Cognitive Impairments, Mobility Safety/Performance attention;insight into deficits/self-awareness;safety precaution awareness;safety precaution follow-through  -               User Key  (r) = Recorded By, (t) = Taken By, (c) = Cosigned By      Initials Name Provider Type    Marsha Wall Occupational Therapist                     Mobility/ADL's       Row Name 12/12/24 1154          Bed Mobility    Bed Mobility supine-sit;sit-supine  -     Supine-Sit [place] (Bed Mobility) moderate assist (50% patient effort)  [facility]     Sit-Supine [place] (Bed Mobility) maximum assist (25% patient effort)  [facility]     Bed Mobility, Safety Issues decreased use of arms for pushing/pulling;cognitive deficits limit understanding  -      Assistive Device (Bed Mobility) head of bed elevated  -[facility] Name 12/12/24 1154          Transfers    Transfers sit-stand transfer  -[facility] Name 12/12/24 1154          Sit-Stand Transfer    Sit-Stand [place] (Transfers) minimum assist (75% patient effort);2 person assist;verbal cues  -     Assistive Device (Sit-Stand Transfers) walker, front-wheeled  -       Row Name 12/12/24 1154          Functional Mobility    Functional Mobility- Ind. Level minimum assist (75% patient effort);2 person assist required;verbal cues required  -     Functional Mobility- Device walker, front-wheeled  -     Functional Mobility-Distance (Feet) 2  -     Functional Mobility- Safety Issues step length decreased;weight-shifting ability decreased;loses balance backward  -     Patient was able to Ambulate yes  -AH       Row Name 12/12/24 1154          Activities of Daily Living    BADL Assessment/Intervention bathing;upper body dressing;lower body dressing;grooming;feeding;toileting  -AH       Row Name 12/12/24 1154          Bathing Assessment/Intervention    [place] Level (Bathing) maximum assist (25% patient effort)  -     Comment, (Bathing) d/t increased confusion and decreased vision  -AH       Row Name 12/12/24 115          Upper Body Dressing Assessment/Training    [place] Level (Upper Body Dressing) maximum assist (25% patient effort)  -     Comment, (Upper Body Dressing) d/t increased confusion and decreased vision and pain in R shoulder  -AH       Row Name 12/12/24 115          Lower Body Dressing Assessment/Training    [place] Level (Lower Body Dressing) maximum assist (25% patient effort)  -     Comment, (Lower Body Dressing) d/t increased confusion and decreased vision  -AH       Row Name 12/12/24 115          Grooming Assessment/Training    [place] Level (Grooming) moderate assist (50% patient effort)  -     Comment, (Grooming) d/t increased confusion and decreased  vision  -[facility] Name 12/12/24 1154          Self-Feeding Assessment/Training    [place] Level (Feeding) minimum assist (75% patient effort);moderate assist (50% patient effort)  -     Comment, (Feeding) d/t increased confusion and decreased vision  -[facility] Name 12/12/24 1154          Toileting Assessment/Training    [place] Level (Toileting) maximum assist (25% patient effort)  -     Comment, (Toileting) d/t increased confusion and decreased vision  -               User Key  (r) = Recorded By, (t) = Taken By, (c) = Cosigned By      Initials Name Provider Type     Marsha Morrow Occupational Therapist                   Obj/Interventions       [facility] Name 12/12/24 1156          Sensory Interventions    Comment, Sensory Intervention wife reports sensory changes in BLEs from his knees to his feet from neuropathy, pt has been having frequent falls  -[facility] Name 12/12/24 1156          Vision Assessment/Intervention    Visual Impairment/Limitations other (see comments)  pt has poor vision d/t diabetic retinopathy  -[facility] Name 12/12/24 1156          Range of Motion Comprehensive    Comment, General Range of Motion LUE WFL, RUE limited d/t rotator cuff tear  -[facility] Name 12/12/24 1156          Strength Comprehensive (MMT)    Comment, General Manual Muscle Testing (MMT) Assessment LUE strength WFL, RUE elbow WFL, but pt reports pain in his shoulder with resistance of his elbow  -               User Key  (r) = Recorded By, (t) = Taken By, (c) = Cosigned By      Initials Name Provider Type     Marsha Morrow Occupational Therapist                   Goals/Plan       [facility] Name 12/12/24 1204          Bed Mobility Goal 1 (OT)    Activity/Assistive Device (Bed Mobility Goal 1, OT) bed mobility activities, all  -     [place] Level/Cues Needed (Bed Mobility Goal 1, OT) minimum assist (75% or more patient effort)  -     Time Frame (Bed Mobility Goal 1, OT) by discharge  -      Progress/Outcomes (Bed Mobility Goal 1, OT) goal ongoing  -       Row Name 12/12/24 1204          Transfer Goal 1 (OT)    Activity/Assistive Device (Transfer Goal 1, OT) sit-to-stand/stand-to-sit;walker, rolling  -     [place] Level/Cues Needed (Transfer Goal 1, OT) contact guard required  -     Time Frame (Transfer Goal 1, OT) by discharge  -     Progress/Outcome (Transfer Goal 1, OT) goal ongoing  -       Row Name 12/12/24 1204          Bathing Goal 1 (OT)    Activity/Device (Bathing Goal 1, OT) bathing skills, all  -     [place] Level/Cues Needed (Bathing Goal 1, OT) minimum assist (75% or more patient effort)  -     Time Frame (Bathing Goal 1, OT) long term goal (LTG);1 week  -     Progress/Outcomes (Bathing Goal 1, OT) goal ongoing  -[facility] Name 12/12/24 1204          Dressing Goal 1 (OT)    Activity/Device (Dressing Goal 1, OT) upper body dressing  -     [place]/Cues Needed (Dressing Goal 1, OT) minimum assist (75% or more patient effort)  -     Time Frame (Dressing Goal 1, OT) long term goal (LTG);5 days  -     Progress/Outcome (Dressing Goal 1, OT) goal ongoing  -[facility] Name 12/12/24 1204          ROM Goal 1 (OT)    ROM Goal 1 (OT) Pt will perform AROM ex for RUE to within tolerable pain  -     Time Frame (ROM Goal 1, OT) by discharge  -     Progress/Outcome (ROM Goal 1, OT) goal ongoing  -[facility] Name 12/12/24 1204          Therapy Assessment/Plan (OT)    Planned Therapy Interventions (OT) activity tolerance training;BADL retraining;patient/caregiver education/training;transfer/mobility retraining;ROM/therapeutic exercise  -               User Key  (r) = Recorded By, (t) = Taken By, (c) = Cosigned By      Initials Name Provider Type    Marsha Wall Occupational Therapist                   Clinical Impression       Row Name 12/12/24 1200          Pain Assessment    Pretreatment Pain Rating 8/10  -     Posttreatment Pain Rating 8/10  -      Pain Location shoulder  -     Pain Side/Orientation right  -     Pain Management Interventions exercise or physical activity utilized  -     Response to Pain Interventions activity level improved;mobility function improved;activity participation with tolerable pain  -[facility] 12/12/24 1200          Plan of Care Review    Plan of Care Reviewed With patient  -     Progress no change  -     Outcome Evaluation Pt seen for OT evaluation today.  Pt presents with increased confusion and decreased independence with self care and functional mobility tasks.  Pt lives at home with his wife.  He has been requiring assistance with dressing and bathing from his wife for a little while now.  Pt uses a cane at home, but has had frequent falls.  Wife reports worsening vision in B eyes.  Pt able to sit eob with mod assist, stood with min assist of 2 and walked 2' to head of bed with min assist of 2.  Pt required max assist to lay back down.  Pt is expected to benefit from skilled OT to improve his strength and independence with ADL tasks.  [facility] 12/12/24 1200          Therapy Assessment/Plan (OT)    Patient/Family Therapy Goal Statement (OT) d/c rehab  -     Rehab Potential (OT) [facility]  -     Criteria for Skilled Therapeutic Interventions Met (OT) yes;skilled treatment is necessary  -     Therapy Frequency (OT) 3 times/wk  -[facility] 12/12/24 1200          Therapy Plan Review/Discharge Plan (OT)    Anticipated Discharge Disposition (OT) sub acute care setting  sub acute rehab  [facility] 12/12/24 1200          Positioning and Restraints    Pre-Treatment Position in bed  [facility]     Post Treatment Position bed  -     In Bed supine;call light within reach;encouraged to call for assist;exit alarm on;with family/caregiver  -               User Key  (r) = Recorded By, (t) = Taken By, (c) = Cosigned By      Initials Name Provider Type     Marsha Morrow Occupational Therapist                    Outcome Measures       Row Name 12/12/24 1205          How much help from another is currently needed...    Putting on and taking off regular lower body clothing? 2  -AH     Bathing (including washing, rinsing, and drying) 2  -AH     Toileting (which includes using toilet bed pan or urinal) 2  -AH     Putting on and taking off regular upper body clothing 2  -AH     Taking care of personal grooming (such as brushing teeth) 2  -AH     Eating meals 2  -     AM-PAC 6 Clicks Score (OT) 12  -       Row Name 12/12/24 0817          How much help from another person do you currently need...    Turning from your back to your side while in flat bed without using bedrails? 3  -HH     Moving from lying on back to sitting on the side of a flat bed without bedrails? 3  -HH     Moving to and from a bed to a chair (including a wheelchair)? 2  -HH     Standing up from a chair using your arms (e.g., wheelchair, bedside chair)? 2  -HH     Climbing 3-5 steps with a railing? 1  -HH     To walk in hospital room? 2  -     AM-PAC 6 Clicks Score (PT) 13  -       Row Name 12/12/24 1205          Functional Assessment    Outcome Measure Options AM-PAC 6 Clicks Daily Activity (OT)  -               User Key  (r) = Recorded By, (t) = Taken By, (c) = Cosigned By      Initials Name Provider Type    Marsha Wall Occupational Therapist     Kay Chen, RN Registered Nurse                    Occupational Therapy Education       Title: PT OT SLP Therapies (In Progress)       Topic: Occupational Therapy (In Progress)       Point: ADL training (Done)       Description:   Instruct learner(s) on proper safety adaptation and remediation techniques during self care or transfers.   Instruct in proper use of assistive devices.                  Learning Progress Summary            Patient Acceptance, E,TB, VU by  at 12/12/2024 1205    Comment: Role of OT/POC                      Point: Home exercise program (Not Started)        Description:   Instruct learner(s) on appropriate technique for monitoring, assisting and/or progressing therapeutic exercises/activities.                  Learner Progress:  Not documented in this visit.              Point: Precautions (Not Started)       Description:   Instruct learner(s) on prescribed precautions during self-care and functional transfers.                  Learner Progress:  Not documented in this visit.              Point: Body mechanics (Not Started)       Description:   Instruct learner(s) on proper positioning and spine alignment during self-care, functional mobility activities and/or exercises.                  Learner Progress:  Not documented in this visit.                              User Key       Initials Effective Dates Name Provider Type Discipline     06/16/21 -  Marsha Morrow Occupational Therapist OT                  OT Recommendation and Plan  Planned Therapy Interventions (OT): activity tolerance training, BADL retraining, patient/caregiver education/training, transfer/mobility retraining, ROM/therapeutic exercise  Therapy Frequency (OT): 3 times/wk  Plan of Care Review  Plan of Care Reviewed With: patient  Progress: no change  Outcome Evaluation: Pt seen for OT evaluation today.  Pt presents with increased confusion and decreased independence with self care and functional mobility tasks.  Pt lives at home with his wife.  He has been requiring assistance with dressing and bathing from his wife for a little while now.  Pt uses a cane at home, but has had frequent falls.  Wife reports worsening vision in B eyes.  Pt able to sit eob with mod assist, stood with min assist of 2 and walked 2' to head of bed with min assist of 2.  Pt required max assist to lay back down.  Pt is expected to benefit from skilled OT to improve his strength and independence with ADL tasks.     Time Calculation:   Evaluation Complexity (OT)  Review Occupational Profile/Medical/Therapy History Complexity:  expanded/moderate complexity  Assessment, Occupational Performance/Identification of Deficit Complexity: 3-5 performance deficits  Clinical Decision Making Complexity (OT): detailed assessment/moderate complexity  Overall Complexity of Evaluation (OT): moderate complexity     Time Calculation- OT       Row Name 12/12/24 1206             Time Calculation- OT    OT Start Time 0945  -AH      OT Received On 12/12/24  -      OT Goal Re-Cert Due Date 12/22/24  -         Untimed Charges    OT Eval/Re-eval Minutes 60  -AH         Total Minutes    Untimed Charges Total Minutes 60  -AH       Total Minutes 60  -AH                User Key  (r) = Recorded By, (t) = Taken By, (c) = Cosigned By      Initials Name Provider Type     Marsha Morrow Occupational Therapist                  Therapy Charges for Today       Code Description Service Date Service Provider Modifiers Qty    [id number]  OT EVAL MOD COMPLEXITY 4 12/12/2024 Marsha Morrow GO 1                 Marsha Morrow  12/12/2024

## 2024-12-13 LAB
GLUCOSE BLDC GLUCOMTR-MCNC: 232 MG/DL (ref 70–130)
GLUCOSE BLDC GLUCOMTR-MCNC: 263 MG/DL (ref 70–130)
GLUCOSE BLDC GLUCOMTR-MCNC: 273 MG/DL (ref 70–130)
GLUCOSE BLDC GLUCOMTR-MCNC: 280 MG/DL (ref 70–130)

## 2024-12-13 PROCEDURE — 51701 INSERT BLADDER CATHETER: CPT

## 2024-12-13 PROCEDURE — 99231 SBSQ HOSP IP/OBS SF/LOW 25: CPT | Performed by: FAMILY MEDICINE

## 2024-12-13 PROCEDURE — G0378 HOSPITAL OBSERVATION PER HR: HCPCS

## 2024-12-13 PROCEDURE — 63710000001 INSULIN GLARGINE PER 5 UNITS: Performed by: FAMILY MEDICINE

## 2024-12-13 PROCEDURE — 63710000001 INSULIN LISPRO (HUMAN) PER 5 UNITS: Performed by: FAMILY MEDICINE

## 2024-12-13 PROCEDURE — 82948 REAGENT STRIP/BLOOD GLUCOSE: CPT | Performed by: FAMILY MEDICINE

## 2024-12-13 PROCEDURE — 82948 REAGENT STRIP/BLOOD GLUCOSE: CPT

## 2024-12-13 RX ADMIN — QUETIAPINE FUMARATE 50 MG: 25 TABLET ORAL at 20:45

## 2024-12-13 RX ADMIN — CYCLOPENTOLATE HYDROCHLORIDE 1 DROP: 10 SOLUTION/ DROPS OPHTHALMIC at 08:32

## 2024-12-13 RX ADMIN — INSULIN LISPRO 5 UNITS: 100 INJECTION, SOLUTION INTRAVENOUS; SUBCUTANEOUS at 18:01

## 2024-12-13 RX ADMIN — CHLORTHALIDONE 12.5 MG: 25 TABLET ORAL at 08:15

## 2024-12-13 RX ADMIN — FAMOTIDINE 20 MG: 20 TABLET, FILM COATED ORAL at 08:15

## 2024-12-13 RX ADMIN — LEVOTHYROXINE SODIUM 125 MCG: 125 TABLET ORAL at 05:10

## 2024-12-13 RX ADMIN — ALPRAZOLAM 0.5 MG: 0.5 TABLET ORAL at 20:45

## 2024-12-13 RX ADMIN — INSULIN LISPRO 8 UNITS: 100 INJECTION, SOLUTION INTRAVENOUS; SUBCUTANEOUS at 12:17

## 2024-12-13 RX ADMIN — AMLODIPINE BESYLATE 10 MG: 5 TABLET ORAL at 08:15

## 2024-12-13 RX ADMIN — INSULIN GLARGINE 25 UNITS: 100 INJECTION, SOLUTION SUBCUTANEOUS at 08:24

## 2024-12-13 RX ADMIN — INSULIN LISPRO 8 UNITS: 100 INJECTION, SOLUTION INTRAVENOUS; SUBCUTANEOUS at 08:20

## 2024-12-13 RX ADMIN — DICLOFENAC SODIUM 4 G: 10 GEL TOPICAL at 17:20

## 2024-12-13 RX ADMIN — ATORVASTATIN CALCIUM 80 MG: 80 TABLET, FILM COATED ORAL at 20:45

## 2024-12-13 RX ADMIN — PANTOPRAZOLE SODIUM 40 MG: 40 TABLET, DELAYED RELEASE ORAL at 08:15

## 2024-12-13 RX ADMIN — CYCLOPENTOLATE HYDROCHLORIDE 1 DROP: 10 SOLUTION/ DROPS OPHTHALMIC at 17:18

## 2024-12-13 RX ADMIN — APIXABAN 5 MG: 5 TABLET, FILM COATED ORAL at 08:15

## 2024-12-13 RX ADMIN — ACETAMINOPHEN 650 MG: 325 TABLET, FILM COATED ORAL at 17:15

## 2024-12-13 RX ADMIN — INSULIN GLARGINE 25 UNITS: 100 INJECTION, SOLUTION SUBCUTANEOUS at 20:45

## 2024-12-13 RX ADMIN — CYCLOPENTOLATE HYDROCHLORIDE 1 DROP: 10 SOLUTION/ DROPS OPHTHALMIC at 20:46

## 2024-12-13 RX ADMIN — APIXABAN 5 MG: 5 TABLET, FILM COATED ORAL at 20:45

## 2024-12-13 RX ADMIN — CITALOPRAM HYDROBROMIDE 20 MG: 20 TABLET ORAL at 08:15

## 2024-12-13 RX ADMIN — DICLOFENAC SODIUM 4 G: 10 GEL TOPICAL at 20:46

## 2024-12-13 RX ADMIN — FAMOTIDINE 20 MG: 20 TABLET, FILM COATED ORAL at 20:45

## 2024-12-13 RX ADMIN — DICLOFENAC SODIUM 4 G: 10 GEL TOPICAL at 08:34

## 2024-12-13 RX ADMIN — INSULIN LISPRO 8 UNITS: 100 INJECTION, SOLUTION INTRAVENOUS; SUBCUTANEOUS at 20:45

## 2024-12-13 RX ADMIN — LOSARTAN POTASSIUM 100 MG: 50 TABLET, FILM COATED ORAL at 08:15

## 2024-12-13 RX ADMIN — DICLOFENAC SODIUM 4 G: 10 GEL TOPICAL at 12:24

## 2024-12-13 NOTE — PLAN OF CARE
Problem: Adult Inpatient Plan of Care  Goal: Plan of Care Review  Outcome: Progressing  Flowsheets (Taken 12/13/2024 0023)  Progress: no change  Plan of Care Reviewed With: patient  Goal: Patient-Specific Goal (Individualized)  Outcome: Progressing  Goal: Absence of Hospital-Acquired Illness or Injury  Outcome: Progressing  Intervention: Identify and Manage Fall Risk  Recent Flowsheet Documentation  Taken 12/13/2024 0000 by Kartik Stafford RN  Safety Promotion/Fall Prevention:   activity supervised   assistive device/personal items within reach   fall prevention program maintained   nonskid shoes/slippers when out of bed   safety round/check completed  Taken 12/12/2024 2200 by Kartik Stafford RN  Safety Promotion/Fall Prevention:   activity supervised   assistive device/personal items within reach   fall prevention program maintained   nonskid shoes/slippers when out of bed   safety round/check completed  Taken 12/12/2024 2000 by Kartik Stafford RN  Safety Promotion/Fall Prevention:   activity supervised   assistive device/personal items within reach   fall prevention program maintained   nonskid shoes/slippers when out of bed   safety round/check completed  Intervention: Prevent Skin Injury  Recent Flowsheet Documentation  Taken 12/13/2024 0000 by Kartik Stafford RN  Body Position: position changed independently  Taken 12/12/2024 2200 by Kartik Stafford RN  Body Position: position changed independently  Taken 12/12/2024 2000 by Kartik Stafford RN  Body Position: position changed independently  Intervention: Prevent Infection  Recent Flowsheet Documentation  Taken 12/13/2024 0000 by Kartik Stafford RN  Infection Prevention:   environmental surveillance performed   equipment surfaces disinfected   single patient room provided   hand hygiene promoted   rest/sleep promoted  Taken 12/12/2024 2200 by Kartik Stafford RN  Infection Prevention:   environmental surveillance performed   equipment surfaces  disinfected   single patient room provided   hand hygiene promoted   rest/sleep promoted  Taken 12/12/2024 2000 by Kartik Stafford RN  Infection Prevention:   environmental surveillance performed   equipment surfaces disinfected   single patient room provided   hand hygiene promoted   rest/sleep promoted  Goal: Optimal Comfort and Wellbeing  Outcome: Progressing  Goal: Readiness for Transition of Care  Outcome: Progressing     Problem: Violence Risk or Actual  Goal: Anger and Impulse Control  Outcome: Progressing  Intervention: Minimize Safety Risk  Recent Flowsheet Documentation  Taken 12/13/2024 0000 by Kartik Stafford RN  Enhanced Safety Measures: bed alarm set  Taken 12/12/2024 2200 by Kartik Stafford RN  Enhanced Safety Measures: bed alarm set  Taken 12/12/2024 2000 by Kartik Stafford RN  Enhanced Safety Measures: bed alarm set     Problem: Stroke, Ischemic (Includes Transient Ischemic Attack)  Goal: Optimal Coping  Outcome: Progressing  Goal: Effective Urinary Elimination  Outcome: Progressing     Problem: Fall Injury Risk  Goal: Absence of Fall and Fall-Related Injury  Outcome: Progressing  Intervention: Promote Injury-Free Environment  Recent Flowsheet Documentation  Taken 12/13/2024 0000 by Kartik Stafford RN  Safety Promotion/Fall Prevention:   activity supervised   assistive device/personal items within reach   fall prevention program maintained   nonskid shoes/slippers when out of bed   safety round/check completed  Taken 12/12/2024 2200 by Kartik Stafford RN  Safety Promotion/Fall Prevention:   activity supervised   assistive device/personal items within reach   fall prevention program maintained   nonskid shoes/slippers when out of bed   safety round/check completed  Taken 12/12/2024 2000 by Kartik Stafford RN  Safety Promotion/Fall Prevention:   activity supervised   assistive device/personal items within reach   fall prevention program maintained   nonskid shoes/slippers when out of bed    safety round/check completed   Goal Outcome Evaluation:  Plan of Care Reviewed With: patient        Progress: no change

## 2024-12-13 NOTE — CASE MANAGEMENT/SOCIAL WORK
DCP; STR. Spoke with wife at bedside. She was informed [facility] does not accept outside pts and [facility] won't have a bed until next week. She is considering taking him home and caring for him until a bed is available at [facility]. CM continues to follow.  13:30 Spoke with pts wife again at bedside. She is concerned he may not be agreeable to going to STR if he goes home first. She requested referrals be sent to [facility] and [facility]. SW updated. List of local facilities given to her as she may need LTC for him in the near future.

## 2024-12-13 NOTE — THERAPY TREATMENT NOTE
Attempted x2 to see pt, pt sleeping soundly.  Spoke with his wife in the room, she reports he has been sleeping since she's been in the room.  Will follow up with pt another day.

## 2024-12-13 NOTE — CASE MANAGEMENT/SOCIAL WORK
Case Management/Social Work    Patient Name:  Ramsey Riley  YOB: 1955  MRN: [id number]  Admit Date:  12/10/2024         STEFANI spoke with [facility] who states they do not have any beds and will not until late next week. STEFANI spoke with [facility] who states [facility] do not take any external patients. SW updated CM. SW will need additional options for STR. SW following.       Electronically signed by:  ATN Cheng  12/13/24 10:03 EST

## 2024-12-13 NOTE — THERAPY TREATMENT NOTE
PT held this date due to pt being unable to be woken up or stay awake. Pt received ativan last night per RN. PT to follow up tomorrow as appropriate.

## 2024-12-13 NOTE — PROGRESS NOTES
[facility]    Name:  Ramsey Riley   Age:  69 y.o.  Sex:  male  :  1955  MRN:  [id number]   Visit Number:  [id number]  Admission Date:  12/10/2024  Date Of Service:  24  Primary Care Physician:  Margret Purvis MD     LOS: 0 days :    Chief Complaint:      Follow-up weakness    Subjective:    Patient was seen earlier this morning, he was lethargic but arousable.  Per nursing staff after received Ativan overnight due to agitation.  No family currently at bedside.    Hospital Course:    Chronically ill 69-year-old with a history of peripheral neuropathy, diabetes, hypothyroidism, CAD, prior CVA, vision loss, diabetic retinopathy, chronic pain, history of multiple falls, who had presented to the emergency room due to concerns of confusion.  History obtained primarily from ER record, somewhat from the patient.  No family currently at bedside at time of visit.  Patient notes he had had a fall he thinks yesterday, had hurt his shoulder, actually went to an outlying emergency room where he had no fractures.  He had taken some pain medicine at home, cannot remember the name, apparently his wife had noticed him being more confused overnight into today.  He is currently awake and alert that he is in the emergency room, however he is overall fairly poor historian.  Notes shoulder is feeling better.  He denies any dizziness or headache.     In the ER the patient is overall stable other than mild hypertension.  His labs were largely nonactionable.  X-rays unremarkable for any acute fracture.  CT angiogram of the head neck with contrast was unremarkable, MRI of the brain did not show any acute CVA, showed white matter disease.  Patient was seen by teleneurology who recommended admission for observation.    Concern is for underlying cerebral amyloid angiopathy, possible dementia contributing.  Would likely benefit from short-term rehab after discussion with PT and  "OT.    Review of Systems:     All systems were reviewed and negative except as mentioned in subjective, assessment and plan.    Vital Signs:    Temp:  [97.4 °F (36.3 °C)-98.9 °F (37.2 °C)] 97.4 °F (36.3 °C)  Heart Rate:  [64-94] 67  Resp:  [16-18] 18  BP: (127-148)/(77-89) 134/86    Intake and output:    I/O last 3 completed shifts:  In: 360 [P.O.:360]  Out: 1950 [Urine:1950]  I/O this shift:  In: 120 [P.O.:120]  Out: -     Physical Examination:    General Appearance:  Alert and cooperative.  Lethargic but arouses   Head:  Atraumatic and normocephalic.   Eyes: Conjunctivae and sclerae normal, no icterus. No pallor.   Throat: No oral lesions, no thrush, oral mucosa moist.   Neck: Supple, trachea midline, no thyromegaly.   Lungs:   Breath sounds heard bilaterally equally.  No wheezing or crackles. No Pleural rub or bronchial breathing.   Heart:  Normal S1 and S2, no murmur, no gallop, no rub. No JVD.   Abdomen:   Normal bowel sounds, no masses, no organomegaly. Soft, nontender, nondistended, no rebound tenderness.   Extremities: Supple, no edema, no cyanosis, no clubbing.  Decreased range of motion right shoulder   Skin: No bleeding or rash.   Neurologic: Alert and oriented x person and place.  Weakness noted     Laboratory results:    Results from last 7 days   Lab Units 12/11/24  0907 12/10/24  0929   SODIUM mmol/L 136 138   POTASSIUM mmol/L 4.1 3.8   CHLORIDE mmol/L 101 101   CO2 mmol/L 26.7 26.2   BUN mg/dL 18 16   CREATININE mg/dL 1.43* 1.55*   CALCIUM mg/dL 8.5* 8.9   BILIRUBIN mg/dL  --  0.8   ALK PHOS U/L  --  89   ALT (SGPT) U/L  --  20   AST (SGOT) U/L  --  29   GLUCOSE mg/dL 258* 175*     Results from last 7 days   Lab Units 12/10/24  1128   WBC 10*3/mm3 8.65   HEMOGLOBIN g/dL 16.0   HEMATOCRIT % 48.1   PLATELETS 10*3/mm3 146         Results from last 7 days   Lab Units 12/10/24  1128 12/10/24  0929   CK TOTAL U/L  --  437*   HSTROP T ng/L 50* 52*         No results for input(s): \"PHART\", \"HQO0UIQ\", " "\"PO2ART\", \"UNT9SLG\", \"BASEEXCESS\" in the last 8760 hours.   I have reviewed the patient's laboratory results.    Radiology results:    No radiology results from the last 24 hrs  I have reviewed the patient's radiology reports.    Medication Review:     I have reviewed the patient's active and prn medications.     Problem List:      Weakness      Assessment:    Weakness, POA  Encephalopathy, POA  Diabetes with neuropathy and retinopathy  Vision loss  Hearing loss  CAD  History of prior PE on Eliquis  Impaired mobility and ADL    Plan:    Weakness/confusion:  Appears to have off-and-on confusion and weakness at home.  However recent increased amount of falls, not sleeping well, wife thinks some medication issues may be at play.  Notes patient sometimes doubles up on medicines because he sleeps throughout the morning and takes them all at nighttime.  Will schedule to Seroquel and Xanax only at night for now to help with mixing of nights and days.  Will try to avoid any further doses of Ativan.    In addition, discussed MRI findings and neurology thoughts about possible protein/plaque issues that have been progressive in nature which could be contributing.    Cerebral amyloid angiopathy  Appears to have been progressive on imaging when compared to prior MRIs.  Based off symptomatology with the weakness, confusion, is fairly consistent with this.  Elevated blood pressures may have contributed and or fall exacerbated.  He will benefit from short-term rehab after discussion with PT and OT.  Will need outpatient nerve conduction testing.  Does have diabetic neuropathy     Diabetes/vision loss/hearing loss/CAD/prior PE/impaired mobility:  Continue home medications.  Continue with Eliquis.    Short-term rehab    DVT Prophylaxis: Eliquis  Code Status: Full  Diet: As tolerated  Discharge Plan: RIGOBERTO Sim DO  12/13/24  14:43 EST    Dictated utilizing Dragon dictation.    "

## 2024-12-13 NOTE — THERAPY EVALUATION
Pt. Unable to participate with formal cognitive assessment due to lethargy (had ativan early this a.m.). Will continue to follow.

## 2024-12-14 LAB
GLUCOSE BLDC GLUCOMTR-MCNC: 290 MG/DL (ref 70–130)
GLUCOSE BLDC GLUCOMTR-MCNC: 316 MG/DL (ref 70–130)
GLUCOSE BLDC GLUCOMTR-MCNC: 339 MG/DL (ref 70–130)
GLUCOSE BLDC GLUCOMTR-MCNC: 355 MG/DL (ref 70–130)

## 2024-12-14 PROCEDURE — G0378 HOSPITAL OBSERVATION PER HR: HCPCS

## 2024-12-14 PROCEDURE — 97116 GAIT TRAINING THERAPY: CPT

## 2024-12-14 PROCEDURE — 82948 REAGENT STRIP/BLOOD GLUCOSE: CPT | Performed by: FAMILY MEDICINE

## 2024-12-14 PROCEDURE — 63710000001 INSULIN LISPRO (HUMAN) PER 5 UNITS: Performed by: FAMILY MEDICINE

## 2024-12-14 PROCEDURE — 63710000001 INSULIN GLARGINE PER 5 UNITS: Performed by: FAMILY MEDICINE

## 2024-12-14 PROCEDURE — 97110 THERAPEUTIC EXERCISES: CPT

## 2024-12-14 PROCEDURE — 99232 SBSQ HOSP IP/OBS MODERATE 35: CPT | Performed by: FAMILY MEDICINE

## 2024-12-14 PROCEDURE — 82948 REAGENT STRIP/BLOOD GLUCOSE: CPT

## 2024-12-14 PROCEDURE — 97530 THERAPEUTIC ACTIVITIES: CPT

## 2024-12-14 RX ORDER — AMOXICILLIN 250 MG
2 CAPSULE ORAL 2 TIMES DAILY PRN
Status: DISCONTINUED | OUTPATIENT
Start: 2024-12-14 | End: 2024-12-18 | Stop reason: HOSPADM

## 2024-12-14 RX ORDER — BISACODYL 10 MG
10 SUPPOSITORY, RECTAL RECTAL DAILY PRN
Status: DISCONTINUED | OUTPATIENT
Start: 2024-12-14 | End: 2024-12-18 | Stop reason: HOSPADM

## 2024-12-14 RX ORDER — POLYETHYLENE GLYCOL 3350 17 G/17G
17 POWDER, FOR SOLUTION ORAL DAILY PRN
Status: DISCONTINUED | OUTPATIENT
Start: 2024-12-14 | End: 2024-12-18 | Stop reason: HOSPADM

## 2024-12-14 RX ORDER — BISACODYL 5 MG/1
5 TABLET, DELAYED RELEASE ORAL DAILY PRN
Status: DISCONTINUED | OUTPATIENT
Start: 2024-12-14 | End: 2024-12-18 | Stop reason: HOSPADM

## 2024-12-14 RX ADMIN — INSULIN GLARGINE 25 UNITS: 100 INJECTION, SOLUTION SUBCUTANEOUS at 08:15

## 2024-12-14 RX ADMIN — INSULIN LISPRO 12 UNITS: 100 INJECTION, SOLUTION INTRAVENOUS; SUBCUTANEOUS at 20:20

## 2024-12-14 RX ADMIN — APIXABAN 5 MG: 5 TABLET, FILM COATED ORAL at 20:21

## 2024-12-14 RX ADMIN — INSULIN GLARGINE 25 UNITS: 100 INJECTION, SOLUTION SUBCUTANEOUS at 20:20

## 2024-12-14 RX ADMIN — PANTOPRAZOLE SODIUM 40 MG: 40 TABLET, DELAYED RELEASE ORAL at 08:06

## 2024-12-14 RX ADMIN — QUETIAPINE FUMARATE 50 MG: 25 TABLET ORAL at 20:21

## 2024-12-14 RX ADMIN — CYCLOPENTOLATE HYDROCHLORIDE 1 DROP: 10 SOLUTION/ DROPS OPHTHALMIC at 17:35

## 2024-12-14 RX ADMIN — INSULIN LISPRO 10 UNITS: 100 INJECTION, SOLUTION INTRAVENOUS; SUBCUTANEOUS at 08:15

## 2024-12-14 RX ADMIN — INSULIN LISPRO 8 UNITS: 100 INJECTION, SOLUTION INTRAVENOUS; SUBCUTANEOUS at 12:15

## 2024-12-14 RX ADMIN — INSULIN LISPRO 10 UNITS: 100 INJECTION, SOLUTION INTRAVENOUS; SUBCUTANEOUS at 17:32

## 2024-12-14 RX ADMIN — DICLOFENAC SODIUM 4 G: 10 GEL TOPICAL at 12:18

## 2024-12-14 RX ADMIN — ACETAMINOPHEN 650 MG: 325 TABLET, FILM COATED ORAL at 05:04

## 2024-12-14 RX ADMIN — DICLOFENAC SODIUM 4 G: 10 GEL TOPICAL at 17:36

## 2024-12-14 RX ADMIN — DICLOFENAC SODIUM 4 G: 10 GEL TOPICAL at 20:21

## 2024-12-14 RX ADMIN — DICLOFENAC SODIUM 4 G: 10 GEL TOPICAL at 08:25

## 2024-12-14 RX ADMIN — FAMOTIDINE 20 MG: 20 TABLET, FILM COATED ORAL at 08:06

## 2024-12-14 RX ADMIN — FAMOTIDINE 20 MG: 20 TABLET, FILM COATED ORAL at 20:21

## 2024-12-14 RX ADMIN — HYDROCODONE BITARTRATE AND ACETAMINOPHEN 1 TABLET: 5; 325 TABLET ORAL at 20:21

## 2024-12-14 RX ADMIN — CYCLOPENTOLATE HYDROCHLORIDE 1 DROP: 10 SOLUTION/ DROPS OPHTHALMIC at 20:21

## 2024-12-14 RX ADMIN — ATORVASTATIN CALCIUM 80 MG: 80 TABLET, FILM COATED ORAL at 20:21

## 2024-12-14 RX ADMIN — LEVOTHYROXINE SODIUM 125 MCG: 125 TABLET ORAL at 05:04

## 2024-12-14 RX ADMIN — ALPRAZOLAM 0.5 MG: 0.5 TABLET ORAL at 20:21

## 2024-12-14 RX ADMIN — LOSARTAN POTASSIUM 100 MG: 50 TABLET, FILM COATED ORAL at 08:06

## 2024-12-14 RX ADMIN — CITALOPRAM HYDROBROMIDE 20 MG: 20 TABLET ORAL at 08:06

## 2024-12-14 RX ADMIN — APIXABAN 5 MG: 5 TABLET, FILM COATED ORAL at 08:06

## 2024-12-14 RX ADMIN — ACETAMINOPHEN 650 MG: 325 TABLET, FILM COATED ORAL at 17:41

## 2024-12-14 RX ADMIN — CHLORTHALIDONE 12.5 MG: 25 TABLET ORAL at 08:06

## 2024-12-14 RX ADMIN — AMLODIPINE BESYLATE 10 MG: 5 TABLET ORAL at 08:06

## 2024-12-14 RX ADMIN — CYCLOPENTOLATE HYDROCHLORIDE 1 DROP: 10 SOLUTION/ DROPS OPHTHALMIC at 08:24

## 2024-12-14 NOTE — PLAN OF CARE
Goal Outcome Evaluation:  Plan of Care Reviewed With: patient        Progress: no change       Pt slept during shift. Arousal to voice. Bladder scan performed due to not voiding. In and out cath performed. Pt became more awake towards end of shift.

## 2024-12-14 NOTE — PLAN OF CARE
Problem: Adult Inpatient Plan of Care  Goal: Plan of Care Review  Outcome: Progressing  Flowsheets  Taken 12/13/2024 2143 by Kartik Stafford RN  Plan of Care Reviewed With: patient  Taken 12/13/2024 1943 by Ayala Boss RN  Progress: no change  Goal: Patient-Specific Goal (Individualized)  Outcome: Progressing  Goal: Absence of Hospital-Acquired Illness or Injury  Outcome: Progressing  Intervention: Identify and Manage Fall Risk  Recent Flowsheet Documentation  Taken 12/13/2024 2000 by Kartik Stafford RN  Safety Promotion/Fall Prevention:   activity supervised   assistive device/personal items within reach   fall prevention program maintained   nonskid shoes/slippers when out of bed   safety round/check completed  Intervention: Prevent Skin Injury  Recent Flowsheet Documentation  Taken 12/13/2024 2000 by Kartik Stafford RN  Body Position: position changed independently  Intervention: Prevent Infection  Recent Flowsheet Documentation  Taken 12/13/2024 2000 by Kartik Stafford RN  Infection Prevention:   environmental surveillance performed   equipment surfaces disinfected   single patient room provided   hand hygiene promoted   rest/sleep promoted  Goal: Optimal Comfort and Wellbeing  Outcome: Progressing  Goal: Readiness for Transition of Care  Outcome: Progressing     Problem: Violence Risk or Actual  Goal: Anger and Impulse Control  Outcome: Progressing  Intervention: Minimize Safety Risk  Recent Flowsheet Documentation  Taken 12/13/2024 2000 by Kartik Stafford RN  Enhanced Safety Measures: bed alarm set     Problem: Stroke, Ischemic (Includes Transient Ischemic Attack)  Goal: Optimal Coping  Outcome: Progressing  Goal: Effective Urinary Elimination  Outcome: Progressing     Problem: Fall Injury Risk  Goal: Absence of Fall and Fall-Related Injury  Outcome: Progressing  Intervention: Promote Injury-Free Environment  Recent Flowsheet Documentation  Taken 12/13/2024 2000 by Kartik Stafford RN  Safety  Promotion/Fall Prevention:   activity supervised   assistive device/personal items within reach   fall prevention program maintained   nonskid shoes/slippers when out of bed   safety round/check completed   Goal Outcome Evaluation:  Plan of Care Reviewed With: patient        Progress: no change

## 2024-12-14 NOTE — THERAPY TREATMENT NOTE
Patient Name: Ramsey Riley  : 1955    MRN: [id number]                              Today's Date: 2024       Admit Date: 12/10/2024    Visit Dx:     ICD-10-CM ICD-9-CM   1. TIA (transient ischemic attack)  G45.9 435.9     Patient Active Problem List   Diagnosis    Coronary artery disease involving native coronary artery of native heart without angina pectoris    Essential hypertension    Bradycardia, sinus    Fatigue    Abnormal ECG    Precordial pain    LVH (left ventricular hypertrophy) due to hypertensive disease, without heart failure    Diabetes mellitus    PAD (peripheral artery disease)    Ischemic stroke    History of blood clots    Pulmonary embolus    Dyslipidemia    Class 1 obesity due to excess calories with serious comorbidity and body mass index (BMI) of 34.0 to 34.9 in adult    Weakness     Past Medical History:   Diagnosis Date    Arthritis     Clot     Coronary artery disease     Diabetes mellitus     Heart disease     History of blood clots     Hypertension     Impaired mobility     Low back pain     Thyroid disease     Type 2 diabetes mellitus      Past Surgical History:   Procedure Laterality Date    CHOLECYSTECTOMY      COLONOSCOPY      dr srivastava    CORONARY ARTERY BYPASS GRAFT      [place] -  BY PASS     ENDOSCOPY        General Information       Row Name 24 1322          Physical Therapy Time and Intention    Document Type therapy note (daily note)  -CC     Mode of Treatment physical therapy  -CC       Row Name 24 1322          General Information    Patient Profile Reviewed yes  -CC     Existing Precautions/Restrictions fall  -CC       Row Name 24 1322          Safety Issues/Impairments Affecting Functional Mobility    Safety Issues Affecting Function (Mobility) awareness of need for assistance;insight into deficits/self-awareness;positioning of assistive device;problem-solving;safety precautions follow-through/compliance  -CC     Impairments  Affecting Function (Mobility) visual/perceptual  -CC               User Key  (r) = Recorded By, (t) = Taken By, (c) = Cosigned By      Initials Name Provider Type    Lolita Lopez PTA Physical Therapist Assistant                   Mobility       Row Name 12/14/24 1323          Gait/Stairs (Locomotion)    Patient was able to Ambulate yes  -CC     Distance in Feet (Gait) 55  x2  -CC               User Key  (r) = Recorded By, (t) = Taken By, (c) = Cosigned By      Initials Name Provider Type    Lolita Lopez PTA Physical Therapist Assistant                   Obj/Interventions    No documentation.                  Goals/Plan    No documentation.                  Clinical Impression       Row Name 12/14/24 1324          Pain    Pain Management Interventions exercise or physical activity utilized  -CC     Response to Pain Interventions activity participation with tolerable pain  -CC     Additional Documentation Pain Scale: FACES Pre/Post-Treatment (Group)  -CC       Row Name 12/14/24 1324          Pain Scale: FACES Pre/Post-Treatment    Pain: FACES Scale, Pretreatment 2-->hurts little bit  -CC     Posttreatment Pain Rating 4-->hurts little more  -CC       Row Name 12/14/24 1324          Plan of Care Review    Plan of Care Reviewed With patient;family  -CC     Progress improving  -CC     Outcome Evaluation Pt agreeable to physical therapy. Pt is visually impaired.  Performed supine to sit with min A and VC, scooting EOB CGA, sitting EOB without LOB  or support, sit <->stand RW with min A and VC /TC for hand placement, amb with RW 55 feet x2 with standing rest break, with min/CGA for AD management with VC for amb straight or turns d/t vision deficits. Performed B LE ex in sitting with VC and TC for tech LAQ, hip abd, marching and reclined AP, QS 1x10 reps. pt reported leg mm feel tired. Con't with PT POC and progress as tolerated  -CC       Row Name 12/14/24 1324          Positioning and Restraints     Pre-Treatment Position in bed  -CC     Post Treatment Position chair  -CC     In Chair notified nsg;reclined;call light within reach;encouraged to call for assist;exit alarm on;with family/caregiver  -               User Key  (r) = Recorded By, (t) = Taken By, (c) = Cosigned By      Initials Name Provider Type    Lolita Lopez PTA Physical Therapist Assistant                   Outcome Measures       Row Name 12/14/24 1336 12/14/24 0806       How much help from another person do you currently need...    Turning from your back to your side while in flat bed without using bedrails? 3  -CC 3  -KS    Moving from lying on back to sitting on the side of a flat bed without bedrails? 3  -CC 3  -KS    Moving to and from a bed to a chair (including a wheelchair)? 3  -CC 2  -KS    Standing up from a chair using your arms (e.g., wheelchair, bedside chair)? 3  -CC 3  -KS    Climbing 3-5 steps with a railing? 2  -CC 2  -KS    To walk in hospital room? 3  -CC 3  -KS    AM-PAC 6 Clicks Score (PT) 17  -CC 16  -KS    Highest Level of Mobility Goal 5 --> Static standing  -CC 5 --> Static standing  -KS      Row Name 12/14/24 1336          Functional Assessment    Outcome Measure Options AM-PAC 6 Clicks Basic Mobility (PT)  -               User Key  (r) = Recorded By, (t) = Taken By, (c) = Cosigned By      Initials Name Provider Type     Lolita Alcazar PTA Physical Therapist Assistant    Ayala Durbin RN Registered Nurse                                 Physical Therapy Education       Title: PT OT SLP Therapies (In Progress)       Topic: Physical Therapy (In Progress)       Point: Mobility training (Done)       Learning Progress Summary            Patient Acceptance, E,TB, VU by  at 12/12/2024 1301    Comment: role of PT during IP admission                      Point: Home exercise program (Done)       Learning Progress Summary            Patient Acceptance, E,TB, VU by  at 12/14/2024 1336    Comment:  Perform ex daily                      Point: Body mechanics (Not Started)       Learner Progress:  Not documented in this visit.              Point: Precautions (Not Started)       Learner Progress:  Not documented in this visit.                              User Key       Initials Effective Dates Name Provider Type Discipline    CC 06/16/21 -  Lolita Alcazar PTA Physical Therapist Assistant PT     11/29/23 -  Kay Castano PT Physical Therapist PT                  PT Recommendation and Plan     Progress: improving  Outcome Evaluation: Pt agreeable to physical therapy. Pt is visually impaired.  Performed supine to sit with min A and VC, scooting EOB CGA, sitting EOB without LOB  or support, sit <->stand RW with min A and VC /TC for hand placement, amb with RW 55 feet x2 with standing rest break, with min/CGA for AD management with VC for amb straight or turns d/t vision deficits. Performed B LE ex in sitting with VC and TC for tech LAQ, hip abd, marching and reclined AP, QS 1x10 reps. pt reported leg mm feel tired. Con't with PT POC and progress as tolerated     Time Calculation:         PT Charges       Row Name 12/14/24 1337             Time Calculation    PT Received On 12/14/24  -CC      PT Goal Re-Cert Due Date 12/22/24  -CC         Time Calculation- PT    Total Timed Code Minutes- PT 40 minute(s)  -CC         Timed Charges    86064 - PT Therapeutic Exercise Minutes 17  -CC      42029 - Gait Training Minutes  15  -CC      98961 - PT Therapeutic Activity Minutes 8  -CC         Total Minutes    Timed Charges Total Minutes 40  -CC       Total Minutes 40  -CC                User Key  (r) = Recorded By, (t) = Taken By, (c) = Cosigned By      Initials Name Provider Type    CC Lolita Alcazar PTA Physical Therapist Assistant                  Therapy Charges for Today       Code Description Service Date Service Provider Modifiers Qty    65149753497 HC PT THER PROC EA 15 MIN 12/14/2024 Lolita Alcazar PTA GP  1    97134726382 HC GAIT TRAINING EA 15 MIN 12/14/2024 Lolita Alcazar, ASMITA GP 1    47633558078 HC PT THERAPEUTIC ACT EA 15 MIN 12/14/2024 Lolita Alcazar, ASMITA GP 1            PT G-Codes  Outcome Measure Options: AM-PAC 6 Clicks Basic Mobility (PT)  AM-PAC 6 Clicks Score (PT): 17  AM-PAC 6 Clicks Score (OT): 12       Lolita Alcazar PTA  12/14/2024

## 2024-12-14 NOTE — PLAN OF CARE
Goal Outcome Evaluation:  Plan of Care Reviewed With: patient, family        Progress: improving  Outcome Evaluation: Pt agreeable to physical therapy. Pt is visually impaired.  Performed supine to sit with min A and VC, scooting EOB CGA, sitting EOB without LOB  or support, sit <->stand RW with min A and VC /TC for hand placement, amb with RW 55 feet x2 with standing rest break, with min/CGA for AD management with VC for amb straight or turns d/t vision deficits. Performed B LE ex in sitting with VC and TC for tech LAQ, hip abd, marching and reclined AP, QS 1x10 reps. pt reported leg mm feel tired. Con't with PT POC and progress as tolerated

## 2024-12-14 NOTE — PLAN OF CARE
Goal Outcome Evaluation:  Plan of Care Reviewed With: patient        Progress: no change       VSS. Pt up to bathroom during shift. Pt sat at edge of bed today.

## 2024-12-14 NOTE — PROGRESS NOTES
[facility]    Name:  Ramsey Riley   Age:  69 y.o.  Sex:  male  :  1955  MRN:  [id number]   Visit Number:  [id number]  Admission Date:  12/10/2024  Date Of Service:  24  Primary Care Physician:  Margret Purvis MD     LOS: 0 days :    Chief Complaint:      Follow-up weakness    Subjective:    Patient was more alert this morning.  Nursing noted he did better overnight.  Still waiting hear about rehab placement.    Hospital Course:    Chronically ill 69-year-old with a history of peripheral neuropathy, diabetes, hypothyroidism, CAD, prior CVA, vision loss, diabetic retinopathy, chronic pain, history of multiple falls, who had presented to the emergency room due to concerns of confusion.  History obtained primarily from ER record, somewhat from the patient.  No family currently at bedside at time of visit.  Patient notes he had had a fall he thinks yesterday, had hurt his shoulder, actually went to an outlying emergency room where he had no fractures.  He had taken some pain medicine at home, cannot remember the name, apparently his wife had noticed him being more confused overnight into today.  He is currently awake and alert that he is in the emergency room, however he is overall fairly poor historian.  Notes shoulder is feeling better.  He denies any dizziness or headache.     In the ER the patient is overall stable other than mild hypertension.  His labs were largely nonactionable.  X-rays unremarkable for any acute fracture.  CT angiogram of the head neck with contrast was unremarkable, MRI of the brain did not show any acute CVA, showed white matter disease.  Patient was seen by teleneurology who recommended admission for observation.    Concern is for underlying cerebral amyloid angiopathy, possible dementia contributing.  Would likely benefit from short-term rehab after discussion with PT and OT.    Review of Systems:     All systems were reviewed  "and negative except as mentioned in subjective, assessment and plan.    Vital Signs:    Temp:  [97.5 °F (36.4 °C)-98.5 °F (36.9 °C)] 97.5 °F (36.4 °C)  Heart Rate:  [66-87] 87  Resp:  [16-18] 18  BP: (117-170)/(74-94) 117/74    Intake and output:    I/O last 3 completed shifts:  In: 120 [P.O.:120]  Out: 500 [Urine:500]  No intake/output data recorded.    Physical Examination:    General Appearance:  Alert and cooperative   Head:  Atraumatic and normocephalic.   Eyes: Conjunctivae and sclerae normal, no icterus. No pallor.   Throat: No oral lesions, no thrush, oral mucosa moist.   Neck: Supple, trachea midline, no thyromegaly.   Lungs:   Breath sounds heard bilaterally equally.  No wheezing or crackles. No Pleural rub or bronchial breathing.   Heart:  Normal S1 and S2, no murmur, no gallop, no rub. No JVD.   Abdomen:   Normal bowel sounds, no masses, no organomegaly. Soft, nontender, nondistended, no rebound tenderness.   Extremities: Supple, no edema, no cyanosis, no clubbing.  Decreased range of motion right shoulder   Skin: No bleeding or rash.   Neurologic: Alert and oriented x person and place.  Weakness noted today     Laboratory results:    Results from last 7 days   Lab Units 12/11/24  0907 12/10/24  0929   SODIUM mmol/L 136 138   POTASSIUM mmol/L 4.1 3.8   CHLORIDE mmol/L 101 101   CO2 mmol/L 26.7 26.2   BUN mg/dL 18 16   CREATININE mg/dL 1.43* 1.55*   CALCIUM mg/dL 8.5* 8.9   BILIRUBIN mg/dL  --  0.8   ALK PHOS U/L  --  89   ALT (SGPT) U/L  --  20   AST (SGOT) U/L  --  29   GLUCOSE mg/dL 258* 175*     Results from last 7 days   Lab Units 12/10/24  1128   WBC 10*3/mm3 8.65   HEMOGLOBIN g/dL 16.0   HEMATOCRIT % 48.1   PLATELETS 10*3/mm3 146         Results from last 7 days   Lab Units 12/10/24  1128 12/10/24  0929   CK TOTAL U/L  --  437*   HSTROP T ng/L 50* 52*         No results for input(s): \"PHART\", \"GRV2ZOS\", \"PO2ART\", \"DJN1JJA\", \"BASEEXCESS\" in the last 8760 hours.   I have reviewed the patient's " laboratory results.    Radiology results:    No radiology results from the last 24 hrs  I have reviewed the patient's radiology reports.    Medication Review:     I have reviewed the patient's active and prn medications.     Problem List:      Weakness      Assessment:    Weakness, POA  Encephalopathy, POA  Diabetes with neuropathy and retinopathy  Vision loss  Hearing loss  CAD  History of prior PE on Eliquis  Impaired mobility and ADL    Plan:    Weakness/confusion:  Appears to have off-and-on confusion and weakness at home.  However recent increased amount of falls, not sleeping well, wife thinks some medication issues may be at play.  Notes patient sometimes doubles up on medicines because he sleeps throughout the morning and takes them all at nighttime.  Will schedule to Seroquel and Xanax only at night for now to help with mixing of nights and days.  Did better overnight.  Monitor closely    Cerebral amyloid angiopathy  Appears to have been progressive on imaging when compared to prior MRIs.  Based off symptomatology with the weakness, confusion, is fairly consistent with this.  Elevated blood pressures may have contributed and or fall exacerbated.  He will benefit from short-term rehab after discussion with PT and OT.  Will need outpatient nerve conduction testing.  Does have diabetic neuropathy     Diabetes/vision loss/hearing loss/CAD/prior PE/impaired mobility:  Continue home medications.  Continue with Eliquis.    Short-term rehab    DVT Prophylaxis: Eliquis  Code Status: Full  Diet: As tolerated  Discharge Plan: RIGOBERTO Sim DO  12/14/24  12:47 EST    Dictated utilizing Dragon dictation.

## 2024-12-15 LAB
GLUCOSE BLDC GLUCOMTR-MCNC: 173 MG/DL (ref 70–130)
GLUCOSE BLDC GLUCOMTR-MCNC: 278 MG/DL (ref 70–130)
GLUCOSE BLDC GLUCOMTR-MCNC: 285 MG/DL (ref 70–130)
GLUCOSE BLDC GLUCOMTR-MCNC: 312 MG/DL (ref 70–130)

## 2024-12-15 PROCEDURE — 63710000001 INSULIN GLARGINE PER 5 UNITS: Performed by: FAMILY MEDICINE

## 2024-12-15 PROCEDURE — G0378 HOSPITAL OBSERVATION PER HR: HCPCS

## 2024-12-15 PROCEDURE — 63710000001 INSULIN LISPRO (HUMAN) PER 5 UNITS: Performed by: FAMILY MEDICINE

## 2024-12-15 PROCEDURE — 97110 THERAPEUTIC EXERCISES: CPT

## 2024-12-15 PROCEDURE — 97530 THERAPEUTIC ACTIVITIES: CPT

## 2024-12-15 PROCEDURE — 97116 GAIT TRAINING THERAPY: CPT

## 2024-12-15 PROCEDURE — 82948 REAGENT STRIP/BLOOD GLUCOSE: CPT | Performed by: FAMILY MEDICINE

## 2024-12-15 PROCEDURE — 99231 SBSQ HOSP IP/OBS SF/LOW 25: CPT | Performed by: FAMILY MEDICINE

## 2024-12-15 PROCEDURE — 82948 REAGENT STRIP/BLOOD GLUCOSE: CPT

## 2024-12-15 RX ADMIN — CYCLOPENTOLATE HYDROCHLORIDE 1 DROP: 10 SOLUTION/ DROPS OPHTHALMIC at 21:12

## 2024-12-15 RX ADMIN — DICLOFENAC SODIUM 4 G: 10 GEL TOPICAL at 17:17

## 2024-12-15 RX ADMIN — INSULIN GLARGINE 25 UNITS: 100 INJECTION, SOLUTION SUBCUTANEOUS at 21:11

## 2024-12-15 RX ADMIN — INSULIN LISPRO 8 UNITS: 100 INJECTION, SOLUTION INTRAVENOUS; SUBCUTANEOUS at 21:11

## 2024-12-15 RX ADMIN — DICLOFENAC SODIUM 4 G: 10 GEL TOPICAL at 21:12

## 2024-12-15 RX ADMIN — CHLORTHALIDONE 12.5 MG: 25 TABLET ORAL at 08:18

## 2024-12-15 RX ADMIN — ACETAMINOPHEN 650 MG: 325 TABLET, FILM COATED ORAL at 04:18

## 2024-12-15 RX ADMIN — LEVOTHYROXINE SODIUM 125 MCG: 125 TABLET ORAL at 04:18

## 2024-12-15 RX ADMIN — INSULIN GLARGINE 25 UNITS: 100 INJECTION, SOLUTION SUBCUTANEOUS at 08:13

## 2024-12-15 RX ADMIN — APIXABAN 5 MG: 5 TABLET, FILM COATED ORAL at 21:11

## 2024-12-15 RX ADMIN — INSULIN LISPRO 8 UNITS: 100 INJECTION, SOLUTION INTRAVENOUS; SUBCUTANEOUS at 12:10

## 2024-12-15 RX ADMIN — ATORVASTATIN CALCIUM 80 MG: 80 TABLET, FILM COATED ORAL at 21:11

## 2024-12-15 RX ADMIN — CYCLOPENTOLATE HYDROCHLORIDE 1 DROP: 10 SOLUTION/ DROPS OPHTHALMIC at 08:27

## 2024-12-15 RX ADMIN — AMLODIPINE BESYLATE 10 MG: 5 TABLET ORAL at 08:18

## 2024-12-15 RX ADMIN — INSULIN LISPRO 10 UNITS: 100 INJECTION, SOLUTION INTRAVENOUS; SUBCUTANEOUS at 16:39

## 2024-12-15 RX ADMIN — PANTOPRAZOLE SODIUM 40 MG: 40 TABLET, DELAYED RELEASE ORAL at 08:18

## 2024-12-15 RX ADMIN — LOSARTAN POTASSIUM 100 MG: 50 TABLET, FILM COATED ORAL at 08:18

## 2024-12-15 RX ADMIN — FAMOTIDINE 20 MG: 20 TABLET, FILM COATED ORAL at 08:18

## 2024-12-15 RX ADMIN — FAMOTIDINE 20 MG: 20 TABLET, FILM COATED ORAL at 21:11

## 2024-12-15 RX ADMIN — ALPRAZOLAM 0.5 MG: 0.5 TABLET ORAL at 21:11

## 2024-12-15 RX ADMIN — HYDROCODONE BITARTRATE AND ACETAMINOPHEN 1 TABLET: 5; 325 TABLET ORAL at 14:30

## 2024-12-15 RX ADMIN — DICLOFENAC SODIUM 4 G: 10 GEL TOPICAL at 08:31

## 2024-12-15 RX ADMIN — APIXABAN 5 MG: 5 TABLET, FILM COATED ORAL at 08:18

## 2024-12-15 RX ADMIN — ACETAMINOPHEN 650 MG: 325 TABLET, FILM COATED ORAL at 12:19

## 2024-12-15 RX ADMIN — CITALOPRAM HYDROBROMIDE 20 MG: 20 TABLET ORAL at 08:18

## 2024-12-15 RX ADMIN — DICLOFENAC SODIUM 4 G: 10 GEL TOPICAL at 12:15

## 2024-12-15 RX ADMIN — QUETIAPINE FUMARATE 50 MG: 25 TABLET ORAL at 21:11

## 2024-12-15 RX ADMIN — INSULIN LISPRO 3 UNITS: 100 INJECTION, SOLUTION INTRAVENOUS; SUBCUTANEOUS at 08:15

## 2024-12-15 RX ADMIN — CYCLOPENTOLATE HYDROCHLORIDE 1 DROP: 10 SOLUTION/ DROPS OPHTHALMIC at 16:43

## 2024-12-15 NOTE — PLAN OF CARE
Goal Outcome Evaluation:  Plan of Care Reviewed With: patient        Progress: improving  Outcome Evaluation: Pt agreeable to physical therapy. Pt is visually impaired. Performed supine to sit with min A and VC, scooting EOB CGA, sit <->stand RW with min A and VC for hand placement, amb with RW 60 feet x2 with standing rest break, with min/CGA for AD management with VC for amb straight or turns d/t vision deficits. Performed B LE ex in sitting with VC for tech LAQ, hip abd, marching and reclined AP, QS 1x15 reps. Con't with PT POC and progress as tolerated

## 2024-12-15 NOTE — THERAPY TREATMENT NOTE
Patient Name: Ramsey Riley  : 1955    MRN: [id number]                              Today's Date: 12/15/2024       Admit Date: 12/10/2024    Visit Dx:     ICD-10-CM ICD-9-CM   1. TIA (transient ischemic attack)  G45.9 435.9     Patient Active Problem List   Diagnosis    Coronary artery disease involving native coronary artery of native heart without angina pectoris    Essential hypertension    Bradycardia, sinus    Fatigue    Abnormal ECG    Precordial pain    LVH (left ventricular hypertrophy) due to hypertensive disease, without heart failure    Diabetes mellitus    PAD (peripheral artery disease)    Ischemic stroke    History of blood clots    Pulmonary embolus    Dyslipidemia    Class 1 obesity due to excess calories with serious comorbidity and body mass index (BMI) of 34.0 to 34.9 in adult    Weakness     Past Medical History:   Diagnosis Date    Arthritis     Clot     Coronary artery disease     Diabetes mellitus     Heart disease     History of blood clots     Hypertension     Impaired mobility     Low back pain     Thyroid disease     Type 2 diabetes mellitus      Past Surgical History:   Procedure Laterality Date    CHOLECYSTECTOMY      COLONOSCOPY      dr srivastava    CORONARY ARTERY BYPASS GRAFT      [place] -  BY PASS     ENDOSCOPY        General Information       Row Name 12/15/24 1246          Physical Therapy Time and Intention    Document Type therapy note (daily note)  -CC     Mode of Treatment physical therapy  -CC       Row Name 12/15/24 1246          General Information    Patient Profile Reviewed yes  -CC     Existing Precautions/Restrictions fall  -CC       Row Name 12/15/24 1246          Safety Issues/Impairments Affecting Functional Mobility    Safety Issues Affecting Function (Mobility) awareness of need for assistance;insight into deficits/self-awareness;safety precautions follow-through/compliance  -CC     Impairments Affecting Function (Mobility)  visual/perceptual;endurance/activity tolerance  -CC               User Key  (r) = Recorded By, (t) = Taken By, (c) = Cosigned By      Initials Name Provider Type    CC Lolita Alcazar PTA Physical Therapist Assistant                   Mobility       Row Name 12/15/24 1247          Gait/Stairs (Locomotion)    Patient was able to Ambulate yes  -CC     Distance in Feet (Gait) 60  x2 standing rest break  -CC               User Key  (r) = Recorded By, (t) = Taken By, (c) = Cosigned By      Initials Name Provider Type    CC Lolita Alcazar PTA Physical Therapist Assistant                   Obj/Interventions    No documentation.                  Goals/Plan    No documentation.                  Clinical Impression       Row Name 12/15/24 1249          Pain    Pretreatment Pain Rating 10/10  -CC     Posttreatment Pain Rating 10/10  -CC     Pain Location shoulder  -CC     Pain Side/Orientation right  -CC     Pain Management Interventions exercise or physical activity utilized  -CC     Response to Pain Interventions activity participation with tolerable pain  -CC       Row Name 12/15/24 1249          Plan of Care Review    Plan of Care Reviewed With patient  -CC     Progress improving  -CC     Outcome Evaluation Pt agreeable to physical therapy. Pt is visually impaired. Performed supine to sit with min A and VC, scooting EOB CGA, sit <->stand RW with min A and VC for hand placement, amb with RW 60 feet x2 with standing rest break, with min/CGA for AD management with VC for amb straight or turns d/t vision deficits. Performed B LE ex in sitting with VC for tech LAQ, hip abd, marching and reclined AP, QS 1x15 reps. Con't with PT POC and progress as tolerated  -CC       Row Name 12/15/24 1245          Positioning and Restraints    Pre-Treatment Position in bed  -CC     Post Treatment Position chair  -CC     In Chair notified nsg;reclined;call light within reach;encouraged to call for assist;with family/caregiver  -CC                User Key  (r) = Recorded By, (t) = Taken By, (c) = Cosigned By      Initials Name Provider Type    CC Lolita Alcazar PTA Physical Therapist Assistant                   Outcome Measures       Row Name 12/15/24 1255 12/15/24 0818       How much help from another person do you currently need...    Turning from your back to your side while in flat bed without using bedrails? 3  -CC 3  -KS    Moving from lying on back to sitting on the side of a flat bed without bedrails? 3  -CC 3  -KS    Moving to and from a bed to a chair (including a wheelchair)? 3  -CC 3  -KS    Standing up from a chair using your arms (e.g., wheelchair, bedside chair)? 3  -CC 3  -KS    Climbing 3-5 steps with a railing? 2  -CC 2  -KS    To walk in hospital room? 3  -CC 3  -KS    AM-PAC 6 Clicks Score (PT) 17  -CC 17  -KS    Highest Level of Mobility Goal 5 --> Static standing  -CC 5 --> Static standing  -KS      Row Name 12/15/24 1255          Functional Assessment    Outcome Measure Options AM-PAC 6 Clicks Basic Mobility (PT)  -CC               User Key  (r) = Recorded By, (t) = Taken By, (c) = Cosigned By      Initials Name Provider Type    Lolita Lopez PTA Physical Therapist Assistant    Ayala Durbin, RN Registered Nurse                                 Physical Therapy Education       Title: PT OT SLP Therapies (In Progress)       Topic: Physical Therapy (In Progress)       Point: Mobility training (Done)       Learning Progress Summary            Patient Acceptance, E,TB, VU by  at 12/12/2024 1301    Comment: role of PT during IP admission                      Point: Home exercise program (Done)       Learning Progress Summary            Patient Acceptance, E,TB, VU by  at 12/14/2024 1336    Comment: Perform ex daily                      Point: Body mechanics (Done)       Learning Progress Summary            Patient Acceptance, E,TB, VU by  at 12/15/2024 1256    Comment: upright posture in standing and during  amb                      Point: Precautions (Not Started)       Learner Progress:  Not documented in this visit.                              User Key       Initials Effective Dates Name Provider Type Discipline    CC 06/16/21 -  Lolita Alcazar PTA Physical Therapist Assistant PT     11/29/23 -  Kay Castano PT Physical Therapist PT                  PT Recommendation and Plan     Progress: improving  Outcome Evaluation: Pt agreeable to physical therapy. Pt is visually impaired. Performed supine to sit with min A and VC, scooting EOB CGA, sit <->stand RW with min A and VC for hand placement, amb with RW 60 feet x2 with standing rest break, with min/CGA for AD management with VC for amb straight or turns d/t vision deficits. Performed B LE ex in sitting with VC for tech LAQ, hip abd, marching and reclined AP, QS 1x15 reps. Con't with PT POC and progress as tolerated     Time Calculation:         PT Charges       Row Name 12/15/24 1257             Time Calculation    PT Received On 12/15/24  -CC      PT Goal Re-Cert Due Date 12/22/24  -CC         Time Calculation- PT    Total Timed Code Minutes- PT 38 minute(s)  -CC         Timed Charges    26717 - PT Therapeutic Exercise Minutes 15  -CC      44352 - Gait Training Minutes  15  -CC      71024 - PT Therapeutic Activity Minutes 8  -CC         Total Minutes    Timed Charges Total Minutes 38  -CC       Total Minutes 38  -CC                User Key  (r) = Recorded By, (t) = Taken By, (c) = Cosigned By      Initials Name Provider Type    CC Lolita Alcazar PTA Physical Therapist Assistant                  Therapy Charges for Today       Code Description Service Date Service Provider Modifiers Qty    95970010429 HC PT THER PROC EA 15 MIN 12/14/2024 Lolita Alcazar, ASMITA GP 1    85563140061 HC GAIT TRAINING EA 15 MIN 12/14/2024 Lolita Alcazar, PTA GP 1    66657128536 HC PT THERAPEUTIC ACT EA 15 MIN 12/14/2024 Lolita Alcazar, ASMITA GP 1    38614626188 HC PT THER  PROC EA 15 MIN 12/15/2024 Lolita Alcazar, PTA GP 1    70802796382 HC GAIT TRAINING EA 15 MIN 12/15/2024 Lolita Alcazar, PTA GP 1    42351760737 HC PT THERAPEUTIC ACT EA 15 MIN 12/15/2024 Lolita Alcazar, PTA GP 1            PT G-Codes  Outcome Measure Options: AM-PAC 6 Clicks Basic Mobility (PT)  AM-PAC 6 Clicks Score (PT): 17  AM-PAC 6 Clicks Score (OT): 12       Lolita Alcazar PTA  12/15/2024

## 2024-12-15 NOTE — PROGRESS NOTES
[facility]    Name:  Ramsey Riley   Age:  69 y.o.  Sex:  male  :  1955  MRN:  [id number]   Visit Number:  [id number]  Admission Date:  12/10/2024  Date Of Service:  12/15/24  Primary Care Physician:  Margret Purvis MD     LOS: 0 days :    Chief Complaint:      Follow-up weakness    Subjective:    Patient significant more alert this morning than prior visits.  Nursing noted no significant issues overnight, did not require any additional medications other than scheduled insomnia medications.  Appetite okay encouraged him to eat more today.    Hospital Course:    Chronically ill 69-year-old with a history of peripheral neuropathy, diabetes, hypothyroidism, CAD, prior CVA, vision loss, diabetic retinopathy, chronic pain, history of multiple falls, who had presented to the emergency room due to concerns of confusion.  History obtained primarily from ER record, somewhat from the patient.  No family currently at bedside at time of visit.  Patient notes he had had a fall he thinks yesterday, had hurt his shoulder, actually went to an outlying emergency room where he had no fractures.  He had taken some pain medicine at home, cannot remember the name, apparently his wife had noticed him being more confused overnight into today.  He is currently awake and alert that he is in the emergency room, however he is overall fairly poor historian.  Notes shoulder is feeling better.  He denies any dizziness or headache.     In the ER the patient is overall stable other than mild hypertension.  His labs were largely nonactionable.  X-rays unremarkable for any acute fracture.  CT angiogram of the head neck with contrast was unremarkable, MRI of the brain did not show any acute CVA, showed white matter disease.  Patient was seen by teleneurology who recommended admission for observation.    Concern is for underlying cerebral amyloid angiopathy, possible dementia contributing.   Would likely benefit from short-term rehab after discussion with PT and OT.    Review of Systems:     All systems were reviewed and negative except as mentioned in subjective, assessment and plan.    Vital Signs:    Temp:  [97 °F (36.1 °C)-98.1 °F (36.7 °C)] 97.7 °F (36.5 °C)  Heart Rate:  [62-83] 83  Resp:  [16-18] 16  BP: (126-149)/(72-95) 132/95    Intake and output:    I/O last 3 completed shifts:  In: 700 [P.O.:700]  Out: 1425 [Urine:1425]  No intake/output data recorded.    Physical Examination:    General Appearance:  Alert and cooperative   Head:  Atraumatic and normocephalic.   Eyes: Conjunctivae and sclerae normal, no icterus. No pallor.   Throat: No oral lesions, no thrush, oral mucosa moist.   Neck: Supple, trachea midline, no thyromegaly.   Lungs:   Breath sounds heard bilaterally equally.  No wheezing or crackles. No Pleural rub or bronchial breathing.   Heart:  Normal S1 and S2, no murmur, no gallop, no rub. No JVD.   Abdomen:   Normal bowel sounds, no masses, no organomegaly. Soft, nontender, nondistended, no rebound tenderness.   Extremities: Supple, no edema, no cyanosis, no clubbing.  Decreased range of motion right shoulder   Skin: No bleeding or rash.   Neurologic: Alert and oriented x person and place.  Generalized weakness but improved     Laboratory results:    Results from last 7 days   Lab Units 12/11/24  0907 12/10/24  0929   SODIUM mmol/L 136 138   POTASSIUM mmol/L 4.1 3.8   CHLORIDE mmol/L 101 101   CO2 mmol/L 26.7 26.2   BUN mg/dL 18 16   CREATININE mg/dL 1.43* 1.55*   CALCIUM mg/dL 8.5* 8.9   BILIRUBIN mg/dL  --  0.8   ALK PHOS U/L  --  89   ALT (SGPT) U/L  --  20   AST (SGOT) U/L  --  29   GLUCOSE mg/dL 258* 175*     Results from last 7 days   Lab Units 12/10/24  1128   WBC 10*3/mm3 8.65   HEMOGLOBIN g/dL 16.0   HEMATOCRIT % 48.1   PLATELETS 10*3/mm3 146         Results from last 7 days   Lab Units 12/10/24  1128 12/10/24  0929   CK TOTAL U/L  --  437*   HSTROP T ng/L 50* 52*        "  No results for input(s): \"PHART\", \"YTI6YIF\", \"PO2ART\", \"XAD7AEJ\", \"BASEEXCESS\" in the last 8760 hours.   I have reviewed the patient's laboratory results.    Radiology results:    No radiology results from the last 24 hrs  I have reviewed the patient's radiology reports.    Medication Review:     I have reviewed the patient's active and prn medications.     Problem List:      Weakness      Assessment:    Weakness, POA  Encephalopathy, POA  Diabetes with neuropathy and retinopathy  Vision loss  Hearing loss  CAD  History of prior PE on Eliquis  Impaired mobility and ADL    Plan:    Weakness/confusion:  Appears to have off-and-on confusion and weakness at home.  However recent increased amount of falls, not sleeping well, wife thinks some medication issues may be at play.  Notes patient sometimes doubles up on medicines because he sleeps throughout the morning and takes them all at nighttime.  Will schedule to Seroquel and Xanax only at night for now to help with mixing of nights and days.  This seems to be improving.    Cerebral amyloid angiopathy  Appears to have been progressive on imaging when compared to prior MRIs.  Based off symptomatology with the weakness, confusion, is fairly consistent with this.  Elevated blood pressures may have contributed and or fall exacerbated.  He will benefit from short-term rehab after discussion with PT and OT.  Will need outpatient nerve conduction testing.  Does have diabetic neuropathy     Diabetes/vision loss/hearing loss/CAD/prior PE/impaired mobility:  Continue home medications.  Continue with Eliquis.    Short-term rehab workup is pending.    DVT Prophylaxis: Eliquis  Code Status: Full  Diet: As tolerated  Discharge Plan: RIGOBERTO Sim DO  12/15/24  11:40 EST    Dictated utilizing Dragon dictation.    "

## 2024-12-15 NOTE — PLAN OF CARE
Goal Outcome Evaluation:  Plan of Care Reviewed With: patient        Progress: no change       Pt up in chair during shift. Pt c/o px, see MAR. Pt said he was seeing things that were not there after taking px medicine. Provider informed.

## 2024-12-15 NOTE — PLAN OF CARE
Problem: Adult Inpatient Plan of Care  Goal: Plan of Care Review  Outcome: Progressing  Flowsheets (Taken 12/14/2024 2314)  Progress: no change  Plan of Care Reviewed With: patient  Goal: Patient-Specific Goal (Individualized)  Outcome: Progressing  Goal: Absence of Hospital-Acquired Illness or Injury  Outcome: Progressing  Intervention: Identify and Manage Fall Risk  Recent Flowsheet Documentation  Taken 12/14/2024 2200 by Kartik Stafford RN  Safety Promotion/Fall Prevention:   activity supervised   assistive device/personal items within reach   fall prevention program maintained   nonskid shoes/slippers when out of bed   safety round/check completed  Taken 12/14/2024 2000 by Kartik Stafford RN  Safety Promotion/Fall Prevention:   activity supervised   assistive device/personal items within reach   fall prevention program maintained   nonskid shoes/slippers when out of bed   safety round/check completed  Intervention: Prevent Skin Injury  Recent Flowsheet Documentation  Taken 12/14/2024 2200 by Kartik Stafford, RN  Body Position:   supine   position changed independently  Taken 12/14/2024 2000 by Kartik Stafford RN  Body Position:   supine   legs elevated  Intervention: Prevent Infection  Recent Flowsheet Documentation  Taken 12/14/2024 2200 by Kartik Stafford RN  Infection Prevention:   environmental surveillance performed   equipment surfaces disinfected   single patient room provided   hand hygiene promoted   rest/sleep promoted  Taken 12/14/2024 2000 by Kartik Stafford RN  Infection Prevention:   environmental surveillance performed   equipment surfaces disinfected   single patient room provided   hand hygiene promoted   rest/sleep promoted  Goal: Optimal Comfort and Wellbeing  Outcome: Progressing  Goal: Readiness for Transition of Care  Outcome: Progressing     Problem: Violence Risk or Actual  Goal: Anger and Impulse Control  Outcome: Progressing  Intervention: Minimize Safety Risk  Recent Flowsheet  Documentation  Taken 12/14/2024 2200 by Kartik Stafford RN  Enhanced Safety Measures: bed alarm set  Taken 12/14/2024 2000 by Kartik Stafford RN  Enhanced Safety Measures: bed alarm set     Problem: Stroke, Ischemic (Includes Transient Ischemic Attack)  Goal: Optimal Coping  Outcome: Progressing  Goal: Effective Urinary Elimination  Outcome: Progressing     Problem: Fall Injury Risk  Goal: Absence of Fall and Fall-Related Injury  Outcome: Progressing  Intervention: Promote Injury-Free Environment  Recent Flowsheet Documentation  Taken 12/14/2024 2200 by Kartik Stafford RN  Safety Promotion/Fall Prevention:   activity supervised   assistive device/personal items within reach   fall prevention program maintained   nonskid shoes/slippers when out of bed   safety round/check completed  Taken 12/14/2024 2000 by Kartik Stafford RN  Safety Promotion/Fall Prevention:   activity supervised   assistive device/personal items within reach   fall prevention program maintained   nonskid shoes/slippers when out of bed   safety round/check completed   Goal Outcome Evaluation:  Plan of Care Reviewed With: patient        Progress: no change

## 2024-12-16 LAB
ANION GAP SERPL CALCULATED.3IONS-SCNC: 10.6 MMOL/L (ref 5–15)
BASOPHILS # BLD AUTO: 0.03 10*3/MM3 (ref 0–0.2)
BASOPHILS NFR BLD AUTO: 0.4 % (ref 0–1.5)
BUN SERPL-MCNC: 19 MG/DL (ref 8–23)
BUN/CREAT SERPL: 13.1 (ref 7–25)
CALCIUM SPEC-SCNC: 9.2 MG/DL (ref 8.6–10.5)
CHLORIDE SERPL-SCNC: 100 MMOL/L (ref 98–107)
CO2 SERPL-SCNC: 24.4 MMOL/L (ref 22–29)
CREAT SERPL-MCNC: 1.45 MG/DL (ref 0.76–1.27)
DEPRECATED RDW RBC AUTO: 38 FL (ref 37–54)
EGFRCR SERPLBLD CKD-EPI 2021: 52.8 ML/MIN/1.73
EOSINOPHIL # BLD AUTO: 0.15 10*3/MM3 (ref 0–0.4)
EOSINOPHIL NFR BLD AUTO: 1.9 % (ref 0.3–6.2)
ERYTHROCYTE [DISTWIDTH] IN BLOOD BY AUTOMATED COUNT: 11.9 % (ref 12.3–15.4)
GLUCOSE BLDC GLUCOMTR-MCNC: 223 MG/DL (ref 70–130)
GLUCOSE BLDC GLUCOMTR-MCNC: 234 MG/DL (ref 70–130)
GLUCOSE BLDC GLUCOMTR-MCNC: 242 MG/DL (ref 70–130)
GLUCOSE BLDC GLUCOMTR-MCNC: 259 MG/DL (ref 70–130)
GLUCOSE SERPL-MCNC: 254 MG/DL (ref 65–99)
HCT VFR BLD AUTO: 46.5 % (ref 37.5–51)
HGB BLD-MCNC: 16.1 G/DL (ref 13–17.7)
IMM GRANULOCYTES # BLD AUTO: 0.02 10*3/MM3 (ref 0–0.05)
IMM GRANULOCYTES NFR BLD AUTO: 0.3 % (ref 0–0.5)
LYMPHOCYTES # BLD AUTO: 1.49 10*3/MM3 (ref 0.7–3.1)
LYMPHOCYTES NFR BLD AUTO: 18.7 % (ref 19.6–45.3)
MCH RBC QN AUTO: 29.9 PG (ref 26.6–33)
MCHC RBC AUTO-ENTMCNC: 34.6 G/DL (ref 31.5–35.7)
MCV RBC AUTO: 86.4 FL (ref 79–97)
MONOCYTES # BLD AUTO: 0.55 10*3/MM3 (ref 0.1–0.9)
MONOCYTES NFR BLD AUTO: 6.9 % (ref 5–12)
NEUTROPHILS NFR BLD AUTO: 5.73 10*3/MM3 (ref 1.7–7)
NEUTROPHILS NFR BLD AUTO: 71.8 % (ref 42.7–76)
NRBC BLD AUTO-RTO: 0 /100 WBC (ref 0–0.2)
PLATELET # BLD AUTO: 180 10*3/MM3 (ref 140–450)
PMV BLD AUTO: 10.7 FL (ref 6–12)
POTASSIUM SERPL-SCNC: 4.2 MMOL/L (ref 3.5–5.2)
RBC # BLD AUTO: 5.38 10*6/MM3 (ref 4.14–5.8)
SODIUM SERPL-SCNC: 135 MMOL/L (ref 136–145)
WBC NRBC COR # BLD AUTO: 7.97 10*3/MM3 (ref 3.4–10.8)

## 2024-12-16 PROCEDURE — 80048 BASIC METABOLIC PNL TOTAL CA: CPT | Performed by: FAMILY MEDICINE

## 2024-12-16 PROCEDURE — 82948 REAGENT STRIP/BLOOD GLUCOSE: CPT

## 2024-12-16 PROCEDURE — G0378 HOSPITAL OBSERVATION PER HR: HCPCS

## 2024-12-16 PROCEDURE — 82948 REAGENT STRIP/BLOOD GLUCOSE: CPT | Performed by: FAMILY MEDICINE

## 2024-12-16 PROCEDURE — 97110 THERAPEUTIC EXERCISES: CPT

## 2024-12-16 PROCEDURE — 97535 SELF CARE MNGMENT TRAINING: CPT

## 2024-12-16 PROCEDURE — 97530 THERAPEUTIC ACTIVITIES: CPT

## 2024-12-16 PROCEDURE — 99232 SBSQ HOSP IP/OBS MODERATE 35: CPT | Performed by: FAMILY MEDICINE

## 2024-12-16 PROCEDURE — 63710000001 INSULIN LISPRO (HUMAN) PER 5 UNITS: Performed by: FAMILY MEDICINE

## 2024-12-16 PROCEDURE — 85025 COMPLETE CBC W/AUTO DIFF WBC: CPT | Performed by: FAMILY MEDICINE

## 2024-12-16 PROCEDURE — 63710000001 INSULIN GLARGINE PER 5 UNITS: Performed by: FAMILY MEDICINE

## 2024-12-16 PROCEDURE — 97116 GAIT TRAINING THERAPY: CPT

## 2024-12-16 RX ORDER — HYDROCODONE BITARTRATE AND ACETAMINOPHEN 5; 325 MG/1; MG/1
0.5 TABLET ORAL EVERY 12 HOURS PRN
Status: DISCONTINUED | OUTPATIENT
Start: 2024-12-16 | End: 2024-12-18 | Stop reason: HOSPADM

## 2024-12-16 RX ADMIN — INSULIN LISPRO 5 UNITS: 100 INJECTION, SOLUTION INTRAVENOUS; SUBCUTANEOUS at 08:29

## 2024-12-16 RX ADMIN — CYCLOPENTOLATE HYDROCHLORIDE 1 DROP: 10 SOLUTION/ DROPS OPHTHALMIC at 22:06

## 2024-12-16 RX ADMIN — INSULIN GLARGINE 25 UNITS: 100 INJECTION, SOLUTION SUBCUTANEOUS at 08:15

## 2024-12-16 RX ADMIN — QUETIAPINE FUMARATE 50 MG: 25 TABLET ORAL at 22:14

## 2024-12-16 RX ADMIN — CYCLOPENTOLATE HYDROCHLORIDE 1 DROP: 10 SOLUTION/ DROPS OPHTHALMIC at 17:37

## 2024-12-16 RX ADMIN — PANTOPRAZOLE SODIUM 40 MG: 40 TABLET, DELAYED RELEASE ORAL at 08:15

## 2024-12-16 RX ADMIN — CYCLOPENTOLATE HYDROCHLORIDE 1 DROP: 10 SOLUTION/ DROPS OPHTHALMIC at 08:15

## 2024-12-16 RX ADMIN — DICLOFENAC SODIUM 4 G: 10 GEL TOPICAL at 08:15

## 2024-12-16 RX ADMIN — ALPRAZOLAM 0.5 MG: 0.5 TABLET ORAL at 22:06

## 2024-12-16 RX ADMIN — DICLOFENAC SODIUM 4 G: 10 GEL TOPICAL at 22:07

## 2024-12-16 RX ADMIN — INSULIN LISPRO 8 UNITS: 100 INJECTION, SOLUTION INTRAVENOUS; SUBCUTANEOUS at 12:24

## 2024-12-16 RX ADMIN — APIXABAN 5 MG: 5 TABLET, FILM COATED ORAL at 08:15

## 2024-12-16 RX ADMIN — AMLODIPINE BESYLATE 10 MG: 5 TABLET ORAL at 08:15

## 2024-12-16 RX ADMIN — LOSARTAN POTASSIUM 100 MG: 50 TABLET, FILM COATED ORAL at 08:14

## 2024-12-16 RX ADMIN — FAMOTIDINE 20 MG: 20 TABLET, FILM COATED ORAL at 22:14

## 2024-12-16 RX ADMIN — INSULIN LISPRO 5 UNITS: 100 INJECTION, SOLUTION INTRAVENOUS; SUBCUTANEOUS at 17:37

## 2024-12-16 RX ADMIN — DICLOFENAC SODIUM 4 G: 10 GEL TOPICAL at 12:24

## 2024-12-16 RX ADMIN — CITALOPRAM HYDROBROMIDE 20 MG: 20 TABLET ORAL at 08:15

## 2024-12-16 RX ADMIN — ATORVASTATIN CALCIUM 80 MG: 80 TABLET, FILM COATED ORAL at 22:06

## 2024-12-16 RX ADMIN — CHLORTHALIDONE 12.5 MG: 25 TABLET ORAL at 08:15

## 2024-12-16 RX ADMIN — LEVOTHYROXINE SODIUM 125 MCG: 125 TABLET ORAL at 05:50

## 2024-12-16 RX ADMIN — APIXABAN 5 MG: 5 TABLET, FILM COATED ORAL at 22:13

## 2024-12-16 RX ADMIN — INSULIN GLARGINE 25 UNITS: 100 INJECTION, SOLUTION SUBCUTANEOUS at 22:06

## 2024-12-16 RX ADMIN — FAMOTIDINE 20 MG: 20 TABLET, FILM COATED ORAL at 08:15

## 2024-12-16 RX ADMIN — DICLOFENAC SODIUM 4 G: 10 GEL TOPICAL at 17:37

## 2024-12-16 RX ADMIN — INSULIN LISPRO 5 UNITS: 100 INJECTION, SOLUTION INTRAVENOUS; SUBCUTANEOUS at 22:06

## 2024-12-16 NOTE — PLAN OF CARE
Goal Outcome Evaluation:  Plan of Care Reviewed With: patient        Progress: improving  Outcome Evaluation: Pt supine in bed and cagreeable to particpate with therapy. Pt more confused and hallucinating this treatment. Pt however very pleasant and cooperative. Pt performed bed mobility,transfers and gait training. See flowsheet for details. Cont PT per POC progressing to goals as pt tolerates.

## 2024-12-16 NOTE — PROGRESS NOTES
[facility]    Name:  Ramsey Riley   Age:  69 y.o.  Sex:  male  :  1955  MRN:  [id number]   Visit Number:  [id number]  Admission Date:  12/10/2024  Date Of Service:  24  Primary Care Physician:  Margret Purvis MD     LOS: 0 days :    Chief Complaint:      Follow-up weakness    Subjective:    Patient awake and alert.  Nursing has noted with pain medication he does have some hallucinations.  Currently is aware he is in the hospital and plan for rehab.    Hospital Course:    Chronically ill 69-year-old with a history of peripheral neuropathy, diabetes, hypothyroidism, CAD, prior CVA, vision loss, diabetic retinopathy, chronic pain, history of multiple falls, who had presented to the emergency room due to concerns of confusion.  History obtained primarily from ER record, somewhat from the patient.  No family currently at bedside at time of visit.  Patient notes he had had a fall he thinks yesterday, had hurt his shoulder, actually went to an outlying emergency room where he had no fractures.  He had taken some pain medicine at home, cannot remember the name, apparently his wife had noticed him being more confused overnight into today.  He is currently awake and alert that he is in the emergency room, however he is overall fairly poor historian.  Notes shoulder is feeling better.  He denies any dizziness or headache.     In the ER the patient is overall stable other than mild hypertension.  His labs were largely nonactionable.  X-rays unremarkable for any acute fracture.  CT angiogram of the head neck with contrast was unremarkable, MRI of the brain did not show any acute CVA, showed white matter disease.  Patient was seen by teleneurology who recommended admission for observation.    Concern is for underlying cerebral amyloid angiopathy, possible dementia contributing.  Would likely benefit from short-term rehab after discussion with PT and OT.    Review of  Systems:     All systems were reviewed and negative except as mentioned in subjective, assessment and plan.    Vital Signs:    Temp:  [97.8 °F (36.6 °C)-98.7 °F (37.1 °C)] 98.7 °F (37.1 °C)  Heart Rate:  [75-80] 80  Resp:  [16-18] 16  BP: (146-171)/(80-96) 151/95    Intake and output:    I/O last 3 completed shifts:  In: 240 [P.O.:240]  Out: 2650 [Urine:2650]  I/O this shift:  In: 240 [P.O.:240]  Out: 500 [Urine:500]    Physical Examination:    General Appearance:  Alert and cooperative   Head:  Atraumatic and normocephalic.   Eyes: Conjunctivae and sclerae normal, no icterus. No pallor.   Throat: No oral lesions, no thrush, oral mucosa moist.   Neck: Supple, trachea midline, no thyromegaly.   Lungs:   Breath sounds heard bilaterally equally.  No wheezing or crackles. No Pleural rub or bronchial breathing.   Heart:  Normal S1 and S2, no murmur, no gallop, no rub. No JVD.   Abdomen:   Normal bowel sounds, no masses, no organomegaly. Soft, nontender, nondistended, no rebound tenderness.   Extremities: Supple, no edema, no cyanosis, no clubbing.  Decreased range of motion right shoulder   Skin: No bleeding or rash.   Neurologic: Alert and oriented x person and place.  Weakness but overall improved     Laboratory results:    Results from last 7 days   Lab Units 12/16/24  0551 12/11/24  0907 12/10/24  0929   SODIUM mmol/L 135* 136 138   POTASSIUM mmol/L 4.2 4.1 3.8   CHLORIDE mmol/L 100 101 101   CO2 mmol/L 24.4 26.7 26.2   BUN mg/dL 19 18 16   CREATININE mg/dL 1.45* 1.43* 1.55*   CALCIUM mg/dL 9.2 8.5* 8.9   BILIRUBIN mg/dL  --   --  0.8   ALK PHOS U/L  --   --  89   ALT (SGPT) U/L  --   --  20   AST (SGOT) U/L  --   --  29   GLUCOSE mg/dL 254* 258* 175*     Results from last 7 days   Lab Units 12/16/24  0551 12/10/24  1128   WBC 10*3/mm3 7.97 8.65   HEMOGLOBIN g/dL 16.1 16.0   HEMATOCRIT % 46.5 48.1   PLATELETS 10*3/mm3 180 146         Results from last 7 days   Lab Units 12/10/24  1128 12/10/24  0929   CK TOTAL U/L   "--  437*   HSTROP T ng/L 50* 52*         No results for input(s): \"PHART\", \"DWL9ZHL\", \"PO2ART\", \"VFT6MVF\", \"BASEEXCESS\" in the last 8760 hours.   I have reviewed the patient's laboratory results.    Radiology results:    No radiology results from the last 24 hrs  I have reviewed the patient's radiology reports.    Medication Review:     I have reviewed the patient's active and prn medications.     Problem List:      Weakness      Assessment:    Weakness, POA  Encephalopathy, POA  Diabetes with neuropathy and retinopathy  Vision loss  Hearing loss  CAD  History of prior PE on Eliquis  Impaired mobility and ADL    Plan:    Weakness/confusion:  Appears to have off-and-on confusion and weakness at home.  However recent increased amount of falls, not sleeping well, wife thinks some medication issues may be at play.  Notes patient sometimes doubles up on medicines because he sleeps throughout the morning and takes them all at nighttime.  Will schedule to Seroquel and Xanax only at night for now to help with mixing of nights and days.  Overall is improved.  Still with confusion with pain medication, will decrease dose to see if helps.    Cerebral amyloid angiopathy  Appears to have been progressive on imaging when compared to prior MRIs.  Based off symptomatology with the weakness, confusion, is fairly consistent with this.  Elevated blood pressures may have contributed and or fall exacerbated.  He will benefit from short-term rehab after discussion with PT and OT.  Will need outpatient nerve conduction testing.  Does have diabetic neuropathy     Diabetes/vision loss/hearing loss/CAD/prior PE/impaired mobility:  Continue home medications.  Continue with Eliquis.    Short-term rehab workup is pending.    DVT Prophylaxis: Eliquis  Code Status: Full  Diet: As tolerated  Discharge Plan: RIGOBERTO Sim, DO  12/16/24  11:58 EST    Dictated utilizing Dragon dictation.    "

## 2024-12-16 NOTE — CASE MANAGEMENT/SOCIAL WORK
Case Management/Social Work    Patient Name:  Ramsey Riley  YOB: 1955  MRN: [id number]  Admit Date:  12/10/2024        STEFANI spoke with [facility] who states she did not have a chance to review referral last week but will review today. SW attempted to speak with admissions at [facility], they state she is either on the other line or in a meeting. STEFANI will try again later today. SW following.       Electronically signed by:  TAN Cheng  12/16/24 10:18 EST

## 2024-12-16 NOTE — PLAN OF CARE
Problem: Adult Inpatient Plan of Care  Goal: Plan of Care Review  Outcome: Progressing  Goal: Patient-Specific Goal (Individualized)  Outcome: Progressing  Goal: Absence of Hospital-Acquired Illness or Injury  Outcome: Progressing  Intervention: Identify and Manage Fall Risk  Recent Flowsheet Documentation  Taken 12/16/2024 0200 by Reta Major RN  Safety Promotion/Fall Prevention:   activity supervised   assistive device/personal items within reach   clutter free environment maintained   fall prevention program maintained   lighting adjusted   muscle strengthening facilitated   nonskid shoes/slippers when out of bed   room organization consistent   safety round/check completed  Taken 12/16/2024 0000 by Reta Major RN  Safety Promotion/Fall Prevention:   activity supervised   assistive device/personal items within reach   clutter free environment maintained   fall prevention program maintained   lighting adjusted   muscle strengthening facilitated   nonskid shoes/slippers when out of bed   room organization consistent   safety round/check completed  Taken 12/15/2024 2200 by Reta Major RN  Safety Promotion/Fall Prevention:   activity supervised   assistive device/personal items within reach   clutter free environment maintained   fall prevention program maintained   lighting adjusted   muscle strengthening facilitated   nonskid shoes/slippers when out of bed   room organization consistent   safety round/check completed  Taken 12/15/2024 2000 by Reta Major RN  Safety Promotion/Fall Prevention:   activity supervised   assistive device/personal items within reach   clutter free environment maintained   fall prevention program maintained   lighting adjusted   muscle strengthening facilitated   nonskid shoes/slippers when out of bed   room organization consistent   safety round/check completed  Intervention: Prevent Skin Injury  Recent Flowsheet Documentation  Taken 12/16/2024 0200 by Reta Major  RN  Body Position: position changed independently  Taken 12/16/2024 0000 by Reta Major RN  Body Position: position changed independently  Taken 12/15/2024 2200 by Reta Major RN  Body Position: position changed independently  Taken 12/15/2024 2000 by Reta Major RN  Body Position: position changed independently  Intervention: Prevent Infection  Recent Flowsheet Documentation  Taken 12/16/2024 0200 by Reta Major RN  Infection Prevention:   environmental surveillance performed   hand hygiene promoted   rest/sleep promoted   single patient room provided  Taken 12/16/2024 0000 by Reta Major RN  Infection Prevention:   environmental surveillance performed   hand hygiene promoted   rest/sleep promoted   single patient room provided  Taken 12/15/2024 2200 by Reta Major RN  Infection Prevention:   environmental surveillance performed   hand hygiene promoted   rest/sleep promoted   single patient room provided  Taken 12/15/2024 2000 by Reta Major RN  Infection Prevention:   environmental surveillance performed   hand hygiene promoted   rest/sleep promoted   single patient room provided  Goal: Optimal Comfort and Wellbeing  Outcome: Progressing  Goal: Readiness for Transition of Care  Outcome: Progressing     Problem: Violence Risk or Actual  Goal: Anger and Impulse Control  Outcome: Progressing  Intervention: Minimize Safety Risk  Recent Flowsheet Documentation  Taken 12/16/2024 0200 by Reta Major RN  Enhanced Safety Measures: bed alarm set  Taken 12/16/2024 0000 by Reta Major RN  Enhanced Safety Measures: bed alarm set  Taken 12/15/2024 2200 by Reta Major RN  Enhanced Safety Measures: bed alarm set  Taken 12/15/2024 2000 by Reta Major RN  Enhanced Safety Measures: bed alarm set     Problem: Stroke, Ischemic (Includes Transient Ischemic Attack)  Goal: Optimal Coping  Outcome: Progressing  Goal: Effective Urinary Elimination  Outcome: Progressing     Problem: Fall Injury Risk  Goal:  Absence of Fall and Fall-Related Injury  Outcome: Progressing  Intervention: Identify and Manage Contributors  Recent Flowsheet Documentation  Taken 12/16/2024 0200 by Reta Major RN  Medication Review/Management: medications reviewed  Taken 12/16/2024 0000 by Reta Major RN  Medication Review/Management: medications reviewed  Taken 12/15/2024 2200 by Reta Major RN  Medication Review/Management: medications reviewed  Taken 12/15/2024 2000 by Reta Major RN  Medication Review/Management: medications reviewed  Intervention: Promote Injury-Free Environment  Recent Flowsheet Documentation  Taken 12/16/2024 0200 by Reta Major RN  Safety Promotion/Fall Prevention:   activity supervised   assistive device/personal items within reach   clutter free environment maintained   fall prevention program maintained   lighting adjusted   muscle strengthening facilitated   nonskid shoes/slippers when out of bed   room organization consistent   safety round/check completed  Taken 12/16/2024 0000 by Reta Major RN  Safety Promotion/Fall Prevention:   activity supervised   assistive device/personal items within reach   clutter free environment maintained   fall prevention program maintained   lighting adjusted   muscle strengthening facilitated   nonskid shoes/slippers when out of bed   room organization consistent   safety round/check completed  Taken 12/15/2024 2200 by Reta Major RN  Safety Promotion/Fall Prevention:   activity supervised   assistive device/personal items within reach   clutter free environment maintained   fall prevention program maintained   lighting adjusted   muscle strengthening facilitated   nonskid shoes/slippers when out of bed   room organization consistent   safety round/check completed  Taken 12/15/2024 2000 by Reta Major RN  Safety Promotion/Fall Prevention:   activity supervised   assistive device/personal items within reach   clutter free environment maintained   fall  prevention program maintained   lighting adjusted   muscle strengthening facilitated   nonskid shoes/slippers when out of bed   room organization consistent   safety round/check completed   Goal Outcome Evaluation:

## 2024-12-16 NOTE — PLAN OF CARE
Goal Outcome Evaluation:  Plan of Care Reviewed With: patient, spouse        Progress: no change  Outcome Evaluation: Pt received supine in the bed for OT session. He is plesantly confused and oriented only to himself. He denies pain at rest but demos R shoulder pain with movement. Pt was agreeable to bed lvl exercise and he deferred transitioning to eob for ADL and strength/ROM training. Pt completed grooming with set up and max cues for attention to task and UBD with mod/max and max cues for attention and sequencing due to confusion. He required total assist for scooting towards hob using repositioning system and he was palced in fowlers for comfort. He tolerated light endurance training, R UE AAROM training with pain from recent fall, and L UE strengthening to prevent further debility (see flowsheet). Increased time required due to pt confusion and need for persistent cues. Cont current POC and progress as pt is able to tolerate. He remains most appropriate for subacute rehab when medically ready for discharge.

## 2024-12-16 NOTE — CASE MANAGEMENT/SOCIAL WORK
Continued Stay Note   [facility]     Patient Name: Ramsey Riley  MRN: [id number]  Today's Date: 12/16/2024    Admit Date: 12/10/2024    Plan: spoke with pt in room, informed referrals sent to [facility] and awaiting response from them; some confusion; very pleasant; no other cm nees at this time   Discharge Plan       Row Name 12/16/24 1139       Plan    Plan spoke with pt in room, informed referrals sent to [facility] and awaiting response from them; some confusion; very pleasant; no other cm nees at this time                   Discharge Codes    No documentation.                       Sofía Martinez RN

## 2024-12-16 NOTE — THERAPY TREATMENT NOTE
Patient Name: Ramsey Riley  : 1955    MRN: [id number]                              Today's Date: 2024       Admit Date: 12/10/2024    Visit Dx:     ICD-10-CM ICD-9-CM   1. TIA (transient ischemic attack)  G45.9 435.9     Patient Active Problem List   Diagnosis    Coronary artery disease involving native coronary artery of native heart without angina pectoris    Essential hypertension    Bradycardia, sinus    Fatigue    Abnormal ECG    Precordial pain    LVH (left ventricular hypertrophy) due to hypertensive disease, without heart failure    Diabetes mellitus    PAD (peripheral artery disease)    Ischemic stroke    History of blood clots    Pulmonary embolus    Dyslipidemia    Class 1 obesity due to excess calories with serious comorbidity and body mass index (BMI) of 34.0 to 34.9 in adult    Weakness     Past Medical History:   Diagnosis Date    Arthritis     Clot     Coronary artery disease     Diabetes mellitus     Heart disease     History of blood clots     Hypertension     Impaired mobility     Low back pain     Thyroid disease     Type 2 diabetes mellitus      Past Surgical History:   Procedure Laterality Date    CHOLECYSTECTOMY      COLONOSCOPY      dr srivastava    CORONARY ARTERY BYPASS GRAFT      [facility] -  BY PASS     ENDOSCOPY        General Information       Row Name 24 1420          OT Time and Intention    Subjective Information no complaints  -SP     Document Type therapy note (daily note)  -SP     Mode of Treatment occupational therapy  -SP     Total Minutes, Occupational Therapy 25  -SP     Patient Effort adequate  -SP       Row Name 24 1420          General Information    Patient Profile Reviewed yes  -SP     Existing Precautions/Restrictions fall  -SP       Row Name 24 1422          Cognition    Orientation Status (Cognition) oriented to;person;disoriented to;place;situation;time  -SP       Row Name 24 1427          Safety Issues/Impairments  Affecting Functional Mobility    Safety Issues Affecting Function (Mobility) ability to follow commands;awareness of need for assistance;impulsivity;insight into deficits/self-awareness;judgment;problem-solving;safety precaution awareness;safety precautions follow-through/compliance;sequencing abilities  -SP     Impairments Affecting Function (Mobility) cognition;endurance/activity tolerance  -SP     Cognitive Impairments, Mobility Safety/Performance attention;awareness, need for assistance;insight into deficits/self-awareness;judgment;problem-solving/reasoning;safety precaution awareness;safety precaution follow-through;sequencing abilities;impulsivity  -SP               User Key  (r) = Recorded By, (t) = Taken By, (c) = Cosigned By      Initials Name Provider Type    SP Reta Hernandez OT Occupational Therapist                     Mobility/ADL's       Row Name 12/16/24 1421          Bed Mobility    Bed Mobility scooting/bridging  -SP     Scooting/Bridging Bell (Bed Mobility) dependent (less than 25% patient effort)  -SP       Row Name 12/16/24 1421          Activities of Daily Living    BADL Assessment/Intervention upper body dressing;grooming  -SP       Row Name 12/16/24 142          Upper Body Dressing Assessment/Training    Bell Level (Upper Body Dressing) doff;moderate assist (50% patient effort);don;maximum assist (25% patient effort);verbal cues  -SP     Position (Upper Body Dressing) sitting up in bed  -SP       Row Name 12/16/24 1421          Grooming Assessment/Training    Bell Level (Grooming) wash face, hands;set up;standby assist;verbal cues  -SP     Position (Grooming) sitting up in bed  -SP               User Key  (r) = Recorded By, (t) = Taken By, (c) = Cosigned By      Initials Name Provider Type    SP Reta Hernandez OT Occupational Therapist                   Obj/Interventions       Row Name 12/16/24 1422          Shoulder (Therapeutic Exercise)    Shoulder (Therapeutic  Exercise) AAROM (active assistive range of motion);strengthening exercise  -SP     Shoulder AAROM (Therapeutic Exercise) right;flexion;10 repetitions  -SP     Shoulder Strengthening (Therapeutic Exercise) 1 lb free weight;left;flexion;15 repititions  -SP       Row Name 12/16/24 1422          Elbow/Forearm (Therapeutic Exercise)    Elbow/Forearm (Therapeutic Exercise) strengthening exercise  -SP     Elbow/Forearm Strengthening (Therapeutic Exercise) left;right;flexion;extension;15 repititions;1 lb free weight  -SP       Row Name 12/16/24 1422          Hand (Therapeutic Exercise)    Hand (Therapeutic Exercise) strengthening exercise  -SP     Hand Strengthening (Therapeutic Exercise) bilateral; strengthening;15 repititions  -SP       Row Name 12/16/24 1422          Motor Skills    Therapeutic Exercise shoulder;elbow/forearm;aerobic;hand  -SP       Row Name 12/16/24 1422          Aerobic Exercise    Comment, Aerobic Exercise (Therapeutic Exercise) pt completed two sets B punches x1 minute each  -SP               User Key  (r) = Recorded By, (t) = Taken By, (c) = Cosigned By      Initials Name Provider Type    Reta Tapia OT Occupational Therapist                   Goals/Plan    No documentation.                  Clinical Impression       Row Name 12/16/24 1424          Pain Assessment    Pretreatment Pain Rating 0/10 - no pain  -SP     Posttreatment Pain Rating 0/10 - no pain  -SP       Row Name 12/16/24 1424          Pain Scale: FACES Pre/Post-Treatment    Pain: FACES Scale, Pretreatment 2-->hurts little bit  -SP     Posttreatment Pain Rating 0-->no hurt  -SP       Row Name 12/16/24 1424          Plan of Care Review    Plan of Care Reviewed With patient;spouse  -SP     Progress no change  -SP     Outcome Evaluation Pt received supine in the bed for OT session. He is plesantly confused and oriented only to himself. He denies pain at rest but demos R shoulder pain with movement. Pt was agreeable to bed lvl  exercise and he deferred transitioning to eob for ADL and strength/ROM training. Pt complete grooming with set up and max cues for attention to task and UBD with mod/max and max cues for attention and sequencing due to confusion. He required total assist for scooting towards hob using repositioning system and he was palced in fowlers for comfort. He tolerated light endurance training, R UE AAROM training with pain from recent fall, and L UE strengthening to prevent further debility (see flowsheet). Increased time required due to pt confusion and need for persistent cues. Cont current POC and progress as pt is able to tolerate. He remains most appropriate for subacute rehab when medically ready for discharge.  -SP       Row Name 12/16/24 1424          Vital Signs    O2 Delivery Pre Treatment room air  -SP     O2 Delivery Intra Treatment room air  -SP     O2 Delivery Post Treatment room air  -SP     Pre Patient Position Supine  -SP     Intra Patient Position Supine  -SP     Post Patient Position Supine  -SP       Row Name 12/16/24 1424          Positioning and Restraints    Pre-Treatment Position in bed  -SP     Post Treatment Position bed  -SP     In Bed fowlers;call light within reach;encouraged to call for assist;exit alarm on;with family/caregiver  -SP               User Key  (r) = Recorded By, (t) = Taken By, (c) = Cosigned By      Initials Name Provider Type    Reta Tapia OT Occupational Therapist                   Outcome Measures       Row Name 12/16/24 1429          How much help from another is currently needed...    Putting on and taking off regular lower body clothing? 2  -SP     Bathing (including washing, rinsing, and drying) 2  -SP     Toileting (which includes using toilet bed pan or urinal) 2  -SP     Putting on and taking off regular upper body clothing 2  -SP     Taking care of personal grooming (such as brushing teeth) 2  -SP     Eating meals 2  -SP     AM-PAC 6 Clicks Score (OT) 12  -SP        Row Name 12/16/24 1146          How much help from another person do you currently need...    Turning from your back to your side while in flat bed without using bedrails? 3  -RM     Moving from lying on back to sitting on the side of a flat bed without bedrails? 2  -RM     Moving to and from a bed to a chair (including a wheelchair)? 2  -RM     Standing up from a chair using your arms (e.g., wheelchair, bedside chair)? 2  -RM     Climbing 3-5 steps with a railing? 1  -RM     To walk in hospital room? 3  -RM     AM-PAC 6 Clicks Score (PT) 13  -RM     Highest Level of Mobility Goal 4 --> Transfer to chair/commode  -RM       Row Name 12/16/24 1428 12/16/24 1146       Functional Assessment    Outcome Measure Options AM-PAC 6 Clicks Daily Activity (OT)  -SP AM-PAC 6 Clicks Basic Mobility (PT)  -RM              User Key  (r) = Recorded By, (t) = Taken By, (c) = Cosigned By      Initials Name Provider Type     Jairo Caballero, PTA Physical Therapist Assistant    Reta Tapia, PATRICIA Occupational Therapist                    Occupational Therapy Education       Title: PT OT SLP Therapies (In Progress)       Topic: Occupational Therapy (In Progress)       Point: ADL training (Done)       Description:   Instruct learner(s) on proper safety adaptation and remediation techniques during self care or transfers.   Instruct in proper use of assistive devices.                  Learning Progress Summary            Patient Acceptance, E,TB, VU by  at 12/12/2024 1205    Comment: Role of OT/POC   Family Acceptance, E, VU by SP at 12/16/2024 1420    Comment: Pt spouse was educated on promoting a normal sleep wake cycle and technique to reduce hospital related confusion.                      Point: Home exercise program (Not Started)       Description:   Instruct learner(s) on appropriate technique for monitoring, assisting and/or progressing therapeutic exercises/activities.                  Learner Progress:  Not documented  in this visit.              Point: Precautions (Not Started)       Description:   Instruct learner(s) on prescribed precautions during self-care and functional transfers.                  Learner Progress:  Not documented in this visit.              Point: Body mechanics (Not Started)       Description:   Instruct learner(s) on proper positioning and spine alignment during self-care, functional mobility activities and/or exercises.                  Learner Progress:  Not documented in this visit.                              User Key       Initials Effective Dates Name Provider Type Discipline     06/16/21 -  Marsha Morrow Occupational Therapist OT    SP 09/08/22 -  Reta Hernandez OT Occupational Therapist OT                  OT Recommendation and Plan     Plan of Care Review  Plan of Care Reviewed With: patient, spouse  Progress: no change  Outcome Evaluation: Pt received supine in the bed for OT session. He is plesantly confused and oriented only to himself. He denies pain at rest but demos R shoulder pain with movement. Pt was agreeable to bed lvl exercise and he deferred transitioning to eob for ADL and strength/ROM training. Pt complete grooming with set up and max cues for attention to task and UBD with mod/max and max cues for attention and sequencing due to confusion. He required total assist for scooting towards hob using repositioning system and he was palced in fowlers for comfort. He tolerated light endurance training, R UE AAROM training with pain from recent fall, and L UE strengthening to prevent further debility (see flowsheet). Increased time required due to pt confusion and need for persistent cues. Cont current POC and progress as pt is able to tolerate. He remains most appropriate for subacute rehab when medically ready for discharge.     Time Calculation:         Time Calculation- OT       Row Name 12/16/24 7809 12/16/24 1147          Time Calculation- OT    OT Start Time 9084  -SP --     OT  Stop Time 1420  -SP --     OT Time Calculation (min) 25 min  -SP --     OT Received On 12/16/24  -SP --     OT Goal Re-Cert Due Date 12/22/24  -SP --        Timed Charges    06183 - OT Therapeutic Exercise Minutes 14  -SP --     91449 - Gait Training Minutes  -- 15  -RM     89213 - OT Self Care/Mgmt Minutes 11  -SP --        Total Minutes    Timed Charges Total Minutes 25  -SP 15  -RM      Total Minutes 25  -SP 15  -RM               User Key  (r) = Recorded By, (t) = Taken By, (c) = Cosigned By      Initials Name Provider Type    Jairo Cooper, PTA Physical Therapist Assistant    SP Reta Hernandez OT Occupational Therapist                  Therapy Charges for Today       Code Description Service Date Service Provider Modifiers Qty    17922064027 HC OT THER PROC EA 15 MIN 12/16/2024 Reta Hernandez OT GO, KX 1    36130004855 HC OT SELF CARE/MGMT/TRAIN EA 15 MIN 12/16/2024 Reta Hernandez OT GO, KX 1                 Reta Hernandez OT  12/16/2024

## 2024-12-16 NOTE — THERAPY TREATMENT NOTE
Patient Name: Ramsey Riley  : 1955    MRN: [id number]                              Today's Date: 2024       Admit Date: 12/10/2024    Visit Dx:     ICD-10-CM ICD-9-CM   1. TIA (transient ischemic attack)  G45.9 435.9     Patient Active Problem List   Diagnosis    Coronary artery disease involving native coronary artery of native heart without angina pectoris    Essential hypertension    Bradycardia, sinus    Fatigue    Abnormal ECG    Precordial pain    LVH (left ventricular hypertrophy) due to hypertensive disease, without heart failure    Diabetes mellitus    PAD (peripheral artery disease)    Ischemic stroke    History of blood clots    Pulmonary embolus    Dyslipidemia    Class 1 obesity due to excess calories with serious comorbidity and body mass index (BMI) of 34.0 to 34.9 in adult    Weakness     Past Medical History:   Diagnosis Date    Arthritis     Clot     Coronary artery disease     Diabetes mellitus     Heart disease     History of blood clots     Hypertension     Impaired mobility     Low back pain     Thyroid disease     Type 2 diabetes mellitus      Past Surgical History:   Procedure Laterality Date    CHOLECYSTECTOMY      COLONOSCOPY      dr srivastava    CORONARY ARTERY BYPASS GRAFT      [place] [id number] BY PASS     ENDOSCOPY        General Information       Row Name 24 1140          Physical Therapy Time and Intention    Document Type therapy note (daily note)  -RM     Mode of Treatment physical therapy  -RM       Row Name 24 1140          General Information    Patient Profile Reviewed yes  -RM     Existing Precautions/Restrictions fall  -RM       Row Name 24 1140          Cognition    Orientation Status (Cognition) oriented to;person  -RM       Row Name 24 1140          Safety Issues/Impairments Affecting Functional Mobility    Safety Issues Affecting Function (Mobility) awareness of need for assistance;insight into deficits/self-awareness;positioning  of assistive device;safety precaution awareness;safety precautions follow-through/compliance  -RM     Impairments Affecting Function (Mobility) visual/perceptual;endurance/activity tolerance  -RM               User Key  (r) = Recorded By, (t) = Taken By, (c) = Cosigned By      Initials Name Provider Type    Jairo Cooper PTA Physical Therapist Assistant                   Mobility       Row Name 12/16/24 1141          Bed Mobility    Supine-Sit [place] (Bed Mobility) moderate assist (50% patient effort);verbal cues;nonverbal cues (demo/gesture)  -RM     Sit-Supine [place] (Bed Mobility) moderate assist (50% patient effort);verbal cues;nonverbal cues (demo/gesture)  -RM     Assistive Device (Bed Mobility) head of bed elevated;repositioning sheet  -RM       Row Name 12/16/24 1141          Sit-Stand Transfer    Sit-Stand [place] (Transfers) minimum assist (75% patient effort);moderate assist (50% patient effort);verbal cues  -RM     Assistive Device (Sit-Stand Transfers) walker, front-wheeled  -RM       Row Name 12/16/24 1141          Gait/Stairs (Locomotion)    [place] Level (Gait) verbal cues;nonverbal cues (demo/gesture);moderate assist (50% patient effort)  -RM     Assistive Device (Gait) walker, front-wheeled  -RM     Patient was able to Ambulate yes  -RM     Distance in Feet (Gait) 65  x 2 with standing rest  -RM     Deviations/Abnormal Patterns (Gait) bilateral deviations;nara decreased;festinating/shuffling;base of support, wide  -RM     Bilateral Gait Deviations heel strike decreased;forward flexed posture  -RM               User Key  (r) = Recorded By, (t) = Taken By, (c) = Cosigned By      Initials Name Provider Type    Jairo Cooper PTA Physical Therapist Assistant                   Obj/Interventions    No documentation.                  Goals/Plan    No documentation.                  Clinical Impression       Row Name 12/16/24 1143          Pain    Pretreatment Pain  Rating 5/10  -RM     Posttreatment Pain Rating 5/10  -RM     Pain Location shoulder  -RM     Pain Side/Orientation right  -RM       Row Name 12/16/24 1143          Plan of Care Review    Plan of Care Reviewed With patient  -RM     Progress improving  -RM     Outcome Evaluation Pt supine in bed and cagreeable to particpate with therapy. Pt more confused and hallucinating this treatment. Pt however very pleasant and cooperative. Pt performed bed mobility,transfers and gait training. See flowsheet for details. Cont PT per POC progressing to goals as pt tolerates.  -RM       Row Name 12/16/24 1143          Positioning and Restraints    Pre-Treatment Position in bed  -RM     Post Treatment Position bed  -RM     In Bed fowlers;call light within reach;encouraged to call for assist;exit alarm on;with family/caregiver;notified nsg  -               User Key  (r) = Recorded By, (t) = Taken By, (c) = Cosigned By      Initials Name Provider Type    Jairo Cooper, PTA Physical Therapist Assistant                   Outcome Measures       Row Name 12/16/24 1146          How much help from another person do you currently need...    Turning from your back to your side while in flat bed without using bedrails? 3  -RM     Moving from lying on back to sitting on the side of a flat bed without bedrails? 2  -RM     Moving to and from a bed to a chair (including a wheelchair)? 2  -RM     Standing up from a chair using your arms (e.g., wheelchair, bedside chair)? 2  -RM     Climbing 3-5 steps with a railing? 1  -RM     To walk in hospital room? 3  -RM     AM-PAC 6 Clicks Score (PT) 13  -RM     Highest Level of Mobility Goal 4 --> Transfer to chair/commode  -RM       Row Name 12/16/24 1146          Functional Assessment    Outcome Measure Options AM-PAC 6 Clicks Basic Mobility (PT)  -RM               User Key  (r) = Recorded By, (t) = Taken By, (c) = Cosigned By      Initials Name Provider Type    Jairo Cooper, ASMITA Physical  Therapist Assistant                                 Physical Therapy Education       Title: PT OT SLP Therapies (In Progress)       Topic: Physical Therapy (In Progress)       Point: Mobility training (Done)       Learning Progress Summary            Patient Acceptance, E,TB,D, VU,NR by  at 12/16/2024 1146    Acceptance, E,TB, VU by  at 12/12/2024 1301    Comment: role of PT during IP admission                      Point: Home exercise program (Done)       Learning Progress Summary            Patient Acceptance, E,TB, VU by  at 12/14/2024 1336    Comment: Perform ex daily                      Point: Body mechanics (Done)       Learning Progress Summary            Patient Acceptance, E,TB, VU by  at 12/15/2024 1256    Comment: upright posture in standing and during amb                      Point: Precautions (Not Started)       Learner Progress:  Not documented in this visit.                              User Key       Initials Effective Dates Name Provider Type Discipline     06/16/21 -  Lolita Alcazar PTA Physical Therapist Assistant PT     06/16/21 -  Jairo Caballero, PTA Physical Therapist Assistant PT     11/29/23 -  Kay Castano PT Physical Therapist PT                  PT Recommendation and Plan     Progress: improving  Outcome Evaluation: Pt supine in bed and cagreeable to particpate with therapy. Pt more confused and hallucinating this treatment. Pt however very pleasant and cooperative. Pt performed bed mobility,transfers and gait training. See flowsheet for details. Cont PT per POC progressing to goals as pt tolerates.     Time Calculation:         PT Charges       Row Name 12/16/24 1147 12/16/24 1140 12/16/24 0907       Time Calculation    Start Time 1113  -RM -- --    Stop Time 1147  -RM -- --    Time Calculation (min) 34 min  -RM -- --    PT Received On 12/16/24  - -- --    PT Goal Re-Cert Due Date 12/22/24  - -- --       Time Calculation- PT    Total Timed Code Minutes- PT 34  minute(s)  -RM -- --       Timed Charges    90650 - Gait Training Minutes  15  -RM -- --    40052 - PT Therapeutic Activity Minutes 19  -RM -- --       Total Minutes    Timed Charges Total Minutes 34  -RM -- --     Total Minutes 34  -RM -- --       PT/SLP KX Modifier    Exception criteria met to exceed therapy cap -- Apply KX Modifier  -RM Apply KX Modifier  -RM              User Key  (r) = Recorded By, (t) = Taken By, (c) = Cosigned By      Initials Name Provider Type    Jairo Cooper, ASMITA Physical Therapist Assistant                  Therapy Charges for Today       Code Description Service Date Service Provider Modifiers Qty    34746682498 HC GAIT TRAINING EA 15 MIN 12/16/2024 Jairo Caballero, PTA GP, KX 1    12071539728 HC PT THERAPEUTIC ACT EA 15 MIN 12/16/2024 Jairo Caballero, PTA GP, KX 1            PT G-Codes  Outcome Measure Options: AM-PAC 6 Clicks Basic Mobility (PT)  AM-PAC 6 Clicks Score (PT): 13  AM-PAC 6 Clicks Score (OT): 12       Jairo Caballero PTA  12/16/2024

## 2024-12-17 LAB
GLUCOSE BLDC GLUCOMTR-MCNC: 203 MG/DL (ref 70–130)
GLUCOSE BLDC GLUCOMTR-MCNC: 231 MG/DL (ref 70–130)
GLUCOSE BLDC GLUCOMTR-MCNC: 261 MG/DL (ref 70–130)
GLUCOSE BLDC GLUCOMTR-MCNC: 269 MG/DL (ref 70–130)

## 2024-12-17 PROCEDURE — 63710000001 INSULIN LISPRO (HUMAN) PER 5 UNITS: Performed by: FAMILY MEDICINE

## 2024-12-17 PROCEDURE — G0378 HOSPITAL OBSERVATION PER HR: HCPCS

## 2024-12-17 PROCEDURE — 97530 THERAPEUTIC ACTIVITIES: CPT

## 2024-12-17 PROCEDURE — 97110 THERAPEUTIC EXERCISES: CPT

## 2024-12-17 PROCEDURE — 97535 SELF CARE MNGMENT TRAINING: CPT

## 2024-12-17 PROCEDURE — 25810000003 SODIUM CHLORIDE 0.9 % SOLUTION: Performed by: FAMILY MEDICINE

## 2024-12-17 PROCEDURE — 97116 GAIT TRAINING THERAPY: CPT

## 2024-12-17 PROCEDURE — 63710000001 INSULIN GLARGINE PER 5 UNITS: Performed by: FAMILY MEDICINE

## 2024-12-17 PROCEDURE — 82948 REAGENT STRIP/BLOOD GLUCOSE: CPT | Performed by: FAMILY MEDICINE

## 2024-12-17 PROCEDURE — 82948 REAGENT STRIP/BLOOD GLUCOSE: CPT

## 2024-12-17 RX ORDER — QUETIAPINE FUMARATE 25 MG/1
50 TABLET, FILM COATED ORAL EVERY 12 HOURS PRN
Status: DISCONTINUED | OUTPATIENT
Start: 2024-12-17 | End: 2024-12-18 | Stop reason: HOSPADM

## 2024-12-17 RX ADMIN — ACETAMINOPHEN 650 MG: 325 TABLET, FILM COATED ORAL at 17:40

## 2024-12-17 RX ADMIN — INSULIN LISPRO 5 UNITS: 100 INJECTION, SOLUTION INTRAVENOUS; SUBCUTANEOUS at 11:54

## 2024-12-17 RX ADMIN — APIXABAN 5 MG: 5 TABLET, FILM COATED ORAL at 08:11

## 2024-12-17 RX ADMIN — AMLODIPINE BESYLATE 10 MG: 5 TABLET ORAL at 08:11

## 2024-12-17 RX ADMIN — FAMOTIDINE 20 MG: 20 TABLET, FILM COATED ORAL at 20:40

## 2024-12-17 RX ADMIN — DICLOFENAC SODIUM 4 G: 10 GEL TOPICAL at 08:12

## 2024-12-17 RX ADMIN — CYCLOPENTOLATE HYDROCHLORIDE 1 DROP: 10 SOLUTION/ DROPS OPHTHALMIC at 08:12

## 2024-12-17 RX ADMIN — HYDROCODONE BITARTRATE AND ACETAMINOPHEN 0.5 TABLET: 5; 325 TABLET ORAL at 22:30

## 2024-12-17 RX ADMIN — INSULIN LISPRO 8 UNITS: 100 INJECTION, SOLUTION INTRAVENOUS; SUBCUTANEOUS at 20:40

## 2024-12-17 RX ADMIN — ATORVASTATIN CALCIUM 80 MG: 80 TABLET, FILM COATED ORAL at 20:40

## 2024-12-17 RX ADMIN — APIXABAN 5 MG: 5 TABLET, FILM COATED ORAL at 20:39

## 2024-12-17 RX ADMIN — CHLORTHALIDONE 12.5 MG: 25 TABLET ORAL at 08:11

## 2024-12-17 RX ADMIN — LOSARTAN POTASSIUM 100 MG: 50 TABLET, FILM COATED ORAL at 08:19

## 2024-12-17 RX ADMIN — ALPRAZOLAM 0.5 MG: 0.5 TABLET ORAL at 20:40

## 2024-12-17 RX ADMIN — FAMOTIDINE 20 MG: 20 TABLET, FILM COATED ORAL at 08:11

## 2024-12-17 RX ADMIN — QUETIAPINE FUMARATE 50 MG: 25 TABLET ORAL at 20:40

## 2024-12-17 RX ADMIN — INSULIN GLARGINE 25 UNITS: 100 INJECTION, SOLUTION SUBCUTANEOUS at 20:40

## 2024-12-17 RX ADMIN — PANTOPRAZOLE SODIUM 40 MG: 40 TABLET, DELAYED RELEASE ORAL at 08:11

## 2024-12-17 RX ADMIN — CITALOPRAM HYDROBROMIDE 20 MG: 20 TABLET ORAL at 08:11

## 2024-12-17 RX ADMIN — DICLOFENAC SODIUM 4 G: 10 GEL TOPICAL at 17:41

## 2024-12-17 RX ADMIN — INSULIN GLARGINE 25 UNITS: 100 INJECTION, SOLUTION SUBCUTANEOUS at 08:11

## 2024-12-17 RX ADMIN — INSULIN LISPRO 5 UNITS: 100 INJECTION, SOLUTION INTRAVENOUS; SUBCUTANEOUS at 08:11

## 2024-12-17 RX ADMIN — CYCLOPENTOLATE HYDROCHLORIDE 1 DROP: 10 SOLUTION/ DROPS OPHTHALMIC at 20:40

## 2024-12-17 RX ADMIN — CYCLOPENTOLATE HYDROCHLORIDE 1 DROP: 10 SOLUTION/ DROPS OPHTHALMIC at 17:41

## 2024-12-17 RX ADMIN — DICLOFENAC SODIUM 4 G: 10 GEL TOPICAL at 11:55

## 2024-12-17 RX ADMIN — DICLOFENAC SODIUM 4 G: 10 GEL TOPICAL at 20:40

## 2024-12-17 RX ADMIN — INSULIN LISPRO 8 UNITS: 100 INJECTION, SOLUTION INTRAVENOUS; SUBCUTANEOUS at 17:40

## 2024-12-17 RX ADMIN — SODIUM CHLORIDE 500 ML: 9 INJECTION, SOLUTION INTRAVENOUS at 11:28

## 2024-12-17 NOTE — CASE MANAGEMENT/SOCIAL WORK
DCP; STR. List of facilities she has given us have not offered beds. She agreed to expand search to [facility] or [facility]. SW updated to send out referrals. CM continues to follow for any needs.

## 2024-12-17 NOTE — THERAPY TREATMENT NOTE
Patient Name: Ramsey Riley  : 1955    MRN: [id number]                              Today's Date: 2024       Admit Date: 12/10/2024    Visit Dx:     ICD-10-CM ICD-9-CM   1. TIA (transient ischemic attack)  G45.9 435.9     Patient Active Problem List   Diagnosis    Coronary artery disease involving native coronary artery of native heart without angina pectoris    Essential hypertension    Bradycardia, sinus    Fatigue    Abnormal ECG    Precordial pain    LVH (left ventricular hypertrophy) due to hypertensive disease, without heart failure    Diabetes mellitus    PAD (peripheral artery disease)    Ischemic stroke    History of blood clots    Pulmonary embolus    Dyslipidemia    Class 1 obesity due to excess calories with serious comorbidity and body mass index (BMI) of 34.0 to 34.9 in adult    Weakness     Past Medical History:   Diagnosis Date    Arthritis     Clot     Coronary artery disease     Diabetes mellitus     Heart disease     History of blood clots     Hypertension     Impaired mobility     Low back pain     Thyroid disease     Type 2 diabetes mellitus      Past Surgical History:   Procedure Laterality Date    CHOLECYSTECTOMY      COLONOSCOPY      dr srivastava    CORONARY ARTERY BYPASS GRAFT      [place] -  BY PASS     ENDOSCOPY        General Information       Row Name 24 1236          OT Time and Intention    Subjective Information complains of;fatigue;pain  -SD     Document Type therapy note (daily note)  -SD     Mode of Treatment occupational therapy  -SD     Patient Effort good  -SD     Symptoms Noted During/After Treatment fatigue  -SD               User Key  (r) = Recorded By, (t) = Taken By, (c) = Cosigned By      Initials Name Provider Type    La Nena Dial OT Occupational Therapist                     Mobility/ADL's       Row Name 24 1236          Bed Mobility    Bed Mobility supine-sit  -SD     Supine-Sit [place] (Bed Mobility) moderate assist  (50% patient effort)  -SD     Assistive Device (Bed Mobility) bed rails;head of bed elevated;repositioning sheet  -SD       Row Name 12/17/24 [facility]          Transfers    Transfers sit-stand transfer;toilet transfer  -SD       Row Name 12/17/24 [facility]          Sit-Stand Transfer    Sit-Stand [place] (Transfers) minimum assist (75% patient effort)  -SD     Assistive Device (Sit-Stand Transfers) walker, front-wheeled  -SD       Row Name 12/17/24 [facility]          Toilet Transfer    Type (Toilet Transfer) sit-stand;stand-sit  -SD     [place] Level (Toilet Transfer) minimum assist (75% patient effort)  -SD     Assistive Device (Toilet Transfer) commode;grab bars/safety frame;walker, front-wheeled  -SD       Row Name 12/17/24 [facility]          Functional Mobility    Functional Mobility- Ind. Level minimum assist (75% patient effort)  -SD     Functional Mobility- Device walker, front-wheeled  -SD     Functional Mobility-Distance (Feet) 13  68  -SD       Row Name 12/17/24 [facility]          Bathing Assessment/Intervention    [place] Level (Bathing) upper extremities;upper body;proximal lower extremities;moderate assist (50% patient effort)  -SD     Position (Bathing) edge of bed sitting  -SD       Row Name 12/17/24 [facility]          Lower Body Dressing Assessment/Training    [place] Level (Lower Body Dressing) don;pants/bottoms;moderate assist (50% patient effort)  -SD       Row Name 12/17/24 [facility]          Grooming Assessment/Training    [place] Level (Grooming) wash face, hands;standby assist  -SD     Position (Grooming) edge of bed sitting  -SD       Row Name 12/17/24 [facility]          Toileting Assessment/Training    [place] Level (Toileting) adjust/manage clothing;perform perineal hygiene;moderate assist (50% patient effort)  -SD     Assistive Devices (Toileting) commode;grab bar/safety frame  -SD               User Key  (r) = Recorded By, (t) = Taken By, (c) = Cosigned By      Initials Name Provider Type    SD  La Nena Engel OT Occupational Therapist                   Obj/Interventions    No documentation.                  Goals/Plan    No documentation.                  Clinical Impression       Row Name 12/17/24 1238          Pain Assessment    Pretreatment Pain Rating 9/10  -SD     Posttreatment Pain Rating 6/10  -SD     Pain Location back  -SD     Pain Side/Orientation lower  -SD     Pain Management Interventions exercise or physical activity utilized  -SD     Response to Pain Interventions activity participation with decreased pain  -SD       Row Name 12/17/24 1238          Plan of Care Review    Plan of Care Reviewed With patient  -SD     Progress improving  -SD     Outcome Evaluation OT tx completed. Patient is supine in bed, drowsy but awakens to name and willing to participate. Patient reports 9/10 lower back pain. Patient performed bed mobility mod A, did several forward reaches to establish midline d/t posterior list. Sitting EOB performed bathing with mod A. Patient performed sit to stand min A, walked 13' to bathroom + additional 68' using RW with min A. Patient performed toileting with mod A for hygiene and clothing management. Patient returned to chair, call bell and chair alarm. Pain decreased to 6/10. Continue OT POC  -SD       Row Name 12/17/24 1238          Vital Signs    O2 Delivery Pre Treatment room air  -SD     O2 Delivery Intra Treatment room air  -SD     O2 Delivery Post Treatment room air  -SD       Row Name 12/17/24 1238          Positioning and Restraints    Pre-Treatment Position in bed  -SD     Post Treatment Position chair  -SD     In Chair sitting;call light within reach;encouraged to call for assist;exit alarm on  -SD               User Key  (r) = Recorded By, (t) = Taken By, (c) = Cosigned By      Initials Name Provider Type    SD La Nena Engel OT Occupational Therapist                   Outcome Measures       Row Name 12/17/24 1241          How much help from another is currently  needed...    Putting on and taking off regular lower body clothing? 2  -SD     Bathing (including washing, rinsing, and drying) 2  -SD     Toileting (which includes using toilet bed pan or urinal) 2  -SD     Putting on and taking off regular upper body clothing 2  -SD     Taking care of personal grooming (such as brushing teeth) 3  -SD     Eating meals 3  -SD     AM-PAC 6 Clicks Score (OT) 14  -SD       Row Name 12/17/24 0811 12/17/24 0600       How much help from another person do you currently need...    Turning from your back to your side while in flat bed without using bedrails? 3  -BW 3  -KP    Moving from lying on back to sitting on the side of a flat bed without bedrails? 2  -BW 2  -KP    Moving to and from a bed to a chair (including a wheelchair)? 2  -BW 2  -KP    Standing up from a chair using your arms (e.g., wheelchair, bedside chair)? 2  -BW 2  -KP    Climbing 3-5 steps with a railing? 1  -BW 1  -KP    To walk in hospital room? 3  -BW 3  -KP    AM-PAC 6 Clicks Score (PT) 13  -BW 13  -KP    Highest Level of Mobility Goal 4 --> Transfer to chair/commode  -BW 4 --> Transfer to chair/commode  -KP      Row Name 12/17/24 0400 12/17/24 0200       How much help from another person do you currently need...    Turning from your back to your side while in flat bed without using bedrails? 3  -KP 3  -KP    Moving from lying on back to sitting on the side of a flat bed without bedrails? 2  -KP 2  -KP    Moving to and from a bed to a chair (including a wheelchair)? 2  -KP 2  -KP    Standing up from a chair using your arms (e.g., wheelchair, bedside chair)? 2  -KP 2  -KP    Climbing 3-5 steps with a railing? 1  -KP 1  -KP    To walk in hospital room? 3  -KP 3  -KP    AM-PAC 6 Clicks Score (PT) 13  -KP 13  -KP    Highest Level of Mobility Goal 4 --> Transfer to chair/commode  -KP 4 --> Transfer to chair/commode  -KP      Row Name 12/17/24 1241          Functional Assessment    Outcome Measure Options AM-PAC 6 Clicks  Daily Activity (OT)  -SD               User Key  (r) = Recorded By, (t) = Taken By, (c) = Cosigned By      Initials Name Provider Type    SD La Nena Engel OT Occupational Therapist    Kirti Brandt, RN Registered Nurse    Afshan Ruffin RN Registered Nurse                    Occupational Therapy Education       Title: PT OT SLP Therapies (In Progress)       Topic: Occupational Therapy (In Progress)       Point: ADL training (Done)       Description:   Instruct learner(s) on proper safety adaptation and remediation techniques during self care or transfers.   Instruct in proper use of assistive devices.                  Learning Progress Summary            Patient Acceptance, E,TB, VU by SD at 12/17/2024 1241    Comment: Safety and sequencing during tf and mobility    Acceptance, E,TB, VU by  at 12/12/2024 1205    Comment: Role of OT/POC   Family Acceptance, E, VU by SP at 12/16/2024 1427    Comment: Pt spouse was educated on promoting a normal sleep wake cycle and technique to reduce hospital related confusion.                      Point: Home exercise program (Not Started)       Description:   Instruct learner(s) on appropriate technique for monitoring, assisting and/or progressing therapeutic exercises/activities.                  Learner Progress:  Not documented in this visit.              Point: Precautions (Not Started)       Description:   Instruct learner(s) on prescribed precautions during self-care and functional transfers.                  Learner Progress:  Not documented in this visit.              Point: Body mechanics (Not Started)       Description:   Instruct learner(s) on proper positioning and spine alignment during self-care, functional mobility activities and/or exercises.                  Learner Progress:  Not documented in this visit.                              User Key       Initials Effective Dates Name Provider Type [facility] 06/16/21 -  Marsha Morrow  Occupational Therapist OT    SD 06/16/21 -  La Nena Engel OT Occupational Therapist OT    SP 09/08/22 -  Reta Hernandez OT Occupational Therapist OT                  OT Recommendation and Plan     Plan of Care Review  Plan of Care Reviewed With: patient  Progress: improving  Outcome Evaluation: OT tx completed. Patient is supine in bed, drowsy but awakens to name and willing to participate. Patient reports 9/10 lower back pain. Patient performed bed mobility mod A, did several forward reaches to establish midline d/t posterior list. Sitting EOB performed bathing with mod A. Patient performed sit to stand min A, walked 13' to bathroom + additional 68' using RW with min A. Patient performed toileting with mod A for hygiene and clothing management. Patient returned to chair, call bell and chair alarm. Pain decreased to 6/10. Continue OT POC     Time Calculation:         Time Calculation- OT       Row Name 12/17/24 1242             Time Calculation- OT    OT Start Time 0949  -SD      OT Stop Time 1014  -SD      OT Time Calculation (min) 25 min  -SD      OT Received On 12/17/24  -SD      OT Goal Re-Cert Due Date 12/22/24  -SD         Timed Charges    02954 - OT Therapeutic Activity Minutes 10  -SD      83484 - OT Self Care/Mgmt Minutes 15  -SD         Total Minutes    Timed Charges Total Minutes 25  -SD       Total Minutes 25  -SD                User Key  (r) = Recorded By, (t) = Taken By, (c) = Cosigned By      Initials Name Provider Type    SD La Nena Engel OT Occupational Therapist                  Therapy Charges for Today       Code Description Service Date Service Provider Modifiers Qty    83770927913 HC OT THERAPEUTIC ACT EA 15 MIN 12/17/2024 La Nena Engel OT GO, KX 1    65504963943 HC OT SELF CARE/MGMT/TRAIN EA 15 MIN 12/17/2024 La Nena Engel OT GO KX 1                 La Nena Engel OT  12/17/2024

## 2024-12-17 NOTE — PLAN OF CARE
Goal Outcome Evaluation:  Plan of Care Reviewed With: patient        Progress: improving  Outcome Evaluation: Pt participated in PT treatment session this date. Pt presents supine in bed with family at side, pleasant and agreeeable to PT treatment session. Pt reports 8/10 R shoulder pain at rest. Pt performed supine > sit on EOB with mod A, required assistance to position trunk upright. Pt performed STS with min A from elevated bed surface with use of RW. Pt ambulated x80' with RW and min A, pt required consistent correction of RW placement during gait and management to steer RW. Pt performed seated TE as described in interventions. Pt returned to supine with mod A and required dependent A to reposition once in supine. Following treatment, pt left semi-fowlers position in bed with bed alarm active, call light and all needs within reach. Continue with current POC; progress interventions as tolerated.    Anticipated Discharge Disposition (PT): inpatient rehabilitation facility

## 2024-12-17 NOTE — CASE MANAGEMENT/SOCIAL WORK
Case Management/Social Work    Patient Name:  Ramsey Riley  YOB: 1955  MRN: [id number]  Admit Date:  12/10/2024        [facility] SW attempted to speak [facility]bb admissions at [place] and coordinator was not in yet this morning. SW updated CM on likely needing more options for STR. SW following.       10:47 EST per family, SW sent referrals to [place] and [facility]. SW following.     12:54 EST STEFANI sent updated notes to [facility]. SW following.     15:07 EST [facility] had questions about Seroquel and Xanax. SW updated. They are reviewing. SW following.       Electronically signed by:  TAN Cheng  12/17/24 09:11 EST

## 2024-12-17 NOTE — PROGRESS NOTES
"Dietitian Assessment    Patient Name: Ramsey Riley  YOB: 1955  MRN: [id number]  Admission date: 12/10/2024    Comment:      Clinical Nutrition Assessment      Reason for Assessment LOS   H&P  Past Medical History:   Diagnosis Date    Arthritis     Clot     Coronary artery disease     Diabetes mellitus     Heart disease     History of blood clots     Hypertension     Impaired mobility     Low back pain     Thyroid disease     Type 2 diabetes mellitus        Past Surgical History:   Procedure Laterality Date    CHOLECYSTECTOMY  2015    COLONOSCOPY  2015    dr srivastava    CORONARY ARTERY BYPASS GRAFT      [place] -  BY PASS     ENDOSCOPY              Current Problems   Patient Active Problem List   Diagnosis    Coronary artery disease involving native coronary artery of native heart without angina pectoris    Essential hypertension    Bradycardia, sinus    Fatigue    Abnormal ECG    Precordial pain    LVH (left ventricular hypertrophy) due to hypertensive disease, without heart failure    Diabetes mellitus    PAD (peripheral artery disease)    Ischemic stroke    History of blood clots    Pulmonary embolus    Dyslipidemia    Class 1 obesity due to excess calories with serious comorbidity and body mass index (BMI) of 34.0 to 34.9 in adult    Weakness        Encounter Information        Trending Narrative     12/17: Pt w/ LOS day of 7 days and PO intake averaging 19% x 4 meals. Will order supplementation.      Anthropometrics        Current Height, Weight Height: 180.3 cm (70.98\")  Weight: 106 kg (233 lb 7.5 oz) (12/10/24 2003)   Trending Weight Hx     This admission:              PTA:     Wt Readings from Last 30 Encounters:   12/10/24 2003 106 kg (233 lb 7.5 oz)   12/10/24 1614 108 kg (238 lb 1.6 oz)   12/10/24 0822 108 kg (239 lb)   10/17/24 1500 108 kg (237 lb)   10/01/24 1304 110 kg (242 lb)   06/28/24 1407 108 kg (239 lb)   06/11/24 1324 108 kg (238 lb)   04/30/24 1502 110 kg (243 lb)   02/14/24 " 1407 106 kg (234 lb)   11/06/23 1327 107 kg (235 lb)   09/27/23 1251 111 kg (244 lb)   07/24/23 1100 107 kg (236 lb)   05/26/23 0734 109 kg (240 lb)   04/27/23 1625 108 kg (237 lb)   01/12/23 1435 112 kg (248 lb)   12/22/22 1054 113 kg (248 lb 3.2 oz)   12/19/22 1304 111 kg (244 lb)   09/28/22 1428 111 kg (244 lb)   07/25/22 1214 110 kg (242 lb)   07/11/22 1257 110 kg (242 lb)   06/23/22 1619 107 kg (235 lb)   06/22/22 1325 109 kg (240 lb 4.8 oz)   06/22/22 0857 109 kg (241 lb)   06/21/22 2125 109 kg (241 lb 2.9 oz)   06/21/22 1749 109 kg (241 lb)   06/01/22 1548 107 kg (235 lb)   04/25/22 1538 104 kg (230 lb)   04/04/22 1038 108 kg (239 lb)   02/10/22 1326 110 kg (242 lb)   10/28/21 1200 106 kg (234 lb)   09/13/21 1000 104 kg (229 lb 9.6 oz)   08/26/21 1523 104 kg (230 lb)   08/12/21 1440 104 kg (230 lb)   07/16/21 1053 105 kg (230 lb 12.8 oz)   06/24/21 1428 106 kg (233 lb)      BMI kg/m2 Body mass index is 32.58 kg/m².     Labs        Pertinent Labs     Results from last 7 days   Lab Units 12/16/24  0551 12/11/24  0907   SODIUM mmol/L 135* 136   POTASSIUM mmol/L 4.2 4.1   CHLORIDE mmol/L 100 101   CO2 mmol/L 24.4 26.7   BUN mg/dL 19 18   CREATININE mg/dL 1.45* 1.43*   CALCIUM mg/dL 9.2 8.5*   GLUCOSE mg/dL 254* 258*       Results from last 7 days   Lab Units 12/16/24  0551 12/11/24  0907   MAGNESIUM mg/dL  --  1.8   HEMOGLOBIN g/dL 16.1  --    HEMATOCRIT % 46.5  --    TRIGLYCERIDES mg/dL  --  179*       Lab Results   Component Value Date    HGBA1C 9.80 (H) 12/11/2024            Medications       Scheduled Medications ALPRAZolam, 0.5 mg, Oral, Nightly  [Held by provider] amLODIPine, 10 mg, Oral, Daily  apixaban, 5 mg, Oral, Q12H  atorvastatin, 80 mg, Oral, Nightly  chlorthalidone, 12.5 mg, Oral, Daily  citalopram, 20 mg, Oral, Daily  cyclopentolate, 1 drop, Both Eyes, TID  Diclofenac Sodium, 4 g, Topical, 4x Daily  famotidine, 20 mg, Oral, BID  insulin glargine, 25 Units, Subcutaneous, Q12H  insulin lispro, 3-14  Units, Subcutaneous, 4x Daily AC & at Bedtime  levothyroxine, 125 mcg, Oral, Q AM  losartan, 100 mg, Oral, Daily  pantoprazole, 40 mg, Oral, Daily  QUEtiapine, 50 mg, Oral, Nightly        Infusions       PRN Medications   acetaminophen    senna-docusate sodium **AND** polyethylene glycol **AND** bisacodyl **AND** bisacodyl    dextrose    dextrose    glucagon (human recombinant)    HYDROcodone-acetaminophen    nitroglycerin     Physical Findings        Trending Physical   Appearance, NFPE    --  Edema  1+ (trace)   Bowel Function None   Tubes none   Chewing/Swallowing WNL    Skin Intact     Estimated/Assessed Needs       Energy Requirements    EST Needs, Method, Wt used 2120-2332Kcals/day using 20-22kcals/kg       Protein Requirements    EST Needs, Method, Wt used 106g/day using 1g/kg       Fluid Requirements     Estimated Needs (mL/day) 2120mL/day       Current Nutrition Orders & Evaluation of Intake       Oral Nutrition     Food Allergies    Current PO Diet Diet: Cardiac, Diabetic; Healthy Heart (2-3 Na+); Consistent Carbohydrate; Fluid Consistency: Thin (IDDSI 0)   Supplement    PO Evaluation     Trending % PO Intake 12/17: 19% x 4 meals      Enteral Nutrition    Enteral Route    Order, Modulars, Flushes    Residual/Tolerance    TF Observation         Parenteral Nutrition     TPN Route    Total # Days on TPN    TPN Order, Lipid Details    MVI & Trace Element Freq    TPN Observation       Nutrition Diagnosis         Nutrition Dx Problem 1 Predicted suboptimal PO intake r/t weakness as evidenced by PO intake averaging 19% x 4 meals      Nutrition Dx Problem 2        Intervention Goal         Intervention Goal(s) PO intake to meet >50% of estimated needs  Adhere to supplement ordered  Maintain current body weight       Nutrition Intervention        RD Action Recommend/order: Boost Glucose Control BID      Nutrition Prescription          Diet Prescription Diet: Cardiac, Diabetic; Healthy Heart (2-3 Na+); Consistent  Carbohydrate; Fluid Consistency: Thin (IDDSI 0)   Supplement Prescription Boost Glucose Control BID      Enteral Prescription        TPN Prescription      Monitor/Evaluation        Monitor Per protocol, PO intake, Supplement intake, Pertinent labs, Weight, Skin status, GI status, Symptoms, Swallow function, Hemodynamic stability     RD to follow-up.     Electronically signed by:  Henrry Arias RD  12/17/24 12:44 EST

## 2024-12-17 NOTE — THERAPY TREATMENT NOTE
Patient Name: Ramsey Riley  : 1955    MRN: [id number]                              Today's Date: 2024       Admit Date: 12/10/2024    Visit Dx:     ICD-10-CM ICD-9-CM   1. TIA (transient ischemic attack)  G45.9 435.9     Patient Active Problem List   Diagnosis    Coronary artery disease involving native coronary artery of native heart without angina pectoris    Essential hypertension    Bradycardia, sinus    Fatigue    Abnormal ECG    Precordial pain    LVH (left ventricular hypertrophy) due to hypertensive disease, without heart failure    Diabetes mellitus    PAD (peripheral artery disease)    Ischemic stroke    History of blood clots    Pulmonary embolus    Dyslipidemia    Class 1 obesity due to excess calories with serious comorbidity and body mass index (BMI) of 34.0 to 34.9 in adult    Weakness     Past Medical History:   Diagnosis Date    Arthritis     Clot     Coronary artery disease     Diabetes mellitus     Heart disease     History of blood clots     Hypertension     Impaired mobility     Low back pain     Thyroid disease     Type 2 diabetes mellitus      Past Surgical History:   Procedure Laterality Date    CHOLECYSTECTOMY      COLONOSCOPY      dr srivastava    CORONARY ARTERY BYPASS GRAFT      [place] -  BY PASS     ENDOSCOPY        General Information       Row Name 24 1511          Physical Therapy Time and Intention    Document Type therapy note (daily note)  -     Mode of Treatment physical therapy  -       Row Name 24 1511          General Information    Patient Profile Reviewed yes  -     Existing Precautions/Restrictions fall  -       Row Name 24 1511          Safety Issues/Impairments Affecting Functional Mobility    Safety Issues Affecting Function (Mobility) ability to follow commands;awareness of need for assistance;impulsivity;insight into deficits/self-awareness;judgment;problem-solving;safety precaution awareness;safety precautions  follow-through/compliance;sequencing abilities  -     Impairments Affecting Function (Mobility) cognition;endurance/activity tolerance;balance;postural/trunk control;strength  -     Cognitive Impairments, Mobility Safety/Performance attention;awareness, need for assistance;insight into deficits/self-awareness;judgment;problem-solving/reasoning;safety precaution awareness;safety precaution follow-through;sequencing abilities  -               User Key  (r) = Recorded By, (t) = Taken By, (c) = Cosigned By      Initials Name Provider Type     Kay Castano, CARLOS Physical Therapist                   Mobility       Row Name 12/17/24 1512          Bed Mobility    Bed Mobility supine-sit;scooting/bridging;sit-supine  -     Scooting/Bridging [place] (Bed Mobility) dependent (less than 25% patient effort)  -     Supine-Sit [place] (Bed Mobility) moderate assist (50% patient effort)  -     Sit-Supine [place] (Bed Mobility) moderate assist (50% patient effort);verbal cues;nonverbal cues (demo/gesture)  -     Assistive Device (Bed Mobility) bed rails;head of bed elevated;repositioning sheet  -       Row Name 12/17/24 1512          Sit-Stand Transfer    Sit-Stand [place] (Transfers) minimum assist (75% patient effort)  -     Assistive Device (Sit-Stand Transfers) walker, front-wheeled  -       Row Name 12/17/24 1512          Gait/Stairs (Locomotion)    [place] Level (Gait) minimum assist (75% patient effort);verbal cues;nonverbal cues (demo/gesture)  -     Assistive Device (Gait) walker, front-wheeled  -     Patient was able to Ambulate yes  -     Distance in Feet (Gait) 80  -HW     Deviations/Abnormal Patterns (Gait) bilateral deviations;nara decreased;festinating/shuffling;base of support, wide  -     Bilateral Gait Deviations heel strike decreased;forward flexed posture  -     [place] Level (Stairs) not tested  -               User Key  (r) = Recorded By, (t) =  Taken By, (c) = Cosigned By      Initials Name Provider Type     Kay Castano PT Physical Therapist                   Obj/Interventions       Row Name 12/17/24 1514          Motor Skills    Therapeutic Exercise hip;knee;ankle  -[facility] Name 12/17/24 1514          Hip (Therapeutic Exercise)    Hip (Therapeutic Exercise) strengthening exercise  -     Hip Strengthening (Therapeutic Exercise) bilateral;red;resistance band;aBduction;sitting;marching while seated  -       Row Name 12/17/24 1514          Knee (Therapeutic Exercise)    Knee (Therapeutic Exercise) strengthening exercise  -     Knee Strengthening (Therapeutic Exercise) bilateral;LAQ (long arc quad);10 repetitions;hamstring curls;red;resistance band  -       Row Name 12/17/24 1514          Ankle (Therapeutic Exercise)    Ankle (Therapeutic Exercise) AROM (active range of motion)  -     Ankle AROM (Therapeutic Exercise) bilateral;dorsiflexion;plantarflexion;10 repetitions  -               User Key  (r) = Recorded By, (t) = Taken By, (c) = Cosigned By      Initials Name Provider Type     Kay Castano PT Physical Therapist                   Goals/Plan    No documentation.                  Clinical Impression       [facility] Name 12/17/24 1515          Pain    Pretreatment Pain Rating 8/10  -     Posttreatment Pain Rating 8/10  -     Pain Location shoulder  -     Pain Side/Orientation right  -     Pain Management Interventions exercise or physical activity utilized  -     Response to Pain Interventions activity participation with tolerable pain;mobility function improved;functional ability improved;activity level improved  -       Row Name 12/17/24 1515          Plan of Care Review    Progress improving  -     Outcome Evaluation Pt participated in PT treatment session this date. Pt presents supine in bed with family at side, pleasant and agreeeable to PT treatment session. Pt reports 8/10 R shoulder pain at rest. Pt performed supine  > sit on EOB with mod A, required assistance to position trunk upright. Pt performed STS with min A from elevated bed surface with use of RW. Pt ambulated x80' with RW and min A, pt required consistent correction of RW placement during gait and management to steer RW. Pt performed seated TE as described in interventions. Pt returned to supine with mod A and required dependent A to reposition once in supine. Following treatment, pt left semi-fowlers position in bed with bed alarm active, call light and all needs within reach. Continue with current POC; progress interventions as tolerated.  -       Row Name 12/17/24 1515          Vital Signs    O2 Delivery Pre Treatment room air  -     O2 Delivery Intra Treatment room air  -HW     Post SpO2 (%) 95  -     O2 Delivery Post Treatment room air  -HW     Pre Patient Position Supine  -HW     Intra Patient Position Standing  -     Post Patient Position Supine  -       Row Name 12/17/24 1515          Positioning and Restraints    Pre-Treatment Position in bed  -HW     Post Treatment Position bed  -HW     In Bed fowlers;exit alarm on;encouraged to call for assist;call light within reach  -               User Key  (r) = Recorded By, (t) = Taken By, (c) = Cosigned By      Initials Name Provider Type     Kay Castano, CARLOS Physical Therapist                   Outcome Measures       Row Name 12/17/24 1520 12/17/24 0811       How much help from another person do you currently need...    Turning from your back to your side while in flat bed without using bedrails? 3  -HW 3  -BW    Moving from lying on back to sitting on the side of a flat bed without bedrails? 2  -HW 2  -BW    Moving to and from a bed to a chair (including a wheelchair)? 3  -HW 2  -BW    Standing up from a chair using your arms (e.g., wheelchair, bedside chair)? 3  -HW 2  -BW    Climbing 3-5 steps with a railing? 2  -HW 1  -BW    To walk in hospital room? 3  -HW 3  -BW    AM-PAC 6 Clicks Score (PT) 16   -HW 13  -BW    Highest Level of Mobility Goal 5 --> Static standing  -HW 4 --> Transfer to chair/commode  -BW      Row Name 12/17/24 0600 12/17/24 0400       How much help from another person do you currently need...    Turning from your back to your side while in flat bed without using bedrails? 3  -KP 3  -KP    Moving from lying on back to sitting on the side of a flat bed without bedrails? 2  -KP 2  -KP    Moving to and from a bed to a chair (including a wheelchair)? 2  -KP 2  -KP    Standing up from a chair using your arms (e.g., wheelchair, bedside chair)? 2  -KP 2  -KP    Climbing 3-5 steps with a railing? 1  -KP 1  -KP    To walk in hospital room? 3  -KP 3  -KP    AM-PAC 6 Clicks Score (PT) 13  -KP 13  -KP    Highest Level of Mobility Goal 4 --> Transfer to chair/commode  -KP 4 --> Transfer to chair/commode  -KP      Row Name 12/17/24 1520 12/17/24 1241       Functional Assessment    Outcome Measure Options AM-PAC 6 Clicks Basic Mobility (PT)  - AM-PAC 6 Clicks Daily Activity (OT)  -SD              User Key  (r) = Recorded By, (t) = Taken By, (c) = Cosigned By      Initials Name Provider Type    La Nena Dial OT Occupational Therapist    Kirti Brandt, RN Registered Nurse     Kay Castano, PT Physical Therapist    Afshan Ruffin, RN Registered Nurse                                 Physical Therapy Education       Title: PT OT SLP Therapies (In Progress)       Topic: Physical Therapy (In Progress)       Point: Mobility training (Done)       Learning Progress Summary            Patient Acceptance, E,TB,D, VU,NR by  at 12/16/2024 1146    Acceptance, E,TB, VU by  at 12/12/2024 1301    Comment: role of PT during IP admission                      Point: Home exercise program (Done)       Learning Progress Summary            Patient Acceptance, E,TB, VU by  at 12/14/2024 1336    Comment: Perform ex daily                      Point: Body mechanics (Done)       Learning Progress Summary             Patient Acceptance, E,TB, VU by  at 12/17/2024 1520    Comment: proper posture during gait    Acceptance, E,TB, VU by  at 12/15/2024 1256    Comment: upright posture in standing and during amb                      Point: Precautions (Not Started)       Learner Progress:  Not documented in this visit.                              User Key       Initials Effective Dates Name Provider Type Discipline    CC 06/16/21 -  Lolita Alcazar PTA Physical Therapist Assistant PT     06/16/21 -  Jairo Caballero PTA Physical Therapist Assistant PT     11/29/23 -  Kay Castano PT Physical Therapist PT                  PT Recommendation and Plan  Planned Therapy Interventions (PT): balance training, bed mobility training, gait training, home exercise program, patient/family education, postural re-education, stretching, strengthening, ROM (range of motion), transfer training, neuromuscular re-education  Progress: improving  Outcome Evaluation: Pt participated in PT treatment session this date. Pt presents supine in bed with family at side, pleasant and agreeeable to PT treatment session. Pt reports 8/10 R shoulder pain at rest. Pt performed supine > sit on EOB with mod A, required assistance to position trunk upright. Pt performed STS with min A from elevated bed surface with use of RW. Pt ambulated x80' with RW and min A, pt required consistent correction of RW placement during gait and management to steer RW. Pt performed seated TE as described in interventions. Pt returned to supine with mod A and required dependent A to reposition once in supine. Following treatment, pt left semi-fowlers position in bed with bed alarm active, call light and all needs within reach. Continue with current POC; progress interventions as tolerated.     Time Calculation:   PT Evaluation Complexity  History, PT Evaluation Complexity: 1-2 personal factors and/or comorbidities  Examination of Body Systems (PT Eval Complexity): 1-2  elements  Clinical Presentation (PT Evaluation Complexity): stable  Clinical Decision Making (PT Evaluation Complexity): low complexity  Overall Complexity (PT Evaluation Complexity): low complexity     PT Charges       Row Name 12/17/24 1521 12/17/24 1511 12/17/24 1422       Time Calculation    Start Time 1427  -HW -- --    Stop Time 1450  -HW -- --    Time Calculation (min) 23 min  -HW -- --    PT Received On 12/17/24  -HW -- --       Time Calculation- PT    Total Timed Code Minutes- PT 23 minute(s)  -HW -- --       Timed Charges    18294 - PT Therapeutic Exercise Minutes 10  -HW -- --    96825 - Gait Training Minutes  13  -HW -- --       Total Minutes    Timed Charges Total Minutes 23  -HW -- --     Total Minutes 23  -HW -- --       PT/SLP KX Modifier    Exception criteria met to exceed therapy cap -- Apply KX Modifier  - Apply KX Modifier  -      Row Name 12/17/24 0932             PT/SLP KX Modifier    Exception criteria met to exceed therapy cap Apply KX Modifier  -                User Key  (r) = Recorded By, (t) = Taken By, (c) = Cosigned By      Initials Name Provider Type     Kay Castano, PT Physical Therapist                  Therapy Charges for Today       Code Description Service Date Service Provider Modifiers Qty    03681428384 HC PT THER PROC EA 15 MIN 12/17/2024 Kay Castano, PT GP, KX 1    [id number] HC GAIT TRAINING EA 15 MIN 12/17/2024 Kay Castano, PT GP, KX 1            PT G-Codes  Outcome Measure Options: AM-PAC 6 Clicks Basic Mobility (PT)  AM-PAC 6 Clicks Score (PT): 16  AM-PAC 6 Clicks Score (OT): 14  PT Discharge Summary  Anticipated Discharge Disposition (PT): inpatient rehabilitation facility    Kay Castano PT  12/17/2024

## 2024-12-17 NOTE — PLAN OF CARE
Problem: Adult Inpatient Plan of Care  Goal: Plan of Care Review  Outcome: Progressing  Flowsheets (Taken 12/17/2024 1635)  Progress: improving  Outcome Evaluation: Patient hypotensive during the day, 500mL bolus given see MAR. No other acute events noted today. Pt worked with therapy, plan of care to be continued.  Plan of Care Reviewed With: patient  Goal: Patient-Specific Goal (Individualized)  Outcome: Progressing  Goal: Absence of Hospital-Acquired Illness or Injury  Outcome: Progressing  Intervention: Identify and Manage Fall Risk  Recent Flowsheet Documentation  Taken 12/17/2024 1600 by Kirti Waller RN  Safety Promotion/Fall Prevention:   safety round/check completed   activity supervised   assistive device/personal items within reach   clutter free environment maintained   fall prevention program maintained  Taken 12/17/2024 1400 by Kirti Waller RN  Safety Promotion/Fall Prevention:   safety round/check completed   activity supervised   assistive device/personal items within reach   clutter free environment maintained   fall prevention program maintained   nonskid shoes/slippers when out of bed  Taken 12/17/2024 1200 by Kirti Waller RN  Safety Promotion/Fall Prevention:   safety round/check completed   activity supervised   assistive device/personal items within reach   clutter free environment maintained   fall prevention program maintained   nonskid shoes/slippers when out of bed  Taken 12/17/2024 1000 by Kirti Waller, RN  Safety Promotion/Fall Prevention:   safety round/check completed   activity supervised   assistive device/personal items within reach   clutter free environment maintained   fall prevention program maintained   nonskid shoes/slippers when out of bed  Taken 12/17/2024 0811 by Kirti Waller, RN  Safety Promotion/Fall Prevention:   activity supervised   assistive device/personal items within reach   clutter free environment maintained   fall prevention program  maintained   safety round/check completed  Intervention: Prevent Skin Injury  Recent Flowsheet Documentation  Taken 12/17/2024 1600 by Kirti Waller RN  Body Position:   sitting up in bed   tilted   left  Taken 12/17/2024 1400 by Kirti Waller RN  Body Position: side-lying  Taken 12/17/2024 1000 by Kirti Waller RN  Body Position: legs elevated  Taken 12/17/2024 0811 by Kirti Waller RN  Body Position: position changed independently  Intervention: Prevent Infection  Recent Flowsheet Documentation  Taken 12/17/2024 1400 by Kirti Waller RN  Infection Prevention:   environmental surveillance performed   hand hygiene promoted   rest/sleep promoted  Taken 12/17/2024 1200 by Kirti Waller RN  Infection Prevention:   environmental surveillance performed   hand hygiene promoted   rest/sleep promoted  Taken 12/17/2024 1000 by Kirti Waller RN  Infection Prevention:   environmental surveillance performed   hand hygiene promoted   rest/sleep promoted  Taken 12/17/2024 0811 by Kirti Waller RN  Infection Prevention:   environmental surveillance performed   rest/sleep promoted  Goal: Optimal Comfort and Wellbeing  Outcome: Progressing  Intervention: Provide Person-Centered Care  Recent Flowsheet Documentation  Taken 12/17/2024 0811 by Kirti Waller RN  Trust Relationship/Rapport: care explained  Goal: Readiness for Transition of Care  Outcome: Progressing   Goal Outcome Evaluation:  Plan of Care Reviewed With: patient        Progress: improving  Outcome Evaluation: Patient hypotensive during the day, 500mL bolus given see MAR. No other acute events noted today. Pt worked with therapy, plan of care to be continued.

## 2024-12-17 NOTE — PLAN OF CARE
Goal Outcome Evaluation:  Plan of Care Reviewed With: patient        Progress: improving  Outcome Evaluation: OT tx completed. Patient is supine in bed, drowsy but awakens to name and willing to participate. Patient reports 9/10 lower back pain. Patient performed bed mobility mod A, did several forward reaches to establish midline d/t posterior list. Sitting EOB performed bathing with mod A. Patient performed sit to stand min A, walked 13' to bathroom + additional 68' using RW with min A. Patient performed toileting with mod A for hygiene and clothing management. Patient returned to chair, call bell and chair alarm. Pain decreased to 6/10. Continue OT POC

## 2024-12-17 NOTE — DISCHARGE PLACEMENT REQUEST
"Updated notes     Cici Riley (69 y.o. Male)       Date of Birth   1955    Social Security Number       Address   [address] [place] [address]    Home Phone   [phone]    MRN   [id number]       Confucianist   Patient Refused    Marital Status                               Admission Date   12/10/24    Admission Type   Emergency    Admitting Provider   Ines Sim DO    Attending Provider   Seth Andre MD    Department, Room/Bed   [facility] TELEMETRY 3, 304/1       Discharge Date       Discharge Disposition       Discharge Destination                                 Attending Provider: Seth Andre MD    Allergies: Penicillins, Trazodone    Isolation: None   Infection: None   Code Status: CPR    Ht: 180.3 cm (70.98\")   Wt: 106 kg (233 lb 7.5 oz)    Admission Cmt: None   Principal Problem: Weakness [R53.1]                   Active Insurance as of 12/10/2024       Primary Coverage       Payor Plan Insurance Group Employer/Plan Group    ANTHEM MEDICARE REPLACEMENT ANTHEM MED ADV HMO [id number]       Payor Plan Address Payor Plan Phone Number Payor Plan Fax Number Effective Dates    [address] [phone]  2024 - None Entered    [facility] [id number]         Subscriber Name Subscriber Birth Date Member ID       CICI RILEY 1955 [id number]                     Emergency Contacts        (Rel.) Home Phone Work Phone Mobile Phone    Jina Riley (Spouse) [phone] -- [phone]                 History & Physical        Ines Sim DO at 12/10/24 [address] [place] [address] HOSPITALIST   HISTORY AND PHYSICAL      Name:  Cici Riley   Age:  69 y.o.  Sex:  male  :  1955  MRN:  [id number]   Visit Number:  [id number]  Admission Date:  12/10/2024  Date Of Service:  12/10/24  Primary Care Physician:  Margret Purvis MD    Chief Complaint:     Fall, confusion    History Of Presenting Illness: "      Chronically ill 69-year-old with a history of peripheral neuropathy, diabetes, hypothyroidism, CAD, prior CVA, vision loss, diabetic retinopathy, chronic pain, history of multiple falls, who had presented to the emergency room due to concerns of confusion.  History obtained primarily from ER record, somewhat from the patient.  No family currently at bedside at time of visit.  Patient notes he had had a fall he thinks yesterday, had hurt his shoulder, actually went to an outlying emergency room where he had no fractures.  He had taken some pain medicine at home, cannot remember the name, apparently his wife had noticed him being more confused overnight into today.  He is currently awake and alert that he is in the emergency room, however he is overall fairly poor historian.  Notes shoulder is feeling better.  He denies any dizziness or headache.    In the ER the patient is overall stable other than mild hypertension.  His labs were largely nonactionable.  X-rays unremarkable for any acute fracture.  CT angiogram of the head neck with contrast was unremarkable, MRI of the brain did not show any acute CVA, showed white matter disease.  Patient was seen by teleneurology who recommended admission for observation.    Review Of Systems:    All systems were reviewed and negative except as mentioned in history of presenting illness, assessment and plan.    Past Medical History: Patient  has a past medical history of Arthritis, Clot, Coronary artery disease, Diabetes mellitus, Heart disease, History of blood clots, Hypertension, Low back pain, Thyroid disease, and Type 2 diabetes mellitus.    Past Surgical History: Patient  has a past surgical history that includes Esophagogastroduodenoscopy; Colonoscopy (2015); Cholecystectomy (2015); and Coronary artery bypass graft.    Social History: Patient  reports that he has never smoked. He has never been exposed to tobacco smoke. His smokeless tobacco use includes chew. He  reports that he does not drink alcohol and does not use drugs.    Family History:  Patient's family history has been reviewed and found to be noncontributory.     Allergies:      Trazodone    Home Medications:    Prior to Admission Medications       Prescriptions Last Dose Informant Patient Reported? Taking?    ALPRAZolam (XANAX) 0.5 MG tablet   No No    Take 1 tablet by mouth At Night As Needed for Anxiety.    amLODIPine (NORVASC) 10 MG tablet   No No    Take 1 tablet by mouth Daily.    apixaban (Eliquis) 5 MG tablet tablet   No No    Take 1 tablet by mouth Every 12 (Twelve) Hours.    aspirin 81 MG chewable tablet   Yes No    Chew 1 tablet Daily.    atorvastatin (Lipitor) 80 MG tablet   No No    Take 1 tablet by mouth Every Night.    chlorthalidone (HYGROTON) 25 MG tablet   No No    Take 0.5 tablets by mouth Daily.    citalopram (CeleXA) 40 MG tablet   No No    Take 1 tablet by mouth Daily.    Continuous Glucose Sensor (FreeStyle Rina 3 Sensor) misc   No No    Use 1 each Every 14 (Fourteen) Days.    cyclopentolate (CYCLOGYL) 1 % ophthalmic solution   Yes No    Apply 1 drop to eye(s) as directed by provider 3 (Three) Times a Day.    famotidine (Pepcid) 20 MG tablet   No No    Take 1 tablet by mouth 2 (Two) Times a Day.    glucose blood (Accu-Chek Caryn Plus) test strip   No No    USE AS DIRECTED 3 TIMES DAILY    glucose monitor monitoring kit   No No    1 each Daily.    glucose monitor monitoring kit   No No    1 each Daily.    HYDROcodone-acetaminophen (NORCO) 7.5-325 MG per tablet   No No    Take 1 tablet by mouth Every 12 (Twelve) Hours As Needed for Moderate Pain.    Insulin NPH Isophane & Regular (HumuLIN 70/30 KwikPen) (70-30) 100 UNIT/ML suspension pen-injector   No No    Inject 70 units twice daily E11.65    Insulin Pen Needle 32G X 4 MM misc   No No    For use with insulin injections, daily    Insulin Syringes, Disposable, U-100 1 ML [facility]   No No    50 Units 3 (Three) Times a Day As Needed (50 units in the  "morning, and then with meals PRN).    Lancets 30G misc   No No    Check sugar 3 times daily    levothyroxine (SYNTHROID, LEVOTHROID) 125 MCG tablet   No No    Take 1 tablet by mouth Daily.    losartan (COZAAR) 100 MG tablet   No No    Take 1 tablet by mouth Daily.    pantoprazole (PROTONIX) 40 MG EC tablet   No No    Take 1 tablet by mouth Daily.    QUEtiapine (SEROquel) 50 MG tablet   No No    Take 1 tablet by mouth 2 (Two) Times a Day.          ED Medications:    Medications   apixaban (ELIQUIS) tablet 5 mg (5 mg Oral Given 12/10/24 1354)   atorvastatin (LIPITOR) tablet 80 mg (has no administration in time range)   labetalol (NORMODYNE,TRANDATE) injection 10 mg (has no administration in time range)   Diclofenac Sodium (VOLTAREN) 1 % gel 4 g (4 g Topical Given 12/10/24 1426)   iopamidol (ISOVUE-300) 61 % injection 100 mL (100 mL Intravenous Given 12/10/24 1047)   magnesium sulfate 2g/50 mL (PREMIX) infusion (0 g Intravenous Stopped 12/10/24 1233)   acetaminophen (TYLENOL) tablet 650 mg (650 mg Oral Given 12/10/24 1354)     Vital Signs:  Temp:  [98.3 °F (36.8 °C)] 98.3 °F (36.8 °C)  Heart Rate:  [75-86] 77  Resp:  [20-22] 20  BP: (129-180)/() 157/97        12/10/24  0822   Weight: 108 kg (239 lb)     Body mass index is 33.33 kg/m².    Physical Exam:     Most recent vital Signs: /97   Pulse 77   Temp 98.3 °F (36.8 °C) (Oral)   Resp 20   Ht 180.3 cm (71\")   Wt 108 kg (239 lb)   SpO2 95%   BMI 33.33 kg/m²     Physical Exam  Constitutional:       General: He is not in acute distress.     Appearance: He is not toxic-appearing.   HENT:      Ears:      Comments: Hearing loss     Mouth/Throat:      Mouth: Mucous membranes are moist.      Pharynx: Oropharynx is clear.   Eyes:      Pupils: Pupils are equal, round, and reactive to light.   Cardiovascular:      Rate and Rhythm: Normal rate and regular rhythm.      Pulses: Normal pulses.      Heart sounds: No murmur heard.     No gallop.   Pulmonary:      " Effort: No respiratory distress.      Breath sounds: No wheezing or rales.   Abdominal:      General: Bowel sounds are normal. There is no distension.      Tenderness: There is no abdominal tenderness. There is no guarding.   Skin:     General: Skin is warm.      Capillary Refill: Capillary refill takes less than 2 seconds.   Neurological:      General: No focal deficit present.      Mental Status: He is alert.      Motor: Weakness present.   Psychiatric:         Mood and Affect: Mood normal.         Thought Content: Thought content normal.         Laboratory data:    I have reviewed the labs done in the emergency room.    Results from last 7 days   Lab Units 12/10/24  0929   SODIUM mmol/L 138   POTASSIUM mmol/L 3.8   CHLORIDE mmol/L 101   CO2 mmol/L 26.2   BUN mg/dL 16   CREATININE mg/dL 1.55*   CALCIUM mg/dL 8.9   BILIRUBIN mg/dL 0.8   ALK PHOS U/L 89   ALT (SGPT) U/L 20   AST (SGOT) U/L 29   GLUCOSE mg/dL 175*     Results from last 7 days   Lab Units 12/10/24  1128   WBC 10*3/mm3 8.65   HEMOGLOBIN g/dL 16.0   HEMATOCRIT % 48.1   PLATELETS 10*3/mm3 146         Results from last 7 days   Lab Units 12/10/24  1128 12/10/24  0929   CK TOTAL U/L  --  437*   HSTROP T ng/L 50* 52*     Results from last 7 days   Lab Units 12/10/24  0929   PROBNP pg/mL 290.6                 Results from last 7 days   Lab Units 12/10/24  1119   COLOR UA  Yellow   GLUCOSE UA  250 mg/dL (1+)*   KETONES UA  Negative   BLOOD UA  Trace*   LEUKOCYTES UA  Negative   PH, URINE  7.0   BILIRUBIN UA  Negative   UROBILINOGEN UA  1.0 E.U./dL   RBC UA /HPF 0-2   WBC UA /HPF None Seen       Pain Management Panel  More data exists         Latest Ref Rng & Units 12/10/2024 4/15/2024   Pain Management Panel   Creatinine, Urine Not Estab. mg/dL - 126.1    Amphetamine, Urine Qual Negative Negative  -   Barbiturates Screen, Urine Negative Negative  -   Benzodiazepine Screen, Urine Negative Positive  -   Buprenorphine, Screen, Urine Negative Negative  -    Cocaine Screen, Urine Negative Negative  -   Methadone Screen , Urine Negative Negative  -   Methamphetamine, Ur Negative Negative  -      Details                   EKG:          Radiology:    MRI Brain Without Contrast    Result Date: 12/10/2024  PROCEDURE: MRI BRAIN WO CONTRAST-  HISTORY: concern for possible posterior stroke,  PROCEDURE: Multiplanar MR imaging of the brain was performed in multiple MR sequences .  FINDINGS: Brain parenchyma displays normal signal without evidence of mass, hemorrhage or edema.  Limited images the proximal cord are unremarkable. The ventricles are normal in size. There is a small area of encephalomalacia in the right basal ganglia consistent with small remote CVA. There is no extra-axial fluid or midline shift. Flow-voids are appropriate. Diffusion weighted images demonstrate no evidence of acute CVA although a small area could be missed secondary to the motion artifact. Limited images of the paranasal sinuses are unremarkable. Motion artifact noted on multiple images significantly decreasing the sensitivity of this exam. There is mild small vessel ischemic disease. T2 coronal images are almost nondiagnostic. Same is true of the T1 axial images.      Gross motion artifact on multiple images and motion artifact present on all images.  No acute CVA identified though a small area could be missed secondary to motion artifact.  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      This report was signed and finalized on 12/10/2024 1:38 PM by Kristi Roblero MD.      CT Angiogram Head    Result Date: 12/10/2024  PROCEDURE: CT ANGIOGRAM NECK-, CT ANGIOGRAM HEAD-  HISTORY: Concern for stroke, altered mental status  TECHNIQUE: Thin section axial CT with IV contrast supplemented with multiplanar reconstruction of the head and neck.  FINDINGS: Exam is limited by significant patient motion artifact.  CTA:  Aortic arch:  Arch shows no significant narrowing. Great vessel origins are  widely patent.  Right carotid: There is mild plaque in the right carotid bulb with less than 50% stenosis. Right common carotid artery is unremarkable..  Left carotid: There is minimal calcified plaque in the left carotid bulb with less than 50% stenosis. Carotid artery is unremarkable.  Vertebrals: Left vertebral artery is dominant. No significant stenosis is present. Distal vertebral arteries are suboptimally visualized.  The cranial circulation is grossly unremarkable. ACAs are unremarkable. Motion artifact limits visualization of MCAs and PCAs. No definite stenosis or aneurysm is seen, but small lesions could be missed. Basilar arteries are unremarkable.  Calcification in the right palatine tonsil suggests previous tonsillitis.      Suboptimal exam secondary to motion artifact on multiple images.  Less than 50% stenosis of the internal carotid arteries bilaterally.  No significant intracranial stenosis or aneurysm identified, however, small lesions could be missed secondary to motion artifact.    DLP: 749.41 mGy.cm,748.94 mGy.cm CTDI: 22.7 mGy  This study was performed with techniques to keep radiation doses as low as reasonably achievable (ALARA). Individualized dose reduction techniques using automated exposure control or adjustment of mA and/or kV according to the patient size were employed.      Images were reviewed, interpreted, and dictated by Dr. Kristi Roblero MD Transcribed by Anna Beasley PA-C.  This report was signed and finalized on 12/10/2024 11:28 AM by Kristi Roblero MD.      CT Angiogram Neck    Result Date: 12/10/2024  PROCEDURE: CT ANGIOGRAM NECK-, CT ANGIOGRAM HEAD-  HISTORY: Concern for stroke, altered mental status  TECHNIQUE: Thin section axial CT with IV contrast supplemented with multiplanar reconstruction of the head and neck.  FINDINGS: Exam is limited by significant patient motion artifact.  CTA:  Aortic arch:  Arch shows no significant narrowing. Great vessel origins are widely  patent.  Right carotid: There is mild plaque in the right carotid bulb with less than 50% stenosis. Right common carotid artery is unremarkable..  Left carotid: There is minimal calcified plaque in the left carotid bulb with less than 50% stenosis. Carotid artery is unremarkable.  Vertebrals: Left vertebral artery is dominant. No significant stenosis is present. Distal vertebral arteries are suboptimally visualized.  The cranial circulation is grossly unremarkable. ACAs are unremarkable. Motion artifact limits visualization of MCAs and PCAs. No definite stenosis or aneurysm is seen, but small lesions could be missed. Basilar arteries are unremarkable.  Calcification in the right palatine tonsil suggests previous tonsillitis.      Suboptimal exam secondary to motion artifact on multiple images.  Less than 50% stenosis of the internal carotid arteries bilaterally.  No significant intracranial stenosis or aneurysm identified, however, small lesions could be missed secondary to motion artifact.    DLP: 749.41 mGy.cm,748.94 mGy.cm CTDI: 22.7 mGy  This study was performed with techniques to keep radiation doses as low as reasonably achievable (ALARA). Individualized dose reduction techniques using automated exposure control or adjustment of mA and/or kV according to the patient size were employed.      Images were reviewed, interpreted, and dictated by Dr. Kristi Roblero MD Transcribed by Anna Beasley PA-C.  This report was signed and finalized on 12/10/2024 11:28 AM by Kristi Roblero MD.      CT Cervical Spine Without Contrast    Result Date: 12/10/2024  PROCEDURE: CT CERVICAL SPINE WO CONTRAST-  HISTORY: Fall yesterday, AMS, neck pain  COMPARISON: May 2023.  PROCEDURE: Multiple axial CT images were obtained through the cervical spine using bone window algorithms. Coronal and sagittal images were reconstructed from the original axial data set.  FINDINGS: Facets are normally aligned. There is no acute fracture.. There is  straightening of the normal cervical adenosis. There is minimal retrolisthesis of C3 on C4, stable. There is mild to moderate disc space narrowing at all levels with small osteophytes. Multiple levels of spinal stenosis and/or neuroforaminal narrowing secondary to degenerative change, but not secondary to acute abnormality. Bilateral carotid artery calcifications are noted.  Limited images of the lung apices are unremarkable.      No acute fracture or malalignment.  Degenerative changes.   CTDI: 22.7 mGy DLP: 749.41 mGy.cm,748.94 mGy.cm   This study was performed with techniques to keep radiation doses as low as reasonably achievable (ALARA). Individualized dose reduction techniques using automated exposure control or adjustment of mA and/or kV according to the patient size were employed.     Images were reviewed, interpreted, and dictated by Dr. Kristi Roblero MD Transcribed by Anna Beasley PA-C.  This report was signed and finalized on 12/10/2024 11:20 AM by Kristi Roblero MD.      CT Head Without Contrast    Result Date: 12/10/2024  PROCEDURE: CT HEAD WO CONTRAST-  HISTORY: ground level fall yesterday on [facility] since fall  COMPARISON: May 2023.  TECHNIQUE: Multiple axial CT images were performed from the foramen magnum to the vertex without enhancement.  FINDINGS: There is no CT evidence of acute intracranial hemorrhage. Moderate atrophy, pronounced for the patient's age, is stable. There is a stable small area of encephalomalacia in the right basal ganglia consistent with remote CVA. There is no mass, mass effect or midline shift.  There is no hydrocephalus. The paranasal sinuses are clear. Bone windows reveal no acute osseous abnormalities.      Stable chronic changes without acute intracranial process.     DLP: 749.41 mGy.cm,748.94 mGy.cm CTDI: 22.7 mGy   This study was performed with techniques to keep radiation doses as low as reasonably achievable (ALARA). Individualized dose reduction techniques  using automated exposure control or adjustment of mA and/or kV according to the patient size were employed.     Images were reviewed, interpreted, and dictated by Dr. Kristi Roblero MD Transcribed by Anna Beasley PA-C.  This report was signed and finalized on 12/10/2024 11:20 AM by Kristi Roblero MD.      XR Shoulder 2+ View Right    Result Date: 12/10/2024   PROCEDURE: XR SHOULDER 2+ VW RIGHT-  HISTORY: Fall, yesterday, knot noted to right shoulder  COMPARISON: March 24, 2016.  FINDINGS:  A three view exam demonstrates no acute fracture or dislocation. The joint spaces demonstrate moderate narrowing of the right acromioclavicular joint. There is also decreased subacromial distance suggesting chronic rotator cuff tear. No soft tissue abnormality is seen. On a single view there is a linear lucency in the superior lateral right acromion. Borders appear smooth and mildly sclerotic. If this is a fracture appears to be remote. This is not identified on the prior study.       No acute bony abnormality.  Degenerative changes described.      This report was signed and finalized on 12/10/2024 11:03 AM by Kristi Roblero MD.      XR Humerus Right    Result Date: 12/10/2024  PROCEDURE: XR HUMERUS RIGHT-  HISTORY: Fall yesterday, pain, inability to abduct arm  COMPARISON: None.  FINDINGS:  A two view exam demonstrates no acute fracture or dislocation. The joint spaces appear unremarkable. No soft tissue abnormality is seen.      No acute bony abnormality.      This report was signed and finalized on 12/10/2024 10:56 AM by Kristi Roblero MD.      XR Chest 1 View    Result Date: 12/10/2024  PROCEDURE: XR CHEST 1 VW-  HISTORY: AMS, fell last night, right shoulder pain.  COMPARISON: June 21, 2022..  FINDINGS: The heart is normal in size. The lungs are clear. The mediastinum is unremarkable. There is no pneumothorax.  There are no acute osseous abnormalities. Status post median sternotomy. Apical lordotic positioning noted. Small density  is again projected over the left apex, stable. There is evidence of old calcified granulomatous disease.      No acute cardiopulmonary process.     This report was signed and finalized on 12/10/2024 10:56 AM by Kristi Roblero MD.       Assessment:    Weakness, POA  Encephalopathy, POA  Diabetes with neuropathy and retinopathy  Vision loss  Hearing loss  CAD  History of prior PE on Eliquis  Impaired mobility and ADL    Plan:    Admitted as observation    Weakness/confusion:  Appears to have chronic weakness, history of falls, lot of cares provided by spouse.  In regards to confusion, may have been exacerbated by pain medication needed been taking for the shoulder.  He is also on benzodiazepines and Seroquel.  Echocardiogram was ordered.  Lipitor has been ordered.  Telemetry monitoring.  Neurology to follow-up.  Will continue his Eliquis.    Will have PT and OT evaluations.    Diabetes/vision loss/hearing loss/CAD/prior PE/impaired mobility:  Will resume home medications once able to reconcile with spouse.    Likely home in 24 to 48 hours.  Plan of care discussed with the patient.    Risk Assessment: High  DVT Prophylaxis: Eliquis  Code Status: Full  Diet: As tolerated    Advance Care Planning  ACP discussion was held with the patient during this visit. Patient does not have an advance directive, declines further assistance.           Ines Sim DO  12/10/24  15:11 EST    Dictated utilizing Dragon dictation.    Electronically signed by Ines Sim DO at 12/10/24 1653       Prior to Admission Medications       Prescriptions Last Dose Informant Patient Reported? Taking?    ALPRAZolam (XANAX) 0.5 MG tablet Past Week  No Yes    Take 1 tablet by mouth At Night As Needed for Anxiety.    amLODIPine (NORVASC) 10 MG tablet 12/9/2024  No No    Take 1 tablet by mouth Daily.    apixaban (Eliquis) 5 MG tablet tablet 12/9/2024  No No    Take 1 tablet by mouth Every 12 (Twelve) Hours.    aspirin 81 MG chewable  tablet 12/9/2024  Yes No    Chew 1 tablet Daily.    atorvastatin (Lipitor) 80 MG tablet 12/9/2024  No No    Take 1 tablet by mouth Every Night.    chlorthalidone (HYGROTON) 25 MG tablet 12/9/2024  No No    Take 0.5 tablets by mouth Daily.    citalopram (CeleXA) 40 MG tablet 12/9/2024  No No    Take 1 tablet by mouth Daily.    Continuous Glucose Sensor (FreeStyle Rina 3 Sensor) misc Unknown  No No    Use 1 each Every 14 (Fourteen) Days.    cyclopentolate (CYCLOGYL) 1 % ophthalmic solution Unknown  Yes No    Apply 1 drop to eye(s) as directed by provider 3 (Three) Times a Day.    Patient not taking:  Reported on 12/11/2024    dorzolamide-timolol (COSOPT) 2-0.5 % ophthalmic solution 12/9/2024  Yes No    Administer 1 drop to both eyes 2 (Two) Times a Day.    famotidine (Pepcid) 20 MG tablet 12/9/2024  No No    Take 1 tablet by mouth 2 (Two) Times a Day.    glucose blood (Accu-Chek Caryn Plus) test strip Unknown  No No    USE AS DIRECTED 3 TIMES DAILY    glucose monitor monitoring kit Unknown  No No    1 each Daily.    glucose monitor monitoring kit Unknown  No No    1 each Daily.    HYDROcodone-acetaminophen (NORCO) 7.5-325 MG per tablet Unknown  No No    Take 1 tablet by mouth Every 12 (Twelve) Hours As Needed for Moderate Pain.    Insulin NPH Isophane & Regular (HumuLIN 70/30 KwikPen) (70-30) 100 UNIT/ML suspension pen-injector 12/9/2024  No No    Inject 70 units twice daily E11.65    Insulin Pen Needle 32G X 4 MM misc Unknown  No No    For use with insulin injections, daily    Insulin Syringes, Disposable, U-100 1 ML misc Unknown  No No    50 Units 3 (Three) Times a Day As Needed (50 units in the morning, and then with meals PRN).    Lancets 30G misc Unknown  No No    Check sugar 3 times daily    levothyroxine (SYNTHROID, LEVOTHROID) 125 MCG tablet 12/9/2024  No No    Take 1 tablet by mouth Daily.    losartan (COZAAR) 100 MG tablet 12/9/2024  No No    Take 1 tablet by mouth Daily.    pantoprazole (PROTONIX) 40 MG EC  tablet 2024  No No    Take 1 tablet by mouth Daily.    QUEtiapine (SEROquel) 50 MG tablet 2024  No No    Take 1 tablet by mouth 2 (Two) Times a Day.             Physician Progress Notes (most recent note)        Ines Sim,  at 24 1158              [facility]    PROGRESS NOTE    Name:  Ramsey Riley   Age:  69 y.o.  Sex:  male  :  1955  MRN:  [id number]   Visit Number:  [id number]  Admission Date:  12/10/2024  Date Of Service:  24  Primary Care Physician:  Margret Purvis MD     LOS: 0 days :    Chief Complaint:      Follow-up weakness    Subjective:    Patient awake and alert.  Nursing has noted with pain medication he does have some hallucinations.  Currently is aware he is in the hospital and plan for rehab.    Hospital Course:    Chronically ill 69-year-old with a history of peripheral neuropathy, diabetes, hypothyroidism, CAD, prior CVA, vision loss, diabetic retinopathy, chronic pain, history of multiple falls, who had presented to the emergency room due to concerns of confusion.  History obtained primarily from ER record, somewhat from the patient.  No family currently at bedside at time of visit.  Patient notes he had had a fall he thinks yesterday, had hurt his shoulder, actually went to an outlying emergency room where he had no fractures.  He had taken some pain medicine at home, cannot remember the name, apparently his wife had noticed him being more confused overnight into today.  He is currently awake and alert that he is in the emergency room, however he is overall fairly poor historian.  Notes shoulder is feeling better.  He denies any dizziness or headache.     In the ER the patient is overall stable other than mild hypertension.  His labs were largely nonactionable.  X-rays unremarkable for any acute fracture.  CT angiogram of the head neck with contrast was unremarkable, MRI of the brain did not show any acute CVA, showed  white matter disease.  Patient was seen by teleneurology who recommended admission for observation.    Concern is for underlying cerebral amyloid angiopathy, possible dementia contributing.  Would likely benefit from short-term rehab after discussion with PT and OT.    Review of Systems:     All systems were reviewed and negative except as mentioned in subjective, assessment and plan.    Vital Signs:    Temp:  [97.8 °F (36.6 °C)-98.7 °F (37.1 °C)] 98.7 °F (37.1 °C)  Heart Rate:  [75-80] 80  Resp:  [16-18] 16  BP: (146-171)/(80-96) 151/95    Intake and output:    I/O last 3 completed shifts:  In: 240 [P.O.:240]  Out: 2650 [Urine:2650]  I/O this shift:  In: 240 [P.O.:240]  Out: 500 [Urine:500]    Physical Examination:    General Appearance:  Alert and cooperative   Head:  Atraumatic and normocephalic.   Eyes: Conjunctivae and sclerae normal, no icterus. No pallor.   Throat: No oral lesions, no thrush, oral mucosa moist.   Neck: Supple, trachea midline, no thyromegaly.   Lungs:   Breath sounds heard bilaterally equally.  No wheezing or crackles. No Pleural rub or bronchial breathing.   Heart:  Normal S1 and S2, no murmur, no gallop, no rub. No JVD.   Abdomen:   Normal bowel sounds, no masses, no organomegaly. Soft, nontender, nondistended, no rebound tenderness.   Extremities: Supple, no edema, no cyanosis, no clubbing.  Decreased range of motion right shoulder   Skin: No bleeding or rash.   Neurologic: Alert and oriented x person and place.  Weakness but overall improved     Laboratory results:    Results from last 7 days   Lab Units 12/16/24  0551 12/11/24  0907 12/10/24  0929   SODIUM mmol/L 135* 136 138   POTASSIUM mmol/L 4.2 4.1 3.8   CHLORIDE mmol/L 100 101 101   CO2 mmol/L 24.4 26.7 26.2   BUN mg/dL 19 18 16   CREATININE mg/dL 1.45* 1.43* 1.55*   CALCIUM mg/dL 9.2 8.5* 8.9   BILIRUBIN mg/dL  --   --  0.8   ALK PHOS U/L  --   --  89   ALT (SGPT) U/L  --   --  20   AST (SGOT) U/L  --   --  29   GLUCOSE mg/dL 254*  "258* 175*     Results from last 7 days   Lab Units 12/16/24  0551 12/10/24  1128   WBC 10*3/mm3 7.97 8.65   HEMOGLOBIN g/dL 16.1 16.0   HEMATOCRIT % 46.5 48.1   PLATELETS 10*3/mm3 180 146         Results from last 7 days   Lab Units 12/10/24  1128 12/10/24  0929   CK TOTAL U/L  --  437*   HSTROP T ng/L 50* 52*         No results for input(s): \"PHART\", \"OKQ8TJA\", \"PO2ART\", \"OMU9INZ\", \"BASEEXCESS\" in the last 8760 hours.   I have reviewed the patient's laboratory results.    Radiology results:    No radiology results from the last 24 hrs  I have reviewed the patient's radiology reports.    Medication Review:     I have reviewed the patient's active and prn medications.     Problem List:      Weakness      Assessment:    Weakness, POA  Encephalopathy, POA  Diabetes with neuropathy and retinopathy  Vision loss  Hearing loss  CAD  History of prior PE on Eliquis  Impaired mobility and ADL    Plan:    Weakness/confusion:  Appears to have off-and-on confusion and weakness at home.  However recent increased amount of falls, not sleeping well, wife thinks some medication issues may be at play.  Notes patient sometimes doubles up on medicines because he sleeps throughout the morning and takes them all at nighttime.  Will schedule to Seroquel and Xanax only at night for now to help with mixing of nights and days.  Overall is improved.  Still with confusion with pain medication, will decrease dose to see if helps.    Cerebral amyloid angiopathy  Appears to have been progressive on imaging when compared to prior MRIs.  Based off symptomatology with the weakness, confusion, is fairly consistent with this.  Elevated blood pressures may have contributed and or fall exacerbated.  He will benefit from short-term rehab after discussion with PT and OT.  Will need outpatient nerve conduction testing.  Does have diabetic neuropathy     Diabetes/vision loss/hearing loss/CAD/prior PE/impaired mobility:  Continue home medications.  Continue " with Eliquis.    Short-term rehab workup is pending.    DVT Prophylaxis: Eliquis  Code Status: Full  Diet: As tolerated  Discharge Plan: STR    Ines Sim DO  24  11:58 EST    Dictated utilizing Dragon dictation.      Electronically signed by Ines Sim DO at 24 1159          Physical Therapy Notes (most recent note)        Jairo Caballero, PTA at 24 1113  Version 1 of 1         Patient Name: Ramsey Riley  : 1955    MRN: [id number]                              Today's Date: 2024       Admit Date: 12/10/2024    Visit Dx:     ICD-10-CM ICD-9-CM   1. TIA (transient ischemic attack)  G45.9 435.9     Patient Active Problem List   Diagnosis    Coronary artery disease involving native coronary artery of native heart without angina pectoris    Essential hypertension    Bradycardia, sinus    Fatigue    Abnormal ECG    Precordial pain    LVH (left ventricular hypertrophy) due to hypertensive disease, without heart failure    Diabetes mellitus    PAD (peripheral artery disease)    Ischemic stroke    History of blood clots    Pulmonary embolus    Dyslipidemia    Class 1 obesity due to excess calories with serious comorbidity and body mass index (BMI) of 34.0 to 34.9 in adult    Weakness     Past Medical History:   Diagnosis Date    Arthritis     Clot     Coronary artery disease     Diabetes mellitus     Heart disease     History of blood clots     Hypertension     Impaired mobility     Low back pain     Thyroid disease     Type 2 diabetes mellitus      Past Surgical History:   Procedure Laterality Date    CHOLECYSTECTOMY      COLONOSCOPY      dr srivastava    CORONARY ARTERY BYPASS GRAFT      [place] - 3 BY PASS     ENDOSCOPY        General Information       Row Name 24 1142          Physical Therapy Time and Intention    Document Type therapy note (daily note)  -RM     Mode of Treatment physical therapy  -RM       Row Name 24 1141          General  Information    Patient Profile Reviewed yes  -RM     Existing Precautions/Restrictions fall  -RM       Row Name 12/16/24 1140          Cognition    Orientation Status (Cognition) oriented to;person  -RM       Row Name 12/16/24 1140          Safety Issues/Impairments Affecting Functional Mobility    Safety Issues Affecting Function (Mobility) awareness of need for assistance;insight into deficits/self-awareness;positioning of assistive device;safety precaution awareness;safety precautions follow-through/compliance  -RM     Impairments Affecting Function (Mobility) visual/perceptual;endurance/activity tolerance  -RM               User Key  (r) = Recorded By, (t) = Taken By, (c) = Cosigned By      Initials Name Provider Type     Jairo Caballero, PTA Physical Therapist Assistant                   Mobility       Row Name 12/16/24 1141          Bed Mobility    Supine-Sit [place] (Bed Mobility) moderate assist (50% patient effort);verbal cues;nonverbal cues (demo/gesture)  -RM     Sit-Supine [place] (Bed Mobility) moderate assist (50% patient effort);verbal cues;nonverbal cues (demo/gesture)  -RM     Assistive Device (Bed Mobility) head of bed elevated;repositioning sheet  -       Row Name 12/16/24 1141          Sit-Stand Transfer    Sit-Stand [place] (Transfers) minimum assist (75% patient effort);moderate assist (50% patient effort);verbal cues  -RM     Assistive Device (Sit-Stand Transfers) walker, front-wheeled  -       Row Name 12/16/24 1141          Gait/Stairs (Locomotion)    [place] Level (Gait) verbal cues;nonverbal cues (demo/gesture);moderate assist (50% patient effort)  -RM     Assistive Device (Gait) walker, front-wheeled  -RM     Patient was able to Ambulate yes  -RM     Distance in Feet (Gait) 65  x 2 with standing rest  -RM     Deviations/Abnormal Patterns (Gait) bilateral deviations;nara decreased;festinating/shuffling;base of support, wide  -RM     Bilateral Gait Deviations  heel strike decreased;forward flexed posture  -RM               User Key  (r) = Recorded By, (t) = Taken By, (c) = Cosigned By      Initials Name Provider Type    Jairo Cooper, ASMITA Physical Therapist Assistant                   Obj/Interventions    No documentation.                  Goals/Plan    No documentation.                  Clinical Impression       Row Name 12/16/24 1143          Pain    Pretreatment Pain Rating 5/10  -RM     Posttreatment Pain Rating 5/10  -RM     Pain Location shoulder  -RM     Pain Side/Orientation right  -RM       Row Name 12/16/24 1143          Plan of Care Review    Plan of Care Reviewed With patient  -RM     Progress improving  -RM     Outcome Evaluation Pt supine in bed and cagreeable to particpate with therapy. Pt more confused and hallucinating this treatment. Pt however very pleasant and cooperative. Pt performed bed mobility,transfers and gait training. See flowsheet for details. Cont PT per POC progressing to goals as pt tolerates.  -RM       Row Name 12/16/24 1143          Positioning and Restraints    Pre-Treatment Position in bed  -RM     Post Treatment Position bed  -RM     In Bed fowlers;call light within reach;encouraged to call for assist;exit alarm on;with family/caregiver;notified nsg  -RM               User Key  (r) = Recorded By, (t) = Taken By, (c) = Cosigned By      Initials Name Provider Type    Jairo Cooper, ASMITA Physical Therapist Assistant                   Outcome Measures       Row Name 12/16/24 1146          How much help from another person do you currently need...    Turning from your back to your side while in flat bed without using bedrails? 3  -RM     Moving from lying on back to sitting on the side of a flat bed without bedrails? 2  -RM     Moving to and from a bed to a chair (including a wheelchair)? 2  -RM     Standing up from a chair using your arms (e.g., wheelchair, bedside chair)? 2  -RM     Climbing 3-5 steps with a railing? 1  -RM      To walk in hospital room? 3  -RM     AM-PAC 6 Clicks Score (PT) 13  -RM     Highest Level of Mobility Goal 4 --> Transfer to chair/commode  -       Row Name 12/16/24 1146          Functional Assessment    Outcome Measure Options AM-PAC 6 Clicks Basic Mobility (PT)  -               User Key  (r) = Recorded By, (t) = Taken By, (c) = Cosigned By      Initials Name Provider Type     Jairo Caballero, PTA Physical Therapist Assistant                                 Physical Therapy Education       Title: PT OT SLP Therapies (In Progress)       Topic: Physical Therapy (In Progress)       Point: Mobility training (Done)       Learning Progress Summary            Patient Acceptance, E,TB,D, VU,NR by  at 12/16/2024 1146    Acceptance, E,TB, VU by  at 12/12/2024 1301    Comment: role of PT during IP admission                      Point: Home exercise program (Done)       Learning Progress Summary            Patient Acceptance, E,TB, VU by  at 12/14/2024 1336    Comment: Perform ex daily                      Point: Body mechanics (Done)       Learning Progress Summary            Patient Acceptance, E,TB, VU by  at 12/15/2024 1256    Comment: upright posture in standing and during amb                      Point: Precautions (Not Started)       Learner Progress:  Not documented in this visit.                              User Key       Initials Effective Dates Name Provider Type Discipline     06/16/21 -  Lolita Alcazar, PTA Physical Therapist Assistant PT     06/16/21 -  Jairo Caballero, PTA Physical Therapist Assistant PT     11/29/23 -  Kay Castano, CARLOS Physical Therapist PT                  PT Recommendation and Plan     Progress: improving  Outcome Evaluation: Pt supine in bed and cagreeable to particpate with therapy. Pt more confused and hallucinating this treatment. Pt however very pleasant and cooperative. Pt performed bed mobility,transfers and gait training. See flowsheet for details.  Cont PT per POC progressing to goals as pt tolerates.     Time Calculation:         PT Charges       Row Name 24 1147 24 1140 24 0907       Time Calculation    Start Time 1113  -RM -- --    Stop Time 1147  -RM -- --    Time Calculation (min) 34 min  -RM -- --    PT Received On 24  -RM -- --    PT Goal Re-Cert Due Date 24  -RM -- --       Time Calculation- PT    Total Timed Code Minutes- PT 34 minute(s)  -RM -- --       Timed Charges    28716 - Gait Training Minutes  15  -RM -- --    96470 - PT Therapeutic Activity Minutes 19  -RM -- --       Total Minutes    Timed Charges Total Minutes 34  -RM -- --     Total Minutes 34  -RM -- --       PT/SLP KX Modifier    Exception criteria met to exceed therapy cap -- Apply KX Modifier  -RM Apply KX Modifier  -RM              User Key  (r) = Recorded By, (t) = Taken By, (c) = Cosigned By      Initials Name Provider Type     Jairo Caballero PTA Physical Therapist Assistant                  Therapy Charges for Today       Code Description Service Date Service Provider Modifiers Qty    37776760555 HC GAIT TRAINING EA 15 MIN 2024 Jairo Caballero, PTA GP, KX 1    11921120640 HC PT THERAPEUTIC ACT EA 15 MIN 2024 Jairo Caballero, ASMITA GP, KX 1            PT G-Codes  Outcome Measure Options: AM-PAC 6 Clicks Basic Mobility (PT)  AM-PAC 6 Clicks Score (PT): 13  AM-PAC 6 Clicks Score (OT): 12       Jairo Caballero PTA  2024      Electronically signed by Jairo Caballero PTA at 24 1148          Occupational Therapy Notes (most recent note)        La Nena Engel, OT at 24 1242          Patient Name: Ramsey Riley  : 1955    MRN: [id number]                              Today's Date: 2024       Admit Date: 12/10/2024    Visit Dx:     ICD-10-CM ICD-9-CM   1. TIA (transient ischemic attack)  G45.9 435.9     Patient Active Problem List   Diagnosis    Coronary artery disease involving native coronary artery  of native heart without angina pectoris    Essential hypertension    Bradycardia, sinus    Fatigue    Abnormal ECG    Precordial pain    LVH (left ventricular hypertrophy) due to hypertensive disease, without heart failure    Diabetes mellitus    PAD (peripheral artery disease)    Ischemic stroke    History of blood clots    Pulmonary embolus    Dyslipidemia    Class 1 obesity due to excess calories with serious comorbidity and body mass index (BMI) of 34.0 to 34.9 in adult    Weakness     Past Medical History:   Diagnosis Date    Arthritis     Clot     Coronary artery disease     Diabetes mellitus     Heart disease     History of blood clots     Hypertension     Impaired mobility     Low back pain     Thyroid disease     Type 2 diabetes mellitus      Past Surgical History:   Procedure Laterality Date    CHOLECYSTECTOMY  2015    COLONOSCOPY  2015    dr srivastava    CORONARY ARTERY BYPASS GRAFT      [place] -  BY PASS     ENDOSCOPY        General Information       Row Name 12/17/24 1236          OT Time and Intention    Subjective Information complains of;fatigue;pain  -SD     Document Type therapy note (daily note)  -SD     Mode of Treatment occupational therapy  -SD     Patient Effort good  -SD     Symptoms Noted During/After Treatment fatigue  -SD               User Key  (r) = Recorded By, (t) = Taken By, (c) = Cosigned By      Initials Name Provider Type    SD La Nena Engel OT Occupational Therapist                     Mobility/ADL's       Row Name 12/17/24 1236          Bed Mobility    Bed Mobility supine-sit  -SD     Supine-Sit [place] (Bed Mobility) moderate assist (50% patient effort)  -SD     Assistive Device (Bed Mobility) bed rails;head of bed elevated;repositioning sheet  -SD       Row Name 12/17/24 1236          Transfers    Transfers sit-stand transfer;toilet transfer  -SD       Row Name 12/17/24 1236          Sit-Stand Transfer    Sit-Stand [place] (Transfers) minimum assist (75% patient  effort)  -SD     Assistive Device (Sit-Stand Transfers) walker, front-wheeled  -SD       Row Name 12/17/24 1236          Toilet Transfer    Type (Toilet Transfer) sit-stand;stand-sit  -SD     [place] Level (Toilet Transfer) minimum assist (75% patient effort)  -SD     Assistive Device (Toilet Transfer) commode;grab bars/safety frame;walker, front-wheeled  -SD       Row Name 12/17/24 1236          Functional Mobility    Functional Mobility- Ind. Level minimum assist (75% patient effort)  -SD     Functional Mobility- Device walker, front-wheeled  -SD     Functional Mobility-Distance (Feet) 13  68  -SD       Row Name 12/17/24 1236          Bathing Assessment/Intervention    [place] Level (Bathing) upper extremities;upper body;proximal lower extremities;moderate assist (50% patient effort)  -SD     Position (Bathing) edge of bed sitting  -SD       Row Name 12/17/24 1236          Lower Body Dressing Assessment/Training    [place] Level (Lower Body Dressing) don;pants/bottoms;moderate assist (50% patient effort)  -SD       Row Name 12/17/24 1236          Grooming Assessment/Training    [place] Level (Grooming) wash face, hands;standby assist  -SD     Position (Grooming) edge of bed sitting  -SD       Row Name 12/17/24 1236          Toileting Assessment/Training    [place] Level (Toileting) adjust/manage clothing;perform perineal hygiene;moderate assist (50% patient effort)  -SD     Assistive Devices (Toileting) commode;grab bar/safety frame  -SD               User Key  (r) = Recorded By, (t) = Taken By, (c) = Cosigned By      Initials Name Provider Type    La Nena Dial OT Occupational Therapist                   Obj/Interventions    No documentation.                  Goals/Plan    No documentation.                  Clinical Impression       Row Name 12/17/24 1238          Pain Assessment    Pretreatment Pain Rating 9/10  -SD     Posttreatment Pain Rating 6/10  -SD     Pain Location back   -SD     Pain Side/Orientation lower  -SD     Pain Management Interventions exercise or physical activity utilized  -SD     Response to Pain Interventions activity participation with decreased pain  -SD       Row Name 12/17/24 1238          Plan of Care Review    Plan of Care Reviewed With patient  -SD     Progress improving  -SD     Outcome Evaluation OT tx completed. Patient is supine in bed, drowsy but awakens to name and willing to participate. Patient reports 9/10 lower back pain. Patient performed bed mobility mod A, did several forward reaches to establish midline d/t posterior list. Sitting EOB performed bathing with mod A. Patient performed sit to stand min A, walked 13' to bathroom + additional 68' using RW with min A. Patient performed toileting with mod A for hygiene and clothing management. Patient returned to chair, call bell and chair alarm. Pain decreased to 6/10. Continue OT POC  -SD       Row Name 12/17/24 1238          Vital Signs    O2 Delivery Pre Treatment room air  -SD     O2 Delivery Intra Treatment room air  -SD     O2 Delivery Post Treatment room air  -SD       Row Name 12/17/24 1238          Positioning and Restraints    Pre-Treatment Position in bed  -SD     Post Treatment Position chair  -SD     In Chair sitting;call light within reach;encouraged to call for assist;exit alarm on  -SD               User Key  (r) = Recorded By, (t) = Taken By, (c) = Cosigned By      Initials Name Provider Type    La Nena Dial OT Occupational Therapist                   Outcome Measures       Row Name 12/17/24 1241          How much help from another is currently needed...    Putting on and taking off regular lower body clothing? 2  -SD     Bathing (including washing, rinsing, and drying) 2  -SD     Toileting (which includes using toilet bed pan or urinal) 2  -SD     Putting on and taking off regular upper body clothing 2  -SD     Taking care of personal grooming (such as brushing teeth) 3  -SD      Eating meals 3  -SD     AM-PAC 6 Clicks Score (OT) 14  -SD       Row Name 12/17/24 0811 12/17/24 0600       How much help from another person do you currently need...    Turning from your back to your side while in flat bed without using bedrails? 3  -BW 3  -KP    Moving from lying on back to sitting on the side of a flat bed without bedrails? 2  -BW 2  -KP    Moving to and from a bed to a chair (including a wheelchair)? 2  -BW 2  -KP    Standing up from a chair using your arms (e.g., wheelchair, bedside chair)? 2  -BW 2  -KP    Climbing 3-5 steps with a railing? 1  -BW 1  -KP    To walk in hospital room? 3  -BW 3  -KP    AM-PAC 6 Clicks Score (PT) 13  -BW 13  -KP    Highest Level of Mobility Goal 4 --> Transfer to chair/commode  -BW 4 --> Transfer to chair/commode  -KP      Row Name 12/17/24 0400 12/17/24 0200       How much help from another person do you currently need...    Turning from your back to your side while in flat bed without using bedrails? 3  -KP 3  -KP    Moving from lying on back to sitting on the side of a flat bed without bedrails? 2  -KP 2  -KP    Moving to and from a bed to a chair (including a wheelchair)? 2  -KP 2  -KP    Standing up from a chair using your arms (e.g., wheelchair, bedside chair)? 2  -KP 2  -KP    Climbing 3-5 steps with a railing? 1  -KP 1  -KP    To walk in hospital room? 3  -KP 3  -KP    AM-PAC 6 Clicks Score (PT) 13  -KP 13  -KP    Highest Level of Mobility Goal 4 --> Transfer to chair/commode  -KP 4 --> Transfer to chair/commode  -KP      Row Name 12/17/24 1241          Functional Assessment    Outcome Measure Options AM-PAC 6 Clicks Daily Activity (OT)  -SD               User Key  (r) = Recorded By, (t) = Taken By, (c) = Cosigned By      Initials Name Provider Type    La Nena Dial OT Occupational Therapist    Kirti Brandt RN Registered Nurse    Afshan Ruffin RN Registered Nurse                    Occupational Therapy Education       Title: PT OT  SLP Therapies (In Progress)       Topic: Occupational Therapy (In Progress)       Point: ADL training (Done)       Description:   Instruct learner(s) on proper safety adaptation and remediation techniques during self care or transfers.   Instruct in proper use of assistive devices.                  Learning Progress Summary            Patient Acceptance, E,TB, VU by SD at 12/17/2024 1241    Comment: Safety and sequencing during tf and mobility    Acceptance, E,TB, VU by  at 12/12/2024 1205    Comment: Role of OT/POC   Family Acceptance, E, VU by SP at 12/16/2024 1422    Comment: Pt spouse was educated on promoting a normal sleep wake cycle and technique to reduce hospital related confusion.                      Point: Home exercise program (Not Started)       Description:   Instruct learner(s) on appropriate technique for monitoring, assisting and/or progressing therapeutic exercises/activities.                  Learner Progress:  Not documented in this visit.              Point: Precautions (Not Started)       Description:   Instruct learner(s) on prescribed precautions during self-care and functional transfers.                  Learner Progress:  Not documented in this visit.              Point: Body mechanics (Not Started)       Description:   Instruct learner(s) on proper positioning and spine alignment during self-care, functional mobility activities and/or exercises.                  Learner Progress:  Not documented in this visit.                              User Key       Initials Effective Dates Name Provider Type Discipline     06/16/21 -  Marsha Morrow Occupational Therapist OT    SD 06/16/21 -  La Nena Engel OT Occupational Therapist OT    SP 09/08/22 -  Reta Hernandez OT Occupational Therapist OT                  OT Recommendation and Plan     Plan of Care Review  Plan of Care Reviewed With: patient  Progress: improving  Outcome Evaluation: OT tx completed. Patient is supine in bed, drowsy but  awakens to name and willing to participate. Patient reports 9/10 lower back pain. Patient performed bed mobility mod A, did several forward reaches to establish midline d/t posterior list. Sitting EOB performed bathing with mod A. Patient performed sit to stand min A, walked 13' to bathroom + additional 68' using RW with min A. Patient performed toileting with mod A for hygiene and clothing management. Patient returned to chair, call bell and chair alarm. Pain decreased to 6/10. Continue OT POC     Time Calculation:         Time Calculation- OT       Row Name 12/17/24 1242             Time Calculation- OT    OT Start Time 0949  -SD      OT Stop Time 1014  -SD      OT Time Calculation (min) 25 min  -SD      OT Received On 12/17/24  -SD      OT Goal Re-Cert Due Date 12/22/24  -SD         Timed Charges    97871 - OT Therapeutic Activity Minutes 10  -SD      67776 - OT Self Care/Mgmt Minutes 15  -SD         Total Minutes    Timed Charges Total Minutes 25  -SD       Total Minutes 25  -SD                User Key  (r) = Recorded By, (t) = Taken By, (c) = Cosigned By      Initials Name Provider Type    SD La Nena Engel OT Occupational Therapist                  Therapy Charges for Today       Code Description Service Date Service Provider Modifiers Qty    66559210587  OT THERAPEUTIC ACT EA 15 MIN 12/17/2024 La Nena Engel OT GO, KX 1    54339046526 HC OT SELF CARE/MGMT/TRAIN EA 15 MIN 12/17/2024 La Nena Engel OT GO, KX 1                 La Nena Engel OT  12/17/2024    Electronically signed by La Nena Engel OT at 12/17/24 1241

## 2024-12-17 NOTE — PROGRESS NOTES
[facility]    Name:  Ramsey Riley   Age:  69 y.o.  Sex:  male  :  1955  MRN:  [id number]   Visit Number:  [id number]  Admission Date:  12/10/2024  Date Of Service:  24  Primary Care Physician:  Margret Purvis MD     LOS: 0 days :    Chief Complaint:      Follow-up weakness    Subjective:    Patient was seen examined bedside today.  Sitting up in the chair and appears comfortable with no distress, tolerating p.o. well.  He denies any acute complaints today.  Patient is AAOx3.  Answering questions appropriately.  Patient pending placement for rehab.    Later in the day when his wife arrived I met with her at bedside.  She reports that they are interested in rehab however she was thinking about taking the patient home for [place], will discuss with case management.    Hospital Course:    Chronically ill 69-year-old with a history of peripheral neuropathy, diabetes, hypothyroidism, CAD, prior CVA, vision loss, diabetic retinopathy, chronic pain, history of multiple falls, who had presented to the emergency room due to concerns of confusion.  History obtained primarily from ER record, somewhat from the patient.  No family currently at bedside at time of visit.  Patient notes he had had a fall he thinks yesterday, had hurt his shoulder, actually went to an outlying emergency room where he had no fractures.  He had taken some pain medicine at home, cannot remember the name, apparently his wife had noticed him being more confused overnight into today.  He is currently awake and alert that he is in the emergency room, however he is overall fairly poor historian.  Notes shoulder is feeling better.  He denies any dizziness or headache.     In the ER the patient is overall stable other than mild hypertension.  His labs were largely nonactionable.  X-rays unremarkable for any acute fracture.  CT angiogram of the head neck with contrast was unremarkable, MRI of  the brain did not show any acute CVA, showed white matter disease.  Patient was seen by teleneurology who recommended admission for observation.    Concern is for underlying cerebral amyloid angiopathy, possible dementia contributing.  Would likely benefit from short-term rehab after discussion with PT and OT.    Review of Systems:     All systems were reviewed and negative except as mentioned in subjective, assessment and plan.    Vital Signs:    Temp:  [97.5 °F (36.4 °C)-98.1 °F (36.7 °C)] 97.5 °F (36.4 °C)  Heart Rate:  [78-88] 78  Resp:  [18] 18  BP: (135-165)/(87-97) 162/97    Intake and output:    I/O last 3 completed shifts:  In: 540 [P.O.:540]  Out: 2475 [Urine:2475]  No intake/output data recorded.    Physical Examination:    General Appearance:  Alert and cooperative.  Chronically ill-appearing.  No acute distress.   Head:  Atraumatic and normocephalic.   Eyes: Conjunctivae and sclerae normal, no icterus. No pallor.   Throat: No oral lesions, no thrush, oral mucosa moist.   Neck: Supple, trachea midline, no thyromegaly.   Lungs:   Breath sounds heard bilaterally equally.  No wheezing or crackles. No Pleural rub or bronchial breathing.   Heart:  Normal S1 and S2, no murmur, no gallop, no rub. No JVD.   Abdomen:   Normal bowel sounds, no masses, no organomegaly. Soft, nontender, nondistended, no rebound tenderness.   Extremities: Supple, no edema, no cyanosis, no clubbing.  Decreased range of motion right shoulder   Skin: No bleeding or rash.   Neurologic: Alert and oriented x person and place.  Generalized weakness noted but overall improved on his mental status.  Moving all 4 extremities with no difficulty.     Laboratory results:    Results from last 7 days   Lab Units 12/16/24  0551 12/11/24  0907   SODIUM mmol/L 135* 136   POTASSIUM mmol/L 4.2 4.1   CHLORIDE mmol/L 100 101   CO2 mmol/L 24.4 26.7   BUN mg/dL 19 18   CREATININE mg/dL 1.45* 1.43*   CALCIUM mg/dL 9.2 8.5*   GLUCOSE mg/dL 254* 258*  "    Results from last 7 days   Lab Units 12/16/24  0551 12/10/24  1128   WBC 10*3/mm3 7.97 8.65   HEMOGLOBIN g/dL 16.1 16.0   HEMATOCRIT % 46.5 48.1   PLATELETS 10*3/mm3 180 146         Results from last 7 days   Lab Units 12/10/24  1128   HSTROP T ng/L 50*         No results for input(s): \"PHART\", \"ERX3STF\", \"PO2ART\", \"EZE6NWD\", \"BASEEXCESS\" in the last 8760 hours.   I have reviewed the patient's laboratory results.    Radiology results:    No radiology results from the last 24 hrs  I have reviewed the patient's radiology reports.    Medication Review:     I have reviewed the patient's active and prn medications.     Problem List:      Weakness      Assessment:    Weakness, POA  Encephalopathy, POA  Diabetes with neuropathy and retinopathy  Vision loss  Hearing loss  CAD  History of prior PE on Eliquis  Impaired mobility and ADL    Plan:    Weakness/confusion:  Appears to have off-and-on confusion and weakness at home.  However recent increased amount of falls, not sleeping well, wife thinks some medication issues may be at play.  Notes patient sometimes doubles up on medicines because he sleeps throughout the morning and takes them all at nighttime.  Will schedule to Seroquel and Xanax only at night for now to help with mixing of nights and days.  Overall is improved.  Still with confusion with pain medication, will decrease dose to see if helps.  Mental status appears to be improving and at baseline currently, AAOx3.    Cerebral amyloid angiopathy  Appears to have been progressive on imaging when compared to prior MRIs.  Based off symptomatology with the weakness, confusion, is fairly consistent with this.  Elevated blood pressures may have contributed and or fall exacerbated.  He will benefit from short-term rehab after discussion with PT and OT.  Will need outpatient nerve conduction testing.  Does have diabetic neuropathy     Diabetes/vision loss/hearing loss/CAD/prior PE/impaired mobility:  Continue home " medications.  Continue with Eliquis.    Short-term rehab workup is pending.  I have reviewed the copied text and it is accurate as of 12/17/2024     DVT Prophylaxis: Eliquis  Code Status: Full  Diet: As tolerated  Discharge Plan: STR placement pending    Seth Andre MD  12/17/24  09:59 EST    Dictated utilizing Dragon dictation.

## 2024-12-17 NOTE — PLAN OF CARE
Goal Outcome Evaluation:   VSS during 7820-1806 shift. No acute changes noted during shift. Pt continues to have hallucinations and confusion. Pt was able to get some rest during the shift. Will notify Md PRN. Plan of care ongoing.

## 2024-12-18 ENCOUNTER — READMISSION MANAGEMENT (OUTPATIENT)
Dept: CALL CENTER | Facility: HOSPITAL | Age: 69
End: 2024-12-18
Payer: MEDICARE

## 2024-12-18 VITALS
DIASTOLIC BLOOD PRESSURE: 73 MMHG | BODY MASS INDEX: 32.69 KG/M2 | HEIGHT: 71 IN | HEART RATE: 66 BPM | TEMPERATURE: 98.7 F | OXYGEN SATURATION: 92 % | WEIGHT: 233.47 LBS | RESPIRATION RATE: 18 BRPM | SYSTOLIC BLOOD PRESSURE: 111 MMHG

## 2024-12-18 LAB
ANION GAP SERPL CALCULATED.3IONS-SCNC: 10.4 MMOL/L (ref 5–15)
BUN SERPL-MCNC: 25 MG/DL (ref 8–23)
BUN/CREAT SERPL: 16.4 (ref 7–25)
CALCIUM SPEC-SCNC: 8.7 MG/DL (ref 8.6–10.5)
CHLORIDE SERPL-SCNC: 100 MMOL/L (ref 98–107)
CO2 SERPL-SCNC: 23.6 MMOL/L (ref 22–29)
CREAT SERPL-MCNC: 1.52 MG/DL (ref 0.76–1.27)
DEPRECATED RDW RBC AUTO: 38.5 FL (ref 37–54)
EGFRCR SERPLBLD CKD-EPI 2021: 49.9 ML/MIN/1.73
ERYTHROCYTE [DISTWIDTH] IN BLOOD BY AUTOMATED COUNT: 12.2 % (ref 12.3–15.4)
GLUCOSE BLDC GLUCOMTR-MCNC: 186 MG/DL (ref 70–130)
GLUCOSE BLDC GLUCOMTR-MCNC: 254 MG/DL (ref 70–130)
GLUCOSE SERPL-MCNC: 206 MG/DL (ref 65–99)
HCT VFR BLD AUTO: 45.4 % (ref 37.5–51)
HGB BLD-MCNC: 15.6 G/DL (ref 13–17.7)
MCH RBC QN AUTO: 29.7 PG (ref 26.6–33)
MCHC RBC AUTO-ENTMCNC: 34.4 G/DL (ref 31.5–35.7)
MCV RBC AUTO: 86.5 FL (ref 79–97)
PLATELET # BLD AUTO: 176 10*3/MM3 (ref 140–450)
PMV BLD AUTO: 10.2 FL (ref 6–12)
POTASSIUM SERPL-SCNC: 3.8 MMOL/L (ref 3.5–5.2)
RBC # BLD AUTO: 5.25 10*6/MM3 (ref 4.14–5.8)
SODIUM SERPL-SCNC: 134 MMOL/L (ref 136–145)
WBC NRBC COR # BLD AUTO: 6.77 10*3/MM3 (ref 3.4–10.8)

## 2024-12-18 PROCEDURE — 97530 THERAPEUTIC ACTIVITIES: CPT

## 2024-12-18 PROCEDURE — G0378 HOSPITAL OBSERVATION PER HR: HCPCS

## 2024-12-18 PROCEDURE — 82948 REAGENT STRIP/BLOOD GLUCOSE: CPT | Performed by: FAMILY MEDICINE

## 2024-12-18 PROCEDURE — 80048 BASIC METABOLIC PNL TOTAL CA: CPT | Performed by: FAMILY MEDICINE

## 2024-12-18 PROCEDURE — 63710000001 INSULIN LISPRO (HUMAN) PER 5 UNITS: Performed by: FAMILY MEDICINE

## 2024-12-18 PROCEDURE — 85027 COMPLETE CBC AUTOMATED: CPT | Performed by: FAMILY MEDICINE

## 2024-12-18 PROCEDURE — 97116 GAIT TRAINING THERAPY: CPT

## 2024-12-18 PROCEDURE — 63710000001 INSULIN GLARGINE PER 5 UNITS: Performed by: FAMILY MEDICINE

## 2024-12-18 PROCEDURE — 97110 THERAPEUTIC EXERCISES: CPT

## 2024-12-18 RX ADMIN — INSULIN LISPRO 8 UNITS: 100 INJECTION, SOLUTION INTRAVENOUS; SUBCUTANEOUS at 12:18

## 2024-12-18 RX ADMIN — CHLORTHALIDONE 12.5 MG: 25 TABLET ORAL at 08:05

## 2024-12-18 RX ADMIN — LOSARTAN POTASSIUM 100 MG: 50 TABLET, FILM COATED ORAL at 08:05

## 2024-12-18 RX ADMIN — CYCLOPENTOLATE HYDROCHLORIDE 1 DROP: 10 SOLUTION/ DROPS OPHTHALMIC at 08:04

## 2024-12-18 RX ADMIN — LEVOTHYROXINE SODIUM 125 MCG: 125 TABLET ORAL at 06:24

## 2024-12-18 RX ADMIN — INSULIN GLARGINE 25 UNITS: 100 INJECTION, SOLUTION SUBCUTANEOUS at 08:04

## 2024-12-18 RX ADMIN — ACETAMINOPHEN 650 MG: 325 TABLET, FILM COATED ORAL at 08:05

## 2024-12-18 RX ADMIN — FAMOTIDINE 20 MG: 20 TABLET, FILM COATED ORAL at 08:05

## 2024-12-18 RX ADMIN — PANTOPRAZOLE SODIUM 40 MG: 40 TABLET, DELAYED RELEASE ORAL at 08:05

## 2024-12-18 RX ADMIN — APIXABAN 5 MG: 5 TABLET, FILM COATED ORAL at 08:05

## 2024-12-18 RX ADMIN — DICLOFENAC SODIUM 4 G: 10 GEL TOPICAL at 08:04

## 2024-12-18 RX ADMIN — CITALOPRAM HYDROBROMIDE 20 MG: 20 TABLET ORAL at 08:05

## 2024-12-18 RX ADMIN — INSULIN LISPRO 3 UNITS: 100 INJECTION, SOLUTION INTRAVENOUS; SUBCUTANEOUS at 08:05

## 2024-12-18 RX ADMIN — DICLOFENAC SODIUM 4 G: 10 GEL TOPICAL at 12:18

## 2024-12-18 NOTE — DISCHARGE SUMMARY
[facility]   DISCHARGE SUMMARY      Name:  Ramsey Riley   Age:  69 y.o.  Sex:  male  :  1955  MRN:  [id number]   Visit Number:  [id number]    Admission Date:  12/10/2024  Date of Discharge:  2024  Primary Care Physician:  Margret Purvis MD    Important issues to note:    -Continue Seroquel as previously prescribed  -Follow-up with neurology  -Follow-up with PCP    Discharge Diagnoses:     Weakness, POA, improved  Encephalopathy, POA, resolved  Diabetes with neuropathy and retinopathy  Vision loss  Hearing loss  CAD  History of prior PE on Eliquis  Impaired mobility and ADL    Problem List:     Active Hospital Problems    Diagnosis  POA    **Weakness [R53.1]  Yes      Resolved Hospital Problems   No resolved problems to display.     Presenting Problem:    Chief Complaint   Patient presents with    Altered Mental Status     Family reports ground level fall yesterday and injured his right shoulder and seen in [place]. Since 0300 today pt has been more altered and unsteady gait.       Consults:     Consulting Physician(s)                     None              Procedures Performed:        History of presenting illness/Hospital Course:    Chronically ill 69-year-old with a history of peripheral neuropathy, diabetes, hypothyroidism, CAD, prior CVA, vision loss, diabetic retinopathy, chronic pain, history of multiple falls, who had presented to the emergency room due to concerns of confusion.  History obtained primarily from ER record, somewhat from the patient.  No family currently at bedside at time of visit.  Patient notes he had had a fall he thinks yesterday, had hurt his shoulder, actually went to an outlying emergency room where he had no fractures.  He had taken some pain medicine at home, cannot remember the name, apparently his wife had noticed him being more confused overnight into today.  He is currently awake and alert that he is in the emergency room, however he  is overall fairly poor historian.  Notes shoulder is feeling better.  He denies any dizziness or headache.     In the ER the patient is overall stable other than mild hypertension.  His labs were largely nonactionable.  X-rays unremarkable for any acute fracture.  CT angiogram of the head neck with contrast was unremarkable, MRI of the brain did not show any acute CVA, showed white matter disease.  Patient was seen by teleneurology who recommended admission for observation.     Concern is for underlying cerebral amyloid angiopathy, possible dementia contributing.  Would likely benefit from short-term rehab after discussion with PT and OT.    Weakness/confusion:  Appears to have off-and-on confusion and weakness at home.  However recent increased amount of falls, not sleeping well, wife thinks some medication issues may be at play.  Notes patient sometimes doubles up on medicines because he sleeps throughout the morning and takes them all at nighttime. Overall is improved.  confusion with pain medication.  Mental status appears to be improving and at baseline currently, AAOx3.     Cerebral amyloid angiopathy  Appears to have been progressive on imaging when compared to prior MRIs.  Based off symptomatology with the weakness, confusion, is fairly consistent with this.  Elevated blood pressures may have contributed and or fall exacerbated.  He will benefit from short-term rehab after discussion with PT and OT.  Will need outpatient nerve conduction testing.  Does have diabetic neuropathy.  Referral for neurology follow-up as an outpatient     Diabetes/vision loss/hearing loss/CAD/prior PE/impaired mobility:  Continue home medications.  Continue with Eliquis.    Patient was seen and examined on the day of discharge.  Wife at bedside.  Patient sitting up in the chair and appears comfortable with no signs.  Patient is AO x 3.  Answering questions appropriately.  Denies any pain or discomfort.  No acute complaints or  symptoms.  No other neurological symptoms. Patient tolerating p.o. well.  Patient has been ambulatory and walked over 140 feet today with no difficulty.  Case management was discussing with the wife, she prefers to take him home as he is improved and she will be able to take care of him at home, she does not want to wait any longer on placement.  Wife was wanting to take patient in the home with them on the [place].    Patient and his wife both instructed on management and plan in details.  Discussed with wife that patient might have underlying dementia and cerebral amyloid angiopathy and was seen on imaging.  Discussed medication compliance and monitoring at home. Discussed follow-up with neurology as outpatient, referral sent. Patient to follow-up with PCP in 2 to 3 days for recheck and continued care. Clear return precautions discussed.  Patient and wife both verbalized understanding and agreeable to plan.  All questions were answered to the patient and his wife's satisfaction. Patient has reached maximum medical improvement of inpatient hospital stay. Patient is discharged in stable condition.      Vital Signs:    Temp:  [98.1 °F (36.7 °C)-98.7 °F (37.1 °C)] 98.7 °F (37.1 °C)  Heart Rate:  [66-82] 66  Resp:  [18] 18  BP: (111-151)/(73-93) 111/73    Physical Exam:    General Appearance:  Alert and cooperative.  No acute distress.   Head:  Atraumatic and normocephalic.   Eyes: Conjunctivae and sclerae normal, no icterus. No pallor.   Ears:  Ears with no abnormalities noted.   Throat: No oral lesions, no thrush, oral mucosa moist.   Neck: Supple, trachea midline, no thyromegaly.   Back:   No tenderness to palpation.   Lungs:   Breath sounds heard bilaterally equally.  No crackles or wheezing. No Pleural rub or bronchial breathing.   Heart:  Normal S1 and S2, no murmur, no gallop, no rub. No JVD.   Abdomen:   Normal bowel sounds, no masses, no organomegaly. Soft, nontender, nondistended, no rebound tenderness.    Extremities: Supple, no edema, no cyanosis, no clubbing.   Pulses: Pulses palpable bilaterally.   Skin: No bleeding or rash.   Neurologic: Alert and oriented x 3. No facial asymmetry. Moves all four limbs. No tremors.     Pertinent Lab Results:     Results from last 7 days   Lab Units 12/18/24  0905 12/16/24  0551   SODIUM mmol/L 134* 135*   POTASSIUM mmol/L 3.8 4.2   CHLORIDE mmol/L 100 100   CO2 mmol/L 23.6 24.4   BUN mg/dL 25* 19   CREATININE mg/dL 1.52* 1.45*   CALCIUM mg/dL 8.7 9.2   GLUCOSE mg/dL 206* 254*     Results from last 7 days   Lab Units 12/18/24  0905 12/16/24  0551   WBC 10*3/mm3 6.77 7.97   HEMOGLOBIN g/dL 15.6 16.1   HEMATOCRIT % 45.4 46.5   PLATELETS 10*3/mm3 176 180                                   Pertinent Radiology Results:    Imaging Results (All)       Procedure Component Value Units Date/Time    MRI Brain Without Contrast [[id number]] Collected: 12/10/24 1335     Updated: 12/10/24 1340    Narrative:      PROCEDURE: MRI BRAIN WO CONTRAST-     HISTORY: concern for possible posterior stroke,     PROCEDURE: Multiplanar MR imaging of the brain was performed in multiple  MR sequences .     FINDINGS: Brain parenchyma displays normal signal without evidence of  mass, hemorrhage or edema.  Limited images the proximal cord are  unremarkable. The ventricles are normal in size. There is a small area  of encephalomalacia in the right basal ganglia consistent with small  remote CVA. There is no extra-axial fluid or midline shift. Flow-voids  are appropriate. Diffusion weighted images demonstrate no evidence of  acute CVA although a small area could be missed secondary to the motion  artifact. Limited images of the paranasal sinuses are unremarkable.  Motion artifact noted on multiple images significantly decreasing the  sensitivity of this exam. There is mild small vessel ischemic disease.  T2 coronal images are almost nondiagnostic. Same is true of the T1 axial  images.       Impression:      Gross  motion artifact on multiple images and motion artifact  present on all images.     No acute CVA identified though a small area could be missed secondary to  motion artifact.     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                 This report was signed and finalized on 12/10/2024 1:38 PM by Kristi Roblero MD.       CT Angiogram Neck [[id number]] Collected: 12/10/24 1124     Updated: 12/10/24 1130    Narrative:      PROCEDURE: CT ANGIOGRAM NECK-, CT ANGIOGRAM HEAD-     HISTORY: Concern for stroke, altered mental status     TECHNIQUE: Thin section axial CT with IV contrast supplemented with  multiplanar reconstruction of the head and neck.     FINDINGS: Exam is limited by significant patient motion artifact.     CTA:     Aortic arch:  Arch shows no significant narrowing. Great vessel origins  are widely patent.     Right carotid: There is mild plaque in the right carotid bulb with less  than 50% stenosis. Right common carotid artery is unremarkable..     Left carotid: There is minimal calcified plaque in the left carotid bulb  with less than 50% stenosis. Carotid artery is unremarkable.     Vertebrals: Left vertebral artery is dominant. No significant stenosis  is present. Distal vertebral arteries are suboptimally visualized.     The cranial circulation is grossly unremarkable. ACAs are unremarkable.  Motion artifact limits visualization of MCAs and PCAs. No definite  stenosis or aneurysm is seen, but small lesions could be missed. Basilar  arteries are unremarkable.     Calcification in the right palatine tonsil suggests previous  tonsillitis.       Impression:      Suboptimal exam secondary to motion artifact on multiple  images.     Less than 50% stenosis of the internal carotid arteries bilaterally.     No significant intracranial stenosis or aneurysm identified, however,  small lesions could be missed secondary to motion artifact.           DLP: 749.41 mGy.cm,748.94 mGy.cm  CTDI: 22.7  mGy     This study was performed with techniques to keep radiation doses as low  as reasonably achievable (ALARA). Individualized dose reduction  techniques using automated exposure control or adjustment of mA and/or  kV according to the patient size were employed.                 Images were reviewed, interpreted, and dictated by Dr. Kristi Roblero MD  Transcribed by Anna Beasley PA-C.     This report was signed and finalized on 12/10/2024 11:28 AM by Kristi Roblero MD.       CT Angiogram Head [[id number]] Collected: 12/10/24 1124     Updated: 12/10/24 1130    Narrative:      PROCEDURE: CT ANGIOGRAM NECK-, CT ANGIOGRAM HEAD-     HISTORY: Concern for stroke, altered mental status     TECHNIQUE: Thin section axial CT with IV contrast supplemented with  multiplanar reconstruction of the head and neck.     FINDINGS: Exam is limited by significant patient motion artifact.     CTA:     Aortic arch:  Arch shows no significant narrowing. Great vessel origins  are widely patent.     Right carotid: There is mild plaque in the right carotid bulb with less  than 50% stenosis. Right common carotid artery is unremarkable..     Left carotid: There is minimal calcified plaque in the left carotid bulb  with less than 50% stenosis. Carotid artery is unremarkable.     Vertebrals: Left vertebral artery is dominant. No significant stenosis  is present. Distal vertebral arteries are suboptimally visualized.     The cranial circulation is grossly unremarkable. ACAs are unremarkable.  Motion artifact limits visualization of MCAs and PCAs. No definite  stenosis or aneurysm is seen, but small lesions could be missed. Basilar  arteries are unremarkable.     Calcification in the right palatine tonsil suggests previous  tonsillitis.       Impression:      Suboptimal exam secondary to motion artifact on multiple  images.     Less than 50% stenosis of the internal carotid arteries bilaterally.     No significant intracranial stenosis or aneurysm  identified, however,  small lesions could be missed secondary to motion artifact.           DLP: 749.41 mGy.cm,748.94 mGy.cm  CTDI: 22.7 mGy     This study was performed with techniques to keep radiation doses as low  as reasonably achievable (ALARA). Individualized dose reduction  techniques using automated exposure control or adjustment of mA and/or  kV according to the patient size were employed.                 Images were reviewed, interpreted, and dictated by Dr. Kristi Roblero MD  Transcribed by Anna Beasley PA-C.     This report was signed and finalized on 12/10/2024 11:28 AM by Kristi Roblero MD.       CT Cervical Spine Without Contrast [[id number]] Collected: 12/10/24 1120     Updated: 12/10/24 1122    Narrative:      PROCEDURE: CT CERVICAL SPINE WO CONTRAST-     HISTORY: Fall yesterday, AMS, neck pain     COMPARISON: May 2023.     PROCEDURE: Multiple axial CT images were obtained through the cervical  spine using bone window algorithms. Coronal and sagittal images were  reconstructed from the original axial data set.     FINDINGS: Facets are normally aligned. There is no acute fracture..  There is straightening of the normal cervical adenosis. There is minimal  retrolisthesis of C3 on C4, stable. There is mild to moderate disc space  narrowing at all levels with small osteophytes. Multiple levels of  spinal stenosis and/or neuroforaminal narrowing secondary to  degenerative change, but not secondary to acute abnormality. Bilateral  carotid artery calcifications are noted.  Limited images of the lung  apices are unremarkable.       Impression:      No acute fracture or malalignment.     Degenerative changes.        CTDI: 22.7 mGy  DLP: 749.41 mGy.cm,748.94 mGy.cm        This study was performed with techniques to keep radiation doses as low  as reasonably achievable (ALARA). Individualized dose reduction  techniques using automated exposure control or adjustment of mA and/or  kV according to the patient  size were employed.              Images were reviewed, interpreted, and dictated by Dr. Kristi Roblero MD  Transcribed by Anna Beasley PA-C.     This report was signed and finalized on 12/10/2024 11:20 AM by Kristi Roblero MD.       CT Head Without Contrast [[id number]] Collected: 12/10/24 1118     Updated: 12/10/24 1122    Narrative:      PROCEDURE: CT HEAD WO CONTRAST-     HISTORY: ground level fall yesterday on [facility] since fall     COMPARISON: May 2023.     TECHNIQUE: Multiple axial CT images were performed from the foramen  magnum to the vertex without enhancement.     FINDINGS: There is no CT evidence of acute intracranial hemorrhage.  Moderate atrophy, pronounced for the patient's age, is stable. There is  a stable small area of encephalomalacia in the right basal ganglia  consistent with remote CVA. There is no mass, mass effect or midline  shift.  There is no hydrocephalus. The paranasal sinuses are clear. Bone  windows reveal no acute osseous abnormalities.       Impression:      Stable chronic changes without acute intracranial process.              DLP: 749.41 mGy.cm,748.94 mGy.cm  CTDI: 22.7 mGy        This study was performed with techniques to keep radiation doses as low  as reasonably achievable (ALARA). Individualized dose reduction  techniques using automated exposure control or adjustment of mA and/or  kV according to the patient size were employed.              Images were reviewed, interpreted, and dictated by Dr. Kristi Roblero MD  Transcribed by Anna Beasley PA-C.     This report was signed and finalized on 12/10/2024 11:20 AM by Kristi Roblero MD.       XR Shoulder 2+ View Right [[id number]] Collected: 12/10/24 1059     Updated: 12/10/24 1105    Narrative:         PROCEDURE: XR SHOULDER 2+ VW RIGHT-     HISTORY: Fall, yesterday, knot noted to right shoulder     COMPARISON: March 24, 2016.     FINDINGS:  A three view exam demonstrates no acute fracture or  dislocation. The joint spaces  demonstrate moderate narrowing of the  right acromioclavicular joint. There is also decreased subacromial  distance suggesting chronic rotator cuff tear. No soft tissue  abnormality is seen. On a single view there is a linear lucency in the  superior lateral right acromion. Borders appear smooth and mildly  sclerotic. If this is a fracture appears to be remote. This is not  identified on the prior study.          Impression:      No acute bony abnormality.     Degenerative changes described.                 This report was signed and finalized on 12/10/2024 11:03 AM by Kristi Roblero MD.       XR Humerus Right [[id number]] Collected: 12/10/24 1056     Updated: 12/10/24 1058    Narrative:      PROCEDURE: XR HUMERUS RIGHT-     HISTORY: Fall yesterday, pain, inability to abduct arm     COMPARISON: None.     FINDINGS:  A two view exam demonstrates no acute fracture or  dislocation. The joint spaces appear unremarkable. No soft tissue  abnormality is seen.       Impression:      No acute bony abnormality.                 This report was signed and finalized on 12/10/2024 10:56 AM by Kristi Robelro MD.       XR Chest 1 View [[id number]] Collected: 12/10/24 1055     Updated: 12/10/24 1058    Narrative:      PROCEDURE: XR CHEST 1 VW-     HISTORY: AMS, fell last night, right shoulder pain.     COMPARISON: June 21, 2022..     FINDINGS: The heart is normal in size. The lungs are clear. The  mediastinum is unremarkable. There is no pneumothorax.  There are no  acute osseous abnormalities. Status post median sternotomy. Apical  lordotic positioning noted. Small density is again projected over the  left apex, stable. There is evidence of old calcified granulomatous  disease.       Impression:      No acute cardiopulmonary process.              This report was signed and finalized on 12/10/2024 10:56 AM by Kristi Roblero MD.               Echo:    Results for orders placed during the hospital encounter of 12/10/24    Adult  Transthoracic Echo Complete W/ Cont if Necessary Per Protocol    Interpretation Summary    Left ventricular systolic function is normal. Calculated left ventricular EF = 67.8% Left ventricular ejection fraction appears to be 66 - 70%.    Left ventricular wall thickness is consistent with mild concentric hypertrophy. Left ventricular diastolic function was indeterminate.    The right ventricular cavity is borderline dilated with borderline systolic function.    The left atrial cavity is mildly dilated.    Mild aortic valve regurgitation is present.    Saline test results are negative for right to left atrial level shunt.    Condition on Discharge:      Stable.    Code status during the hospital stay:    Code Status and Medical Interventions: CPR (Attempt to Resuscitate); Full Support   Ordered at: 12/10/24 1700     Code Status (Patient has no pulse and is not breathing):    CPR (Attempt to Resuscitate)     Medical Interventions (Patient has pulse or is breathing):    Full Support     Discharge Disposition:    Home-Health Care [facility]    Discharge Medications:       Discharge Medications        Continue These Medications        Instructions Start Date   Accu-Chek Caryn Plus test strip  Generic drug: glucose blood   USE AS DIRECTED 3 TIMES DAILY      ALPRAZolam 0.5 MG tablet  Commonly known as: XANAX   0.5 mg, Oral, Nightly PRN      amLODIPine 10 MG tablet  Commonly known as: NORVASC   10 mg, Oral, Daily      apixaban 5 MG tablet tablet  Commonly known as: Eliquis   5 mg, Oral, Every 12 Hours      aspirin 81 MG chewable tablet   81 mg, Daily      atorvastatin 80 MG tablet  Commonly known as: Lipitor   80 mg, Oral, Nightly      chlorthalidone 25 MG tablet  Commonly known as: HYGROTON   12.5 mg, Oral, Daily      citalopram 40 MG tablet  Commonly known as: CeleXA   40 mg, Oral, Daily      dorzolamide-timolol 2-0.5 % ophthalmic solution  Commonly known as: COSOPT   1 drop, Both Eyes, 2 Times Daily      famotidine 20 MG  tablet  Commonly known as: Pepcid   20 mg, Oral, 2 Times Daily      FreeStyle Rina 3 Sensor misc   1 each, Not Applicable, Every 14 Days      glucose monitor monitoring kit   1 each, Not Applicable, Daily      glucose monitor monitoring kit   1 each, Not Applicable, Daily      HumuLIN 70/30 KwikPen (70-30) 100 UNIT/ML suspension pen-injector  Generic drug: Insulin NPH Isophane & Regular   Inject 70 units twice daily E11.65      HYDROcodone-acetaminophen 7.5-325 MG per tablet  Commonly known as: NORCO   1 tablet, Oral, Every 12 Hours PRN      Insulin Pen Needle 32G X 4 MM misc   For use with insulin injections, daily      Insulin Syringes (Disposable) U-100 1 ML misc   50 Units, Not Applicable, 3 Times Daily PRN      Lancets 30G misc   Check sugar 3 times daily      levothyroxine 125 MCG tablet  Commonly known as: SYNTHROID, LEVOTHROID   125 mcg, Oral, Daily      losartan 100 MG tablet  Commonly known as: COZAAR   100 mg, Oral, Daily      pantoprazole 40 MG EC tablet  Commonly known as: PROTONIX   40 mg, Oral, Daily      QUEtiapine 50 MG tablet  Commonly known as: SEROquel   50 mg, Oral, 2 Times Daily             ASK your doctor about these medications        Instructions Start Date   cyclopentolate 1 % ophthalmic solution  Commonly known as: CYCLOGYL   1 drop, 3 Times Daily             Discharge Diet:   Cardiac    Activity at Discharge:   As tolerated    Follow-up Appointments:    Additional Instructions for the Follow-ups that You Need to Schedule       Ambulatory Referral to Home Health (Hospital)   As directed      Face to Face Visit Date: 12/18/2024   Follow-up provider for Plan of Care?: I treated the patient in an acute care facility and will not continue treatment after discharge.   Follow-up provider: ABIGAIL COOK [[id number]]   Reason/Clinical Findings: debility, Cerebral amyloid angiopathy   Describe mobility limitations that make leaving home difficult: debility, Cerebral amyloid angiopathy    Nursing/Therapeutic Services Requested: Skilled Nursing Physical Therapy Occupational Therapy   Skilled nursing orders: Neurovascular assessments   PT orders: Transfer training Gait Training Strengthening   Weight Bearing Status: As Tolerated   Occupational orders: Activities of daily living Strengthening   Frequency: 1 Week 1        Ambulatory Referral to Neurology   As directed             Follow-up Information       Yuliet Rahman, BOUCHRA .    Specialties: Nurse Practitioner, Neurology, Internal Medicine  Contact information:  [address]  [address]  [facility] [address]  [phone]               Margret Purvis MD .    Specialties: Internal Medicine, Geriatric Medicine  Contact information:  [address]  [address]  [facility] [address]  [phone]                           Future Appointments   Date Time Provider Department Center   1/20/2025  2:15 PM Margret Purvis MD [facility] PC RI MR MAR   2/6/2025  3:15 PM Michelle King MD [facility] END BM [facility]   5/5/2025  2:45 PM Margret Purvis MD [facility] MR [facility]   10/16/2025  2:00 PM Adilson Whittaker MD [facility] CD BG R MAR     Test Results Pending at Discharge:    Pending Results       None                 Seth Andre MD  12/18/24  15:15 EST    Time: I spent 29 minutes on this discharge activity which included: face-to-face encounter with the patient, reviewing the data in the system, coordination of the care with the nursing staff as well as consultants, documentation, and entering orders.     Dictated utilizing Dragon dictation.

## 2024-12-18 NOTE — CASE MANAGEMENT/SOCIAL WORK
Case Management/Social Work    Patient Name:  Ramsey Riley  YOB: 1955  MRN: [id number]  Admit Date:  12/10/2024        [facility] states out of network with pts insurance.   [facility] to come complete onsite with pt today for STR. SW following.     11:53 EST [facility] can offer a bed. SW updated CM to make sure pt/family agreeable. If so, will start pre-cert. SW following.     12:15 EST Pre-cert was submitted through [facility]. Pre-cert is currently pending. Pt has walked 180ft today with PT. SW following.       Electronically signed by:  TAN Cheng  12/18/24 08:44 EST

## 2024-12-18 NOTE — THERAPY TREATMENT NOTE
Patient Name: Ramsey Riley  : 1955    MRN: [id number]                              Today's Date: 2024       Admit Date: 12/10/2024    Visit Dx:     ICD-10-CM ICD-9-CM   1. TIA (transient ischemic attack)  G45.9 435.9     Patient Active Problem List   Diagnosis    Coronary artery disease involving native coronary artery of native heart without angina pectoris    Essential hypertension    Bradycardia, sinus    Fatigue    Abnormal ECG    Precordial pain    LVH (left ventricular hypertrophy) due to hypertensive disease, without heart failure    Diabetes mellitus    PAD (peripheral artery disease)    Ischemic stroke    History of blood clots    Pulmonary embolus    Dyslipidemia    Class 1 obesity due to excess calories with serious comorbidity and body mass index (BMI) of 34.0 to 34.9 in adult    Weakness     Past Medical History:   Diagnosis Date    Arthritis     Clot     Coronary artery disease     Diabetes mellitus     Heart disease     History of blood clots     Hypertension     Impaired mobility     Low back pain     Thyroid disease     Type 2 diabetes mellitus      Past Surgical History:   Procedure Laterality Date    CHOLECYSTECTOMY      COLONOSCOPY      dr srivastava    CORONARY ARTERY BYPASS GRAFT      [place] -  BY PASS     ENDOSCOPY        General Information       Row Name 24 1236          OT Time and Intention    Subjective Information complains of;pain  -SD     Document Type therapy note (daily note)  -SD     Mode of Treatment occupational therapy  -SD     Patient Effort good  -SD     Symptoms Noted During/After Treatment fatigue  -SD       Row Name 24 1236          General Information    Patient Profile Reviewed yes  -SD               User Key  (r) = Recorded By, (t) = Taken By, (c) = Cosigned By      Initials Name Provider Type    La Nena Dial OT Occupational Therapist                     Mobility/ADL's       Row Name 24 1236          Bed Mobility     Comment, (Bed Mobility) in chair  -SD       Row Name 12/18/24 1237          Transfers    Transfers sit-stand transfer  -SD       Row Name 12/18/24 1237          Sit-Stand Transfer    Sit-Stand [place] (Transfers) contact guard  -SD     Assistive Device (Sit-Stand Transfers) walker, front-wheeled  -SD       Row Name 12/18/24 1237          Functional Mobility    Functional Mobility- Ind. Level contact guard assist  -SD     Functional Mobility- Device walker, front-wheeled  -SD     Functional Mobility-Distance (Feet) 20  x2  -SD     Functional Mobility- Comment cues and physical assistance to maneuver the walker d/t visual deficits  -SD       Row Name 12/18/24 1237          Grooming Assessment/Training    [place] Level (Grooming) oral care regimen;wash face, hands;contact guard assist  -SD     Position (Grooming) sink side;supported standing  -SD               User Key  (r) = Recorded By, (t) = Taken By, (c) = Cosigned By      Initials Name Provider Type    La Nena Dial OT Occupational Therapist                   Obj/Interventions       Row Name 12/18/24 1239          Motor Skills    Therapeutic Exercise other (see comments)  trunk flexion, trunk rotation left/right, trunk lateral flexion left/right x 10 reps. Resistance applied to trunk flexion using yellow resistance band  -SD               User Key  (r) = Recorded By, (t) = Taken By, (c) = Cosigned By      Initials Name Provider Type    La Nena Dial OT Occupational Therapist                   Goals/Plan    No documentation.                  Clinical Impression       Row Name 12/18/24 1240          Pain Assessment    Pretreatment Pain Rating 9/10  -SD     Posttreatment Pain Rating 7/10  -SD     Pain Location extremity  -SD     Pain Side/Orientation right;upper  -SD     Pain Management Interventions exercise or physical activity utilized  -SD     Response to Pain Interventions no change per patient report  -SD       Row Name 12/18/24 1247           Plan of Care Review    Plan of Care Reviewed With patient  -SD     Progress improving  -SD     Outcome Evaluation OT tx completed. Patient is sitting in chair, agrees to participate. Patient performed sit to stand CGA, walked 20' to bathroom CGA using RW, some assistance to maneuver walker d/t visual deficits. Patient stood supported sink side and performed grooming tasks. Patient walked back to chair. Performed trunk flexion/roation and lateral flexion x 10 reps each direction using yellow resistance band for trunk flexion. Patient left sitting in chair, wife present, chair alarm and call bell within reach. Continue OT POC  -SD       Row Name 12/18/24 1240          Vital Signs    O2 Delivery Pre Treatment room air  -SD     O2 Delivery Intra Treatment room air  -SD     O2 Delivery Post Treatment room air  -SD       Row Name 12/18/24 1240          Positioning and Restraints    Pre-Treatment Position sitting in chair/recliner  -SD     Post Treatment Position chair  -SD     In Bed sitting;call light within reach;encouraged to call for assist;exit alarm on;with family/caregiver  -SD               User Key  (r) = Recorded By, (t) = Taken By, (c) = Cosigned By      Initials Name Provider Type    La Nena Dial OT Occupational Therapist                   Outcome Measures       Row Name 12/18/24 1243          How much help from another is currently needed...    Putting on and taking off regular lower body clothing? 2  -SD     Bathing (including washing, rinsing, and drying) 2  -SD     Toileting (which includes using toilet bed pan or urinal) 2  -SD     Putting on and taking off regular upper body clothing 3  -SD     Taking care of personal grooming (such as brushing teeth) 3  -SD     Eating meals 3  -SD     AM-PAC 6 Clicks Score (OT) 15  -SD       Row Name 12/18/24 1128 12/18/24 0804       How much help from another person do you currently need...    Turning from your back to your side while in flat bed without  using bedrails? 3  -RM 3  -BW    Moving from lying on back to sitting on the side of a flat bed without bedrails? 3  -RM 2  -BW    Moving to and from a bed to a chair (including a wheelchair)? 3  -RM 3  -BW    Standing up from a chair using your arms (e.g., wheelchair, bedside chair)? 3  -RM 3  -BW    Climbing 3-5 steps with a railing? 2  -RM 2  -BW    To walk in hospital room? 3  -RM 3  -BW    AM-PAC 6 Clicks Score (PT) 17  -RM 16  -BW    Highest Level of Mobility Goal 5 --> Static standing  -RM 5 --> Static standing  -BW      Row Name 12/18/24 1243 12/18/24 1128       Functional Assessment    Outcome Measure Options AM-PAC 6 Clicks Daily Activity (OT)  -SD AM-PAC 6 Clicks Basic Mobility (PT)  -RM              User Key  (r) = Recorded By, (t) = Taken By, (c) = Cosigned By      Initials Name Provider Type    Jairo Cooper, PTA Physical Therapist Assistant    La Nena Dial, OT Occupational Therapist     Kirti Waller, RN Registered Nurse                    Occupational Therapy Education       Title: PT OT SLP Therapies (In Progress)       Topic: Occupational Therapy (In Progress)       Point: ADL training (Done)       Description:   Instruct learner(s) on proper safety adaptation and remediation techniques during self care or transfers.   Instruct in proper use of assistive devices.                  Learning Progress Summary            Patient Acceptance, E,TB, VU by SD at 12/17/2024 1241    Comment: Safety and sequencing during tf and mobility    Acceptance, E,TB, VU by  at 12/12/2024 1205    Comment: Role of OT/POC   Family Acceptance, E, VU by SP at 12/16/2024 1428    Comment: Pt spouse was educated on promoting a normal sleep wake cycle and technique to reduce hospital related confusion.                      Point: Home exercise program (Done)       Description:   Instruct learner(s) on appropriate technique for monitoring, assisting and/or progressing therapeutic exercises/activities.                   Learning Progress Summary            Patient Acceptance, E,TB, VU by SD at 12/18/2024 1243    Comment: Core exercises to improve balance                      Point: Precautions (Not Started)       Description:   Instruct learner(s) on prescribed precautions during self-care and functional transfers.                  Learner Progress:  Not documented in this visit.              Point: Body mechanics (Not Started)       Description:   Instruct learner(s) on proper positioning and spine alignment during self-care, functional mobility activities and/or exercises.                  Learner Progress:  Not documented in this visit.                              User Key       Initials Effective Dates Name Provider Type Discipline     06/16/21 -  Marsha Morrow Occupational Therapist OT    SD 06/16/21 -  La Nena Engel OT Occupational Therapist OT    SP 09/08/22 -  Reta Hernandez OT Occupational Therapist OT                  OT Recommendation and Plan     Plan of Care Review  Plan of Care Reviewed With: patient  Progress: improving  Outcome Evaluation: OT tx completed. Patient is sitting in chair, agrees to participate. Patient performed sit to stand CGA, walked 20' to bathroom CGA using RW, some assistance to maneuver walker d/t visual deficits. Patient stood supported sink side and performed grooming tasks. Patient walked back to chair. Performed trunk flexion/roation and lateral flexion x 10 reps each direction using yellow resistance band for trunk flexion. Patient left sitting in chair, wife present, chair alarm and call bell within reach. Continue OT POC     Time Calculation:         Time Calculation- OT       Row Name 12/18/24 1244 12/18/24 1129          Time Calculation- OT    OT Start Time 1126  -SD --     OT Stop Time 1156  -SD --     OT Time Calculation (min) 30 min  -SD --     OT Received On 12/18/24  -SD --     OT Goal Re-Cert Due Date 12/22/24  -SD --        Timed Charges    43100 - OT  Therapeutic Exercise Minutes 12  -SD --     11319 - Gait Training Minutes  -- 13  -RM     57822 - OT Therapeutic Activity Minutes 10  -SD --     18760 - OT Self Care/Mgmt Minutes 8  -SD --        Total Minutes    Timed Charges Total Minutes 30  -SD 13  -RM      Total Minutes 30  -SD 13  -RM               User Key  (r) = Recorded By, (t) = Taken By, (c) = Cosigned By      Initials Name Provider Type     Jairo Caballero, PTA Physical Therapist Assistant    SD La Nena Engel, OT Occupational Therapist                  Therapy Charges for Today       Code Description Service Date Service Provider Modifiers Qty    47447666680  OT THERAPEUTIC ACT EA 15 MIN 12/17/2024 La Nena Engel OT GO, KX 1    [id number]  OT SELF CARE/MGMT/TRAIN EA 15 MIN 12/17/2024 La Nena Engel OT GO, KX 1    [id number]  OT THER PROC EA 15 MIN 12/18/2024 La Nena Engel OT GO, KX 1    [id number]  OT THERAPEUTIC ACT EA 15 MIN 12/18/2024 La Nena Engel OT GO, KX 1                 La Nena Engel OT  12/18/2024

## 2024-12-18 NOTE — CASE MANAGEMENT/SOCIAL WORK
DCP; Referrals to STR pending. Met with pt and his wife at bedside. No immediate needs. CM continues to follow.

## 2024-12-18 NOTE — OUTREACH NOTE
Prep Survey      Flowsheet Row Responses   Congregational facility patient discharged from? [facility]   Is LACE score < 7 ? No   Eligibility Not Eligible   What are the reasons patient is not eligible? [facility]   Does the patient have one of the following disease processes/diagnoses(primary or secondary)? Other   Prep survey completed? Yes            Mirta DIEZ - Registered Nurse

## 2024-12-18 NOTE — PLAN OF CARE
Problem: Adult Inpatient Plan of Care  Goal: Plan of Care Review  Outcome: Met  Flowsheets (Taken 12/18/2024 1556)  Progress: improving  Outcome Evaluation: Patient discharging home with wife per Dr. Jes dominguez.  Plan of Care Reviewed With: patient  Goal: Patient-Specific Goal (Individualized)  Outcome: Met  Goal: Absence of Hospital-Acquired Illness or Injury  Outcome: Met  Intervention: Identify and Manage Fall Risk  Recent Flowsheet Documentation  Taken 12/18/2024 1200 by Kirti Waller RN  Safety Promotion/Fall Prevention:   safety round/check completed   activity supervised   assistive device/personal items within reach   clutter free environment maintained   fall prevention program maintained   nonskid shoes/slippers when out of bed  Taken 12/18/2024 1000 by Kirti Waller RN  Safety Promotion/Fall Prevention:   safety round/check completed   activity supervised   assistive device/personal items within reach   clutter free environment maintained   fall prevention program maintained   nonskid shoes/slippers when out of bed  Taken 12/18/2024 0804 by Kirti Waller RN  Safety Promotion/Fall Prevention:   safety round/check completed   assistive device/personal items within reach   activity supervised   clutter free environment maintained   fall prevention program maintained   lighting adjusted   gait belt   nonskid shoes/slippers when out of bed  Intervention: Prevent Skin Injury  Recent Flowsheet Documentation  Taken 12/18/2024 0804 by Kirti Waller RN  Body Position: sitting up in bed  Intervention: Prevent Infection  Recent Flowsheet Documentation  Taken 12/18/2024 1200 by Kirti Waller RN  Infection Prevention:   environmental surveillance performed   hand hygiene promoted   rest/sleep promoted  Taken 12/18/2024 1000 by Kirti Waller RN  Infection Prevention:   environmental surveillance performed   hand hygiene promoted   rest/sleep promoted  Taken 12/18/2024 0804 by Christin  Kirti GALAN RN  Infection Prevention:   environmental surveillance performed   rest/sleep promoted   single patient room provided  Goal: Optimal Comfort and Wellbeing  Outcome: Met  Intervention: Monitor Pain and Promote Comfort  Recent Flowsheet Documentation  Taken 12/18/2024 0805 by Kirti Waller, RN  Pain Management Interventions: pain medication given  Intervention: Provide Person-Centered Care  Recent Flowsheet Documentation  Taken 12/18/2024 0804 by Kirti Waller, RN  Trust Relationship/Rapport:   care explained   choices provided  Goal: Readiness for Transition of Care  Outcome: Met   Goal Outcome Evaluation:  Plan of Care Reviewed With: patient        Progress: improving  Outcome Evaluation: Patient discharging home with wife per Dr. Andre orders.

## 2024-12-18 NOTE — THERAPY TREATMENT NOTE
Patient Name: Ramsey Riley  : 1955    MRN: [id number]                              Today's Date: 2024       Admit Date: 12/10/2024    Visit Dx:     ICD-10-CM ICD-9-CM   1. TIA (transient ischemic attack)  G45.9 435.9     Patient Active Problem List   Diagnosis    Coronary artery disease involving native coronary artery of native heart without angina pectoris    Essential hypertension    Bradycardia, sinus    Fatigue    Abnormal ECG    Precordial pain    LVH (left ventricular hypertrophy) due to hypertensive disease, without heart failure    Diabetes mellitus    PAD (peripheral artery disease)    Ischemic stroke    History of blood clots    Pulmonary embolus    Dyslipidemia    Class 1 obesity due to excess calories with serious comorbidity and body mass index (BMI) of 34.0 to 34.9 in adult    Weakness     Past Medical History:   Diagnosis Date    Arthritis     Clot     Coronary artery disease     Diabetes mellitus     Heart disease     History of blood clots     Hypertension     Impaired mobility     Low back pain     Thyroid disease     Type 2 diabetes mellitus      Past Surgical History:   Procedure Laterality Date    CHOLECYSTECTOMY      COLONOSCOPY      dr srivastava    CORONARY ARTERY BYPASS GRAFT      [place] [id number] BY PASS     ENDOSCOPY        General Information       Row Name 24 1123          Physical Therapy Time and Intention    Document Type therapy note (daily note)  -RM     Mode of Treatment physical therapy  -RM       Row Name 24 1123          General Information    Patient Profile Reviewed yes  -RM     Existing Precautions/Restrictions fall  -RM       Row Name 24 1123          Cognition    Orientation Status (Cognition) oriented to;person;disoriented to;place;situation;time  -RM       Row Name 24 1123          Safety Issues/Impairments Affecting Functional Mobility    Impairments Affecting Function (Mobility) cognition;endurance/activity  tolerance;balance;postural/trunk control;strength  -RM               User Key  (r) = Recorded By, (t) = Taken By, (c) = Cosigned By      Initials Name Provider Type    Jairo Cooper PTA Physical Therapist Assistant                   Mobility       Row Name 12/18/24 1123          Bed Mobility    Comment, (Bed Mobility) [facility]  -RM       Row Name 12/18/24 1123          Sit-Stand Transfer    Sit-Stand [place] (Transfers) contact guard;verbal cues  for hand placement  -RM     Assistive Device (Sit-Stand Transfers) walker, front-wheeled  -RM       Row Name 12/18/24 1123          Gait/Stairs (Locomotion)    [place] Level (Gait) contact guard;verbal cues  -RM     Assistive Device (Gait) walker, front-wheeled  -RM     Distance in Feet (Gait) --  40'+70'+70'  -RM     Deviations/Abnormal Patterns (Gait) bilateral deviations;n[place] decreased;base of support, wide;stride length decreased  -RM     Bilateral Gait Deviations heel strike decreased;forward flexed posture  -RM               User Key  (r) = Recorded By, (t) = Taken By, (c) = Cosigned By      Initials Name Provider Type    Jairo Cooper PTA Physical Therapist Assistant                   Obj/Interventions    No documentation.                  Goals/Plan    No documentation.                  Clinical Impression       Row Name 12/18/24 1125          Pain    Pretreatment Pain Rating 0/10 - no pain  -RM     Posttreatment Pain Rating 0/10 - no pain  -RM       Row Name 12/18/24 1125          Plan of Care Review    Plan of Care Reviewed With patient;spouse  -RM     Progress improving  -RM     Outcome Evaluation Pt sitting [facility] and willing to particiapate with treatment. Pt performed transfers and gait training this treatment. Pt was able to tolerates increased activity this treatment. See flowsheet for details. Cont PT per POC pprogressing to goals as pt tolerates.  -RM       Row Name 12/18/24 1125          Vital Signs    Pre SpO2 (%) 89  -RM     O2  Delivery Pre Treatment room air  -RM     Intra SpO2 (%) 94  -RM     O2 Delivery Intra Treatment room air  -RM     Post SpO2 (%) 96  -RM     O2 Delivery Post Treatment room air  -RM     Pre Patient Position Sitting  -RM     Intra Patient Position Standing  -RM     Post Patient Position Sitting  -RM       Row Name 12/18/24 1125          Positioning and Restraints    Pre-Treatment Position sitting in chair/recliner  -RM     Post Treatment Position chair  -RM     In Chair reclined;call light within reach;encouraged to call for assist;exit alarm on;with family/caregiver;notified nsg  -RM               User Key  (r) = Recorded By, (t) = Taken By, (c) = Cosigned By      Initials Name Provider Type    Jairo Cooper, ASMITA Physical Therapist Assistant                   Outcome Measures       Row Name 12/18/24 1128 12/18/24 0804       How much help from another person do you currently need...    Turning from your back to your side while in flat bed without using bedrails? 3  -RM 3  -BW    Moving from lying on back to sitting on the side of a flat bed without bedrails? 3  -RM 2  -BW    Moving to and from a bed to a chair (including a wheelchair)? 3  -RM 3  -BW    Standing up from a chair using your arms (e.g., wheelchair, bedside chair)? 3  -RM 3  -BW    Climbing 3-5 steps with a railing? 2  -RM 2  -BW    To walk in hospital room? 3  -RM 3  -BW    AM-PAC 6 Clicks Score (PT) 17  -RM 16  -BW    Highest Level of Mobility Goal 5 --> Static standing  -RM 5 --> Static standing  -BW      Row Name 12/18/24 1128          Functional Assessment    Outcome Measure Options AM-PAC 6 Clicks Basic Mobility (PT)  -RM               User Key  (r) = Recorded By, (t) = Taken By, (c) = Cosigned By      Initials Name Provider Type    Jairo Cooper, ASMITA Physical Therapist Assistant    Kirti Brandt RN Registered Nurse                                 Physical Therapy Education       Title: PT OT SLP Therapies (In Progress)        Topic: Physical Therapy (In Progress)       Point: Mobility training (Done)       Learning Progress Summary            Patient Acceptance, E,TB,D, VU,NR by  at 12/18/2024 1128    Comment: safety with mobility    Acceptance, E,TB,D, VU,NR by  at 12/16/2024 1146    Acceptance, E,TB, VU by  at 12/12/2024 1301    Comment: role of PT during IP admission                      Point: Home exercise program (Done)       Learning Progress Summary            Patient Acceptance, E,TB, VU by  at 12/14/2024 1336    Comment: Perform ex daily                      Point: Body mechanics (Done)       Learning Progress Summary            Patient Acceptance, E,TB, VU by  at 12/17/2024 1520    Comment: proper posture during gait    Acceptance, E,TB, VU by  at 12/15/2024 1256    Comment: upright posture in standing and during amb                      Point: Precautions (Not Started)       Learner Progress:  Not documented in this visit.                              User Key       Initials Effective Dates Name Provider Type Discipline     06/16/21 -  Lolita Alcazar PTA Physical Therapist Assistant PT     06/16/21 -  Jairo Caballero PTA Physical Therapist Assistant PT     11/29/23 -  Kay Castano PT Physical Therapist PT                  PT Recommendation and Plan     Progress: improving  Outcome Evaluation: Pt sitting [facility] and willing to particiapate with treatment. Pt performed transfers and gait training this treatment. Pt was able to tolerates increased activity this treatment. See flowsheet for details. Cont PT per POC pprogressing to goals as pt tolerates.     Time Calculation:         PT Charges       Row Name 12/18/24 1129             Time Calculation    Start Time 1106  -RM      Stop Time 1129  -RM      Time Calculation (min) 23 min  -RM      PT Received On 12/18/24  -RM      PT Goal Re-Cert Due Date 12/22/24  -RM         Time Calculation- PT    Total Timed Code Minutes- PT 23 minute(s)  -RM         Timed  Charges    74102 - Gait Training Minutes  13  -RM      39270 - PT Therapeutic Activity Minutes 10  -RM         Total Minutes    Timed Charges Total Minutes 23  -RM       Total Minutes 23  -RM                User Key  (r) = Recorded By, (t) = Taken By, (c) = Cosigned By      Initials Name Provider Type    Jairo Cooper, ASMITA Physical Therapist Assistant                  Therapy Charges for Today       Code Description Service Date Service Provider Modifiers Qty    95382663730 HC GAIT TRAINING EA 15 MIN 12/18/2024 Jairo Caballero, PTA GP, KX 1    20033482412 HC PT THERAPEUTIC ACT EA 15 MIN 12/18/2024 Jairo Caballero, PTA GP, KX 1            PT G-Codes  Outcome Measure Options: AM-PAC 6 Clicks Basic Mobility (PT)  AM-PAC 6 Clicks Score (PT): 17  AM-PAC 6 Clicks Score (OT): 14       Jairo Caballero PTA  12/18/2024

## 2024-12-18 NOTE — PLAN OF CARE
Goal Outcome Evaluation:  Plan of Care Reviewed With: patient        Progress: improving  Outcome Evaluation: OT tx completed. Patient is sitting in chair, agrees to participate. Patient performed sit to stand CGA, walked 20' to bathroom CGA using RW, some assistance to maneuver walker d/t visual deficits. Patient stood supported sink side and performed grooming tasks. Patient walked back to chair. Performed trunk flexion/roation and lateral flexion x 10 reps each direction using yellow resistance band for trunk flexion. Patient left sitting in chair, wife present, chair alarm and call bell within reach. Continue OT POC

## 2024-12-18 NOTE — PLAN OF CARE
Goal Outcome Evaluation:  Plan of Care Reviewed With: patient, spouse        Progress: improving  Outcome Evaluation: Pt sitting [facility] and willing to particiapate with treatment. Pt performed transfers and gait training this treatment. Pt was able to tolerates increased activity this treatment. See flowsheet for details. Cont PT per POC pprogressing to goals as pt tolerates.

## 2024-12-18 NOTE — DISCHARGE INSTRUCTIONS
-Follow-up with neurology as an outpatient  -Continue Seroquel at home as previously prescribed  -Follow-up with PCP in 2 to 3 days for recheck and continued care.

## 2024-12-18 NOTE — DISCHARGE PLACEMENT REQUEST
"[facility] Documentation     Cici Riley (69 y.o. Male)       Date of Birth   1955    Social Security Number       Address   [address] [place] [address]    Home Phone   [phone]    MRN   [id number]       Gnosticism   Patient Refused    Marital Status                               Admission Date   12/10/24    Admission Type   Emergency    Admitting Provider   Ines Sim DO    Attending Provider   Seth Andre MD    Department, Room/Bed   [facility] TELEMETRY 3, 304/1       Discharge Date       Discharge Disposition       Discharge Destination                                 Attending Provider: Seth Andre MD    Allergies: Penicillins, Trazodone    Isolation: None   Infection: None   Code Status: CPR    Ht: 180.3 cm (70.98\")   Wt: 106 kg (233 lb 7.5 oz)    Admission Cmt: None   Principal Problem: Weakness [R53.1]                   Active Insurance as of 12/10/2024       Primary Coverage       Payor Plan Insurance Group Employer/Plan Group    ANTHEM MEDICARE REPLACEMENT ANTHEM MED ADV HMO [id number]       Payor Plan Address Payor Plan Phone Number Payor Plan Fax Number Effective Dates    [address] [phone]  2024 - None Entered    [facility] [id number]         Subscriber Name Subscriber Birth Date Member ID       CICI RILEY 1955 [id number]                     Emergency Contacts        (Rel.) Home Phone Work Phone Mobile Phone    Jina Riley (Spouse) [phone] -- [phone]                 History & Physical        Ines Sim DO at 12/10/24 [address] [place] [address] HOSPITALIST   HISTORY AND PHYSICAL      Name:  Cici Riley   Age:  69 y.o.  Sex:  male  :  1955  MRN:  [id number]   Visit Number:  [id number]  Admission Date:  12/10/2024  Date Of Service:  12/10/24  Primary Care Physician:  Margret Purvis MD    Chief Complaint:     Fall, confusion    History Of Presenting " Illness:      Chronically ill 69-year-old with a history of peripheral neuropathy, diabetes, hypothyroidism, CAD, prior CVA, vision loss, diabetic retinopathy, chronic pain, history of multiple falls, who had presented to the emergency room due to concerns of confusion.  History obtained primarily from ER record, somewhat from the patient.  No family currently at bedside at time of visit.  Patient notes he had had a fall he thinks yesterday, had hurt his shoulder, actually went to an outlying emergency room where he had no fractures.  He had taken some pain medicine at home, cannot remember the name, apparently his wife had noticed him being more confused overnight into today.  He is currently awake and alert that he is in the emergency room, however he is overall fairly poor historian.  Notes shoulder is feeling better.  He denies any dizziness or headache.    In the ER the patient is overall stable other than mild hypertension.  His labs were largely nonactionable.  X-rays unremarkable for any acute fracture.  CT angiogram of the head neck with contrast was unremarkable, MRI of the brain did not show any acute CVA, showed white matter disease.  Patient was seen by teleneurology who recommended admission for observation.    Review Of Systems:    All systems were reviewed and negative except as mentioned in history of presenting illness, assessment and plan.    Past Medical History: Patient  has a past medical history of Arthritis, Clot, Coronary artery disease, Diabetes mellitus, Heart disease, History of blood clots, Hypertension, Low back pain, Thyroid disease, and Type 2 diabetes mellitus.    Past Surgical History: Patient  has a past surgical history that includes Esophagogastroduodenoscopy; Colonoscopy (2015); Cholecystectomy (2015); and Coronary artery bypass graft.    Social History: Patient  reports that he has never smoked. He has never been exposed to tobacco smoke. His smokeless tobacco use includes  chew. He reports that he does not drink alcohol and does not use drugs.    Family History:  Patient's family history has been reviewed and found to be noncontributory.     Allergies:      Trazodone    Home Medications:    Prior to Admission Medications       Prescriptions Last Dose Informant Patient Reported? Taking?    ALPRAZolam (XANAX) 0.5 MG tablet   No No    Take 1 tablet by mouth At Night As Needed for Anxiety.    amLODIPine (NORVASC) 10 MG tablet   No No    Take 1 tablet by mouth Daily.    apixaban (Eliquis) 5 MG tablet tablet   No No    Take 1 tablet by mouth Every 12 (Twelve) Hours.    aspirin 81 MG chewable tablet   Yes No    Chew 1 tablet Daily.    atorvastatin (Lipitor) 80 MG tablet   No No    Take 1 tablet by mouth Every Night.    chlorthalidone (HYGROTON) 25 MG tablet   No No    Take 0.5 tablets by mouth Daily.    citalopram (CeleXA) 40 MG tablet   No No    Take 1 tablet by mouth Daily.    Continuous Glucose Sensor (FreeStyle Rina 3 Sensor) misc   No No    Use 1 each Every 14 (Fourteen) Days.    cyclopentolate (CYCLOGYL) 1 % ophthalmic solution   Yes No    Apply 1 drop to eye(s) as directed by provider 3 (Three) Times a Day.    famotidine (Pepcid) 20 MG tablet   No No    Take 1 tablet by mouth 2 (Two) Times a Day.    glucose blood (Accu-Chek Caryn Plus) test strip   No No    USE AS DIRECTED 3 TIMES DAILY    glucose monitor monitoring kit   No No    1 each Daily.    glucose monitor monitoring kit   No No    1 each Daily.    HYDROcodone-acetaminophen (NORCO) 7.5-325 MG per tablet   No No    Take 1 tablet by mouth Every 12 (Twelve) Hours As Needed for Moderate Pain.    Insulin NPH Isophane & Regular (HumuLIN 70/30 KwikPen) (70-30) 100 UNIT/ML suspension pen-injector   No No    Inject 70 units twice daily E11.65    Insulin Pen Needle 32G X 4 MM misc   No No    For use with insulin injections, daily    Insulin Syringes, Disposable, U-100 1 ML misc   No No    50 Units 3 (Three) Times a Day As Needed (50  "units in the morning, and then with meals PRN).    Lancets 30G misc   No No    Check sugar 3 times daily    levothyroxine (SYNTHROID, LEVOTHROID) 125 MCG tablet   No No    Take 1 tablet by mouth Daily.    losartan (COZAAR) 100 MG tablet   No No    Take 1 tablet by mouth Daily.    pantoprazole (PROTONIX) 40 MG EC tablet   No No    Take 1 tablet by mouth Daily.    QUEtiapine (SEROquel) 50 MG tablet   No No    Take 1 tablet by mouth 2 (Two) Times a Day.          ED Medications:    Medications   apixaban (ELIQUIS) tablet 5 mg (5 mg Oral Given 12/10/24 1354)   atorvastatin (LIPITOR) tablet 80 mg (has no administration in time range)   labetalol (NORMODYNE,TRANDATE) injection 10 mg (has no administration in time range)   Diclofenac Sodium (VOLTAREN) 1 % gel 4 g (4 g Topical Given 12/10/24 1426)   iopamidol (ISOVUE-300) 61 % injection 100 mL (100 mL Intravenous Given 12/10/24 1047)   magnesium sulfate 2g/50 mL (PREMIX) infusion (0 g Intravenous Stopped 12/10/24 1233)   acetaminophen (TYLENOL) tablet 650 mg (650 mg Oral Given 12/10/24 1354)     Vital Signs:  Temp:  [98.3 °F (36.8 °C)] 98.3 °F (36.8 °C)  Heart Rate:  [75-86] 77  Resp:  [20-22] 20  BP: (129-180)/() 157/97        12/10/24  0822   Weight: 108 kg (239 lb)     Body mass index is 33.33 kg/m².    Physical Exam:     Most recent vital Signs: /97   Pulse 77   Temp 98.3 °F (36.8 °C) (Oral)   Resp 20   Ht 180.3 cm (71\")   Wt 108 kg (239 lb)   SpO2 95%   BMI 33.33 kg/m²     Physical Exam  Constitutional:       General: He is not in acute distress.     Appearance: He is not toxic-appearing.   HENT:      Ears:      Comments: Hearing loss     Mouth/Throat:      Mouth: Mucous membranes are moist.      Pharynx: Oropharynx is clear.   Eyes:      Pupils: Pupils are equal, round, and reactive to light.   Cardiovascular:      Rate and Rhythm: Normal rate and regular rhythm.      Pulses: Normal pulses.      Heart sounds: No murmur heard.     No gallop. "   Pulmonary:      Effort: No respiratory distress.      Breath sounds: No wheezing or rales.   Abdominal:      General: Bowel sounds are normal. There is no distension.      Tenderness: There is no abdominal tenderness. There is no guarding.   Skin:     General: Skin is warm.      Capillary Refill: Capillary refill takes less than 2 seconds.   Neurological:      General: No focal deficit present.      Mental Status: He is alert.      Motor: Weakness present.   Psychiatric:         Mood and Affect: Mood normal.         Thought Content: Thought content normal.         Laboratory data:    I have reviewed the labs done in the emergency room.    Results from last 7 days   Lab Units 12/10/24  0929   SODIUM mmol/L 138   POTASSIUM mmol/L 3.8   CHLORIDE mmol/L 101   CO2 mmol/L 26.2   BUN mg/dL 16   CREATININE mg/dL 1.55*   CALCIUM mg/dL 8.9   BILIRUBIN mg/dL 0.8   ALK PHOS U/L 89   ALT (SGPT) U/L 20   AST (SGOT) U/L 29   GLUCOSE mg/dL 175*     Results from last 7 days   Lab Units 12/10/24  1128   WBC 10*3/mm3 8.65   HEMOGLOBIN g/dL 16.0   HEMATOCRIT % 48.1   PLATELETS 10*3/mm3 146         Results from last 7 days   Lab Units 12/10/24  1128 12/10/24  0929   CK TOTAL U/L  --  437*   HSTROP T ng/L 50* 52*     Results from last 7 days   Lab Units 12/10/24  0929   PROBNP pg/mL 290.6                 Results from last 7 days   Lab Units 12/10/24  1119   COLOR UA  Yellow   GLUCOSE UA  250 mg/dL (1+)*   KETONES UA  Negative   BLOOD UA  Trace*   LEUKOCYTES UA  Negative   PH, URINE  7.0   BILIRUBIN UA  Negative   UROBILINOGEN UA  1.0 E.U./dL   RBC UA /HPF 0-2   WBC UA /HPF None Seen       Pain Management Panel  More data exists         Latest Ref Rng & Units 12/10/2024 4/15/2024   Pain Management Panel   Creatinine, Urine Not Estab. mg/dL - 126.1    Amphetamine, Urine Qual Negative Negative  -   Barbiturates Screen, Urine Negative Negative  -   Benzodiazepine Screen, Urine Negative Positive  -   Buprenorphine, Screen, Urine Negative  Negative  -   Cocaine Screen, Urine Negative Negative  -   Methadone Screen , Urine Negative Negative  -   Methamphetamine, Ur Negative Negative  -      Details                   EKG:          Radiology:    MRI Brain Without Contrast    Result Date: 12/10/2024  PROCEDURE: MRI BRAIN WO CONTRAST-  HISTORY: concern for possible posterior stroke,  PROCEDURE: Multiplanar MR imaging of the brain was performed in multiple MR sequences .  FINDINGS: Brain parenchyma displays normal signal without evidence of mass, hemorrhage or edema.  Limited images the proximal cord are unremarkable. The ventricles are normal in size. There is a small area of encephalomalacia in the right basal ganglia consistent with small remote CVA. There is no extra-axial fluid or midline shift. Flow-voids are appropriate. Diffusion weighted images demonstrate no evidence of acute CVA although a small area could be missed secondary to the motion artifact. Limited images of the paranasal sinuses are unremarkable. Motion artifact noted on multiple images significantly decreasing the sensitivity of this exam. There is mild small vessel ischemic disease. T2 coronal images are almost nondiagnostic. Same is true of the T1 axial images.      Gross motion artifact on multiple images and motion artifact present on all images.  No acute CVA identified though a small area could be missed secondary to motion artifact.  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      This report was signed and finalized on 12/10/2024 1:38 PM by Kristi Roblero MD.      CT Angiogram Head    Result Date: 12/10/2024  PROCEDURE: CT ANGIOGRAM NECK-, CT ANGIOGRAM HEAD-  HISTORY: Concern for stroke, altered mental status  TECHNIQUE: Thin section axial CT with IV contrast supplemented with multiplanar reconstruction of the head and neck.  FINDINGS: Exam is limited by significant patient motion artifact.  CTA:  Aortic arch:  Arch shows no significant narrowing. Great vessel  origins are widely patent.  Right carotid: There is mild plaque in the right carotid bulb with less than 50% stenosis. Right common carotid artery is unremarkable..  Left carotid: There is minimal calcified plaque in the left carotid bulb with less than 50% stenosis. Carotid artery is unremarkable.  Vertebrals: Left vertebral artery is dominant. No significant stenosis is present. Distal vertebral arteries are suboptimally visualized.  The cranial circulation is grossly unremarkable. ACAs are unremarkable. Motion artifact limits visualization of MCAs and PCAs. No definite stenosis or aneurysm is seen, but small lesions could be missed. Basilar arteries are unremarkable.  Calcification in the right palatine tonsil suggests previous tonsillitis.      Suboptimal exam secondary to motion artifact on multiple images.  Less than 50% stenosis of the internal carotid arteries bilaterally.  No significant intracranial stenosis or aneurysm identified, however, small lesions could be missed secondary to motion artifact.    DLP: 749.41 mGy.cm,748.94 mGy.cm CTDI: 22.7 mGy  This study was performed with techniques to keep radiation doses as low as reasonably achievable (ALARA). Individualized dose reduction techniques using automated exposure control or adjustment of mA and/or kV according to the patient size were employed.      Images were reviewed, interpreted, and dictated by Dr. Kristi Roblero MD Transcribed by Anna Beasley PA-C.  This report was signed and finalized on 12/10/2024 11:28 AM by Kristi Roblero MD.      CT Angiogram Neck    Result Date: 12/10/2024  PROCEDURE: CT ANGIOGRAM NECK-, CT ANGIOGRAM HEAD-  HISTORY: Concern for stroke, altered mental status  TECHNIQUE: Thin section axial CT with IV contrast supplemented with multiplanar reconstruction of the head and neck.  FINDINGS: Exam is limited by significant patient motion artifact.  CTA:  Aortic arch:  Arch shows no significant narrowing. Great vessel origins are  widely patent.  Right carotid: There is mild plaque in the right carotid bulb with less than 50% stenosis. Right common carotid artery is unremarkable..  Left carotid: There is minimal calcified plaque in the left carotid bulb with less than 50% stenosis. Carotid artery is unremarkable.  Vertebrals: Left vertebral artery is dominant. No significant stenosis is present. Distal vertebral arteries are suboptimally visualized.  The cranial circulation is grossly unremarkable. ACAs are unremarkable. Motion artifact limits visualization of MCAs and PCAs. No definite stenosis or aneurysm is seen, but small lesions could be missed. Basilar arteries are unremarkable.  Calcification in the right palatine tonsil suggests previous tonsillitis.      Suboptimal exam secondary to motion artifact on multiple images.  Less than 50% stenosis of the internal carotid arteries bilaterally.  No significant intracranial stenosis or aneurysm identified, however, small lesions could be missed secondary to motion artifact.    DLP: 749.41 mGy.cm,748.94 mGy.cm CTDI: 22.7 mGy  This study was performed with techniques to keep radiation doses as low as reasonably achievable (ALARA). Individualized dose reduction techniques using automated exposure control or adjustment of mA and/or kV according to the patient size were employed.      Images were reviewed, interpreted, and dictated by Dr. Kristi Roblero MD Transcribed by Anna Beasley PA-C.  This report was signed and finalized on 12/10/2024 11:28 AM by Kristi Roblero MD.      CT Cervical Spine Without Contrast    Result Date: 12/10/2024  PROCEDURE: CT CERVICAL SPINE WO CONTRAST-  HISTORY: Fall yesterday, AMS, neck pain  COMPARISON: May 2023.  PROCEDURE: Multiple axial CT images were obtained through the cervical spine using bone window algorithms. Coronal and sagittal images were reconstructed from the original axial data set.  FINDINGS: Facets are normally aligned. There is no acute fracture..  There is straightening of the normal cervical adenosis. There is minimal retrolisthesis of C3 on C4, stable. There is mild to moderate disc space narrowing at all levels with small osteophytes. Multiple levels of spinal stenosis and/or neuroforaminal narrowing secondary to degenerative change, but not secondary to acute abnormality. Bilateral carotid artery calcifications are noted.  Limited images of the lung apices are unremarkable.      No acute fracture or malalignment.  Degenerative changes.   CTDI: 22.7 mGy DLP: 749.41 mGy.cm,748.94 mGy.cm   This study was performed with techniques to keep radiation doses as low as reasonably achievable (ALARA). Individualized dose reduction techniques using automated exposure control or adjustment of mA and/or kV according to the patient size were employed.     Images were reviewed, interpreted, and dictated by Dr. Kristi Roblero MD Transcribed by Anna Beasley PA-C.  This report was signed and finalized on 12/10/2024 11:20 AM by Kristi Roblero MD.      CT Head Without Contrast    Result Date: 12/10/2024  PROCEDURE: CT HEAD WO CONTRAST-  HISTORY: ground level fall yesterday on [facility] since fall  COMPARISON: May 2023.  TECHNIQUE: Multiple axial CT images were performed from the foramen magnum to the vertex without enhancement.  FINDINGS: There is no CT evidence of acute intracranial hemorrhage. Moderate atrophy, pronounced for the patient's age, is stable. There is a stable small area of encephalomalacia in the right basal ganglia consistent with remote CVA. There is no mass, mass effect or midline shift.  There is no hydrocephalus. The paranasal sinuses are clear. Bone windows reveal no acute osseous abnormalities.      Stable chronic changes without acute intracranial process.     DLP: 749.41 mGy.cm,748.94 mGy.cm CTDI: 22.7 mGy   This study was performed with techniques to keep radiation doses as low as reasonably achievable (ALARA). Individualized dose reduction  techniques using automated exposure control or adjustment of mA and/or kV according to the patient size were employed.     Images were reviewed, interpreted, and dictated by Dr. Kristi Roblero MD Transcribed by Anna Beasley PA-C.  This report was signed and finalized on 12/10/2024 11:20 AM by Kristi Roblero MD.      XR Shoulder 2+ View Right    Result Date: 12/10/2024   PROCEDURE: XR SHOULDER 2+ VW RIGHT-  HISTORY: Fall, yesterday, knot noted to right shoulder  COMPARISON: March 24, 2016.  FINDINGS:  A three view exam demonstrates no acute fracture or dislocation. The joint spaces demonstrate moderate narrowing of the right acromioclavicular joint. There is also decreased subacromial distance suggesting chronic rotator cuff tear. No soft tissue abnormality is seen. On a single view there is a linear lucency in the superior lateral right acromion. Borders appear smooth and mildly sclerotic. If this is a fracture appears to be remote. This is not identified on the prior study.       No acute bony abnormality.  Degenerative changes described.      This report was signed and finalized on 12/10/2024 11:03 AM by Kristi Roblero MD.      XR Humerus Right    Result Date: 12/10/2024  PROCEDURE: XR HUMERUS RIGHT-  HISTORY: Fall yesterday, pain, inability to abduct arm  COMPARISON: None.  FINDINGS:  A two view exam demonstrates no acute fracture or dislocation. The joint spaces appear unremarkable. No soft tissue abnormality is seen.      No acute bony abnormality.      This report was signed and finalized on 12/10/2024 10:56 AM by Kristi Roblero MD.      XR Chest 1 View    Result Date: 12/10/2024  PROCEDURE: XR CHEST 1 VW-  HISTORY: AMS, fell last night, right shoulder pain.  COMPARISON: June 21, 2022..  FINDINGS: The heart is normal in size. The lungs are clear. The mediastinum is unremarkable. There is no pneumothorax.  There are no acute osseous abnormalities. Status post median sternotomy. Apical lordotic positioning noted.  Small density is again projected over the left apex, stable. There is evidence of old calcified granulomatous disease.      No acute cardiopulmonary process.     This report was signed and finalized on 12/10/2024 10:56 AM by Kristi Roblero MD.       Assessment:    Weakness, POA  Encephalopathy, POA  Diabetes with neuropathy and retinopathy  Vision loss  Hearing loss  CAD  History of prior PE on Eliquis  Impaired mobility and ADL    Plan:    Admitted as observation    Weakness/confusion:  Appears to have chronic weakness, history of falls, lot of cares provided by spouse.  In regards to confusion, may have been exacerbated by pain medication needed been taking for the shoulder.  He is also on benzodiazepines and Seroquel.  Echocardiogram was ordered.  Lipitor has been ordered.  Telemetry monitoring.  Neurology to follow-up.  Will continue his Eliquis.    Will have PT and OT evaluations.    Diabetes/vision loss/hearing loss/CAD/prior PE/impaired mobility:  Will resume home medications once able to reconcile with spouse.    Likely home in 24 to 48 hours.  Plan of care discussed with the patient.    Risk Assessment: High  DVT Prophylaxis: Eliquis  Code Status: Full  Diet: As tolerated    Advance Care Planning  ACP discussion was held with the patient during this visit. Patient does not have an advance directive, declines further assistance.           Ines Sim DO  12/10/24  15:11 EST    Dictated utilizing Dragon dictation.    Electronically signed by Ines Sim DO at 12/10/24 1653       Current Facility-Administered Medications   Medication Dose Route Frequency Provider Last Rate Last Admin    acetaminophen (TYLENOL) tablet 650 mg  650 mg Oral Q6H PRN Ines Sim DO   650 mg at 12/18/24 0805    ALPRAZolam (XANAX) tablet 0.5 mg  0.5 mg Oral Nightly Ines Sim DO   0.5 mg at 12/17/24 2040    [Held by provider] amLODIPine (NORVASC) tablet 10 mg  10 mg Oral Daily Ines Sim  Ryley DO   10 mg at 12/17/24 0811    apixaban (ELIQUIS) tablet 5 mg  5 mg Oral Q12H Ines Sim DO   5 mg at 12/18/24 0805    atorvastatin (LIPITOR) tablet 80 mg  80 mg Oral Nightly Ines Sim DO   80 mg at 12/17/24 2040    sennosides-docusate (PERICOLACE) 8.6-50 MG per tablet 2 tablet  2 tablet Oral BID PRN Ines Sim DO        And    polyethylene glycol (MIRALAX) packet 17 g  17 g Oral Daily PRN Ines Sim DO        And    bisacodyl (DULCOLAX) EC tablet 5 mg  5 mg Oral Daily PRN Ines Sim DO        And    bisacodyl (DULCOLAX) suppository 10 mg  10 mg Rectal Daily PRN Ines Sim DO        chlorthalidone (HYGROTON) tablet 12.5 mg  12.5 mg Oral Daily Ines Sim DO   12.5 mg at 12/18/24 0805    citalopram (CeleXA) tablet 20 mg  20 mg Oral Daily Ines Sim DO   20 mg at 12/18/24 0805    cyclopentolate (CYCLOGYL) 1 % ophthalmic solution 1 drop  1 drop Both Eyes TID Ines Sim DO   1 drop at 12/18/24 0804    dextrose (D50W) (25 g/50 mL) IV injection 25 g  25 g Intravenous Q15 Min PRN Ines Sim DO        dextrose (GLUTOSE) oral gel 15 g  15 g Oral Q15 Min PRN Ines Sim DO        Diclofenac Sodium (VOLTAREN) 1 % gel 4 g  4 g Topical 4x Daily Ines Sim DO   4 g at 12/18/24 0804    famotidine (PEPCID) tablet 20 mg  20 mg Oral BID Ines Sim DO   20 mg at 12/18/24 0805    glucagon (GLUCAGEN) injection 1 mg  1 mg Intramuscular Q15 Min PRN Ines Sim DO        HYDROcodone-acetaminophen (NORCO) 5-325 MG per tablet 0.5 tablet  0.5 tablet Oral Q12H PRN Ines Sim, DO   0.5 tablet at 12/17/24 2230    insulin glargine (LANTUS, SEMGLEE) injection 25 Units  25 Units Subcutaneous Q12H Ines Sim,    25 Units at 12/18/24 0804    Insulin Lispro (humaLOG) injection 3-14 Units  3-14 Units Subcutaneous 4x Daily AC & at Bedtime  Ines Sim, DO   3 Units at 24 0805    levothyroxine (SYNTHROID, LEVOTHROID) tablet 125 mcg  125 mcg Oral Q AM Roney Inesmeghan Hedrick DO   125 mcg at 24 0624    losartan (COZAAR) tablet 100 mg  100 mg Oral Daily Roney Inesmeghan Hedrick, DO   100 mg at 24 0805    nitroglycerin (NITROSTAT) SL tablet 0.4 mg  0.4 mg Sublingual Q5 Min PRN Ines Sim DO        pantoprazole (PROTONIX) EC tablet 40 mg  40 mg Oral Daily Roney Inesmeghan Hedrick, DO   40 mg at 24 0805    QUEtiapine (SEROquel) tablet 50 mg  50 mg Oral Nightly Roney Inesmeghan Hedrick, DO   50 mg at 24 2040    QUEtiapine (SEROquel) tablet 50 mg  50 mg Oral Q12H PRN Hernandez Weir DO            Physician Progress Notes (most recent note)        Seth Andre MD at 24 0959              [facility]    [facility]    Name:  Ramsey Riley   Age:  69 y.o.  Sex:  male  :  1955  MRN:  [id number]   Visit Number:  [id number]  Admission Date:  12/10/2024  Date Of Service:  24  Primary Care Physician:  Margret Purvis MD     LOS: 0 days :    Chief Complaint:      Follow-up weakness    Subjective:    Patient was seen examined bedside today.  Sitting up in the chair and appears comfortable with no distress, tolerating p.o. well.  He denies any acute complaints today.  Patient is AAOx3.  Answering questions appropriately.  Patient pending placement for rehab.    Later in the day when his wife arrived I met with her at bedside.  She reports that they are interested in rehab however she was thinking about taking the patient home for [place], will discuss with case management.    Hospital Course:    Chronically ill 69-year-old with a history of peripheral neuropathy, diabetes, hypothyroidism, CAD, prior CVA, vision loss, diabetic retinopathy, chronic pain, history of multiple falls, who had presented to the emergency room due to concerns of confusion.  History obtained  primarily from ER record, somewhat from the patient.  No family currently at bedside at time of visit.  Patient notes he had had a fall he thinks yesterday, had hurt his shoulder, actually went to an outlying emergency room where he had no fractures.  He had taken some pain medicine at home, cannot remember the name, apparently his wife had noticed him being more confused overnight into today.  He is currently awake and alert that he is in the emergency room, however he is overall fairly poor historian.  Notes shoulder is feeling better.  He denies any dizziness or headache.     In the ER the patient is overall stable other than mild hypertension.  His labs were largely nonactionable.  X-rays unremarkable for any acute fracture.  CT angiogram of the head neck with contrast was unremarkable, MRI of the brain did not show any acute CVA, showed white matter disease.  Patient was seen by teleneurology who recommended admission for observation.    Concern is for underlying cerebral amyloid angiopathy, possible dementia contributing.  Would likely benefit from short-term rehab after discussion with PT and OT.    Review of Systems:     All systems were reviewed and negative except as mentioned in subjective, assessment and plan.    Vital Signs:    Temp:  [97.5 °F (36.4 °C)-98.1 °F (36.7 °C)] 97.5 °F (36.4 °C)  Heart Rate:  [78-88] 78  Resp:  [18] 18  BP: (135-165)/(87-97) 162/97    Intake and output:    I/O last 3 completed shifts:  In: 540 [P.O.:540]  Out: 2475 [Urine:2475]  No intake/output data recorded.    Physical Examination:    General Appearance:  Alert and cooperative.  Chronically ill-appearing.  No acute distress.   Head:  Atraumatic and normocephalic.   Eyes: Conjunctivae and sclerae normal, no icterus. No pallor.   Throat: No oral lesions, no thrush, oral mucosa moist.   Neck: Supple, trachea midline, no thyromegaly.   Lungs:   Breath sounds heard bilaterally equally.  No wheezing or crackles. No Pleural rub  "or bronchial breathing.   Heart:  Normal S1 and S2, no murmur, no gallop, no rub. No JVD.   Abdomen:   Normal bowel sounds, no masses, no organomegaly. Soft, nontender, nondistended, no rebound tenderness.   Extremities: Supple, no edema, no cyanosis, no clubbing.  Decreased range of motion right shoulder   Skin: No bleeding or rash.   Neurologic: Alert and oriented x person and place.  Generalized weakness noted but overall improved on his mental status.  Moving all 4 extremities with no difficulty.     Laboratory results:    Results from last 7 days   Lab Units 12/16/24  0551 12/11/24  0907   SODIUM mmol/L 135* 136   POTASSIUM mmol/L 4.2 4.1   CHLORIDE mmol/L 100 101   CO2 mmol/L 24.4 26.7   BUN mg/dL 19 18   CREATININE mg/dL 1.45* 1.43*   CALCIUM mg/dL 9.2 8.5*   GLUCOSE mg/dL 254* 258*     Results from last 7 days   Lab Units 12/16/24  0551 12/10/24  1128   WBC 10*3/mm3 7.97 8.65   HEMOGLOBIN g/dL 16.1 16.0   HEMATOCRIT % 46.5 48.1   PLATELETS 10*3/mm3 180 146         Results from last 7 days   Lab Units 12/10/24  1128   HSTROP T ng/L 50*         No results for input(s): \"PHART\", \"CLZ3RAG\", \"PO2ART\", \"IJE9REV\", \"BASEEXCESS\" in the last 8760 hours.   I have reviewed the patient's laboratory results.    Radiology results:    No radiology results from the last 24 hrs  I have reviewed the patient's radiology reports.    Medication Review:     I have reviewed the patient's active and prn medications.     Problem List:      Weakness      Assessment:    Weakness, POA  Encephalopathy, POA  Diabetes with neuropathy and retinopathy  Vision loss  Hearing loss  CAD  History of prior PE on Eliquis  Impaired mobility and ADL    Plan:    Weakness/confusion:  Appears to have off-and-on confusion and weakness at home.  However recent increased amount of falls, not sleeping well, wife thinks some medication issues may be at play.  Notes patient sometimes doubles up on medicines because he sleeps throughout the morning and takes " them all at nighttime.  Will schedule to Seroquel and Xanax only at night for now to help with mixing of nights and days.  Overall is improved.  Still with confusion with pain medication, will decrease dose to see if helps.  Mental status appears to be improving and at baseline currently, AAOx3.    Cerebral amyloid angiopathy  Appears to have been progressive on imaging when compared to prior MRIs.  Based off symptomatology with the weakness, confusion, is fairly consistent with this.  Elevated blood pressures may have contributed and or fall exacerbated.  He will benefit from short-term rehab after discussion with PT and OT.  Will need outpatient nerve conduction testing.  Does have diabetic neuropathy     Diabetes/vision loss/hearing loss/CAD/prior PE/impaired mobility:  Continue home medications.  Continue with Eliquis.    Short-term rehab workup is pending.  I have reviewed the copied text and it is accurate as of 12/17/2024     DVT Prophylaxis: Eliquis  Code Status: Full  Diet: As tolerated  Discharge Plan: STR placement pending    Seth Andre MD  12/17/24  09:59 EST    Dictated utilizing Dragon dictation.      Electronically signed by Seth Andre MD at 12/17/24 9432          Physical Therapy Notes (all)             Kay Castano PT at 12/12/24 1302  Version 1 of 1         Goal Outcome Evaluation:  Plan of Care Reviewed With: patient        Progress: no change  Outcome Evaluation: Pt participated in PT initial evaluation this date. Pt presents supine in bed with wife at side, agreeable to PT evaluation. Pt AO to person. Pt reports 8/10 R shoulder pain at rest. Pt's wife assisted to provide subjective history. At baseline, pt lives at home with his wife. Pt's wife reports that there are 3 JONATAN home and 2 JONATAN pt's bedroom within home. Pt normally ambulates household distances with a walking stick and uses a manual w/c for community mobility. Pt's wife reports frequent falls at home. Pt performed supine >  sit on EOB with mod A to assist with placing LE's off EOB and moving trunk upright. Pt performed STS transfer with min A x2 and use of RW. Pt was able to take x2 sidesteps toward HOB with min A x2 and RW. Pt required frequent VC for safe management of the RW during. Pt required max A to perform sit to supine. Following evaluation, pt left supine in bed with bed alarm active, call light and all needs within reach. Wife at side. Recommend skilled PT intervention during IP admission to address identified deficits, reduce risk of falls and prevent functional decline. Once medically stable, recommend pt to d/c to STR to address remaining deficits.    Anticipated Discharge Disposition (PT): inpatient rehabilitation facility                          Electronically signed by Kay Castano, PT at 24 1302       Kay Castano, PT at 24 1302  Version 1 of 1         Patient Name: Ramsey Riley  : 1955    MRN: [id number]                              Today's Date: 2024       Admit Date: 12/10/2024    Visit Dx:     ICD-10-CM ICD-9-CM   1. TIA (transient ischemic attack)  G45.9 435.9     Patient Active Problem List   Diagnosis    Coronary artery disease involving native coronary artery of native heart without angina pectoris    Essential hypertension    Bradycardia, sinus    Fatigue    Abnormal ECG    Precordial pain    LVH (left ventricular hypertrophy) due to hypertensive disease, without heart failure    Diabetes mellitus    PAD (peripheral artery disease)    Ischemic stroke    History of blood clots    Pulmonary embolus    Dyslipidemia    Class 1 obesity due to excess calories with serious comorbidity and body mass index (BMI) of 34.0 to 34.9 in adult    Weakness     Past Medical History:   Diagnosis Date    Arthritis     Clot     Coronary artery disease     Diabetes mellitus     Heart disease     History of blood clots     Hypertension     Impaired mobility     Low back pain     Thyroid disease      Type 2 diabetes mellitus      Past Surgical History:   Procedure Laterality Date    CHOLECYSTECTOMY  2015    COLONOSCOPY  2015    dr srivastava    CORONARY ARTERY BYPASS GRAFT      Ethel - 3 BY PASS     ENDOSCOPY        General Information       Row Name 12/12/24 1154          Physical Therapy Time and Intention    Document Type evaluation  -     Mode of Treatment physical therapy  -       Row Name 12/12/24 1154          General Information    Patient Profile Reviewed yes  -     Prior Level of Function min assist:;all household mobility;community mobility  -     Existing Precautions/Restrictions fall  -     Barriers to Rehab medically complex;previous functional deficit;cognitive status;visual deficit  -       Row Name 12/12/24 1154          Living Environment    People in Home spouse  -       Row Name 12/12/24 1154          Home Main Entrance    Number of Stairs, Main Entrance three  -     Stair Railings, Main Entrance railings on both sides of stairs  -       Row Name 12/12/24 1154          Stairs Within Home, Primary    Stairs, Within Home, Primary 2 JONATAN bedroom  -     Number of Stairs, Within Home, Primary two  -       Row Name 12/12/24 1154          Cognition    Orientation Status (Cognition) oriented to;person  -       Row Name 12/12/24 1255 12/12/24 1154       Safety Issues/Impairments Affecting Functional Mobility    Safety Issues Affecting Function (Mobility) -- ability to follow commands;awareness of need for assistance;impulsivity;insight into deficits/self-awareness;judgment;positioning of assistive device;safety precautions follow-through/compliance;safety precaution awareness  -    Impairments Affecting Function (Mobility) visual/perceptual  - balance;cognition;endurance/activity tolerance;pain;range of motion (ROM);postural/trunk control;strength  -    Cognitive Impairments, Mobility Safety/Performance -- attention;insight into deficits/self-awareness;safety precaution  awareness;safety precaution follow-through  -              User Key  (r) = Recorded By, (t) = Taken By, (c) = Cosigned By      Initials Name Provider Type    Kay Evans PT Physical Therapist                   Mobility       Row Name 12/12/24 1156          Bed Mobility    Bed Mobility supine-sit;sit-supine  -     Supine-Sit [place] (Bed Mobility) moderate assist (50% patient effort);verbal cues;nonverbal cues (demo/gesture)  -     Sit-Supine [place] (Bed Mobility) maximum assist (25% patient effort);verbal cues;nonverbal cues (demo/gesture)  -     Assistive Device (Bed Mobility) head of bed elevated;repositioning sheet  -       Row Name 12/12/24 1156          Sit-Stand Transfer    Sit-Stand [place] (Transfers) minimum assist (75% patient effort);verbal cues;nonverbal cues (demo/gesture)  -     Assistive Device (Sit-Stand Transfers) walker, front-wheeled  -       Row Name 12/12/24 1156          Gait/Stairs (Locomotion)    [place] Level (Gait) minimum assist (75% patient effort);verbal cues;nonverbal cues (demo/gesture)  -     Assistive Device (Gait) walker, front-wheeled  -     Patient was able to Ambulate yes  -HW     Distance in Feet (Gait) 2  -HW     Deviations/Abnormal Patterns (Gait) bilateral deviations;nara decreased;festinating/shuffling;base of support, wide  -HW     Bilateral Gait Deviations heel strike decreased;forward flexed posture  -     [place] Level (Stairs) not tested  -               User Key  (r) = Recorded By, (t) = Taken By, (c) = Cosigned By      Initials Name Provider Type    Kay Evans PT Physical Therapist                   Obj/Interventions       Row Name 12/12/24 1243          Range of Motion Comprehensive    General Range of Motion bilateral lower extremity ROM WFL  -       Row Name 12/12/24 1243          Strength Comprehensive (MMT)    Comment, General Manual Muscle Testing (MMT) Assessment BLE MMT grossly 4-/5  -       Row  Name 12/12/24 1243          Balance    Balance Assessment sitting static balance;sitting dynamic balance;standing static balance;standing dynamic balance  -HW     Static Sitting Balance contact guard  -HW     Dynamic Sitting Balance minimal assist  -HW     Position, Sitting Balance supported;sitting edge of bed  -HW     Static Standing Balance minimal assist  -HW     Dynamic Standing Balance minimal assist  -HW     Position/Device Used, Standing Balance supported;walker, front-wheeled  -       Row Name 12/12/24 1243          Sensory Assessment (Somatosensory)    Sensory Assessment (Somatosensory) other (see comments)  impaired light touch sensation to BLE's  -HW               User Key  (r) = Recorded By, (t) = Taken By, (c) = Cosigned By      Initials Name Provider Type     Kay Castano, PT Physical Therapist                   Goals/Plan       Row Name 12/12/24 1300          Bed Mobility Goal 1 (PT)    Activity/Assistive Device (Bed Mobility Goal 1, PT) bed mobility activities, all  -HW     [place] Level/Cues Needed (Bed Mobility Goal 1, PT) minimum assist (75% or more patient effort)  -HW     Time Frame (Bed Mobility Goal 1, PT) long term goal (LTG);10 days  -HW     Progress/Outcomes (Bed Mobility Goal 1, PT) new goal  -       Row Name 12/12/24 1300          Transfer Goal 1 (PT)    Activity/Assistive Device (Transfer Goal 1, PT) transfers, all  -HW     [place] Level/Cues Needed (Transfer Goal 1, PT) contact guard required  -HW     Time Frame (Transfer Goal 1, PT) long term goal (LTG);10 days  -HW     Progress/Outcome (Transfer Goal 1, PT) new goal  -       Row Name 12/12/24 1300          Gait Training Goal 1 (PT)    Activity/Assistive Device (Gait Training Goal 1, PT) gait (walking locomotion)  -HW     [place] Level (Gait Training Goal 1, PT) contact guard required  -HW     Distance (Gait Training Goal 1, PT) 100  -HW     Time Frame (Gait Training Goal 1, PT) long term goal (LTG);10 days  -HW      Progress/Outcome (Gait Training Goal 1, PT) new goal  -       Row Name 12/12/24 1300          Patient Education Goal (PT)    Activity (Patient Education Goal, PT) Pt will perform 1x10 BLE therex with min A to allow for improved BLE strength and endurance.  -HW     [place]/Cues/Accuracy (Memory Goal 2, PT) demonstrates adequately  -HW     Time Frame (Patient Education Goal, PT) short term goal (STG);5 days  -HW     Progress/Outcome (Patient Education Goal, PT) new goal  -       Row Name 12/12/24 1300          Therapy Assessment/Plan (PT)    Planned Therapy Interventions (PT) balance training;bed mobility training;gait training;home exercise program;patient/family education;postural re-education;stretching;strengthening;ROM (range of motion);transfer training;neuromuscular re-education  -               User Key  (r) = Recorded By, (t) = Taken By, (c) = Cosigned By      Initials Name Provider Type     Kay Castano, PT Physical Therapist                   Clinical Impression       Row Name 12/12/24 1247          Pain    Pretreatment Pain Rating 8/10  -HW     Posttreatment Pain Rating 8/10  -     Pain Location shoulder  -     Pain Side/Orientation right  -     Pain Management Interventions exercise or physical activity utilized  -     Response to Pain Interventions activity level improved;mobility function improved;activity participation with tolerable pain  -       Row Name 12/12/24 1247          Plan of Care Review    Plan of Care Reviewed With patient  -HW     Progress no change  -     Outcome Evaluation Pt participated in PT initial evaluation this date. Pt presents supine in bed with wife at side, agreeable to PT evaluation. Pt AO to person. Pt reports 8/10 R shoulder pain at rest. Pt's wife assisted to provide subjective history. At baseline, pt lives at home with his wife. Pt's wife reports that there are 3 JONATAN home and 2 JONATAN pt's bedroom within home. Pt normally ambulates household  distances with a walking stick and uses a manual w/c for community mobility. Pt's wife reports frequent falls at home. Pt performed supine > sit on EOB with mod A to assist with placing LE's off EOB and moving trunk upright. Pt performed STS transfer with min A x2 and use of RW. Pt was able to take x2 sidesteps toward HOB with min A x2 and RW. Pt required frequent VC for safe management of the RW during. Pt required max A to perform sit to supine. Following evaluation, pt left supine in bed with bed alarm active, call light and all needs within reach. Wife at side. Recommend skilled PT intervention during IP admission to address identified deficits, reduce risk of falls and prevent functional decline. Once medically stable, recommend pt to d/c to STR to address remaining deficits.  -       Row Name 12/12/24 1248          Therapy Assessment/Plan (PT)    Patient/Family Therapy Goals Statement (PT) Pt's wife would like pt to get stronger before returning home.  -     Rehab Potential (PT) good  -     Criteria for Skilled Interventions Met (PT) yes  -     Therapy Frequency (PT) 5 times/wk  -     Predicted Duration of Therapy Intervention (PT) 10 days  -       Row Name 12/12/24 1247          Vital Signs    Pre Systolic BP Rehab 168  -HW     Pre Treatment Diastolic BP 93  -HW     Pretreatment Heart Rate (beats/min) 87  -HW     Pre SpO2 (%) 92  -HW     O2 Delivery Pre Treatment room air  -HW     O2 Delivery Intra Treatment room air  -HW     O2 Delivery Post Treatment room air  -HW     Pre Patient Position Supine  -HW     Intra Patient Position Standing  -HW     Post Patient Position Supine  -       Row Name 12/12/24 1248          Positioning and Restraints    Pre-Treatment Position in bed  -HW     Post Treatment Position bed  -HW     In Bed supine;call light within reach;encouraged to call for assist;exit alarm on;with family/caregiver  -               User Key  (r) = Recorded By, (t) = Taken By, (c) =  Cosigned By      Initials Name Provider Type     Kay Castano, CARLOS Physical Therapist                   Outcome Measures       Row Name 12/12/24 1301 12/12/24 0817       How much help from another person do you currently need...    Turning from your back to your side while in flat bed without using bedrails? 3  - 3  -HH    Moving from lying on back to sitting on the side of a flat bed without bedrails? 2  - 3  -HH    Moving to and from a bed to a chair (including a wheelchair)? 3  - 2  -HH    Standing up from a chair using your arms (e.g., wheelchair, bedside chair)? 3  - 2  -HH    Climbing 3-5 steps with a railing? 2  - 1  -HH    To walk in hospital room? 2  - 2  -HH    AM-PAC 6 Clicks Score (PT) 15  - 13  -HH    Highest Level of Mobility Goal 4 --> Transfer to chair/commode  - 4 --> Transfer to chair/commode  -      Row Name 12/12/24 1301 12/12/24 1205       Functional Assessment    Outcome Measure Options AM-PAC 6 Clicks Basic Mobility (PT)  - AM-PAC 6 Clicks Daily Activity (OT)  -              User Key  (r) = Recorded By, (t) = Taken By, (c) = Cosigned By      Initials Name Provider Type    Marsha Wall Occupational Therapist     Kay Chen, RN Registered Nurse     Kay Castano PT Physical Therapist                                 Physical Therapy Education       Title: PT OT SLP Therapies (In Progress)       Topic: Physical Therapy (In Progress)       Point: Mobility training (Done)       Learning Progress Summary            Patient Acceptance, E,TB, VU by  at 12/12/2024 1301    Comment: role of PT during IP admission                      Point: Home exercise program (Not Started)       Learner Progress:  Not documented in this visit.              Point: Body mechanics (Not Started)       Learner Progress:  Not documented in this visit.              Point: Precautions (Not Started)       Learner Progress:  Not documented in this visit.                              User Key        Initials Effective Dates Name Provider Type Discipline     11/29/23 -  Kay Castano, CARLOS Physical Therapist PT                  PT Recommendation and Plan  Planned Therapy Interventions (PT): balance training, bed mobility training, gait training, home exercise program, patient/family education, postural re-education, stretching, strengthening, ROM (range of motion), transfer training, neuromuscular re-education  Progress: no change  Outcome Evaluation: Pt participated in PT initial evaluation this date. Pt presents supine in bed with wife at side, agreeable to PT evaluation. Pt AO to person. Pt reports 8/10 R shoulder pain at rest. Pt's wife assisted to provide subjective history. At baseline, pt lives at home with his wife. Pt's wife reports that there are 3 JONATAN home and 2 JONATAN pt's bedroom within home. Pt normally ambulates household distances with a walking stick and uses a manual w/c for community mobility. Pt's wife reports frequent falls at home. Pt performed supine > sit on EOB with mod A to assist with placing LE's off EOB and moving trunk upright. Pt performed STS transfer with min A x2 and use of RW. Pt was able to take x2 sidesteps toward HOB with min A x2 and RW. Pt required frequent VC for safe management of the RW during. Pt required max A to perform sit to supine. Following evaluation, pt left supine in bed with bed alarm active, call light and all needs within reach. Wife at side. Recommend skilled PT intervention during IP admission to address identified deficits, reduce risk of falls and prevent functional decline. Once medically stable, recommend pt to d/c to STR to address remaining deficits.     Time Calculation:   PT Evaluation Complexity  History, PT Evaluation Complexity: 1-2 personal factors and/or comorbidities  Examination of Body Systems (PT Eval Complexity): 1-2 elements  Clinical Presentation (PT Evaluation Complexity): stable  Clinical Decision Making (PT Evaluation Complexity):  low complexity  Overall Complexity (PT Evaluation Complexity): low complexity     PT Charges       Row Name 24 1302             Time Calculation    Start Time 0940  -HW      PT Received On 24  -HW      PT Goal Re-Cert Due Date 24  -         Untimed Charges    PT Eval/Re-eval Minutes 57  -HW         Total Minutes    Untimed Charges Total Minutes 57  -HW       Total Minutes 57  -HW                User Key  (r) = Recorded By, (t) = Taken By, (c) = Cosigned By      Initials Name Provider Type     Kay Castano, PT Physical Therapist                  Therapy Charges for Today       Code Description Service Date Service Provider Modifiers Qty    82005744729 HC PT EVAL LOW COMPLEXITY 4 2024 Kay Castano, PT  1            PT G-Codes  Outcome Measure Options: AM-PAC 6 Clicks Basic Mobility (PT)  AM-PAC 6 Clicks Score (PT): 15  AM-PAC 6 Clicks Score (OT): 12  PT Discharge Summary  Anticipated Discharge Disposition (PT): inpatient rehabilitation facility    Kay Castano PT  2024      Electronically signed by Kay Castano, PT at 24 1303           Jairo Caballero, PTA at 24 1106  Version 1 of 1         Goal Outcome Evaluation:  Plan of Care Reviewed With: patient, spouse        Progress: improving  Outcome Evaluation: Pt sitting UIC and willing to particiapate with treatment. Pt performed transfers and gait training this treatment. Pt was able to tolerates increased activity this treatment. See flowsheet for details. Cont PT per POC pprogressing to goals as pt tolerates.                               Electronically signed by Jairo Caballero, PTA at 24 1129       Jairo Caballero, PTA at 24 1106  Version 1 of 1         Patient Name: Ramsey Riley  : 1955    MRN: [id number]                              Today's Date: 2024       Admit Date: 12/10/2024    Visit Dx:     ICD-10-CM ICD-9-CM   1. TIA (transient ischemic attack)  G45.9 435.9     Patient Active  Problem List   Diagnosis    Coronary artery disease involving native coronary artery of native heart without angina pectoris    Essential hypertension    Bradycardia, sinus    Fatigue    Abnormal ECG    Precordial pain    LVH (left ventricular hypertrophy) due to hypertensive disease, without heart failure    Diabetes mellitus    PAD (peripheral artery disease)    Ischemic stroke    History of blood clots    Pulmonary embolus    Dyslipidemia    Class 1 obesity due to excess calories with serious comorbidity and body mass index (BMI) of 34.0 to 34.9 in adult    Weakness     Past Medical History:   Diagnosis Date    Arthritis     Clot     Coronary artery disease     Diabetes mellitus     Heart disease     History of blood clots     Hypertension     Impaired mobility     Low back pain     Thyroid disease     Type 2 diabetes mellitus      Past Surgical History:   Procedure Laterality Date    CHOLECYSTECTOMY  2015    COLONOSCOPY  2015    dr srivastava    CORONARY ARTERY BYPASS GRAFT      [place] - 3 BY PASS     ENDOSCOPY        General Information       Row Name 12/18/24 1123          Physical Therapy Time and Intention    Document Type therapy note (daily note)  -RM     Mode of Treatment physical therapy  -RM       Row Name 12/18/24 1123          General Information    Patient Profile Reviewed yes  -RM     Existing Precautions/Restrictions fall  -RM       Row Name 12/18/24 1123          Cognition    Orientation Status (Cognition) oriented to;person;disoriented to;place;situation;time  -RM       Row Name 12/18/24 1123          Safety Issues/Impairments Affecting Functional Mobility    Impairments Affecting Function (Mobility) cognition;endurance/activity tolerance;balance;postural/trunk control;strength  -RM               User Key  (r) = Recorded By, (t) = Taken By, (c) = Cosigned By      Initials Name Provider Type    RM Jairo Caballero, PTA Physical Therapist Assistant                   Mobility       Row Name  12/18/24 1123          Bed Mobility    Comment, (Bed Mobility) UIC  -RM       Row Name 12/18/24 1123          Sit-Stand Transfer    Sit-Stand [place] (Transfers) contact guard;verbal cues  for hand placement  -RM     Assistive Device (Sit-Stand Transfers) walker, front-wheeled  -RM       Row Name 12/18/24 1123          Gait/Stairs (Locomotion)    [place] Level (Gait) contact guard;verbal cues  -RM     Assistive Device (Gait) walker, front-wheeled  -RM     Distance in Feet (Gait) --  40'+70'+70'  -RM     Deviations/Abnormal Patterns (Gait) bilateral deviations;nara decreased;base of support, wide;stride length decreased  -RM     Bilateral Gait Deviations heel strike decreased;forward flexed posture  -RM               User Key  (r) = Recorded By, (t) = Taken By, (c) = Cosigned By      Initials Name Provider Type    RM Jairo Caballero, PTA Physical Therapist Assistant                   Obj/Interventions    No documentation.                  Goals/Plan    No documentation.                  Clinical Impression       Row Name 12/18/24 1125          Pain    Pretreatment Pain Rating 0/10 - no pain  -RM     Posttreatment Pain Rating 0/10 - no pain  -RM       Row Name 12/18/24 1125          Plan of Care Review    Plan of Care Reviewed With patient;spouse  -RM     Progress improving  -RM     Outcome Evaluation Pt sitting UIC and willing to particiapate with treatment. Pt performed transfers and gait training this treatment. Pt was able to tolerates increased activity this treatment. See flowsheet for details. Cont PT per POC pprogressing to goals as pt tolerates.  -RM       Row Name 12/18/24 1125          Vital Signs    Pre SpO2 (%) 89  -RM     O2 Delivery Pre Treatment room air  -RM     Intra SpO2 (%) 94  -RM     O2 Delivery Intra Treatment room air  -RM     Post SpO2 (%) 96  -RM     O2 Delivery Post Treatment room air  -RM     Pre Patient Position Sitting  -RM     Intra Patient Position Standing  -RM     Post  Patient Position Sitting  -RM       Row Name 12/18/24 1125          Positioning and Restraints    Pre-Treatment Position sitting in chair/recliner  -RM     Post Treatment Position chair  -RM     In Chair reclined;call light within reach;encouraged to call for assist;exit alarm on;with family/caregiver;notified nsg  -RM               User Key  (r) = Recorded By, (t) = Taken By, (c) = Cosigned By      Initials Name Provider Type    Jairo Cooper, ASMITA Physical Therapist Assistant                   Outcome Measures       Row Name 12/18/24 1128 12/18/24 0804       How much help from another person do you currently need...    Turning from your back to your side while in flat bed without using bedrails? 3  -RM 3  -BW    Moving from lying on back to sitting on the side of a flat bed without bedrails? 3  -RM 2  -BW    Moving to and from a bed to a chair (including a wheelchair)? 3  -RM 3  -BW    Standing up from a chair using your arms (e.g., wheelchair, bedside chair)? 3  -RM 3  -BW    Climbing 3-5 steps with a railing? 2  -RM 2  -BW    To walk in hospital room? 3  -RM 3  -BW    AM-PAC 6 Clicks Score (PT) 17  -RM 16  -BW    Highest Level of Mobility Goal 5 --> Static standing  -RM 5 --> Static standing  -BW      Row Name 12/18/24 1128          Functional Assessment    Outcome Measure Options AM-PAC 6 Clicks Basic Mobility (PT)  -RM               User Key  (r) = Recorded By, (t) = Taken By, (c) = Cosigned By      Initials Name Provider Type    Jairo Cooper, ASMITA Physical Therapist Assistant    Kirti Brnadt RN Registered Nurse                                 Physical Therapy Education       Title: PT OT SLP Therapies (In Progress)       Topic: Physical Therapy (In Progress)       Point: Mobility training (Done)       Learning Progress Summary            Patient Acceptance, E,TB,D, VU,NR by  at 12/18/2024 1128    Comment: safety with mobility    Acceptance, E,TB,D, VU,NR by  at 12/16/2024 1146     Acceptance, E,TB, VU by  at 12/12/2024 1301    Comment: role of PT during IP admission                      Point: Home exercise program (Done)       Learning Progress Summary            Patient Acceptance, E,TB, VU by  at 12/14/2024 1336    Comment: Perform ex daily                      Point: Body mechanics (Done)       Learning Progress Summary            Patient Acceptance, E,TB, VU by  at 12/17/2024 1520    Comment: proper posture during gait    Acceptance, E,TB, VU by  at 12/15/2024 1256    Comment: upright posture in standing and during amb                      Point: Precautions (Not Started)       Learner Progress:  Not documented in this visit.                              User Key       Initials Effective Dates Name Provider Type Discipline     06/16/21 -  Lolita Alcazar, PTA Physical Therapist Assistant PT     06/16/21 -  Jairo Caballero, PTA Physical Therapist Assistant PT     11/29/23 -  Kay Castano, CARLOS Physical Therapist PT                  PT Recommendation and Plan     Progress: improving  Outcome Evaluation: Pt sitting UIC and willing to particiapate with treatment. Pt performed transfers and gait training this treatment. Pt was able to tolerates increased activity this treatment. See flowsheet for details. Cont PT per POC pprogressing to goals as pt tolerates.     Time Calculation:         PT Charges       Row Name 12/18/24 1129             Time Calculation    Start Time 1106  -RM      Stop Time 1129  -RM      Time Calculation (min) 23 min  -RM      PT Received On 12/18/24  -RM      PT Goal Re-Cert Due Date 12/22/24  -RM         Time Calculation- PT    Total Timed Code Minutes- PT 23 minute(s)  -RM         Timed Charges    80961 - Gait Training Minutes  13  -RM      74822 - PT Therapeutic Activity Minutes 10  -RM         Total Minutes    Timed Charges Total Minutes 23  -RM       Total Minutes 23  -RM                User Key  (r) = Recorded By, (t) = Taken By, (c) = Cosigned By       Initials Name Provider Type     Jairo Caballero PTA Physical Therapist Assistant                  Therapy Charges for Today       Code Description Service Date Service Provider Modifiers Qty    84380942017 HC GAIT TRAINING EA 15 MIN 2024 Jairo Caballero PTA GP, KX 1    97020748526 HC PT THERAPEUTIC ACT EA 15 MIN 2024 Jairo Caballero PTA GP, KX 1            PT G-Codes  Outcome Measure Options: AM-PAC 6 Clicks Basic Mobility (PT)  AM-PAC 6 Clicks Score (PT): 17  AM-PAC 6 Clicks Score (OT): 14       Jairo Caballero PTA  2024      Electronically signed by Jairo Caballero PTA at 24 1130          Occupational Therapy Notes (all)        La Nena Engel, OT at 24 1242          Patient Name: Ramsey Riley  : 1955    MRN: [id number]                              Today's Date: 2024       Admit Date: 12/10/2024    Visit Dx:     ICD-10-CM ICD-9-CM   1. TIA (transient ischemic attack)  G45.9 435.9     Patient Active Problem List   Diagnosis    Coronary artery disease involving native coronary artery of native heart without angina pectoris    Essential hypertension    Bradycardia, sinus    Fatigue    Abnormal ECG    Precordial pain    LVH (left ventricular hypertrophy) due to hypertensive disease, without heart failure    Diabetes mellitus    PAD (peripheral artery disease)    Ischemic stroke    History of blood clots    Pulmonary embolus    Dyslipidemia    Class 1 obesity due to excess calories with serious comorbidity and body mass index (BMI) of 34.0 to 34.9 in adult    Weakness     Past Medical History:   Diagnosis Date    Arthritis     Clot     Coronary artery disease     Diabetes mellitus     Heart disease     History of blood clots     Hypertension     Impaired mobility     Low back pain     Thyroid disease     Type 2 diabetes mellitus      Past Surgical History:   Procedure Laterality Date    CHOLECYSTECTOMY      COLONOSCOPY      dr srivastava     CORONARY ARTERY BYPASS GRAFT      [place] - 3 BY PASS     ENDOSCOPY        General Information       Row Name 12/17/24 1236          OT Time and Intention    Subjective Information complains of;fatigue;pain  -SD     Document Type therapy note (daily note)  -SD     Mode of Treatment occupational therapy  -SD     Patient Effort good  -SD     Symptoms Noted During/After Treatment fatigue  -SD               User Key  (r) = Recorded By, (t) = Taken By, (c) = Cosigned By      Initials Name Provider Type    SD La Nena Engel OT Occupational Therapist                     Mobility/ADL's       Row Name 12/17/24 1236          Bed Mobility    Bed Mobility supine-sit  -SD     Supine-Sit [place] (Bed Mobility) moderate assist (50% patient effort)  -SD     Assistive Device (Bed Mobility) bed rails;head of bed elevated;repositioning sheet  -SD       Row Name 12/17/24 1236          Transfers    Transfers sit-stand transfer;toilet transfer  -SD       Row Name 12/17/24 1236          Sit-Stand Transfer    Sit-Stand [place] (Transfers) minimum assist (75% patient effort)  -SD     Assistive Device (Sit-Stand Transfers) walker, front-wheeled  -SD       Row Name 12/17/24 1236          Toilet Transfer    Type (Toilet Transfer) sit-stand;stand-sit  -SD     [place] Level (Toilet Transfer) minimum assist (75% patient effort)  -SD     Assistive Device (Toilet Transfer) commode;grab bars/safety frame;walker, front-wheeled  -SD       Row Name 12/17/24 1236          Functional Mobility    Functional Mobility- Ind. Level minimum assist (75% patient effort)  -SD     Functional Mobility- Device walker, front-wheeled  -SD     Functional Mobility-Distance (Feet) 13  68  -SD       Row Name 12/17/24 123          Bathing Assessment/Intervention    [place] Level (Bathing) upper extremities;upper body;proximal lower extremities;moderate assist (50% patient effort)  -SD     Position (Bathing) edge of bed sitting  -SD       Row Name  12/17/24 1236          Lower Body Dressing Assessment/Training    [place] Level (Lower Body Dressing) don;pants/bottoms;moderate assist (50% patient effort)  -SD       Row Name 12/17/24 1236          Grooming Assessment/Training    [place] Level (Grooming) wash face, hands;standby assist  -SD     Position (Grooming) edge of bed sitting  -SD       Row Name 12/17/24 1236          Toileting Assessment/Training    [place] Level (Toileting) adjust/manage clothing;perform perineal hygiene;moderate assist (50% patient effort)  -SD     Assistive Devices (Toileting) commode;grab bar/safety frame  -SD               User Key  (r) = Recorded By, (t) = Taken By, (c) = Cosigned By      Initials Name Provider Type    La Nena Dial OT Occupational Therapist                   Obj/Interventions    No documentation.                  Goals/Plan    No documentation.                  Clinical Impression       Row Name 12/17/24 1238          Pain Assessment    Pretreatment Pain Rating 9/10  -SD     Posttreatment Pain Rating 6/10  -SD     Pain Location back  -SD     Pain Side/Orientation lower  -SD     Pain Management Interventions exercise or physical activity utilized  -SD     Response to Pain Interventions activity participation with decreased pain  -SD       Row Name 12/17/24 1238          Plan of Care Review    Plan of Care Reviewed With patient  -SD     Progress improving  -SD     Outcome Evaluation OT tx completed. Patient is supine in bed, drowsy but awakens to name and willing to participate. Patient reports 9/10 lower back pain. Patient performed bed mobility mod A, did several forward reaches to establish midline d/t posterior list. Sitting EOB performed bathing with mod A. Patient performed sit to stand min A, walked 13' to bathroom + additional 68' using RW with min A. Patient performed toileting with mod A for hygiene and clothing management. Patient returned to chair, call bell and chair alarm. Pain  decreased to 6/10. Continue OT POC  -SD       Row Name 12/17/24 1238          Vital Signs    O2 Delivery Pre Treatment room air  -SD     O2 Delivery Intra Treatment room air  -SD     O2 Delivery Post Treatment room air  -SD       Row Name 12/17/24 1238          Positioning and Restraints    Pre-Treatment Position in bed  -SD     Post Treatment Position chair  -SD     In Chair sitting;call light within reach;encouraged to call for assist;exit alarm on  -SD               User Key  (r) = Recorded By, (t) = Taken By, (c) = Cosigned By      Initials Name Provider Type    La Nena Dial OT Occupational Therapist                   Outcome Measures       Row Name 12/17/24 1241          How much help from another is currently needed...    Putting on and taking off regular lower body clothing? 2  -SD     Bathing (including washing, rinsing, and drying) 2  -SD     Toileting (which includes using toilet bed pan or urinal) 2  -SD     Putting on and taking off regular upper body clothing 2  -SD     Taking care of personal grooming (such as brushing teeth) 3  -SD     Eating meals 3  -SD     AM-PAC 6 Clicks Score (OT) 14  -SD       Row Name 12/17/24 0811 12/17/24 0600       How much help from another person do you currently need...    Turning from your back to your side while in flat bed without using bedrails? 3  -BW 3  -KP    Moving from lying on back to sitting on the side of a flat bed without bedrails? 2  -BW 2  -KP    Moving to and from a bed to a chair (including a wheelchair)? 2  -BW 2  -KP    Standing up from a chair using your arms (e.g., wheelchair, bedside chair)? 2  -BW 2  -KP    Climbing 3-5 steps with a railing? 1  -BW 1  -KP    To walk in hospital room? 3  -BW 3  -KP    AM-PAC 6 Clicks Score (PT) 13  -BW 13  -KP    Highest Level of Mobility Goal 4 --> Transfer to chair/commode  -BW 4 --> Transfer to chair/commode  -KP      Row Name 12/17/24 0400 12/17/24 0200       How much help from another person do you  currently need...    Turning from your back to your side while in flat bed without using bedrails? 3  -KP 3  -KP    Moving from lying on back to sitting on the side of a flat bed without bedrails? 2  -KP 2  -KP    Moving to and from a bed to a chair (including a wheelchair)? 2  -KP 2  -KP    Standing up from a chair using your arms (e.g., wheelchair, bedside chair)? 2  -KP 2  -KP    Climbing 3-5 steps with a railing? 1  -KP 1  -KP    To walk in hospital room? 3  -KP 3  -KP    AM-PAC 6 Clicks Score (PT) 13  -KP 13  -KP    Highest Level of Mobility Goal 4 --> Transfer to chair/commode  -KP 4 --> Transfer to chair/commode  -KP      Row Name 12/17/24 1241          Functional Assessment    Outcome Measure Options AM-PAC 6 Clicks Daily Activity (OT)  -SD               User Key  (r) = Recorded By, (t) = Taken By, (c) = Cosigned By      Initials Name Provider Type    La Nena Dial OT Occupational Therapist    Kirti Brandt, RN Registered Nurse    Afshan Ruffin, RN Registered Nurse                    Occupational Therapy Education       Title: PT OT SLP Therapies (In Progress)       Topic: Occupational Therapy (In Progress)       Point: ADL training (Done)       Description:   Instruct learner(s) on proper safety adaptation and remediation techniques during self care or transfers.   Instruct in proper use of assistive devices.                  Learning Progress Summary            Patient Acceptance, E,TB, VU by SD at 12/17/2024 1241    Comment: Safety and sequencing during tf and mobility    Acceptance, E,TB, VU by  at 12/12/2024 1205    Comment: Role of OT/POC   Family Acceptance, E, VU by SP at 12/16/2024 1428    Comment: Pt spouse was educated on promoting a normal sleep wake cycle and technique to reduce hospital related confusion.                      Point: Home exercise program (Not Started)       Description:   Instruct learner(s) on appropriate technique for monitoring, assisting and/or  progressing therapeutic exercises/activities.                  Learner Progress:  Not documented in this visit.              Point: Precautions (Not Started)       Description:   Instruct learner(s) on prescribed precautions during self-care and functional transfers.                  Learner Progress:  Not documented in this visit.              Point: Body mechanics (Not Started)       Description:   Instruct learner(s) on proper positioning and spine alignment during self-care, functional mobility activities and/or exercises.                  Learner Progress:  Not documented in this visit.                              User Key       Initials Effective Dates Name Provider Type Discipline     06/16/21 -  Marsha Morrow Occupational Therapist OT    SD 06/16/21 -  La Nena Engel OT Occupational Therapist OT    SP 09/08/22 -  Reta Hernandez OT Occupational Therapist OT                  OT Recommendation and Plan     Plan of Care Review  Plan of Care Reviewed With: patient  Progress: improving  Outcome Evaluation: OT tx completed. Patient is supine in bed, drowsy but awakens to name and willing to participate. Patient reports 9/10 lower back pain. Patient performed bed mobility mod A, did several forward reaches to establish midline d/t posterior list. Sitting EOB performed bathing with mod A. Patient performed sit to stand min A, walked 13' to bathroom + additional 68' using RW with min A. Patient performed toileting with mod A for hygiene and clothing management. Patient returned to chair, call bell and chair alarm. Pain decreased to 6/10. Continue OT POC     Time Calculation:         Time Calculation- OT       Row Name 12/17/24 1242             Time Calculation- OT    OT Start Time 0949  -SD      OT Stop Time 1014  -SD      OT Time Calculation (min) 25 min  -SD      OT Received On 12/17/24  -SD      OT Goal Re-Cert Due Date 12/22/24  -SD         Timed Charges    93310 - OT Therapeutic Activity Minutes 10  -SD       85732 - OT Self Care/Mgmt Minutes 15  -SD         Total Minutes    Timed Charges Total Minutes 25  -SD       Total Minutes 25  -SD                User Key  (r) = Recorded By, (t) = Taken By, (c) = Cosigned By      Initials Name Provider Type    La Nena Dial OT Occupational Therapist                  Therapy Charges for Today       Code Description Service Date Service Provider Modifiers Qty    92153596270  OT THERAPEUTIC ACT EA 15 MIN 2024 La Nena Engel OT GO, KX 1    [id number]  OT SELF CARE/MGMT/TRAIN EA 15 MIN 2024 La Nena Engel OT GO, KX 1                 La Nena Engel OT  2024    Electronically signed by La Nena Engel OT at 24 1242       La Nena Engel OT at 24 1242          Goal Outcome Evaluation:  Plan of Care Reviewed With: patient        Progress: improving  Outcome Evaluation: OT tx completed. Patient is supine in bed, drowsy but awakens to name and willing to participate. Patient reports 9/10 lower back pain. Patient performed bed mobility mod A, did several forward reaches to establish midline d/t posterior list. Sitting EOB performed bathing with mod A. Patient performed sit to stand min A, walked 13' to bathroom + additional 68' using RW with min A. Patient performed toileting with mod A for hygiene and clothing management. Patient returned to chair, call bell and chair alarm. Pain decreased to 6/10. Continue OT POC                               Electronically signed by La Nena Engel OT at 24 1242     Electronically signed by Marsha Morrow at 24 1059       Marsha Morrow at 24 1206          Patient Name: Ramsey Riley  : 1955    MRN: [id number]                              Today's Date: 2024       Admit Date: 12/10/2024    Visit Dx:     ICD-10-CM ICD-9-CM   1. TIA (transient ischemic attack)  G45.9 435.9     Patient Active Problem List   Diagnosis    Coronary artery disease involving native coronary  artery of native heart without angina pectoris    Essential hypertension    Bradycardia, sinus    Fatigue    Abnormal ECG    Precordial pain    LVH (left ventricular hypertrophy) due to hypertensive disease, without heart failure    Diabetes mellitus    PAD (peripheral artery disease)    Ischemic stroke    History of blood clots    Pulmonary embolus    Dyslipidemia    Class 1 obesity due to excess calories with serious comorbidity and body mass index (BMI) of 34.0 to 34.9 in adult    Weakness     Past Medical History:   Diagnosis Date    Arthritis     Clot     Coronary artery disease     Diabetes mellitus     Heart disease     History of blood clots     Hypertension     Impaired mobility     Low back pain     Thyroid disease     Type 2 diabetes mellitus      Past Surgical History:   Procedure Laterality Date    CHOLECYSTECTOMY  2015    COLONOSCOPY  2015    dr srivastava    CORONARY ARTERY BYPASS GRAFT      [place] - 3 BY PASS     ENDOSCOPY        General Information       Row Name 12/12/24 1151          OT Time and Intention    Subjective Information complains of;pain  -     Document Type evaluation  -     Mode of Treatment occupational therapy  -     Patient Effort adequate  -     Symptoms Noted During/After Treatment other (see comments)  -     Comment increased confusion today  -       Row Name 12/12/24 1151          General Information    Patient Profile Reviewed yes  -     Prior Level of Function min assist:;ADL's;all household mobility  pt has been using a cane at home, but has been having frequent falls.  -     Existing Precautions/Restrictions fall  -     Barriers to Rehab medically complex;previous functional deficit;cognitive status  -       Row Name 12/12/24 1151          Occupational Profile    Reason for Services/Referral (Occupational Profile) ADL decline  -       Row Name 12/12/24 1151          Living Environment    People in Home spouse  -       Row Name 12/12/24 1151           Home Main Entrance    Number of Stairs, Main Entrance three  -     Stair Railings, Main Entrance railings on both sides of stairs  -       Row Name 12/12/24 1151          Stairs Within Home, Primary    Stairs, Within Home, Primary to bedroom  -     Number of Stairs, Within Home, Primary two  -[facility] Name 12/12/24 1151          Cognition    Orientation Status (Cognition) oriented to;person  -[facility] Name 12/12/24 1151          Safety Issues/Impairments Affecting Functional Mobility    Safety Issues Affecting Function (Mobility) ability to follow commands;awareness of need for assistance;impulsivity;insight into deficits/self-awareness;judgment;safety precaution awareness;safety precautions follow-through/compliance  -     Impairments Affecting Function (Mobility) balance;cognition;endurance/activity tolerance;pain;range of motion (ROM)  -     Cognitive Impairments, Mobility Safety/Performance attention;insight into deficits/self-awareness;safety precaution awareness;safety precaution follow-through  -               User Key  (r) = Recorded By, (t) = Taken By, (c) = Cosigned By      Initials Name Provider Type     Marsha Morrow Occupational Therapist                     Mobility/ADL's       [facility] Name 12/12/24 1154          Bed Mobility    Bed Mobility supine-sit;sit-supine  -     Supine-Sit [place] (Bed Mobility) moderate assist (50% patient effort)  -     Sit-Supine [place] (Bed Mobility) maximum assist (25% patient effort)  -     Bed Mobility, Safety Issues decreased use of arms for pushing/pulling;cognitive deficits limit understanding  -     Assistive Device (Bed Mobility) head of bed elevated  -[facility] Name 12/12/24 1154          Transfers    Transfers sit-stand transfer  -[facility] Name 12/12/24 1154          Sit-Stand Transfer    Sit-Stand [place] (Transfers) minimum assist (75% patient effort);2 person assist;verbal cues  -     Assistive Device  (Sit-Stand Transfers) walker, front-wheeled  -       Row Name 12/12/24 1154          Functional Mobility    Functional Mobility- Ind. Level minimum assist (75% patient effort);2 person assist required;verbal cues required  -     Functional Mobility- Device walker, front-wheeled  -     Functional Mobility-Distance (Feet) 2  -     Functional Mobility- Safety Issues step length decreased;weight-shifting ability decreased;loses balance backward  -     Patient was able to Ambulate yes  -[facility] Name 12/12/24 1154          Activities of Daily Living    BADL Assessment/Intervention bathing;upper body dressing;lower body dressing;grooming;feeding;toileting  -[facility] Name 12/12/24 115          Bathing Assessment/Intervention    [place] Level (Bathing) maximum assist (25% patient effort)  -     Comment, (Bathing) d/t increased confusion and decreased vision  -[facility] Name 12/12/24 [facility]          Upper Body Dressing Assessment/Training    [place] Level (Upper Body Dressing) maximum assist (25% patient effort)  -     Comment, (Upper Body Dressing) d/t increased confusion and decreased vision and pain in R shoulder  -[facility] Name 12/12/24 [facility]          Lower Body Dressing Assessment/Training    [place] Level (Lower Body Dressing) maximum assist (25% patient effort)  -     Comment, (Lower Body Dressing) d/t increased confusion and decreased vision  -[facility] Name 12/12/24 [facility]          Grooming Assessment/Training    [place] Level (Grooming) moderate assist (50% patient effort)  -     Comment, (Grooming) d/t increased confusion and decreased vision  -[facility] Name 12/12/24 115          Self-Feeding Assessment/Training    [place] Level (Feeding) minimum assist (75% patient effort);moderate assist (50% patient effort)  -     Comment, (Feeding) d/t increased confusion and decreased vision  -[facility] Name 12/12/24 115          Toileting Assessment/Training     [place] Level (Toileting) maximum assist (25% patient effort)  -     Comment, (Toileting) d/t increased confusion and decreased vision  -               User Key  (r) = Recorded By, (t) = Taken By, (c) = Cosigned By      Initials Name Provider Type    Marsha Wall Occupational Therapist                   Obj/Interventions       [facility] Name 12/12/24 1156          Sensory Interventions    Comment, Sensory Intervention wife reports sensory changes in BLEs from his knees to his feet from neuropathy, pt has been having frequent falls  -[facility] Name 12/12/24 1156          Vision Assessment/Intervention    Visual Impairment/Limitations other (see comments)  pt has poor vision d/t diabetic retinopathy  -[facility] Name 12/12/24 1156          Range of Motion Comprehensive    Comment, General Range of Motion LUE WFL, RUE limited d/t rotator cuff tear  -[facility] Name 12/12/24 1156          Strength Comprehensive (MMT)    Comment, General Manual Muscle Testing (MMT) Assessment LUE strength WFL, RUE elbow WFL, but pt reports pain in his shoulder with resistance of his elbow  -               User Key  (r) = Recorded By, (t) = Taken By, (c) = Cosigned By      Initials Name Provider Type    Marsha Wall Occupational Therapist                   Goals/Plan       [facility] Name 12/12/24 1204          Bed Mobility Goal 1 (OT)    Activity/Assistive Device (Bed Mobility Goal 1, OT) bed mobility activities, all  -     [place] Level/Cues Needed (Bed Mobility Goal 1, OT) minimum assist (75% or more patient effort)  -     Time Frame (Bed Mobility Goal 1, OT) by discharge  -     Progress/Outcomes (Bed Mobility Goal 1, OT) goal ongoing  -[facility] Name 12/12/24 1204          Transfer Goal 1 (OT)    Activity/Assistive Device (Transfer Goal 1, OT) sit-to-stand/stand-to-sit;walker, rolling  -     [place] Level/Cues Needed (Transfer Goal 1, OT) contact guard required  -     Time Frame (Transfer Goal  1, OT) by discharge  -     Progress/Outcome (Transfer Goal 1, OT) goal ongoing  -[facility] Name 12/12/24 1204          Bathing Goal 1 (OT)    Activity/Device (Bathing Goal 1, OT) bathing skills, all  -     [place] Level/Cues Needed (Bathing Goal 1, OT) minimum assist (75% or more patient effort)  -     Time Frame (Bathing Goal 1, OT) long term goal (LTG);1 week  -     Progress/Outcomes (Bathing Goal 1, OT) goal ongoing  -[facility] Name 12/12/24 1204          Dressing Goal 1 (OT)    Activity/Device (Dressing Goal 1, OT) upper body dressing  -     [place]/Cues Needed (Dressing Goal 1, OT) minimum assist (75% or more patient effort)  -     Time Frame (Dressing Goal 1, OT) long term goal (LTG);5 days  -     Progress/Outcome (Dressing Goal 1, OT) goal ongoing  -AH       Row Name 12/12/24 1204          ROM Goal 1 (OT)    ROM Goal 1 (OT) Pt will perform AROM ex for RUE to within tolerable pain  -     Time Frame (ROM Goal 1, OT) by discharge  -     Progress/Outcome (ROM Goal 1, OT) goal ongoing  -AH       Row Name 12/12/24 1204          Therapy Assessment/Plan (OT)    Planned Therapy Interventions (OT) activity tolerance training;BADL retraining;patient/caregiver education/training;transfer/mobility retraining;ROM/therapeutic exercise  -               User Key  (r) = Recorded By, (t) = Taken By, (c) = Cosigned By      Initials Name Provider Type     Marsha Morrow Occupational Therapist                   Clinical Impression       Row Name 12/12/24 1200          Pain Assessment    Pretreatment Pain Rating 8/10  -     Posttreatment Pain Rating 8/10  -     Pain Location shoulder  -     Pain Side/Orientation right  -     Pain Management Interventions exercise or physical activity utilized  -     Response to Pain Interventions activity level improved;mobility function improved;activity participation with tolerable pain  -AH       Row Name 12/12/24 1200          Plan of Care Review     Plan of Care Reviewed With patient  -     Progress no change  -     Outcome Evaluation Pt seen for OT evaluation today.  Pt presents with increased confusion and decreased independence with self care and functional mobility tasks.  Pt lives at home with his wife.  He has been requiring assistance with dressing and bathing from his wife for a little while now.  Pt uses a cane at home, but has had frequent falls.  Wife reports worsening vision in B eyes.  Pt able to sit eob with mod assist, stood with min assist of 2 and walked 2' to head of bed with min assist of 2.  Pt required max assist to lay back down.  Pt is expected to benefit from skilled OT to improve his strength and independence with ADL tasks.  -       Row Name 12/12/24 1200          Therapy Assessment/Plan (OT)    Patient/Family Therapy Goal Statement (OT) d/c rehab  -     Rehab Potential (OT) fair  -AH     Criteria for Skilled Therapeutic Interventions Met (OT) yes;skilled treatment is necessary  [facility]     Therapy Frequency (OT) 3 times/wk  -       Row Name 12/12/24 1200          Therapy Plan Review/Discharge Plan (OT)    Anticipated Discharge Disposition (OT) sub acute care setting  sub acute rehab  -       Row Name 12/12/24 1200          Positioning and Restraints    Pre-Treatment Position in bed  -     Post Treatment Position bed  -     In Bed supine;call light within reach;encouraged to call for assist;exit alarm on;with family/caregiver  -               User Key  (r) = Recorded By, (t) = Taken By, (c) = Cosigned By      Initials Name Provider Type     Marsha Morrow Occupational Therapist                   Outcome Measures       Row Name 12/12/24 1205          How much help from another is currently needed...    Putting on and taking off regular lower body clothing? 2  -     Bathing (including washing, rinsing, and drying) 2  -     Toileting (which includes using toilet bed pan or urinal) 2  -AH     Putting on and taking  off regular upper body clothing 2  -     Taking care of personal grooming (such as brushing teeth) 2  -AH     Eating meals 2  -     AM-PAC 6 Clicks Score (OT) 12  -       Row Name 12/12/24 0817          How much help from another person do you currently need...    Turning from your back to your side while in flat bed without using bedrails? 3  -HH     Moving from lying on back to sitting on the side of a flat bed without bedrails? 3  -HH     Moving to and from a bed to a chair (including a wheelchair)? 2  -HH     Standing up from a chair using your arms (e.g., wheelchair, bedside chair)? 2  -HH     Climbing 3-5 steps with a railing? 1  -HH     To walk in hospital room? 2  -     AM-PAC 6 Clicks Score (PT) 13  -       Row Name 12/12/24 1205          Functional Assessment    Outcome Measure Options AM-PAC 6 Clicks Daily Activity (OT)  -               User Key  (r) = Recorded By, (t) = Taken By, (c) = Cosigned By      Initials Name Provider Type    Marsha Wall Occupational Therapist     Kay Chen, RN Registered Nurse                    Occupational Therapy Education       Title: PT OT SLP Therapies (In Progress)       Topic: Occupational Therapy (In Progress)       Point: ADL training (Done)       Description:   Instruct learner(s) on proper safety adaptation and remediation techniques during self care or transfers.   Instruct in proper use of assistive devices.                  Learning Progress Summary            Patient Acceptance, E,TB, VU by  at 12/12/2024 1205    Comment: Role of OT/POC                      Point: Home exercise program (Not Started)       Description:   Instruct learner(s) on appropriate technique for monitoring, assisting and/or progressing therapeutic exercises/activities.                  Learner Progress:  Not documented in this visit.              Point: Precautions (Not Started)       Description:   Instruct learner(s) on prescribed precautions during self-care and  functional transfers.                  Learner Progress:  Not documented in this visit.              Point: Body mechanics (Not Started)       Description:   Instruct learner(s) on proper positioning and spine alignment during self-care, functional mobility activities and/or exercises.                  Learner Progress:  Not documented in this visit.                              User Key       Initials Effective Dates Name Provider Type Discipline     06/16/21 -  Marsha Morrow Occupational Therapist OT                  OT Recommendation and Plan  Planned Therapy Interventions (OT): activity tolerance training, BADL retraining, patient/caregiver education/training, transfer/mobility retraining, ROM/therapeutic exercise  Therapy Frequency (OT): 3 times/wk  Plan of Care Review  Plan of Care Reviewed With: patient  Progress: no change  Outcome Evaluation: Pt seen for OT evaluation today.  Pt presents with increased confusion and decreased independence with self care and functional mobility tasks.  Pt lives at home with his wife.  He has been requiring assistance with dressing and bathing from his wife for a little while now.  Pt uses a cane at home, but has had frequent falls.  Wife reports worsening vision in B eyes.  Pt able to sit eob with mod assist, stood with min assist of 2 and walked 2' to head of bed with min assist of 2.  Pt required max assist to lay back down.  Pt is expected to benefit from skilled OT to improve his strength and independence with ADL tasks.     Time Calculation:   Evaluation Complexity (OT)  Review Occupational Profile/Medical/Therapy History Complexity: expanded/moderate complexity  Assessment, Occupational Performance/Identification of Deficit Complexity: 3-5 performance deficits  Clinical Decision Making Complexity (OT): detailed assessment/moderate complexity  Overall Complexity of Evaluation (OT): moderate complexity     Time Calculation- OT       Row Name 12/12/24 2252              Time Calculation- OT    OT Start Time 0945  -      OT Received On 12/12/24  -      OT Goal Re-Cert Due Date 12/22/24  -         Untimed Charges    OT Eval/Re-eval Minutes 60  -AH         Total Minutes    Untimed Charges Total Minutes 60  -AH       Total Minutes 60  -AH                User Key  (r) = Recorded By, (t) = Taken By, (c) = Cosigned By      Initials Name Provider Type     Marsha Morrow Occupational Therapist                  Therapy Charges for Today       Code Description Service Date Service Provider Modifiers Qty    82423221980 HC OT EVAL MOD COMPLEXITY 4 12/12/2024 Marsha Morrow GO 1                 Marsha Morrow  12/12/2024    Electronically signed by Marsha Morrow at 12/12/24 1206       Marsha Morrow at 12/12/24 1205          Goal Outcome Evaluation:  Plan of Care Reviewed With: patient        Progress: no change  Outcome Evaluation: Pt seen for OT evaluation today.  Pt presents with increased confusion and decreased independence with self care and functional mobility tasks.  Pt lives at home with his wife.  He has been requiring assistance with dressing and bathing from his wife for a little while now.  Pt uses a cane at home, but has had frequent falls.  Wife reports worsening vision in B eyes.  Pt able to sit eob with mod assist, stood with min assist of 2 and walked 2' to head of bed with min assist of 2.  Pt required max assist to lay back down.  Pt is expected to benefit from skilled OT to improve his strength and independence with ADL tasks.    Anticipated Discharge Disposition (OT): sub acute care setting (sub acute rehab)                          Electronically signed by Marsha Morrow at 12/12/24 1205       Marsha Morrow at 12/11/24 1143          Attempted OT evaluation today, pt unable to stay awake enough to safely participate in skilled eval.  Will follow up with pt as able.    Electronically signed by Marsha Morrow at 12/11/24 1211       Marsha Morrow at 12/10/24 1413           Attempted to see pt for OT evaluation, however held today awaiting further testing.  Will follow up with pt tomorrow.    Electronically signed by Marsha Morrow at 12/10/24 1868

## 2024-12-20 ENCOUNTER — TELEPHONE (OUTPATIENT)
Dept: INTERNAL MEDICINE | Facility: CLINIC | Age: 69
End: 2024-12-20
Payer: MEDICARE

## 2024-12-20 NOTE — CASE MANAGEMENT/SOCIAL WORK
12/19/24 14:00 Spoke with [facility] several times today.They service [facility] where pt lives and said he was under review.    12/20 09:45 am Spoke with [facility] again. They state they are waiting to hear back from pts PCP that she will agree to write orders if needed. CM called Dr. Ferrer office and explained the HH agency needed to hear from them. Was told this message would be escalated to urgent /priority. [facility] contact number provided. Confirmed the patient is an active patient of Dr. Purvis, last visit was within the last couple of months.    12/20 16:45. Called [facility], they stated they are still waiting to hear back from Dr. Purvis. Attempted to call Dr. Purvis's office but they are closed for the weekend. Pt's wife was updated via voice message. Status changed from home with HH to home/ self care.

## 2024-12-20 NOTE — TELEPHONE ENCOUNTER
Caller: [facility] CASE MANAGEMENT    Relationship: Other    Best call back number: [phone]     What is the best time to reach you: ANYTIME, OK TO LEAVE VOICEMAIL.    Who are you requesting to speak with (clinical staff, provider,  specific staff member): CLINICAL STAFF    Do you know the name of the person who called: JOSESITO    What was the call regarding: JOSESITO IS CALLING TO ASK IF DR. COOK WILL BE FOLLOWING PATIENT FOR HOME HEALTH ORDERS. [facility] [place] IS WHAT HOME HEALTH WILL BE USED. PLEASE ADVISE. DISCHARGED ON 12.18.24.    Is it okay if the provider responds through [employer]: NO CALL ONLY

## 2024-12-20 NOTE — CASE MANAGEMENT/SOCIAL WORK
Case Management Discharge Note      Final Note: Pt discharged home with wife via private car. New order for HH. referrals pending. No DME needs at discharge.    Provided Post Acute Provider List?: Yes  Post Acute Provider List: Nursing Home  Provided Post Acute Provider Quality & Resource List?: Yes  Post Acute Provider Quality and Resource List: Nursing Home  Delivered To: Support Person  Support Person: Vonda High  Method of Delivery: Telephone ([phone])    Selected Continued Care - Discharged on 12/18/2024 Admission date: 12/10/2024 - Discharge disposition: Home-Health Care [facility]      Destination    No services have been selected for the patient.                Durable Medical Equipment    No services have been selected for the patient.                Dialysis/Infusion    No services have been selected for the patient.                Home Medical Care    No services have been selected for the patient.                Therapy    No services have been selected for the patient.                Community Resources    No services have been selected for the patient.                Community & DME    No services have been selected for the patient.                    Transportation Services  Private: Car    Final Discharge Disposition Code: 01 - home or self-care

## 2025-01-03 DIAGNOSIS — M54.50 LUMBOSACRAL PAIN: ICD-10-CM

## 2025-01-03 NOTE — TELEPHONE ENCOUNTER
Rx Refill Note  Requested Prescriptions     Pending Prescriptions Disp Refills    HYDROcodone-acetaminophen (NORCO) 7.5-325 MG per tablet [Pharmacy Med Name: HYDROCODON-APAP 7.5-325 7.5-325 Tablet] 60 tablet 0     Sig: TAKE 1 TABLET BY MOUTH EVERY 12 HOURS AS NEEDED FOR MODERATE PAIN.      Last office visit with prescribing clinician: 10/1/2024   Last telemedicine visit with prescribing clinician: Visit date not found   Next office visit with prescribing clinician: 1/20/2025  UDS: 10/01/2024      Virginia Harrell MA  01/03/25, 14:57 EST

## 2025-01-04 RX ORDER — HYDROCODONE BITARTRATE AND ACETAMINOPHEN 7.5; 325 MG/1; MG/1
1 TABLET ORAL EVERY 12 HOURS PRN
Qty: 60 TABLET | Refills: 0 | Status: SHIPPED | OUTPATIENT
Start: 2025-01-04

## 2025-01-21 ENCOUNTER — OFFICE VISIT (OUTPATIENT)
Dept: NEUROLOGY | Facility: CLINIC | Age: 70
End: 2025-01-21
Payer: MEDICARE

## 2025-01-21 VITALS
WEIGHT: 233 LBS | TEMPERATURE: 98.3 F | BODY MASS INDEX: 32.62 KG/M2 | SYSTOLIC BLOOD PRESSURE: 124 MMHG | HEART RATE: 49 BPM | DIASTOLIC BLOOD PRESSURE: 74 MMHG | OXYGEN SATURATION: 97 % | HEIGHT: 71 IN

## 2025-01-21 DIAGNOSIS — I10 ESSENTIAL HYPERTENSION: ICD-10-CM

## 2025-01-21 DIAGNOSIS — E11.65 TYPE 2 DIABETES MELLITUS WITH HYPERGLYCEMIA, WITH LONG-TERM CURRENT USE OF INSULIN: ICD-10-CM

## 2025-01-21 DIAGNOSIS — I73.9 PAD (PERIPHERAL ARTERY DISEASE): ICD-10-CM

## 2025-01-21 DIAGNOSIS — Z86.73 HISTORY OF CVA (CEREBROVASCULAR ACCIDENT): Primary | ICD-10-CM

## 2025-01-21 DIAGNOSIS — R53.1 WEAKNESS: ICD-10-CM

## 2025-01-21 DIAGNOSIS — I63.9 ISCHEMIC STROKE: ICD-10-CM

## 2025-01-21 DIAGNOSIS — Z79.4 TYPE 2 DIABETES MELLITUS WITH HYPERGLYCEMIA, WITH LONG-TERM CURRENT USE OF INSULIN: ICD-10-CM

## 2025-01-21 DIAGNOSIS — E78.5 HYPERLIPIDEMIA, UNSPECIFIED HYPERLIPIDEMIA TYPE: ICD-10-CM

## 2025-02-01 NOTE — PROGRESS NOTES
New Patient Office Visit      Encounter Date: 2025   Patient Name: Ramsey Riley  : 1955   MRN: [id number]   PCP: Margret Purvis MD    Referring Provider: Seth Andre MD     Chief Complaint:    Chief Complaint   Patient presents with    Follow-up       History of Present Illness: Ramsey Riley is a 69 y.o. male with known medical diagnoses of multiple stroke with lacunar and left temporal infarct in  and prior right anterior thalamic infarct, peripheral neuropathy secondary to uncontrolled diabetes, frequent falls, HTN, CAD s/p CABG, and chronic pulmonary embolism (currently on Eliquis) who presented to [facility] on 12/10/2024 following a fall reported on the prior day and when she reportedly fell on his right shoulder.  NIH 0 per evaluation with Dr. Cruz teleneurology however the patient was more confused and lethargic.  He was alert to person, place, and month at time of evaluation but could not remember the year.  Reportedly had some difficulty interpreting complex pictures due to some peripheral vision loss secondary to diabetes.  Reduced range of motion to right upper extremity was noted likely due to a rotator cuff tear.  No additional focal deficits were observed.  Bilateral lower extremity peripheral neuropathy reported.  Patient had some significant balance difficulty with standing.  CT head was without hemorrhage or acute abnormality.  CT angiography was unrevealing and less than 50% stenosis on bilateral carotids.  MRI without evidence of acute stroke, but evidence of CAA with increased number of microhemorrhages compared to prior imaging.  EEG showed no evidence of seizure activity.  Patient was discharged with recommendation to continue Eliquis, take supplements with B12 and folate, follow-up with outpatient PT, and have outpatient EMG/nerve conduction studies performed.  Of note, patient typically follows with Dr. Joaquin Norman for continued Neuro Stroke  evaluations of prior CVAs    Clinic note 01/21/25: Patient presents today currently in wheelchair accompanied by his wife to [facility] care clinic post-hospital discharge.  Much of the history is provided by the patient's wife.  No reported worsening or new strokelike symptoms since discharge from hospital.  On exam today the patient continues to have some significant confusion.  He was unable to initially correctly identify his current age or the current month.  Patient continues to have bilateral leg weakness and significant bilateral ongoing neuropathy to both lower extremities.  Lower extremity coordination could not be tested due to weakness however some dysmetria observed bilaterally to upper extremities.  Patient has some impaired peripheral vision however was able to correctly identify number of fingers held to all 4 quadrants of visual field testing.  Patient is currently on both benzodiazepines and Seroquel.  Per chart review and per patient's wife he does occasionally double on medications if needed and frequently sleeps throughout the day.  Patient reports no urinary incontinence or increased urinary frequency and NPH is not suspected as source of both confusion and lower extremity weakness.  Patient does have a history of spinal stenosis.  Per wife, outpatient EMG/nerve conduction studies have not been made for appointments at this time.  The studies have been ordered today.  Additionally the patient has not established with home health for any physical therapy or assessment of the home for fall risk.  Order has been placed per request.  Patient continues to take Eliquis as prescribed.  He does have some slight bruising but no reported worsening of bruising or obvious bleeding.  Reportedly patient has been supplementing with B12 and folate.  I discussed with patient and patient's wife that given current diagnosis of CAA, additional blood thinners in the form of antiplatelet medications are not  recommended at he is to avoid any aspirin over-the-counter medication for headache or pain.  Additionally would avoid any herbal supplementation which which could then blood including turmeric.  Given the increased bleed risk for the patient, education concerning fall risk was provided.  Patient currently uses a cane at home for ambulation.  Until home health and physical therapy can further provide evaluation, strength, and coordination training, a walker may be more appropriate for ambulation to avoid falls at home.  Patient and family had no additional questions or concerns today.  Patient does have an appointment with Dr. Norman on 2/13/25, but given ongoing workup and current satisfaction with [facility] neurology services, family requested follow-up appointments with [facility] neurology stroke in [facility].     Stroke Risk Factors: carotid stenosis, diabetes mellitus, hyperlipidemia, and hypertension      Subjective      Past Medical History:   Past Medical History:   Diagnosis Date    Arthritis     Clot     Coronary artery disease     Diabetes mellitus     Heart disease     History of blood clots     Hypertension     Impaired mobility     Low back pain     Thyroid disease     Type 2 diabetes mellitus        Past Surgical History:   Past Surgical History:   Procedure Laterality Date    CHOLECYSTECTOMY  2015    COLONOSCOPY  2015    dr srivastava    CORONARY ARTERY BYPASS GRAFT      [place] [id number] BY PASS     ENDOSCOPY         Family History:   Family History   Problem Relation Age of Onset    Diabetes Mother     Heart disease Mother         STENTING    COPD Mother     Heart attack Father     Diabetes Father     Heart defect Son         Ross procedure    Heart murmur Son     Hypertension Son        Social History:   Social History     Socioeconomic History    Marital status:    Tobacco Use    Smoking status: Never     Passive exposure: Never    Smokeless tobacco: Current     Types: Chew   Vaping  Use    Vaping status: Never Used   Substance and Sexual Activity    Alcohol use: No    Drug use: No    Sexual activity: Defer       Medications:     Current Outpatient Medications:     ALPRAZolam (XANAX) 0.5 MG tablet, Take 1 tablet by mouth At Night As Needed for Anxiety., Disp: 30 tablet, Rfl: 5    amLODIPine (NORVASC) 10 MG tablet, Take 1 tablet by mouth Daily., Disp: 90 tablet, Rfl: 3    apixaban (Eliquis) 5 MG tablet tablet, Take 1 tablet by mouth Every 12 (Twelve) Hours., Disp: 60 tablet, Rfl: 5    atorvastatin (Lipitor) 80 MG tablet, Take 1 tablet by mouth Every Night., Disp: 90 tablet, Rfl: 1    chlorthalidone (HYGROTON) 25 MG tablet, Take 0.5 tablets by mouth Daily., Disp: 45 tablet, Rfl: 3    citalopram (CeleXA) 40 MG tablet, Take 1 tablet by mouth Daily., Disp: 90 tablet, Rfl: 1    Continuous Glucose Sensor (FreeStyle Rina 3 Sensor) misc, Use 1 each Every 14 (Fourteen) Days., Disp: 3 each, Rfl: 11    dorzolamide-timolol (COSOPT) 2-0.5 % ophthalmic solution, Administer 1 drop to both eyes 2 (Two) Times a Day., Disp: , Rfl:     famotidine (Pepcid) 20 MG tablet, Take 1 tablet by mouth 2 (Two) Times a Day., Disp: 180 tablet, Rfl: 1    glucose blood (Accu-Chek Caryn Plus) test strip, USE AS DIRECTED 3 TIMES DAILY, Disp: 100 each, Rfl: 12    glucose monitor monitoring kit, 1 each Daily., Disp: 1 each, Rfl: 1    glucose monitor monitoring kit, 1 each Daily., Disp: 1 each, Rfl: 1    HYDROcodone-acetaminophen (NORCO) 7.5-325 MG per tablet, TAKE 1 TABLET BY MOUTH EVERY 12 HOURS AS NEEDED FOR MODERATE PAIN., Disp: 60 tablet, Rfl: 0    Insulin NPH Isophane & Regular (HumuLIN 70/30 KwikPen) (70-30) 100 UNIT/ML suspension pen-injector, Inject 70 units twice daily E11.65, Disp: 45 mL, Rfl: 1    Insulin Pen Needle 32G X 4 MM misc, For use with insulin injections, daily, Disp: 100 each, Rfl: 11    Insulin Syringes, Disposable, U-100 1 ML misc, 50 Units 3 (Three) Times a Day As Needed (50 units in the morning, and then  "with meals PRN)., Disp: 200 each, Rfl: 12    Lancets 30G misc, Check sugar 3 times daily, Disp: 30 each, Rfl: 12    levothyroxine (SYNTHROID, LEVOTHROID) 125 MCG tablet, Take 1 tablet by mouth Daily., Disp: 90 tablet, Rfl: 1    losartan (COZAAR) 100 MG tablet, Take 1 tablet by mouth Daily., Disp: 90 tablet, Rfl: 1    pantoprazole (PROTONIX) 40 MG EC tablet, Take 1 tablet by mouth Daily., Disp: 90 tablet, Rfl: 1    QUEtiapine (SEROquel) 50 MG tablet, Take 1 tablet by mouth 2 (Two) Times a Day., Disp: 180 tablet, Rfl: 1    aspirin 81 MG chewable tablet, Chew 1 tablet Daily. (Patient not taking: Reported on 1/21/2025), Disp: , Rfl:     cyclopentolate (CYCLOGYL) 1 % ophthalmic solution, Apply 1 drop to eye(s) as directed by provider 3 (Three) Times a Day. (Patient not taking: Reported on 1/21/2025), Disp: , Rfl:     Allergies:   Allergies   Allergen Reactions    Penicillins Unknown - High Severity    Trazodone Other (See Comments)     agitation       Objective     Physical Exam:  Vital Signs:   Vitals:    01/21/25 0911   BP: 124/74   Pulse: (!) 49   Temp: 98.3 °F (36.8 °C)   SpO2: 97%   Weight: 106 kg (233 lb)   Height: 180.3 cm (70.98\")     Body mass index is 32.51 kg/m².     Physical Exam  Constitutional:       General: He is not in acute distress.     Appearance: He is not ill-appearing.   HENT:      Head: Normocephalic and atraumatic.   Eyes:      Extraocular Movements: Extraocular movements intact.      Pupils: Pupils are equal, round, and reactive to light.   Cardiovascular:      Rate and Rhythm: Bradycardia present.   Pulmonary:      Effort: Pulmonary effort is normal. No respiratory distress.   Abdominal:      General: There is no distension.      Tenderness: There is no guarding.   Musculoskeletal:         General: No signs of injury.      Right shoulder: Decreased range of motion.      Right lower leg: Edema present.      Left lower leg: Edema present.   Skin:     General: Skin is warm and dry.      Findings: " Bruising present. No lesion.   Neurological:      Mental Status: He is alert. Mental status is at baseline. He is disoriented.      Sensory: Sensory deficit present.      Motor: Weakness present.      Coordination: Coordination abnormal.   Psychiatric:         Speech: Speech normal.       Neurological Exam  Mental Status  Alert. Oriented only to person. Speech is normal. Language is fluent with no aphasia.    Cranial Nerves  CN II: Right visual acuity: Counts fingers. Left visual acuity: Counts fingers. Some decreased peripheral vision however patient able to count fingers on all visual field quadrants.  CN III, IV, VI: Extraocular movements intact bilaterally. Pupils equal round and reactive to light bilaterally.  CN V:  Right: Facial sensation is normal.  Left: Facial sensation is normal on the left.  CN VII:  Right: There is no facial weakness.  Left: There is no facial weakness.    Motor  Normal muscle bulk throughout. No fasciculations present. Normal muscle tone. Strength is 5/5 in all four extremities except as noted.  4/5 strength extremities  2/5 strength bilateral lower extremities with drift.    Sensory  Light touch abnormality: Decreased sensation to touch bilaterally to lower extremities.     Coordination  Right: Finger-to-nose abnormality:Left: Finger-to-nose abnormality:  Dysmetria observed on exam bilateral finger-nose  Lower extremities not testable due to weakness.    Gait    Deferred.       NIH Stroke Scale    1a  Level of consciousness: 0=alert; keenly responsive   1b. LOC questions:  2=Answers neither task correctly    1c. LOC commands: 0=Performs both tasks correctly   2.  Best Gaze: 0=normal   3. Visual: 0=No visual loss   4. Facial Palsy: 0=Normal symmetric movement   5a. Motor left arm: 0=No drift, limb holds 90 (or 45) degrees for full 10 seconds   5b.  Motor right arm: 0=No drift, limb holds 90 (or 45) degrees for full 10 seconds   6a. Motor left le=Drift, limb holds 90 (or 45) degrees  "but drifts down before full 10 seconds: does not hit bed   6b  Motor right le=Drift, limb holds 90 (or 45) degrees but drifts down before full 10 seconds: does not hit bed   7. Limb Ataxia: 2=Present in two limbs   8.  Sensory: 0=Normal; no sensory loss   9. Best Language:  0=No aphasia, normal   10. Dysarthria: 0=Normal   11. Extinction and Inattention: 0=No abnormality    Total:   6         Modified Stephenson Score:   MODIFIED GENESIS SCALE (to be assessed for each patient having history of stroke) []Stroke history but not assessed  []0: No symptoms at all  []1: No significant disability despite symptoms  []2: Slight disability  [x]3: Moderate disability  []4: Moderately severe disability  []5: Severe disability  []6: Death        PHQ-9 Depression Screening  Little interest or pleasure in doing things? Not at all   Feeling down, depressed, or hopeless? Not at all   PHQ-2 Total Score 0   Trouble falling or staying asleep, or sleeping too much?     Feeling tired or having little energy?     Poor appetite or overeating?     Feeling bad about yourself - or that you are a failure or have let yourself or your family down?     Trouble concentrating on things, such as reading the newspaper or watching television?     Moving or speaking so slowly that other people could have noticed? Or the opposite - being so fidgety or restless that you have been moving around a lot more than usual?     Thoughts that you would be better off dead, or of hurting yourself in some way?     PHQ-9 Total Score     If you checked off any problems, how difficult have these problems made it for you to do your work, take care of things at home, or get along with other people?       STOP-Bang Score  Have you been diagnosed with Sleep Apnea?: no  Snoring?: no  Tired?: no  Observed?: yes  Pressure?: yes  Stop Score: 2  Body Mass Index more than 35 kg/m2?: no  Age older than 50 year old?: yes  Neck large? \">17\"/43cm-M, >16\"/41cm-F: yes  Gender=Male?: " yes  Total Stop-Bang Score: 5    Imaging Reviewed:     EEG    Result Date: 12/10/2024  Diffuse cerebral dysfunction of moderate degree, nonspecific.  This is most commonly seen due to toxic/metabolic cause No evidence for epilepsy is seen This repo most commonly seen due to toxic/metabolic causeThis isrt is transcribed using the Dragon dictation system.      MRI Brain Without Contrast    Result Date: 12/10/2024  Gross motion artifact on multiple images and motion artifact present on all images.  No acute CVA identified though a small area could be missed secondary to motion artifact.  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      This report was signed and finalized on 12/10/2024 1:38 PM by Kristi Roblero MD.      CT Angiogram Head    Result Date: 12/10/2024  Suboptimal exam secondary to motion artifact on multiple images.  Less than 50% stenosis of the internal carotid arteries bilaterally.  No significant intracranial stenosis or aneurysm identified, however, small lesions could be missed secondary to motion artifact.    DLP: 749.41 mGy.cm,748.94 mGy.cm CTDI: 22.7 mGy  This study was performed with techniques to keep radiation doses as low as reasonably achievable (ALARA). Individualized dose reduction techniques using automated exposure control or adjustment of mA and/or kV according to the patient size were employed.      Images were reviewed, interpreted, and dictated by Dr. Kristi Roblero MD Transcribed by Anna Beasley PA-C.  This report was signed and finalized on 12/10/2024 11:28 AM by Kristi Roblero MD.      CT Angiogram Neck    Result Date: 12/10/2024  Suboptimal exam secondary to motion artifact on multiple images.  Less than 50% stenosis of the internal carotid arteries bilaterally.  No significant intracranial stenosis or aneurysm identified, however, small lesions could be missed secondary to motion artifact.    DLP: 749.41 mGy.cm,748.94 mGy.cm CTDI: 22.7 mGy  This study was  performed with techniques to keep radiation doses as low as reasonably achievable (ALARA). Individualized dose reduction techniques using automated exposure control or adjustment of mA and/or kV according to the patient size were employed.      Images were reviewed, interpreted, and dictated by Dr. Kristi Roblero MD Transcribed by Anna Beasley PA-C.  This report was signed and finalized on 12/10/2024 11:28 AM by Kritsi Roblero MD.      CT Head Without Contrast    Result Date: 12/10/2024  Stable chronic changes without acute intracranial process.     DLP: 749.41 mGy.cm,748.94 mGy.cm CTDI: 22.7 mGy   This study was performed with techniques to keep radiation doses as low as reasonably achievable (ALARA). Individualized dose reduction techniques using automated exposure control or adjustment of mA and/or kV according to the patient size were employed.     Images were reviewed, interpreted, and dictated by Dr. Kristi Roblero MD Transcribed by Anna Beasley PA-C.  This report was signed and finalized on 12/10/2024 11:20 AM by Kristi Roblero MD.      Laboratory Results:   CBC w/diff          12/10/2024    11:28 12/16/2024    05:51 12/18/2024    09:05   CBC w/Diff   WBC 8.65  7.97  6.77    RBC 5.40  5.38  5.25    Hemoglobin 16.0  16.1  15.6    Hematocrit 48.1  46.5  45.4    MCV 89.1  86.4  86.5    MCH 29.6  29.9  29.7    MCHC 33.3  34.6  34.4    RDW 12.7  11.9  12.2    Platelets 146  180  176    Neutrophil Rel % 67.6  71.8     Immature Granulocyte Rel % 0.2  0.3     Lymphocyte Rel % 23.0  18.7     Monocyte Rel % 8.2  6.9     Eosinophil Rel % 0.7  1.9     Basophil Rel % 0.3  0.4        Lab Results   Component Value Date    GLUCOSE 206 (H) 12/18/2024    BUN 25 (H) 12/18/2024    CREATININE 1.52 (H) 12/18/2024     (L) 12/18/2024    K 3.8 12/18/2024     12/18/2024    CALCIUM 8.7 12/18/2024    PROTEINTOT 6.4 12/10/2024    ALBUMIN 3.4 (L) 12/10/2024    ALT 20 12/10/2024    AST 29 12/10/2024    ALKPHOS 89 12/10/2024     BILITOT 0.8 12/10/2024    GLOB 3.0 12/10/2024    AGRATIO 1.1 12/10/2024    BCR 16.4 12/18/2024    ANIONGAP 10.4 12/18/2024    EGFR 49.9 (L) 12/18/2024      Most Recent A1C          12/11/2024    09:07   HGBA1C Most Recent   Hemoglobin A1C 9.80       Lipid Panel          4/15/2024    14:40 12/11/2024    09:07   Lipid Panel   Total Cholesterol  149    Total Cholesterol 153     Triglycerides 210  179    HDL Cholesterol 36  37    VLDL Cholesterol 35  31    LDL Cholesterol  82  81    LDL/HDL Ratio  2.06           Assessment / Plan      Assessment/Plan:   Diagnoses and all orders for this visit:    # History of CVA, prior lacunar left temporal and right anterior thalamic infarcts  # Progressive cerebral amyloid angiopathy  # Bilateral lower extremity weakness  -No new acute infarcts on MRI on most recent hospital evaluation in December 2024  -Continue Eliquis 5 mg twice daily for history of chronic pulmonary embolisms  -Recommend avoid additional antiplatelets due to progressive CAA  -Recommend supplementation with B12 and folate  -Fall risk education provided.  Home health referral for physical therapy and home fall risk assessment ordered  -Recommended patient take medications as prescribed as polypharmacy could contribute to confusion and increased fall risk with doubling of medication such as benzodiazepines and Seroquel  -Outpatient EMG/nerve conduction study to evaluate further for lower extremity weakness  -Dietary changes recommended including Mediterranean diet for secondary stroke prevention  -Follow in neurology stroke clinic [facility] in approximately 3 months    # Hyperlipidemia  -LDL on 12/11/2024 equal 81.  Goal LDL less than 70  -Continue atorvastatin 80 mg nightly  -Follow with PCP for routine lab work and monitoring    # Hypertension  -Today blood pressure 124/74.  Within goal blood pressure less than 130/80  -Recommend blood pressure log book at home  -Recommend adequate hydration via H2O and avoidance of  hypotension  -Follow with cardiologist for continued monitoring and medication adjustments as needed    # T2DM  -Recent A1c: 9.8 on 12/11/2024  -Recommended heart healthy and diabetic diet with monitoring of blood glucose  -Continue home medications  -Continue appointments with current endocrinologist for monitoring    # Sleep disturbance  -Current STOP-BANG score equal 5.  Patient denies snoring or feeling tired due to lack of sleep  -Referral for sleep study and CPAP evaluation deferred at this time.  Will continue discussions for evaluation on future visits    Discussed the importance of medication compliance and lifestyle modifications (adequate blood pressure control, adequate control of hyperlipidemia, adequate glycemic control, increase physical activity, and healthy diet) to help reduce the risk of future cerebrovascular events.  Also discussed the signs symptoms that would warrant the patient return back to the emergency department including unilateral weakness, unilateral numbness, visual disturbances, loss of balance, speech difficulties, and/or a sudden severe headache.  Patient and family acknowledge understanding    Follow Up:   Follow in neurology stroke clinic in approximately 3 months    Jonah Alas PA-C  [facility]

## 2025-02-06 ENCOUNTER — OFFICE VISIT (OUTPATIENT)
Dept: ENDOCRINOLOGY | Facility: CLINIC | Age: 70
End: 2025-02-06
Payer: MEDICARE

## 2025-02-06 VITALS
HEART RATE: 66 BPM | HEIGHT: 71 IN | DIASTOLIC BLOOD PRESSURE: 78 MMHG | BODY MASS INDEX: 32.62 KG/M2 | WEIGHT: 233 LBS | OXYGEN SATURATION: 97 % | SYSTOLIC BLOOD PRESSURE: 130 MMHG

## 2025-02-06 DIAGNOSIS — E11.65 TYPE 2 DIABETES MELLITUS WITH HYPERGLYCEMIA, WITH LONG-TERM CURRENT USE OF INSULIN: Primary | ICD-10-CM

## 2025-02-06 DIAGNOSIS — E78.5 HYPERLIPIDEMIA, UNSPECIFIED HYPERLIPIDEMIA TYPE: ICD-10-CM

## 2025-02-06 DIAGNOSIS — Z79.4 TYPE 2 DIABETES MELLITUS WITH HYPERGLYCEMIA, WITH LONG-TERM CURRENT USE OF INSULIN: Primary | ICD-10-CM

## 2025-02-06 DIAGNOSIS — E03.9 HYPOTHYROIDISM, UNSPECIFIED TYPE: ICD-10-CM

## 2025-02-06 LAB
EXPIRATION DATE: ABNORMAL
EXPIRATION DATE: ABNORMAL
GLUCOSE BLDC GLUCOMTR-MCNC: 280 MG/DL (ref 70–130)
HBA1C MFR BLD: 9.4 % (ref 4.5–5.7)
Lab: ABNORMAL
Lab: ABNORMAL

## 2025-02-06 RX ORDER — INSULIN HUMAN 100 [IU]/ML
INJECTION, SUSPENSION SUBCUTANEOUS
Qty: 135 ML | Refills: 1 | Status: SHIPPED | OUTPATIENT
Start: 2025-02-06

## 2025-02-06 NOTE — PROGRESS NOTES
"Chief Complaint   Patient presents with    Diabetes        HPI   Ramsey Riley is a 69 y.o. male had concerns including Diabetes.       Patient presents for follow-up, he was last seen June 2024.  He was hospitalized in the interim since last visit after a fall and was noted to have had prior strokes.  He continues to have issues with falls and thus presents today in a wheelchair.  He has started using CGM for glucose monitoring but does not have kaleb reader with him during visit today.  Glucose values elevated during visit.  They do report some borderline low glucose values overnight and in the early morning, no values less than 70.    Current diabetes medications include the following:  Premixed insulin 70/30 70 units twice daily-this is sometimes being administered after meals.    Patient is taking atorvastatin 80 mg daily for hyperlipidemia.    Discussed abnormal TSH noted on labs from December 2024.  Patient reports there has been no change to medical therapy.  They will be seeing PCP next week and suspect labs will be drawn.    The following portions of the patient's history were reviewed and updated as appropriate: allergies, current medications, and past social history.    Review of Systems   Musculoskeletal:  Positive for gait problem.      /78   Pulse 66   Ht 180.3 cm (70.98\")   Wt 106 kg (233 lb)   SpO2 97%   BMI 32.51 kg/m²      Physical Exam      Constitutional:  well developed; well nourished  no acute distress  appears stated age   ENT/Thyroid: not examined   Eyes: Conjunctiva: clear   Respiratory:  breathing is unlabored  clear to auscultation bilaterally   Cardiovascular:  regular rate and rhythm   Chest:  Not performed.   Abdomen: Not performed.   : Not performed.   Musculoskeletal: Not performed   Skin: not performed.   Neuro: mental status, speech normal   Psych: mood and affect are within normal limits       Labs/Imaging  A1C:  Lab Results   Component Value Date    HGBA1C 9.4 " (A) 02/06/2025    HGBA1C 9.80 (H) 12/11/2024      Glucose:  Lab Results   Component Value Date    POCGLU 280 (A) 02/06/2025      CMP:  Lab Results   Component Value Date    GLUCOSE 206 (H) 12/18/2024    BUN 25 (H) 12/18/2024    CREATININE 1.52 (H) 12/18/2024     (L) 12/18/2024    K 3.8 12/18/2024     12/18/2024    CALCIUM 8.7 12/18/2024    PROTEINTOT 6.4 12/10/2024    ALBUMIN 3.4 (L) 12/10/2024    ALT 20 12/10/2024    AST 29 12/10/2024    ALKPHOS 89 12/10/2024    BILITOT 0.8 12/10/2024    GLOB 3.0 12/10/2024    AGRATIO 1.1 12/10/2024    BCR 16.4 12/18/2024    ANIONGAP 10.4 12/18/2024    EGFR 49.9 (L) 12/18/2024      Lipid Panel:  Lab Results   Component Value Date    CHOL 149 12/11/2024    CHLPL 153 04/15/2024    TRIG 179 (H) 12/11/2024    HDL 37 (L) 12/11/2024    LDL 81 12/11/2024      Urine Microalbumin:  Lab Results   Component Value Date    MICROALBUR 3,522.7 04/15/2024      TSH:  Lab Results   Component Value Date    TSH 5.450 (H) 12/10/2024         Diagnoses and all orders for this visit:    1. Type 2 diabetes mellitus with hyperglycemia, with long-term current use of insulin (Primary)  -     POC Glycosylated Hemoglobin (Hb A1C)  -     POC Glucose, Blood  -     Insulin NPH Isophane & Regular (HumuLIN 70/30 KwikPen) (70-30) 100 UNIT/ML suspension pen-injector; Inject 77 units in the morning and 70 units in the dinner E11.65  Dispense: 135 mL; Refill: 1  Diabetes is poorly controlled with hemoglobin A1c 9.4%, this is improved from prior testing  No home glucose data is available for review but family does report borderline low values overnight which have been worrisome.  Given this, will defer any change to evening dose of insulin.  We did discuss that this could be due to peak action of NPH.  Patient is hyperglycemic during visit.  Suspect pattern of daytime hyperglycemia based on reported data.  Patient will increase premixed insulin 70/30 to 77 units in the morning and continue 70 units with  dinner.  We discussed the importance of placing insulin prior to eating.  Patient was encouraged to bring CGM to all future visits.  Urine microalbumin is up-to-date from April 2024  CMP, lipid panel reviewed from December 2024    2. Hypothyroidism, unspecified type  -     TSH; Future  -     T4, Free; Future  TSH was obtained during hospitalization and was mildly elevated.  Patient reports no dose change has been made recently.  He is taking 125 mcg daily.  Orders placed to repeat thyroid function studies same day as PCP appointment next week.    3. Hyperlipidemia, unspecified hyperlipidemia type  Lipid panel is up-to-date and was reviewed from December 2024, patient will continue atorvastatin 80 mg daily         Return in about 6 weeks (around 3/20/2025) for Next scheduled follow up. The patient was instructed to contact the clinic with any interval questions or concerns.    Electronically signed by: Michelle King MD   Endocrinologist    Dictated Utilizing Dragon Dictation

## 2025-02-13 ENCOUNTER — OFFICE VISIT (OUTPATIENT)
Dept: INTERNAL MEDICINE | Facility: CLINIC | Age: 70
End: 2025-02-13
Payer: MEDICARE

## 2025-02-13 VITALS
HEIGHT: 71 IN | DIASTOLIC BLOOD PRESSURE: 75 MMHG | BODY MASS INDEX: 31.64 KG/M2 | SYSTOLIC BLOOD PRESSURE: 144 MMHG | OXYGEN SATURATION: 99 % | HEART RATE: 63 BPM | WEIGHT: 226 LBS | RESPIRATION RATE: 16 BRPM | TEMPERATURE: 97.7 F

## 2025-02-13 DIAGNOSIS — I10 BENIGN ESSENTIAL HYPERTENSION: Primary | ICD-10-CM

## 2025-02-13 DIAGNOSIS — Z79.4 TYPE 2 DIABETES MELLITUS WITH HYPERGLYCEMIA, WITH LONG-TERM CURRENT USE OF INSULIN: ICD-10-CM

## 2025-02-13 DIAGNOSIS — E78.2 MIXED HYPERLIPIDEMIA: ICD-10-CM

## 2025-02-13 DIAGNOSIS — F33.0 MILD EPISODE OF RECURRENT MAJOR DEPRESSIVE DISORDER: ICD-10-CM

## 2025-02-13 DIAGNOSIS — F51.04 INSOMNIA, PSYCHOPHYSIOLOGICAL: ICD-10-CM

## 2025-02-13 DIAGNOSIS — E11.65 TYPE 2 DIABETES MELLITUS WITH HYPERGLYCEMIA, WITH LONG-TERM CURRENT USE OF INSULIN: ICD-10-CM

## 2025-02-13 DIAGNOSIS — F41.9 ANXIETY: ICD-10-CM

## 2025-02-13 DIAGNOSIS — M54.50 LUMBOSACRAL PAIN: ICD-10-CM

## 2025-02-13 DIAGNOSIS — Z51.81 THERAPEUTIC DRUG MONITORING: ICD-10-CM

## 2025-02-13 DIAGNOSIS — N18.31 STAGE 3A CHRONIC KIDNEY DISEASE: ICD-10-CM

## 2025-02-13 DIAGNOSIS — K21.00 GASTROESOPHAGEAL REFLUX DISEASE WITH ESOPHAGITIS WITHOUT HEMORRHAGE: ICD-10-CM

## 2025-02-13 DIAGNOSIS — E03.8 ADULT ONSET HYPOTHYROIDISM: ICD-10-CM

## 2025-02-13 PROCEDURE — 3077F SYST BP >= 140 MM HG: CPT | Performed by: INTERNAL MEDICINE

## 2025-02-13 PROCEDURE — 99214 OFFICE O/P EST MOD 30 MIN: CPT | Performed by: INTERNAL MEDICINE

## 2025-02-13 PROCEDURE — G2211 COMPLEX E/M VISIT ADD ON: HCPCS | Performed by: INTERNAL MEDICINE

## 2025-02-13 PROCEDURE — 3046F HEMOGLOBIN A1C LEVEL >9.0%: CPT | Performed by: INTERNAL MEDICINE

## 2025-02-13 PROCEDURE — 1159F MED LIST DOCD IN RCRD: CPT | Performed by: INTERNAL MEDICINE

## 2025-02-13 PROCEDURE — 3078F DIAST BP <80 MM HG: CPT | Performed by: INTERNAL MEDICINE

## 2025-02-13 PROCEDURE — 1160F RVW MEDS BY RX/DR IN RCRD: CPT | Performed by: INTERNAL MEDICINE

## 2025-02-13 PROCEDURE — 1126F AMNT PAIN NOTED NONE PRSNT: CPT | Performed by: INTERNAL MEDICINE

## 2025-02-13 RX ORDER — LOSARTAN POTASSIUM 100 MG/1
100 TABLET ORAL DAILY
Qty: 90 TABLET | Refills: 1 | Status: SHIPPED | OUTPATIENT
Start: 2025-02-13

## 2025-02-13 RX ORDER — QUETIAPINE FUMARATE 50 MG/1
50 TABLET, FILM COATED ORAL 2 TIMES DAILY
Qty: 180 TABLET | Refills: 1 | Status: SHIPPED | OUTPATIENT
Start: 2025-02-13

## 2025-02-13 RX ORDER — ALPRAZOLAM 0.5 MG
0.5 TABLET ORAL NIGHTLY PRN
Qty: 30 TABLET | Refills: 4 | Status: SHIPPED | OUTPATIENT
Start: 2025-02-13

## 2025-02-13 RX ORDER — PANTOPRAZOLE SODIUM 40 MG/1
40 TABLET, DELAYED RELEASE ORAL DAILY
Qty: 90 TABLET | Refills: 1 | Status: SHIPPED | OUTPATIENT
Start: 2025-02-13

## 2025-02-13 RX ORDER — CITALOPRAM HYDROBROMIDE 40 MG/1
40 TABLET ORAL DAILY
Qty: 90 TABLET | Refills: 1 | Status: SHIPPED | OUTPATIENT
Start: 2025-02-13

## 2025-02-13 RX ORDER — ATORVASTATIN CALCIUM 80 MG/1
80 TABLET, FILM COATED ORAL NIGHTLY
Qty: 90 TABLET | Refills: 1 | Status: SHIPPED | OUTPATIENT
Start: 2025-02-13

## 2025-02-13 RX ORDER — FAMOTIDINE 20 MG/1
20 TABLET, FILM COATED ORAL 2 TIMES DAILY
Qty: 180 TABLET | Refills: 1 | Status: SHIPPED | OUTPATIENT
Start: 2025-02-13

## 2025-02-13 RX ORDER — HYDROCODONE BITARTRATE AND ACETAMINOPHEN 7.5; 325 MG/1; MG/1
1 TABLET ORAL EVERY 12 HOURS PRN
Qty: 60 TABLET | Refills: 0 | Status: SHIPPED | OUTPATIENT
Start: 2025-02-13

## 2025-02-13 NOTE — PROGRESS NOTES
"Chief Complaint  Hypertension, Hyperlipidemia, Diabetes, and Hypothyroidism    Subjective        Ramsey Riley presents to [facility]    HPI: Patient is here to follow up on the blood pressure  The patient is taking the blood pressure medications as prescribed and has had no side effects. The patient is also here to follow up on the cholesterol and is trying to follow a diet. The patient is also here to follow on sugar and thyroid and is  due to get lab work done .  He follows up with endocrinology, the patient also complains of anxiety and depression and insomnia and needs refills on medications .  He was admitted at [facility] in December for CVA secondary to acute infarcts and follows up with neurology at the stroke clinic, history today is also from wife who accompanies him  Hypertension   Pertinent negatives include no chest pain, palpitations or shortness of breath.   Hyperlipidemia   Pertinent negatives include no chest pain or shortness of breath.   Diabetes   Pertinent negatives for hypoglycemia include no dizziness, nervousness/anxiousness or pallor. Pertinent negatives for diabetes include no chest pain, no shortness of breath  Patient is on acei/arb     Objective   Vital Signs:  /75   Pulse 63   Temp 97.7 °F (36.5 °C) (Temporal)   Resp 16   Ht 180.3 cm (71\")   Wt 103 kg (226 lb)   SpO2 99%   BMI 31.52 kg/m²   Estimated body mass index is 31.52 kg/m² as calculated from the following:    Height as of this encounter: 180.3 cm (71\").    Weight as of this encounter: 103 kg (226 lb).            Physical Exam  Vitals and nursing note reviewed.   Constitutional:       General: He is not in acute distress.     Appearance: Normal appearance. He is not diaphoretic.   HENT:      Head: Normocephalic and atraumatic.      Right Ear: External ear normal.      Left Ear: External ear normal.      Nose: Nose normal.   Eyes:      Extraocular Movements: Extraocular movements " intact.      Conjunctiva/sclera: Conjunctivae normal.   Neck:      Trachea: Trachea normal.   Cardiovascular:      Rate and Rhythm: Normal rate and regular rhythm.      Heart sounds: Normal heart sounds.   Pulmonary:      Effort: Pulmonary effort is normal. No respiratory distress.   Abdominal:      General: Abdomen is flat.   Musculoskeletal:      Cervical back: Neck supple.      Comments: Walks with cane   Skin:     General: Skin is warm and dry.      Findings: No erythema.   Neurological:      Mental Status: He is alert and oriented to person, place, and time.      Comments: No gross motor or sensory deficits        Result Review :  The following data was reviewed by: Margret Purvis MD on 02/13/2025:  Common labs          12/16/2024    05:51 12/18/2024    09:05 2/6/2025    15:19   Common Labs   Glucose 254  206     BUN 19  25     Creatinine 1.45  1.52     Sodium 135  134     Potassium 4.2  3.8     Chloride 100  100     Calcium 9.2  8.7     WBC 7.97  6.77     Hemoglobin 16.1  15.6     Hematocrit 46.5  45.4     Platelets 180  176     Hemoglobin A1C   9.4                Assessment and Plan   Diagnoses and all orders for this visit:    1. Benign essential hypertension (Primary)  -     losartan (COZAAR) 100 MG tablet; Take 1 tablet by mouth Daily.  Dispense: 90 tablet; Refill: 1    2. Mixed hyperlipidemia  -     CBC (No Diff)  -     Comprehensive Metabolic Panel  -     Lipid Panel  -     atorvastatin (Lipitor) 80 MG tablet; Take 1 tablet by mouth Every Night.  Dispense: 90 tablet; Refill: 1    3. Type 2 diabetes mellitus with hyperglycemia, with long-term current use of insulin  -     Hemoglobin A1c  -     Microalbumin / Creatinine Urine Ratio - Urine, Clean Catch    4. Adult onset hypothyroidism  -     TSH  -     TSH    5. Stage 3a chronic kidney disease  -     Comprehensive Metabolic Panel    6. Anxiety  -     ALPRAZolam (XANAX) 0.5 MG tablet; Take 1 tablet by mouth At Night As Needed for Anxiety.  Dispense: 30  tablet; Refill: 4  -     citalopram (CeleXA) 40 MG tablet; Take 1 tablet by mouth Daily.  Dispense: 90 tablet; Refill: 1    7. Mild episode of recurrent major depressive disorder  -     citalopram (CeleXA) 40 MG tablet; Take 1 tablet by mouth Daily.  Dispense: 90 tablet; Refill: 1    8. Insomnia, psychophysiological  -     QUEtiapine (SEROquel) 50 MG tablet; Take 1 tablet by mouth 2 (Two) Times a Day.  Dispense: 180 tablet; Refill: 1    9. Gastroesophageal reflux disease with esophagitis without hemorrhage  -     pantoprazole (PROTONIX) 40 MG EC tablet; Take 1 tablet by mouth Daily.  Dispense: 90 tablet; Refill: 1  -     famotidine (Pepcid) 20 MG tablet; Take 1 tablet by mouth 2 (Two) Times a Day.  Dispense: 180 tablet; Refill: 1    10. Lumbosacral pain  -     HYDROcodone-acetaminophen (NORCO) 7.5-325 MG per tablet; Take 1 tablet by mouth Every 12 (Twelve) Hours As Needed for Moderate Pain.  Dispense: 60 tablet; Refill: 0  -     Compliance Drug Analysis, Ur - Urine, Clean Catch    11. Therapeutic drug monitoring  -     Compliance Drug Analysis, Ur - Urine, Clean Catch      Plan:  1.  Benign essential hypertension: Will continue current medication, low-sodium diet advised, Counseled to regularly check BP at home with goal averaging <130/80.   2.mixed hyperlipidemia: will obtain   fasting CMP and lipid panel.  Diet and exercise counseled,  Will continue current medications  3. Diabetes  Mellitus  : will obtain   fasting CMP  and hba1c  6.4  , diet and exercise counseled , Will continue current medications  4. hypothyroidism: will obtain tsh , and continue levothyroxine  5.  CKD stage IIIa: Monitor BMP, follow-up with nephrology  6.  Anxiety disorder: Will refill Celexa 40 mg daily and as needed alprazolam, ARISES and Enoc reviewed and obtained  7.  Major depression: Refill Celexa 40 mg daily  8.  Insomnia: Refill Seroquel 50 mg daily  9.  GERD: Refill pantoprazole and Pepcid  10.  Number sacral pain: UDS and Enoc  reviewed and obtained and refilled hydrocodone       Follow Up   Return in about 8 weeks (around 4/7/2025).  Patient was given instructions and counseling regarding his condition or for health maintenance advice. Please see specific information pulled into the AVS if appropriate.

## 2025-02-14 LAB — TSH SERPL DL<=0.005 MIU/L-ACNC: 1.9 UIU/ML (ref 0.45–4.5)

## 2025-02-17 DIAGNOSIS — E03.8 ADULT ONSET HYPOTHYROIDISM: ICD-10-CM

## 2025-02-17 RX ORDER — LEVOTHYROXINE SODIUM 125 UG/1
125 TABLET ORAL DAILY
Qty: 90 TABLET | Refills: 1 | Status: SHIPPED | OUTPATIENT
Start: 2025-02-17

## 2025-02-21 LAB — DRUGS UR: NORMAL

## 2025-03-10 RX ORDER — AMLODIPINE BESYLATE 10 MG/1
10 TABLET ORAL DAILY
Qty: 90 TABLET | Refills: 2 | Status: SHIPPED | OUTPATIENT
Start: 2025-03-10

## 2025-03-17 DIAGNOSIS — M54.50 LUMBOSACRAL PAIN: ICD-10-CM

## 2025-03-17 RX ORDER — HYDROCODONE BITARTRATE AND ACETAMINOPHEN 7.5; 325 MG/1; MG/1
1 TABLET ORAL EVERY 12 HOURS PRN
Qty: 60 TABLET | Refills: 0 | OUTPATIENT
Start: 2025-03-17

## 2025-03-17 RX ORDER — AMLODIPINE BESYLATE 10 MG/1
10 TABLET ORAL DAILY
Qty: 90 TABLET | Refills: 2 | OUTPATIENT
Start: 2025-03-17

## 2025-03-17 RX ORDER — HYDROCODONE BITARTRATE AND ACETAMINOPHEN 7.5; 325 MG/1; MG/1
1 TABLET ORAL EVERY 12 HOURS PRN
Qty: 60 TABLET | Refills: 0 | Status: SHIPPED | OUTPATIENT
Start: 2025-03-17

## 2025-03-17 NOTE — TELEPHONE ENCOUNTER
Caller: Jina Riley    Relationship: Emergency Contact    Best call back number: [phone]    Requested Prescriptions:   Requested Prescriptions     Pending Prescriptions Disp Refills    HYDROcodone-acetaminophen (NORCO) 7.5-325 MG per tablet 60 tablet 0     Sig: Take 1 tablet by mouth Every 12 (Twelve) Hours As Needed for Moderate Pain.        amLODIPine (NORVASC) 10 MG tablet     Pharmacy where request should be sent: [address] [phone] [facility] [phone] [facility]     Last office visit with prescribing clinician: 2/13/2025   Last telemedicine visit with prescribing clinician: Visit date not found   Next office visit with prescribing clinician: 4/8/2025         Does the patient have less than a 3 day supply:  [x] Yes  [] No        Neal Uriostegui Rep   03/17/25 13:37 EDT

## 2025-03-17 NOTE — TELEPHONE ENCOUNTER
Rx Refill Note  Last UDS 2/14/2025  Requested Prescriptions     Pending Prescriptions Disp Refills    HYDROcodone-acetaminophen (NORCO) 7.5-325 MG per tablet 60 tablet 0     Sig: Take 1 tablet by mouth Every 12 (Twelve) Hours As Needed for Moderate Pain.     Refused Prescriptions Disp Refills    amLODIPine (NORVASC) 10 MG tablet 90 tablet 2     Sig: Take 1 tablet by mouth Daily.     Refused By: CARLOS PETERSON     Reason for Refusal: Duplicate renewal request      Last office visit with prescribing clinician: 2/13/2025   Last telemedicine visit with prescribing clinician: Visit date not found   Next office visit with prescribing clinician: 4/8/2025                         Would you like a call back once the refill request has been completed: [] Yes [] No    If the office needs to give you a call back, can they leave a voicemail: [] Yes [] No    Carlos Peterson CMA  03/17/25, 13:58 EDT

## 2025-03-21 RX ORDER — AMLODIPINE BESYLATE 10 MG/1
10 TABLET ORAL DAILY
Qty: 90 TABLET | Refills: 2 | Status: SHIPPED | OUTPATIENT
Start: 2025-03-21

## 2025-03-26 ENCOUNTER — OFFICE VISIT (OUTPATIENT)
Dept: ENDOCRINOLOGY | Facility: CLINIC | Age: 70
End: 2025-03-26
Payer: MEDICARE

## 2025-03-26 VITALS
HEIGHT: 71 IN | BODY MASS INDEX: 32.06 KG/M2 | HEART RATE: 66 BPM | DIASTOLIC BLOOD PRESSURE: 80 MMHG | SYSTOLIC BLOOD PRESSURE: 142 MMHG | WEIGHT: 229 LBS

## 2025-03-26 DIAGNOSIS — E03.9 HYPOTHYROIDISM, UNSPECIFIED TYPE: ICD-10-CM

## 2025-03-26 DIAGNOSIS — E78.5 HYPERLIPIDEMIA, UNSPECIFIED HYPERLIPIDEMIA TYPE: ICD-10-CM

## 2025-03-26 DIAGNOSIS — Z79.4 TYPE 2 DIABETES MELLITUS WITH HYPERGLYCEMIA, WITH LONG-TERM CURRENT USE OF INSULIN: Primary | ICD-10-CM

## 2025-03-26 DIAGNOSIS — E11.65 TYPE 2 DIABETES MELLITUS WITH HYPERGLYCEMIA, WITH LONG-TERM CURRENT USE OF INSULIN: Primary | ICD-10-CM

## 2025-03-26 LAB
EXPIRATION DATE: ABNORMAL
EXPIRATION DATE: ABNORMAL
GLUCOSE BLDC GLUCOMTR-MCNC: 195 MG/DL (ref 70–130)
HBA1C MFR BLD: 8.3 % (ref 4.5–5.7)
Lab: ABNORMAL
Lab: ABNORMAL

## 2025-03-26 RX ORDER — DAPAGLIFLOZIN 5 MG/1
5 TABLET, FILM COATED ORAL DAILY
Qty: 30 TABLET | Refills: 5 | Status: SHIPPED | OUTPATIENT
Start: 2025-03-26

## 2025-03-26 RX ORDER — INSULIN HUMAN 100 [IU]/ML
INJECTION, SUSPENSION SUBCUTANEOUS
Qty: 135 ML | Refills: 1 | Status: SHIPPED | OUTPATIENT
Start: 2025-03-26

## 2025-03-26 NOTE — PROGRESS NOTES
"Chief Complaint   Patient presents with    Diabetes        HPI   Ramsey Riley is a 70 y.o. male had concerns including Diabetes.      Patient presents for follow-up of diabetes.    Current diabetes medications include the following:  Humulin 70/30 77 units in the morning and 70 units with dinner    Patient is monitoring glucose using freestyle kaleb.  He does report low glucose values occasionally overnight, he had a value of 68 this morning.    Patient is taking atorvastatin 80 mg daily for hyperlipidemia.    Patient had interval labs with his PCP which were reviewed.  He continues on levothyroxine 125 mcg daily.    The following portions of the patient's history were reviewed and updated as appropriate: allergies, current medications, and past social history.    Review of Systems   Constitutional:  Negative for activity change and appetite change.      /80   Pulse 66   Ht 180.3 cm (70.98\")   Wt 104 kg (229 lb)   BMI 31.95 kg/m²      Physical Exam      Constitutional:  well developed; well nourished  no acute distress  appears stated age   ENT/Thyroid: not examined   Eyes: Conjunctiva: clear   Respiratory:  breathing is unlabored  clear to auscultation bilaterally   Cardiovascular:  regular rate and rhythm   Chest:  Not performed.   Abdomen: Not performed.   : Not performed.   Musculoskeletal: Not performed   Skin: not performed.   Neuro: mental status, speech normal   Psych: mood and affect are within normal limits       Labs/Imaging  A1C:  Lab Results   Component Value Date    HGBA1C 8.3 (A) 03/26/2025    HGBA1C 9.4 (A) 02/06/2025      Glucose:  Lab Results   Component Value Date    POCGLU 195 (A) 03/26/2025      CMP:  Lab Results   Component Value Date    GLUCOSE 206 (H) 12/18/2024    BUN 25 (H) 12/18/2024    CREATININE 1.52 (H) 12/18/2024     (L) 12/18/2024    K 3.8 12/18/2024     12/18/2024    CALCIUM 8.7 12/18/2024    PROTEINTOT 6.4 12/10/2024    ALBUMIN 3.4 (L) 12/10/2024    ALT " 20 12/10/2024    AST 29 12/10/2024    ALKPHOS 89 12/10/2024    BILITOT 0.8 12/10/2024    GLOB 3.0 12/10/2024    AGRATIO 1.1 12/10/2024    BCR 16.4 12/18/2024    ANIONGAP 10.4 12/18/2024    EGFR 49.9 (L) 12/18/2024      Lipid Panel:  Lab Results   Component Value Date    CHOL 149 12/11/2024    CHLPL 153 04/15/2024    TRIG 179 (H) 12/11/2024    HDL 37 (L) 12/11/2024    LDL 81 12/11/2024      Urine Microalbumin:  Lab Results   Component Value Date    MICROALBUR 3,522.7 04/15/2024      TSH:  Lab Results   Component Value Date    TSH 1.900 02/13/2025       Freestyle kaleb downloaded for review for dates ranging March 13 through March 26, 2025, CGM is active 96% of the time with average glucose 208, GMI 8.3% with 40.6% glucose variability.  45% of data is within target range, 24% high, 31% very high.  Patient has daytime hyperglycemia.  Lowest glucose values are in the overnight hours.    Diagnoses and all orders for this visit:    1. Type 2 diabetes mellitus with hyperglycemia, with long-term current use of insulin (Primary)  -     POC Glucose, Blood  -     POC Glycosylated Hemoglobin (Hb A1C)  Diabetes is uncontrolled with hemoglobin A1c 8.3%, this is improved in comparison to prior testing.  Notably, this may not be fully reflective of changes made in February.  CGM containing 72 hours of glucose data was downloaded and reviewed  Patient has significant daytime hyperglycemia, plan to increase morning dose of premixed insulin and reduce evening dose given borderline low values overnight.  Patient will change insulin 70/30 to 84 units in the morning and 64 units before dinner  Discussed utilization of SGLT2 inhibitor given microalbuminuria and CKD.  Reviewed potential adverse effects including dehydration, urinary tract infection, yeast infection.  Following discussion, patient was agreeable to initiation of Farxiga, as prescription was sent for 5 mg daily. Patient does have an upcoming appointment with nephrology.  CMP,  lipid panel reviewed from December 2024  Urine microalbumin is up-to-date from April 2024    2. Hypothyroidism, unspecified type  Repeat TSH was normal in February 2025, patient will continue levothyroxine 125 mcg daily    3. Hyperlipidemia, unspecified hyperlipidemia type  Lipid panel is up-to-date from December 2024, patient will continue atorvastatin 80 mg daily       Return in about 3 months (around 6/26/2025). The patient was instructed to contact the clinic with any interval questions or concerns.    Electronically signed by: Michelle King MD   Endocrinologist    Dictated Utilizing Dragon Dictation

## 2025-04-17 ENCOUNTER — HOSPITAL ENCOUNTER (OUTPATIENT)
Dept: NEUROLOGY | Facility: HOSPITAL | Age: 70
Discharge: HOME | End: 2025-04-17
Payer: MEDICARE

## 2025-04-17 DIAGNOSIS — R53.1 WEAKNESS: ICD-10-CM

## 2025-04-17 DIAGNOSIS — I63.9 ISCHEMIC STROKE: ICD-10-CM

## 2025-04-17 DIAGNOSIS — I73.9 PAD (PERIPHERAL ARTERY DISEASE): ICD-10-CM

## 2025-04-17 PROCEDURE — 95913 NRV CNDJ TEST 13/> STUDIES: CPT

## 2025-04-17 PROCEDURE — 95886 MUSC TEST DONE W/N TEST COMP: CPT

## 2025-04-18 ENCOUNTER — OFFICE VISIT (OUTPATIENT)
Dept: NEUROLOGY | Facility: CLINIC | Age: 70
End: 2025-04-18
Payer: MEDICARE

## 2025-04-18 VITALS
OXYGEN SATURATION: 95 % | DIASTOLIC BLOOD PRESSURE: 86 MMHG | BODY MASS INDEX: 32.62 KG/M2 | WEIGHT: 233.8 LBS | HEART RATE: 84 BPM | SYSTOLIC BLOOD PRESSURE: 156 MMHG

## 2025-04-18 DIAGNOSIS — I10 ESSENTIAL HYPERTENSION: ICD-10-CM

## 2025-04-18 DIAGNOSIS — Z86.73 HISTORY OF CVA (CEREBROVASCULAR ACCIDENT): Primary | ICD-10-CM

## 2025-04-18 DIAGNOSIS — E11.65 TYPE 2 DIABETES MELLITUS WITH HYPERGLYCEMIA, WITH LONG-TERM CURRENT USE OF INSULIN: ICD-10-CM

## 2025-04-18 DIAGNOSIS — R53.1 WEAKNESS: ICD-10-CM

## 2025-04-18 DIAGNOSIS — Z79.4 TYPE 2 DIABETES MELLITUS WITH HYPERGLYCEMIA, WITH LONG-TERM CURRENT USE OF INSULIN: ICD-10-CM

## 2025-04-18 DIAGNOSIS — E78.5 HYPERLIPIDEMIA, UNSPECIFIED HYPERLIPIDEMIA TYPE: ICD-10-CM

## 2025-04-18 DIAGNOSIS — I63.9 ISCHEMIC STROKE: ICD-10-CM

## 2025-04-21 ENCOUNTER — TELEPHONE (OUTPATIENT)
Dept: NEUROLOGY | Facility: CLINIC | Age: 70
End: 2025-04-21
Payer: MEDICARE

## 2025-04-21 DIAGNOSIS — M54.50 LUMBOSACRAL PAIN: ICD-10-CM

## 2025-04-21 DIAGNOSIS — R41.3 MEMORY LOSS: Primary | ICD-10-CM

## 2025-04-21 RX ORDER — HYDROCODONE BITARTRATE AND ACETAMINOPHEN 7.5; 325 MG/1; MG/1
1 TABLET ORAL EVERY 12 HOURS PRN
Qty: 60 TABLET | Refills: 0 | Status: SHIPPED | OUTPATIENT
Start: 2025-04-21

## 2025-04-21 NOTE — TELEPHONE ENCOUNTER
Provider: BOUCHRA HUTCHINSON     Caller: Jina Riley    Relationship to Patient: Emergency Contact    Pharmacy: [facility]    Phone Number: [phone]    Reason for Call: THE PATIENT'S GETTING MORE CONFUSED AND FORGETFUL. HE DOESN'T RECOGNIZE HIS OWN STUFF IN HIS GARAGE. HE NEEDS MEDICINE BECAUSE HE'S BECOMING AGITATED AND VERBALLY ABUSIVE. HE ALSO WANTS TO DRIVE BUT BECAUSE THE DR NEVER TOLD HIM HE COULDN'T BUT HIS FAMILY KNOWS HE'S IN NO SHAPE TO DRIVE. THEY THINK HE HAS DEMENTIA AND NEEDS MEDICATION.    When was the patient last seen: 4/18/25    When did it start: YESTERDAY IT WAS NOTABLY WORSE    Characteristics of symptom/severity: 10 OUT OF 10    Timing- Is it constant or intermittent: INTERMITTENT    What makes it worse: NO    What makes it better: CHANGING THE SUBJECT    What therapies/medications have you tried: NONE

## 2025-04-21 NOTE — TELEPHONE ENCOUNTER
I spoke to Jonha Alas PA-C, and he would like to offer the patient a referral to general neurology.  The stroke service does not manage dementia medications or specifically conduct cognition tests.     Do you mind to call patient to offer referral?

## 2025-04-21 NOTE — TELEPHONE ENCOUNTER
Caller: Jina Riley    Relationship: Emergency Contact    Best call back number: [phone]     Requested Prescriptions:   Requested Prescriptions     Pending Prescriptions Disp Refills    HYDROcodone-acetaminophen (NORCO) 7.5-325 MG per tablet 60 tablet 0     Sig: Take 1 tablet by mouth Every 12 (Twelve) Hours As Needed for Moderate Pain.        Pharmacy where request should be sent: [address] [phone] [facility] [phone]      Last office visit with prescribing clinician: 2/13/2025   Last telemedicine visit with prescribing clinician: Visit date not found   Next office visit with prescribing clinician: 4/30/2025       Neal Hector Rep   04/21/25 11:42 EDT

## 2025-04-28 ENCOUNTER — TELEPHONE (OUTPATIENT)
Dept: NEUROLOGY | Facility: CLINIC | Age: 70
End: 2025-04-28
Payer: MEDICARE

## 2025-04-28 NOTE — TELEPHONE ENCOUNTER
I called patient to advise on a referral that was placed. During the call, Mrs Riley stated that he fell, hit his head (has stitches) and broke his shoulder. She has an appointment in [place] tomorrow to see if he will need surgery on his shoulder. Provider notified via Epic chat.

## 2025-04-30 ENCOUNTER — TELEPHONE (OUTPATIENT)
Dept: INTERNAL MEDICINE | Facility: CLINIC | Age: 70
End: 2025-04-30

## 2025-04-30 NOTE — TELEPHONE ENCOUNTER
Caller: Jina Riley    Relationship: Emergency Contact    Best call back number:  [phone]    Requested Prescriptions:   Requested Prescriptions      No prescriptions requested or ordered in this encounter      QUEtiapine (SEROquel) 50 MG tablet     Pharmacy where request should be sent: [place], [place] - [address] [place] [address] [phone] [facility] [phone]      Last office visit with prescribing clinician: 2/13/2025   Last telemedicine visit with prescribing clinician: Visit date not found   Next office visit with prescribing clinician: 5/5/2025     Additional details provided by patient:     TOTALLY OUT OF THIS MEDICATION    Does the patient have less than a 3 day supply:  [x] Yes  [] No    Would you like a call back once the refill request has been completed: [] Yes [x] No    If the office needs to give you a call back, can they leave a voicemail: [] Yes [x] No    Bebe Ford, PCT   04/30/25 11:37 EDT

## 2025-04-30 NOTE — TELEPHONE ENCOUNTER
Called [facility] and spoke with Shirin to let her know Dr Purvis will be taking over his care for home health

## 2025-04-30 NOTE — TELEPHONE ENCOUNTER
Caller: [facility]    Relationship: Home Health    Best call back number: [phone]       What was the call regarding: HOME HEALTH IS WANTING TO SEE IF DR COOK IS GOING TO FOLLOWING HIS HOME HEALTH    Is it okay if the provider responds through MyChart:

## 2025-04-30 NOTE — TELEPHONE ENCOUNTER
Caller: Jina Riley    Relationship: Emergency Contact    Best call back number: [phone]    What is the best time to reach you: ANY    Who are you requesting to speak with (clinical staff, provider,  specific staff member):     CLINICAL    What was the call regarding:     NEEDS TO TALK TO DOCTOR REGARDING WHY PATIENT DID NOT MAKE IT IN TO THIS APPOINTMENT.  ALSO NEEDS MEDICATION

## 2025-04-30 NOTE — TELEPHONE ENCOUNTER
I spoke with wife she states he is getting bad. He had a fall 4/23/2025 and had stitches on his head. Went to [facility]. She states he is staying confused more. He can't get up and she had to cancel today due to this. She states his Sleep medication ( unsure what) is not working he is up all through the night. She is struggling to get him help. He is in a lot of pain.

## 2025-05-02 NOTE — PROGRESS NOTES
New Patient Office Visit      Encounter Date: 2025   Patient Name: Ramsey Riley  : 1955   MRN: [id number]   PCP: Margret Purvis MD    Referring Provider: No ref. provider found     Chief Complaint:    Chief Complaint   Patient presents with   • History of CVA (cerebrovascular accident)       History of Present Illness: Ramsey Riley is a 70 y.o. male with known medical diagnoses of multiple stroke with lacunar and left temporal infarct in  and prior right anterior thalamic infarct, peripheral neuropathy secondary to uncontrolled diabetes, frequent falls, HTN, CAD s/p CABG, and chronic pulmonary embolism (currently on Eliquis) who presented to [facility] on 12/10/2024 following a fall reported on the prior day and when she reportedly fell on his right shoulder.  NIH 0 per evaluation with Dr. Cruz teleneurology however the patient was more confused and lethargic.  He was alert to person, place, and month at time of evaluation but could not remember the year.  Reportedly had some difficulty interpreting complex pictures due to some peripheral vision loss secondary to diabetes.  Reduced range of motion to right upper extremity was noted likely due to a rotator cuff tear.  No additional focal deficits were observed.  Bilateral lower extremity peripheral neuropathy reported.  Patient had some significant balance difficulty with standing.  CT head was without hemorrhage or acute abnormality.  CT angiography was unrevealing and less than 50% stenosis on bilateral carotids.  MRI without evidence of acute stroke, but evidence of CAA with increased number of microhemorrhages compared to prior imaging.  EEG showed no evidence of seizure activity.  Patient was discharged with recommendation to continue Eliquis, take supplements with B12 and folate, follow-up with outpatient PT, and have outpatient EMG/nerve conduction studies performed.  Of note, patient typically follows with Dr. Nuñez  Emerson for continued Neuro Stroke evaluations of prior CVAs    Clinic note 01/21/25: Patient presents today currently in wheelchair accompanied by his wife to establish care clinic post-hospital discharge.  Much of the history is provided by the patient's wife.  No reported worsening or new strokelike symptoms since discharge from hospital.  On exam today the patient continues to have some significant confusion.  He was unable to initially correctly identify his current age or the current month.  Patient continues to have bilateral leg weakness and significant bilateral ongoing neuropathy to both lower extremities.  Lower extremity coordination could not be tested due to weakness however some dysmetria observed bilaterally to upper extremities.  Patient has some impaired peripheral vision however was able to correctly identify number of fingers held to all 4 quadrants of visual field testing.  Patient is currently on both benzodiazepines and Seroquel.  Per chart review and per patient's wife he does occasionally double on medications if needed and frequently sleeps throughout the day.  Patient reports no urinary incontinence or increased urinary frequency and NPH is not suspected as source of both confusion and lower extremity weakness.  Patient does have a history of spinal stenosis.  Per wife, outpatient EMG/nerve conduction studies have not been made for appointments at this time.  The studies have been ordered today.  Additionally the patient has not established with home health for any physical therapy or assessment of the home for fall risk.  Order has been placed per request.  Patient continues to take Eliquis as prescribed.  He does have some slight bruising but no reported worsening of bruising or obvious bleeding.  Reportedly patient has been supplementing with B12 and folate.  I discussed with patient and patient's wife that given current diagnosis of CAA, additional blood thinners in the form of  antiplatelet medications are not recommended at he is to avoid any aspirin over-the-counter medication for headache or pain.  Additionally would avoid any herbal supplementation which which could then blood including turmeric.  Given the increased bleed risk for the patient, education concerning fall risk was provided.  Patient currently uses a cane at home for ambulation.  Until home health and physical therapy can further provide evaluation, strength, and coordination training, a walker may be more appropriate for ambulation to avoid falls at home.  Patient and family had no additional questions or concerns today.  Patient does have an appointment with Dr. Norman on 2/13/25, but given ongoing workup and current satisfaction with [facility] neurology services, family requested follow-up appointments with [facility] neurology stroke in [facility].     Clinic Visit 04/18/2025: Patient is seen today accompanied by his wife for routine follow up. Patient reports no new or worsening stroke like symptoms today. Patient continues to have some confusion similar to prior exams and today missed questions concerning his age and current month. Vision continues to be impaired worse to peripheral vision. Bilateral leg weakness is improved compared to prior exam. Patient is able to ambulate today with a cane, but does have a lean to the left with his gait. Some minor right arm ataxia on finger to nose tests. I discussed the results of EMG/nerve conduction study which demonstrated severe axonal generalized neuropathy. Patient is currently prescribed Norco for some neuropathic pain via his PCP. Patient states that his sleep has been improved and he feels like he has more energy during the day with some adjustments to meds and prior addition of B12 and Folate. I have suggested addition of B1 in a B complex supplement as the patient also has a prior history of alcohol use. Patients wife states that Home Health PT has not yet  been arranged. On chart review, these orders were placed on 01/21 will be reordered in an attempt to schedule. As per prior clinic visit, Given the increased bleed risk for the patient on Eliquis and with CAA, education concerning fall risk was provided.  Until home health and physical therapy can further provide evaluation, strength, and coordination training, a walker may be more appropriate than cane for ambulation to avoid falls at home. Patient continued to decline sleep study referral. StopBang = 5.  Patients wife arranged medications and reports compliance with Eliquis and statin. Patient and family had no additional questions or concerns today.     On follow up with patients wife, she is concerned for confusion and memory loss. Referral to General Neuro for neuro cognitive testing is made.     Stroke Risk Factors: carotid stenosis, diabetes mellitus, hyperlipidemia, and hypertension      Subjective      Past Medical History:   Past Medical History:   Diagnosis Date   • Arthritis    • Clot    • Coronary artery disease    • Dementia 12/18/2024   • Diabetes mellitus    • Heart disease    • History of blood clots    • Hypertension    • Impaired mobility    • Low back pain    • Thyroid disease    • Type 2 diabetes mellitus        Past Surgical History:   Past Surgical History:   Procedure Laterality Date   • CHOLECYSTECTOMY  2015   • COLONOSCOPY  2015    dr srivastava   • CORONARY ARTERY BYPASS GRAFT      [place] [id number] BY PASS    • ENDOSCOPY         Family History:   Family History   Problem Relation Age of Onset   • Diabetes Mother    • Heart disease Mother         STENTING   • COPD Mother    • Heart attack Father    • Diabetes Father    • Heart defect Son         Ross procedure   • Heart murmur Son    • Hypertension Son        Social History:   Social History     Socioeconomic History   • Marital status:    Tobacco Use   • Smoking status: Never     Passive exposure: Never   • Smokeless tobacco: Current      Types: Chew   Vaping Use   • Vaping status: Never Used   Substance and Sexual Activity   • Alcohol use: No   • Drug use: No   • Sexual activity: Defer       Medications:     Current Outpatient Medications:   •  ALPRAZolam (XANAX) 0.5 MG tablet, Take 1 tablet by mouth At Night As Needed for Anxiety., Disp: 30 tablet, Rfl: 4  •  amLODIPine (NORVASC) 10 MG tablet, Take 1 tablet by mouth Daily., Disp: 90 tablet, Rfl: 2  •  apixaban (Eliquis) 5 MG tablet tablet, Take 1 tablet by mouth Every 12 (Twelve) Hours., Disp: 60 tablet, Rfl: 5  •  atorvastatin (Lipitor) 80 MG tablet, Take 1 tablet by mouth Every Night., Disp: 90 tablet, Rfl: 1  •  chlorthalidone (HYGROTON) 25 MG tablet, Take 0.5 tablets by mouth Daily., Disp: 45 tablet, Rfl: 3  •  citalopram (CeleXA) 40 MG tablet, Take 1 tablet by mouth Daily., Disp: 90 tablet, Rfl: 1  •  Continuous Glucose Sensor (FreeStyle Rina 3 Sensor) misc, Use 1 each Every 14 (Fourteen) Days., Disp: 3 each, Rfl: 11  •  dapagliflozin (Farxiga) 5 MG tablet tablet, Take 1 tablet by mouth Daily., Disp: 30 tablet, Rfl: 5  •  dorzolamide-timolol (COSOPT) 2-0.5 % ophthalmic solution, Administer 1 drop to both eyes 2 (Two) Times a Day., Disp: , Rfl:   •  famotidine (Pepcid) 20 MG tablet, Take 1 tablet by mouth 2 (Two) Times a Day., Disp: 180 tablet, Rfl: 1  •  glucose blood (Accu-Chek Caryn Plus) test strip, USE AS DIRECTED 3 TIMES DAILY, Disp: 100 each, Rfl: 12  •  glucose monitor monitoring kit, 1 each Daily., Disp: 1 each, Rfl: 1  •  glucose monitor monitoring kit, 1 each Daily., Disp: 1 each, Rfl: 1  •  Insulin NPH Isophane & Regular (HumuLIN 70/30 KwikPen) (70-30) 100 UNIT/ML suspension pen-injector, Inject 84 units in the morning and 64 units in the dinner E11.65, Disp: 135 mL, Rfl: 1  •  Insulin Pen Needle 32G X 4 MM misc, For use with insulin injections, daily, Disp: 100 each, Rfl: 11  •  Insulin Syringes, Disposable, U-100 1 ML misc, 50 Units 3 (Three) Times a Day As Needed (50 units in  the morning, and then with meals PRN)., Disp: 200 each, Rfl: 12  •  Lancets 30G misc, Check sugar 3 times daily, Disp: 30 each, Rfl: 12  •  levothyroxine (SYNTHROID, LEVOTHROID) 125 MCG tablet, Take 1 tablet by mouth Daily., Disp: 90 tablet, Rfl: 1  •  losartan (COZAAR) 100 MG tablet, Take 1 tablet by mouth Daily., Disp: 90 tablet, Rfl: 1  •  pantoprazole (PROTONIX) 40 MG EC tablet, Take 1 tablet by mouth Daily., Disp: 90 tablet, Rfl: 1  •  QUEtiapine (SEROquel) 50 MG tablet, Take 1 tablet by mouth 2 (Two) Times a Day., Disp: 180 tablet, Rfl: 1  •  HYDROcodone-acetaminophen (NORCO) 7.5-325 MG per tablet, Take 1 tablet by mouth Every 12 (Twelve) Hours As Needed for Moderate Pain., Disp: 60 tablet, Rfl: 0    Allergies:   Allergies   Allergen Reactions   • Penicillins Unknown - High Severity   • Trazodone Other (See Comments)     agitation       Objective     Physical Exam:  Vital Signs:   Vitals:    04/18/25 1330   BP: 156/86   BP Location: Right arm   Patient Position: Sitting   Cuff Size: Small Adult   Pulse: 84   SpO2: 95%   Weight: 106 kg (233 lb 12.8 oz)     Body mass index is 32.62 kg/m².     Physical Exam  Constitutional:       General: He is not in acute distress.     Appearance: He is not ill-appearing.   HENT:      Head: Normocephalic and atraumatic.   Eyes:      Extraocular Movements: Extraocular movements intact.      Pupils: Pupils are equal, round, and reactive to light.   Cardiovascular:      Rate and Rhythm: Normal rate.   Pulmonary:      Effort: Pulmonary effort is normal. No respiratory distress.   Abdominal:      General: There is no distension.      Tenderness: There is no guarding.   Musculoskeletal:         General: No signs of injury.      Right shoulder: Decreased range of motion.      Right lower leg: Edema present.      Left lower leg: Edema present.   Skin:     General: Skin is warm and dry.      Findings: Bruising present. No lesion.   Neurological:      Mental Status: He is alert. Mental  status is at baseline. He is disoriented.      Sensory: Sensory deficit present.      Motor: Weakness present.      Coordination: Coordination abnormal.   Psychiatric:         Speech: Speech normal.     Neurological Exam  Mental Status  Alert. Oriented only to person. Speech is normal. Language is fluent with no aphasia.    Cranial Nerves  CN II: Right visual acuity: Counts fingers. Left visual acuity: Counts fingers. Some decreased peripheral vision however patient able to count fingers on all visual field quadrants.  CN III, IV, VI: Extraocular movements intact bilaterally. Pupils equal round and reactive to light bilaterally.  CN V:  Right: Facial sensation is normal.  Left: Facial sensation is normal on the left.  CN VII:  Right: There is no facial weakness.  Left: There is no facial weakness.    Motor  Normal muscle bulk throughout. No fasciculations present. Normal muscle tone. Strength is 5/5 in all four extremities except as noted.  4/5 strength extremities  3/5 strength bilateral lower extremities with drift.    Sensory  Light touch abnormality: Decreased sensation to touch bilaterally to lower extremities.     Coordination  Right: Finger-to-nose abnormality:Left: Finger-to-nose abnormality:  Dysmetria observed on exam bilateral finger-nose  Lower extremities not testable due to weakness.    Gait  Casual gait: Reduced stride length. Ataxic gait.  Patient with left gait lean and assisted with cane .       NIH Stroke Scale    1a  Level of consciousness: 0=alert; keenly responsive   1b. LOC questions:  2=Answers neither task correctly    1c. LOC commands: 0=Performs both tasks correctly   2.  Best Gaze: 0=normal   3. Visual: 0=No visual loss   4. Facial Palsy: 0=Normal symmetric movement   5a. Motor left arm: 0=No drift, limb holds 90 (or 45) degrees for full 10 seconds   5b.  Motor right arm: 0=No drift, limb holds 90 (or 45) degrees for full 10 seconds   6a. Motor left le=Drift, limb holds 90 (or 45)  degrees but drifts down before full 10 seconds: does not hit bed   6b  Motor right le=Drift, limb holds 90 (or 45) degrees but drifts down before full 10 seconds: does not hit bed   7. Limb Ataxia: 2=Present in two limbs   8.  Sensory: 0=Normal; no sensory loss   9. Best Language:  0=No aphasia, normal   10. Dysarthria: 0=Normal   11. Extinction and Inattention: 0=No abnormality    Total:   6         Modified Bruce Score:   MODIFIED BRUCE SCALE (to be assessed for each patient having history of stroke) []Stroke history but not assessed  []0: No symptoms at all  []1: No significant disability despite symptoms  []2: Slight disability  [x]3: Moderate disability  []4: Moderately severe disability  []5: Severe disability  []6: Death        PHQ-9 Depression Screening  Little interest or pleasure in doing things? Not at all   Feeling down, depressed, or hopeless? Not at all   PHQ-2 Total Score 0   Trouble falling or staying asleep, or sleeping too much?     Feeling tired or having little energy?     Poor appetite or overeating?     Feeling bad about yourself - or that you are a failure or have let yourself or your family down?     Trouble concentrating on things, such as reading the newspaper or watching television?     Moving or speaking so slowly that other people could have noticed? Or the opposite - being so fidgety or restless that you have been moving around a lot more than usual?     Thoughts that you would be better off dead, or of hurting yourself in some way?     PHQ-9 Total Score     If you checked off any problems, how difficult have these problems made it for you to do your work, take care of things at home, or get along with other people?            Imaging Reviewed:     Nerve Conduction Studies:    Sural sensory responses on the right and left are absent    No responses seen following stimulation of the right and left peroneal  motor or right and left posterior tibial motor nerves    Motor F wave are  absent in the right and left peroneal and right and left  posterior tibial motor nerves    H-reflexes are prolonged bilaterally and low amplitude    Median sensory latency on the left is prolonged at 2.9 ms with low  amplitude    Ulnar sensory latency on the left is prolonged at 4.9 ms with low  amplitude    Median motor latency on the left is prolonged at 7.2 ms, velocity is  slowed at 44 m/s, and amplitude is reduced at 2.0 mV    Left median and left ulnar motor F wave latencies are prolonged    Median sensory latency on the right is prolonged at 2.6 ms and amplitude  is reduced    Ulnar sensory latency on the right is prolonged at 4.2 ms and amplitude is  reduced    Median motor latency on the right is prolonged at 5.5 ms, velocity is  slowed at 43 m/s and amplitude is reduced at 4.4 mV    Right median and right ulnar motor F wave latencies are prolonged    Left ulnar motor study shows a reduced amplitude of 1.1 mV no responses  were seen with more proximal sites of stimulation    Amplitude of the right ulnar motor nerve is reduced at 0.6 mV.  No  responses seen following or proximal sites of stimulation    Study of the left ulnar antidromic sensory nerve showed low amplitude and  prolonged latency    Stated the right ulnar and sural sensory nerve showed a prolonged latency  and slowed velocities.  Amplitudes were low.    Electromyogram:    Needle examination of the the legs shows changes of chronic denervation  and reduced recruitment in distal muscles in both legs    Needle examination of the right arm shows changes of chronic denervation  in distal muscles of the right arm as well as ongoing denervation in the  FDI   Impression:         Generalized sensorimotor neuropathy, axonal, severe         EEG    Result Date: 12/10/2024  Diffuse cerebral dysfunction of moderate degree, nonspecific.  This is most commonly seen due to toxic/metabolic cause No evidence for epilepsy is seen This repo most commonly seen due  to toxic/metabolic causeThis isrt is transcribed using the Dragon dictation system.      MRI Brain Without Contrast    Result Date: 12/10/2024  Gross motion artifact on multiple images and motion artifact present on all images.  No acute CVA identified though a small area could be missed secondary to motion artifact.  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      This report was signed and finalized on 12/10/2024 1:38 PM by Kristi Roblero MD.      CT Angiogram Head    Result Date: 12/10/2024  Suboptimal exam secondary to motion artifact on multiple images.  Less than 50% stenosis of the internal carotid arteries bilaterally.  No significant intracranial stenosis or aneurysm identified, however, small lesions could be missed secondary to motion artifact.    DLP: 749.41 mGy.cm,748.94 mGy.cm CTDI: 22.7 mGy  This study was performed with techniques to keep radiation doses as low as reasonably achievable (ALARA). Individualized dose reduction techniques using automated exposure control or adjustment of mA and/or kV according to the patient size were employed.      Images were reviewed, interpreted, and dictated by Dr. Kristi Roblero MD Transcribed by Anna Beasley PA-C.  This report was signed and finalized on 12/10/2024 11:28 AM by Kristi Roblero MD.      CT Angiogram Neck    Result Date: 12/10/2024  Suboptimal exam secondary to motion artifact on multiple images.  Less than 50% stenosis of the internal carotid arteries bilaterally.  No significant intracranial stenosis or aneurysm identified, however, small lesions could be missed secondary to motion artifact.    DLP: 749.41 mGy.cm,748.94 mGy.cm CTDI: 22.7 mGy  This study was performed with techniques to keep radiation doses as low as reasonably achievable (ALARA). Individualized dose reduction techniques using automated exposure control or adjustment of mA and/or kV according to the patient size were employed.      Images were reviewed, interpreted,  and dictated by Dr. Kristi Roblero MD Transcribed by Anna Beasley PA-C.  This report was signed and finalized on 12/10/2024 11:28 AM by Kristi Roblero MD.      CT Head Without Contrast    Result Date: 12/10/2024  Stable chronic changes without acute intracranial process.     DLP: 749.41 mGy.cm,748.94 mGy.cm CTDI: 22.7 mGy   This study was performed with techniques to keep radiation doses as low as reasonably achievable (ALARA). Individualized dose reduction techniques using automated exposure control or adjustment of mA and/or kV according to the patient size were employed.     Images were reviewed, interpreted, and dictated by Dr. Kristi Roblero MD Transcribed by Anna Beasley PA-C.  This report was signed and finalized on 12/10/2024 11:20 AM by Kristi Roblero MD.      Laboratory Results:   CBC w/diff          12/10/2024    11:28 12/16/2024    05:51 12/18/2024    09:05   CBC w/Diff   WBC 8.65  7.97  6.77    RBC 5.40  5.38  5.25    Hemoglobin 16.0  16.1  15.6    Hematocrit 48.1  46.5  45.4    MCV 89.1  86.4  86.5    MCH 29.6  29.9  29.7    MCHC 33.3  34.6  34.4    RDW 12.7  11.9  12.2    Platelets 146  180  176    Neutrophil Rel % 67.6  71.8     Immature Granulocyte Rel % 0.2  0.3     Lymphocyte Rel % 23.0  18.7     Monocyte Rel % 8.2  6.9     Eosinophil Rel % 0.7  1.9     Basophil Rel % 0.3  0.4        Lab Results   Component Value Date    GLUCOSE 206 (H) 12/18/2024    BUN 25 (H) 12/18/2024    CREATININE 1.52 (H) 12/18/2024     (L) 12/18/2024    K 3.8 12/18/2024     12/18/2024    CALCIUM 8.7 12/18/2024    PROTEINTOT 6.4 12/10/2024    ALBUMIN 3.4 (L) 12/10/2024    ALT 20 12/10/2024    AST 29 12/10/2024    ALKPHOS 89 12/10/2024    BILITOT 0.8 12/10/2024    GLOB 3.0 12/10/2024    AGRATIO 1.1 12/10/2024    BCR 16.4 12/18/2024    ANIONGAP 10.4 12/18/2024    EGFR 49.9 (L) 12/18/2024      Most Recent A1C          3/26/2025    11:43   HGBA1C Most Recent   Hemoglobin A1C 8.3       Lipid Panel          12/11/2024     09:07   Lipid Panel   Total Cholesterol 149    Triglycerides 179    HDL Cholesterol 37    VLDL Cholesterol 31    LDL Cholesterol  81    LDL/HDL Ratio 2.06           Assessment / Plan      Assessment/Plan:   Diagnoses and all orders for this visit:    # History of CVA, prior lacunar left temporal and right anterior thalamic infarcts  # Progressive cerebral amyloid angiopathy  # Bilateral lower extremity weakness  -Continue Eliquis 5 mg twice daily for history of chronic pulmonary embolisms  -Recommend avoid additional antiplatelets due to progressive CAA  -Recommend supplementation with B12, B1 and folate  -Fall risk education provided.  Home health referral for physical therapy and home fall risk assessment ordered  -Recommended patient take medications as prescribed as polypharmacy could contribute to confusion and increased fall risk with doubling of medication such as benzodiazepines and Seroquel  -Dietary changes recommended including Mediterranean diet for secondary stroke prevention  -Follow in neurology stroke clinic [facility] in approximately 3 months    # Hyperlipidemia  -LDL on 12/11/2024 equal 81.  Goal LDL less than 70  -Continue atorvastatin 80 mg nightly  -Follow with PCP for routine lab work and monitoring    # Hypertension  -Today blood pressure 156/86.  Goal blood pressure less than 130/80  -Recommend blood pressure log book at home  -Recommend adequate hydration via H2O and avoidance of hypotension  -Follow with cardiologist for continued monitoring and medication adjustments as needed    # T2DM  -Recent A1c: 8.3 on 03/26/25  -Recommended heart healthy and diabetic diet with monitoring of blood glucose  -Outpatient EMG/nerve conduction study demonstrates severe general axonal neuropathy  -Continue home medications  -Continue appointments with current endocrinologist for monitoring    # Sleep disturbance  -Current STOP-BANG score equal 5.  Patient denies snoring or feeling tired due to lack of  sleep  -Referral for sleep study and CPAP evaluation deferred at this time.  Will continue discussions for evaluation on future visits    Discussed the importance of medication compliance and lifestyle modifications (adequate blood pressure control, adequate control of hyperlipidemia, adequate glycemic control, increase physical activity, and healthy diet) to help reduce the risk of future cerebrovascular events.  Also discussed the signs symptoms that would warrant the patient return back to the emergency department including unilateral weakness, unilateral numbness, visual disturbances, loss of balance, speech difficulties, and/or a sudden severe headache.  Patient and family acknowledge understanding    Follow Up:   Follow in neurology stroke clinic in approximately 6 months    Jonah Alas PA-C  [facility]

## 2025-05-05 ENCOUNTER — OFFICE VISIT (OUTPATIENT)
Dept: INTERNAL MEDICINE | Facility: CLINIC | Age: 70
End: 2025-05-05
Payer: MEDICARE

## 2025-05-05 VITALS
OXYGEN SATURATION: 95 % | HEIGHT: 71 IN | DIASTOLIC BLOOD PRESSURE: 82 MMHG | WEIGHT: 223.8 LBS | RESPIRATION RATE: 20 BRPM | TEMPERATURE: 96.9 F | HEART RATE: 74 BPM | BODY MASS INDEX: 31.33 KG/M2 | SYSTOLIC BLOOD PRESSURE: 130 MMHG

## 2025-05-05 DIAGNOSIS — I10 BENIGN ESSENTIAL HYPERTENSION: ICD-10-CM

## 2025-05-05 DIAGNOSIS — E85.4 CEREBRAL AMYLOID ANGIOPATHY: ICD-10-CM

## 2025-05-05 DIAGNOSIS — Z91.81 AT HIGH RISK FOR FALLS: ICD-10-CM

## 2025-05-05 DIAGNOSIS — F51.04 INSOMNIA, PSYCHOPHYSIOLOGICAL: ICD-10-CM

## 2025-05-05 DIAGNOSIS — Z00.00 MEDICARE ANNUAL WELLNESS VISIT, SUBSEQUENT: Primary | ICD-10-CM

## 2025-05-05 DIAGNOSIS — I68.0 CEREBRAL AMYLOID ANGIOPATHY: ICD-10-CM

## 2025-05-05 RX ORDER — QUETIAPINE FUMARATE 100 MG/1
100 TABLET, FILM COATED ORAL NIGHTLY
Qty: 30 TABLET | Refills: 4 | Status: SHIPPED | OUTPATIENT
Start: 2025-05-05

## 2025-05-05 NOTE — PATIENT INSTRUCTIONS
Advance Care Planning and Advance Directives     You make decisions on a daily basis - decisions about where you want to live, your career, your home, your life. Perhaps one of the most important decisions you face is your choice for future medical care. Take time to talk with your family and your healthcare team and start planning today.  Advance Care Planning is a process that can help you:  Understand possible future healthcare decisions in light of your own experiences  Reflect on those decision in light of your goals and values  Discuss your decisions with those closest to you and the healthcare professionals that care for you  Make a plan by creating a document that reflects your wishes    Surrogate Decision Maker  In the event of a medical emergency, which has left you unable to communicate or to make your own decisions, you would need someone to make decisions for you.  It is important to discuss your preferences for medical treatment with this person while you are in good health.     Qualities of a surrogate decision maker:  Willing to take on this role and responsibility  Knows what you want for future medical care  Willing to follow your wishes even if they don't agree with them  Able to make difficult medical decisions under stressful circumstances    Advance Directives  These are legal documents you can create that will guide your healthcare team and decision maker(s) when needed. These documents can be stored in the electronic medical record.    Living Will - a legal document to guide your care if you have a terminal condition or a serious illness and are unable to communicate. States vary by statute in document names/types, but most forms may include one or more of the following:        -  Directions regarding life-prolonging treatments        -  Directions regarding artificially provided nutrition/hydration        -  Choosing a healthcare decision maker        -  Direction regarding organ/tissue  donation    Durable Power of  for Healthcare - this document names an -in-fact to make medical decisions for you, but it may also allow this person to make personal and financial decisions for you. Please seek the advice of an  if you need this type of document.    **Advance Directives are not required and no one may discriminate against you if you do not sign one.    Medical Orders  Many states allow specific forms/orders signed by your physician to record your wishes for medical treatment in your current state of health. This form, signed in personal communication with your physician, addresses resuscitation and other medical interventions that you may or may not want.      For more information or to schedule a time with a [facility] Advance Care Planning Facilitator contact: [facility] or call [phone] and someone will contact you directly.  Fall Prevention in the Home, Adult  Falls can cause injuries and affect people of all ages. There are many simple things that you can do to make your home safe and to help prevent falls.  If you need it, ask for help making these changes.  What actions can I take to prevent falls?  General information  Use good lighting in all rooms. Make sure to:  Replace any light bulbs that burn out.  Turn on lights if it is dark and use night-lights.  Keep items that you use often in easy-to-reach places. Lower the shelves around your home if needed.  Move furniture so that there are clear paths around it.  Do not keep throw rugs or other things on the floor that can make you trip.  If any of your floors are uneven, fix them.  Add color or contrast paint or tape to clearly dameon and help you see:  Grab bars or handrails.  First and last steps of staircases.  Where the edge of each step is.  If you use a ladder or stepladder:  Make sure that it is fully opened. Do not climb a closed ladder.  Make sure the sides of the ladder are locked in  place.  Have someone hold the ladder while you use it.  Know where your pets are as you move through your home.  What can I do in the bathroom?         Keep the floor dry. Clean up any water that is on the floor right away.  Remove soap buildup in the bathtub or shower. Buildup makes bathtubs and showers slippery.  Use non-skid mats or decals on the floor of the bathtub or shower.  Attach bath mats securely with double-sided, non-slip rug tape.  If you need to sit down while you are in the shower, use a non-slip stool.  Install grab bars by the toilet and in the bathtub and shower. Do not use towel bars as grab bars.  What can I do in the bedroom?  Make sure that you have a light by your bed that is easy to reach.  Do not use any sheets or blankets on your bed that hang to the floor.  Have a firm bench or chair with side arms that you can use for support when you get dressed.  What can I do in the kitchen?  Clean up any spills right away.  If you need to reach something above you, use a sturdy step stool that has a grab bar.  Keep electrical cables out of the way.  Do not use floor polish or wax that makes floors slippery.  What can I do with my stairs?  Do not leave anything on the stairs.  Make sure that you have a light switch at the top and the bottom of the stairs. Have them installed if you do not have them.  Make sure that there are handrails on both sides of the stairs. Fix handrails that are broken or loose. Make sure that handrails are as long as the staircases.  Install non-slip stair treads on all stairs in your home if they do not have carpet.  Avoid having throw rugs at the top or bottom of stairs, or secure the rugs with carpet tape to prevent them from moving.  Choose a carpet design that does not hide the edge of steps on the stairs. Make sure that carpet is firmly attached to the stairs. Fix any carpet that is loose or worn.  What can I do on the outside of my home?  Use bright outdoor  lighting.  Repair the edges of walkways and driveways and fix any cracks. Clear paths of anything that can make you trip, such as tools or rocks.  Add color or contrast paint or tape to clearly dameon and help you see high doorway thresholds.  Trim any bushes or trees on the main path into your home.  Check that handrails are securely fastened and in good repair. Both sides of all steps should have handrails.  Install guardrails along the edges of any raised decks or porches.  Have leaves, snow, and ice cleared regularly. Use sand, salt, or ice melt on walkways during winter months if you live where there is ice and snow.  In the garage, clean up any spills right away, including grease or oil spills.  What other actions can I take?  Review your medicines with your health care provider. Some medicines can make you confused or feel dizzy. This can increase your chance of falling.  Wear closed-toe shoes that fit well and support your feet. Wear shoes that have rubber soles and low heels.  Use a cane, walker, scooter, or crutches that help you move around if needed.  Talk with your provider about other ways that you can decrease your risk of falls. This may include seeing a physical therapist to learn to do exercises to improve movement and strength.  Where to find more information  Centers for Disease Control and PreventionSHEREEN: cdc.gov  National [place] on Aging: valery.nih.gov  National [place] on Aging: valery.nih.gov  Contact a health care provider if:  You are afraid of falling at home.  You feel weak, drowsy, or dizzy at home.  You fall at home.  Get help right away if you:  Lose consciousness or have trouble moving after a fall.  Have a fall that causes a head injury.  These symptoms may be an emergency. Get help right away. Call 911.  Do not wait to see if the symptoms will go away.  Do not drive yourself to the hospital.  This information is not intended to replace advice given to you by your health care  provider. Make sure you discuss any questions you have with your health care provider.  Document Revised: 08/21/2023 Document Reviewed: 08/21/2023  Elsevier Patient Education © 2024 [employer]  Sit-to-Stand Exercise    The sit-to-stand exercise (also known as the chair stand or chair rise exercise) strengthens your lower body and helps you maintain or improve your mobility and independence. The end goal is to do the sit-to-stand exercise without using your hands. This will be easier as you become stronger. You should always talk with your health care provider before starting any exercise program, especially if you have had recent surgery.  Do the exercise exactly as told by your health care provider and adjust it as directed. It is normal to feel mild stretching, pulling, tightness, or discomfort as you do this exercise, but you should stop right away if you feel sudden pain or your pain gets worse. Do not begin doing this exercise until told by your health care provider.  What the sit-to-stand exercise does  The sit-to-stand exercise helps to strengthen the muscles in your thighs and the muscles in the center of your body that give you stability (core muscles). This exercise is especially helpful if:  You have had knee or hip surgery.  You have trouble getting up from a chair, out of a car, or off the toilet due to muscle weakness.  How to do the sit-to-stand exercise  Sit toward the front edge of a sturdy chair without armrests. Your knees should be bent and your feet should be flat on the floor and shoulder-width apart and underneath your hips.  Place your hands lightly on each side of the seat. Keep your back and neck as straight as possible, with your chest slightly forward.  Breathe in slowly. Lean forward and slightly shift your weight to the front of your feet.  Breathe out as you slowly stand up. Try not to support any weight with your hands.  Stand and pause for a full breath in and out.  Breathe in as  you sit down slowly. Tighten your core and abdominal muscles to control your lowering as much as possible. You should lower yourself back to the chair slowly, not just drop back into the seat.  Breathe out slowly.  Do this exercise 10-15 times. If needed, do it fewer times until you build up strength.  Rest for 1 minute, then do another set of 10-15 repetitions.  To change the difficulty of the sit-to-stand exercise  If the exercise is too difficult, use a chair with sturdy armrests, and push off the armrests to help you come to the standing position. You can also use the armrests to help slowly lower yourself back to sitting. As this gets easier, try to use your arms less. You can also place a firm cushion or pillow on the chair to make the surface higher.  If this exercise is too easy, do not use your arms to help raise or lower yourself. You can also wear a weighted vest, use hand weights, increase your repetitions, or try a lower chair.  General tips  You may feel tired when starting an exercise routine. This is normal.  You may have muscle soreness that lasts a few days. This is normal. As you get stronger, you may not feel muscle soreness.  Use smooth, steady movements.  Do not  hold your breath during strength exercises. This can cause unsafe changes in your blood pressure.  Breathe in slowly through your nose, and breathe out slowly through your mouth.  Summary  Strengthening your lower body is an important step to help you move safely and independently.  The sit-to-stand exercise helps strengthen the muscles in your thighs and core.  You should always talk with your health care provider before starting any exercise program, especially if you have had recent surgery.  This information is not intended to replace advice given to you by your health care provider. Make sure you discuss any questions you have with your health care provider.  Document Revised: 04/10/2022 Document Reviewed: 04/10/2022  Jennie  Patient Education 2024 [employer] Inc[employer]    Medicare Wellness  Personal Prevention Plan of Service     Date of Office Visit:    Encounter Provider:  Margret Purvis MD  Place of Service:  [facility]  Patient Name: Ramsey Riley  :  1955    As part of the Medicare Wellness portion of your visit today, we are providing you with this personalized preventive plan of services (PPPS). This plan is based upon recommendations of the [place] Preventive Services Task Force (USPSTF) and the Advisory Committee on Immunization Practices (ACIP).    This lists the preventive care services that should be considered, and provides dates of when you are due. Items listed as completed are up-to-date and do not require any further intervention.    Health Maintenance   Topic Date Due   • DIABETIC EYE EXAM  Never done   • TDAP/TD VACCINES (1 - Tdap) Never done   • COLORECTAL CANCER SCREENING  Never done   • DIABETIC FOOT EXAM  10/19/2021   • ANNUAL WELLNESS VISIT  2025   • COVID-19 Vaccine ( season) 2025 (Originally 2024)   • ZOSTER VACCINE (1 of 2) 06/10/2025 (Originally 3/11/2007)   • INFLUENZA VACCINE  2025   • HEMOGLOBIN A1C  2025   • LIPID PANEL  2025   • URINE MICROALBUMIN-CREATININE RATIO (uACR)  2026   • HEPATITIS C SCREENING  Completed   • Pneumococcal Vaccine 50+  Completed       No orders of the defined types were placed in this encounter.      No follow-ups on file.

## 2025-05-05 NOTE — PROGRESS NOTES
Subjective   The ABCs of the Annual Wellness Visit  Medicare Wellness Visit    Chief Complaint   Patient presents with    Medicare Wellness-subsequent       Ramsey Riley is a 70 y.o. patient who presents for a Medicare Wellness Visit and physical.    The following portions of the patient's history were reviewed and   updated as appropriate: allergies, current medications, past family history, past medical history, past social history, past surgical history, and problem list.    Compared to one year ago, the patient's physical   health is worse.  Compared to one year ago, the patient's mental   health is the same.    Recent Hospitalizations:  This patient has had a [facility] admission record on file within the last 365 days.  Current Medical Providers:  Patient Care Team:  Margret Purvis MD as PCP - General (Internal Medicine)  Margret Purvis MD as Referring Physician (Internal Medicine)  Adilson Whittaker MD as Consulting Physician (Cardiology)  Michelle King MD as Consulting Physician (Endocrinology)  Jamie Berrios MD, ELIZA as Consulting Physician (Nephrology)    Outpatient Medications Prior to Visit   Medication Sig Dispense Refill    amLODIPine (NORVASC) 10 MG tablet Take 1 tablet by mouth Daily. 90 tablet 2    apixaban (Eliquis) 5 MG tablet tablet Take 1 tablet by mouth Every 12 (Twelve) Hours. 60 tablet 5    atorvastatin (Lipitor) 80 MG tablet Take 1 tablet by mouth Every Night. 90 tablet 1    chlorthalidone (HYGROTON) 25 MG tablet Take 0.5 tablets by mouth Daily. 45 tablet 3    citalopram (CeleXA) 40 MG tablet Take 1 tablet by mouth Daily. 90 tablet 1    Continuous Glucose Sensor (FreeStyle Rina 3 Sensor) [facility] Use 1 each Every 14 (Fourteen) Days. 3 each 11    dapagliflozin (Farxiga) 5 MG tablet tablet Take 1 tablet by mouth Daily. 30 tablet 5    famotidine (Pepcid) 20 MG tablet Take 1 tablet by mouth 2 (Two) Times a Day. 180 tablet 1    glucose blood (Accu-Chek Caryn Plus) test strip USE AS  DIRECTED 3 TIMES DAILY 100 each 12    glucose monitor monitoring kit 1 each Daily. 1 each 1    glucose monitor monitoring kit 1 each Daily. 1 each 1    HYDROcodone-acetaminophen (NORCO) 7.5-325 MG per tablet Take 1 tablet by mouth Every 12 (Twelve) Hours As Needed for Moderate Pain. 60 tablet 0    Insulin NPH Isophane & Regular (HumuLIN 70/30 KwikPen) (70-30) 100 UNIT/ML suspension pen-injector Inject 84 units in the morning and 64 units in the dinner E11.65 135 mL 1    Insulin Pen Needle 32G X 4 MM misc For use with insulin injections, daily 100 each 11    Insulin Syringes, Disposable, U-100 1 ML misc 50 Units 3 (Three) Times a Day As Needed (50 units in the morning, and then with meals PRN). 200 each 12    Lancets 30G misc Check sugar 3 times daily 30 each 12    levothyroxine (SYNTHROID, LEVOTHROID) 125 MCG tablet Take 1 tablet by mouth Daily. 90 tablet 1    losartan (COZAAR) 100 MG tablet Take 1 tablet by mouth Daily. 90 tablet 1    pantoprazole (PROTONIX) 40 MG EC tablet Take 1 tablet by mouth Daily. 90 tablet 1    ALPRAZolam (XANAX) 0.5 MG tablet Take 1 tablet by mouth At Night As Needed for Anxiety. 30 tablet 4    QUEtiapine (SEROquel) 50 MG tablet Take 1 tablet by mouth 2 (Two) Times a Day. 180 tablet 1    dorzolamide-timolol (COSOPT) 2-0.5 % ophthalmic solution Administer 1 drop to both eyes 2 (Two) Times a Day. (Patient not taking: Reported on 5/5/2025)       No facility-administered medications prior to visit.     Opioid medication/s are on active medication list.  and I have evaluated his active treatment plan and pain score trends (see table).  Vitals:    05/05/25 1520   PainSc: 7      I have reviewed the chart for potential of high risk medication and harmful drug interactions in the elderly.        Alprazolam has been discontinued today and this has been discussed with patient and wife who accompanies him  He had a recent humerus fracture and pain score is at 7 and would like to continue hydrocodone at  "this time  Discussed with patient that it would be advisable to change to gabapentin, also advised to take over-the-counter medications and to follow-up with orthopedist    Aspirin is not on active medication list.  Aspirin use is not indicated based on review of current medical condition/s. Risk of harm outweighs potential benefits.  .    Patient Active Problem List   Diagnosis    Coronary artery disease involving native coronary artery of native heart without angina pectoris    Essential hypertension    Bradycardia, sinus    Fatigue    Abnormal ECG    Precordial pain    LVH (left ventricular hypertrophy) due to hypertensive disease, without heart failure    Diabetes mellitus    PAD (peripheral artery disease)    Ischemic stroke    History of blood clots    Pulmonary embolus    Dyslipidemia    Class 1 obesity due to excess calories with serious comorbidity and body mass index (BMI) of 34.0 to 34.9 in adult    Weakness     Advance Care Planning Advance Directive is not on file.  ACP discussion was held with the patient during this visit. Patient does not have an advance directive, information provided.            Objective   Vitals:    05/05/25 1520   BP: 130/82   Pulse: 74   Resp: 20   Temp: 96.9 °F (36.1 °C)   TempSrc: Infrared   SpO2: 95%   Weight: 102 kg (223 lb 12.8 oz)   Height: 180.3 cm (71\")   PainSc: 7        Estimated body mass index is 31.21 kg/m² as calculated from the following:    Height as of this encounter: 180.3 cm (71\").    Weight as of this encounter: 102 kg (223 lb 12.8 oz).    BMI is >= 30 and <35. (Class 1 Obesity). The following options were offered after discussion;: weight loss educational material (shared in after visit summary), exercise counseling/recommendations, and nutrition counseling/recommendations     Physical Examination  Vitals and nursing note reviewed  General appearance: Normocephalic and nontraumatic  HEENT: External inspection of eyes, ears and nose appears benign, hearing " appears intact  Neck: Neck appears supple, trachea in midline, thyroid appears not enlarged  Respiratory: Respiratory effort appears normal  Musculoskeletal: Moving all limbs , right arm in sling and in wheelchair  Range of motion of visible joints appears within normal  CNS: No gross motor or sensory deficits  No gross cranial nerve deficits  No tremors  Psychiatry: Alert and oriented x3  Memory appears intact  Mood and affect appears normal  Insight appears normal           Does the patient have evidence of cognitive impairment? Yes  Lab Results   Component Value Date    HGBA1C 8.3 (A) 2025                                                                                                Health  Risk Assessment    Smoking Status:  Social History     Tobacco Use   Smoking Status Never    Passive exposure: Never   Smokeless Tobacco Current    Types: Chew     Alcohol Consumption:  Social History     Substance and Sexual Activity   Alcohol Use No       Fall Risk Screen  STEADI Fall Risk Assessment was completed, and patient is at HIGH risk for falls. Assessment completed on:2025    Depression Screening   Little interest or pleasure in doing things? Not at all   Feeling down, depressed, or hopeless? Not at all   PHQ-2 Total Score 0      Health Habits and Functional and Cognitive Screenin/5/2025     3:23 PM   Functional & Cognitive Status   Do you have difficulty preparing food and eating? Yes   Do you have difficulty bathing yourself, getting dressed or grooming yourself? Yes   Do you have difficulty using the toilet? No   Do you have difficulty moving around from place to place? Yes   Do you have trouble with steps or getting out of a bed or a chair? Yes   Current Diet Well Balanced Diet   Dental Exam Up to date   Eye Exam Up to date   Exercise (times per week) 0 times per week   Current Exercises Include No Regular Exercise   Do you need help using the phone?  No   Are you deaf or do you have serious  difficulty hearing?  No   Do you need help to go to places out of walking distance? Yes   Do you need help shopping? Yes   Do you need help preparing meals?  Yes   Do you need help with housework?  Yes   Do you need help with laundry? Yes   Do you need help taking your medications? Yes   Do you need help managing money? No   Do you ever drive or ride in a car without wearing a seat belt? No   Have you felt unusual stress, anger or loneliness in the last month? No   Who do you live with? Spouse   If you need help, do you have trouble finding someone available to you? No   Have you been bothered in the last four weeks by sexual problems? No   Do you have difficulty concentrating, remembering or making decisions? Yes           Age-appropriate Screening Schedule:  Refer to the list below for future screening recommendations based on patient's age, sex and/or medical conditions. Orders for these recommended tests are listed in the plan section. The patient has been provided with a written plan.    Health Maintenance List  Health Maintenance   Topic Date Due    DIABETIC EYE EXAM  Never done    TDAP/TD VACCINES (1 - Tdap) Never done    COLORECTAL CANCER SCREENING  Never done    DIABETIC FOOT EXAM  10/19/2021    COVID-19 Vaccine (1 - 2024-25 season) 05/05/2025 (Originally 9/1/2024)    ZOSTER VACCINE (1 of 2) 06/10/2025 (Originally 3/11/2007)    INFLUENZA VACCINE  07/01/2025    HEMOGLOBIN A1C  09/26/2025    LIPID PANEL  12/11/2025    URINE MICROALBUMIN-CREATININE RATIO (uACR)  04/16/2026    ANNUAL WELLNESS VISIT  05/05/2026    HEPATITIS C SCREENING  Completed    Pneumococcal Vaccine 50+  Completed                                                                                                                                                CMS Preventative Services Quick Reference  Risk Factors Identified During Encounter  Fall Risk-High or Moderate: Discussed Fall Prevention in the home, Information on Fall Prevention Shared  "in After Visit Summary, and Sit to Stand Exercise Information Shared in After Visit Summary    The above risks/problems have been discussed with the patient.  Pertinent information has been shared with the patient in the After Visit Summary.  An After Visit Summary and PPPS were made available to the patient.    Follow Up:   Next Medicare Wellness visit to be scheduled in 1 year.         Additional E&M Note during same encounter follows:  Patient has additional, significant, and separately identifiable condition(s)/problem(s) that require work above and beyond the Medicare Wellness Visit     Chief Complaint  Medicare Wellness-subsequent    Subjective   HPI  Ramsey is also being seen today for an annual adult preventative physical exam.  and Ramsey is also being seen today for additional medical problem/s.   Patient is here to follow up on the blood pressure  The patient is taking the blood pressure medications as prescribed and has had no side effects.  he has had recent falls and I have discussed with patient and his wife who accompanies him today and was also the historian and they are agreeable to stop alprazolam, she states he has insomnia and will change to Seroquel 100 mg at bedtime, she would like him to see neurology at [facility] for recent diagnosis of cerebral amyloid angiopathy  Hypertension   Pertinent negatives include no chest pain, palpitations or shortness of breath.                      Objective   Vital Signs:  /82   Pulse 74   Temp 96.9 °F (36.1 °C) (Infrared)   Resp 20   Ht 180.3 cm (71\")   Wt 102 kg (223 lb 12.8 oz)   SpO2 95%   BMI 31.21 kg/m²   Physical Exam  Vitals and nursing note reviewed.   Constitutional:       General: He is not in acute distress.     Appearance: Normal appearance. He is not diaphoretic.   HENT:      Head: Normocephalic and atraumatic.      Right Ear: External ear normal.      Left Ear: External ear normal.      Nose: Nose normal.   Eyes:      Extraocular " Movements: Extraocular movements intact.      Conjunctiva/sclera: Conjunctivae normal.   Neck:      Trachea: Trachea normal.   Cardiovascular:      Rate and Rhythm: Normal rate and regular rhythm.      Heart sounds: Normal heart sounds.   Pulmonary:      Effort: Pulmonary effort is normal. No respiratory distress.   Abdominal:      General: Abdomen is flat.   Musculoskeletal:         General: Deformity present.      Cervical back: Neck supple.      Comments: In wheelchair  Right arm sling   Skin:     General: Skin is warm and dry.      Findings: No erythema.   Neurological:      Mental Status: He is alert and oriented to person, place, and time.      Comments: No gross motor or sensory deficits         The following data was reviewed by: Margret Purvis MD on 05/05/2025:    Common labs          2/6/2025    15:19 3/26/2025    11:43 4/15/2025    14:35   Common Labs   Hemoglobin A1C 9.4  8.3     Uric Acid   5.3          Details          This result is from an external source.                  Assessment and Plan Additional age appropriate preventative wellness advice topics were discussed during today's preventative wellness exam(some topics already addressed during AWV portion of the note above):    Physical Activity: Advised cardiovascular activity 150 minutes per week as tolerated. (example brisk walk for 30 minutes, 5 days a week).     Nutrition: Discussed nutrition plan with patient. Information shared in after visit summary. Goal is for a well balanced diet to enhance overall health.     Healthy Weight: Discussed current and goal BMI with patient. Steps to attain this goal discussed. Information shared in after visit summary.     Medicare annual wellness visit, subsequent  Plan:  1.physical: Physical exam conducted today, obtain fasting lab work,   Impression: healthy adult Patient. Currently, patient eats a healthy diet. Screening lab work includes glucose, lipid profile and complete blood count . The risks and  benefits of immunizations were discussed and immunizations are up to date. Advice and education were given regarding nutrition and aerobic exercise. Patient discussion: discussed with the patient.  Annual Wellness Visit  , physical with preventive exam as well as age and risk appropriate counseling completed.   Advance Directive Planning: paperwork and instructions were given to the patient.   Patient Discussion: plan discussed with the patient, follow-up visit needed in one year. Medication Review and medication list updated         At high risk for falls  Fall Precautions advised       Benign essential hypertension  Hypertension is stable and controlled  Continue current treatment regimen.  Blood pressure will be reassessed in 6 months.         Insomnia, psychophysiological  Stop alprazolam    Orders:    QUEtiapine (SEROquel) 100 MG tablet; Take 1 tablet by mouth Every Night.    Cerebral amyloid angiopathy    Orders:    Ambulatory Referral to Neurology     Data reviewed:  Progress Notes by Jonah Alas PA-C (04/18/2025 13:30)        Follow Up   Return in about 6 weeks (around 6/19/2025).  Patient was given instructions and counseling regarding his condition or for health maintenance advice. Please see specific information pulled into the AVS if appropriate.

## 2025-05-06 LAB — SPECIMEN STATUS: NORMAL

## 2025-05-08 ENCOUNTER — OUTSIDE FACILITY SERVICE (OUTPATIENT)
Dept: INTERNAL MEDICINE | Facility: CLINIC | Age: 70
End: 2025-05-08
Payer: MEDICARE

## 2025-05-08 LAB
ALBUMIN SERPL-MCNC: 3.7 G/DL (ref 3.5–5.2)
ALBUMIN/CREAT UR: 3335 MG/G CREAT (ref 0–29)
ALBUMIN/GLOB SERPL: 1.5 G/DL
ALP SERPL-CCNC: 112 U/L (ref 39–117)
ALT SERPL-CCNC: 22 U/L (ref 1–41)
AST SERPL-CCNC: 24 U/L (ref 1–40)
BILIRUB SERPL-MCNC: 0.5 MG/DL (ref 0–1.2)
BUN SERPL-MCNC: 15 MG/DL (ref 8–23)
BUN/CREAT SERPL: 10.5 (ref 7–25)
CALCIUM SERPL-MCNC: 9.3 MG/DL (ref 8.6–10.5)
CHLORIDE SERPL-SCNC: 100 MMOL/L (ref 98–107)
CHOLEST SERPL-MCNC: 133 MG/DL (ref 0–200)
CO2 SERPL-SCNC: 27.6 MMOL/L (ref 22–29)
CREAT SERPL-MCNC: 1.43 MG/DL (ref 0.76–1.27)
CREAT UR-MCNC: 110.9 MG/DL
EGFRCR SERPLBLD CKD-EPI 2021: 53.4 ML/MIN/1.73
ERYTHROCYTE [DISTWIDTH] IN BLOOD BY AUTOMATED COUNT: 12 % (ref 12.3–15.4)
GLOBULIN SER CALC-MCNC: 2.5 GM/DL
GLUCOSE SERPL-MCNC: 214 MG/DL (ref 65–99)
HBA1C MFR BLD: 8.3 % (ref 4.8–5.6)
HCT VFR BLD AUTO: 48.6 % (ref 37.5–51)
HDLC SERPL-MCNC: 38 MG/DL (ref 40–60)
HGB BLD-MCNC: 15.9 G/DL (ref 13–17.7)
LDLC SERPL CALC-MCNC: 58 MG/DL (ref 0–100)
MCH RBC QN AUTO: 29.6 PG (ref 26.6–33)
MCHC RBC AUTO-ENTMCNC: 32.7 G/DL (ref 31.5–35.7)
MCV RBC AUTO: 90.5 FL (ref 79–97)
MICROALBUMIN UR-MCNC: 3698.7 UG/ML
PLATELET # BLD AUTO: 206 10*3/MM3 (ref 140–450)
POTASSIUM SERPL-SCNC: 4.2 MMOL/L (ref 3.5–5.2)
PROT SERPL-MCNC: 6.2 G/DL (ref 6–8.5)
RBC # BLD AUTO: 5.37 10*6/MM3 (ref 4.14–5.8)
SODIUM SERPL-SCNC: 139 MMOL/L (ref 136–145)
TRIGL SERPL-MCNC: 232 MG/DL (ref 0–150)
TSH SERPL DL<=0.005 MIU/L-ACNC: 4.61 UIU/ML (ref 0.27–4.2)
VLDLC SERPL CALC-MCNC: 37 MG/DL (ref 5–40)
WBC # BLD AUTO: 6.82 10*3/MM3 (ref 3.4–10.8)

## 2025-05-19 ENCOUNTER — APPOINTMENT (OUTPATIENT)
Dept: CT IMAGING | Facility: HOSPITAL | Age: 70
End: 2025-05-19
Payer: MEDICARE

## 2025-05-19 ENCOUNTER — APPOINTMENT (OUTPATIENT)
Dept: GENERAL RADIOLOGY | Facility: HOSPITAL | Age: 70
End: 2025-05-19
Payer: MEDICARE

## 2025-05-19 ENCOUNTER — HOSPITAL ENCOUNTER (EMERGENCY)
Facility: HOSPITAL | Age: 70
Discharge: HOME | End: 2025-05-20
Payer: MEDICARE

## 2025-05-19 VITALS
WEIGHT: 230 LBS | RESPIRATION RATE: 16 BRPM | DIASTOLIC BLOOD PRESSURE: 61 MMHG | OXYGEN SATURATION: 91 % | BODY MASS INDEX: 32.2 KG/M2 | TEMPERATURE: 98.4 F | SYSTOLIC BLOOD PRESSURE: 143 MMHG | HEIGHT: 71 IN | HEART RATE: 66 BPM

## 2025-05-19 DIAGNOSIS — S01.01XA LACERATION OF SCALP WITHOUT FOREIGN BODY, INITIAL ENCOUNTER: Primary | ICD-10-CM

## 2025-05-19 PROCEDURE — 72128 CT CHEST SPINE W/O DYE: CPT

## 2025-05-19 PROCEDURE — 72125 CT NECK SPINE W/O DYE: CPT

## 2025-05-19 PROCEDURE — 73060 X-RAY EXAM OF HUMERUS: CPT

## 2025-05-19 PROCEDURE — 70450 CT HEAD/BRAIN W/O DYE: CPT

## 2025-05-19 PROCEDURE — 90471 IMMUNIZATION ADMIN: CPT

## 2025-05-19 PROCEDURE — 70450 CT HEAD/BRAIN W/O DYE: CPT | Performed by: RADIOLOGY

## 2025-05-19 PROCEDURE — 73060 X-RAY EXAM OF HUMERUS: CPT | Performed by: RADIOLOGY

## 2025-05-19 PROCEDURE — 72131 CT LUMBAR SPINE W/O DYE: CPT | Performed by: RADIOLOGY

## 2025-05-19 PROCEDURE — 72128 CT CHEST SPINE W/O DYE: CPT | Performed by: RADIOLOGY

## 2025-05-19 PROCEDURE — 25010000002 TETANUS-DIPHTH-ACELL PERTUSSIS 5-2.5-18.5 LF-MCG/0.5 SUSPENSION PREFILLED SYRINGE

## 2025-05-19 PROCEDURE — 90715 TDAP VACCINE 7 YRS/> IM: CPT

## 2025-05-19 PROCEDURE — 99284 EMERGENCY DEPT VISIT MOD MDM: CPT

## 2025-05-19 PROCEDURE — 72131 CT LUMBAR SPINE W/O DYE: CPT

## 2025-05-19 PROCEDURE — 72125 CT NECK SPINE W/O DYE: CPT | Performed by: RADIOLOGY

## 2025-05-19 RX ADMIN — TETANUS TOXOID, REDUCED DIPHTHERIA TOXOID AND ACELLULAR PERTUSSIS VACCINE, ADSORBED 0.5 ML: 5; 2.5; 8; 8; 2.5 SUSPENSION INTRAMUSCULAR at 23:47

## 2025-05-19 RX ADMIN — CEPHALEXIN 500 MG: 250 CAPSULE ORAL at 23:47

## 2025-05-19 NOTE — Clinical Note
[facility] EMERGENCY DEPARTMENT  [address] [address]  Phone: [phone]    Ramsey Riley was seen and treated in our emergency department on 5/19/2025.  He may return to school on 05/22/2025.          Thank you for choosing [facility].    Андрей Boyer, DO

## 2025-05-20 NOTE — ED PROVIDER NOTES
Subjective   History of Present Illness  70-year-old male with a history of CAD/DM/hypertension presenting to the ED due to fall..  Patient reports he lost his balance falling backwards hitting his head.      Review of Systems   Constitutional: Negative.  Negative for fever.   HENT: Negative.     Respiratory: Negative.     Cardiovascular: Negative.  Negative for chest pain.   Gastrointestinal: Negative.  Negative for abdominal pain.   Endocrine: Negative.    Genitourinary: Negative.  Negative for dysuria.   Musculoskeletal:  Positive for arthralgias and myalgias.   Skin:  Positive for wound.   Neurological: Negative.    Psychiatric/Behavioral: Negative.     All other systems reviewed and are negative.      Past Medical History:   Diagnosis Date    Arthritis     Clot     Coronary artery disease     Dementia 12/18/2024    Diabetes mellitus     Heart disease     History of blood clots     Hypertension     Impaired mobility     Low back pain     Thyroid disease     Type 2 diabetes mellitus        Allergies   Allergen Reactions    Penicillins Unknown - High Severity    Trazodone Other (See Comments)     agitation       Past Surgical History:   Procedure Laterality Date    CHOLECYSTECTOMY  2015    COLONOSCOPY  2015    dr srivastava    CORONARY ARTERY BYPASS GRAFT      [place] - 3 BY PASS     ENDOSCOPY         Family History   Problem Relation Age of Onset    Diabetes Mother     Heart disease Mother         STENTING    COPD Mother     Heart attack Father     Diabetes Father     Heart defect Son         Ross procedure    Heart murmur Son     Hypertension Son        Social History     Socioeconomic History    Marital status:    Tobacco Use    Smoking status: Never     Passive exposure: Never    Smokeless tobacco: Current     Types: Chew   Vaping Use    Vaping status: Never Used   Substance and Sexual Activity    Alcohol use: No    Drug use: No    Sexual activity: Defer           Objective   Physical Exam  Vitals and  nursing note reviewed.   Constitutional:       General: He is in acute distress.      Appearance: He is well-developed. He is not ill-appearing, toxic-appearing or diaphoretic.   HENT:      Head: Normocephalic and atraumatic.      Right Ear: External ear normal.      Left Ear: External ear normal.      Nose: Nose normal.   Eyes:      Conjunctiva/sclera: Conjunctivae normal.      Pupils: Pupils are equal, round, and reactive to light.   Neck:      Vascular: No JVD.      Trachea: No tracheal deviation.   Cardiovascular:      Rate and Rhythm: Normal rate and regular rhythm.      Heart sounds: Normal heart sounds. No murmur heard.  Pulmonary:      Effort: Pulmonary effort is normal. No respiratory distress.      Breath sounds: Normal breath sounds. No wheezing.   Abdominal:      General: Bowel sounds are normal.      Palpations: Abdomen is soft.      Tenderness: There is no abdominal tenderness.   Musculoskeletal:         General: No deformity. Normal range of motion.      Cervical back: Normal range of motion and neck supple.   Skin:     General: Skin is warm and dry.      Coloration: Skin is not pale.      Findings: No erythema or rash.      Comments: 6 cm laceration on the back of the scalp.   Neurological:      Mental Status: He is alert and oriented to person, place, and time.      Cranial Nerves: No cranial nerve deficit.   Psychiatric:         Behavior: Behavior normal.         Thought Content: Thought content normal.         Laceration Repair    Date/Time: 5/19/2025 11:41 PM    Performed by: Андрей Boyer DO  Authorized by: Андрей Boyer DO    Consent:     Consent obtained:  Verbal    Risks discussed:  Infection, need for additional repair and nerve damage  Anesthesia:     Anesthesia method:  None  Laceration details:     Location:  Scalp    Scalp location:  Occipital    Length (cm):  6  Pre-procedure details:     Preparation:  Imaging obtained to evaluate for foreign bodies and patient was prepped  and draped in usual sterile fashion  Exploration:     Contaminated: no    Treatment:     Area cleansed with:  Soap and water  Skin repair:     Repair method:  Staples    Number of staples:  5  Approximation:     Approximation:  Close  Repair type:     Repair type:  Simple  Post-procedure details:     Dressing:  Open (no dressing)    Procedure completion:  Tolerated well, no immediate complications             ED Course                                                       Medical Decision Making  Laceration repair.  CT scans without acute findings.  Patient has a fractured right shoulder, which is known and the patient had that previously.  He is currently following orthopedic surgery for his shoulder..  We discussed wound care outpatient follow-up all questions answered.    Problems Addressed:  Laceration of scalp without foreign body, initial encounter: complicated acute illness or injury    Amount and/or Complexity of Data Reviewed  Radiology: ordered.    Risk  Prescription drug management.        Final diagnoses:   Laceration of scalp without foreign body, initial encounter       ED Disposition  ED Disposition       ED Disposition   Discharge    Condition   Stable    Comment   --               Margret Purvis MD  [address] [place] [address] [address]  [facility] [address]  [phone]    Schedule an appointment as soon as possible for a visit in 3 days      Margret Purvis MD  [address] [place] [address] [address]  [facility] [address]  [phone]    Go to   If symptoms worsen    [facility] EMERGENCY DEPARTMENT  [address] [facility] [address] [phone]             Medication List      No changes were made to your prescriptions during this visit.            Андрей Boyer DO  05/19/25 0595

## 2025-05-20 NOTE — ED NOTES
MEDICAL SCREENING:    Reason for Visit: Fall with head injury, laceration. Pain in right upper arm, neck, and back.    Patient initially seen in triage.  The patient was advised further evaluation and diagnostic testing will be needed, some of the treatment and testing will be initiated in the lobby in order to begin the process.  The patient will be returned to the waiting area for the time being and possibly be re-assessed by a subsequent ED provider.  The patient will be brought back to the treatment area in as timely manner as possible.       Olegario Larson, APRN  05/19/25 2104

## 2025-05-20 NOTE — ED NOTES
Pt and family denies LOC Pt states he lost his balance and fell straight backwards hitting a fireplace with his head causing about a 9 cm laceration to the occipital part of his head, he also has several skin tears and abrasions to the back side of his right arm. Wounds were cleaned with water and Hibiclens and bandaged with non adherent dressings and Kerlix. Pt tolerated well.

## 2025-05-20 NOTE — DISCHARGE INSTRUCTIONS
Keep the wound clean and dry.  I recommend removing the staples in 10 days.  If you have any concerns return to the ER for evaluation.

## 2025-05-21 ENCOUNTER — TELEPHONE (OUTPATIENT)
Dept: INTERNAL MEDICINE | Facility: CLINIC | Age: 70
End: 2025-05-21

## 2025-05-21 NOTE — TELEPHONE ENCOUNTER
Caller: [facility]     Relationship: HOME HEALTH    Best call back number: [phone]    What is your medical concern? WANTED TO REPORT THAT PATIENT HAD A FALL AND DOESN'T WANT TO HAVE PHYSICAL THERAPY THIS WEEK. PHYSICAL THERAPY WILL GO BACK OUT NEXT WEEK.     WIFE REPORTED THAT HE HAD FALLEN AND SHE HAD TAKEN HIM TO THE ER, HE HAD A HEAD LACERATION (STAPLES), INJURED ARM,LEG & FOOT DIDN'T A SPECIFIC SIDE.       Is your provider already aware of this issue? NO     Have you been treated for this issue? AT ER

## 2025-05-23 ENCOUNTER — TELEPHONE (OUTPATIENT)
Dept: ENDOCRINOLOGY | Facility: CLINIC | Age: 70
End: 2025-05-23
Payer: MEDICARE

## 2025-05-23 RX ORDER — KETOROLAC TROMETHAMINE 30 MG/ML
1 INJECTION, SOLUTION INTRAMUSCULAR; INTRAVENOUS CONTINUOUS
Qty: 1 EACH | Refills: 0 | Status: ON HOLD | OUTPATIENT
Start: 2025-05-23 | End: 2025-05-26

## 2025-05-23 NOTE — TELEPHONE ENCOUNTER
Caller: Jina Riley    Relationship: Emergency Contact    Best call back number: [phone]    Requested Prescriptions: GLUCOSE MONITOR    Pharmacy where request should be sent:  [address] [phone] [facility] [phone]      Last office visit with prescribing clinician: 3/26/2025   Last telemedicine visit with prescribing clinician: Visit date not found   Next office visit with prescribing clinician: 7/23/2025     Additional details provided by patient: PATIENT CAN NOT FIND HIS FREESTYLE GLUCOSE MONITOR, NOT THE KIT OR SENSORS, AND [facility]    Does the patient have less than a 3 day supply:  [x] Yes  [] No

## 2025-05-26 ENCOUNTER — APPOINTMENT (OUTPATIENT)
Dept: GENERAL RADIOLOGY | Facility: HOSPITAL | Age: 70
End: 2025-05-26
Payer: MEDICARE

## 2025-05-26 ENCOUNTER — HOSPITAL ENCOUNTER (INPATIENT)
Facility: HOSPITAL | Age: 70
LOS: 8 days | Discharge: NURSING, REHAB OR LONG-TERM CARE | End: 2025-06-04
Admitting: INTERNAL MEDICINE
Payer: MEDICARE

## 2025-05-26 DIAGNOSIS — R29.6 FREQUENT FALLS: Primary | ICD-10-CM

## 2025-05-26 DIAGNOSIS — T14.8XXA SKIN AVULSION: ICD-10-CM

## 2025-05-26 DIAGNOSIS — T14.8XXA BONE BRUISE: ICD-10-CM

## 2025-05-26 LAB
ALBUMIN SERPL-MCNC: 3.9 G/DL (ref 3.5–5.2)
ALBUMIN/GLOB SERPL: 1.4 G/DL
ALP SERPL-CCNC: 150 U/L (ref 39–117)
ALT SERPL W P-5'-P-CCNC: 25 U/L (ref 1–41)
AMPHET+METHAMPHET UR QL: NEGATIVE
AMPHETAMINES UR QL: NEGATIVE
ANION GAP SERPL CALCULATED.3IONS-SCNC: 10.5 MMOL/L (ref 5–15)
AST SERPL-CCNC: 31 U/L (ref 1–40)
BACTERIA UR QL AUTO: ABNORMAL /HPF
BARBITURATES UR QL SCN: NEGATIVE
BASOPHILS # BLD AUTO: 0.03 10*3/MM3 (ref 0–0.2)
BASOPHILS NFR BLD AUTO: 0.4 % (ref 0–1.5)
BENZODIAZ UR QL SCN: POSITIVE
BILIRUB SERPL-MCNC: 0.6 MG/DL (ref 0–1.2)
BILIRUB UR QL STRIP: NEGATIVE
BUN SERPL-MCNC: 12 MG/DL (ref 8–23)
BUN/CREAT SERPL: 9.4 (ref 7–25)
BUPRENORPHINE SERPL-MCNC: NEGATIVE NG/ML
CALCIUM SPEC-SCNC: 9.2 MG/DL (ref 8.6–10.5)
CANNABINOIDS SERPL QL: NEGATIVE
CHLORIDE SERPL-SCNC: 98 MMOL/L (ref 98–107)
CLARITY UR: CLEAR
CO2 SERPL-SCNC: 25.5 MMOL/L (ref 22–29)
COCAINE UR QL: NEGATIVE
COLOR UR: YELLOW
CREAT SERPL-MCNC: 1.28 MG/DL (ref 0.76–1.27)
CRP SERPL-MCNC: 0.3 MG/DL (ref 0–0.5)
DEPRECATED RDW RBC AUTO: 42.2 FL (ref 37–54)
EGFRCR SERPLBLD CKD-EPI 2021: 61 ML/MIN/1.73
EOSINOPHIL # BLD AUTO: 0.23 10*3/MM3 (ref 0–0.4)
EOSINOPHIL NFR BLD AUTO: 3.1 % (ref 0.3–6.2)
ERYTHROCYTE [DISTWIDTH] IN BLOOD BY AUTOMATED COUNT: 12.4 % (ref 12.3–15.4)
FENTANYL UR-MCNC: NEGATIVE NG/ML
GLOBULIN UR ELPH-MCNC: 2.7 GM/DL
GLUCOSE BLDC GLUCOMTR-MCNC: 250 MG/DL (ref 70–130)
GLUCOSE BLDC GLUCOMTR-MCNC: 297 MG/DL (ref 70–130)
GLUCOSE SERPL-MCNC: 282 MG/DL (ref 65–99)
GLUCOSE UR STRIP-MCNC: ABNORMAL MG/DL
HCT VFR BLD AUTO: 47.1 % (ref 37.5–51)
HGB BLD-MCNC: 15.2 G/DL (ref 13–17.7)
HGB UR QL STRIP.AUTO: ABNORMAL
HOLD SPECIMEN: NORMAL
HYALINE CASTS UR QL AUTO: ABNORMAL /LPF
IMM GRANULOCYTES # BLD AUTO: 0.02 10*3/MM3 (ref 0–0.05)
IMM GRANULOCYTES NFR BLD AUTO: 0.3 % (ref 0–0.5)
KETONES UR QL STRIP: NEGATIVE
LEUKOCYTE ESTERASE UR QL STRIP.AUTO: NEGATIVE
LYMPHOCYTES # BLD AUTO: 1.61 10*3/MM3 (ref 0.7–3.1)
LYMPHOCYTES NFR BLD AUTO: 21.8 % (ref 19.6–45.3)
MCH RBC QN AUTO: 29.6 PG (ref 26.6–33)
MCHC RBC AUTO-ENTMCNC: 32.3 G/DL (ref 31.5–35.7)
MCV RBC AUTO: 91.6 FL (ref 79–97)
METHADONE UR QL SCN: NEGATIVE
MONOCYTES # BLD AUTO: 0.54 10*3/MM3 (ref 0.1–0.9)
MONOCYTES NFR BLD AUTO: 7.3 % (ref 5–12)
NEUTROPHILS NFR BLD AUTO: 4.95 10*3/MM3 (ref 1.7–7)
NEUTROPHILS NFR BLD AUTO: 67.1 % (ref 42.7–76)
NITRITE UR QL STRIP: NEGATIVE
NRBC BLD AUTO-RTO: 0 /100 WBC (ref 0–0.2)
OPIATES UR QL: POSITIVE
OXYCODONE UR QL SCN: NEGATIVE
PCP UR QL SCN: NEGATIVE
PH UR STRIP.AUTO: 6 [PH] (ref 5–8)
PLATELET # BLD AUTO: 148 10*3/MM3 (ref 140–450)
PMV BLD AUTO: 10.3 FL (ref 6–12)
POTASSIUM SERPL-SCNC: 4.6 MMOL/L (ref 3.5–5.2)
PROT SERPL-MCNC: 6.6 G/DL (ref 6–8.5)
PROT UR QL STRIP: ABNORMAL
RBC # BLD AUTO: 5.14 10*6/MM3 (ref 4.14–5.8)
RBC # UR STRIP: ABNORMAL /HPF
REF LAB TEST METHOD: ABNORMAL
SODIUM SERPL-SCNC: 134 MMOL/L (ref 136–145)
SP GR UR STRIP: 1.02 (ref 1–1.03)
SQUAMOUS #/AREA URNS HPF: ABNORMAL /HPF
TRICYCLICS UR QL SCN: POSITIVE
TSH SERPL DL<=0.05 MIU/L-ACNC: 4.97 UIU/ML (ref 0.27–4.2)
UROBILINOGEN UR QL STRIP: ABNORMAL
WBC # UR STRIP: ABNORMAL /HPF
WBC NRBC COR # BLD AUTO: 7.38 10*3/MM3 (ref 3.4–10.8)
WHOLE BLOOD HOLD COAG: NORMAL
WHOLE BLOOD HOLD SPECIMEN: NORMAL

## 2025-05-26 PROCEDURE — 99222 1ST HOSP IP/OBS MODERATE 55: CPT

## 2025-05-26 PROCEDURE — 63710000001 INSULIN LISPRO (HUMAN) PER 5 UNITS: Performed by: INTERNAL MEDICINE

## 2025-05-26 PROCEDURE — G0378 HOSPITAL OBSERVATION PER HR: HCPCS

## 2025-05-26 PROCEDURE — 81001 URINALYSIS AUTO W/SCOPE: CPT | Performed by: PHYSICIAN ASSISTANT

## 2025-05-26 PROCEDURE — 80307 DRUG TEST PRSMV CHEM ANLYZR: CPT | Performed by: INTERNAL MEDICINE

## 2025-05-26 PROCEDURE — 80053 COMPREHEN METABOLIC PANEL: CPT | Performed by: PHYSICIAN ASSISTANT

## 2025-05-26 PROCEDURE — 73630 X-RAY EXAM OF FOOT: CPT

## 2025-05-26 PROCEDURE — 93005 ELECTROCARDIOGRAM TRACING: CPT | Performed by: INTERNAL MEDICINE

## 2025-05-26 PROCEDURE — 86140 C-REACTIVE PROTEIN: CPT | Performed by: PHYSICIAN ASSISTANT

## 2025-05-26 PROCEDURE — 71045 X-RAY EXAM CHEST 1 VIEW: CPT | Performed by: RADIOLOGY

## 2025-05-26 PROCEDURE — 71045 X-RAY EXAM CHEST 1 VIEW: CPT

## 2025-05-26 PROCEDURE — 99285 EMERGENCY DEPT VISIT HI MDM: CPT

## 2025-05-26 PROCEDURE — 84443 ASSAY THYROID STIM HORMONE: CPT | Performed by: INTERNAL MEDICINE

## 2025-05-26 PROCEDURE — 82948 REAGENT STRIP/BLOOD GLUCOSE: CPT

## 2025-05-26 PROCEDURE — 73630 X-RAY EXAM OF FOOT: CPT | Performed by: RADIOLOGY

## 2025-05-26 PROCEDURE — 85025 COMPLETE CBC W/AUTO DIFF WBC: CPT | Performed by: PHYSICIAN ASSISTANT

## 2025-05-26 RX ORDER — BISACODYL 10 MG
10 SUPPOSITORY, RECTAL RECTAL DAILY PRN
Status: DISCONTINUED | OUTPATIENT
Start: 2025-05-26 | End: 2025-06-04 | Stop reason: HOSPADM

## 2025-05-26 RX ORDER — INSULIN LISPRO 100 [IU]/ML
2-9 INJECTION, SOLUTION INTRAVENOUS; SUBCUTANEOUS
Status: DISCONTINUED | OUTPATIENT
Start: 2025-05-26 | End: 2025-05-27

## 2025-05-26 RX ORDER — BISACODYL 5 MG/1
5 TABLET, DELAYED RELEASE ORAL DAILY PRN
Status: DISCONTINUED | OUTPATIENT
Start: 2025-05-26 | End: 2025-06-04 | Stop reason: HOSPADM

## 2025-05-26 RX ORDER — HYDROCODONE BITARTRATE AND ACETAMINOPHEN 5; 325 MG/1; MG/1
1 TABLET ORAL ONCE AS NEEDED
Refills: 0 | Status: COMPLETED | OUTPATIENT
Start: 2025-05-26 | End: 2025-05-26

## 2025-05-26 RX ORDER — POLYETHYLENE GLYCOL 3350 17 G/17G
17 POWDER, FOR SOLUTION ORAL DAILY PRN
Status: DISCONTINUED | OUTPATIENT
Start: 2025-05-26 | End: 2025-06-04 | Stop reason: HOSPADM

## 2025-05-26 RX ORDER — SODIUM CHLORIDE 0.9 % (FLUSH) 0.9 %
10 SYRINGE (ML) INJECTION EVERY 12 HOURS SCHEDULED
Status: DISCONTINUED | OUTPATIENT
Start: 2025-05-26 | End: 2025-06-04 | Stop reason: HOSPADM

## 2025-05-26 RX ORDER — NITROGLYCERIN 0.4 MG/1
0.4 TABLET SUBLINGUAL
Status: DISCONTINUED | OUTPATIENT
Start: 2025-05-26 | End: 2025-06-04 | Stop reason: HOSPADM

## 2025-05-26 RX ORDER — SODIUM CHLORIDE 0.9 % (FLUSH) 0.9 %
10 SYRINGE (ML) INJECTION AS NEEDED
Status: DISCONTINUED | OUTPATIENT
Start: 2025-05-26 | End: 2025-06-04 | Stop reason: HOSPADM

## 2025-05-26 RX ORDER — ACETAMINOPHEN 500 MG
1000 TABLET ORAL EVERY 8 HOURS PRN
Status: DISCONTINUED | OUTPATIENT
Start: 2025-05-26 | End: 2025-06-04 | Stop reason: HOSPADM

## 2025-05-26 RX ORDER — GLUCAGON 1 MG/ML
1 KIT INJECTION
Status: DISCONTINUED | OUTPATIENT
Start: 2025-05-26 | End: 2025-06-04 | Stop reason: HOSPADM

## 2025-05-26 RX ORDER — QUETIAPINE FUMARATE 50 MG/1
50 TABLET, FILM COATED ORAL 2 TIMES DAILY
COMMUNITY
End: 2025-06-14 | Stop reason: HOSPADM

## 2025-05-26 RX ORDER — SODIUM CHLORIDE 9 MG/ML
40 INJECTION, SOLUTION INTRAVENOUS AS NEEDED
Status: DISCONTINUED | OUTPATIENT
Start: 2025-05-26 | End: 2025-06-04 | Stop reason: HOSPADM

## 2025-05-26 RX ORDER — ALPRAZOLAM 0.25 MG
0.25 TABLET ORAL ONCE
Status: COMPLETED | OUTPATIENT
Start: 2025-05-26 | End: 2025-05-26

## 2025-05-26 RX ORDER — NICOTINE POLACRILEX 4 MG
15 LOZENGE BUCCAL
Status: DISCONTINUED | OUTPATIENT
Start: 2025-05-26 | End: 2025-06-04 | Stop reason: HOSPADM

## 2025-05-26 RX ORDER — ALPRAZOLAM 0.5 MG
0.5 TABLET ORAL NIGHTLY PRN
COMMUNITY

## 2025-05-26 RX ORDER — SILVER SULFADIAZINE 10 MG/G
1 CREAM TOPICAL
Status: DISCONTINUED | OUTPATIENT
Start: 2025-05-26 | End: 2025-05-27

## 2025-05-26 RX ORDER — DEXTROSE MONOHYDRATE 25 G/50ML
25 INJECTION, SOLUTION INTRAVENOUS
Status: DISCONTINUED | OUTPATIENT
Start: 2025-05-26 | End: 2025-06-04 | Stop reason: HOSPADM

## 2025-05-26 RX ORDER — AMOXICILLIN 250 MG
2 CAPSULE ORAL 2 TIMES DAILY
Status: DISCONTINUED | OUTPATIENT
Start: 2025-05-26 | End: 2025-06-04 | Stop reason: HOSPADM

## 2025-05-26 RX ADMIN — HYDROCODONE BITARTRATE AND ACETAMINOPHEN 1 TABLET: 5; 325 TABLET ORAL at 21:38

## 2025-05-26 RX ADMIN — INSULIN LISPRO 4 UNITS: 100 INJECTION, SOLUTION INTRAVENOUS; SUBCUTANEOUS at 17:23

## 2025-05-26 RX ADMIN — Medication 10 ML: at 20:36

## 2025-05-26 RX ADMIN — ALPRAZOLAM 0.25 MG: 0.25 TABLET ORAL at 21:38

## 2025-05-26 RX ADMIN — INSULIN LISPRO 6 UNITS: 100 INJECTION, SOLUTION INTRAVENOUS; SUBCUTANEOUS at 20:36

## 2025-05-26 NOTE — ED PROVIDER NOTES
Subjective   History of Present Illness  70-year-old male with past medical history of type 2 diabetes, thyroid disease, hypertension, blood clots chronically anticoagulated with Eliquis, coronary artery disease, and vascular dementia presents to the emergency room for staple removal and left foot pain accompanied by his spouse.  Spouse states that patient has had multiple recurrent falls since April 23 and is becoming concerned because patient does take Eliquis.  She states his most recent fall was on the 19th where he was seen and evaluated and at that time had 5 staples placed in the back of his head.  She also states that patient has been complaining of left heel pain and she is uncertain if this is from his previous fall or not, however states it is now bruised and states he will not bear weight on it.  She also states a couple days ago patient fell however did not hit his head or have a loss of consciousness.  She states he just a braised his right upper arm.  Patient denies falling directly on the heels of his feet and denies any other complaints or concerns at this time.  Spouse at bedside states that she is concerned due to patient's current debility and increase in frequent falls recently.  She states that patient has home health who comes out twice a week, however does not feel that this is being very effective.      History provided by:  Patient   used: No        Review of Systems   Constitutional: Negative.  Negative for fever.   HENT: Negative.     Respiratory: Negative.     Cardiovascular: Negative.  Negative for chest pain.   Gastrointestinal: Negative.  Negative for abdominal pain.   Endocrine: Negative.    Genitourinary: Negative.  Negative for dysuria.   Musculoskeletal:         (+) Left heel pain   Skin:  Positive for wound.   Neurological: Negative.    Psychiatric/Behavioral: Negative.     All other systems reviewed and are negative.      Past Medical History:   Diagnosis  Date    Arthritis     Clot     Coronary artery disease     Dementia 12/18/2024    Diabetes mellitus     Heart disease     History of blood clots     Hypertension     Impaired mobility     Low back pain     Thyroid disease     Type 2 diabetes mellitus        Allergies   Allergen Reactions    Penicillins Unknown - High Severity    Trazodone Other (See Comments)     agitation       Past Surgical History:   Procedure Laterality Date    CHOLECYSTECTOMY  2015    COLONOSCOPY  2015    dr srivastava    CORONARY ARTERY BYPASS GRAFT      [place] - 3 BY PASS     ENDOSCOPY         Family History   Problem Relation Age of Onset    Diabetes Mother     Heart disease Mother         STENTING    COPD Mother     Heart attack Father     Diabetes Father     Heart defect Son         Ross procedure    Heart murmur Son     Hypertension Son        Social History     Socioeconomic History    Marital status:    Tobacco Use    Smoking status: Never     Passive exposure: Never    Smokeless tobacco: Current     Types: Chew   Vaping Use    Vaping status: Never Used   Substance and Sexual Activity    Alcohol use: No    Drug use: No    Sexual activity: Defer           Objective   Physical Exam  Vitals and nursing note reviewed.   Constitutional:       General: He is not in acute distress.     Appearance: He is well-developed. He is not diaphoretic.   HENT:      Head: Normocephalic.        Right Ear: External ear normal.      Left Ear: External ear normal.      Nose: Nose normal.   Eyes:      Conjunctiva/sclera: Conjunctivae normal.      Pupils: Pupils are equal, round, and reactive to light.   Neck:      Vascular: No JVD.      Trachea: No tracheal deviation.   Cardiovascular:      Rate and Rhythm: Normal rate and regular rhythm.      Heart sounds: Normal heart sounds. No murmur heard.  Pulmonary:      Effort: Pulmonary effort is normal. No respiratory distress.      Breath sounds: Normal breath sounds. No wheezing.   Abdominal:      General:  Bowel sounds are normal.      Palpations: Abdomen is soft.      Tenderness: There is no abdominal tenderness.   Musculoskeletal:         General: No deformity. Normal range of motion.      Cervical back: Normal range of motion and neck supple.        Feet:       Comments: Right upper extremity sling in place   Skin:     General: Skin is warm and dry.      Coloration: Skin is not pale.      Findings: Abrasion present. No erythema or rash.          Neurological:      Mental Status: He is alert and oriented to person, place, and time.      Cranial Nerves: No cranial nerve deficit.   Psychiatric:         Behavior: Behavior normal.         Thought Content: Thought content normal.         Suture Removal    Date/Time: 5/26/2025 2:27 PM    Performed by: Evelyn Vance PA-C  Authorized by: Zurdo Marie MD    Consent:     Consent obtained:  Verbal             ED Course  ED Course as of 05/26/25 1800   Mon May 26, 2025   1349 XR Chest 1 View [TK]   1403 Paged hospitalist [TK]   1421 XR Foot 3+ View Bilateral [TK]   1424 Patient has been admitted to hospitalist services by Dr. Baig. [TK]      ED Course User Index  [TK] Evelyn Vance PA-C                                           Results for orders placed or performed during the hospital encounter of 05/26/25   Urinalysis With Microscopic If Indicated (No Culture) - Urine, Clean Catch    Collection Time: 05/26/25 12:49 PM    Specimen: Urine, Clean Catch   Result Value Ref Range    Color, UA Yellow Yellow, Straw    Appearance, UA Clear Clear    pH, UA 6.0 5.0 - 8.0    Specific Gravity, UA 1.017 1.005 - 1.030    Glucose,  mg/dL (2+) (A) Negative    Ketones, UA Negative Negative    Bilirubin, UA Negative Negative    Blood, UA Trace (A) Negative    Protein, UA >=300 mg/dL (3+) (A) Negative    Leuk Esterase, UA Negative Negative    Nitrite, UA Negative Negative    Urobilinogen, UA 0.2 E.U./dL 0.2 - 1.0 E.U./dL   Urinalysis, Microscopic Only - Urine, Clean  Catch    Collection Time: 05/26/25 12:49 PM    Specimen: Urine, Clean Catch   Result Value Ref Range    RBC, UA 0-2 None Seen, 0-2 /HPF    WBC, UA 11-20 (A) None Seen, 0-2 /HPF    Bacteria, UA Trace (A) None Seen /HPF    Squamous Epithelial Cells, UA 0-2 None Seen, 0-2 /HPF    Hyaline Casts, UA 7-12 None Seen /LPF    Methodology Automated Microscopy    [place] Urine Culture Tube - Urine, Clean Catch    Collection Time: 05/26/25 12:49 PM    Specimen: Urine, Clean Catch   Result Value Ref Range    Extra Tube Hold for add-ons.    Urine Drug Screen - Urine, Clean Catch    Collection Time: 05/26/25 12:49 PM    Specimen: Urine, Clean Catch   Result Value Ref Range    THC, Screen, Urine Negative Negative    Phencyclidine (PCP), Urine Negative Negative    Cocaine Screen, Urine Negative Negative    Methamphetamine, Ur Negative Negative    Opiate Screen Positive (A) Negative    Amphetamine Screen, Urine Negative Negative    Benzodiazepine Screen, Urine Positive (A) Negative    Tricyclic Antidepressants Screen Positive (A) Negative    Methadone Screen, Urine Negative Negative    Barbiturates Screen, Urine Negative Negative    Oxycodone Screen, Urine Negative Negative    Buprenorphine, Screen, Urine Negative Negative   Fentanyl, Urine - Urine, Clean Catch    Collection Time: 05/26/25 12:49 PM    Specimen: Urine, Clean Catch   Result Value Ref Range    Fentanyl, Urine Negative Negative   Comprehensive Metabolic Panel    Collection Time: 05/26/25 12:50 PM    Specimen: Blood   Result Value Ref Range    Glucose 282 (H) 65 - 99 mg/dL    BUN 12 8 - 23 mg/dL    Creatinine 1.28 (H) 0.76 - 1.27 mg/dL    Sodium 134 (L) 136 - 145 mmol/L    Potassium 4.6 3.5 - 5.2 mmol/L    Chloride 98 98 - 107 mmol/L    CO2 25.5 22.0 - 29.0 mmol/L    Calcium 9.2 8.6 - 10.5 mg/dL    Total Protein 6.6 6.0 - 8.5 g/dL    Albumin 3.9 3.5 - 5.2 g/dL    ALT (SGPT) 25 1 - 41 U/L    AST (SGOT) 31 1 - 40 U/L    Alkaline Phosphatase 150 (H) 39 - 117 U/L    Total  Bilirubin 0.6 0.0 - 1.2 mg/dL    Globulin 2.7 gm/dL    A/G Ratio 1.4 g/dL    BUN/Creatinine Ratio 9.4 7.0 - 25.0    Anion Gap 10.5 5.0 - 15.0 mmol/L    eGFR 61.0 >60.0 mL/min/1.73   C-reactive Protein    Collection Time: 05/26/25 12:50 PM    Specimen: Blood   Result Value Ref Range    C-Reactive Protein 0.30 0.00 - 0.50 mg/dL   CBC Auto Differential    Collection Time: 05/26/25 12:50 PM    Specimen: Blood   Result Value Ref Range    WBC 7.38 3.40 - 10.80 10*3/mm3    RBC 5.14 4.14 - 5.80 10*6/mm3    Hemoglobin 15.2 13.0 - 17.7 g/dL    Hematocrit 47.1 37.5 - 51.0 %    MCV 91.6 79.0 - 97.0 fL    MCH 29.6 26.6 - 33.0 pg    MCHC 32.3 31.5 - 35.7 g/dL    RDW 12.4 12.3 - 15.4 %    RDW-SD 42.2 37.0 - 54.0 fl    MPV 10.3 6.0 - 12.0 fL    Platelets 148 140 - 450 10*3/mm3    Neutrophil % 67.1 42.7 - 76.0 %    Lymphocyte % 21.8 19.6 - 45.3 %    Monocyte % 7.3 5.0 - 12.0 %    Eosinophil % 3.1 0.3 - 6.2 %    Basophil % 0.4 0.0 - 1.5 %    Immature Grans % 0.3 0.0 - 0.5 %    Neutrophils, Absolute 4.95 1.70 - 7.00 10*3/mm3    Lymphocytes, Absolute 1.61 0.70 - 3.10 10*3/mm3    Monocytes, Absolute 0.54 0.10 - 0.90 10*3/mm3    Eosinophils, Absolute 0.23 0.00 - 0.40 10*3/mm3    Basophils, Absolute 0.03 0.00 - 0.20 10*3/mm3    Immature Grans, Absolute 0.02 0.00 - 0.05 10*3/mm3    nRBC 0.0 0.0 - 0.2 /100 WBC   TSH    Collection Time: 05/26/25 12:50 PM    Specimen: Blood   Result Value Ref Range    TSH 4.970 (H) 0.270 - 4.200 uIU/mL   Green Top (Gel)    Collection Time: 05/26/25 12:50 PM   Result Value Ref Range    Extra Tube Hold for add-ons.    Lavender Top    Collection Time: 05/26/25 12:50 PM   Result Value Ref Range    Extra Tube hold for add-on    Gold Top - SST    Collection Time: 05/26/25 12:50 PM   Result Value Ref Range    Extra Tube Hold for add-ons.    Light Blue Top    Collection Time: 05/26/25 12:50 PM   Result Value Ref Range    Extra Tube Hold for add-ons.    POC Glucose Once    Collection Time: 05/26/25  4:47 PM     Specimen: Blood   Result Value Ref Range    Glucose 250 (H) 70 - 130 mg/dL   ECG 12 Lead Altered Mental Status    Collection Time: 05/26/25  5:42 PM   Result Value Ref Range    QT Interval 406 ms    QTC Interval 431 ms       XR Chest 1 View   Final Result       No evidence of acute disease in the chest.       This report was finalized on 5/26/2025 1:35 PM by Brock Tafoya MD.          XR Foot 3+ View Bilateral   Final Result   Impression:       No acute bony process.       This report was finalized on 5/26/2025 2:12 PM by Brock Tafoya MD.          MRI Brain Without Contrast    (Results Pending)                   Medical Decision Making  - Uncomplicated ED course.  Patient remained hemodynamically stable throughout entire duration of ED course.  -Patient labs fairly unremarkable.  Creatinine mildly elevated, however this does appear to be improved as compared to his previous creatinine levels.  -Patient x-rays of bilateral feet negative for any acute pathology.  -Due to patient's recurrent falls and safety concerns at home I did discuss case with Dr. Pinedo, hospitalist who will admit patient to hospitalist services for further evaluation and treatment.    Problems Addressed:  Frequent falls: complicated acute illness or injury    Amount and/or Complexity of Data Reviewed  Labs: ordered.  Radiology: ordered. Decision-making details documented in ED Course.    Risk  Prescription drug management.  Decision regarding hospitalization.        Final diagnoses:   Frequent falls   Skin avulsion   Bone bruise       ED Disposition  ED Disposition       ED Disposition   Decision to Admit    Condition   --    Comment   Level of Care: Medical Telemetry [23]   Diagnosis: Frequent falls [959670]   Admitting Physician: TARAS PINEDO [[id number]]                 No follow-up provider specified.       Medication List      No changes were made to your prescriptions during this visit.            Evelyn Vance PA-C  05/26/25  1800

## 2025-05-26 NOTE — PLAN OF CARE
Goal Outcome Evaluation:   Pt received this shift from ED Alert oriented x3  and in no distress. C/O pain to left heel wound care consulted and heel protector boot placed. Call light in reach bed alarm set. Continue with plan of care.

## 2025-05-26 NOTE — H&P
[facility]  History & Physical    Patient Identification:  Name:  Ramsey Riley  Age:  70 y.o.  Sex:  male  :  1955  MRN:  [id number]   Visit Number:  [id number]  Admit Date: 2025   Primary Care Physician:  Margret Purvis MD    Subjective     Chief complaint: Foot pain    History of presenting illness:      Ramsey Riley is a 70 y.o. male who presented for further evaluation of foot pain. He was seen and examined in the ED with spouse at the bedside. He and spouse report history mild vascular dementia with mild confusion and memory difficulty at baseline but he was pleasant and conversant. His spouse assists in providing history. They report they presented to the ED today secondary to foot pain. Patient has had a deep bruise/blister on the back of his heel for the past several days which he reports is incredibly painful. He reports deep, burning pain. His spouse states that last evening and overnight the pain seemed worse and the patient was very uncomfortable at home, at times even yelling out in pain so she decided to bring him in to the ED for further evaluation.   She believes the wound on the foot may be related to one of his recent multiple falls but they cannot pinpoint when the exact occurrence was. Spouse and patient report he has fallen multiple times in the past month. With one fall they report he injured his shoulder, spouse states had a fracture but unsure specifics. He did not require surgical intervention but has been in a sling for the past several weeks. Recently followed up with provider regarding shoulder injury and was told to discontinue the sling but patient has still been wearing this as he states it is too painful without it. He was hospitalized in [place] [facility]/p [facility] in the recent past and the plan was for some sort of inpatient rehab however they had difficulty securing placement for him. Wife reports that he is significantly  debilitated and has declined further since that discharge. At this time he is requiring assistance with bed mobility, ADLs such as dressing. She states he uses a walker but remains significantly unsteady on his feet and she is not physically able to stabilize him herself. She further adds that his vision is very poor due to diabetic retinopathy and this is limited his ability to safely ambulate in their home.  When he does fall he is unable to get up on his own and she is unable to assist. He denies any chest pain, palpitations, or dizziness preceding these falls.     Past medical history is significant for vascular dementia, CAD, HTN, PAD, hyperlipidemia, diabetes mellitus, chronically anticoagulated with Eliquis, thyroid disease    Upon arrival to the ED, vital signs were temp 98, heart rate 84, respirations 18, /75, SpO2 97% on RA.  On laboratory workup he was hyperglycemic at 282, CBC unremarkable.  CXR negative, XR bilateral feet negative.    Known Emergency Department medications received prior to my evaluation included none.   Emergency Department Room location at the time of my evaluation was 411.     ---------------------------------------------------------------------------------------------------------------------   Review of Systems   Constitutional:  Positive for activity change.   HENT:  Negative for congestion and rhinorrhea.    Respiratory:  Negative for cough and shortness of breath.    Cardiovascular:  Negative for chest pain and leg swelling.   Gastrointestinal:  Negative for abdominal pain, diarrhea, nausea and vomiting.   Genitourinary:  Negative for difficulty urinating and dysuria.   Musculoskeletal:  Positive for arthralgias, gait problem and myalgias.   Skin:  Positive for wound. Negative for rash.   Neurological:  Positive for weakness. Negative for dizziness and light-headedness.   Psychiatric/Behavioral:  Positive for confusion (baseline).          ---------------------------------------------------------------------------------------------------------------------   Past Medical History:   Diagnosis Date    Arthritis     Clot     Coronary artery disease     Dementia 12/18/2024    Diabetes mellitus     Heart disease     History of blood clots     Hypertension     Impaired mobility     Low back pain     Thyroid disease     Type 2 diabetes mellitus      Past Surgical History:   Procedure Laterality Date    CHOLECYSTECTOMY  2015    COLONOSCOPY  2015    dr srivastava    CORONARY ARTERY BYPASS GRAFT      [place] -  BY PASS     ENDOSCOPY       Family History   Problem Relation Age of Onset    Diabetes Mother     Heart disease Mother         STENTING    COPD Mother     Heart attack Father     Diabetes Father     Heart defect Son         Ross procedure    Heart murmur Son     Hypertension Son      Social History     Socioeconomic History    Marital status:    Tobacco Use    Smoking status: Never     Passive exposure: Never    Smokeless tobacco: Current     Types: Chew   Vaping Use    Vaping status: Never Used   Substance and Sexual Activity    Alcohol use: No    Drug use: No    Sexual activity: Defer     ---------------------------------------------------------------------------------------------------------------------   Allergies:  Penicillins and Trazodone  ---------------------------------------------------------------------------------------------------------------------   Home medications:    Medications below are reported home medications pulling from within the system; at this time, these medications have not been reconciled unless otherwise specified and are in the verification process for further verifcation as current home medications.  (Not in a hospital admission)      Hospital Scheduled Meds:          Current listed hospital scheduled medications may not yet reflect those currently placed in orders that are signed and held awaiting patient's  arrival to floor.   ---------------------------------------------------------------------------------------------------------------------     Objective     Vital Signs:  Temp:  [98 °F (36.7 °C)] 98 °F (36.7 °C)  Heart Rate:  [69-84] 69  Resp:  [18] 18  BP: (148-176)/(73-95) 148/75      05/26/25  1133   Weight: 99.8 kg (220 lb)     Body mass index is 30.68 kg/m².  ---------------------------------------------------------------------------------------------------------------------       Physical Exam  Vitals and nursing note reviewed.   Constitutional:       General: He is not in acute distress.     Appearance: He is obese. He is ill-appearing (chronically ill appearing).   HENT:      Head: Normocephalic and atraumatic.   Eyes:      Extraocular Movements: Extraocular movements intact.   Cardiovascular:      Rate and Rhythm: Normal rate and regular rhythm.   Pulmonary:      Effort: Pulmonary effort is normal. No respiratory distress.      Breath sounds: Normal breath sounds.   Abdominal:      Palpations: Abdomen is soft.      Tenderness: There is no abdominal tenderness.   Musculoskeletal:      Right lower leg: No edema.      Left lower leg: No edema.   Skin:     General: Skin is warm and dry.      Comments: Wound to posterior left heel, no surrounding erythema or warmth, no drainage or other sign of infection   Neurological:      Mental Status: He is alert. Mental status is at baseline.   Psychiatric:         Mood and Affect: Mood normal.         Behavior: Behavior normal.         ---------------------------------------------------------------------------------------------------------------------  EKG:      ---------------------------------------------------------------------------------------------------------------------   Results from last 7 days   Lab Units 05/26/25  1250   CRP mg/dL 0.30   WBC 10*3/mm3 7.38   HEMOGLOBIN g/dL 15.2   HEMATOCRIT % 47.1   MCV fL 91.6   MCHC g/dL 32.3   PLATELETS 10*3/mm3 148        "  Results from last 7 days   Lab Units 05/26/25  1250   SODIUM mmol/L 134*   POTASSIUM mmol/L 4.6   CHLORIDE mmol/L 98   CO2 mmol/L 25.5   BUN mg/dL 12   CREATININE mg/dL 1.28*   CALCIUM mg/dL 9.2   GLUCOSE mg/dL 282*   ALBUMIN g/dL 3.9   BILIRUBIN mg/dL 0.6   ALK PHOS U/L 150*   AST (SGOT) U/L 31   ALT (SGPT) U/L 25   Estimated Creatinine Clearance: 66.5 mL/min (A) (by C-G formula based on SCr of 1.28 mg/dL (H)).  No results found for: \"AMMONIA\"          Lab Results   Component Value Date    HGBA1C 8.30 (H) 05/05/2025     Lab Results   Component Value Date    TSH 4.610 (H) 05/05/2025    FREET4 1.24 12/10/2024     No results found for: \"PREGTESTUR\", \"PREGSERUM\", \"HCG\", \"HCGQUANT\"  Pain Management Panel  More data exists         Latest Ref Rng & Units 5/5/2025 12/10/2024   Pain Management Panel   Creatinine, Urine Not Estab. mg/dL 110.9  -   Amphetamine, Urine Qual Negative - Negative    Barbiturates Screen, Urine Negative - Negative    Benzodiazepine Screen, Urine Negative - Positive    Buprenorphine, Screen, Urine Negative - Negative    Cocaine Screen, Urine Negative - Negative    Fentanyl, Urine Negative - Negative    Methadone Screen , Urine Negative - Negative    Methamphetamine, Ur Negative - Negative      No results found for: \"BLOODCX\"  No results found for: \"URINECX\"  No results found for: \"WOUNDCX\"  No results found for: \"STOOLCX\"      ---------------------------------------------------------------------------------------------------------------------  Imaging Results (Last 7 Days)       Procedure Component Value Units Date/Time    XR Foot 3+ View Bilateral [[id number]] Collected: 05/26/25 1335     Updated: 05/26/25 1415    Narrative:      Exam: XR FOOT 3+ VW BILATERAL-     History: pain     Comparison: No images available     Findings:     Right distal first tow resection.      No acute fracture or dislocation.     Soft tissue are normal.       Impression:      Impression:     No acute bony process.   " "  This report was finalized on 5/26/2025 2:12 PM by Brock Tafoya MD.       XR Chest 1 View [[id number]] Collected: 05/26/25 1335     Updated: 05/26/25 1337    Narrative:      Procedure: Frontal view of chest obtained.     Comparison: None available     History: frequent falls     Findings:     No pneumonia or acute process seen in the chest.  Normal heart size and mediastinal contours.  Trachea is in midline position.  No edema or effusion is seen.  There is no evidence of pneumothorax.       Impression:         No evidence of acute disease in the chest.     This report was finalized on 5/26/2025 1:35 PM by Brock Tafoya MD.               Cultures:  No results found for: \"BLOODCX\", \"URINECX\", \"WOUNDCX\", \"MRSACX\", \"RESPCX\", \"STOOLCX\"    Last echocardiogram:  Results for orders placed during the hospital encounter of 12/10/24    Adult Transthoracic Echo Complete W/ Cont if Necessary Per Protocol    Interpretation Summary    Left ventricular systolic function is normal. Calculated left ventricular EF = 67.8% Left ventricular ejection fraction appears to be 66 - 70%.    Left ventricular wall thickness is consistent with mild concentric hypertrophy. Left ventricular diastolic function was indeterminate.    The right ventricular cavity is borderline dilated with borderline systolic function.    The left atrial cavity is mildly dilated.    Mild aortic valve regurgitation is present.    Saline test results are negative for right to left atrial level shunt.          I have personally reviewed the above radiology images and read the final radiology report on 05/26/25  ---------------------------------------------------------------------------------------------------------------------  Assessment / Plan     Active Hospital Problems    Diagnosis  POA    **Frequent falls [R29.6]  Not Applicable       ASSESSMENT/PLAN:    Vascular dementia  Acute on chronic debility  Frequent falls  Recent proximal right humerus fracture due " to fall  Patient presented to the ED secondary to foot pain.  On further discussion about reports history of vascular dementia with increasing difficulty caring for the patient at home.  He is fallen 3 times within the past month and sustained a right humerus fracture.  She reports he is currently requiring assistance getting out of bed, dressing, etc.  She states he is unsteady on his feet and has difficulty navigating their home despite use of a walker and her assistance.  Will admit to the telemetry unit for further observation and management  Will proceed with MRI brain without contrast to further evaluate  PT OT consults  Appreciate CM/SW assistance with discharge planning as well.  Will check TSH, folate, B12  Continue supportive care measures  Continue to trend labs    Chronic:  CAD  HTN  PAD  HLD  Diabetes mellitus  Hx blood clot, chronically anticoagulated  Continue home medication regimen as indicated cover with SSI for now.  Accu-Cheks to monitor with hypoglycemia protocol in place.  Add basal insulin as clinically indicated  Trend vital signs    ----------  -DVT prophylaxis: Chronically anticoagulated with Eliquis  -Activity: Up with assistance  -Expected length of stay: Less than 2 midnights  -Disposition pending further clinical course and PT OT recommendations    High risk secondary to frequent falls, vascular dementia    Code Status and Medical Interventions: CPR (Attempt to Resuscitate); Full Support   Ordered at: 05/26/25 1430     Code Status (Patient has no pulse and is not breathing):    CPR (Attempt to Resuscitate)     Medical Interventions (Patient has pulse or is breathing):    Full Support       Justo Grande PA-C   05/26/25  14:35 EDT

## 2025-05-27 ENCOUNTER — APPOINTMENT (OUTPATIENT)
Dept: MRI IMAGING | Facility: HOSPITAL | Age: 70
End: 2025-05-27
Payer: MEDICARE

## 2025-05-27 LAB
ALBUMIN SERPL-MCNC: 3.3 G/DL (ref 3.5–5.2)
ALBUMIN/GLOB SERPL: 1.3 G/DL
ALP SERPL-CCNC: 134 U/L (ref 39–117)
ALT SERPL W P-5'-P-CCNC: 20 U/L (ref 1–41)
ANION GAP SERPL CALCULATED.3IONS-SCNC: 11.1 MMOL/L (ref 5–15)
AST SERPL-CCNC: 25 U/L (ref 1–40)
BASOPHILS # BLD AUTO: 0.03 10*3/MM3 (ref 0–0.2)
BASOPHILS NFR BLD AUTO: 0.4 % (ref 0–1.5)
BILIRUB SERPL-MCNC: 0.5 MG/DL (ref 0–1.2)
BUN SERPL-MCNC: 13 MG/DL (ref 8–23)
BUN/CREAT SERPL: 10.3 (ref 7–25)
CALCIUM SPEC-SCNC: 8.6 MG/DL (ref 8.6–10.5)
CHLORIDE SERPL-SCNC: 100 MMOL/L (ref 98–107)
CO2 SERPL-SCNC: 22.9 MMOL/L (ref 22–29)
CREAT SERPL-MCNC: 1.26 MG/DL (ref 0.76–1.27)
DEPRECATED RDW RBC AUTO: 40.2 FL (ref 37–54)
EGFRCR SERPLBLD CKD-EPI 2021: 62.1 ML/MIN/1.73
EOSINOPHIL # BLD AUTO: 0.21 10*3/MM3 (ref 0–0.4)
EOSINOPHIL NFR BLD AUTO: 2.7 % (ref 0.3–6.2)
ERYTHROCYTE [DISTWIDTH] IN BLOOD BY AUTOMATED COUNT: 12.3 % (ref 12.3–15.4)
FOLATE SERPL-MCNC: 6.92 NG/ML (ref 4.78–24.2)
GLOBULIN UR ELPH-MCNC: 2.6 GM/DL
GLUCOSE BLDC GLUCOMTR-MCNC: 203 MG/DL (ref 70–130)
GLUCOSE BLDC GLUCOMTR-MCNC: 278 MG/DL (ref 70–130)
GLUCOSE BLDC GLUCOMTR-MCNC: 285 MG/DL (ref 70–130)
GLUCOSE BLDC GLUCOMTR-MCNC: 294 MG/DL (ref 70–130)
GLUCOSE SERPL-MCNC: 221 MG/DL (ref 65–99)
HCT VFR BLD AUTO: 44.8 % (ref 37.5–51)
HGB BLD-MCNC: 14.7 G/DL (ref 13–17.7)
IMM GRANULOCYTES # BLD AUTO: 0.03 10*3/MM3 (ref 0–0.05)
IMM GRANULOCYTES NFR BLD AUTO: 0.4 % (ref 0–0.5)
LYMPHOCYTES # BLD AUTO: 1.67 10*3/MM3 (ref 0.7–3.1)
LYMPHOCYTES NFR BLD AUTO: 21.2 % (ref 19.6–45.3)
MCH RBC QN AUTO: 29.7 PG (ref 26.6–33)
MCHC RBC AUTO-ENTMCNC: 32.8 G/DL (ref 31.5–35.7)
MCV RBC AUTO: 90.5 FL (ref 79–97)
MONOCYTES # BLD AUTO: 0.67 10*3/MM3 (ref 0.1–0.9)
MONOCYTES NFR BLD AUTO: 8.5 % (ref 5–12)
NEUTROPHILS NFR BLD AUTO: 5.26 10*3/MM3 (ref 1.7–7)
NEUTROPHILS NFR BLD AUTO: 66.8 % (ref 42.7–76)
NRBC BLD AUTO-RTO: 0 /100 WBC (ref 0–0.2)
PLATELET # BLD AUTO: 149 10*3/MM3 (ref 140–450)
PMV BLD AUTO: 10.2 FL (ref 6–12)
POTASSIUM SERPL-SCNC: 4.4 MMOL/L (ref 3.5–5.2)
PROT SERPL-MCNC: 5.9 G/DL (ref 6–8.5)
QT INTERVAL: 406 MS
QTC INTERVAL: 431 MS
RBC # BLD AUTO: 4.95 10*6/MM3 (ref 4.14–5.8)
SODIUM SERPL-SCNC: 134 MMOL/L (ref 136–145)
VIT B12 BLD-MCNC: 1046 PG/ML (ref 211–946)
WBC NRBC COR # BLD AUTO: 7.87 10*3/MM3 (ref 3.4–10.8)

## 2025-05-27 PROCEDURE — 85025 COMPLETE CBC W/AUTO DIFF WBC: CPT | Performed by: INTERNAL MEDICINE

## 2025-05-27 PROCEDURE — 63710000001 INSULIN LISPRO (HUMAN) PER 5 UNITS: Performed by: INTERNAL MEDICINE

## 2025-05-27 PROCEDURE — 80053 COMPREHEN METABOLIC PANEL: CPT | Performed by: INTERNAL MEDICINE

## 2025-05-27 PROCEDURE — 97162 PT EVAL MOD COMPLEX 30 MIN: CPT

## 2025-05-27 PROCEDURE — 82948 REAGENT STRIP/BLOOD GLUCOSE: CPT

## 2025-05-27 PROCEDURE — 82607 VITAMIN B-12: CPT | Performed by: INTERNAL MEDICINE

## 2025-05-27 PROCEDURE — 97166 OT EVAL MOD COMPLEX 45 MIN: CPT

## 2025-05-27 PROCEDURE — 82746 ASSAY OF FOLIC ACID SERUM: CPT | Performed by: INTERNAL MEDICINE

## 2025-05-27 PROCEDURE — 70551 MRI BRAIN STEM W/O DYE: CPT | Performed by: RADIOLOGY

## 2025-05-27 PROCEDURE — 63710000001 INSULIN LISPRO PROTAMINE-INSULIN LISPRO (75-25) 100 UNIT/ML SUSPENSION: Performed by: INTERNAL MEDICINE

## 2025-05-27 PROCEDURE — 99233 SBSQ HOSP IP/OBS HIGH 50: CPT | Performed by: INTERNAL MEDICINE

## 2025-05-27 PROCEDURE — 70551 MRI BRAIN STEM W/O DYE: CPT

## 2025-05-27 RX ORDER — CITALOPRAM HYDROBROMIDE 20 MG/1
20 TABLET ORAL DAILY
Status: DISCONTINUED | OUTPATIENT
Start: 2025-05-27 | End: 2025-06-04 | Stop reason: HOSPADM

## 2025-05-27 RX ORDER — INSULIN HUMAN 100 [IU]/ML
64 INJECTION, SUSPENSION SUBCUTANEOUS EVERY EVENING
Status: ON HOLD | COMMUNITY
End: 2025-06-14

## 2025-05-27 RX ORDER — DORZOLAMIDE HYDROCHLORIDE AND TIMOLOL MALEATE 20; 5 MG/ML; MG/ML
SOLUTION/ DROPS OPHTHALMIC 2 TIMES DAILY
Status: DISCONTINUED | OUTPATIENT
Start: 2025-05-27 | End: 2025-06-04 | Stop reason: HOSPADM

## 2025-05-27 RX ORDER — QUETIAPINE FUMARATE 100 MG/1
100 TABLET, FILM COATED ORAL NIGHTLY
Status: DISCONTINUED | OUTPATIENT
Start: 2025-05-27 | End: 2025-06-04 | Stop reason: HOSPADM

## 2025-05-27 RX ORDER — LOSARTAN POTASSIUM 50 MG/1
100 TABLET ORAL DAILY
Status: DISCONTINUED | OUTPATIENT
Start: 2025-05-27 | End: 2025-06-04 | Stop reason: HOSPADM

## 2025-05-27 RX ORDER — QUETIAPINE FUMARATE 25 MG/1
50 TABLET, FILM COATED ORAL 2 TIMES DAILY
Status: DISCONTINUED | OUTPATIENT
Start: 2025-05-27 | End: 2025-06-04 | Stop reason: HOSPADM

## 2025-05-27 RX ORDER — AMLODIPINE BESYLATE 10 MG/1
10 TABLET ORAL DAILY
Status: DISCONTINUED | OUTPATIENT
Start: 2025-05-27 | End: 2025-06-04 | Stop reason: HOSPADM

## 2025-05-27 RX ORDER — PANTOPRAZOLE SODIUM 40 MG/1
40 TABLET, DELAYED RELEASE ORAL
Status: DISCONTINUED | OUTPATIENT
Start: 2025-05-27 | End: 2025-06-04 | Stop reason: HOSPADM

## 2025-05-27 RX ORDER — ATORVASTATIN CALCIUM 40 MG/1
80 TABLET, FILM COATED ORAL NIGHTLY
Status: DISCONTINUED | OUTPATIENT
Start: 2025-05-27 | End: 2025-06-04 | Stop reason: HOSPADM

## 2025-05-27 RX ORDER — HYDROCODONE BITARTRATE AND ACETAMINOPHEN 7.5; 325 MG/1; MG/1
1 TABLET ORAL EVERY 12 HOURS PRN
Status: DISCONTINUED | OUTPATIENT
Start: 2025-05-27 | End: 2025-06-04 | Stop reason: HOSPADM

## 2025-05-27 RX ORDER — FAMOTIDINE 20 MG/1
20 TABLET, FILM COATED ORAL 2 TIMES DAILY
Status: DISCONTINUED | OUTPATIENT
Start: 2025-05-27 | End: 2025-06-04 | Stop reason: HOSPADM

## 2025-05-27 RX ORDER — HYDROCODONE BITARTRATE AND ACETAMINOPHEN 7.5; 325 MG/1; MG/1
1 TABLET ORAL EVERY 12 HOURS PRN
Refills: 0 | Status: DISCONTINUED | OUTPATIENT
Start: 2025-05-27 | End: 2025-05-27

## 2025-05-27 RX ORDER — ALPRAZOLAM 0.5 MG
0.5 TABLET ORAL NIGHTLY PRN
Status: DISCONTINUED | OUTPATIENT
Start: 2025-05-27 | End: 2025-06-04 | Stop reason: HOSPADM

## 2025-05-27 RX ORDER — CASTOR OIL AND BALSAM, PERU 788; 87 MG/G; MG/G
1 OINTMENT TOPICAL EVERY 12 HOURS SCHEDULED
Status: DISCONTINUED | OUTPATIENT
Start: 2025-05-27 | End: 2025-06-04 | Stop reason: HOSPADM

## 2025-05-27 RX ORDER — INSULIN HUMAN 100 [IU]/ML
84 INJECTION, SUSPENSION SUBCUTANEOUS EVERY MORNING
Status: ON HOLD | COMMUNITY
End: 2025-06-14

## 2025-05-27 RX ADMIN — SENNOSIDES AND DOCUSATE SODIUM 2 TABLET: 50; 8.6 TABLET ORAL at 20:50

## 2025-05-27 RX ADMIN — QUETIAPINE FUMARATE 50 MG: 25 TABLET ORAL at 15:31

## 2025-05-27 RX ADMIN — AMLODIPINE BESYLATE 10 MG: 10 TABLET ORAL at 12:01

## 2025-05-27 RX ADMIN — HYDROCODONE BITARTRATE AND ACETAMINOPHEN 1 TABLET: 7.5; 325 TABLET ORAL at 11:20

## 2025-05-27 RX ADMIN — TIMOLOL MALEATE: 5 SOLUTION OPHTHALMIC at 20:52

## 2025-05-27 RX ADMIN — QUETIAPINE FUMARATE 100 MG: 100 TABLET ORAL at 20:50

## 2025-05-27 RX ADMIN — PANTOPRAZOLE SODIUM 40 MG: 40 TABLET, DELAYED RELEASE ORAL at 12:01

## 2025-05-27 RX ADMIN — ATORVASTATIN CALCIUM 80 MG: 40 TABLET, FILM COATED ORAL at 20:50

## 2025-05-27 RX ADMIN — CITALOPRAM HYDROBROMIDE 20 MG: 20 TABLET ORAL at 12:01

## 2025-05-27 RX ADMIN — INSULIN LISPRO 6 UNITS: 100 INJECTION, SOLUTION INTRAVENOUS; SUBCUTANEOUS at 12:02

## 2025-05-27 RX ADMIN — Medication 10 ML: at 20:52

## 2025-05-27 RX ADMIN — FAMOTIDINE 20 MG: 20 TABLET ORAL at 12:02

## 2025-05-27 RX ADMIN — FAMOTIDINE 20 MG: 20 TABLET ORAL at 20:50

## 2025-05-27 RX ADMIN — Medication 10 ML: at 09:08

## 2025-05-27 RX ADMIN — EMPAGLIFLOZIN 10 MG: 10 TABLET, FILM COATED ORAL at 12:01

## 2025-05-27 RX ADMIN — CASTOR OIL AND BALSAM, PERU 1 APPLICATION: 788; 87 OINTMENT TOPICAL at 20:52

## 2025-05-27 RX ADMIN — CASTOR OIL AND BALSAM, PERU 1 APPLICATION: 788; 87 OINTMENT TOPICAL at 16:08

## 2025-05-27 RX ADMIN — LOSARTAN POTASSIUM 100 MG: 50 TABLET, FILM COATED ORAL at 12:01

## 2025-05-27 RX ADMIN — ACETAMINOPHEN 1000 MG: 500 TABLET ORAL at 19:11

## 2025-05-27 RX ADMIN — TIMOLOL MALEATE: 5 SOLUTION OPHTHALMIC at 12:05

## 2025-05-27 RX ADMIN — APIXABAN 5 MG: 5 TABLET, FILM COATED ORAL at 20:50

## 2025-05-27 RX ADMIN — INSULIN LISPRO 4 UNITS: 100 INJECTION, SOLUTION INTRAVENOUS; SUBCUTANEOUS at 09:07

## 2025-05-27 RX ADMIN — APIXABAN 5 MG: 5 TABLET, FILM COATED ORAL at 12:01

## 2025-05-27 RX ADMIN — SENNOSIDES AND DOCUSATE SODIUM 2 TABLET: 50; 8.6 TABLET ORAL at 09:08

## 2025-05-27 RX ADMIN — ALPRAZOLAM 0.5 MG: 0.5 TABLET ORAL at 20:50

## 2025-05-27 RX ADMIN — INSULIN LISPRO 15 UNITS: 100 INJECTION, SUSPENSION SUBCUTANEOUS at 19:12

## 2025-05-27 RX ADMIN — LEVOTHYROXINE SODIUM 137.5 MCG: 0.12 TABLET ORAL at 12:01

## 2025-05-27 NOTE — PROGRESS NOTES
[facility]     Progress Note    Patient Name: Ramsey Riley  : 1955  MRN: [id number]  Primary Care Physician:  Margret Purvis MD  Date of admission: 2025    Subjective   Subjective     Chief Complaint: 70-year-old male being seen in follow-up for frequent falls    History of Present Illness  Patient Reports no new complaints when seen at bedside today.  His spouse and physical therapy were present at bedside.    Wife states that patient does occasionally shuffle feet as well.    Review of Systems  Patient does not report any confusion this morning, he denies any dizziness or lightheadedness.  No vomiting or cough.  No shortness of air.    Objective   Objective     Vitals:   Temp:  [97.9 °F (36.6 °C)-98.8 °F (37.1 °C)] 98 °F (36.7 °C)  Heart Rate:  [64-78] 78  Resp:  [18-20] 18  BP: (152-180)/(82-90) 167/87    Physical Exam  Constitutional:       General: He is not in acute distress.     Appearance: He is well-developed.   HENT:      Head: Normocephalic and atraumatic.   Eyes:      Conjunctiva/sclera: Conjunctivae normal.   Neck:      Trachea: No tracheal deviation.   Cardiovascular:      Rate and Rhythm: Normal rate and regular rhythm.      Heart sounds: No murmur heard.     No friction rub. No gallop.   Pulmonary:      Effort: No respiratory distress.      Breath sounds: Normal breath sounds. No wheezing or rales.   Abdominal:      General: Bowel sounds are normal. There is no distension.      Palpations: Abdomen is soft.      Tenderness: There is no abdominal tenderness. There is no guarding.   Musculoskeletal:         General: No tenderness.   Skin:     General: Skin is warm and dry.      Findings: No erythema or rash.   Neurological:      Mental Status: He is alert and oriented to person, place, and time.      Cranial Nerves: No cranial nerve deficit.          Result Review    Result Review:  I have personally reviewed the results from the time of this admission to 2025 16:20 EDT and  agree with these findings:  [x]  Laboratory list / accordion  []  Microbiology  []  Radiology  []  EKG/Telemetry   []  Cardiology/Vascular   []  Pathology  []  Old records  []  Other:  Most notable findings include:     Results from last 7 days   Lab Units 05/27/25  0106 05/26/25  1250   WBC 10*3/mm3 7.87 7.38   HEMOGLOBIN g/dL 14.7 15.2   HEMATOCRIT % 44.8 47.1   PLATELETS 10*3/mm3 149 148     Results from last 7 days   Lab Units 05/27/25  0106 05/26/25  1250   SODIUM mmol/L 134* 134*   POTASSIUM mmol/L 4.4 4.6   CHLORIDE mmol/L 100 98   CO2 mmol/L 22.9 25.5   BUN mg/dL 13 12   CREATININE mg/dL 1.26 1.28*   CALCIUM mg/dL 8.6 9.2   BILIRUBIN mg/dL 0.5 0.6   ALK PHOS U/L 134* 150*   ALT (SGPT) U/L 20 25   AST (SGOT) U/L 25 31   GLUCOSE mg/dL 221* 282*     Lab Results   Component Value Date    [id number] 1,046 (H) 05/27/2025     Lab Results   Component Value Date    FOLATE 6.92 05/27/2025       Assessment & Plan   Assessment / Plan     #Frequent falls  #Generalized weakness/physical debility  #Recent right proximal humerus fracture due to fall, prior to admission  #Vascular dementia  - PT/OT evaluations today; monitor for progress  - Patient with significant falls at home causing trauma; in setting of anticoagulation (eliquis)  - Vitamin B12/Folate levels replete  - MRI brain without contrast ordered and still pending    #Uncontrolled essential hypertension  #Diabetes mellitus type 2, insulin-dependent and complicated by hyperglycemia  #Obesity per BMI 40.28 kg/m²  - I have reviewed the patient's home medication list and have resumed his home amlodipine 10 mg daily, and Cozaar 100 mg daily  - Unclear as to why patient is not on a beta-blocker given history of coronary artery disease; for now, we will plan for as needed hydralazine for ongoing uncontrolled hypertension  - Patient on 70/30 at home; will plan to start 75/25 here at 15 units BID for now and titrate based on blood glucose trend  - Continue to monitor BP  and glucose level trend while hospitalized    Chronic medical conditions:  -Gastroesophageal reflux disease  - Previous stroke  - Previous blood clot  - Coronary artery disease status post three-vessel CABG  - Chronic low back pain  - Hypothyroidism    VTE Prophylaxis:  Continue home eliquis for now - monitor closely in setting of falls    CODE STATUS:    Code Status (Patient has no pulse and is not breathing): CPR (Attempt to Resuscitate)  Medical Interventions (Patient has pulse or is breathing): Full Support    Disposition:  I expect patient to be discharged unclear pending progress with PT/OT.    Magali Baig, DO

## 2025-05-27 NOTE — THERAPY EVALUATION
Patient Name: Ramsey Riley  : 1955    MRN: [id number]                              Today's Date: 2025       Admit Date: 2025    Visit Dx:     ICD-10-CM ICD-9-CM   1. Frequent falls  R29.6 V15.88   2. Skin avulsion  T14.8XXA 879.8   3. Bone bruise  T14.8XXA 924.9     Patient Active Problem List   Diagnosis    Coronary artery disease involving native coronary artery of native heart without angina pectoris    Essential hypertension    Bradycardia, sinus    Fatigue    Abnormal ECG    Precordial pain    LVH (left ventricular hypertrophy) due to hypertensive disease, without heart failure    Diabetes mellitus    PAD (peripheral artery disease)    Ischemic stroke    History of blood clots    Pulmonary embolus    Dyslipidemia    Class 1 obesity due to excess calories with serious comorbidity and body mass index (BMI) of 34.0 to 34.9 in adult    Weakness    Frequent falls     Past Medical History:   Diagnosis Date    Arthritis     Clot     Coronary artery disease     Dementia 2024    Diabetes mellitus     Heart disease     History of blood clots     Hypertension     Impaired mobility     Low back pain     Thyroid disease     Type 2 diabetes mellitus      Past Surgical History:   Procedure Laterality Date    CHOLECYSTECTOMY      COLONOSCOPY      dr srivastava    CORONARY ARTERY BYPASS GRAFT      [place] - 3 BY PASS     ENDOSCOPY        General Information       Row Name 25 1432          OT Time and Intention    Subjective Information complains of;weakness  -KP     Document Type evaluation  -KP     Mode of Treatment individual therapy;occupational therapy  -KP     Patient Effort good  -KP     Symptoms Noted During/After Treatment none  -KP     Comment Patient agreeable to OT evaluation.  -       Row Name 25 1432          General Information    Patient Profile Reviewed yes  -KP     Prior Level of Function --  assist from wife for all ADLs, supervision with functional mobility  using rolling walker; frequent falls, no DME  -     Existing Precautions/Restrictions fall  -     Barriers to Rehab previous functional deficit;cognitive status;medically complex  -       Row Name 05/27/25 1432          Occupational Profile    Reason for Services/Referral (Occupational Profile) Patient admitted to [facility] on 5/26/2025. He was referred for OT evaluation due to change in functional performance with ADLs, functional mobility, and/or transfers.  -       Row Name 05/27/25 1432          Living Environment    Current Living Arrangements home  -     People in Home spouse  -       Row Name 05/27/25 1432          Cognition    Orientation Status (Cognition) oriented to;person;place;situation;verbal cues/prompts needed for orientation  -       Row Name 05/27/25 1432          Safety Issues/Impairments Affecting Functional Mobility    Safety Issues Affecting Function (Mobility) insight into deficits/self-awareness;awareness of need for assistance  -     Impairments Affecting Function (Mobility) balance;cognition;coordination;endurance/activity tolerance;pain;strength  -     Cognitive Impairments, Mobility Safety/Performance awareness, need for assistance;insight into deficits/self-awareness  -               User Key  (r) = Recorded By, (t) = Taken By, (c) = Cosigned By      Initials Name Provider Type     Vanesa Tse, OT Occupational Therapist                     Mobility/ADL's       Row Name 05/27/25 1440          Bed Mobility    Bed Mobility bed mobility (all) activities  -     All Activities, [place] (Bed Mobility) moderate assist (50% patient effort)  -     Assistive Device (Bed Mobility) bed rails;head of bed elevated  -       Row Name 05/27/25 1440          Transfers    Transfers sit-stand transfer;stand-sit transfer  -       Row Name 05/27/25 1440          Sit-Stand Transfer    Sit-Stand [place] (Transfers) moderate assist (50% patient effort)  -      Assistive Device (Sit-Stand Transfers) walker, front-wheeled  -[facility] Name 05/27/25 1440          Stand-Sit Transfer    Stand-Sit [place] (Transfers) moderate assist (50% patient effort)  -     Assistive Device (Stand-Sit Transfers) walker, front-wheeled  -[facility] Name 05/27/25 1440          Activities of Daily Living    BADL Assessment/Intervention bathing;upper body dressing;lower body dressing;grooming;feeding;toileting  -KP       Row Name 05/27/25 1440          Bathing Assessment/Intervention    [place] Level (Bathing) bathing skills;maximum assist (25% patient effort)  -KP       Row Name 05/27/25 1440          Upper Body Dressing Assessment/Training    [place] Level (Upper Body Dressing) upper body dressing skills;moderate assist (50% patient effort)  -KP       Row Name 05/27/25 1440          Lower Body Dressing Assessment/Training    [place] Level (Lower Body Dressing) lower body dressing skills;maximum assist (25% patient effort)  -KP       Row Name 05/27/25 1440          Grooming Assessment/Training    [place] Level (Grooming) grooming skills;moderate assist (50% patient effort)  -KP       Row Name 05/27/25 1443          Self-Feeding Assessment/Training    [place] Level (Feeding) feeding skills;maximum assist (25% patient effort)  -KP       Row Name 05/27/25 1440          Toileting Assessment/Training    [place] Level (Toileting) toileting skills;maximum assist (25% patient effort)  -               User Key  (r) = Recorded By, (t) = Taken By, (c) = Cosigned By      Initials Name Provider Type     Vanesa Tse OT Occupational Therapist                   Obj/Interventions       Row Name 05/27/25 1443          Sensory Assessment (Somatosensory)    Sensory Assessment (Somatosensory) UE sensation intact  -KP       Row Name 05/27/25 1443          Vision Assessment/Intervention    Visual Impairment/Limitations blurry vision  -     Vision Assessment Comment  impaired due to diabetic retinopathy  -[facility] Name 05/27/25 1443          Range of Motion Comprehensive    Comment, General Range of Motion LUE WFLs, RUE 75% with AAROM tolerated well  (recent right humeral fracture cleared by ortho per patient's wife)  -KP       Row Name 05/27/25 1443          Strength Comprehensive (MMT)    Comment, General Manual Muscle Testing (MMT) Assessment functional, 3+/5 RUE  -KP       Row Name 05/27/25 1443          Motor Skills    Motor Skills coordination;functional endurance  -     Coordination fine motor deficit;gross motor deficit;bilateral;upper extremity;minimal impairment;moderate impairment  -     Functional Endurance fair minus  -KP       Row Name 05/27/25 1443          Balance    Balance Assessment sitting static balance;standing static balance  -     Static Sitting Balance standby assist  -     Static Standing Balance minimal assist  -     Position/Device Used, Standing Balance walker, rolling  -               User Key  (r) = Recorded By, (t) = Taken By, (c) = Cosigned By      Initials Name Provider Type     Vanesa Tse, OT Occupational Therapist                   Goals/Plan       Row Name 05/27/25 1448          Transfer Goal 1 (OT)    Activity/Assistive Device (Transfer Goal 1, OT) toilet  -     [place] Level/Cues Needed (Transfer Goal 1, OT) contact guard required  -     Time Frame (Transfer Goal 1, OT) by discharge  -KP       Row Name 05/27/25 1440          Problem Specific Goal 1 (OT)    Problem Specific Goal 1 (OT) Patient will perform sustained activity X15 minutes to promote functional endurance/activity tolerance needed for daily occupations.  -     Time Frame (Problem Specific Goal 1, OT) by discharge  -KP       Row Name 05/27/25 1444          Therapy Assessment/Plan (OT)    Planned Therapy Interventions (OT) activity tolerance training;BADL retraining;functional balance retraining;patient/caregiver  education/training;occupation/activity based interventions;transfer/mobility retraining;ROM/therapeutic exercise;strengthening exercise;passive ROM/stretching  -               User Key  (r) = Recorded By, (t) = Taken By, (c) = Cosigned By      Initials Name Provider Type    Vanesa Aguillon, OT Occupational Therapist                   Clinical Impression       Row Name 05/27/25 1446          Pain Assessment    Pretreatment Pain Rating 1/10  -     Posttreatment Pain Rating 1/10  -     Pain Location foot  -     Pain Side/Orientation left  -       Row Name 05/27/25 1446          Plan of Care Review    Plan of Care Reviewed With patient  -     Progress no change  -     Outcome Evaluation Patient seen for OT evaluation. He presents with functional limitations including generalized weakness, impaired activity tolerance/functional endurance, impaired balance, and decreased safety due to cognition. Patient would benefit from ongoing OT services to promote highest level of independence and safety prior to discharge.  -       Row Name 05/27/25 1446          Therapy Assessment/Plan (OT)    Patient/Family Therapy Goal Statement (OT) return home after rehab  -     Rehab Potential (OT) good  -     Criteria for Skilled Therapeutic Interventions Met (OT) yes;meets criteria;skilled treatment is necessary  [facility]     Therapy Frequency (OT) 5 times/wk  -     Predicted Duration of Therapy Intervention (OT) discharge  -       Row Name 05/27/25 1446          Therapy Plan Review/Discharge Plan (OT)    Anticipated Discharge Disposition (OT) inpatient rehabilitation facility  -       Row Name 05/27/25 1446          Positioning and Restraints    Pre-Treatment Position in bed  -     Post Treatment Position bed  -     In Bed fowlers;call light within reach;encouraged to call for assist;with family/caregiver  -               User Key  (r) = Recorded By, (t) = Taken By, (c) = Cosigned By      Initials Name Provider  Type    Vanesa Aguillon OT Occupational Therapist                   Outcome Measures       Row Name 05/27/25 1120          How much help from another person do you currently need...    Turning from your back to your side while in flat bed without using bedrails? 4  -LB     Moving from lying on back to sitting on the side of a flat bed without bedrails? 4  -LB     Moving to and from a bed to a chair (including a wheelchair)? 3  -LB     Standing up from a chair using your arms (e.g., wheelchair, bedside chair)? 3  -LB     Climbing 3-5 steps with a railing? 2  -LB     To walk in hospital room? 3  -LB     AM-PAC 6 Clicks Score (PT) 19  -LB               User Key  (r) = Recorded By, (t) = Taken By, (c) = Cosigned By      Initials Name Provider Type    Marysol Rodriguez, RN Registered Nurse                      OT Recommendation and Plan  Planned Therapy Interventions (OT): activity tolerance training, BADL retraining, functional balance retraining, patient/caregiver education/training, occupation/activity based interventions, transfer/mobility retraining, ROM/therapeutic exercise, strengthening exercise, passive ROM/stretching  Therapy Frequency (OT): 5 times/wk  Plan of Care Review  Plan of Care Reviewed With: patient  Progress: no change  Outcome Evaluation: Patient seen for OT evaluation. He presents with functional limitations including generalized weakness, impaired activity tolerance/functional endurance, impaired balance, and decreased safety due to cognition. Patient would benefit from ongoing OT services to promote highest level of independence and safety prior to discharge.     Time Calculation:         Time Calculation- OT       Row Name 05/27/25 1449             Time Calculation- OT    OT Received On 05/27/25  -                User Key  (r) = Recorded By, (t) = Taken By, (c) = Cosigned By      Initials Name Provider Type    Vanesa Aguillon OT Occupational Therapist                  Therapy Charges for  Today       Code Description Service Date Service Provider Modifiers Qty    83035721534 HC OT EVAL MOD COMPLEXITY 4 5/27/2025 Vanesa Tse, PATRICIA GO 1                 Vanesa Tse OT  5/27/2025

## 2025-05-27 NOTE — THERAPY EVALUATION
Acute Care - Physical Therapy Initial Evaluation   [facility]     Patient Name: Ramsey Riley  : 1955  MRN: [id number]  Today's Date: 2025      Visit Dx:     ICD-10-CM ICD-9-CM   1. Frequent falls  R29.6 V15.88   2. Skin avulsion  T14.8XXA 879.8   3. Bone bruise  T14.8XXA 924.9     Patient Active Problem List   Diagnosis    Coronary artery disease involving native coronary artery of native heart without angina pectoris    Essential hypertension    Bradycardia, sinus    Fatigue    Abnormal ECG    Precordial pain    LVH (left ventricular hypertrophy) due to hypertensive disease, without heart failure    Diabetes mellitus    PAD (peripheral artery disease)    Ischemic stroke    History of blood clots    Pulmonary embolus    Dyslipidemia    Class 1 obesity due to excess calories with serious comorbidity and body mass index (BMI) of 34.0 to 34.9 in adult    Weakness    Frequent falls     Past Medical History:   Diagnosis Date    Arthritis     Clot     Coronary artery disease     Dementia 2024    Diabetes mellitus     Heart disease     History of blood clots     Hypertension     Impaired mobility     Low back pain     Thyroid disease     Type 2 diabetes mellitus      Past Surgical History:   Procedure Laterality Date    CHOLECYSTECTOMY      COLONOSCOPY      dr srivastava    CORONARY ARTERY BYPASS GRAFT      [place] - 3 BY PASS     ENDOSCOPY       PT Assessment (Last 12 Hours)       PT Evaluation and Treatment       Row Name 25 1506          Physical Therapy Time and Intention    Subjective Information complains of;weakness  -KM     Document Type evaluation  -KM     Mode of Treatment physical therapy  -KM     Patient Effort good  -KM     Symptoms Noted During/After Treatment none  -KM       Row Name 25 1506          General Information    Patient Profile Reviewed yes  -KM     Patient Observations alert;cooperative;agree to therapy  -KM     Prior Level of Function --  Pt. receives  assistance for ADLs. He is able to perform mobility skills w/ supervision from spouse. He uses RW for ambulation.  -KM     Existing Precautions/Restrictions fall  -KM     Risks Reviewed patient:;LOB;nausea/vomiting;dizziness;increased discomfort  -KM     Benefits Reviewed patient:;improve function;increase independence;increase strength;increase balance  -KM     Barriers to Rehab medically complex;previous functional deficit;cognitive status  -KM       Row Name 05/27/25 1506          Living Environment    Current Living Arrangements home  -KM     People in Home spouse  -KM     Primary Care Provided by self;spouse/significant other  -KM       Row Name 05/27/25 1506          Home Use of Assistive/Adaptive Equipment    Equipment Currently Used at Home walker, rolling  -KM       Row Name 05/27/25 1506          Pain    Pretreatment Pain Rating 1/10  -KM     Posttreatment Pain Rating 1/10  -KM     Pain Location foot  -KM     Pain Side/Orientation left  -KM       Row Name 05/27/25 1506          Cognition    Orientation Status (Cognition) oriented to;person;place;situation;verbal cues/prompts needed for orientation  -KM     Follows Commands (Cognition) follows one-step commands  -KM       Row Name 05/27/25 1506          Range of Motion (ROM)    Range of Motion bilateral lower extremities;ROM is WFL  -KM       Row Name 05/27/25 1506          Strength (Manual Muscle Testing)    Strength (Manual Muscle Testing) bilateral lower extremities;strength is WFL  -       Row Name 05/27/25 1506          Bed Mobility    Bed Mobility bed mobility (all) activities  -KM     All Activities, [place] (Bed Mobility) moderate assist (50% patient effort)  -KM     Assistive Device (Bed Mobility) bed rails;head of bed elevated  -KM       Row Name 05/27/25 1506          Transfers    Transfers sit-stand transfer;stand-sit transfer  -KM       Row Name 05/27/25 1506          Sit-Stand Transfer    Sit-Stand [place] (Transfers) moderate  assist (50% patient effort)  -KM     Assistive Device (Sit-Stand Transfers) walker, front-wheeled  -KM       Row Name 05/27/25 1506          Stand-Sit Transfer    Stand-Sit [place] (Transfers) moderate assist (50% patient effort)  -KM     Assistive Device (Stand-Sit Transfers) walker, front-wheeled  -KM       Row Name 05/27/25 1506          Gait/Stairs (Locomotion)    Gait/Stairs Locomotion gait/ambulation independence;gait/ambulation assistive device;distance ambulated  -KM     [place] Level (Gait) minimum assist (75% patient effort);moderate assist (50% patient effort)  -KM     Assistive Device (Gait) walker, front-wheeled  -KM     Patient was able to Ambulate yes  -KM     Distance in Feet (Gait) 40  -KM     Pattern (Gait) step-to  -KM     Deviations/Abnormal Patterns (Gait) ataxic;base of support, narrow;gait speed decreased  -KM     Bilateral Gait Deviations forward flexed posture  -KM     Left Sided Gait Deviations weight shift ability decreased  -KM       Row Name 05/27/25 1506          Safety Issues/Impairments Affecting Functional Mobility    Safety Issues Affecting Function (Mobility) awareness of need for assistance;insight into deficits/self-awareness  -KM     Impairments Affecting Function (Mobility) balance;cognition;coordination;endurance/activity tolerance;pain;strength  -KM     Cognitive Impairments, Mobility Safety/Performance awareness, need for assistance;insight into deficits/self-awareness  -KM       Row Name 05/27/25 1506          Balance    Balance Assessment sitting static balance;standing dynamic balance  -KM     Static Sitting Balance standby assist  -KM     Dynamic Standing Balance minimal assist  -KM     Position/Device Used, Standing Balance walker, front-wheeled  -KM       Row Name             Wound 05/26/25 1618 Left posterior heel Pressure Injury    Wound - Properties Group Placement Date: 05/26/25  -CW Placement Time: 1618 -CW Present on Original Admission: Y  -CW Side:  Left  -CW Orientation: posterior  -CW Location: heel  -CW Primary Wound Type: Pressure Inj  -CW    Retired Wound - Properties Group Placement Date: 05/26/25  -CW Placement Time: 1618  -CW Present on Original Admission: Y  -CW Side: Left  -CW Orientation: posterior  -CW Location: heel  -CW    Retired Wound - Properties Group Placement Date: 05/26/25  -CW Placement Time: 1618  -CW Present on Original Admission: Y  -CW Side: Left  -CW Orientation: posterior  -CW Location: heel  -CW    Retired Wound - Properties Group Date first assessed: 05/26/25  -CW Time first assessed: 1618  -CW Present on Original Admission: Y  -CW Side: Left  -CW Location: heel  -CW      Row Name             Wound 05/26/25 1622 Left anterior heel Pressure Injury    Wound - Properties Group Placement Date: 05/26/25  -CW Placement Time: 1622  -CW Side: Left  -CW Orientation: anterior  -CW Location: heel  -CW Primary Wound Type: Pressure Inj  -CW    Retired Wound - Properties Group Placement Date: 05/26/25  -CW Placement Time: 1622  -CW Side: Left  -CW Orientation: anterior  -CW Location: heel  -CW    Retired Wound - Properties Group Placement Date: 05/26/25  -CW Placement Time: 1622  -CW Side: Left  -CW Orientation: anterior  -CW Location: heel  -CW    Retired Wound - Properties Group Date first assessed: 05/26/25  -CW Time first assessed: 1622  -CW Side: Left  -CW Location: heel  -CW      Row Name             Wound 05/27/25 1007 medial parietal region Traumatic Abrasion    Wound - Properties Group Placement Date: 05/27/25  -LB Placement Time: 1007  -LB Present on Original Admission: Y  -LB Orientation: medial  -LB Location: parietal region  -LB Primary Wound Type: Traumatic  -LB Secondary Wound Type - Traumatic: Abrasion  -LB    Retired Wound - Properties Group Placement Date: 05/27/25  -LB Placement Time: 1007  -LB Present on Original Admission: Y  -LB Orientation: medial  -LB Location: parietal region  -LB    Retired Wound - Properties Group  Placement Date: 05/27/25  -LB Placement Time: 1007  -LB Present on Original Admission: Y  -LB Orientation: medial  -LB Location: parietal region  -LB    Retired Wound - Properties Group Date first assessed: 05/27/25  -LB Time first assessed: 1007  -LB Present on Original Admission: Y  -LB Location: parietal region  -LB      Row Name             Wound 05/27/25 1008 Right distal arm Traumatic Skin Tear    Wound - Properties Group Placement Date: 05/27/25  -LB Placement Time: 1008  -LB Present on Original Admission: Y  -LB Side: Right  -LB Orientation: distal  -LB Location: arm  -LB Primary Wound Type: Traumatic  -LB Secondary Wound Type - Traumatic: Skin Tear  -LB    Retired Wound - Properties Group Placement Date: 05/27/25  -LB Placement Time: 1008  -LB Present on Original Admission: Y  -LB Side: Right  -LB Orientation: distal  -LB Location: arm  -LB    Retired Wound - Properties Group Placement Date: 05/27/25  -LB Placement Time: 1008  -LB Present on Original Admission: Y  -LB Side: Right  -LB Orientation: distal  -LB Location: arm  -LB    Retired Wound - Properties Group Date first assessed: 05/27/25  -LB Time first assessed: 1008  -LB Present on Original Admission: Y  -LB Side: Right  -LB Location: arm  -LB      Row Name 05/27/25 1506          Plan of Care Review    Plan of Care Reviewed With patient  -KM     Outcome Evaluation Pt. evaluation completed during PT session. He was able to perform functional mobility skills w/ modA. He was able to ambulate in room w/ RW and Teddy. Pt. would benefit from further PT services at this time.  -KM       Row Name 05/27/25 1506          Therapy Assessment/Plan (PT)    Patient/Family Therapy Goals Statement (PT) improve mobility skills  -KM     Functional Level at Time of Evaluation (PT) modA  -KM     PT Diagnosis (PT) decreased mobility skills  -KM     Rehab Potential (PT) fair  -KM     Criteria for Skilled Interventions Met (PT) yes;skilled treatment is necessary  -KM      Therapy Frequency (PT) 2 times/wk  2-5x/wk  -KM     Predicted Duration of Therapy Intervention (PT) until discharge  -     Problem List (PT) problems related to;balance;mobility  -KM     Activity Limitations Related to Problem List (PT) unable to ambulate safely;unable to transfer safely  -       Row Name 05/27/25 1506          Therapy Plan Review/Discharge Plan (PT)    Therapy Plan Review (PT) evaluation/treatment results reviewed;care plan/treatment goals reviewed;risks/benefits reviewed;patient  -       Row Name 05/27/25 1506          Physical Therapy Goals    Bed Mobility Goal Selection (PT) bed mobility, PT goal 1  -KM     Transfer Goal Selection (PT) transfer, PT goal 1  -KM     Gait Training Goal Selection (PT) gait training, PT goal 1  -KM       Row Name 05/27/25 1506          Bed Mobility Goal 1 (PT)    Activity/Assistive Device (Bed Mobility Goal 1, PT) bed mobility activities, all  -KM     [place] Level/Cues Needed (Bed Mobility Goal 1, PT) standby assist  -KM     Time Frame (Bed Mobility Goal 1, PT) by discharge  -       Row Name 05/27/25 1506          Transfer Goal 1 (PT)    Activity/Assistive Device (Transfer Goal 1, PT) transfers, all;walker, rolling  -KM     [place] Level/Cues Needed (Transfer Goal 1, PT) standby assist  -KM     Time Frame (Transfer Goal 1, PT) by discharge  -       Row Name 05/27/25 1506          Gait Training Goal 1 (PT)    Activity/Assistive Device (Gait Training Goal 1, PT) gait (walking locomotion);assistive device use;walker, rolling  -KM     [place] Level (Gait Training Goal 1, PT) standby assist  -KM     Distance (Gait Training Goal 1, PT) 100'  -KM     Time Frame (Gait Training Goal 1, PT) by [facility]               User Key  (r) = Recorded By, (t) = Taken By, (c) = Cosigned By      Initials Name Provider Type    CW Chata Pulido, RN Registered Nurse    Baljinder Reese, PT Physical Therapist    LB Marysol Nix, RN Registered Nurse                     Physical Therapy Education       Title: PT OT SLP Therapies (Done)       Topic: Physical Therapy (Done)       Point: Mobility training (Done)       Learning Progress Summary            Patient Acceptance, E,TB, VU by  at 5/27/2025 1532                      Point: Home exercise program (Done)       Learning Progress Summary            Patient Acceptance, E,TB, VU by  at 5/27/2025 1532                      Point: Body mechanics (Done)       Learning Progress Summary            Patient Acceptance, E,TB, VU by  at 5/27/2025 1532                      Point: Precautions (Done)       Learning Progress Summary            Patient Acceptance, E,TB, VU by  at 5/27/2025 1532                                      User Key       Initials Effective Dates Name Provider Type Discipline     05/24/22 -  Baljinder Martinez PT Physical Therapist PT                  PT Recommendation and Plan  Anticipated Discharge Disposition (PT):  (TBD based on pt. progress)  Planned Therapy Interventions (PT): balance training, bed mobility training, gait training, home exercise program, patient/family education, postural re-education, ROM (range of motion), strengthening, stretching, transfer training  Therapy Frequency (PT): 2 times/wk (2-5x/wk)  Plan of Care Reviewed With: patient  Outcome Evaluation: Pt. evaluation completed during PT session. He was able to perform functional mobility skills w/ modA. He was able to ambulate in room w/ RW and Teddy. Pt. would benefit from further PT services at this time.       Time Calculation:    PT Charges       Row Name 05/27/25 1506             Time Calculation    PT Received On 05/27/25  -KM      PT Goal Re-Cert Due Date 06/10/25  -KM                User Key  (r) = Recorded By, (t) = Taken By, (c) = Cosigned By      Initials Name Provider Type    Baljinder Reese PT Physical Therapist                  Therapy Charges for Today       Code Description Service Date Service Provider Modifiers  Qty    [id number]  PT EVAL MOD COMPLEXITY 4 5/27/2025 Baljinder Martinez, PT GP 1            PT G-Codes  AM-PAC 6 Clicks Score (PT): 19    Baljinder Martinez, PT  5/27/2025

## 2025-05-27 NOTE — NURSING NOTE
DTPI to left posterior heel.  2x1 cm.  Purple and nonblanchable.  No drainage.  Daksha wound intact.    Stage II PI to left lateral heel.  2x2x0.1cm.  Red moist base that does not [place].  Scant serous drainage noted to wound bed.    Order placed to cleanse both wounds with wound cleanser, apply Venelex and cover with silicone bordered dressing BID.  Order in place for heel protector boots.    Patient with scab covered laceration to his scalp.  States had staples but these have been removed.  No drainage from laceration.  [place] wound without erythema.  Order placed to assess daily and leave open to air.    Skin tear documented to right forearm.  Skin tear protocol orders initiated.    Louis score 11.  PI prevention orders are in place.

## 2025-05-27 NOTE — PLAN OF CARE
Goal Outcome Evaluation:         Patient resting in bed at this time voicing no complaints or concerns. Pt pleasant and cooperative with care, disoriented to time/situation, bed alarm on with call light in reach. IV access maintained, no s/s of acute distress noted at this time. Plan of care ongoing.

## 2025-05-27 NOTE — CASE MANAGEMENT/SOCIAL WORK
Discharge Planning Assessment  [facility]     Patient Name: Ramsey Riley  MRN: [id number]  Today's Date: 5/27/2025    Admit Date: 5/26/2025    Plan: SS received consult per Physician for discharge planning.  SS spoke with pt at bedside and spouse via telephone.  Pt resides at home with spouse, Jina, at [address] [place] [address].  Pt currently does not utilize home health services.  Pt has rolling walker, wheelchair and scooter via unknown provider.  Pt's PCP is Margret Purvis.  Pt stated no POA or Living Will.  Pt utilizes [facility].  Pt's spouse requests Inpt rehab consult.  Pt is agreeable if a candidate.  SS will follow.   Discharge Needs Assessment       Row Name 05/27/25 1628       Living Environment    People in Home spouse    Name(s) of People in Home Lives with spouse, Jina    Current Living Arrangements home    Potentially Unsafe Housing Conditions none    Family Caregiver if Needed spouse    Family Caregiver Names spouse Jina    Quality of Family Relationships helpful;involved;supportive    Able to Return to Prior Arrangements yes       Resource/Environmental Concerns    Resource/Environmental Concerns none       Discharge Needs Assessment    Equipment Currently Used at Home walker, rolling;wheelchair                   Discharge Plan       Row Name 05/27/25 1632       Plan    Plan SS received consult per Physician for discharge planning.  SS spoke with pt at bedside and spouse via telephone.  Pt resides at home with spouse, Jina, at [address] [place] [address].  Pt currently does not utilize home health services.  Pt has rolling walker, wheelchair and scooter via unknown provider.  Pt's PCP is Margret Purvis.  Pt stated no POA or Living Will.  Pt utilizes [facility].  Pt's spouse requests Inpt rehab consult.  Pt is agreeable if a candidate.  SS will follow.                    Expected Discharge Date and Time       Expected Discharge Date Expected Discharge Time    May 27, 2025             Demographic Summary       Row Name 05/27/25 9839       General Information    Admission Type inpatient    Referral Source nursing    Reason for Consult discharge planning    Preferred Language English                    GERARD RowanW

## 2025-05-27 NOTE — PLAN OF CARE
Goal Outcome Evaluation:    Pt is resting in bed with no s/s of distress noted. VSS. IV access maintained. Wound care completed per orders. Will continue with plan of care.

## 2025-05-27 NOTE — CONSULTS
Spoke with primary RN who states that patient has some confusion and would be difficult to comprehend educational materials at this time. Will follow up with patient and family in am to review handouts on healthy eating, activity, monitoring blood glucose levels, and medication regime.

## 2025-05-28 LAB
ANION GAP SERPL CALCULATED.3IONS-SCNC: 10.1 MMOL/L (ref 5–15)
BUN SERPL-MCNC: 16.9 MG/DL (ref 8–23)
BUN/CREAT SERPL: 13.7 (ref 7–25)
CALCIUM SPEC-SCNC: 8.7 MG/DL (ref 8.6–10.5)
CHLORIDE SERPL-SCNC: 101 MMOL/L (ref 98–107)
CO2 SERPL-SCNC: 22.9 MMOL/L (ref 22–29)
CREAT SERPL-MCNC: 1.23 MG/DL (ref 0.76–1.27)
EGFRCR SERPLBLD CKD-EPI 2021: 63.9 ML/MIN/1.73
GLUCOSE BLDC GLUCOMTR-MCNC: 189 MG/DL (ref 70–130)
GLUCOSE BLDC GLUCOMTR-MCNC: 191 MG/DL (ref 70–130)
GLUCOSE BLDC GLUCOMTR-MCNC: 195 MG/DL (ref 70–130)
GLUCOSE BLDC GLUCOMTR-MCNC: 197 MG/DL (ref 70–130)
GLUCOSE SERPL-MCNC: 229 MG/DL (ref 65–99)
POTASSIUM SERPL-SCNC: 4.4 MMOL/L (ref 3.5–5.2)
SODIUM SERPL-SCNC: 134 MMOL/L (ref 136–145)

## 2025-05-28 PROCEDURE — 82948 REAGENT STRIP/BLOOD GLUCOSE: CPT

## 2025-05-28 PROCEDURE — 99232 SBSQ HOSP IP/OBS MODERATE 35: CPT | Performed by: INTERNAL MEDICINE

## 2025-05-28 PROCEDURE — 80048 BASIC METABOLIC PNL TOTAL CA: CPT | Performed by: INTERNAL MEDICINE

## 2025-05-28 PROCEDURE — 63710000001 INSULIN LISPRO PROTAMINE-INSULIN LISPRO (75-25) 100 UNIT/ML SUSPENSION: Performed by: INTERNAL MEDICINE

## 2025-05-28 RX ADMIN — INSULIN LISPRO 15 UNITS: 100 INJECTION, SUSPENSION SUBCUTANEOUS at 09:58

## 2025-05-28 RX ADMIN — LEVOTHYROXINE SODIUM 137.5 MCG: 0.12 TABLET ORAL at 08:22

## 2025-05-28 RX ADMIN — QUETIAPINE FUMARATE 100 MG: 100 TABLET ORAL at 21:59

## 2025-05-28 RX ADMIN — QUETIAPINE FUMARATE 50 MG: 25 TABLET ORAL at 16:18

## 2025-05-28 RX ADMIN — FAMOTIDINE 20 MG: 20 TABLET ORAL at 21:59

## 2025-05-28 RX ADMIN — CITALOPRAM HYDROBROMIDE 20 MG: 20 TABLET ORAL at 08:22

## 2025-05-28 RX ADMIN — FAMOTIDINE 20 MG: 20 TABLET ORAL at 08:22

## 2025-05-28 RX ADMIN — QUETIAPINE FUMARATE 50 MG: 25 TABLET ORAL at 08:22

## 2025-05-28 RX ADMIN — TIMOLOL MALEATE: 5 SOLUTION OPHTHALMIC at 08:23

## 2025-05-28 RX ADMIN — CASTOR OIL AND BALSAM, PERU 1 APPLICATION: 788; 87 OINTMENT TOPICAL at 08:23

## 2025-05-28 RX ADMIN — PANTOPRAZOLE SODIUM 40 MG: 40 TABLET, DELAYED RELEASE ORAL at 08:22

## 2025-05-28 RX ADMIN — INSULIN LISPRO 15 UNITS: 100 INJECTION, SUSPENSION SUBCUTANEOUS at 16:18

## 2025-05-28 RX ADMIN — HYDROCODONE BITARTRATE AND ACETAMINOPHEN 1 TABLET: 7.5; 325 TABLET ORAL at 16:19

## 2025-05-28 RX ADMIN — ATORVASTATIN CALCIUM 80 MG: 40 TABLET, FILM COATED ORAL at 21:59

## 2025-05-28 RX ADMIN — APIXABAN 5 MG: 5 TABLET, FILM COATED ORAL at 08:22

## 2025-05-28 RX ADMIN — SENNOSIDES AND DOCUSATE SODIUM 2 TABLET: 50; 8.6 TABLET ORAL at 21:59

## 2025-05-28 RX ADMIN — APIXABAN 5 MG: 5 TABLET, FILM COATED ORAL at 21:59

## 2025-05-28 RX ADMIN — TIMOLOL MALEATE: 5 SOLUTION OPHTHALMIC at 21:58

## 2025-05-28 RX ADMIN — CASTOR OIL AND BALSAM, PERU 1 APPLICATION: 788; 87 OINTMENT TOPICAL at 21:58

## 2025-05-28 RX ADMIN — SENNOSIDES AND DOCUSATE SODIUM 2 TABLET: 50; 8.6 TABLET ORAL at 08:22

## 2025-05-28 RX ADMIN — AMLODIPINE BESYLATE 10 MG: 10 TABLET ORAL at 08:22

## 2025-05-28 RX ADMIN — Medication 10 ML: at 21:59

## 2025-05-28 RX ADMIN — EMPAGLIFLOZIN 10 MG: 10 TABLET, FILM COATED ORAL at 08:22

## 2025-05-28 RX ADMIN — LOSARTAN POTASSIUM 100 MG: 50 TABLET, FILM COATED ORAL at 08:22

## 2025-05-28 NOTE — PLAN OF CARE
Goal Outcome Evaluation:      Patient resting in bed at this time voicing no complaints or concerns. Pt pleasant and cooperative with all care this shift taking p.o. meds whole. IV access maintained, bed alarm on with call light in reach, no s/s of acute distress noted at this time. Plan of care ongoing.

## 2025-05-28 NOTE — CASE MANAGEMENT/SOCIAL WORK
Discharge Planning Assessment  [facility]     Patient Name: Ramsey Riley  MRN: [id number]  Today's Date: 5/28/2025    Admit Date: 5/26/2025    Plan: SS noted [facility] consult and spoke with rehab at ext [phone].  Pt is being evaluted by [facility].  Pt resides at home with spouse, Jina , at [address] [place] [address].  Pt currently does not utilize home health services.  Pt has rolling walker, wheelchair and scooter via unknown provider.  Pt's PCP is Margret Purvis.  Pt stated no POA or Living Will.  Pt utilizes [facility]. SS will follow       Discharge Plan       Row Name 05/28/25 1133       Plan    Plan SS noted [facility] consult and spoke with rehab at ext [phone].  Pt is being evaluted by [facility].  Pt resides at home with spouse, Jina , at [address] [place] [address].  Pt currently does not utilize home health services.  Pt has rolling walker, wheelchair and scooter via unknown provider.  Pt's PCP is Margret Purvis.  Pt stated no POA or Living Will.  Pt utilizes [facility]. SS will follow                    Expected Discharge Date and Time       Expected Discharge Date Expected Discharge Time    May 27, 2025           Haley Larkin, GERARDW

## 2025-05-28 NOTE — SIGNIFICANT NOTE
05/28/25 1358   Post Acute Pre-Cert Documentation   Request Submitted by Facility - Type: Post Acute   Post-Acute Authorization Type Submitted: IRF   Date Post Acute Pre-Cert Inititated per Facility 05/28/25   Verification from Payer Yes   Accepting Facility [facility]   All Clinicals Submitted? Yes   Had Accepting Facility at Time of Submission Yes   Response Communicated to:    Authorization Number: [id number]   Post Acute Pre-Cert Initiated Comment Prior auth request for Rehab has been submitted to [employer] via [employer] portal with auth #[id number].

## 2025-05-28 NOTE — PROGRESS NOTES
[facility]     Progress Note    Patient Name: Ramsey Riley  : 1955  MRN: [id number]  Primary Care Physician:  Margret Purvis MD  Date of admission: 2025    Subjective   Subjective     Chief Complaint: 70-year-old male being seen in follow-up for frequent falls    History of Present Illness  Patient Reports no new complaints when seen at bedside today.  He is sleeping; awakens easily. Denies any pain at this time.     Wife present at bedside. Reports concern for returning home with  at present given multiple falls in the setting of eliquis and that he is much larger than she is at over 200 lbs. Patient appears motivated to improve with ambulation.     Review of Systems  Patient does not report any confusion this morning, he denies any dizziness or lightheadedness.  No vomiting or cough.  No shortness of air.    Objective   Objective     Vitals:   Temp:  [97 °F (36.1 °C)-98.2 °F (36.8 °C)] 98.1 °F (36.7 °C)  Heart Rate:  [46-59] 46  Resp:  [18-20] 18  BP: (107-139)/(62-78) 123/69    Physical Exam  Constitutional:       General: He is not in acute distress.     Appearance: He is well-developed.   HENT:      Head: Normocephalic and atraumatic.   Eyes:      Conjunctiva/sclera: Conjunctivae normal.   Neck:      Trachea: No tracheal deviation.   Cardiovascular:      Rate and Rhythm: Normal rate and regular rhythm.      Heart sounds: No murmur heard.     No friction rub. No gallop.   Pulmonary:      Effort: No respiratory distress.      Breath sounds: Normal breath sounds. No wheezing or rales.   Abdominal:      General: Bowel sounds are normal. There is no distension.      Palpations: Abdomen is soft.      Tenderness: There is no abdominal tenderness. There is no guarding.   Musculoskeletal:         General: No tenderness.   Skin:     General: Skin is warm and dry.      Findings: No erythema or rash.   Neurological:      Mental Status: He is alert and oriented to person, place, and time.       Cranial Nerves: No cranial nerve deficit.       *No change compared to 5/27/25*     Result Review    Result Review:  I have personally reviewed the results from the time of this admission to 5/28/2025 15:44 EDT and agree with these findings:  [x]  Laboratory list / accordion  []  Microbiology  []  Radiology  []  EKG/Telemetry   []  Cardiology/Vascular   []  Pathology  []  Old records  []  Other:  Most notable findings include:     Results from last 7 days   Lab Units 05/27/25  0106 05/26/25  1250   WBC 10*3/mm3 7.87 7.38   HEMOGLOBIN g/dL 14.7 15.2   HEMATOCRIT % 44.8 47.1   PLATELETS 10*3/mm3 149 148     Results from last 7 days   Lab Units 05/28/25  0114 05/27/25  0106 05/26/25  1250   SODIUM mmol/L 134* 134* 134*   POTASSIUM mmol/L 4.4 4.4 4.6   CHLORIDE mmol/L 101 100 98   CO2 mmol/L 22.9 22.9 25.5   BUN mg/dL 16.9 13 12   CREATININE mg/dL 1.23 1.26 1.28*   CALCIUM mg/dL 8.7 8.6 9.2   BILIRUBIN mg/dL  --  0.5 0.6   ALK PHOS U/L  --  134* 150*   ALT (SGPT) U/L  --  20 25   AST (SGOT) U/L  --  25 31   GLUCOSE mg/dL 229* 221* 282*     Lab Results   Component Value Date    [id number] 1,046 (H) 05/27/2025     Lab Results   Component Value Date    FOLATE 6.92 05/27/2025     MRI brain without contrast:    FINDINGS:  Study is degraded by patient motion.  The diffusion weighted images show no focal signal abnormality. There is  no evidence of acute ischemia     The remaining pulse sequences show no mass, hemorrhage, or midline  shift.  The ventricles, cisterns, and sulci are unremarkable. There is no  hydrocephalus.           The sagittal view show no sellar or parasellar mass.            There is no tectal or pineal lesion.     Coronal imaging demonstrates a symmetric appearance of the visualized  portions of the optic nerves and extraocular muscles.     IMPRESSION:     1. Study markedly degraded by patient motion  2. No convincing evidence of acute ischemia, focal mass, or midline  shift    Assessment & Plan    Assessment / Plan     #Frequent falls  #Generalized weakness/physical debility  #Recent right proximal humerus fracture due to fall, prior to admission  #Vascular dementia  - PT/OT evaluating; continue to participate as tolerated  - Requested inpatient physical rehabilitation; feel patient would be an excellent candidate given degree of debility  - Patient with significant falls at home causing trauma; in setting of anticoagulation (eliquis)  - Vitamin B12/Folate levels replete  - MRI brain without contrast ordered and unremarkable given in account the motion degradation     #Uncontrolled essential hypertension, controlled at present (123/69 mmHg)  #Diabetes mellitus type 2, insulin-dependent and complicated by hyperglycemia, improved  #Obesity per BMI 40.28 kg/m²  - I have reviewed the patient's home medication list and have resumed his home amlodipine 10 mg daily, and Cozaar 100 mg daily; BP much improved today; continue current regimen  - Unclear as to why patient is not on a beta-blocker given history of coronary artery disease; for now, we will plan for as needed hydralazine for ongoing uncontrolled hypertension  - Patient on 70/30 at home; will plan to continue 75/25 here at 15 units BID for now and titrate based on blood glucose trend; overall starting to improve/downtrend  - Continue to monitor BP and glucose level trend while hospitalized    Chronic medical conditions:  - Pulmonary nodules, noted since 2023; discussed with wife in detail at bedside today  -Gastroesophageal reflux disease  - Previous stroke  - Previous blood clot  - Coronary artery disease status post three-vessel CABG  - Chronic low back pain  - Hypothyroidism    VTE Prophylaxis:  Continue home eliquis for now - monitor closely in setting of falls    CODE STATUS:    Code Status (Patient has no pulse and is not breathing): CPR (Attempt to Resuscitate)  Medical Interventions (Patient has pulse or is breathing): Full Support    Disposition:   I expect patient to be discharged unclear pending progress with PT/OT.    Magali Baig, DO

## 2025-05-28 NOTE — PLAN OF CARE
Goal Outcome Evaluation:      Patient resting in bed, medicated per MAR for pain. VSS. Plan of care ongoing.

## 2025-05-28 NOTE — CASE MANAGEMENT/SOCIAL WORK
Discharge Planning Assessment   Ilya     Patient Name: Ramsey Riley  MRN: [id number]  Today's Date: 5/28/2025    Admit Date: 5/26/2025    Plan: [facility] notified SS that pt's information will be submitted to insurance for possible admit to [facility].  SS will follow.       Discharge Plan       Row Name 05/28/25 1444       Plan    Plan [facility] notified SS that pt's information will be submitted to insurance for possible admit to [facility].  SS will follow.                    Expected Discharge Date and Time       Expected Discharge Date Expected Discharge Time    May 29, 2025             Haley Larkin, GERARDW

## 2025-05-29 LAB
GLUCOSE BLDC GLUCOMTR-MCNC: 223 MG/DL (ref 70–130)
GLUCOSE BLDC GLUCOMTR-MCNC: 241 MG/DL (ref 70–130)
GLUCOSE BLDC GLUCOMTR-MCNC: 245 MG/DL (ref 70–130)
GLUCOSE BLDC GLUCOMTR-MCNC: 267 MG/DL (ref 70–130)

## 2025-05-29 PROCEDURE — 97530 THERAPEUTIC ACTIVITIES: CPT

## 2025-05-29 PROCEDURE — 82948 REAGENT STRIP/BLOOD GLUCOSE: CPT

## 2025-05-29 PROCEDURE — 97116 GAIT TRAINING THERAPY: CPT

## 2025-05-29 PROCEDURE — 63710000001 INSULIN LISPRO PROTAMINE-INSULIN LISPRO (75-25) 100 UNIT/ML SUSPENSION: Performed by: INTERNAL MEDICINE

## 2025-05-29 PROCEDURE — 99232 SBSQ HOSP IP/OBS MODERATE 35: CPT | Performed by: INTERNAL MEDICINE

## 2025-05-29 PROCEDURE — 97110 THERAPEUTIC EXERCISES: CPT

## 2025-05-29 RX ADMIN — Medication 10 ML: at 21:55

## 2025-05-29 RX ADMIN — PANTOPRAZOLE SODIUM 40 MG: 40 TABLET, DELAYED RELEASE ORAL at 08:56

## 2025-05-29 RX ADMIN — CASTOR OIL AND BALSAM, PERU 1 APPLICATION: 788; 87 OINTMENT TOPICAL at 21:55

## 2025-05-29 RX ADMIN — INSULIN LISPRO 15 UNITS: 100 INJECTION, SUSPENSION SUBCUTANEOUS at 08:58

## 2025-05-29 RX ADMIN — QUETIAPINE FUMARATE 100 MG: 100 TABLET ORAL at 21:55

## 2025-05-29 RX ADMIN — AMLODIPINE BESYLATE 10 MG: 10 TABLET ORAL at 08:56

## 2025-05-29 RX ADMIN — CITALOPRAM HYDROBROMIDE 20 MG: 20 TABLET ORAL at 08:55

## 2025-05-29 RX ADMIN — QUETIAPINE FUMARATE 50 MG: 25 TABLET ORAL at 08:55

## 2025-05-29 RX ADMIN — EMPAGLIFLOZIN 10 MG: 10 TABLET, FILM COATED ORAL at 08:56

## 2025-05-29 RX ADMIN — HYDROCODONE BITARTRATE AND ACETAMINOPHEN 1 TABLET: 7.5; 325 TABLET ORAL at 07:07

## 2025-05-29 RX ADMIN — LEVOTHYROXINE SODIUM 137.5 MCG: 0.12 TABLET ORAL at 08:56

## 2025-05-29 RX ADMIN — ATORVASTATIN CALCIUM 80 MG: 40 TABLET, FILM COATED ORAL at 21:54

## 2025-05-29 RX ADMIN — FAMOTIDINE 20 MG: 20 TABLET ORAL at 21:54

## 2025-05-29 RX ADMIN — TIMOLOL MALEATE: 5 SOLUTION OPHTHALMIC at 08:58

## 2025-05-29 RX ADMIN — LOSARTAN POTASSIUM 100 MG: 50 TABLET, FILM COATED ORAL at 08:56

## 2025-05-29 RX ADMIN — INSULIN LISPRO 15 UNITS: 100 INJECTION, SUSPENSION SUBCUTANEOUS at 17:21

## 2025-05-29 RX ADMIN — CASTOR OIL AND BALSAM, PERU 1 APPLICATION: 788; 87 OINTMENT TOPICAL at 08:57

## 2025-05-29 RX ADMIN — Medication 10 ML: at 08:58

## 2025-05-29 RX ADMIN — APIXABAN 5 MG: 5 TABLET, FILM COATED ORAL at 21:54

## 2025-05-29 RX ADMIN — APIXABAN 5 MG: 5 TABLET, FILM COATED ORAL at 08:55

## 2025-05-29 RX ADMIN — SENNOSIDES AND DOCUSATE SODIUM 2 TABLET: 50; 8.6 TABLET ORAL at 08:55

## 2025-05-29 RX ADMIN — FAMOTIDINE 20 MG: 20 TABLET ORAL at 08:56

## 2025-05-29 RX ADMIN — QUETIAPINE FUMARATE 50 MG: 25 TABLET ORAL at 17:21

## 2025-05-29 RX ADMIN — HYDROCODONE BITARTRATE AND ACETAMINOPHEN 1 TABLET: 7.5; 325 TABLET ORAL at 21:54

## 2025-05-29 NOTE — PLAN OF CARE
Goal Outcome Evaluation: Pt has had some c/o pain in left heel; PRN pain medication provided as needed. Pt did sit up in chair today for an extended period. Pt has intermittent confusion; disoriented to time & situation. Wound care completed this shift. Will continue to monitor & follow plan of care.

## 2025-05-29 NOTE — CONSULTS
"Diabetes Education  Assessment/Teaching    Patient Name:  Ramsey Riley  YOB: 1955  MRN: [id number]  Admit Date:  5/26/2025      Assessment Date:  5/29/2025  Flowsheet Row Most Recent Value   General Information     Height 155.8 cm (61.32\")   Height Method Stated   Weight 96.2 kg (212 lb)   Weight Method Bed scale   Pregnancy Assessment    Diabetes History    Education Preferences    Nutrition Information    Assessment Topics    DM Goals             Flowsheet Row Most Recent Value   DM Education Needs    Meter Has own   Meter Type Other (comment)  [Dexcom]   Frequency of Testing Other (comment)  [CGM]   Medication Insulin, Oral   Problem Solving Hyperglycemia, Hypoglycemia, Sick days, Symptoms, Signs, Treatment   Reducing Risks Cardiovascular, Immunizations, Foot care, Dental exam, Eye exam, Blood pressure, Lipids, A1C testing, Neuropathy, Retinopathy   Healthy Eating Basic meal plan provided   Physical Activity Walking   Physical Activity Frequency Occasionally   Healthy Coping Appropriate   Discharge Plan Follow-up with PCP   Motivation Moderate   Teaching Method Explanation, Discussion, Handouts   Patient Response Verbalized understanding, Needs reinforcement, Other (comment)  [Wife Jina also received the education and information]              Other Comments:  A1C 8.3 Patient and wife Jina was educated and received AADE7 and ADA handouts on diet, activity, checking blood glucose, taking medication as prescribed, checking feet daily and S/S of hypo/hyperglycemia. They were  educated on sick rule days. They had no questions or concerns. Please re-consult or call for concerns or questions. Thank you.        Electronically signed by:  Aurelia Perez RN  05/29/25 13:16 EDT  "

## 2025-05-29 NOTE — PROGRESS NOTES
[facility]     Progress Note    Patient Name: Ramsey Riley  : 1955  MRN: [id number]  Primary Care Physician:  Margret Purvis MD  Date of admission: 2025    Subjective   Subjective     Chief Complaint: 70-year-old male being seen in follow-up for frequent falls    History of Present Illness  Patient Reports no new complaints when seen at bedside today.  He is resting in bed complaining of some skin irritation in the groin and pain in the left heel.     Wife and primary RN present.    Review of Systems  Patient does not report any dizziness or lightheadedness.  No vomiting or cough.  No shortness of air.    Objective   Objective     Vitals:   Temp:  [98 °F (36.7 °C)-98.4 °F (36.9 °C)] 98.3 °F (36.8 °C)  Heart Rate:  [55-62] 62  Resp:  [18] 18  BP: (118-142)/(65-79) 133/65    Physical Exam  Constitutional:       General: He is not in acute distress.     Appearance: He is well-developed.   HENT:      Head: Normocephalic and atraumatic.   Eyes:      Conjunctiva/sclera: Conjunctivae normal.   Neck:      Trachea: No tracheal deviation.   Cardiovascular:      Rate and Rhythm: Normal rate and regular rhythm.      Heart sounds: No murmur heard.     No friction rub. No gallop.   Pulmonary:      Effort: No respiratory distress.      Breath sounds: Normal breath sounds. No wheezing or rales.   Abdominal:      General: Bowel sounds are normal. There is no distension.      Palpations: Abdomen is soft.      Tenderness: There is no abdominal tenderness. There is no guarding.   Musculoskeletal:         General: No tenderness.      Comments: Tinea cruris noted in groin   Skin:     General: Skin is warm and dry.      Findings: No erythema or rash.   Neurological:      Mental Status: He is alert and oriented to person, place, and time.      Cranial Nerves: No cranial nerve deficit.         Result Review    Result Review:  I have personally reviewed the results from the time of this admission to 2025 16:54 EDT  and agree with these findings:  [x]  Laboratory list / accordion  []  Microbiology  []  Radiology  []  EKG/Telemetry   []  Cardiology/Vascular   []  Pathology  []  Old records  []  Other:  Most notable findings include:     Results from last 7 days   Lab Units 05/27/25  0106 05/26/25  1250   WBC 10*3/mm3 7.87 7.38   HEMOGLOBIN g/dL 14.7 15.2   HEMATOCRIT % 44.8 47.1   PLATELETS 10*3/mm3 149 148     Results from last 7 days   Lab Units 05/28/25  0114 05/27/25  0106 05/26/25  1250   SODIUM mmol/L 134* 134* 134*   POTASSIUM mmol/L 4.4 4.4 4.6   CHLORIDE mmol/L 101 100 98   CO2 mmol/L 22.9 22.9 25.5   BUN mg/dL 16.9 13 12   CREATININE mg/dL 1.23 1.26 1.28*   CALCIUM mg/dL 8.7 8.6 9.2   BILIRUBIN mg/dL  --  0.5 0.6   ALK PHOS U/L  --  134* 150*   ALT (SGPT) U/L  --  20 25   AST (SGOT) U/L  --  25 31   GLUCOSE mg/dL 229* 221* 282*     Lab Results   Component Value Date    [id number] 1,046 (H) 05/27/2025     Lab Results   Component Value Date    FOLATE 6.92 05/27/2025     MRI brain without contrast:    FINDINGS:  Study is degraded by patient motion.  The diffusion weighted images show no focal signal abnormality. There is  no evidence of acute ischemia     The remaining pulse sequences show no mass, hemorrhage, or midline  shift.  The ventricles, cisterns, and sulci are unremarkable. There is no  hydrocephalus.           The sagittal view show no sellar or parasellar mass.            There is no tectal or pineal lesion.     Coronal imaging demonstrates a symmetric appearance of the visualized  portions of the optic nerves and extraocular muscles.     IMPRESSION:     1. Study markedly degraded by patient motion  2. No convincing evidence of acute ischemia, focal mass, or midline  shift    Assessment & Plan   Assessment / Plan     #Frequent falls  #Generalized weakness/physical debility  #Recent right proximal humerus fracture due to fall, prior to admission  #Vascular dementia  - PT/OT evaluating; continue to participate as  tolerated  - Requested inpatient physical rehabilitation; feel patient would be an excellent candidate given degree of debility  - Patient with significant falls at home causing trauma; in setting of anticoagulation (eliquis)  - Vitamin B12/Folate levels replete  - MRI brain without contrast ordered and unremarkable given in account the motion degradation     #Uncontrolled essential hypertension, controlled at present (123/69 mmHg)  #Diabetes mellitus type 2, insulin-dependent and complicated by hyperglycemia  #Obesity per BMI 40.28 kg/m²  - I have reviewed the patient's home medication list and have resumed his home amlodipine 10 mg daily, and Cozaar 100 mg daily; BP much improved today; continue current regimen  - Unclear as to why patient is not on a beta-blocker given history of coronary artery disease; for now, we will plan for as needed hydralazine for ongoing uncontrolled hypertension  - Patient on 70/30 at home; will plan to continue 75/25 here and increase to 20 units BID for now and titrate based on blood glucose trend; overall starting to improve/downtrend  - Continue to monitor BP and glucose level trend while hospitalized    #Tinea cruris  - Nystatin powder BID    Chronic medical conditions:  - Pulmonary nodules, noted since 2023; discussed with wife in detail at bedside today  -Gastroesophageal reflux disease  - Previous stroke  - Previous blood clot  - Coronary artery disease status post three-vessel CABG  - Chronic low back pain  - Hypothyroidism    VTE Prophylaxis:  Continue home eliquis for now - monitor closely in setting of falls    CODE STATUS:    Code Status (Patient has no pulse and is not breathing): CPR (Attempt to Resuscitate)  Medical Interventions (Patient has pulse or is breathing): Full Support    Disposition:  I expect patient to be discharged unclear pending progress with PT/OT; I think he would be an excellent candidate for inpatient physical rehabilitation.    Magali Baig,  DO

## 2025-05-29 NOTE — PAYOR COMM NOTE
"[facility]  NPI:[id number]    Utilization Review  Contact: Suyapa Mcginnis RN  Phone: [phone]  Fax:[phone]    INITIATE INPATIENT AUTHORIZATION   Observation 5/26 converted to inpatient 5/28  Cici Riley (70 y.o. Male)       Date of Birth   1955    Social Security Number       Address   [address] [place] [address]    Home Phone   [phone]    MRN   [id number]       [place]   Patient Refused    Marital Status                               Admission Date   5/26/2025    Admission Type   Emergency    Admitting Provider   Magali Baig DO    Attending Provider   Magali Baig DO    Department, Room/Bed   [address], 3311/2S       Discharge Date       Discharge Disposition       Discharge Destination                                 Attending Provider: Magali Baig DO    Allergies: Penicillins, Trazodone    Isolation: None   Infection: None   Code Status: CPR    Ht: 155.8 cm (61.32\")   Wt: 96.2 kg (212 lb)    Admission Cmt: None   Principal Problem: Frequent falls [R29.6]                   Active Insurance as of 5/26/2025       Primary Coverage       Payor Plan Insurance Group Employer/Plan Group    ANTHEM MEDICARE REPLACEMENT ANTHEM MEDICARE ADVANTAGE HMO [id number]       Payor Plan Address Payor Plan Phone Number Payor Plan Fax Number Effective Dates    [address] [phone]  1/1/2024 - None Entered    [facility] [id number]         Subscriber Name Subscriber Birth Date Member ID       CICI RILEY 1955 [id number]                     Emergency Contacts        (Rel.) Home Phone Work Phone Mobile Phone    Jina Riley (Spouse) [phone] -- [phone]                 History & Physical        Justo Grande PA-C at 05/26/25 1435       Attestation signed by Magali Baig DO at 05/26/25 1810      I have reviewed this documentation and agree. Patient seen and examined " in 311-2. No visitors present. Patient has no complaints at present. He states he has no prodromal symptoms prior to falling. No syncope. Mostly just generalized weakness. No dizziness. No known vitamin deficiencies or previous ETOH use.    Exam largely unremarkable; he does have a scar on right hemicranium from recently falling and striking a fireplace. Left heel blistered area noted; no drainage.     Place on observation with telemetry. MRI brain w/o contrast given falls on eliquis in the setting of vascular dementia. PT/OT and SS consult with b12/folate levels in [facility]  History & Physical    Patient Identification:  Name:  Ramsey Riley  Age:  70 y.o.  Sex:  male  :  1955  MRN:  [id number]   Visit Number:  [id number]  Admit Date: 2025   Primary Care Physician:  Margret Purvis MD    Subjective     Chief complaint: Foot pain    History of presenting illness:      Ramsey Riley is a 70 y.o. male who presented for further evaluation of foot pain. He was seen and examined in the ED with spouse at the bedside. He and spouse report history mild vascular dementia with mild confusion and memory difficulty at baseline but he was pleasant and conversant. His spouse assists in providing history. They report they presented to the ED today secondary to foot pain. Patient has had a deep bruise/blister on the back of his heel for the past several days which he reports is incredibly painful. He reports deep, burning pain. His spouse states that last evening and overnight the pain seemed worse and the patient was very uncomfortable at home, at times even yelling out in pain so she decided to bring him in to the ED for further evaluation.   She believes the wound on the foot may be related to one of his recent multiple falls but they cannot pinpoint when the exact occurrence was. Spouse and patient report he has fallen multiple times in  the past month. With one fall they report he injured his shoulder, spouse states had a fracture but unsure specifics. He did not require surgical intervention but has been in a sling for the past several weeks. Recently followed up with provider regarding shoulder injury and was told to discontinue the sling but patient has still been wearing this as he states it is too painful without it. He was hospitalized in [place] s/p [facility] in the recent past and the plan was for some sort of inpatient rehab however they had difficulty securing placement for him. Wife reports that he is significantly debilitated and has declined further since that discharge. At this time he is requiring assistance with bed mobility, ADLs such as dressing. She states he uses a walker but remains significantly unsteady on his feet and she is not physically able to stabilize him herself. She further adds that his vision is very poor due to diabetic retinopathy and this is limited his ability to safely ambulate in their home.  When he does fall he is unable to get up on his own and she is unable to assist. He denies any chest pain, palpitations, or dizziness preceding these falls.     Past medical history is significant for vascular dementia, CAD, HTN, PAD, hyperlipidemia, diabetes mellitus, chronically anticoagulated with Eliquis, thyroid disease    Upon arrival to the ED, vital signs were temp 98, heart rate 84, respirations 18, /75, SpO2 97% on RA.  On laboratory workup he was hyperglycemic at 282, CBC unremarkable.  CXR negative, XR bilateral feet negative.    Known Emergency Department medications received prior to my evaluation included none.   Emergency Department Room location at the time of my evaluation was 411.     ---------------------------------------------------------------------------------------------------------------------   Review of Systems   Constitutional:  Positive for activity change.   HENT:  Negative for congestion  and rhinorrhea.    Respiratory:  Negative for cough and shortness of breath.    Cardiovascular:  Negative for chest pain and leg swelling.   Gastrointestinal:  Negative for abdominal pain, diarrhea, nausea and vomiting.   Genitourinary:  Negative for difficulty urinating and dysuria.   Musculoskeletal:  Positive for arthralgias, gait problem and myalgias.   Skin:  Positive for wound. Negative for rash.   Neurological:  Positive for weakness. Negative for dizziness and light-headedness.   Psychiatric/Behavioral:  Positive for confusion (baseline).         ---------------------------------------------------------------------------------------------------------------------   Past Medical History:   Diagnosis Date    Arthritis     Clot     Coronary artery disease     Dementia 12/18/2024    Diabetes mellitus     Heart disease     History of blood clots     Hypertension     Impaired mobility     Low back pain     Thyroid disease     Type 2 diabetes mellitus      Past Surgical History:   Procedure Laterality Date    CHOLECYSTECTOMY  2015    COLONOSCOPY  2015    dr srivastava    CORONARY ARTERY BYPASS GRAFT      [facility] -  BY PASS     ENDOSCOPY       Family History   Problem Relation Age of Onset    Diabetes Mother     Heart disease Mother         STENTING    COPD Mother     Heart attack Father     Diabetes Father     Heart defect Son         Ross procedure    Heart murmur Son     Hypertension Son      Social History     Socioeconomic History    Marital status:    Tobacco Use    Smoking status: Never     Passive exposure: Never    Smokeless tobacco: Current     Types: Chew   Vaping Use    Vaping status: Never Used   Substance and Sexual Activity    Alcohol use: No    Drug use: No    Sexual activity: Defer     ---------------------------------------------------------------------------------------------------------------------   Allergies:  Penicillins and  Trazodone  ---------------------------------------------------------------------------------------------------------------------   Home medications:    Medications below are reported home medications pulling from within the system; at this time, these medications have not been reconciled unless otherwise specified and are in the verification process for further verifcation as current home medications.  (Not in a hospital admission)      Hospital Scheduled Meds:          Current listed hospital scheduled medications may not yet reflect those currently placed in orders that are signed and held awaiting patient's arrival to floor.   ---------------------------------------------------------------------------------------------------------------------     Objective     Vital Signs:  Temp:  [98 °F (36.7 °C)] 98 °F (36.7 °C)  Heart Rate:  [69-84] 69  Resp:  [18] 18  BP: (148-176)/(73-95) 148/75      05/26/25  1133   Weight: 99.8 kg (220 lb)     Body mass index is 30.68 kg/m².  ---------------------------------------------------------------------------------------------------------------------       Physical Exam  Vitals and nursing note reviewed.   Constitutional:       General: He is not in acute distress.     Appearance: He is obese. He is ill-appearing (chronically ill appearing).   HENT:      Head: Normocephalic and atraumatic.   Eyes:      Extraocular Movements: Extraocular movements intact.   Cardiovascular:      Rate and Rhythm: Normal rate and regular rhythm.   Pulmonary:      Effort: Pulmonary effort is normal. No respiratory distress.      Breath sounds: Normal breath sounds.   Abdominal:      Palpations: Abdomen is soft.      Tenderness: There is no abdominal tenderness.   Musculoskeletal:      Right lower leg: No edema.      Left lower leg: No edema.   Skin:     General: Skin is warm and dry.      Comments: Wound to posterior left heel, no surrounding erythema or warmth, no drainage or other sign of infection  "  Neurological:      Mental Status: He is alert. Mental status is at baseline.   Psychiatric:         Mood and Affect: Mood normal.         Behavior: Behavior normal.         ---------------------------------------------------------------------------------------------------------------------  EKG:      ---------------------------------------------------------------------------------------------------------------------   Results from last 7 days   Lab Units 05/26/25  1250   CRP mg/dL 0.30   WBC 10*3/mm3 7.38   HEMOGLOBIN g/dL 15.2   HEMATOCRIT % 47.1   MCV fL 91.6   MCHC g/dL 32.3   PLATELETS 10*3/mm3 148         Results from last 7 days   Lab Units 05/26/25  1250   SODIUM mmol/L 134*   POTASSIUM mmol/L 4.6   CHLORIDE mmol/L 98   CO2 mmol/L 25.5   BUN mg/dL 12   CREATININE mg/dL 1.28*   CALCIUM mg/dL 9.2   GLUCOSE mg/dL 282*   ALBUMIN g/dL 3.9   BILIRUBIN mg/dL 0.6   ALK PHOS U/L 150*   AST (SGOT) U/L 31   ALT (SGPT) U/L 25   Estimated Creatinine Clearance: 66.5 mL/min (A) (by C-G formula based on SCr of 1.28 mg/dL (H)).  No results found for: \"AMMONIA\"          Lab Results   Component Value Date    HGBA1C 8.30 (H) 05/05/2025     Lab Results   Component Value Date    TSH 4.610 (H) 05/05/2025    FREET4 1.24 12/10/2024     No results found for: \"PREGTESTUR\", \"PREGSERUM\", \"HCG\", \"HCGQUANT\"  Pain Management Panel  More data exists         Latest Ref Rng & Units 5/5/2025 12/10/2024   Pain Management Panel   Creatinine, Urine Not Estab. mg/dL 110.9  -   Amphetamine, Urine Qual Negative - Negative    Barbiturates Screen, Urine Negative - Negative    Benzodiazepine Screen, Urine Negative - Positive    Buprenorphine, Screen, Urine Negative - Negative    Cocaine Screen, Urine Negative - Negative    Fentanyl, Urine Negative - Negative    Methadone Screen , Urine Negative - Negative    Methamphetamine, Ur Negative - Negative      No results found for: \"BLOODCX\"  No results found for: \"URINECX\"  No results found for: " "\"WOUNDCX\"  No results found for: \"STOOLCX\"      ---------------------------------------------------------------------------------------------------------------------  Imaging Results (Last 7 Days)       Procedure Component Value Units Date/Time    XR Foot 3+ View Bilateral [[id number]] Collected: 05/26/25 1335     Updated: 05/26/25 1415    Narrative:      Exam: XR FOOT 3+ VW BILATERAL-     History: pain     Comparison: No images available     Findings:     Right distal first tow resection.      No acute fracture or dislocation.     Soft tissue are normal.       Impression:      Impression:     No acute bony process.     This report was finalized on 5/26/2025 2:12 PM by Brock Tafoya MD.       XR Chest 1 View [[id number]] Collected: 05/26/25 1335     Updated: 05/26/25 1337    Narrative:      Procedure: Frontal view of chest obtained.     Comparison: None available     History: frequent falls     Findings:     No pneumonia or acute process seen in the chest.  Normal heart size and mediastinal contours.  Trachea is in midline position.  No edema or effusion is seen.  There is no evidence of pneumothorax.       Impression:         No evidence of acute disease in the chest.     This report was finalized on 5/26/2025 1:35 PM by Brock Tafoya MD.               Cultures:  No results found for: \"BLOODCX\", \"URINECX\", \"WOUNDCX\", \"MRSACX\", \"RESPCX\", \"STOOLCX\"    Last echocardiogram:  Results for orders placed during the hospital encounter of 12/10/24    Adult Transthoracic Echo Complete W/ Cont if Necessary Per Protocol    Interpretation Summary    Left ventricular systolic function is normal. Calculated left ventricular EF = 67.8% Left ventricular ejection fraction appears to be 66 - 70%.    Left ventricular wall thickness is consistent with mild concentric hypertrophy. Left ventricular diastolic function was indeterminate.    The right ventricular cavity is borderline dilated with borderline systolic function.    The left " atrial cavity is mildly dilated.    Mild aortic valve regurgitation is present.    Saline test results are negative for right to left atrial level shunt.          I have personally reviewed the above radiology images and read the final radiology report on 05/26/25  ---------------------------------------------------------------------------------------------------------------------  Assessment / Plan     Active Hospital Problems    Diagnosis  POA    **Frequent falls [R29.6]  Not Applicable       ASSESSMENT/PLAN:    Vascular dementia  Acute on chronic debility  Frequent falls  Recent proximal right humerus fracture due to fall  Patient presented to the ED secondary to foot pain.  On further discussion about reports history of vascular dementia with increasing difficulty caring for the patient at home.  He is fallen 3 times within the past month and sustained a right humerus fracture.  She reports he is currently requiring assistance getting out of bed, dressing, etc.  She states he is unsteady on his feet and has difficulty navigating their home despite use of a walker and her assistance.  Will admit to the telemetry unit for further observation and management  Will proceed with MRI brain without contrast to further evaluate  PT OT consults  Appreciate CM/SW assistance with discharge planning as well.  Will check TSH, folate, B12  Continue supportive care measures  Continue to trend labs    Chronic:  CAD  HTN  PAD  HLD  Diabetes mellitus  Hx blood clot, chronically anticoagulated  Continue home medication regimen as indicated cover with SSI for now.  Accu-Cheks to monitor with hypoglycemia protocol in place.  Add basal insulin as clinically indicated  Trend vital signs    ----------  -DVT prophylaxis: Chronically anticoagulated with Eliquis  -Activity: Up with assistance  -Expected length of stay: Less than 2 midnights  -Disposition pending further clinical course and PT OT recommendations    High risk secondary to  frequent falls, vascular dementia    Code Status and Medical Interventions: CPR (Attempt to Resuscitate); Full Support   Ordered at: 05/26/25 1430     Code Status (Patient has no pulse and is not breathing):    CPR (Attempt to Resuscitate)     Medical Interventions (Patient has pulse or is breathing):    Full Support       Justo Grande PA-C   05/26/25  14:35 EDT    Electronically signed by Magali Baig DO at 05/26/25 1819          Emergency Department Notes        Evelyn Vance PA-C at 05/26/25 1149        Procedure Orders    1. Suture Removal [[id number]] ordered by Evelyn Vance PA-C              Attestation signed by Zurdo Marie MD at 05/27/25 1008          SUPERVISE: For this patient encounter, I reviewed the APC's documentation, treatment plan, and medical decision making.  Zurdo Marie MD 5/27/2025 10:08 EDT                             Subjective   History of Present Illness  70-year-old male with past medical history of type 2 diabetes, thyroid disease, hypertension, blood clots chronically anticoagulated with Eliquis, coronary artery disease, and vascular dementia presents to the emergency room for staple removal and left foot pain accompanied by his spouse.  Spouse states that patient has had multiple recurrent falls since April 23 and is becoming concerned because patient does take Eliquis.  She states his most recent fall was on the 19th where he was seen and evaluated and at that time had 5 staples placed in the back of his head.  She also states that patient has been complaining of left heel pain and she is uncertain if this is from his previous fall or not, however states it is now bruised and states he will not bear weight on it.  She also states a couple days ago patient fell however did not hit his head or have a loss of consciousness.  She states he just a braised his right upper arm.  Patient denies falling directly on the heels of his feet and denies any  other complaints or concerns at this time.  Spouse at bedside states that she is concerned due to patient's current debility and increase in frequent falls recently.  She states that patient has home health who comes out twice a week, however does not feel that this is being very effective.      History provided by:  Patient   used: No        Review of Systems   Constitutional: Negative.  Negative for fever.   HENT: Negative.     Respiratory: Negative.     Cardiovascular: Negative.  Negative for chest pain.   Gastrointestinal: Negative.  Negative for abdominal pain.   Endocrine: Negative.    Genitourinary: Negative.  Negative for dysuria.   Musculoskeletal:         (+) Left heel pain   Skin:  Positive for wound.   Neurological: Negative.    Psychiatric/Behavioral: Negative.     All other systems reviewed and are negative.      Past Medical History:   Diagnosis Date    Arthritis     Clot     Coronary artery disease     Dementia 12/18/2024    Diabetes mellitus     Heart disease     History of blood clots     Hypertension     Impaired mobility     Low back pain     Thyroid disease     Type 2 diabetes mellitus        Allergies   Allergen Reactions    Penicillins Unknown - High Severity    Trazodone Other (See Comments)     agitation       Past Surgical History:   Procedure Laterality Date    CHOLECYSTECTOMY  2015    COLONOSCOPY  2015    dr srivastava    CORONARY ARTERY BYPASS GRAFT      [employer] - 3 BY PASS     ENDOSCOPY         Family History   Problem Relation Age of Onset    Diabetes Mother     Heart disease Mother         STENTING    COPD Mother     Heart attack Father     Diabetes Father     Heart defect Son         Ross procedure    Heart murmur Son     Hypertension Son        Social History     Socioeconomic History    Marital status:    Tobacco Use    Smoking status: Never     Passive exposure: Never    Smokeless tobacco: Current     Types: Chew   Vaping Use    Vaping status: Never Used    Substance and Sexual Activity    Alcohol use: No    Drug use: No    Sexual activity: Defer           Objective   Physical Exam  Vitals and nursing note reviewed.   Constitutional:       General: He is not in acute distress.     Appearance: He is well-developed. He is not diaphoretic.   HENT:      Head: Normocephalic.        Right Ear: External ear normal.      Left Ear: External ear normal.      Nose: Nose normal.   Eyes:      Conjunctiva/sclera: Conjunctivae normal.      Pupils: Pupils are equal, round, and reactive to light.   Neck:      Vascular: No JVD.      Trachea: No tracheal deviation.   Cardiovascular:      Rate and Rhythm: Normal rate and regular rhythm.      Heart sounds: Normal heart sounds. No murmur heard.  Pulmonary:      Effort: Pulmonary effort is normal. No respiratory distress.      Breath sounds: Normal breath sounds. No wheezing.   Abdominal:      General: Bowel sounds are normal.      Palpations: Abdomen is soft.      Tenderness: There is no abdominal tenderness.   Musculoskeletal:         General: No deformity. Normal range of motion.      Cervical back: Normal range of motion and neck supple.        Feet:       Comments: Right upper extremity sling in place   Skin:     General: Skin is warm and dry.      Coloration: Skin is not pale.      Findings: Abrasion present. No erythema or rash.          Neurological:      Mental Status: He is alert and oriented to person, place, and time.      Cranial Nerves: No cranial nerve deficit.   Psychiatric:         Behavior: Behavior normal.         Thought Content: Thought content normal.         Suture Removal    Date/Time: 5/26/2025 2:27 PM    Performed by: Evelyn Vance PA-C  Authorized by: Zurdo Marie MD    Consent:     Consent obtained:  Verbal            ED Course  ED Course as of 05/26/25 1800   Mon May 26, 2025   1349 XR Chest 1 View [TK]   1403 Paged hospitalist [TK]   1421 XR Foot 3+ View Bilateral [TK]   1424 Patient has been  admitted to hospitalist services by Dr. Baig. [TK]      ED Course User Index  [TK] Evelyn Vance, MONY                                           Results for orders placed or performed during the hospital encounter of 05/26/25   Urinalysis With Microscopic If Indicated (No Culture) - Urine, Clean Catch    Collection Time: 05/26/25 12:49 PM    Specimen: Urine, Clean Catch   Result Value Ref Range    Color, UA Yellow Yellow, Straw    Appearance, UA Clear Clear    pH, UA 6.0 5.0 - 8.0    Specific Gravity, UA 1.017 1.005 - 1.030    Glucose,  mg/dL (2+) (A) Negative    Ketones, UA Negative Negative    Bilirubin, UA Negative Negative    Blood, UA Trace (A) Negative    Protein, UA >=300 mg/dL (3+) (A) Negative    Leuk Esterase, UA Negative Negative    Nitrite, UA Negative Negative    Urobilinogen, UA 0.2 E.U./dL 0.2 - 1.0 E.U./dL   Urinalysis, Microscopic Only - Urine, Clean Catch    Collection Time: 05/26/25 12:49 PM    Specimen: Urine, Clean Catch   Result Value Ref Range    RBC, UA 0-2 None Seen, 0-2 /HPF    WBC, UA 11-20 (A) None Seen, 0-2 /HPF    Bacteria, UA Trace (A) None Seen /HPF    Squamous Epithelial Cells, UA 0-2 None Seen, 0-2 /HPF    Hyaline Casts, UA 7-12 None Seen /LPF    Methodology Automated Microscopy    Melvin Urine Culture Tube - Urine, Clean Catch    Collection Time: 05/26/25 12:49 PM    Specimen: Urine, Clean Catch   Result Value Ref Range    Extra Tube Hold for add-ons.    Urine Drug Screen - Urine, Clean Catch    Collection Time: 05/26/25 12:49 PM    Specimen: Urine, Clean Catch   Result Value Ref Range    THC, Screen, Urine Negative Negative    Phencyclidine (PCP), Urine Negative Negative    Cocaine Screen, Urine Negative Negative    Methamphetamine, Ur Negative Negative    Opiate Screen Positive (A) Negative    Amphetamine Screen, Urine Negative Negative    Benzodiazepine Screen, Urine Positive (A) Negative    Tricyclic Antidepressants Screen Positive (A) Negative    Methadone  Screen, Urine Negative Negative    Barbiturates Screen, Urine Negative Negative    Oxycodone Screen, Urine Negative Negative    Buprenorphine, Screen, Urine Negative Negative   Fentanyl, Urine - Urine, Clean Catch    Collection Time: 05/26/25 12:49 PM    Specimen: Urine, Clean Catch   Result Value Ref Range    Fentanyl, Urine Negative Negative   Comprehensive Metabolic Panel    Collection Time: 05/26/25 12:50 PM    Specimen: Blood   Result Value Ref Range    Glucose 282 (H) 65 - 99 mg/dL    BUN 12 8 - 23 mg/dL    Creatinine 1.28 (H) 0.76 - 1.27 mg/dL    Sodium 134 (L) 136 - 145 mmol/L    Potassium 4.6 3.5 - 5.2 mmol/L    Chloride 98 98 - 107 mmol/L    CO2 25.5 22.0 - 29.0 mmol/L    Calcium 9.2 8.6 - 10.5 mg/dL    Total Protein 6.6 6.0 - 8.5 g/dL    Albumin 3.9 3.5 - 5.2 g/dL    ALT (SGPT) 25 1 - 41 U/L    AST (SGOT) 31 1 - 40 U/L    Alkaline Phosphatase 150 (H) 39 - 117 U/L    Total Bilirubin 0.6 0.0 - 1.2 mg/dL    Globulin 2.7 gm/dL    A/G Ratio 1.4 g/dL    BUN/Creatinine Ratio 9.4 7.0 - 25.0    Anion Gap 10.5 5.0 - 15.0 mmol/L    eGFR 61.0 >60.0 mL/min/1.73   C-reactive Protein    Collection Time: 05/26/25 12:50 PM    Specimen: Blood   Result Value Ref Range    C-Reactive Protein 0.30 0.00 - 0.50 mg/dL   CBC Auto Differential    Collection Time: 05/26/25 12:50 PM    Specimen: Blood   Result Value Ref Range    WBC 7.38 3.40 - 10.80 10*3/mm3    RBC 5.14 4.14 - 5.80 10*6/mm3    Hemoglobin 15.2 13.0 - 17.7 g/dL    Hematocrit 47.1 37.5 - 51.0 %    MCV 91.6 79.0 - 97.0 fL    MCH 29.6 26.6 - 33.0 pg    MCHC 32.3 31.5 - 35.7 g/dL    RDW 12.4 12.3 - 15.4 %    RDW-SD 42.2 37.0 - 54.0 fl    MPV 10.3 6.0 - 12.0 fL    Platelets 148 140 - 450 10*3/mm3    Neutrophil % 67.1 42.7 - 76.0 %    Lymphocyte % 21.8 19.6 - 45.3 %    Monocyte % 7.3 5.0 - 12.0 %    Eosinophil % 3.1 0.3 - 6.2 %    Basophil % 0.4 0.0 - 1.5 %    Immature Grans % 0.3 0.0 - 0.5 %    Neutrophils, Absolute 4.95 1.70 - 7.00 10*3/mm3    Lymphocytes, Absolute 1.61  0.70 - 3.10 10*3/mm3    Monocytes, Absolute 0.54 0.10 - 0.90 10*3/mm3    Eosinophils, Absolute 0.23 0.00 - 0.40 10*3/mm3    Basophils, Absolute 0.03 0.00 - 0.20 10*3/mm3    Immature Grans, Absolute 0.02 0.00 - 0.05 10*3/mm3    nRBC 0.0 0.0 - 0.2 /100 WBC   TSH    Collection Time: 05/26/25 12:50 PM    Specimen: Blood   Result Value Ref Range    TSH 4.970 (H) 0.270 - 4.200 uIU/mL   Green Top (Gel)    Collection Time: 05/26/25 12:50 PM   Result Value Ref Range    Extra Tube Hold for add-ons.    Lavender Top    Collection Time: 05/26/25 12:50 PM   Result Value Ref Range    Extra Tube hold for add-on    Gold Top - SST    Collection Time: 05/26/25 12:50 PM   Result Value Ref Range    Extra Tube Hold for add-ons.    Light Blue Top    Collection Time: 05/26/25 12:50 PM   Result Value Ref Range    Extra Tube Hold for add-ons.    POC Glucose Once    Collection Time: 05/26/25  4:47 PM    Specimen: Blood   Result Value Ref Range    Glucose 250 (H) 70 - 130 mg/dL   ECG 12 Lead Altered Mental Status    Collection Time: 05/26/25  5:42 PM   Result Value Ref Range    QT Interval 406 ms    QTC Interval 431 ms       XR Chest 1 View   Final Result       No evidence of acute disease in the chest.       This report was finalized on 5/26/2025 1:35 PM by Brock Tafoya MD.          XR Foot 3+ View Bilateral   Final Result   Impression:       No acute bony process.       This report was finalized on 5/26/2025 2:12 PM by Brock Tafoya MD.          MRI Brain Without Contrast    (Results Pending)                   Medical Decision Making  - Uncomplicated ED course.  Patient remained hemodynamically stable throughout entire duration of ED course.  -Patient labs fairly unremarkable.  Creatinine mildly elevated, however this does appear to be improved as compared to his previous creatinine levels.  -Patient x-rays of bilateral feet negative for any acute pathology.  -Due to patient's recurrent falls and safety concerns at home I did discuss  case with Dr. Pinedo, hospitalist who will admit patient to hospitalist services for further evaluation and treatment.    Problems Addressed:  Frequent falls: complicated acute illness or injury    Amount and/or Complexity of Data Reviewed  Labs: ordered.  Radiology: ordered. Decision-making details documented in ED Course.    Risk  Prescription drug management.  Decision regarding hospitalization.        Final diagnoses:   Frequent falls   Skin avulsion   Bone bruise       ED Disposition  ED Disposition       ED Disposition   Decision to Admit    Condition   --    Comment   Level of Care: Medical Telemetry [23]   Diagnosis: Frequent falls [658773]   Admitting Physician: TARAS PINEDO [[id number]]                 No follow-up provider specified.       Medication List      No changes were made to your prescriptions during this visit.            Evelyn Vance PA-C  05/26/25 1800      Electronically signed by Zurdo Marie MD at 05/27/25 1008       Facility-Administered Medications as of 5/29/2025   Medication Dose Route Frequency Provider Last Rate Last Admin    acetaminophen (TYLENOL) tablet 1,000 mg  1,000 mg Oral Q8H PRN Taras Pinedo DO   1,000 mg at 05/27/25 1911    [COMPLETED] ALPRAZolam (XANAX) tablet 0.25 mg  0.25 mg Oral Once Yamilet Marshall MD   0.25 mg at 05/26/25 2138    ALPRAZolam (XANAX) tablet 0.5 mg  0.5 mg Oral Nightly PRN Taras Pinedo DO   0.5 mg at 05/27/25 2050    amLODIPine (NORVASC) tablet 10 mg  10 mg Oral Daily Taras Pinedo DO   10 mg at 05/28/25 0822    apixaban (ELIQUIS) tablet 5 mg  5 mg Oral Q12H Taras Pinedo DO   5 mg at 05/28/25 2159    atorvastatin (LIPITOR) tablet 80 mg  80 mg Oral Nightly Taras Pinedo DO   80 mg at 05/28/25 2159    sennosides-docusate (PERICOLACE) 8.6-50 MG per tablet 2 tablet  2 tablet Oral BID Taras Pinedo DO   2 tablet at 05/28/25 2159    And    polyethylene glycol (MIRALAX) packet 17 g  17 g Oral  Daily PRN Magali Baig DO        And    bisacodyl (DULCOLAX) EC tablet 5 mg  5 mg Oral Daily PRN Magali Baig DO        And    bisacodyl (DULCOLAX) suppository 10 mg  10 mg Rectal Daily PRN Mgaali Baig DO        castor oil-balsam peru (VENELEX) ointment 1 Application  1 Application Topical Q12H Magali Baig DO   1 Application at 05/28/25 2158    citalopram (CeleXA) tablet 20 mg  20 mg Oral Daily Magali Baig DO   20 mg at 05/28/25 0822    dextrose (D50W) (25 g/50 mL) IV injection 25 g  25 g Intravenous Q15 Min PRN Magali Baig DO        dextrose (GLUTOSE) oral gel 15 g  15 g Oral Q15 Min PRN Magali Baig DO        dorzolamide (TRUSOPT) 2 % 1 drop, timolol (TIMOPTIC) 0.5 % 1 drop for Cosopt 22.3-6.8 mg/mL   Both Eyes BID Magali Baig DO   Given at 05/28/25 2158    empagliflozin (JARDIANCE) tablet 10 mg  10 mg Oral Daily Magali Baig DO   10 mg at 05/28/25 0822    famotidine (PEPCID) tablet 20 mg  20 mg Oral BID Magali Baig DO   20 mg at 05/28/25 2159    glucagon HCl (Diagnostic) injection 1 mg  1 mg Intramuscular Q15 Min PRN Magali Baig DO        [COMPLETED] HYDROcodone-acetaminophen (NORCO) 5-325 MG per tablet 1 tablet  1 tablet Oral Once PRN Yamilet Marshall MD   1 tablet at 05/26/25 2138    HYDROcodone-acetaminophen (NORCO) 7.5-325 MG per tablet 1 tablet  1 tablet Oral Q12H PRN Magali Bagi DO   1 tablet at 05/28/25 1619    insulin lispro protamine-insulin lispro (humaLOG 75-25) injection 15 Units  15 Units Subcutaneous BID With Meals Magali Baig DO   15 Units at 05/28/25 1618    levothyroxine (SYNTHROID, LEVOTHROID) tablet 137.5 mcg  137.5 mcg Oral QAM AC Magali Baig DO   137.5 mcg at 05/28/25 0822    losartan (COZAAR) tablet 100 mg  100 mg Oral Daily Magali Baig DO   100 mg at 05/28/25 0822    nitroglycerin (NITROSTAT) SL tablet 0.4 mg  0.4 mg Sublingual Q5 Min PRN Safia  Magali Swan DO        pantoprazole (PROTONIX) EC tablet 40 mg  40 mg Oral QAM AC Magali Baig DO   40 mg at 25 0822    QUEtiapine (SEROquel) tablet 100 mg  100 mg Oral Nightly Magali Baig DO   100 mg at 25    QUEtiapine (SEROquel) tablet 50 mg  50 mg Oral BID Magali Baig DO   50 mg at 25 1618    sodium chloride 0.9 % flush 10 mL  10 mL Intravenous PRN Magali Baig,         sodium chloride 0.9 % flush 10 mL  10 mL Intravenous Q12H Magali Baig DO   10 mL at 25    sodium chloride 0.9 % flush 10 mL  10 mL Intravenous PRN Magali Baig DO        sodium chloride 0.9 % infusion 40 mL  40 mL Intravenous PRN Magali Baig DO            Physician Progress Notes (all)        Magali Baig DO at 25 1544           [facility]     Progress Note    Patient Name: Ramsey Riley  : 1955  MRN: [id number]  Primary Care Physician:  Margret Purvis MD  Date of admission: 2025    Subjective   Subjective     Chief Complaint: 70-year-old male being seen in follow-up for frequent falls    History of Present Illness  Patient Reports no new complaints when seen at bedside today.  He is sleeping; awakens easily. Denies any pain at this time.     Wife present at bedside. Reports concern for returning home with  at present given multiple falls in the setting of eliquis and that he is much larger than she is at over 200 lbs. Patient appears motivated to improve with ambulation.     Review of Systems  Patient does not report any confusion this morning, he denies any dizziness or lightheadedness.  No vomiting or cough.  No shortness of air.    Objective   Objective     Vitals:   Temp:  [97 °F (36.1 °C)-98.2 °F (36.8 °C)] 98.1 °F (36.7 °C)  Heart Rate:  [46-59] 46  Resp:  [18-20] 18  BP: (107-139)/(62-78) 123/69    Physical Exam  Constitutional:       General: He is not in acute distress.     Appearance: He is  well-developed.   HENT:      Head: Normocephalic and atraumatic.   Eyes:      Conjunctiva/sclera: Conjunctivae normal.   Neck:      Trachea: No tracheal deviation.   Cardiovascular:      Rate and Rhythm: Normal rate and regular rhythm.      Heart sounds: No murmur heard.     No friction rub. No gallop.   Pulmonary:      Effort: No respiratory distress.      Breath sounds: Normal breath sounds. No wheezing or rales.   Abdominal:      General: Bowel sounds are normal. There is no distension.      Palpations: Abdomen is soft.      Tenderness: There is no abdominal tenderness. There is no guarding.   Musculoskeletal:         General: No tenderness.   Skin:     General: Skin is warm and dry.      Findings: No erythema or rash.   Neurological:      Mental Status: He is alert and oriented to person, place, and time.      Cranial Nerves: No cranial nerve deficit.       *No change compared to 5/27/25*     Result Review    Result Review:  I have personally reviewed the results from the time of this admission to 5/28/2025 15:44 EDT and agree with these findings:  [x]  Laboratory list / accordion  []  Microbiology  []  Radiology  []  EKG/Telemetry   []  Cardiology/Vascular   []  Pathology  []  Old records  []  Other:  Most notable findings include:     Results from last 7 days   Lab Units 05/27/25  0106 05/26/25  1250   WBC 10*3/mm3 7.87 7.38   HEMOGLOBIN g/dL 14.7 15.2   HEMATOCRIT % 44.8 47.1   PLATELETS 10*3/mm3 149 148     Results from last 7 days   Lab Units 05/28/25  0114 05/27/25  0106 05/26/25  1250   SODIUM mmol/L 134* 134* 134*   POTASSIUM mmol/L 4.4 4.4 4.6   CHLORIDE mmol/L 101 100 98   CO2 mmol/L 22.9 22.9 25.5   BUN mg/dL 16.9 13 12   CREATININE mg/dL 1.23 1.26 1.28*   CALCIUM mg/dL 8.7 8.6 9.2   BILIRUBIN mg/dL  --  0.5 0.6   ALK PHOS U/L  --  134* 150*   ALT (SGPT) U/L  --  20 25   AST (SGOT) U/L  --  25 31   GLUCOSE mg/dL 229* 221* 282*     Lab Results   Component Value Date    [id number] 1,046 (H) 05/27/2025      Lab Results   Component Value Date    FOLATE 6.92 05/27/2025     MRI brain without contrast:    FINDINGS:  Study is degraded by patient motion.  The diffusion weighted images show no focal signal abnormality. There is  no evidence of acute ischemia     The remaining pulse sequences show no mass, hemorrhage, or midline  shift.  The ventricles, cisterns, and sulci are unremarkable. There is no  hydrocephalus.           The sagittal view show no sellar or parasellar mass.            There is no tectal or pineal lesion.     Coronal imaging demonstrates a symmetric appearance of the visualized  portions of the optic nerves and extraocular muscles.     IMPRESSION:     1. Study markedly degraded by patient motion  2. No convincing evidence of acute ischemia, focal mass, or midline  shift    Assessment & Plan   Assessment / Plan     #Frequent falls  #Generalized weakness/physical debility  #Recent right proximal humerus fracture due to fall, prior to admission  #Vascular dementia  - PT/OT evaluating; continue to participate as tolerated  - Requested inpatient physical rehabilitation; feel patient would be an excellent candidate given degree of debility  - Patient with significant falls at home causing trauma; in setting of anticoagulation (eliquis)  - Vitamin B12/Folate levels replete  - MRI brain without contrast ordered and unremarkable given in account the motion degradation     #Uncontrolled essential hypertension, controlled at present (123/69 mmHg)  #Diabetes mellitus type 2, insulin-dependent and complicated by hyperglycemia, improved  #Obesity per BMI 40.28 kg/m²  - I have reviewed the patient's home medication list and have resumed his home amlodipine 10 mg daily, and Cozaar 100 mg daily; BP much improved today; continue current regimen  - Unclear as to why patient is not on a beta-blocker given history of coronary artery disease; for now, we will plan for as needed hydralazine for ongoing uncontrolled  hypertension  - Patient on 70/30 at home; will plan to continue 75/25 here at 15 units BID for now and titrate based on blood glucose trend; overall starting to improve/downtrend  - Continue to monitor BP and glucose level trend while hospitalized    Chronic medical conditions:  - Pulmonary nodules, noted since ; discussed with wife in detail at bedside today  -Gastroesophageal reflux disease  - Previous stroke  - Previous blood clot  - Coronary artery disease status post three-vessel CABG  - Chronic low back pain  - Hypothyroidism    VTE Prophylaxis:  Continue home eliquis for now - monitor closely in setting of falls    CODE STATUS:    Code Status (Patient has no pulse and is not breathing): CPR (Attempt to Resuscitate)  Medical Interventions (Patient has pulse or is breathing): Full Support    Disposition:  I expect patient to be discharged unclear pending progress with PT/OT.    Magali Baig DO               Electronically signed by Magali Baig DO at 25 [phone]       Magali Baig DO at 25 1620           [facility]     Progress Note    Patient Name: Ramsey Riley  : 1955  MRN: [id number]  Primary Care Physician:  Margret Purvis MD  Date of admission: 2025    Subjective   Subjective     Chief Complaint: 70-year-old male being seen in follow-up for frequent falls    History of Present Illness  Patient Reports no new complaints when seen at bedside today.  His spouse and physical therapy were present at bedside.    Wife states that patient does occasionally shuffle feet as well.    Review of Systems  Patient does not report any confusion this morning, he denies any dizziness or lightheadedness.  No vomiting or cough.  No shortness of air.    Objective   Objective     Vitals:   Temp:  [97.9 °F (36.6 °C)-98.8 °F (37.1 °C)] 98 °F (36.7 °C)  Heart Rate:  [64-78] 78  Resp:  [18-20] 18  BP: (152-180)/(82-90) 167/87    Physical Exam  Constitutional:       General:  He is not in acute distress.     Appearance: He is well-developed.   HENT:      Head: Normocephalic and atraumatic.   Eyes:      Conjunctiva/sclera: Conjunctivae normal.   Neck:      Trachea: No tracheal deviation.   Cardiovascular:      Rate and Rhythm: Normal rate and regular rhythm.      Heart sounds: No murmur heard.     No friction rub. No gallop.   Pulmonary:      Effort: No respiratory distress.      Breath sounds: Normal breath sounds. No wheezing or rales.   Abdominal:      General: Bowel sounds are normal. There is no distension.      Palpations: Abdomen is soft.      Tenderness: There is no abdominal tenderness. There is no guarding.   Musculoskeletal:         General: No tenderness.   Skin:     General: Skin is warm and dry.      Findings: No erythema or rash.   Neurological:      Mental Status: He is alert and oriented to person, place, and time.      Cranial Nerves: No cranial nerve deficit.          Result Review    Result Review:  I have personally reviewed the results from the time of this admission to 5/27/2025 16:20 EDT and agree with these findings:  [x]  Laboratory list / accordion  []  Microbiology  []  Radiology  []  EKG/Telemetry   []  Cardiology/Vascular   []  Pathology  []  Old records  []  Other:  Most notable findings include:     Results from last 7 days   Lab Units 05/27/25  0106 05/26/25  1250   WBC 10*3/mm3 7.87 7.38   HEMOGLOBIN g/dL 14.7 15.2   HEMATOCRIT % 44.8 47.1   PLATELETS 10*3/mm3 149 148     Results from last 7 days   Lab Units 05/27/25  0106 05/26/25  1250   SODIUM mmol/L 134* 134*   POTASSIUM mmol/L 4.4 4.6   CHLORIDE mmol/L 100 98   CO2 mmol/L 22.9 25.5   BUN mg/dL 13 12   CREATININE mg/dL 1.26 1.28*   CALCIUM mg/dL 8.6 9.2   BILIRUBIN mg/dL 0.5 0.6   ALK PHOS U/L 134* 150*   ALT (SGPT) U/L 20 25   AST (SGOT) U/L 25 31   GLUCOSE mg/dL 221* 282*     Lab Results   Component Value Date    [id number] 1,046 (H) 05/27/2025     Lab Results   Component Value Date    FOLATE 6.92  05/27/2025       Assessment & Plan   Assessment / Plan     #Frequent falls  #Generalized weakness/physical debility  #Recent right proximal humerus fracture due to fall, prior to admission  #Vascular dementia  - PT/OT evaluations today; monitor for progress  - Patient with significant falls at home causing trauma; in setting of anticoagulation (eliquis)  - Vitamin B12/Folate levels replete  - MRI brain without contrast ordered and still pending    #Uncontrolled essential hypertension  #Diabetes mellitus type 2, insulin-dependent and complicated by hyperglycemia  #Obesity per BMI 40.28 kg/m²  - I have reviewed the patient's home medication list and have resumed his home amlodipine 10 mg daily, and Cozaar 100 mg daily  - Unclear as to why patient is not on a beta-blocker given history of coronary artery disease; for now, we will plan for as needed hydralazine for ongoing uncontrolled hypertension  - Patient on 70/30 at home; will plan to start 75/25 here at 15 units BID for now and titrate based on blood glucose trend  - Continue to monitor BP and glucose level trend while hospitalized    Chronic medical conditions:  -Gastroesophageal reflux disease  - Previous stroke  - Previous blood clot  - Coronary artery disease status post three-vessel CABG  - Chronic low back pain  - Hypothyroidism    VTE Prophylaxis:  Continue home eliquis for now - monitor closely in setting of falls    CODE STATUS:    Code Status (Patient has no pulse and is not breathing): CPR (Attempt to Resuscitate)  Medical Interventions (Patient has pulse or is breathing): Full Support    Disposition:  I expect patient to be discharged unclear pending progress with PT/OT.    Magali Baig DO               Electronically signed by Magali Baig DO at 05/27/25 2506       Consult Notes (all)    No notes of this type exist for this encounter.

## 2025-05-29 NOTE — PLAN OF CARE
Goal Outcome Evaluation:  Pt resting in bed with no s/s of distress noted. VSS. IV access maintained. Call light and belongings in reach. Plan of care ongoing.

## 2025-05-29 NOTE — CASE MANAGEMENT/SOCIAL WORK
Discharge Planning Assessment  [facility]     Patient Name: Ramsey Riley  MRN: [id number]  Today's Date: 5/29/2025    Admit Date: 5/26/2025    Plan: SS followed up with [facility] [place] inpatient rehab admissions on this date who states pt's pre-auth remains pending at this time. SS to follow.     Discharge Plan       Row Name 05/29/25 1417       Plan    Plan SS followed up with [facility] [place] inpatient rehab admissions on this date who states pt's pre-auth remains pending at this time. SS to follow.        Expected Discharge Date and Time       Expected Discharge Date Expected Discharge Time    May 29, 2025    MARIUSZ Forrester

## 2025-05-29 NOTE — THERAPY TREATMENT NOTE
Acute Care - Physical Therapy Treatment Note   Ilya     Patient Name: Ramsey Riley  : 1955  MRN: [id number]  Today's Date: 2025      Visit Dx:     ICD-10-CM ICD-9-CM   1. Frequent falls  R29.6 V15.88   2. Skin avulsion  T14.8XXA 879.8   3. Bone bruise  T14.8XXA 924.9     Patient Active Problem List   Diagnosis    Coronary artery disease involving native coronary artery of native heart without angina pectoris    Essential hypertension    Bradycardia, sinus    Fatigue    Abnormal ECG    Precordial pain    LVH (left ventricular hypertrophy) due to hypertensive disease, without heart failure    Diabetes mellitus    PAD (peripheral artery disease)    Ischemic stroke    History of blood clots    Pulmonary embolus    Dyslipidemia    Class 1 obesity due to excess calories with serious comorbidity and body mass index (BMI) of 34.0 to 34.9 in adult    Weakness    Frequent falls     Past Medical History:   Diagnosis Date    Arthritis     Clot     Coronary artery disease     Dementia 2024    Diabetes mellitus     Heart disease     History of blood clots     Hypertension     Impaired mobility     Low back pain     Thyroid disease     Type 2 diabetes mellitus      Past Surgical History:   Procedure Laterality Date    CHOLECYSTECTOMY      COLONOSCOPY      dr srivastava    CORONARY ARTERY BYPASS GRAFT      [place] - 3 BY PASS     ENDOSCOPY       PT Assessment (Last 12 Hours)       PT Evaluation and Treatment       Row Name 25 1524          Physical Therapy Time and Intention    Subjective Information complains of;weakness  -KM     Document Type therapy note (daily note)  -KM     Mode of Treatment individual therapy;physical therapy  -KM     Patient Effort good  -KM     Symptoms Noted During/After Treatment fatigue  -KM       Row Name 25 1524          General Information    Patient Profile Reviewed yes  -KM     Patient Observations alert;cooperative;agree to therapy  -KM     Existing  Precautions/Restrictions fall  -KM       Row Name 05/29/25 1524          Pain    Pretreatment Pain Rating 1/10  -KM     Posttreatment Pain Rating 3/10  -KM     Pain Location foot  -KM     Pain Side/Orientation left  -KM       Row Name 05/29/25 1524          Cognition    Affect/Mental Status (Cognition) WFL  -KM     Orientation Status (Cognition) oriented to;person;place;situation;verbal cues/prompts needed for orientation  -KM     Follows Commands (Cognition) follows one-step commands  -KM       Row Name 05/29/25 1524          Bed Mobility    Comment, (Bed Mobility) up in chair upon arrival  -KM       Row Name 05/29/25 1524          Transfers    Transfers sit-stand transfer;stand-sit transfer  -KM       Row Name 05/29/25 1524          Sit-Stand Transfer    Sit-Stand [place] (Transfers) moderate assist (50% patient effort)  -KM     Assistive Device (Sit-Stand Transfers) walker, front-wheeled  -KM     Comment, (Sit-Stand Transfer) x5  -KM       Row Name 05/29/25 1524          Stand-Sit Transfer    Stand-Sit [place] (Transfers) moderate assist (50% patient effort)  -KM     Assistive Device (Stand-Sit Transfers) walker, front-wheeled  -KM     Comment, (Stand-Sit Transfer) x5  -KM       Row Name 05/29/25 1524          Gait/Stairs (Locomotion)    Gait/Stairs Locomotion gait/ambulation independence;gait/ambulation assistive device;distance ambulated  -KM     [place] Level (Gait) minimum assist (75% patient effort)  -KM     Assistive Device (Gait) walker, front-wheeled  -KM     Patient was able to Ambulate yes  -KM     Distance in Feet (Gait) 60  -KM     Pattern (Gait) step-to  -KM     Deviations/Abnormal Patterns (Gait) ataxic;base of support, narrow;gait speed decreased  -KM     Bilateral Gait Deviations forward flexed posture  -KM     Left Sided Gait Deviations weight shift ability decreased  -KM       Row Name 05/29/25 1524          Safety Issues/Impairments Affecting Functional Mobility    Impairments  Affecting Function (Mobility) balance;cognition;coordination;endurance/activity tolerance;pain;strength  -KM       Row Name 05/29/25 1524          Motor Skills    Comments, Therapeutic Exercise seated ther-ex  -KM     Additional Documentation Comments, Therapeutic Exercise (Row)  -KM       Row Name             Wound 05/26/25 1618 Left posterior heel Pressure Injury    Wound - Properties Group Placement Date: 05/26/25  -CW Placement Time: 1618  -CW Present on Original Admission: Y  -CW Side: Left  -CW Orientation: posterior  -CW Location: heel  -CW Primary Wound Type: Pressure Inj  -CW    Retired Wound - Properties Group Placement Date: 05/26/25  -CW Placement Time: 1618  -CW Present on Original Admission: Y  -CW Side: Left  -CW Orientation: posterior  -CW Location: heel  -CW    Retired Wound - Properties Group Placement Date: 05/26/25  -CW Placement Time: 1618  -CW Present on Original Admission: Y  -CW Side: Left  -CW Orientation: posterior  -CW Location: heel  -CW    Retired Wound - Properties Group Date first assessed: 05/26/25  -CW Time first assessed: 1618  -CW Present on Original Admission: Y  -CW Side: Left  -CW Location: heel  -CW      Row Name             Wound 05/26/25 1622 Left anterior heel Pressure Injury    Wound - Properties Group Placement Date: 05/26/25  -CW Placement Time: 1622  -CW Side: Left  -CW Orientation: anterior  -CW Location: heel  -CW Primary Wound Type: Pressure Inj  -CW    Retired Wound - Properties Group Placement Date: 05/26/25  -CW Placement Time: 1622  -CW Side: Left  -CW Orientation: anterior  -CW Location: heel  -CW    Retired Wound - Properties Group Placement Date: 05/26/25  -CW Placement Time: 1622  -CW Side: Left  -CW Orientation: anterior  -CW Location: heel  -CW    Retired Wound - Properties Group Date first assessed: 05/26/25  -CW Time first assessed: 1622  -CW Side: Left  -CW Location: heel  -CW      Row Name             Wound 05/27/25 1007 medial parietal region Traumatic  Abrasion    Wound - Properties Group Placement Date: 05/27/25  -LB Placement Time: 1007  -LB Present on Original Admission: Y  -LB Orientation: medial  -LB Location: parietal region  -LB Primary Wound Type: Traumatic  -LB Secondary Wound Type - Traumatic: Abrasion  -LB    Retired Wound - Properties Group Placement Date: 05/27/25  -LB Placement Time: 1007  -LB Present on Original Admission: Y  -LB Orientation: medial  -LB Location: parietal region  -LB    Retired Wound - Properties Group Placement Date: 05/27/25  -LB Placement Time: 1007  -LB Present on Original Admission: Y  -LB Orientation: medial  -LB Location: parietal region  -LB    Retired Wound - Properties Group Date first assessed: 05/27/25  -LB Time first assessed: 1007  -LB Present on Original Admission: Y  -LB Location: parietal region  -LB      Row Name             Wound 05/27/25 1008 Right distal arm Traumatic Skin Tear    Wound - Properties Group Placement Date: 05/27/25  -LB Placement Time: 1008  -LB Present on Original Admission: Y  -LB Side: Right  -LB Orientation: distal  -LB Location: arm  -LB Primary Wound Type: Traumatic  -LB Secondary Wound Type - Traumatic: Skin Tear  -LB    Retired Wound - Properties Group Placement Date: 05/27/25  -LB Placement Time: 1008  -LB Present on Original Admission: Y  -LB Side: Right  -LB Orientation: distal  -LB Location: arm  -LB    Retired Wound - Properties Group Placement Date: 05/27/25  -LB Placement Time: 1008  -LB Present on Original Admission: Y  -LB Side: Right  -LB Orientation: distal  -LB Location: arm  -LB    Retired Wound - Properties Group Date first assessed: 05/27/25  -LB Time first assessed: 1008  -LB Present on Original Admission: Y  -LB Side: Right  -LB Location: arm  -LB      Row Name 05/29/25 1524          Progress Summary (PT)    Daily Progress Summary (PT) Pt. was able to demonstrate functional mobility skills w/ modA. He was able to improve ambulation distance w/ RW. He tolerated increased  activity during PT session. He has agreed to sit up in chair for all meals. Pt. would continue to benefit from more intense PT services.  -               User Key  (r) = Recorded By, (t) = Taken By, (c) = Cosigned By      Initials Name Provider Type    CW Chata Pulido, RN Registered Nurse    Baljinder Reese, PT Physical Therapist    Marysol Rodriguez RN Registered Nurse                    Physical Therapy Education       Title: PT OT SLP Therapies (In Progress)       Topic: Physical Therapy (In Progress)       Point: Mobility training (In Progress)       Learning Progress Summary            Patient Acceptance, E,TB, NR by  at 5/28/2025 0920    Acceptance, E,TB, VU by KM at 5/27/2025 1532                      Point: Home exercise program (In Progress)       Learning Progress Summary            Patient Acceptance, E,TB, NR by  at 5/28/2025 0920    Acceptance, E,TB, VU by KM at 5/27/2025 1532                      Point: Body mechanics (In Progress)       Learning Progress Summary            Patient Acceptance, E,TB, NR by  at 5/28/2025 0920    Acceptance, E,TB, VU by KM at 5/27/2025 1532                      Point: Precautions (In Progress)       Learning Progress Summary            Patient Acceptance, E,TB, NR by  at 5/28/2025 0920    Acceptance, E,TB, VU by  at 5/27/2025 1532                                      User Key       Initials Effective Dates Name Provider Type Discipline     06/16/21 -  Vy Tejeda, RN Registered Nurse Nurse     05/24/22 -  Baljinder Martinez, PT Physical Therapist PT                  PT Recommendation and Plan  Anticipated Discharge Disposition (PT): inpatient rehabilitation facility  Planned Therapy Interventions (PT): balance training, bed mobility training, gait training, home exercise program, patient/family education, postural re-education, ROM (range of motion), strengthening, stretching, transfer training  Therapy Frequency (PT): 2 times/wk (2-5x/wk)  Progress  Summary (PT)  Daily Progress Summary (PT): Pt. was able to demonstrate functional mobility skills w/ modA. He was able to improve ambulation distance w/ RW. He tolerated increased activity during PT session. He has agreed to sit up in chair for all meals. Pt. would continue to benefit from more intense PT services.  Plan of Care Reviewed With: patient  Outcome Evaluation: Pt. evaluation completed during PT session. He was able to perform functional mobility skills w/ modA. He was able to ambulate in room w/ RW and Teddy. Pt. would benefit from further PT services at this time.       Time Calculation:    PT Charges       Row Name 05/29/25 1524 05/29/25 1523          Time Calculation    PT Received On -- 05/29/25  -KM        Time Calculation- PT    Total Timed Code Minutes- PT 38 minute(s)  -KM 23 minute(s)  -KM               User Key  (r) = Recorded By, (t) = Taken By, (c) = Cosigned By      Initials Name Provider Type     Baljinder Martinez, PT Physical Therapist                  Therapy Charges for Today       Code Description Service Date Service Provider Modifiers Qty    71533772057 HC PT THERAPEUTIC ACT EA 15 MIN 5/29/2025 Baljinder Martinez, PT GP 1    46714830670 HC GAIT TRAINING EA 15 MIN 5/29/2025 Baljinder Martinez, PT GP 1    03717883813 HC PT THER PROC EA 15 MIN 5/29/2025 Baljinder Martinez, PT GP 1            PT G-Codes  AM-PAC 6 Clicks Score (PT): 17    Baljinder Martinez PT  5/29/2025

## 2025-05-30 DIAGNOSIS — M54.50 LUMBOSACRAL PAIN: ICD-10-CM

## 2025-05-30 LAB
ALBUMIN SERPL-MCNC: 3.1 G/DL (ref 3.5–5.2)
ALBUMIN/GLOB SERPL: 1.2 G/DL
ALP SERPL-CCNC: 127 U/L (ref 39–117)
ALT SERPL W P-5'-P-CCNC: 15 U/L (ref 1–41)
ANION GAP SERPL CALCULATED.3IONS-SCNC: 11.8 MMOL/L (ref 5–15)
AST SERPL-CCNC: 23 U/L (ref 1–40)
BASOPHILS # BLD AUTO: 0.02 10*3/MM3 (ref 0–0.2)
BASOPHILS NFR BLD AUTO: 0.3 % (ref 0–1.5)
BILIRUB SERPL-MCNC: 0.3 MG/DL (ref 0–1.2)
BUN SERPL-MCNC: 19.5 MG/DL (ref 8–23)
BUN/CREAT SERPL: 14.4 (ref 7–25)
CALCIUM SPEC-SCNC: 8.3 MG/DL (ref 8.6–10.5)
CHLORIDE SERPL-SCNC: 100 MMOL/L (ref 98–107)
CO2 SERPL-SCNC: 22.2 MMOL/L (ref 22–29)
CREAT SERPL-MCNC: 1.35 MG/DL (ref 0.76–1.27)
DEPRECATED RDW RBC AUTO: 41.6 FL (ref 37–54)
EGFRCR SERPLBLD CKD-EPI 2021: 57.2 ML/MIN/1.73
EOSINOPHIL # BLD AUTO: 0.3 10*3/MM3 (ref 0–0.4)
EOSINOPHIL NFR BLD AUTO: 4.3 % (ref 0.3–6.2)
ERYTHROCYTE [DISTWIDTH] IN BLOOD BY AUTOMATED COUNT: 12.3 % (ref 12.3–15.4)
GLOBULIN UR ELPH-MCNC: 2.6 GM/DL
GLUCOSE BLDC GLUCOMTR-MCNC: 143 MG/DL (ref 70–130)
GLUCOSE BLDC GLUCOMTR-MCNC: 191 MG/DL (ref 70–130)
GLUCOSE BLDC GLUCOMTR-MCNC: 216 MG/DL (ref 70–130)
GLUCOSE BLDC GLUCOMTR-MCNC: 237 MG/DL (ref 70–130)
GLUCOSE SERPL-MCNC: 186 MG/DL (ref 65–99)
HCT VFR BLD AUTO: 43.3 % (ref 37.5–51)
HGB BLD-MCNC: 13.9 G/DL (ref 13–17.7)
IMM GRANULOCYTES # BLD AUTO: 0.04 10*3/MM3 (ref 0–0.05)
IMM GRANULOCYTES NFR BLD AUTO: 0.6 % (ref 0–0.5)
LYMPHOCYTES # BLD AUTO: 2.37 10*3/MM3 (ref 0.7–3.1)
LYMPHOCYTES NFR BLD AUTO: 34.3 % (ref 19.6–45.3)
MCH RBC QN AUTO: 29.5 PG (ref 26.6–33)
MCHC RBC AUTO-ENTMCNC: 32.1 G/DL (ref 31.5–35.7)
MCV RBC AUTO: 91.9 FL (ref 79–97)
MONOCYTES # BLD AUTO: 0.61 10*3/MM3 (ref 0.1–0.9)
MONOCYTES NFR BLD AUTO: 8.8 % (ref 5–12)
NEUTROPHILS NFR BLD AUTO: 3.56 10*3/MM3 (ref 1.7–7)
NEUTROPHILS NFR BLD AUTO: 51.7 % (ref 42.7–76)
NRBC BLD AUTO-RTO: 0 /100 WBC (ref 0–0.2)
PLATELET # BLD AUTO: 155 10*3/MM3 (ref 140–450)
PMV BLD AUTO: 10.3 FL (ref 6–12)
POTASSIUM SERPL-SCNC: 3.9 MMOL/L (ref 3.5–5.2)
PROT SERPL-MCNC: 5.7 G/DL (ref 6–8.5)
RBC # BLD AUTO: 4.71 10*6/MM3 (ref 4.14–5.8)
SODIUM SERPL-SCNC: 134 MMOL/L (ref 136–145)
WBC NRBC COR # BLD AUTO: 6.9 10*3/MM3 (ref 3.4–10.8)

## 2025-05-30 PROCEDURE — 82948 REAGENT STRIP/BLOOD GLUCOSE: CPT

## 2025-05-30 PROCEDURE — 63710000001 INSULIN LISPRO PROTAMINE-INSULIN LISPRO (75-25) 100 UNIT/ML SUSPENSION: Performed by: INTERNAL MEDICINE

## 2025-05-30 PROCEDURE — 97110 THERAPEUTIC EXERCISES: CPT

## 2025-05-30 PROCEDURE — 85025 COMPLETE CBC W/AUTO DIFF WBC: CPT | Performed by: INTERNAL MEDICINE

## 2025-05-30 PROCEDURE — 99231 SBSQ HOSP IP/OBS SF/LOW 25: CPT | Performed by: INTERNAL MEDICINE

## 2025-05-30 PROCEDURE — 97116 GAIT TRAINING THERAPY: CPT

## 2025-05-30 PROCEDURE — 97530 THERAPEUTIC ACTIVITIES: CPT

## 2025-05-30 PROCEDURE — 80053 COMPREHEN METABOLIC PANEL: CPT | Performed by: INTERNAL MEDICINE

## 2025-05-30 RX ADMIN — APIXABAN 5 MG: 5 TABLET, FILM COATED ORAL at 21:27

## 2025-05-30 RX ADMIN — PANTOPRAZOLE SODIUM 40 MG: 40 TABLET, DELAYED RELEASE ORAL at 08:52

## 2025-05-30 RX ADMIN — TIMOLOL MALEATE: 5 SOLUTION OPHTHALMIC at 08:52

## 2025-05-30 RX ADMIN — HYDROCODONE BITARTRATE AND ACETAMINOPHEN 1 TABLET: 7.5; 325 TABLET ORAL at 11:16

## 2025-05-30 RX ADMIN — INSULIN LISPRO 15 UNITS: 100 INJECTION, SUSPENSION SUBCUTANEOUS at 08:53

## 2025-05-30 RX ADMIN — CITALOPRAM HYDROBROMIDE 20 MG: 20 TABLET ORAL at 08:52

## 2025-05-30 RX ADMIN — INSULIN LISPRO 15 UNITS: 100 INJECTION, SUSPENSION SUBCUTANEOUS at 17:26

## 2025-05-30 RX ADMIN — TIMOLOL MALEATE: 5 SOLUTION OPHTHALMIC at 21:28

## 2025-05-30 RX ADMIN — LEVOTHYROXINE SODIUM 137.5 MCG: 0.12 TABLET ORAL at 08:51

## 2025-05-30 RX ADMIN — SENNOSIDES AND DOCUSATE SODIUM 2 TABLET: 50; 8.6 TABLET ORAL at 21:27

## 2025-05-30 RX ADMIN — AMLODIPINE BESYLATE 10 MG: 10 TABLET ORAL at 08:51

## 2025-05-30 RX ADMIN — QUETIAPINE FUMARATE 100 MG: 100 TABLET ORAL at 21:27

## 2025-05-30 RX ADMIN — CASTOR OIL AND BALSAM, PERU 1 APPLICATION: 788; 87 OINTMENT TOPICAL at 21:28

## 2025-05-30 RX ADMIN — LOSARTAN POTASSIUM 100 MG: 50 TABLET, FILM COATED ORAL at 08:51

## 2025-05-30 RX ADMIN — CASTOR OIL AND BALSAM, PERU 1 APPLICATION: 788; 87 OINTMENT TOPICAL at 08:52

## 2025-05-30 RX ADMIN — FAMOTIDINE 20 MG: 20 TABLET ORAL at 21:27

## 2025-05-30 RX ADMIN — QUETIAPINE FUMARATE 50 MG: 25 TABLET ORAL at 08:51

## 2025-05-30 RX ADMIN — QUETIAPINE FUMARATE 50 MG: 25 TABLET ORAL at 17:27

## 2025-05-30 RX ADMIN — SENNOSIDES AND DOCUSATE SODIUM 2 TABLET: 50; 8.6 TABLET ORAL at 08:51

## 2025-05-30 RX ADMIN — FAMOTIDINE 20 MG: 20 TABLET ORAL at 08:51

## 2025-05-30 RX ADMIN — Medication 10 ML: at 08:53

## 2025-05-30 RX ADMIN — ATORVASTATIN CALCIUM 80 MG: 40 TABLET, FILM COATED ORAL at 21:27

## 2025-05-30 RX ADMIN — Medication 10 ML: at 21:28

## 2025-05-30 RX ADMIN — APIXABAN 5 MG: 5 TABLET, FILM COATED ORAL at 08:51

## 2025-05-30 RX ADMIN — EMPAGLIFLOZIN 10 MG: 10 TABLET, FILM COATED ORAL at 08:51

## 2025-05-30 NOTE — CASE MANAGEMENT/SOCIAL WORK
Discharge Planning Assessment  [facility]     Patient Name: Ramsey Riley  MRN: [id number]  Today's Date: 5/30/2025    Admit Date: 5/26/2025    Plan: SS followed up with [facility] in rehab admissions who stated that pre authorization with insurance remains pending at this time.  SS spoke with pt's spouse, Jina, and provided update.  SS will follow.       Discharge Plan       Row Name 05/30/25 1537       Plan    Plan SS followed up with [facility] in rehab admissions who stated that pre authorization with insurance remains pending at this time.  SS spoke with pt's spouse, Jina, and provided update.  SS will follow.                    Expected Discharge Date and Time       Expected Discharge Date Expected Discharge Time    May 30, 2025             GERARD RowanW

## 2025-05-30 NOTE — PROGRESS NOTES
[facility]     Progress Note    Patient Name: Ramsey Riley  : 1955  MRN: [id number]  Primary Care Physician:  Margret Purvis MD  Date of admission: 2025    Subjective   Subjective     Chief Complaint: 70-year-old male being seen in follow-up for frequent falls    History of Present Illness  Patient Reports no new complaints when seen at bedside today.  He is resting in bed and states that his rash of the groin area is improved.     No visitors present during my visit.    Review of Systems  Patient does not report any dizziness or lightheadedness.  No vomiting or cough.  No shortness of air.    Objective   Objective     Vitals:   Temp:  [97.8 °F (36.6 °C)-98.8 °F (37.1 °C)] 98.8 °F (37.1 °C)  Heart Rate:  [52-67] 56  Resp:  [16-18] 16  BP: (113-177)/(65-84) 135/69    Physical Exam  Constitutional:       General: He is not in acute distress.     Appearance: He is well-developed.   HENT:      Head: Normocephalic and atraumatic.   Eyes:      Conjunctiva/sclera: Conjunctivae normal.   Neck:      Trachea: No tracheal deviation.   Cardiovascular:      Rate and Rhythm: Normal rate and regular rhythm.      Heart sounds: No murmur heard.     No friction rub. No gallop.   Pulmonary:      Effort: No respiratory distress.      Breath sounds: Normal breath sounds. No wheezing or rales.   Abdominal:      General: Bowel sounds are normal. There is no distension.      Palpations: Abdomen is soft.      Tenderness: There is no abdominal tenderness. There is no guarding.   Musculoskeletal:         General: No tenderness.   Skin:     General: Skin is warm and dry.      Findings: No erythema or rash.   Neurological:      Mental Status: He is alert and oriented to person, place, and time.      Cranial Nerves: No cranial nerve deficit.         Result Review    Result Review:  I have personally reviewed the results from the time of this admission to 2025 15:26 EDT and agree with these findings:  [x]  Laboratory  list / accordion  []  Microbiology  []  Radiology  []  EKG/Telemetry   []  Cardiology/Vascular   []  Pathology  []  Old records  []  Other:  Most notable findings include:     Results from last 7 days   Lab Units 05/30/25  0058 05/27/25  0106 05/26/25  1250   WBC 10*3/mm3 6.90 7.87 7.38   HEMOGLOBIN g/dL 13.9 14.7 15.2   HEMATOCRIT % 43.3 44.8 47.1   PLATELETS 10*3/mm3 155 149 148     Results from last 7 days   Lab Units 05/30/25  0058 05/28/25  0114 05/27/25  0106 05/26/25  1250   SODIUM mmol/L 134* 134* 134* 134*   POTASSIUM mmol/L 3.9 4.4 4.4 4.6   CHLORIDE mmol/L 100 101 100 98   CO2 mmol/L 22.2 22.9 22.9 25.5   BUN mg/dL 19.5 16.9 13 12   CREATININE mg/dL 1.35* 1.23 1.26 1.28*   CALCIUM mg/dL 8.3* 8.7 8.6 9.2   BILIRUBIN mg/dL 0.3  --  0.5 0.6   ALK PHOS U/L 127*  --  134* 150*   ALT (SGPT) U/L 15  --  20 25   AST (SGOT) U/L 23  --  25 31   GLUCOSE mg/dL 186* 229* 221* 282*     Lab Results   Component Value Date    CPSRIJON03 1,046 (H) 05/27/2025     Lab Results   Component Value Date    FOLATE 6.92 05/27/2025     MRI brain without contrast:    FINDINGS:  Study is degraded by patient motion.  The diffusion weighted images show no focal signal abnormality. There is  no evidence of acute ischemia     The remaining pulse sequences show no mass, hemorrhage, or midline  shift.  The ventricles, cisterns, and sulci are unremarkable. There is no  hydrocephalus.     The sagittal view show no sellar or parasellar mass.     There is no tectal or pineal lesion.     Coronal imaging demonstrates a symmetric appearance of the visualized  portions of the optic nerves and extraocular muscles.     IMPRESSION:     1. Study markedly degraded by patient motion  2. No convincing evidence of acute ischemia, focal mass, or midline  shift    Assessment & Plan   Assessment / Plan     #Frequent falls  #Generalized weakness/physical debility  #Recent right proximal humerus fracture due to fall, prior to admission  #Vascular dementia  -  PT/OT evaluating; continue to participate as tolerated  - Requested inpatient physical rehabilitation; feel patient would be an excellent candidate given degree of debility  - Patient with significant falls at home causing trauma; in setting of anticoagulation (eliquis)  - Vitamin B12/Folate levels replete  - MRI brain without contrast ordered and unremarkable given in account the motion degradation     #Uncontrolled essential hypertension, controlled at present (123/69 mmHg)  #Diabetes mellitus type 2, insulin-dependent and complicated by hyperglycemia  #Obesity per BMI 40.28 kg/m²  - I have reviewed the patient's home medication list and have resumed his home amlodipine 10 mg daily, and Cozaar 100 mg daily; BP much improved today; continue current regimen  - Unclear as to why patient is not on a beta-blocker given history of coronary artery disease; for now, we will plan for as needed hydralazine for ongoing uncontrolled hypertension  - Patient on 70/30 at home; will plan to continue 75/25 here at increased dose of 20 units BID for now and titrate based on blood glucose trend; overall much improved  - Continue to monitor BP and glucose level trend while hospitalized    #Tinea cruris  - Nystatin powder BID    Chronic medical conditions:  - Pulmonary nodules, noted since 2023; discussed with wife in detail at bedside today  -Gastroesophageal reflux disease  - Previous stroke  - Previous blood clot  - Coronary artery disease status post three-vessel CABG  - Chronic low back pain  - Hypothyroidism    VTE Prophylaxis:  Continue home eliquis for now - monitor closely in setting of falls    CODE STATUS:    Code Status (Patient has no pulse and is not breathing): CPR (Attempt to Resuscitate)  Medical Interventions (Patient has pulse or is breathing): Full Support    Disposition:  I expect patient to be discharged unclear pending progress with PT/OT; I think he would be an excellent candidate for inpatient physical  rehabilitation. Awaiting insurance approval or denial of IPR    Magali Baig, DO

## 2025-05-30 NOTE — TELEPHONE ENCOUNTER
Caller: RosemaryJina    Relationship: Emergency Contact    Best call back number: [phone]     Requested Prescriptions:   Requested Prescriptions     Pending Prescriptions Disp Refills    HYDROcodone-acetaminophen (NORCO) 7.5-325 MG per tablet 60 tablet 0     Sig: Take 1 tablet by mouth Every 12 (Twelve) Hours As Needed for Moderate Pain.        Pharmacy where request should be sent: [facility] [place] [address] [place] [address] [phone] [facility] [phone]      Last office visit with prescribing clinician: 5/5/2025   Last telemedicine visit with prescribing clinician: Visit date not found   Next office visit with prescribing clinician: 6/24/2025     Additional details provided by patient:     Does the patient have less than a 3 day supply:  [x] Yes  [] No    Would you like a call back once the refill request has been completed: [] Yes [] No    If the office needs to give you a call back, can they leave a voicemail: [] Yes [] No    Neal Chew Rep   05/30/25 08:32 EDT

## 2025-05-30 NOTE — PLAN OF CARE
Goal Outcome Evaluation:  Plan of Care Reviewed With: patient   Pt has ambulated with PT today and sat up to chair during meals. Pt complained of intermittent pain to L heel, PRN pain medications administered. Wound care completed per orders. Pt continues to be high fall risk, bed alarm on. Will continue to monitor follow plan of care.

## 2025-05-30 NOTE — THERAPY TREATMENT NOTE
Acute Care - Physical Therapy Treatment Note   Ilya     Patient Name: Ramsey Riley  : 1955  MRN: [id number]  Today's Date: 2025      Visit Dx:     ICD-10-CM ICD-9-CM   1. Frequent falls  R29.6 V15.88   2. Skin avulsion  T14.8XXA 879.8   3. Bone bruise  T14.8XXA 924.9     Patient Active Problem List   Diagnosis    Coronary artery disease involving native coronary artery of native heart without angina pectoris    Essential hypertension    Bradycardia, sinus    Fatigue    Abnormal ECG    Precordial pain    LVH (left ventricular hypertrophy) due to hypertensive disease, without heart failure    Diabetes mellitus    PAD (peripheral artery disease)    Ischemic stroke    History of blood clots    Pulmonary embolus    Dyslipidemia    Class 1 obesity due to excess calories with serious comorbidity and body mass index (BMI) of 34.0 to 34.9 in adult    Weakness    Frequent falls     Past Medical History:   Diagnosis Date    Arthritis     Clot     Coronary artery disease     Dementia 2024    Diabetes mellitus     Heart disease     History of blood clots     Hypertension     Impaired mobility     Low back pain     Thyroid disease     Type 2 diabetes mellitus      Past Surgical History:   Procedure Laterality Date    CHOLECYSTECTOMY      COLONOSCOPY      dr srivastava    CORONARY ARTERY BYPASS GRAFT      Bryant - 3 BY PASS     ENDOSCOPY       PT Assessment (Last 12 Hours)       PT Evaluation and Treatment       Row Name 25 1669          Physical Therapy Time and Intention    Subjective Information complains of;weakness  -LL     Document Type therapy note (daily note)  -LL     Mode of Treatment individual therapy;physical therapy  -LL     Patient Effort good  -LL     Comment Patient & RN in agreement for participation with PT.  Patient very motivated for improvement and was compliant with all activities asked of him.  -LL       Row Name 25 8296          General Information    Patient  Profile Reviewed yes  -LL     Existing Precautions/Restrictions fall  -LL       Row Name 05/30/25 1457          Pain    Pretreatment Pain Rating 1/10  -LL     Posttreatment Pain Rating 3/10  -LL     Pain Location foot  -LL     Pain Side/Orientation left  -LL       Row Name 05/30/25 1457          Cognition    Affect/Mental Status (Cognition) WFL  -LL     Orientation Status (Cognition) oriented to;person;place;situation;verbal cues/prompts needed for orientation  -LL     Follows Commands (Cognition) follows one-step commands  -LL       Row Name 05/30/25 1457          Bed Mobility    All Activities, [place] (Bed Mobility) moderate assist (50% patient effort)  -LL       Row Name 05/30/25 1457          Transfers    Transfers sit-stand transfer;stand-sit transfer  -LL       Row Name 05/30/25 1457          Sit-Stand Transfer    Sit-Stand [place] (Transfers) moderate assist (50% patient effort)  -LL     Assistive Device (Sit-Stand Transfers) walker, front-wheeled  -LL       Row Name 05/30/25 1457          Stand-Sit Transfer    Stand-Sit [place] (Transfers) moderate assist (50% patient effort)  -LL     Assistive Device (Stand-Sit Transfers) walker, front-wheeled  -LL       Row Name 05/30/25 1457          Gait/Stairs (Locomotion)    Gait/Stairs Locomotion gait/ambulation independence;gait/ambulation assistive device;distance ambulated  -LL     [place] Level (Gait) minimum assist (75% patient effort)  -LL     Assistive Device (Gait) walker, front-wheeled  -LL     Distance in Feet (Gait) --  60' x 2  -LL     Pattern (Gait) step-to  -LL     Deviations/Abnormal Patterns (Gait) ataxic;base of support, narrow;gait speed decreased  -LL     Bilateral Gait Deviations forward flexed posture  -LL     Left Sided Gait Deviations weight shift ability decreased  -LL       Row Name 05/30/25 1457          Safety Issues/Impairments Affecting Functional Mobility    Impairments Affecting Function (Mobility)  balance;cognition;coordination;endurance/activity tolerance;pain;strength  -LL       Row Name 05/30/25 1457          Motor Skills    Comments, Therapeutic Exercise --  Seated: LAQ, marching, pillow squeeze, AP x 20 reps each  -LL       Row Name             Wound 05/26/25 1618 Left posterior heel Pressure Injury    Wound - Properties Group Placement Date: 05/26/25  -CW Placement Time: 1618  -CW Present on Original Admission: Y  -CW Side: Left  -CW Orientation: posterior  -CW Location: heel  -CW Primary Wound Type: Pressure Inj  -CW    Retired Wound - Properties Group Placement Date: 05/26/25  -CW Placement Time: 1618  -CW Present on Original Admission: Y  -CW Side: Left  -CW Orientation: posterior  -CW Location: heel  -CW    Retired Wound - Properties Group Placement Date: 05/26/25  -CW Placement Time: 1618  -CW Present on Original Admission: Y  -CW Side: Left  -CW Orientation: posterior  -CW Location: heel  -CW    Retired Wound - Properties Group Date first assessed: 05/26/25  -CW Time first assessed: 1618  -CW Present on Original Admission: Y  -CW Side: Left  -CW Location: heel  -CW      Row Name             Wound 05/26/25 1622 Left anterior heel Pressure Injury    Wound - Properties Group Placement Date: 05/26/25  -CW Placement Time: 1622  -CW Side: Left  -CW Orientation: anterior  -CW Location: heel  -CW Primary Wound Type: Pressure Inj  -CW    Retired Wound - Properties Group Placement Date: 05/26/25  -CW Placement Time: 1622  -CW Side: Left  -CW Orientation: anterior  -CW Location: heel  -CW    Retired Wound - Properties Group Placement Date: 05/26/25  -CW Placement Time: 1622  -CW Side: Left  -CW Orientation: anterior  -CW Location: heel  -CW    Retired Wound - Properties Group Date first assessed: 05/26/25  -CW Time first assessed: 1622  -CW Side: Left  -CW Location: heel  -CW      Row Name             Wound 05/27/25 1007 medial parietal region Traumatic Abrasion    Wound - Properties Group Placement Date:  05/27/25  -LB Placement Time: 1007  -LB Present on Original Admission: Y  -LB Orientation: medial  -LB Location: parietal region  -LB Primary Wound Type: Traumatic  -LB Secondary Wound Type - Traumatic: Abrasion  -LB    Retired Wound - Properties Group Placement Date: 05/27/25  -LB Placement Time: 1007  -LB Present on Original Admission: Y  -LB Orientation: medial  -LB Location: parietal region  -LB    Retired Wound - Properties Group Placement Date: 05/27/25  -LB Placement Time: 1007  -LB Present on Original Admission: Y  -LB Orientation: medial  -LB Location: parietal region  -LB    Retired Wound - Properties Group Date first assessed: 05/27/25  -LB Time first assessed: 1007  -LB Present on Original Admission: Y  -LB Location: parietal region  -LB      Row Name             Wound 05/27/25 1008 Right distal arm Traumatic Skin Tear    Wound - Properties Group Placement Date: 05/27/25  -LB Placement Time: 1008  -LB Present on Original Admission: Y  -LB Side: Right  -LB Orientation: distal  -LB Location: arm  -LB Primary Wound Type: Traumatic  -LB Secondary Wound Type - Traumatic: Skin Tear  -LB    Retired Wound - Properties Group Placement Date: 05/27/25  -LB Placement Time: 1008  -LB Present on Original Admission: Y  -LB Side: Right  -LB Orientation: distal  -LB Location: arm  -LB    Retired Wound - Properties Group Placement Date: 05/27/25  -LB Placement Time: 1008  -LB Present on Original Admission: Y  -LB Side: Right  -LB Orientation: distal  -LB Location: arm  -LB    Retired Wound - Properties Group Date first assessed: 05/27/25  -LB Time first assessed: 1008  -LB Present on Original Admission: Y  -LB Side: Right  -LB Location: arm  -LB      Row Name 05/30/25 1457          Positioning and Restraints    Pre-Treatment Position in bed  -LL     Post Treatment Position chair  -LL     In Chair sitting;notified nsg;call light within reach;encouraged to call for assist;exit alarm on  With tray table in front of him  -LL                User Key  (r) = Recorded By, (t) = Taken By, (c) = Cosigned By      Initials Name Provider Type    CW Chata Pulido RN Registered Nurse    Lesley Floyd PTA Physical Therapist Assistant    Marysol Rodriguez RN Registered Nurse                    Physical Therapy Education       Title: PT OT SLP Therapies (In Progress)       Topic: Physical Therapy (In Progress)       Point: Mobility training (In Progress)       Learning Progress Summary            Patient Acceptance, E,TB, NR by  at 5/28/2025 0920    Acceptance, E,TB, VU by KM at 5/27/2025 1532                      Point: Home exercise program (In Progress)       Learning Progress Summary            Patient Acceptance, E,TB, NR by  at 5/28/2025 0920    Acceptance, E,TB, VU by KM at 5/27/2025 1532                      Point: Body mechanics (In Progress)       Learning Progress Summary            Patient Acceptance, E,TB, NR by  at 5/28/2025 0920    Acceptance, E,TB, VU by KM at 5/27/2025 1532                      Point: Precautions (In Progress)       Learning Progress Summary            Patient Acceptance, E,TB, NR by  at 5/28/2025 0920    Acceptance, E,TB, VU by KM at 5/27/2025 1532                                      User Key       Initials Effective Dates Name Provider Type Discipline     06/16/21 -  Vy Tejeda, RN Registered Nurse Nurse     05/24/22 -  Baljinder Martinez PT Physical Therapist PT                  PT Recommendation and Plan  Anticipated Discharge Disposition (PT): inpatient rehabilitation facility          Time Calculation:    PT Charges       Row Name 05/30/25 1501             Time Calculation    PT Received On 05/30/25  -LL         Time Calculation- PT    Total Timed Code Minutes- PT 40 minute(s)  -LL                User Key  (r) = Recorded By, (t) = Taken By, (c) = Cosigned By      Initials Name Provider Type    LL Lesley Lofton PTA Physical Therapist Assistant                  Therapy Charges for Today        Code Description Service Date Service Provider Modifiers Qty    00581224289 HC GAIT TRAINING EA 15 MIN 5/30/2025 Lesley Lofton, ASMITA GP, CQ 1    68314652480 HC PT THERAPEUTIC ACT EA 15 MIN 5/30/2025 Lesley Lofton, ASMITA GP, CQ 1    75549366089 HC PT THER PROC EA 15 MIN 5/30/2025 Lesley Lofton PTA GP, CQ 1            PT G-Codes  AM-PAC 6 Clicks Score (PT): 13    Lesley. ASMITA Lofton  5/30/2025

## 2025-05-30 NOTE — TELEPHONE ENCOUNTER
Rx Refill Note  Last UDS 5/2025  CSA 2/2025  Requested Prescriptions     Pending Prescriptions Disp Refills    HYDROcodone-acetaminophen (NORCO) 7.5-325 MG per tablet 60 tablet 0     Sig: Take 1 tablet by mouth Every 12 (Twelve) Hours As Needed for Moderate Pain.      Last office visit with prescribing clinician: 5/5/2025   Last telemedicine visit with prescribing clinician: Visit date not found   Next office visit with prescribing clinician: 6/24/2025                         Would you like a call back once the refill request has been completed: [] Yes [] No    If the office needs to give you a call back, can they leave a voicemail: [] Yes [] No    Carlos Peterson, TAHIR  05/30/25, 08:35 EDT

## 2025-05-31 ENCOUNTER — APPOINTMENT (OUTPATIENT)
Dept: GENERAL RADIOLOGY | Facility: HOSPITAL | Age: 70
End: 2025-05-31
Payer: MEDICARE

## 2025-05-31 LAB
GLUCOSE BLDC GLUCOMTR-MCNC: 154 MG/DL (ref 70–130)
GLUCOSE BLDC GLUCOMTR-MCNC: 243 MG/DL (ref 70–130)
GLUCOSE BLDC GLUCOMTR-MCNC: 271 MG/DL (ref 70–130)
GLUCOSE BLDC GLUCOMTR-MCNC: 297 MG/DL (ref 70–130)

## 2025-05-31 PROCEDURE — 99231 SBSQ HOSP IP/OBS SF/LOW 25: CPT | Performed by: INTERNAL MEDICINE

## 2025-05-31 PROCEDURE — 71111 X-RAY EXAM RIBS/CHEST4/> VWS: CPT

## 2025-05-31 PROCEDURE — 82948 REAGENT STRIP/BLOOD GLUCOSE: CPT

## 2025-05-31 PROCEDURE — 63710000001 INSULIN LISPRO PROTAMINE-INSULIN LISPRO (75-25) 100 UNIT/ML SUSPENSION: Performed by: INTERNAL MEDICINE

## 2025-05-31 PROCEDURE — 71111 X-RAY EXAM RIBS/CHEST4/> VWS: CPT | Performed by: RADIOLOGY

## 2025-05-31 RX ADMIN — ALPRAZOLAM 0.5 MG: 0.5 TABLET ORAL at 00:22

## 2025-05-31 RX ADMIN — ATORVASTATIN CALCIUM 80 MG: 40 TABLET, FILM COATED ORAL at 20:17

## 2025-05-31 RX ADMIN — HYDROCODONE BITARTRATE AND ACETAMINOPHEN 1 TABLET: 7.5; 325 TABLET ORAL at 00:22

## 2025-05-31 RX ADMIN — INSULIN LISPRO 15 UNITS: 100 INJECTION, SUSPENSION SUBCUTANEOUS at 08:51

## 2025-05-31 RX ADMIN — LEVOTHYROXINE SODIUM 137.5 MCG: 0.12 TABLET ORAL at 08:50

## 2025-05-31 RX ADMIN — TIMOLOL MALEATE: 5 SOLUTION OPHTHALMIC at 08:52

## 2025-05-31 RX ADMIN — HYDROCODONE BITARTRATE AND ACETAMINOPHEN 1 TABLET: 7.5; 325 TABLET ORAL at 17:41

## 2025-05-31 RX ADMIN — SENNOSIDES AND DOCUSATE SODIUM 2 TABLET: 50; 8.6 TABLET ORAL at 08:50

## 2025-05-31 RX ADMIN — APIXABAN 5 MG: 5 TABLET, FILM COATED ORAL at 20:17

## 2025-05-31 RX ADMIN — ACETAMINOPHEN 1000 MG: 500 TABLET ORAL at 10:38

## 2025-05-31 RX ADMIN — QUETIAPINE FUMARATE 100 MG: 100 TABLET ORAL at 20:17

## 2025-05-31 RX ADMIN — FAMOTIDINE 20 MG: 20 TABLET ORAL at 08:50

## 2025-05-31 RX ADMIN — CASTOR OIL AND BALSAM, PERU 1 APPLICATION: 788; 87 OINTMENT TOPICAL at 20:18

## 2025-05-31 RX ADMIN — EMPAGLIFLOZIN 10 MG: 10 TABLET, FILM COATED ORAL at 08:50

## 2025-05-31 RX ADMIN — CASTOR OIL AND BALSAM, PERU 1 APPLICATION: 788; 87 OINTMENT TOPICAL at 08:52

## 2025-05-31 RX ADMIN — TIMOLOL MALEATE: 5 SOLUTION OPHTHALMIC at 20:18

## 2025-05-31 RX ADMIN — PANTOPRAZOLE SODIUM 40 MG: 40 TABLET, DELAYED RELEASE ORAL at 08:50

## 2025-05-31 RX ADMIN — QUETIAPINE FUMARATE 50 MG: 25 TABLET ORAL at 08:51

## 2025-05-31 RX ADMIN — Medication 10 ML: at 08:52

## 2025-05-31 RX ADMIN — FAMOTIDINE 20 MG: 20 TABLET ORAL at 20:17

## 2025-05-31 RX ADMIN — QUETIAPINE FUMARATE 50 MG: 25 TABLET ORAL at 17:32

## 2025-05-31 RX ADMIN — Medication 10 ML: at 20:18

## 2025-05-31 RX ADMIN — APIXABAN 5 MG: 5 TABLET, FILM COATED ORAL at 08:51

## 2025-05-31 RX ADMIN — AMLODIPINE BESYLATE 10 MG: 10 TABLET ORAL at 08:50

## 2025-05-31 RX ADMIN — INSULIN LISPRO 15 UNITS: 100 INJECTION, SUSPENSION SUBCUTANEOUS at 17:32

## 2025-05-31 RX ADMIN — LOSARTAN POTASSIUM 100 MG: 50 TABLET, FILM COATED ORAL at 08:51

## 2025-05-31 RX ADMIN — CITALOPRAM HYDROBROMIDE 20 MG: 20 TABLET ORAL at 08:51

## 2025-05-31 RX ADMIN — SENNOSIDES AND DOCUSATE SODIUM 2 TABLET: 50; 8.6 TABLET ORAL at 20:17

## 2025-05-31 NOTE — PLAN OF CARE
Goal Outcome Evaluation:  Plan of Care Reviewed With: patient     Pt has verbalized no complaints this shift. Pt has ambulated in halls with family this shift & tolerated well. Pt performs ADL's without difficulty. Will continue to monitor & follow plan of care.

## 2025-05-31 NOTE — PLAN OF CARE
Goal Outcome Evaluation: Pt has rested well this shift. Pt moved to room 306B, to be closer to the nurses station. Wound care completed this shift. Pt ate majority of meals this shift. Will continue to monitor & follow plan of care.

## 2025-05-31 NOTE — PROGRESS NOTES
[facility]     Progress Note    Patient Name: Ramsey Riley  : 1955  MRN: [id number]  Primary Care Physician:  Margret Purvis MD  Date of admission: 2025    Subjective   Subjective     Chief Complaint: 70-year-old male being seen in follow-up for frequent falls    History of Present Illness  Patient sleeping soundly -overnight events reviewed.  Patient reportedly had a assisted fall as he was attempting to get out of bed to ambulate to the restroom unassisted.  Imaging revealed a age-indeterminate right posterior fourth rib fracture and fracture of the right humeral head which was previously known.    Review of Systems  Patient will wake up upon stimulation; denies any pain. Otherwise, does not provide further HPI.    Objective   Objective     Vitals:   Temp:  [97.6 °F (36.4 °C)-98.2 °F (36.8 °C)] 97.8 °F (36.6 °C)  Heart Rate:  [53-65] 60  Resp:  [17-19] 18  BP: (113-141)/(61-76) 141/72    Physical Exam  Constitutional:       General: He is not in acute distress.     Appearance: He is well-developed.   HENT:      Head: Normocephalic and atraumatic.   Eyes:      Conjunctiva/sclera: Conjunctivae normal.   Neck:      Trachea: No tracheal deviation.   Cardiovascular:      Rate and Rhythm: Normal rate and regular rhythm.      Heart sounds: No murmur heard.     No friction rub. No gallop.   Pulmonary:      Effort: No respiratory distress.      Breath sounds: Examination of the right-lower field reveals decreased breath sounds. Examination of the left-lower field reveals decreased breath sounds. Decreased breath sounds present. No wheezing or rales.   Abdominal:      General: Bowel sounds are normal. There is no distension.      Palpations: Abdomen is soft.      Tenderness: There is no abdominal tenderness. There is no guarding.   Musculoskeletal:         General: No tenderness.   Skin:     General: Skin is warm and dry.      Findings: No erythema or rash.   Neurological:      Mental Status: He is  lethargic.      Cranial Nerves: No cranial nerve deficit.         Result Review    Result Review:  I have personally reviewed the results from the time of this admission to 5/31/2025 15:11 EDT and agree with these findings:  [x]  Laboratory list / accordion  []  Microbiology  []  Radiology  []  EKG/Telemetry   []  Cardiology/Vascular   []  Pathology  []  Old records  []  Other:  Most notable findings include:     Results from last 7 days   Lab Units 05/30/25  0058 05/27/25  0106 05/26/25  1250   WBC 10*3/mm3 6.90 7.87 7.38   HEMOGLOBIN g/dL 13.9 14.7 15.2   HEMATOCRIT % 43.3 44.8 47.1   PLATELETS 10*3/mm3 155 149 148     Results from last 7 days   Lab Units 05/30/25 0058 05/28/25  0114 05/27/25  0106 05/26/25  1250   SODIUM mmol/L 134* 134* 134* 134*   POTASSIUM mmol/L 3.9 4.4 4.4 4.6   CHLORIDE mmol/L 100 101 100 98   CO2 mmol/L 22.2 22.9 22.9 25.5   BUN mg/dL 19.5 16.9 13 12   CREATININE mg/dL 1.35* 1.23 1.26 1.28*   CALCIUM mg/dL 8.3* 8.7 8.6 9.2   BILIRUBIN mg/dL 0.3  --  0.5 0.6   ALK PHOS U/L 127*  --  134* 150*   ALT (SGPT) U/L 15  --  20 25   AST (SGOT) U/L 23  --  25 31   GLUCOSE mg/dL 186* 229* 221* 282*     Lab Results   Component Value Date    TCEANDAJ31 1,046 (H) 05/27/2025     Lab Results   Component Value Date    FOLATE 6.92 05/27/2025     MRI brain without contrast:    FINDINGS:  Study is degraded by patient motion.  The diffusion weighted images show no focal signal abnormality. There is  no evidence of acute ischemia     The remaining pulse sequences show no mass, hemorrhage, or midline  shift.  The ventricles, cisterns, and sulci are unremarkable. There is no  hydrocephalus.     The sagittal view show no sellar or parasellar mass.     There is no tectal or pineal lesion.     Coronal imaging demonstrates a symmetric appearance of the visualized  portions of the optic nerves and extraocular muscles.     IMPRESSION:     1. Study markedly degraded by patient motion  2. No convincing evidence of acute  ischemia, focal mass, or midline  Shift    XR bilateral ribs with 4 view and PA:      FINDINGS:     Slight prominence to the heart size  Sternotomy.  Fracture involving the right posterior fourth rib.  Fracture noted at the right humeral head.  No acute dislocation.  Intact left ribs  No lytic or blastic lesion.  No acute foreign body.     IMPRESSION:     Aberrant contour to the right posterior fourth rib consistent with  underlying fractures of indeterminate age.  No left rib fracture identified.  Fracture noted involving the right humeral head with no dislocation.  No acute foreign body.  Clinical correlation recommended.    Assessment & Plan   Assessment / Plan     #Assisted fall during hospitalization  #Age indeterminate right posterior 4th rib fracture  #Frequent falls  #Generalized weakness/physical debility  #Recent right proximal humerus fracture due to fall, prior to admission  #Vascular dementia  - Patient moved to [facility] to be closer to nursing station  - PT/OT evaluating; continue to participate as tolerated  - Requested inpatient physical rehabilitation; feel patient would be an excellent candidate given degree of debility and ongoing falls as noted above  - Patient with significant falls at home causing trauma; in setting of anticoagulation (eliquis)  - Vitamin B12/Folate levels replete  - MRI brain without contrast ordered and unremarkable given in account the motion degradation     #Uncontrolled essential hypertension, controlled at present (123/69 mmHg)  #Diabetes mellitus type 2, insulin-dependent and complicated by hyperglycemia  #Obesity per BMI 40.28 kg/m²  - I have reviewed the patient's home medication list and have resumed his home amlodipine 10 mg daily, and Cozaar 100 mg daily; BP much improved today; continue current regimen  - Unclear as to why patient is not on a beta-blocker given history of coronary artery disease; for now, we will plan for as needed hydralazine for ongoing uncontrolled  hypertension  - Patient on 70/30 at home; will plan to continue 75/25 here at increased dose of 20 units BID for now and titrate based on blood glucose trend; will continue current regimen for now  - Continue to monitor BP and glucose level trend while hospitalized    #Tinea cruris  - Nystatin powder BID    Chronic medical conditions:  - Pulmonary nodules, noted since 2023; discussed with wife in detail at bedside this week  -Gastroesophageal reflux disease  - Previous stroke  - Previous blood clot  - Coronary artery disease status post three-vessel CABG  - Chronic low back pain  - Hypothyroidism    VTE Prophylaxis:  Continue home eliquis for now - monitor closely in setting of falls    CODE STATUS:    Code Status (Patient has no pulse and is not breathing): CPR (Attempt to Resuscitate)  Medical Interventions (Patient has pulse or is breathing): Full Support    Disposition:  I expect patient to be discharged unclear pending progress with PT/OT; I think he would be an excellent candidate for inpatient physical rehabilitation. Awaiting insurance approval or denial of IPR    Magali Baig, DO

## 2025-05-31 NOTE — PLAN OF CARE
Goal Outcome Evaluation:  Plan of Care Reviewed With: patient        Progress: no change     Pt resting in bed at this time. No acute distress noted. VSS. Pt had an assisted fall this shift. No injuries noted. Stated he was trying to go to restroom. Was half way out of bed. Assisted to floor by nursing staff. BOUCHRA Escalona notified. Wife notified. Fall interventions initiated. Plan of care on going.

## 2025-05-31 NOTE — NURSING NOTE
Called pt's wife Jina at this time to make her aware that we moved pt closer to the nurses station to room 306B.

## 2025-06-01 LAB
ANION GAP SERPL CALCULATED.3IONS-SCNC: 11.7 MMOL/L (ref 5–15)
BUN SERPL-MCNC: 16.2 MG/DL (ref 8–23)
BUN/CREAT SERPL: 11.5 (ref 7–25)
CALCIUM SPEC-SCNC: 8.4 MG/DL (ref 8.6–10.5)
CHLORIDE SERPL-SCNC: 101 MMOL/L (ref 98–107)
CO2 SERPL-SCNC: 22.3 MMOL/L (ref 22–29)
CREAT SERPL-MCNC: 1.41 MG/DL (ref 0.76–1.27)
DEPRECATED RDW RBC AUTO: 40.8 FL (ref 37–54)
EGFRCR SERPLBLD CKD-EPI 2021: 54.3 ML/MIN/1.73
ERYTHROCYTE [DISTWIDTH] IN BLOOD BY AUTOMATED COUNT: 12.2 % (ref 12.3–15.4)
GLUCOSE BLDC GLUCOMTR-MCNC: 136 MG/DL (ref 70–130)
GLUCOSE BLDC GLUCOMTR-MCNC: 142 MG/DL (ref 70–130)
GLUCOSE BLDC GLUCOMTR-MCNC: 153 MG/DL (ref 70–130)
GLUCOSE SERPL-MCNC: 176 MG/DL (ref 65–99)
HCT VFR BLD AUTO: 42.8 % (ref 37.5–51)
HGB BLD-MCNC: 14 G/DL (ref 13–17.7)
MCH RBC QN AUTO: 29.8 PG (ref 26.6–33)
MCHC RBC AUTO-ENTMCNC: 32.7 G/DL (ref 31.5–35.7)
MCV RBC AUTO: 91.1 FL (ref 79–97)
PLATELET # BLD AUTO: 141 10*3/MM3 (ref 140–450)
PMV BLD AUTO: 10.2 FL (ref 6–12)
POTASSIUM SERPL-SCNC: 3.7 MMOL/L (ref 3.5–5.2)
RBC # BLD AUTO: 4.7 10*6/MM3 (ref 4.14–5.8)
SODIUM SERPL-SCNC: 135 MMOL/L (ref 136–145)
WBC NRBC COR # BLD AUTO: 7.33 10*3/MM3 (ref 3.4–10.8)

## 2025-06-01 PROCEDURE — 63710000001 INSULIN LISPRO PROTAMINE-INSULIN LISPRO (75-25) 100 UNIT/ML SUSPENSION: Performed by: INTERNAL MEDICINE

## 2025-06-01 PROCEDURE — 85027 COMPLETE CBC AUTOMATED: CPT | Performed by: INTERNAL MEDICINE

## 2025-06-01 PROCEDURE — 80048 BASIC METABOLIC PNL TOTAL CA: CPT | Performed by: INTERNAL MEDICINE

## 2025-06-01 PROCEDURE — 99231 SBSQ HOSP IP/OBS SF/LOW 25: CPT | Performed by: INTERNAL MEDICINE

## 2025-06-01 PROCEDURE — 82948 REAGENT STRIP/BLOOD GLUCOSE: CPT

## 2025-06-01 RX ADMIN — Medication 10 ML: at 08:38

## 2025-06-01 RX ADMIN — ALPRAZOLAM 0.5 MG: 0.5 TABLET ORAL at 00:12

## 2025-06-01 RX ADMIN — SENNOSIDES AND DOCUSATE SODIUM 2 TABLET: 50; 8.6 TABLET ORAL at 20:27

## 2025-06-01 RX ADMIN — Medication 10 ML: at 20:28

## 2025-06-01 RX ADMIN — ATORVASTATIN CALCIUM 80 MG: 40 TABLET, FILM COATED ORAL at 20:27

## 2025-06-01 RX ADMIN — QUETIAPINE FUMARATE 100 MG: 100 TABLET ORAL at 20:27

## 2025-06-01 RX ADMIN — APIXABAN 5 MG: 5 TABLET, FILM COATED ORAL at 20:27

## 2025-06-01 RX ADMIN — TIMOLOL MALEATE: 5 SOLUTION OPHTHALMIC at 08:36

## 2025-06-01 RX ADMIN — HYDROCODONE BITARTRATE AND ACETAMINOPHEN 1 TABLET: 7.5; 325 TABLET ORAL at 20:28

## 2025-06-01 RX ADMIN — ACETAMINOPHEN 1000 MG: 500 TABLET ORAL at 17:14

## 2025-06-01 RX ADMIN — INSULIN LISPRO 15 UNITS: 100 INJECTION, SUSPENSION SUBCUTANEOUS at 16:57

## 2025-06-01 RX ADMIN — TIMOLOL MALEATE: 5 SOLUTION OPHTHALMIC at 20:28

## 2025-06-01 RX ADMIN — CASTOR OIL AND BALSAM, PERU 1 APPLICATION: 788; 87 OINTMENT TOPICAL at 08:36

## 2025-06-01 RX ADMIN — CASTOR OIL AND BALSAM, PERU 1 APPLICATION: 788; 87 OINTMENT TOPICAL at 20:28

## 2025-06-01 RX ADMIN — FAMOTIDINE 20 MG: 20 TABLET ORAL at 20:27

## 2025-06-01 NOTE — PLAN OF CARE
Goal Outcome Evaluation:    Problem: Adult Inpatient Plan of Care  Goal: Plan of Care Review  Outcome: Progressing  Flowsheets (Taken 6/1/2025 1344)  Progress: no change  Outcome Evaluation: Patient resting in bed. NAD noted. IV patent. Remains confused and argumentative with staff this shift. Refused morning medications this shift. Educated on importance of medication compliance. Refusing to attempt to sit edge of bed this shift. Educated on importance of attempting to ambulate. Pending IPR referral. POC ongoing.  Plan of Care Reviewed With:   patient   spouse                       Problem: Adult Inpatient Plan of Care  Goal: Absence of Hospital-Acquired Illness or Injury  Intervention: Identify and Manage Fall Risk  Recent Flowsheet Documentation  Taken 6/1/2025 1300 by Marilynn Martinez, RN  Safety Promotion/Fall Prevention: safety round/check completed  Taken 6/1/2025 1100 by Marilynn Martinez, RN  Safety Promotion/Fall Prevention: safety round/check completed  Taken 6/1/2025 0900 by Marilynn Martinez, RN  Safety Promotion/Fall Prevention: safety round/check completed  Taken 6/1/2025 0720 by Marilynn Martinez, RN  Safety Promotion/Fall Prevention: safety round/check completed  Intervention: Prevent Skin Injury  Recent Flowsheet Documentation  Taken 6/1/2025 1300 by Marilynn Martinez, RN  Body Position: head facing, left  Taken 6/1/2025 1100 by Marilynn Martinez, RN  Body Position: head facing, right  Taken 6/1/2025 0900 by Marilynn Martinez, RN  Body Position: head facing, left  Taken 6/1/2025 0720 by Marilynn Martinez, RN  Body Position: head facing, right  Skin Protection: incontinence pads utilized  Intervention: Prevent and Manage VTE (Venous Thromboembolism) Risk  Recent Flowsheet Documentation  Taken 6/1/2025 0720 by Marilynn Martinez, RN  VTE Prevention/Management: (Eliquis PO) other (see comments)  Intervention: Prevent Infection  Recent Flowsheet  Documentation  Taken 6/1/2025 1300 by Marilynn Martinez, RN  Infection Prevention:   rest/sleep promoted   hand hygiene promoted  Taken 6/1/2025 1100 by Marilynn Martinez, RN  Infection Prevention:   rest/sleep promoted   hand hygiene promoted  Taken 6/1/2025 0900 by Marilynn Martinez, RN  Infection Prevention:   rest/sleep promoted   hand hygiene promoted  Taken 6/1/2025 0720 by Marilynn Martinez, RN  Infection Prevention: hand hygiene promoted  Goal: Optimal Comfort and Wellbeing  Intervention: Provide Person-Centered Care  Recent Flowsheet Documentation  Taken 6/1/2025 0720 by Marilynn Martinez, RN  Trust Relationship/Rapport:   care explained   choices provided   thoughts/feelings acknowledged     Problem: Fall Injury Risk  Goal: Absence of Fall and Fall-Related Injury  Intervention: Identify and Manage Contributors  Recent Flowsheet Documentation  Taken 6/1/2025 0720 by Marilynn Martinez, RN  Medication Review/Management: medications reviewed  Self-Care Promotion:   independence encouraged   BADL personal objects within reach   meal set-up provided  Intervention: Promote Injury-Free Environment  Recent Flowsheet Documentation  Taken 6/1/2025 1300 by Marilynn Martinez, RN  Safety Promotion/Fall Prevention: safety round/check completed  Taken 6/1/2025 1100 by Marilynn Martinez, RN  Safety Promotion/Fall Prevention: safety round/check completed  Taken 6/1/2025 0900 by Marilynn Martinez, RN  Safety Promotion/Fall Prevention: safety round/check completed  Taken 6/1/2025 0720 by Marilynn Martinez, RN  Safety Promotion/Fall Prevention: safety round/check completed     Problem: Skin Injury Risk Increased  Goal: Skin Health and Integrity  Intervention: Optimize Skin Protection  Recent Flowsheet Documentation  Taken 6/1/2025 1300 by Marilynn Martinez, RN  Activity Management: activity encouraged  Head of Bed (HOB) Positioning: HOB lowered  Taken 6/1/2025 1100 by  Marilynn Martinez, RN  Activity Management: activity encouraged  Head of Bed (HOB) Positioning: HOB elevated  Taken 6/1/2025 0900 by Marilynn Martinez, RN  Activity Management: activity encouraged  Head of Bed (HOB) Positioning: HOB elevated  Taken 6/1/2025 0720 by Marilynn Martinez, RN  Activity Management: activity encouraged  Pressure Reduction Techniques:   frequent weight shift encouraged   heels elevated off bed   positioned off wounds   weight shift assistance provided  Head of Bed (HOB) Positioning: HOB elevated  Pressure Reduction Devices:   pressure-redistributing mattress utilized   positioning supports utilized   heel offloading device utilized  Skin Protection: incontinence pads utilized  Intervention: Promote and Optimize Oral Intake  Recent Flowsheet Documentation  Taken 6/1/2025 0720 by Marilynn Martinez, RN  Oral Nutrition Promotion: physical activity promoted  Nutrition Interventions: meal set-up provided

## 2025-06-01 NOTE — PROGRESS NOTES
[facility]     Progress Note    Patient Name: Ramsey Riley  : 1955  MRN: [id number]  Primary Care Physician:  Margret Purvis MD  Date of admission: 2025    Subjective   Subjective     Chief Complaint: 70-year-old male being seen in follow-up for frequent falls    History of Present Illness  Patient sleeping soundly - no further episodes overnight. No further falls.    Case discussed with patient's primary RN Marilynn    Review of Systems  Patient will wake up upon stimulation; denies any pain. Otherwise, does not provide further HPI.    Objective   Objective     Vitals:   Temp:  [97.7 °F (36.5 °C)-98.3 °F (36.8 °C)] 98.1 °F (36.7 °C)  Heart Rate:  [50-91] 91  Resp:  [18-20] 20  BP: (118-141)/(61-74) 132/70    Physical Exam  Constitutional:       General: He is not in acute distress.     Appearance: He is well-developed.   HENT:      Head: Normocephalic and atraumatic.   Eyes:      Conjunctiva/sclera: Conjunctivae normal.   Neck:      Trachea: No tracheal deviation.   Cardiovascular:      Rate and Rhythm: Normal rate and regular rhythm.      Heart sounds: No murmur heard.     No friction rub. No gallop.   Pulmonary:      Effort: No respiratory distress.      Breath sounds: Examination of the right-lower field reveals decreased breath sounds. Examination of the left-lower field reveals decreased breath sounds. Decreased breath sounds present. No wheezing or rales.   Abdominal:      General: Bowel sounds are normal. There is no distension.      Palpations: Abdomen is soft.      Tenderness: There is no abdominal tenderness. There is no guarding.   Musculoskeletal:         General: No tenderness.   Skin:     General: Skin is warm and dry.      Findings: No erythema or rash.   Neurological:      Mental Status: He is lethargic.      Cranial Nerves: No cranial nerve deficit.       *No significant change compared to *    Result Review    Result Review:  I have personally reviewed the results from the  time of this admission to 6/1/2025 15:32 EDT and agree with these findings:  [x]  Laboratory list / accordion  []  Microbiology  []  Radiology  []  EKG/Telemetry   []  Cardiology/Vascular   []  Pathology  []  Old records  []  Other:  Most notable findings include:     Results from last 7 days   Lab Units 06/01/25 0108 05/30/25  0058 05/27/25  0106   WBC 10*3/mm3 7.33 6.90 7.87   HEMOGLOBIN g/dL 14.0 13.9 14.7   HEMATOCRIT % 42.8 43.3 44.8   PLATELETS 10*3/mm3 141 155 149     Results from last 7 days   Lab Units 06/01/25 0108 05/30/25  0058 05/28/25  0114 05/27/25  0106 05/26/25  1250   SODIUM mmol/L 135* 134* 134* 134* 134*   POTASSIUM mmol/L 3.7 3.9 4.4 4.4 4.6   CHLORIDE mmol/L 101 100 101 100 98   CO2 mmol/L 22.3 22.2 22.9 22.9 25.5   BUN mg/dL 16.2 19.5 16.9 13 12   CREATININE mg/dL 1.41* 1.35* 1.23 1.26 1.28*   CALCIUM mg/dL 8.4* 8.3* 8.7 8.6 9.2   BILIRUBIN mg/dL  --  0.3  --  0.5 0.6   ALK PHOS U/L  --  127*  --  134* 150*   ALT (SGPT) U/L  --  15  --  20 25   AST (SGOT) U/L  --  23  --  25 31   GLUCOSE mg/dL 176* 186* 229* 221* 282*     Lab Results   Component Value Date    PHXWBAUR34 1,046 (H) 05/27/2025     Lab Results   Component Value Date    FOLATE 6.92 05/27/2025     MRI brain without contrast:    FINDINGS:  Study is degraded by patient motion.  The diffusion weighted images show no focal signal abnormality. There is  no evidence of acute ischemia     The remaining pulse sequences show no mass, hemorrhage, or midline  shift.  The ventricles, cisterns, and sulci are unremarkable. There is no  hydrocephalus.     The sagittal view show no sellar or parasellar mass.     There is no tectal or pineal lesion.     Coronal imaging demonstrates a symmetric appearance of the visualized  portions of the optic nerves and extraocular muscles.     IMPRESSION:     1. Study markedly degraded by patient motion  2. No convincing evidence of acute ischemia, focal mass, or midline  Shift    XR bilateral ribs with 4 view  and PA:      FINDINGS:     Slight prominence to the heart size  Sternotomy.  Fracture involving the right posterior fourth rib.  Fracture noted at the right humeral head.  No acute dislocation.  Intact left ribs  No lytic or blastic lesion.  No acute foreign body.     IMPRESSION:     Aberrant contour to the right posterior fourth rib consistent with  underlying fractures of indeterminate age.  No left rib fracture identified.  Fracture noted involving the right humeral head with no dislocation.  No acute foreign body.  Clinical correlation recommended.    Assessment & Plan   Assessment / Plan     #Assisted fall during hospitalization on early AM 5/31  #Age indeterminate right posterior 4th rib fracture  #Frequent falls  #Generalized weakness/physical debility  #Recent right proximal humerus fracture due to fall, prior to admission  #Vascular dementia  - Patient moved to [facility] to be closer to nursing station  - PT/OT evaluating; continue to participate as tolerated  - Appears patient ambulated 50 ft to bathroom today with staff  - Requested inpatient physical rehabilitation; feel patient would be an excellent candidate given degree of debility and ongoing falls as noted above  - Patient with significant falls at home causing trauma; in setting of anticoagulation (eliquis)  - Vitamin B12/Folate levels replete  - MRI brain without contrast ordered and unremarkable given in account the motion degradation     #Uncontrolled essential hypertension, controlled at present   #Diabetes mellitus type 2, insulin-dependent and complicated by hyperglycemia, improving  #Obesity per BMI 40.28 kg/m²  - I have reviewed the patient's home medication list and have resumed his home amlodipine 10 mg daily, and Cozaar 100 mg daily; BP much improved today; continue current regimen  - Unclear as to why patient is not on a beta-blocker given history of coronary artery disease; for now, we will plan for as needed hydralazine for ongoing  uncontrolled hypertension  - Patient on 70/30 at home; will plan to continue 75/25 here at increased dose of 20 units BID for now and titrate based on blood glucose trend; will continue current regimen for now  - Continue to monitor BP and glucose level trend while hospitalized    #Tinea cruris  - Nystatin powder BID    Chronic medical conditions:  - Pulmonary nodules, noted since 2023; discussed with wife in detail at bedside this week  -Gastroesophageal reflux disease  - Previous stroke  - Previous blood clot  - Coronary artery disease status post three-vessel CABG  - Chronic low back pain  - Hypothyroidism    VTE Prophylaxis:  Continue home eliquis for now - monitor closely in setting of falls    CODE STATUS:    Code Status (Patient has no pulse and is not breathing): CPR (Attempt to Resuscitate)  Medical Interventions (Patient has pulse or is breathing): Full Support    Disposition:  I expect patient to be discharged unclear pending progress with PT/OT; I think he would be an excellent candidate for inpatient physical rehabilitation. Awaiting insurance approval or denial of IPR    Magali Baig, DO

## 2025-06-01 NOTE — PLAN OF CARE
Goal Outcome Evaluation:   Patient currently resting in bed. Alert and confused. VSS. No visible s/s of acute distress noted at this time. No requests or complaints at this time. Call light within reach. Plan of care ongoing.

## 2025-06-02 ENCOUNTER — APPOINTMENT (OUTPATIENT)
Dept: GENERAL RADIOLOGY | Facility: HOSPITAL | Age: 70
End: 2025-06-02
Payer: MEDICARE

## 2025-06-02 LAB
ANION GAP SERPL CALCULATED.3IONS-SCNC: 11 MMOL/L (ref 5–15)
BUN SERPL-MCNC: 15.4 MG/DL (ref 8–23)
BUN/CREAT SERPL: 11.9 (ref 7–25)
CALCIUM SPEC-SCNC: 8.1 MG/DL (ref 8.6–10.5)
CHLORIDE SERPL-SCNC: 97 MMOL/L (ref 98–107)
CO2 SERPL-SCNC: 23 MMOL/L (ref 22–29)
CREAT SERPL-MCNC: 1.29 MG/DL (ref 0.76–1.27)
EGFRCR SERPLBLD CKD-EPI 2021: 60.4 ML/MIN/1.73
GLUCOSE BLDC GLUCOMTR-MCNC: 199 MG/DL (ref 70–130)
GLUCOSE BLDC GLUCOMTR-MCNC: 200 MG/DL (ref 70–130)
GLUCOSE BLDC GLUCOMTR-MCNC: 233 MG/DL (ref 70–130)
GLUCOSE BLDC GLUCOMTR-MCNC: 300 MG/DL (ref 70–130)
GLUCOSE SERPL-MCNC: 343 MG/DL (ref 65–99)
POTASSIUM SERPL-SCNC: 4 MMOL/L (ref 3.5–5.2)
SODIUM SERPL-SCNC: 131 MMOL/L (ref 136–145)

## 2025-06-02 PROCEDURE — 99232 SBSQ HOSP IP/OBS MODERATE 35: CPT | Performed by: STUDENT IN AN ORGANIZED HEALTH CARE EDUCATION/TRAINING PROGRAM

## 2025-06-02 PROCEDURE — 97116 GAIT TRAINING THERAPY: CPT

## 2025-06-02 PROCEDURE — 82948 REAGENT STRIP/BLOOD GLUCOSE: CPT

## 2025-06-02 PROCEDURE — 63710000001 INSULIN LISPRO PROTAMINE-INSULIN LISPRO (75-25) 100 UNIT/ML SUSPENSION: Performed by: STUDENT IN AN ORGANIZED HEALTH CARE EDUCATION/TRAINING PROGRAM

## 2025-06-02 PROCEDURE — 73610 X-RAY EXAM OF ANKLE: CPT

## 2025-06-02 PROCEDURE — 80048 BASIC METABOLIC PNL TOTAL CA: CPT | Performed by: INTERNAL MEDICINE

## 2025-06-02 PROCEDURE — 63710000001 INSULIN LISPRO PROTAMINE-INSULIN LISPRO (75-25) 100 UNIT/ML SUSPENSION: Performed by: INTERNAL MEDICINE

## 2025-06-02 PROCEDURE — 73610 X-RAY EXAM OF ANKLE: CPT | Performed by: RADIOLOGY

## 2025-06-02 PROCEDURE — 73630 X-RAY EXAM OF FOOT: CPT

## 2025-06-02 PROCEDURE — 73630 X-RAY EXAM OF FOOT: CPT | Performed by: RADIOLOGY

## 2025-06-02 PROCEDURE — 97530 THERAPEUTIC ACTIVITIES: CPT

## 2025-06-02 RX ORDER — HYDROCODONE BITARTRATE AND ACETAMINOPHEN 7.5; 325 MG/1; MG/1
1 TABLET ORAL EVERY 12 HOURS PRN
Qty: 60 TABLET | Refills: 0 | Status: ON HOLD | OUTPATIENT
Start: 2025-06-02

## 2025-06-02 RX ADMIN — LOSARTAN POTASSIUM 100 MG: 50 TABLET, FILM COATED ORAL at 09:00

## 2025-06-02 RX ADMIN — FAMOTIDINE 20 MG: 20 TABLET ORAL at 08:59

## 2025-06-02 RX ADMIN — EMPAGLIFLOZIN 10 MG: 10 TABLET, FILM COATED ORAL at 09:00

## 2025-06-02 RX ADMIN — SENNOSIDES AND DOCUSATE SODIUM 2 TABLET: 50; 8.6 TABLET ORAL at 20:33

## 2025-06-02 RX ADMIN — ALPRAZOLAM 0.5 MG: 0.5 TABLET ORAL at 19:09

## 2025-06-02 RX ADMIN — APIXABAN 5 MG: 5 TABLET, FILM COATED ORAL at 20:32

## 2025-06-02 RX ADMIN — LEVOTHYROXINE SODIUM 137.5 MCG: 0.12 TABLET ORAL at 09:00

## 2025-06-02 RX ADMIN — SENNOSIDES AND DOCUSATE SODIUM 2 TABLET: 50; 8.6 TABLET ORAL at 09:00

## 2025-06-02 RX ADMIN — CITALOPRAM HYDROBROMIDE 20 MG: 20 TABLET ORAL at 08:59

## 2025-06-02 RX ADMIN — QUETIAPINE FUMARATE 100 MG: 100 TABLET ORAL at 20:32

## 2025-06-02 RX ADMIN — TIMOLOL MALEATE: 5 SOLUTION OPHTHALMIC at 09:01

## 2025-06-02 RX ADMIN — FAMOTIDINE 20 MG: 20 TABLET ORAL at 20:37

## 2025-06-02 RX ADMIN — APIXABAN 5 MG: 5 TABLET, FILM COATED ORAL at 08:59

## 2025-06-02 RX ADMIN — INSULIN LISPRO 20 UNITS: 100 INJECTION, SUSPENSION SUBCUTANEOUS at 16:34

## 2025-06-02 RX ADMIN — INSULIN LISPRO 15 UNITS: 100 INJECTION, SUSPENSION SUBCUTANEOUS at 08:59

## 2025-06-02 RX ADMIN — ATORVASTATIN CALCIUM 80 MG: 40 TABLET, FILM COATED ORAL at 20:32

## 2025-06-02 RX ADMIN — Medication 10 ML: at 20:33

## 2025-06-02 RX ADMIN — PANTOPRAZOLE SODIUM 40 MG: 40 TABLET, DELAYED RELEASE ORAL at 09:00

## 2025-06-02 RX ADMIN — CASTOR OIL AND BALSAM, PERU 1 APPLICATION: 788; 87 OINTMENT TOPICAL at 09:01

## 2025-06-02 RX ADMIN — Medication 10 ML: at 09:01

## 2025-06-02 RX ADMIN — TIMOLOL MALEATE: 5 SOLUTION OPHTHALMIC at 22:13

## 2025-06-02 RX ADMIN — CASTOR OIL AND BALSAM, PERU 1 APPLICATION: 788; 87 OINTMENT TOPICAL at 22:13

## 2025-06-02 RX ADMIN — QUETIAPINE FUMARATE 50 MG: 25 TABLET ORAL at 09:00

## 2025-06-02 RX ADMIN — HYDROCODONE BITARTRATE AND ACETAMINOPHEN 1 TABLET: 7.5; 325 TABLET ORAL at 19:09

## 2025-06-02 RX ADMIN — AMLODIPINE BESYLATE 10 MG: 10 TABLET ORAL at 09:00

## 2025-06-02 NOTE — DISCHARGE PLACEMENT REQUEST
"Cici Riley (70 y.o. Male)       Date of Birth   1955    Social Security Number       Address   [address] [place] [address]    Home Phone   [phone]    MRN   [id number]       Roman [employer]   Patient Refused    Marital Status                               Admission Date   5/26/2025    Admission Type   Emergency    Admitting Provider   Magali Baig DO    Attending Provider   Harini Urbina DO    Department, Room/Bed   [address], 3306/2S       Discharge Date       Discharge Disposition       Discharge Destination                                 Attending Provider: Harini Urbina DO    Allergies: Penicillins, Trazodone    Isolation: None   Infection: None   Code Status: CPR    Ht: 155.8 cm (61.32\")   Wt: 95.3 kg (210 lb)    Admission Cmt: None   Principal Problem: Frequent falls [R29.6]                   Active Insurance as of 5/26/2025       Primary Coverage       Payor Plan Insurance Group Employer/Plan Group    ANTHEM MEDICARE REPLACEMENT ANTHEM MEDICARE ADVANTAGE HMO [id number]       Payor Plan Address Payor Plan Phone Number Payor Plan Fax Number Effective Dates    [address] [phone]  1/1/2024 - None Entered    [facility] [id number]         Subscriber Name Subscriber Birth Date Member ID       CICI RILEY 1955 [id number]                     Emergency Contacts        (Rel.) Home Phone Work Phone Mobile Phone    Jina Riley (Spouse) [phone] -- [phone]              Emergency Contact Information       Name Relation Home Work Mobile    Jina Riley Spouse [phone]          Other Contacts    None on File          History & Physical        Justo Grande PA-C at 05/26/25 1435       Attestation signed by Magali Baig DO at 05/26/25 1815      I have reviewed this documentation and agree. Patient seen and examined in 311-2. No visitors present. Patient has no complaints at " present. He states he has no prodromal symptoms prior to falling. No syncope. Mostly just generalized weakness. No dizziness. No known vitamin deficiencies or previous ETOH use.    Exam largely unremarkable; he does have a scar on right hemicranium from recently falling and striking a fireplace. Left heel blistered area noted; no drainage.     Place on observation with telemetry. MRI brain w/o contrast given falls on eliquis in the setting of vascular dementia. PT/OT and SS consult with b12/folate levels in [facility]  History & Physical    Patient Identification:  Name:  Ramsey Riley  Age:  70 y.o.  Sex:  male  :  1955  MRN:  [id number]   Visit Number:  [id number]  Admit Date: 2025   Primary Care Physician:  Margret Purvis MD    Subjective     Chief complaint: Foot pain    History of presenting illness:      Ramsey Riley is a 70 y.o. male who presented for further evaluation of foot pain. He was seen and examined in the ED with spouse at the bedside. He and spouse report history mild vascular dementia with mild confusion and memory difficulty at baseline but he was pleasant and conversant. His spouse assists in providing history. They report they presented to the ED today secondary to foot pain. Patient has had a deep bruise/blister on the back of his heel for the past several days which he reports is incredibly painful. He reports deep, burning pain. His spouse states that last evening and overnight the pain seemed worse and the patient was very uncomfortable at home, at times even yelling out in pain so she decided to bring him in to the ED for further evaluation.   She believes the wound on the foot may be related to one of his recent multiple falls but they cannot pinpoint when the exact occurrence was. Spouse and patient report he has fallen multiple times in the past month. With one fall they report he injured his  shoulder, spouse states had a fracture but unsure specifics. He did not require surgical intervention but has been in a sling for the past several weeks. Recently followed up with provider regarding shoulder injury and was told to discontinue the sling but patient has still been wearing this as he states it is too painful without it. He was hospitalized in [place] s/p fall in the recent past and the plan was for some sort of inpatient rehab however they had difficulty securing placement for him. Wife reports that he is significantly debilitated and has declined further since that discharge. At this time he is requiring assistance with bed mobility, ADLs such as dressing. She states he uses a walker but remains significantly unsteady on his feet and she is not physically able to stabilize him herself. She further adds that his vision is very poor due to diabetic retinopathy and this is limited his ability to safely ambulate in their home.  When he does fall he is unable to get up on his own and she is unable to assist. He denies any chest pain, palpitations, or dizziness preceding these falls.     Past medical history is significant for vascular dementia, CAD, HTN, PAD, hyperlipidemia, diabetes mellitus, chronically anticoagulated with Eliquis, thyroid disease    Upon arrival to the ED, vital signs were temp 98, heart rate 84, respirations 18, /75, SpO2 97% on RA.  On laboratory workup he was hyperglycemic at 282, CBC unremarkable.  CXR negative, XR bilateral feet negative.    Known Emergency Department medications received prior to my evaluation included none.   Emergency Department Room location at the time of my evaluation was 411.     ---------------------------------------------------------------------------------------------------------------------   Review of Systems   Constitutional:  Positive for activity change.   HENT:  Negative for congestion and rhinorrhea.    Respiratory:  Negative for cough and  shortness of breath.    Cardiovascular:  Negative for chest pain and leg swelling.   Gastrointestinal:  Negative for abdominal pain, diarrhea, nausea and vomiting.   Genitourinary:  Negative for difficulty urinating and dysuria.   Musculoskeletal:  Positive for arthralgias, gait problem and myalgias.   Skin:  Positive for wound. Negative for rash.   Neurological:  Positive for weakness. Negative for dizziness and light-headedness.   Psychiatric/Behavioral:  Positive for confusion (baseline).         ---------------------------------------------------------------------------------------------------------------------   Past Medical History:   Diagnosis Date    Arthritis     Clot     Coronary artery disease     Dementia 12/18/2024    Diabetes mellitus     Heart disease     History of blood clots     Hypertension     Impaired mobility     Low back pain     Thyroid disease     Type 2 diabetes mellitus      Past Surgical History:   Procedure Laterality Date    CHOLECYSTECTOMY  2015    COLONOSCOPY  2015    dr srivastava    CORONARY ARTERY BYPASS GRAFT      [place] -  BY PASS     ENDOSCOPY       Family History   Problem Relation Age of Onset    Diabetes Mother     Heart disease Mother         STENTING    COPD Mother     Heart attack Father     Diabetes Father     Heart defect Son         Ross procedure    Heart murmur Son     Hypertension Son      Social History     Socioeconomic History    Marital status:    Tobacco Use    Smoking status: Never     Passive exposure: Never    Smokeless tobacco: Current     Types: Chew   Vaping Use    Vaping status: Never Used   Substance and Sexual Activity    Alcohol use: No    Drug use: No    Sexual activity: Defer     ---------------------------------------------------------------------------------------------------------------------   Allergies:  Penicillins and  Trazodone  ---------------------------------------------------------------------------------------------------------------------   Home medications:    Medications below are reported home medications pulling from within the system; at this time, these medications have not been reconciled unless otherwise specified and are in the verification process for further verifcation as current home medications.  (Not in a hospital admission)      Hospital Scheduled Meds:          Current listed hospital scheduled medications may not yet reflect those currently placed in orders that are signed and held awaiting patient's arrival to floor.   ---------------------------------------------------------------------------------------------------------------------     Objective     Vital Signs:  Temp:  [98 °F (36.7 °C)] 98 °F (36.7 °C)  Heart Rate:  [69-84] 69  Resp:  [18] 18  BP: (148-176)/(73-95) 148/75      05/26/25  1133   Weight: 99.8 kg (220 lb)     Body mass index is 30.68 kg/m².  ---------------------------------------------------------------------------------------------------------------------       Physical Exam  Vitals and nursing note reviewed.   Constitutional:       General: He is not in acute distress.     Appearance: He is obese. He is ill-appearing (chronically ill appearing).   HENT:      Head: Normocephalic and atraumatic.   Eyes:      Extraocular Movements: Extraocular movements intact.   Cardiovascular:      Rate and Rhythm: Normal rate and regular rhythm.   Pulmonary:      Effort: Pulmonary effort is normal. No respiratory distress.      Breath sounds: Normal breath sounds.   Abdominal:      Palpations: Abdomen is soft.      Tenderness: There is no abdominal tenderness.   Musculoskeletal:      Right lower leg: No edema.      Left lower leg: No edema.   Skin:     General: Skin is warm and dry.      Comments: Wound to posterior left heel, no surrounding erythema or warmth, no drainage or other sign of infection  "  Neurological:      Mental Status: He is alert. Mental status is at baseline.   Psychiatric:         Mood and Affect: Mood normal.         Behavior: Behavior normal.         ---------------------------------------------------------------------------------------------------------------------  EKG:      ---------------------------------------------------------------------------------------------------------------------   Results from last 7 days   Lab Units 05/26/25  1250   CRP mg/dL 0.30   WBC 10*3/mm3 7.38   HEMOGLOBIN g/dL 15.2   HEMATOCRIT % 47.1   MCV fL 91.6   MCHC g/dL 32.3   PLATELETS 10*3/mm3 148         Results from last 7 days   Lab Units 05/26/25  1250   SODIUM mmol/L 134*   POTASSIUM mmol/L 4.6   CHLORIDE mmol/L 98   CO2 mmol/L 25.5   BUN mg/dL 12   CREATININE mg/dL 1.28*   CALCIUM mg/dL 9.2   GLUCOSE mg/dL 282*   ALBUMIN g/dL 3.9   BILIRUBIN mg/dL 0.6   ALK PHOS U/L 150*   AST (SGOT) U/L 31   ALT (SGPT) U/L 25   Estimated Creatinine Clearance: 66.5 mL/min (A) (by C-G formula based on SCr of 1.28 mg/dL (H)).  No results found for: \"AMMONIA\"          Lab Results   Component Value Date    HGBA1C 8.30 (H) 05/05/2025     Lab Results   Component Value Date    TSH 4.610 (H) 05/05/2025    FREET4 1.24 12/10/2024     No results found for: \"PREGTESTUR\", \"PREGSERUM\", \"HCG\", \"HCGQUANT\"  Pain Management Panel  More data exists         Latest Ref Rng & Units 5/5/2025 12/10/2024   Pain Management Panel   Creatinine, Urine Not Estab. mg/dL 110.9  -   Amphetamine, Urine Qual Negative - Negative    Barbiturates Screen, Urine Negative - Negative    Benzodiazepine Screen, Urine Negative - Positive    Buprenorphine, Screen, Urine Negative - Negative    Cocaine Screen, Urine Negative - Negative    Fentanyl, Urine Negative - Negative    Methadone Screen , Urine Negative - Negative    Methamphetamine, Ur Negative - Negative      No results found for: \"BLOODCX\"  No results found for: \"URINECX\"  No results found for: " "\"WOUNDCX\"  No results found for: \"STOOLCX\"      ---------------------------------------------------------------------------------------------------------------------  Imaging Results (Last 7 Days)       Procedure Component Value Units Date/Time    XR Foot 3+ View Bilateral [[id number]] Collected: 05/26/25 1335     Updated: 05/26/25 1415    Narrative:      Exam: XR FOOT 3+ VW BILATERAL-     History: pain     Comparison: No images available     Findings:     Right distal first tow resection.      No acute fracture or dislocation.     Soft tissue are normal.       Impression:      Impression:     No acute bony process.     This report was finalized on 5/26/2025 2:12 PM by Brock Tafoya MD.       XR Chest 1 View [[id number]] Collected: 05/26/25 1335     Updated: 05/26/25 1337    Narrative:      Procedure: Frontal view of chest obtained.     Comparison: None available     History: frequent falls     Findings:     No pneumonia or acute process seen in the chest.  Normal heart size and mediastinal contours.  Trachea is in midline position.  No edema or effusion is seen.  There is no evidence of pneumothorax.       Impression:         No evidence of acute disease in the chest.     This report was finalized on 5/26/2025 1:35 PM by Brock Tafoya MD.               Cultures:  No results found for: \"BLOODCX\", \"URINECX\", \"WOUNDCX\", \"MRSACX\", \"RESPCX\", \"STOOLCX\"    Last echocardiogram:  Results for orders placed during the hospital encounter of 12/10/24    Adult Transthoracic Echo Complete W/ Cont if Necessary Per Protocol    Interpretation Summary    Left ventricular systolic function is normal. Calculated left ventricular EF = 67.8% Left ventricular ejection fraction appears to be 66 - 70%.    Left ventricular wall thickness is consistent with mild concentric hypertrophy. Left ventricular diastolic function was indeterminate.    The right ventricular cavity is borderline dilated with borderline systolic function.    The left " atrial cavity is mildly dilated.    Mild aortic valve regurgitation is present.    Saline test results are negative for right to left atrial level shunt.          I have personally reviewed the above radiology images and read the final radiology report on 05/26/25  ---------------------------------------------------------------------------------------------------------------------  Assessment / Plan     Active Hospital Problems    Diagnosis  POA    **Frequent falls [R29.6]  Not Applicable       ASSESSMENT/PLAN:    Vascular dementia  Acute on chronic debility  Frequent falls  Recent proximal right humerus fracture due to fall  Patient presented to the ED secondary to foot pain.  On further discussion about reports history of vascular dementia with increasing difficulty caring for the patient at home.  He is fallen 3 times within the past month and sustained a right humerus fracture.  She reports he is currently requiring assistance getting out of bed, dressing, etc.  She states he is unsteady on his feet and has difficulty navigating their home despite use of a walker and her assistance.  Will admit to the telemetry unit for further observation and management  Will proceed with MRI brain without contrast to further evaluate  PT OT consults  Appreciate CM/SW assistance with discharge planning as well.  Will check TSH, folate, B12  Continue supportive care measures  Continue to trend labs    Chronic:  CAD  HTN  PAD  HLD  Diabetes mellitus  Hx blood clot, chronically anticoagulated  Continue home medication regimen as indicated cover with SSI for now.  Accu-Cheks to monitor with hypoglycemia protocol in place.  Add basal insulin as clinically indicated  Trend vital signs    ----------  -DVT prophylaxis: Chronically anticoagulated with Eliquis  -Activity: Up with assistance  -Expected length of stay: Less than 2 midnights  -Disposition pending further clinical course and PT OT recommendations    High risk secondary to  frequent falls, vascular dementia    Code Status and Medical Interventions: CPR (Attempt to Resuscitate); Full Support   Ordered at: 05/26/25 1430     Code Status (Patient has no pulse and is not breathing):    CPR (Attempt to Resuscitate)     Medical Interventions (Patient has pulse or is breathing):    Full Support       Justo Grande PA-C   05/26/25  14:35 EDT    Electronically signed by Magali Baig DO at 05/26/25 1819       Vital Signs (last day)       Date/Time Temp Temp src Pulse Resp BP Patient Position SpO2    06/02/25 1300 -- -- 77 -- -- -- 92    06/02/25 1100 -- -- 79 -- -- -- 97    06/02/25 1026 97.9 (36.6) Oral 73 18 133/74 Sitting 96    06/02/25 0900 -- -- 67 -- -- -- 94    06/02/25 0801 97.8 (36.6) Oral 63 18 129/66 Lying 96    06/02/25 0337 98.1 (36.7) Oral 67 18 122/67 Lying 91    06/01/25 2343 97.8 (36.6) Oral 79 20 177/96 Lying 93    06/01/25 1844 98.4 (36.9) Oral 85 18 153/83 Lying 95    06/01/25 1514 98.1 (36.7) Axillary 91 20 132/70 Lying 95    06/01/25 1100 98.3 (36.8) Axillary 55 20 135/74 Lying 93    06/01/25 0900 -- -- 54 -- -- -- 95    06/01/25 0700 97.7 (36.5) Oral 51 18 136/68 Lying 95    06/01/25 0400 98.3 (36.8) Oral 50 18 118/66 Lying 93          Lines, Drains & Airways       Active LDAs       Name Placement date Placement time Site Days    Peripheral IV 05/27/25 0415 20 G Left;Posterior Forearm 05/27/25  0415  Forearm  6                  Current Facility-Administered Medications   Medication Dose Route Frequency Provider Last Rate Last Admin    acetaminophen (TYLENOL) tablet 1,000 mg  1,000 mg Oral Q8H PRN Magali Baig DO   1,000 mg at 06/01/25 1714    ALPRAZolam (XANAX) tablet 0.5 mg  0.5 mg Oral Nightly PRN Magali Baig DO   0.5 mg at 06/01/25 0012    amLODIPine (NORVASC) tablet 10 mg  10 mg Oral Daily Magali Baig DO   10 mg at 06/02/25 0900    apixaban (ELIQUIS) tablet 5 mg  5 mg Oral Q12H Magali Baig DO   5 mg at 06/02/25 0859     atorvastatin (LIPITOR) tablet 80 mg  80 mg Oral Nightly Magali Baig DO   80 mg at 06/01/25 2027    sennosides-docusate (PERICOLACE) 8.6-50 MG per tablet 2 tablet  2 tablet Oral BID Magali Baig DO   2 tablet at 06/02/25 0900    And    polyethylene glycol (MIRALAX) packet 17 g  17 g Oral Daily PRN Magali Baig DO        And    bisacodyl (DULCOLAX) EC tablet 5 mg  5 mg Oral Daily PRN Magali Baig DO        And    bisacodyl (DULCOLAX) suppository 10 mg  10 mg Rectal Daily PRN Magali Baig DO        castor oil-balsam peru (VENELEX) ointment 1 Application  1 Application Topical Q12H Magali Baig DO   1 Application at 06/02/25 0901    citalopram (CeleXA) tablet 20 mg  20 mg Oral Daily Magali Baig DO   20 mg at 06/02/25 0859    dextrose (D50W) (25 g/50 mL) IV injection 25 g  25 g Intravenous Q15 Min PRN Magali Baig DO        dextrose (GLUTOSE) oral gel 15 g  15 g Oral Q15 Min PRN Magali Baig DO        dorzolamide (TRUSOPT) 2 % 1 drop, timolol (TIMOPTIC) 0.5 % 1 drop for Cosopt 22.3-6.8 mg/mL   Both Eyes BID Magali Baig DO   Given at 06/02/25 0901    empagliflozin (JARDIANCE) tablet 10 mg  10 mg Oral Daily Magali Baig DO   10 mg at 06/02/25 0900    famotidine (PEPCID) tablet 20 mg  20 mg Oral BID Magali Baig DO   20 mg at 06/02/25 0859    glucagon HCl (Diagnostic) injection 1 mg  1 mg Intramuscular Q15 Min PRN Magali Baig DO        HYDROcodone-acetaminophen (NORCO) 7.5-325 MG per tablet 1 tablet  1 tablet Oral Q12H PRN Magali Baig DO   1 tablet at 06/01/25 2028    insulin lispro protamine-insulin lispro (humaLOG 75-25) injection 15 Units  15 Units Subcutaneous BID With Meals Safia, Magali Beny, DO   15 Units at 06/02/25 0859    levothyroxine (SYNTHROID, LEVOTHROID) tablet 137.5 mcg  137.5 mcg Oral QAM AC Magali Baig DO   137.5 mcg at 06/02/25 0900    losartan (COZAAR) tablet 100  mg  100 mg Oral Daily Magali Baig DO   100 mg at 06/02/25 0900    nitroglycerin (NITROSTAT) SL tablet 0.4 mg  0.4 mg Sublingual Q5 Min PRN Magali Baig,         pantoprazole (PROTONIX) EC tablet 40 mg  40 mg Oral QAM AC Magali Baig DO   40 mg at 06/02/25 0900    QUEtiapine (SEROquel) tablet 100 mg  100 mg Oral Nightly Magali Baig DO   100 mg at 06/01/25 2027    QUEtiapine (SEROquel) tablet 50 mg  50 mg Oral BID Magali Baig DO   50 mg at 06/02/25 0900    sodium chloride 0.9 % flush 10 mL  10 mL Intravenous PRN Magali Baig,         sodium chloride 0.9 % flush 10 mL  10 mL Intravenous Q12H Magali Baig DO   10 mL at 06/02/25 0901    sodium chloride 0.9 % flush 10 mL  10 mL Intravenous PRN Magali Baig DO        sodium chloride 0.9 % infusion 40 mL  40 mL Intravenous PRN Magali Baig DO         Lab Results (most recent)       Procedure Component Value Units Date/Time    POC Glucose Once [[id number]]  (Abnormal) Collected: 06/02/25 1137    Specimen: Blood Updated: 06/02/25 1144     Glucose 300 mg/dL     POC Glucose Once [[id number]]  (Abnormal) Collected: 06/02/25 0801    Specimen: Blood Updated: 06/02/25 0817     Glucose 233 mg/dL     Basic Metabolic Panel [[id number]]  (Abnormal) Collected: 06/02/25 0358    Specimen: Blood Updated: 06/02/25 0451     Glucose 343 mg/dL      BUN 15.4 mg/dL      Creatinine 1.29 mg/dL      Sodium 131 mmol/L      Potassium 4.0 mmol/L      Chloride 97 mmol/L      CO2 23.0 mmol/L      Calcium 8.1 mg/dL      BUN/Creatinine Ratio 11.9     Anion Gap 11.0 mmol/L      eGFR 60.4 mL/min/1.73     Narrative:      GFR Categories in Chronic Kidney Disease (CKD)              GFR Category          GFR (mL/min/1.73)    Interpretation  G1                    90 or greater        Normal or high (1)  G2                    60-89                Mild decrease (1)  G3a                   45-59                Mild to moderate  decrease  G3b                   30-44                Moderate to severe decrease  G4                    15-29                Severe decrease  G5                    14 or less           Kidney failure    (1)In the absence of evidence of kidney disease, neither GFR category G1 or G2 fulfill the criteria for CKD.    eGFR calculation 2021 CKD-EPI creatinine equation, which does not include race as a factor    Basic Metabolic Panel [[id number]]  (Abnormal) Collected: 06/01/25 0108    Specimen: Blood Updated: 06/01/25 0142     Glucose 176 mg/dL      BUN 16.2 mg/dL      Creatinine 1.41 mg/dL      Sodium 135 mmol/L      Potassium 3.7 mmol/L      Chloride 101 mmol/L      CO2 22.3 mmol/L      Calcium 8.4 mg/dL      BUN/Creatinine Ratio 11.5     Anion Gap 11.7 mmol/L      eGFR 54.3 mL/min/1.73     Narrative:      GFR Categories in Chronic Kidney Disease (CKD)              GFR Category          GFR (mL/min/1.73)    Interpretation  G1                    90 or greater        Normal or high (1)  G2                    60-89                Mild decrease (1)  G3a                   45-59                Mild to moderate decrease  G3b                   30-44                Moderate to severe decrease  G4                    15-29                Severe decrease  G5                    14 or less           Kidney failure    (1)In the absence of evidence of kidney disease, neither GFR category G1 or G2 fulfill the criteria for CKD.    eGFR calculation 2021 CKD-EPI creatinine equation, which does not include race as a factor    CBC (No Diff) [[id number]]  (Abnormal) Collected: 06/01/25 0108    Specimen: Blood Updated: 06/01/25 0120     WBC 7.33 10*3/mm3      RBC 4.70 10*6/mm3      Hemoglobin 14.0 g/dL      Hematocrit 42.8 %      MCV 91.1 fL      MCH 29.8 pg      MCHC 32.7 g/dL      RDW 12.2 %      RDW-SD 40.8 fl      MPV 10.2 fL      Platelets 141 10*3/mm3     Comprehensive Metabolic Panel [[id number]]  (Abnormal) Collected: 05/30/25 0058     Specimen: Blood Updated: 05/30/25 0353     Glucose 186 mg/dL      BUN 19.5 mg/dL      Creatinine 1.35 mg/dL      Sodium 134 mmol/L      Potassium 3.9 mmol/L      Chloride 100 mmol/L      CO2 22.2 mmol/L      Calcium 8.3 mg/dL      Total Protein 5.7 g/dL      Albumin 3.1 g/dL      ALT (SGPT) 15 U/L      AST (SGOT) 23 U/L      Alkaline Phosphatase 127 U/L      Total Bilirubin 0.3 mg/dL      Globulin 2.6 gm/dL      A/G Ratio 1.2 g/dL      BUN/Creatinine Ratio 14.4     Anion Gap 11.8 mmol/L      eGFR 57.2 mL/min/1.73     Narrative:      GFR Categories in Chronic Kidney Disease (CKD)              GFR Category          GFR (mL/min/1.73)    Interpretation  G1                    90 or greater        Normal or high (1)  G2                    60-89                Mild decrease (1)  G3a                   45-59                Mild to moderate decrease  G3b                   30-44                Moderate to severe decrease  G4                    15-29                Severe decrease  G5                    14 or less           Kidney failure    (1)In the absence of evidence of kidney disease, neither GFR category G1 or G2 fulfill the criteria for CKD.    eGFR calculation 2021 CKD-EPI creatinine equation, which does not include race as a factor    CBC & Differential [[id number]]  (Abnormal) Collected: 05/30/25 0058    Specimen: Blood Updated: 05/30/25 0144    Narrative:      The following orders were created for panel order CBC & Differential.  Procedure                               Abnormality         Status                     ---------                               -----------         ------                     CBC Auto Differential[[id number]]        Abnormal            Final result                 Please view results for these tests on the individual orders.    CBC Auto Differential [[id number]]  (Abnormal) Collected: 05/30/25 0058    Specimen: Blood Updated: 05/30/25 0144     WBC 6.90 10*3/mm3      RBC 4.71 10*6/mm3       Hemoglobin 13.9 g/dL      Hematocrit 43.3 %      MCV 91.9 fL      MCH 29.5 pg      MCHC 32.1 g/dL      RDW 12.3 %      RDW-SD 41.6 fl      MPV 10.3 fL      Platelets 155 10*3/mm3      Neutrophil % 51.7 %      Lymphocyte % 34.3 %      Monocyte % 8.8 %      Eosinophil % 4.3 %      Basophil % 0.3 %      Immature Grans % 0.6 %      Neutrophils, Absolute 3.56 10*3/mm3      Lymphocytes, Absolute 2.37 10*3/mm3      Monocytes, Absolute 0.61 10*3/mm3      Eosinophils, Absolute 0.30 10*3/mm3      Basophils, Absolute 0.02 10*3/mm3      Immature Grans, Absolute 0.04 10*3/mm3      nRBC 0.0 /100 WBC     Vitamin B12 [[id number]]  (Abnormal) Collected: 05/27/25 0106    Specimen: Blood Updated: 05/27/25 1238     Vitamin B-12 1,046 pg/mL     Narrative:      Results may be falsely increased if patient taking Biotin.      Folate [[id number]]  (Normal) Collected: 05/27/25 0106    Specimen: Blood Updated: 05/27/25 1238     Folate 6.92 ng/mL     Narrative:      Results may be falsely increased if patient taking Biotin.      Comprehensive Metabolic Panel [[id number]]  (Abnormal) Collected: 05/27/25 0106    Specimen: Blood Updated: 05/27/25 0149     Glucose 221 mg/dL      BUN 13 mg/dL      Creatinine 1.26 mg/dL      Sodium 134 mmol/L      Potassium 4.4 mmol/L      Comment: Specimen hemolyzed.  Result may be falsely elevated.        Chloride 100 mmol/L      CO2 22.9 mmol/L      Calcium 8.6 mg/dL      Total Protein 5.9 g/dL      Albumin 3.3 g/dL      ALT (SGPT) 20 U/L      AST (SGOT) 25 U/L      Alkaline Phosphatase 134 U/L      Total Bilirubin 0.5 mg/dL      Globulin 2.6 gm/dL      A/G Ratio 1.3 g/dL      BUN/Creatinine Ratio 10.3     Anion Gap 11.1 mmol/L      eGFR 62.1 mL/min/1.73     Narrative:      GFR Categories in Chronic Kidney Disease (CKD)              GFR Category          GFR (mL/min/1.73)    Interpretation  G1                    90 or greater        Normal or high (1)  G2                    60-89                Mild decrease  (1)  G3a                   45-59                Mild to moderate decrease  G3b                   30-44                Moderate to severe decrease  G4                    15-29                Severe decrease  G5                    14 or less           Kidney failure    (1)In the absence of evidence of kidney disease, neither GFR category G1 or G2 fulfill the criteria for CKD.    eGFR calculation 2021 CKD-EPI creatinine equation, which does not include race as a factor    CBC & Differential [[id number]] Collected: 05/27/25 0106    Specimen: Blood Updated: 05/27/25 0128    Narrative:      The following orders were created for panel order CBC & Differential.  Procedure                               Abnormality         Status                     ---------                               -----------         ------                     CBC Auto Differential[[id number]]        Normal              Final result               Scan Slide[[id number]]                                                                    Please view results for these tests on the individual orders.    CBC Auto Differential [[id number]]  (Normal) Collected: 05/27/25 0106    Specimen: Blood Updated: 05/27/25 0128     WBC 7.87 10*3/mm3      RBC 4.95 10*6/mm3      Hemoglobin 14.7 g/dL      Hematocrit 44.8 %      MCV 90.5 fL      MCH 29.7 pg      MCHC 32.8 g/dL      RDW 12.3 %      RDW-SD 40.2 fl      MPV 10.2 fL      Platelets 149 10*3/mm3      Neutrophil % 66.8 %      Lymphocyte % 21.2 %      Monocyte % 8.5 %      Eosinophil % 2.7 %      Basophil % 0.4 %      Immature Grans % 0.4 %      Neutrophils, Absolute 5.26 10*3/mm3      Lymphocytes, Absolute 1.67 10*3/mm3      Monocytes, Absolute 0.67 10*3/mm3      Eosinophils, Absolute 0.21 10*3/mm3      Basophils, Absolute 0.03 10*3/mm3      Immature Grans, Absolute 0.03 10*3/mm3      nRBC 0.0 /100 WBC     TSH [[id number]]  (Abnormal) Collected: 05/26/25 1250    Specimen: Blood Updated: 05/26/25 1700     TSH 4.970  uIU/mL     Fentanyl, Urine - Urine, Clean Catch [[id number]]  (Normal) Collected: 05/26/25 1249    Specimen: Urine, Clean Catch Updated: 05/26/25 1637     Fentanyl, Urine Negative    Narrative:      Negative Threshold:      Fentanyl 5 ng/mL     The normal value for the drug tested is negative. This report includes final unconfirmed screening results to be used for medical treatment purposes only. Unconfirmed results must not be used for non-medical purposes such as employment or legal testing. Clinical consideration should be applied to any drug of abuse test, particularly when unconfirmed results are used.           Urine Drug Screen - Urine, Clean Catch [[id number]]  (Abnormal) Collected: 05/26/25 1249    Specimen: Urine, Clean Catch Updated: 05/26/25 1634     THC, Screen, Urine Negative     Phencyclidine (PCP), Urine Negative     Cocaine Screen, Urine Negative     Methamphetamine, Ur Negative     Opiate Screen Positive     Amphetamine Screen, Urine Negative     Benzodiazepine Screen, Urine Positive     Tricyclic Antidepressants Screen Positive     Methadone Screen, Urine Negative     Barbiturates Screen, Urine Negative     Oxycodone Screen, Urine Negative     Buprenorphine, Screen, Urine Negative    Narrative:      Cutoff For Drugs Screened:    Amphetamines               500 ng/ml  Barbiturates               200 ng/ml  Benzodiazepines            150 ng/ml  Cocaine                    150 ng/ml  Methadone                  200 ng/ml  Opiates                    100 ng/ml  Phencyclidine               25 ng/ml  THC                         50 ng/ml  Methamphetamine            500 ng/ml  Tricyclic Antidepressants  300 ng/ml  Oxycodone                  100 ng/ml  Buprenorphine               10 ng/ml    The normal value for all drugs tested is negative. This report includes unconfirmed screening results, with the cutoff values listed, to be used for medical treatment purposes only.  Unconfirmed results must not be used for  non-medical purposes such as employment or legal testing.  Clinical consideration should be applied to any drug of abuse test, particularly when unconfirmed results are used.      C-reactive Protein [[id number]]  (Normal) Collected: 05/26/25 1250    Specimen: Blood Updated: 05/26/25 1333     C-Reactive Protein 0.30 mg/dL     [facility] Urine Culture Tube - Urine, Clean Catch [[id number]] Collected: 05/26/25 1249    Specimen: Urine, Clean Catch Updated: 05/26/25 1315     Extra Tube Hold for add-ons.     Comment: Auto resulted.       Urinalysis With Microscopic If Indicated (No Culture) - Urine, Clean Catch [[id number]]  (Abnormal) Collected: 05/26/25 1249    Specimen: Urine, Clean Catch Updated: 05/26/25 1315     Color, UA Yellow     Appearance, UA Clear     pH, UA 6.0     Specific Gravity, UA 1.017     Glucose,  mg/dL (2+)     Ketones, UA Negative     Bilirubin, UA Negative     Blood, UA Trace     Protein, UA >=300 mg/dL (3+)     Leuk Esterase, UA Negative     Nitrite, UA Negative     Urobilinogen, UA 0.2 E.U./dL    Urinalysis, Microscopic Only - Urine, Clean Catch [[id number]]  (Abnormal) Collected: 05/26/25 1249    Specimen: Urine, Clean Catch Updated: 05/26/25 1315     RBC, UA 0-2 /HPF      WBC, UA 11-20 /HPF      Bacteria, UA Trace /HPF      Squamous Epithelial Cells, UA 0-2 /HPF      Hyaline Casts, UA 7-12 /LPF      Methodology Automated Microscopy    [facility] Draw [[id number]] Collected: 05/26/25 1250    Specimen: Blood Updated: 05/26/25 1315    Narrative:      The following orders were created for panel order [facility] Draw.  Procedure                               Abnormality         Status                     ---------                               -----------         ------                     Green Top (Gel)[[id number]]                                  Final result               Lavender Top[[id number]]                                     Final result               Gold Top - SST[[id number]]                                    Final result               Light Blue Top[[id number]]                                   Final result                 Please view results for these tests on the individual orders.    Green Top (Gel) [[id number]] Collected: 05/26/25 1250    Specimen: Blood Updated: 05/26/25 1315     Extra Tube Hold for add-ons.     Comment: Auto resulted.       Lavender Top [[id number]] Collected: 05/26/25 1250    Specimen: Blood Updated: 05/26/25 1315     Extra Tube hold for add-on     Comment: Auto resulted       Gold Top - SST [[id number]] Collected: 05/26/25 1250    Specimen: Blood Updated: 05/26/25 1315     Extra Tube Hold for add-ons.     Comment: Auto resulted.       Light Blue Top [[id number]] Collected: 05/26/25 1250    Specimen: Blood Updated: 05/26/25 1315     Extra Tube Hold for add-ons.     Comment: Auto resulted             Orders (last 24 hrs)        Start     Ordered    06/02/25 1216  XR Foot 3+ View Left  1 Time Imaging         06/02/25 1215    06/02/25 1216  XR Ankle 3+ View Left  1 Time Imaging         06/02/25 1215    06/02/25 1145  POC Glucose Once  PROCEDURE ONCE        Comments: Complete no more than 45 minutes prior to patient eating      06/02/25 1137    06/02/25 0818  POC Glucose Once  PROCEDURE ONCE        Comments: Complete no more than 45 minutes prior to patient eating      06/02/25 0801    06/02/25 0600  Basic Metabolic Panel  Morning Draw         06/01/25 1544    06/01/25 1637  POC Glucose Once  PROCEDURE ONCE        Comments: Complete no more than 45 minutes prior to patient eating      06/01/25 1630    05/28/25 0600  Wound Care  Daily       05/27/25 1439    05/27/25 2100  atorvastatin (LIPITOR) tablet 80 mg  Nightly         05/27/25 1120    05/27/25 2100  QUEtiapine (SEROquel) tablet 100 mg  Nightly         05/27/25 1120    05/27/25 1800  insulin lispro protamine-insulin lispro (humaLOG 75-25) injection 15 Units  2 Times Daily With Meals         05/27/25 1635    05/27/25 1600  QUEtiapine  "(SEROquel) tablet 50 mg  2 Times Daily         05/27/25 1120    05/27/25 1445  castor oil-balsam peru (VENELEX) ointment 1 Application  Every 12 Hours Scheduled         05/27/25 1435    05/27/25 1300  dorzolamide (TRUSOPT) 2 % 1 drop, timolol (TIMOPTIC) 0.5 % 1 drop for Cosopt 22.3-6.8 mg/mL  2 Times Daily         05/27/25 1120    05/27/25 1215  amLODIPine (NORVASC) tablet 10 mg  Daily         05/27/25 1120    05/27/25 1215  apixaban (ELIQUIS) tablet 5 mg  Every 12 Hours Scheduled         05/27/25 1120    05/27/25 1215  citalopram (CeleXA) tablet 20 mg  Daily         05/27/25 1120    05/27/25 1215  empagliflozin (JARDIANCE) tablet 10 mg  Daily         05/27/25 1120    05/27/25 1215  famotidine (PEPCID) tablet 20 mg  2 Times Daily         05/27/25 1120    05/27/25 1215  levothyroxine (SYNTHROID, LEVOTHROID) tablet 137.5 mcg  Every Morning Before Breakfast         05/27/25 1120    05/27/25 1215  losartan (COZAAR) tablet 100 mg  Daily         05/27/25 1120    05/27/25 1215  pantoprazole (PROTONIX) EC tablet 40 mg  Every Morning Before Breakfast         05/27/25 1120    05/27/25 1120  ALPRAZolam (XANAX) tablet 0.5 mg  Nightly PRN         05/27/25 1120    05/27/25 1120  HYDROcodone-acetaminophen (NORCO) 7.5-325 MG per tablet 1 tablet  Every 12 Hours PRN         05/27/25 1120    05/26/25 2100  sodium chloride 0.9 % flush 10 mL  Every 12 Hours Scheduled         05/26/25 1617    05/26/25 2100  sennosides-docusate (PERICOLACE) 8.6-50 MG per tablet 2 tablet  2 Times Daily        Placed in \"And\" Linked Group    05/26/25 1617 05/26/25 2000  Vital Signs  Every 4 Hours      Comments: Per per hospital policy    05/26/25 1617 05/26/25 1800  Oral Care  2 Times Daily       05/26/25 1617    05/26/25 1700  POC Glucose 4x Daily Before Meals & at Bedtime  4 Times Daily Before Meals & at Bedtime      Comments: Complete no more than 45 minutes prior to patient eating      05/26/25 1617    05/26/25 1618  Intake & Output  Every Shift   " "    05/26/25 1617    05/26/25 1617  sodium chloride 0.9 % flush 10 mL  As Needed         05/26/25 1617    05/26/25 1617  sodium chloride 0.9 % infusion 40 mL  As Needed         05/26/25 1617    05/26/25 1617  nitroglycerin (NITROSTAT) SL tablet 0.4 mg  Every 5 Minutes PRN         05/26/25 1617    05/26/25 1617  polyethylene glycol (MIRALAX) packet 17 g  Daily PRN        Placed in \"And\" Linked Group    05/26/25 1617    05/26/25 1617  bisacodyl (DULCOLAX) EC tablet 5 mg  Daily PRN        Placed in \"And\" Linked Group    05/26/25 1617    05/26/25 1617  bisacodyl (DULCOLAX) suppository 10 mg  Daily PRN        Placed in \"And\" Linked Group    05/26/25 1617    05/26/25 1617  acetaminophen (TYLENOL) tablet 1,000 mg  Every 8 Hours PRN         05/26/25 1617    05/26/25 1617  dextrose (GLUTOSE) oral gel 15 g  Every 15 Minutes PRN         05/26/25 1617    05/26/25 1617  dextrose (D50W) (25 g/50 mL) IV injection 25 g  Every 15 Minutes PRN         05/26/25 1617    05/26/25 1617  glucagon HCl (Diagnostic) injection 1 mg  Every 15 Minutes PRN         05/26/25 1617    05/26/25 1205  sodium chloride 0.9 % flush 10 mL  As Needed        Placed in \"And\" Linked Group    05/26/25 1205    Unscheduled  Up With Assistance  As Needed       05/26/25 1617    Unscheduled  Follow Hypoglycemia Standing Orders For Blood Glucose <70 & Notify Provider of Treatment  As Needed      Comments: Follow Hypoglycemia Orders As Outlined in Process Instructions (Open Order Report to View Full Instructions)  Notify Provider Any Time Hypoglycemia Treatment is Administered    05/26/25 1617    Unscheduled  Wound Care  As Needed       05/27/25 1437    Unscheduled  Skin Tear Care - Minimal Drainage PRN  As Needed      Comments: - Realign Skin Flap (if Viable) Gently Ease Flap Into Place Using a Dampened Cotton-Tipped Applicator or Gloved Finger  - Gently Cleanse Area & Pat Dry  - Apply Hydrogel & Non-Adherent Layer (Silicone Sheet, Oil Emulsion Dressing or Vaseline " Gauze)  - Secure With Silicone Border Dressing (Unless Skin Fragile)  - If Skin is Fragile Secure With Roll Gauze - Do NOT Put Tape Directly on Skin  - Change Every 3 Days & As Needed    25 1439    Unscheduled  Bed / Chair Alarm On  As Needed      Comments: Bed Alarm On When in Bed,   Use Chair Alarm When in Chair    25 [id number]    --  ALPRAZolam (XANAX) 0.5 MG tablet  Nightly PRN         25    --  QUEtiapine (SEROquel) 50 MG tablet  2 Times Daily         25    --  Insulin NPH Isophane & Regular (HumuLIN 70/30 KwikPen) (70-30) 100 UNIT/ML suspension pen-injector  Every Morning         25 0957    --  Insulin NPH Isophane & Regular (HumuLIN 70/30 KwikPen) (70-30) 100 UNIT/ML suspension pen-injector  Every Evening         25 0957                     Physician Progress Notes (most recent note)        Magali Baig DO at 25 1532           [facility]     Progress Note    Patient Name: Ramsey Riley  : 1955  MRN: [id number]  Primary Care Physician:  Margret Purvis MD  Date of admission: 2025    Subjective   Subjective     Chief Complaint: 70-year-old male being seen in follow-up for frequent falls    History of Present Illness  Patient sleeping soundly - no further episodes overnight. No further falls.    Case discussed with patient's primary RN Marilynn    Review of Systems  Patient will wake up upon stimulation; denies any pain. Otherwise, does not provide further HPI.    Objective   Objective     Vitals:   Temp:  [97.7 °F (36.5 °C)-98.3 °F (36.8 °C)] 98.1 °F (36.7 °C)  Heart Rate:  [50-91] 91  Resp:  [18-20] 20  BP: (118-141)/(61-74) 132/70    Physical Exam  Constitutional:       General: He is not in acute distress.     Appearance: He is well-developed.   HENT:      Head: Normocephalic and atraumatic.   Eyes:      Conjunctiva/sclera: Conjunctivae normal.   Neck:      Trachea: No tracheal deviation.   Cardiovascular:      Rate and Rhythm: Normal  rate and regular rhythm.      Heart sounds: No murmur heard.     No friction rub. No gallop.   Pulmonary:      Effort: No respiratory distress.      Breath sounds: Examination of the right-lower field reveals decreased breath sounds. Examination of the left-lower field reveals decreased breath sounds. Decreased breath sounds present. No wheezing or rales.   Abdominal:      General: Bowel sounds are normal. There is no distension.      Palpations: Abdomen is soft.      Tenderness: There is no abdominal tenderness. There is no guarding.   Musculoskeletal:         General: No tenderness.   Skin:     General: Skin is warm and dry.      Findings: No erythema or rash.   Neurological:      Mental Status: He is lethargic.      Cranial Nerves: No cranial nerve deficit.       *No significant change compared to 5/31*    Result Review    Result Review:  I have personally reviewed the results from the time of this admission to 6/1/2025 15:32 EDT and agree with these findings:  [x]  Laboratory list / accordion  []  Microbiology  []  Radiology  []  EKG/Telemetry   []  Cardiology/Vascular   []  Pathology  []  Old records  []  Other:  Most notable findings include:     Results from last 7 days   Lab Units 06/01/25  0108 05/30/25  0058 05/27/25  0106   WBC 10*3/mm3 7.33 6.90 7.87   HEMOGLOBIN g/dL 14.0 13.9 14.7   HEMATOCRIT % 42.8 43.3 44.8   PLATELETS 10*3/mm3 141 155 149     Results from last 7 days   Lab Units 06/01/25  0108 05/30/25  0058 05/28/25  0114 05/27/25  0106 05/26/25  1250   SODIUM mmol/L 135* 134* 134* 134* 134*   POTASSIUM mmol/L 3.7 3.9 4.4 4.4 4.6   CHLORIDE mmol/L 101 100 101 100 98   CO2 mmol/L 22.3 22.2 22.9 22.9 25.5   BUN mg/dL 16.2 19.5 16.9 13 12   CREATININE mg/dL 1.41* 1.35* 1.23 1.26 1.28*   CALCIUM mg/dL 8.4* 8.3* 8.7 8.6 9.2   BILIRUBIN mg/dL  --  0.3  --  0.5 0.6   ALK PHOS U/L  --  127*  --  134* 150*   ALT (SGPT) U/L  --  15  --  20 25   AST (SGOT) U/L  --  23  --  25 31   GLUCOSE mg/dL 176* 186*  229* 221* 282*     Lab Results   Component Value Date    WVMRZJCG23 1,046 (H) 05/27/2025     Lab Results   Component Value Date    FOLATE 6.92 05/27/2025     MRI brain without contrast:    FINDINGS:  Study is degraded by patient motion.  The diffusion weighted images show no focal signal abnormality. There is  no evidence of acute ischemia     The remaining pulse sequences show no mass, hemorrhage, or midline  shift.  The ventricles, cisterns, and sulci are unremarkable. There is no  hydrocephalus.     The sagittal view show no sellar or parasellar mass.     There is no tectal or pineal lesion.     Coronal imaging demonstrates a symmetric appearance of the visualized  portions of the optic nerves and extraocular muscles.     IMPRESSION:     1. Study markedly degraded by patient motion  2. No convincing evidence of acute ischemia, focal mass, or midline  Shift    XR bilateral ribs with 4 view and PA:      FINDINGS:     Slight prominence to the heart size  Sternotomy.  Fracture involving the right posterior fourth rib.  Fracture noted at the right humeral head.  No acute dislocation.  Intact left ribs  No lytic or blastic lesion.  No acute foreign body.     IMPRESSION:     Aberrant contour to the right posterior fourth rib consistent with  underlying fractures of indeterminate age.  No left rib fracture identified.  Fracture noted involving the right humeral head with no dislocation.  No acute foreign body.  Clinical correlation recommended.    Assessment & Plan   Assessment / Plan     #Assisted fall during hospitalization on early AM 5/31  #Age indeterminate right posterior 4th rib fracture  #Frequent falls  #Generalized weakness/physical debility  #Recent right proximal humerus fracture due to fall, prior to admission  #Vascular dementia  - Patient moved to [facility] to be closer to nursing station  - PT/OT evaluating; continue to participate as tolerated  - Appears patient ambulated 50 ft to bathroom today with  staff  - Requested inpatient physical rehabilitation; feel patient would be an excellent candidate given degree of debility and ongoing falls as noted above  - Patient with significant falls at home causing trauma; in setting of anticoagulation (eliquis)  - Vitamin B12/Folate levels replete  - MRI brain without contrast ordered and unremarkable given in account the motion degradation     #Uncontrolled essential hypertension, controlled at present   #Diabetes mellitus type 2, insulin-dependent and complicated by hyperglycemia, improving  #Obesity per BMI 40.28 kg/m²  - I have reviewed the patient's home medication list and have resumed his home amlodipine 10 mg daily, and Cozaar 100 mg daily; BP much improved today; continue current regimen  - Unclear as to why patient is not on a beta-blocker given history of coronary artery disease; for now, we will plan for as needed hydralazine for ongoing uncontrolled hypertension  - Patient on 70/30 at home; will plan to continue 75/25 here at increased dose of 20 units BID for now and titrate based on blood glucose trend; will continue current regimen for now  - Continue to monitor BP and glucose level trend while hospitalized    #Tinea cruris  - Nystatin powder BID    Chronic medical conditions:  - Pulmonary nodules, noted since 2023; discussed with wife in detail at bedside this week  -Gastroesophageal reflux disease  - Previous stroke  - Previous blood clot  - Coronary artery disease status post three-vessel CABG  - Chronic low back pain  - Hypothyroidism    VTE Prophylaxis:  Continue home eliquis for now - monitor closely in setting of falls    CODE STATUS:    Code Status (Patient has no pulse and is not breathing): CPR (Attempt to Resuscitate)  Medical Interventions (Patient has pulse or is breathing): Full Support    Disposition:  I expect patient to be discharged unclear pending progress with PT/OT; I think he would be an excellent candidate for inpatient physical  rehabilitation. Awaiting insurance approval or denial of IPR    Magali Baig DO               Electronically signed by Magali Baig DO at 25 5577       Consult Notes (most recent note)    No notes of this type exist for this encounter.          Physical Therapy Notes (most recent note)        Baljinder Martinez, PT at 25 1046  Version 1 of 1         Acute Care - Physical Therapy Treatment Note  TA [facility]     Patient Name: Ramsey Riley  : 1955  MRN: [id number]  Today's Date: 2025      Visit Dx:     ICD-10-CM ICD-9-CM   1. Frequent falls  R29.6 V15.88   2. Skin avulsion  T14.8XXA 879.8   3. Bone bruise  T14.8XXA 924.9     Patient Active Problem List   Diagnosis    Coronary artery disease involving native coronary artery of native heart without angina pectoris    Essential hypertension    Bradycardia, sinus    Fatigue    Abnormal ECG    Precordial pain    LVH (left ventricular hypertrophy) due to hypertensive disease, without heart failure    Diabetes mellitus    PAD (peripheral artery disease)    Ischemic stroke    History of blood clots    Pulmonary embolus    Dyslipidemia    Class 1 obesity due to excess calories with serious comorbidity and body mass index (BMI) of 34.0 to 34.9 in adult    Weakness    Frequent falls     Past Medical History:   Diagnosis Date    Arthritis     Clot     Coronary artery disease     Dementia 2024    Diabetes mellitus     Heart disease     History of blood clots     Hypertension     Impaired mobility     Low back pain     Thyroid disease     Type 2 diabetes mellitus      Past Surgical History:   Procedure Laterality Date    CHOLECYSTECTOMY      COLONOSCOPY      dr srivastava    CORONARY ARTERY BYPASS GRAFT      [place] -  BY PASS     ENDOSCOPY       PT Assessment (Last 12 Hours)       PT Evaluation and Treatment       Row Name 25 1043          Physical Therapy Time and Intention    Subjective Information complains of;weakness   -KM     Document Type therapy note (daily note)  -KM     Mode of Treatment individual therapy;physical therapy  -KM     Patient Effort good  -KM     Symptoms Noted During/After Treatment fatigue  -KM       Row Name 06/02/25 1043          General Information    Patient Profile Reviewed yes  -KM     Patient Observations alert;cooperative;agree to therapy  -KM     Existing Precautions/Restrictions fall  -KM       Row Name 06/02/25 1043          Pain    Pretreatment Pain Rating 0/10 - no pain  -KM     Posttreatment Pain Rating 0/10 - no pain  -KM       Row Name 06/02/25 1043          Cognition    Affect/Mental Status (Cognition) WFL  -KM     Orientation Status (Cognition) oriented to;person;place;situation;verbal cues/prompts needed for orientation  -KM     Follows Commands (Cognition) follows one-step commands  -KM       Row Name 06/02/25 1043          Bed Mobility    Bed Mobility bed mobility (all) activities  -KM     All Activities, [place] (Bed Mobility) moderate assist (50% patient effort)  -KM     Assistive Device (Bed Mobility) bed rails;head of bed elevated  -KM       Row Name 06/02/25 1043          Transfers    Transfers sit-stand transfer;stand-sit transfer;bed-chair transfer  -KM       Row Name 06/02/25 1043          Bed-Chair Transfer    Bed-Chair [place] (Transfers) minimum assist (75% patient effort)  -KM     Assistive Device (Bed-Chair Transfers) walker, front-wheeled  -KM       Row Name 06/02/25 1043          Sit-Stand Transfer    Sit-Stand [place] (Transfers) minimum assist (75% patient effort)  -KM     Assistive Device (Sit-Stand Transfers) walker, front-wheeled  -KM       Row Name 06/02/25 1043          Stand-Sit Transfer    Stand-Sit [place] (Transfers) minimum assist (75% patient effort)  -KM     Assistive Device (Stand-Sit Transfers) walker, front-wheeled  -KM       Row Name 06/02/25 1043          Gait/Stairs (Locomotion)    Gait/Stairs Locomotion gait/ambulation  independence;gait/ambulation assistive device;distance ambulated  -KM     [place] Level (Gait) contact guard;minimum assist (75% patient effort)  -KM     Assistive Device (Gait) walker, front-wheeled  -KM     Patient was able to Ambulate yes  -KM     Distance in Feet (Gait) 60  x2  -KM     Pattern (Gait) step-to  -KM     Deviations/Abnormal Patterns (Gait) ataxic;base of support, narrow;gait speed decreased  -KM     Bilateral Gait Deviations forward flexed posture  -KM     Left Sided Gait Deviations weight shift ability decreased  -KM       Row Name 06/02/25 1043          Safety Issues/Impairments Affecting Functional Mobility    Impairments Affecting Function (Mobility) balance;cognition;coordination;endurance/activity tolerance;pain;strength  -KM       Row Name             Wound 05/26/25 1618 Left posterior heel Pressure Injury    Wound - Properties Group Placement Date: 05/26/25  -CW Placement Time: 1618  -CW Present on Original Admission: Y  -CW Side: Left  -CW Orientation: posterior  -CW Location: heel  -CW Primary Wound Type: Pressure Inj  -CW    Retired Wound - Properties Group Placement Date: 05/26/25  -CW Placement Time: 1618  -CW Present on Original Admission: Y  -CW Side: Left  -CW Orientation: posterior  -CW Location: heel  -CW    Retired Wound - Properties Group Placement Date: 05/26/25  -CW Placement Time: 1618  -CW Present on Original Admission: Y  -CW Side: Left  -CW Orientation: posterior  -CW Location: heel  -CW    Retired Wound - Properties Group Date first assessed: 05/26/25  -CW Time first assessed: 1618  -CW Present on Original Admission: Y  -CW Side: Left  -CW Location: heel  -CW      Row Name             Wound 05/26/25 1622 Left anterior heel Pressure Injury    Wound - Properties Group Placement Date: 05/26/25  -CW Placement Time: 1622  -CW Side: Left  -CW Orientation: anterior  -CW Location: heel  -CW Primary Wound Type: Pressure Inj  -CW    Retired Wound - Properties Group Placement  Date: 05/26/25  -CW Placement Time: 1622  -CW Side: Left  -CW Orientation: anterior  -CW Location: heel  -CW    Retired Wound - Properties Group Placement Date: 05/26/25  -CW Placement Time: 1622  -CW Side: Left  -CW Orientation: anterior  -CW Location: heel  -CW    Retired Wound - Properties Group Date first assessed: 05/26/25  -CW Time first assessed: 1622  -CW Side: Left  -CW Location: heel  -CW      Row Name             Wound 05/27/25 1007 medial parietal region Traumatic Abrasion    Wound - Properties Group Placement Date: 05/27/25  -LB Placement Time: 1007  -LB Present on Original Admission: Y  -LB Orientation: medial  -LB Location: parietal region  -LB Primary Wound Type: Traumatic  -LB Secondary Wound Type - Traumatic: Abrasion  -LB    Retired Wound - Properties Group Placement Date: 05/27/25  -LB Placement Time: 1007  -LB Present on Original Admission: Y  -LB Orientation: medial  -LB Location: parietal region  -LB    Retired Wound - Properties Group Placement Date: 05/27/25  -LB Placement Time: 1007  -LB Present on Original Admission: Y  -LB Orientation: medial  -LB Location: parietal region  -LB    Retired Wound - Properties Group Date first assessed: 05/27/25  -LB Time first assessed: 1007  -LB Present on Original Admission: Y  -LB Location: parietal region  -LB      Row Name             Wound 05/27/25 1008 Right distal arm Traumatic Skin Tear    Wound - Properties Group Placement Date: 05/27/25  -LB Placement Time: 1008  -LB Present on Original Admission: Y  -LB Side: Right  -LB Orientation: distal  -LB Location: arm  -LB Primary Wound Type: Traumatic  -LB Secondary Wound Type - Traumatic: Skin Tear  -LB    Retired Wound - Properties Group Placement Date: 05/27/25  -LB Placement Time: 1008  -LB Present on Original Admission: Y  -LB Side: Right  -LB Orientation: distal  -LB Location: arm  -LB    Retired Wound - Properties Group Placement Date: 05/27/25  -LB Placement Time: 1008  -LB Present on Original  Admission: Y  -LB Side: Right  -LB Orientation: distal  -LB Location: arm  -LB    Retired Wound - Properties Group Date first assessed: 05/27/25  -LB Time first assessed: 1008  -LB Present on Original Admission: Y  -LB Side: Right  -LB Location: arm  -LB      Row Name 06/02/25 1043          Progress Summary (PT)    Daily Progress Summary (PT) Pt. was able to demonstrate functional mobility skills w/ decreased assistance required. He was able to ambulate moderate distance w/ RW. He has noted balance issues while ambulating. Pt. would continue to benefit from PT services.  -               User Key  (r) = Recorded By, (t) = Taken By, (c) = Cosigned By      Initials Name Provider Type    CW Chata Pulido, RN Registered Nurse    Baljinder Reese, CARLOS Physical Therapist    Marysol Rodriguez, RN Registered Nurse                    Physical Therapy Education       Title: PT OT SLP Therapies (Done)       Topic: Physical Therapy (Done)       Point: Mobility training (Done)       Learning Progress Summary            Patient Acceptance, E,TB, VU by  at 5/30/2025 2339    Acceptance, E,TB, NR by SHEELA at 5/28/2025 0920    Acceptance, E,TB, VU by KEVIN at 5/27/2025 1532                      Point: Home exercise program (Done)       Learning Progress Summary            Patient Acceptance, E,TB, VU by  at 5/30/2025 2339    Acceptance, E,TB, NR by SHEELA at 5/28/2025 0920    Acceptance, E,TB, VU by KEVIN at 5/27/2025 1532                      Point: Body mechanics (Done)       Learning Progress Summary            Patient Acceptance, E,TB, VU by  at 5/30/2025 2339    Acceptance, E,TB, NR by  at 5/28/2025 0920    Acceptance, E,TB, VU by KM at 5/27/2025 1532                      Point: Precautions (Done)       Learning Progress Summary            Patient Acceptance, E,TB, VU by  at 5/30/2025 2339    Acceptance, E,TB, NR by  at 5/28/2025 0920    Acceptance, E,TB, VU by KM at 5/27/2025 1532                                      User Key        Initials Effective Dates Name Provider Type Discipline     06/16/21 -  Vy Tejeda, RN Registered Nurse Nurse     05/24/22 -  Baljinder Martinez, CARLOS Physical Therapist PT     02/07/25 -  Dagmar Maciel RN Registered Nurse Nurse                  PT Recommendation and Plan  Anticipated Discharge Disposition (PT): inpatient rehabilitation facility  Planned Therapy Interventions (PT): balance training, bed mobility training, gait training, home exercise program, patient/family education, postural re-education, ROM (range of motion), strengthening, stretching, transfer training  Therapy Frequency (PT): 2 times/wk (2-5x/wk)  Progress Summary (PT)  Daily Progress Summary (PT): Pt. was able to demonstrate functional mobility skills w/ decreased assistance required. He was able to ambulate moderate distance w/ RW. He has noted balance issues while ambulating. Pt. would continue to benefit from PT services.  Plan of Care Reviewed With: patient  Outcome Evaluation: Pt. evaluation completed during PT session. He was able to perform functional mobility skills w/ modA. He was able to ambulate in room w/ RW and Teddy. Pt. would benefit from further PT services at this time.       Time Calculation:    PT Charges       Row Name 06/02/25 1042             Time Calculation    PT Received On 06/02/25  -KM         Time Calculation- PT    Total Timed Code Minutes- PT 23 minute(s)  -KM                User Key  (r) = Recorded By, (t) = Taken By, (c) = Cosigned By      Initials Name Provider Type    Baljinder Reese, PT Physical Therapist                  Therapy Charges for Today       Code Description Service Date Service Provider Modifiers Qty    93025543259 HC PT THERAPEUTIC ACT EA 15 MIN 6/2/2025 Baljinder Martinez, PT GP 1    [id number] HC GAIT TRAINING EA 15 MIN 6/2/2025 Baljinder Martinez, PT GP 1            PT G-Codes  AM-PAC 6 Clicks Score (PT): 17    Baljinder Martinez PT  6/2/2025      Electronically signed by Baljinder Martinez, PT at  25 1047          Occupational Therapy Notes (most recent note)        Vanesa Tse, OT at 25 1449          Patient Name: Ramsey Riley  : 1955    MRN: [id number]                              Today's Date: 2025       Admit Date: 2025    Visit Dx:     ICD-10-CM ICD-9-CM   1. Frequent falls  R29.6 V15.88   2. Skin avulsion  T14.8XXA 879.8   3. Bone bruise  T14.8XXA 924.9     Patient Active Problem List   Diagnosis    Coronary artery disease involving native coronary artery of native heart without angina pectoris    Essential hypertension    Bradycardia, sinus    Fatigue    Abnormal ECG    Precordial pain    LVH (left ventricular hypertrophy) due to hypertensive disease, without heart failure    Diabetes mellitus    PAD (peripheral artery disease)    Ischemic stroke    History of blood clots    Pulmonary embolus    Dyslipidemia    Class 1 obesity due to excess calories with serious comorbidity and body mass index (BMI) of 34.0 to 34.9 in adult    Weakness    Frequent falls     Past Medical History:   Diagnosis Date    Arthritis     Clot     Coronary artery disease     Dementia 2024    Diabetes mellitus     Heart disease     History of blood clots     Hypertension     Impaired mobility     Low back pain     Thyroid disease     Type 2 diabetes mellitus      Past Surgical History:   Procedure Laterality Date    CHOLECYSTECTOMY      COLONOSCOPY      dr srivastava    CORONARY ARTERY BYPASS GRAFT      [place] - 3 BY PASS     ENDOSCOPY        General Information       Row Name 25 1432          OT Time and Intention    Subjective Information complains of;weakness  -KP     Document Type evaluation  -     Mode of Treatment individual therapy;occupational therapy  -KP     Patient Effort good  -KP     Symptoms Noted During/After Treatment none  -KP     Comment Patient agreeable to OT evaluation.  -KP       Row Name 25 1432          General Information    Patient Profile  Reviewed yes  -     Prior Level of Function --  assist from wife for all ADLs, supervision with functional mobility using rolling walker; frequent falls, no DME  -     Existing Precautions/Restrictions fall  -     Barriers to Rehab previous functional deficit;cognitive status;medically complex  -       Row Name 05/27/25 1432          Occupational Profile    Reason for Services/Referral (Occupational Profile) Patient [facility]. He was referred for OT evaluation due to change in functional performance with ADLs, functional mobility, and/or transfers.  -       Row Name 05/27/25 1432          Living Environment    Current Living Arrangements home  -     People in Home spouse  -       Row Name 05/27/25 1432          Cognition    Orientation Status (Cognition) oriented to;person;place;situation;verbal cues/prompts needed for orientation  -       Row Name 05/27/25 1432          Safety Issues/Impairments Affecting Functional Mobility    Safety Issues Affecting Function (Mobility) insight into deficits/self-awareness;awareness of need for assistance  -     Impairments Affecting Function (Mobility) balance;cognition;coordination;endurance/activity tolerance;pain;strength  -     Cognitive Impairments, Mobility Safety/Performance awareness, need for assistance;insight into deficits/self-awareness  -               User Key  (r) = Recorded By, (t) = Taken By, (c) = Cosigned By      Initials Name Provider Type     Vanesa Tse, OT Occupational Therapist                     Mobility/ADL's       Row Name 05/27/25 1440          Bed Mobility    Bed Mobility bed mobility (all) activities  -     All Activities, [place] (Bed Mobility) moderate assist (50% patient effort)  -     Assistive Device (Bed Mobility) bed rails;head of bed elevated  -       Row Name 05/27/25 1440          Transfers    Transfers sit-stand transfer;stand-sit transfer  -       Row Name  05/27/25 1440          Sit-Stand Transfer    Sit-Stand [place] (Transfers) moderate assist (50% patient effort)  -     Assistive Device (Sit-Stand Transfers) walker, front-wheeled  -[facility] Name 05/27/25 1440          Stand-Sit Transfer    Stand-Sit [place] (Transfers) moderate assist (50% patient effort)  -     Assistive Device (Stand-Sit Transfers) walker, front-wheeled  -KP       Row Name 05/27/25 1440          Activities of Daily Living    BADL Assessment/Intervention bathing;upper body dressing;lower body dressing;grooming;feeding;toileting  -KP       Row Name 05/27/25 1440          Bathing Assessment/Intervention    [place] Level (Bathing) bathing skills;maximum assist (25% patient effort)  -KP       Row Name 05/27/25 1440          Upper Body Dressing Assessment/Training    [place] Level (Upper Body Dressing) upper body dressing skills;moderate assist (50% patient effort)  -KP       Row Name 05/27/25 1440          Lower Body Dressing Assessment/Training    [place] Level (Lower Body Dressing) lower body dressing skills;maximum assist (25% patient effort)  -KP       Row Name 05/27/25 1440          Grooming Assessment/Training    [place] Level (Grooming) grooming skills;moderate assist (50% patient effort)  -KP       Row Name 05/27/25 1443          Self-Feeding Assessment/Training    [place] Level (Feeding) feeding skills;maximum assist (25% patient effort)  -KP       Row Name 05/27/25 1440          Toileting Assessment/Training    [place] Level (Toileting) toileting skills;maximum assist (25% patient effort)  -               User Key  (r) = Recorded By, (t) = Taken By, (c) = Cosigned By      Initials Name Provider Type     Vanesa Tse OT Occupational Therapist                   Obj/Interventions       [facility] Name 05/27/25 1443          Sensory Assessment (Somatosensory)    Sensory Assessment (Somatosensory) UE sensation intact  -KP       Row Name 05/27/25  1443          Vision Assessment/Intervention    Visual Impairment/Limitations blurry vision  -     Vision Assessment Comment impaired due to diabetic retinopathy  -[facility] Name 05/27/25 1443          Range of Motion Comprehensive    Comment, General Range of Motion LUE WFLs, RUE 75% with AAROM tolerated well  (recent right humeral fracture cleared by ortho per patient's wife)  -[facility] Name 05/27/25 1443          Strength Comprehensive (MMT)    Comment, General Manual Muscle Testing (MMT) Assessment functional, 3+/5 RUE  -KP       Row Name 05/27/25 1443          Motor Skills    Motor Skills coordination;functional endurance  -     Coordination fine motor deficit;gross motor deficit;bilateral;upper extremity;minimal impairment;moderate impairment  -     Functional Endurance fair minus  -KP       Row Name 05/27/25 1443          Balance    Balance Assessment sitting static balance;standing static balance  -     Static Sitting Balance standby assist  -     Static Standing Balance minimal assist  -     Position/Device Used, Standing Balance walker, rolling  -               User Key  (r) = Recorded By, (t) = Taken By, (c) = Cosigned By      Initials Name Provider Type     Vanesa Tse, PATRICIA Occupational Therapist                   Goals/Plan       Row Name 05/27/25 1446          Transfer Goal 1 (OT)    Activity/Assistive Device (Transfer Goal 1, OT) toilet  -     [place] Level/Cues Needed (Transfer Goal 1, OT) contact guard required  -     Time Frame (Transfer Goal 1, OT) by discharge  -[facility] Name 05/27/25 1443          Problem Specific Goal 1 (OT)    Problem Specific Goal 1 (OT) Patient will perform sustained activity X15 minutes to promote functional endurance/activity tolerance needed for daily occupations.  -     Time Frame (Problem Specific Goal 1, OT) by discharge  -[facility] Name 05/27/25 1440          Therapy Assessment/Plan (OT)    Planned Therapy Interventions (OT)  activity tolerance training;BADL retraining;functional balance retraining;patient/caregiver education/training;occupation/activity based interventions;transfer/mobility retraining;ROM/therapeutic exercise;strengthening exercise;passive ROM/stretching  -               User Key  (r) = Recorded By, (t) = Taken By, (c) = Cosigned By      Initials Name Provider Type     Vanesa Tse, OT Occupational Therapist                   Clinical Impression       [facility] Name 05/27/25 1446          Pain Assessment    Pretreatment Pain Rating 1/10  -     Posttreatment Pain Rating 1/10  -     Pain Location foot  -     Pain Side/Orientation left  -       Row Name 05/27/25 1446          Plan of Care Review    Plan of Care Reviewed With patient  -     Progress no change  -     Outcome Evaluation Patient seen for OT evaluation. He presents with functional limitations including generalized weakness, impaired activity tolerance/functional endurance, impaired balance, and decreased safety due to cognition. Patient would benefit from ongoing OT services to promote highest level of independence and safety prior to discharge.  -       Row Name 05/27/25 1446          Therapy Assessment/Plan (OT)    Patient/Family Therapy Goal Statement (OT) return home after rehab  -     Rehab Potential (OT) good  -     Criteria for Skilled Therapeutic Interventions Met (OT) yes;meets criteria;skilled treatment is necessary  [facility]     Therapy Frequency (OT) 5 times/wk  [facility]     Predicted Duration of Therapy Intervention (OT) discharge  -       Row Name 05/27/25 1446          Therapy Plan Review/Discharge Plan (OT)    Anticipated Discharge Disposition (OT) inpatient rehabilitation facility  -       Row Name 05/27/25 1446          Positioning and Restraints    Pre-Treatment Position in bed  -     Post Treatment Position bed  -     In Bed fowlers;call light within reach;encouraged to call for assist;with family/caregiver  -                User Key  (r) = Recorded By, (t) = Taken By, (c) = Cosigned By      Initials Name Provider Type    Vanesa Aguillon OT Occupational Therapist                   Outcome Measures       Row Name 05/27/25 1120          How much help from another person do you currently need...    Turning from your back to your side while in flat bed without using bedrails? 4  -LB     Moving from lying on back to sitting on the side of a flat bed without bedrails? 4  -LB     Moving to and from a bed to a chair (including a wheelchair)? 3  -LB     Standing up from a chair using your arms (e.g., wheelchair, bedside chair)? 3  -LB     Climbing 3-5 steps with a railing? 2  -LB     To walk in hospital room? 3  -LB     AM-PAC 6 Clicks Score (PT) 19  -LB               User Key  (r) = Recorded By, (t) = Taken By, (c) = Cosigned By      Initials Name Provider Type    Marysol Rodriguez, RN Registered Nurse                      OT Recommendation and Plan  Planned Therapy Interventions (OT): activity tolerance training, BADL retraining, functional balance retraining, patient/caregiver education/training, occupation/activity based interventions, transfer/mobility retraining, ROM/therapeutic exercise, strengthening exercise, passive ROM/stretching  Therapy Frequency (OT): 5 times/wk  Plan of Care Review  Plan of Care Reviewed With: patient  Progress: no change  Outcome Evaluation: Patient seen for OT evaluation. He presents with functional limitations including generalized weakness, impaired activity tolerance/functional endurance, impaired balance, and decreased safety due to cognition. Patient would benefit from ongoing OT services to promote highest level of independence and safety prior to discharge.     Time Calculation:         Time Calculation- OT       Row Name 05/27/25 1449             Time Calculation- OT    OT Received On 05/27/25  -                User Key  (r) = Recorded By, (t) = Taken By, (c) = Cosigned By      Initials Name Provider  Type    KP Vanesa Tse OT Occupational Therapist                  Therapy Charges for Today       Code Description Service Date Service Provider Modifiers Qty    [id number] HC OT EVAL MOD COMPLEXITY 4 5/27/2025 Vanesa Tse OT GO 1                 Vanesa Tse OT  5/27/2025    Electronically signed by Vanesa Tse OT at 05/27/25 1449       Speech Language Pathology Notes (most recent note)    No notes exist for this encounter.

## 2025-06-02 NOTE — CASE MANAGEMENT/SOCIAL WORK
Discharge Planning Assessment   Ilya     Patient Name: Ramsey Riley  MRN: [id number]  Today's Date: 6/2/2025    Admit Date: 5/26/2025    Plan: SS notified that pt's insurance has offered a peer to peer.  SS notified Physician Advisor who stated after review that pt's status does not meet criteria for inpt rehab and recommended possible short term nursing home placement for rehab if spouse and pt wanted to attempt for additional rehab.  SS spoke with pt's spouse who is agreeable for SS to submit pt's information to [place] or [facility] for review.  [facility] available.  SS spoke with Janell at [facility] who stated bed availability. SS sent pt's referral to [facility] for review.  SS will follow.       Discharge Plan       Row Name 06/02/25 1038       Plan    Plan SS notified that pt's insurance has offered a peer to peer.  SS notified Physician Advisor who stated after review that pt's status does not meet criteria for inpt rehab and recommended possible short term nursing home placement for rehab if spouse and pt wanted to attempt for additional rehab.  SS spoke with pt's spouse who is agreeable for SS to submit pt's information to [place] or [facility] for review.  [facility] not have a bed available.  SS spoke with Janell at [facility] who stated bed availability. [facility]  SS will follow.      Row Name 06/02/25 0943                  Continued Care and Services - Admitted Since 5/26/2025       Destination       Service Provider Request Status Services Address Phone Fax Patient Preferred    [facility] Pending - Request Sent -- [address] [place] [address] [phone] --                  Expected Discharge Date and Time       Expected Discharge Date Expected Discharge Time    Jun 2, 2025             MARIUSZ Rowan

## 2025-06-02 NOTE — CASE MANAGEMENT/SOCIAL WORK
Discharge Planning Assessment  [facility]     Patient Name: Ramsey Riley  MRN: [id number]  Today's Date: 6/2/2025    Admit Date: 5/26/2025    Plan: SS spoke with Janell at [facility] who stated that pt has been denied due to being a fall risk.  SS spoke with spouse who is agreeable for SS to submit pt's information to [facility] for review.  SS placed referral in [facility] and notified Milla.  SS will follow.     Discharge Plan       Row Name 06/02/25 1410       Plan    Plan SS spoke with Janell at [facility] who stated that pt has been denied due to being a fall risk.  SS spoke with spouse who is agreeable for SS to submit pt's information to [facility] for review.  SS placed referral in [facility] and notified Milla.  SS will follow.                 GERARD RowanW

## 2025-06-02 NOTE — PLAN OF CARE
Goal Outcome Evaluation:  Problem: Adult Inpatient Plan of Care  Goal: Plan of Care Review  Outcome: Progressing  Flowsheets (Taken 6/2/2025 1323)  Progress: improving  Outcome Evaluation: Patient sitting up in chair. NAD noted. IV patent. Patient mentation much more improved this shift. Pleasantly confused this shift. Spouse at bedside. Patient compliant with medications this shift. Denied IPR, pending referral for SNF at [facility]. POC ongoing.  Plan of Care Reviewed With:   patient   spouse                         Problem: Adult Inpatient Plan of Care  Goal: Absence of Hospital-Acquired Illness or Injury  Intervention: Identify and Manage Fall Risk  Recent Flowsheet Documentation  Taken 6/2/2025 1300 by Marilynn Martinez, RN  Safety Promotion/Fall Prevention: safety round/check completed  Taken 6/2/2025 1100 by Marilynn Martinez, RN  Safety Promotion/Fall Prevention: safety round/check completed  Taken 6/2/2025 0900 by Marilynn Martinez, RN  Safety Promotion/Fall Prevention: safety round/check completed  Taken 6/2/2025 0740 by Marilynn Martinez, RN  Safety Promotion/Fall Prevention: safety round/check completed  Intervention: Prevent Skin Injury  Recent Flowsheet Documentation  Taken 6/2/2025 1300 by Marilynn Martinez, RN  Body Position: (up in chair)   weight shifting   other (see comments)  Taken 6/2/2025 1100 by Marilynn Martinez, RN  Body Position: (up in chair)   weight shifting   other (see comments)  Taken 6/2/2025 0900 by Marilynn Martinez, RN  Body Position: sitting up in bed  Taken 6/2/2025 0740 by Marilynn Martinez, RN  Body Position: head facing, right  Intervention: Prevent and Manage VTE (Venous Thromboembolism) Risk  Recent Flowsheet Documentation  Taken 6/2/2025 0740 by Marilynn Martinez, RN  VTE Prevention/Management: (Eliquis PO) other (see comments)  Intervention: Prevent Infection  Recent Flowsheet Documentation  Taken 6/2/2025 1300 by Juan  Marilynn ARMSTRONG RN  Infection Prevention:   rest/sleep promoted   hand hygiene promoted  Taken 6/2/2025 1100 by Marilynn Martinez RN  Infection Prevention:   rest/sleep promoted   hand hygiene promoted  Taken 6/2/2025 0900 by Marilynn Martinez RN  Infection Prevention:   rest/sleep promoted   hand hygiene promoted  Taken 6/2/2025 0740 by Marilynn Martinez RN  Infection Prevention: hand hygiene promoted  Goal: Optimal Comfort and Wellbeing  Intervention: Provide Person-Centered Care  Recent Flowsheet Documentation  Taken 6/2/2025 0740 by Marilynn Martinez, RN  Trust Relationship/Rapport:   care explained   choices provided   thoughts/feelings acknowledged     Problem: Fall Injury Risk  Goal: Absence of Fall and Fall-Related Injury  Intervention: Identify and Manage Contributors  Recent Flowsheet Documentation  Taken 6/2/2025 0740 by Marilynn Martinez, RN  Medication Review/Management: medications reviewed  Self-Care Promotion:   independence encouraged   BADL personal objects within reach   meal set-up provided  Intervention: Promote Injury-Free Environment  Recent Flowsheet Documentation  Taken 6/2/2025 1300 by Marilynn Martinez, RN  Safety Promotion/Fall Prevention: safety round/check completed  Taken 6/2/2025 1100 by Marilynn Martinez, RN  Safety Promotion/Fall Prevention: safety round/check completed  Taken 6/2/2025 0900 by Marilynn Martinez, RN  Safety Promotion/Fall Prevention: safety round/check completed  Taken 6/2/2025 0740 by Marilynn Martinez, RN  Safety Promotion/Fall Prevention: safety round/check completed     Problem: Skin Injury Risk Increased  Goal: Skin Health and Integrity  Intervention: Optimize Skin Protection  Recent Flowsheet Documentation  Taken 6/2/2025 1300 by Marilynn Martinez, RN  Activity Management:   up in chair   up ad cleopatra  Taken 6/2/2025 1100 by Marilynn Martinez, RN  Activity Management:   up ad cleopatra   up in chair  Taken 6/2/2025  0900 by Marilynn Martinez, RN  Activity Management:   up ad cleopatra   activity encouraged  Head of Bed (HOB) Positioning: HOB elevated  Taken 6/2/2025 0740 by Marilynn Martinez, RN  Activity Management:   up ad cleopatra   activity encouraged  Pressure Reduction Techniques:   frequent weight shift encouraged   heels elevated off bed   positioned off wounds   weight shift assistance provided  Head of Bed (HOB) Positioning: HOB elevated  Pressure Reduction Devices: (refusing heel protective boots)   pressure-redistributing mattress utilized   positioning supports utilized   heel offloading device utilized   other (see comments)  Intervention: Promote and Optimize Oral Intake  Recent Flowsheet Documentation  Taken 6/2/2025 0740 by Marilynn Martinez, RN  Oral Nutrition Promotion: physical activity promoted  Nutrition Interventions: meal set-up provided

## 2025-06-02 NOTE — THERAPY TREATMENT NOTE
Acute Care - Physical Therapy Treatment Note   [facility]     Patient Name: Ramsey Riley  : 1955  MRN: [id number]  Today's Date: 2025      Visit Dx:     ICD-10-CM ICD-9-CM   1. Frequent falls  R29.6 V15.88   2. Skin avulsion  T14.8XXA 879.8   3. Bone bruise  T14.8XXA 924.9     Patient Active Problem List   Diagnosis    Coronary artery disease involving native coronary artery of native heart without angina pectoris    Essential hypertension    Bradycardia, sinus    Fatigue    Abnormal ECG    Precordial pain    LVH (left ventricular hypertrophy) due to hypertensive disease, without heart failure    Diabetes mellitus    PAD (peripheral artery disease)    Ischemic stroke    History of blood clots    Pulmonary embolus    Dyslipidemia    Class 1 obesity due to excess calories with serious comorbidity and body mass index (BMI) of 34.0 to 34.9 in adult    Weakness    Frequent falls     Past Medical History:   Diagnosis Date    Arthritis     Clot     Coronary artery disease     Dementia 2024    Diabetes mellitus     Heart disease     History of blood clots     Hypertension     Impaired mobility     Low back pain     Thyroid disease     Type 2 diabetes mellitus      Past Surgical History:   Procedure Laterality Date    CHOLECYSTECTOMY      COLONOSCOPY      dr srivastava    CORONARY ARTERY BYPASS GRAFT      [facility] - 3 BY PASS     ENDOSCOPY       PT Assessment (Last 12 Hours)       PT Evaluation and Treatment       Row Name 25 1043          Physical Therapy Time and Intention    Subjective Information complains of;weakness  -KM     Document Type therapy note (daily note)  -KM     Mode of Treatment individual therapy;physical therapy  -KM     Patient Effort good  -KM     Symptoms Noted During/After Treatment fatigue  -KM       Row Name 25 1043          General Information    Patient Profile Reviewed yes  -KM     Patient Observations alert;cooperative;agree to therapy  -KM     Existing  Precautions/Restrictions fall  -KM       Row Name 06/02/25 1043          Pain    Pretreatment Pain Rating 0/10 - no pain  -KM     Posttreatment Pain Rating 0/10 - no pain  -KM       Row Name 06/02/25 1043          Cognition    Affect/Mental Status (Cognition) WFL  -KM     Orientation Status (Cognition) oriented to;person;place;situation;verbal cues/prompts needed for orientation  -KM     Follows Commands (Cognition) follows one-step commands  -       Row Name 06/02/25 1043          Bed Mobility    Bed Mobility bed mobility (all) activities  -KM     All Activities, [place] (Bed Mobility) moderate assist (50% patient effort)  -KM     Assistive Device (Bed Mobility) bed rails;head of bed elevated  -       Row Name 06/02/25 1043          Transfers    Transfers sit-stand transfer;stand-sit transfer;bed-chair transfer  -       Row Name 06/02/25 1043          Bed-Chair Transfer    Bed-Chair [place] (Transfers) minimum assist (75% patient effort)  -KM     Assistive Device (Bed-Chair Transfers) walker, front-wheeled  -       Row Name 06/02/25 1043          Sit-Stand Transfer    Sit-Stand [place] (Transfers) minimum assist (75% patient effort)  -KM     Assistive Device (Sit-Stand Transfers) walker, front-wheeled  -       Row Name 06/02/25 1043          Stand-Sit Transfer    Stand-Sit [place] (Transfers) minimum assist (75% patient effort)  -     Assistive Device (Stand-Sit Transfers) walker, front-wheeled  -       Row Name 06/02/25 1043          Gait/Stairs (Locomotion)    Gait/Stairs Locomotion gait/ambulation independence;gait/ambulation assistive device;distance ambulated  -KM     [place] Level (Gait) contact guard;minimum assist (75% patient effort)  -KM     Assistive Device (Gait) walker, front-wheeled  -     Patient was able to Ambulate yes  -KM     Distance in Feet (Gait) 60  x2  -KM     Pattern (Gait) step-to  -KM     Deviations/Abnormal Patterns (Gait) ataxic;base of support,  narrow;gait speed decreased  -KM     Bilateral Gait Deviations forward flexed posture  -KM     Left Sided Gait Deviations weight shift ability decreased  -KM       Row Name 06/02/25 1043          Safety Issues/Impairments Affecting Functional Mobility    Impairments Affecting Function (Mobility) balance;cognition;coordination;endurance/activity tolerance;pain;strength  -KM       Row Name             Wound 05/26/25 1618 Left posterior heel Pressure Injury    Wound - Properties Group Placement Date: 05/26/25  -CW Placement Time: 1618  -CW Present on Original Admission: Y  -CW Side: Left  -CW Orientation: posterior  -CW Location: heel  -CW Primary Wound Type: Pressure Inj  -CW    Retired Wound - Properties Group Placement Date: 05/26/25  -CW Placement Time: 1618  -CW Present on Original Admission: Y  -CW Side: Left  -CW Orientation: posterior  -CW Location: heel  -CW    Retired Wound - Properties Group Placement Date: 05/26/25  -CW Placement Time: 1618  -CW Present on Original Admission: Y  -CW Side: Left  -CW Orientation: posterior  -CW Location: heel  -CW    Retired Wound - Properties Group Date first assessed: 05/26/25  -CW Time first assessed: 1618  -CW Present on Original Admission: Y  -CW Side: Left  -CW Location: heel  -CW      Row Name             Wound 05/26/25 1622 Left anterior heel Pressure Injury    Wound - Properties Group Placement Date: 05/26/25  -CW Placement Time: 1622  -CW Side: Left  -CW Orientation: anterior  -CW Location: heel  -CW Primary Wound Type: Pressure Inj  -CW    Retired Wound - Properties Group Placement Date: 05/26/25  -CW Placement Time: 1622  -CW Side: Left  -CW Orientation: anterior  -CW Location: heel  -CW    Retired Wound - Properties Group Placement Date: 05/26/25  -CW Placement Time: 1622  -CW Side: Left  -CW Orientation: anterior  -CW Location: heel  -CW    Retired Wound - Properties Group Date first assessed: 05/26/25  -CW Time first assessed: 1622  -CW Side: Left  -CW  Location: heel  -CW      Row Name             Wound 05/27/25 1007 medial parietal region Traumatic Abrasion    Wound - Properties Group Placement Date: 05/27/25  -LB Placement Time: 1007  -LB Present on Original Admission: Y  -LB Orientation: medial  -LB Location: parietal region  -LB Primary Wound Type: Traumatic  -LB Secondary Wound Type - Traumatic: Abrasion  -LB    Retired Wound - Properties Group Placement Date: 05/27/25  -LB Placement Time: 1007  -LB Present on Original Admission: Y  -LB Orientation: medial  -LB Location: parietal region  -LB    Retired Wound - Properties Group Placement Date: 05/27/25  -LB Placement Time: 1007  -LB Present on Original Admission: Y  -LB Orientation: medial  -LB Location: parietal region  -LB    Retired Wound - Properties Group Date first assessed: 05/27/25  -LB Time first assessed: 1007  -LB Present on Original Admission: Y  -LB Location: parietal region  -LB      Row Name             Wound 05/27/25 1008 Right distal arm Traumatic Skin Tear    Wound - Properties Group Placement Date: 05/27/25  -LB Placement Time: 1008  -LB Present on Original Admission: Y  -LB Side: Right  -LB Orientation: distal  -LB Location: arm  -LB Primary Wound Type: Traumatic  -LB Secondary Wound Type - Traumatic: Skin Tear  -LB    Retired Wound - Properties Group Placement Date: 05/27/25  -LB Placement Time: 1008  -LB Present on Original Admission: Y  -LB Side: Right  -LB Orientation: distal  -LB Location: arm  -LB    Retired Wound - Properties Group Placement Date: 05/27/25  -LB Placement Time: 1008  -LB Present on Original Admission: Y  -LB Side: Right  -LB Orientation: distal  -LB Location: arm  -LB    Retired Wound - Properties Group Date first assessed: 05/27/25  -LB Time first assessed: 1008  -LB Present on Original Admission: Y  -LB Side: Right  -LB Location: arm  -LB      Row Name 06/02/25 1043          Progress Summary (PT)    Daily Progress Summary (PT) Pt. was able to demonstrate functional  mobility skills w/ decreased assistance required. He was able to ambulate moderate distance w/ RW. He has noted balance issues while ambulating. Pt. would continue to benefit from PT services.  -               User Key  (r) = Recorded By, (t) = Taken By, (c) = Cosigned By      Initials Name Provider Type    CW Chata Pulido, RN Registered Nurse    Baljinder Reese, PT Physical Therapist    Marysol Rodriguez, RN Registered Nurse                    Physical Therapy Education       Title: PT OT SLP Therapies (Done)       Topic: Physical Therapy (Done)       Point: Mobility training (Done)       Learning Progress Summary            Patient Acceptance, E,TB, VU by  at 5/30/2025 2339    Acceptance, E,TB, NR by  at 5/28/2025 0920    Acceptance, E,TB, VU by  at 5/27/2025 1532                      Point: Home exercise program (Done)       Learning Progress Summary            Patient Acceptance, E,TB, VU by  at 5/30/2025 2339    Acceptance, E,TB, NR by  at 5/28/2025 0920    Acceptance, E,TB, VU by  at 5/27/2025 1532                      Point: Body mechanics (Done)       Learning Progress Summary            Patient Acceptance, E,TB, VU by  at 5/30/2025 2339    Acceptance, E,TB, NR by  at 5/28/2025 0920    Acceptance, E,TB, VU by  at 5/27/2025 1532                      Point: Precautions (Done)       Learning Progress Summary            Patient Acceptance, E,TB, VU by  at 5/30/2025 2339    Acceptance, E,TB, NR by  at 5/28/2025 0920    Acceptance, E,TB, VU by  at 5/27/2025 1532                                      User Key       Initials Effective Dates Name Provider Type Discipline     06/16/21 -  Vy Tejeda, RN Registered Nurse Nurse     05/24/22 -  Baljinder Martinez, PT Physical Therapist PT     02/07/25 -  Dagmar Maciel, RN Registered Nurse Nurse                  PT Recommendation and Plan  Anticipated Discharge Disposition (PT): inpatient rehabilitation facility  Planned Therapy  Interventions (PT): balance training, bed mobility training, gait training, home exercise program, patient/family education, postural re-education, ROM (range of motion), strengthening, stretching, transfer training  Therapy Frequency (PT): 2 times/wk (2-5x/wk)  Progress Summary (PT)  Daily Progress Summary (PT): Pt. was able to demonstrate functional mobility skills w/ decreased assistance required. He was able to ambulate moderate distance w/ RW. He has noted balance issues while ambulating. Pt. would continue to benefit from PT services.  Plan of Care Reviewed With: patient  Outcome Evaluation: Pt. evaluation completed during PT session. He was able to perform functional mobility skills w/ modA. He was able to ambulate in room w/ RW and Teddy. Pt. would benefit from further PT services at this time.       Time Calculation:    PT Charges       Row Name 06/02/25 1042             Time Calculation    PT Received On 06/02/25  -KM         Time Calculation- PT    Total Timed Code Minutes- PT 23 minute(s)  -KM                User Key  (r) = Recorded By, (t) = Taken By, (c) = Cosigned By      Initials Name Provider Type    Baljinder Reese, PT Physical Therapist                  Therapy Charges for Today       Code Description Service Date Service Provider Modifiers Qty    63415408785 HC PT THERAPEUTIC ACT EA 15 MIN 6/2/2025 Baljinder Martinez, PT GP 1    85168596394 HC GAIT TRAINING EA 15 MIN 6/2/2025 Baljinder Martinez, PT GP 1            PT G-Codes  AM-PAC 6 Clicks Score (PT): 17    Baljinder Martinez PT  6/2/2025

## 2025-06-02 NOTE — SIGNIFICANT NOTE
06/02/25 0802   Post Acute Pre-Cert Documentation   Request Submitted by Facility - Type: Post Acute   Post-Acute Authorization Type Submitted: IRF   Date Post Acute Pre-Cert Completed 06/02/25   Response Received from Insurance? P2P Requested   Response Communicated to:    Authorization Number: [id number]   Post Acute Pre-Cert Initiated Comment Notification received from [employer] portal with offer for peer to peer.  Spoke to Farrha in SS about offer.

## 2025-06-02 NOTE — DISCHARGE PLACEMENT REQUEST
"Cici Lopez (70 y.o. Male)       Date of Birth   1955    Social Security Number       Address   [address] [place] [address]    Home Phone   [phone]    MRN   [id number]       Christian   Patient Refused    Marital Status                               Admission Date   5/26/2025    Admission Type   Emergency    Admitting Provider   Magali Baig DO    Attending Provider   Harini Urbina DO    Department, Room/Bed   [address], 3306/2S       Discharge Date       Discharge Disposition       Discharge Destination                                 Attending Provider: Harini Urbina DO    Allergies: Penicillins, Trazodone    Isolation: None   Infection: None   Code Status: CPR    Ht: 155.8 cm (61.32\")   Wt: 95.3 kg (210 lb)    Admission Cmt: None   Principal Problem: Frequent falls [R29.6]                   Active Insurance as of 5/26/2025       Primary Coverage       Payor Plan Insurance Group Employer/Plan Group    ANTH MEDICARE REPLACEMENT [facility] MEDICARE ADVANTAGE O [id number]       Payor Plan Address Payor Plan Phone Number Payor Plan Fax Number Effective Dates    [address] [phone]  1/1/2024 - None Entered    [facility] [id number]         Subscriber Name Subscriber Birth Date Member ID       CICI LOPEZ 1955 [id number]                     Emergency Contacts        (Rel.) Home Phone Work Phone Mobile Phone    Jina Lopez (Spouse) [phone] -- [phone]              Emergency Contact Information       Name Relation Home Work Mobile    Jina Lopez Spouse [phone]          Other Contacts    None on File       Insurance Information                  ANTH MEDICARE REPLACEMENT/[employer] MEDICARE ADVANTAGE [employer] Phone: [phone]    Subscriber: Cici Lopez Subscriber#: [id number]    Group#: [id number] Precert#: [id number]    Authorization#: -- Effective Date: --             History & Physical "        Justo Grande PA-C at 25 1435       Attestation signed by Magali Baig DO at 25 1819      I have reviewed this documentation and agree. Patient seen and examined in 311-2. No visitors present. Patient has no complaints at present. He states he has no prodromal symptoms prior to falling. No syncope. Mostly just generalized weakness. No dizziness. No known vitamin deficiencies or previous ETOH use.    Exam largely unremarkable; he does have a scar on right hemicranium from recently falling and striking a fireplace. Left heel blistered area noted; no drainage.     Place on observation with telemetry. MRI brain w/o contrast given falls on eliquis in the setting of vascular dementia. PT/OT and SS consult with b12/folate levels in [facility]  History & Physical    Patient Identification:  Name:  Ramsey Riley  Age:  70 y.o.  Sex:  male  :  1955  MRN:  [id number]   Visit Number:  [id number]  Admit Date: 2025   Primary Care Physician:  Margret Purvis MD    Subjective     Chief complaint: Foot pain    History of presenting illness:      Ramsey Riley is a 70 y.o. male who presented for further evaluation of foot pain. He was seen and examined in the ED with spouse at the bedside. He and spouse report history mild vascular dementia with mild confusion and memory difficulty at baseline but he was pleasant and conversant. His spouse assists in providing history. They report they presented to the ED today secondary to foot pain. Patient has had a deep bruise/blister on the back of his heel for the past several days which he reports is incredibly painful. He reports deep, burning pain. His spouse states that last evening and overnight the pain seemed worse and the patient was very uncomfortable at home, at times even yelling out in pain so she decided to bring him in to the ED for further evaluation.    She believes the wound on the foot may be related to one of his recent multiple falls but they cannot pinpoint when the exact occurrence was. Spouse and patient report he has fallen multiple times in the past month. With one fall they report he injured his shoulder, spouse states had a fracture but unsure specifics. He did not require surgical intervention but has been in a sling for the past several weeks. Recently followed up with provider regarding shoulder injury and was told to discontinue the sling but patient has still been wearing this as he states it is too painful without it. He was hospitalized in [place] s/p fall in the recent past and the plan was for some sort of inpatient rehab however they had difficulty securing placement for him. Wife reports that he is significantly debilitated and has declined further since that discharge. At this time he is requiring assistance with bed mobility, ADLs such as dressing. She states he uses a walker but remains significantly unsteady on his feet and she is not physically able to stabilize him herself. She further adds that his vision is very poor due to diabetic retinopathy and this is limited his ability to safely ambulate in their home.  When he does fall he is unable to get up on his own and she is unable to assist. He denies any chest pain, palpitations, or dizziness preceding these falls.     Past medical history is significant for vascular dementia, CAD, HTN, PAD, hyperlipidemia, diabetes mellitus, chronically anticoagulated with Eliquis, thyroid disease    Upon arrival to the ED, vital signs were temp 98, heart rate 84, respirations 18, /75, SpO2 97% on RA.  On laboratory workup he was hyperglycemic at 282, CBC unremarkable.  CXR negative, XR bilateral feet negative.    Known Emergency Department medications received prior to my evaluation included none.   Emergency Department Room location at the time of my evaluation was [facility].      ---------------------------------------------------------------------------------------------------------------------   Review of Systems   Constitutional:  Positive for activity change.   HENT:  Negative for congestion and rhinorrhea.    Respiratory:  Negative for cough and shortness of breath.    Cardiovascular:  Negative for chest pain and leg swelling.   Gastrointestinal:  Negative for abdominal pain, diarrhea, nausea and vomiting.   Genitourinary:  Negative for difficulty urinating and dysuria.   Musculoskeletal:  Positive for arthralgias, gait problem and myalgias.   Skin:  Positive for wound. Negative for rash.   Neurological:  Positive for weakness. Negative for dizziness and light-headedness.   Psychiatric/Behavioral:  Positive for confusion (baseline).         ---------------------------------------------------------------------------------------------------------------------   Past Medical History:   Diagnosis Date    Arthritis     Clot     Coronary artery disease     Dementia 12/18/2024    Diabetes mellitus     Heart disease     History of blood clots     Hypertension     Impaired mobility     Low back pain     Thyroid disease     Type 2 diabetes mellitus      Past Surgical History:   Procedure Laterality Date    CHOLECYSTECTOMY  2015    COLONOSCOPY  2015    dr srivastava    CORONARY ARTERY BYPASS GRAFT      [place] - 3 BY PASS     ENDOSCOPY       Family History   Problem Relation Age of Onset    Diabetes Mother     Heart disease Mother         STENTING    COPD Mother     Heart attack Father     Diabetes Father     Heart defect Son         Ross procedure    Heart murmur Son     Hypertension Son      Social History     Socioeconomic History    Marital status:    Tobacco Use    Smoking status: Never     Passive exposure: Never    Smokeless tobacco: Current     Types: Chew   Vaping Use    Vaping status: Never Used   Substance and Sexual Activity    Alcohol use: No    Drug use: No    Sexual  activity: Defer     ---------------------------------------------------------------------------------------------------------------------   Allergies:  Penicillins and Trazodone  ---------------------------------------------------------------------------------------------------------------------   Home medications:    Medications below are reported home medications pulling from within the system; at this time, these medications have not been reconciled unless otherwise specified and are in the verification process for further verifcation as current home medications.  (Not in a hospital admission)      Hospital Scheduled Meds:          Current listed hospital scheduled medications may not yet reflect those currently placed in orders that are signed and held awaiting patient's arrival to floor.   ---------------------------------------------------------------------------------------------------------------------     Objective     Vital Signs:  Temp:  [98 °F (36.7 °C)] 98 °F (36.7 °C)  Heart Rate:  [69-84] 69  Resp:  [18] 18  BP: (148-176)/(73-95) 148/75      05/26/25  1133   Weight: 99.8 kg (220 lb)     Body mass index is 30.68 kg/m².  ---------------------------------------------------------------------------------------------------------------------       Physical Exam  Vitals and nursing note reviewed.   Constitutional:       General: He is not in acute distress.     Appearance: He is obese. He is ill-appearing (chronically ill appearing).   HENT:      Head: Normocephalic and atraumatic.   Eyes:      Extraocular Movements: Extraocular movements intact.   Cardiovascular:      Rate and Rhythm: Normal rate and regular rhythm.   Pulmonary:      Effort: Pulmonary effort is normal. No respiratory distress.      Breath sounds: Normal breath sounds.   Abdominal:      Palpations: Abdomen is soft.      Tenderness: There is no abdominal tenderness.   Musculoskeletal:      Right lower leg: No edema.      Left lower leg: No  "edema.   Skin:     General: Skin is warm and dry.      Comments: Wound to posterior left heel, no surrounding erythema or warmth, no drainage or other sign of infection   Neurological:      Mental Status: He is alert. Mental status is at baseline.   Psychiatric:         Mood and Affect: Mood normal.         Behavior: Behavior normal.         ---------------------------------------------------------------------------------------------------------------------  EKG:      ---------------------------------------------------------------------------------------------------------------------   Results from last 7 days   Lab Units 05/26/25  1250   CRP mg/dL 0.30   WBC 10*3/mm3 7.38   HEMOGLOBIN g/dL 15.2   HEMATOCRIT % 47.1   MCV fL 91.6   MCHC g/dL 32.3   PLATELETS 10*3/mm3 148         Results from last 7 days   Lab Units 05/26/25  1250   SODIUM mmol/L 134*   POTASSIUM mmol/L 4.6   CHLORIDE mmol/L 98   CO2 mmol/L 25.5   BUN mg/dL 12   CREATININE mg/dL 1.28*   CALCIUM mg/dL 9.2   GLUCOSE mg/dL 282*   ALBUMIN g/dL 3.9   BILIRUBIN mg/dL 0.6   ALK PHOS U/L 150*   AST (SGOT) U/L 31   ALT (SGPT) U/L 25   Estimated Creatinine Clearance: 66.5 mL/min (A) (by C-G formula based on SCr of 1.28 mg/dL (H)).  No results found for: \"AMMONIA\"          Lab Results   Component Value Date    HGBA1C 8.30 (H) 05/05/2025     Lab Results   Component Value Date    TSH 4.610 (H) 05/05/2025    FREET4 1.24 12/10/2024     No results found for: \"PREGTESTUR\", \"PREGSERUM\", \"HCG\", \"HCGQUANT\"  Pain Management Panel  More data exists         Latest Ref Rng & Units 5/5/2025 12/10/2024   Pain Management Panel   Creatinine, Urine Not Estab. mg/dL 110.9  -   Amphetamine, Urine Qual Negative - Negative    Barbiturates Screen, Urine Negative - Negative    Benzodiazepine Screen, Urine Negative - Positive    Buprenorphine, Screen, Urine Negative - Negative    Cocaine Screen, Urine Negative - Negative    Fentanyl, Urine Negative - Negative    Methadone Screen , Urine " "Negative - Negative    Methamphetamine, Ur Negative - Negative      No results found for: \"BLOODCX\"  No results found for: \"URINECX\"  No results found for: \"WOUNDCX\"  No results found for: \"STOOLCX\"      ---------------------------------------------------------------------------------------------------------------------  Imaging Results (Last 7 Days)       Procedure Component Value Units Date/Time    XR Foot 3+ View Bilateral [[id number]] Collected: 05/26/25 1335     Updated: 05/26/25 1415    Narrative:      Exam: XR FOOT 3+ VW BILATERAL-     History: pain     Comparison: No images available     Findings:     Right distal first tow resection.      No acute fracture or dislocation.     Soft tissue are normal.       Impression:      Impression:     No acute bony process.     This report was finalized on 5/26/2025 2:12 PM by Brock Tafoya MD.       XR Chest 1 View [[id number]] Collected: 05/26/25 1335     Updated: 05/26/25 1337    Narrative:      Procedure: Frontal view of chest obtained.     Comparison: None available     History: frequent falls     Findings:     No pneumonia or acute process seen in the chest.  Normal heart size and mediastinal contours.  Trachea is in midline position.  No edema or effusion is seen.  There is no evidence of pneumothorax.       Impression:         No evidence of acute disease in the chest.     This report was finalized on 5/26/2025 1:35 PM by Brock Tafoya MD.               Cultures:  No results found for: \"BLOODCX\", \"URINECX\", \"WOUNDCX\", \"MRSACX\", \"RESPCX\", \"STOOLCX\"    Last echocardiogram:  Results for orders placed during the hospital encounter of 12/10/24    Adult Transthoracic Echo Complete W/ Cont if Necessary Per Protocol    Interpretation Summary    Left ventricular systolic function is normal. Calculated left ventricular EF = 67.8% Left ventricular ejection fraction appears to be 66 - 70%.    Left ventricular wall thickness is consistent with mild concentric hypertrophy. " Left ventricular diastolic function was indeterminate.    The right ventricular cavity is borderline dilated with borderline systolic function.    The left atrial cavity is mildly dilated.    Mild aortic valve regurgitation is present.    Saline test results are negative for right to left atrial level shunt.          I have personally reviewed the above radiology images and read the final radiology report on 05/26/25  ---------------------------------------------------------------------------------------------------------------------  Assessment / Plan     Active Hospital Problems    Diagnosis  POA    **Frequent falls [R29.6]  Not Applicable       ASSESSMENT/PLAN:    Vascular dementia  Acute on chronic debility  Frequent falls  Recent proximal right humerus fracture due to fall  Patient presented to the ED secondary to foot pain.  On further discussion about reports history of vascular dementia with increasing difficulty caring for the patient at home.  He is fallen 3 times within the past month and sustained a right humerus fracture.  She reports he is currently requiring assistance getting out of bed, dressing, etc.  She states he is unsteady on his feet and has difficulty navigating their home despite use of a walker and her assistance.  Will admit to the telemetry unit for further observation and management  Will proceed with MRI brain without contrast to further evaluate  PT OT consults  Appreciate CM/SW assistance with discharge planning as well.  Will check TSH, folate, B12  Continue supportive care measures  Continue to trend labs    Chronic:  CAD  HTN  PAD  HLD  Diabetes mellitus  Hx blood clot, chronically anticoagulated  Continue home medication regimen as indicated cover with SSI for now.  Accu-Cheks to monitor with hypoglycemia protocol in place.  Add basal insulin as clinically indicated  Trend vital signs    ----------  -DVT prophylaxis: Chronically anticoagulated with Eliquis  -Activity: Up with  assistance  -Expected length of stay: Less than 2 midnights  -Disposition pending further clinical course and PT OT recommendations    High risk secondary to frequent falls, vascular dementia    Code Status and Medical Interventions: CPR (Attempt to Resuscitate); Full Support   Ordered at: 05/26/25 1430     Code Status (Patient has no pulse and is not breathing):    CPR (Attempt to Resuscitate)     Medical Interventions (Patient has pulse or is breathing):    Full Support       Justo Grande PA-C   05/26/25  14:35 EDT    Electronically signed by Magali Baig DO at 05/26/25 1819       Lines, Drains & Airways       Active LDAs       Name Placement date Placement time Site Days    Peripheral IV 05/27/25 0415 20 G Left;Posterior Forearm 05/27/25  0415  Forearm  6                  Current Facility-Administered Medications   Medication Dose Route Frequency Provider Last Rate Last Admin    acetaminophen (TYLENOL) tablet 1,000 mg  1,000 mg Oral Q8H PRN Magali Baig DO   1,000 mg at 06/01/25 1714    ALPRAZolam (XANAX) tablet 0.5 mg  0.5 mg Oral Nightly PRN Magali Baig DO   0.5 mg at 06/01/25 0012    amLODIPine (NORVASC) tablet 10 mg  10 mg Oral Daily Magali Baig DO   10 mg at 06/02/25 0900    apixaban (ELIQUIS) tablet 5 mg  5 mg Oral Q12H Magali Baig DO   5 mg at 06/02/25 0859    atorvastatin (LIPITOR) tablet 80 mg  80 mg Oral Nightly Magali Baig DO   80 mg at 06/01/25 2027    sennosides-docusate (PERICOLACE) 8.6-50 MG per tablet 2 tablet  2 tablet Oral BID Magali Baig DO   2 tablet at 06/02/25 0900    And    polyethylene glycol (MIRALAX) packet 17 g  17 g Oral Daily PRN Magali Baig DO        And    bisacodyl (DULCOLAX) EC tablet 5 mg  5 mg Oral Daily PRN Magali Baig DO        And    bisacodyl (DULCOLAX) suppository 10 mg  10 mg Rectal Daily PRN Magali Baig DO        castor oil-balsam peru (VENELEX) ointment 1 Application  1  Application Topical Q12H Magali Baig DO   1 Application at 06/02/25 0901    citalopram (CeleXA) tablet 20 mg  20 mg Oral Daily Magali Baig DO   20 mg at 06/02/25 0859    dextrose (D50W) (25 g/50 mL) IV injection 25 g  25 g Intravenous Q15 Min PRN Magali Baig DO        dextrose (GLUTOSE) oral gel 15 g  15 g Oral Q15 Min PRN Magali Baig DO        dorzolamide (TRUSOPT) 2 % 1 drop, timolol (TIMOPTIC) 0.5 % 1 drop for Cosopt 22.3-6.8 mg/mL   Both Eyes BID Magali Baig DO   Given at 06/02/25 0901    empagliflozin (JARDIANCE) tablet 10 mg  10 mg Oral Daily Magali Baig DO   10 mg at 06/02/25 0900    famotidine (PEPCID) tablet 20 mg  20 mg Oral BID Magali Baig DO   20 mg at 06/02/25 0859    glucagon HCl (Diagnostic) injection 1 mg  1 mg Intramuscular Q15 Min PRN Magali Baig DO        HYDROcodone-acetaminophen (NORCO) 7.5-325 MG per tablet 1 tablet  1 tablet Oral Q12H PRN Magali Baig DO   1 tablet at 06/01/25 2028    insulin lispro protamine-insulin lispro (humaLOG 75-25) injection 15 Units  15 Units Subcutaneous BID With Meals Magali Baig DO   15 Units at 06/02/25 0859    levothyroxine (SYNTHROID, LEVOTHROID) tablet 137.5 mcg  137.5 mcg Oral QAM AC Magali Baig DO   137.5 mcg at 06/02/25 0900    losartan (COZAAR) tablet 100 mg  100 mg Oral Daily Magali Baig DO   100 mg at 06/02/25 0900    nitroglycerin (NITROSTAT) SL tablet 0.4 mg  0.4 mg Sublingual Q5 Min PRN Magali Baig DO        pantoprazole (PROTONIX) EC tablet 40 mg  40 mg Oral QAM AC Magali Baig DO   40 mg at 06/02/25 0900    QUEtiapine (SEROquel) tablet 100 mg  100 mg Oral Nightly Magali Baig DO   100 mg at 06/01/25 2027    QUEtiapine (SEROquel) tablet 50 mg  50 mg Oral BID Magali Baig DO   50 mg at 06/02/25 0900    sodium chloride 0.9 % flush 10 mL  10 mL Intravenous PRN Magali Baig DO        sodium  chloride 0.9 % flush 10 mL  10 mL Intravenous Q12H Magali Baig, DO   10 mL at 06/02/25 0901    sodium chloride 0.9 % flush 10 mL  10 mL Intravenous PRN Magali Baig,         sodium chloride 0.9 % infusion 40 mL  40 mL Intravenous PRN Magali Baig, DO         Lab Results (most recent)       Procedure Component Value Units Date/Time    POC Glucose Once [[id number]]  (Abnormal) Collected: 06/02/25 0801    Specimen: Blood Updated: 06/02/25 0817     Glucose 233 mg/dL     Basic Metabolic Panel [[id number]]  (Abnormal) Collected: 06/02/25 0358    Specimen: Blood Updated: 06/02/25 0451     Glucose 343 mg/dL      BUN 15.4 mg/dL      Creatinine 1.29 mg/dL      Sodium 131 mmol/L      Potassium 4.0 mmol/L      Chloride 97 mmol/L      CO2 23.0 mmol/L      Calcium 8.1 mg/dL      BUN/Creatinine Ratio 11.9     Anion Gap 11.0 mmol/L      eGFR 60.4 mL/min/1.73     Narrative:      GFR Categories in Chronic Kidney Disease (CKD)              GFR Category          GFR (mL/min/1.73)    Interpretation  G1                    90 or greater        Normal or high (1)  G2                    60-89                Mild decrease (1)  G3a                   45-59                Mild to moderate decrease  G3b                   30-44                Moderate to severe decrease  G4                    15-29                Severe decrease  G5                    14 or less           Kidney failure    (1)In the absence of evidence of kidney disease, neither GFR category G1 or G2 fulfill the criteria for CKD.    eGFR calculation 2021 CKD-EPI creatinine equation, which does not include race as a factor    POC Glucose Once [[id number]]  (Abnormal) Collected: 06/01/25 1630    Specimen: Blood Updated: 06/01/25 1636     Glucose 153 mg/dL     Basic Metabolic Panel [[id number]]  (Abnormal) Collected: 06/01/25 0108    Specimen: Blood Updated: 06/01/25 0142     Glucose 176 mg/dL      BUN 16.2 mg/dL      Creatinine 1.41 mg/dL      Sodium 135  mmol/L      Potassium 3.7 mmol/L      Chloride 101 mmol/L      CO2 22.3 mmol/L      Calcium 8.4 mg/dL      BUN/Creatinine Ratio 11.5     Anion Gap 11.7 mmol/L      eGFR 54.3 mL/min/1.73     Narrative:      GFR Categories in Chronic Kidney Disease (CKD)              GFR Category          GFR (mL/min/1.73)    Interpretation  G1                    90 or greater        Normal or high (1)  G2                    60-89                Mild decrease (1)  G3a                   45-59                Mild to moderate decrease  G3b                   30-44                Moderate to severe decrease  G4                    15-29                Severe decrease  G5                    14 or less           Kidney failure    (1)In the absence of evidence of kidney disease, neither GFR category G1 or G2 fulfill the criteria for CKD.    eGFR calculation 2021 CKD-EPI creatinine equation, which does not include race as a factor    CBC (No Diff) [[id number]]  (Abnormal) Collected: 06/01/25 0108    Specimen: Blood Updated: 06/01/25 0120     WBC 7.33 10*3/mm3      RBC 4.70 10*6/mm3      Hemoglobin 14.0 g/dL      Hematocrit 42.8 %      MCV 91.1 fL      MCH 29.8 pg      MCHC 32.7 g/dL      RDW 12.2 %      RDW-SD 40.8 fl      MPV 10.2 fL      Platelets 141 10*3/mm3     Comprehensive Metabolic Panel [[id number]]  (Abnormal) Collected: 05/30/25 0058    Specimen: Blood Updated: 05/30/25 0353     Glucose 186 mg/dL      BUN 19.5 mg/dL      Creatinine 1.35 mg/dL      Sodium 134 mmol/L      Potassium 3.9 mmol/L      Chloride 100 mmol/L      CO2 22.2 mmol/L      Calcium 8.3 mg/dL      Total Protein 5.7 g/dL      Albumin 3.1 g/dL      ALT (SGPT) 15 U/L      AST (SGOT) 23 U/L      Alkaline Phosphatase 127 U/L      Total Bilirubin 0.3 mg/dL      Globulin 2.6 gm/dL      A/G Ratio 1.2 g/dL      BUN/Creatinine Ratio 14.4     Anion Gap 11.8 mmol/L      eGFR 57.2 mL/min/1.73     Narrative:      GFR Categories in Chronic Kidney Disease (CKD)              GFR  Category          GFR (mL/min/1.73)    Interpretation  G1                    90 or greater        Normal or high (1)  G2                    60-89                Mild decrease (1)  G3a                   45-59                Mild to moderate decrease  G3b                   30-44                Moderate to severe decrease  G4                    15-29                Severe decrease  G5                    14 or less           Kidney failure    (1)In the absence of evidence of kidney disease, neither GFR category G1 or G2 fulfill the criteria for CKD.    eGFR calculation 2021 CKD-EPI creatinine equation, which does not include race as a factor    CBC & Differential [[id number]]  (Abnormal) Collected: 05/30/25 0058    Specimen: Blood Updated: 05/30/25 0144    Narrative:      The following orders were created for panel order CBC & Differential.  Procedure                               Abnormality         Status                     ---------                               -----------         ------                     CBC Auto Differential[[id number]]        Abnormal            Final result                 Please view results for these tests on the individual orders.    CBC Auto Differential [[id number]]  (Abnormal) Collected: 05/30/25 0058    Specimen: Blood Updated: 05/30/25 0144     WBC 6.90 10*3/mm3      RBC 4.71 10*6/mm3      Hemoglobin 13.9 g/dL      Hematocrit 43.3 %      MCV 91.9 fL      MCH 29.5 pg      MCHC 32.1 g/dL      RDW 12.3 %      RDW-SD 41.6 fl      MPV 10.3 fL      Platelets 155 10*3/mm3      Neutrophil % 51.7 %      Lymphocyte % 34.3 %      Monocyte % 8.8 %      Eosinophil % 4.3 %      Basophil % 0.3 %      Immature Grans % 0.6 %      Neutrophils, Absolute 3.56 10*3/mm3      Lymphocytes, Absolute 2.37 10*3/mm3      Monocytes, Absolute 0.61 10*3/mm3      Eosinophils, Absolute 0.30 10*3/mm3      Basophils, Absolute 0.02 10*3/mm3      Immature Grans, Absolute 0.04 10*3/mm3      nRBC 0.0 /100 WBC     Vitamin B12  [[id number]]  (Abnormal) Collected: 05/27/25 0106    Specimen: Blood Updated: 05/27/25 1238     Vitamin B-12 1,046 pg/mL     Narrative:      Results may be falsely increased if patient taking Biotin.      Folate [[id number]]  (Normal) Collected: 05/27/25 0106    Specimen: Blood Updated: 05/27/25 1238     Folate 6.92 ng/mL     Narrative:      Results may be falsely increased if patient taking Biotin.      Comprehensive Metabolic Panel [[id number]]  (Abnormal) Collected: 05/27/25 0106    Specimen: Blood Updated: 05/27/25 0149     Glucose 221 mg/dL      BUN 13 mg/dL      Creatinine 1.26 mg/dL      Sodium 134 mmol/L      Potassium 4.4 mmol/L      Comment: Specimen hemolyzed.  Result may be falsely elevated.        Chloride 100 mmol/L      CO2 22.9 mmol/L      Calcium 8.6 mg/dL      Total Protein 5.9 g/dL      Albumin 3.3 g/dL      ALT (SGPT) 20 U/L      AST (SGOT) 25 U/L      Alkaline Phosphatase 134 U/L      Total Bilirubin 0.5 mg/dL      Globulin 2.6 gm/dL      A/G Ratio 1.3 g/dL      BUN/Creatinine Ratio 10.3     Anion Gap 11.1 mmol/L      eGFR 62.1 mL/min/1.73     Narrative:      GFR Categories in Chronic Kidney Disease (CKD)              GFR Category          GFR (mL/min/1.73)    Interpretation  G1                    90 or greater        Normal or high (1)  G2                    60-89                Mild decrease (1)  G3a                   45-59                Mild to moderate decrease  G3b                   30-44                Moderate to severe decrease  G4                    15-29                Severe decrease  G5                    14 or less           Kidney failure    (1)In the absence of evidence of kidney disease, neither GFR category G1 or G2 fulfill the criteria for CKD.    eGFR calculation 2021 CKD-EPI creatinine equation, which does not include race as a factor    CBC & Differential [[id number]] Collected: 05/27/25 0106    Specimen: Blood Updated: 05/27/25 0128    Narrative:      The following orders were  created for panel order CBC & Differential.  Procedure                               Abnormality         Status                     ---------                               -----------         ------                     CBC Auto Differential[[id number]]        Normal              Final result               Scan Slide[[id number]]                                                                    Please view results for these tests on the individual orders.    CBC Auto Differential [[id number]]  (Normal) Collected: 05/27/25 0106    Specimen: Blood Updated: 05/27/25 0128     WBC 7.87 10*3/mm3      RBC 4.95 10*6/mm3      Hemoglobin 14.7 g/dL      Hematocrit 44.8 %      MCV 90.5 fL      MCH 29.7 pg      MCHC 32.8 g/dL      RDW 12.3 %      RDW-SD 40.2 fl      MPV 10.2 fL      Platelets 149 10*3/mm3      Neutrophil % 66.8 %      Lymphocyte % 21.2 %      Monocyte % 8.5 %      Eosinophil % 2.7 %      Basophil % 0.4 %      Immature Grans % 0.4 %      Neutrophils, Absolute 5.26 10*3/mm3      Lymphocytes, Absolute 1.67 10*3/mm3      Monocytes, Absolute 0.67 10*3/mm3      Eosinophils, Absolute 0.21 10*3/mm3      Basophils, Absolute 0.03 10*3/mm3      Immature Grans, Absolute 0.03 10*3/mm3      nRBC 0.0 /100 WBC     TSH [[id number]]  (Abnormal) Collected: 05/26/25 1250    Specimen: Blood Updated: 05/26/25 1700     TSH 4.970 uIU/mL     Fentanyl, Urine - Urine, Clean Catch [[id number]]  (Normal) Collected: 05/26/25 1249    Specimen: Urine, Clean Catch Updated: 05/26/25 1637     Fentanyl, Urine Negative    Narrative:      Negative Threshold:      Fentanyl 5 ng/mL     The normal value for the drug tested is negative. This report includes final unconfirmed screening results to be used for medical treatment purposes only. Unconfirmed results must not be used for non-medical purposes such as employment or legal testing. Clinical consideration should be applied to any drug of abuse test, particularly when unconfirmed results are used.            Urine Drug Screen - Urine, Clean Catch [[id number]]  (Abnormal) Collected: 05/26/25 1249    Specimen: Urine, Clean Catch Updated: 05/26/25 1634     THC, Screen, Urine Negative     Phencyclidine (PCP), Urine Negative     Cocaine Screen, Urine Negative     Methamphetamine, Ur Negative     Opiate Screen Positive     Amphetamine Screen, Urine Negative     Benzodiazepine Screen, Urine Positive     Tricyclic Antidepressants Screen Positive     Methadone Screen, Urine Negative     Barbiturates Screen, Urine Negative     Oxycodone Screen, Urine Negative     Buprenorphine, Screen, Urine Negative    Narrative:      Cutoff For Drugs Screened:    Amphetamines               500 ng/ml  Barbiturates               200 ng/ml  Benzodiazepines            150 ng/ml  Cocaine                    150 ng/ml  Methadone                  200 ng/ml  Opiates                    100 ng/ml  Phencyclidine               25 ng/ml  THC                         50 ng/ml  Methamphetamine            500 ng/ml  Tricyclic Antidepressants  300 ng/ml  Oxycodone                  100 ng/ml  Buprenorphine               10 ng/ml    The normal value for all drugs tested is negative. This report includes unconfirmed screening results, with the cutoff values listed, to be used for medical treatment purposes only.  Unconfirmed results must not be used for non-medical purposes such as employment or legal testing.  Clinical consideration should be applied to any drug of abuse test, particularly when unconfirmed results are used.      C-reactive Protein [[id number]]  (Normal) Collected: 05/26/25 1250    Specimen: Blood Updated: 05/26/25 1333     C-Reactive Protein 0.30 mg/dL     [place] Urine Culture Tube - Urine, Clean Catch [[id number]] Collected: 05/26/25 1249    Specimen: Urine, Clean Catch Updated: 05/26/25 1315     Extra Tube Hold for add-ons.     Comment: Auto resulted.       Urinalysis With Microscopic If Indicated (No Culture) - Urine, Clean Catch [[id number]]   (Abnormal) Collected: 05/26/25 1249    Specimen: Urine, Clean Catch Updated: 05/26/25 1315     Color, UA Yellow     Appearance, UA Clear     pH, UA 6.0     Specific Gravity, UA 1.017     Glucose,  mg/dL (2+)     Ketones, UA Negative     Bilirubin, UA Negative     Blood, UA Trace     Protein, UA >=300 mg/dL (3+)     Leuk Esterase, UA Negative     Nitrite, UA Negative     Urobilinogen, UA 0.2 E.U./dL    Urinalysis, Microscopic Only - Urine, Clean Catch [[id number]]  (Abnormal) Collected: 05/26/25 1249    Specimen: Urine, Clean Catch Updated: 05/26/25 1315     RBC, UA 0-2 /HPF      WBC, UA 11-20 /HPF      Bacteria, UA Trace /HPF      Squamous Epithelial Cells, UA 0-2 /HPF      Hyaline Casts, UA 7-12 /LPF      Methodology Automated Microscopy    [place] Draw [[id number]] Collected: 05/26/25 1250    Specimen: Blood Updated: 05/26/25 1315    Narrative:      The following orders were created for panel order [place] Draw.  Procedure                               Abnormality         Status                     ---------                               -----------         ------                     Green Top (Gel)[[id number]]                                  Final result               Lavender Top[[id number]]                                     Final result               Gold Top - SST[[id number]]                                   Final result               Light Blue Top[[id number]]                                   Final result                 Please view results for these tests on the individual orders.    Green Top (Gel) [[id number]] Collected: 05/26/25 1250    Specimen: Blood Updated: 05/26/25 1315     Extra Tube Hold for add-ons.     Comment: Auto resulted.       Lavender Top [[id number]] Collected: 05/26/25 1250    Specimen: Blood Updated: 05/26/25 1315     Extra Tube hold for add-on     Comment: Auto resulted       Gold Top - SST [[id number]] Collected: 05/26/25 1250    Specimen: Blood Updated: 05/26/25 1315     Extra Tube  Hold for add-ons.     Comment: Auto resulted.       Light Blue Top [[id number]] Collected: 05/26/25 1250    Specimen: Blood Updated: 05/26/25 1315     Extra Tube Hold for add-ons.     Comment: Auto resulted             Orders (last 24 hrs)        Start     Ordered    06/02/25 0818  POC Glucose Once  PROCEDURE ONCE        Comments: Complete no more than 45 minutes prior to patient eating      06/02/25 0801    06/02/25 0600  Basic Metabolic Panel  Morning Draw         06/01/25 1544    06/01/25 1637  POC Glucose Once  PROCEDURE ONCE        Comments: Complete no more than 45 minutes prior to patient eating      06/01/25 1630    06/01/25 1200  POC Glucose Once  PROCEDURE ONCE        Comments: Complete no more than 45 minutes prior to patient eating      06/01/25 1152    05/28/25 0600  Wound Care  Daily       05/27/25 1439    05/27/25 2100  atorvastatin (LIPITOR) tablet 80 mg  Nightly         05/27/25 1120    05/27/25 2100  QUEtiapine (SEROquel) tablet 100 mg  Nightly         05/27/25 1120    05/27/25 1800  insulin lispro protamine-insulin lispro (humaLOG 75-25) injection 15 Units  2 Times Daily With Meals         05/27/25 1635    05/27/25 1600  QUEtiapine (SEROquel) tablet 50 mg  2 Times Daily         05/27/25 1120    05/27/25 1445  castor oil-balsam peru (VENELEX) ointment 1 Application  Every 12 Hours Scheduled         05/27/25 1435    05/27/25 1300  dorzolamide (TRUSOPT) 2 % 1 drop, timolol (TIMOPTIC) 0.5 % 1 drop for Cosopt 22.3-6.8 mg/mL  2 Times Daily         05/27/25 1120    05/27/25 1215  amLODIPine (NORVASC) tablet 10 mg  Daily         05/27/25 1120    05/27/25 1215  apixaban (ELIQUIS) tablet 5 mg  Every 12 Hours Scheduled         05/27/25 1120    05/27/25 1215  citalopram (CeleXA) tablet 20 mg  Daily         05/27/25 1120    05/27/25 1215  empagliflozin (JARDIANCE) tablet 10 mg  Daily         05/27/25 1120    05/27/25 1215  famotidine (PEPCID) tablet 20 mg  2 Times Daily         05/27/25 1120    05/27/25 1216   "levothyroxine (SYNTHROID, LEVOTHROID) tablet 137.5 mcg  Every Morning Before Breakfast         05/27/25 1120    05/27/25 1215  losartan (COZAAR) tablet 100 mg  Daily         05/27/25 1120    05/27/25 1215  pantoprazole (PROTONIX) EC tablet 40 mg  Every Morning Before Breakfast         05/27/25 1120    05/27/25 1120  ALPRAZolam (XANAX) tablet 0.5 mg  Nightly PRN         05/27/25 1120    05/27/25 1120  HYDROcodone-acetaminophen (NORCO) 7.5-325 MG per tablet 1 tablet  Every 12 Hours PRN         05/27/25 1120    05/26/25 2100  sodium chloride 0.9 % flush 10 mL  Every 12 Hours Scheduled         05/26/25 1617 05/26/25 2100  sennosides-docusate (PERICOLACE) 8.6-50 MG per tablet 2 tablet  2 Times Daily        Placed in \"And\" Linked Group    05/26/25 1617    05/26/25 2000  Vital Signs  Every 4 Hours      Comments: Per per hospital policy    05/26/25 1617    05/26/25 1800  Oral Care  2 Times Daily       05/26/25 1617    05/26/25 1700  POC Glucose 4x Daily Before Meals & at Bedtime  4 Times Daily Before Meals & at Bedtime      Comments: Complete no more than 45 minutes prior to patient eating      05/26/25 1617    05/26/25 1618  Intake & Output  Every Shift       05/26/25 1617    05/26/25 1617  sodium chloride 0.9 % flush 10 mL  As Needed         05/26/25 1617    05/26/25 1617  sodium chloride 0.9 % infusion 40 mL  As Needed         05/26/25 1617    05/26/25 1617  nitroglycerin (NITROSTAT) SL tablet 0.4 mg  Every 5 Minutes PRN         05/26/25 1617    05/26/25 1617  polyethylene glycol (MIRALAX) packet 17 g  Daily PRN        Placed in \"And\" Linked Group    05/26/25 1617    05/26/25 1617  bisacodyl (DULCOLAX) EC tablet 5 mg  Daily PRN        Placed in \"And\" Linked Group    05/26/25 1617    05/26/25 1617  bisacodyl (DULCOLAX) suppository 10 mg  Daily PRN        Placed in \"And\" Linked Group    05/26/25 1617    05/26/25 1617  acetaminophen (TYLENOL) tablet 1,000 mg  Every 8 Hours PRN         05/26/25 1617    05/26/25 1617  " "dextrose (GLUTOSE) oral gel 15 g  Every 15 Minutes PRN         05/26/25 1617    05/26/25 1617  dextrose (D50W) (25 g/50 mL) IV injection 25 g  Every 15 Minutes PRN         05/26/25 1617    05/26/25 1617  glucagon HCl (Diagnostic) injection 1 mg  Every 15 Minutes PRN         05/26/25 1617    05/26/25 1205  sodium chloride 0.9 % flush 10 mL  As Needed        Placed in \"And\" Linked Group    05/26/25 1205    Unscheduled  Up With Assistance  As Needed       05/26/25 1617    Unscheduled  Follow Hypoglycemia Standing Orders For Blood Glucose <70 & Notify Provider of Treatment  As Needed      Comments: Follow Hypoglycemia Orders As Outlined in Process Instructions (Open Order Report to View Full Instructions)  Notify Provider Any Time Hypoglycemia Treatment is Administered    05/26/25 1617    Unscheduled  Wound Care  As Needed       05/27/25 1437    Unscheduled  Skin Tear Care - Minimal Drainage PRN  As Needed      Comments: - Realign Skin Flap (if Viable) Gently Ease Flap Into Place Using a Dampened Cotton-Tipped Applicator or Gloved Finger  - Gently Cleanse Area & Pat Dry  - Apply Hydrogel & Non-Adherent Layer (Silicone Sheet, Oil Emulsion Dressing or Vaseline Gauze)  - Secure With Silicone Border Dressing (Unless Skin Fragile)  - If Skin is Fragile Secure With Roll Gauze - Do NOT Put Tape Directly on Skin  - Change Every 3 Days & As Needed    05/27/25 1439    Unscheduled  Bed / Chair Alarm On  As Needed      Comments: Bed Alarm On When in Bed,   Use Chair Alarm When in Chair    05/30/25 6122    --  ALPRAZolam (XANAX) 0.5 MG tablet  Nightly PRN         05/26/25 1926    --  QUEtiapine (SEROquel) 50 MG tablet  2 Times Daily         05/26/25 1929    --  Insulin NPH Isophane & Regular (HumuLIN 70/30 KwikPen) (70-30) 100 UNIT/ML suspension pen-injector  Every Morning         05/27/25 0957    --  Insulin NPH Isophane & Regular (HumuLIN 70/30 KwikPen) (70-30) 100 UNIT/ML suspension pen-injector  Every Evening         05/27/25 " 0957                     Physician Progress Notes (most recent note)        Magali Baig DO at 25 1532           [facility]     Progress Note    Patient Name: Rasmey Riley  : 1955  MRN: [id number]  Primary Care Physician:  Margret Purvis MD  Date of admission: 2025    Subjective   Subjective     Chief Complaint: 70-year-old male being seen in follow-up for frequent falls    History of Present Illness  Patient sleeping soundly - no further episodes overnight. No further falls.    Case discussed with patient's primary RN Marilynn    Review of Systems  Patient will wake up upon stimulation; denies any pain. Otherwise, does not provide further HPI.    Objective   Objective     Vitals:   Temp:  [97.7 °F (36.5 °C)-98.3 °F (36.8 °C)] 98.1 °F (36.7 °C)  Heart Rate:  [50-91] 91  Resp:  [18-20] 20  BP: (118-141)/(61-74) 132/70    Physical Exam  Constitutional:       General: He is not in acute distress.     Appearance: He is well-developed.   HENT:      Head: Normocephalic and atraumatic.   Eyes:      Conjunctiva/sclera: Conjunctivae normal.   Neck:      Trachea: No tracheal deviation.   Cardiovascular:      Rate and Rhythm: Normal rate and regular rhythm.      Heart sounds: No murmur heard.     No friction rub. No gallop.   Pulmonary:      Effort: No respiratory distress.      Breath sounds: Examination of the right-lower field reveals decreased breath sounds. Examination of the left-lower field reveals decreased breath sounds. Decreased breath sounds present. No wheezing or rales.   Abdominal:      General: Bowel sounds are normal. There is no distension.      Palpations: Abdomen is soft.      Tenderness: There is no abdominal tenderness. There is no guarding.   Musculoskeletal:         General: No tenderness.   Skin:     General: Skin is warm and dry.      Findings: No erythema or rash.   Neurological:      Mental Status: He is lethargic.      Cranial Nerves: No cranial nerve deficit.        *No significant change compared to 5/31*    Result Review    Result Review:  I have personally reviewed the results from the time of this admission to 6/1/2025 15:32 EDT and agree with these findings:  [x]  Laboratory list / accordion  []  Microbiology  []  Radiology  []  EKG/Telemetry   []  Cardiology/Vascular   []  Pathology  []  Old records  []  Other:  Most notable findings include:     Results from last 7 days   Lab Units 06/01/25 0108 05/30/25 0058 05/27/25  0106   WBC 10*3/mm3 7.33 6.90 7.87   HEMOGLOBIN g/dL 14.0 13.9 14.7   HEMATOCRIT % 42.8 43.3 44.8   PLATELETS 10*3/mm3 141 155 149     Results from last 7 days   Lab Units 06/01/25 0108 05/30/25 0058 05/28/25  0114 05/27/25  0106 05/26/25  1250   SODIUM mmol/L 135* 134* 134* 134* 134*   POTASSIUM mmol/L 3.7 3.9 4.4 4.4 4.6   CHLORIDE mmol/L 101 100 101 100 98   CO2 mmol/L 22.3 22.2 22.9 22.9 25.5   BUN mg/dL 16.2 19.5 16.9 13 12   CREATININE mg/dL 1.41* 1.35* 1.23 1.26 1.28*   CALCIUM mg/dL 8.4* 8.3* 8.7 8.6 9.2   BILIRUBIN mg/dL  --  0.3  --  0.5 0.6   ALK PHOS U/L  --  127*  --  134* 150*   ALT (SGPT) U/L  --  15  --  20 25   AST (SGOT) U/L  --  23  --  25 31   GLUCOSE mg/dL 176* 186* 229* 221* 282*     Lab Results   Component Value Date    [id number] 1,046 (H) 05/27/2025     Lab Results   Component Value Date    FOLATE 6.92 05/27/2025     MRI brain without contrast:    FINDINGS:  Study is degraded by patient motion.  The diffusion weighted images show no focal signal abnormality. There is  no evidence of acute ischemia     The remaining pulse sequences show no mass, hemorrhage, or midline  shift.  The ventricles, cisterns, and sulci are unremarkable. There is no  hydrocephalus.     The sagittal view show no sellar or parasellar mass.     There is no tectal or pineal lesion.     Coronal imaging demonstrates a symmetric appearance of the visualized  portions of the optic nerves and extraocular muscles.     IMPRESSION:     1. Study markedly  degraded by patient motion  2. No convincing evidence of acute ischemia, focal mass, or midline  Shift    XR bilateral ribs with 4 view and PA:      FINDINGS:     Slight prominence to the heart size  Sternotomy.  Fracture involving the right posterior fourth rib.  Fracture noted at the right humeral head.  No acute dislocation.  Intact left ribs  No lytic or blastic lesion.  No acute foreign body.     IMPRESSION:     Aberrant contour to the right posterior fourth rib consistent with  underlying fractures of indeterminate age.  No left rib fracture identified.  Fracture noted involving the right humeral head with no dislocation.  No acute foreign body.  Clinical correlation recommended.    Assessment & Plan   Assessment / Plan     #Assisted fall during hospitalization on early AM 5/31  #Age indeterminate right posterior 4th rib fracture  #Frequent falls  #Generalized weakness/physical debility  #Recent right proximal humerus fracture due to fall, prior to admission  #Vascular dementia  - Patient moved to [facility] to be closer to nursing station  - PT/OT evaluating; continue to participate as tolerated  - Appears patient ambulated 50 ft to bathroom today with staff  - Requested inpatient physical rehabilitation; feel patient would be an excellent candidate given degree of debility and ongoing falls as noted above  - Patient with significant falls at home causing trauma; in setting of anticoagulation (eliquis)  - Vitamin B12/Folate levels replete  - MRI brain without contrast ordered and unremarkable given in account the motion degradation     #Uncontrolled essential hypertension, controlled at present   #Diabetes mellitus type 2, insulin-dependent and complicated by hyperglycemia, improving  #Obesity per BMI 40.28 kg/m²  - I have reviewed the patient's home medication list and have resumed his home amlodipine 10 mg daily, and Cozaar 100 mg daily; BP much improved today; continue current regimen  - Unclear as to why  patient is not on a beta-blocker given history of coronary artery disease; for now, we will plan for as needed hydralazine for ongoing uncontrolled hypertension  - Patient on 70/30 at home; will plan to continue 75/25 here at increased dose of 20 units BID for now and titrate based on blood glucose trend; will continue current regimen for now  - Continue to monitor BP and glucose level trend while hospitalized    #Tinea cruris  - Nystatin powder BID    Chronic medical conditions:  - Pulmonary nodules, noted since ; discussed with wife in detail at bedside this week  -Gastroesophageal reflux disease  - Previous stroke  - Previous blood clot  - Coronary artery disease status post three-vessel CABG  - Chronic low back pain  - Hypothyroidism    VTE Prophylaxis:  Continue home eliquis for now - monitor closely in setting of falls    CODE STATUS:    Code Status (Patient has no pulse and is not breathing): CPR (Attempt to Resuscitate)  Medical Interventions (Patient has pulse or is breathing): Full Support    Disposition:  I expect patient to be discharged unclear pending progress with PT/OT; I think he would be an excellent candidate for inpatient physical rehabilitation. Awaiting insurance approval or denial of IPR    Magali Baig DO               Electronically signed by Magali Baig DO at 25 1544       Consult Notes (most recent note)    No notes of this type exist for this encounter.          Physical Therapy Notes (most recent note)        Lesley Lofton PTA at 25 1502  Version 1 of 1         Acute Care - Physical Therapy Treatment Note   Ilya     Patient Name: Ramsey Riley  : 1955  MRN: [id number]  Today's Date: 2025      Visit Dx:     ICD-10-CM ICD-9-CM   1. Frequent falls  R29.6 V15.88   2. Skin avulsion  T14.8XXA 879.8   3. Bone bruise  T14.8XXA 924.9     Patient Active Problem List   Diagnosis    Coronary artery disease involving native coronary artery of  native heart without angina pectoris    Essential hypertension    Bradycardia, sinus    Fatigue    Abnormal ECG    Precordial pain    LVH (left ventricular hypertrophy) due to hypertensive disease, without heart failure    Diabetes mellitus    PAD (peripheral artery disease)    Ischemic stroke    History of blood clots    Pulmonary embolus    Dyslipidemia    Class 1 obesity due to excess calories with serious comorbidity and body mass index (BMI) of 34.0 to 34.9 in adult    Weakness    Frequent falls     Past Medical History:   Diagnosis Date    Arthritis     Clot     Coronary artery disease     Dementia 12/18/2024    Diabetes mellitus     Heart disease     History of blood clots     Hypertension     Impaired mobility     Low back pain     Thyroid disease     Type 2 diabetes mellitus      Past Surgical History:   Procedure Laterality Date    CHOLECYSTECTOMY  2015    COLONOSCOPY  2015    dr srivastava    CORONARY ARTERY BYPASS GRAFT      [facility] - 3 BY PASS     ENDOSCOPY       PT Assessment (Last 12 Hours)       PT Evaluation and Treatment       Row Name 05/30/25 1457          Physical Therapy Time and Intention    Subjective Information complains of;weakness  -LL     Document Type therapy note (daily note)  -LL     Mode of Treatment individual therapy;physical therapy  -LL     Patient Effort good  -LL     Comment Patient & RN in agreement for participation with PT.  Patient very motivated for improvement and was compliant with all activities asked of him.  -LL       Row Name 05/30/25 1457          General Information    Patient Profile Reviewed yes  -LL     Existing Precautions/Restrictions fall  -LL       Row Name 05/30/25 1457          Pain    Pretreatment Pain Rating 1/10  -LL     Posttreatment Pain Rating 3/10  -LL     Pain Location foot  -LL     Pain Side/Orientation left  -LL       Row Name 05/30/25 1457          Cognition    Affect/Mental Status (Cognition) WFL  -LL     Orientation Status (Cognition) oriented  to;person;place;situation;verbal cues/prompts needed for orientation  -LL     Follows Commands (Cognition) follows one-step commands  -LL       Row Name 05/30/25 1457          Bed Mobility    All Activities, [place] (Bed Mobility) moderate assist (50% patient effort)  -LL       Row Name 05/30/25 1457          Transfers    Transfers sit-stand transfer;stand-sit transfer  -LL       Row Name 05/30/25 1457          Sit-Stand Transfer    Sit-Stand [place] (Transfers) moderate assist (50% patient effort)  -LL     Assistive Device (Sit-Stand Transfers) walker, front-wheeled  -LL       Row Name 05/30/25 1457          Stand-Sit Transfer    Stand-Sit [place] (Transfers) moderate assist (50% patient effort)  -LL     Assistive Device (Stand-Sit Transfers) walker, front-wheeled  -LL       Row Name 05/30/25 1457          Gait/Stairs (Locomotion)    Gait/Stairs Locomotion gait/ambulation independence;gait/ambulation assistive device;distance ambulated  -LL     [place] Level (Gait) minimum assist (75% patient effort)  -LL     Assistive Device (Gait) walker, front-wheeled  -LL     Distance in Feet (Gait) --  60' x 2  -LL     Pattern (Gait) step-to  -LL     Deviations/Abnormal Patterns (Gait) ataxic;base of support, narrow;gait speed decreased  -LL     Bilateral Gait Deviations forward flexed posture  -LL     Left Sided Gait Deviations weight shift ability decreased  -LL       Row Name 05/30/25 1457          Safety Issues/Impairments Affecting Functional Mobility    Impairments Affecting Function (Mobility) balance;cognition;coordination;endurance/activity tolerance;pain;strength  -       Row Name 05/30/25 1457          Motor Skills    Comments, Therapeutic Exercise --  Seated: LAQ, marching, pillow squeeze, AP x 20 reps each  -LL       Row Name             Wound 05/26/25 1618 Left posterior heel Pressure Injury    Wound - Properties Group Placement Date: 05/26/25  -CW Placement Time: 1618 -CW Present on  Original Admission: Y  -CW Side: Left  -CW Orientation: posterior  -CW Location: heel  -CW Primary Wound Type: Pressure Inj  -CW    Retired Wound - Properties Group Placement Date: 05/26/25  -CW Placement Time: 1618  -CW Present on Original Admission: Y  -CW Side: Left  -CW Orientation: posterior  -CW Location: heel  -CW    Retired Wound - Properties Group Placement Date: 05/26/25  -CW Placement Time: 1618  -CW Present on Original Admission: Y  -CW Side: Left  -CW Orientation: posterior  -CW Location: heel  -CW    Retired Wound - Properties Group Date first assessed: 05/26/25  -CW Time first assessed: 1618  -CW Present on Original Admission: Y  -CW Side: Left  -CW Location: heel  -CW      Row Name             Wound 05/26/25 1622 Left anterior heel Pressure Injury    Wound - Properties Group Placement Date: 05/26/25  -CW Placement Time: 1622  -CW Side: Left  -CW Orientation: anterior  -CW Location: heel  -CW Primary Wound Type: Pressure Inj  -CW    Retired Wound - Properties Group Placement Date: 05/26/25  -CW Placement Time: 1622  -CW Side: Left  -CW Orientation: anterior  -CW Location: heel  -CW    Retired Wound - Properties Group Placement Date: 05/26/25  -CW Placement Time: 1622  -CW Side: Left  -CW Orientation: anterior  -CW Location: heel  -CW    Retired Wound - Properties Group Date first assessed: 05/26/25  -CW Time first assessed: 1622  -CW Side: Left  -CW Location: heel  -CW      Row Name             Wound 05/27/25 1007 medial parietal region Traumatic Abrasion    Wound - Properties Group Placement Date: 05/27/25  -LB Placement Time: 1007  -LB Present on Original Admission: Y  -LB Orientation: medial  -LB Location: parietal region  -LB Primary Wound Type: Traumatic  -LB Secondary Wound Type - Traumatic: Abrasion  -LB    Retired Wound - Properties Group Placement Date: 05/27/25  -LB Placement Time: 1007  -LB Present on Original Admission: Y  -LB Orientation: medial  -LB Location: parietal region  -LB     Retired Wound - Properties Group Placement Date: 05/27/25  -LB Placement Time: 1007  -LB Present on Original Admission: Y  -LB Orientation: medial  -LB Location: parietal region  -LB    Retired Wound - Properties Group Date first assessed: 05/27/25  -LB Time first assessed: 1007  -LB Present on Original Admission: Y  -LB Location: parietal region  -LB      Row Name             Wound 05/27/25 1008 Right distal arm Traumatic Skin Tear    Wound - Properties Group Placement Date: 05/27/25  -LB Placement Time: 1008  -LB Present on Original Admission: Y  -LB Side: Right  -LB Orientation: distal  -LB Location: arm  -LB Primary Wound Type: Traumatic  -LB Secondary Wound Type - Traumatic: Skin Tear  -LB    Retired Wound - Properties Group Placement Date: 05/27/25  -LB Placement Time: 1008  -LB Present on Original Admission: Y  -LB Side: Right  -LB Orientation: distal  -LB Location: arm  -LB    Retired Wound - Properties Group Placement Date: 05/27/25  -LB Placement Time: 1008  -LB Present on Original Admission: Y  -LB Side: Right  -LB Orientation: distal  -LB Location: arm  -LB    Retired Wound - Properties Group Date first assessed: 05/27/25  -LB Time first assessed: 1008  -LB Present on Original Admission: Y  -LB Side: Right  -LB Location: arm  -LB      Row Name 05/30/25 1457          Positioning and Restraints    Pre-Treatment Position in bed  -LL     Post Treatment Position chair  -LL     In Chair sitting;notified nsg;call light within reach;encouraged to call for assist;exit alarm on  With tray table in front of him  -LL               User Key  (r) = Recorded By, (t) = Taken By, (c) = Cosigned By      Initials Name Provider Type    Chata Couch RN Registered Nurse    Lesley Floyd PTA Physical Therapist Assistant    Marysol Rodriguez RN Registered Nurse                    Physical Therapy Education       Title: PT OT SLP Therapies (In Progress)       Topic: Physical Therapy (In Progress)       Point:  Mobility training (In Progress)       Learning Progress Summary            Patient Acceptance, E,TB, NR by MP at 5/28/2025 0920    Acceptance, E,TB, VU by KM at 5/27/2025 1532                      Point: Home exercise program (In Progress)       Learning Progress Summary            Patient Acceptance, E,TB, NR by MP at 5/28/2025 0920    Acceptance, E,TB, VU by KM at 5/27/2025 1532                      Point: Body mechanics (In Progress)       Learning Progress Summary            Patient Acceptance, E,TB, NR by MP at 5/28/2025 0920    Acceptance, E,TB, VU by KM at 5/27/2025 1532                      Point: Precautions (In Progress)       Learning Progress Summary            Patient Acceptance, E,TB, NR by  at 5/28/2025 0920    Acceptance, E,TB, VU by KM at 5/27/2025 1532                                      User Key       Initials Effective Dates Name Provider Type Discipline     06/16/21 -  Vy Tejeda, RN Registered Nurse Nurse     05/24/22 -  Baljinder Martinez, PT Physical Therapist PT                  PT Recommendation and Plan  Anticipated Discharge Disposition (PT): inpatient rehabilitation facility          Time Calculation:    PT Charges       Row Name 05/30/25 1501             Time Calculation    PT Received On 05/30/25  -LL         Time Calculation- PT    Total Timed Code Minutes- PT 40 minute(s)  -LL                User Key  (r) = Recorded By, (t) = Taken By, (c) = Cosigned By      Initials Name Provider Type    LL Lesley Lofton PTA Physical Therapist Assistant                  Therapy Charges for Today       Code Description Service Date Service Provider Modifiers Qty    95566975393 HC GAIT TRAINING EA 15 MIN 5/30/2025 Lesley Lofton PTA GP, CQ 1    00014431947 HC PT THERAPEUTIC ACT EA 15 MIN 5/30/2025 Lesley Lofton PTA GP, CQ 1    60710927301 HC PT THER PROC EA 15 MIN 5/30/2025 Lesley Lofton, ASMITA GP, CQ 1            PT G-Codes  AM-PAC 6 Clicks Score (PT): 13    Lesley. Rolly  PTA  2025      Electronically signed by Lesley Lofton, PTA at 25 1502          Occupational Therapy Notes (most recent note)        Vanesa Tse, OT at 25 5037          Patient Name: Ramsey Rliey  : 1955    MRN: [id number]                              Today's Date: 2025       Admit Date: 2025    Visit Dx:     ICD-10-CM ICD-9-CM   1. Frequent falls  R29.6 V15.88   2. Skin avulsion  T14.8XXA 879.8   3. Bone bruise  T14.8XXA 924.9     Patient Active Problem List   Diagnosis    Coronary artery disease involving native coronary artery of native heart without angina pectoris    Essential hypertension    Bradycardia, sinus    Fatigue    Abnormal ECG    Precordial pain    LVH (left ventricular hypertrophy) due to hypertensive disease, without heart failure    Diabetes mellitus    PAD (peripheral artery disease)    Ischemic stroke    History of blood clots    Pulmonary embolus    Dyslipidemia    Class 1 obesity due to excess calories with serious comorbidity and body mass index (BMI) of 34.0 to 34.9 in adult    Weakness    Frequent falls     Past Medical History:   Diagnosis Date    Arthritis     Clot     Coronary artery disease     Dementia 2024    Diabetes mellitus     Heart disease     History of blood clots     Hypertension     Impaired mobility     Low back pain     Thyroid disease     Type 2 diabetes mellitus      Past Surgical History:   Procedure Laterality Date    CHOLECYSTECTOMY      COLONOSCOPY      dr srivastava    CORONARY ARTERY BYPASS GRAFT      [place] -  BY PASS     ENDOSCOPY        General Information       Row Name 25 1432          OT Time and Intention    Subjective Information complains of;weakness  -KP     Document Type evaluation  -KP     Mode of Treatment individual therapy;occupational therapy  -KP     Patient Effort good  -KP     Symptoms Noted During/After Treatment none  -KP     Comment Patient agreeable to OT evaluation.  -KP        Row Name 05/27/25 1432          General Information    Patient Profile Reviewed yes  -     Prior Level of Function --  assist from wife for all ADLs, supervision with functional mobility using rolling walker; frequent falls, no DME  -     Existing Precautions/Restrictions fall  -     Barriers to Rehab previous functional deficit;cognitive status;medically complex  -       Row Name 05/27/25 1432          Occupational Profile    Reason for Services/Referral (Occupational Profile) Patient admitted to [facility] on 5/26/2025. He was referred for OT evaluation due to change in functional performance with ADLs, functional mobility, and/or transfers.  -       Row Name 05/27/25 1432          Living Environment    Current Living Arrangements home  -     People in Home spouse  -       Row Name 05/27/25 1432          Cognition    Orientation Status (Cognition) oriented to;person;place;situation;verbal cues/prompts needed for orientation  -       Row Name 05/27/25 1432          Safety Issues/Impairments Affecting Functional Mobility    Safety Issues Affecting Function (Mobility) insight into deficits/self-awareness;awareness of need for assistance  -     Impairments Affecting Function (Mobility) balance;cognition;coordination;endurance/activity tolerance;pain;strength  -     Cognitive Impairments, Mobility Safety/Performance awareness, need for assistance;insight into deficits/self-awareness  -               User Key  (r) = Recorded By, (t) = Taken By, (c) = Cosigned By      Initials Name Provider Type     Vanesa Tse, OT Occupational Therapist                     Mobility/ADL's       Row Name 05/27/25 1440          Bed Mobility    Bed Mobility bed mobility (all) activities  -     All Activities, [place] (Bed Mobility) moderate assist (50% patient effort)  -     Assistive Device (Bed Mobility) bed rails;head of bed elevated  -       Row Name 05/27/25 1440          Transfers     Transfers sit-stand transfer;stand-sit transfer  -KP       Row Name 05/27/25 1440          Sit-Stand Transfer    Sit-Stand [place] (Transfers) moderate assist (50% patient effort)  -     Assistive Device (Sit-Stand Transfers) walker, front-wheeled  -[facility] Name 05/27/25 1440          Stand-Sit Transfer    Stand-Sit [place] (Transfers) moderate assist (50% patient effort)  -     Assistive Device (Stand-Sit Transfers) walker, front-wheeled  -[facility] Name 05/27/25 1440          Activities of Daily Living    BADL Assessment/Intervention bathing;upper body dressing;lower body dressing;grooming;feeding;toileting  -KP       Row Name 05/27/25 1440          Bathing Assessment/Intervention    [place] Level (Bathing) bathing skills;maximum assist (25% patient effort)  -KP       Row Name 05/27/25 1440          Upper Body Dressing Assessment/Training    [place] Level (Upper Body Dressing) upper body dressing skills;moderate assist (50% patient effort)  -KP       Row Name 05/27/25 1440          Lower Body Dressing Assessment/Training    [place] Level (Lower Body Dressing) lower body dressing skills;maximum assist (25% patient effort)  -KP       Row Name 05/27/25 1440          Grooming Assessment/Training    [place] Level (Grooming) grooming skills;moderate assist (50% patient effort)  -KP       Row Name 05/27/25 1443          Self-Feeding Assessment/Training    [place] Level (Feeding) feeding skills;maximum assist (25% patient effort)  -KP       Row Name 05/27/25 1440          Toileting Assessment/Training    [place] Level (Toileting) toileting skills;maximum assist (25% patient effort)  -               User Key  (r) = Recorded By, (t) = Taken By, (c) = Cosigned By      Initials Name Provider Type     Vanesa Tse OT Occupational Therapist                   Obj/Interventions       [facility] Name 05/27/25 1443          Sensory Assessment (Somatosensory)    Sensory Assessment  (Somatosensory) UE sensation intact  -[facility] Name 05/27/25 1443          Vision Assessment/Intervention    Visual Impairment/Limitations blurry vision  -     Vision Assessment Comment impaired due to diabetic retinopathy  -[facility] Name 05/27/25 1443          Range of Motion Comprehensive    Comment, General Range of Motion LUE WFLs, RUE 75% with AAROM tolerated well  (recent right humeral fracture cleared by ortho per patient's wife)  -[facility] Name 05/27/25 1443          Strength Comprehensive (MMT)    Comment, General Manual Muscle Testing (MMT) Assessment functional, 3+/5 RUE  -[facility] Name 05/27/25 1443          Motor Skills    Motor Skills coordination;functional endurance  -     Coordination fine motor deficit;gross motor deficit;bilateral;upper extremity;minimal impairment;moderate impairment  -     Functional Endurance fair minus  -[facility] Name 05/27/25 1443          Balance    Balance Assessment sitting static balance;standing static balance  -     Static Sitting Balance standby assist  -     Static Standing Balance minimal assist  -     Position/Device Used, Standing Balance walker, rolling  -               User Key  (r) = Recorded By, (t) = Taken By, (c) = Cosigned By      Initials Name Provider Type     Vanesa Tse OT Occupational Therapist                   Goals/Plan       [facility] Name 05/27/25 1443          Transfer Goal 1 (OT)    Activity/Assistive Device (Transfer Goal 1, OT) toilet  -     [place] Level/Cues Needed (Transfer Goal 1, OT) contact guard required  -     Time Frame (Transfer Goal 1, OT) by discharge  -[facility] Name 05/27/25 1445          Problem Specific Goal 1 (OT)    Problem Specific Goal 1 (OT) Patient will perform sustained activity X15 minutes to promote functional endurance/activity tolerance needed for daily occupations.  -     Time Frame (Problem Specific Goal 1, OT) by discharge  -[facility] Name 05/27/25 1443           Therapy Assessment/Plan (OT)    Planned Therapy Interventions (OT) activity tolerance training;BADL retraining;functional balance retraining;patient/caregiver education/training;occupation/activity based interventions;transfer/mobility retraining;ROM/therapeutic exercise;strengthening exercise;passive ROM/stretching  -               User Key  (r) = Recorded By, (t) = Taken By, (c) = Cosigned By      Initials Name Provider Type     Vanesa Tse, PATRICIA Occupational Therapist                   Clinical Impression       Row Name 05/27/25 1446          Pain Assessment    Pretreatment Pain Rating 1/10  -     Posttreatment Pain Rating 1/10  -     Pain Location foot  -     Pain Side/Orientation left  -       Row Name 05/27/25 1446          Plan of Care Review    Plan of Care Reviewed With patient  -     Progress no change  -     Outcome Evaluation Patient seen for OT evaluation. He presents with functional limitations including generalized weakness, impaired activity tolerance/functional endurance, impaired balance, and decreased safety due to cognition. Patient would benefit from ongoing OT services to promote highest level of independence and safety prior to discharge.  -       Row Name 05/27/25 1446          Therapy Assessment/Plan (OT)    Patient/Family Therapy Goal Statement (OT) return home after rehab  -     Rehab Potential (OT) good  -     Criteria for Skilled Therapeutic Interventions Met (OT) yes;meets criteria;skilled treatment is necessary  -     Therapy Frequency (OT) 5 times/wk  -     Predicted Duration of Therapy Intervention (OT) discharge  -       Row Name 05/27/25 1446          Therapy Plan Review/Discharge Plan (OT)    Anticipated Discharge Disposition (OT) inpatient rehabilitation facility  -       Row Name 05/27/25 1446          Positioning and Restraints    Pre-Treatment Position in bed  -     Post Treatment Position bed  -     In Bed fowlers;call light within  reach;encouraged to call for assist;with family/caregiver  -               User Key  (r) = Recorded By, (t) = Taken By, (c) = Cosigned By      Initials Name Provider Type    Vanesa Aguillon OT Occupational Therapist                   Outcome Measures       Row Name 05/27/25 1120          How much help from another person do you currently need...    Turning from your back to your side while in flat bed without using bedrails? 4  -LB     Moving from lying on back to sitting on the side of a flat bed without bedrails? 4  -LB     Moving to and from a bed to a chair (including a wheelchair)? 3  -LB     Standing up from a chair using your arms (e.g., wheelchair, bedside chair)? 3  -LB     Climbing 3-5 steps with a railing? 2  -LB     To walk in hospital room? 3  -LB     AM-PAC 6 Clicks Score (PT) 19  -LB               User Key  (r) = Recorded By, (t) = Taken By, (c) = Cosigned By      Initials Name Provider Type    Marysol Rodriguez, RN Registered Nurse                      OT Recommendation and Plan  Planned Therapy Interventions (OT): activity tolerance training, BADL retraining, functional balance retraining, patient/caregiver education/training, occupation/activity based interventions, transfer/mobility retraining, ROM/therapeutic exercise, strengthening exercise, passive ROM/stretching  Therapy Frequency (OT): 5 times/wk  Plan of Care Review  Plan of Care Reviewed With: patient  Progress: no change  Outcome Evaluation: Patient seen for OT evaluation. He presents with functional limitations including generalized weakness, impaired activity tolerance/functional endurance, impaired balance, and decreased safety due to cognition. Patient would benefit from ongoing OT services to promote highest level of independence and safety prior to discharge.     Time Calculation:         Time Calculation- OT       Row Name 05/27/25 2930             Time Calculation- OT    OT Received On 05/27/25  -                User Key  (r)  = Recorded By, (t) = Taken By, (c) = Cosigned By      Initials Name Provider Type     Vanesa Tse OT Occupational Therapist                  Therapy Charges for Today       Code Description Service Date Service Provider Modifiers Qty    18978593080  OT EVAL MOD COMPLEXITY 4 5/27/2025 Vanesa Tse OT GO 1                 Vanesa Tse OT  5/27/2025    Electronically signed by Vanesa Tse OT at 05/27/25 3439       Speech Language Pathology Notes (most recent note)    No notes exist for this encounter.       ADL Documentation (most recent)      Flowsheet Row Most Recent Value   Transferring 2 - assistive person   Toileting 3 - assistive equipment and person   Bathing 2 - assistive person   Dressing 2 - assistive person   Eating 0 - independent   Communication 2 - difficulty understanding and speaking (not related to language barrier)   Swallowing 0 - swallows foods/liquids without difficulty   Equipment Currently Used at Home walker, rolling, wheelchair

## 2025-06-02 NOTE — PROGRESS NOTES
[facility] HOSPITALIST PROGRESS NOTE     Patient Identification:  Name:  Ramsey Riley  Age:  70 y.o.  Sex:  male  :  1955  MRN:  [id number]  Visit Number:  [id number]  ROOM: [address] [place] [address]     Primary Care Provider:  Margret Purvis MD    Length of stay in inpatient status:  6    Subjective     Chief Compliant:    Chief Complaint   Patient presents with    Foot Pain    Suture / Staple Removal       History of Presenting Illness:    Patient was seen and examined this morning while he was sitting up in the bedside chair. He complained of left heel pain that he says has been present since he first presented to the hospital, but per review of previous notes he has been denying having any pain. The pain is worse with palpation or bearing weight.     Objective     Current Hospital Meds:amLODIPine, 10 mg, Oral, Daily  apixaban, 5 mg, Oral, Q12H  atorvastatin, 80 mg, Oral, Nightly  castor oil-balsam peru, 1 Application, Topical, Q12H  citalopram, 20 mg, Oral, Daily  dorzolamide (TRUSOPT) 2 % 1 drop, timolol (TIMOPTIC) 0.5 % 1 drop for Cosopt 22.3-6.8 mg/mL, , Both Eyes, BID  empagliflozin, 10 mg, Oral, Daily  famotidine, 20 mg, Oral, BID  insulin lispro protamine-insulin lispro, 15 Units, Subcutaneous, BID With Meals  levothyroxine, 137.5 mcg, Oral, QAM AC  losartan, 100 mg, Oral, Daily  pantoprazole, 40 mg, Oral, QAM AC  QUEtiapine, 100 mg, Oral, Nightly  QUEtiapine, 50 mg, Oral, BID  senna-docusate sodium, 2 tablet, Oral, BID  sodium chloride, 10 mL, Intravenous, Q12H         Current Antimicrobial Therapy:  Anti-Infectives (From admission, onward)      None          Current Diuretic Therapy:  Diuretics (From admission, onward)      None          ----------------------------------------------------------------------------------------------------------------------  Vital Signs:  Temp:  [97.8 °F (36.6 °C)-98.4 °F (36.9 °C)] 97.8 °F (36.6 °C)  Heart Rate:  [63-85] 80  Resp:  [18-20] 18  BP:  (122-177)/(66-96) 133/74  SpO2:  [91 %-97 %] 94 %  on   ;   Device (Oxygen Therapy): room air  Body mass index is 39.27 kg/m².    Wt Readings from Last 3 Encounters:   06/02/25 95.3 kg (210 lb)   05/19/25 104 kg (230 lb)   05/05/25 102 kg (223 lb 12.8 oz)     Intake & Output (last 3 days)         05/30 0701 05/31 0700 05/31 0701 06/01 0700 06/01 0701 06/02 0700 06/02 0701 06/03 0700    P.O. 1320 240 360 600    Total Intake(mL/kg) 1320 (13.2) 240 (2.5) 360 (3.8) 600 (6.3)    Urine (mL/kg/hr) 2350 (1) 1575 (0.7) 1250 (0.5) 1175 (1.4)    Stool   0     Total Output 2350 1575 1250 1175    Net -1030 -1335 -890 -575            Urine Unmeasured Occurrence   2 x     Stool Unmeasured Occurrence   3 x           Diet: Cardiac, Diabetic; Healthy Heart (2-3 Na+); Consistent Carbohydrate; Fluid Consistency: Thin (IDDSI 0)  ----------------------------------------------------------------------------------------------------------------------  Physical exam:   Constitutional:  Well-developed and well-nourished.  No acute distress.      HENT:  Head:  Normocephalic and atraumatic.    Cardiovascular:  Normal rate, regular rhythm   Pulmonary/Chest:  No respiratory distress, no wheezes, no crackles, no rhonchi  Musculoskeletal:  No deformity.  No red or swollen joints anywhere. There is mild to moderate tenderness of the left heel.   Neurological: Awake, alert, no focal deficit on gross examination. No slurred speech or facial droop.   Skin:  Skin is warm and dry.   Peripheral vascular:  No cyanosis, no edema.  Psychiatric: Appropriate mood and affect    ----------------------------------------------------------------------------------------------------------------------  Results from last 7 days   Lab Units 06/01/25  0108 05/30/25  0058 05/27/25  0106   WBC 10*3/mm3 7.33 6.90 7.87   HEMOGLOBIN g/dL 14.0 13.9 14.7   HEMATOCRIT % 42.8 43.3 44.8   MCV fL 91.1 91.9 90.5   MCHC g/dL 32.7 32.1 32.8   PLATELETS 10*3/mm3 141 155 149        "  Results from last 7 days   Lab Units 06/02/25  0358 06/01/25  0108 05/30/25  0058 05/28/25  0114 05/27/25  0106   SODIUM mmol/L 131* 135* 134*   < > 134*   POTASSIUM mmol/L 4.0 3.7 3.9   < > 4.4   CHLORIDE mmol/L 97* 101 100   < > 100   CO2 mmol/L 23.0 22.3 22.2   < > 22.9   BUN mg/dL 15.4 16.2 19.5   < > 13   CREATININE mg/dL 1.29* 1.41* 1.35*   < > 1.26   CALCIUM mg/dL 8.1* 8.4* 8.3*   < > 8.6   GLUCOSE mg/dL 343* 176* 186*   < > 221*   ALBUMIN g/dL  --   --  3.1*  --  3.3*   BILIRUBIN mg/dL  --   --  0.3  --  0.5   ALK PHOS U/L  --   --  127*  --  134*   AST (SGOT) U/L  --   --  23  --  25   ALT (SGPT) U/L  --   --  15  --  20    < > = values in this interval not displayed.   Estimated Creatinine Clearance: 54.2 mL/min (A) (by C-G formula based on SCr of 1.29 mg/dL (H)).  No results found for: \"AMMONIA\"              Glucose   Date/Time Value Ref Range Status   06/02/2025 1137 300 (H) 70 - 130 mg/dL Final   06/02/2025 0801 233 (H) 70 - 130 mg/dL Final   06/01/2025 1630 153 (H) 70 - 130 mg/dL Final   06/01/2025 1152 136 (H) 70 - 130 mg/dL Final   06/01/2025 0634 142 (H) 70 - 130 mg/dL Final   05/31/2025 1924 271 (H) 70 - 130 mg/dL Final   05/31/2025 1621 297 (H) 70 - 130 mg/dL Final   05/31/2025 1142 243 (H) 70 - 130 mg/dL Final     Lab Results   Component Value Date    TSH 4.970 (H) 05/26/2025    FREET4 1.24 12/10/2024     No results found for: \"PREGTESTUR\", \"PREGSERUM\", \"HCG\", \"HCGQUANT\"  Pain Management Panel  More data exists         Latest Ref Rng & Units 5/26/2025 5/5/2025   Pain Management Panel   Creatinine, Urine Not Estab. mg/dL - 110.9    Amphetamine, Urine Qual Negative Negative  -   Barbiturates Screen, Urine Negative Negative  -   Benzodiazepine Screen, Urine Negative Positive  -   Buprenorphine, Screen, Urine Negative Negative  -   Cocaine Screen, Urine Negative Negative  -   Fentanyl, Urine Negative Negative  -   Methadone Screen , Urine Negative Negative  -   Methamphetamine, Ur Negative Negative "  -     Brief Urine Lab Results  (Last result in the past 365 days)        Color   Clarity   Blood   Leuk Est   Nitrite   Protein   CREAT   Urine HCG        05/26/25 1249 Yellow   Clear   Trace   Negative   Negative   >=300 mg/dL (3+)                   I have personally looked at the labs and they are summarized above.  ----------------------------------------------------------------------------------------------------------------------  Detailed radiology reports for the last 24 hours:  Imaging Results (Last 24 Hours)       Procedure Component Value Units Date/Time    XR Foot 3+ View Left [[id number]] Collected: 06/02/25 1533     Updated: 06/02/25 1535    Narrative:      EXAM:    XR Left Foot Complete, 3 or More Views     EXAM DATE:    6/2/2025 2:29 PM     CLINICAL HISTORY:    left heel and ankle pain, recent fall; R29.6-Repeated falls;  T14.8XXA-Other injury of unspecified body region, initial encounter;  T14.8XXA-Other injury of unspecified body region, initial encounter     TECHNIQUE:    Frontal, lateral and oblique views of the left foot.     COMPARISON:    No relevant prior studies available.     FINDINGS:    Bones/joints:  Unremarkable.  No acute fracture or dislocation.    Soft tissues:  Unremarkable.  No radiopaque foreign body.    Vasculature:  Dense arterial vascular calcifications are noted.       Impression:        No acute fracture or dislocation.     This report was finalized on 6/2/2025 3:33 PM by Dr. Dao Winchester MD.       XR Ankle 3+ View Left [[id number]] Collected: 06/02/25 1531     Updated: 06/02/25 1534    Narrative:      EXAM:    XR Left Ankle Complete, 3 or More Views     EXAM DATE:    6/2/2025 2:42 PM     CLINICAL HISTORY:    left heel and ankle pain, recent fall; R29.6-Repeated falls;  T14.8XXA-Other injury of unspecified body region, initial encounter;  T14.8XXA-Other injury of unspecified body region, initial encounter     TECHNIQUE:    Frontal, lateral and oblique views of the left  ankle.     COMPARISON:    No relevant prior studies available.     FINDINGS:    Bones/joints:  Unremarkable.  No acute fracture.  No dislocation.    Soft tissues:  Unremarkable.    Vasculature:  Vascular calcifications noted.       Impression:        No acute findings in the left ankle.     This report was finalized on 6/2/2025 3:32 PM by Dr. Dao Winchester MD.             Assessment & Plan      #Frequent falls at home  #Assisted fall during hospitalization 5/31  #Generalized weakness and physical debility  #Recent right proximal humerus fracture due to fall, prior to admission  #Age indeterminate right posterior 4th rib fracture  #Vascular dementia  #Left heel and ankle pain  - Continue PT/OT as tolerated. Maintain fall precautions.  - As patient has had multiple recent falls and is reporting left ankle and heel pain I ordered x-rays to further evaluate. Per radiologist read there are no acute fractures or dislocations noted in the left ankle or foot. Differential diagnosis for patient's heel pain includes plantar fasciitis. Continue prn pain medications. If the pain continues and is affecting his ability to work with PT/OT then could try applying ice or wearing a brace.   - MRI brain without contrast was negative for evidence of acute ischemia, focal mass, or midline shift, but was markedly degraded due to patient motion.  - case management is assisting with referrals for short term rehab, appreciate assistance.     #Essential hypertension, now with improved BP control  #Diabetes mellitus type II, insulin dependent and with hyperglycemia  - continue home amlodipine 10mg po daily and losartan 100mg po daily.  - Continue insulin 75/25 here (in place of home 70/30 per hospital formulary). Glucose appears to have been fairly well controlled yesterday but is uncontrolled in the 200-300 range today. Increase from 15 units bid to 20 units bid. No hypoglycemia noted over the past several days. Per review of prior to  admission med rec patient was taking 84 units in the a.m and 64 units in the p.m.     #Tinea cruris  - continue nystatin powder bid    Chronic medical conditions:  #Pulmonary nodules (present since 2023)  #GERD  #Previous stroke  #History of blood clot - continue chronic anticoagulation with eliquis  #Coronary artery disease s/p three vessel CABG  #Chronic low back pain  #Hypothyroidism    Dispo: pending short term rehab referrals    Harini Urbina DO  [facility] Hospitalist  06/02/25  15:45 EDT

## 2025-06-03 LAB
GLUCOSE BLDC GLUCOMTR-MCNC: 124 MG/DL (ref 70–130)
GLUCOSE BLDC GLUCOMTR-MCNC: 141 MG/DL (ref 70–130)
GLUCOSE BLDC GLUCOMTR-MCNC: 168 MG/DL (ref 70–130)
GLUCOSE BLDC GLUCOMTR-MCNC: 178 MG/DL (ref 70–130)

## 2025-06-03 PROCEDURE — 97530 THERAPEUTIC ACTIVITIES: CPT

## 2025-06-03 PROCEDURE — 99231 SBSQ HOSP IP/OBS SF/LOW 25: CPT | Performed by: STUDENT IN AN ORGANIZED HEALTH CARE EDUCATION/TRAINING PROGRAM

## 2025-06-03 PROCEDURE — 97116 GAIT TRAINING THERAPY: CPT

## 2025-06-03 PROCEDURE — 63710000001 INSULIN LISPRO PROTAMINE-INSULIN LISPRO (75-25) 100 UNIT/ML SUSPENSION: Performed by: STUDENT IN AN ORGANIZED HEALTH CARE EDUCATION/TRAINING PROGRAM

## 2025-06-03 PROCEDURE — 82948 REAGENT STRIP/BLOOD GLUCOSE: CPT

## 2025-06-03 RX ADMIN — AMLODIPINE BESYLATE 10 MG: 10 TABLET ORAL at 10:21

## 2025-06-03 RX ADMIN — CASTOR OIL AND BALSAM, PERU 1 APPLICATION: 788; 87 OINTMENT TOPICAL at 20:44

## 2025-06-03 RX ADMIN — INSULIN LISPRO 20 UNITS: 100 INJECTION, SUSPENSION SUBCUTANEOUS at 18:00

## 2025-06-03 RX ADMIN — Medication 10 ML: at 10:22

## 2025-06-03 RX ADMIN — ALPRAZOLAM 0.5 MG: 0.5 TABLET ORAL at 20:43

## 2025-06-03 RX ADMIN — LOSARTAN POTASSIUM 100 MG: 50 TABLET, FILM COATED ORAL at 10:19

## 2025-06-03 RX ADMIN — FAMOTIDINE 20 MG: 20 TABLET ORAL at 10:20

## 2025-06-03 RX ADMIN — SENNOSIDES AND DOCUSATE SODIUM 2 TABLET: 50; 8.6 TABLET ORAL at 20:43

## 2025-06-03 RX ADMIN — CASTOR OIL AND BALSAM, PERU 1 APPLICATION: 788; 87 OINTMENT TOPICAL at 10:22

## 2025-06-03 RX ADMIN — INSULIN LISPRO 20 UNITS: 100 INJECTION, SUSPENSION SUBCUTANEOUS at 10:24

## 2025-06-03 RX ADMIN — APIXABAN 5 MG: 5 TABLET, FILM COATED ORAL at 20:43

## 2025-06-03 RX ADMIN — PANTOPRAZOLE SODIUM 40 MG: 40 TABLET, DELAYED RELEASE ORAL at 10:21

## 2025-06-03 RX ADMIN — TIMOLOL MALEATE: 5 SOLUTION OPHTHALMIC at 10:23

## 2025-06-03 RX ADMIN — ATORVASTATIN CALCIUM 80 MG: 40 TABLET, FILM COATED ORAL at 20:43

## 2025-06-03 RX ADMIN — LEVOTHYROXINE SODIUM 137.5 MCG: 0.12 TABLET ORAL at 10:21

## 2025-06-03 RX ADMIN — Medication 10 ML: at 20:44

## 2025-06-03 RX ADMIN — QUETIAPINE FUMARATE 100 MG: 100 TABLET ORAL at 20:43

## 2025-06-03 RX ADMIN — APIXABAN 5 MG: 5 TABLET, FILM COATED ORAL at 10:21

## 2025-06-03 RX ADMIN — FAMOTIDINE 20 MG: 20 TABLET ORAL at 20:43

## 2025-06-03 RX ADMIN — TIMOLOL MALEATE: 5 SOLUTION OPHTHALMIC at 20:44

## 2025-06-03 RX ADMIN — EMPAGLIFLOZIN 10 MG: 10 TABLET, FILM COATED ORAL at 10:20

## 2025-06-03 RX ADMIN — CITALOPRAM HYDROBROMIDE 20 MG: 20 TABLET ORAL at 10:21

## 2025-06-03 NOTE — THERAPY TREATMENT NOTE
Acute Care - Physical Therapy Treatment Note   [facility]     Patient Name: Ramsey Riley  : 1955  MRN: [id number]  Today's Date: 6/3/2025      Visit Dx:     ICD-10-CM ICD-9-CM   1. Frequent falls  R29.6 V15.88   2. Skin avulsion  T14.8XXA 879.8   3. Bone bruise  T14.8XXA 924.9     Patient Active Problem List   Diagnosis    Coronary artery disease involving native coronary artery of native heart without angina pectoris    Essential hypertension    Bradycardia, sinus    Fatigue    Abnormal ECG    Precordial pain    LVH (left ventricular hypertrophy) due to hypertensive disease, without heart failure    Diabetes mellitus    PAD (peripheral artery disease)    Ischemic stroke    History of blood clots    Pulmonary embolus    Dyslipidemia    Class 1 obesity due to excess calories with serious comorbidity and body mass index (BMI) of 34.0 to 34.9 in adult    Weakness    Frequent falls     Past Medical History:   Diagnosis Date    Arthritis     Clot     Coronary artery disease     Dementia 2024    Diabetes mellitus     Heart disease     History of blood clots     Hypertension     Impaired mobility     Low back pain     Thyroid disease     Type 2 diabetes mellitus      Past Surgical History:   Procedure Laterality Date    CHOLECYSTECTOMY      COLONOSCOPY      dr srivastava    CORONARY ARTERY BYPASS GRAFT      [place] - 3 BY PASS     ENDOSCOPY       PT Assessment (Last 12 Hours)       PT Evaluation and Treatment       Row Name 25 1133          Physical Therapy Time and Intention    Subjective Information complains of;weakness;fatigue  -KM     Document Type therapy note (daily note)  -KM     Mode of Treatment individual therapy;physical therapy  -KM     Patient Effort good  -KM     Symptoms Noted During/After Treatment fatigue  -KM       Row Name 25 1133          General Information    Patient Profile Reviewed yes  -KM     Patient Observations alert;cooperative;agree to therapy  -KM      Existing Precautions/Restrictions fall  -KM       Row Name 06/03/25 1133          Pain    Pretreatment Pain Rating 0/10 - no pain  -KM     Posttreatment Pain Rating 0/10 - no pain  -KM       Row Name 06/03/25 1133          Cognition    Affect/Mental Status (Cognition) WFL  -KM     Orientation Status (Cognition) oriented to;person;place;situation;verbal cues/prompts needed for orientation  -KM     Follows Commands (Cognition) follows one-step commands  -KM       Row Name 06/03/25 1133          Bed Mobility    Bed Mobility bed mobility (all) activities  -KM     All Activities, [place] (Bed Mobility) moderate assist (50% patient effort)  -KM     Assistive Device (Bed Mobility) bed rails;head of bed elevated  -KM       Row Name 06/03/25 1133          Transfers    Transfers sit-stand transfer;stand-sit transfer;bed-chair transfer  -       Row Name 06/03/25 1133          Bed-Chair Transfer    Bed-Chair [place] (Transfers) minimum assist (75% patient effort)  -KM     Assistive Device (Bed-Chair Transfers) walker, front-wheeled  -KM       Row Name 06/03/25 1133          Sit-Stand Transfer    Sit-Stand [place] (Transfers) minimum assist (75% patient effort)  -KM     Assistive Device (Sit-Stand Transfers) walker, front-wheeled  -KM       Row Name 06/03/25 1133          Stand-Sit Transfer    Stand-Sit [place] (Transfers) minimum assist (75% patient effort)  -KM     Assistive Device (Stand-Sit Transfers) walker, front-wheeled  -KM       Row Name 06/03/25 1133          Gait/Stairs (Locomotion)    Gait/Stairs Locomotion gait/ambulation independence;gait/ambulation assistive device;distance ambulated  -KM     [place] Level (Gait) contact guard;minimum assist (75% patient effort)  -KM     Assistive Device (Gait) walker, front-wheeled  -KM     Distance in Feet (Gait) 75  x2  -KM     Pattern (Gait) step-to  -KM     Deviations/Abnormal Patterns (Gait) ataxic;base of support, narrow;gait speed decreased  -KM      Bilateral Gait Deviations forward flexed posture  -KM     Left Sided Gait Deviations weight shift ability decreased  -KM       Row Name 06/03/25 1133          Safety Issues/Impairments Affecting Functional Mobility    Impairments Affecting Function (Mobility) balance;cognition;coordination;endurance/activity tolerance;pain;strength  -KM       Row Name             Wound 05/26/25 1618 Left posterior heel Pressure Injury    Wound - Properties Group Placement Date: 05/26/25  -CW Placement Time: 1618  -CW Present on Original Admission: Y  -CW Side: Left  -CW Orientation: posterior  -CW Location: heel  -CW Primary Wound Type: Pressure Inj  -CW    Retired Wound - Properties Group Placement Date: 05/26/25  -CW Placement Time: 1618  -CW Present on Original Admission: Y  -CW Side: Left  -CW Orientation: posterior  -CW Location: heel  -CW    Retired Wound - Properties Group Placement Date: 05/26/25  -CW Placement Time: 1618  -CW Present on Original Admission: Y  -CW Side: Left  -CW Orientation: posterior  -CW Location: heel  -CW    Retired Wound - Properties Group Date first assessed: 05/26/25  -CW Time first assessed: 1618  -CW Present on Original Admission: Y  -CW Side: Left  -CW Location: heel  -CW      Row Name             Wound 05/26/25 1622 Left anterior heel Pressure Injury    Wound - Properties Group Placement Date: 05/26/25  -CW Placement Time: 1622  -CW Side: Left  -CW Orientation: anterior  -CW Location: heel  -CW Primary Wound Type: Pressure Inj  -CW    Retired Wound - Properties Group Placement Date: 05/26/25  -CW Placement Time: 1622  -CW Side: Left  -CW Orientation: anterior  -CW Location: heel  -CW    Retired Wound - Properties Group Placement Date: 05/26/25  -CW Placement Time: 1622  -CW Side: Left  -CW Orientation: anterior  -CW Location: heel  -CW    Retired Wound - Properties Group Date first assessed: 05/26/25  -CW Time first assessed: 1622  -CW Side: Left  -CW Location: heel  -CW      Row Name              Wound 05/27/25 1007 medial parietal region Traumatic Abrasion    Wound - Properties Group Placement Date: 05/27/25  -LB Placement Time: 1007  -LB Present on Original Admission: Y  -LB Orientation: medial  -LB Location: parietal region  -LB Primary Wound Type: Traumatic  -LB Secondary Wound Type - Traumatic: Abrasion  -LB    Retired Wound - Properties Group Placement Date: 05/27/25  -LB Placement Time: 1007  -LB Present on Original Admission: Y  -LB Orientation: medial  -LB Location: parietal region  -LB    Retired Wound - Properties Group Placement Date: 05/27/25  -LB Placement Time: 1007  -LB Present on Original Admission: Y  -LB Orientation: medial  -LB Location: parietal region  -LB    Retired Wound - Properties Group Date first assessed: 05/27/25  -LB Time first assessed: 1007  -LB Present on Original Admission: Y  -LB Location: parietal region  -LB      Row Name             Wound 05/27/25 1008 Right distal arm Traumatic Skin Tear    Wound - Properties Group Placement Date: 05/27/25  -LB Placement Time: 1008  -LB Present on Original Admission: Y  -LB Side: Right  -LB Orientation: distal  -LB Location: arm  -LB Primary Wound Type: Traumatic  -LB Secondary Wound Type - Traumatic: Skin Tear  -LB    Retired Wound - Properties Group Placement Date: 05/27/25  -LB Placement Time: 1008  -LB Present on Original Admission: Y  -LB Side: Right  -LB Orientation: distal  -LB Location: arm  -LB    Retired Wound - Properties Group Placement Date: 05/27/25  -LB Placement Time: 1008  -LB Present on Original Admission: Y  -LB Side: Right  -LB Orientation: distal  -LB Location: arm  -LB    Retired Wound - Properties Group Date first assessed: 05/27/25  -LB Time first assessed: 1008  -LB Present on Original Admission: Y  -LB Side: Right  -LB Location: arm  -LB      Row Name 06/03/25 1133          Progress Summary (PT)    Daily Progress Summary (PT) Pt. continues to show progress during PT sessions. He was able to perform  functional mobility skills similar to prior sessions. He was able to ambulate improved distance w/ RW. Pt. tolerates activity well. Pt. would continue to benefit from PT services.  -               User Key  (r) = Recorded By, (t) = Taken By, (c) = Cosigned By      Initials Name Provider Type    CW Chata Pulido, RN Registered Nurse    Baljinder Reese, PT Physical Therapist    Marysol Rodriguez, RN Registered Nurse                    Physical Therapy Education       Title: PT OT SLP Therapies (Done)       Topic: Physical Therapy (Done)       Point: Mobility training (Done)       Learning Progress Summary            Patient Acceptance, E,TB, VU by  at 5/30/2025 2339    Acceptance, E,TB, NR by  at 5/28/2025 0920    Acceptance, E,TB, VU by  at 5/27/2025 1532                      Point: Home exercise program (Done)       Learning Progress Summary            Patient Acceptance, E,TB, VU by  at 5/30/2025 2339    Acceptance, E,TB, NR by  at 5/28/2025 0920    Acceptance, E,TB, VU by  at 5/27/2025 1532                      Point: Body mechanics (Done)       Learning Progress Summary            Patient Acceptance, E,TB, VU by  at 5/30/2025 2339    Acceptance, E,TB, NR by  at 5/28/2025 0920    Acceptance, E,TB, VU by  at 5/27/2025 1532                      Point: Precautions (Done)       Learning Progress Summary            Patient Acceptance, E,TB, VU by  at 5/30/2025 2339    Acceptance, E,TB, NR by  at 5/28/2025 0920    Acceptance, E,TB, VU by  at 5/27/2025 1532                                      User Key       Initials Effective Dates Name Provider Type Discipline     06/16/21 -  Vy Tejeda, RN Registered Nurse Nurse     05/24/22 -  Baljinder Martinez, PT Physical Therapist PT     02/07/25 -  Dagmar Maciel, RN Registered Nurse Nurse                  PT Recommendation and Plan  Anticipated Discharge Disposition (PT): inpatient rehabilitation facility  Planned Therapy Interventions (PT):  balance training, bed mobility training, gait training, home exercise program, patient/family education, postural re-education, ROM (range of motion), strengthening, stretching, transfer training  Therapy Frequency (PT): 2 times/wk (2-5x/wk)  Progress Summary (PT)  Daily Progress Summary (PT): Pt. continues to show progress during PT sessions. He was able to perform functional mobility skills similar to prior sessions. He was able to ambulate improved distance w/ RW. Pt. tolerates activity well. Pt. would continue to benefit from PT services.  Plan of Care Reviewed With: patient  Outcome Evaluation: Pt. evaluation completed during PT session. He was able to perform functional mobility skills w/ modA. He was able to ambulate in room w/ RW and Teddy. Pt. would benefit from further PT services at this time.       Time Calculation:    PT Charges       Row Name 06/03/25 1133             Time Calculation    PT Received On 06/03/25  -KM         Time Calculation- PT    Total Timed Code Minutes- PT 23 minute(s)  -KM                User Key  (r) = Recorded By, (t) = Taken By, (c) = Cosigned By      Initials Name Provider Type    Baljinder Reese, PT Physical Therapist                  Therapy Charges for Today       Code Description Service Date Service Provider Modifiers Qty    56315938697 HC PT THERAPEUTIC ACT EA 15 MIN 6/2/2025 Baljinder Martinez, PT GP 1    02885873785 HC GAIT TRAINING EA 15 MIN 6/2/2025 Baljinder Martinez, PT GP 1    80289043148 HC PT THERAPEUTIC ACT EA 15 MIN 6/3/2025 Baljinder Martinez, PT GP 1    46734550416 HC GAIT TRAINING EA 15 MIN 6/3/2025 Baljinder Martinez, PT GP 1            PT G-Codes  AM-PAC 6 Clicks Score (PT): 15    Baljinder Martinez PT  6/3/2025

## 2025-06-03 NOTE — CASE MANAGEMENT/SOCIAL WORK
Discharge Planning Assessment  [facility]     Patient Name: Ramsey Riley  MRN: [id number]  Today's Date: 6/3/2025    Admit Date: 5/26/2025    Plan: [facility] per [facility] unable to accept pt due to home medications.  SS discussed with pt's spouse. Pt's spouse stated understanding of reasons for denial.  SS will follow up in am.     Discharge Plan       Row Name 06/03/25 1647       Plan    Plan [facility] per [facility] unable to accept pt due to home medications.  SS discussed with pt's spouse. Pt's spouse stated understanding of reasons for denial.  SS will follow up in am.      Row Name 06/03/25 1432       Plan    Plan SS spoke with Milla at [facility] who stated pt was denied due to home medication list.  SS spoke with pt and spouse at bedside.  Pt's spouse requests short term nursing home placement for rehab and is not interested at this time in long term nursing home placement.  Pt's spouse requested SS send referral to [facility] for review.  SS sent [facility] referral via [employer] and notified [facility].  [facility] per [facility] to complete an onsite on this date.  SS will follow.                  Continued Care and Services - Admitted Since 5/26/2025       Destination       Service Provider Request Status Services Address Phone Fax Patient Preferred    [facility] Pending - Request Sent -- [address] [facility] [address] [phone] --    [facility] Declined  Fall risk -- [address] [facility] [address] [phone] --    [facility] [place] Declined  medications -- [address] [facility] [address] [phone] --    [facility] Declined  Bed not available -- [address] [address] [phone] --                  Expected Discharge Date and Time       Expected Discharge Date Expected Discharge Time    Jun 4, 2025              Haley Larkin, BSW

## 2025-06-03 NOTE — THERAPY TREATMENT NOTE
Acute Care - Occupational Therapy Treatment Note   Ilya     Patient Name: Ramsey Riley  : 1955  MRN: [id number]  Today's Date: 6/3/2025             Admit Date: 2025       ICD-10-CM ICD-9-CM   1. Frequent falls  R29.6 V15.88   2. Skin avulsion  T14.8XXA 879.8   3. Bone bruise  T14.8XXA 924.9     Patient Active Problem List   Diagnosis    Coronary artery disease involving native coronary artery of native heart without angina pectoris    Essential hypertension    Bradycardia, sinus    Fatigue    Abnormal ECG    Precordial pain    LVH (left ventricular hypertrophy) due to hypertensive disease, without heart failure    Diabetes mellitus    PAD (peripheral artery disease)    Ischemic stroke    History of blood clots    Pulmonary embolus    Dyslipidemia    Class 1 obesity due to excess calories with serious comorbidity and body mass index (BMI) of 34.0 to 34.9 in adult    Weakness    Frequent falls     Past Medical History:   Diagnosis Date    Arthritis     Clot     Coronary artery disease     Dementia 2024    Diabetes mellitus     Heart disease     History of blood clots     Hypertension     Impaired mobility     Low back pain     Thyroid disease     Type 2 diabetes mellitus      Past Surgical History:   Procedure Laterality Date    CHOLECYSTECTOMY      COLONOSCOPY      dr srivastava    CORONARY ARTERY BYPASS GRAFT      [facility] - 3 BY PASS     ENDOSCOPY           OT ASSESSMENT FLOWSHEET (Last 12 Hours)       OT Evaluation and Treatment       Row Name 25 1016                   OT Time and Intention    Document Type therapy note (daily note)  -KR        Mode of Treatment occupational therapy  -KR        Patient Effort adequate  -KR        Comment Pt seen on this date for BUE ther ex to enhance strength/endurance needed to assist with self care tasks. Pt would benefit from additional / extended therapy services to enhance ability to perform/assist with household/self care performance.   -KR           General Information    General Observations of Patient alert/cooperative  -KR           Motor Skills    Therapeutic Exercise shoulder;elbow/forearm;hand  -KR           Shoulder (Therapeutic Exercise)    Shoulder (Therapeutic Exercise) AROM (active range of motion)  -KR        Shoulder AROM (Therapeutic Exercise) left;flexion  -KR           Elbow/Forearm (Therapeutic Exercise)    Elbow/Forearm (Therapeutic Exercise) AROM (active range of motion)  -KR        Elbow/Forearm AROM (Therapeutic Exercise) bilateral;flexion;extension  -KR           Hand (Therapeutic Exercise)    Hand (Therapeutic Exercise) AROM (active range of motion)  -KR        Hand AROM/AAROM (Therapeutic Exercise) bilateral;finger flexion;finger extension  -KR           Wound 05/26/25 1618 Left posterior heel Pressure Injury    Wound - Properties Group Placement Date: 05/26/25  -CW Placement Time: 1618  -CW Present on Original Admission: Y  -CW Side: Left  -CW Orientation: posterior  -CW Location: heel  -CW Primary Wound Type: Pressure Inj  -CW    Retired Wound - Properties Group Placement Date: 05/26/25  -CW Placement Time: 1618  -CW Present on Original Admission: Y  -CW Side: Left  -CW Orientation: posterior  -CW Location: heel  -CW    Retired Wound - Properties Group Placement Date: 05/26/25  -CW Placement Time: 1618  -CW Present on Original Admission: Y  -CW Side: Left  -CW Orientation: posterior  -CW Location: heel  -CW    Retired Wound - Properties Group Date first assessed: 05/26/25  -CW Time first assessed: 1618  -CW Present on Original Admission: Y  -CW Side: Left  -CW Location: heel  -CW       Wound 05/26/25 1622 Left anterior heel Pressure Injury    Wound - Properties Group Placement Date: 05/26/25  -CW Placement Time: 1622  -CW Side: Left  -CW Orientation: anterior  -CW Location: heel  -CW Primary Wound Type: Pressure Inj  -CW    Retired Wound - Properties Group Placement Date: 05/26/25  -CW Placement Time: 1622  -CW Side: Left   -CW Orientation: anterior  -CW Location: heel  -CW    Retired Wound - Properties Group Placement Date: 05/26/25  -CW Placement Time: 1622  -CW Side: Left  -CW Orientation: anterior  -CW Location: heel  -CW    Retired Wound - Properties Group Date first assessed: 05/26/25  -CW Time first assessed: 1622  -CW Side: Left  -CW Location: heel  -CW       Wound 05/27/25 1007 medial parietal region Traumatic Abrasion    Wound - Properties Group Placement Date: 05/27/25  -LB Placement Time: 1007  -LB Present on Original Admission: Y  -LB Orientation: medial  -LB Location: parietal region  -LB Primary Wound Type: Traumatic  -LB Secondary Wound Type - Traumatic: Abrasion  -LB    Retired Wound - Properties Group Placement Date: 05/27/25  -LB Placement Time: 1007  -LB Present on Original Admission: Y  -LB Orientation: medial  -LB Location: parietal region  -LB    Retired Wound - Properties Group Placement Date: 05/27/25  -LB Placement Time: 1007  -LB Present on Original Admission: Y  -LB Orientation: medial  -LB Location: parietal region  -LB    Retired Wound - Properties Group Date first assessed: 05/27/25  -LB Time first assessed: 1007  -LB Present on Original Admission: Y  -LB Location: parietal region  -LB       Wound 05/27/25 1008 Right distal arm Traumatic Skin Tear    Wound - Properties Group Placement Date: 05/27/25  -LB Placement Time: 1008  -LB Present on Original Admission: Y  -LB Side: Right  -LB Orientation: distal  -LB Location: arm  -LB Primary Wound Type: Traumatic  -LB Secondary Wound Type - Traumatic: Skin Tear  -LB    Retired Wound - Properties Group Placement Date: 05/27/25  -LB Placement Time: 1008  -LB Present on Original Admission: Y  -LB Side: Right  -LB Orientation: distal  -LB Location: arm  -LB    Retired Wound - Properties Group Placement Date: 05/27/25  -LB Placement Time: 1008  -LB Present on Original Admission: Y  -LB Side: Right  -LB Orientation: distal  -LB Location: arm  -LB    Retired Wound -  Properties Group Date first assessed: 05/27/25  -LB Time first assessed: 1008  -LB Present on Original Admission: Y  -LB Side: Right  -LB Location: arm  -LB       Plan of Care Review    Plan of Care Reviewed With patient;spouse  -KR           Progress Summary (OT)    Progress Toward Functional Goals (OT) progress toward functional goals as expected  -KR           Therapy Plan Review/Discharge Plan (OT)    Anticipated Discharge Disposition (OT) extended care facility;inpatient rehabilitation facility  -KR                  User Key  (r) = Recorded By, (t) = Taken By, (c) = Cosigned By      Initials Name Effective Dates    CW Chata Pulido, RN 10/05/21 -     Dao Valentine OT 06/16/21 -     LB Marysol Nix RN 10/20/21 -                            OT Recommendation and Plan     Progress Toward Functional Goals (OT): progress toward functional goals as expected  Plan of Care Review  Plan of Care Reviewed With: patient, spouse  Plan of Care Reviewed With: patient, spouse        Time Calculation:     Therapy Charges for Today       Code Description Service Date Service Provider Modifiers Qty    [id number]  OT THERAPEUTIC ACT EA 15 MIN 6/3/2025 Dao Noland OT GO 1                 Dao Noland OT  6/3/2025

## 2025-06-03 NOTE — PROGRESS NOTES
[facility] HOSPITALIST PROGRESS NOTE     Patient Identification:  Name:  Ramsey Riley  Age:  70 y.o.  Sex:  male  :  1955  MRN:  [id number]  Visit Number:  [id number]  ROOM: [address] [place] [address]     Primary Care Provider:  Margret Purvis MD    Length of stay in inpatient status:  7    Subjective     Chief Compliant:    Chief Complaint   Patient presents with    Foot Pain    Suture / Staple Removal       History of Presenting Illness:    Patient seen and examined today with his wife present at bedside. He reports continued left heel pain which is worse with standing, but is better with rest. His wife says she is concerned about taking him home after discharge as she has a very hard time helping him get up out of bed (she reports having a bad shoulder) and she does not have help at home.    Objective     Current Hospital Meds:amLODIPine, 10 mg, Oral, Daily  apixaban, 5 mg, Oral, Q12H  atorvastatin, 80 mg, Oral, Nightly  castor oil-balsam peru, 1 Application, Topical, Q12H  citalopram, 20 mg, Oral, Daily  dorzolamide (TRUSOPT) 2 % 1 drop, timolol (TIMOPTIC) 0.5 % 1 drop for Cosopt 22.3-6.8 mg/mL, , Both Eyes, BID  empagliflozin, 10 mg, Oral, Daily  famotidine, 20 mg, Oral, BID  insulin lispro protamine-insulin lispro, 20 Units, Subcutaneous, BID With Meals  levothyroxine, 137.5 mcg, Oral, QAM AC  losartan, 100 mg, Oral, Daily  pantoprazole, 40 mg, Oral, QAM AC  QUEtiapine, 100 mg, Oral, Nightly  QUEtiapine, 50 mg, Oral, BID  senna-docusate sodium, 2 tablet, Oral, BID  sodium chloride, 10 mL, Intravenous, Q12H         Current Antimicrobial Therapy:  Anti-Infectives (From admission, onward)      None          Current Diuretic Therapy:  Diuretics (From admission, onward)      None          ----------------------------------------------------------------------------------------------------------------------  Vital Signs:  Temp:  [98.1 °F (36.7 °C)-98.7 °F (37.1 °C)] 98.1 °F (36.7 °C)  Heart Rate:  [54-86]  67  Resp:  [18] 18  BP: (115-159)/(61-86) 117/68  SpO2:  [92 %-98 %] 96 %  on   ;   Device (Oxygen Therapy): room air  Body mass index is 39.7 kg/m².    Wt Readings from Last 3 Encounters:   06/03/25 96.3 kg (212 lb 4.8 oz)   05/19/25 104 kg (230 lb)   05/05/25 102 kg (223 lb 12.8 oz)     Intake & Output (last 3 days)         05/31 0701 06/01 0700 06/01 0701 06/02 0700 06/02 0701 06/03 0700 06/03 0701 06/04 0700    P.O. 240 360 840 480    Total Intake(mL/kg) 240 (2.5) 360 (3.8) 840 (8.7) 480 (5)    Urine (mL/kg/hr) 1575 (0.7) 1250 (0.5) 1175 (0.5) 1200 (1.1)    Stool  0  0    Total Output 1575 1250 1175 1200    Net -1335 -890 -335 -504            Urine Unmeasured Occurrence  2 x 2 x     Stool Unmeasured Occurrence  3 x  1 x          Diet: Cardiac, Diabetic; Healthy Heart (2-3 Na+); Consistent Carbohydrate; Fluid Consistency: Thin (IDDSI 0)  ----------------------------------------------------------------------------------------------------------------------  Physical exam:   Constitutional:  Well-developed and well-nourished.  No acute distress.      HENT:  Head:  Normocephalic and atraumatic.   Pulmonary/Chest:  Normal rate and effort  Musculoskeletal:  No deformity.    Neurological: Awake, alert, no focal deficit on gross examination. No slurred speech or facial droop.   Skin:  Skin is warm and dry.   Psychiatric: Appropriate mood and affect  Edited by: Harini Urbina DO at 6/3/2025 1847  ----------------------------------------------------------------------------------------------------------------------  Results from last 7 days   Lab Units 06/01/25 0108 05/30/25 0058   WBC 10*3/mm3 7.33 6.90   HEMOGLOBIN g/dL 14.0 13.9   HEMATOCRIT % 42.8 43.3   MCV fL 91.1 91.9   MCHC g/dL 32.7 32.1   PLATELETS 10*3/mm3 141 155         Results from last 7 days   Lab Units 06/02/25  0358 06/01/25 0108 05/30/25 0058   SODIUM mmol/L 131* 135* 134*   POTASSIUM mmol/L 4.0 3.7 3.9   CHLORIDE mmol/L 97* 101 100   CO2 mmol/L  "23.0 22.3 22.2   BUN mg/dL 15.4 16.2 19.5   CREATININE mg/dL 1.29* 1.41* 1.35*   CALCIUM mg/dL 8.1* 8.4* 8.3*   GLUCOSE mg/dL 343* 176* 186*   ALBUMIN g/dL  --   --  3.1*   BILIRUBIN mg/dL  --   --  0.3   ALK PHOS U/L  --   --  127*   AST (SGOT) U/L  --   --  23   ALT (SGPT) U/L  --   --  15   Estimated Creatinine Clearance: 54.5 mL/min (A) (by C-G formula based on SCr of 1.29 mg/dL (H)).  No results found for: \"AMMONIA\"              Glucose   Date/Time Value Ref Range Status   06/03/2025 1630 178 (H) 70 - 130 mg/dL Final   06/03/2025 1057 124 70 - 130 mg/dL Final   06/03/2025 0658 141 (H) 70 - 130 mg/dL Final   06/02/2025 1941 200 (H) 70 - 130 mg/dL Final   06/02/2025 1617 199 (H) 70 - 130 mg/dL Final   06/02/2025 1137 300 (H) 70 - 130 mg/dL Final   06/02/2025 0801 233 (H) 70 - 130 mg/dL Final   06/01/2025 1630 153 (H) 70 - 130 mg/dL Final     Lab Results   Component Value Date    TSH 4.970 (H) 05/26/2025    FREET4 1.24 12/10/2024     No results found for: \"PREGTESTUR\", \"PREGSERUM\", \"HCG\", \"HCGQUANT\"  Pain Management Panel  More data exists         Latest Ref Rng & Units 5/26/2025 5/5/2025   Pain Management Panel   Creatinine, Urine Not Estab. mg/dL - 110.9    Amphetamine, Urine Qual Negative Negative  -   Barbiturates Screen, Urine Negative Negative  -   Benzodiazepine Screen, Urine Negative Positive  -   Buprenorphine, Screen, Urine Negative Negative  -   Cocaine Screen, Urine Negative Negative  -   Fentanyl, Urine Negative Negative  -   Methadone Screen , Urine Negative Negative  -   Methamphetamine, Ur Negative Negative  -     Brief Urine Lab Results  (Last result in the past 365 days)        Color   Clarity   Blood   Leuk Est   Nitrite   Protein   CREAT   Urine HCG        05/26/25 1249 Yellow   Clear   Trace   Negative   Negative   >=300 mg/dL (3+)                 No results found for: \"BLOODCX\"  No results found for: \"URINECX\"  No results found for: \"WOUNDCX\"  No results found for: \"STOOLCX\"  No results found " "for: \"RESPCX\"  No results found for: \"AFBCX\"        I have personally looked at the labs and they are summarized above.  ----------------------------------------------------------------------------------------------------------------------  Detailed radiology reports for the last 24 hours:  Imaging Results (Last 24 Hours)       ** No results found for the last 24 hours. **          Assessment & Plan        #Frequent falls at home  #Assisted fall during hospitalization 5/31  #Generalized weakness and physical debility  #Recent right proximal humerus fracture due to fall, prior to admission  #Age indeterminate right posterior 4th rib fracture  #Vascular dementia  #Left heel and ankle pain  - Continue PT/OT as tolerated. Maintain fall precautions.  - X-rays of left heel and ankle were negative for fracture. Possible plantar fasciitis? Continue PRN pain medications. Recommend applying ice pack as needed 3-4x per day.   - MRI brain without contrast was negative for evidence of acute ischemia, focal mass, or midline shift, but was markedly degraded due to patient motion.  - Case management has assisted with referrals for short term rehab, appreciate assistance. He unfortunately was declined by his insurance company for inpatient rehab at our facility and he has been declined by multiple skilled nursing facilities. Per discussion with case management the denials of the skilled nursing facilities are due to him taking seroquel as a home medication for insomnia related to vascular dementia, although he has not had any behavioral disturbance throughout his hospitalization and it is prescribed by his primary care physician who specializes in internal medicine and geriatric medicine. He has shown good progress during his physical therapy sessions and PT/OT continue to recommend admission to inpatient rehabilitation facility as he is high risk for additional falls and injury upon returning home.    #Essential hypertension, now " with improved BP control  #Diabetes mellitus type II, insulin dependent and with hyperglycemia  - continue home amlodipine 10mg po daily and losartan 100mg po daily.  - Blood sugar is better controlled today but still occasionally up to 200. Lowest blood sugar today was 124. Continue insulin 75/25 20 units bid.    #Tinea cruris  - continue nystatin powder bid    Chronic medical conditions:  #Pulmonary nodules (present since 2023)  #GERD  #Previous stroke  #History of blood clot - continue chronic anticoagulation with eliquis  #Coronary artery disease s/p three vessel CABG  #Chronic low back pain  #Hypothyroidism  Edited by: Harini Urbina DO at 6/3/2025 1847    Active VTE Prophylaxis  Pharmacologic:        Start     Dose Route Frequency Stop    05/27/25 1215  apixaban (ELIQUIS) tablet 5 mg         5 mg PO Every 12 Hours Scheduled --                    Dispo: pending swing bed consult vs returning home with home health    Harini Urbina DO  [facility] Hospitalist  06/03/25  18:47 EDT

## 2025-06-03 NOTE — PLAN OF CARE
Goal Outcome Evaluation:  Plan of Care Reviewed With: patient        Progress: no change  Outcome Evaluation: Patient resting in bed. VSS on room air. Wound care completed per orders. Patient voices no concerns at this time, will continue with POC.

## 2025-06-03 NOTE — DISCHARGE PLACEMENT REQUEST
"Cici Lopez (70 y.o. Male)       Date of Birth   1955    Social Security Number       Address   [address] [place] [address]    Home Phone   [phone]    MRN   [id number]       Yarsanism   Patient Refused    Marital Status                               Admission Date   5/26/2025    Admission Type   Emergency    Admitting Provider   Magali Baig DO    Attending Provider   Harini Urbina DO    Department, Room/Bed   [address], 3306/2S       Discharge Date       Discharge Disposition       Discharge Destination                                 Attending Provider: Harini Urbina DO    Allergies: Penicillins, Trazodone    Isolation: None   Infection: None   Code Status: CPR    Ht: 155.8 cm (61.32\")   Wt: 96.3 kg (212 lb 4.8 oz)    Admission Cmt: None   Principal Problem: Frequent falls [R29.6]                   Active Insurance as of 5/26/2025       Primary Coverage       Payor Plan Insurance Group Employer/Plan Group    ANTHEM MEDICARE REPLACEMENT [facility] [place]IC[place] [facility] [id number]       Payor Plan Address Payor Plan Phone Number Payor Plan Fax Number Effective Dates    [address] [phone]  1/1/2024 - None Entered    [facility] [id number]         Subscriber Name Subscriber Birth Date Member ID       CICI LOPEZ 1955 [id number]                     Emergency Contacts        (Rel.) Home Phone Work Phone Mobile Phone    Jina Lopez (Spouse) [phone] -- [phone]              Emergency Contact Information       Name Relation Home Work Mobile    Jina Lopez Spouse [phone]          Other Contacts    None on File       Insurance Information                  ANTH MEDICARE REPLACEMENT/[facility] MEDICARE ADVANTAGE O Phone: [phone]    Subscriber: Cici Lopez Subscriber#: [id number]    Group#: [id number] Precert#: [id number]    Authorization#: -- Effective Date: --          Problem List  "          Codes Noted - Resolved       Hospital    * (Principal) Frequent falls ICD-10-CM: R29.6  ICD-9-CM: V15.88 5/26/2025 - Present       Non-Hospital    Weakness ICD-10-CM: R53.1  ICD-9-CM: 780.79 12/10/2024 - Present    Dyslipidemia ICD-10-CM: E78.5  ICD-9-CM: 272.4 10/17/2024 - Present    Class 1 obesity due to excess calories with serious comorbidity and body mass index (BMI) of 34.0 to 34.9 in adult ICD-10-CM: E66.811, E66.09, Z68.34  ICD-9-CM: 278.00, V85.34 10/17/2024 - Present    History of blood clots ICD-10-CM: Z86.718  ICD-9-CM: V12.51 Unknown - Present    Pulmonary embolus ICD-10-CM: I26.99  ICD-9-CM: 415.19 7/11/2022 - Present    Ischemic stroke ICD-10-CM: I63.9  ICD-9-CM: 434.91 6/21/2022 - Present    Diabetes mellitus ICD-10-CM: E11.9  ICD-9-CM: 250.00 Unknown - Present    PAD (peripheral artery disease) ICD-10-CM: I73.9  ICD-9-CM: 443.9 1/4/2021 - Present    Precordial pain ICD-10-CM: R07.2  ICD-9-CM: 786.51 8/10/2018 - Present    LVH (left ventricular hypertrophy) due to hypertensive disease, without heart failure ICD-10-CM: I11.9  ICD-9-CM: 402.90 8/10/2018 - Present    Coronary artery disease involving native coronary artery of native heart without angina pectoris ICD-10-CM: I25.10  ICD-9-CM: 414.01 6/21/2018 - Present    Essential hypertension ICD-10-CM: I10  ICD-9-CM: 401.9 6/21/2018 - Present    Bradycardia, sinus ICD-10-CM: R00.1  ICD-9-CM: 427.89 6/21/2018 - Present    Fatigue ICD-10-CM: R53.83  ICD-9-CM: 780.79 6/21/2018 - Present    Abnormal ECG ICD-10-CM: R94.31  ICD-9-CM: 794.31 6/21/2018 - Present        History & Physical        Justo Grande PA-C at 05/26/25 1435       Attestation signed by Magali Baig DO at 05/26/25 9477      I have reviewed this documentation and agree. Patient seen and examined in 311-2. No visitors present. Patient has no complaints at present. He states he has no prodromal symptoms prior to falling. No syncope. Mostly just generalized  weakness. No dizziness. No known vitamin deficiencies or previous ETOH use.    Exam largely unremarkable; he does have a scar on right hemicranium from recently falling and striking a fireplace. Left heel blistered area noted; no drainage.     Place on observation with telemetry. MRI brain w/o contrast given falls on eliquis in the setting of vascular dementia. PT/OT and SS consult with b12/folate levels in [facility]  History & Physical    Patient Identification:  Name:  Ramsey Riley  Age:  70 y.o.  Sex:  male  :  1955  MRN:  [id number]   Visit Number:  [id number]  Admit Date: 2025   Primary Care Physician:  Margret Purvis MD    Subjective     Chief complaint: Foot pain    History of presenting illness:      Ramsey Riley is a 70 y.o. male who presented for further evaluation of foot pain. He was seen and examined in the ED with spouse at the bedside. He and spouse report history mild vascular dementia with mild confusion and memory difficulty at baseline but he was pleasant and conversant. His spouse assists in providing history. They report they presented to the ED today secondary to foot pain. Patient has had a deep bruise/blister on the back of his heel for the past several days which he reports is incredibly painful. He reports deep, burning pain. His spouse states that last evening and overnight the pain seemed worse and the patient was very uncomfortable at home, at times even yelling out in pain so she decided to bring him in to the ED for further evaluation.   She believes the wound on the foot may be related to one of his recent multiple falls but they cannot pinpoint when the exact occurrence was. Spouse and patient report he has fallen multiple times in the past month. With one fall they report he injured his shoulder, spouse states had a fracture but unsure specifics. He did not require surgical intervention but  has been in a sling for the past several weeks. Recently followed up with provider regarding shoulder injury and was told to discontinue the sling but patient has still been wearing this as he states it is too painful without it. He was hospitalized in [place] s/p fall in the recent past and the plan was for some sort of inpatient rehab however they had difficulty securing placement for him. Wife reports that he is significantly debilitated and has declined further since that discharge. At this time he is requiring assistance with bed mobility, ADLs such as dressing. She states he uses a walker but remains significantly unsteady on his feet and she is not physically able to stabilize him herself. She further adds that his vision is very poor due to diabetic retinopathy and this is limited his ability to safely ambulate in their home.  When he does fall he is unable to get up on his own and she is unable to assist. He denies any chest pain, palpitations, or dizziness preceding these falls.     Past medical history is significant for vascular dementia, CAD, HTN, PAD, hyperlipidemia, diabetes mellitus, chronically anticoagulated with Eliquis, thyroid disease    Upon arrival to the ED, vital signs were temp 98, heart rate 84, respirations 18, /75, SpO2 97% on RA.  On laboratory workup he was hyperglycemic at 282, CBC unremarkable.  CXR negative, XR bilateral feet negative.    Known Emergency Department medications received prior to my evaluation included none.   Emergency Department Room location at the time of my evaluation was 411.     ---------------------------------------------------------------------------------------------------------------------   Review of Systems   Constitutional:  Positive for activity change.   HENT:  Negative for congestion and rhinorrhea.    Respiratory:  Negative for cough and shortness of breath.    Cardiovascular:  Negative for chest pain and leg swelling.   Gastrointestinal:   Negative for abdominal pain, diarrhea, nausea and vomiting.   Genitourinary:  Negative for difficulty urinating and dysuria.   Musculoskeletal:  Positive for arthralgias, gait problem and myalgias.   Skin:  Positive for wound. Negative for rash.   Neurological:  Positive for weakness. Negative for dizziness and light-headedness.   Psychiatric/Behavioral:  Positive for confusion (baseline).         ---------------------------------------------------------------------------------------------------------------------   Past Medical History:   Diagnosis Date    Arthritis     Clot     Coronary artery disease     Dementia 12/18/2024    Diabetes mellitus     Heart disease     History of blood clots     Hypertension     Impaired mobility     Low back pain     Thyroid disease     Type 2 diabetes mellitus      Past Surgical History:   Procedure Laterality Date    CHOLECYSTECTOMY  2015    COLONOSCOPY  2015    dr srivastava    CORONARY ARTERY BYPASS GRAFT      [place] -  BY PASS     ENDOSCOPY       Family History   Problem Relation Age of Onset    Diabetes Mother     Heart disease Mother         STENTING    COPD Mother     Heart attack Father     Diabetes Father     Heart defect Son         Ross procedure    Heart murmur Son     Hypertension Son      Social History     Socioeconomic History    Marital status:    Tobacco Use    Smoking status: Never     Passive exposure: Never    Smokeless tobacco: Current     Types: Chew   Vaping Use    Vaping status: Never Used   Substance and Sexual Activity    Alcohol use: No    Drug use: No    Sexual activity: Defer     ---------------------------------------------------------------------------------------------------------------------   Allergies:  Penicillins and Trazodone  ---------------------------------------------------------------------------------------------------------------------   Home medications:    Medications below are reported home medications pulling from within the  system; at this time, these medications have not been reconciled unless otherwise specified and are in the verification process for further verifcation as current home medications.  (Not in a hospital admission)      Hospital Scheduled Meds:          Current listed hospital scheduled medications may not yet reflect those currently placed in orders that are signed and held awaiting patient's arrival to floor.   ---------------------------------------------------------------------------------------------------------------------     Objective     Vital Signs:  Temp:  [98 °F (36.7 °C)] 98 °F (36.7 °C)  Heart Rate:  [69-84] 69  Resp:  [18] 18  BP: (148-176)/(73-95) 148/75      05/26/25  1133   Weight: 99.8 kg (220 lb)     Body mass index is 30.68 kg/m².  ---------------------------------------------------------------------------------------------------------------------       Physical Exam  Vitals and nursing note reviewed.   Constitutional:       General: He is not in acute distress.     Appearance: He is obese. He is ill-appearing (chronically ill appearing).   HENT:      Head: Normocephalic and atraumatic.   Eyes:      Extraocular Movements: Extraocular movements intact.   Cardiovascular:      Rate and Rhythm: Normal rate and regular rhythm.   Pulmonary:      Effort: Pulmonary effort is normal. No respiratory distress.      Breath sounds: Normal breath sounds.   Abdominal:      Palpations: Abdomen is soft.      Tenderness: There is no abdominal tenderness.   Musculoskeletal:      Right lower leg: No edema.      Left lower leg: No edema.   Skin:     General: Skin is warm and dry.      Comments: Wound to posterior left heel, no surrounding erythema or warmth, no drainage or other sign of infection   Neurological:      Mental Status: He is alert. Mental status is at baseline.   Psychiatric:         Mood and Affect: Mood normal.         Behavior: Behavior normal.  "        ---------------------------------------------------------------------------------------------------------------------  EKG:      ---------------------------------------------------------------------------------------------------------------------   Results from last 7 days   Lab Units 05/26/25  1250   CRP mg/dL 0.30   WBC 10*3/mm3 7.38   HEMOGLOBIN g/dL 15.2   HEMATOCRIT % 47.1   MCV fL 91.6   MCHC g/dL 32.3   PLATELETS 10*3/mm3 148         Results from last 7 days   Lab Units 05/26/25  1250   SODIUM mmol/L 134*   POTASSIUM mmol/L 4.6   CHLORIDE mmol/L 98   CO2 mmol/L 25.5   BUN mg/dL 12   CREATININE mg/dL 1.28*   CALCIUM mg/dL 9.2   GLUCOSE mg/dL 282*   ALBUMIN g/dL 3.9   BILIRUBIN mg/dL 0.6   ALK PHOS U/L 150*   AST (SGOT) U/L 31   ALT (SGPT) U/L 25   Estimated Creatinine Clearance: 66.5 mL/min (A) (by C-G formula based on SCr of 1.28 mg/dL (H)).  No results found for: \"AMMONIA\"          Lab Results   Component Value Date    HGBA1C 8.30 (H) 05/05/2025     Lab Results   Component Value Date    TSH 4.610 (H) 05/05/2025    FREET4 1.24 12/10/2024     No results found for: \"PREGTESTUR\", \"PREGSERUM\", \"HCG\", \"HCGQUANT\"  Pain Management Panel  More data exists         Latest Ref Rng & Units 5/5/2025 12/10/2024   Pain Management Panel   Creatinine, Urine Not Estab. mg/dL 110.9  -   Amphetamine, Urine Qual Negative - Negative    Barbiturates Screen, Urine Negative - Negative    Benzodiazepine Screen, Urine Negative - Positive    Buprenorphine, Screen, Urine Negative - Negative    Cocaine Screen, Urine Negative - Negative    Fentanyl, Urine Negative - Negative    Methadone Screen , Urine Negative - Negative    Methamphetamine, Ur Negative - Negative      No results found for: \"BLOODCX\"  No results found for: \"URINECX\"  No results found for: \"WOUNDCX\"  No results found for: \"STOOLCX\"      ---------------------------------------------------------------------------------------------------------------------  Imaging " "Results (Last 7 Days)       Procedure Component Value Units Date/Time    XR Foot 3+ View Bilateral [[id number]] Collected: 05/26/25 1335     Updated: 05/26/25 1415    Narrative:      Exam: XR FOOT 3+ VW BILATERAL-     History: pain     Comparison: No images available     Findings:     Right distal first tow resection.      No acute fracture or dislocation.     Soft tissue are normal.       Impression:      Impression:     No acute bony process.     This report was finalized on 5/26/2025 2:12 PM by Brock Tafoya MD.       XR Chest 1 View [[id number]] Collected: 05/26/25 1335     Updated: 05/26/25 1337    Narrative:      Procedure: Frontal view of chest obtained.     Comparison: None available     History: frequent falls     Findings:     No pneumonia or acute process seen in the chest.  Normal heart size and mediastinal contours.  Trachea is in midline position.  No edema or effusion is seen.  There is no evidence of pneumothorax.       Impression:         No evidence of acute disease in the chest.     This report was finalized on 5/26/2025 1:35 PM by Brock Tafoya MD.               Cultures:  No results found for: \"BLOODCX\", \"URINECX\", \"WOUNDCX\", \"MRSACX\", \"RESPCX\", \"STOOLCX\"    Last echocardiogram:  Results for orders placed during the hospital encounter of 12/10/24    Adult Transthoracic Echo Complete W/ Cont if Necessary Per Protocol    Interpretation Summary    Left ventricular systolic function is normal. Calculated left ventricular EF = 67.8% Left ventricular ejection fraction appears to be 66 - 70%.    Left ventricular wall thickness is consistent with mild concentric hypertrophy. Left ventricular diastolic function was indeterminate.    The right ventricular cavity is borderline dilated with borderline systolic function.    The left atrial cavity is mildly dilated.    Mild aortic valve regurgitation is present.    Saline test results are negative for right to left atrial level shunt.          I have " personally reviewed the above radiology images and read the final radiology report on 05/26/25  ---------------------------------------------------------------------------------------------------------------------  Assessment / Plan     Active Hospital Problems    Diagnosis  POA    **Frequent falls [R29.6]  Not Applicable       ASSESSMENT/PLAN:    Vascular dementia  Acute on chronic debility  Frequent falls  Recent proximal right humerus fracture due to fall  Patient presented to the ED secondary to foot pain.  On further discussion about reports history of vascular dementia with increasing difficulty caring for the patient at home.  He is fallen 3 times within the past month and sustained a right humerus fracture.  She reports he is currently requiring assistance getting out of bed, dressing, etc.  She states he is unsteady on his feet and has difficulty navigating their home despite use of a walker and her assistance.  Will admit to the telemetry unit for further observation and management  Will proceed with MRI brain without contrast to further evaluate  PT OT consults  Appreciate CM/SW assistance with discharge planning as well.  Will check TSH, folate, B12  Continue supportive care measures  Continue to trend labs    Chronic:  CAD  HTN  PAD  HLD  Diabetes mellitus  Hx blood clot, chronically anticoagulated  Continue home medication regimen as indicated cover with SSI for now.  Accu-Cheks to monitor with hypoglycemia protocol in place.  Add basal insulin as clinically indicated  Trend vital signs    ----------  -DVT prophylaxis: Chronically anticoagulated with Eliquis  -Activity: Up with assistance  -Expected length of stay: Less than 2 midnights  -Disposition pending further clinical course and PT OT recommendations    High risk secondary to frequent falls, vascular dementia    Code Status and Medical Interventions: CPR (Attempt to Resuscitate); Full Support   Ordered at: 05/26/25 1430     Code Status (Patient  has no pulse and is not breathing):    CPR (Attempt to Resuscitate)     Medical Interventions (Patient has pulse or is breathing):    Full Support       Justo Grande PA-C   05/26/25  14:35 EDT    Electronically signed by Magali Baig DO at 05/26/25 1819       Vital Signs (last day)       Date/Time Temp Temp src Pulse Resp BP Patient Position SpO2    06/03/25 1055 98.4 (36.9) Oral 71 18 155/86 Lying 98    06/03/25 0658 98.1 (36.7) Oral 54 18 159/76 Lying 95    06/03/25 0331 98.4 (36.9) Oral 61 18 133/72 Lying 95    06/02/25 2334 98.3 (36.8) Oral 71 18 115/61 Lying 92    06/02/25 1921 98.7 (37.1) Oral 86 18 124/69 Sitting 94    06/02/25 1500 97.8 (36.6) Oral 80 18 135/79 Lying 94    06/02/25 1300 -- -- 77 -- -- -- 92    06/02/25 1100 -- -- 79 -- -- -- 97    06/02/25 1026 97.9 (36.6) Oral 73 18 133/74 Sitting 96    06/02/25 0900 -- -- 67 -- -- -- 94    06/02/25 0801 97.8 (36.6) Oral 63 18 129/66 Lying 96    06/02/25 0337 98.1 (36.7) Oral 67 18 122/67 Lying 91          Lines, Drains & Airways       Active LDAs       Name Placement date Placement time Site Days    Peripheral IV 05/27/25 0415 20 G Left;Posterior Forearm 05/27/25  0415  Forearm  7                  Current Facility-Administered Medications   Medication Dose Route Frequency Provider Last Rate Last Admin    acetaminophen (TYLENOL) tablet 1,000 mg  1,000 mg Oral Q8H PRN Magali Baig DO   1,000 mg at 06/01/25 1714    ALPRAZolam (XANAX) tablet 0.5 mg  0.5 mg Oral Nightly PRN Magali Baig DO   0.5 mg at 06/02/25 1909    amLODIPine (NORVASC) tablet 10 mg  10 mg Oral Daily Magali Baig DO   10 mg at 06/03/25 1021    apixaban (ELIQUIS) tablet 5 mg  5 mg Oral Q12H Magali Baig DO   5 mg at 06/03/25 1021    atorvastatin (LIPITOR) tablet 80 mg  80 mg Oral Nightly Magali Baig DO   80 mg at 06/02/25 2032    sennosides-docusate (PERICOLACE) 8.6-50 MG per tablet 2 tablet  2 tablet Oral BID Magali Baig DO    2 tablet at 06/02/25 2033    And    polyethylene glycol (MIRALAX) packet 17 g  17 g Oral Daily PRN Magali Baig DO        And    bisacodyl (DULCOLAX) EC tablet 5 mg  5 mg Oral Daily PRN Magali Baig DO        And    bisacodyl (DULCOLAX) suppository 10 mg  10 mg Rectal Daily PRN Magali Baig DO        castor oil-balsam peru (VENELEX) ointment 1 Application  1 Application Topical Q12H Magali Baig DO   1 Application at 06/03/25 1022    citalopram (CeleXA) tablet 20 mg  20 mg Oral Daily Magali Baig DO   20 mg at 06/03/25 1021    dextrose (D50W) (25 g/50 mL) IV injection 25 g  25 g Intravenous Q15 Min PRN Magali Baig DO        dextrose (GLUTOSE) oral gel 15 g  15 g Oral Q15 Min PRN Magali Baig DO        dorzolamide (TRUSOPT) 2 % 1 drop, timolol (TIMOPTIC) 0.5 % 1 drop for Cosopt 22.3-6.8 mg/mL   Both Eyes BID Magali Baig DO   Given at 06/03/25 1023    empagliflozin (JARDIANCE) tablet 10 mg  10 mg Oral Daily Magali Baig DO   10 mg at 06/03/25 1020    famotidine (PEPCID) tablet 20 mg  20 mg Oral BID Magali Baig DO   20 mg at 06/03/25 1020    glucagon HCl (Diagnostic) injection 1 mg  1 mg Intramuscular Q15 Min PRN Magali Baig DO        HYDROcodone-acetaminophen (NORCO) 7.5-325 MG per tablet 1 tablet  1 tablet Oral Q12H PRN Magali Baig DO   1 tablet at 06/02/25 1909    insulin lispro protamine-insulin lispro (humaLOG 75-25) injection 20 Units  20 Units Subcutaneous BID With Meals Harini Urbina DO   20 Units at 06/03/25 1024    levothyroxine (SYNTHROID, LEVOTHROID) tablet 137.5 mcg  137.5 mcg Oral QAM AC Magali Baig DO   137.5 mcg at 06/03/25 1021    losartan (COZAAR) tablet 100 mg  100 mg Oral Daily Magali Baig DO   100 mg at 06/03/25 1019    nitroglycerin (NITROSTAT) SL tablet 0.4 mg  0.4 mg Sublingual Q5 Min PRN Magali Baig DO        pantoprazole (PROTONIX) EC tablet 40 mg   40 mg Oral QAM AC Magali Baig, DO   40 mg at 06/03/25 1021    QUEtiapine (SEROquel) tablet 100 mg  100 mg Oral Nightly Magali Baig, DO   100 mg at 06/02/25 2032    QUEtiapine (SEROquel) tablet 50 mg  50 mg Oral BID Magali Baig, DO   50 mg at 06/02/25 0900    sodium chloride 0.9 % flush 10 mL  10 mL Intravenous PRN Magali Baig, DO        sodium chloride 0.9 % flush 10 mL  10 mL Intravenous Q12H Magali Baig, DO   10 mL at 06/03/25 1022    sodium chloride 0.9 % flush 10 mL  10 mL Intravenous PRN Magali Baig,         sodium chloride 0.9 % infusion 40 mL  40 mL Intravenous PRN Magali Baig,          Lab Results (most recent)       Procedure Component Value Units Date/Time    POC Glucose Once [[id number]]  (Normal) Collected: 06/03/25 1057    Specimen: Blood Updated: 06/03/25 1103     Glucose 124 mg/dL     POC Glucose Once [[id number]]  (Abnormal) Collected: 06/03/25 0658    Specimen: Blood Updated: 06/03/25 0714     Glucose 141 mg/dL     Basic Metabolic Panel [[id number]]  (Abnormal) Collected: 06/02/25 0358    Specimen: Blood Updated: 06/02/25 0451     Glucose 343 mg/dL      BUN 15.4 mg/dL      Creatinine 1.29 mg/dL      Sodium 131 mmol/L      Potassium 4.0 mmol/L      Chloride 97 mmol/L      CO2 23.0 mmol/L      Calcium 8.1 mg/dL      BUN/Creatinine Ratio 11.9     Anion Gap 11.0 mmol/L      eGFR 60.4 mL/min/1.73     Narrative:      GFR Categories in Chronic Kidney Disease (CKD)              GFR Category          GFR (mL/min/1.73)    Interpretation  G1                    90 or greater        Normal or high (1)  G2                    60-89                Mild decrease (1)  G3a                   45-59                Mild to moderate decrease  G3b                   30-44                Moderate to severe decrease  G4                    15-29                Severe decrease  G5                    14 or less           Kidney failure    (1)In the absence of  evidence of kidney disease, neither GFR category G1 or G2 fulfill the criteria for CKD.    eGFR calculation 2021 CKD-EPI creatinine equation, which does not include race as a factor    Basic Metabolic Panel [[id number]]  (Abnormal) Collected: 06/01/25 0108    Specimen: Blood Updated: 06/01/25 0142     Glucose 176 mg/dL      BUN 16.2 mg/dL      Creatinine 1.41 mg/dL      Sodium 135 mmol/L      Potassium 3.7 mmol/L      Chloride 101 mmol/L      CO2 22.3 mmol/L      Calcium 8.4 mg/dL      BUN/Creatinine Ratio 11.5     Anion Gap 11.7 mmol/L      eGFR 54.3 mL/min/1.73     Narrative:      GFR Categories in Chronic Kidney Disease (CKD)              GFR Category          GFR (mL/min/1.73)    Interpretation  G1                    90 or greater        Normal or high (1)  G2                    60-89                Mild decrease (1)  G3a                   45-59                Mild to moderate decrease  G3b                   30-44                Moderate to severe decrease  G4                    15-29                Severe decrease  G5                    14 or less           Kidney failure    (1)In the absence of evidence of kidney disease, neither GFR category G1 or G2 fulfill the criteria for CKD.    eGFR calculation 2021 CKD-EPI creatinine equation, which does not include race as a factor    CBC (No Diff) [[id number]]  (Abnormal) Collected: 06/01/25 0108    Specimen: Blood Updated: 06/01/25 0120     WBC 7.33 10*3/mm3      RBC 4.70 10*6/mm3      Hemoglobin 14.0 g/dL      Hematocrit 42.8 %      MCV 91.1 fL      MCH 29.8 pg      MCHC 32.7 g/dL      RDW 12.2 %      RDW-SD 40.8 fl      MPV 10.2 fL      Platelets 141 10*3/mm3     Comprehensive Metabolic Panel [[id number]]  (Abnormal) Collected: 05/30/25 0058    Specimen: Blood Updated: 05/30/25 0353     Glucose 186 mg/dL      BUN 19.5 mg/dL      Creatinine 1.35 mg/dL      Sodium 134 mmol/L      Potassium 3.9 mmol/L      Chloride 100 mmol/L      CO2 22.2 mmol/L      Calcium 8.3 mg/dL       Total Protein 5.7 g/dL      Albumin 3.1 g/dL      ALT (SGPT) 15 U/L      AST (SGOT) 23 U/L      Alkaline Phosphatase 127 U/L      Total Bilirubin 0.3 mg/dL      Globulin 2.6 gm/dL      A/G Ratio 1.2 g/dL      BUN/Creatinine Ratio 14.4     Anion Gap 11.8 mmol/L      eGFR 57.2 mL/min/1.73     Narrative:      GFR Categories in Chronic Kidney Disease (CKD)              GFR Category          GFR (mL/min/1.73)    Interpretation  G1                    90 or greater        Normal or high (1)  G2                    60-89                Mild decrease (1)  G3a                   45-59                Mild to moderate decrease  G3b                   30-44                Moderate to severe decrease  G4                    15-29                Severe decrease  G5                    14 or less           Kidney failure    (1)In the absence of evidence of kidney disease, neither GFR category G1 or G2 fulfill the criteria for CKD.    eGFR calculation 2021 CKD-EPI creatinine equation, which does not include race as a factor    CBC & Differential [[id number]]  (Abnormal) Collected: 05/30/25 0058    Specimen: Blood Updated: 05/30/25 0144    Narrative:      The following orders were created for panel order CBC & Differential.  Procedure                               Abnormality         Status                     ---------                               -----------         ------                     CBC Auto Differential[[id number]]        Abnormal            Final result                 Please view results for these tests on the individual orders.    CBC Auto Differential [[id number]]  (Abnormal) Collected: 05/30/25 0058    Specimen: Blood Updated: 05/30/25 0144     WBC 6.90 10*3/mm3      RBC 4.71 10*6/mm3      Hemoglobin 13.9 g/dL      Hematocrit 43.3 %      MCV 91.9 fL      MCH 29.5 pg      MCHC 32.1 g/dL      RDW 12.3 %      RDW-SD 41.6 fl      MPV 10.3 fL      Platelets 155 10*3/mm3      Neutrophil % 51.7 %      Lymphocyte % 34.3 %       Monocyte % 8.8 %      Eosinophil % 4.3 %      Basophil % 0.3 %      Immature Grans % 0.6 %      Neutrophils, Absolute 3.56 10*3/mm3      Lymphocytes, Absolute 2.37 10*3/mm3      Monocytes, Absolute 0.61 10*3/mm3      Eosinophils, Absolute 0.30 10*3/mm3      Basophils, Absolute 0.02 10*3/mm3      Immature Grans, Absolute 0.04 10*3/mm3      nRBC 0.0 /100 WBC     Vitamin B12 [[id number]]  (Abnormal) Collected: 05/27/25 0106    Specimen: Blood Updated: 05/27/25 1238     Vitamin B-12 1,046 pg/mL     Narrative:      Results may be falsely increased if patient taking Biotin.      Folate [[id number]]  (Normal) Collected: 05/27/25 0106    Specimen: Blood Updated: 05/27/25 1238     Folate 6.92 ng/mL     Narrative:      Results may be falsely increased if patient taking Biotin.      Comprehensive Metabolic Panel [[id number]]  (Abnormal) Collected: 05/27/25 0106    Specimen: Blood Updated: 05/27/25 0149     Glucose 221 mg/dL      BUN 13 mg/dL      Creatinine 1.26 mg/dL      Sodium 134 mmol/L      Potassium 4.4 mmol/L      Comment: Specimen hemolyzed.  Result may be falsely elevated.        Chloride 100 mmol/L      CO2 22.9 mmol/L      Calcium 8.6 mg/dL      Total Protein 5.9 g/dL      Albumin 3.3 g/dL      ALT (SGPT) 20 U/L      AST (SGOT) 25 U/L      Alkaline Phosphatase 134 U/L      Total Bilirubin 0.5 mg/dL      Globulin 2.6 gm/dL      A/G Ratio 1.3 g/dL      BUN/Creatinine Ratio 10.3     Anion Gap 11.1 mmol/L      eGFR 62.1 mL/min/1.73     Narrative:      GFR Categories in Chronic Kidney Disease (CKD)              GFR Category          GFR (mL/min/1.73)    Interpretation  G1                    90 or greater        Normal or high (1)  G2                    60-89                Mild decrease (1)  G3a                   45-59                Mild to moderate decrease  G3b                   30-44                Moderate to severe decrease  G4                    15-29                Severe decrease  G5                    14 or less            Kidney failure    (1)In the absence of evidence of kidney disease, neither GFR category G1 or G2 fulfill the criteria for CKD.    eGFR calculation 2021 CKD-EPI creatinine equation, which does not include race as a factor    CBC & Differential [[id number]] Collected: 05/27/25 0106    Specimen: Blood Updated: 05/27/25 0128    Narrative:      The following orders were created for panel order CBC & Differential.  Procedure                               Abnormality         Status                     ---------                               -----------         ------                     CBC Auto Differential[[id number]]        Normal              Final result               Scan Slide[[id number]]                                                                    Please view results for these tests on the individual orders.    CBC Auto Differential [[id number]]  (Normal) Collected: 05/27/25 0106    Specimen: Blood Updated: 05/27/25 0128     WBC 7.87 10*3/mm3      RBC 4.95 10*6/mm3      Hemoglobin 14.7 g/dL      Hematocrit 44.8 %      MCV 90.5 fL      MCH 29.7 pg      MCHC 32.8 g/dL      RDW 12.3 %      RDW-SD 40.2 fl      MPV 10.2 fL      Platelets 149 10*3/mm3      Neutrophil % 66.8 %      Lymphocyte % 21.2 %      Monocyte % 8.5 %      Eosinophil % 2.7 %      Basophil % 0.4 %      Immature Grans % 0.4 %      Neutrophils, Absolute 5.26 10*3/mm3      Lymphocytes, Absolute 1.67 10*3/mm3      Monocytes, Absolute 0.67 10*3/mm3      Eosinophils, Absolute 0.21 10*3/mm3      Basophils, Absolute 0.03 10*3/mm3      Immature Grans, Absolute 0.03 10*3/mm3      nRBC 0.0 /100 WBC     TSH [[id number]]  (Abnormal) Collected: 05/26/25 1250    Specimen: Blood Updated: 05/26/25 1700     TSH 4.970 uIU/mL     Fentanyl, Urine - Urine, Clean Catch [[id number]]  (Normal) Collected: 05/26/25 1249    Specimen: Urine, Clean Catch Updated: 05/26/25 1637     Fentanyl, Urine Negative    Narrative:      Negative Threshold:      Fentanyl 5 ng/mL     The  normal value for the drug tested is negative. This report includes final unconfirmed screening results to be used for medical treatment purposes only. Unconfirmed results must not be used for non-medical purposes such as employment or legal testing. Clinical consideration should be applied to any drug of abuse test, particularly when unconfirmed results are used.           Urine Drug Screen - Urine, Clean Catch [[id number]]  (Abnormal) Collected: 05/26/25 1249    Specimen: Urine, Clean Catch Updated: 05/26/25 1634     THC, Screen, Urine Negative     Phencyclidine (PCP), Urine Negative     Cocaine Screen, Urine Negative     Methamphetamine, Ur Negative     Opiate Screen Positive     Amphetamine Screen, Urine Negative     Benzodiazepine Screen, Urine Positive     Tricyclic Antidepressants Screen Positive     Methadone Screen, Urine Negative     Barbiturates Screen, Urine Negative     Oxycodone Screen, Urine Negative     Buprenorphine, Screen, Urine Negative    Narrative:      Cutoff For Drugs Screened:    Amphetamines               500 ng/ml  Barbiturates               200 ng/ml  Benzodiazepines            150 ng/ml  Cocaine                    150 ng/ml  Methadone                  200 ng/ml  Opiates                    100 ng/ml  Phencyclidine               25 ng/ml  THC                         50 ng/ml  Methamphetamine            500 ng/ml  Tricyclic Antidepressants  300 ng/ml  Oxycodone                  100 ng/ml  Buprenorphine               10 ng/ml    The normal value for all drugs tested is negative. This report includes unconfirmed screening results, with the cutoff values listed, to be used for medical treatment purposes only.  Unconfirmed results must not be used for non-medical purposes such as employment or legal testing.  Clinical consideration should be applied to any drug of abuse test, particularly when unconfirmed results are used.      C-reactive Protein [[id number]]  (Normal) Collected: 05/26/25 1250     Specimen: Blood Updated: 05/26/25 1333     C-Reactive Protein 0.30 mg/dL     [place] Urine Culture Tube - Urine, Clean Catch [[id number]] Collected: 05/26/25 1249    Specimen: Urine, Clean Catch Updated: 05/26/25 1315     Extra Tube Hold for add-ons.     Comment: Auto resulted.       Urinalysis With Microscopic If Indicated (No Culture) - Urine, Clean Catch [[id number]]  (Abnormal) Collected: 05/26/25 1249    Specimen: Urine, Clean Catch Updated: 05/26/25 1315     Color, UA Yellow     Appearance, UA Clear     pH, UA 6.0     Specific Gravity, UA 1.017     Glucose,  mg/dL (2+)     Ketones, UA Negative     Bilirubin, UA Negative     Blood, UA Trace     Protein, UA >=300 mg/dL (3+)     Leuk Esterase, UA Negative     Nitrite, UA Negative     Urobilinogen, UA 0.2 E.U./dL    Urinalysis, Microscopic Only - Urine, Clean Catch [[id number]]  (Abnormal) Collected: 05/26/25 1249    Specimen: Urine, Clean Catch Updated: 05/26/25 1315     RBC, UA 0-2 /HPF      WBC, UA 11-20 /HPF      Bacteria, UA Trace /HPF      Squamous Epithelial Cells, UA 0-2 /HPF      Hyaline Casts, UA 7-12 /LPF      Methodology Automated Microscopy    [place] Draw [[id number]] Collected: 05/26/25 1250    Specimen: Blood Updated: 05/26/25 1315    Narrative:      The following orders were created for panel order [place] Draw.  Procedure                               Abnormality         Status                     ---------                               -----------         ------                     Green Top (Gel)[[id number]]                                  Final result               Lavender Top[[id number]]                                     Final result               Gold Top - SST[[id number]]                                   Final result               Light Blue Top[[id number]]                                   Final result                 Please view results for these tests on the individual orders.    Green Top (Gel) [[id number]] Collected: 05/26/25 1250     Specimen: Blood Updated: 05/26/25 1315     Extra Tube Hold for add-ons.     Comment: Auto resulted.       Lavender Top [[id number]] Collected: 05/26/25 1250    Specimen: Blood Updated: 05/26/25 1315     Extra Tube hold for add-on     Comment: Auto resulted       Gold Top - SST [[id number]] Collected: 05/26/25 1250    Specimen: Blood Updated: 05/26/25 1315     Extra Tube Hold for add-ons.     Comment: Auto resulted.       Light Blue Top [[id number]] Collected: 05/26/25 1250    Specimen: Blood Updated: 05/26/25 1315     Extra Tube Hold for add-ons.     Comment: Auto resulted             Orders (last 24 hrs)        Start     Ordered    06/03/25 1104  POC Glucose Once  PROCEDURE ONCE        Comments: Complete no more than 45 minutes prior to patient eating      06/03/25 1057    06/03/25 0715  POC Glucose Once  PROCEDURE ONCE        Comments: Complete no more than 45 minutes prior to patient eating      06/03/25 0658    06/02/25 1948  POC Glucose Once  PROCEDURE ONCE        Comments: Complete no more than 45 minutes prior to patient eating      06/02/25 1941    06/02/25 1800  insulin lispro protamine-insulin lispro (humaLOG 75-25) injection 20 Units  2 Times Daily With Meals         06/02/25 1603    06/02/25 1800  Dietary Nutrition Supplements Other (See Comment); Boost Glucose Control Chocolate  Daily With Lunch & Dinner       06/02/25 1608    06/02/25 1625  POC Glucose Once  PROCEDURE ONCE        Comments: Complete no more than 45 minutes prior to patient eating      06/02/25 1617    06/02/25 1216  XR Foot 3+ View Left  1 Time Imaging         06/02/25 1215    06/02/25 1216  XR Ankle 3+ View Left  1 Time Imaging         06/02/25 1215    05/28/25 0600  Wound Care  Daily       05/27/25 1439    05/27/25 2100  atorvastatin (LIPITOR) tablet 80 mg  Nightly         05/27/25 1120    05/27/25 2100  QUEtiapine (SEROquel) tablet 100 mg  Nightly         05/27/25 1120    05/27/25 1800  insulin lispro protamine-insulin lispro (humaLOG  "75-25) injection 15 Units  2 Times Daily With Meals,   Status:  Discontinued         05/27/25 1635    05/27/25 1600  QUEtiapine (SEROquel) tablet 50 mg  2 Times Daily         05/27/25 1120    05/27/25 1445  castor oil-balsam peru (VENELEX) ointment 1 Application  Every 12 Hours Scheduled         05/27/25 1435    05/27/25 1300  dorzolamide (TRUSOPT) 2 % 1 drop, timolol (TIMOPTIC) 0.5 % 1 drop for Cosopt 22.3-6.8 mg/mL  2 Times Daily         05/27/25 1120    05/27/25 1215  amLODIPine (NORVASC) tablet 10 mg  Daily         05/27/25 1120    05/27/25 1215  apixaban (ELIQUIS) tablet 5 mg  Every 12 Hours Scheduled         05/27/25 1120    05/27/25 1215  citalopram (CeleXA) tablet 20 mg  Daily         05/27/25 1120    05/27/25 1215  empagliflozin (JARDIANCE) tablet 10 mg  Daily         05/27/25 1120    05/27/25 1215  famotidine (PEPCID) tablet 20 mg  2 Times Daily         05/27/25 1120    05/27/25 1215  levothyroxine (SYNTHROID, LEVOTHROID) tablet 137.5 mcg  Every Morning Before Breakfast         05/27/25 1120    05/27/25 1215  losartan (COZAAR) tablet 100 mg  Daily         05/27/25 1120    05/27/25 1215  pantoprazole (PROTONIX) EC tablet 40 mg  Every Morning Before Breakfast         05/27/25 1120    05/27/25 1120  ALPRAZolam (XANAX) tablet 0.5 mg  Nightly PRN         05/27/25 1120    05/27/25 1120  HYDROcodone-acetaminophen (NORCO) 7.5-325 MG per tablet 1 tablet  Every 12 Hours PRN         05/27/25 1120    05/26/25 2100  sodium chloride 0.9 % flush 10 mL  Every 12 Hours Scheduled         05/26/25 1617 05/26/25 2100  sennosides-docusate (PERICOLACE) 8.6-50 MG per tablet 2 tablet  2 Times Daily        Placed in \"And\" Linked Group    05/26/25 1617 05/26/25 2000  Vital Signs  Every 4 Hours      Comments: Per per hospital policy    05/26/25 1617    05/26/25 1800  Oral Care  2 Times Daily       05/26/25 1617    05/26/25 1700  POC Glucose 4x Daily Before Meals & at Bedtime  4 Times Daily Before Meals & at Bedtime    " "  Comments: Complete no more than 45 minutes prior to patient eating      05/26/25 1617    05/26/25 1618  Intake & Output  Every Shift       05/26/25 1617    05/26/25 1617  sodium chloride 0.9 % flush 10 mL  As Needed         05/26/25 1617    05/26/25 1617  sodium chloride 0.9 % infusion 40 mL  As Needed         05/26/25 1617    05/26/25 1617  nitroglycerin (NITROSTAT) SL tablet 0.4 mg  Every 5 Minutes PRN         05/26/25 1617    05/26/25 1617  polyethylene glycol (MIRALAX) packet 17 g  Daily PRN        Placed in \"And\" Linked Group    05/26/25 1617    05/26/25 1617  bisacodyl (DULCOLAX) EC tablet 5 mg  Daily PRN        Placed in \"And\" Linked Group    05/26/25 1617    05/26/25 1617  bisacodyl (DULCOLAX) suppository 10 mg  Daily PRN        Placed in \"And\" Linked Group    05/26/25 1617    05/26/25 1617  acetaminophen (TYLENOL) tablet 1,000 mg  Every 8 Hours PRN         05/26/25 1617    05/26/25 1617  dextrose (GLUTOSE) oral gel 15 g  Every 15 Minutes PRN         05/26/25 1617    05/26/25 1617  dextrose (D50W) (25 g/50 mL) IV injection 25 g  Every 15 Minutes PRN         05/26/25 1617    05/26/25 1617  glucagon HCl (Diagnostic) injection 1 mg  Every 15 Minutes PRN         05/26/25 1617    05/26/25 1205  sodium chloride 0.9 % flush 10 mL  As Needed        Placed in \"And\" Linked Group    05/26/25 1205    Unscheduled  Up With Assistance  As Needed       05/26/25 1617    Unscheduled  Follow Hypoglycemia Standing Orders For Blood Glucose <70 & Notify Provider of Treatment  As Needed      Comments: Follow Hypoglycemia Orders As Outlined in Process Instructions (Open Order Report to View Full Instructions)  Notify Provider Any Time Hypoglycemia Treatment is Administered    05/26/25 1617    Unscheduled  Wound Care  As Needed       05/27/25 1437    Unscheduled  Skin Tear Care - Minimal Drainage PRN  As Needed      Comments: - Realign Skin Flap (if Viable) Gently Ease Flap Into Place Using a Dampened Cotton-Tipped Applicator or " Gloved Finger  - Gently Cleanse Area & Pat Dry  - Apply Hydrogel & Non-Adherent Layer (Silicone Sheet, Oil Emulsion Dressing or Vaseline Gauze)  - Secure With Silicone Border Dressing (Unless Skin Fragile)  - If Skin is Fragile Secure With Roll Gauze - Do NOT Put Tape Directly on Skin  - Change Every 3 Days & As Needed    25 1439    Unscheduled  Bed / Chair Alarm On  As Needed      Comments: Bed Alarm On When in Bed,   Use Chair Alarm When in Chair    25 3925    Unscheduled  Apply Ice To Affected Area  As Needed      Comments: Apply to left heel for 10-20 minutes up to 4 times per day    25 1618    --  ALPRAZolam (XANAX) 0.5 MG tablet  Nightly PRN         25 192    --  QUEtiapine (SEROquel) 50 MG tablet  2 Times Daily         25    --  Insulin NPH Isophane & Regular (HumuLIN 70/30 KwikPen) (70-30) 100 UNIT/ML suspension pen-injector  Every Morning         25 0957    --  Insulin NPH Isophane & Regular (HumuLIN 70/30 KwikPen) (70-30) 100 UNIT/ML suspension pen-injector  Every Evening         25 0957                     Physician Progress Notes (most recent note)        Harini Urbina DO at 25 1545              [facility] HOSPITALIST PROGRESS NOTE     Patient Identification:  Name:  Ramsey Riley  Age:  70 y.o.  Sex:  male  :  1955  MRN:  [id number]  Visit Number:  [id number]  ROOM: [address] [place] [address]     Primary Care Provider:  Margret Purvis MD    Length of stay in inpatient status:  6    Subjective     Chief Compliant:    Chief Complaint   Patient presents with    Foot Pain    Suture / Staple Removal       History of Presenting Illness:    Patient was seen and examined this morning while he was sitting up in the bedside chair. He complained of left heel pain that he says has been present since he first presented to the hospital, but per review of previous notes he has been denying having any pain. The pain is worse with palpation or bearing weight.      Objective     Current Hospital Meds:amLODIPine, 10 mg, Oral, Daily  apixaban, 5 mg, Oral, Q12H  atorvastatin, 80 mg, Oral, Nightly  castor oil-balsam peru, 1 Application, Topical, Q12H  citalopram, 20 mg, Oral, Daily  dorzolamide (TRUSOPT) 2 % 1 drop, timolol (TIMOPTIC) 0.5 % 1 drop for Cosopt 22.3-6.8 mg/mL, , Both Eyes, BID  empagliflozin, 10 mg, Oral, Daily  famotidine, 20 mg, Oral, BID  insulin lispro protamine-insulin lispro, 15 Units, Subcutaneous, BID With Meals  levothyroxine, 137.5 mcg, Oral, QAM AC  losartan, 100 mg, Oral, Daily  pantoprazole, 40 mg, Oral, QAM AC  QUEtiapine, 100 mg, Oral, Nightly  QUEtiapine, 50 mg, Oral, BID  senna-docusate sodium, 2 tablet, Oral, BID  sodium chloride, 10 mL, Intravenous, Q12H         Current Antimicrobial Therapy:  Anti-Infectives (From admission, onward)      None          Current Diuretic Therapy:  Diuretics (From admission, onward)      None          ----------------------------------------------------------------------------------------------------------------------  Vital Signs:  Temp:  [97.8 °F (36.6 °C)-98.4 °F (36.9 °C)] 97.8 °F (36.6 °C)  Heart Rate:  [63-85] 80  Resp:  [18-20] 18  BP: (122-177)/(66-96) 133/74  SpO2:  [91 %-97 %] 94 %  on   ;   Device (Oxygen Therapy): room air  Body mass index is 39.27 kg/m².    Wt Readings from Last 3 Encounters:   06/02/25 95.3 kg (210 lb)   05/19/25 104 kg (230 lb)   05/05/25 102 kg (223 lb 12.8 oz)     Intake & Output (last 3 days)         05/30 0701 05/31 0700 05/31 0701 06/01 0700 06/01 0701 06/02 0700 06/02 0701 06/03 0700    P.O. 1320 240 360 600    Total Intake(mL/kg) 1320 (13.2) 240 (2.5) 360 (3.8) 600 (6.3)    Urine (mL/kg/hr) 2350 (1) 1575 (0.7) 1250 (0.5) 1175 (1.4)    Stool   0     Total Output 2350 1575 1250 1175    Net -1030 -1335 -890 -575            Urine Unmeasured Occurrence   2 x     Stool Unmeasured Occurrence   3 x           Diet: Cardiac, Diabetic; Healthy Heart (2-3 Na+); Consistent  Carbohydrate; Fluid Consistency: Thin (IDDSI 0)  ----------------------------------------------------------------------------------------------------------------------  Physical exam:   Constitutional:  Well-developed and well-nourished.  No acute distress.      HENT:  Head:  Normocephalic and atraumatic.    Cardiovascular:  Normal rate, regular rhythm   Pulmonary/Chest:  No respiratory distress, no wheezes, no crackles, no rhonchi  Musculoskeletal:  No deformity.  No red or swollen joints anywhere. There is mild to moderate tenderness of the left heel.   Neurological: Awake, alert, no focal deficit on gross examination. No slurred speech or facial droop.   Skin:  Skin is warm and dry.   Peripheral vascular:  No cyanosis, no edema.  Psychiatric: Appropriate mood and affect    ----------------------------------------------------------------------------------------------------------------------  Results from last 7 days   Lab Units 06/01/25 0108 05/30/25 0058 05/27/25 0106   WBC 10*3/mm3 7.33 6.90 7.87   HEMOGLOBIN g/dL 14.0 13.9 14.7   HEMATOCRIT % 42.8 43.3 44.8   MCV fL 91.1 91.9 90.5   MCHC g/dL 32.7 32.1 32.8   PLATELETS 10*3/mm3 141 155 149         Results from last 7 days   Lab Units 06/02/25  0358 06/01/25 0108 05/30/25 0058 05/28/25  0114 05/27/25  0106   SODIUM mmol/L 131* 135* 134*   < > 134*   POTASSIUM mmol/L 4.0 3.7 3.9   < > 4.4   CHLORIDE mmol/L 97* 101 100   < > 100   CO2 mmol/L 23.0 22.3 22.2   < > 22.9   BUN mg/dL 15.4 16.2 19.5   < > 13   CREATININE mg/dL 1.29* 1.41* 1.35*   < > 1.26   CALCIUM mg/dL 8.1* 8.4* 8.3*   < > 8.6   GLUCOSE mg/dL 343* 176* 186*   < > 221*   ALBUMIN g/dL  --   --  3.1*  --  3.3*   BILIRUBIN mg/dL  --   --  0.3  --  0.5   ALK PHOS U/L  --   --  127*  --  134*   AST (SGOT) U/L  --   --  23  --  25   ALT (SGPT) U/L  --   --  15  --  20    < > = values in this interval not displayed.   Estimated Creatinine Clearance: 54.2 mL/min (A) (by C-G formula based on SCr of 1.29  "mg/dL (H)).  No results found for: \"AMMONIA\"              Glucose   Date/Time Value Ref Range Status   06/02/2025 1137 300 (H) 70 - 130 mg/dL Final   06/02/2025 0801 233 (H) 70 - 130 mg/dL Final   06/01/2025 1630 153 (H) 70 - 130 mg/dL Final   06/01/2025 1152 136 (H) 70 - 130 mg/dL Final   06/01/2025 0634 142 (H) 70 - 130 mg/dL Final   05/31/2025 1924 271 (H) 70 - 130 mg/dL Final   05/31/2025 1621 297 (H) 70 - 130 mg/dL Final   05/31/2025 1142 243 (H) 70 - 130 mg/dL Final     Lab Results   Component Value Date    TSH 4.970 (H) 05/26/2025    FREET4 1.24 12/10/2024     No results found for: \"PREGTESTUR\", \"PREGSERUM\", \"HCG\", \"HCGQUANT\"  Pain Management Panel  More data exists         Latest Ref Rng & Units 5/26/2025 5/5/2025   Pain Management Panel   Creatinine, Urine Not Estab. mg/dL - 110.9    Amphetamine, Urine Qual Negative Negative  -   Barbiturates Screen, Urine Negative Negative  -   Benzodiazepine Screen, Urine Negative Positive  -   Buprenorphine, Screen, Urine Negative Negative  -   Cocaine Screen, Urine Negative Negative  -   Fentanyl, Urine Negative Negative  -   Methadone Screen , Urine Negative Negative  -   Methamphetamine, Ur Negative Negative  -     Brief Urine Lab Results  (Last result in the past 365 days)        Color   Clarity   Blood   Leuk Est   Nitrite   Protein   CREAT   Urine HCG        05/26/25 1249 Yellow   Clear   Trace   Negative   Negative   >=300 mg/dL (3+)                   I have personally looked at the labs and they are summarized above.  ----------------------------------------------------------------------------------------------------------------------  Detailed radiology reports for the last 24 hours:  Imaging Results (Last 24 Hours)       Procedure Component Value Units Date/Time    XR Foot 3+ View Left [[id number]] Collected: 06/02/25 1533     Updated: 06/02/25 1535    Narrative:      EXAM:    XR Left Foot Complete, 3 or More Views     EXAM DATE:    6/2/2025 2:29 PM     CLINICAL " HISTORY:    left heel and ankle pain, recent fall; R29.6-Repeated falls;  T14.8XXA-Other injury of unspecified body region, initial encounter;  T14.8XXA-Other injury of unspecified body region, initial encounter     TECHNIQUE:    Frontal, lateral and oblique views of the left foot.     COMPARISON:    No relevant prior studies available.     FINDINGS:    Bones/joints:  Unremarkable.  No acute fracture or dislocation.    Soft tissues:  Unremarkable.  No radiopaque foreign body.    Vasculature:  Dense arterial vascular calcifications are noted.       Impression:        No acute fracture or dislocation.     This report was finalized on 6/2/2025 3:33 PM by Dr. Dao Winchester MD.       XR Ankle 3+ View Left [[id number]] Collected: 06/02/25 1531     Updated: 06/02/25 1534    Narrative:      EXAM:    XR Left Ankle Complete, 3 or More Views     EXAM DATE:    6/2/2025 2:42 PM     CLINICAL HISTORY:    left heel and ankle pain, recent fall; R29.6-Repeated falls;  T14.8XXA-Other injury of unspecified body region, initial encounter;  T14.8XXA-Other injury of unspecified body region, initial encounter     TECHNIQUE:    Frontal, lateral and oblique views of the left ankle.     COMPARISON:    No relevant prior studies available.     FINDINGS:    Bones/joints:  Unremarkable.  No acute fracture.  No dislocation.    Soft tissues:  Unremarkable.    Vasculature:  Vascular calcifications noted.       Impression:        No acute findings in the left ankle.     This report was finalized on 6/2/2025 3:32 PM by Dr. Dao Winchester MD.             Assessment & Plan      #Frequent falls at home  #Assisted fall during hospitalization 5/31  #Generalized weakness and physical debility  #Recent right proximal humerus fracture due to fall, prior to admission  #Age indeterminate right posterior 4th rib fracture  #Vascular dementia  #Left heel and ankle pain  - Continue PT/OT as tolerated. Maintain fall precautions.  - As patient has had multiple recent  falls and is reporting left ankle and heel pain I ordered x-rays to further evaluate. Per radiologist read there are no acute fractures or dislocations noted in the left ankle or foot. Differential diagnosis for patient's heel pain includes plantar fasciitis. Continue prn pain medications. If the pain continues and is affecting his ability to work with PT/OT then could try applying ice or wearing a brace.   - MRI brain without contrast was negative for evidence of acute ischemia, focal mass, or midline shift, but was markedly degraded due to patient motion.  - case management is assisting with referrals for short term rehab, appreciate assistance.     #Essential hypertension, now with improved BP control  #Diabetes mellitus type II, insulin dependent and with hyperglycemia  - continue home amlodipine 10mg po daily and losartan 100mg po daily.  - Continue insulin 75/25 here (in place of home 70/30 per hospital formulary). Glucose appears to have been fairly well controlled yesterday but is uncontrolled in the 200-300 range today. Increase from 15 units bid to 20 units bid. No hypoglycemia noted over the past several days. Per review of prior to admission med rec patient was taking 84 units in the a.m and 64 units in the p.m.     #Tinea cruris  - continue nystatin powder bid    Chronic medical conditions:  #Pulmonary nodules (present since 2023)  #GERD  #Previous stroke  #History of blood clot - continue chronic anticoagulation with eliquis  #Coronary artery disease s/p three vessel CABG  #Chronic low back pain  #Hypothyroidism    Dispo: pending short term rehab referrals    Harini Urbina DO  [facility]  06/02/25  15:45 EDT     Electronically signed by Harini Urbina DO at 06/02/25 1619       Consult Notes (most recent note)    No notes of this type exist for this encounter.          Physical Therapy Notes (most recent note)        Baljinder Martinez, PT at 06/03/25 3664  Version 1 of 1          Acute Care - Physical Therapy Treatment Note   [facility]     Patient Name: Ramsey Riley  : 1955  MRN: [id number]  Today's Date: 6/3/2025      Visit Dx:     ICD-10-CM ICD-9-CM   1. Frequent falls  R29.6 V15.88   2. Skin avulsion  T14.8XXA 879.8   3. Bone bruise  T14.8XXA 924.9     Patient Active Problem List   Diagnosis    Coronary artery disease involving native coronary artery of native heart without angina pectoris    Essential hypertension    Bradycardia, sinus    Fatigue    Abnormal ECG    Precordial pain    LVH (left ventricular hypertrophy) due to hypertensive disease, without heart failure    Diabetes mellitus    PAD (peripheral artery disease)    Ischemic stroke    History of blood clots    Pulmonary embolus    Dyslipidemia    Class 1 obesity due to excess calories with serious comorbidity and body mass index (BMI) of 34.0 to 34.9 in adult    Weakness    Frequent falls     Past Medical History:   Diagnosis Date    Arthritis     Clot     Coronary artery disease     Dementia 2024    Diabetes mellitus     Heart disease     History of blood clots     Hypertension     Impaired mobility     Low back pain     Thyroid disease     Type 2 diabetes mellitus      Past Surgical History:   Procedure Laterality Date    CHOLECYSTECTOMY      COLONOSCOPY      dr srivastava    CORONARY ARTERY BYPASS GRAFT      [place] - 3 BY PASS     ENDOSCOPY       PT Assessment (Last 12 Hours)       PT Evaluation and Treatment       Row Name 25 1133          Physical Therapy Time and Intention    Subjective Information complains of;weakness;fatigue  -KM     Document Type therapy note (daily note)  -KM     Mode of Treatment individual therapy;physical therapy  -KM     Patient Effort good  -KM     Symptoms Noted During/After Treatment fatigue  -KM       Row Name 25 1133          General Information    Patient Profile Reviewed yes  -KM     Patient Observations alert;cooperative;agree to therapy  -KM      Existing Precautions/Restrictions fall  -KM       Row Name 06/03/25 1133          Pain    Pretreatment Pain Rating 0/10 - no pain  -KM     Posttreatment Pain Rating 0/10 - no pain  -KM       Row Name 06/03/25 1133          Cognition    Affect/Mental Status (Cognition) WFL  -KM     Orientation Status (Cognition) oriented to;person;place;situation;verbal cues/prompts needed for orientation  -KM     Follows Commands (Cognition) follows one-step commands  -KM       Row Name 06/03/25 1133          Bed Mobility    Bed Mobility bed mobility (all) activities  -KM     All Activities, Preston (Bed Mobility) moderate assist (50% patient effort)  -KM     Assistive Device (Bed Mobility) bed rails;head of bed elevated  -KM       Row Name 06/03/25 1133          Transfers    Transfers sit-stand transfer;stand-sit transfer;bed-chair transfer  -       Row Name 06/03/25 1133          Bed-Chair Transfer    Bed-Chair [place] (Transfers) minimum assist (75% patient effort)  -KM     Assistive Device (Bed-Chair Transfers) walker, front-wheeled  -KM       Row Name 06/03/25 1133          Sit-Stand Transfer    Sit-Stand [place] (Transfers) minimum assist (75% patient effort)  -KM     Assistive Device (Sit-Stand Transfers) walker, front-wheeled  -KM       Row Name 06/03/25 1133          Stand-Sit Transfer    Stand-Sit [place] (Transfers) minimum assist (75% patient effort)  -KM     Assistive Device (Stand-Sit Transfers) walker, front-wheeled  -KM       Row Name 06/03/25 1133          Gait/Stairs (Locomotion)    Gait/Stairs Locomotion gait/ambulation independence;gait/ambulation assistive device;distance ambulated  -KM     [place] Level (Gait) contact guard;minimum assist (75% patient effort)  -KM     Assistive Device (Gait) walker, front-wheeled  -KM     Distance in Feet (Gait) 75  x2  -KM     Pattern (Gait) step-to  -KM     Deviations/Abnormal Patterns (Gait) ataxic;base of support, narrow;gait speed decreased  -KM      Bilateral Gait Deviations forward flexed posture  -KM     Left Sided Gait Deviations weight shift ability decreased  -KM       Row Name 06/03/25 1133          Safety Issues/Impairments Affecting Functional Mobility    Impairments Affecting Function (Mobility) balance;cognition;coordination;endurance/activity tolerance;pain;strength  -KM       Row Name             Wound 05/26/25 1618 Left posterior heel Pressure Injury    Wound - Properties Group Placement Date: 05/26/25  -CW Placement Time: 1618  -CW Present on Original Admission: Y  -CW Side: Left  -CW Orientation: posterior  -CW Location: heel  -CW Primary Wound Type: Pressure Inj  -CW    Retired Wound - Properties Group Placement Date: 05/26/25  -CW Placement Time: 1618  -CW Present on Original Admission: Y  -CW Side: Left  -CW Orientation: posterior  -CW Location: heel  -CW    Retired Wound - Properties Group Placement Date: 05/26/25  -CW Placement Time: 1618  -CW Present on Original Admission: Y  -CW Side: Left  -CW Orientation: posterior  -CW Location: heel  -CW    Retired Wound - Properties Group Date first assessed: 05/26/25  -CW Time first assessed: 1618  -CW Present on Original Admission: Y  -CW Side: Left  -CW Location: heel  -CW      Row Name             Wound 05/26/25 1622 Left anterior heel Pressure Injury    Wound - Properties Group Placement Date: 05/26/25  -CW Placement Time: 1622  -CW Side: Left  -CW Orientation: anterior  -CW Location: heel  -CW Primary Wound Type: Pressure Inj  -CW    Retired Wound - Properties Group Placement Date: 05/26/25  -CW Placement Time: 1622  -CW Side: Left  -CW Orientation: anterior  -CW Location: heel  -CW    Retired Wound - Properties Group Placement Date: 05/26/25  -CW Placement Time: 1622  -CW Side: Left  -CW Orientation: anterior  -CW Location: heel  -CW    Retired Wound - Properties Group Date first assessed: 05/26/25  -CW Time first assessed: 1622  -CW Side: Left  -CW Location: heel  -CW      Row Name              Wound 05/27/25 1007 medial parietal region Traumatic Abrasion    Wound - Properties Group Placement Date: 05/27/25  -LB Placement Time: 1007  -LB Present on Original Admission: Y  -LB Orientation: medial  -LB Location: parietal region  -LB Primary Wound Type: Traumatic  -LB Secondary Wound Type - Traumatic: Abrasion  -LB    Retired Wound - Properties Group Placement Date: 05/27/25  -LB Placement Time: 1007  -LB Present on Original Admission: Y  -LB Orientation: medial  -LB Location: parietal region  -LB    Retired Wound - Properties Group Placement Date: 05/27/25  -LB Placement Time: 1007  -LB Present on Original Admission: Y  -LB Orientation: medial  -LB Location: parietal region  -LB    Retired Wound - Properties Group Date first assessed: 05/27/25  -LB Time first assessed: 1007  -LB Present on Original Admission: Y  -LB Location: parietal region  -LB      Row Name             Wound 05/27/25 1008 Right distal arm Traumatic Skin Tear    Wound - Properties Group Placement Date: 05/27/25  -LB Placement Time: 1008  -LB Present on Original Admission: Y  -LB Side: Right  -LB Orientation: distal  -LB Location: arm  -LB Primary Wound Type: Traumatic  -LB Secondary Wound Type - Traumatic: Skin Tear  -LB    Retired Wound - Properties Group Placement Date: 05/27/25  -LB Placement Time: 1008  -LB Present on Original Admission: Y  -LB Side: Right  -LB Orientation: distal  -LB Location: arm  -LB    Retired Wound - Properties Group Placement Date: 05/27/25  -LB Placement Time: 1008  -LB Present on Original Admission: Y  -LB Side: Right  -LB Orientation: distal  -LB Location: arm  -LB    Retired Wound - Properties Group Date first assessed: 05/27/25  -LB Time first assessed: 1008  -LB Present on Original Admission: Y  -LB Side: Right  -LB Location: arm  -LB      Row Name 06/03/25 1133          Progress Summary (PT)    Daily Progress Summary (PT) Pt. continues to show progress during PT sessions. He was able to perform  functional mobility skills similar to prior sessions. He was able to ambulate improved distance w/ RW. Pt. tolerates activity well. Pt. would continue to benefit from PT services.  -               User Key  (r) = Recorded By, (t) = Taken By, (c) = Cosigned By      Initials Name Provider Type    CW Chata Pulido, RN Registered Nurse    Baljinder Reese, PT Physical Therapist    Marysol Rodriguez, RN Registered Nurse                    Physical Therapy Education       Title: PT OT SLP Therapies (Done)       Topic: Physical Therapy (Done)       Point: Mobility training (Done)       Learning Progress Summary            Patient Acceptance, E,TB, VU by  at 5/30/2025 2339    Acceptance, E,TB, NR by  at 5/28/2025 0920    Acceptance, E,TB, VU by  at 5/27/2025 1532                      Point: Home exercise program (Done)       Learning Progress Summary            Patient Acceptance, E,TB, VU by  at 5/30/2025 2339    Acceptance, E,TB, NR by  at 5/28/2025 0920    Acceptance, E,TB, VU by  at 5/27/2025 1532                      Point: Body mechanics (Done)       Learning Progress Summary            Patient Acceptance, E,TB, VU by  at 5/30/2025 2339    Acceptance, E,TB, NR by  at 5/28/2025 0920    Acceptance, E,TB, VU by  at 5/27/2025 1532                      Point: Precautions (Done)       Learning Progress Summary            Patient Acceptance, E,TB, VU by  at 5/30/2025 2339    Acceptance, E,TB, NR by  at 5/28/2025 0920    Acceptance, E,TB, VU by  at 5/27/2025 1532                                      User Key       Initials Effective Dates Name Provider Type Discipline     06/16/21 -  Vy Tejeda, RN Registered Nurse Nurse     05/24/22 -  Baljinder Martinez, PT Physical Therapist PT     02/07/25 -  Dagmar Maciel, RN Registered Nurse Nurse                  PT Recommendation and Plan  Anticipated Discharge Disposition (PT): inpatient rehabilitation facility  Planned Therapy Interventions (PT):  balance training, bed mobility training, gait training, home exercise program, patient/family education, postural re-education, ROM (range of motion), strengthening, stretching, transfer training  Therapy Frequency (PT): 2 times/wk (2-5x/wk)  Progress Summary (PT)  Daily Progress Summary (PT): Pt. continues to show progress during PT sessions. He was able to perform functional mobility skills similar to prior sessions. He was able to ambulate improved distance w/ RW. Pt. tolerates activity well. Pt. would continue to benefit from PT services.  Plan of Care Reviewed With: patient  Outcome Evaluation: Pt. evaluation completed during PT session. He was able to perform functional mobility skills w/ modA. He was able to ambulate in room w/ RW and Teddy. Pt. would benefit from further PT services at this time.       Time Calculation:    PT Charges       Row Name 25 1133             Time Calculation    PT Received On 25  -KM         Time Calculation- PT    Total Timed Code Minutes- PT 23 minute(s)  -KM                User Key  (r) = Recorded By, (t) = Taken By, (c) = Cosigned By      Initials Name Provider Type    KM Baljinder Martinez, PT Physical Therapist                  Therapy Charges for Today       Code Description Service Date Service Provider Modifiers Qty    46175160974 HC PT THERAPEUTIC ACT EA 15 MIN 2025 Baljinder Martinez, PT GP 1    63269667306 HC GAIT TRAINING EA 15 MIN 2025 Baljinder Martinez, PT GP 1    17532604343 HC PT THERAPEUTIC ACT EA 15 MIN 6/3/2025 Baljinder Martinez, PT GP 1    51430347725 HC GAIT TRAINING EA 15 MIN 6/3/2025 Baljinder Martinez, PT GP 1            PT G-Codes  AM-PAC 6 Clicks Score (PT): 15    Baljinder Martinez PT  6/3/2025      Electronically signed by Baljinder Martinez, PT at 25 1135          Occupational Therapy Notes (most recent note)        Dao Noland, OT at 25 1021          Acute Care - Occupational Therapy Treatment Note  [facility]     Patient Name: Ramsey NOLAN Rosemary  :  1955  MRN: [id number]  Today's Date: 6/3/2025             Admit Date: 5/26/2025       ICD-10-CM ICD-9-CM   1. Frequent falls  R29.6 V15.88   2. Skin avulsion  T14.8XXA 879.8   3. Bone bruise  T14.8XXA 924.9     Patient Active Problem List   Diagnosis    Coronary artery disease involving native coronary artery of native heart without angina pectoris    Essential hypertension    Bradycardia, sinus    Fatigue    Abnormal ECG    Precordial pain    LVH (left ventricular hypertrophy) due to hypertensive disease, without heart failure    Diabetes mellitus    PAD (peripheral artery disease)    Ischemic stroke    History of blood clots    Pulmonary embolus    Dyslipidemia    Class 1 obesity due to excess calories with serious comorbidity and body mass index (BMI) of 34.0 to 34.9 in adult    Weakness    Frequent falls     Past Medical History:   Diagnosis Date    Arthritis     Clot     Coronary artery disease     Dementia 12/18/2024    Diabetes mellitus     Heart disease     History of blood clots     Hypertension     Impaired mobility     Low back pain     Thyroid disease     Type 2 diabetes mellitus      Past Surgical History:   Procedure Laterality Date    CHOLECYSTECTOMY  2015    COLONOSCOPY  2015    dr srivastava    CORONARY ARTERY BYPASS GRAFT      [place] - 3 BY PASS     ENDOSCOPY           OT ASSESSMENT FLOWSHEET (Last 12 Hours)       OT Evaluation and Treatment       Row Name 06/03/25 1016                   OT Time and Intention    Document Type therapy note (daily note)  -KR        Mode of Treatment occupational therapy  -KR        Patient Effort adequate  -KR        Comment Pt seen on this date for BUE ther ex to enhance strength/endurance needed to assist with self care tasks. Pt would benefit from additional / extended therapy services to enhance ability to perform/assist with household/self care performance.  -KR           General Information    General Observations of Patient alert/cooperative  -KR            Motor Skills    Therapeutic Exercise shoulder;elbow/forearm;hand  -KR           Shoulder (Therapeutic Exercise)    Shoulder (Therapeutic Exercise) AROM (active range of motion)  -KR        Shoulder AROM (Therapeutic Exercise) left;flexion  -KR           Elbow/Forearm (Therapeutic Exercise)    Elbow/Forearm (Therapeutic Exercise) AROM (active range of motion)  -KR        Elbow/Forearm AROM (Therapeutic Exercise) bilateral;flexion;extension  -KR           Hand (Therapeutic Exercise)    Hand (Therapeutic Exercise) AROM (active range of motion)  -KR        Hand AROM/AAROM (Therapeutic Exercise) bilateral;finger flexion;finger extension  -KR           Wound 05/26/25 1618 Left posterior heel Pressure Injury    Wound - Properties Group Placement Date: 05/26/25  -CW Placement Time: 1618  -CW Present on Original Admission: Y  -CW Side: Left  -CW Orientation: posterior  -CW Location: heel  -CW Primary Wound Type: Pressure Inj  -CW    Retired Wound - Properties Group Placement Date: 05/26/25  -CW Placement Time: 1618  -CW Present on Original Admission: Y  -CW Side: Left  -CW Orientation: posterior  -CW Location: heel  -CW    Retired Wound - Properties Group Placement Date: 05/26/25  -CW Placement Time: 1618  -CW Present on Original Admission: Y  -CW Side: Left  -CW Orientation: posterior  -CW Location: heel  -CW    Retired Wound - Properties Group Date first assessed: 05/26/25  -CW Time first assessed: 1618  -CW Present on Original Admission: Y  -CW Side: Left  -CW Location: heel  -CW       Wound 05/26/25 1622 Left anterior heel Pressure Injury    Wound - Properties Group Placement Date: 05/26/25  -CW Placement Time: 1622  -CW Side: Left  -CW Orientation: anterior  -CW Location: heel  -CW Primary Wound Type: Pressure Inj  -CW    Retired Wound - Properties Group Placement Date: 05/26/25  -CW Placement Time: 1622  -CW Side: Left  -CW Orientation: anterior  -CW Location: heel  -CW    Retired Wound - Properties Group Placement  Date: 05/26/25  -CW Placement Time: 1622  -CW Side: Left  -CW Orientation: anterior  -CW Location: heel  -CW    Retired Wound - Properties Group Date first assessed: 05/26/25  -CW Time first assessed: 1622  -CW Side: Left  -CW Location: heel  -CW       Wound 05/27/25 1007 medial parietal region Traumatic Abrasion    Wound - Properties Group Placement Date: 05/27/25  -LB Placement Time: 1007  -LB Present on Original Admission: Y  -LB Orientation: medial  -LB Location: parietal region  -LB Primary Wound Type: Traumatic  -LB Secondary Wound Type - Traumatic: Abrasion  -LB    Retired Wound - Properties Group Placement Date: 05/27/25  -LB Placement Time: 1007  -LB Present on Original Admission: Y  -LB Orientation: medial  -LB Location: parietal region  -LB    Retired Wound - Properties Group Placement Date: 05/27/25  -LB Placement Time: 1007  -LB Present on Original Admission: Y  -LB Orientation: medial  -LB Location: parietal region  -LB    Retired Wound - Properties Group Date first assessed: 05/27/25  -LB Time first assessed: 1007  -LB Present on Original Admission: Y  -LB Location: parietal region  -LB       Wound 05/27/25 1008 Right distal arm Traumatic Skin Tear    Wound - Properties Group Placement Date: 05/27/25  -LB Placement Time: 1008  -LB Present on Original Admission: Y  -LB Side: Right  -LB Orientation: distal  -LB Location: arm  -LB Primary Wound Type: Traumatic  -LB Secondary Wound Type - Traumatic: Skin Tear  -LB    Retired Wound - Properties Group Placement Date: 05/27/25  -LB Placement Time: 1008  -LB Present on Original Admission: Y  -LB Side: Right  -LB Orientation: distal  -LB Location: arm  -LB    Retired Wound - Properties Group Placement Date: 05/27/25  -LB Placement Time: 1008  -LB Present on Original Admission: Y  -LB Side: Right  -LB Orientation: distal  -LB Location: arm  -LB    Retired Wound - Properties Group Date first assessed: 05/27/25  -LB Time first assessed: 1008  -LB Present on  Original Admission: Y  -LB Side: Right  -LB Location: arm  -LB       Plan of Care Review    Plan of Care Reviewed With patient;spouse  -KR           Progress Summary (OT)    Progress Toward Functional Goals (OT) progress toward functional goals as expected  -KR           Therapy Plan Review/Discharge Plan (OT)    Anticipated Discharge Disposition (OT) extended care facility;inpatient rehabilitation facility  -KR                  User Key  (r) = Recorded By, (t) = Taken By, (c) = Cosigned By      Initials Name Effective Dates    CW Chata Pulido RN 10/05/21 -     Dao Valentine OT 06/16/21 -     LB Marysol Nix RN 10/20/21 -                            OT Recommendation and Plan     Progress Toward Functional Goals (OT): progress toward functional goals as expected  Plan of Care Review  Plan of Care Reviewed With: patient, spouse  Plan of Care Reviewed With: patient, spouse        Time Calculation:     Therapy Charges for Today       Code Description Service Date Service Provider Modifiers Qty    06397407954 HC OT THERAPEUTIC ACT EA 15 MIN 6/3/2025 Dao Noland OT GO 1                 Dao Noland OT  6/3/2025    Electronically signed by Dao Noland OT at 06/03/25 1022       Speech Language Pathology Notes (most recent note)    No notes exist for this encounter.       ADL Documentation (most recent)      Flowsheet Row Most Recent Value   Transferring 3 - assistive equipment and person   Toileting 3 - assistive equipment and person   Bathing 2 - assistive person   Dressing 2 - assistive person   Eating 0 - independent   Communication 2 - difficulty understanding and speaking (not related to language barrier)   Swallowing 0 - swallows foods/liquids without difficulty   Equipment Currently Used at Home walker, rolling, wheelchair

## 2025-06-03 NOTE — CASE MANAGEMENT/SOCIAL WORK
Discharge Planning [facility]     Patient Name: Ramsey Riley  MRN: [id number]  Today's Date: 6/3/2025    Admit Date: 5/26/2025    Plan: SS spoke with Milla at [facility] who stated pt was denied due to home medication list.  SS spoke with pt and spouse at bedside.  Pt's spouse requests short term nursing home placement for rehab and is not interested at this time in long term nursing home placement.  Pt's spouse requested  send referral to [facility] for review.  SS sent [facility] referral via [employer] and notified Caity.  [facility] per Caity to complete an onsite on this date.  SS will follow.       Discharge Plan       Row Name 06/03/25 1432       Plan    Plan SS spoke with Milla at [facility] who stated pt was denied due to home medication list.  SS spoke with pt and spouse at bedside.  Pt's spouse requests short term nursing home placement for rehab and is not interested at this time in long term nursing home placement.  Pt's spouse requested SS send referral to [facility] for review.  SS sent [facility] referral via [employer] and notified Caity.  [facility] per Caity to complete an onsite on this date.  SS will follow.                  Continued Care and Services - Admitted Since 5/26/2025       Destination       Service Provider Request Status Services Address Phone Fax Patient Preferred    [facility] Pending - Request Sent -- [address] [facility] [address] [phone] --    [facility] Declined  Fall [facility] [address] [facility] [address] [phone] --    [facility] Declined  medications -- [address] [facility] [address] [phone] --    [facility] Declined  Bed not available -- [address] [address] [phone] --                  Expected Discharge Date and Time        Expected Discharge Date Expected Discharge Time    Jun 4, 2025          Haley Larkin, BSW

## 2025-06-03 NOTE — CASE MANAGEMENT/SOCIAL WORK
Discharge Planning Assessment  [facility]     Patient Name: Ramsey Riley  MRN: [id number]  Today's Date: 6/3/2025    Admit Date: 5/26/2025    Plan: SS spoke with Milla at [facility] who stated referral was being reviewed.  SS will follow.       Discharge Plan       Row Name 06/03/25 0935       Plan    Plan SS spoke with Milla at [facility] who stated referral was being reviewed.  SS will follow.      Row Name 06/03/25 0903                  Continued Care and Services - Admitted Since 5/26/2025       Destination       Service Provider Request Status Services Address Phone Fax Patient Preferred    [facility] Pending - Request Sent -- [address] [facility] [address] [phone] --    [facility] Declined  Fall risk -- [address] [facility] [address] [phone] --    [facility] Declined  Bed not available -- [address] [address] [phone] --                  Expected Discharge Date and Time       Expected Discharge Date Expected Discharge Time    Jun 4, 2025               MARIUSZ Rowan

## 2025-06-04 ENCOUNTER — HOSPITAL ENCOUNTER (INPATIENT)
Facility: HOSPITAL | Age: 70
LOS: 10 days | Discharge: HOME | End: 2025-06-14
Attending: STUDENT IN AN ORGANIZED HEALTH CARE EDUCATION/TRAINING PROGRAM | Admitting: STUDENT IN AN ORGANIZED HEALTH CARE EDUCATION/TRAINING PROGRAM
Payer: MEDICARE

## 2025-06-04 ENCOUNTER — APPOINTMENT (OUTPATIENT)
Dept: CT IMAGING | Facility: HOSPITAL | Age: 70
End: 2025-06-04
Payer: MEDICARE

## 2025-06-04 VITALS
SYSTOLIC BLOOD PRESSURE: 138 MMHG | DIASTOLIC BLOOD PRESSURE: 76 MMHG | OXYGEN SATURATION: 93 % | HEART RATE: 70 BPM | BODY MASS INDEX: 30.16 KG/M2 | WEIGHT: 215.44 LBS | TEMPERATURE: 98.4 F | RESPIRATION RATE: 16 BRPM | HEIGHT: 71 IN

## 2025-06-04 DIAGNOSIS — E03.8 ADULT ONSET HYPOTHYROIDISM: ICD-10-CM

## 2025-06-04 DIAGNOSIS — M54.50 LUMBOSACRAL PAIN: ICD-10-CM

## 2025-06-04 DIAGNOSIS — F51.04 INSOMNIA, PSYCHOPHYSIOLOGICAL: ICD-10-CM

## 2025-06-04 DIAGNOSIS — R53.81 PHYSICAL DEBILITY: Primary | ICD-10-CM

## 2025-06-04 LAB
BACTERIA UR QL AUTO: ABNORMAL /HPF
BILIRUB UR QL STRIP: NEGATIVE
CLARITY UR: CLEAR
COLOR UR: YELLOW
GLUCOSE BLDC GLUCOMTR-MCNC: 142 MG/DL (ref 70–130)
GLUCOSE BLDC GLUCOMTR-MCNC: 174 MG/DL (ref 70–130)
GLUCOSE BLDC GLUCOMTR-MCNC: 186 MG/DL (ref 70–130)
GLUCOSE BLDC GLUCOMTR-MCNC: 196 MG/DL (ref 70–130)
GLUCOSE BLDC GLUCOMTR-MCNC: 199 MG/DL (ref 70–130)
GLUCOSE UR STRIP-MCNC: ABNORMAL MG/DL
HGB UR QL STRIP.AUTO: NEGATIVE
HYALINE CASTS UR QL AUTO: ABNORMAL /LPF
KETONES UR QL STRIP: ABNORMAL
LEUKOCYTE ESTERASE UR QL STRIP.AUTO: NEGATIVE
NITRITE UR QL STRIP: NEGATIVE
PH UR STRIP.AUTO: 6.5 [PH] (ref 5–8)
PROT UR QL STRIP: ABNORMAL
RBC # UR STRIP: ABNORMAL /HPF
REF LAB TEST METHOD: ABNORMAL
SP GR UR STRIP: 1.03 (ref 1–1.03)
SQUAMOUS #/AREA URNS HPF: ABNORMAL /HPF
UROBILINOGEN UR QL STRIP: ABNORMAL
WBC # UR STRIP: ABNORMAL /HPF

## 2025-06-04 PROCEDURE — 63710000001 INSULIN LISPRO PROTAMINE-INSULIN LISPRO (75-25) 100 UNIT/ML SUSPENSION: Performed by: STUDENT IN AN ORGANIZED HEALTH CARE EDUCATION/TRAINING PROGRAM

## 2025-06-04 PROCEDURE — 70450 CT HEAD/BRAIN W/O DYE: CPT

## 2025-06-04 PROCEDURE — 97550 CAREGIVER TRAING 1ST 30 MIN: CPT

## 2025-06-04 PROCEDURE — 70450 CT HEAD/BRAIN W/O DYE: CPT | Performed by: RADIOLOGY

## 2025-06-04 PROCEDURE — 81001 URINALYSIS AUTO W/SCOPE: CPT | Performed by: STUDENT IN AN ORGANIZED HEALTH CARE EDUCATION/TRAINING PROGRAM

## 2025-06-04 PROCEDURE — 99304 1ST NF CARE SF/LOW MDM 25: CPT | Performed by: STUDENT IN AN ORGANIZED HEALTH CARE EDUCATION/TRAINING PROGRAM

## 2025-06-04 PROCEDURE — 25010000002 ZIPRASIDONE MESYLATE PER 10 MG

## 2025-06-04 PROCEDURE — 82948 REAGENT STRIP/BLOOD GLUCOSE: CPT

## 2025-06-04 RX ORDER — DEXTROSE MONOHYDRATE 25 G/50ML
25 INJECTION, SOLUTION INTRAVENOUS
Status: DISCONTINUED | OUTPATIENT
Start: 2025-06-04 | End: 2025-06-14 | Stop reason: HOSPADM

## 2025-06-04 RX ORDER — POLYETHYLENE GLYCOL 3350 17 G/17G
17 POWDER, FOR SOLUTION ORAL DAILY PRN
Status: CANCELLED | OUTPATIENT
Start: 2025-06-04

## 2025-06-04 RX ORDER — NICOTINE POLACRILEX 4 MG
15 LOZENGE BUCCAL
Status: CANCELLED | OUTPATIENT
Start: 2025-06-04

## 2025-06-04 RX ORDER — GLUCAGON 1 MG/ML
1 KIT INJECTION
Status: DISCONTINUED | OUTPATIENT
Start: 2025-06-04 | End: 2025-06-14 | Stop reason: HOSPADM

## 2025-06-04 RX ORDER — QUETIAPINE FUMARATE 100 MG/1
100 TABLET, FILM COATED ORAL NIGHTLY
Status: CANCELLED | OUTPATIENT
Start: 2025-06-04

## 2025-06-04 RX ORDER — HYDROCODONE BITARTRATE AND ACETAMINOPHEN 7.5; 325 MG/1; MG/1
1 TABLET ORAL EVERY 12 HOURS PRN
Refills: 0 | Status: CANCELLED | OUTPATIENT
Start: 2025-06-04

## 2025-06-04 RX ORDER — QUETIAPINE FUMARATE 100 MG/1
100 TABLET, FILM COATED ORAL NIGHTLY
Status: DISCONTINUED | OUTPATIENT
Start: 2025-06-04 | End: 2025-06-12

## 2025-06-04 RX ORDER — ACETAMINOPHEN 500 MG
1000 TABLET ORAL EVERY 8 HOURS PRN
Status: DISCONTINUED | OUTPATIENT
Start: 2025-06-04 | End: 2025-06-14 | Stop reason: HOSPADM

## 2025-06-04 RX ORDER — NITROGLYCERIN 0.4 MG/1
0.4 TABLET SUBLINGUAL
Status: CANCELLED | OUTPATIENT
Start: 2025-06-04

## 2025-06-04 RX ORDER — PANTOPRAZOLE SODIUM 40 MG/1
40 TABLET, DELAYED RELEASE ORAL
Status: DISCONTINUED | OUTPATIENT
Start: 2025-06-05 | End: 2025-06-14 | Stop reason: HOSPADM

## 2025-06-04 RX ORDER — HYDROCODONE BITARTRATE AND ACETAMINOPHEN 7.5; 325 MG/1; MG/1
1 TABLET ORAL EVERY 12 HOURS PRN
Status: DISCONTINUED | OUTPATIENT
Start: 2025-06-04 | End: 2025-06-14 | Stop reason: HOSPADM

## 2025-06-04 RX ORDER — GLUCAGON 1 MG/ML
1 KIT INJECTION
Status: CANCELLED | OUTPATIENT
Start: 2025-06-04

## 2025-06-04 RX ORDER — SODIUM CHLORIDE 9 MG/ML
40 INJECTION, SOLUTION INTRAVENOUS AS NEEDED
Status: DISCONTINUED | OUTPATIENT
Start: 2025-06-04 | End: 2025-06-14 | Stop reason: HOSPADM

## 2025-06-04 RX ORDER — QUETIAPINE FUMARATE 25 MG/1
50 TABLET, FILM COATED ORAL 2 TIMES DAILY
Status: DISCONTINUED | OUTPATIENT
Start: 2025-06-05 | End: 2025-06-06

## 2025-06-04 RX ORDER — NITROGLYCERIN 0.4 MG/1
0.4 TABLET SUBLINGUAL
Status: DISCONTINUED | OUTPATIENT
Start: 2025-06-04 | End: 2025-06-14 | Stop reason: HOSPADM

## 2025-06-04 RX ORDER — DEXTROSE MONOHYDRATE 25 G/50ML
25 INJECTION, SOLUTION INTRAVENOUS
Status: CANCELLED | OUTPATIENT
Start: 2025-06-04

## 2025-06-04 RX ORDER — SODIUM CHLORIDE 9 MG/ML
40 INJECTION, SOLUTION INTRAVENOUS AS NEEDED
Status: CANCELLED | OUTPATIENT
Start: 2025-06-04

## 2025-06-04 RX ORDER — ATORVASTATIN CALCIUM 40 MG/1
80 TABLET, FILM COATED ORAL NIGHTLY
Status: DISCONTINUED | OUTPATIENT
Start: 2025-06-04 | End: 2025-06-09

## 2025-06-04 RX ORDER — ATORVASTATIN CALCIUM 40 MG/1
80 TABLET, FILM COATED ORAL NIGHTLY
Status: CANCELLED | OUTPATIENT
Start: 2025-06-04

## 2025-06-04 RX ORDER — LOSARTAN POTASSIUM 50 MG/1
100 TABLET ORAL DAILY
Status: CANCELLED | OUTPATIENT
Start: 2025-06-05

## 2025-06-04 RX ORDER — SODIUM CHLORIDE 0.9 % (FLUSH) 0.9 %
10 SYRINGE (ML) INJECTION AS NEEDED
Status: CANCELLED | OUTPATIENT
Start: 2025-06-04

## 2025-06-04 RX ORDER — CITALOPRAM HYDROBROMIDE 20 MG/1
20 TABLET ORAL DAILY
Status: DISCONTINUED | OUTPATIENT
Start: 2025-06-05 | End: 2025-06-09

## 2025-06-04 RX ORDER — PANTOPRAZOLE SODIUM 40 MG/1
40 TABLET, DELAYED RELEASE ORAL
Status: CANCELLED | OUTPATIENT
Start: 2025-06-05

## 2025-06-04 RX ORDER — DORZOLAMIDE HYDROCHLORIDE AND TIMOLOL MALEATE 20; 5 MG/ML; MG/ML
SOLUTION/ DROPS OPHTHALMIC 2 TIMES DAILY
Status: CANCELLED | OUTPATIENT
Start: 2025-06-04

## 2025-06-04 RX ORDER — SODIUM CHLORIDE 0.9 % (FLUSH) 0.9 %
10 SYRINGE (ML) INJECTION AS NEEDED
Status: DISCONTINUED | OUTPATIENT
Start: 2025-06-04 | End: 2025-06-14 | Stop reason: HOSPADM

## 2025-06-04 RX ORDER — DORZOLAMIDE HYDROCHLORIDE AND TIMOLOL MALEATE 20; 5 MG/ML; MG/ML
SOLUTION/ DROPS OPHTHALMIC 2 TIMES DAILY
Status: DISCONTINUED | OUTPATIENT
Start: 2025-06-04 | End: 2025-06-14 | Stop reason: HOSPADM

## 2025-06-04 RX ORDER — LOSARTAN POTASSIUM 50 MG/1
100 TABLET ORAL DAILY
Status: DISCONTINUED | OUTPATIENT
Start: 2025-06-05 | End: 2025-06-14 | Stop reason: HOSPADM

## 2025-06-04 RX ORDER — SODIUM CHLORIDE 0.9 % (FLUSH) 0.9 %
10 SYRINGE (ML) INJECTION EVERY 12 HOURS SCHEDULED
Status: DISCONTINUED | OUTPATIENT
Start: 2025-06-04 | End: 2025-06-14 | Stop reason: HOSPADM

## 2025-06-04 RX ORDER — FAMOTIDINE 20 MG/1
20 TABLET, FILM COATED ORAL 2 TIMES DAILY
Status: DISCONTINUED | OUTPATIENT
Start: 2025-06-04 | End: 2025-06-14 | Stop reason: HOSPADM

## 2025-06-04 RX ORDER — CITALOPRAM HYDROBROMIDE 20 MG/1
20 TABLET ORAL DAILY
Status: CANCELLED | OUTPATIENT
Start: 2025-06-05

## 2025-06-04 RX ORDER — BISACODYL 5 MG/1
5 TABLET, DELAYED RELEASE ORAL DAILY PRN
Status: DISCONTINUED | OUTPATIENT
Start: 2025-06-04 | End: 2025-06-14 | Stop reason: HOSPADM

## 2025-06-04 RX ORDER — ACETAMINOPHEN 500 MG
1000 TABLET ORAL EVERY 8 HOURS PRN
Status: CANCELLED | OUTPATIENT
Start: 2025-06-04

## 2025-06-04 RX ORDER — BISACODYL 10 MG
10 SUPPOSITORY, RECTAL RECTAL DAILY PRN
Status: DISCONTINUED | OUTPATIENT
Start: 2025-06-04 | End: 2025-06-14 | Stop reason: HOSPADM

## 2025-06-04 RX ORDER — BISACODYL 10 MG
10 SUPPOSITORY, RECTAL RECTAL DAILY PRN
Status: CANCELLED | OUTPATIENT
Start: 2025-06-04

## 2025-06-04 RX ORDER — NICOTINE POLACRILEX 4 MG
15 LOZENGE BUCCAL
Status: DISCONTINUED | OUTPATIENT
Start: 2025-06-04 | End: 2025-06-14 | Stop reason: HOSPADM

## 2025-06-04 RX ORDER — AMLODIPINE BESYLATE 10 MG/1
10 TABLET ORAL DAILY
Status: DISCONTINUED | OUTPATIENT
Start: 2025-06-05 | End: 2025-06-14 | Stop reason: HOSPADM

## 2025-06-04 RX ORDER — ALPRAZOLAM 0.5 MG
0.5 TABLET ORAL NIGHTLY PRN
Status: DISCONTINUED | OUTPATIENT
Start: 2025-06-04 | End: 2025-06-14 | Stop reason: HOSPADM

## 2025-06-04 RX ORDER — AMOXICILLIN 250 MG
2 CAPSULE ORAL 2 TIMES DAILY
Status: CANCELLED | OUTPATIENT
Start: 2025-06-04

## 2025-06-04 RX ORDER — SODIUM CHLORIDE 0.9 % (FLUSH) 0.9 %
10 SYRINGE (ML) INJECTION EVERY 12 HOURS SCHEDULED
Status: CANCELLED | OUTPATIENT
Start: 2025-06-04

## 2025-06-04 RX ORDER — CASTOR OIL AND BALSAM, PERU 788; 87 MG/G; MG/G
1 OINTMENT TOPICAL EVERY 12 HOURS SCHEDULED
Status: CANCELLED | OUTPATIENT
Start: 2025-06-04

## 2025-06-04 RX ORDER — ALPRAZOLAM 0.5 MG
0.5 TABLET ORAL NIGHTLY PRN
Status: CANCELLED | OUTPATIENT
Start: 2025-06-04

## 2025-06-04 RX ORDER — AMLODIPINE BESYLATE 10 MG/1
10 TABLET ORAL DAILY
Status: CANCELLED | OUTPATIENT
Start: 2025-06-05

## 2025-06-04 RX ORDER — POLYETHYLENE GLYCOL 3350 17 G/17G
17 POWDER, FOR SOLUTION ORAL DAILY PRN
Status: DISCONTINUED | OUTPATIENT
Start: 2025-06-04 | End: 2025-06-14 | Stop reason: HOSPADM

## 2025-06-04 RX ORDER — FAMOTIDINE 20 MG/1
20 TABLET, FILM COATED ORAL 2 TIMES DAILY
Status: CANCELLED | OUTPATIENT
Start: 2025-06-04

## 2025-06-04 RX ORDER — BISACODYL 5 MG/1
5 TABLET, DELAYED RELEASE ORAL DAILY PRN
Status: CANCELLED | OUTPATIENT
Start: 2025-06-04

## 2025-06-04 RX ORDER — CASTOR OIL AND BALSAM, PERU 788; 87 MG/G; MG/G
1 OINTMENT TOPICAL EVERY 12 HOURS SCHEDULED
Status: DISCONTINUED | OUTPATIENT
Start: 2025-06-04 | End: 2025-06-14 | Stop reason: HOSPADM

## 2025-06-04 RX ORDER — AMOXICILLIN 250 MG
2 CAPSULE ORAL 2 TIMES DAILY
Status: DISCONTINUED | OUTPATIENT
Start: 2025-06-04 | End: 2025-06-14 | Stop reason: HOSPADM

## 2025-06-04 RX ORDER — QUETIAPINE FUMARATE 25 MG/1
50 TABLET, FILM COATED ORAL 2 TIMES DAILY
Status: CANCELLED | OUTPATIENT
Start: 2025-06-05

## 2025-06-04 RX ADMIN — INSULIN LISPRO 20 UNITS: 100 INJECTION, SUSPENSION SUBCUTANEOUS at 09:21

## 2025-06-04 RX ADMIN — ALPRAZOLAM 0.5 MG: 0.5 TABLET ORAL at 21:42

## 2025-06-04 RX ADMIN — DOCUSATE SODIUM 50MG AND SENNOSIDES 8.6MG 2 TABLET: 8.6; 5 TABLET, FILM COATED ORAL at 21:44

## 2025-06-04 RX ADMIN — ATORVASTATIN CALCIUM 80 MG: 40 TABLET, FILM COATED ORAL at 21:45

## 2025-06-04 RX ADMIN — ZIPRASIDONE MESYLATE 10 MG: 20 INJECTION, POWDER, LYOPHILIZED, FOR SOLUTION INTRAMUSCULAR at 00:56

## 2025-06-04 RX ADMIN — EMPAGLIFLOZIN 10 MG: 10 TABLET, FILM COATED ORAL at 12:04

## 2025-06-04 RX ADMIN — PANTOPRAZOLE SODIUM 40 MG: 40 TABLET, DELAYED RELEASE ORAL at 12:04

## 2025-06-04 RX ADMIN — CITALOPRAM HYDROBROMIDE 20 MG: 20 TABLET ORAL at 12:04

## 2025-06-04 RX ADMIN — APIXABAN 5 MG: 5 TABLET, FILM COATED ORAL at 21:45

## 2025-06-04 RX ADMIN — TIMOLOL MALEATE: 5 SOLUTION OPHTHALMIC at 21:45

## 2025-06-04 RX ADMIN — TIMOLOL MALEATE: 5 SOLUTION OPHTHALMIC at 09:22

## 2025-06-04 RX ADMIN — FAMOTIDINE 20 MG: 20 TABLET ORAL at 21:45

## 2025-06-04 RX ADMIN — LOSARTAN POTASSIUM 100 MG: 50 TABLET, FILM COATED ORAL at 12:04

## 2025-06-04 RX ADMIN — Medication 10 ML: at 09:16

## 2025-06-04 RX ADMIN — LEVOTHYROXINE SODIUM 137.5 MCG: 0.12 TABLET ORAL at 12:04

## 2025-06-04 RX ADMIN — APIXABAN 5 MG: 5 TABLET, FILM COATED ORAL at 12:04

## 2025-06-04 RX ADMIN — Medication 10 ML: at 21:47

## 2025-06-04 RX ADMIN — FAMOTIDINE 20 MG: 20 TABLET ORAL at 12:04

## 2025-06-04 RX ADMIN — AMLODIPINE BESYLATE 10 MG: 10 TABLET ORAL at 12:04

## 2025-06-04 RX ADMIN — HYDROCODONE BITARTRATE AND ACETAMINOPHEN 1 TABLET: 7.5; 325 TABLET ORAL at 21:41

## 2025-06-04 RX ADMIN — QUETIAPINE FUMARATE 100 MG: 100 TABLET ORAL at 21:44

## 2025-06-04 RX ADMIN — INSULIN LISPRO 20 UNITS: 100 INJECTION, SUSPENSION SUBCUTANEOUS at 18:13

## 2025-06-04 RX ADMIN — CASTOR OIL AND BALSAM, PERU 1 APPLICATION: 788; 87 OINTMENT TOPICAL at 09:22

## 2025-06-04 RX ADMIN — ACETAMINOPHEN 1000 MG: 500 TABLET ORAL at 18:12

## 2025-06-04 RX ADMIN — Medication 1 APPLICATION: at 21:46

## 2025-06-04 NOTE — CASE MANAGEMENT/SOCIAL WORK
Discharge Planning Assessment  [facility]     Patient Name: Ramsey Riley  MRN: [id number]  Today's Date: 6/4/2025    Admit Date: 5/26/2025    Plan: Pt to be tranferred to [facility] and admitted to Swing Bed.  Pt's spouse aware and agreeable.       Discharge Plan       Row Name 06/04/25 1231       Plan    Plan Pt to be tranferred to [facility] and admitted to Swing Bed.  Pt's spouse aware and agreeable.                  Continued Care and Services - Admitted Since 5/26/2025       Destination       Service Provider Request Status Services Address Phone Fax Patient Preferred    [facility] Declined  Fall risk -- [address] [facility] [address] [phone] --    [facility] [place] Declined  medications -- [address] [facility] [address] [phone] --    [facility] Declined  Bed not available -- [address] [address] [phone] --    [facility] Declined  Home medications -- [address] [facility] [address] [phone] --                  Expected Discharge Date and Time       Expected Discharge Date Expected Discharge Time    Jun 4, 2025             MARIUSZ Rowan

## 2025-06-04 NOTE — CASE MANAGEMENT/SOCIAL WORK
Case Management/Social Work    Patient Name:  Ramsey Riley  YOB: 1955  MRN: [id number]  Admit Date:  5/26/2025        Patient is approved for 7 initial swing bed days with clinical updates due 6/9/25. SS and provider notified via secure chat.       Electronically signed by:  Zhanna Au RN  06/04/25 11:28 EDT

## 2025-06-04 NOTE — NURSING NOTE
"Around 0415 pt bed alarm went off, upon entering room pt was found kneeling beside bed with the only obvious injury noted to be a new abrasion to L MACKENZIE vigil APRN aware, see orders. Pt states he was \"trying to get off the boat\". Pt wife, Jina, notified of pts fall, verbalizes understanding. Bed alarm active, bed locked and lowered, sitter currently at bedside.   "

## 2025-06-04 NOTE — THERAPY TREATMENT NOTE
Acute Care - Physical Therapy Treatment Note   [facility]     Patient Name: Ramsey Riley  : 1955  MRN: [id number]  Today's Date: 2025      Visit Dx:     ICD-10-CM ICD-9-CM   1. Frequent falls  R29.6 V15.88   2. Skin avulsion  T14.8XXA 879.8   3. Bone bruise  T14.8XXA 924.9     Patient Active Problem List   Diagnosis    Coronary artery disease involving native coronary artery of native heart without angina pectoris    Essential hypertension    Bradycardia, sinus    Fatigue    Abnormal ECG    Precordial pain    LVH (left ventricular hypertrophy) due to hypertensive disease, without heart failure    Diabetes mellitus    PAD (peripheral artery disease)    Ischemic stroke    History of blood clots    Pulmonary embolus    Dyslipidemia    Class 1 obesity due to excess calories with serious comorbidity and body mass index (BMI) of 34.0 to 34.9 in adult    Weakness    Frequent falls     Past Medical History:   Diagnosis Date    Arthritis     Clot     Coronary artery disease     Dementia 2024    Diabetes mellitus     Heart disease     History of blood clots     Hypertension     Impaired mobility     Low back pain     Thyroid disease     Type 2 diabetes mellitus      Past Surgical History:   Procedure Laterality Date    CHOLECYSTECTOMY      COLONOSCOPY      dr srivastava    CORONARY ARTERY BYPASS GRAFT      [facility] - 3 BY PASS     ENDOSCOPY       PT Assessment (Last 12 Hours)       PT Evaluation and Treatment       Row Name 25 1357          Physical Therapy Time and Intention    Comment Pt. spouse asked PT not to wake pt. after having rough night and needing to get rest. PT educated pt. spouse on importance of daily mobility to prevent further debility. Pt. spouse verbalized understanding on importance of continued mobility.  -KM       Row Name             Wound 25 1618 Left posterior heel Pressure Injury    Wound - Properties Group Placement Date: 25  -CW Placement Time:   -CW  Present on Original Admission: Y  -CW Side: Left  -CW Orientation: posterior  -CW Location: heel  -CW Primary Wound Type: Pressure Inj  -CW    Retired Wound - Properties Group Placement Date: 05/26/25  -CW Placement Time: 1618  -CW Present on Original Admission: Y  -CW Side: Left  -CW Orientation: posterior  -CW Location: heel  -CW    Retired Wound - Properties Group Placement Date: 05/26/25  -CW Placement Time: 1618  -CW Present on Original Admission: Y  -CW Side: Left  -CW Orientation: posterior  -CW Location: heel  -CW    Retired Wound - Properties Group Date first assessed: 05/26/25  -CW Time first assessed: 1618  -CW Present on Original Admission: Y  -CW Side: Left  -CW Location: heel  -CW      Row Name             Wound 05/26/25 1622 Left anterior heel Pressure Injury    Wound - Properties Group Placement Date: 05/26/25  -CW Placement Time: 1622  -CW Side: Left  -CW Orientation: anterior  -CW Location: heel  -CW Primary Wound Type: Pressure Inj  -CW    Retired Wound - Properties Group Placement Date: 05/26/25  -CW Placement Time: 1622  -CW Side: Left  -CW Orientation: anterior  -CW Location: heel  -CW    Retired Wound - Properties Group Placement Date: 05/26/25  -CW Placement Time: 1622  -CW Side: Left  -CW Orientation: anterior  -CW Location: heel  -CW    Retired Wound - Properties Group Date first assessed: 05/26/25  -CW Time first assessed: 1622  -CW Side: Left  -CW Location: heel  -CW      Row Name             Wound 05/27/25 1007 medial parietal region Traumatic Abrasion    Wound - Properties Group Placement Date: 05/27/25  -LB Placement Time: 1007  -LB Present on Original Admission: Y  -LB Orientation: medial  -LB Location: parietal region  -LB Primary Wound Type: Traumatic  -LB Secondary Wound Type - Traumatic: Abrasion  -LB    Retired Wound - Properties Group Placement Date: 05/27/25  -LB Placement Time: 1007  -LB Present on Original Admission: Y  -LB Orientation: medial  -LB Location: parietal region   -LB    Retired Wound - Properties Group Placement Date: 05/27/25  -LB Placement Time: 1007  -LB Present on Original Admission: Y  -LB Orientation: medial  -LB Location: parietal region  -LB    Retired Wound - Properties Group Date first assessed: 05/27/25  -LB Time first assessed: 1007  -LB Present on Original Admission: Y  -LB Location: parietal region  -LB      Row Name             Wound 05/27/25 1008 Right distal arm Traumatic Skin Tear    Wound - Properties Group Placement Date: 05/27/25  -LB Placement Time: 1008  -LB Present on Original Admission: Y  -LB Side: Right  -LB Orientation: distal  -LB Location: arm  -LB Primary Wound Type: Traumatic  -LB Secondary Wound Type - Traumatic: Skin Tear  -LB    Retired Wound - Properties Group Placement Date: 05/27/25  -LB Placement Time: 1008  -LB Present on Original Admission: Y  -LB Side: Right  -LB Orientation: distal  -LB Location: arm  -LB    Retired Wound - Properties Group Placement Date: 05/27/25  -LB Placement Time: 1008  -LB Present on Original Admission: Y  -LB Side: Right  -LB Orientation: distal  -LB Location: arm  -LB    Retired Wound - Properties Group Date first assessed: 05/27/25  -LB Time first assessed: 1008  -LB Present on Original Admission: Y  -LB Side: Right  -LB Location: arm  -LB              User Key  (r) = Recorded By, (t) = Taken By, (c) = Cosigned By      Initials Name Provider Type    Chata Couch RN Registered Nurse    Baljinder Reese, CARLOS Physical Therapist    Marysol Rodriguez RN Registered Nurse                    Physical Therapy Education       Title: PT OT SLP Therapies (In Progress)       Topic: Physical Therapy (In Progress)       Point: Mobility training (In Progress)       Learning Progress Summary            Patient Acceptance, E, NR by LAURYN at 6/4/2025 0033    Acceptance, E,TB, VU by TERRENCE at 5/30/2025 2339    Acceptance, E,TB, NR by SHEELA at 5/28/2025 0920    Acceptance, E,TB, VU by KEVIN at 5/27/2025 1532                       Continue Regimen: sulfacetamide sodium-sulfur 9%-4.5% topical cleanser: Wash face bid\\ndoxycycline monohydrate 100mg capsule: Take one pill BID with food Point: Home exercise program (In Progress)       Learning Progress Summary            Patient Acceptance, E, NR by  at 6/4/2025 0033    Acceptance, E,TB, VU by  at 5/30/2025 2339    Acceptance, E,TB, NR by  at 5/28/2025 0920    Acceptance, E,TB, VU by  at 5/27/2025 1532                      Point: Body mechanics (In Progress)       Learning Progress Summary            Patient Acceptance, E, NR by  at 6/4/2025 0033    Acceptance, E,TB, VU by  at 5/30/2025 2339    Acceptance, E,TB, NR by  at 5/28/2025 0920    Acceptance, E,TB, VU by KM at 5/27/2025 1532                      Point: Precautions (In Progress)       Learning Progress Summary            Patient Acceptance, E, NR by  at 6/4/2025 0033    Acceptance, E,TB, VU by  at 5/30/2025 2339    Acceptance, E,TB, NR by  at 5/28/2025 0920    Acceptance, E,TB, VU by  at 5/27/2025 1532                                      User Key       Initials Effective Dates Name Provider Type Discipline     06/16/21 -  Vy Tejeda, RN Registered Nurse Nurse     05/24/22 -  Baljinder Martinez PT Physical Therapist PT     02/07/25 -  Dagmar Maciel, RN Registered Nurse Nurse     03/03/25 -  Marielena Rothman, RN Registered Nurse Nurse                  PT Recommendation and Plan  Anticipated Discharge Disposition (PT): inpatient rehabilitation facility  Planned Therapy Interventions (PT): balance training, bed mobility training, gait training, home exercise program, patient/family education, postural re-education, ROM (range of motion), strengthening, stretching, transfer training  Therapy Frequency (PT): 2 times/wk (2-5x/wk)  Progress Summary (PT)  Daily Progress Summary (PT): Pt. continues to show progress during PT sessions. He was able to perform functional mobility skills similar to prior sessions. He was able to ambulate improved distance w/ RW. Pt. tolerates activity well. Pt. would continue to benefit from PT services.  Plan of Care Reviewed With:  patient  Outcome Evaluation: Pt. evaluation completed during PT session. He was able to perform functional mobility skills w/ modA. He was able to ambulate in room w/ RW and Teddy. Pt. would benefit from further PT services at this time.       Time Calculation:    PT Charges       Row Name 06/04/25 1357             Time Calculation    PT Received On 06/04/25  -KEVIN                User Key  (r) = Recorded By, (t) = Taken By, (c) = Cosigned By      Initials Name Provider Type     Baljinder Martinez, PT Physical Therapist                  Therapy Charges for Today       Code Description Service Date Service Provider Modifiers Qty    08391724892 HC PT THERAPEUTIC ACT EA 15 MIN 6/3/2025 Baljinder Martinez, PT GP 1    42724183842 HC GAIT TRAINING EA 15 MIN 6/3/2025 Baljinder Martinez, PT GP 1    34398512521 HC CAREGIVER TRAINING STRATEGIES & TQ 1ST 30 MINUTES 6/4/2025 Baljinder Martinez, PT  1            PT G-Codes  AM-PAC 6 Clicks Score (PT): 14    Baljinder Martinez PT  6/4/2025

## 2025-06-04 NOTE — NURSING NOTE
Patient transferred to room 344 bed A. Spouse Jina notified of room change. All belongings with patient. Sitter remains at bedside.

## 2025-06-04 NOTE — CASE MANAGEMENT/SOCIAL WORK
Discharge Planning Assessment   [facility]     Patient Name: Ramsey Riley  MRN: [id number]  Today's Date: 6/4/2025    Admit Date: 5/26/2025    Plan: SS discussed in length with pt's spouse on 6/3 regarding SNF denials and possible long term placement.  Pt's spouse stated she wants pt to continue on current medications and does not want long term placement at this time.  Pt's spouse would be agreeable to Swing Bed for continued therapy if pt's insurance will approve.  SS discussed with Physician and Swing Bed consult is placed.  Pt's spouse stated that if insurance denies she will take pt home with home health.  SS will follow.          Discharge Plan       Row Name 06/04/25 0924       Plan    Plan SS discussed in length with pt's spouse on 6/3 regarding SNF denials and possible long term placement.  Pt's spouse stated she wants pt to continue on current medications and does not want long term placement at this time.  Pt's spouse would be agreeable to Swing Bed for continued therapy if pt's insurance will approve.  SS discussed with Physician and Swing Bed consult is placed.  Pt's spouse stated that if insurance denies she will take pt home with home health.  SS will follow.                  Haley Larkin, GERARDW

## 2025-06-04 NOTE — PLAN OF CARE
Goal Outcome Evaluation:  Plan of Care Reviewed With: patient        Progress: no change        Patient has been resting in bed during this shift. Patient was agitated and restless as a result of confusion, Nemo, BOUCHRA notified. Geodon IM administered per provider orders. No signs or symptoms of acute distress noted at this time. Plan of care ongoing.

## 2025-06-04 NOTE — DISCHARGE SUMMARY
[facility] HOSPITALISTS DISCHARGE SUMMARY    Patient Identification:  Name:  Ramsey Riley  Age:  70 y.o.  Sex:  male  :  1955  MRN:  [id number]  Visit Number:  [id number]    Date of Admission: 2025  Date of Discharge:  2025     PCP: Margret Purvis MD    DISCHARGE DIAGNOSIS ***      CONSULTS ***      PROCEDURES PERFORMED  ***    HOSPITAL COURSE  Patient is a 70 y.o. male presented to [facility] complaining of ***.  Please see the admitting history and physical for further details.          VITAL SIGNS:  Temp:  [97.6 °F (36.4 °C)-98.5 °F (36.9 °C)] 97.7 °F (36.5 °C)  Heart Rate:  [54-88] 61  Resp:  [18-20] 18  BP: (139-176)/(70-87) 163/82  SpO2:  [93 %-98 %] 93 %  on   ;   Device (Oxygen Therapy): room air    Body mass index is 30.05 kg/m².  Wt Readings from Last 3 Encounters:   25 97.7 kg (215 lb 7 oz)   25 104 kg (230 lb)   25 102 kg (223 lb 12.8 oz)       PHYSICAL EXAM: ***  Constitutional:  Well-developed and well-nourished.  No acute distress.      HENT:  Head:  Normocephalic and atraumatic.  Mouth:  Moist mucous membranes.    Eyes:  Conjunctivae and EOM are normal. No scleral icterus.    Neck:  Neck supple.  No JVD present.    Cardiovascular:  Normal rate, regular rhythm, and normal heart sounds. No murmur.  Pulmonary/Chest:  Normal rate and effort. Breath sounds clear to auscultation bilaterally with good air movement.  Abdominal:  Soft. No distension and no tenderness. Normal bowel sounds present.  Musculoskeletal:  No tenderness and no deformity.  No red or swollen joints anywhere.    Neurological:  Alert and oriented to person, place, and time.  No focal strength or sensory deficit.    Skin:  Skin is warm and dry. No rash noted. No pallor.   Peripheral vascular:  No clubbing, no cyanosis, no edema.  DISCHARGE DISPOSITION   Stable    DISCHARGE MEDICATIONS:     Discharge Medications        ASK your doctor about these medications         Instructions Start Date   ALPRAZolam 0.5 MG tablet  Commonly known as: XANAX   0.5 mg, Nightly PRN      amLODIPine 10 MG tablet  Commonly known as: NORVASC   10 mg, Oral, Daily      apixaban 5 MG tablet tablet  Commonly known as: Eliquis   5 mg, Oral, Every 12 Hours      atorvastatin 80 MG tablet  Commonly known as: Lipitor   80 mg, Oral, Nightly      citalopram 40 MG tablet  Commonly known as: CeleXA   40 mg, Oral, Daily      dapagliflozin 5 MG tablet tablet  Commonly known as: Farxiga   5 mg, Oral, Daily      dorzolamide-timolol 2-0.5 % ophthalmic solution  Commonly known as: COSOPT   1 drop, Both Eyes, 2 Times Daily      famotidine 20 MG tablet  Commonly known as: Pepcid   20 mg, Oral, 2 Times Daily      HumuLIN 70/30 KwikPen (70-30) 100 UNIT/ML suspension pen-injector  Generic drug: Insulin NPH Isophane & Regular  Ask about: Which instructions should I use?   84 Units, Every Morning      HumuLIN 70/30 KwikPen (70-30) 100 UNIT/ML suspension pen-injector  Generic drug: Insulin NPH Isophane & Regular  Ask about: Which instructions should I use?   64 Units, Every Evening      HYDROcodone-acetaminophen 7.5-325 MG per tablet  Commonly known as: NORCO  Ask about: Which instructions should I use?   1 tablet, Oral, Every 12 Hours PRN      levothyroxine 137 MCG tablet  Commonly known as: SYNTHROID, LEVOTHROID   137 mcg, Oral, Daily      losartan 100 MG tablet  Commonly known as: COZAAR   100 mg, Oral, Daily      pantoprazole 40 MG EC tablet  Commonly known as: PROTONIX   40 mg, Oral, Daily      QUEtiapine 100 MG tablet  Commonly known as: SEROquel   100 mg, Oral, Nightly      QUEtiapine 50 MG tablet  Commonly known as: SEROquel   50 mg, Oral, 2 Times Daily                  Follow-up Information       Margret Purvis MD .    Specialties: Internal Medicine, Geriatric Medicine  Contact information:  [address] [place] [address] [phone]                              CODE STATUS  Code Status and Medical  Interventions: CPR (Attempt to Resuscitate); Full Support   Ordered at: 05/26/25 1430     Code Status (Patient has no pulse and is not breathing):    CPR (Attempt to Resuscitate)     Medical Interventions (Patient has pulse or is breathing):    Full Support       Harini Urbina DO  [facility]  06/04/25  18:34 EDT    Please note that this discharge summary required more than 30 minutes to complete.

## 2025-06-04 NOTE — PAYOR COMM NOTE
"Zhanna Au RN CM/Swing Bed  [email or web address]  Phone: [phone] Fax: [phone]  -NPI [id number]  [facility] [id number]  Authorization No:     Patient needs post acute swing bed admission for PT/OT strength, mobility, ADL and transfer training. PLOF lives at home with spouse and uses rolling walker for ambulation. Receives assistance with ADLs from spouse.     ICD 10  R29.6  R53.81    Ramsey Riley (70 y.o. Male)       Date of Birth   1955    Social Security Number       Address   [address] [place] [address]    Home Phone   [phone]    MRN   [id number]       Denominational   Patient Refused    Marital Status                               Admission Date   5/26/2025    Admission Type   Emergency    Admitting Provider   Magali Baig DO    Attending Provider   Harini Urbina DO    Department, Room/Bed   [address], 3306/2S       Discharge Date       Discharge Disposition       Discharge Destination                                 Attending Provider: Harini Urbina DO    Allergies: Penicillins, Trazodone    Isolation: None   Infection: None   Code Status: CPR    Ht: 155.8 cm (61.32\")   Wt: 97 kg (213 lb 14.4 oz)    Admission Cmt: None   Principal Problem: Frequent falls [R29.6]                   Active Insurance as of 5/26/2025       Primary Coverage       Payor Plan Insurance Group Employer/Plan Group    ANTHEM MEDICARE REPLACEMENT ANTH MEDICARE ADVANTAGE HMO [id number]       Payor Plan Address Payor Plan Phone Number Payor Plan Fax Number Effective Dates     [id number] [phone]  1/1/2024 - None Entered    [facility] [id number]         Subscriber Name Subscriber Birth Date Member ID       RAMSEY RILEY 1955 [id number]                     Emergency Contacts        (Rel.) Home Phone Work Phone Mobile Phone    Jina Riley (Spouse) [phone] -- [phone]                 History & Physical        Justo Grande " MONY Hernandez at 25 1435       Attestation signed by Magali Baig DO at 25 1819      I have reviewed this documentation and agree. Patient seen and examined in 311-2. No visitors present. Patient has no complaints at present. He states he has no prodromal symptoms prior to falling. No syncope. Mostly just generalized weakness. No dizziness. No known vitamin deficiencies or previous ETOH use.    Exam largely unremarkable; he does have a scar on right hemicranium from recently falling and striking a fireplace. Left heel blistered area noted; no drainage.     Place on observation with telemetry. MRI brain w/o contrast given falls on eliquis in the setting of vascular dementia. PT/OT and SS consult with b12/folate levels in [facility]  History & Physical    Patient Identification:  Name:  Ramsey Riley  Age:  70 y.o.  Sex:  male  :  1955  MRN:  [id number]   Visit Number:  [id number]  Admit Date: 2025   Primary Care Physician:  Margret Purvis MD    Subjective     Chief complaint: Foot pain    History of presenting illness:      Ramsey Riley is a 70 y.o. male who presented for further evaluation of foot pain. He was seen and examined in the ED with spouse at the bedside. He and spouse report history mild vascular dementia with mild confusion and memory difficulty at baseline but he was pleasant and conversant. His spouse assists in providing history. They report they presented to the ED today secondary to foot pain. Patient has had a deep bruise/blister on the back of his heel for the past several days which he reports is incredibly painful. He reports deep, burning pain. His spouse states that last evening and overnight the pain seemed worse and the patient was very uncomfortable at home, at times even yelling out in pain so she decided to bring him in to the ED for further evaluation.   She believes the wound  on the foot may be related to one of his recent multiple falls but they cannot pinpoint when the exact occurrence was. Spouse and patient report he has fallen multiple times in the past month. With one fall they report he injured his shoulder, spouse states had a fracture but unsure specifics. He did not require surgical intervention but has been in a sling for the past several weeks. Recently followed up with provider regarding shoulder injury and was told to discontinue the sling but patient has still been wearing this as he states it is too painful without it. He was hospitalized in [place] s/p fall in the recent past and the plan was for some sort of inpatient rehab however they had difficulty securing placement for him. Wife reports that he is significantly debilitated and has declined further since that discharge. At this time he is requiring assistance with bed mobility, ADLs such as dressing. She states he uses a walker but remains significantly unsteady on his feet and she is not physically able to stabilize him herself. She further adds that his vision is very poor due to diabetic retinopathy and this is limited his ability to safely ambulate in their home.  When he does fall he is unable to get up on his own and she is unable to assist. He denies any chest pain, palpitations, or dizziness preceding these falls.     Past medical history is significant for vascular dementia, CAD, HTN, PAD, hyperlipidemia, diabetes mellitus, chronically anticoagulated with Eliquis, thyroid disease    Upon arrival to the ED, vital signs were temp 98, heart rate 84, respirations 18, /75, SpO2 97% on RA.  On laboratory workup he was hyperglycemic at 282, CBC unremarkable.  CXR negative, XR bilateral feet negative.    Known Emergency Department medications received prior to my evaluation included none.   Emergency Department Room location at the time of my evaluation was [facility].      ---------------------------------------------------------------------------------------------------------------------   Review of Systems   Constitutional:  Positive for activity change.   HENT:  Negative for congestion and rhinorrhea.    Respiratory:  Negative for cough and shortness of breath.    Cardiovascular:  Negative for chest pain and leg swelling.   Gastrointestinal:  Negative for abdominal pain, diarrhea, nausea and vomiting.   Genitourinary:  Negative for difficulty urinating and dysuria.   Musculoskeletal:  Positive for arthralgias, gait problem and myalgias.   Skin:  Positive for wound. Negative for rash.   Neurological:  Positive for weakness. Negative for dizziness and light-headedness.   Psychiatric/Behavioral:  Positive for confusion (baseline).         ---------------------------------------------------------------------------------------------------------------------   Past Medical History:   Diagnosis Date    Arthritis     Clot     Coronary artery disease     Dementia 12/18/2024    Diabetes mellitus     Heart disease     History of blood clots     Hypertension     Impaired mobility     Low back pain     Thyroid disease     Type 2 diabetes mellitus      Past Surgical History:   Procedure Laterality Date    CHOLECYSTECTOMY  2015    COLONOSCOPY  2015    dr srivastava    CORONARY ARTERY BYPASS GRAFT      [place] - 3 BY PASS     ENDOSCOPY       Family History   Problem Relation Age of Onset    Diabetes Mother     Heart disease Mother         STENTING    COPD Mother     Heart attack Father     Diabetes Father     Heart defect Son         Ross procedure    Heart murmur Son     Hypertension Son      Social History     Socioeconomic History    Marital status:    Tobacco Use    Smoking status: Never     Passive exposure: Never    Smokeless tobacco: Current     Types: Chew   Vaping Use    Vaping status: Never Used   Substance and Sexual Activity    Alcohol use: No    Drug use: No    Sexual  activity: Defer     ---------------------------------------------------------------------------------------------------------------------   Allergies:  Penicillins and Trazodone  ---------------------------------------------------------------------------------------------------------------------   Home medications:    Medications below are reported home medications pulling from within the system; at this time, these medications have not been reconciled unless otherwise specified and are in the verification process for further verifcation as current home medications.  (Not in a hospital admission)      Hospital Scheduled Meds:          Current listed hospital scheduled medications may not yet reflect those currently placed in orders that are signed and held awaiting patient's arrival to floor.   ---------------------------------------------------------------------------------------------------------------------     Objective     Vital Signs:  Temp:  [98 °F (36.7 °C)] 98 °F (36.7 °C)  Heart Rate:  [69-84] 69  Resp:  [18] 18  BP: (148-176)/(73-95) 148/75      05/26/25  1133   Weight: 99.8 kg (220 lb)     Body mass index is 30.68 kg/m².  ---------------------------------------------------------------------------------------------------------------------       Physical Exam  Vitals and nursing note reviewed.   Constitutional:       General: He is not in acute distress.     Appearance: He is obese. He is ill-appearing (chronically ill appearing).   HENT:      Head: Normocephalic and atraumatic.   Eyes:      Extraocular Movements: Extraocular movements intact.   Cardiovascular:      Rate and Rhythm: Normal rate and regular rhythm.   Pulmonary:      Effort: Pulmonary effort is normal. No respiratory distress.      Breath sounds: Normal breath sounds.   Abdominal:      Palpations: Abdomen is soft.      Tenderness: There is no abdominal tenderness.   Musculoskeletal:      Right lower leg: No edema.      Left lower leg: No  "edema.   Skin:     General: Skin is warm and dry.      Comments: Wound to posterior left heel, no surrounding erythema or warmth, no drainage or other sign of infection   Neurological:      Mental Status: He is alert. Mental status is at baseline.   Psychiatric:         Mood and Affect: Mood normal.         Behavior: Behavior normal.         ---------------------------------------------------------------------------------------------------------------------  EKG:      ---------------------------------------------------------------------------------------------------------------------   Results from last 7 days   Lab Units 05/26/25  1250   CRP mg/dL 0.30   WBC 10*3/mm3 7.38   HEMOGLOBIN g/dL 15.2   HEMATOCRIT % 47.1   MCV fL 91.6   MCHC g/dL 32.3   PLATELETS 10*3/mm3 148         Results from last 7 days   Lab Units 05/26/25  1250   SODIUM mmol/L 134*   POTASSIUM mmol/L 4.6   CHLORIDE mmol/L 98   CO2 mmol/L 25.5   BUN mg/dL 12   CREATININE mg/dL 1.28*   CALCIUM mg/dL 9.2   GLUCOSE mg/dL 282*   ALBUMIN g/dL 3.9   BILIRUBIN mg/dL 0.6   ALK PHOS U/L 150*   AST (SGOT) U/L 31   ALT (SGPT) U/L 25   Estimated Creatinine Clearance: 66.5 mL/min (A) (by C-G formula based on SCr of 1.28 mg/dL (H)).  No results found for: \"AMMONIA\"          Lab Results   Component Value Date    HGBA1C 8.30 (H) 05/05/2025     Lab Results   Component Value Date    TSH 4.610 (H) 05/05/2025    FREET4 1.24 12/10/2024     No results found for: \"PREGTESTUR\", \"PREGSERUM\", \"HCG\", \"HCGQUANT\"  Pain Management Panel  More data exists         Latest Ref Rng & Units 5/5/2025 12/10/2024   Pain Management Panel   Creatinine, Urine Not Estab. mg/dL 110.9  -   Amphetamine, Urine Qual Negative - Negative    Barbiturates Screen, Urine Negative - Negative    Benzodiazepine Screen, Urine Negative - Positive    Buprenorphine, Screen, Urine Negative - Negative    Cocaine Screen, Urine Negative - Negative    Fentanyl, Urine Negative - Negative    Methadone Screen , Urine " "Negative - Negative    Methamphetamine, Ur Negative - Negative      No results found for: \"BLOODCX\"  No results found for: \"URINECX\"  No results found for: \"WOUNDCX\"  No results found for: \"STOOLCX\"      ---------------------------------------------------------------------------------------------------------------------  Imaging Results (Last 7 Days)       Procedure Component Value Units Date/Time    XR Foot 3+ View Bilateral [[id number]] Collected: 05/26/25 1335     Updated: 05/26/25 1415    Narrative:      Exam: XR FOOT 3+ VW BILATERAL-     History: pain     Comparison: No images available     Findings:     Right distal first tow resection.      No acute fracture or dislocation.     Soft tissue are normal.       Impression:      Impression:     No acute bony process.     This report was finalized on 5/26/2025 2:12 PM by Brock Tafoya MD.       XR Chest 1 View [[id number]] Collected: 05/26/25 1335     Updated: 05/26/25 1337    Narrative:      Procedure: Frontal view of chest obtained.     Comparison: None available     History: frequent falls     Findings:     No pneumonia or acute process seen in the chest.  Normal heart size and mediastinal contours.  Trachea is in midline position.  No edema or effusion is seen.  There is no evidence of pneumothorax.       Impression:         No evidence of acute disease in the chest.     This report was finalized on 5/26/2025 1:35 PM by Brock Tafoya MD.               Cultures:  No results found for: \"BLOODCX\", \"URINECX\", \"WOUNDCX\", \"MRSACX\", \"RESPCX\", \"STOOLCX\"    Last echocardiogram:  Results for orders placed during the hospital encounter of 12/10/24    Adult Transthoracic Echo Complete W/ Cont if Necessary Per Protocol    Interpretation Summary    Left ventricular systolic function is normal. Calculated left ventricular EF = 67.8% Left ventricular ejection fraction appears to be 66 - 70%.    Left ventricular wall thickness is consistent with mild concentric hypertrophy. " Left ventricular diastolic function was indeterminate.    The right ventricular cavity is borderline dilated with borderline systolic function.    The left atrial cavity is mildly dilated.    Mild aortic valve regurgitation is present.    Saline test results are negative for right to left atrial level shunt.          I have personally reviewed the above radiology images and read the final radiology report on 05/26/25  ---------------------------------------------------------------------------------------------------------------------  Assessment / Plan     Active Hospital Problems    Diagnosis  POA    **Frequent falls [R29.6]  Not Applicable       ASSESSMENT/PLAN:    Vascular dementia  Acute on chronic debility  Frequent falls  Recent proximal right humerus fracture due to fall  Patient presented to the ED secondary to foot pain.  On further discussion about reports history of vascular dementia with increasing difficulty caring for the patient at home.  He is fallen 3 times within the past month and sustained a right humerus fracture.  She reports he is currently requiring assistance getting out of bed, dressing, etc.  She states he is unsteady on his feet and has difficulty navigating their home despite use of a walker and her assistance.  Will admit to the telemetry unit for further observation and management  Will proceed with MRI brain without contrast to further evaluate  PT OT consults  Appreciate CM/SW assistance with discharge planning as well.  Will check TSH, folate, B12  Continue supportive care measures  Continue to trend labs    Chronic:  CAD  HTN  PAD  HLD  Diabetes mellitus  Hx blood clot, chronically anticoagulated  Continue home medication regimen as indicated cover with SSI for now.  Accu-Cheks to monitor with hypoglycemia protocol in place.  Add basal insulin as clinically indicated  Trend vital signs    ----------  -DVT prophylaxis: Chronically anticoagulated with Eliquis  -Activity: Up with  assistance  -Expected length of stay: Less than 2 midnights  -Disposition pending further clinical course and PT OT recommendations    High risk secondary to frequent falls, vascular dementia    Code Status and Medical Interventions: CPR (Attempt to Resuscitate); Full Support   Ordered at: 05/26/25 1430     Code Status (Patient has no pulse and is not breathing):    CPR (Attempt to Resuscitate)     Medical Interventions (Patient has pulse or is breathing):    Full Support       Justo Grande PA-C   05/26/25  14:35 EDT    Electronically signed by Magali Baig DO at 05/26/25 1819       Current Facility-Administered Medications   Medication Dose Route Frequency Provider Last Rate Last Admin    acetaminophen (TYLENOL) tablet 1,000 mg  1,000 mg Oral Q8H PRN Magali Baig DO   1,000 mg at 06/01/25 1714    ALPRAZolam (XANAX) tablet 0.5 mg  0.5 mg Oral Nightly PRN Magali Baig DO   0.5 mg at 06/03/25 2043    amLODIPine (NORVASC) tablet 10 mg  10 mg Oral Daily Magali Baig DO   10 mg at 06/03/25 1021    apixaban (ELIQUIS) tablet 5 mg  5 mg Oral Q12H Magali Baig DO   5 mg at 06/03/25 2043    atorvastatin (LIPITOR) tablet 80 mg  80 mg Oral Nightly Magali Baig DO   80 mg at 06/03/25 2043    sennosides-docusate (PERICOLACE) 8.6-50 MG per tablet 2 tablet  2 tablet Oral BID Magali Baig DO   2 tablet at 06/03/25 2043    And    polyethylene glycol (MIRALAX) packet 17 g  17 g Oral Daily PRN Magali Baig DO        And    bisacodyl (DULCOLAX) EC tablet 5 mg  5 mg Oral Daily PRN Magali Baig DO        And    bisacodyl (DULCOLAX) suppository 10 mg  10 mg Rectal Daily PRN Magali Baig DO        castor oil-balsam peru (VENELEX) ointment 1 Application  1 Application Topical Q12H Magali Baig DO   1 Application at 06/04/25 0922    citalopram (CeleXA) tablet 20 mg  20 mg Oral Daily Magali Baig DO   20 mg at 06/03/25 1024     dextrose (D50W) (25 g/50 mL) IV injection 25 g  25 g Intravenous Q15 Min PRN Magali Baig,         dextrose (GLUTOSE) oral gel 15 g  15 g Oral Q15 Min PRN Magali Baig DO        dorzolamide (TRUSOPT) 2 % 1 drop, timolol (TIMOPTIC) 0.5 % 1 drop for Cosopt 22.3-6.8 mg/mL   Both Eyes BID Magali Baig,    Given at 06/04/25 0922    empagliflozin (JARDIANCE) tablet 10 mg  10 mg Oral Daily Magali Baig DO   10 mg at 06/03/25 1020    famotidine (PEPCID) tablet 20 mg  20 mg Oral BID Magali Baig DO   20 mg at 06/03/25 2043    glucagon HCl (Diagnostic) injection 1 mg  1 mg Intramuscular Q15 Min PRN Magali Baig,         HYDROcodone-acetaminophen (NORCO) 7.5-325 MG per tablet 1 tablet  1 tablet Oral Q12H PRN Magali Baig DO   1 tablet at 06/02/25 1909    insulin lispro protamine-insulin lispro (humaLOG 75-25) injection 20 Units  20 Units Subcutaneous BID With Meals Harini Urbina DO   20 Units at 06/04/25 0921    levothyroxine (SYNTHROID, LEVOTHROID) tablet 137.5 mcg  137.5 mcg Oral QAM AC Magali Baig DO   137.5 mcg at 06/03/25 1021    losartan (COZAAR) tablet 100 mg  100 mg Oral Daily Magali Baig DO   100 mg at 06/03/25 1019    nitroglycerin (NITROSTAT) SL tablet 0.4 mg  0.4 mg Sublingual Q5 Min PRN Magali Baig DO        pantoprazole (PROTONIX) EC tablet 40 mg  40 mg Oral QAM AC Magali Baig DO   40 mg at 06/03/25 1021    QUEtiapine (SEROquel) tablet 100 mg  100 mg Oral Nightly Magali Baig DO   100 mg at 06/03/25 2043    QUEtiapine (SEROquel) tablet 50 mg  50 mg Oral BID Magali Baig DO   50 mg at 06/02/25 0900    sodium chloride 0.9 % flush 10 mL  10 mL Intravenous PRN Magali Baig, DO        sodium chloride 0.9 % flush 10 mL  10 mL Intravenous Q12H Magali Baig, DO   10 mL at 06/04/25 0916    sodium chloride 0.9 % flush 10 mL  10 mL Intravenous PRN Magali Baig, DO         sodium chloride 0.9 % infusion 40 mL  40 mL Intravenous PRN Magali Baig DO         Orders (active)        Start     Ordered    06/04/25 0447  Sitter At Bedside  Continuous         06/04/25 0446    06/03/25 1850  Swing Bed Consult  Once        Provider:  (Not yet assigned)    06/03/25 1849    06/02/25 1800  insulin lispro protamine-insulin lispro (humaLOG 75-25) injection 20 Units  2 Times Daily With Meals         06/02/25 1603    06/02/25 1800  Dietary Nutrition Supplements Other (See Comment); Boost Glucose Control Chocolate  Daily With Lunch & Dinner       06/02/25 1608    05/30/25 2355  Initiate Post Fall Interventions / Assessments  Once        Comments: Post Fall Interventions / Assessments are Outlined in Process Instructions (Open Order Report to View Full Instructions)    05/30/25 2355    05/30/25 2355  Fall Precautions  Continuous         05/30/25 2355    05/28/25 0600  Wound Care  Daily       05/27/25 1439    05/27/25 2100  atorvastatin (LIPITOR) tablet 80 mg  Nightly         05/27/25 1120    05/27/25 2100  QUEtiapine (SEROquel) tablet 100 mg  Nightly         05/27/25 1120    05/27/25 1600  QUEtiapine (SEROquel) tablet 50 mg  2 Times Daily         05/27/25 1120    05/27/25 1445  castor oil-balsam peru (VENELEX) ointment 1 Application  Every 12 Hours Scheduled         05/27/25 1435    05/27/25 1440  Skin Tear Care - Minimal Drainage Q3 Days  Every Third Day        Comments: - Realign Skin Flap (if Viable) Gently Ease Flap Into Place Using a Dampened Cotton-Tipped Applicator or Gloved Finger  - Gently Cleanse Area & Pat Dry  - Apply Hydrogel & Non-Adherent Layer (Silicone Sheet, Oil Emulsion Dressing or Vaseline Gauze)  - Secure With Silicone Border Dressing (Unless Skin Fragile)  - If Skin is Fragile Secure With Roll Gauze - Do NOT Put Tape Directly on Skin  - Change Every 3 Days & As Needed    05/27/25 1439    05/27/25 1438  Wound Care  Every 12 Hours         05/27/25 1437    05/27/25 1437  Use  "Repositioning Wedge to Position Patient  Continuous         05/27/25 1437    05/27/25 1300  dorzolamide (TRUSOPT) 2 % 1 drop, timolol (TIMOPTIC) 0.5 % 1 drop for Cosopt 22.3-6.8 mg/mL  2 Times Daily         05/27/25 1120    05/27/25 1215  amLODIPine (NORVASC) tablet 10 mg  Daily         05/27/25 1120    05/27/25 1215  apixaban (ELIQUIS) tablet 5 mg  Every 12 Hours Scheduled         05/27/25 1120    05/27/25 1215  citalopram (CeleXA) tablet 20 mg  Daily         05/27/25 1120    05/27/25 1215  empagliflozin (JARDIANCE) tablet 10 mg  Daily         05/27/25 1120    05/27/25 1215  famotidine (PEPCID) tablet 20 mg  2 Times Daily         05/27/25 1120    05/27/25 1215  levothyroxine (SYNTHROID, LEVOTHROID) tablet 137.5 mcg  Every Morning Before Breakfast         05/27/25 1120    05/27/25 1215  losartan (COZAAR) tablet 100 mg  Daily         05/27/25 1120    05/27/25 1215  pantoprazole (PROTONIX) EC tablet 40 mg  Every Morning Before Breakfast         05/27/25 1120    05/27/25 1120  ALPRAZolam (XANAX) tablet 0.5 mg  Nightly PRN         05/27/25 1120    05/27/25 1120  HYDROcodone-acetaminophen (NORCO) 7.5-325 MG per tablet 1 tablet  Every 12 Hours PRN         05/27/25 1120    05/26/25 2100  sodium chloride 0.9 % flush 10 mL  Every 12 Hours Scheduled         05/26/25 1617    05/26/25 2100  sennosides-docusate (PERICOLACE) 8.6-50 MG per tablet 2 tablet  2 Times Daily        Placed in \"And\" Linked Group    05/26/25 1617 05/26/25 2000  Vital Signs  Every 4 Hours      Comments: Per per hospital policy    05/26/25 1617 05/26/25 1800  Oral Care  2 Times Daily       05/26/25 1617    05/26/25 1737  Turn Patient  Now Then Every 2 Hours         05/26/25 1738    05/26/25 1737  Head of Bed 30 Degrees or Less (Unless Contraindicated)  Until Discontinued         05/26/25 1738 05/26/25 1737  Elevate Heels Off of Bed  Until Discontinued         05/26/25 1738 05/26/25 1737  Use Seat Cushion When Up In Chair  Continuous         " 05/26/25 1738    05/26/25 1737  Do NOT Rub or Massage Any Bony Prominence  Continuous         05/26/25 1738    05/26/25 1737  Apply Heel Protector Boot Until Discontinued  Until Discontinued         05/26/25 1738    05/26/25 1700  POC Glucose 4x Daily Before Meals & at Bedtime  4 Times Daily Before Meals & at Bedtime      Comments: Complete no more than 45 minutes prior to patient eating      05/26/25 1617    05/26/25 1650  Wound Ostomy Eval & Treat  Once         05/26/25 1651    05/26/25 1618  Notify Physician (with Parameters)  Until Discontinued         05/26/25 1617    05/26/25 1618  Intake & Output  Every Shift       05/26/25 1617    05/26/25 1618  Weigh patient  Once         05/26/25 1617    05/26/25 1618  Maintain Sequential Compression Device  Continuous         05/26/25 1617    05/26/25 1618  Maintain IV Access  Continuous         05/26/25 1617    05/26/25 1618  Telemetry - Place Orders & Notify Provider of Results When Patient Experiences Acute Chest Pain, Dysrhythmia or Respiratory Distress  Continuous        Comments: Open Order Report to View Parameters Requiring Provider Notification    05/26/25 1617    05/26/25 1618  May Be Off Telemetry for Tests  Continuous         05/26/25 1617    05/26/25 1618  Diet: Cardiac, Diabetic; Healthy Heart (2-3 Na+); Consistent Carbohydrate; Fluid Consistency: Thin (IDDSI 0)  Diet Effective Now         05/26/25 1617    05/26/25 1618  PT Consult: Eval & Treat As Tolerated; Discharge Placement Assessment, Functional Mobility Below Baseline  Once         05/26/25 1617    05/26/25 1618  OT Consult: Eval & Treat ADL Performance Below Baseline, Discharge Placement Assessment  Once         05/26/25 1617    05/26/25 1617  sodium chloride 0.9 % flush 10 mL  As Needed         05/26/25 1617    05/26/25 1617  sodium chloride 0.9 % infusion 40 mL  As Needed         05/26/25 1617    05/26/25 1617  nitroglycerin (NITROSTAT) SL tablet 0.4 mg  Every 5 Minutes PRN         05/26/25 1617     "05/26/25 1617  polyethylene glycol (MIRALAX) packet 17 g  Daily PRN        Placed in \"And\" Linked Group    05/26/25 1617    05/26/25 1617  bisacodyl (DULCOLAX) EC tablet 5 mg  Daily PRN        Placed in \"And\" Linked Group    05/26/25 1617    05/26/25 1617  bisacodyl (DULCOLAX) suppository 10 mg  Daily PRN        Placed in \"And\" Linked Group    05/26/25 1617    05/26/25 1617  acetaminophen (TYLENOL) tablet 1,000 mg  Every 8 Hours PRN         05/26/25 1617    05/26/25 1617  dextrose (GLUTOSE) oral gel 15 g  Every 15 Minutes PRN         05/26/25 1617    05/26/25 1617  dextrose (D50W) (25 g/50 mL) IV injection 25 g  Every 15 Minutes PRN         05/26/25 1617    05/26/25 1617  glucagon HCl (Diagnostic) injection 1 mg  Every 15 Minutes PRN         05/26/25 1617    05/26/25 1427  Code Status and Medical Interventions: CPR (Attempt to Resuscitate); Full Support  Continuous         05/26/25 1430    05/26/25 1404  Hospitalist (on-call MD unless specified)  Once        Specialty:  Hospitalist  Provider:  (Not yet assigned)    05/26/25 1403    05/26/25 1206  Insert Peripheral IV  Once        Placed in \"And\" Linked Group    05/26/25 1205    05/26/25 1205  sodium chloride 0.9 % flush 10 mL  As Needed        Placed in \"And\" Linked Group    05/26/25 1205    Unscheduled  Up With Assistance  As Needed       05/26/25 1617    Unscheduled  Follow Hypoglycemia Standing Orders For Blood Glucose <70 & Notify Provider of Treatment  As Needed      Comments: Follow Hypoglycemia Orders As Outlined in Process Instructions (Open Order Report to View Full Instructions)  Notify Provider Any Time Hypoglycemia Treatment is Administered    05/26/25 1617    Unscheduled  Wound Care  As Needed       05/27/25 1437    Unscheduled  Skin Tear Care - Minimal Drainage PRN  As Needed      Comments: - Realign Skin Flap (if Viable) Gently Ease Flap Into Place Using a Dampened Cotton-Tipped Applicator or Gloved Finger  - Gently Cleanse Area & Pat Dry  - Apply " Hydrogel & Non-Adherent Layer (Silicone Sheet, Oil Emulsion Dressing or Vaseline Gauze)  - Secure With Silicone Border Dressing (Unless Skin Fragile)  - If Skin is Fragile Secure With Roll Gauze - Do NOT Put Tape Directly on Skin  - Change Every 3 Days & As Needed    25 1439    Unscheduled  Bed / Chair Alarm On  As Needed      Comments: Bed Alarm On When in Bed,   Use Chair Alarm When in Chair    25 2355    Unscheduled  Apply Ice To Affected Area  As Needed      Comments: Apply to left heel for 10-20 minutes up to 4 times per day    25 1618                     Physician Progress Notes (most recent note)        Harini Urbina DO at 25 1847              [facility]     Patient Identification:  Name:  Ramsey Riley  Age:  70 y.o.  Sex:  male  :  1955  MRN:  [id number]  Visit Number:  [id number]  ROOM: [address] [place] [address]     Primary Care Provider:  Margret Purvis MD    Length of stay in inpatient status:  7    Subjective     Chief Compliant:    Chief Complaint   Patient presents with    Foot Pain    Suture / Staple Removal       History of Presenting Illness:    Patient seen and examined today with his wife present at bedside. He reports continued left heel pain which is worse with standing, but is better with rest. His wife says she is concerned about taking him home after discharge as she has a very hard time helping him get up out of bed (she reports having a bad shoulder) and she does not have help at home.    Objective     Current Hospital Meds:amLODIPine, 10 mg, Oral, Daily  apixaban, 5 mg, Oral, Q12H  atorvastatin, 80 mg, Oral, Nightly  castor oil-balsam peru, 1 Application, Topical, Q12H  citalopram, 20 mg, Oral, Daily  dorzolamide (TRUSOPT) 2 % 1 drop, timolol (TIMOPTIC) 0.5 % 1 drop for Cosopt 22.3-6.8 mg/mL, , Both Eyes, BID  empagliflozin, 10 mg, Oral, Daily  famotidine, 20 mg, Oral, BID  insulin lispro protamine-insulin lispro, 20 Units,  Subcutaneous, BID With Meals  levothyroxine, 137.5 mcg, Oral, QAM AC  losartan, 100 mg, Oral, Daily  pantoprazole, 40 mg, Oral, QAM AC  QUEtiapine, 100 mg, Oral, Nightly  QUEtiapine, 50 mg, Oral, BID  senna-docusate sodium, 2 tablet, Oral, BID  sodium chloride, 10 mL, Intravenous, Q12H         Current Antimicrobial Therapy:  Anti-Infectives (From admission, onward)      None          Current Diuretic Therapy:  Diuretics (From admission, onward)      None          ----------------------------------------------------------------------------------------------------------------------  Vital Signs:  Temp:  [98.1 °F (36.7 °C)-98.7 °F (37.1 °C)] 98.1 °F (36.7 °C)  Heart Rate:  [54-86] 67  Resp:  [18] 18  BP: (115-159)/(61-86) 117/68  SpO2:  [92 %-98 %] 96 %  on   ;   Device (Oxygen Therapy): room air  Body mass index is 39.7 kg/m².    Wt Readings from Last 3 Encounters:   06/03/25 96.3 kg (212 lb 4.8 oz)   05/19/25 104 kg (230 lb)   05/05/25 102 kg (223 lb 12.8 oz)     Intake & Output (last 3 days)         05/31 0701 06/01 0700 06/01 0701 06/02 0700 06/02 0701 06/03 0700 06/03 0701 06/04 0700    P.O. 240 360 840 480    Total Intake(mL/kg) 240 (2.5) 360 (3.8) 840 (8.7) 480 (5)    Urine (mL/kg/hr) 1575 (0.7) 1250 (0.5) 1175 (0.5) 1200 (1.1)    Stool  0  0    Total Output 1570 1250 1173 1200    Net -4620 -898 -239 -841            Urine Unmeasured Occurrence  2 x 2 x     Stool Unmeasured Occurrence  3 x  1 x          Diet: Cardiac, Diabetic; Healthy Heart (2-3 Na+); Consistent Carbohydrate; Fluid Consistency: Thin (IDDSI 0)  ----------------------------------------------------------------------------------------------------------------------  Physical exam:   Constitutional:  Well-developed and well-nourished.  No acute distress.      HENT:  Head:  Normocephalic and atraumatic.   Pulmonary/Chest:  Normal rate and effort  Musculoskeletal:  No deformity.    Neurological: Awake, alert, no focal deficit on gross examination.  "No slurred speech or facial droop.   Skin:  Skin is warm and dry.   Psychiatric: Appropriate mood and affect  Edited by: Harini Urbina DO at 6/3/2025 1847  ----------------------------------------------------------------------------------------------------------------------  Results from last 7 days   Lab Units 06/01/25 0108 05/30/25  0058   WBC 10*3/mm3 7.33 6.90   HEMOGLOBIN g/dL 14.0 13.9   HEMATOCRIT % 42.8 43.3   MCV fL 91.1 91.9   MCHC g/dL 32.7 32.1   PLATELETS 10*3/mm3 141 155         Results from last 7 days   Lab Units 06/02/25  0358 06/01/25 0108 05/30/25  0058   SODIUM mmol/L 131* 135* 134*   POTASSIUM mmol/L 4.0 3.7 3.9   CHLORIDE mmol/L 97* 101 100   CO2 mmol/L 23.0 22.3 22.2   BUN mg/dL 15.4 16.2 19.5   CREATININE mg/dL 1.29* 1.41* 1.35*   CALCIUM mg/dL 8.1* 8.4* 8.3*   GLUCOSE mg/dL 343* 176* 186*   ALBUMIN g/dL  --   --  3.1*   BILIRUBIN mg/dL  --   --  0.3   ALK PHOS U/L  --   --  127*   AST (SGOT) U/L  --   --  23   ALT (SGPT) U/L  --   --  15   Estimated Creatinine Clearance: 54.5 mL/min (A) (by C-G formula based on SCr of 1.29 mg/dL (H)).  No results found for: \"AMMONIA\"              Glucose   Date/Time Value Ref Range Status   06/03/2025 1630 178 (H) 70 - 130 mg/dL Final   06/03/2025 1057 124 70 - 130 mg/dL Final   06/03/2025 0658 141 (H) 70 - 130 mg/dL Final   06/02/2025 1941 200 (H) 70 - 130 mg/dL Final   06/02/2025 1617 199 (H) 70 - 130 mg/dL Final   06/02/2025 1137 300 (H) 70 - 130 mg/dL Final   06/02/2025 0801 233 (H) 70 - 130 mg/dL Final   06/01/2025 1630 153 (H) 70 - 130 mg/dL Final     Lab Results   Component Value Date    TSH 4.970 (H) 05/26/2025    FREET4 1.24 12/10/2024     No results found for: \"PREGTESTUR\", \"PREGSERUM\", \"HCG\", \"HCGQUANT\"  Pain Management Panel  More data exists         Latest Ref Rng & Units 5/26/2025 5/5/2025   Pain Management Panel   Creatinine, Urine Not Estab. mg/dL - 110.9    Amphetamine, Urine Qual Negative Negative  -   Barbiturates Screen, Urine " "Negative Negative  -   Benzodiazepine Screen, Urine Negative Positive  -   Buprenorphine, Screen, Urine Negative Negative  -   Cocaine Screen, Urine Negative Negative  -   Fentanyl, Urine Negative Negative  -   Methadone Screen , Urine Negative Negative  -   Methamphetamine, Ur Negative Negative  -     Brief Urine Lab Results  (Last result in the past 365 days)        Color   Clarity   Blood   Leuk Est   Nitrite   Protein   CREAT   Urine HCG        05/26/25 1249 Yellow   Clear   Trace   Negative   Negative   >=300 mg/dL (3+)                 No results found for: \"BLOODCX\"  No results found for: \"URINECX\"  No results found for: \"WOUNDCX\"  No results found for: \"STOOLCX\"  No results found for: \"RESPCX\"  No results found for: \"AFBCX\"        I have personally looked at the labs and they are summarized above.  ----------------------------------------------------------------------------------------------------------------------  Detailed radiology reports for the last 24 hours:  Imaging Results (Last 24 Hours)       ** No results found for the last 24 hours. **          Assessment & Plan        #Frequent falls at home  #Assisted fall during hospitalization 5/31  #Generalized weakness and physical debility  #Recent right proximal humerus fracture due to fall, prior to admission  #Age indeterminate right posterior 4th rib fracture  #Vascular dementia  #Left heel and ankle pain  - Continue PT/OT as tolerated. Maintain fall precautions.  - X-rays of left heel and ankle were negative for fracture. Possible plantar fasciitis? Continue PRN pain medications. Recommend applying ice pack as needed 3-4x per day.   - MRI brain without contrast was negative for evidence of acute ischemia, focal mass, or midline shift, but was markedly degraded due to patient motion.  - Case management has assisted with referrals for short term rehab, appreciate assistance. He unfortunately was declined by his insurance company for inpatient rehab at " our facility and he has been declined by multiple skilled nursing facilities. Per discussion with case management the denials of the skilled nursing facilities are due to him taking seroquel as a home medication for insomnia related to vascular dementia, although he has not had any behavioral disturbance throughout his hospitalization and it is prescribed by his primary care physician who specializes in internal medicine and geriatric medicine. He has shown good progress during his physical therapy sessions and PT/OT continue to recommend admission to inpatient rehabilitation facility as he is high risk for additional falls and injury upon returning home.    #Essential hypertension, now with improved BP control  #Diabetes mellitus type II, insulin dependent and with hyperglycemia  - continue home amlodipine 10mg po daily and losartan 100mg po daily.  - Blood sugar is better controlled today but still occasionally up to 200. Lowest blood sugar today was 124. Continue insulin 75/25 20 units bid.    #Tinea cruris  - continue nystatin powder bid    Chronic medical conditions:  #Pulmonary nodules (present since 2023)  #GERD  #Previous stroke  #History of blood clot - continue chronic anticoagulation with eliquis  #Coronary artery disease s/p three vessel CABG  #Chronic low back pain  #Hypothyroidism  Edited by: Harini Urbina DO at 6/3/2025 1847    Active VTE Prophylaxis  Pharmacologic:        Start     Dose Route Frequency Stop    05/27/25 1215  apixaban (ELIQUIS) tablet 5 mg         5 mg PO Every 12 Hours Scheduled --                    Dispo: pending swing bed consult vs returning home with home health    Harini Urbina DO  [facility]  06/03/25  18:47 EDT     Electronically signed by Harini Urbina DO at 06/03/25 1849       Consult Notes (most recent note)    No notes of this type exist for this encounter.          Physical Therapy Notes (most recent note)        Baljinder Martinez, PT at 06/03/25  1135  Version 1 of 1         Acute Care - Physical Therapy Treatment Note  [facility]     Patient Name: Ramsey Riley  : 1955  MRN: [id number]  Today's Date: 6/3/2025      Visit Dx:     ICD-10-CM ICD-9-CM   1. Frequent falls  R29.6 V15.88   2. Skin avulsion  T14.8XXA 879.8   3. Bone bruise  T14.8XXA 924.9     Patient Active Problem List   Diagnosis    Coronary artery disease involving native coronary artery of native heart without angina pectoris    Essential hypertension    Bradycardia, sinus    Fatigue    Abnormal ECG    Precordial pain    LVH (left ventricular hypertrophy) due to hypertensive disease, without heart failure    Diabetes mellitus    PAD (peripheral artery disease)    Ischemic stroke    History of blood clots    Pulmonary embolus    Dyslipidemia    Class 1 obesity due to excess calories with serious comorbidity and body mass index (BMI) of 34.0 to 34.9 in adult    Weakness    Frequent falls     Past Medical History:   Diagnosis Date    Arthritis     Clot     Coronary artery disease     Dementia 2024    Diabetes mellitus     Heart disease     History of blood clots     Hypertension     Impaired mobility     Low back pain     Thyroid disease     Type 2 diabetes mellitus      Past Surgical History:   Procedure Laterality Date    CHOLECYSTECTOMY      COLONOSCOPY      dr srivastava    CORONARY ARTERY BYPASS GRAFT      [place] -  BY PASS     ENDOSCOPY       PT Assessment (Last 12 Hours)       PT Evaluation and Treatment       Row Name 25 1133          Physical Therapy Time and Intention    Subjective Information complains of;weakness;fatigue  -KM     Document Type therapy note (daily note)  -KM     Mode of Treatment individual therapy;physical therapy  -KM     Patient Effort good  -KM     Symptoms Noted During/After Treatment fatigue  -KM       Row Name 25 1133          General Information    Patient Profile Reviewed yes  -KM     Patient Observations  alert;cooperative;agree to therapy  -KM     Existing Precautions/Restrictions fall  -KM       Row Name 06/03/25 1133          Pain    Pretreatment Pain Rating 0/10 - no pain  -KM     Posttreatment Pain Rating 0/10 - no pain  -KM       Row Name 06/03/25 1133          Cognition    Affect/Mental Status (Cognition) WFL  -KM     Orientation Status (Cognition) oriented to;person;place;situation;verbal cues/prompts needed for orientation  -KM     Follows Commands (Cognition) follows one-step commands  -KM       Row Name 06/03/25 1133          Bed Mobility    Bed Mobility bed mobility (all) activities  -KM     All Activities, [place] (Bed Mobility) moderate assist (50% patient effort)  -KM     Assistive Device (Bed Mobility) bed rails;head of bed elevated  -       Row Name 06/03/25 1133          Transfers    Transfers sit-stand transfer;stand-sit transfer;bed-chair transfer  -       Row Name 06/03/25 1133          Bed-Chair Transfer    Bed-Chair [place] (Transfers) minimum assist (75% patient effort)  -KM     Assistive Device (Bed-Chair Transfers) walker, front-wheeled  -KM       Row Name 06/03/25 1133          Sit-Stand Transfer    Sit-Stand [place] (Transfers) minimum assist (75% patient effort)  -KM     Assistive Device (Sit-Stand Transfers) walker, front-wheeled  -KM       Row Name 06/03/25 1133          Stand-Sit Transfer    Stand-Sit [place] (Transfers) minimum assist (75% patient effort)  -KM     Assistive Device (Stand-Sit Transfers) walker, front-wheeled  -KM       Row Name 06/03/25 1133          Gait/Stairs (Locomotion)    Gait/Stairs Locomotion gait/ambulation independence;gait/ambulation assistive device;distance ambulated  -KM     [place] Level (Gait) contact guard;minimum assist (75% patient effort)  -KM     Assistive Device (Gait) walker, front-wheeled  -KM     Distance in Feet (Gait) 75  x2  -KM     Pattern (Gait) step-to  -KM     Deviations/Abnormal Patterns (Gait) ataxic;base  of support, narrow;gait speed decreased  -KM     Bilateral Gait Deviations forward flexed posture  -KM     Left Sided Gait Deviations weight shift ability decreased  -KM       Row Name 06/03/25 1133          Safety Issues/Impairments Affecting Functional Mobility    Impairments Affecting Function (Mobility) balance;cognition;coordination;endurance/activity tolerance;pain;strength  -KM       Row Name             Wound 05/26/25 1618 Left posterior heel Pressure Injury    Wound - Properties Group Placement Date: 05/26/25  -CW Placement Time: 1618  -CW Present on Original Admission: Y  -CW Side: Left  -CW Orientation: posterior  -CW Location: heel  -CW Primary Wound Type: Pressure Inj  -CW    Retired Wound - Properties Group Placement Date: 05/26/25  -CW Placement Time: 1618  -CW Present on Original Admission: Y  -CW Side: Left  -CW Orientation: posterior  -CW Location: heel  -CW    Retired Wound - Properties Group Placement Date: 05/26/25  -CW Placement Time: 1618  -CW Present on Original Admission: Y  -CW Side: Left  -CW Orientation: posterior  -CW Location: heel  -CW    Retired Wound - Properties Group Date first assessed: 05/26/25  -CW Time first assessed: 1618  -CW Present on Original Admission: Y  -CW Side: Left  -CW Location: heel  -CW      Row Name             Wound 05/26/25 1622 Left anterior heel Pressure Injury    Wound - Properties Group Placement Date: 05/26/25  -CW Placement Time: 1622  -CW Side: Left  -CW Orientation: anterior  -CW Location: heel  -CW Primary Wound Type: Pressure Inj  -CW    Retired Wound - Properties Group Placement Date: 05/26/25  -CW Placement Time: 1622  -CW Side: Left  -CW Orientation: anterior  -CW Location: heel  -CW    Retired Wound - Properties Group Placement Date: 05/26/25  -CW Placement Time: 1622  -CW Side: Left  -CW Orientation: anterior  -CW Location: heel  -CW    Retired Wound - Properties Group Date first assessed: 05/26/25  -CW Time first assessed: 1622  -CW Side: Left   -CW Location: heel  -CW      Row Name             Wound 05/27/25 1007 medial parietal region Traumatic Abrasion    Wound - Properties Group Placement Date: 05/27/25  -LB Placement Time: 1007  -LB Present on Original Admission: Y  -LB Orientation: medial  -LB Location: parietal region  -LB Primary Wound Type: Traumatic  -LB Secondary Wound Type - Traumatic: Abrasion  -LB    Retired Wound - Properties Group Placement Date: 05/27/25  -LB Placement Time: 1007  -LB Present on Original Admission: Y  -LB Orientation: medial  -LB Location: parietal region  -LB    Retired Wound - Properties Group Placement Date: 05/27/25  -LB Placement Time: 1007  -LB Present on Original Admission: Y  -LB Orientation: medial  -LB Location: parietal region  -LB    Retired Wound - Properties Group Date first assessed: 05/27/25  -LB Time first assessed: 1007  -LB Present on Original Admission: Y  -LB Location: parietal region  -LB      Row Name             Wound 05/27/25 1008 Right distal arm Traumatic Skin Tear    Wound - Properties Group Placement Date: 05/27/25  -LB Placement Time: 1008  -LB Present on Original Admission: Y  -LB Side: Right  -LB Orientation: distal  -LB Location: arm  -LB Primary Wound Type: Traumatic  -LB Secondary Wound Type - Traumatic: Skin Tear  -LB    Retired Wound - Properties Group Placement Date: 05/27/25  -LB Placement Time: 1008  -LB Present on Original Admission: Y  -LB Side: Right  -LB Orientation: distal  -LB Location: arm  -LB    Retired Wound - Properties Group Placement Date: 05/27/25  -LB Placement Time: 1008  -LB Present on Original Admission: Y  -LB Side: Right  -LB Orientation: distal  -LB Location: arm  -LB    Retired Wound - Properties Group Date first assessed: 05/27/25  -LB Time first assessed: 1008  -LB Present on Original Admission: Y  -LB Side: Right  -LB Location: arm  -LB      Row Name 06/03/25 1133          Progress Summary (PT)    Daily Progress Summary (PT) Pt. continues to show progress  during PT sessions. He was able to perform functional mobility skills similar to prior sessions. He was able to ambulate improved distance w/ RW. Pt. tolerates activity well. Pt. would continue to benefit from PT services.  -               User Key  (r) = Recorded By, (t) = Taken By, (c) = Cosigned By      Initials Name Provider Type    CW Chata Pulido, RN Registered Nurse    Baljinder Reese, PT Physical Therapist    Marysol Rodriguez, RN Registered Nurse                    Physical Therapy Education       Title: PT OT SLP Therapies (Done)       Topic: Physical Therapy (Done)       Point: Mobility training (Done)       Learning Progress Summary            Patient Acceptance, E,TB, VU by  at 5/30/2025 2339    Acceptance, E,TB, NR by  at 5/28/2025 0920    Acceptance, E,TB, VU by  at 5/27/2025 1532                      Point: Home exercise program (Done)       Learning Progress Summary            Patient Acceptance, E,TB, VU by  at 5/30/2025 2339    Acceptance, E,TB, NR by  at 5/28/2025 0920    Acceptance, E,TB, VU by  at 5/27/2025 1532                      Point: Body mechanics (Done)       Learning Progress Summary            Patient Acceptance, E,TB, VU by  at 5/30/2025 2339    Acceptance, E,TB, NR by  at 5/28/2025 0920    Acceptance, E,TB, VU by  at 5/27/2025 1532                      Point: Precautions (Done)       Learning Progress Summary            Patient Acceptance, E,TB, VU by  at 5/30/2025 2339    Acceptance, E,TB, NR by  at 5/28/2025 0920    Acceptance, E,TB, VU by  at 5/27/2025 1532                                      User Key       Initials Effective Dates Name Provider Type Discipline     06/16/21 -  Vy Tejeda, RN Registered Nurse Nurse     05/24/22 -  Baljinder Martinez, PT Physical Therapist PT     02/07/25 -  Dagmar Maciel, RN Registered Nurse Nurse                  PT Recommendation and Plan  Anticipated Discharge Disposition (PT): inpatient rehabilitation  facility  Planned Therapy Interventions (PT): balance training, bed mobility training, gait training, home exercise program, patient/family education, postural re-education, ROM (range of motion), strengthening, stretching, transfer training  Therapy Frequency (PT): 2 times/wk (2-5x/wk)  Progress Summary (PT)  Daily Progress Summary (PT): Pt. continues to show progress during PT sessions. He was able to perform functional mobility skills similar to prior sessions. He was able to ambulate improved distance w/ RW. Pt. tolerates activity well. Pt. would continue to benefit from PT services.  Plan of Care Reviewed With: patient  Outcome Evaluation: Pt. evaluation completed during PT session. He was able to perform functional mobility skills w/ modA. He was able to ambulate in room w/ RW and Teddy. Pt. would benefit from further PT services at this time.       Time Calculation:    PT Charges       Row Name 06/03/25 1133             Time Calculation    PT Received On 06/03/25  -KM         Time Calculation- PT    Total Timed Code Minutes- PT 23 minute(s)  -KM                User Key  (r) = Recorded By, (t) = Taken By, (c) = Cosigned By      Initials Name Provider Type    KM Baljinder Martinez, PT Physical Therapist                  Therapy Charges for Today       Code Description Service Date Service Provider Modifiers Qty    16555759105 HC PT THERAPEUTIC ACT EA 15 MIN 6/2/2025 Baljinder Martinez, PT GP 1    25193477125 HC GAIT TRAINING EA 15 MIN 6/2/2025 Baljinder Martinez, PT GP 1    48213155746 HC PT THERAPEUTIC ACT EA 15 MIN 6/3/2025 Baljinder Martinez, PT GP 1    67214411742 HC GAIT TRAINING EA 15 MIN 6/3/2025 Baljinder Martinez, PT GP 1            PT G-Codes  AM-PAC 6 Clicks Score (PT): 15    Baljinder Martinez PT  6/3/2025      Electronically signed by Baljinder Martinez PT at 06/03/25 1135          Occupational Therapy Notes (most recent note)        Dao Noland, OT at 06/03/25 1021          Acute Care - Occupational Therapy Treatment Note  [facility]     Patient Name: Ramsey Riley  : 1955  MRN: [id number]  Today's Date: 6/3/2025             Admit Date: 2025       ICD-10-CM ICD-9-CM   1. Frequent falls  R29.6 V15.88   2. Skin avulsion  T14.8XXA 879.8   3. Bone bruise  T14.8XXA 924.9     Patient Active Problem List   Diagnosis    Coronary artery disease involving native coronary artery of native heart without angina pectoris    Essential hypertension    Bradycardia, sinus    Fatigue    Abnormal ECG    Precordial pain    LVH (left ventricular hypertrophy) due to hypertensive disease, without heart failure    Diabetes mellitus    PAD (peripheral artery disease)    Ischemic stroke    History of blood clots    Pulmonary embolus    Dyslipidemia    Class 1 obesity due to excess calories with serious comorbidity and body mass index (BMI) of 34.0 to 34.9 in adult    Weakness    Frequent falls     Past Medical History:   Diagnosis Date    Arthritis     Clot     Coronary artery disease     Dementia 2024    Diabetes mellitus     Heart disease     History of blood clots     Hypertension     Impaired mobility     Low back pain     Thyroid disease     Type 2 diabetes mellitus      Past Surgical History:   Procedure Laterality Date    CHOLECYSTECTOMY      COLONOSCOPY      dr srivastava    CORONARY ARTERY BYPASS GRAFT      [place] - 3 BY PASS     ENDOSCOPY           OT ASSESSMENT FLOWSHEET (Last 12 Hours)       OT Evaluation and Treatment       Row Name 25 1016                   OT Time and Intention    Document Type therapy note (daily note)  -KR        Mode of Treatment occupational therapy  -KR        Patient Effort adequate  -KR        Comment Pt seen on this date for BUE ther ex to enhance strength/endurance needed to assist with self care tasks. Pt would benefit from additional / extended therapy services to enhance ability to perform/assist with household/self care performance.  -KR           General Information    General  Observations of Patient alert/cooperative  -KR           Motor Skills    Therapeutic Exercise shoulder;elbow/forearm;hand  -KR           Shoulder (Therapeutic Exercise)    Shoulder (Therapeutic Exercise) AROM (active range of motion)  -KR        Shoulder AROM (Therapeutic Exercise) left;flexion  -KR           Elbow/Forearm (Therapeutic Exercise)    Elbow/Forearm (Therapeutic Exercise) AROM (active range of motion)  -KR        Elbow/Forearm AROM (Therapeutic Exercise) bilateral;flexion;extension  -KR           Hand (Therapeutic Exercise)    Hand (Therapeutic Exercise) AROM (active range of motion)  -KR        Hand AROM/AAROM (Therapeutic Exercise) bilateral;finger flexion;finger extension  -KR           Wound 05/26/25 1618 Left posterior heel Pressure Injury    Wound - Properties Group Placement Date: 05/26/25  -CW Placement Time: 1618  -CW Present on Original Admission: Y  -CW Side: Left  -CW Orientation: posterior  -CW Location: heel  -CW Primary Wound Type: Pressure Inj  -CW    Retired Wound - Properties Group Placement Date: 05/26/25  -CW Placement Time: 1618  -CW Present on Original Admission: Y  -CW Side: Left  -CW Orientation: posterior  -CW Location: heel  -CW    Retired Wound - Properties Group Placement Date: 05/26/25  -CW Placement Time: 1618  -CW Present on Original Admission: Y  -CW Side: Left  -CW Orientation: posterior  -CW Location: heel  -CW    Retired Wound - Properties Group Date first assessed: 05/26/25  -CW Time first assessed: 1618  -CW Present on Original Admission: Y  -CW Side: Left  -CW Location: heel  -CW       Wound 05/26/25 1622 Left anterior heel Pressure Injury    Wound - Properties Group Placement Date: 05/26/25  -CW Placement Time: 1622  -CW Side: Left  -CW Orientation: anterior  -CW Location: heel  -CW Primary Wound Type: Pressure Inj  -CW    Retired Wound - Properties Group Placement Date: 05/26/25  -CW Placement Time: 1622  -CW Side: Left  -CW Orientation: anterior  -CW Location:  heel  -CW    Retired Wound - Properties Group Placement Date: 05/26/25  -CW Placement Time: 1622  -CW Side: Left  -CW Orientation: anterior  -CW Location: heel  -CW    Retired Wound - Properties Group Date first assessed: 05/26/25  -CW Time first assessed: 1622  -CW Side: Left  -CW Location: heel  -CW       Wound 05/27/25 1007 medial parietal region Traumatic Abrasion    Wound - Properties Group Placement Date: 05/27/25  -LB Placement Time: 1007  -LB Present on Original Admission: Y  -LB Orientation: medial  -LB Location: parietal region  -LB Primary Wound Type: Traumatic  -LB Secondary Wound Type - Traumatic: Abrasion  -LB    Retired Wound - Properties Group Placement Date: 05/27/25  -LB Placement Time: 1007  -LB Present on Original Admission: Y  -LB Orientation: medial  -LB Location: parietal region  -LB    Retired Wound - Properties Group Placement Date: 05/27/25  -LB Placement Time: 1007  -LB Present on Original Admission: Y  -LB Orientation: medial  -LB Location: parietal region  -LB    Retired Wound - Properties Group Date first assessed: 05/27/25  -LB Time first assessed: 1007  -LB Present on Original Admission: Y  -LB Location: parietal region  -LB       Wound 05/27/25 1008 Right distal arm Traumatic Skin Tear    Wound - Properties Group Placement Date: 05/27/25  -LB Placement Time: 1008  -LB Present on Original Admission: Y  -LB Side: Right  -LB Orientation: distal  -LB Location: arm  -LB Primary Wound Type: Traumatic  -LB Secondary Wound Type - Traumatic: Skin Tear  -LB    Retired Wound - Properties Group Placement Date: 05/27/25  -LB Placement Time: 1008  -LB Present on Original Admission: Y  -LB Side: Right  -LB Orientation: distal  -LB Location: arm  -LB    Retired Wound - Properties Group Placement Date: 05/27/25  -LB Placement Time: 1008  -LB Present on Original Admission: Y  -LB Side: Right  -LB Orientation: distal  -LB Location: arm  -LB    Retired Wound - Properties Group Date first assessed:  25  -LB Time first assessed: 1008  -LB Present on Original Admission: Y  -LB Side: Right  -LB Location: arm  -LB       Plan of Care Review    Plan of Care Reviewed With patient;spouse  -KR           Progress Summary (OT)    Progress Toward Functional Goals (OT) progress toward functional goals as expected  -KR           Therapy Plan Review/Discharge Plan (OT)    Anticipated Discharge Disposition (OT) extended care facility;inpatient rehabilitation facility  -KR                  User Key  (r) = Recorded By, (t) = Taken By, (c) = Cosigned By      Initials Name Effective Dates    CW Chata Pulido RN 10/05/21 -     Dao Valentine OT 21 -     LB Marysol Nix RN 10/20/21 -                            OT Recommendation and Plan     Progress Toward Functional Goals (OT): progress toward functional goals as expected  Plan of Care Review  Plan of Care Reviewed With: patient, spouse  Plan of Care Reviewed With: patient, spouse        Time Calculation:     Therapy Charges for Today       Code Description Service Date Service Provider Modifiers Qty    [id number]  OT THERAPEUTIC ACT EA 15 MIN 6/3/2025 Dao Noland OT GO 1                 Dao Noland OT  6/3/2025    Electronically signed by Dao Noland OT at 25 1022       Baljinder Martinez, CARLOS   Physical Therapist  Physical Therapy     Therapy Evaluation     Signed     Date of Service: 25  Creation Time: 25     Signed       Expand All Collapse All[]Expand All by Default    Acute Care - Physical Therapy Initial Evaluation  [facility] [place]     Patient Name: Ramsey Riley                 : 1955                      MRN: [id number]  Today's Date: 2025                                  Visit Dx:   Visit Diagnosis       ICD-10-CM ICD-9-CM   1. Frequent falls  R29.6 V15.88   2. Skin avulsion  T14.8XXA 879.8   3. Bone bruise  T14.8XXA 924.9         Problem List       Patient Active Problem List   Diagnosis    Coronary artery  disease involving native coronary artery of native heart without angina pectoris    Essential hypertension    Bradycardia, sinus    Fatigue    Abnormal ECG    Precordial pain    LVH (left ventricular hypertrophy) due to hypertensive disease, without heart failure    Diabetes mellitus    PAD (peripheral artery disease)    Ischemic stroke    History of blood clots    Pulmonary embolus    Dyslipidemia    Class 1 obesity due to excess calories with serious comorbidity and body mass index (BMI) of 34.0 to 34.9 in adult    Weakness    Frequent falls         Medical History        Past Medical History:   Diagnosis Date    Arthritis      Clot      Coronary artery disease      Dementia 12/18/2024    Diabetes mellitus      Heart disease      History of blood clots      Hypertension      Impaired mobility      Low back pain      Thyroid disease      Type 2 diabetes mellitus           Surgical History         Past Surgical History:   Procedure Laterality Date    CHOLECYSTECTOMY   2015    COLONOSCOPY   2015     dr srivastava    CORONARY ARTERY BYPASS GRAFT         [place] - 3 BY PASS     ENDOSCOPY             PT Assessment (Last 12 Hours)            PT Evaluation and Treatment         Row Name 05/27/25 1508                 Physical Therapy Time and Intention     Subjective Information complains of;weakness  -KM       Document Type evaluation  -KM       Mode of Treatment physical therapy  -KM       Patient Effort good  -KM       Symptoms Noted During/After Treatment none  -KM          Row Name 05/27/25 1504                 General Information     Patient Profile Reviewed yes  -KM       Patient Observations alert;cooperative;agree to therapy  -KM       Prior Level of Function --  Pt. receives assistance for ADLs. He is able to perform mobility skills w/ supervision from spouse. He uses RW for ambulation.  -KM       Existing Precautions/Restrictions fall  -KM       Risks Reviewed patient:;LOB;nausea/vomiting;dizziness;increased  discomfort  -KM       Benefits Reviewed patient:;improve function;increase independence;increase strength;increase balance  -KM       Barriers to Rehab medically complex;previous functional deficit;cognitive status  -KM          Row Name 05/27/25 1506                 Living Environment     Current Living Arrangements home  -KM       People in Home spouse  -KM       Primary Care Provided by self;spouse/significant other  -KM          Row Name 05/27/25 1506                 Home Use of Assistive/Adaptive Equipment     Equipment Currently Used at Home walker, rolling  -KM          Row Name 05/27/25 1506                 Pain     Pretreatment Pain Rating 1/10  -KM       Posttreatment Pain Rating 1/10  -KM       Pain Location foot  -KM       Pain Side/Orientation left  -KM          Row Name 05/27/25 1506                 Cognition     Orientation Status (Cognition) oriented to;person;place;situation;verbal cues/prompts needed for orientation  -KM       Follows Commands (Cognition) follows one-step commands  -KM          Row Name 05/27/25 1506                 Range of Motion (ROM)     Range of Motion bilateral lower extremities;ROM is WFL  -KM          Row Name 05/27/25 1506                 Strength (Manual Muscle Testing)     Strength (Manual Muscle Testing) bilateral lower extremities;strength is WFL  -          Row Name 05/27/25 1506                 Bed Mobility     Bed Mobility bed mobility (all) activities  -KM       All Activities, [place] (Bed Mobility) moderate assist (50% patient effort)  -KM       Assistive Device (Bed Mobility) bed rails;head of bed elevated  -KM          Row Name 05/27/25 1506                 Transfers     Transfers sit-stand transfer;stand-sit transfer  -          Row Name 05/27/25 1506                 Sit-Stand Transfer     Sit-Stand [place] (Transfers) moderate assist (50% patient effort)  -KM       Assistive Device (Sit-Stand Transfers) walker, front-wheeled  -KM          Row  Name 05/27/25 1506                 Stand-Sit Transfer     Stand-Sit [place] (Transfers) moderate assist (50% patient effort)  -KM       Assistive Device (Stand-Sit Transfers) walker, front-wheeled  -KM          Row Name 05/27/25 1506                 Gait/Stairs (Locomotion)     Gait/Stairs Locomotion gait/ambulation independence;gait/ambulation assistive device;distance ambulated  -KM       [place] Level (Gait) minimum assist (75% patient effort);moderate assist (50% patient effort)  -KM       Assistive Device (Gait) walker, front-wheeled  -KM       Patient was able to Ambulate yes  -KM       Distance in Feet (Gait) 40  -KM       Pattern (Gait) step-to  -KM       Deviations/Abnormal Patterns (Gait) ataxic;base of support, narrow;gait speed decreased  -KM       Bilateral Gait Deviations forward flexed posture  -KM       Left Sided Gait Deviations weight shift ability decreased  -KM          Row Name 05/27/25 1506                 Safety Issues/Impairments Affecting Functional Mobility     Safety Issues Affecting Function (Mobility) awareness of need for assistance;insight into deficits/self-awareness  -KM       Impairments Affecting Function (Mobility) balance;cognition;coordination;endurance/activity tolerance;pain;strength  -KM       Cognitive Impairments, Mobility Safety/Performance awareness, need for assistance;insight into deficits/self-awareness  -KM          Row Name 05/27/25 1506                 Balance     Balance Assessment sitting static balance;standing dynamic balance  -KM       Static Sitting Balance standby assist  -KM       Dynamic Standing Balance minimal assist  -KM       Position/Device Used, Standing Balance walker, front-wheeled  -KM          Row Name                      Wound 05/26/25 1618 Left posterior heel Pressure Injury     Wound - Properties Group Placement Date: 05/26/25  -CW Placement Time: 1618 -CW Present on Original Admission: Y  -CW Side: Left  -CW Orientation: posterior   -CW Location: heel  -CW Primary Wound Type: Pressure Inj  -CW     Retired Wound - Properties Group Placement Date: 05/26/25  -CW Placement Time: 1618  -CW Present on Original Admission: Y  -CW Side: Left  -CW Orientation: posterior  -CW Location: heel  -CW     Retired Wound - Properties Group Placement Date: 05/26/25  -CW Placement Time: 1618  -CW Present on Original Admission: Y  -CW Side: Left  -CW Orientation: posterior  -CW Location: heel  -CW     Retired Wound - Properties Group Date first assessed: 05/26/25  -CW Time first assessed: 1618  -CW Present on Original Admission: Y  -CW Side: Left  -CW Location: heel  -CW        Row Name                      Wound 05/26/25 1622 Left anterior heel Pressure Injury     Wound - Properties Group Placement Date: 05/26/25  -CW Placement Time: 1622  -CW Side: Left  -CW Orientation: anterior  -CW Location: heel  -CW Primary Wound Type: Pressure Inj  -CW     Retired Wound - Properties Group Placement Date: 05/26/25  -CW Placement Time: 1622  -CW Side: Left  -CW Orientation: anterior  -CW Location: heel  -CW     Retired Wound - Properties Group Placement Date: 05/26/25  -CW Placement Time: 1622  -CW Side: Left  -CW Orientation: anterior  -CW Location: heel  -CW     Retired Wound - Properties Group Date first assessed: 05/26/25  -CW Time first assessed: 1622  -CW Side: Left  -CW Location: heel  -CW        Row Name                      Wound 05/27/25 1007 medial parietal region Traumatic Abrasion     Wound - Properties Group Placement Date: 05/27/25  -LB Placement Time: 1007  -LB Present on Original Admission: Y  -LB Orientation: medial  -LB Location: parietal region  -LB Primary Wound Type: Traumatic  -LB Secondary Wound Type - Traumatic: Abrasion  -LB     Retired Wound - Properties Group Placement Date: 05/27/25  -LB Placement Time: 1007  -LB Present on Original Admission: Y  -LB Orientation: medial  -LB Location: parietal region  -LB     Retired Wound - Properties Group  Placement Date: 05/27/25  -LB Placement Time: 1007  -LB Present on Original Admission: Y  -LB Orientation: medial  -LB Location: parietal region  -LB     Retired Wound - Properties Group Date first assessed: 05/27/25  -LB Time first assessed: 1007  -LB Present on Original Admission: Y  -LB Location: parietal region  -LB        Row Name                      Wound 05/27/25 1008 Right distal arm Traumatic Skin Tear     Wound - Properties Group Placement Date: 05/27/25  -LB Placement Time: 1008  -LB Present on Original Admission: Y  -LB Side: Right  -LB Orientation: distal  -LB Location: arm  -LB Primary Wound Type: Traumatic  -LB Secondary Wound Type - Traumatic: Skin Tear  -LB     Retired Wound - Properties Group Placement Date: 05/27/25  -LB Placement Time: 1008  -LB Present on Original Admission: Y  -LB Side: Right  -LB Orientation: distal  -LB Location: arm  -LB     Retired Wound - Properties Group Placement Date: 05/27/25  -LB Placement Time: 1008  -LB Present on Original Admission: Y  -LB Side: Right  -LB Orientation: distal  -LB Location: arm  -LB     Retired Wound - Properties Group Date first assessed: 05/27/25  -LB Time first assessed: 1008  -LB Present on Original Admission: Y  -LB Side: Right  -LB Location: arm  -LB        Row Name 05/27/25 1506                 Plan of Care Review     Plan of Care Reviewed With patient  -KM       Outcome Evaluation Pt. evaluation completed during PT session. He was able to perform functional mobility skills w/ modA. He was able to ambulate in room w/ RW and Teddy. Pt. would benefit from further PT services at this time.  -KM          Row Name 05/27/25 1506                 Therapy Assessment/Plan (PT)     Patient/Family Therapy Goals Statement (PT) improve mobility skills  -KM       Functional Level at Time of Evaluation (PT) modA  -KM       PT Diagnosis (PT) decreased mobility skills  -KM       Rehab Potential (PT) fair  -KM       Criteria for Skilled Interventions Met (PT)  yes;skilled treatment is necessary  [facility]       Therapy Frequency (PT) 2 times/wk  2-5x/wk  -KM       Predicted Duration of Therapy Intervention (PT) until discharge  -       Problem List (PT) problems related to;balance;mobility  -KM       Activity Limitations Related to Problem List (PT) unable to ambulate safely;unable to transfer safely  -          Row Name 05/27/25 1506                 Therapy Plan Review/Discharge Plan (PT)     Therapy Plan Review (PT) evaluation/treatment results reviewed;care plan/treatment goals reviewed;risks/benefits reviewed;patient  -KM          Row Name 05/27/25 1506                 Physical Therapy Goals     Bed Mobility Goal Selection (PT) bed mobility, PT goal 1  -KM       Transfer Goal Selection (PT) transfer, PT goal 1  -KM       Gait Training Goal Selection (PT) gait training, PT goal 1  -KM          Row Name 05/27/25 1506                 Bed Mobility Goal 1 (PT)     Activity/Assistive Device (Bed Mobility Goal 1, PT) bed mobility activities, all  -KM       [place] Level/Cues Needed (Bed Mobility Goal 1, PT) standby assist  -KM       Time Frame (Bed Mobility Goal 1, PT) by discharge  -          Row Name 05/27/25 1506                 Transfer Goal 1 (PT)     Activity/Assistive Device (Transfer Goal 1, PT) transfers, all;walker, rolling  -KM       [place] Level/Cues Needed (Transfer Goal 1, PT) standby assist  -KM       Time Frame (Transfer Goal 1, PT) by discharge  -          Row Name 05/27/25 1506                 Gait Training Goal 1 (PT)     Activity/Assistive Device (Gait Training Goal 1, PT) gait (walking locomotion);assistive device use;walker, rolling  -KM       [place] Level (Gait Training Goal 1, PT) standby assist  -KM       Distance (Gait Training Goal 1, PT) 100'  -KM       Time Frame (Gait Training Goal 1, PT) by [facility]                    User Key  (r) = Recorded By, (t) = Taken By, (c) = Cosigned By        Initials Name Provider Type      Chata Couch, RN Registered Nurse     Baljinder Reese, PT Physical Therapist     Marysol Rodriguez, RN Registered Nurse                             Physical Therapy Education            Title: PT OT SLP Therapies (Done)         Topic: Physical Therapy (Done)         Point: Mobility training (Done)         Learning Progress Summary             Patient Acceptance, E,TB, VU by  at 5/27/2025 1532                            Point: Home exercise program (Done)         Learning Progress Summary             Patient Acceptance, E,TB, VU by  at 5/27/2025 1532                            Point: Body mechanics (Done)         Learning Progress Summary             Patient Acceptance, E,TB, VU by  at 5/27/2025 1532                            Point: Precautions (Done)         Learning Progress Summary             Patient Acceptance, E,TB, VU by  at 5/27/2025 1532                                                User Key         Initials Effective Dates Name Provider Type Discipline      05/24/22 -  Baljinder Martinez, PT Physical Therapist PT                          PT Recommendation and Plan  Anticipated Discharge Disposition (PT):  (TBD based on pt. progress)  Planned Therapy Interventions (PT): balance training, bed mobility training, gait training, home exercise program, patient/family education, postural re-education, ROM (range of motion), strengthening, stretching, transfer training  Therapy Frequency (PT): 2 times/wk (2-5x/wk)  Plan of Care Reviewed With: patient  Outcome Evaluation: Pt. evaluation completed during PT session. He was able to perform functional mobility skills w/ modA. He was able to ambulate in room w/ RW and Teddy. Pt. would benefit from further PT services at this time.               Time Calculation:     PT Charges         Row Name 05/27/25 1506                       Time Calculation     PT Received On 05/27/25  -KM         PT Goal Re-Cert Due Date 06/10/25  -KEVIN                      User Key  (r)  = Recorded By, (t) = Taken By, (c) = Cosigned By        Initials Name Provider Type     KM Baljinder Martinez, PT Physical Therapist                       Therapy Charges for Today         Code Description Service Date Service Provider Modifiers Qty     47477235431 HC PT EVAL MOD COMPLEXITY 4 2025 Baljinder Martinez, PT GP 1                PT G-Codes  AM-PAC 6 Clicks Score (PT): 19     Baljinder Martinez PT                   2025                                   Vanesa Tse OT   Occupational Therapist  Specialty: Occupational Therapy     Therapy Evaluation     Signed     Date of Service: 25  Creation Time: 25     Signed       Expand All Collapse All[]Expand All by Default    Patient Name: Ramsey Riley                 : 1955                        MRN: [id number]                              Today's Date: 2025                                   Admit Date: 2025                        Visit Dx:   Visit Diagnosis       ICD-10-CM ICD-9-CM   1. Frequent falls  R29.6 V15.88   2. Skin avulsion  T14.8XXA 879.8   3. Bone bruise  T14.8XXA 924.9         Problem List       Patient Active Problem List   Diagnosis    Coronary artery disease involving native coronary artery of native heart without angina pectoris    Essential hypertension    Bradycardia, sinus    Fatigue    Abnormal ECG    Precordial pain    LVH (left ventricular hypertrophy) due to hypertensive disease, without heart failure    Diabetes mellitus    PAD (peripheral artery disease)    Ischemic stroke    History of blood clots    Pulmonary embolus    Dyslipidemia    Class 1 obesity due to excess calories with serious comorbidity and body mass index (BMI) of 34.0 to 34.9 in adult    Weakness    Frequent falls         Medical History        Past Medical History:   Diagnosis Date    Arthritis      Clot      Coronary artery disease      Dementia 2024    Diabetes mellitus      Heart disease      History of blood clots       Hypertension      Impaired mobility      Low back pain      Thyroid disease      Type 2 diabetes mellitus           Surgical History         Past Surgical History:   Procedure Laterality Date    CHOLECYSTECTOMY   2015    COLONOSCOPY   2015     dr srivastava    CORONARY ARTERY BYPASS GRAFT         [place] - 3 BY PASS     ENDOSCOPY               General Information         Row Name 05/27/25 1432                 OT Time and Intention     Subjective Information complains of;weakness  -       Document Type evaluation  -       Mode of Treatment individual therapy;occupational therapy  -       Patient Effort good  -       Symptoms Noted During/After Treatment none  -       Comment Patient agreeable to OT evaluation.  -          Row Name 05/27/25 1432                 General Information     Patient Profile Reviewed yes  -       Prior Level of Function --  assist from wife for all ADLs, supervision with functional mobility using rolling walker; frequent falls, no DME  -       Existing Precautions/Restrictions fall  -       Barriers to Rehab previous functional deficit;cognitive status;medically complex  -          Row Name 05/27/25 1432                 Occupational Profile     Reason for Services/Referral (Occupational Profile) Patient admitted to [facility] on 5/26/[facility]25. He was referred for OT evaluation due to change in functional performance with ADLs, functional mobility, and/or transfers.  -          Row Name 05/27/25 1432                 Living Environment     Current Living Arrangements home  -       People in Home spouse  -          Row Name 05/27/25 1432                 Cognition     Orientation Status (Cognition) oriented to;person;place;situation;verbal cues/prompts needed for orientation  -          Row Name 05/27/25 1432                 Safety Issues/Impairments Affecting Functional Mobility     Safety Issues Affecting Function (Mobility) insight into  deficits/self-awareness;awareness of need for assistance  -       Impairments Affecting Function (Mobility) balance;cognition;coordination;endurance/activity tolerance;pain;strength  -       Cognitive Impairments, Mobility Safety/Performance awareness, need for assistance;insight into deficits/self-awareness  -                       User Key  (r) = Recorded By, (t) = Taken By, (c) = Cosigned By        Initials Name Provider Type      Vanesa Tse OT Occupational Therapist                                     Mobility/ADL's         Row Name 05/27/25 1440                 Bed Mobility     Bed Mobility bed mobility (all) activities  -       All Activities, [place] (Bed Mobility) moderate assist (50% patient effort)  -       Assistive Device (Bed Mobility) bed rails;head of bed elevated  -[facility] Name 05/27/25 1440                 Transfers     Transfers sit-stand transfer;stand-sit transfer  -[facility] Name 05/27/25 1440                 Sit-Stand Transfer     Sit-Stand [place] (Transfers) moderate assist (50% patient effort)  -       Assistive Device (Sit-Stand Transfers) walker, front-wheeled  -[facility] Name 05/27/25 1440                 Stand-Sit Transfer     Stand-Sit [place] (Transfers) moderate assist (50% patient effort)  -       Assistive Device (Stand-Sit Transfers) walker, front-wheeled  -[facility] Name 05/27/25 1440                 Activities of Daily Living     BADL Assessment/Intervention bathing;upper body dressing;lower body dressing;grooming;feeding;toileting  -[facility] Name 05/27/25 1440                 Bathing Assessment/Intervention     [place] Level (Bathing) bathing skills;maximum assist (25% patient effort)  -[facility] Name 05/27/25 1440                 Upper Body Dressing Assessment/Training     [place] Level (Upper Body Dressing) upper body dressing skills;moderate assist (50% patient effort)  -[facility] Name  05/27/25 1440                 Lower Body Dressing Assessment/Training     [place] Level (Lower Body Dressing) lower body dressing skills;maximum assist (25% patient effort)  -[facility] Name 05/27/25 1440                 Grooming Assessment/Training     [place] Level (Grooming) grooming skills;moderate assist (50% patient effort)  -KP          Row Name 05/27/25 1443                 Self-Feeding Assessment/Training     [place] Level (Feeding) feeding skills;maximum assist (25% patient effort)  -KP          Row Name 05/27/25 1440                 Toileting Assessment/Training     [place] Level (Toileting) toileting skills;maximum assist (25% patient effort)  -                       User Key  (r) = Recorded By, (t) = Taken By, (c) = Cosigned By        Initials Name Provider Type      Vanesa Tse OT Occupational Therapist                            Obj/Interventions         [facility] Name 05/27/25 1443                 Sensory Assessment (Somatosensory)     Sensory Assessment (Somatosensory) UE sensation intact  -KP          Row Name 05/27/25 1443                 Vision Assessment/Intervention     Visual Impairment/Limitations blurry vision  -       Vision Assessment Comment impaired due to diabetic retinopathy  -KP          Row Name 05/27/25 1443                 Range of Motion Comprehensive     Comment, General Range of Motion LUE WFLs, RUE 75% with AAROM tolerated well  (recent right humeral fracture cleared by ortho per patient's wife)  -[facility] Name 05/27/25 1443                 Strength Comprehensive (MMT)     Comment, General Manual Muscle Testing (MMT) Assessment functional, 3+/5 RUE  -KP          Row Name 05/27/25 1443                 Motor Skills     Motor Skills coordination;functional endurance  -       Coordination fine motor deficit;gross motor deficit;bilateral;upper extremity;minimal impairment;moderate impairment  -       Functional Endurance fair minus  -           Row Name 05/27/25 1443                 Balance     Balance Assessment sitting static balance;standing static balance  -KP       Static Sitting Balance standby assist  -KP       Static Standing Balance minimal assist  -KP       Position/Device Used, Standing Balance walker, rolling  -                       User Key  (r) = Recorded By, (t) = Taken By, (c) = Cosigned By        Initials Name Provider Type     Vanesa Aguillon OT Occupational Therapist                            Goals/Plan         [facility] Name 05/27/25 1448                 Transfer Goal 1 (OT)     Activity/Assistive Device (Transfer Goal 1, OT) toilet  -       [place] Level/Cues Needed (Transfer Goal 1, OT) contact guard required  -       Time Frame (Transfer Goal 1, OT) by discharge  -          Row Name 05/27/25 1448                 Problem Specific Goal 1 (OT)     Problem Specific Goal 1 (OT) Patient will perform sustained activity X15 minutes to promote functional endurance/activity tolerance needed for daily occupations.  -       Time Frame (Problem Specific Goal 1, OT) by discharge  -          Row Name 05/27/25 1448                 Therapy Assessment/Plan (OT)     Planned Therapy Interventions (OT) activity tolerance training;BADL retraining;functional balance retraining;patient/caregiver education/training;occupation/activity based interventions;transfer/mobility retraining;ROM/therapeutic exercise;strengthening exercise;passive ROM/stretching  -                    User Key  (r) = Recorded By, (t) = Taken By, (c) = Cosigned By        Initials Name Provider Type     Vanesa Aguillon OT Occupational Therapist                         Clinical Impression         Row Name 05/27/25 1446                 Pain Assessment     Pretreatment Pain Rating 1/10  -KP       Posttreatment Pain Rating 1/10  -KP       Pain Location foot  -       Pain Side/Orientation left  -          Row Name 05/27/25 1446                 Plan of Care Review      Plan of Care Reviewed With patient  -       Progress no change  -       Outcome Evaluation Patient seen for OT evaluation. He presents with functional limitations including generalized weakness, impaired activity tolerance/functional endurance, impaired balance, and decreased safety due to cognition. Patient would benefit from ongoing OT services to promote highest level of independence and safety prior to discharge.  -          Row Name 05/27/25 1446                 Therapy Assessment/Plan (OT)     Patient/Family Therapy Goal Statement (OT) return home after rehab  -       Rehab Potential (OT) good  -       Criteria for Skilled Therapeutic Interventions Met (OT) yes;meets criteria;skilled treatment is necessary  -       Therapy Frequency (OT) 5 times/wk  -       Predicted Duration of Therapy Intervention (OT) discharge  -          Row Name 05/27/25 1446                 Therapy Plan Review/Discharge Plan (OT)     Anticipated Discharge Disposition (OT) inpatient rehabilitation facility  -          Row Name 05/27/25 1446                 Positioning and Restraints     Pre-Treatment Position in bed  -       Post Treatment Position bed  -       In Bed fowlers;call light within reach;encouraged to call for assist;with family/caregiver  -                       User Key  (r) = Recorded By, (t) = Taken By, (c) = Cosigned By        Initials Name Provider Type      Vanesa Tse, OT Occupational Therapist                            Outcome Measures         Row Name 05/27/25 1120                 How much help from another person do you currently need...     Turning from your back to your side while in flat bed without using bedrails? 4  -LB       Moving from lying on back to sitting on the side of a flat bed without bedrails? 4  -LB       Moving to and from a bed to a chair (including a wheelchair)? 3  -LB       Standing up from a chair using your arms (e.g., wheelchair, bedside chair)? 3  -LB        Climbing 3-5 steps with a railing? 2  -LB       To walk in hospital room? 3  -LB       AM-PAC 6 Clicks Score (PT) 19  -LB                       User Key  (r) = Recorded By, (t) = Taken By, (c) = Cosigned By        Initials Name Provider Type     Marysol Rodriguez, RN Registered Nurse                                      OT Recommendation and Plan  Planned Therapy Interventions (OT): activity tolerance training, BADL retraining, functional balance retraining, patient/caregiver education/training, occupation/activity based interventions, transfer/mobility retraining, ROM/therapeutic exercise, strengthening exercise, passive ROM/stretching  Therapy Frequency (OT): 5 times/wk  Plan of Care Review  Plan of Care Reviewed With: patient  Progress: no change  Outcome Evaluation: Patient seen for OT evaluation. He presents with functional limitations including generalized weakness, impaired activity tolerance/functional endurance, impaired balance, and decreased safety due to cognition. Patient would benefit from ongoing OT services to promote highest level of independence and safety prior to discharge.      Time Calculation:        Time Calculation- OT         Row Name 05/27/25 1449                       Time Calculation- OT     OT Received On 05/27/25  -                      User Key  (r) = Recorded By, (t) = Taken By, (c) = Cosigned By        Initials Name Provider Type      Vanesa Tse OT Occupational Therapist                       Therapy Charges for Today         Code Description Service Date Service Provider Modifiers Qty     [id number]  OT EVAL MOD COMPLEXITY 4 5/27/2025 Vanesa Tse OT GO 1                   Vanesa Tse OT                   5/27/2025               Haley Larkin BSW     Case Management     Case Management/Social Work     Signed     Date of Service: 05/27/25 1636  Creation Time: 05/27/25 1636     Signed         Discharge Planning Assessment   [place]     Patient  Name: Ramsey Riley                      MRN: [id number]  Today's Date: 5/27/2025                Admit Date: 5/26/2025     Plan: SS received consult per Physician for discharge planning.  SS spoke with pt at bedside and spouse via telephone.  Pt resides at home with spouse, Jina, at [address] [place] [address].  Pt currently does not utilize home health services.  Pt has rolling walker, wheelchair and scooter via unknown provider.  Pt's PCP is Margret Purvis.  Pt stated no POA or Living Will.  Pt utilizes [facility].  Pt's spouse requests Inpt rehab consult.  Pt is agreeable if a candidate.  SS will follow.

## 2025-06-04 NOTE — PLAN OF CARE
Goal Outcome Evaluation:           Progress: no change  Outcome Evaluation: Patient transferred to 3N this shift. Alert to self only. Sitter at bedside. VSS on room air. No acute distress noted at this time. Will continue with plan of care.

## 2025-06-05 DIAGNOSIS — E03.8 ADULT ONSET HYPOTHYROIDISM: ICD-10-CM

## 2025-06-05 PROBLEM — R53.81 PHYSICAL DEBILITY: Status: ACTIVE | Noted: 2025-06-05

## 2025-06-05 LAB
GLUCOSE BLDC GLUCOMTR-MCNC: 155 MG/DL (ref 70–130)
GLUCOSE BLDC GLUCOMTR-MCNC: 199 MG/DL (ref 70–130)
GLUCOSE BLDC GLUCOMTR-MCNC: 235 MG/DL (ref 70–130)
GLUCOSE BLDC GLUCOMTR-MCNC: 247 MG/DL (ref 70–130)

## 2025-06-05 PROCEDURE — 63710000001 INSULIN LISPRO PROTAMINE-INSULIN LISPRO (75-25) 100 UNIT/ML SUSPENSION: Performed by: STUDENT IN AN ORGANIZED HEALTH CARE EDUCATION/TRAINING PROGRAM

## 2025-06-05 PROCEDURE — 97162 PT EVAL MOD COMPLEX 30 MIN: CPT

## 2025-06-05 PROCEDURE — 97166 OT EVAL MOD COMPLEX 45 MIN: CPT

## 2025-06-05 PROCEDURE — 25010000002 ZIPRASIDONE MESYLATE PER 10 MG

## 2025-06-05 PROCEDURE — 82948 REAGENT STRIP/BLOOD GLUCOSE: CPT

## 2025-06-05 RX ADMIN — FAMOTIDINE 20 MG: 20 TABLET ORAL at 09:11

## 2025-06-05 RX ADMIN — PANTOPRAZOLE SODIUM 40 MG: 40 TABLET, DELAYED RELEASE ORAL at 09:10

## 2025-06-05 RX ADMIN — ALPRAZOLAM 0.5 MG: 0.5 TABLET ORAL at 23:19

## 2025-06-05 RX ADMIN — DOCUSATE SODIUM 50MG AND SENNOSIDES 8.6MG 2 TABLET: 8.6; 5 TABLET, FILM COATED ORAL at 21:04

## 2025-06-05 RX ADMIN — AMLODIPINE BESYLATE 10 MG: 10 TABLET ORAL at 09:10

## 2025-06-05 RX ADMIN — FAMOTIDINE 20 MG: 20 TABLET ORAL at 21:05

## 2025-06-05 RX ADMIN — TUBERCULIN PURIFIED PROTEIN DERIVATIVE 5 UNITS: 5 INJECTION, SOLUTION INTRADERMAL at 09:09

## 2025-06-05 RX ADMIN — QUETIAPINE FUMARATE 100 MG: 100 TABLET ORAL at 21:04

## 2025-06-05 RX ADMIN — QUETIAPINE FUMARATE 50 MG: 25 TABLET ORAL at 09:11

## 2025-06-05 RX ADMIN — EMPAGLIFLOZIN 10 MG: 10 TABLET, FILM COATED ORAL at 09:10

## 2025-06-05 RX ADMIN — LEVOTHYROXINE SODIUM 137.5 MCG: 0.12 TABLET ORAL at 09:10

## 2025-06-05 RX ADMIN — INSULIN LISPRO 20 UNITS: 100 INJECTION, SUSPENSION SUBCUTANEOUS at 09:08

## 2025-06-05 RX ADMIN — TIMOLOL MALEATE: 5 SOLUTION OPHTHALMIC at 21:05

## 2025-06-05 RX ADMIN — Medication 10 ML: at 21:08

## 2025-06-05 RX ADMIN — LOSARTAN POTASSIUM 100 MG: 50 TABLET, FILM COATED ORAL at 09:10

## 2025-06-05 RX ADMIN — ZIPRASIDONE MESYLATE 10 MG: 20 INJECTION, POWDER, LYOPHILIZED, FOR SOLUTION INTRAMUSCULAR at 23:56

## 2025-06-05 RX ADMIN — HYDROCODONE BITARTRATE AND ACETAMINOPHEN 1 TABLET: 7.5; 325 TABLET ORAL at 13:28

## 2025-06-05 RX ADMIN — QUETIAPINE FUMARATE 50 MG: 25 TABLET ORAL at 17:33

## 2025-06-05 RX ADMIN — Medication 1 APPLICATION: at 09:11

## 2025-06-05 RX ADMIN — TIMOLOL MALEATE: 5 SOLUTION OPHTHALMIC at 09:12

## 2025-06-05 RX ADMIN — Medication 1 APPLICATION: at 21:05

## 2025-06-05 RX ADMIN — CITALOPRAM HYDROBROMIDE 20 MG: 20 TABLET ORAL at 09:11

## 2025-06-05 RX ADMIN — APIXABAN 5 MG: 5 TABLET, FILM COATED ORAL at 21:05

## 2025-06-05 RX ADMIN — ATORVASTATIN CALCIUM 80 MG: 40 TABLET, FILM COATED ORAL at 21:05

## 2025-06-05 RX ADMIN — INSULIN LISPRO 20 UNITS: 100 INJECTION, SUSPENSION SUBCUTANEOUS at 17:33

## 2025-06-05 RX ADMIN — DOCUSATE SODIUM 50MG AND SENNOSIDES 8.6MG 2 TABLET: 8.6; 5 TABLET, FILM COATED ORAL at 09:10

## 2025-06-05 RX ADMIN — APIXABAN 5 MG: 5 TABLET, FILM COATED ORAL at 09:10

## 2025-06-05 NOTE — THERAPY EVALUATION
Acute Care - Occupational Therapy Initial Evaluation   [place]     Patient Name: Ramsey Riley  : 1955  MRN: [id number]  Today's Date: 2025             Admit Date: 2025     No diagnosis found.  Patient Active Problem List   Diagnosis    Coronary artery disease involving native coronary artery of native heart without angina pectoris    Essential hypertension    Bradycardia, sinus    Fatigue    Abnormal ECG    Precordial pain    LVH (left ventricular hypertrophy) due to hypertensive disease, without heart failure    Diabetes mellitus    PAD (peripheral artery disease)    Ischemic stroke    History of blood clots    Pulmonary embolus    Dyslipidemia    Class 1 obesity due to excess calories with serious comorbidity and body mass index (BMI) of 34.0 to 34.9 in adult    Weakness    Frequent falls    Physical debility     Past Medical History:   Diagnosis Date    Arthritis     Clot     Coronary artery disease     Dementia 2024    Diabetes mellitus     Heart disease     History of blood clots     Hypertension     Impaired mobility     Low back pain     Thyroid disease     Type 2 diabetes mellitus      Past Surgical History:   Procedure Laterality Date    CHOLECYSTECTOMY      COLONOSCOPY      dr srivastava    CORONARY ARTERY BYPASS GRAFT      [place] - 3 BY PASS     ENDOSCOPY           OT ASSESSMENT FLOWSHEET (Last 12 Hours)       OT Evaluation and Treatment       Row Name 25 1242                   OT Time and Intention    Document Type evaluation  -KR        Mode of Treatment occupational therapy  -KR        Patient Effort adequate  -KR           General Information    General Observations of Patient alert/cooperative  -KR        Prior Level of Function min assist:  -KR           Living Environment    Current Living Arrangements home  -KR        People in Home spouse  -KR        Primary Care Provided by spouse/significant other  -KR           Cognition    Affect/Mental Status  (Cognition) confused  -KR        Orientation Status (Cognition) oriented to;person  -KR        Follows Commands (Cognition) verbal cues/prompting required;increased processing time needed  -KR           Range of Motion Comprehensive    Comment, General Range of Motion R shoulder premorbid limitations, remaining BUE WFL  -KR           Strength Comprehensive (MMT)    Comment, General Manual Muscle Testing (MMT) Assessment R shoulder deferred, remaining BUE 4/5  -KR           Activities of Daily Living    BADL Assessment/Intervention bathing;upper body dressing;lower body dressing;grooming;feeding;toileting  -KR           Bathing Assessment/Intervention    [place] Level (Bathing) bathing skills;moderate assist (50% patient effort)  -KR           Upper Body Dressing Assessment/Training    [place] Level (Upper Body Dressing) upper body dressing skills;moderate assist (50% patient effort)  -KR           Lower Body Dressing Assessment/Training    [place] Level (Lower Body Dressing) lower body dressing skills;moderate assist (50% patient effort)  -KR           Grooming Assessment/Training    [place] Level (Grooming) grooming skills;moderate assist (50% patient effort)  -KR           Self-Feeding Assessment/Training    [place] Level (Feeding) feeding skills;minimum assist (75% patient effort)  -KR           Toileting Assessment/Training    [place] Level (Toileting) toileting skills;moderate assist (50% patient effort)  -KR           Wound 05/26/25 1618 Left posterior heel Pressure Injury    Wound - Properties Group Placement Date: 05/26/25  -CW Placement Time: 1618 -CW Present on Original Admission: Y  -CW Side: Left  -CW Orientation: posterior  -CW Location: heel  -CW Primary Wound Type: Pressure Inj  -CW    Retired Wound - Properties Group Placement Date: 05/26/25  -CW Placement Time: 1618 -CW Present on Original Admission: Y  -CW Side: Left  -CW Orientation: posterior  -CW Location: heel   -CW    Retired Wound - Properties Group Placement Date: 05/26/25  -CW Placement Time: 1618  -CW Present on Original Admission: Y  -CW Side: Left  -CW Orientation: posterior  -CW Location: heel  -CW    Retired Wound - Properties Group Date first assessed: 05/26/25  -CW Time first assessed: 1618  -CW Present on Original Admission: Y  -CW Side: Left  -CW Location: heel  -CW       Wound 05/26/25 1622 Left anterior heel Pressure Injury    Wound - Properties Group Placement Date: 05/26/25  -CW Placement Time: 1622  -CW Side: Left  -CW Orientation: anterior  -CW Location: heel  -CW Primary Wound Type: Pressure Inj  -CW    Retired Wound - Properties Group Placement Date: 05/26/25  -CW Placement Time: 1622  -CW Side: Left  -CW Orientation: anterior  -CW Location: heel  -CW    Retired Wound - Properties Group Placement Date: 05/26/25  -CW Placement Time: 1622  -CW Side: Left  -CW Orientation: anterior  -CW Location: heel  -CW    Retired Wound - Properties Group Date first assessed: 05/26/25  -CW Time first assessed: 1622  -CW Side: Left  -CW Location: heel  -CW       Wound 05/27/25 1007 medial parietal region Traumatic Abrasion    Wound - Properties Group Placement Date: 05/27/25  -LB Placement Time: 1007  -LB Present on Original Admission: Y  -LB Orientation: medial  -LB Location: parietal region  -LB Primary Wound Type: Traumatic  -LB Secondary Wound Type - Traumatic: Abrasion  -LB    Retired Wound - Properties Group Placement Date: 05/27/25  -LB Placement Time: 1007  -LB Present on Original Admission: Y  -LB Orientation: medial  -LB Location: parietal region  -LB    Retired Wound - Properties Group Placement Date: 05/27/25  -LB Placement Time: 1007  -LB Present on Original Admission: Y  -LB Orientation: medial  -LB Location: parietal region  -LB    Retired Wound - Properties Group Date first assessed: 05/27/25  -LB Time first assessed: 1007  -LB Present on Original Admission: Y  -LB Location: parietal region  -LB        Wound 05/27/25 1008 Right distal arm Traumatic Skin Tear    Wound - Properties Group Placement Date: 05/27/25  -LB Placement Time: 1008  -LB Present on Original Admission: Y  -LB Side: Right  -LB Orientation: distal  -LB Location: arm  -LB Primary Wound Type: Traumatic  -LB Secondary Wound Type - Traumatic: Skin Tear  -LB    Retired Wound - Properties Group Placement Date: 05/27/25  -LB Placement Time: 1008  -LB Present on Original Admission: Y  -LB Side: Right  -LB Orientation: distal  -LB Location: arm  -LB    Retired Wound - Properties Group Placement Date: 05/27/25  -LB Placement Time: 1008  -LB Present on Original Admission: Y  -LB Side: Right  -LB Orientation: distal  -LB Location: arm  -LB    Retired Wound - Properties Group Date first assessed: 05/27/25  -LB Time first assessed: 1008  -LB Present on Original Admission: Y  -LB Side: Right  -LB Location: arm  -LB       Plan of Care Review    Plan of Care Reviewed With patient  -KR           Therapy Assessment/Plan (OT)    Planned Therapy Interventions (OT) activity tolerance training  -KR           Therapy Plan Review/Discharge Plan (OT)    Anticipated Discharge Disposition (OT) extended care [facility]  -KR           OT Goals    Dressing Goal Selection (OT) dressing, OT goal 1  -KR        Activity Tolerance Goal Selection (OT) activity tolerance, OT goal 1  -KR           Dressing Goal 1 (OT)    Activity/Device (Dressing Goal 1, OT) dressing skills, all  -KR        [place]/Cues Needed (Dressing Goal 1, OT) minimum assist (75% or more patient effort)  -KR        Time Frame (Dressing Goal 1, OT) by discharge  -KR            Activity Tolerance Goal 1 (OT)    Activity Tolerance Goal 1 (OT) Increase to enhance ADL performance, ability to assist caregiver  -KR        Activity Level (Endurance Goal 1, OT) 15 min activity  -KR        Time Frame (Activity Tolerance Goal 1, OT) by discharge  -KR                  User Key  (r) = Recorded By, (t) = Taken By, (c) =  Cosigned By      Initials Name Effective Dates    CW Chata Pulido RN 10/05/21 -     Dao Valentine OT 06/16/21 -     Marysol Rodriguez RN 10/20/21 -                            OT Recommendation and Plan  Planned Therapy Interventions (OT): activity tolerance training  Plan of Care Review  Plan of Care Reviewed With: patient  Plan of Care Reviewed With: patient     Outcome Measures       Row Name 06/05/25 1251 06/05/25 1200          How much help from another is currently needed...    Putting on and taking off regular lower body clothing? 2  -KR --     Bathing (including washing, rinsing, and drying) 2  -KR --     Toileting (which includes using toilet bed pan or urinal) 2  -KR --     Putting on and taking off regular upper body clothing 2  -KR --     Taking care of personal grooming (such as brushing teeth) 3  -KR --     Eating meals 3  -KR --     AM-PAC 6 Clicks Score (OT) 14  -KR --        Functional Assessment    Outcome Measure Options -- Modified [place];AM-PAC 6 Clicks Daily Activity (OT)  -KR               User Key  (r) = Recorded By, (t) = Taken By, (c) = Cosigned By      Initials Name Provider Type    Dao Valentine OT Occupational Therapist                    Time Calculation:     Therapy Charges for Today       Code Description Service Date Service Provider Modifiers Qty    [id number]  OT EVAL MOD COMPLEXITY 4 6/5/2025 Dao Noland OT GO 1                 Dao Noland OT  6/5/2025

## 2025-06-05 NOTE — PLAN OF CARE
Goal Outcome Evaluation:  Plan of Care Reviewed With: patient        Progress: no change  Outcome Evaluation: Patient resting in bed at this time with sitter and family at bedside. Alert to self, pleasantly confused. VSS on RA. No acute changes noted. Patient worked with PT/OT this shift. Unable to ambulate with nursing staf d/t confusion. Had bath this shift. PRN pain meds given for c/o generalized pain. No other requests or complaints at this time. Will continue with POC.

## 2025-06-05 NOTE — H&P
Interval H&P  Patient was evaluated for confusion and frequent falls during his acute hospitalization and MRI brain was negative for evidence of acute ischemia, focal mass, or midline shift. He was evaluated by PT/OT and inpatient rehab facility or skilled nursing facility was recommended. Unfortunately he was declined by skilled nursing facility due to his home seroquel which is used for insomnia and agitation in the setting of vascular dementia. Today patient reported feeling well and was sitting up in bed with his wife present at bedside. He denied any headache, dizziness, shortness of breath, leg pain, or foot pain. Physical exam was notable for normal cardiac rate and regular rhythm, clear breath sounds bilaterally on auscultation, and no focal deficits on gross neurologic examination. He has been accepted to swing bed for continued physical therapy services to improve strength and balance before returning home, to reduce risk of further falls and injury.     Harini Urbina DO  25 21:39 EDT             [facility]  History & Physical    Patient Identification:  Name:  Ramsey Riley  Age:  70 y.o.  Sex:  male  :  1955  MRN:  [id number]   Visit Number:  [id number]  Admit Date: 2025   Primary Care Physician:  Margret Purvis MD    Subjective     Chief complaint: Foot pain    History of presenting illness:      Ramsey Riley is a 70 y.o. male who presented for further evaluation of foot pain. He was seen and examined in the ED with spouse at the bedside. He and spouse report history mild vascular dementia with mild confusion and memory difficulty at baseline but he was pleasant and conversant. His spouse assists in providing history. They report they presented to the ED today secondary to foot pain. Patient has had a deep bruise/blister on the back of his heel for the past several days which he reports is incredibly painful. He reports deep, burning  pain. His spouse states that last evening and overnight the pain seemed worse and the patient was very uncomfortable at home, at times even yelling out in pain so she decided to bring him in to the ED for further evaluation.   She believes the wound on the foot may be related to one of his recent multiple falls but they cannot pinpoint when the exact occurrence was. Spouse and patient report he has fallen multiple times in the past month. With one fall they report he injured his shoulder, spouse states had a fracture but unsure specifics. He did not require surgical intervention but has been in a sling for the past several weeks. Recently followed up with provider regarding shoulder injury and was told to discontinue the sling but patient has still been wearing this as he states it is too painful without it. He was hospitalized in [place] s/p [facility] in the recent past and the plan was for some sort of inpatient rehab however they had difficulty securing placement for him. Wife reports that he is significantly debilitated and has declined further since that discharge. At this time he is requiring assistance with bed mobility, ADLs such as dressing. She states he uses a walker but remains significantly unsteady on his feet and she is not physically able to stabilize him herself. She further adds that his vision is very poor due to diabetic retinopathy and this is limited his ability to safely ambulate in their home.  When he does fall he is unable to get up on his own and she is unable to assist. He denies any chest pain, palpitations, or dizziness preceding these falls.     Past medical history is significant for vascular dementia, CAD, HTN, PAD, hyperlipidemia, diabetes mellitus, chronically anticoagulated with Eliquis, thyroid disease    Upon arrival to the ED, vital signs were temp 98, heart rate 84, respirations 18, /75, SpO2 97% on RA.  On laboratory workup he was hyperglycemic at 282, CBC unremarkable.   CXR negative, XR bilateral feet negative.    Known Emergency Department medications received prior to my evaluation included none.   Emergency Department Room location at the time of my evaluation was 411.     ---------------------------------------------------------------------------------------------------------------------   Review of Systems   Constitutional:  Positive for activity change.   HENT:  Negative for congestion and rhinorrhea.    Respiratory:  Negative for cough and shortness of breath.    Cardiovascular:  Negative for chest pain and leg swelling.   Gastrointestinal:  Negative for abdominal pain, diarrhea, nausea and vomiting.   Genitourinary:  Negative for difficulty urinating and dysuria.   Musculoskeletal:  Positive for arthralgias, gait problem and myalgias.   Skin:  Positive for wound. Negative for rash.   Neurological:  Positive for weakness. Negative for dizziness and light-headedness.   Psychiatric/Behavioral:  Positive for confusion (baseline).         ---------------------------------------------------------------------------------------------------------------------   Past Medical History:   Diagnosis Date    Arthritis     Clot     Coronary artery disease     Dementia 12/18/2024    Diabetes mellitus     Heart disease     History of blood clots     Hypertension     Impaired mobility     Low back pain     Thyroid disease     Type 2 diabetes mellitus      Past Surgical History:   Procedure Laterality Date    CHOLECYSTECTOMY  2015    COLONOSCOPY  2015    dr srivastava    CORONARY ARTERY BYPASS GRAFT      [place] - 3 BY PASS     ENDOSCOPY       Family History   Problem Relation Age of Onset    Diabetes Mother     Heart disease Mother         STENTING    COPD Mother     Heart attack Father     Diabetes Father     Heart defect Son         Ross procedure    Heart murmur Son     Hypertension Son      Social History     Socioeconomic History    Marital status:    Tobacco Use    Smoking status:  Never     Passive exposure: Never    Smokeless tobacco: Current     Types: Chew   Vaping Use    Vaping status: Never Used   Substance and Sexual Activity    Alcohol use: No    Drug use: No    Sexual activity: Defer     ---------------------------------------------------------------------------------------------------------------------   Allergies:  Penicillins and Trazodone  ---------------------------------------------------------------------------------------------------------------------   Home medications:    Medications below are reported home medications pulling from within the system; at this time, these medications have not been reconciled unless otherwise specified and are in the verification process for further verifcation as current home medications.  (Not in a hospital admission)      Hospital Scheduled Meds:          Current listed hospital scheduled medications may not yet reflect those currently placed in orders that are signed and held awaiting patient's arrival to floor.   ---------------------------------------------------------------------------------------------------------------------     Objective     Vital Signs:  Temp:  [98 °F (36.7 °C)] 98 °F (36.7 °C)  Heart Rate:  [69-84] 69  Resp:  [18] 18  BP: (148-176)/(73-95) 148/75      05/26/25  1133   Weight: 99.8 kg (220 lb)     Body mass index is 30.68 kg/m².  ---------------------------------------------------------------------------------------------------------------------       Physical Exam  Vitals and nursing note reviewed.   Constitutional:       General: He is not in acute distress.     Appearance: He is obese. He is ill-appearing (chronically ill appearing).   HENT:      Head: Normocephalic and atraumatic.   Eyes:      Extraocular Movements: Extraocular movements intact.   Cardiovascular:      Rate and Rhythm: Normal rate and regular rhythm.   Pulmonary:      Effort: Pulmonary effort is normal. No respiratory distress.      Breath sounds:  "Normal breath sounds.   Abdominal:      Palpations: Abdomen is soft.      Tenderness: There is no abdominal tenderness.   Musculoskeletal:      Right lower leg: No edema.      Left lower leg: No edema.   Skin:     General: Skin is warm and dry.      Comments: Wound to posterior left heel, no surrounding erythema or warmth, no drainage or other sign of infection   Neurological:      Mental Status: He is alert. Mental status is at baseline.   Psychiatric:         Mood and Affect: Mood normal.         Behavior: Behavior normal.         ---------------------------------------------------------------------------------------------------------------------  EKG:      ---------------------------------------------------------------------------------------------------------------------   Results from last 7 days   Lab Units 05/26/25  1250   CRP mg/dL 0.30   WBC 10*3/mm3 7.38   HEMOGLOBIN g/dL 15.2   HEMATOCRIT % 47.1   MCV fL 91.6   MCHC g/dL 32.3   PLATELETS 10*3/mm3 148         Results from last 7 days   Lab Units 05/26/25  1250   SODIUM mmol/L 134*   POTASSIUM mmol/L 4.6   CHLORIDE mmol/L 98   CO2 mmol/L 25.5   BUN mg/dL 12   CREATININE mg/dL 1.28*   CALCIUM mg/dL 9.2   GLUCOSE mg/dL 282*   ALBUMIN g/dL 3.9   BILIRUBIN mg/dL 0.6   ALK PHOS U/L 150*   AST (SGOT) U/L 31   ALT (SGPT) U/L 25   Estimated Creatinine Clearance: 66.5 mL/min (A) (by C-G formula based on SCr of 1.28 mg/dL (H)).  No results found for: \"AMMONIA\"          Lab Results   Component Value Date    HGBA1C 8.30 (H) 05/05/2025     Lab Results   Component Value Date    TSH 4.610 (H) 05/05/2025    FREET4 1.24 12/10/2024     No results found for: \"PREGTESTUR\", \"PREGSERUM\", \"HCG\", \"HCGQUANT\"  Pain Management Panel  More data exists         Latest Ref Rng & Units 5/5/2025 12/10/2024   Pain Management Panel   Creatinine, Urine Not Estab. mg/dL 110.9  -   Amphetamine, Urine Qual Negative - Negative    Barbiturates Screen, Urine Negative - Negative    Benzodiazepine " "Screen, Urine Negative - Positive    Buprenorphine, Screen, Urine Negative - Negative    Cocaine Screen, Urine Negative - Negative    Fentanyl, Urine Negative - Negative    Methadone Screen , Urine Negative - Negative    Methamphetamine, Ur Negative - Negative      No results found for: \"BLOODCX\"  No results found for: \"URINECX\"  No results found for: \"WOUNDCX\"  No results found for: \"STOOLCX\"      ---------------------------------------------------------------------------------------------------------------------  Imaging Results (Last 7 Days)       Procedure Component Value Units Date/Time    XR Foot 3+ View Bilateral [[id number]] Collected: 05/26/25 1335     Updated: 05/26/25 1415    Narrative:      Exam: XR FOOT 3+ VW BILATERAL-     History: pain     Comparison: No images available     Findings:     Right distal first tow resection.      No acute fracture or dislocation.     Soft tissue are normal.       Impression:      Impression:     No acute bony process.     This report was finalized on 5/26/2025 2:12 PM by Brock Tafoya MD.       XR Chest 1 View [[id number]] Collected: 05/26/25 1335     Updated: 05/26/25 1337    Narrative:      Procedure: Frontal view of chest obtained.     Comparison: None available     History: frequent falls     Findings:     No pneumonia or acute process seen in the chest.  Normal heart size and mediastinal contours.  Trachea is in midline position.  No edema or effusion is seen.  There is no evidence of pneumothorax.       Impression:         No evidence of acute disease in the chest.     This report was finalized on 5/26/2025 1:35 PM by Brock Tafoya MD.               Cultures:  No results found for: \"BLOODCX\", \"URINECX\", \"WOUNDCX\", \"MRSACX\", \"RESPCX\", \"STOOLCX\"    Last echocardiogram:  Results for orders placed during the hospital encounter of 12/10/24    Adult Transthoracic Echo Complete W/ Cont if Necessary Per Protocol    Interpretation Summary    Left ventricular systolic " function is normal. Calculated left ventricular EF = 67.8% Left ventricular ejection fraction appears to be 66 - 70%.    Left ventricular wall thickness is consistent with mild concentric hypertrophy. Left ventricular diastolic function was indeterminate.    The right ventricular cavity is borderline dilated with borderline systolic function.    The left atrial cavity is mildly dilated.    Mild aortic valve regurgitation is present.    Saline test results are negative for right to left atrial level shunt.          I have personally reviewed the above radiology images and read the final radiology report on 05/26/25  ---------------------------------------------------------------------------------------------------------------------  Assessment / Plan     Active Hospital Problems    Diagnosis  POA    **Frequent falls [R29.6]  Not Applicable       ASSESSMENT/PLAN:    Vascular dementia  Acute on chronic debility  Frequent falls  Recent proximal right humerus fracture due to fall  Patient presented to the ED secondary to foot pain.  On further discussion about reports history of vascular dementia with increasing difficulty caring for the patient at home.  He is fallen 3 times within the past month and sustained a right humerus fracture.  She reports he is currently requiring assistance getting out of bed, dressing, etc.  She states he is unsteady on his feet and has difficulty navigating their home despite use of a walker and her assistance.  Will admit to the telemetry unit for further observation and management  Will proceed with MRI brain without contrast to further evaluate  PT OT consults  Appreciate CM/SW assistance with discharge planning as well.  Will check TSH, folate, B12  Continue supportive care measures  Continue to trend labs    Chronic:  CAD  HTN  PAD  HLD  Diabetes mellitus  Hx blood clot, chronically anticoagulated  Continue home medication regimen as indicated cover with SSI for now.  Accu-Cheks to  monitor with hypoglycemia protocol in place.  Add basal insulin as clinically indicated  Trend vital signs    ----------  -DVT prophylaxis: Chronically anticoagulated with Eliquis  -Activity: Up with assistance  -Expected length of stay: Less than 2 midnights  -Disposition pending further clinical course and PT OT recommendations    High risk secondary to frequent falls, vascular dementia    Code Status and Medical Interventions: CPR (Attempt to Resuscitate); Full Support   Ordered at: 05/26/25 1430     Code Status (Patient has no pulse and is not breathing):    CPR (Attempt to Resuscitate)     Medical Interventions (Patient has pulse or is breathing):    Full Support       Justo Grande PA-C   05/26/25  14:35 EDT

## 2025-06-05 NOTE — PLAN OF CARE
Goal Outcome Evaluation:              Outcome Evaluation: Pt seen for evaluation this date, pleasant and cooperative with therapy. Pt presents with mobility likely impaired 2/2 confusion, however pt may benefit from therapy interventions for balance/ambulation benefit.    Anticipated Discharge Disposition (PT): home with 24/7 care, home with home health, home with assist, home with supervision

## 2025-06-05 NOTE — NURSING NOTE
Received consult for swing bed admit.    Patient with active wounds.  Orders are in place.    Louis score 15.  PI prevention orders are in place.

## 2025-06-05 NOTE — PLAN OF CARE
Goal Outcome Evaluation:              Outcome Evaluation: (P) Patient has been resting in bed throughout the night, Alert to self only. sitter at bedside. VSS on RA. pt c/o pain, see MAR. will continue POC

## 2025-06-05 NOTE — CASE MANAGEMENT/SOCIAL WORK
Discharge Planning Assessment  [facility]     Patient Name: Ramsey Riley  MRN: [id number]  Today's Date: 6/5/2025    Admit Date: 5/26/2025    Plan: Pt to be tranferred to [address] [place] [address] and admitted to Swing Bed.  Pt's spouse aware and agreeable.          Discharge Plan       Row Name 06/05/25 0832       Plan    Final Discharge Disposition Code 61 - hospital-based swing bed    Final Note Pt to be discharged to [facility] Swing bed.                  Continued Care and Services - Discharged on 6/4/2025 Admission date: 5/26/2025 - Discharge disposition: Swing Bed      Destination       Service Provider Request Status Services Address Phone Fax Patient Preferred    [facility] Declined  Fall risk -- [address] [facility] [address] [phone] --    [facility] [place] Declined  medications -- [address] [facility] [address] [phone] --    [facility] Declined  Bed not available -- [address] [address] [phone] --    [facility] Declined  Home medications -- [address] [facility] [address] [phone] --                  Expected Discharge Date and Time       Expected Discharge Date Expected Discharge Time    Jun 4, 2025               GERARD RowanW

## 2025-06-05 NOTE — CASE MANAGEMENT/SOCIAL WORK
Discharge Planning Assessment  [facility]     Patient Name: Ramsey Riley  MRN: [id number]  Today's Date: 6/5/2025    Admit Date: 6/4/2025    Plan: Pt was admitted to swing bed on 6/4 to continue therapy. Pt has a sitter at bedside. Pt lives with spouse, Jina and she plans for pt to return home at discharge. Pt does not utilize home health services. Pt has a rolling walker, wheelchair, and scooter via unknown provider. PCP is Margret Purvis. Pt does not have a POA or living will on file. SS to follow and assist as needed with discharge planning.   Discharge Needs Assessment       Row Name 06/05/25 1029       Living Environment    People in Home spouse    Name(s) of People in Home Jina    Primary Care Provided by spouse/significant other    Provides Primary Care For no one    Family Caregiver if Needed spouse    Quality of Family Relationships helpful;involved;supportive    Able to Return to Prior Arrangements yes       Transition Planning    Patient/Family Anticipates Transition to home with family;home with help/services    Transportation Anticipated health plan transportation       Discharge Needs Assessment    Equipment Currently Used at Home walker, rolling;wheelchair;wheelchair, motorized  unknown provider                   Discharge Plan       Row Name 06/05/25 1033       Plan    Plan Pt was admitted to swing bed on 6/4 to continue therapy. Pt has a sitter at bedside. Pt lives with spouse, Jina and she plans for pt to return home at discharge. Pt does not utilize home health services. Pt has a rolling walker, wheelchair, and scooter via unknown provider. PCP is Margret Purvis. Pt does not have a POA or living will on file. SS to follow and assist as needed with discharge planning.                       Demographic Summary       Row Name 06/05/25 1028       General Information    Referral Source physician    Reason for Consult --  SS received cosnult for will need home health after discharge. Pt was admitted  to swing bed on 6/4.                TAN Buchanan

## 2025-06-05 NOTE — THERAPY EVALUATION
Acute Care - Physical Therapy Initial Evaluation   Ilya     Patient Name: Ramsey Riley  : 1955  MRN: [id number]  Today's Date: 2025   Onset of Illness/Injury or Date of Surgery: 25 (admission date)  Visit Dx:   No diagnosis found.  Patient Active Problem List   Diagnosis    Coronary artery disease involving native coronary artery of native heart without angina pectoris    Essential hypertension    Bradycardia, sinus    Fatigue    Abnormal ECG    Precordial pain    LVH (left ventricular hypertrophy) due to hypertensive disease, without heart failure    Diabetes mellitus    PAD (peripheral artery disease)    Ischemic stroke    History of blood clots    Pulmonary embolus    Dyslipidemia    Class 1 obesity due to excess calories with serious comorbidity and body mass index (BMI) of 34.0 to 34.9 in adult    Weakness    Frequent falls    Physical debility     Past Medical History:   Diagnosis Date    Arthritis     Clot     Coronary artery disease     Dementia 2024    Diabetes mellitus     Heart disease     History of blood clots     Hypertension     Impaired mobility     Low back pain     Thyroid disease     Type 2 diabetes mellitus      Past Surgical History:   Procedure Laterality Date    CHOLECYSTECTOMY      COLONOSCOPY      dr srivastava    CORONARY ARTERY BYPASS GRAFT      [place] - 3 BY PASS     ENDOSCOPY       PT Assessment (Last 12 Hours)       PT Evaluation and Treatment       Row Name 25 1125          Physical Therapy Time and Intention    Document Type evaluation  -     Mode of Treatment physical therapy  -     Patient Effort adequate  -     Comment Pt seen for therapy evaluation this date as pt is in swing bed admission, pleasant, oriented however staff reports pt has been confused. Pt states he utilized SPC and walker at home, also has a W/C, with fall history, and states pt lives at home with wife.  -       Row Name 25 1125          General  Information    Patient Profile Reviewed yes  -     Onset of Illness/Injury or Date of Surgery 06/04/25  admission date  -     Patient Observations alert;cooperative;agree to therapy  -     General Observations of Patient Pt seen supine in hospital bed, pleasant and cooperative with therapy  -     Prior Level of Function gait  -     Equipment Currently Used at Home --  per pt, cane/walker  -     Existing Precautions/Restrictions fall  -     Limitations/Impairments safety/cognitive  -[facility] Name 06/05/25 1125          Pain    Pretreatment Pain Rating 0/10 - no pain  -     Posttreatment Pain Rating 0/10 - no pain  -     Pre/Posttreatment Pain Comment no observed pain on pt presentation  -[facility] Name 06/05/25 1125          Cognition    Personal Safety Interventions supervised activity;nonskid shoes/slippers when out of bed  -[facility] Name 06/05/25 1125          Strength Comprehensive (MMT)    Comment, General Manual Muscle Testing (MMT) Assessment Grossly 4+ to 5/5 MMT - functional  -[facility] Name 06/05/25 1125          Bed Mobility    Bed Mobility supine-sit;sit-supine  -     Supine-Sit [place] (Bed Mobility) minimum assist (75% patient effort);1 person assist;2 person assist  c/o R shoulder fx  -     Sit-Supine [place] (Bed Mobility) contact guard;minimum assist (75% patient effort);1 person assist;2 person assist  -[facility] Name 06/05/25 1125          Transfers    Transfers sit-stand transfer;stand-sit transfer  -[facility] Name 06/05/25 1125          Sit-Stand Transfer    Sit-Stand Denali (Transfers) contact guard;minimum assist (75% patient effort);1 person assist;2 person assist  -[facility] Name 06/05/25 1125          Stand-Sit Transfer    Stand-Sit Denali (Transfers) contact guard;1 person assist;2 person assist  -[facility] Name 06/05/25 1125          Gait/Stairs (Locomotion)    Comment, (Gait/Stairs) Pt able to take lateral steps,  deferred ambulation away from EOB.  -KH       Row Name 06/05/25 1125          Balance    Comment, Balance presents as fall risk  -KH       Row Name             Wound 05/26/25 1618 Left posterior heel Pressure Injury    Wound - Properties Group Placement Date: 05/26/25  -CW Placement Time: 1618  -CW Present on Original Admission: Y  -CW Side: Left  -CW Orientation: posterior  -CW Location: heel  -CW Primary Wound Type: Pressure Inj  -CW    Retired Wound - Properties Group Placement Date: 05/26/25  -CW Placement Time: 1618  -CW Present on Original Admission: Y  -CW Side: Left  -CW Orientation: posterior  -CW Location: heel  -CW    Retired Wound - Properties Group Placement Date: 05/26/25  -CW Placement Time: 1618  -CW Present on Original Admission: Y  -CW Side: Left  -CW Orientation: posterior  -CW Location: heel  -CW    Retired Wound - Properties Group Date first assessed: 05/26/25  -CW Time first assessed: 1618  -CW Present on Original Admission: Y  -CW Side: Left  -CW Location: heel  -CW      Row Name             Wound 05/26/25 1622 Left anterior heel Pressure Injury    Wound - Properties Group Placement Date: 05/26/25  -CW Placement Time: 1622  -CW Side: Left  -CW Orientation: anterior  -CW Location: heel  -CW Primary Wound Type: Pressure Inj  -CW    Retired Wound - Properties Group Placement Date: 05/26/25  -CW Placement Time: 1622  -CW Side: Left  -CW Orientation: anterior  -CW Location: heel  -CW    Retired Wound - Properties Group Placement Date: 05/26/25  -CW Placement Time: 1622  -CW Side: Left  -CW Orientation: anterior  -CW Location: heel  -CW    Retired Wound - Properties Group Date first assessed: 05/26/25  -CW Time first assessed: 1622  -CW Side: Left  -CW Location: heel  -CW      Row Name             Wound 05/27/25 1007 medial parietal region Traumatic Abrasion    Wound - Properties Group Placement Date: 05/27/25  -LB Placement Time: 1007  -LB Present on Original Admission: Y  -LB Orientation: medial   -LB Location: parietal region  -LB Primary Wound Type: Traumatic  -LB Secondary Wound Type - Traumatic: Abrasion  -LB    Retired Wound - Properties Group Placement Date: 05/27/25  -LB Placement Time: 1007  -LB Present on Original Admission: Y  -LB Orientation: medial  -LB Location: parietal region  -LB    Retired Wound - Properties Group Placement Date: 05/27/25  -LB Placement Time: 1007  -LB Present on Original Admission: Y  -LB Orientation: medial  -LB Location: parietal region  -LB    Retired Wound - Properties Group Date first assessed: 05/27/25  -LB Time first assessed: 1007  -LB Present on Original Admission: Y  -LB Location: parietal region  -LB      Row Name             Wound 05/27/25 1008 Right distal arm Traumatic Skin Tear    Wound - Properties Group Placement Date: 05/27/25  -LB Placement Time: 1008  -LB Present on Original Admission: Y  -LB Side: Right  -LB Orientation: distal  -LB Location: arm  -LB Primary Wound Type: Traumatic  -LB Secondary Wound Type - Traumatic: Skin Tear  -LB    Retired Wound - Properties Group Placement Date: 05/27/25  -LB Placement Time: 1008  -LB Present on Original Admission: Y  -LB Side: Right  -LB Orientation: distal  -LB Location: arm  -LB    Retired Wound - Properties Group Placement Date: 05/27/25  -LB Placement Time: 1008  -LB Present on Original Admission: Y  -LB Side: Right  -LB Orientation: distal  -LB Location: arm  -LB    Retired Wound - Properties Group Date first assessed: 05/27/25  -LB Time first assessed: 1008  -LB Present on Original Admission: Y  -LB Side: Right  -LB Location: arm  -LB      Row Name 06/05/25 1125          Plan of Care Review    Outcome Evaluation Pt seen for evaluation this date, pleasant and cooperative with therapy. Pt presents with mobility likely impaired 2/2 confusion, however pt may benefit from therapy interventions for balance/ambulation benefit.  -KH       Row Name 06/05/25 1125          Positioning and Restraints    Pre-Treatment  Position in bed  -     Post Treatment Position bed  -     In Bed supine;exit alarm on;patient within staff view  -       Row Name 06/05/25 1125          Therapy Assessment/Plan (PT)    Functional Level at Time of Evaluation (PT) Grossly CGA/T[place]dy  -     PT Diagnosis (PT) fall risk  -     Rehab Potential (PT) other (see comments)  guarded  -     Criteria for Skilled Interventions Met (PT) other (see comments)  may benefit from therapy interventions  -     Therapy Frequency (PT) 5 times/wk  -       Row Name 06/05/25 1125          Physical Therapy Goals    Gait Training Goal Selection (PT) gait training, PT goal 1  -       Row Name 06/05/25 1125          Gait Training Goal 1 (PT)    Activity/Assistive Device (Gait Training Goal 1, PT) gait (walking locomotion);assistive device use;decrease fall risk;improve balance and speed;walker, rolling;cane, quad  -     [place] Level (Gait Training Goal 1, PT) standby assist  -     Distance (Gait Training Goal 1, PT) 50'  -     Time Frame (Gait Training Goal 1, PT) long term goal (LTG)  -               User Key  (r) = Recorded By, (t) = Taken By, (c) = Cosigned By      Initials Name Provider Type    CW Chata Pulido, RN Registered Nurse    Sierra Weir, PT Physical Therapist    LB Marysol Nix, RN Registered Nurse                    Physical Therapy Education       Title: PT OT SLP Therapies (In Progress)       Topic: Physical Therapy (Not Started)       Point: Mobility training (Not Started)       Learner Progress:  Not documented in this visit.              Point: Home exercise program (Not Started)       Learner Progress:  Not documented in this visit.              Point: Body mechanics (Not Started)       Learner Progress:  Not documented in this visit.              Point: Precautions (Not Started)       Learner Progress:  Not documented in this visit.                                  PT Recommendation and Plan  Anticipated Discharge  Disposition (PT): home with 24/7 care, home with home health, home with assist, home with supervision  Planned Therapy Interventions (PT): balance training, gait training, bed mobility training, transfer training, other (see comments) (endurance training; add interventions PRN, as appropriate)  Therapy Frequency (PT): 5 times/wk  Outcome Evaluation: Pt seen for evaluation this date, pleasant and cooperative with therapy. Pt presents with mobility likely impaired 2/2 confusion, however pt may benefit from therapy interventions for balance/ambulation benefit.   Outcome Measures       Row Name 06/05/25 1251 06/05/25 1200          How much help from another is currently needed...    Putting on and taking off regular lower body clothing? 2  -KR --     Bathing (including washing, rinsing, and drying) 2  -KR --     Toileting (which includes using toilet bed pan or urinal) 2  -KR --     Putting on and taking off regular upper body clothing 2  -KR --     Taking care of personal grooming (such as brushing teeth) 3  -KR --     Eating meals 3  -KR --     AM-PAC 6 Clicks Score (OT) 14  -KR --        Functional Assessment    Outcome Measure Options -- Modified [place];AM-PAC 6 Clicks Daily Activity (OT)  -KR               User Key  (r) = Recorded By, (t) = Taken By, (c) = Cosigned By      Initials Name Provider Type    Dao Valentine, OT Occupational Therapist                     Time Calculation:    PT Charges       Row Name 06/05/25 1446             Time Calculation    Start Time 1125  -KH      PT Received On 06/05/25  -CALLUM      PT Goal Re-Cert Due Date 06/19/25  -                User Key  (r) = Recorded By, (t) = Taken By, (c) = Cosigned By      Initials Name Provider Type    Sierra Weir PT Physical Therapist                  Therapy Charges for Today       Code Description Service Date Service Provider Modifiers Qty    24051033330 HC PT EVAL MOD COMPLEXITY 4 6/5/2025 Sierra Zarco, PT GP 1            PT  G-Codes  Outcome Measure Options: Modified [place], AM-PAC 6 Clicks Daily Activity (OT)  AM-PAC 6 Clicks Score (PT): 14  AM-PAC 6 Clicks Score (OT): 14    Sierra Zarco, PT  6/5/2025

## 2025-06-05 NOTE — PAYOR COMM NOTE
"[facility]  NPI:[id number]    Utilization Review  Contact: Suyapa Mcginnis RN  Phone: [phone]  Fax:[phone]    DISCHARGE NOTIFICATION    Cici Lopez (70 y.o. Male)       Date of Birth   1955    Social Security Number       Address   [address] [place] [address]    Home Phone   [phone]    MRN   [id number]       [place]   Patient Refused    Marital Status                               Admission Date   5/26/2025    Admission Type   Emergency    Admitting Provider   Magali Baig DO    Attending Provider       Department, Room/Bed   [address], 3344/1S       Discharge Date   6/4/2025    Discharge Disposition   Swing Bed    Discharge Destination   Other                              Attending Provider: (none)   Allergies: Penicillins, Trazodone    Isolation: None   Infection: None   Code Status: CPR    Ht: 180.3 cm (71\")   Wt: 97.7 kg (215 lb 7 oz)    Admission Cmt: None   Principal Problem: Frequent falls [R29.6]                   Active Insurance as of 5/26/2025       Primary Coverage       Payor Plan Insurance Group Employer/Plan Group    ANTHEM MEDICARE REPLACEMENT ANTHEM MEDICARE ADVANTAGE HMO [id number]       Payor Plan Address Payor Plan Phone Number Payor Plan Fax Number Effective Dates    [phone]  1/1/2024 - None Entered    [facility] [address]         Subscriber Name Subscriber Birth Date Member ID       CICI LOPEZ 1955 [id number]                     Emergency Contacts        (Rel.) Home Phone Work Phone Mobile Phone    RosemaryJina (Spouse) [phone] -- [phone]                  "

## 2025-06-06 LAB
GLUCOSE BLDC GLUCOMTR-MCNC: 107 MG/DL (ref 70–130)
GLUCOSE BLDC GLUCOMTR-MCNC: 117 MG/DL (ref 70–130)
GLUCOSE BLDC GLUCOMTR-MCNC: 117 MG/DL (ref 70–130)
GLUCOSE BLDC GLUCOMTR-MCNC: 126 MG/DL (ref 70–130)
GLUCOSE BLDC GLUCOMTR-MCNC: 136 MG/DL (ref 70–130)
GLUCOSE BLDC GLUCOMTR-MCNC: 136 MG/DL (ref 70–130)
GLUCOSE BLDC GLUCOMTR-MCNC: 160 MG/DL (ref 70–130)

## 2025-06-06 PROCEDURE — 97116 GAIT TRAINING THERAPY: CPT

## 2025-06-06 PROCEDURE — 97530 THERAPEUTIC ACTIVITIES: CPT

## 2025-06-06 PROCEDURE — 63710000001 INSULIN LISPRO PROTAMINE-INSULIN LISPRO (75-25) 100 UNIT/ML SUSPENSION: Performed by: STUDENT IN AN ORGANIZED HEALTH CARE EDUCATION/TRAINING PROGRAM

## 2025-06-06 PROCEDURE — 93005 ELECTROCARDIOGRAM TRACING: CPT | Performed by: STUDENT IN AN ORGANIZED HEALTH CARE EDUCATION/TRAINING PROGRAM

## 2025-06-06 PROCEDURE — 93010 ELECTROCARDIOGRAM REPORT: CPT | Performed by: INTERNAL MEDICINE

## 2025-06-06 PROCEDURE — 97110 THERAPEUTIC EXERCISES: CPT

## 2025-06-06 PROCEDURE — 82948 REAGENT STRIP/BLOOD GLUCOSE: CPT

## 2025-06-06 PROCEDURE — 97535 SELF CARE MNGMENT TRAINING: CPT

## 2025-06-06 PROCEDURE — 63710000001 ONDANSETRON ODT 4 MG TABLET DISPERSIBLE: Performed by: STUDENT IN AN ORGANIZED HEALTH CARE EDUCATION/TRAINING PROGRAM

## 2025-06-06 RX ORDER — LEVOTHYROXINE SODIUM 137 UG/1
137 TABLET ORAL DAILY
Qty: 20 TABLET | Refills: 0 | Status: SHIPPED | OUTPATIENT
Start: 2025-06-06

## 2025-06-06 RX ORDER — ONDANSETRON 2 MG/ML
4 INJECTION INTRAMUSCULAR; INTRAVENOUS EVERY 6 HOURS PRN
Status: DISCONTINUED | OUTPATIENT
Start: 2025-06-06 | End: 2025-06-14 | Stop reason: HOSPADM

## 2025-06-06 RX ORDER — ONDANSETRON 4 MG/1
4 TABLET, ORALLY DISINTEGRATING ORAL EVERY 6 HOURS PRN
Status: DISCONTINUED | OUTPATIENT
Start: 2025-06-06 | End: 2025-06-14 | Stop reason: HOSPADM

## 2025-06-06 RX ADMIN — Medication 1 APPLICATION: at 20:20

## 2025-06-06 RX ADMIN — APIXABAN 5 MG: 5 TABLET, FILM COATED ORAL at 10:00

## 2025-06-06 RX ADMIN — AMLODIPINE BESYLATE 10 MG: 10 TABLET ORAL at 10:01

## 2025-06-06 RX ADMIN — DOCUSATE SODIUM 50MG AND SENNOSIDES 8.6MG 2 TABLET: 8.6; 5 TABLET, FILM COATED ORAL at 10:00

## 2025-06-06 RX ADMIN — INSULIN LISPRO 20 UNITS: 100 INJECTION, SUSPENSION SUBCUTANEOUS at 10:03

## 2025-06-06 RX ADMIN — TIMOLOL MALEATE: 5 SOLUTION OPHTHALMIC at 10:11

## 2025-06-06 RX ADMIN — EMPAGLIFLOZIN 10 MG: 10 TABLET, FILM COATED ORAL at 10:00

## 2025-06-06 RX ADMIN — ACETAMINOPHEN 1000 MG: 500 TABLET ORAL at 20:20

## 2025-06-06 RX ADMIN — PANTOPRAZOLE SODIUM 40 MG: 40 TABLET, DELAYED RELEASE ORAL at 10:01

## 2025-06-06 RX ADMIN — QUETIAPINE FUMARATE 100 MG: 100 TABLET ORAL at 20:20

## 2025-06-06 RX ADMIN — CITALOPRAM HYDROBROMIDE 20 MG: 20 TABLET ORAL at 10:01

## 2025-06-06 RX ADMIN — QUETIAPINE FUMARATE 50 MG: 25 TABLET ORAL at 10:01

## 2025-06-06 RX ADMIN — Medication 10 ML: at 10:03

## 2025-06-06 RX ADMIN — FAMOTIDINE 20 MG: 20 TABLET ORAL at 20:20

## 2025-06-06 RX ADMIN — Medication 1 APPLICATION: at 10:04

## 2025-06-06 RX ADMIN — APIXABAN 5 MG: 5 TABLET, FILM COATED ORAL at 20:20

## 2025-06-06 RX ADMIN — TIMOLOL MALEATE: 5 SOLUTION OPHTHALMIC at 20:20

## 2025-06-06 RX ADMIN — FAMOTIDINE 20 MG: 20 TABLET ORAL at 10:00

## 2025-06-06 RX ADMIN — DOCUSATE SODIUM 50MG AND SENNOSIDES 8.6MG 2 TABLET: 8.6; 5 TABLET, FILM COATED ORAL at 20:20

## 2025-06-06 RX ADMIN — ATORVASTATIN CALCIUM 80 MG: 40 TABLET, FILM COATED ORAL at 20:20

## 2025-06-06 RX ADMIN — INSULIN LISPRO 20 UNITS: 100 INJECTION, SUSPENSION SUBCUTANEOUS at 17:06

## 2025-06-06 RX ADMIN — Medication 10 ML: at 20:21

## 2025-06-06 RX ADMIN — LOSARTAN POTASSIUM 100 MG: 50 TABLET, FILM COATED ORAL at 10:06

## 2025-06-06 RX ADMIN — LEVOTHYROXINE SODIUM 137.5 MCG: 0.12 TABLET ORAL at 10:00

## 2025-06-06 RX ADMIN — ONDANSETRON 4 MG: 4 TABLET, ORALLY DISINTEGRATING ORAL at 11:08

## 2025-06-06 NOTE — PROGRESS NOTES
I have reviewed the chart for nursing notes, labs, and vitals and I briefly evaluated the patient in person today, patient remains admitted to swing bed.  Continue PT/OT, continue swing bed admission. Patient became agitated and combative last night and was given a dose of geodon. He has slept most of today. He was sleeping soundly at time of my exam, with normal respiratory rate and effort, normal heart rate and regular rhythm, and protection of his airway noted. He briefly roused and denied any pain, but was disoriented and quickly went back to sleep. Patient's wife came to visit today and reported to nursing staff that he only takes seroquel 100mg once daily at night (home med rec noted seroquel 50mg po morning and afternoon, and 100mg qhs). She requested to decrease the dose to what he was taking at home prior to admission, so I have discontinued the morning and afternoon doses of 50mg, but he may need another dose to be added back if he develops recurrent agitation. Continue delirium precautions. Monitor for worsening agitation tonight and I would recommend to try an alternative agent if needed since geodon caused patient to be very sedated today. Patient was noted to have urinary retention on bladder scan, but after becoming more alert was able to urinate 700cc. Monitor for worsening urinary retention with bladder scans.     Harini Urbina DO   06/06/25 20:03 EDT

## 2025-06-06 NOTE — THERAPY TREATMENT NOTE
Acute Care - Physical Therapy Treatment Note   [place]     Patient Name: Ramsey Riley  : 1955  MRN: [id number]  Today's Date: 2025   Onset of Illness/Injury or Date of Surgery: 25 (admission date)  Visit Dx:   No diagnosis found.  Patient Active Problem List   Diagnosis    Coronary artery disease involving native coronary artery of native heart without angina pectoris    Essential hypertension    Bradycardia, sinus    Fatigue    Abnormal ECG    Precordial pain    LVH (left ventricular hypertrophy) due to hypertensive disease, without heart failure    Diabetes mellitus    PAD (peripheral artery disease)    Ischemic stroke    History of blood clots    Pulmonary embolus    Dyslipidemia    Class 1 obesity due to excess calories with serious comorbidity and body mass index (BMI) of 34.0 to 34.9 in adult    Weakness    Frequent falls    Physical debility     Past Medical History:   Diagnosis Date    Arthritis     Clot     Coronary artery disease     Dementia 2024    Diabetes mellitus     Heart disease     History of blood clots     Hypertension     Impaired mobility     Low back pain     Thyroid disease     Type 2 diabetes mellitus      Past Surgical History:   Procedure Laterality Date    CHOLECYSTECTOMY      COLONOSCOPY      dr srivastava    CORONARY ARTERY BYPASS GRAFT      [place] -  BY PASS     ENDOSCOPY       PT Assessment (Last 12 Hours)       PT Evaluation and Treatment       Row Name 25 1342          Physical Therapy Time and Intention    Subjective Information no complaints  -KM     Document Type therapy note (daily note)  -KM     Mode of Treatment physical therapy  -KM     Patient Effort adequate  -KM     Symptoms Noted During/After Treatment none  -KM       Row Name 25 1344          General Information    Patient Profile Reviewed yes  -KM     Patient Observations cooperative;agree to therapy  -KM     Existing Precautions/Restrictions fall  -KM       Row Name  06/06/25 1345          Pain    Pretreatment Pain Rating 0/10 - no pain  -KM     Posttreatment Pain Rating 0/10 - no pain  -KM       Row Name 06/06/25 1345          Cognition    Affect/Mental Status (Cognition) confused  -KM     Orientation Status (Cognition) oriented to;person  -KM     Follows Commands (Cognition) follows one-step commands;initiation impaired;physical/tactile prompts required;repetition of directions required  -KM       Row Name 06/06/25 1345          Bed Mobility    Bed Mobility bed mobility (all) activities  -KM     All Activities, [place] (Bed Mobility) moderate assist (50% patient effort);2 person assist  -KM     Assistive Device (Bed Mobility) bed rails;head of bed elevated  -KM       Row Name 06/06/25 1345          Transfers    Transfers sit-stand transfer;stand-sit transfer  -KM       Row Name 06/06/25 1345          Sit-Stand Transfer    Sit-Stand [place] (Transfers) minimum assist (75% patient effort);2 person assist  -KM     Assistive Device (Sit-Stand Transfers) walker, front-wheeled  -KM       Row Name 06/06/25 1345          Stand-Sit Transfer    Stand-Sit [place] (Transfers) minimum assist (75% patient effort);2 person assist  -KM     Assistive Device (Stand-Sit Transfers) walker, front-wheeled  -KM       Row Name 06/06/25 1341          Gait/Stairs (Locomotion)    Gait/Stairs Locomotion gait/ambulation independence;gait/ambulation assistive device;distance ambulated  -KM     [place] Level (Gait) minimum assist (75% patient effort)  -KM     Assistive Device (Gait) walker, front-wheeled  -KM     Patient was able to Ambulate yes  -KM     Distance in Feet (Gait) 12  -KM     Pattern (Gait) step-to  -KM     Deviations/Abnormal Patterns (Gait) ataxic;base of support, narrow;gait speed decreased  -KM     Bilateral Gait Deviations forward flexed posture  -KM     Left Sided Gait Deviations weight shift ability decreased  -KM       Row Name 06/06/25 1349          Safety  Issues/Impairments Affecting Functional Mobility    Impairments Affecting Function (Mobility) balance;cognition;coordination;endurance/activity tolerance;pain;strength  -KM       Row Name 06/06/25 1345          Motor Skills    Comments, Therapeutic Exercise seated ther-ex  -KM     Additional Documentation Comments, Therapeutic Exercise (Row)  -KM       Row Name             Wound 05/26/25 1618 Left posterior heel Pressure Injury    Wound - Properties Group Placement Date: 05/26/25  -CW Placement Time: 1618  -CW Present on Original Admission: Y  -CW Side: Left  -CW Orientation: posterior  -CW Location: heel  -CW Primary Wound Type: Pressure Inj  -CW    Retired Wound - Properties Group Placement Date: 05/26/25  -CW Placement Time: 1618  -CW Present on Original Admission: Y  -CW Side: Left  -CW Orientation: posterior  -CW Location: heel  -CW    Retired Wound - Properties Group Placement Date: 05/26/25  -CW Placement Time: 1618  -CW Present on Original Admission: Y  -CW Side: Left  -CW Orientation: posterior  -CW Location: heel  -CW    Retired Wound - Properties Group Date first assessed: 05/26/25  -CW Time first assessed: 1618  -CW Present on Original Admission: Y  -CW Side: Left  -CW Location: heel  -CW      Row Name             Wound 05/26/25 1622 Left anterior heel Pressure Injury    Wound - Properties Group Placement Date: 05/26/25  -CW Placement Time: 1622  -CW Side: Left  -CW Orientation: anterior  -CW Location: heel  -CW Primary Wound Type: Pressure Inj  -CW    Retired Wound - Properties Group Placement Date: 05/26/25  -CW Placement Time: 1622  -CW Side: Left  -CW Orientation: anterior  -CW Location: heel  -CW    Retired Wound - Properties Group Placement Date: 05/26/25  -CW Placement Time: 1622  -CW Side: Left  -CW Orientation: anterior  -CW Location: heel  -CW    Retired Wound - Properties Group Date first assessed: 05/26/25  -CW Time first assessed: 1622  -CW Side: Left  -CW Location: heel  -CW      Row Name              Wound 05/27/25 1007 medial parietal region Traumatic Abrasion    Wound - Properties Group Placement Date: 05/27/25  -LB Placement Time: 1007  -LB Present on Original Admission: Y  -LB Orientation: medial  -LB Location: parietal region  -LB Primary Wound Type: Traumatic  -LB Secondary Wound Type - Traumatic: Abrasion  -LB    Retired Wound - Properties Group Placement Date: 05/27/25  -LB Placement Time: 1007  -LB Present on Original Admission: Y  -LB Orientation: medial  -LB Location: parietal region  -LB    Retired Wound - Properties Group Placement Date: 05/27/25  -LB Placement Time: 1007  -LB Present on Original Admission: Y  -LB Orientation: medial  -LB Location: parietal region  -LB    Retired Wound - Properties Group Date first assessed: 05/27/25  -LB Time first assessed: 1007  -LB Present on Original Admission: Y  -LB Location: parietal region  -LB      Row Name             Wound 05/27/25 1008 Right distal arm Traumatic Skin Tear    Wound - Properties Group Placement Date: 05/27/25  -LB Placement Time: 1008  -LB Present on Original Admission: Y  -LB Side: Right  -LB Orientation: distal  -LB Location: arm  -LB Primary Wound Type: Traumatic  -LB Secondary Wound Type - Traumatic: Skin Tear  -LB    Retired Wound - Properties Group Placement Date: 05/27/25  -LB Placement Time: 1008  -LB Present on Original Admission: Y  -LB Side: Right  -LB Orientation: distal  -LB Location: arm  -LB    Retired Wound - Properties Group Placement Date: 05/27/25  -LB Placement Time: 1008  -LB Present on Original Admission: Y  -LB Side: Right  -LB Orientation: distal  -LB Location: arm  -LB    Retired Wound - Properties Group Date first assessed: 05/27/25  -LB Time first assessed: 1008  -LB Present on Original Admission: Y  -LB Side: Right  -LB Location: arm  -LB      Row Name 06/06/25 1345          Progress Summary (PT)    Daily Progress Summary (PT) Pt. was able to demonstrate functional mobility skills w/ Teddy-modA. He  was able to ambulate short distance in room w/ RW. He tolerated activity well. Pt. would continue to benefit from PT services.  -KM               User Key  (r) = Recorded By, (t) = Taken By, (c) = Cosigned By      Initials Name Provider Type    CW Chata Pulido, RN Registered Nurse    Baljinder Reese, PT Physical Therapist    Marysol Rodriguez, RN Registered Nurse                    Physical Therapy Education       Title: PT OT SLP Therapies (In Progress)       Topic: Physical Therapy (Not Started)       Point: Mobility training (Not Started)       Learner Progress:  Not documented in this visit.              Point: Home exercise program (Not Started)       Learner Progress:  Not documented in this visit.              Point: Body mechanics (Not Started)       Learner Progress:  Not documented in this visit.              Point: Precautions (Not Started)       Learner Progress:  Not documented in this visit.                                  PT Recommendation and Plan     Progress Summary (PT)  Daily Progress Summary (PT): Pt. was able to demonstrate functional mobility skills w/ Teddy-modA. He was able to ambulate short distance in room w/ RW. He tolerated activity well. Pt. would continue to benefit from PT services.   Outcome Measures       Row Name 06/05/25 1251 06/05/25 1200          How much help from another is currently needed...    Putting on and taking off regular lower body clothing? 2  -KR --     Bathing (including washing, rinsing, and drying) 2  -KR --     Toileting (which includes using toilet bed pan or urinal) 2  -KR --     Putting on and taking off regular upper body clothing 2  -KR --     Taking care of personal grooming (such as brushing teeth) 3  -KR --     Eating meals 3  -KR --     AM-PAC 6 Clicks Score (OT) 14  -KR --        Functional Assessment    Outcome Measure Options -- Modified [place];AM-PAC 6 Clicks Daily Activity (OT)  -KR               User Key  (r) = Recorded By, (t) = Taken By, (c)  = Cosigned By      Initials Name Provider Type    Dao Valentine OT Occupational Therapist                     Time Calculation:    PT Charges       Row Name 06/06/25 1344             Time Calculation    PT Received On 06/06/25  -KM         Time Calculation- PT    Total Timed Code Minutes- PT 38 minute(s)  -KM                User Key  (r) = Recorded By, (t) = Taken By, (c) = Cosigned By      Initials Name Provider Type    Baljinder Reese, PT Physical Therapist                  Therapy Charges for Today       Code Description Service Date Service Provider Modifiers Qty    98573829079 HC PT THERAPEUTIC ACT EA 15 MIN 6/6/2025 Baljinder Martinez, PT GP 1    30905739359 HC PT THER PROC EA 15 MIN 6/6/2025 Baljinder Martinez, PT GP 1    72188870681 HC GAIT TRAINING EA 15 MIN 6/6/2025 Baljinder Martinez, PT GP 1            PT G-Codes  Outcome Measure Options: AM-PAC 6 Clicks Daily Activity (OT)  AM-PAC 6 Clicks Score (PT): 14  AM-PAC 6 Clicks Score (OT): 14    Baljinder Martinez PT  6/6/2025

## 2025-06-06 NOTE — PLAN OF CARE
Goal Outcome Evaluation:  Plan of Care Reviewed With: patient        Progress: no change  Outcome Evaluation: Pt resting in bed with family at bedside, attentive to pt. Sitter in place. Pt remains alert to self only. Uncooperative at first, slowly began to become pleasant and cooperative. Ambulated with PT.  Bladder scan performed to check pt for retention, MD aware. No acute changes. Will continue POC.

## 2025-06-06 NOTE — PROGRESS NOTES
I have reviewed the chart for nursing notes, labs, and vitals but did not see the patient in person today, patient remains admitted to swing bed.  Continue PT/OT, continue swing bed admission.     Harini Urbina DO  06/05/25 20:17 EDT

## 2025-06-06 NOTE — TELEPHONE ENCOUNTER
Rx Refill Note  Patient is currently admitted  Requested Prescriptions     Pending Prescriptions Disp Refills    levothyroxine (SYNTHROID, LEVOTHROID) 137 MCG tablet [Pharmacy Med Name: LEVOTHYROXINE   Tablet] 30 tablet 2     Sig: TAKE 1 TABLET BY MOUTH DAILY.      Last office visit with prescribing clinician: 5/5/2025   Last telemedicine visit with prescribing clinician: Visit date not found   Next office visit with prescribing clinician: 6/24/2025                         Would you like a call back once the refill request has been completed: [] Yes [] No    If the office needs to give you a call back, can they leave a voicemail: [] Yes [] No    Carlos Peterson, TAHIR  06/06/25, 10:30 EDT

## 2025-06-06 NOTE — THERAPY TREATMENT NOTE
Patient Name: Ramsey Riley  : 1955    MRN: [id number]                              Today's Date: 2025       Admit Date: 2025    Visit Dx: No diagnosis found.  Patient Active Problem List   Diagnosis    Coronary artery disease involving native coronary artery of native heart without angina pectoris    Essential hypertension    Bradycardia, sinus    Fatigue    Abnormal ECG    Precordial pain    LVH (left ventricular hypertrophy) due to hypertensive disease, without heart failure    Diabetes mellitus    PAD (peripheral artery disease)    Ischemic stroke    History of blood clots    Pulmonary embolus    Dyslipidemia    Class 1 obesity due to excess calories with serious comorbidity and body mass index (BMI) of 34.0 to 34.9 in adult    Weakness    Frequent falls    Physical debility     Past Medical History:   Diagnosis Date    Arthritis     Clot     Coronary artery disease     Dementia 2024    Diabetes mellitus     Heart disease     History of blood clots     Hypertension     Impaired mobility     Low back pain     Thyroid disease     Type 2 diabetes mellitus      Past Surgical History:   Procedure Laterality Date    CHOLECYSTECTOMY      COLONOSCOPY      dr srivastava    CORONARY ARTERY BYPASS GRAFT      [place] - 3 BY PASS     ENDOSCOPY        General Information       Row Name 25 6236          OT Time and Intention    Subjective Information no complaints  -     Document Type therapy note (daily note)  -     Mode of Treatment individual therapy;occupational therapy  -     Patient Effort adequate  -     Symptoms Noted During/After Treatment other (see comments)  lethargic  -     Comment Patient seen for OT treatment. Patient lethargic, able to sit at edge of bed and brief functional mobility with PT. Patient returned to bed, then had some nausea/vomitting with nursing in room and aware.  -       Row Name 25 8950          General Information    Patient Profile  Reviewed yes  -     Existing Precautions/Restrictions fall  -     Barriers to Rehab medically complex;previous functional deficit;cognitive status  -       Row Name 06/06/25 1338          Cognition    Orientation Status (Cognition) oriented to;person  -       Row Name 06/06/25 1338          Safety Issues/Impairments Affecting Functional Mobility    Safety Issues Affecting Function (Mobility) ability to follow commands;awareness of need for assistance;insight into deficits/self-awareness;judgment;problem-solving  -     Impairments Affecting Function (Mobility) balance;cognition;coordination;endurance/activity tolerance;pain;strength  -     Cognitive Impairments, Mobility Safety/Performance awareness, need for assistance;insight into deficits/self-awareness  -               User Key  (r) = Recorded By, (t) = Taken By, (c) = Cosigned By      Initials Name Provider Type     Vanesa Tse OT Occupational Therapist                     Mobility/ADL's       Row Name 06/06/25 1340          Bed Mobility    Bed Mobility supine-sit;sit-supine  -     All Activities, [place] (Bed Mobility) moderate assist (50% patient effort);2 person assist  -     Assistive Device (Bed Mobility) bed rails;head of bed elevated  -       Row Name 06/06/25 1340          Transfers    Transfers sit-stand transfer;stand-sit transfer  -       Row Name 06/06/25 1340          Sit-Stand Transfer    Sit-Stand [place] (Transfers) minimum assist (75% patient effort);2 person assist  -     Assistive Device (Sit-Stand Transfers) walker, front-wheeled  -       Row Name 06/06/25 1340          Stand-Sit Transfer    Stand-Sit [place] (Transfers) minimum assist (75% patient effort);2 person assist  -     Assistive Device (Stand-Sit Transfers) walker, front-wheeled  -       Row Name 06/06/25 1340          Activities of Daily Living    BADL Assessment/Intervention grooming  -       Row Name 06/06/25 1340           Grooming Assessment/Training    [place] Level (Grooming) grooming skills;maximum assist (25% patient effort)  -     Position (Grooming) edge of bed sitting  -               User Key  (r) = Recorded By, (t) = Taken By, (c) = Cosigned By      Initials Name Provider Type    Vanesa Aguillon OT Occupational Therapist                   Obj/Interventions       Row Name 06/06/25 1341          Motor Skills    Motor Skills functional endurance  -     Functional Endurance fair minus  -       Row Name 06/06/25 1341          Balance    Balance Assessment sitting static balance  -     Static Sitting Balance minimal assist  -     Balance Interventions sitting;static;dynamic;minimal challenge;occupation based/functional task  -     Comment, Balance initial minimal/moderate assist to maintain sitting balance with patient disoriented and fatigued, improved to contact guard assist  -               User Key  (r) = Recorded By, (t) = Taken By, (c) = Cosigned By      Initials Name Provider Type    Vanesa Aguillon OT Occupational Therapist                   Goals/Plan    No documentation.                  Clinical Impression       [facility] Name 06/06/25 1342          Pain Assessment    Additional Documentation Pain Scale: FACES Pre/Post-Treatment (Group)  -[facility] Name 06/06/25 1342          Pain Scale: FACES Pre/Post-Treatment    Pain: FACES Scale, Pretreatment 0-->no hurt  -     Posttreatment Pain Rating 0-->no hurt  -[facility] Name 06/06/25 1342          Plan of Care Review    Plan of Care Reviewed With patient  -     Progress no change  -     Outcome Evaluation Patient seen for OT treatment. He was able to participate but increased lethargy noted requiring increased assist and curing on this date. Continue plan of care.  -       Row Name 06/06/25 1342          Therapy Plan Review/Discharge Plan (OT)    Anticipated Discharge Disposition (OT) extended care facility  -       Row Name 06/06/25 1342           Positioning and Restraints    Pre-Treatment Position in bed  -KP     Post Treatment Position bed  -KP     In Bed fowlers;call light within reach;encouraged to call for assist;exit alarm on;with nsg  -               User Key  (r) = Recorded By, (t) = Taken By, (c) = Cosigned By      Initials Name Provider Type    Vanesa Aguillon OT Occupational Therapist                   Outcome Measures       Row Name 06/06/25 1344          How much help from another is currently needed...    Putting on and taking off regular lower body clothing? 2  -KP     Bathing (including washing, rinsing, and drying) 2  -KP     Toileting (which includes using toilet bed pan or urinal) 2  -KP     Putting on and taking off regular upper body clothing 2  -KP     Taking care of personal grooming (such as brushing teeth) 3  -KP     Eating meals 3  -KP     AM-PAC 6 Clicks Score (OT) 14  -       Row Name 06/06/25 0955          How much help from another person do you currently need...    Turning from your back to your side while in flat bed without using bedrails? 4  -MH     Moving from lying on back to sitting on the side of a flat bed without bedrails? 3  -MH     Moving to and from a bed to a chair (including a wheelchair)? 2  -MH     Standing up from a chair using your arms (e.g., wheelchair, bedside chair)? 2  -MH     Climbing 3-5 steps with a railing? 1  -MH     To walk in hospital room? 2  -MH     AM-PAC 6 Clicks Score (PT) 14  -       Row Name 06/06/25 1344          Functional Assessment    Outcome Measure Options AM-PAC 6 Clicks Daily Activity (OT)  -               User Key  (r) = Recorded By, (t) = Taken By, (c) = Cosigned By      Initials Name Provider Type    Vanesa Aguillon OT Occupational Therapist    Jackie Javier RN Registered Nurse                      OT Recommendation and Plan     Plan of Care Review  Plan of Care Reviewed With: patient  Progress: no change  Outcome Evaluation: Patient seen for OT  treatment. He was able to participate but increased lethargy noted requiring increased assist and curing on this date. Continue plan of care.     Time Calculation:         Time Calculation- OT       Row Name 06/06/25 1344             Time Calculation- OT    OT Received On 06/06/25  -                User Key  (r) = Recorded By, (t) = Taken By, (c) = Cosigned By      Initials Name Provider Type     Vanesa Tse OT Occupational Therapist                  Therapy Charges for Today       Code Description Service Date Service Provider Modifiers Qty    66568875236  OT THERAPEUTIC ACT EA 15 MIN 6/6/2025 Vanesa Tse OT GO 1    [id number]  OT SELF CARE/MGMT/TRAIN EA 15 MIN 6/6/2025 Vanesa Tse OT GO 1                 Vanesa Tse OT  6/6/2025

## 2025-06-06 NOTE — PLAN OF CARE
Goal Outcome Evaluation:           Progress: no change  Outcome Evaluation: Patient resting in bed at this time, sitter at bedside. Patient is alert to self only, VSS on room air. Patient has been very uncooperative and combative this shift, prn medication given. No acute changes noted at this time. Will continue plan of care.

## 2025-06-06 NOTE — CASE MANAGEMENT/SOCIAL WORK
Discharge Planning Assessment   [place]     Patient Name: Ramsey Riley  MRN: [id number]  Today's Date: 6/6/2025    Admit Date: 6/4/2025       Discharge Plan       Row Name 06/06/25 1021       Plan    Plan Pt was admitted to swing bed on 6/4 to continue therapy. Pt has a sitter at bedside. Pt lives with spouse, Jina and she plans for pt to return home at discharge. Pt does not utilize home health services. Pt has a rolling walker, wheelchair, and scooter via unknown provider. PCP is Margret Purvis. Pt does not have a POA or living will on file. SS to follow and assist as needed with discharge planning.                  TAN Buchanan

## 2025-06-07 LAB
GLUCOSE BLDC GLUCOMTR-MCNC: 122 MG/DL (ref 70–130)
GLUCOSE BLDC GLUCOMTR-MCNC: 172 MG/DL (ref 70–130)
GLUCOSE BLDC GLUCOMTR-MCNC: 175 MG/DL (ref 70–130)
GLUCOSE BLDC GLUCOMTR-MCNC: 97 MG/DL (ref 70–130)
INDURATION: 0 MM (ref 0–10)
QT INTERVAL: 360 MS
QTC INTERVAL: 425 MS
TB SKIN TEST: NEGATIVE

## 2025-06-07 PROCEDURE — 63710000001 INSULIN LISPRO PROTAMINE-INSULIN LISPRO (75-25) 100 UNIT/ML SUSPENSION: Performed by: STUDENT IN AN ORGANIZED HEALTH CARE EDUCATION/TRAINING PROGRAM

## 2025-06-07 PROCEDURE — 97530 THERAPEUTIC ACTIVITIES: CPT

## 2025-06-07 PROCEDURE — 97110 THERAPEUTIC EXERCISES: CPT

## 2025-06-07 PROCEDURE — 82948 REAGENT STRIP/BLOOD GLUCOSE: CPT

## 2025-06-07 PROCEDURE — 97116 GAIT TRAINING THERAPY: CPT

## 2025-06-07 RX ADMIN — APIXABAN 5 MG: 5 TABLET, FILM COATED ORAL at 10:10

## 2025-06-07 RX ADMIN — LEVOTHYROXINE SODIUM 137.5 MCG: 0.12 TABLET ORAL at 10:11

## 2025-06-07 RX ADMIN — Medication 1 APPLICATION: at 20:04

## 2025-06-07 RX ADMIN — APIXABAN 5 MG: 5 TABLET, FILM COATED ORAL at 20:03

## 2025-06-07 RX ADMIN — DOCUSATE SODIUM 50MG AND SENNOSIDES 8.6MG 2 TABLET: 8.6; 5 TABLET, FILM COATED ORAL at 10:10

## 2025-06-07 RX ADMIN — Medication 1 APPLICATION: at 10:12

## 2025-06-07 RX ADMIN — TIMOLOL MALEATE: 5 SOLUTION OPHTHALMIC at 20:04

## 2025-06-07 RX ADMIN — PANTOPRAZOLE SODIUM 40 MG: 40 TABLET, DELAYED RELEASE ORAL at 10:11

## 2025-06-07 RX ADMIN — AMLODIPINE BESYLATE 10 MG: 10 TABLET ORAL at 10:11

## 2025-06-07 RX ADMIN — ALPRAZOLAM 0.5 MG: 0.5 TABLET ORAL at 21:01

## 2025-06-07 RX ADMIN — HYDROCODONE BITARTRATE AND ACETAMINOPHEN 1 TABLET: 7.5; 325 TABLET ORAL at 13:58

## 2025-06-07 RX ADMIN — FAMOTIDINE 20 MG: 20 TABLET ORAL at 20:02

## 2025-06-07 RX ADMIN — LOSARTAN POTASSIUM 100 MG: 50 TABLET, FILM COATED ORAL at 10:11

## 2025-06-07 RX ADMIN — INSULIN LISPRO 20 UNITS: 100 INJECTION, SUSPENSION SUBCUTANEOUS at 10:10

## 2025-06-07 RX ADMIN — Medication 10 ML: at 10:12

## 2025-06-07 RX ADMIN — ACETAMINOPHEN 1000 MG: 500 TABLET ORAL at 20:02

## 2025-06-07 RX ADMIN — EMPAGLIFLOZIN 10 MG: 10 TABLET, FILM COATED ORAL at 10:11

## 2025-06-07 RX ADMIN — TIMOLOL MALEATE: 5 SOLUTION OPHTHALMIC at 10:12

## 2025-06-07 RX ADMIN — CITALOPRAM HYDROBROMIDE 20 MG: 20 TABLET ORAL at 10:10

## 2025-06-07 RX ADMIN — Medication 10 ML: at 20:05

## 2025-06-07 RX ADMIN — FAMOTIDINE 20 MG: 20 TABLET ORAL at 10:10

## 2025-06-07 RX ADMIN — INSULIN LISPRO 20 UNITS: 100 INJECTION, SUSPENSION SUBCUTANEOUS at 17:05

## 2025-06-07 RX ADMIN — ATORVASTATIN CALCIUM 80 MG: 40 TABLET, FILM COATED ORAL at 20:03

## 2025-06-07 RX ADMIN — QUETIAPINE FUMARATE 100 MG: 100 TABLET ORAL at 20:02

## 2025-06-07 NOTE — THERAPY TREATMENT NOTE
Acute Care - Occupational Therapy Treatment Note  TA Caraballo     Patient Name: Ramsey Riley  : 1955  MRN: [id number]  Today's Date: 2025  Onset of Illness/Injury or Date of Surgery: 25 (admission date)          Admit Date: 2025     No diagnosis found.  Patient Active Problem List   Diagnosis    Coronary artery disease involving native coronary artery of native heart without angina pectoris    Essential hypertension    Bradycardia, sinus    Fatigue    Abnormal ECG    Precordial pain    LVH (left ventricular hypertrophy) due to hypertensive disease, without heart failure    Diabetes mellitus    PAD (peripheral artery disease)    Ischemic stroke    History of blood clots    Pulmonary embolus    Dyslipidemia    Class 1 obesity due to excess calories with serious comorbidity and body mass index (BMI) of 34.0 to 34.9 in adult    Weakness    Frequent falls    Physical debility     Past Medical History:   Diagnosis Date    Arthritis     Clot     Coronary artery disease     Dementia 2024    Diabetes mellitus     Heart disease     History of blood clots     Hypertension     Impaired mobility     Low back pain     Thyroid disease     Type 2 diabetes mellitus      Past Surgical History:   Procedure Laterality Date    CHOLECYSTECTOMY      COLONOSCOPY      dr srivastava    CORONARY ARTERY BYPASS GRAFT      [place] -  BY PASS     ENDOSCOPY           OT ASSESSMENT FLOWSHEET (Last 12 Hours)       OT Evaluation and Treatment       Row Name 25 1236                   OT Time and Intention    Subjective Information no complaints  -LA        Document Type therapy note (daily note)  -LA        Mode of Treatment occupational therapy  -LA        Patient Effort adequate  -LA           General Information    Patient Profile Reviewed yes  -LA        Existing Precautions/Restrictions fall  -LA           Pain Assessment    Pretreatment Pain Rating 0/10 - no pain  -LA        Posttreatment Pain Rating  0/10 - no pain  -LA           Cognition    Affect/Mental Status (Cognition) confused  -LA        Orientation Status (Cognition) oriented to;person  -LA        Follows Commands (Cognition) follows one-step commands  -LA           Motor Skills    Motor Skills coordination;functional endurance;motor control/coordination interventions  -LA        Coordination WFL  -LA        Functional Endurance fair  -LA        Motor Control/Coordination Interventions fine motor manipulation/dexterity activities;occupation/activity based treatment;therapeutic exercise/ROM  -LA        Therapeutic Exercise shoulder;elbow/forearm;wrist;hand  -LA        Comments, Therapeutic Exercise UE exercises in all planes  -LA           Wound 05/26/25 1618 Left posterior heel Pressure Injury    Wound - Properties Group Placement Date: 05/26/25  -CW Placement Time: 1618  -CW Present on Original Admission: Y  -CW Side: Left  -CW Orientation: posterior  -CW Location: heel  -CW Primary Wound Type: Pressure Inj  -CW    Retired Wound - Properties Group Placement Date: 05/26/25  -CW Placement Time: 1618  -CW Present on Original Admission: Y  -CW Side: Left  -CW Orientation: posterior  -CW Location: heel  -CW    Retired Wound - Properties Group Placement Date: 05/26/25  -CW Placement Time: 1618  -CW Present on Original Admission: Y  -CW Side: Left  -CW Orientation: posterior  -CW Location: heel  -CW    Retired Wound - Properties Group Date first assessed: 05/26/25  -CW Time first assessed: 1618  -CW Present on Original Admission: Y  -CW Side: Left  -CW Location: heel  -CW       Wound 05/26/25 1622 Left anterior heel Pressure Injury    Wound - Properties Group Placement Date: 05/26/25  -CW Placement Time: 1622  -CW Side: Left  -CW Orientation: anterior  -CW Location: heel  -CW Primary Wound Type: Pressure Inj  -CW    Retired Wound - Properties Group Placement Date: 05/26/25  -CW Placement Time: 1622  -CW Side: Left  -CW Orientation: anterior  -CW Location:  heel  -CW    Retired Wound - Properties Group Placement Date: 05/26/25  -CW Placement Time: 1622  -CW Side: Left  -CW Orientation: anterior  -CW Location: heel  -CW    Retired Wound - Properties Group Date first assessed: 05/26/25  -CW Time first assessed: 1622  -CW Side: Left  -CW Location: heel  -CW       Wound 05/27/25 1007 medial parietal region Traumatic Abrasion    Wound - Properties Group Placement Date: 05/27/25  -LB Placement Time: 1007  -LB Present on Original Admission: Y  -LB Orientation: medial  -LB Location: parietal region  -LB Primary Wound Type: Traumatic  -LB Secondary Wound Type - Traumatic: Abrasion  -LB    Retired Wound - Properties Group Placement Date: 05/27/25  -LB Placement Time: 1007  -LB Present on Original Admission: Y  -LB Orientation: medial  -LB Location: parietal region  -LB    Retired Wound - Properties Group Placement Date: 05/27/25  -LB Placement Time: 1007  -LB Present on Original Admission: Y  -LB Orientation: medial  -LB Location: parietal region  -LB    Retired Wound - Properties Group Date first assessed: 05/27/25  -LB Time first assessed: 1007  -LB Present on Original Admission: Y  -LB Location: parietal region  -LB       Wound 05/27/25 1008 Right distal arm Traumatic Skin Tear    Wound - Properties Group Placement Date: 05/27/25  -LB Placement Time: 1008  -LB Present on Original Admission: Y  -LB Side: Right  -LB Orientation: distal  -LB Location: arm  -LB Primary Wound Type: Traumatic  -LB Secondary Wound Type - Traumatic: Skin Tear  -LB    Retired Wound - Properties Group Placement Date: 05/27/25  -LB Placement Time: 1008  -LB Present on Original Admission: Y  -LB Side: Right  -LB Orientation: distal  -LB Location: arm  -LB    Retired Wound - Properties Group Placement Date: 05/27/25  -LB Placement Time: 1008  -LB Present on Original Admission: Y  -LB Side: Right  -LB Orientation: distal  -LB Location: arm  -LB    Retired Wound - Properties Group Date first assessed:  05/27/25  -LB Time first assessed: 1008  -LB Present on Original Admission: Y  -LB Side: Right  -LB Location: arm  -LB       Plan of Care Review    Plan of Care Reviewed With patient  -LA           Positioning and Restraints    Pre-Treatment Position sitting in chair/recliner  -LA        Post Treatment Position chair  -LA        In Chair sitting;call light within reach;encouraged to call for assist;exit alarm on  -LA                  User Key  (r) = Recorded By, (t) = Taken By, (c) = Cosigned By      Initials Name Effective Dates    CW Chata Pulido RN 10/05/21 -     Marysol Rodriguez RN 10/20/21 -     Myah Eli OT 02/14/22 -                            OT Recommendation and Plan     Plan of Care Review  Plan of Care Reviewed With: patient  Plan of Care Reviewed With: patient     Outcome Measures       Row Name 06/07/25 1200 06/05/25 1251 06/05/25 1200       How much help from another is currently needed...    Putting on and taking off regular lower body clothing? 2  -LA 2  -KR --    Bathing (including washing, rinsing, and drying) 2  -LA 2  -KR --    Toileting (which includes using toilet bed pan or urinal) 2  -LA 2  -KR --    Putting on and taking off regular upper body clothing 2  -LA 2  -KR --    Taking care of personal grooming (such as brushing teeth) 3  -LA 3  -KR --    Eating meals 3  -LA 3  -KR --    AM-PAC 6 Clicks Score (OT) 14  -LA 14  -KR --       Functional Assessment    Outcome Measure Options AM-PAC 6 Clicks Daily Activity (OT)  -LA -- Modified [place];AM-PAC 6 Clicks Daily Activity (OT)  -KR              User Key  (r) = Recorded By, (t) = Taken By, (c) = Cosigned By      Initials Name Provider Type    Dao Valentine OT Occupational Therapist    Myah Eli OT Occupational Therapist                    Time Calculation:    Time Calculation- OT       Row Name 06/07/25 1240             Time Calculation- OT    Total Timed Code Minutes- OT 25 minute(s)  -LA                User Key  (r)  = Recorded By, (t) = Taken By, (c) = Cosigned By      Initials Name Provider Type    Myah Eli OT Occupational Therapist                  Therapy Charges for Today       Code Description Service Date Service Provider Modifiers Qty    65230332180 HC OT THER PROC EA 15 MIN 6/7/2025 Myah Torres OT GO 2                 Myah Torres OT  6/7/2025

## 2025-06-07 NOTE — THERAPY TREATMENT NOTE
Acute Care - Physical Therapy Treatment Note  TA Caraballo     Patient Name: Ramsey Riley  : 1955  MRN: [id number]  Today's Date: 2025   Onset of Illness/Injury or Date of Surgery: 25 (admission date)  Visit Dx:   No diagnosis found.  Patient Active Problem List   Diagnosis    Coronary artery disease involving native coronary artery of native heart without angina pectoris    Essential hypertension    Bradycardia, sinus    Fatigue    Abnormal ECG    Precordial pain    LVH (left ventricular hypertrophy) due to hypertensive disease, without heart failure    Diabetes mellitus    PAD (peripheral artery disease)    Ischemic stroke    History of blood clots    Pulmonary embolus    Dyslipidemia    Class 1 obesity due to excess calories with serious comorbidity and body mass index (BMI) of 34.0 to 34.9 in adult    Weakness    Frequent falls    Physical debility     Past Medical History:   Diagnosis Date    Arthritis     Clot     Coronary artery disease     Dementia 2024    Diabetes mellitus     Heart disease     History of blood clots     Hypertension     Impaired mobility     Low back pain     Thyroid disease     Type 2 diabetes mellitus      Past Surgical History:   Procedure Laterality Date    CHOLECYSTECTOMY      COLONOSCOPY      dr srivastava    CORONARY ARTERY BYPASS GRAFT      [place] - 3 BY PASS     ENDOSCOPY       PT Assessment (Last 12 Hours)       PT Evaluation and Treatment       Row Name 25 1212          Physical Therapy Time and Intention    Subjective Information complains of;weakness  -CT     Document Type therapy note (daily note)  -CT     Mode of Treatment physical therapy  -CT     Patient Effort adequate  -CT       Row Name 25 1212          General Information    Patient Profile Reviewed yes  -CT     Existing Precautions/Restrictions fall  -CT       Row Name 25 1212          Pain    Pretreatment Pain Rating 0/10 - no pain  -CT     Posttreatment Pain Rating  0/10 - no pain  -CT       Row Name 06/07/25 1212          Cognition    Affect/Mental Status (Cognition) confused  -CT     Orientation Status (Cognition) oriented to;person  -CT     Follows Commands (Cognition) follows one-step commands;initiation impaired;physical/tactile prompts required;repetition of directions required  -CT       Row Name 06/07/25 1212          Bed Mobility    Bed Mobility bed mobility (all) activities  -CT     All Activities, [place] (Bed Mobility) moderate assist (50% patient effort)  -CT     Assistive Device (Bed Mobility) bed rails;head of bed elevated  -CT       Row Name 06/07/25 1212          Transfers    Transfers sit-stand transfer;stand-sit transfer  -CT       Row Name 06/07/25 1212          Sit-Stand Transfer    Sit-Stand [place] (Transfers) minimum assist (75% patient effort);2 person assist  -CT     Assistive Device (Sit-Stand Transfers) walker, front-wheeled  -CT       Row Name 06/07/25 1212          Stand-Sit Transfer    Stand-Sit [place] (Transfers) minimum assist (75% patient effort);2 person assist  -CT     Assistive Device (Stand-Sit Transfers) walker, front-wheeled  -CT       Row Name 06/07/25 1212          Gait/Stairs (Locomotion)    Gait/Stairs Locomotion gait/ambulation independence;gait/ambulation assistive device;distance ambulated  -CT     [place] Level (Gait) minimum assist (75% patient effort)  -CT     Assistive Device (Gait) walker, front-wheeled  -CT     Patient was able to Ambulate yes  -CT     Pattern (Gait) step-to  -CT     Deviations/Abnormal Patterns (Gait) ataxic;base of support, narrow;gait speed decreased  -CT     Bilateral Gait Deviations forward flexed posture  -CT     Left Sided Gait Deviations weight shift ability decreased  -CT       Row Name 06/07/25 1212          Safety Issues/Impairments Affecting Functional Mobility    Impairments Affecting Function (Mobility) balance;cognition;coordination;endurance/activity  tolerance;pain;strength  -CT       Row Name             Wound 05/26/25 1618 Left posterior heel Pressure Injury    Wound - Properties Group Placement Date: 05/26/25  -CW Placement Time: 1618  -CW Present on Original Admission: Y  -CW Side: Left  -CW Orientation: posterior  -CW Location: heel  -CW Primary Wound Type: Pressure Inj  -CW    Retired Wound - Properties Group Placement Date: 05/26/25  -CW Placement Time: 1618  -CW Present on Original Admission: Y  -CW Side: Left  -CW Orientation: posterior  -CW Location: heel  -CW    Retired Wound - Properties Group Placement Date: 05/26/25  -CW Placement Time: 1618  -CW Present on Original Admission: Y  -CW Side: Left  -CW Orientation: posterior  -CW Location: heel  -CW    Retired Wound - Properties Group Date first assessed: 05/26/25  -CW Time first assessed: 1618  -CW Present on Original Admission: Y  -CW Side: Left  -CW Location: heel  -CW      Row Name             Wound 05/26/25 1622 Left anterior heel Pressure Injury    Wound - Properties Group Placement Date: 05/26/25  -CW Placement Time: 1622  -CW Side: Left  -CW Orientation: anterior  -CW Location: heel  -CW Primary Wound Type: Pressure Inj  -CW    Retired Wound - Properties Group Placement Date: 05/26/25  -CW Placement Time: 1622  -CW Side: Left  -CW Orientation: anterior  -CW Location: heel  -CW    Retired Wound - Properties Group Placement Date: 05/26/25  -CW Placement Time: 1622  -CW Side: Left  -CW Orientation: anterior  -CW Location: heel  -CW    Retired Wound - Properties Group Date first assessed: 05/26/25  -CW Time first assessed: 1622  -CW Side: Left  -CW Location: heel  -CW      Row Name             Wound 05/27/25 1007 medial parietal region Traumatic Abrasion    Wound - Properties Group Placement Date: 05/27/25  -LB Placement Time: 1007  -LB Present on Original Admission: Y  -LB Orientation: medial  -LB Location: parietal region  -LB Primary Wound Type: Traumatic  -LB Secondary Wound Type - Traumatic:  Abrasion  -LB    Retired Wound - Properties Group Placement Date: 05/27/25  -LB Placement Time: 1007  -LB Present on Original Admission: Y  -LB Orientation: medial  -LB Location: parietal region  -LB    Retired Wound - Properties Group Placement Date: 05/27/25  -LB Placement Time: 1007  -LB Present on Original Admission: Y  -LB Orientation: medial  -LB Location: parietal region  -LB    Retired Wound - Properties Group Date first assessed: 05/27/25  -LB Time first assessed: 1007  -LB Present on Original Admission: Y  -LB Location: parietal region  -LB      Row Name             Wound 05/27/25 1008 Right distal arm Traumatic Skin Tear    Wound - Properties Group Placement Date: 05/27/25  -LB Placement Time: 1008  -LB Present on Original Admission: Y  -LB Side: Right  -LB Orientation: distal  -LB Location: arm  -LB Primary Wound Type: Traumatic  -LB Secondary Wound Type - Traumatic: Skin Tear  -LB    Retired Wound - Properties Group Placement Date: 05/27/25  -LB Placement Time: 1008  -LB Present on Original Admission: Y  -LB Side: Right  -LB Orientation: distal  -LB Location: arm  -LB    Retired Wound - Properties Group Placement Date: 05/27/25  -LB Placement Time: 1008  -LB Present on Original Admission: Y  -LB Side: Right  -LB Orientation: distal  -LB Location: arm  -LB    Retired Wound - Properties Group Date first assessed: 05/27/25  -LB Time first assessed: 1008  -LB Present on Original Admission: Y  -LB Side: Right  -LB Location: arm  -LB      Row Name 06/07/25 1212          Coping    Observed Emotional State calm;cooperative  -CT     Verbalized Emotional State acceptance  -CT       Row Name 06/07/25 1212          Plan of Care Review    Plan of Care Reviewed With patient  -CT       Row Name 06/07/25 1212          Positioning and Restraints    Pre-Treatment Position in bed  -CT     Post Treatment Position chair  -CT     In Chair sitting;call light within reach;encouraged to call for assist;with  family/caregiver;notified nsg  -CT               User Key  (r) = Recorded By, (t) = Taken By, (c) = Cosigned By      Initials Name Provider Type    Chata Couch, RN Registered Nurse    CT Vivian Cervantes, ACRLOS Physical Therapist    Marysol Rodriguez, RN Registered Nurse                    Physical Therapy Education       Title: PT OT SLP Therapies (In Progress)       Topic: Physical Therapy (Not Started)       Point: Mobility training (Not Started)       Learner Progress:  Not documented in this visit.              Point: Home exercise program (Not Started)       Learner Progress:  Not documented in this visit.              Point: Body mechanics (Not Started)       Learner Progress:  Not documented in this visit.              Point: Precautions (Not Started)       Learner Progress:  Not documented in this visit.                                  PT Recommendation and Plan     Plan of Care Reviewed With: patient   Outcome Measures       Row Name 06/05/25 1251 06/05/25 1200          How much help from another is currently needed...    Putting on and taking off regular lower body clothing? 2  -KR --     Bathing (including washing, rinsing, and drying) 2  -KR --     Toileting (which includes using toilet bed pan or urinal) 2  -KR --     Putting on and taking off regular upper body clothing 2  -KR --     Taking care of personal grooming (such as brushing teeth) 3  -KR --     Eating meals 3  -KR --     AM-PAC 6 Clicks Score (OT) 14  -KR --        Functional Assessment    Outcome Measure Options -- Modified [place];AM-PAC 6 Clicks Daily Activity (OT)  -KR               User Key  (r) = Recorded By, (t) = Taken By, (c) = Cosigned By      Initials Name Provider Type    Dao Valentine OT Occupational Therapist                     Time Calculation:    PT Charges       Row Name 06/07/25 1214             Time Calculation    PT Received On 06/07/25  -CT         Time Calculation- PT    Total Timed Code Minutes- PT 26 minute(s)   -CT                User Key  (r) = Recorded By, (t) = Taken By, (c) = Cosigned By      Initials Name Provider Type    CT Vivian Cervantes, PT Physical Therapist                  Therapy Charges for Today       Code Description Service Date Service Provider Modifiers Qty    72886172665 HC GAIT TRAINING EA 15 MIN 6/7/2025 Vivian Cervantes, PT GP 1    [id number]  PT THERAPEUTIC ACT EA 15 MIN 6/7/2025 Vivian Cervantes, PT GP 1            PT G-Codes  Outcome Measure Options: AM-PAC 6 Clicks Daily Activity (OT)  AM-PAC 6 Clicks Score (PT): 12  AM-PAC 6 Clicks Score (OT): 14    Vivian Cervantes PT  6/7/2025

## 2025-06-07 NOTE — PROGRESS NOTES
I have reviewed the chart for nursing notes, labs, and vitals but did not see the patient in person today, patient remains admitted to swing bed.  Continue PT/OT, continue swing bed admission. I discussed with nursing staff and patient has been cooperative and calm today.     Harini Urbina DO  06/07/25 19:47 EDT

## 2025-06-07 NOTE — PLAN OF CARE
Goal Outcome Evaluation:  Plan of Care Reviewed With: patient, spouse        Progress: improving  Outcome Evaluation: Patient has been more alert today, patient has been able to ambulate with nursing staff as well as with PT/OT, wound care has been done, PRN medication has been given for pain, patient has refused a bath this shift, patient did let us change the bedding and his gown. Patient educated on the importance of daily bathing. Patient refused to wear heel protector boots but allows offloading of heels on pillows. Patient has been turned every 2 hours with wedge.

## 2025-06-07 NOTE — PLAN OF CARE
Goal Outcome Evaluation:  Plan of Care Reviewed With: patient        Progress: no change  Outcome Evaluation: Patient resting in bed at this time. Sitter remains at bedside. Pt remains alert to self. Pt calm and cooperative during shift. Wound care completed per orders. VSS on RA. Pt did not ambulate during shift d/t weakness, pt educated on importance of daily ambulation. Pt refused to wear heel protector boots, but allowed offloading of heels. Pt educated on importance. No concerns or complaints at this time. Will continue with plan of care.

## 2025-06-08 LAB
GLUCOSE BLDC GLUCOMTR-MCNC: 101 MG/DL (ref 70–130)
GLUCOSE BLDC GLUCOMTR-MCNC: 115 MG/DL (ref 70–130)
GLUCOSE BLDC GLUCOMTR-MCNC: 120 MG/DL (ref 70–130)
GLUCOSE BLDC GLUCOMTR-MCNC: 88 MG/DL (ref 70–130)

## 2025-06-08 PROCEDURE — 63710000001 INSULIN LISPRO PROTAMINE-INSULIN LISPRO (75-25) 100 UNIT/ML SUSPENSION: Performed by: STUDENT IN AN ORGANIZED HEALTH CARE EDUCATION/TRAINING PROGRAM

## 2025-06-08 PROCEDURE — 82948 REAGENT STRIP/BLOOD GLUCOSE: CPT

## 2025-06-08 RX ADMIN — Medication 1 APPLICATION: at 21:05

## 2025-06-08 RX ADMIN — QUETIAPINE FUMARATE 100 MG: 100 TABLET ORAL at 21:07

## 2025-06-08 RX ADMIN — ATORVASTATIN CALCIUM 80 MG: 40 TABLET, FILM COATED ORAL at 21:07

## 2025-06-08 RX ADMIN — LEVOTHYROXINE SODIUM 137.5 MCG: 0.12 TABLET ORAL at 08:19

## 2025-06-08 RX ADMIN — FAMOTIDINE 20 MG: 20 TABLET ORAL at 21:07

## 2025-06-08 RX ADMIN — Medication 10 ML: at 21:03

## 2025-06-08 RX ADMIN — HYDROCODONE BITARTRATE AND ACETAMINOPHEN 1 TABLET: 7.5; 325 TABLET ORAL at 22:52

## 2025-06-08 RX ADMIN — TIMOLOL MALEATE: 5 SOLUTION OPHTHALMIC at 08:16

## 2025-06-08 RX ADMIN — DOCUSATE SODIUM 50MG AND SENNOSIDES 8.6MG 2 TABLET: 8.6; 5 TABLET, FILM COATED ORAL at 21:07

## 2025-06-08 RX ADMIN — INSULIN LISPRO 20 UNITS: 100 INJECTION, SUSPENSION SUBCUTANEOUS at 08:17

## 2025-06-08 RX ADMIN — Medication 10 ML: at 08:20

## 2025-06-08 RX ADMIN — PANTOPRAZOLE SODIUM 40 MG: 40 TABLET, DELAYED RELEASE ORAL at 08:18

## 2025-06-08 RX ADMIN — TIMOLOL MALEATE: 5 SOLUTION OPHTHALMIC at 21:05

## 2025-06-08 RX ADMIN — Medication 1 APPLICATION: at 08:16

## 2025-06-08 RX ADMIN — EMPAGLIFLOZIN 10 MG: 10 TABLET, FILM COATED ORAL at 08:19

## 2025-06-08 RX ADMIN — APIXABAN 5 MG: 5 TABLET, FILM COATED ORAL at 08:18

## 2025-06-08 RX ADMIN — FAMOTIDINE 20 MG: 20 TABLET ORAL at 08:19

## 2025-06-08 RX ADMIN — APIXABAN 5 MG: 5 TABLET, FILM COATED ORAL at 21:07

## 2025-06-08 RX ADMIN — CITALOPRAM HYDROBROMIDE 20 MG: 20 TABLET ORAL at 08:17

## 2025-06-08 RX ADMIN — ALPRAZOLAM 0.5 MG: 0.5 TABLET ORAL at 21:59

## 2025-06-08 NOTE — PLAN OF CARE
Goal Outcome Evaluation:              Patient has rested in bed this shift, alert,  disoriented to time.  RA, PRN medication given for complaints of pain to the left heel.  Patient continues to refuse to wear heel protector boots but allows offloading of heels on pillows.Wound care complete. No other complaints or request at this time, VSS, Sitter at bedside, Will continue plan of care.

## 2025-06-08 NOTE — PLAN OF CARE
Goal Outcome Evaluation:      Patient has rested in bed this shift, alert,  disoriented to time. Patient continues to refuse to wear heel protector boots but allows offloading of heels on pillows.Wound care complete. No other complaints or request at this time, VSS, Sitter at bedside, Will continue plan of care.

## 2025-06-09 LAB
ANION GAP SERPL CALCULATED.3IONS-SCNC: 13.3 MMOL/L (ref 5–15)
BUN SERPL-MCNC: 20.4 MG/DL (ref 8–23)
BUN/CREAT SERPL: 15.9 (ref 7–25)
CALCIUM SPEC-SCNC: 8.6 MG/DL (ref 8.6–10.5)
CHLORIDE SERPL-SCNC: 100 MMOL/L (ref 98–107)
CO2 SERPL-SCNC: 20.7 MMOL/L (ref 22–29)
CREAT SERPL-MCNC: 1.28 MG/DL (ref 0.76–1.27)
EGFRCR SERPLBLD CKD-EPI 2021: 61 ML/MIN/1.73
GLUCOSE BLDC GLUCOMTR-MCNC: 103 MG/DL (ref 70–130)
GLUCOSE BLDC GLUCOMTR-MCNC: 137 MG/DL (ref 70–130)
GLUCOSE BLDC GLUCOMTR-MCNC: 161 MG/DL (ref 70–130)
GLUCOSE BLDC GLUCOMTR-MCNC: 99 MG/DL (ref 70–130)
GLUCOSE SERPL-MCNC: 150 MG/DL (ref 65–99)
POTASSIUM SERPL-SCNC: 4.2 MMOL/L (ref 3.5–5.2)
SODIUM SERPL-SCNC: 134 MMOL/L (ref 136–145)

## 2025-06-09 PROCEDURE — 82948 REAGENT STRIP/BLOOD GLUCOSE: CPT

## 2025-06-09 PROCEDURE — 97535 SELF CARE MNGMENT TRAINING: CPT

## 2025-06-09 PROCEDURE — 25010000002 ZIPRASIDONE MESYLATE PER 10 MG

## 2025-06-09 PROCEDURE — 97530 THERAPEUTIC ACTIVITIES: CPT

## 2025-06-09 PROCEDURE — 63710000001 INSULIN LISPRO PROTAMINE-INSULIN LISPRO (75-25) 100 UNIT/ML SUSPENSION: Performed by: STUDENT IN AN ORGANIZED HEALTH CARE EDUCATION/TRAINING PROGRAM

## 2025-06-09 PROCEDURE — 97116 GAIT TRAINING THERAPY: CPT

## 2025-06-09 PROCEDURE — 99309 SBSQ NF CARE MODERATE MDM 30: CPT | Performed by: INTERNAL MEDICINE

## 2025-06-09 PROCEDURE — 80048 BASIC METABOLIC PNL TOTAL CA: CPT | Performed by: STUDENT IN AN ORGANIZED HEALTH CARE EDUCATION/TRAINING PROGRAM

## 2025-06-09 RX ORDER — HYDROXYZINE HYDROCHLORIDE 25 MG/1
25 TABLET, FILM COATED ORAL ONCE
Status: COMPLETED | OUTPATIENT
Start: 2025-06-09 | End: 2025-06-09

## 2025-06-09 RX ORDER — MIRTAZAPINE 15 MG/1
30 TABLET, FILM COATED ORAL NIGHTLY
Status: DISCONTINUED | OUTPATIENT
Start: 2025-06-09 | End: 2025-06-12

## 2025-06-09 RX ADMIN — LEVOTHYROXINE SODIUM 137.5 MCG: 0.12 TABLET ORAL at 08:14

## 2025-06-09 RX ADMIN — Medication 10 ML: at 08:15

## 2025-06-09 RX ADMIN — QUETIAPINE FUMARATE 100 MG: 100 TABLET ORAL at 20:57

## 2025-06-09 RX ADMIN — FAMOTIDINE 20 MG: 20 TABLET ORAL at 20:57

## 2025-06-09 RX ADMIN — APIXABAN 5 MG: 5 TABLET, FILM COATED ORAL at 08:14

## 2025-06-09 RX ADMIN — DOCUSATE SODIUM 50MG AND SENNOSIDES 8.6MG 2 TABLET: 8.6; 5 TABLET, FILM COATED ORAL at 20:57

## 2025-06-09 RX ADMIN — Medication 1 APPLICATION: at 08:15

## 2025-06-09 RX ADMIN — HYDROXYZINE HYDROCHLORIDE 25 MG: 25 TABLET, FILM COATED ORAL at 03:44

## 2025-06-09 RX ADMIN — INSULIN LISPRO 20 UNITS: 100 INJECTION, SUSPENSION SUBCUTANEOUS at 17:53

## 2025-06-09 RX ADMIN — AMLODIPINE BESYLATE 10 MG: 10 TABLET ORAL at 08:14

## 2025-06-09 RX ADMIN — EMPAGLIFLOZIN 10 MG: 10 TABLET, FILM COATED ORAL at 08:14

## 2025-06-09 RX ADMIN — HYDROCODONE BITARTRATE AND ACETAMINOPHEN 1 TABLET: 7.5; 325 TABLET ORAL at 20:57

## 2025-06-09 RX ADMIN — CITALOPRAM HYDROBROMIDE 20 MG: 20 TABLET ORAL at 08:14

## 2025-06-09 RX ADMIN — MIRTAZAPINE 30 MG: 15 TABLET, FILM COATED ORAL at 20:57

## 2025-06-09 RX ADMIN — APIXABAN 5 MG: 5 TABLET, FILM COATED ORAL at 20:57

## 2025-06-09 RX ADMIN — ALPRAZOLAM 0.5 MG: 0.5 TABLET ORAL at 21:52

## 2025-06-09 RX ADMIN — WATER 10 MG: 1 INJECTION INTRAMUSCULAR; INTRAVENOUS; SUBCUTANEOUS at 01:28

## 2025-06-09 RX ADMIN — LOSARTAN POTASSIUM 100 MG: 50 TABLET, FILM COATED ORAL at 08:14

## 2025-06-09 RX ADMIN — Medication 1 APPLICATION: at 20:57

## 2025-06-09 RX ADMIN — FAMOTIDINE 20 MG: 20 TABLET ORAL at 08:14

## 2025-06-09 RX ADMIN — INSULIN LISPRO 20 UNITS: 100 INJECTION, SUSPENSION SUBCUTANEOUS at 08:13

## 2025-06-09 RX ADMIN — DOCUSATE SODIUM 50MG AND SENNOSIDES 8.6MG 2 TABLET: 8.6; 5 TABLET, FILM COATED ORAL at 08:14

## 2025-06-09 RX ADMIN — Medication 10 ML: at 20:58

## 2025-06-09 RX ADMIN — PANTOPRAZOLE SODIUM 40 MG: 40 TABLET, DELAYED RELEASE ORAL at 08:14

## 2025-06-09 RX ADMIN — TIMOLOL MALEATE: 5 SOLUTION OPHTHALMIC at 20:57

## 2025-06-09 RX ADMIN — ACETAMINOPHEN 1000 MG: 500 TABLET ORAL at 10:24

## 2025-06-09 RX ADMIN — TIMOLOL MALEATE: 5 SOLUTION OPHTHALMIC at 08:15

## 2025-06-09 NOTE — CASE MANAGEMENT/SOCIAL WORK
Case Management/Social Work    Patient Name:  Ramsey Riley  YOB: 1955  MRN: [id number]  Admit Date:  6/4/2025        Patient is approved for 3 additional swing bed days with next clinical updates due 6/12/25. SS and provider notified via secure chat.       Electronically signed by:  Zhanna Au RN  06/09/25 12:48 EDT

## 2025-06-09 NOTE — PLAN OF CARE
Goal Outcome Evaluation:           Progress: no change  Outcome Evaluation: Pt has remained restless throughout shift. Pt frequently trying to get OOB without assistance and has made threats towards staff at times. Received IM Geodon and PO Atarax and Xanax but ultimately needed a 1:1 sitter for safety. VSS on RA. Wound care completed per orders. Pain well controlled with PRN meds. Will continue POC.                              no deformity, pain or tenderness. no restriction of movement

## 2025-06-09 NOTE — PROGRESS NOTES
I have reviewed the chart for nursing notes, labs, and vitals but did not see the patient in person today, patient remains admitted to swing bed.  Continue PT/OT, continue swing bed admission. Patient has been calm and cooperative during this shift, per discussion with daytime nursing staff.     Harini Urbina DO  06/08/25 21:43 EDT

## 2025-06-09 NOTE — PROGRESS NOTES
[facility] HOSPITALIST PROGRESS NOTE     Patient Identification:  Name:  Ramsey Riley  Age:  70 y.o.  Sex:  male  :  1955  MRN:  [id number]  Visit Number:  [id number]  ROOM: [address] [place] [address]     Primary Care Provider:  Margret Purvis MD    Length of stay:  5    Subjective     CC: Decondition swing bed for rehab    Patient seen and examined today reviewed his condition discussed the case with his wife at the bedside.  Reported to have agitation overnight.        Objective     Current Hospital Meds:amLODIPine, 10 mg, Oral, Daily  apixaban, 5 mg, Oral, Q12H  castor oil-balsam peru, 1 Application, Topical, Q12H  dorzolamide (TRUSOPT) 2 % 1 drop, timolol (TIMOPTIC) 0.5 % 1 drop for Cosopt 22.3-6.8 mg/mL, , Both Eyes, BID  empagliflozin, 10 mg, Oral, Daily  famotidine, 20 mg, Oral, BID  insulin lispro protamine-insulin lispro, 20 Units, Subcutaneous, BID With Meals  levothyroxine, 137.5 mcg, Oral, QAM AC  losartan, 100 mg, Oral, Daily  mirtazapine, 30 mg, Oral, Nightly  pantoprazole, 40 mg, Oral, QAM AC  QUEtiapine, 100 mg, Oral, Nightly  senna-docusate sodium, 2 tablet, Oral, BID  sodium chloride, 10 mL, Intravenous, Q12H  [START ON 2025] tuberculin, 5 Units, Intradermal, Once       ----------------------------------------------------------------------------------------------------------------------  Vital Signs:  Temp:  [97.6 °F (36.4 °C)-98.8 °F (37.1 °C)] 97.6 °F (36.4 °C)  Heart Rate:  [68-71] 68  Resp:  [16-18] 16  BP: (149-158)/(82-85) 149/85  SpO2:  [93 %] 93 %  on   ;   Device (Oxygen Therapy): room air  Body mass index is 30.05 kg/m².    Wt Readings from Last 3 Encounters:   25 97.7 kg (215 lb 7 oz)   25 97.7 kg (215 lb 7 oz)   25 104 kg (230 lb)     Intake/Output Summary (Last 24 hours) at 2025 1653  Last data filed at 2025 1632  Gross per 24 hour   Intake 1140 ml   Output 1150 ml   Net -10 ml     Diet: Cardiac, Diabetic; Healthy Heart (2-3 Na+); Consistent  Carbohydrate; Fluid Consistency: Thin (IDDSI 0)  ----------------------------------------------------------------------------------------------------------------------  Physical exam:  Constitutional:  Well-developed and well-nourished.  No respiratory distress.      HENT:  Head:  Normocephalic and atraumatic.  Mouth:  Moist mucous membranes.    Eyes:  Conjunctivae and EOM are normal.  Pupils are equal, round, and reactive to light.  No scleral icterus.    Neck:  Neck supple.  No JVD present.    Cardiovascular:  Normal rate, regular rhythm and normal heart sounds with no murmur.  Pulmonary/Chest:  No respiratory distress, no wheezes, no crackles, with normal breath sounds and good air movement.  Abdominal:  Soft.  Bowel sounds are normal.  No distension and no tenderness.   Musculoskeletal:  No edema, no tenderness, and no deformity.  No red or swollen joints anywhere.    Neurological: Proximal muscle weakness, conscious oriented x 3, nonlateralizing gross neuroexam, gait was not tested  Skin:  Skin is warm and dry. No rash noted. No pallor.   Peripheral vascular:  Strong pulses in all 4 extremities with no clubbing, no cyanosis, no edema.        ----------------------------------------------------------------------------------------------------------------------    Results from last 7 days   Lab Units 06/09/25  0139   SODIUM mmol/L 134*   POTASSIUM mmol/L 4.2   CHLORIDE mmol/L 100   CO2 mmol/L 20.7*   BUN mg/dL 20.4   CREATININE mg/dL 1.28*   CALCIUM mg/dL 8.6   GLUCOSE mg/dL 150*   Estimated Creatinine Clearance: 65.9 mL/min (A) (by C-G formula based on SCr of 1.28 mg/dL (H)).      Glucose   Date/Time Value Ref Range Status   06/09/2025 1050 99 70 - 130 mg/dL Final   06/09/2025 0717 103 70 - 130 mg/dL Final   06/08/2025 2103 120 70 - 130 mg/dL Final   06/08/2025 1614 115 70 - 130 mg/dL Final   06/08/2025 1021 101 70 - 130 mg/dL Final   06/08/2025 0707 88 70 - 130 mg/dL Final   06/07/2025 1913 172 (H) 70 - 130  "mg/dL Final   06/07/2025 1632 175 (H) 70 - 130 mg/dL Final     No results found for: \"AMMONIA\"    Results from last 7 days   Lab Units 06/04/25  1740   NITRITE UA  Negative   WBC UA /HPF 3-5*   BACTERIA UA /HPF 3+*   SQUAM EPITHEL UA /HPF 0-2     No results found for: \"BLOODCX\"No results found for: \"RESPCX\"No results found for: \"URINECX\"No results found for: \"WOUNDCX\"No results found for: \"BODYFLDCX\"No results found for: \"STOOLCX\"  I have personally looked at the labs and they are summarized above.  ----------------------------------------------------------------------------------------------------------------------  Imaging Results (Last 24 Hours)       ** No results found for the last 24 hours. **          I have personally reviewed the radiology images and read the final radiology report.          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

## 2025-06-09 NOTE — CASE MANAGEMENT/SOCIAL WORK
Discharge Planning Assessment   Ilya     Patient Name: Ramsey Riley  MRN: [id number]  Today's Date: 6/9/2025    Admit Date: 6/4/2025         Discharge Plan       Row Name 06/09/25 1802       Plan    Plan Pt was admitted to swing bed on 6/4 to continue therapy. Per swing bed RN,Pt has been approved for 3 more days, clinical updates are due on 06/12/25. Pt has a sitter at bedside. Pt lives with spouse, Jina and she plans for pt to return home at discharge. Pt does not utilize home health services. Pt has a rolling walker, wheelchair, and scooter via unknown provider. SS to follow.                      MARIUSZ Rai

## 2025-06-09 NOTE — THERAPY TREATMENT NOTE
Acute Care - Physical Therapy Treatment Note   Ilya     Patient Name: Ramsey Riley  : 1955  MRN: [id number]  Today's Date: 2025   Onset of Illness/Injury or Date of Surgery: 25 (admission date)  Visit Dx:   No diagnosis found.  Patient Active Problem List   Diagnosis    Coronary artery disease involving native coronary artery of native heart without angina pectoris    Essential hypertension    Bradycardia, sinus    Fatigue    Abnormal ECG    Precordial pain    LVH (left ventricular hypertrophy) due to hypertensive disease, without heart failure    Diabetes mellitus    PAD (peripheral artery disease)    Ischemic stroke    History of blood clots    Pulmonary embolus    Dyslipidemia    Class 1 obesity due to excess calories with serious comorbidity and body mass index (BMI) of 34.0 to 34.9 in adult    Weakness    Frequent falls    Physical debility     Past Medical History:   Diagnosis Date    Arthritis     Clot     Coronary artery disease     Dementia 2024    Diabetes mellitus     Heart disease     History of blood clots     Hypertension     Impaired mobility     Low back pain     Thyroid disease     Type 2 diabetes mellitus      Past Surgical History:   Procedure Laterality Date    CHOLECYSTECTOMY      COLONOSCOPY      dr srivastava    CORONARY ARTERY BYPASS GRAFT      Amorita -  BY PASS     ENDOSCOPY       PT Assessment (Last 12 Hours)       PT Evaluation and Treatment       Row Name 25 1435          Physical Therapy Time and Intention    Subjective Information no complaints  -KM     Document Type therapy note (daily note)  -KM     Mode of Treatment physical therapy  -KM     Patient Effort good  -KM     Symptoms Noted During/After Treatment fatigue  -KM       Row Name 25 1437          General Information    Patient Profile Reviewed yes  -KM     Patient Observations alert;cooperative;agree to therapy  -KM     Existing Precautions/Restrictions fall  -KM       Row  Name 06/09/25 1435          Pain    Pretreatment Pain Rating 0/10 - no pain  -KM     Posttreatment Pain Rating 0/10 - no pain  -KM       Row Name 06/09/25 1435          Cognition    Affect/Mental Status (Cognition) confused  -KM     Orientation Status (Cognition) oriented to;person  -KM     Follows Commands (Cognition) follows one-step commands  -KM       Row Name 06/09/25 1435          Bed Mobility    Bed Mobility bed mobility (all) activities  -KM     All Activities, Spencer (Bed Mobility) moderate assist (50% patient effort);2 person assist  -KM     Assistive Device (Bed Mobility) bed rails;head of bed elevated  -KM       Row Name 06/09/25 1435          Transfers    Transfers sit-stand transfer;stand-sit transfer  -KM       Row Name 06/09/25 1435          Sit-Stand Transfer    Sit-Stand Spencer (Transfers) minimum assist (75% patient effort);2 person assist  -KM     Assistive Device (Sit-Stand Transfers) walker, front-wheeled  -KM       Row Name 06/09/25 1435          Stand-Sit Transfer    Stand-Sit Spencer (Transfers) minimum assist (75% patient effort);2 person assist  -KM     Assistive Device (Stand-Sit Transfers) walker, front-wheeled  -KM       Row Name 06/09/25 1435          Gait/Stairs (Locomotion)    Gait/Stairs Locomotion gait/ambulation independence;gait/ambulation assistive device;distance ambulated  -KM     Spencer Level (Gait) minimum assist (75% patient effort)  -KM     Assistive Device (Gait) walker, front-wheeled  -KM     Patient was able to Ambulate yes  -KM     Distance in Feet (Gait) 30  -KM     Pattern (Gait) step-to  -KM     Deviations/Abnormal Patterns (Gait) ataxic;base of support, narrow;gait speed decreased  -KM     Bilateral Gait Deviations forward flexed posture  -KM     Left Sided Gait Deviations weight shift ability decreased  -KM       Row Name 06/09/25 1435          Safety Issues/Impairments Affecting Functional Mobility    Impairments Affecting Function  (Mobility) balance;cognition;coordination;endurance/activity tolerance;pain;strength  -KM       Row Name             Wound 05/26/25 1618 Left posterior heel Pressure Injury    Wound - Properties Group Placement Date: 05/26/25  -CW Placement Time: 1618  -CW Present on Original Admission: Y  -CW Side: Left  -CW Orientation: posterior  -CW Location: heel  -CW Primary Wound Type: Pressure Inj  -CW    Retired Wound - Properties Group Placement Date: 05/26/25  -CW Placement Time: 1618  -CW Present on Original Admission: Y  -CW Side: Left  -CW Orientation: posterior  -CW Location: heel  -CW    Retired Wound - Properties Group Placement Date: 05/26/25  -CW Placement Time: 1618  -CW Present on Original Admission: Y  -CW Side: Left  -CW Orientation: posterior  -CW Location: heel  -CW    Retired Wound - Properties Group Date first assessed: 05/26/25  -CW Time first assessed: 1618  -CW Present on Original Admission: Y  -CW Side: Left  -CW Location: heel  -CW      Row Name             Wound 05/26/25 1622 Left anterior heel Pressure Injury    Wound - Properties Group Placement Date: 05/26/25  -CW Placement Time: 1622  -CW Side: Left  -CW Orientation: anterior  -CW Location: heel  -CW Primary Wound Type: Pressure Inj  -CW    Retired Wound - Properties Group Placement Date: 05/26/25  -CW Placement Time: 1622  -CW Side: Left  -CW Orientation: anterior  -CW Location: heel  -CW    Retired Wound - Properties Group Placement Date: 05/26/25  -CW Placement Time: 1622  -CW Side: Left  -CW Orientation: anterior  -CW Location: heel  -CW    Retired Wound - Properties Group Date first assessed: 05/26/25  -CW Time first assessed: 1622  -CW Side: Left  -CW Location: heel  -CW      Row Name             Wound 05/27/25 1007 medial parietal region Traumatic Abrasion    Wound - Properties Group Placement Date: 05/27/25  -LB Placement Time: 1007  -LB Present on Original Admission: Y  -LB Orientation: medial  -LB Location: parietal region  -LB Primary  Wound Type: Traumatic  -LB Secondary Wound Type - Traumatic: Abrasion  -LB    Retired Wound - Properties Group Placement Date: 05/27/25  -LB Placement Time: 1007  -LB Present on Original Admission: Y  -LB Orientation: medial  -LB Location: parietal region  -LB    Retired Wound - Properties Group Placement Date: 05/27/25  -LB Placement Time: 1007  -LB Present on Original Admission: Y  -LB Orientation: medial  -LB Location: parietal region  -LB    Retired Wound - Properties Group Date first assessed: 05/27/25  -LB Time first assessed: 1007  -LB Present on Original Admission: Y  -LB Location: parietal region  -LB      Row Name             Wound 05/27/25 1008 Right distal arm Traumatic Skin Tear    Wound - Properties Group Placement Date: 05/27/25  -LB Placement Time: 1008  -LB Present on Original Admission: Y  -LB Side: Right  -LB Orientation: distal  -LB Location: arm  -LB Primary Wound Type: Traumatic  -LB Secondary Wound Type - Traumatic: Skin Tear  -LB    Retired Wound - Properties Group Placement Date: 05/27/25  -LB Placement Time: 1008  -LB Present on Original Admission: Y  -LB Side: Right  -LB Orientation: distal  -LB Location: arm  -LB    Retired Wound - Properties Group Placement Date: 05/27/25  -LB Placement Time: 1008  -LB Present on Original Admission: Y  -LB Side: Right  -LB Orientation: distal  -LB Location: arm  -LB    Retired Wound - Properties Group Date first assessed: 05/27/25  -LB Time first assessed: 1008  -LB Present on Original Admission: Y  -LB Side: Right  -LB Location: arm  -LB      Row Name 06/09/25 1435          Progress Summary (PT)    Daily Progress Summary (PT) Pt. was able to demonstrate functional mobility skills w/ Teddy-modA x2. He was able to ambulate in room w/ RW and increased cueing. He tolerated increased activity. Pt. would continue to benefit from PT services.  -KM               User Key  (r) = Recorded By, (t) = Taken By, (c) = Cosigned By      Initials Name Provider Type    CW  Chata Pulido, RN Registered Nurse    Baljinder Reese, PT Physical Therapist    LB Marysol Nix, RN Registered Nurse                    Physical Therapy Education       Title: PT OT SLP Therapies (In Progress)       Topic: Physical Therapy (Not Started)       Point: Mobility training (Not Started)       Learner Progress:  Not documented in this visit.              Point: Home exercise program (Not Started)       Learner Progress:  Not documented in this visit.              Point: Body mechanics (Not Started)       Learner Progress:  Not documented in this visit.              Point: Precautions (Not Started)       Learner Progress:  Not documented in this visit.                                  PT Recommendation and Plan     Progress Summary (PT)  Daily Progress Summary (PT): Pt. was able to demonstrate functional mobility skills w/ Tedyd-modA x2. He was able to ambulate in room w/ RW and increased cueing. He tolerated increased activity. Pt. would continue to benefit from PT services.   Outcome Measures       Row Name 06/07/25 1200             How much help from another is currently needed...    Putting on and taking off regular lower body clothing? 2  -LA      Bathing (including washing, rinsing, and drying) 2  -LA      Toileting (which includes using toilet bed pan or urinal) 2  -LA      Putting on and taking off regular upper body clothing 2  -LA      Taking care of personal grooming (such as brushing teeth) 3  -LA      Eating meals 3  -LA      AM-PAC 6 Clicks Score (OT) 14  -LA         Functional Assessment    Outcome Measure Options AM-PAC 6 Clicks Daily Activity (OT)  -LA                User Key  (r) = Recorded By, (t) = Taken By, (c) = Cosigned By      Initials Name Provider Type    Myah Eli OT Occupational Therapist                     Time Calculation:    PT Charges       Row Name 06/09/25 2632             Time Calculation    PT Received On 06/09/25  -KM         Time Calculation- PT    Total  Timed Code Minutes- PT 23 minute(s)  -KEVIN                User Key  (r) = Recorded By, (t) = Taken By, (c) = Cosigned By      Initials Name Provider Type    Baljinder Reese, CARLOS Physical Therapist                  Therapy Charges for Today       Code Description Service Date Service Provider Modifiers Qty    17369610966 HC PT THERAPEUTIC ACT EA 15 MIN 6/9/2025 Baljinder Martinez, PT GP 1    44292242487 HC GAIT TRAINING EA 15 MIN 6/9/2025 Baljinder Martinez, PT GP 1            PT G-Codes  Outcome Measure Options: AM-PAC 6 Clicks Daily Activity (OT)  AM-PAC 6 Clicks Score (PT): 12  AM-PAC 6 Clicks Score (OT): 14    Baljinder Martinez PT  6/9/2025

## 2025-06-09 NOTE — PLAN OF CARE
Goal Outcome Evaluation:              Outcome Evaluation: Pt alert to self only this shift. Reoriented several times throughout shift. Pt refused daily bath, educated on importance of batheing. VSS on RA. Wound care completed per orders. Pt c/o pain, PRN medication administered Per MAR. Pt denies any other concerns at this time, will continue with POC.

## 2025-06-09 NOTE — PAYOR COMM NOTE
"Zhanna Au RN CM/Swing Bed  [email or web address]  Phone: [phone] Fax: [phone]  [id number]  [id number]  Authorization No: [id number]    Patient was admitted to post acute swing bed for PT/OT strength, mobility, ADL and transfer training. PLOF lives at home with spouse and uses rolling walker for ambulation. Receives assistance with ADLs from spouse.     ICD 10  R29.6  R53.81    Ramsey Riley (70 y.o. Male)       Date of Birth   1955    Social Security Number       Address   [address] [place] [address]    Home Phone   [phone]    MRN   [id number]       Nondenominational   Patient Refused    Marital Status                               Admission Date   6/4/2025    Admission Type   Urgent    Admitting Provider   Harini Urbina DO    Attending Provider   Lisbeth Gonzalez MD    Department, Room/Bed   [place] [address]/       Discharge Date       Discharge Disposition       Discharge Destination                                 Attending Provider: Lisbeth Gonzalez MD    Allergies: Penicillins, Trazodone    Isolation: None   Infection: None   Code Status: CPR    Ht: 180.3 cm (71\")   Wt: 97.7 kg (215 lb 7 oz)    Admission Cmt: None   Principal Problem: Physical debility [R53.81]                   Active Insurance as of 6/4/2025       Primary Coverage       Payor Plan Insurance Group Employer/Plan Group    ANTHEM MEDICARE REPLACEMENT [facility] MEDICARE ADVANTAGE O [id number]       Payor Plan Address Payor Plan Phone Number Payor Plan Fax Number Effective Dates    [address] [phone]  1/1/2024 - None Entered    [facility] [id number]         Subscriber Name Subscriber Birth Date Member ID       RAMSEY RILEY 1955 [id number]                     Emergency Contacts        (Rel.) Home Phone Work Phone Mobile Phone    RosemaryJina (Spouse) [phone] -- [phone]              Current Facility-Administered " Medications   Medication Dose Route Frequency Provider Last Rate Last Admin    acetaminophen (TYLENOL) tablet 1,000 mg  1,000 mg Oral Q8H PRN Harini Urbina DO   1,000 mg at 06/07/25 2002    ALPRAZolam (XANAX) tablet 0.5 mg  0.5 mg Oral Nightly PRN Harini Urbina DO   0.5 mg at 06/08/25 2159    amLODIPine (NORVASC) tablet 10 mg  10 mg Oral Daily Harini Urbina DO   10 mg at 06/07/25 1011    apixaban (ELIQUIS) tablet 5 mg  5 mg Oral Q12H Harini Urbina DO   5 mg at 06/08/25 2107    atorvastatin (LIPITOR) tablet 80 mg  80 mg Oral Nightly Harini Urbina DO   80 mg at 06/08/25 2107    sennosides-docusate (PERICOLACE) 8.6-50 MG per tablet 2 tablet  2 tablet Oral BID Harini Urbina DO   2 tablet at 06/08/25 2107    And    polyethylene glycol (MIRALAX) packet 17 g  17 g Oral Daily PRN Harini Urbina DO        And    bisacodyl (DULCOLAX) EC tablet 5 mg  5 mg Oral Daily PRN Harini Urbina DO        And    bisacodyl (DULCOLAX) suppository 10 mg  10 mg Rectal Daily PRN Harini Urbina DO        castor oil-balsam peru (VENELEX) ointment 1 Application  1 Application Topical Q12H Harini Urbina DO   1 Application at 06/08/25 2105    citalopram (CeleXA) tablet 20 mg  20 mg Oral Daily Harini Urbina DO   20 mg at 06/08/25 0817    dextrose (D50W) (25 g/50 mL) IV injection 25 g  25 g Intravenous Q15 Min PRN Harini Urbina DO        dextrose (GLUTOSE) oral gel 15 g  15 g Oral Q15 Min PRN Harini Urbina DO        dorzolamide (TRUSOPT) 2 % 1 drop, timolol (TIMOPTIC) 0.5 % 1 drop for Cosopt 22.3-6.8 mg/mL   Both Eyes BID Harini Urbina DO   Given at 06/08/25 2105    empagliflozin (JARDIANCE) tablet 10 mg  10 mg Oral Daily Harini Urbina DO   10 mg at 06/08/25 0819    famotidine (PEPCID) tablet 20 mg  20 mg Oral BID Harini Urbina DO   20 mg at 06/08/25 2107    glucagon HCl (Diagnostic) injection 1 mg  1 mg Intramuscular Q15 Min PRN Harini Urbina DO        HYDROcodone-acetaminophen (NORCO) 7.5-325 MG per tablet 1 tablet   "1 tablet Oral Q12H PRN Harini Urbina DO   1 tablet at 06/08/25 2252    insulin lispro protamine-insulin lispro (humaLOG 75-25) injection 20 Units  20 Units Subcutaneous BID With Meals Harini Urbina DO   20 Units at 06/08/25 0817    levothyroxine (SYNTHROID, LEVOTHROID) tablet 137.5 mcg  137.5 mcg Oral QAM AC Harini Urbina DO   137.5 mcg at 06/08/25 0819    losartan (COZAAR) tablet 100 mg  100 mg Oral Daily Harini Urbina DO   100 mg at 06/07/25 1011    nitroglycerin (NITROSTAT) SL tablet 0.4 mg  0.4 mg Sublingual Q5 Min PRN Harini Urbina DO        ondansetron ODT (ZOFRAN-ODT) disintegrating tablet 4 mg  4 mg Oral Q6H PRN Harini Urbina DO   4 mg at 06/06/25 1108    Or    ondansetron (ZOFRAN) injection 4 mg  4 mg Intravenous Q6H PRN Harini Urbina DO        pantoprazole (PROTONIX) EC tablet 40 mg  40 mg Oral QAM AC Harini Urbina DO   40 mg at 06/08/25 0818    QUEtiapine (SEROquel) tablet 100 mg  100 mg Oral Nightly Harini Urbina DO   100 mg at 06/08/25 2107    sodium chloride 0.9 % flush 10 mL  10 mL Intravenous PRN Harini Urbina DO        sodium chloride 0.9 % flush 10 mL  10 mL Intravenous Q12H Harini Urbina DO   10 mL at 06/08/25 2103    sodium chloride 0.9 % flush 10 mL  10 mL Intravenous PRN Harini Urbina DO        sodium chloride 0.9 % infusion 40 mL  40 mL Intravenous PRN Harini Urbina DO        [START ON 6/19/2025] tuberculin injection 5 Units  5 Units Intradermal Once Harini Urbina DO         Orders (active)        Start     Ordered    06/19/25 0600  tuberculin injection 5 Units  Once         06/05/25 0739    06/19/25 0600  TB Skin Test (Reading)  Once        Comments: Use Enter / Edit Results to Document Results      06/05/25 0739    06/09/25 0326  Sitter At Bedside  Continuous         06/09/25 0325    06/06/25 1047  ondansetron ODT (ZOFRAN-ODT) disintegrating tablet 4 mg  Every 6 Hours PRN        Placed in \"Or\" Linked Group    06/06/25 1047    06/06/25 1047  ondansetron (ZOFRAN) " "injection 4 mg  Every 6 Hours PRN        Placed in \"Or\" Linked Group    06/06/25 1047    06/05/25 1200  Dietary Nutrition Supplements Other (See Comment); Boost Glucose Control Chocolate  Daily With Lunch & Dinner       06/04/25 1958 06/05/25 0900  amLODIPine (NORVASC) tablet 10 mg  Daily         06/04/25 1958 06/05/25 0900  citalopram (CeleXA) tablet 20 mg  Daily         06/04/25 1958 06/05/25 0900  empagliflozin (JARDIANCE) tablet 10 mg  Daily         06/04/25 1958 06/05/25 0900  losartan (COZAAR) tablet 100 mg  Daily         06/04/25 1958 06/05/25 0800  Oral Care  2 Times Daily       06/04/25 1958 06/05/25 0800  insulin lispro protamine-insulin lispro (humaLOG 75-25) injection 20 Units  2 Times Daily With Meals         06/04/25 1958 06/05/25 0800  Vital Signs  Every Morning         06/05/25 0739    06/05/25 0737  Initial Swing Bed Certification  Once         06/05/25 0739    06/05/25 0737  Weigh Patient Upon Admission and Every Sunday  Weekly         06/05/25 0739    06/05/25 0737  Intake & Output  Every Shift       06/05/25 0739    06/05/25 0730  levothyroxine (SYNTHROID, LEVOTHROID) tablet 137.5 mcg  Every Morning Before Breakfast         06/04/25 1958 06/05/25 0730  pantoprazole (PROTONIX) EC tablet 40 mg  Every Morning Before Breakfast         06/04/25 1958 06/05/25 0600  Wound Care  Daily       06/04/25 1958 06/05/25 0418  Intake & Output  Every Shift       06/04/25 1958 06/04/25 2200  POC Glucose 4x Daily Before Meals & at Bedtime  4 Times Daily Before Meals & at Bedtime      Comments: Complete no more than 45 minutes prior to patient eating      06/04/25 1958 06/04/25 2100  sodium chloride 0.9 % flush 10 mL  Every 12 Hours Scheduled         06/04/25 1958 06/04/25 2100  sennosides-docusate (PERICOLACE) 8.6-50 MG per tablet 2 tablet  2 Times Daily        Placed in \"And\" Linked Group    06/04/25 1958 06/04/25 2100  apixaban (ELIQUIS) tablet 5 mg  Every 12 Hours " Scheduled         06/04/25 1958 06/04/25 2100  atorvastatin (LIPITOR) tablet 80 mg  Nightly         06/04/25 1958 06/04/25 2100  dorzolamide (TRUSOPT) 2 % 1 drop, timolol (TIMOPTIC) 0.5 % 1 drop for Cosopt 22.3-6.8 mg/mL  2 Times Daily         06/04/25 1958 06/04/25 2100  famotidine (PEPCID) tablet 20 mg  2 Times Daily         06/04/25 1958 06/04/25 2100  QUEtiapine (SEROquel) tablet 100 mg  Nightly         06/04/25 1958 06/04/25 2100  castor oil-balsam peru (VENELEX) ointment 1 Application  Every 12 Hours Scheduled         06/04/25 1958 06/04/25 2000  Vital Signs  Every 4 Hours      Comments: Per per hospital policy    06/04/25 1958 06/04/25 2000  Wound Care  Every 12 Hours         06/04/25 1958 06/04/25 1959  Hospitalist (on-call MD unless specified)  Once        Specialty:  Hospitalist  Provider:  Harini Urbina DO    06/04/25 1958 06/04/25 1959  Notify Physician (with Parameters)  Until Discontinued         06/04/25 1958 06/04/25 1959  Weigh patient  Once         06/04/25 1958 06/04/25 1959  Code Status and Medical Interventions: CPR (Attempt to Resuscitate); Full Support  Continuous         06/04/25 1958 06/04/25 1959  Maintain Sequential Compression Device  Continuous         06/04/25 1958 06/04/25 1959  Maintain IV Access  Continuous         06/04/25 1958 06/04/25 1959  Telemetry - Place Orders & Notify Provider of Results When Patient Experiences Acute Chest Pain, Dysrhythmia or Respiratory Distress  Continuous        Comments: Open Order Report to View Parameters Requiring Provider Notification    06/04/25 1958 06/04/25 1959  May Be Off Telemetry for Tests  Continuous         06/04/25 1958 06/04/25 1959  Diet: Cardiac, Diabetic; Healthy Heart (2-3 Na+); Consistent Carbohydrate; Fluid Consistency: Thin (IDDSI 0)  Diet Effective Now         06/04/25 1958 06/04/25 1959  PT Consult: Eval & Treat As Tolerated; Discharge Placement Assessment, Functional Mobility  Below Baseline  Once         06/04/25 1958 06/04/25 1959  OT Consult: Eval & Treat ADL Performance Below Baseline, Discharge Placement Assessment  Once         06/04/25 1958 06/04/25 1959  Wound Ostomy Eval & Treat  Once         06/04/25 1958 06/04/25 1959  Turn Patient  Now Then Every 2 Hours         06/04/25 1958 06/04/25 1959  Head of Bed 30 Degrees or Less (Unless Contraindicated)  Until Discontinued         06/04/25 1958 06/04/25 1959  Elevate Heels Off of Bed  Until Discontinued         06/04/25 1958 06/04/25 1959  Use Seat Cushion When Up In Chair  Continuous         06/04/25 1958 06/04/25 1959  Do NOT Rub or Massage Any Bony Prominence  Continuous         06/04/25 1958 06/04/25 1959  Apply Heel Protector Boot Until Discontinued  Until Discontinued         06/04/25 1958 06/04/25 1959  Use Repositioning Wedge to Position Patient  Continuous         06/04/25 1958 06/04/25 1959  Skin Tear Care - Minimal Drainage Q3 Days  Every Third Day        Comments: - Realign Skin Flap (if Viable) Gently Ease Flap Into Place Using a Dampened Cotton-Tipped Applicator or Gloved Finger  - Gently Cleanse Area & Pat Dry  - Apply Hydrogel & Non-Adherent Layer (Silicone Sheet, Oil Emulsion Dressing or Vaseline Gauze)  - Secure With Silicone Border Dressing (Unless Skin Fragile)  - If Skin is Fragile Secure With Roll Gauze - Do NOT Put Tape Directly on Skin  - Change Every 3 Days & As Needed    06/04/25 1958 06/04/25 1959  Initiate Post Fall Interventions / Assessments  Once        Comments: Post Fall Interventions / Assessments are Outlined in Process Instructions (Open Order Report to View Full Instructions)    06/04/25 1958 06/04/25 1959  Fall Precautions  Continuous         06/04/25 1958 06/04/25 1958  sodium chloride 0.9 % flush 10 mL  As Needed         06/04/25 1958 06/04/25 1958  sodium chloride 0.9 % flush 10 mL  As Needed         06/04/25 1958 06/04/25 1958  sodium chloride 0.9 %  "infusion 40 mL  As Needed         06/04/25 1958    06/04/25 1958  nitroglycerin (NITROSTAT) SL tablet 0.4 mg  Every 5 Minutes PRN         06/04/25 1958    06/04/25 1958  polyethylene glycol (MIRALAX) packet 17 g  Daily PRN        Placed in \"And\" Linked Group    06/04/25 1958 06/04/25 1958  bisacodyl (DULCOLAX) EC tablet 5 mg  Daily PRN        Placed in \"And\" Linked Group    06/04/25 1958 06/04/25 1958  bisacodyl (DULCOLAX) suppository 10 mg  Daily PRN        Placed in \"And\" Linked Group    06/04/25 1958 06/04/25 1958  acetaminophen (TYLENOL) tablet 1,000 mg  Every 8 Hours PRN         06/04/25 1958    06/04/25 1958  dextrose (GLUTOSE) oral gel 15 g  Every 15 Minutes PRN         06/04/25 1958    06/04/25 1958  dextrose (D50W) (25 g/50 mL) IV injection 25 g  Every 15 Minutes PRN         06/04/25 1958    06/04/25 1958  glucagon HCl (Diagnostic) injection 1 mg  Every 15 Minutes PRN         06/04/25 1958    06/04/25 1958  ALPRAZolam (XANAX) tablet 0.5 mg  Nightly PRN         06/04/25 1958    06/04/25 1958  HYDROcodone-acetaminophen (NORCO) 7.5-325 MG per tablet 1 tablet  Every 12 Hours PRN         06/04/25 1958    Unscheduled  Up With Assistance  As Needed       06/04/25 1958    Unscheduled  Follow Hypoglycemia Standing Orders For Blood Glucose <70 & Notify Provider of Treatment  As Needed      Comments: Follow Hypoglycemia Orders As Outlined in Process Instructions (Open Order Report to View Full Instructions)  Notify Provider Any Time Hypoglycemia Treatment is Administered    06/04/25 1958    Unscheduled  Wound Care  As Needed       06/04/25 1958    Unscheduled  Skin Tear Care - Minimal Drainage PRN  As Needed      Comments: - Realign Skin Flap (if Viable) Gently Ease Flap Into Place Using a Dampened Cotton-Tipped Applicator or Gloved Finger  - Gently Cleanse Area & Pat Dry  - Apply Hydrogel & Non-Adherent Layer (Silicone Sheet, Oil Emulsion Dressing or Vaseline Gauze)  - Secure With Silicone Border Dressing " (Unless Skin Fragile)  - If Skin is Fragile Secure With Roll Gauze - Do NOT Put Tape Directly on Skin  - Change Every 3 Days & As Needed    25    Unscheduled  Bed / Chair Alarm On  As Needed      Comments: Bed Alarm On When in Bed,   Use Chair Alarm When in Chair    25    Unscheduled  Apply Ice To Affected Area  As Needed      Comments: Apply to left heel for 10-20 minutes up to 4 times per day    25    Pending  insulin lispro protamine-insulin lispro (humaLOG 75-25) injection 84 Units  Every Morning         Pending    Pending  insulin lispro protamine-insulin lispro (humaLOG 75-25) injection 64 Units  Nightly         Pending                     Physician Progress Notes (most recent note)        Harini Urbina DO at 25          I have reviewed the chart for nursing notes, labs, and vitals but did not see the patient in person today, patient remains admitted to swing bed.  Continue PT/OT, continue swing bed admission. Patient has been calm and cooperative during this shift, per discussion with daytime nursing staff.     Harini Urbina DO  25 21:43 EDT     Electronically signed by Harini Urbina DO at 25       Consult Notes (most recent note)    No notes of this type exist for this encounter.          Physical Therapy Notes (most recent note)        Vivian Cervantes PT at 25 1214  Version 1 of 1         Acute Care - Physical Therapy Treatment Note  [facility]     Patient Name: Ramsey Riley  : 1955  MRN: [id number]  Today's Date: 2025   Onset of Illness/Injury or Date of Surgery: 25 (admission date)  Visit Dx:   No diagnosis found.  Patient Active Problem List   Diagnosis    Coronary artery disease involving native coronary artery of native heart without angina pectoris    Essential hypertension    Bradycardia, sinus    Fatigue    Abnormal ECG    Precordial pain    LVH (left ventricular hypertrophy) due to hypertensive disease,  without heart failure    Diabetes mellitus    PAD (peripheral artery disease)    Ischemic stroke    History of blood clots    Pulmonary embolus    Dyslipidemia    Class 1 obesity due to excess calories with serious comorbidity and body mass index (BMI) of 34.0 to 34.9 in adult    Weakness    Frequent falls    Physical debility     Past Medical History:   Diagnosis Date    Arthritis     Clot     Coronary artery disease     Dementia 12/18/2024    Diabetes mellitus     Heart disease     History of blood clots     Hypertension     Impaired mobility     Low back pain     Thyroid disease     Type 2 diabetes mellitus      Past Surgical History:   Procedure Laterality Date    CHOLECYSTECTOMY  2015    COLONOSCOPY  2015    dr srivastava    CORONARY ARTERY BYPASS GRAFT      [place] -  BY PASS     ENDOSCOPY       PT Assessment (Last 12 Hours)       PT Evaluation and Treatment       Row Name 06/07/25 1212          Physical Therapy Time and Intention    Subjective Information complains of;weakness  -CT     Document Type therapy note (daily note)  -CT     Mode of Treatment physical therapy  -CT     Patient Effort adequate  -CT       Row Name 06/07/25 1212          General Information    Patient Profile Reviewed yes  -CT     Existing Precautions/Restrictions fall  -CT       Row Name 06/07/25 1212          Pain    Pretreatment Pain Rating 0/10 - no pain  -CT     Posttreatment Pain Rating 0/10 - no pain  -CT       Row Name 06/07/25 1212          Cognition    Affect/Mental Status (Cognition) confused  -CT     Orientation Status (Cognition) oriented to;person  -CT     Follows Commands (Cognition) follows one-step commands;initiation impaired;physical/tactile prompts required;repetition of directions required  -CT       Row Name 06/07/25 1212          Bed Mobility    Bed Mobility bed mobility (all) activities  -CT     All Activities, [place] (Bed Mobility) moderate assist (50% patient effort)  -CT     Assistive Device (Bed  Mobility) bed rails;head of bed elevated  -CT       Row Name 06/07/25 1212          Transfers    Transfers sit-stand transfer;stand-sit transfer  -CT       Row Name 06/07/25 1212          Sit-Stand Transfer    Sit-Stand [place] (Transfers) minimum assist (75% patient effort);2 person assist  -CT     Assistive Device (Sit-Stand Transfers) walker, front-wheeled  -CT       Row Name 06/07/25 1212          Stand-Sit Transfer    Stand-Sit [place] (Transfers) minimum assist (75% patient effort);2 person assist  -CT     Assistive Device (Stand-Sit Transfers) walker, front-wheeled  -CT       Row Name 06/07/25 1212          Gait/Stairs (Locomotion)    Gait/Stairs Locomotion gait/ambulation independence;gait/ambulation assistive device;distance ambulated  -CT     [place] Level (Gait) minimum assist (75% patient effort)  -CT     Assistive Device (Gait) walker, front-wheeled  -CT     Patient was able to Ambulate yes  -CT     Pattern (Gait) step-to  -CT     Deviations/Abnormal Patterns (Gait) ataxic;base of support, narrow;gait speed decreased  -CT     Bilateral Gait Deviations forward flexed posture  -CT     Left Sided Gait Deviations weight shift ability decreased  -CT       Row Name 06/07/25 1212          Safety Issues/Impairments Affecting Functional Mobility    Impairments Affecting Function (Mobility) balance;cognition;coordination;endurance/activity tolerance;pain;strength  -CT       Row Name             Wound 05/26/25 1618 Left posterior heel Pressure Injury    Wound - Properties Group Placement Date: 05/26/25  -CW Placement Time: 1618 -CW Present on Original Admission: Y  -CW Side: Left  -CW Orientation: posterior  -CW Location: heel  -CW Primary Wound Type: Pressure Inj  -CW    Retired Wound - Properties Group Placement Date: 05/26/25  -CW Placement Time: 1618 -CW Present on Original Admission: Y  -CW Side: Left  -CW Orientation: posterior  -CW Location: heel  -CW    Retired Wound - Properties Group  Placement Date: 05/26/25  -CW Placement Time: 1618  -CW Present on Original Admission: Y  -CW Side: Left  -CW Orientation: posterior  -CW Location: heel  -CW    Retired Wound - Properties Group Date first assessed: 05/26/25  -CW Time first assessed: 1618  -CW Present on Original Admission: Y  -CW Side: Left  -CW Location: heel  -CW      Row Name             Wound 05/26/25 1622 Left anterior heel Pressure Injury    Wound - Properties Group Placement Date: 05/26/25  -CW Placement Time: 1622  -CW Side: Left  -CW Orientation: anterior  -CW Location: heel  -CW Primary Wound Type: Pressure Inj  -CW    Retired Wound - Properties Group Placement Date: 05/26/25  -CW Placement Time: 1622  -CW Side: Left  -CW Orientation: anterior  -CW Location: heel  -CW    Retired Wound - Properties Group Placement Date: 05/26/25  -CW Placement Time: 1622  -CW Side: Left  -CW Orientation: anterior  -CW Location: heel  -CW    Retired Wound - Properties Group Date first assessed: 05/26/25  -CW Time first assessed: 1622  -CW Side: Left  -CW Location: heel  -CW      Row Name             Wound 05/27/25 1007 medial parietal region Traumatic Abrasion    Wound - Properties Group Placement Date: 05/27/25  -LB Placement Time: 1007  -LB Present on Original Admission: Y  -LB Orientation: medial  -LB Location: parietal region  -LB Primary Wound Type: Traumatic  -LB Secondary Wound Type - Traumatic: Abrasion  -LB    Retired Wound - Properties Group Placement Date: 05/27/25  -LB Placement Time: 1007  -LB Present on Original Admission: Y  -LB Orientation: medial  -LB Location: parietal region  -LB    Retired Wound - Properties Group Placement Date: 05/27/25  -LB Placement Time: 1007  -LB Present on Original Admission: Y  -LB Orientation: medial  -LB Location: parietal region  -LB    Retired Wound - Properties Group Date first assessed: 05/27/25  -LB Time first assessed: 1007  -LB Present on Original Admission: Y  -LB Location: parietal region  -LB      Row  Name             Wound 05/27/25 1008 Right distal arm Traumatic Skin Tear    Wound - Properties Group Placement Date: 05/27/25  -LB Placement Time: 1008  -LB Present on Original Admission: Y  -LB Side: Right  -LB Orientation: distal  -LB Location: arm  -LB Primary Wound Type: Traumatic  -LB Secondary Wound Type - Traumatic: Skin Tear  -LB    Retired Wound - Properties Group Placement Date: 05/27/25  -LB Placement Time: 1008  -LB Present on Original Admission: Y  -LB Side: Right  -LB Orientation: distal  -LB Location: arm  -LB    Retired Wound - Properties Group Placement Date: 05/27/25  -LB Placement Time: 1008  -LB Present on Original Admission: Y  -LB Side: Right  -LB Orientation: distal  -LB Location: arm  -LB    Retired Wound - Properties Group Date first assessed: 05/27/25  -LB Time first assessed: 1008  -LB Present on Original Admission: Y  -LB Side: Right  -LB Location: arm  -LB      Row Name 06/07/25 1212          Coping    Observed Emotional State calm;cooperative  -CT     Verbalized Emotional State acceptance  -CT       Row Name 06/07/25 1212          Plan of Care Review    Plan of Care Reviewed With patient  -CT       Row Name 06/07/25 1212          Positioning and Restraints    Pre-Treatment Position in bed  -CT     Post Treatment Position chair  -CT     In Chair sitting;call light within reach;encouraged to call for assist;with family/caregiver;notified nsg  -CT               User Key  (r) = Recorded By, (t) = Taken By, (c) = Cosigned By      Initials Name Provider Type    Chata Couch RN Registered Nurse    Vivian Figueredo PT Physical Therapist    Marysol Rodriguez RN Registered Nurse                    Physical Therapy Education       Title: PT OT SLP Therapies (In Progress)       Topic: Physical Therapy (Not Started)       Point: Mobility training (Not Started)       Learner Progress:  Not documented in this visit.              Point: Home exercise program (Not Started)       Learner  Progress:  Not documented in this visit.              Point: Body mechanics (Not Started)       Learner Progress:  Not documented in this visit.              Point: Precautions (Not Started)       Learner Progress:  Not documented in this visit.                                  PT Recommendation and Plan     Plan of Care Reviewed With: patient   Outcome Measures       Row Name 06/05/25 1251 06/05/25 1200          How much help from another is currently needed...    Putting on and taking off regular lower body clothing? 2  -KR --     Bathing (including washing, rinsing, and drying) 2  -KR --     Toileting (which includes using toilet bed pan or urinal) 2  -KR --     Putting on and taking off regular upper body clothing 2  -KR --     Taking care of personal grooming (such as brushing teeth) 3  -KR --     Eating meals 3  -KR --     AM-PAC 6 Clicks Score (OT) 14  -KR --        Functional Assessment    Outcome Measure Options -- Modified [place];AM-PAC 6 Clicks Daily Activity (OT)  -KR               User Key  (r) = Recorded By, (t) = Taken By, (c) = Cosigned By      Initials Name Provider Type    Dao Valentine, OT Occupational Therapist                     Time Calculation:    PT Charges       Row Name 06/07/25 1214             Time Calculation    PT Received On 06/07/25  -CT         Time Calculation- PT    Total Timed Code Minutes- PT 26 minute(s)  -CT                User Key  (r) = Recorded By, (t) = Taken By, (c) = Cosigned By      Initials Name Provider Type    CT Vivian Cervantes, CARLOS Physical Therapist                  Therapy Charges for Today       Code Description Service Date Service Provider Modifiers Qty    97939373904 HC GAIT TRAINING EA 15 MIN 6/7/2025 Vivian Cervantes, PT GP 1    96669003085 HC PT THERAPEUTIC ACT EA 15 MIN 6/7/2025 Vivian Cervantes, PT GP 1            PT G-Codes  Outcome Measure Options: AM-PAC 6 Clicks Daily Activity (OT)  AM-PAC 6 Clicks Score (PT): 12  AM-PAC 6 Clicks Score (OT):  14    Vivian Cervantes, PT  2025      Electronically signed by Vivian Cervantes, PT at 25 1214          Occupational Therapy Notes (most recent note)        Myah Torres, OT at 25 1240          Acute Care - Occupational Therapy Treatment Note  [facility]     Patient Name: Ramsey Riley  : 1955  MRN: [id number]  Today's Date: 2025  Onset of Illness/Injury or Date of Surgery: 25 (admission date)          Admit Date: 2025     No diagnosis found.  Patient Active Problem List   Diagnosis    Coronary artery disease involving native coronary artery of native heart without angina pectoris    Essential hypertension    Bradycardia, sinus    Fatigue    Abnormal ECG    Precordial pain    LVH (left ventricular hypertrophy) due to hypertensive disease, without heart failure    Diabetes mellitus    PAD (peripheral artery disease)    Ischemic stroke    History of blood clots    Pulmonary embolus    Dyslipidemia    Class 1 obesity due to excess calories with serious comorbidity and body mass index (BMI) of 34.0 to 34.9 in adult    Weakness    Frequent falls    Physical debility     Past Medical History:   Diagnosis Date    Arthritis     Clot     Coronary artery disease     Dementia 2024    Diabetes mellitus     Heart disease     History of blood clots     Hypertension     Impaired mobility     Low back pain     Thyroid disease     Type 2 diabetes mellitus      Past Surgical History:   Procedure Laterality Date    CHOLECYSTECTOMY      COLONOSCOPY      dr srivastava    CORONARY ARTERY BYPASS GRAFT      [place] - 3 BY PASS     ENDOSCOPY           OT ASSESSMENT FLOWSHEET (Last 12 Hours)       OT Evaluation and Treatment       Row Name 25 1236                   OT Time and Intention    Subjective Information no complaints  -LA        Document Type therapy note (daily note)  -LA        Mode of Treatment occupational therapy  -LA        Patient Effort adequate  -LA           General  Information    Patient Profile Reviewed yes  -LA        Existing Precautions/Restrictions fall  -LA           Pain Assessment    Pretreatment Pain Rating 0/10 - no pain  -LA        Posttreatment Pain Rating 0/10 - no pain  -LA           Cognition    Affect/Mental Status (Cognition) confused  -LA        Orientation Status (Cognition) oriented to;person  -LA        Follows Commands (Cognition) follows one-step commands  -LA           Motor Skills    Motor Skills coordination;functional endurance;motor control/coordination interventions  -LA        Coordination WFL  -LA        Functional Endurance fair  -LA        Motor Control/Coordination Interventions fine motor manipulation/dexterity activities;occupation/activity based treatment;therapeutic exercise/ROM  -LA        Therapeutic Exercise shoulder;elbow/forearm;wrist;hand  -LA        Comments, Therapeutic Exercise UE exercises in all planes  -LA           Wound 05/26/25 1618 Left posterior heel Pressure Injury    Wound - Properties Group Placement Date: 05/26/25  -CW Placement Time: 1618  -CW Present on Original Admission: Y  -CW Side: Left  -CW Orientation: posterior  -CW Location: heel  -CW Primary Wound Type: Pressure Inj  -CW    Retired Wound - Properties Group Placement Date: 05/26/25  -CW Placement Time: 1618  -CW Present on Original Admission: Y  -CW Side: Left  -CW Orientation: posterior  -CW Location: heel  -CW    Retired Wound - Properties Group Placement Date: 05/26/25  -CW Placement Time: 1618  -CW Present on Original Admission: Y  -CW Side: Left  -CW Orientation: posterior  -CW Location: heel  -CW    Retired Wound - Properties Group Date first assessed: 05/26/25  -CW Time first assessed: 1618  -CW Present on Original Admission: Y  -CW Side: Left  -CW Location: heel  -CW       Wound 05/26/25 1622 Left anterior heel Pressure Injury    Wound - Properties Group Placement Date: 05/26/25  -CW Placement Time: 1622  -CW Side: Left  -CW Orientation: anterior  -CW  Location: heel  -CW Primary Wound Type: Pressure Inj  -CW    Retired Wound - Properties Group Placement Date: 05/26/25  -CW Placement Time: 1622  -CW Side: Left  -CW Orientation: anterior  -CW Location: heel  -CW    Retired Wound - Properties Group Placement Date: 05/26/25  -CW Placement Time: 1622  -CW Side: Left  -CW Orientation: anterior  -CW Location: heel  -CW    Retired Wound - Properties Group Date first assessed: 05/26/25  -CW Time first assessed: 1622  -CW Side: Left  -CW Location: heel  -CW       Wound 05/27/25 1007 medial parietal region Traumatic Abrasion    Wound - Properties Group Placement Date: 05/27/25  -LB Placement Time: 1007  -LB Present on Original Admission: Y  -LB Orientation: medial  -LB Location: parietal region  -LB Primary Wound Type: Traumatic  -LB Secondary Wound Type - Traumatic: Abrasion  -LB    Retired Wound - Properties Group Placement Date: 05/27/25  -LB Placement Time: 1007  -LB Present on Original Admission: Y  -LB Orientation: medial  -LB Location: parietal region  -LB    Retired Wound - Properties Group Placement Date: 05/27/25  -LB Placement Time: 1007  -LB Present on Original Admission: Y  -LB Orientation: medial  -LB Location: parietal region  -LB    Retired Wound - Properties Group Date first assessed: 05/27/25  -LB Time first assessed: 1007  -LB Present on Original Admission: Y  -LB Location: parietal region  -LB       Wound 05/27/25 1008 Right distal arm Traumatic Skin Tear    Wound - Properties Group Placement Date: 05/27/25  -LB Placement Time: 1008  -LB Present on Original Admission: Y  -LB Side: Right  -LB Orientation: distal  -LB Location: arm  -LB Primary Wound Type: Traumatic  -LB Secondary Wound Type - Traumatic: Skin Tear  -LB    Retired Wound - Properties Group Placement Date: 05/27/25  -LB Placement Time: 1008  -LB Present on Original Admission: Y  -LB Side: Right  -LB Orientation: distal  -LB Location: arm  -LB    Retired Wound - Properties Group Placement  Date: 05/27/25  -LB Placement Time: 1008  -LB Present on Original Admission: Y  -LB Side: Right  -LB Orientation: distal  -LB Location: arm  -LB    Retired Wound - Properties Group Date first assessed: 05/27/25  -LB Time first assessed: 1008  -LB Present on Original Admission: Y  -LB Side: Right  -LB Location: arm  -LB       Plan of Care Review    Plan of Care Reviewed With patient  -LA           Positioning and Restraints    Pre-Treatment Position sitting in chair/recliner  -LA        Post Treatment Position chair  -LA        In Chair sitting;call light within reach;encouraged to call for assist;exit alarm on  -LA                  User Key  (r) = Recorded By, (t) = Taken By, (c) = Cosigned By      Initials Name Effective Dates    CW Chata Pulido RN 10/05/21 -     Marysol Rodriguez RN 10/20/21 -     Myah Eli OT 02/14/22 -                            OT Recommendation and Plan     Plan of Care Review  Plan of Care Reviewed With: patient  Plan of Care Reviewed With: patient     Outcome Measures       Row Name 06/07/25 1200 06/05/25 1251 06/05/25 1200       How much help from another is currently needed...    Putting on and taking off regular lower body clothing? 2  -LA 2  -KR --    Bathing (including washing, rinsing, and drying) 2  -LA 2  -KR --    Toileting (which includes using toilet bed pan or urinal) 2  -LA 2  -KR --    Putting on and taking off regular upper body clothing 2  -LA 2  -KR --    Taking care of personal grooming (such as brushing teeth) 3  -LA 3  -KR --    Eating meals 3  -LA 3  -KR --    AM-PAC 6 Clicks Score (OT) 14  -LA 14  -KR --       Functional Assessment    Outcome Measure Options AM-PAC 6 Clicks Daily Activity (OT)  -LA -- Modified [place];AM-PAC 6 Clicks Daily Activity (OT)  -KR              User Key  (r) = Recorded By, (t) = Taken By, (c) = Cosigned By      Initials Name Provider Type    Dao Valentine, OT Occupational Therapist    Myah Eli OT Occupational Therapist                     Time Calculation:    Time Calculation- OT       Row Name 25 1240             Time Calculation- OT    Total Timed Code Minutes- OT 25 minute(s)  -LA                User Key  (r) = Recorded By, (t) = Taken By, (c) = Cosigned By      Initials Name Provider Type    Myah Eli OT Occupational Therapist                  Therapy Charges for Today       Code Description Service Date Service Provider Modifiers Qty    05698760190 HC OT THER PROC EA 15 MIN 2025 Myah Torres OT GO 2                 Myah Torres OT  2025    Electronically signed by Myah Torres OT at 25 1241       Baljinder Martinez, CARLOS   Physical Therapist  Physical Therapy     Therapy Treatment Note     Signed     Date of Service: 25 135  Creation Time: 25     Signed       Expand All Collapse All[]Expand All by Default    Acute Care - Physical Therapy Treatment Note  [facility]     Patient Name: Ramsey Riley                 : 1955                      MRN: [id number]  Today's Date: 2025                                 Onset of Illness/Injury or Date of Surgery: 25 (admission date)  Visit Dx:   Visit Diagnosis   No diagnosis found.     Problem List       Patient Active Problem List   Diagnosis    Coronary artery disease involving native coronary artery of native heart without angina pectoris    Essential hypertension    Bradycardia, sinus    Fatigue    Abnormal ECG    Precordial pain    LVH (left ventricular hypertrophy) due to hypertensive disease, without heart failure    Diabetes mellitus    PAD (peripheral artery disease)    Ischemic stroke    History of blood clots    Pulmonary embolus    Dyslipidemia    Class 1 obesity due to excess calories with serious comorbidity and body mass index (BMI) of 34.0 to 34.9 in adult    Weakness    Frequent falls    Physical debility         Medical History        Past Medical History:   Diagnosis Date    Arthritis      Clot       Coronary artery disease      Dementia 12/18/2024    Diabetes mellitus      Heart disease      History of blood clots      Hypertension      Impaired mobility      Low back pain      Thyroid disease      Type 2 diabetes mellitus           Surgical History         Past Surgical History:   Procedure Laterality Date    CHOLECYSTECTOMY   2015    COLONOSCOPY   2015     dr srivastava    CORONARY ARTERY BYPASS GRAFT         [place] -  BY PASS     ENDOSCOPY             PT Assessment (Last 12 Hours)            PT Evaluation and Treatment         Row Name 06/06/25 1345                 Physical Therapy Time and Intention     Subjective Information no complaints  -KM       Document Type therapy note (daily note)  -KM       Mode of Treatment physical therapy  -KM       Patient Effort adequate  -KM       Symptoms Noted During/After Treatment none  -KM          Row Name 06/06/25 1345                 General Information     Patient Profile Reviewed yes  -KM       Patient Observations cooperative;agree to therapy  -KM       Existing Precautions/Restrictions fall  -KM          Row Name 06/06/25 1345                 Pain     Pretreatment Pain Rating 0/10 - no pain  -KM       Posttreatment Pain Rating 0/10 - no pain  -KM          Row Name 06/06/25 1345                 Cognition     Affect/Mental Status (Cognition) confused  -KM       Orientation Status (Cognition) oriented to;person  -KM       Follows Commands (Cognition) follows one-step commands;initiation impaired;physical/tactile prompts required;repetition of directions required  -KM          Row Name 06/06/25 1347                 Bed Mobility     Bed Mobility bed mobility (all) activities  -KM       All Activities, [place] (Bed Mobility) moderate assist (50% patient effort);2 person assist  -KM       Assistive Device (Bed Mobility) bed rails;head of bed elevated  -KM          Row Name 06/06/25 1348                 Transfers     Transfers sit-stand transfer;stand-sit transfer   -KM          Row Name 06/06/25 1345                 Sit-Stand Transfer     Sit-Stand [place] (Transfers) minimum assist (75% patient effort);2 person assist  -KM       Assistive Device (Sit-Stand Transfers) walker, front-wheeled  -KM          Row Name 06/06/25 1345                 Stand-Sit Transfer     Stand-Sit [place] (Transfers) minimum assist (75% patient effort);2 person assist  -KM       Assistive Device (Stand-Sit Transfers) walker, front-wheeled  -KM          Row Name 06/06/25 1345                 Gait/Stairs (Locomotion)     Gait/Stairs Locomotion gait/ambulation independence;gait/ambulation assistive device;distance ambulated  -KM       Centre Level (Gait) minimum assist (75% patient effort)  -KM       Assistive Device (Gait) walker, front-wheeled  -KM       Patient was able to Ambulate yes  -KM       Distance in Feet (Gait) 12  -KM       Pattern (Gait) step-to  -KM       Deviations/Abnormal Patterns (Gait) ataxic;base of support, narrow;gait speed decreased  -KM       Bilateral Gait Deviations forward flexed posture  -KM       Left Sided Gait Deviations weight shift ability decreased  -KM          Row Name 06/06/25 1345                 Safety Issues/Impairments Affecting Functional Mobility     Impairments Affecting Function (Mobility) balance;cognition;coordination;endurance/activity tolerance;pain;strength  -KM          Row Name 06/06/25 1345                 Motor Skills     Comments, Therapeutic Exercise seated ther-ex  -KM       Additional Documentation Comments, Therapeutic Exercise (Row)  -KM          Row Name                      Wound 05/26/25 1618 Left posterior heel Pressure Injury     Wound - Properties Group Placement Date: 05/26/25  -CW Placement Time: 1618  -CW Present on Original Admission: Y  -CW Side: Left  -CW Orientation: posterior  -CW Location: heel  -CW Primary Wound Type: Pressure Inj  -CW     Retired Wound - Properties Group Placement Date: 05/26/25  -CW Placement  Time: 1618  -CW Present on Original Admission: Y  -CW Side: Left  -CW Orientation: posterior  -CW Location: heel  -CW     Retired Wound - Properties Group Placement Date: 05/26/25  -CW Placement Time: 1618  -CW Present on Original Admission: Y  -CW Side: Left  -CW Orientation: posterior  -CW Location: heel  -CW     Retired Wound - Properties Group Date first assessed: 05/26/25  -CW Time first assessed: 1618  -CW Present on Original Admission: Y  -CW Side: Left  -CW Location: heel  -CW        Row Name                      Wound 05/26/25 1622 Left anterior heel Pressure Injury     Wound - Properties Group Placement Date: 05/26/25  -CW Placement Time: 1622  -CW Side: Left  -CW Orientation: anterior  -CW Location: heel  -CW Primary Wound Type: Pressure Inj  -CW     Retired Wound - Properties Group Placement Date: 05/26/25  -CW Placement Time: 1622  -CW Side: Left  -CW Orientation: anterior  -CW Location: heel  -CW     Retired Wound - Properties Group Placement Date: 05/26/25  -CW Placement Time: 1622  -CW Side: Left  -CW Orientation: anterior  -CW Location: heel  -CW     Retired Wound - Properties Group Date first assessed: 05/26/25  -CW Time first assessed: 1622  -CW Side: Left  -CW Location: heel  -CW        Row Name                      Wound 05/27/25 1007 medial parietal region Traumatic Abrasion     Wound - Properties Group Placement Date: 05/27/25  -LB Placement Time: 1007  -LB Present on Original Admission: Y  -LB Orientation: medial  -LB Location: parietal region  -LB Primary Wound Type: Traumatic  -LB Secondary Wound Type - Traumatic: Abrasion  -LB     Retired Wound - Properties Group Placement Date: 05/27/25  -LB Placement Time: 1007  -LB Present on Original Admission: Y  -LB Orientation: medial  -LB Location: parietal region  -LB     Retired Wound - Properties Group Placement Date: 05/27/25  -LB Placement Time: 1007  -LB Present on Original Admission: Y  -LB Orientation: medial  -LB Location: parietal region   -LB     Retired Wound - Properties Group Date first assessed: 05/27/25  -LB Time first assessed: 1007  -LB Present on Original Admission: Y  -LB Location: parietal region  -LB        Row Name                      Wound 05/27/25 1008 Right distal arm Traumatic Skin Tear     Wound - Properties Group Placement Date: 05/27/25  -LB Placement Time: 1008  -LB Present on Original Admission: Y  -LB Side: Right  -LB Orientation: distal  -LB Location: arm  -LB Primary Wound Type: Traumatic  -LB Secondary Wound Type - Traumatic: Skin Tear  -LB     Retired Wound - Properties Group Placement Date: 05/27/25  -LB Placement Time: 1008  -LB Present on Original Admission: Y  -LB Side: Right  -LB Orientation: distal  -LB Location: arm  -LB     Retired Wound - Properties Group Placement Date: 05/27/25  -LB Placement Time: 1008  -LB Present on Original Admission: Y  -LB Side: Right  -LB Orientation: distal  -LB Location: arm  -LB     Retired Wound - Properties Group Date first assessed: 05/27/25  -LB Time first assessed: 1008  -LB Present on Original Admission: Y  -LB Side: Right  -LB Location: arm  -LB        Row Name 06/06/25 1345                 Progress Summary (PT)     Daily Progress Summary (PT) Pt. was able to demonstrate functional mobility skills w/ Teddy-modA. He was able to ambulate short distance in room w/ RW. He tolerated activity well. Pt. would continue to benefit from PT services.  -KM                    User Key  (r) = Recorded By, (t) = Taken By, (c) = Cosigned By        Initials Name Provider Type     Chata Couch, RN Registered Nurse     Baljinder Reese, CARLOS Physical Therapist     Marysol Rodriguez RN Registered Nurse                             Physical Therapy Education            Title: PT OT SLP Therapies (In Progress)         Topic: Physical Therapy (Not Started)         Point: Mobility training (Not Started)         Learner Progress:  Not documented in this visit.                  Point: Home exercise  program (Not Started)         Learner Progress:  Not documented in this visit.                  Point: Body mechanics (Not Started)         Learner Progress:  Not documented in this visit.                  Point: Precautions (Not Started)         Learner Progress:  Not documented in this visit.                                              PT Recommendation and Plan  Progress Summary (PT)  Daily Progress Summary (PT): Pt. was able to demonstrate functional mobility skills w/ Teddy-modA. He was able to ambulate short distance in room w/ RW. He tolerated activity well. Pt. would continue to benefit from PT services.    Outcome Measures         Row Name 06/05/25 1251 06/05/25 1200                  How much help from another is currently needed...     Putting on and taking off regular lower body clothing? 2  -KR --       Bathing (including washing, rinsing, and drying) 2  -KR --       Toileting (which includes using toilet bed pan or urinal) 2  -KR --       Putting on and taking off regular upper body clothing 2  -KR --       Taking care of personal grooming (such as brushing teeth) 3  -KR --       Eating meals 3  -KR --       AM-PAC 6 Clicks Score (OT) 14  -KR --               Functional Assessment     Outcome Measure Options -- Modified [place];AM-PAC 6 Clicks Daily Activity (OT)  -KR                       User Key  (r) = Recorded By, (t) = Taken By, (c) = Cosigned By        Initials Name Provider Type     Dao Valentine, OT Occupational Therapist                              Time Calculation:     PT Charges         Row Name 06/06/25 7663                       Time Calculation     PT Received On 06/06/25  -KM                 Time Calculation- PT     Total Timed Code Minutes- PT 38 minute(s)  -KM                      User Key  (r) = Recorded By, (t) = Taken By, (c) = Cosigned By        Initials Name Provider Type     Baljinder Reese, PT Physical Therapist                       Therapy Charges for Today         Code  Description Service Date Service Provider Modifiers Qty     50877143135 HC PT THERAPEUTIC ACT EA 15 MIN 2025 Baljinder Martinez, PT GP 1     77156324901 HC PT THER PROC EA 15 MIN 2025 Baljinder Martinez, PT GP 1     12357649124 HC GAIT TRAINING EA 15 MIN 2025 Baljinder Martinez, PT GP 1                PT G-Codes  Outcome Measure Options: AM-PAC 6 Clicks Daily Activity (OT)  AM-PAC 6 Clicks Score (PT): 14  AM-PAC 6 Clicks Score (OT): 14     Baljinder Martinez PT                   2025                  Vanesa Tse OT   Occupational Therapist  Specialty: Occupational Therapy     Therapy Treatment Note     Signed     Date of Service: 25  Creation Time: 25     Signed       Expand All Collapse All[]Expand All by Default    Patient Name: Ramsey Riley                 : 1955                        MRN: [id number]                              Today's Date: 2025                                     Admit Date: 2025               Visit Dx:   Visit Diagnosis   No diagnosis found.     Problem List       Patient Active Problem List   Diagnosis    Coronary artery disease involving native coronary artery of native heart without angina pectoris    Essential hypertension    Bradycardia, sinus    Fatigue    Abnormal ECG    Precordial pain    LVH (left ventricular hypertrophy) due to hypertensive disease, without heart failure    Diabetes mellitus    PAD (peripheral artery disease)    Ischemic stroke    History of blood clots    Pulmonary embolus    Dyslipidemia    Class 1 obesity due to excess calories with serious comorbidity and body mass index (BMI) of 34.0 to 34.9 in adult    Weakness    Frequent falls    Physical debility         Medical History        Past Medical History:   Diagnosis Date    Arthritis      Clot      Coronary artery disease      Dementia 2024    Diabetes mellitus      Heart disease      History of blood clots      Hypertension      Impaired mobility      Low back  pain      Thyroid disease      Type 2 diabetes mellitus           Surgical History         Past Surgical History:   Procedure Laterality Date    CHOLECYSTECTOMY   2015    COLONOSCOPY   2015     dr srivastava    CORONARY ARTERY BYPASS GRAFT         [place] - 3 BY PASS     ENDOSCOPY               General Information         Row Name 06/06/25 1338                 OT Time and Intention     Subjective Information no complaints  -       Document Type therapy note (daily note)  -       Mode of Treatment individual therapy;occupational therapy  -       Patient Effort adequate  -       Symptoms Noted During/After Treatment other (see comments)  lethargic  -       Comment Patient seen for OT treatment. Patient lethargic, able to sit at edge of bed and brief functional mobility with PT. Patient returned to bed, then had some nausea/vomitting with nursing in room and aware.  -          Row Name 06/06/25 1335                 General Information     Patient Profile Reviewed yes  -       Existing Precautions/Restrictions fall  -       Barriers to Rehab medically complex;previous functional deficit;cognitive status  -          Row Name 06/06/25 1338                 Cognition     Orientation Status (Cognition) oriented to;person  -          Row Name 06/06/25 133                 Safety Issues/Impairments Affecting Functional Mobility     Safety Issues Affecting Function (Mobility) ability to follow commands;awareness of need for assistance;insight into deficits/self-awareness;judgment;problem-solving  -       Impairments Affecting Function (Mobility) balance;cognition;coordination;endurance/activity tolerance;pain;strength  -       Cognitive Impairments, Mobility Safety/Performance awareness, need for assistance;insight into deficits/self-awareness  -                       User Key  (r) = Recorded By, (t) = Taken By, (c) = Cosigned By        Initials Name Provider Type     Vanesa Aguillon, OT Occupational  Therapist                                     Mobility/ADL's         Row Name 06/06/25 1340                 Bed Mobility     Bed Mobility supine-sit;sit-supine  -       All Activities, [place] (Bed Mobility) moderate assist (50% patient effort);2 person assist  -       Assistive Device (Bed Mobility) bed rails;head of bed elevated  -          Row Name 06/06/25 1340                 Transfers     Transfers sit-stand transfer;stand-sit transfer  -          Row Name 06/06/25 1340                 Sit-Stand Transfer     Sit-Stand [place] (Transfers) minimum assist (75% patient effort);2 person assist  -       Assistive Device (Sit-Stand Transfers) walker, front-wheeled  -          Row Name 06/06/25 1340                 Stand-Sit Transfer     Stand-Sit [place] (Transfers) minimum assist (75% patient effort);2 person assist  -       Assistive Device (Stand-Sit Transfers) walker, front-wheeled  -          Row Name 06/06/25 1340                 Activities of Daily Living     BADL Assessment/Intervention grooming  -          Row Name 06/06/25 1340                 Grooming Assessment/Training     [place] Level (Grooming) grooming skills;maximum assist (25% patient effort)  hospitals       Position (Grooming) edge of bed sitting  -                       User Key  (r) = Recorded By, (t) = Taken By, (c) = Cosigned By        Initials Name Provider Type     Vanesa Aguillon OT Occupational Therapist                            Obj/Interventions         Row Name 06/06/25 1341                 Motor Skills     Motor Skills functional endurance  -       Functional Endurance fair minus  -          Row Name 06/06/25 1341                 Balance     Balance Assessment sitting static balance  -       Static Sitting Balance minimal assist  -       Balance Interventions sitting;static;dynamic;minimal challenge;occupation based/functional task  -       Comment, Balance initial minimal/moderate  assist to maintain sitting balance with patient disoriented and fatigued, improved to contact guard assist  -                       User Key  (r) = Recorded By, (t) = Taken By, (c) = Cosigned By        Initials Name Provider Type     Vanesa Aguillon OT Occupational Therapist                            Goals/Plan    No documentation.                        Clinical Impression         Row Name 06/06/25 1342                 Pain Assessment     Additional Documentation Pain Scale: FACES Pre/Post-Treatment (Group)  -[facility] Name 06/06/25 1342                 Pain Scale: FACES Pre/Post-Treatment     Pain: FACES Scale, Pretreatment 0-->no hurt  -KP       Posttreatment Pain Rating 0-->no hurt  -KP          Row Name 06/06/25 1342                 Plan of Care Review     Plan of Care Reviewed With patient  -       Progress no change  -       Outcome Evaluation Patient seen for OT treatment. He was able to participate but increased lethargy noted requiring increased assist and curing on this date. Continue plan of care.  -          Row Name 06/06/25 1342                 Therapy Plan Review/Discharge Plan (OT)     Anticipated Discharge Disposition (OT) extended care facility  -[facility] Name 06/06/25 1342                 Positioning and Restraints     Pre-Treatment Position in bed  -       Post Treatment Position bed  -KP       In Bed fowlers;call light within reach;encouraged to call for assist;exit alarm on;with nsg  -                       User Key  (r) = Recorded By, (t) = Taken By, (c) = Cosigned By        Initials Name Provider Type     Vanesa Aguillon OT Occupational Therapist                            Outcome Measures         Row Name 06/06/25 1343                 How much help from another is currently needed...     Putting on and taking off regular lower body clothing? 2  -KP       Bathing (including washing, rinsing, and drying) 2  -KP       Toileting (which includes using toilet bed  pan or urinal) 2  -KP       Putting on and taking off regular upper body clothing 2  -KP       Taking care of personal grooming (such as brushing teeth) 3  -KP       Eating meals 3  -KP       AM-PAC 6 Clicks Score (OT) 14  -          Row Name 06/06/25 0955                 How much help from another person do you currently need...     Turning from your back to your side while in flat bed without using bedrails? 4  -MH       Moving from lying on back to sitting on the side of a flat bed without bedrails? 3  -MH       Moving to and from a bed to a chair (including a wheelchair)? 2  -MH       Standing up from a chair using your arms (e.g., wheelchair, bedside chair)? 2  -MH       Climbing 3-5 steps with a railing? 1  -MH       To walk in hospital room? 2  -MH       AM-PAC 6 Clicks Score (PT) 14  -          Row Name 06/06/25 1344                 Functional Assessment     Outcome Measure Options AM-PAC 6 Clicks Daily Activity (OT)  -                       User Key  (r) = Recorded By, (t) = Taken By, (c) = Cosigned By        Initials Name Provider Type     Vanesa Aguillon OT Occupational Therapist     Jackie Javier RN Registered Nurse                                      OT Recommendation and Plan  Plan of Care Review  Plan of Care Reviewed With: patient  Progress: no change  Outcome Evaluation: Patient seen for OT treatment. He was able to participate but increased lethargy noted requiring increased assist and curing on this date. Continue plan of care.      Time Calculation:        Time Calculation- OT         Row Name 06/06/25 1344                       Time Calculation- OT     OT Received On 06/06/25  -                      User Key  (r) = Recorded By, (t) = Taken By, (c) = Cosigned By        Initials Name Provider Type     Vanesa Aguillon OT Occupational Therapist                       Therapy Charges for Today         Code Description Service Date Service Provider Modifiers Qty     73310711454  OT  THERAPEUTIC ACT EA 15 MIN 6/6/2025 Vanesa Tse OT GO 1     04614790279 HC OT SELF CARE/MGMT/TRAIN EA 15 MIN 6/6/2025 Vanesa Tse OT GO 1                   Vanesa Tse OT                   6/6/2025               Radha Tse MSW     Case Management     Case Management/Social Work     Signed     Date of Service: 06/06/25 1021  Creation Time: 06/06/25 1021     Signed         Discharge Planning Assessment   Ilya     Patient Name: Ramsey Riley                      MRN: [id number]  Today's Date: 6/6/2025                  Admit Date: 6/4/2025          Discharge Plan         Row Name 06/06/25 1021           Plan     Plan Pt was admitted to swing bed on 6/4 to continue therapy. Pt has a sitter at bedside. Pt lives with spouse, Jina and she plans for pt to return home at discharge. Pt does not utilize home health services. Pt has a rolling walker, wheelchair, and scooter via unknown provider. PCP is Margret Purvis. Pt does not have a POA or living will on file. SS to follow and assist as needed with discharge planning.                       TAN Buchanan

## 2025-06-10 LAB
25(OH)D3 SERPL-MCNC: 18 NG/ML (ref 30–100)
GLUCOSE BLDC GLUCOMTR-MCNC: 164 MG/DL (ref 70–130)
GLUCOSE BLDC GLUCOMTR-MCNC: 193 MG/DL (ref 70–130)
GLUCOSE BLDC GLUCOMTR-MCNC: 94 MG/DL (ref 70–130)
GLUCOSE BLDC GLUCOMTR-MCNC: 96 MG/DL (ref 70–130)

## 2025-06-10 PROCEDURE — 63710000001 INSULIN LISPRO PROTAMINE-INSULIN LISPRO (75-25) 100 UNIT/ML SUSPENSION: Performed by: STUDENT IN AN ORGANIZED HEALTH CARE EDUCATION/TRAINING PROGRAM

## 2025-06-10 PROCEDURE — 82948 REAGENT STRIP/BLOOD GLUCOSE: CPT

## 2025-06-10 PROCEDURE — 97530 THERAPEUTIC ACTIVITIES: CPT

## 2025-06-10 PROCEDURE — 82306 VITAMIN D 25 HYDROXY: CPT | Performed by: INTERNAL MEDICINE

## 2025-06-10 PROCEDURE — 97116 GAIT TRAINING THERAPY: CPT

## 2025-06-10 PROCEDURE — 99232 SBSQ HOSP IP/OBS MODERATE 35: CPT | Performed by: INTERNAL MEDICINE

## 2025-06-10 RX ADMIN — Medication 1 APPLICATION: at 21:25

## 2025-06-10 RX ADMIN — Medication 1 APPLICATION: at 08:23

## 2025-06-10 RX ADMIN — OFLOXACIN 50000 UNITS: 300 TABLET, COATED ORAL at 08:28

## 2025-06-10 RX ADMIN — LOSARTAN POTASSIUM 100 MG: 50 TABLET, FILM COATED ORAL at 08:22

## 2025-06-10 RX ADMIN — FAMOTIDINE 20 MG: 20 TABLET ORAL at 08:22

## 2025-06-10 RX ADMIN — APIXABAN 5 MG: 5 TABLET, FILM COATED ORAL at 08:22

## 2025-06-10 RX ADMIN — QUETIAPINE FUMARATE 100 MG: 100 TABLET ORAL at 21:21

## 2025-06-10 RX ADMIN — MIRTAZAPINE 30 MG: 15 TABLET, FILM COATED ORAL at 21:21

## 2025-06-10 RX ADMIN — Medication 10 ML: at 08:23

## 2025-06-10 RX ADMIN — Medication 10 ML: at 21:22

## 2025-06-10 RX ADMIN — DOCUSATE SODIUM 50MG AND SENNOSIDES 8.6MG 2 TABLET: 8.6; 5 TABLET, FILM COATED ORAL at 08:22

## 2025-06-10 RX ADMIN — AMLODIPINE BESYLATE 10 MG: 10 TABLET ORAL at 08:22

## 2025-06-10 RX ADMIN — APIXABAN 5 MG: 5 TABLET, FILM COATED ORAL at 21:21

## 2025-06-10 RX ADMIN — EMPAGLIFLOZIN 10 MG: 10 TABLET, FILM COATED ORAL at 08:22

## 2025-06-10 RX ADMIN — HYDROCODONE BITARTRATE AND ACETAMINOPHEN 1 TABLET: 7.5; 325 TABLET ORAL at 07:55

## 2025-06-10 RX ADMIN — INSULIN LISPRO 20 UNITS: 100 INJECTION, SUSPENSION SUBCUTANEOUS at 17:55

## 2025-06-10 RX ADMIN — TIMOLOL MALEATE: 5 SOLUTION OPHTHALMIC at 21:22

## 2025-06-10 RX ADMIN — TIMOLOL MALEATE: 5 SOLUTION OPHTHALMIC at 08:23

## 2025-06-10 RX ADMIN — LEVOTHYROXINE SODIUM 137.5 MCG: 0.12 TABLET ORAL at 08:22

## 2025-06-10 RX ADMIN — FAMOTIDINE 20 MG: 20 TABLET ORAL at 21:21

## 2025-06-10 RX ADMIN — PANTOPRAZOLE SODIUM 40 MG: 40 TABLET, DELAYED RELEASE ORAL at 08:22

## 2025-06-10 NOTE — THERAPY TREATMENT NOTE
Acute Care - Physical Therapy Treatment Note   Ilya     Patient Name: Ramsey Riley  : 1955  MRN: [id number]  Today's Date: 6/10/2025   Onset of Illness/Injury or Date of Surgery: 25 (admission date)  Visit Dx:   No diagnosis found.  Patient Active Problem List   Diagnosis    Coronary artery disease involving native coronary artery of native heart without angina pectoris    Essential hypertension    Bradycardia, sinus    Fatigue    Abnormal ECG    Precordial pain    LVH (left ventricular hypertrophy) due to hypertensive disease, without heart failure    Diabetes mellitus    PAD (peripheral artery disease)    Ischemic stroke    History of blood clots    Pulmonary embolus    Dyslipidemia    Class 1 obesity due to excess calories with serious comorbidity and body mass index (BMI) of 34.0 to 34.9 in adult    Weakness    Frequent falls    Physical debility     Past Medical History:   Diagnosis Date    Arthritis     Clot     Coronary artery disease     Dementia 2024    Diabetes mellitus     Heart disease     History of blood clots     Hypertension     Impaired mobility     Low back pain     Thyroid disease     Type 2 diabetes mellitus      Past Surgical History:   Procedure Laterality Date    CHOLECYSTECTOMY      COLONOSCOPY      dr srivastava    CORONARY ARTERY BYPASS GRAFT      [place] -  BY PASS     ENDOSCOPY       PT Assessment (Last 12 Hours)       PT Evaluation and Treatment       Row Name 06/10/25 [facility]          Physical Therapy Time and Intention    Symptoms Noted During/After Treatment fatigue  -KM       Row Name 06/10/25 1351          General Information    Patient Observations alert;cooperative;agree to therapy  -KM     Limitations/Impairments safety/cognitive  -KM       Row Name 06/10/25 135          Pain    Pretreatment Pain Rating 0/10 - no pain  -KM     Posttreatment Pain Rating 0/10 - no pain  -KM       Row Name 06/10/25 1356          Cognition    Affect/Mental Status  (Cognition) confused  -KM     Follows Commands (Cognition) follows one-step commands  -KM       Row Name 06/10/25 1357          Bed Mobility    Bed Mobility bed mobility (all) activities  -KM     All Activities, [place] (Bed Mobility) moderate assist (50% patient effort)  -KM     Assistive Device (Bed Mobility) bed rails;head of bed elevated  -KM       Row Name 06/10/25 1357          Transfers    Transfers sit-stand transfer;stand-sit transfer;bed-chair transfer  -KM       Row Name 06/10/25 1357          Bed-Chair Transfer    Bed-Chair [place] (Transfers) minimum assist (75% patient effort)  -KM     Assistive Device (Bed-Chair Transfers) walker, front-wheeled  -KM       Row Name 06/10/25 1357          Sit-Stand Transfer    Sit-Stand [place] (Transfers) minimum assist (75% patient effort)  -KM     Assistive Device (Sit-Stand Transfers) walker, front-wheeled  -KM       Row Name 06/10/25 [facility]7          Stand-Sit Transfer    Stand-Sit [place] (Transfers) minimum assist (75% patient effort)  -KM     Assistive Device (Stand-Sit Transfers) walker, front-wheeled  -KM       Row Name 06/10/25 1357          Gait/Stairs (Locomotion)    Gait/Stairs Locomotion gait/ambulation independence;gait/ambulation assistive device;distance ambulated  -KM     [place] Level (Gait) minimum assist (75% patient effort)  -KM     Assistive Device (Gait) walker, front-wheeled  -KM     Patient was able to Ambulate yes  -KM     Distance in Feet (Gait) 80  -KM     Pattern (Gait) step-to  -KM     Deviations/Abnormal Patterns (Gait) ataxic;base of support, narrow;gait speed decreased  -KM     Bilateral Gait Deviations forward flexed posture  -KM     Left Sided Gait Deviations weight shift ability decreased  -KM       Row Name             Wound 05/26/25 1618 Left posterior heel Pressure Injury    Wound - Properties Group Placement Date: 05/26/25  -CW Placement Time: 1618 -CW Present on Original Admission: Y  -CW Side: Left  -CW  Orientation: posterior  -CW Location: heel  -CW Primary Wound Type: Pressure Inj  -CW    Retired Wound - Properties Group Placement Date: 05/26/25  -CW Placement Time: 1618  -CW Present on Original Admission: Y  -CW Side: Left  -CW Orientation: posterior  -CW Location: heel  -CW    Retired Wound - Properties Group Placement Date: 05/26/25  -CW Placement Time: 1618  -CW Present on Original Admission: Y  -CW Side: Left  -CW Orientation: posterior  -CW Location: heel  -CW    Retired Wound - Properties Group Date first assessed: 05/26/25  -CW Time first assessed: 1618  -CW Present on Original Admission: Y  -CW Side: Left  -CW Location: heel  -CW      Row Name             Wound 05/26/25 1622 Left anterior heel Pressure Injury    Wound - Properties Group Placement Date: 05/26/25  -CW Placement Time: 1622  -CW Side: Left  -CW Orientation: anterior  -CW Location: heel  -CW Primary Wound Type: Pressure Inj  -CW    Retired Wound - Properties Group Placement Date: 05/26/25  -CW Placement Time: 1622  -CW Side: Left  -CW Orientation: anterior  -CW Location: heel  -CW    Retired Wound - Properties Group Placement Date: 05/26/25  -CW Placement Time: 1622  -CW Side: Left  -CW Orientation: anterior  -CW Location: heel  -CW    Retired Wound - Properties Group Date first assessed: 05/26/25  -CW Time first assessed: 1622  -CW Side: Left  -CW Location: heel  -CW      Row Name             Wound 05/27/25 1007 medial parietal region Traumatic Abrasion    Wound - Properties Group Placement Date: 05/27/25  -LB Placement Time: 1007  -LB Present on Original Admission: Y  -LB Orientation: medial  -LB Location: parietal region  -LB Primary Wound Type: Traumatic  -LB Secondary Wound Type - Traumatic: Abrasion  -LB    Retired Wound - Properties Group Placement Date: 05/27/25  -LB Placement Time: 1007  -LB Present on Original Admission: Y  -LB Orientation: medial  -LB Location: parietal region  -LB    Retired Wound - Properties Group Placement  Date: 05/27/25  -LB Placement Time: 1007  -LB Present on Original Admission: Y  -LB Orientation: medial  -LB Location: parietal region  -LB    Retired Wound - Properties Group Date first assessed: 05/27/25  -LB Time first assessed: 1007  -LB Present on Original Admission: Y  -LB Location: parietal region  -LB      Row Name             Wound 05/27/25 1008 Right distal arm Traumatic Skin Tear    Wound - Properties Group Placement Date: 05/27/25  -LB Placement Time: 1008  -LB Present on Original Admission: Y  -LB Side: Right  -LB Orientation: distal  -LB Location: arm  -LB Primary Wound Type: Traumatic  -LB Secondary Wound Type - Traumatic: Skin Tear  -LB    Retired Wound - Properties Group Placement Date: 05/27/25  -LB Placement Time: 1008  -LB Present on Original Admission: Y  -LB Side: Right  -LB Orientation: distal  -LB Location: arm  -LB    Retired Wound - Properties Group Placement Date: 05/27/25  -LB Placement Time: 1008  -LB Present on Original Admission: Y  -LB Side: Right  -LB Orientation: distal  -LB Location: arm  -LB    Retired Wound - Properties Group Date first assessed: 05/27/25  -LB Time first assessed: 1008  -LB Present on Original Admission: Y  -LB Side: Right  -LB Location: arm  -LB      Row Name 06/10/25 1357          Progress Summary (PT)    Daily Progress Summary (PT) Pt. was able to demonstrate improved functional mobility skills during PT session. He was able to improve ambulation distance and quality. He tolerated increased activity. Pt. would continue to benefit from PT services.  -KM               User Key  (r) = Recorded By, (t) = Taken By, (c) = Cosigned By      Initials Name Provider Type    Chata Couch, RN Registered Nurse    Baljinder Reese, PT Physical Therapist    LB Marysol Nix, RN Registered Nurse                    Physical Therapy Education       Title: PT OT SLP Therapies (In Progress)       Topic: Physical Therapy (Not Started)       Point: Mobility training (Not  Started)       Learner Progress:  Not documented in this visit.              Point: Home exercise program (Not Started)       Learner Progress:  Not documented in this visit.              Point: Body mechanics (Not Started)       Learner Progress:  Not documented in this visit.              Point: Precautions (Not Started)       Learner Progress:  Not documented in this visit.                                  PT Recommendation and Plan     Progress Summary (PT)  Daily Progress Summary (PT): Pt. was able to demonstrate improved functional mobility skills during PT session. He was able to improve ambulation distance and quality. He tolerated increased activity. Pt. would continue to benefit from PT services.       Time Calculation:    PT Charges       Row Name 06/10/25 1356             Time Calculation    PT Received On 06/10/25  -KM         Time Calculation- PT    Total Timed Code Minutes- PT 23 minute(s)  -KM                User Key  (r) = Recorded By, (t) = Taken By, (c) = Cosigned By      Initials Name Provider Type    Baljinder Reese, PT Physical Therapist                  Therapy Charges for Today       Code Description Service Date Service Provider Modifiers Qty    01700523331 HC PT THERAPEUTIC ACT EA 15 MIN 6/9/2025 Baljinder Martinez, PT GP 1    45002491347 HC GAIT TRAINING EA 15 MIN 6/9/2025 Baljinder Martinez, PT GP 1    24916602286 HC PT THERAPEUTIC ACT EA 15 MIN 6/10/2025 Baljinder Martinez, PT GP 1    42347692545 HC GAIT TRAINING EA 15 MIN 6/10/2025 Baljinder Martinez, PT GP 1            PT G-Codes  Outcome Measure Options: AM-PAC 6 Clicks Daily Activity (OT)  AM-PAC 6 Clicks Score (PT): 12  AM-PAC 6 Clicks Score (OT): 14    Baljinder Martinez PT  6/10/2025

## 2025-06-10 NOTE — PLAN OF CARE
Goal Outcome Evaluation:                    Patient resting in bed at this time .

## 2025-06-10 NOTE — PROGRESS NOTES
[facility] HOSPITALIST PROGRESS NOTE     Patient Identification:  Name:  Ramsey Riley  Age:  70 y.o.  Sex:  male  :  1955  MRN:  [id number]  Visit Number:  [id number]  ROOM: [address] [place] [address]     Primary Care Provider:  Margret Purvis MD    Length of stay:  6    Subjective     CC: Decondition swing bed for rehab    Patient seen and examined today reviewed his condition discussed the case.  No reported agitation overnight at this time.      Objective     Current Hospital Meds:amLODIPine, 10 mg, Oral, Daily  apixaban, 5 mg, Oral, Q12H  castor oil-balsam peru, 1 Application, Topical, Q12H  cholecalciferol, 50,000 Units, Oral, Q7 Days  dorzolamide (TRUSOPT) 2 % 1 drop, timolol (TIMOPTIC) 0.5 % 1 drop for Cosopt 22.3-6.8 mg/mL, , Both Eyes, BID  empagliflozin, 10 mg, Oral, Daily  famotidine, 20 mg, Oral, BID  insulin lispro protamine-insulin lispro, 20 Units, Subcutaneous, BID With Meals  levothyroxine, 137.5 mcg, Oral, QAM AC  losartan, 100 mg, Oral, Daily  mirtazapine, 30 mg, Oral, Nightly  pantoprazole, 40 mg, Oral, QAM AC  QUEtiapine, 100 mg, Oral, Nightly  senna-docusate sodium, 2 tablet, Oral, BID  sodium chloride, 10 mL, Intravenous, Q12H  [START ON 2025] tuberculin, 5 Units, Intradermal, Once       ----------------------------------------------------------------------------------------------------------------------  Vital Signs:  Temp:  [97.6 °F (36.4 °C)-97.9 °F (36.6 °C)] 97.6 °F (36.4 °C)  Heart Rate:  [61-72] 71  Resp:  [16-20] 20  BP: (117-187)/(71-86) 147/81     on   ;   Device (Oxygen Therapy): room air  Body mass index is 30.05 kg/m².    Wt Readings from Last 3 Encounters:   25 97.7 kg (215 lb 7 oz)   25 97.7 kg (215 lb 7 oz)   25 104 kg (230 lb)     Intake/Output Summary (Last 24 hours) at 6/10/2025 1327  Last data filed at 2025 1849  Gross per 24 hour   Intake --   Output 700 ml   Net -700 ml     Diet: Cardiac, Diabetic; Healthy Heart (2-3 Na+); Consistent  Carbohydrate; Fluid Consistency: Thin (IDDSI 0)  ----------------------------------------------------------------------------------------------------------------------      Physical exam:  Constitutional:  Well-developed and well-nourished.  No respiratory distress.      HENT:  Head:  Normocephalic and atraumatic.  Mouth:  Moist mucous membranes.    Eyes:  Conjunctivae and EOM are normal.  Pupils are equal, round, and reactive to light.  No scleral icterus.    Neck:  Neck supple.  No JVD present.    Cardiovascular:  Normal rate, regular rhythm and normal heart sounds with no murmur.  Pulmonary/Chest:  No respiratory distress, no wheezes, no crackles, with normal breath sounds and good air movement.  Abdominal:  Soft.  Bowel sounds are normal.  No distension and no tenderness.   Musculoskeletal:  No edema, no tenderness, and no deformity.  No red or swollen joints anywhere.    Neurological: Proximal muscle weakness, conscious oriented x 3, nonlateralizing gross neuroexam,   Peripheral vascular:  Strong pulses in all 4 extremities with no clubbing, no cyanosis, no edema.        ----------------------------------------------------------------------------------------------------------------------    Results from last 7 days   Lab Units 06/09/25  0139   SODIUM mmol/L 134*   POTASSIUM mmol/L 4.2   CHLORIDE mmol/L 100   CO2 mmol/L 20.7*   BUN mg/dL 20.4   CREATININE mg/dL 1.28*   CALCIUM mg/dL 8.6   GLUCOSE mg/dL 150*   Estimated Creatinine Clearance: 65.9 mL/min (A) (by C-G formula based on SCr of 1.28 mg/dL (H)).      Glucose   Date/Time Value Ref Range Status   06/10/2025 1016 96 70 - 130 mg/dL Final   06/10/2025 0729 94 70 - 130 mg/dL Final   06/09/2025 2043 137 (H) 70 - 130 mg/dL Final   06/09/2025 1650 161 (H) 70 - 130 mg/dL Final   06/09/2025 1050 99 70 - 130 mg/dL Final   06/09/2025 0717 103 70 - 130 mg/dL Final   06/08/2025 2103 120 70 - 130 mg/dL Final   06/08/2025 1614 115 70 - 130 mg/dL Final     No results  "found for: \"AMMONIA\"    Results from last 7 days   Lab Units 06/04/25  1740   NITRITE UA  Negative   WBC UA /HPF 3-5*   BACTERIA UA /HPF 3+*   SQUAM EPITHEL UA /HPF 0-2     No results found for: \"BLOODCX\"No results found for: \"RESPCX\"No results found for: \"URINECX\"No results found for: \"WOUNDCX\"No results found for: \"BODYFLDCX\"No results found for: \"STOOLCX\"  I have personally looked at the labs and they are summarized above.  ----------------------------------------------------------------------------------------------------------------------  Imaging Results (Last 24 Hours)       ** No results found for the last 24 hours. **          I have personally reviewed the radiology images and read the final radiology report.          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  Will continue with current dose of quetiapine and mirtazapine nightly.  Keep off citalopram for now.    -- Continue with quetiapine current dose, add mirtazapine 30 mg nightly, DC  citalopram and monitor mentation  -- Start vitamin D replacement  -- Aggressive PT OT  -- Keep off statin  -- Continue all other therapies          Lisbeth Gonzalez MD  06/10/25  13:27 EDT    "

## 2025-06-10 NOTE — PLAN OF CARE
Goal Outcome Evaluation:              Outcome Evaluation: Patient has rested in bed this shift, PRN medication given for pain and anxiety, RA, Wound care completed per order, VSS, sitter at bedside, Will continue plan of care.

## 2025-06-10 NOTE — THERAPY TREATMENT NOTE
"Subjective:       Patient ID: Nacho Pond is a 55 y.o. male.    Vitals:  height is 5' 10" (1.778 m) and weight is 86.2 kg (190 lb). His temperature is 98.1 °F (36.7 °C). His blood pressure is 138/89 and his pulse is 60. His respiration is 16 and oxygen saturation is 96%.     Chief Complaint: COVID-19 Concerns    Pt states he has had a headache. Pt states today tested positive for covid antibodies igg and igm at [facility].     Other  This is a new problem. The current episode started today. Pertinent negatives include no arthralgias, chest pain, chills, congestion, coughing, fatigue, fever, headaches, joint swelling, myalgias, nausea, rash, sore throat, vertigo or vomiting. Nothing aggravates the symptoms. He has tried nothing for the symptoms.       Constitution: Negative for chills, fatigue and fever.   HENT: Negative for congestion and sore throat.    Neck: Negative for painful lymph nodes.   Cardiovascular: Negative for chest pain and leg swelling.   Eyes: Negative for double vision and blurred vision.   Respiratory: Negative for cough and shortness of breath.    Gastrointestinal: Negative for nausea, vomiting and diarrhea.   Genitourinary: Negative for dysuria, frequency and urgency.   Musculoskeletal: Negative for joint pain, joint swelling, muscle cramps and muscle ache.   Skin: Negative for color change, pale and rash.   Allergic/Immunologic: Negative for seasonal allergies.   Neurological: Negative for dizziness, history of vertigo, light-headedness, passing out and headaches.   Hematologic/Lymphatic: Negative for swollen lymph nodes, easy bruising/bleeding and history of blood clots. Does not bruise/bleed easily.   Psychiatric/Behavioral: Negative for nervous/anxious, sleep disturbance and depression. The patient is not nervous/anxious.        Objective:      Physical Exam   Constitutional: He is oriented to person, place, and time.  Non-toxic appearance. He does not appear ill. No distress.   HENT: " Patient Name: Ramsey Riley  : 1955    MRN: [id number]                              Today's Date: 6/10/2025       Admit Date: 2025    Visit Dx: No diagnosis found.  Patient Active Problem List   Diagnosis    Coronary artery disease involving native coronary artery of native heart without angina pectoris    Essential hypertension    Bradycardia, sinus    Fatigue    Abnormal ECG    Precordial pain    LVH (left ventricular hypertrophy) due to hypertensive disease, without heart failure    Diabetes mellitus    PAD (peripheral artery disease)    Ischemic stroke    History of blood clots    Pulmonary embolus    Dyslipidemia    Class 1 obesity due to excess calories with serious comorbidity and body mass index (BMI) of 34.0 to 34.9 in adult    Weakness    Frequent falls    Physical debility     Past Medical History:   Diagnosis Date    Arthritis     Clot     Coronary artery disease     Dementia 2024    Diabetes mellitus     Heart disease     History of blood clots     Hypertension     Impaired mobility     Low back pain     Thyroid disease     Type 2 diabetes mellitus      Past Surgical History:   Procedure Laterality Date    CHOLECYSTECTOMY      COLONOSCOPY      dr srivastava    CORONARY ARTERY BYPASS GRAFT      [place] - 3 BY PASS     ENDOSCOPY        General Information       Row Name 06/10/25 5363          OT Time and Intention    Subjective Information no complaints  Simultaneous filing. User may be unaware of other data.  -     Document Type therapy note (daily note)  Simultaneous filing. User may be unaware of other data.  -     Mode of Treatment individual therapy;occupational therapy  Simultaneous filing. User may be unaware of other data.  -     Patient Effort good  Simultaneous filing. User may be unaware of other data.  -     Comment Patient agreeable to therapy.  -       Row Name 06/10/25 4719          General Information    Patient Profile Reviewed yes  Simultaneous    Head: Normocephalic.   Nose: Mucosal edema and rhinorrhea present. No purulent discharge. Right sinus exhibits no maxillary sinus tenderness and no frontal sinus tenderness. Left sinus exhibits no maxillary sinus tenderness and no frontal sinus tenderness.   Mouth/Throat: Uvula is midline, oropharynx is clear and moist and mucous membranes are normal. No tonsillar exudate.   Pulmonary/Chest: Effort normal. No respiratory distress.   Neurological: He is alert and oriented to person, place, and time.   Skin: Skin is not diaphoretic. Psychiatric: His behavior is normal.   Nursing note and vitals reviewed.        Results for orders placed or performed in visit on 11/16/20   POCT COVID-19 Rapid Screening   Result Value Ref Range    POC Rapid COVID Negative Negative     Acceptable Yes          Assessment:       1. Vomiting, intractability of vomiting not specified, presence of nausea not specified, unspecified vomiting type        Plan:         Vomiting, intractability of vomiting not specified, presence of nausea not specified, unspecified vomiting type  -     POCT COVID-19 Rapid Screening              filing. User may be unaware of other data.  -     Existing Precautions/Restrictions fall  Simultaneous filing. User may be unaware of other data.  -     Barriers to Rehab medically complex;previous functional deficit;cognitive status  -       Row Name 06/10/25 1358          Cognition    Orientation Status (Cognition) oriented to;person;place  Simultaneous filing. User may be unaware of other data.  -       Row Name 06/10/25 1358          Safety Issues/Impairments Affecting Functional Mobility    Safety Issues Affecting Function (Mobility) awareness of need for assistance;insight into deficits/self-awareness  -     Impairments Affecting Function (Mobility) balance;cognition;coordination;endurance/activity tolerance;pain;strength  Simultaneous filing. User may be unaware of other data.  -               User Key  (r) = Recorded By, (t) = Taken By, (c) = Cosigned By      Initials Name Provider Type     Vanesa Tse, OT Occupational Therapist                     Mobility/ADL's       Row Name 06/10/25 1356          Transfers    Transfers sit-stand transfer;stand-sit transfer;bed-chair transfer  -     Comment, (Transfers) cues required throughout due to decreased vision and cognition (although improved)  -       Row Name 06/10/25 1355          Bed-Chair Transfer    Bed-Chair [place] (Transfers) minimum assist (75% patient effort)  -     Assistive Device (Bed-Chair Transfers) walker, front-wheeled  -       Row Name 06/10/25 1359          Sit-Stand Transfer    Sit-Stand [place] (Transfers) minimum assist (75% patient effort);1 person assist  -     Assistive Device (Sit-Stand Transfers) walker, front-wheeled  -       Row Name 06/10/25 1354          Stand-Sit Transfer    Stand-Sit [place] (Transfers) minimum assist (75% patient effort)  -     Assistive Device (Stand-Sit Transfers) walker, front-wheeled  -       Row Name 06/10/25 135          Self-Feeding Assessment/Training     [place] Level (Feeding) feeding skills;minimum assist (75% patient effort);verbal cues;nonverbal cues (demo/gesture)  -               User Key  (r) = Recorded By, (t) = Taken By, (c) = Cosigned By      Initials Name Provider Type    Vanesa Aguillon OT Occupational Therapist                   Obj/Interventions       Row Name 06/10/25 1358          Motor Skills    Motor Skills functional endurance  -KP     Functional Endurance fair  -               User Key  (r) = Recorded By, (t) = Taken By, (c) = Cosigned By      Initials Name Provider Type    Vanesa Aguillon OT Occupational Therapist                   Goals/Plan    No documentation.                  Clinical Impression       Row Name 06/10/25 1402          Pain Assessment    Pretreatment Pain Rating 0/10 - no pain  -KP     Posttreatment Pain Rating 0/10 - no pain  -KP       Row Name 06/10/25 1402          Plan of Care Review    Plan of Care Reviewed With patient  -     Progress no change  -     Outcome Evaluation Patient seen for OT treatment. Improved functional mobility, but still requires consistent cues related to decreased vision and cognition. Continue plan of care.  -       Row Name 06/10/25 1402          Positioning and Restraints    Pre-Treatment Position in bed  -KP     Post Treatment Position chair  -     In Chair sitting;call light within reach;encouraged to call for assist;exit alarm on;with other staff  -               User Key  (r) = Recorded By, (t) = Taken By, (c) = Cosigned By      Initials Name Provider Type    Vanesa Aguillon OT Occupational Therapist                   Outcome Measures       Row Name 06/10/25 1402          How much help from another is currently needed...    Putting on and taking off regular lower body clothing? 3  -KP     Bathing (including washing, rinsing, and drying) 3  -KP     Toileting (which includes using toilet bed pan or urinal) 2  -KP     Putting on and taking off regular upper body  clothing 3  -KP     Taking care of personal grooming (such as brushing teeth) 3  -KP     Eating meals 3  -KP     AM-PAC 6 Clicks Score (OT) 17  -KP       Row Name 06/10/25 0822          How much help from another person do you currently need...    Turning from your back to your side while in flat bed without using bedrails? 3  -RH     Moving from lying on back to sitting on the side of a flat bed without bedrails? 2  -RH     Moving to and from a bed to a chair (including a wheelchair)? 2  -RH     Standing up from a chair using your arms (e.g., wheelchair, bedside chair)? 2  -RH     Climbing 3-5 steps with a railing? 1  -RH     To walk in hospital room? 2  -RH     AM-PAC 6 Clicks Score (PT) 12  -       Row Name 06/10/25 1402          Functional Assessment    Outcome Measure Options AM-PAC 6 Clicks Daily Activity (OT)  -               User Key  (r) = Recorded By, (t) = Taken By, (c) = Cosigned By      Initials Name Provider Type    Vanesa Aguillon OT Occupational Therapist     Niecy Hughes RN Registered Nurse                      OT Recommendation and Plan     Plan of Care Review  Plan of Care Reviewed With: patient  Progress: no change  Outcome Evaluation: Patient seen for OT treatment. Improved functional mobility, but still requires consistent cues related to decreased vision and cognition. Continue plan of care.     Time Calculation:         Time Calculation- OT       Row Name 06/10/25 1403             Time Calculation- OT    Total Timed Code Minutes- OT 17 minute(s)  -      OT Received On 06/10/25  -                User Key  (r) = Recorded By, (t) = Taken By, (c) = Cosigned By      Initials Name Provider Type    Vanesa Aguillon OT Occupational Therapist                  Therapy Charges for Today       Code Description Service Date Service Provider Modifiers Qty    27974053109  OT THERAPEUTIC ACT EA 15 MIN 6/10/2025 Vanesa Tse OT GO 1                 Vanesa Tse OT  6/10/2025

## 2025-06-10 NOTE — THERAPY TREATMENT NOTE
Acute Care - Occupational Therapy Treatment Note   Ilya     Patient Name: Ramsey Riley  : 1955  MRN: [id number]  Today's Date: 6/10/2025  Onset of Illness/Injury or Date of Surgery: 25 (admission date)          Admit Date: 2025     No diagnosis found.  Patient Active Problem List   Diagnosis    Coronary artery disease involving native coronary artery of native heart without angina pectoris    Essential hypertension    Bradycardia, sinus    Fatigue    Abnormal ECG    Precordial pain    LVH (left ventricular hypertrophy) due to hypertensive disease, without heart failure    Diabetes mellitus    PAD (peripheral artery disease)    Ischemic stroke    History of blood clots    Pulmonary embolus    Dyslipidemia    Class 1 obesity due to excess calories with serious comorbidity and body mass index (BMI) of 34.0 to 34.9 in adult    Weakness    Frequent falls    Physical debility     Past Medical History:   Diagnosis Date    Arthritis     Clot     Coronary artery disease     Dementia 2024    Diabetes mellitus     Heart disease     History of blood clots     Hypertension     Impaired mobility     Low back pain     Thyroid disease     Type 2 diabetes mellitus      Past Surgical History:   Procedure Laterality Date    CHOLECYSTECTOMY      COLONOSCOPY      dr srivastava    CORONARY ARTERY BYPASS GRAFT      [place] -  BY PASS     ENDOSCOPY        25 1415   OT Time and Intention   Subjective Information no complaints   Document Type therapy note (daily note)   Mode of Treatment occupational therapy   Patient Effort good   Comment Patient seen this date for fxl mobility and adl retraining.  Patient required min assist with rw for fxl transfer to/from chair or BSC. Spouse present and supportive.   General Information   Existing Precautions/Restrictions fall   Limitations/Impairments safety/cognitive   Pain Assessment   Pretreatment Pain Rating 0/10 - no pain   Posttreatment Pain Rating  0/10 - no pain   Cognition   Affect/Mental Status (Cognition) confused   Bathing Assessment/Intervention   [place] Level (Bathing) moderate assist (50% patient effort)   Upper Body Dressing Assessment/Training   [place] Level (Upper Body Dressing) moderate assist (50% patient effort)   Lower Body Dressing Assessment/Training   [place] Level (Lower Body Dressing) moderate assist (50% patient effort);maximum assist (25% patient effort)   Grooming Assessment/Training   [place] Level (Grooming) minimum assist (75% patient effort)   Self-Feeding Assessment/Training   [place] Level (Feeding) feeding skills   Toileting Assessment/Training   [place] Level (Toileting) moderate assist (50% patient effort);maximum assist (25% patient effort)           OT ASSESSMENT FLOWSHEET (Last 12 Hours)       OT Evaluation and Treatment       Row Name                     Wound 05/26/25 1618 Left posterior heel Pressure Injury    Wound - Properties Group Placement Date: 05/26/25  -CW Placement Time: 1618  -CW Present on Original Admission: Y  -CW Side: Left  -CW Orientation: posterior  -CW Location: heel  -CW Primary Wound Type: Pressure Inj  -CW    Retired Wound - Properties Group Placement Date: 05/26/25  -CW Placement Time: 1618  -CW Present on Original Admission: Y  -CW Side: Left  -CW Orientation: posterior  -CW Location: heel  -CW    Retired Wound - Properties Group Placement Date: 05/26/25  -CW Placement Time: 1618  -CW Present on Original Admission: Y  -CW Side: Left  -CW Orientation: posterior  -CW Location: heel  -CW    Retired Wound - Properties Group Date first assessed: 05/26/25  -CW Time first assessed: 1618  -CW Present on Original Admission: Y  -CW Side: Left  -CW Location: heel  -CW       Wound 05/26/25 1622 Left anterior heel Pressure Injury    Wound - Properties Group Placement Date: 05/26/25  -CW Placement Time: 1622  -CW Side: Left  -CW Orientation: anterior  -CW Location: heel  -CW Primary  Wound Type: Pressure Inj  -CW    Retired Wound - Properties Group Placement Date: 05/26/25  -CW Placement Time: 1622  -CW Side: Left  -CW Orientation: anterior  -CW Location: heel  -CW    Retired Wound - Properties Group Placement Date: 05/26/25  -CW Placement Time: 1622  -CW Side: Left  -CW Orientation: anterior  -CW Location: heel  -CW    Retired Wound - Properties Group Date first assessed: 05/26/25  -CW Time first assessed: 1622  -CW Side: Left  -CW Location: heel  -CW       Wound 05/27/25 1007 medial parietal region Traumatic Abrasion    Wound - Properties Group Placement Date: 05/27/25  -LB Placement Time: 1007  -LB Present on Original Admission: Y  -LB Orientation: medial  -LB Location: parietal region  -LB Primary Wound Type: Traumatic  -LB Secondary Wound Type - Traumatic: Abrasion  -LB    Retired Wound - Properties Group Placement Date: 05/27/25  -LB Placement Time: 1007  -LB Present on Original Admission: Y  -LB Orientation: medial  -LB Location: parietal region  -LB    Retired Wound - Properties Group Placement Date: 05/27/25  -LB Placement Time: 1007  -LB Present on Original Admission: Y  -LB Orientation: medial  -LB Location: parietal region  -LB    Retired Wound - Properties Group Date first assessed: 05/27/25  -LB Time first assessed: 1007  -LB Present on Original Admission: Y  -LB Location: parietal region  -LB       Wound 05/27/25 1008 Right distal arm Traumatic Skin Tear    Wound - Properties Group Placement Date: 05/27/25  -LB Placement Time: 1008  -LB Present on Original Admission: Y  -LB Side: Right  -LB Orientation: distal  -LB Location: arm  -LB Primary Wound Type: Traumatic  -LB Secondary Wound Type - Traumatic: Skin Tear  -LB    Retired Wound - Properties Group Placement Date: 05/27/25  -LB Placement Time: 1008  -LB Present on Original Admission: Y  -LB Side: Right  -LB Orientation: distal  -LB Location: arm  -LB    Retired Wound - Properties Group Placement Date: 05/27/25  -LB Placement  Time: 1008  -LB Present on Original Admission: Y  -LB Side: Right  -LB Orientation: distal  -LB Location: arm  -LB    Retired Wound - Properties Group Date first assessed: 05/27/25  -LB Time first assessed: 1008  -LB Present on Original Admission: Y  -LB Side: Right  -LB Location: arm  -LB              User Key  (r) = Recorded By, (t) = Taken By, (c) = Cosigned By      Initials Name Effective Dates    CW Chata Pulido RN 10/05/21 -     LB Marysol Nix RN 10/20/21 -                            OT Recommendation and Plan           Outcome Measures       Row Name 06/07/25 1200             How much help from another is currently needed...    Putting on and taking off regular lower body clothing? 2  -LA      Bathing (including washing, rinsing, and drying) 2  -LA      Toileting (which includes using toilet bed pan or urinal) 2  -LA      Putting on and taking off regular upper body clothing 2  -LA      Taking care of personal grooming (such as brushing teeth) 3  -LA      Eating meals 3  -LA      AM-PAC 6 Clicks Score (OT) 14  -LA         Functional Assessment    Outcome Measure Options AM-PAC 6 Clicks Daily Activity (OT)  -LA                User Key  (r) = Recorded By, (t) = Taken By, (c) = Cosigned By      Initials Name Provider Type    Myah Eli OT Occupational Therapist                    Time Calculation:     Therapy Charges for Today       Code Description Service Date Service Provider Modifiers Qty    23415171289 HC OT SELF CARE/MGMT/TRAIN EA 15 MIN 6/9/2025 Yuliya Sharam, OT GO 2                 Yuliya Sharma OT  6/10/2025

## 2025-06-11 LAB
GLUCOSE BLDC GLUCOMTR-MCNC: 107 MG/DL (ref 70–130)
GLUCOSE BLDC GLUCOMTR-MCNC: 113 MG/DL (ref 70–130)
GLUCOSE BLDC GLUCOMTR-MCNC: 202 MG/DL (ref 70–130)
GLUCOSE BLDC GLUCOMTR-MCNC: 225 MG/DL (ref 70–130)

## 2025-06-11 PROCEDURE — 82948 REAGENT STRIP/BLOOD GLUCOSE: CPT

## 2025-06-11 PROCEDURE — 99308 SBSQ NF CARE LOW MDM 20: CPT | Performed by: INTERNAL MEDICINE

## 2025-06-11 PROCEDURE — 97116 GAIT TRAINING THERAPY: CPT

## 2025-06-11 PROCEDURE — 97530 THERAPEUTIC ACTIVITIES: CPT

## 2025-06-11 PROCEDURE — 63710000001 INSULIN LISPRO PROTAMINE-INSULIN LISPRO (75-25) 100 UNIT/ML SUSPENSION: Performed by: STUDENT IN AN ORGANIZED HEALTH CARE EDUCATION/TRAINING PROGRAM

## 2025-06-11 RX ADMIN — INSULIN LISPRO 20 UNITS: 100 INJECTION, SUSPENSION SUBCUTANEOUS at 17:44

## 2025-06-11 RX ADMIN — Medication 1 APPLICATION: at 09:45

## 2025-06-11 RX ADMIN — TIMOLOL MALEATE: 5 SOLUTION OPHTHALMIC at 21:09

## 2025-06-11 RX ADMIN — APIXABAN 5 MG: 5 TABLET, FILM COATED ORAL at 09:37

## 2025-06-11 RX ADMIN — AMLODIPINE BESYLATE 10 MG: 10 TABLET ORAL at 09:37

## 2025-06-11 RX ADMIN — LEVOTHYROXINE SODIUM 137.5 MCG: 0.12 TABLET ORAL at 09:40

## 2025-06-11 RX ADMIN — TIMOLOL MALEATE: 5 SOLUTION OPHTHALMIC at 09:45

## 2025-06-11 RX ADMIN — LOSARTAN POTASSIUM 100 MG: 50 TABLET, FILM COATED ORAL at 09:41

## 2025-06-11 RX ADMIN — EMPAGLIFLOZIN 10 MG: 10 TABLET, FILM COATED ORAL at 09:41

## 2025-06-11 RX ADMIN — DOCUSATE SODIUM 50MG AND SENNOSIDES 8.6MG 2 TABLET: 8.6; 5 TABLET, FILM COATED ORAL at 21:09

## 2025-06-11 RX ADMIN — ACETAMINOPHEN 1000 MG: 500 TABLET ORAL at 16:54

## 2025-06-11 RX ADMIN — Medication 10 ML: at 21:10

## 2025-06-11 RX ADMIN — MIRTAZAPINE 30 MG: 15 TABLET, FILM COATED ORAL at 21:09

## 2025-06-11 RX ADMIN — APIXABAN 5 MG: 5 TABLET, FILM COATED ORAL at 21:09

## 2025-06-11 RX ADMIN — QUETIAPINE FUMARATE 100 MG: 100 TABLET ORAL at 21:09

## 2025-06-11 RX ADMIN — Medication 1 APPLICATION: at 21:09

## 2025-06-11 RX ADMIN — Medication 10 ML: at 09:41

## 2025-06-11 RX ADMIN — PANTOPRAZOLE SODIUM 40 MG: 40 TABLET, DELAYED RELEASE ORAL at 09:37

## 2025-06-11 RX ADMIN — FAMOTIDINE 20 MG: 20 TABLET ORAL at 21:09

## 2025-06-11 RX ADMIN — FAMOTIDINE 20 MG: 20 TABLET ORAL at 09:37

## 2025-06-11 NOTE — THERAPY TREATMENT NOTE
Acute Care - Physical Therapy Treatment Note   Ilya     Patient Name: Ramsey Riley  : 1955  MRN: [id number]  Today's Date: 2025   Onset of Illness/Injury or Date of Surgery: 25 (admission date)  Visit Dx:   No diagnosis found.  Patient Active Problem List   Diagnosis    Coronary artery disease involving native coronary artery of native heart without angina pectoris    Essential hypertension    Bradycardia, sinus    Fatigue    Abnormal ECG    Precordial pain    LVH (left ventricular hypertrophy) due to hypertensive disease, without heart failure    Diabetes mellitus    PAD (peripheral artery disease)    Ischemic stroke    History of blood clots    Pulmonary embolus    Dyslipidemia    Class 1 obesity due to excess calories with serious comorbidity and body mass index (BMI) of 34.0 to 34.9 in adult    Weakness    Frequent falls    Physical debility     Past Medical History:   Diagnosis Date    Arthritis     Clot     Coronary artery disease     Dementia 2024    Diabetes mellitus     Heart disease     History of blood clots     Hypertension     Impaired mobility     Low back pain     Thyroid disease     Type 2 diabetes mellitus      Past Surgical History:   Procedure Laterality Date    CHOLECYSTECTOMY      COLONOSCOPY      dr srivastava    CORONARY ARTERY BYPASS GRAFT      [place] -  BY PASS     ENDOSCOPY       PT Assessment (Last 12 Hours)       PT Evaluation and Treatment       Row Name 25 1510          Physical Therapy Time and Intention    Subjective Information no complaints  -KM     Document Type therapy note (daily note)  -KM     Mode of Treatment physical therapy  -KM     Patient Effort good  -KM     Symptoms Noted During/After Treatment fatigue  -KM       Row Name 25 1510          General Information    Patient Profile Reviewed yes  -KM     Patient Observations alert;cooperative;agree to therapy  -KM     Existing Precautions/Restrictions fall  -KM       Row  Name 06/11/25 1510          Pain    Pretreatment Pain Rating 0/10 - no pain  -KM     Posttreatment Pain Rating 0/10 - no pain  -KM       Row Name 06/11/25 1510          Cognition    Affect/Mental Status (Cognition) confused  -KM     Orientation Status (Cognition) oriented to;person;place  -KM     Follows Commands (Cognition) follows one-step commands  -KM       Row Name 06/11/25 1510          Bed Mobility    Bed Mobility supine-sit  -KM     Supine-Sit [place] (Bed Mobility) minimum assist (75% patient effort)  -KM     Assistive Device (Bed Mobility) bed rails;head of bed elevated  -KM       Row Name 06/11/25 1510          Transfers    Transfers sit-stand transfer;stand-sit transfer;bed-chair transfer  -KM       Row Name 06/11/25 1510          Bed-Chair Transfer    Bed-Chair [place] (Transfers) minimum assist (75% patient effort)  -KM     Assistive Device (Bed-Chair Transfers) walker, front-wheeled  -KM       Row Name 06/11/25 1510          Sit-Stand Transfer    Sit-Stand [place] (Transfers) minimum assist (75% patient effort);1 person assist  -KM     Assistive Device (Sit-Stand Transfers) walker, front-wheeled  -KM       Row Name 06/11/25 1510          Stand-Sit Transfer    Stand-Sit [place] (Transfers) minimum assist (75% patient effort)  -KM     Assistive Device (Stand-Sit Transfers) walker, front-wheeled  -KM       Row Name 06/11/25 1510          Gait/Stairs (Locomotion)    Gait/Stairs Locomotion gait/ambulation independence;gait/ambulation assistive device;distance ambulated  -KM     Sharp Level (Gait) minimum assist (75% patient effort);verbal cues;nonverbal cues (demo/gesture)  -KM     Assistive Device (Gait) walker, front-wheeled  -KM     Patient was able to Ambulate yes  -KM     Distance in Feet (Gait) 110  -KM     Pattern (Gait) step-to  -KM     Deviations/Abnormal Patterns (Gait) ataxic;base of support, narrow;gait speed decreased  -KM     Bilateral Gait Deviations forward flexed  posture  -KM       Row Name 06/11/25 1510          Safety Issues/Impairments Affecting Functional Mobility    Impairments Affecting Function (Mobility) balance;cognition;coordination;endurance/activity tolerance;pain;strength  -KM       Row Name             Wound 05/26/25 1618 Left posterior heel Pressure Injury    Wound - Properties Group Placement Date: 05/26/25  -CW Placement Time: 1618  -CW Present on Original Admission: Y  -CW Side: Left  -CW Orientation: posterior  -CW Location: heel  -CW Primary Wound Type: Pressure Inj  -CW    Retired Wound - Properties Group Placement Date: 05/26/25  -CW Placement Time: 1618  -CW Present on Original Admission: Y  -CW Side: Left  -CW Orientation: posterior  -CW Location: heel  -CW    Retired Wound - Properties Group Placement Date: 05/26/25  -CW Placement Time: 1618  -CW Present on Original Admission: Y  -CW Side: Left  -CW Orientation: posterior  -CW Location: heel  -CW    Retired Wound - Properties Group Date first assessed: 05/26/25  -CW Time first assessed: 1618  -CW Present on Original Admission: Y  -CW Side: Left  -CW Location: heel  -CW      Row Name             Wound 05/26/25 1622 Left anterior heel Pressure Injury    Wound - Properties Group Placement Date: 05/26/25  -CW Placement Time: 1622  -CW Side: Left  -CW Orientation: anterior  -CW Location: heel  -CW Primary Wound Type: Pressure Inj  -CW    Retired Wound - Properties Group Placement Date: 05/26/25  -CW Placement Time: 1622  -CW Side: Left  -CW Orientation: anterior  -CW Location: heel  -CW    Retired Wound - Properties Group Placement Date: 05/26/25  -CW Placement Time: 1622  -CW Side: Left  -CW Orientation: anterior  -CW Location: heel  -CW    Retired Wound - Properties Group Date first assessed: 05/26/25  -CW Time first assessed: 1622  -CW Side: Left  -CW Location: heel  -CW      Row Name             Wound 05/27/25 1007 medial parietal region Traumatic Abrasion    Wound - Properties Group Placement Date:  05/27/25  -LB Placement Time: 1007  -LB Present on Original Admission: Y  -LB Orientation: medial  -LB Location: parietal region  -LB Primary Wound Type: Traumatic  -LB Secondary Wound Type - Traumatic: Abrasion  -LB    Retired Wound - Properties Group Placement Date: 05/27/25  -LB Placement Time: 1007  -LB Present on Original Admission: Y  -LB Orientation: medial  -LB Location: parietal region  -LB    Retired Wound - Properties Group Placement Date: 05/27/25  -LB Placement Time: 1007  -LB Present on Original Admission: Y  -LB Orientation: medial  -LB Location: parietal region  -LB    Retired Wound - Properties Group Date first assessed: 05/27/25  -LB Time first assessed: 1007  -LB Present on Original Admission: Y  -LB Location: parietal region  -LB      Row Name             Wound 05/27/25 1008 Right distal arm Traumatic Skin Tear    Wound - Properties Group Placement Date: 05/27/25  -LB Placement Time: 1008  -LB Present on Original Admission: Y  -LB Side: Right  -LB Orientation: distal  -LB Location: arm  -LB Primary Wound Type: Traumatic  -LB Secondary Wound Type - Traumatic: Skin Tear  -LB    Retired Wound - Properties Group Placement Date: 05/27/25  -LB Placement Time: 1008  -LB Present on Original Admission: Y  -LB Side: Right  -LB Orientation: distal  -LB Location: arm  -LB    Retired Wound - Properties Group Placement Date: 05/27/25  -LB Placement Time: 1008  -LB Present on Original Admission: Y  -LB Side: Right  -LB Orientation: distal  -LB Location: arm  -LB    Retired Wound - Properties Group Date first assessed: 05/27/25  -LB Time first assessed: 1008  -LB Present on Original Admission: Y  -LB Side: Right  -LB Location: arm  -LB      Row Name 06/11/25 1510          Progress Summary (PT)    Daily Progress Summary (PT) Pt. was able to demonstrate improved functional mobility skills. He was able to ambulate improved distance w/ RW. He continues to show improvement w/ recent PT sessions. Pt. would continue to  benefit from PT services.  -KM               User Key  (r) = Recorded By, (t) = Taken By, (c) = Cosigned By      Initials Name Provider Type    Chata Couch, RN Registered Nurse    Baljinder Reese PT Physical Therapist    Marysol Rodriguez, RN Registered Nurse                    Physical Therapy Education       Title: PT OT SLP Therapies (In Progress)       Topic: Physical Therapy (Not Started)       Point: Mobility training (Not Started)       Learner Progress:  Not documented in this visit.              Point: Home exercise program (Not Started)       Learner Progress:  Not documented in this visit.              Point: Body mechanics (Not Started)       Learner Progress:  Not documented in this visit.              Point: Precautions (Not Started)       Learner Progress:  Not documented in this visit.                                  PT Recommendation and Plan     Progress Summary (PT)  Daily Progress Summary (PT): Pt. was able to demonstrate improved functional mobility skills. He was able to ambulate improved distance w/ RW. He continues to show improvement w/ recent PT sessions. Pt. would continue to benefit from PT services.       Time Calculation:    PT Charges       Row Name 06/11/25 1510             Time Calculation    PT Received On 06/11/25  -KM         Time Calculation- PT    Total Timed Code Minutes- PT 23 minute(s)  -KM                User Key  (r) = Recorded By, (t) = Taken By, (c) = Cosigned By      Initials Name Provider Type    Baljinder Reese PT Physical Therapist                  Therapy Charges for Today       Code Description Service Date Service Provider Modifiers Qty    56778818347 HC PT THERAPEUTIC ACT EA 15 MIN 6/10/2025 Baljinder Martinez, PT GP 1    86907981638 HC GAIT TRAINING EA 15 MIN 6/10/2025 Baljinder Martinez, PT GP 1    34185985773 HC PT THERAPEUTIC ACT EA 15 MIN 6/11/2025 Baljinder Martinez, PT GP 1    37293792522 HC GAIT TRAINING EA 15 MIN 6/11/2025 Baljinder Martinez, PT GP 1            PT  G-Codes  Outcome Measure Options: AM-PAC 6 Clicks Daily Activity (OT)  AM-PAC 6 Clicks Score (PT): 12  AM-PAC 6 Clicks Score (OT): 17    Baljinder Martinez, PT  6/11/2025

## 2025-06-11 NOTE — PLAN OF CARE
Goal Outcome Evaluation:  Plan of Care Reviewed With: patient        Progress: improving  Outcome Evaluation: Pt resting in bed at this time. SItter at bedside. VSS on RA. Pt alert and oriented x 4 this shift. No concerns or requests at this time. Will continue plan of care.

## 2025-06-11 NOTE — THERAPY TREATMENT NOTE
Patient Name: Ramsey Riley  : 1955    MRN: [id number]                              Today's Date: 2025       Admit Date: 2025    Visit Dx: No diagnosis found.  Patient Active Problem List   Diagnosis    Coronary artery disease involving native coronary artery of native heart without angina pectoris    Essential hypertension    Bradycardia, sinus    Fatigue    Abnormal ECG    Precordial pain    LVH (left ventricular hypertrophy) due to hypertensive disease, without heart failure    Diabetes mellitus    PAD (peripheral artery disease)    Ischemic stroke    History of blood clots    Pulmonary embolus    Dyslipidemia    Class 1 obesity due to excess calories with serious comorbidity and body mass index (BMI) of 34.0 to 34.9 in adult    Weakness    Frequent falls    Physical debility     Past Medical History:   Diagnosis Date    Arthritis     Clot     Coronary artery disease     Dementia 2024    Diabetes mellitus     Heart disease     History of blood clots     Hypertension     Impaired mobility     Low back pain     Thyroid disease     Type 2 diabetes mellitus      Past Surgical History:   Procedure Laterality Date    CHOLECYSTECTOMY      COLONOSCOPY      dr srivastava    CORONARY ARTERY BYPASS GRAFT      [place] - 3 BY PASS     ENDOSCOPY        General Information       Row Name 25 1410          OT Time and Intention    Subjective Information no complaints  -     Document Type therapy note (daily note)  -     Mode of Treatment individual therapy;occupational therapy  -KP     Patient Effort adequate  -KP     Symptoms Noted During/After Treatment fatigue  -     Comment Patient agreeable to therapy. Improved cognition/orientatio, but fatigued requring cues to initiate participation in therapy.  -       Row Name 25 1410          General Information    Patient Profile Reviewed yes  -KP     Existing Precautions/Restrictions fall  -KP     Barriers to Rehab medically  complex;previous functional deficit;cognitive status  -       Row Name 06/11/25 1410          Cognition    Orientation Status (Cognition) oriented to;person;place  -       Row Name 06/11/25 1410          Safety Issues/Impairments Affecting Functional Mobility    Safety Issues Affecting Function (Mobility) awareness of need for assistance;insight into deficits/self-awareness  -     Impairments Affecting Function (Mobility) balance;cognition;coordination;endurance/activity tolerance;pain;strength  -     Cognitive Impairments, Mobility Safety/Performance awareness, need for assistance;insight into deficits/self-awareness  -               User Key  (r) = Recorded By, (t) = Taken By, (c) = Cosigned By      Initials Name Provider Type     Vanesa Tse, OT Occupational Therapist                     Mobility/ADL's       Row Name 06/11/25 1411          Bed Mobility    Bed Mobility supine-sit  -     Supine-Sit [place] (Bed Mobility) minimum assist (75% patient effort);1 person assist  -     Assistive Device (Bed Mobility) bed rails;head of bed elevated  -       Row Name 06/11/25 1411          Transfers    Transfers sit-stand transfer;stand-sit transfer;bed-chair transfer  -     Comment, (Transfers) cues throughout due to decreased vision and cognition  -       Row Name 06/11/25 1411          Bed-Chair Transfer    Bed-Chair [place] (Transfers) minimum assist (75% patient effort)  -     Assistive Device (Bed-Chair Transfers) walker, front-wheeled  -       Row Name 06/11/25 1411          Sit-Stand Transfer    Sit-Stand [place] (Transfers) minimum assist (75% patient effort);1 person assist  -     Assistive Device (Sit-Stand Transfers) walker, front-wheeled  -       Row Name 06/11/25 1411          Stand-Sit Transfer    Stand-Sit [place] (Transfers) minimum assist (75% patient effort)  -     Assistive Device (Stand-Sit Transfers) walker, front-wheeled  -               User  Key  (r) = Recorded By, (t) = Taken By, (c) = Cosigned By      Initials Name Provider Type    Vanesa Aguillon OT Occupational Therapist                   Obj/Interventions       Row Name 06/11/25 1412          Motor Skills    Motor Skills functional endurance  -     Functional Endurance fair minus  -       Row Name 06/11/25 1412          Balance    Balance Assessment sitting static balance;standing static balance  -     Static Sitting Balance contact guard  -     Static Standing Balance minimal assist  -     Position/Device Used, Standing Balance walker, rolling  -     Balance Interventions sitting;supported;static;minimal challenge;occupation based/functional task  -               User Key  (r) = Recorded By, (t) = Taken By, (c) = Cosigned By      Initials Name Provider Type    Vanesa Aguillon OT Occupational Therapist                   Goals/Plan    No documentation.                  Clinical Impression       Row Name 06/11/25 1412          Pain Assessment    Pretreatment Pain Rating 0/10 - no pain  -     Posttreatment Pain Rating 0/10 - no pain  -       Row Name 06/11/25 1412          Plan of Care Review    Plan of Care Reviewed With patient  -     Progress no change  -     Outcome Evaluation Patient seen for OT treatment. He was fatigued on this date, cues and assist to initiate participation, but improved with environmental stimuli and  out of bed activity. Continue plan of care.  -       Row Name 06/11/25 1412          Therapy Plan Review/Discharge Plan (OT)    Anticipated Discharge Disposition (OT) home with 24/7 care;home with home health  -       Row Name 06/11/25 1412          Positioning and Restraints    Pre-Treatment Position in bed  -     Post Treatment Position chair  -     In Chair notified nsg;sitting;call light within reach;encouraged to call for assist;with nsg  -               User Key  (r) = Recorded By, (t) = Taken By, (c) = Cosigned By      Initials Name  Provider Type    aVnesa Aguillon OT Occupational Therapist                   Outcome Measures       Row Name 06/11/25 1413          How much help from another is currently needed...    Putting on and taking off regular lower body clothing? 3  -KP     Bathing (including washing, rinsing, and drying) 3  -KP     Toileting (which includes using toilet bed pan or urinal) 2  -KP     Putting on and taking off regular upper body clothing 3  -KP     Taking care of personal grooming (such as brushing teeth) 3  -KP     Eating meals 3  -KP     AM-PAC 6 Clicks Score (OT) 17  -KP       Row Name 06/11/25 0945          How much help from another person do you currently need...    Turning from your back to your side while in flat bed without using bedrails? 3  -OW     Moving from lying on back to sitting on the side of a flat bed without bedrails? 2  -OW     Moving to and from a bed to a chair (including a wheelchair)? 2  -OW     Standing up from a chair using your arms (e.g., wheelchair, bedside chair)? 2  -OW     Climbing 3-5 steps with a railing? 1  -OW     To walk in hospital room? 2  -OW     AM-PAC 6 Clicks Score (PT) 12  -OW       Row Name 06/11/25 1413          Functional Assessment    Outcome Measure Options AM-PAC 6 Clicks Daily Activity (OT)  -               User Key  (r) = Recorded By, (t) = Taken By, (c) = Cosigned By      Initials Name Provider Type    Vanesa Aguillon OT Occupational Therapist    Ceci Mar RN Registered Nurse                      OT Recommendation and Plan     Plan of Care Review  Plan of Care Reviewed With: patient  Progress: no change  Outcome Evaluation: Patient seen for OT treatment. He was fatigued on this date, cues and assist to initiate participation, but improved with environmental stimuli and  out of bed activity. Continue plan of care.     Time Calculation:         Time Calculation- OT       Row Name 06/11/25 1413             Time Calculation- OT    Total Timed Code Minutes- OT  15 minute(s)  -      OT Received On 06/11/25  -                User Key  (r) = Recorded By, (t) = Taken By, (c) = Cosigned By      Initials Name Provider Type    Vanesa Aguillon OT Occupational Therapist                  Therapy Charges for Today       Code Description Service Date Service Provider Modifiers Qty    71567907643 HC OT THERAPEUTIC ACT EA 15 MIN 6/10/2025 Vanesa Tse OT GO 1    54076497874 HC OT THERAPEUTIC ACT EA 15 MIN 6/11/2025 Vanesa Tse OT GO 1                 Vanesa Tse OT  6/11/2025

## 2025-06-11 NOTE — PROGRESS NOTES
[facility] HOSPITALIST PROGRESS NOTE     Patient Identification:  Name:  Ramsey Riley  Age:  70 y.o.  Sex:  male  :  1955  MRN:  [id number]  Visit Number:  [id number]  ROOM: [address] [place] [address]     Primary Care Provider:  Margret Purvis MD    Length of stay:  7    Subjective     CC: Decondition swing bed for rehab    Patient seen and examined today r no new changes or new events overnight.  Reported to have good sleep last night      Objective     Current Hospital Meds:amLODIPine, 10 mg, Oral, Daily  apixaban, 5 mg, Oral, Q12H  castor oil-balsam peru, 1 Application, Topical, Q12H  cholecalciferol, 50,000 Units, Oral, Q7 Days  dorzolamide (TRUSOPT) 2 % 1 drop, timolol (TIMOPTIC) 0.5 % 1 drop for Cosopt 22.3-6.8 mg/mL, , Both Eyes, BID  empagliflozin, 10 mg, Oral, Daily  famotidine, 20 mg, Oral, BID  insulin lispro protamine-insulin lispro, 20 Units, Subcutaneous, BID With Meals  levothyroxine, 137.5 mcg, Oral, QAM AC  losartan, 100 mg, Oral, Daily  mirtazapine, 30 mg, Oral, Nightly  pantoprazole, 40 mg, Oral, QAM AC  QUEtiapine, 100 mg, Oral, Nightly  senna-docusate sodium, 2 tablet, Oral, BID  sodium chloride, 10 mL, Intravenous, Q12H  [START ON 2025] tuberculin, 5 Units, Intradermal, Once       ----------------------------------------------------------------------------------------------------------------------  Vital Signs:  Temp:  [97.9 °F (36.6 °C)-98.2 °F (36.8 °C)] 97.9 °F (36.6 °C)  Heart Rate:  [74-92] 74  Resp:  [18] 18  BP: (112-142)/(65-80) 142/80  SpO2:  [94 %-96 %] 94 %  on   ;   Device (Oxygen Therapy): room air  Body mass index is 30.05 kg/m².    Wt Readings from Last 3 Encounters:   25 97.7 kg (215 lb 7 oz)   25 97.7 kg (215 lb 7 oz)   25 104 kg (230 lb)     Intake/Output Summary (Last 24 hours) at 2025 1111  Last data filed at 2025 0300  Gross per 24 hour   Intake 240 ml   Output 900 ml   Net -660 ml     Diet: Cardiac, Diabetic; Healthy Heart (2-3 Na+);  Consistent Carbohydrate; Fluid Consistency: Thin (IDDSI 0)  ----------------------------------------------------------------------------------------------------------------------      Physical exam:  Constitutional:  Well-developed and well-nourished.  No respiratory distress.      HENT:  Head:  Normocephalic and atraumatic.  Mouth:  Moist mucous membranes.    Eyes:  Conjunctivae and EOM are normal.  Pupils are equal, round, and reactive to light.  No scleral icterus.    Neck:  Neck supple.  No JVD present.    Cardiovascular:  Normal rate, regular rhythm and normal heart sounds with no murmur.  Pulmonary/Chest:  No respiratory distress, no wheezes, no crackles, with normal breath sounds and good air movement.  Abdominal:  Soft.  Bowel sounds are normal.  No distension and no tenderness.   Musculoskeletal:  No edema, no tenderness, and no deformity.  No red or swollen joints anywhere.    Neurological: Proximal muscle weakness which has improved, conscious oriented x 3, nonlateralizing gross neuroexam,   Peripheral vascular:  Strong pulses in all 4 extremities with no clubbing, no cyanosis, no edema.        ----------------------------------------------------------------------------------------------------------------------    Results from last 7 days   Lab Units 06/09/25  0139   SODIUM mmol/L 134*   POTASSIUM mmol/L 4.2   CHLORIDE mmol/L 100   CO2 mmol/L 20.7*   BUN mg/dL 20.4   CREATININE mg/dL 1.28*   CALCIUM mg/dL 8.6   GLUCOSE mg/dL 150*   Estimated Creatinine Clearance: 65.9 mL/min (A) (by C-G formula based on SCr of 1.28 mg/dL (H)).      Glucose   Date/Time Value Ref Range Status   06/11/2025 0725 107 70 - 130 mg/dL Final   06/10/2025 1915 164 (H) 70 - 130 mg/dL Final   06/10/2025 1655 193 (H) 70 - 130 mg/dL Final   06/10/2025 1016 96 70 - 130 mg/dL Final   06/10/2025 0729 94 70 - 130 mg/dL Final   06/09/2025 2043 137 (H) 70 - 130 mg/dL Final   06/09/2025 1650 161 (H) 70 - 130 mg/dL Final   06/09/2025 1050 99  "70 - 130 mg/dL Final     No results found for: \"AMMONIA\"    Results from last 7 days   Lab Units 06/04/25  1740   NITRITE UA  Negative   WBC UA /HPF 3-5*   BACTERIA UA /HPF 3+*   SQUAM EPITHEL UA /HPF 0-2     No results found for: \"BLOODCX\"No results found for: \"RESPCX\"No results found for: \"URINECX\"No results found for: \"WOUNDCX\"No results found for: \"BODYFLDCX\"No results found for: \"STOOLCX\"  I have personally looked at the labs and they are summarized above.  ----------------------------------------------------------------------------------------------------------------------  Imaging Results (Last 24 Hours)       ** No results found for the last 24 hours. **          I have personally reviewed the radiology images and read the final radiology report.          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  Keep off citalopram for now.    -- vitamin D replacement  -- Aggressive PT OT  -- Keep off statin  -- Continue all other therapies          Lisbeth Gonzalez MD  06/11/25  11:11 EDT    "

## 2025-06-11 NOTE — PLAN OF CARE
Goal Outcome Evaluation:           Progress: no change  Outcome Evaluation: Patient is in chair at this time, sitter at bedside. VSS on RA. Pt A&Ox4. No acute changes or complaints at this time. Will continue POC.

## 2025-06-11 NOTE — CASE MANAGEMENT/SOCIAL WORK
Discharge Planning Assessment   Ilya     Patient Name: Ramsey Riley  MRN: [id number]  Today's Date: 6/11/2025    Admit Date: 6/4/2025       Discharge Plan       Row Name 06/11/25 1710       Plan    Plan Pt was admitted to swing bed on 6/4 to continue therapy. Per swing bed RN, Pt's swing bed days are approved and clinical updates are due on 06/12/25. Pt has a sitter at bedside. Pt lives with spouse, Jina and she plans for pt to return home at discharge. Pt does not utilize home health services. Pt has a rolling walker, wheelchair, and scooter via unknown provider. SS to follow.                      MARIUSZ Rai

## 2025-06-12 LAB
GLUCOSE BLDC GLUCOMTR-MCNC: 149 MG/DL (ref 70–130)
GLUCOSE BLDC GLUCOMTR-MCNC: 179 MG/DL (ref 70–130)
GLUCOSE BLDC GLUCOMTR-MCNC: 223 MG/DL (ref 70–130)
GLUCOSE BLDC GLUCOMTR-MCNC: 238 MG/DL (ref 70–130)

## 2025-06-12 PROCEDURE — 82948 REAGENT STRIP/BLOOD GLUCOSE: CPT

## 2025-06-12 PROCEDURE — 99308 SBSQ NF CARE LOW MDM 20: CPT | Performed by: INTERNAL MEDICINE

## 2025-06-12 PROCEDURE — 63710000001 INSULIN LISPRO PROTAMINE-INSULIN LISPRO (75-25) 100 UNIT/ML SUSPENSION: Performed by: STUDENT IN AN ORGANIZED HEALTH CARE EDUCATION/TRAINING PROGRAM

## 2025-06-12 PROCEDURE — 25010000002 ZIPRASIDONE MESYLATE PER 10 MG: Performed by: INTERNAL MEDICINE

## 2025-06-12 RX ORDER — MIRTAZAPINE 15 MG/1
30 TABLET, FILM COATED ORAL NIGHTLY
Status: DISCONTINUED | OUTPATIENT
Start: 2025-06-13 | End: 2025-06-14 | Stop reason: HOSPADM

## 2025-06-12 RX ORDER — QUETIAPINE FUMARATE 100 MG/1
100 TABLET, FILM COATED ORAL NIGHTLY
Status: DISCONTINUED | OUTPATIENT
Start: 2025-06-13 | End: 2025-06-14 | Stop reason: HOSPADM

## 2025-06-12 RX ADMIN — Medication 1 APPLICATION: at 08:07

## 2025-06-12 RX ADMIN — PANTOPRAZOLE SODIUM 40 MG: 40 TABLET, DELAYED RELEASE ORAL at 08:04

## 2025-06-12 RX ADMIN — Medication 10 ML: at 20:21

## 2025-06-12 RX ADMIN — FAMOTIDINE 20 MG: 20 TABLET ORAL at 08:03

## 2025-06-12 RX ADMIN — ZIPRASIDONE MESYLATE 20 MG: 20 INJECTION, POWDER, LYOPHILIZED, FOR SOLUTION INTRAMUSCULAR at 20:00

## 2025-06-12 RX ADMIN — Medication 10 ML: at 08:07

## 2025-06-12 RX ADMIN — APIXABAN 5 MG: 5 TABLET, FILM COATED ORAL at 08:04

## 2025-06-12 RX ADMIN — EMPAGLIFLOZIN 10 MG: 10 TABLET, FILM COATED ORAL at 08:03

## 2025-06-12 RX ADMIN — INSULIN LISPRO 20 UNITS: 100 INJECTION, SUSPENSION SUBCUTANEOUS at 17:02

## 2025-06-12 RX ADMIN — HYDROCODONE BITARTRATE AND ACETAMINOPHEN 1 TABLET: 7.5; 325 TABLET ORAL at 16:25

## 2025-06-12 RX ADMIN — AMLODIPINE BESYLATE 10 MG: 10 TABLET ORAL at 08:03

## 2025-06-12 RX ADMIN — Medication 1 APPLICATION: at 20:21

## 2025-06-12 RX ADMIN — TIMOLOL MALEATE: 5 SOLUTION OPHTHALMIC at 08:07

## 2025-06-12 RX ADMIN — HYDROCODONE BITARTRATE AND ACETAMINOPHEN 1 TABLET: 7.5; 325 TABLET ORAL at 03:37

## 2025-06-12 RX ADMIN — LEVOTHYROXINE SODIUM 137.5 MCG: 0.12 TABLET ORAL at 08:04

## 2025-06-12 RX ADMIN — LOSARTAN POTASSIUM 100 MG: 50 TABLET, FILM COATED ORAL at 08:04

## 2025-06-12 RX ADMIN — ACETAMINOPHEN 1000 MG: 500 TABLET ORAL at 21:42

## 2025-06-12 RX ADMIN — INSULIN LISPRO 20 UNITS: 100 INJECTION, SUSPENSION SUBCUTANEOUS at 08:03

## 2025-06-12 RX ADMIN — ALPRAZOLAM 0.5 MG: 0.5 TABLET ORAL at 18:25

## 2025-06-12 RX ADMIN — DOCUSATE SODIUM 50MG AND SENNOSIDES 8.6MG 2 TABLET: 8.6; 5 TABLET, FILM COATED ORAL at 08:03

## 2025-06-12 NOTE — PROGRESS NOTES
[facility] HOSPITALIST PROGRESS NOTE     Patient Identification:  Name:  Ramsey Riley  Age:  70 y.o.  Sex:  male  :  1955  MRN:  [id number]  Visit Number:  [id number]  ROOM: [address] [place] [address]     Primary Care Provider:  Margret Purvis MD    Length of stay:  8    Subjective     CC: Decondition swing bed for rehab    Patient seen and examined today r no new changes or new events overnight.  Reported to have good sleep last night.  More awake and alert today.  Still intermittently confused      Objective     Current Hospital Meds:amLODIPine, 10 mg, Oral, Daily  apixaban, 5 mg, Oral, Q12H  castor oil-balsam peru, 1 Application, Topical, Q12H  cholecalciferol, 50,000 Units, Oral, Q7 Days  dorzolamide (TRUSOPT) 2 % 1 drop, timolol (TIMOPTIC) 0.5 % 1 drop for Cosopt 22.3-6.8 mg/mL, , Both Eyes, BID  empagliflozin, 10 mg, Oral, Daily  famotidine, 20 mg, Oral, BID  insulin lispro protamine-insulin lispro, 20 Units, Subcutaneous, BID With Meals  levothyroxine, 137.5 mcg, Oral, QAM AC  losartan, 100 mg, Oral, Daily  mirtazapine, 30 mg, Oral, Nightly  pantoprazole, 40 mg, Oral, QAM AC  QUEtiapine, 100 mg, Oral, Nightly  senna-docusate sodium, 2 tablet, Oral, BID  sodium chloride, 10 mL, Intravenous, Q12H  [START ON 2025] tuberculin, 5 Units, Intradermal, Once       ----------------------------------------------------------------------------------------------------------------------  Vital Signs:  Temp:  [98.1 °F (36.7 °C)-98.4 °F (36.9 °C)] 98.1 °F (36.7 °C)  Heart Rate:  [72-84] 84  Resp:  [16-20] 20  BP: (119-125)/(68-72) 119/68  SpO2:  [94 %] 94 %  on   ;   Device (Oxygen Therapy): room air  Body mass index is 30.05 kg/m².    Wt Readings from Last 3 Encounters:   25 97.7 kg (215 lb 7 oz)   25 97.7 kg (215 lb 7 oz)   25 104 kg (230 lb)     Intake/Output Summary (Last 24 hours) at 2025 1119  Last data filed at 2025 1027  Gross per 24 hour   Intake 480 ml   Output 650 ml   Net -170  ml     Diet: Cardiac, Diabetic; Healthy Heart (2-3 Na+); Consistent Carbohydrate; Fluid Consistency: Thin (IDDSI 0)  ----------------------------------------------------------------------------------------------------------------------      Physical exam:  Constitutional:  Well-developed and well-nourished.  No respiratory distress.      HENT:  Head:  Normocephalic and atraumatic.  Mouth:  Moist mucous membranes.    Eyes:  Conjunctivae and EOM are normal.  Pupils are equal, round, and reactive to light.  No scleral icterus.    Neck:  Neck supple.  No JVD present.    Cardiovascular:  Normal rate, regular rhythm and normal heart sounds with no murmur.  Pulmonary/Chest:  No respiratory distress, no wheezes, no crackles, with normal breath sounds and good air movement.  Abdominal:  Soft.  Bowel sounds are normal.  No distension and no tenderness.   Musculoskeletal:  No edema, no tenderness, and no deformity.  No red or swollen joints anywhere.    Neurological: Proximal muscle weakness which has improved, conscious oriented x 3, nonlateralizing gross neuroexam,   Peripheral vascular:  Strong pulses in all 4 extremities with no clubbing, no cyanosis, no edema.        ----------------------------------------------------------------------------------------------------------------------    Results from last 7 days   Lab Units 06/09/25  0139   SODIUM mmol/L 134*   POTASSIUM mmol/L 4.2   CHLORIDE mmol/L 100   CO2 mmol/L 20.7*   BUN mg/dL 20.4   CREATININE mg/dL 1.28*   CALCIUM mg/dL 8.6   GLUCOSE mg/dL 150*   Estimated Creatinine Clearance: 65.9 mL/min (A) (by C-G formula based on SCr of 1.28 mg/dL (H)).      Glucose   Date/Time Value Ref Range Status   06/12/2025 1030 179 (H) 70 - 130 mg/dL Final   06/12/2025 0640 149 (H) 70 - 130 mg/dL Final   06/11/2025 2004 202 (H) 70 - 130 mg/dL Final   06/11/2025 1623 225 (H) 70 - 130 mg/dL Final   06/11/2025 1127 113 70 - 130 mg/dL Final   06/11/2025 0725 107 70 - 130 mg/dL Final  "  06/10/2025 1915 164 (H) 70 - 130 mg/dL Final   06/10/2025 1655 193 (H) 70 - 130 mg/dL Final     No results found for: \"AMMONIA\"          No results found for: \"BLOODCX\"No results found for: \"RESPCX\"No results found for: \"URINECX\"No results found for: \"WOUNDCX\"No results found for: \"BODYFLDCX\"No results found for: \"STOOLCX\"  I have personally looked at the labs and they are summarized above.  ----------------------------------------------------------------------------------------------------------------------  Imaging Results (Last 24 Hours)       ** No results found for the last 24 hours. **          I have personally reviewed the radiology images and read the final radiology report.          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  Keep off citalopram for now.    -- vitamin D replacement  -- Aggressive PT OT  -- Keep off statin  -- Continue all other therapies, no changes.  Will check labs in the morning          Lisbeth Gonzalez MD  06/12/25  11:19 EDT    "

## 2025-06-12 NOTE — NURSING NOTE
Slight MASD noted to inner gluteal fold with small fissure formation noted.  Z-Guard in place.  Patient reports the Z-Guard caused burning and would like a dressing applied.  He does report tenderness to site on palpitation.  Order placed to cleanse sacrum/coccyx with cleansing foam, pat dry, and cover with silicone bordered dressing for protection BID.

## 2025-06-12 NOTE — PAYOR COMM NOTE
"Zhanna uA RN CM/Swing Bed  [email or web address]  Phone: [phone] Fax: [phone]  [id number]  [facility] [id number]  Authorization No: [id number]     Patient was admitted to post acute swing bed for PT/OT strength, mobility, ADL and transfer training. PLOF lives at home with spouse and uses rolling walker for ambulation. Receives assistance with ADLs from spouse.      ICD 10  R29.6  R53.81    Ramsey Riley (70 y.o. Male)       Date of Birth   1955    Social Security Number       Address   [address] [place] [address]    Home Phone   [phone]    MRN   [id number]       Tenriism   Patient Refused    Marital Status                               Admission Date   6/4/2025    Admission Type   Urgent    Admitting Provider   Harini Urbina DO    Attending Provider   Lisbeth Gonzalez MD    Department, Room/Bed   [place] [address]/       Discharge Date       Discharge Disposition       Discharge Destination                                 Attending Provider: Lisbeth Gonzalez MD    Allergies: Penicillins, Trazodone    Isolation: None   Infection: None   Code Status: CPR    Ht: 180.3 cm (71\")   Wt: 97.7 kg (215 lb 7 oz)    Admission Cmt: None   Principal Problem: Physical debility [R53.81]                   Active Insurance as of 6/4/2025       Primary Coverage       Payor Plan Insurance Group Employer/Plan Group    ANTHEM MEDICARE REPLACEMENT [facility] MEDICARE ADVANTAGE O [id number]       Payor Plan Address Payor Plan Phone Number Payor Plan Fax Number Effective Dates    [address] [phone]  1/1/2024 - None Entered    [facility] [place], [place] [address]         Subscriber Name Subscriber Birth Date Member ID       RAMSEY RILEY 1955 [id number]                     Emergency Contacts        (Rel.) Home Phone Work Phone Mobile Phone    RosemaryJina (Spouse) [phone] -- [phone]              Current Facility-Administered " Medications   Medication Dose Route Frequency Provider Last Rate Last Admin    acetaminophen (TYLENOL) tablet 1,000 mg  1,000 mg Oral Q8H PRN Harini Urbina DO   1,000 mg at 06/11/25 1654    ALPRAZolam (XANAX) tablet 0.5 mg  0.5 mg Oral Nightly PRN Harini Urbina DO   0.5 mg at 06/09/25 2152    amLODIPine (NORVASC) tablet 10 mg  10 mg Oral Daily Harini Urbina DO   10 mg at 06/11/25 0937    apixaban (ELIQUIS) tablet 5 mg  5 mg Oral Q12H Harini Urbina DO   5 mg at 06/11/25 2109    sennosides-docusate (PERICOLACE) 8.6-50 MG per tablet 2 tablet  2 tablet Oral BID Harini Urbina DO   2 tablet at 06/11/25 2109    And    polyethylene glycol (MIRALAX) packet 17 g  17 g Oral Daily PRN Harini Urbina DO        And    bisacodyl (DULCOLAX) EC tablet 5 mg  5 mg Oral Daily PRN Harini Urbina DO        And    bisacodyl (DULCOLAX) suppository 10 mg  10 mg Rectal Daily PRN Harini Urbina DO        castor oil-balsam peru (VENELEX) ointment 1 Application  1 Application Topical Q12H Harini Urbina DO   1 Application at 06/11/25 2109    cholecalciferol (VITAMIN D3) capsule 50,000 Units  50,000 Units Oral Q7 Days Lisbeth Gonzalez MD   50,000 Units at 06/10/25 0828    dextrose (D50W) (25 g/50 mL) IV injection 25 g  25 g Intravenous Q15 Min PRN Harini Urbina DO        dextrose (GLUTOSE) oral gel 15 g  15 g Oral Q15 Min PRN Harini Urbina DO        dorzolamide (TRUSOPT) 2 % 1 drop, timolol (TIMOPTIC) 0.5 % 1 drop for Cosopt 22.3-6.8 mg/mL   Both Eyes BID Harini Urbina DO   Given at 06/11/25 2109    empagliflozin (JARDIANCE) tablet 10 mg  10 mg Oral Daily Harini Urbina DO   10 mg at 06/11/25 0941    famotidine (PEPCID) tablet 20 mg  20 mg Oral BID Harini Urbina DO   20 mg at 06/11/25 2105    glucagon HCl (Diagnostic) injection 1 mg  1 mg Intramuscular Q15 Min PRN Harini Urbina DO        HYDROcodone-acetaminophen (NORCO) 7.5-325 MG per tablet 1 tablet  1 tablet Oral Q12H PRN Harini Urbina DO   1 tablet at 06/12/25  0337    insulin lispro protamine-insulin lispro (humaLOG 75-25) injection 20 Units  20 Units Subcutaneous BID With Meals Harini Urbina DO   20 Units at 06/11/25 1744    levothyroxine (SYNTHROID, LEVOTHROID) tablet 137.5 mcg  137.5 mcg Oral QAM AC Harini Urbina DO   137.5 mcg at 06/11/25 0940    losartan (COZAAR) tablet 100 mg  100 mg Oral Daily Harini Urbina DO   100 mg at 06/11/25 0941    mirtazapine (REMERON) tablet 30 mg  30 mg Oral Nightly Lisbeth Gonzalez MD   30 mg at 06/11/25 2109    nitroglycerin (NITROSTAT) SL tablet 0.4 mg  0.4 mg Sublingual Q5 Min PRN Harini Urbina DO        ondansetron ODT (ZOFRAN-ODT) disintegrating tablet 4 mg  4 mg Oral Q6H PRN Harini Urbina DO   4 mg at 06/06/25 1108    Or    ondansetron (ZOFRAN) injection 4 mg  4 mg Intravenous Q6H PRN Harini Urbina DO        pantoprazole (PROTONIX) EC tablet 40 mg  40 mg Oral QAM AC Harini Urbina DO   40 mg at 06/11/25 0937    QUEtiapine (SEROquel) tablet 100 mg  100 mg Oral Nightly Harini Urbina,    100 mg at 06/11/25 2109    sodium chloride 0.9 % flush 10 mL  10 mL Intravenous PRN Harini Urbina DO        sodium chloride 0.9 % flush 10 mL  10 mL Intravenous Q12H Harini Urbina DO   10 mL at 06/11/25 2110    sodium chloride 0.9 % flush 10 mL  10 mL Intravenous PRN Harini Urbina DO        sodium chloride 0.9 % infusion 40 mL  40 mL Intravenous PRN Harini Urbina DO        [START ON 6/19/2025] tuberculin injection 5 Units  5 Units Intradermal Once Harini Urbina DO         Orders (active)        Start     Ordered    06/19/25 0600  tuberculin injection 5 Units  Once         06/05/25 0739    06/19/25 0600  TB Skin Test (Reading)  Once        Comments: Use Enter / Edit Results to Document Results      06/05/25 0739    06/11/25 1706  Wound Ostomy Eval & Treat  Once        Comments: Pt would like order for a mepilex to fissure on coccyx, complaints of it being sore.    06/11/25 1706    06/10/25 0915  cholecalciferol (VITAMIN D3)  "capsule 50,000 Units  Every 7 Days         06/10/25 0823    06/10/25 0800  Dietary Nutrition Supplements [place]  Daily With Breakfast & Lunch       06/09/25 1514    06/09/25 2100  mirtazapine (REMERON) tablet 30 mg  Nightly         06/09/25 1652    06/09/25 1800  Dietary Nutrition Supplements Other (See Comment); Boost Glucose Control Chocolate  Daily With Breakfast, Lunch & Dinner       06/09/25 1514    06/09/25 0326  Sitter At Bedside  Continuous         06/09/25 0325    06/06/25 1047  ondansetron ODT (ZOFRAN-ODT) disintegrating tablet 4 mg  Every 6 Hours PRN        Placed in \"Or\" Linked Group    06/06/25 1047    06/06/25 1047  ondansetron (ZOFRAN) injection 4 mg  Every 6 Hours PRN        Placed in \"Or\" Linked Group    06/06/25 1047    06/05/25 1200  Dietary Nutrition Supplements Other (See Comment); Boost Glucose Control Chocolate  Daily With Lunch & Dinner       06/04/25 1958 06/05/25 0900  amLODIPine (NORVASC) tablet 10 mg  Daily         06/04/25 1958 06/05/25 0900  empagliflozin (JARDIANCE) tablet 10 mg  Daily         06/04/25 1958 06/05/25 0900  losartan (COZAAR) tablet 100 mg  Daily         06/04/25 1958 06/05/25 0800  Oral Care  2 Times Daily       06/04/25 1958 06/05/25 0800  insulin lispro protamine-insulin lispro (humaLOG 75-25) injection 20 Units  2 Times Daily With Meals         06/04/25 1958 06/05/25 0800  Vital Signs  Every Morning         06/05/25 0739    06/05/25 0737  Initial Swing Bed Certification  Once         06/05/25 0739    06/05/25 0737  Weigh Patient Upon Admission and Every Sunday  Weekly         06/05/25 0739    06/05/25 0737  Intake & Output  Every Shift       06/05/25 0739    06/05/25 0730  levothyroxine (SYNTHROID, LEVOTHROID) tablet 137.5 mcg  Every Morning Before Breakfast         06/04/25 1958 06/05/25 0730  pantoprazole (PROTONIX) EC tablet 40 mg  Every Morning Before Breakfast         06/04/25 1958 06/05/25 0600  Wound Care  Daily       06/04/25 1958    " "06/05/25 0418  Intake & Output  Every Shift       06/04/25 1958 06/04/25 2200  POC Glucose 4x Daily Before Meals & at Bedtime  4 Times Daily Before Meals & at Bedtime      Comments: Complete no more than 45 minutes prior to patient eating      06/04/25 1958 06/04/25 2100  sodium chloride 0.9 % flush 10 mL  Every 12 Hours Scheduled         06/04/25 1958 06/04/25 2100  sennosides-docusate (PERICOLACE) 8.6-50 MG per tablet 2 tablet  2 Times Daily        Placed in \"And\" Linked Group    06/04/25 1958 06/04/25 2100  apixaban (ELIQUIS) tablet 5 mg  Every 12 Hours Scheduled         06/04/25 1958 06/04/25 2100  dorzolamide (TRUSOPT) 2 % 1 drop, timolol (TIMOPTIC) 0.5 % 1 drop for Cosopt 22.3-6.8 mg/mL  2 Times Daily         06/04/25 1958 06/04/25 2100  famotidine (PEPCID) tablet 20 mg  2 Times Daily         06/04/25 1958 06/04/25 2100  QUEtiapine (SEROquel) tablet 100 mg  Nightly         06/04/25 1958 06/04/25 2100  castor oil-balsam peru (VENELEX) ointment 1 Application  Every 12 Hours Scheduled         06/04/25 1958 06/04/25 2000  Vital Signs  Every 4 Hours      Comments: Per per hospital policy    06/04/25 1958 06/04/25 2000  Wound Care  Every 12 Hours         06/04/25 1958 06/04/25 1959  Hospitalist (on-call MD unless specified)  Once        Specialty:  Hospitalist  Provider:  Harini Urbina DO    06/04/25 1958 06/04/25 1959  Notify Physician (with Parameters)  Until Discontinued         06/04/25 1958 06/04/25 1959  Weigh patient  Once         06/04/25 1958 06/04/25 1959  Code Status and Medical Interventions: CPR (Attempt to Resuscitate); Full Support  Continuous         06/04/25 1958 06/04/25 1959  Maintain Sequential Compression Device  Continuous         06/04/25 1958 06/04/25 1959  Maintain IV Access  Continuous         06/04/25 1958 06/04/25 1959  Telemetry - Place Orders & Notify Provider of Results When Patient Experiences Acute Chest Pain, Dysrhythmia or " Respiratory Distress  Continuous        Comments: Open Order Report to View Parameters Requiring Provider Notification    06/04/25 1958 06/04/25 1959  May Be Off Telemetry for Tests  Continuous         06/04/25 1958 06/04/25 1959  Diet: Cardiac, Diabetic; Healthy Heart (2-3 Na+); Consistent Carbohydrate; Fluid Consistency: Thin (IDDSI 0)  Diet Effective Now         06/04/25 1958 06/04/25 1959  PT Consult: Eval & Treat As Tolerated; Discharge Placement Assessment, Functional Mobility Below Baseline  Once         06/04/25 1958 06/04/25 1959  OT Consult: Eval & Treat ADL Performance Below Baseline, Discharge Placement Assessment  Once         06/04/25 1958 06/04/25 1959  Wound Ostomy Eval & Treat  Once         06/04/25 1958 06/04/25 1959  Turn Patient  Now Then Every 2 Hours         06/04/25 1958 06/04/25 1959  Head of Bed 30 Degrees or Less (Unless Contraindicated)  Until Discontinued         06/04/25 1958 06/04/25 1959  Elevate Heels Off of Bed  Until Discontinued         06/04/25 1958 06/04/25 1959  Use Seat Cushion When Up In Chair  Continuous         06/04/25 1958 06/04/25 1959  Do NOT Rub or Massage Any Bony Prominence  Continuous         06/04/25 1958 06/04/25 1959  Apply Heel Protector Boot Until Discontinued  Until Discontinued         06/04/25 1958 06/04/25 1959  Use Repositioning Wedge to Position Patient  Continuous         06/04/25 1958 06/04/25 1959  Skin Tear Care - Minimal Drainage Q3 Days  Every Third Day        Comments: - Realign Skin Flap (if Viable) Gently Ease Flap Into Place Using a Dampened Cotton-Tipped Applicator or Gloved Finger  - Gently Cleanse Area & Pat Dry  - Apply Hydrogel & Non-Adherent Layer (Silicone Sheet, Oil Emulsion Dressing or Vaseline Gauze)  - Secure With Silicone Border Dressing (Unless Skin Fragile)  - If Skin is Fragile Secure With Roll Gauze - Do NOT Put Tape Directly on Skin  - Change Every 3 Days & As Needed    06/04/25 1958     "06/04/25 1959  Initiate Post Fall Interventions / Assessments  Once        Comments: Post Fall Interventions / Assessments are Outlined in Process Instructions (Open Order Report to View Full Instructions)    06/04/25 1958 06/04/25 1959  Fall Precautions  Continuous         06/04/25 1958 06/04/25 1958  sodium chloride 0.9 % flush 10 mL  As Needed         06/04/25 1958 06/04/25 1958  sodium chloride 0.9 % flush 10 mL  As Needed         06/04/25 1958 06/04/25 1958  sodium chloride 0.9 % infusion 40 mL  As Needed         06/04/25 1958 06/04/25 1958  nitroglycerin (NITROSTAT) SL tablet 0.4 mg  Every 5 Minutes PRN         06/04/25 1958 06/04/25 1958  polyethylene glycol (MIRALAX) packet 17 g  Daily PRN        Placed in \"And\" Linked Group    06/04/25 1958 06/04/25 1958  bisacodyl (DULCOLAX) EC tablet 5 mg  Daily PRN        Placed in \"And\" Linked Group    06/04/25 1958 06/04/25 1958  bisacodyl (DULCOLAX) suppository 10 mg  Daily PRN        Placed in \"And\" Linked Group    06/04/25 1958 06/04/25 1958  acetaminophen (TYLENOL) tablet 1,000 mg  Every 8 Hours PRN         06/04/25 1958    06/04/25 1958  dextrose (GLUTOSE) oral gel 15 g  Every 15 Minutes PRN         06/04/25 1958 06/04/25 1958  dextrose (D50W) (25 g/50 mL) IV injection 25 g  Every 15 Minutes PRN         06/04/25 1958    06/04/25 1958  glucagon HCl (Diagnostic) injection 1 mg  Every 15 Minutes PRN         06/04/25 1958    06/04/25 1958  ALPRAZolam (XANAX) tablet 0.5 mg  Nightly PRN         06/04/25 1958 06/04/25 1958  HYDROcodone-acetaminophen (NORCO) 7.5-325 MG per tablet 1 tablet  Every 12 Hours PRN         06/04/25 1958    Unscheduled  Up With Assistance  As Needed       06/04/25 1958    Unscheduled  Follow Hypoglycemia Standing Orders For Blood Glucose <70 & Notify Provider of Treatment  As Needed      Comments: Follow Hypoglycemia Orders As Outlined in Process Instructions (Open Order Report to View Full Instructions)  Notify " Provider Any Time Hypoglycemia Treatment is Administered    25    Unscheduled  Wound Care  As Needed       25    Unscheduled  Skin Tear Care - Minimal Drainage PRN  As Needed      Comments: - Realign Skin Flap (if Viable) Gently Ease Flap Into Place Using a Dampened Cotton-Tipped Applicator or Gloved Finger  - Gently Cleanse Area & Pat Dry  - Apply Hydrogel & Non-Adherent Layer (Silicone Sheet, Oil Emulsion Dressing or Vaseline Gauze)  - Secure With Silicone Border Dressing (Unless Skin Fragile)  - If Skin is Fragile Secure With Roll Gauze - Do NOT Put Tape Directly on Skin  - Change Every 3 Days & As Needed    25    Unscheduled  Bed / Chair Alarm On  As Needed      Comments: Bed Alarm On When in Bed,   Use Chair Alarm When in Chair    25    Unscheduled  Apply Ice To Affected Area  As Needed      Comments: Apply to left heel for 10-20 minutes up to 4 times per day    25    Pending  insulin lispro protamine-insulin lispro (humaLOG 75-25) injection 84 Units  Every Morning         Pending    Pending  insulin lispro protamine-insulin lispro (humaLOG 75-25) injection 64 Units  Nightly         Pending                     Physician Progress Notes (most recent note)        Lisbeth Gonzalez MD at 25 [address] [place], [place] [address]IST PROGRESS NOTE     Patient Identification:  Name:  Ramsey Riley  Age:  70 y.o.  Sex:  male  :  1955  MRN:  [id number]  Visit Number:  [id number]  ROOM: [address] [place] [address]     Primary Care Provider:  Margret Purvis MD    Length of stay:  7    Subjective     CC: Decondition swing bed for rehab    Patient seen and examined today r no new changes or new events overnight.  Reported to have good sleep last night      Objective     Current Hospital Meds:amLODIPine, 10 mg, Oral, Daily  apixaban, 5 mg, Oral, Q12H  castor oil-balsam peru, 1 Application, Topical, Q12H  cholecalciferol, 50,000 Units, Oral, Q7 Days  dorzolamide  (TRUSOPT) 2 % 1 drop, timolol (TIMOPTIC) 0.5 % 1 drop for Cosopt 22.3-6.8 mg/mL, , Both Eyes, BID  empagliflozin, 10 mg, Oral, Daily  famotidine, 20 mg, Oral, BID  insulin lispro protamine-insulin lispro, 20 Units, Subcutaneous, BID With Meals  levothyroxine, 137.5 mcg, Oral, QAM AC  losartan, 100 mg, Oral, Daily  mirtazapine, 30 mg, Oral, Nightly  pantoprazole, 40 mg, Oral, QAM AC  QUEtiapine, 100 mg, Oral, Nightly  senna-docusate sodium, 2 tablet, Oral, BID  sodium chloride, 10 mL, Intravenous, Q12H  [START ON 6/19/2025] tuberculin, 5 Units, Intradermal, Once       ----------------------------------------------------------------------------------------------------------------------  Vital Signs:  Temp:  [97.9 °F (36.6 °C)-98.2 °F (36.8 °C)] 97.9 °F (36.6 °C)  Heart Rate:  [74-92] 74  Resp:  [18] 18  BP: (112-142)/(65-80) 142/80  SpO2:  [94 %-96 %] 94 %  on   ;   Device (Oxygen Therapy): room air  Body mass index is 30.05 kg/m².    Wt Readings from Last 3 Encounters:   06/07/25 97.7 kg (215 lb 7 oz)   06/04/25 97.7 kg (215 lb 7 oz)   05/19/25 104 kg (230 lb)     Intake/Output Summary (Last 24 hours) at 6/11/2025 1111  Last data filed at 6/11/2025 0300  Gross per 24 hour   Intake 240 ml   Output 900 ml   Net -660 ml     Diet: Cardiac, Diabetic; Healthy Heart (2-3 Na+); Consistent Carbohydrate; Fluid Consistency: Thin (IDDSI 0)  ----------------------------------------------------------------------------------------------------------------------      Physical exam:  Constitutional:  Well-developed and well-nourished.  No respiratory distress.      HENT:  Head:  Normocephalic and atraumatic.  Mouth:  Moist mucous membranes.    Eyes:  Conjunctivae and EOM are normal.  Pupils are equal, round, and reactive to light.  No scleral icterus.    Neck:  Neck supple.  No JVD present.    Cardiovascular:  Normal rate, regular rhythm and normal heart sounds with no murmur.  Pulmonary/Chest:  No respiratory distress, no wheezes, no  "crackles, with normal breath sounds and good air movement.  Abdominal:  Soft.  Bowel sounds are normal.  No distension and no tenderness.   Musculoskeletal:  No edema, no tenderness, and no deformity.  No red or swollen joints anywhere.    Neurological: Proximal muscle weakness which has improved, conscious oriented x 3, nonlateralizing gross neuroexam,   Peripheral vascular:  Strong pulses in all 4 extremities with no clubbing, no cyanosis, no edema.        ----------------------------------------------------------------------------------------------------------------------    Results from last 7 days   Lab Units 06/09/25  0139   SODIUM mmol/L 134*   POTASSIUM mmol/L 4.2   CHLORIDE mmol/L 100   CO2 mmol/L 20.7*   BUN mg/dL 20.4   CREATININE mg/dL 1.28*   CALCIUM mg/dL 8.6   GLUCOSE mg/dL 150*   Estimated Creatinine Clearance: 65.9 mL/min (A) (by C-G formula based on SCr of 1.28 mg/dL (H)).      Glucose   Date/Time Value Ref Range Status   06/11/2025 0725 107 70 - 130 mg/dL Final   06/10/2025 1915 164 (H) 70 - 130 mg/dL Final   06/10/2025 1655 193 (H) 70 - 130 mg/dL Final   06/10/2025 1016 96 70 - 130 mg/dL Final   06/10/2025 0729 94 70 - 130 mg/dL Final   06/09/2025 2043 137 (H) 70 - 130 mg/dL Final   06/09/2025 1650 161 (H) 70 - 130 mg/dL Final   06/09/2025 1050 99 70 - 130 mg/dL Final     No results found for: \"AMMONIA\"    Results from last 7 days   Lab Units 06/04/25  1740   NITRITE UA  Negative   WBC UA /HPF 3-5*   BACTERIA UA /HPF 3+*   SQUAM EPITHEL UA /HPF 0-2     No results found for: \"BLOODCX\"No results found for: \"RESPCX\"No results found for: \"URINECX\"No results found for: \"WOUNDCX\"No results found for: \"BODYFLDCX\"No results found for: \"STOOLCX\"  I have personally looked at the labs and they are summarized above.  ----------------------------------------------------------------------------------------------------------------------  Imaging Results (Last 24 Hours)       ** No results found for the last 24 " hours. **          I have personally reviewed the radiology images and read the final radiology report.          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  Keep off citalopram for now.    -- vitamin D replacement  -- Aggressive PT OT  -- Keep off statin  -- Continue all other therapies          Lisbeth Gonzalez MD  25  11:11 EDT      Electronically signed by Lisbeth Gonzalez MD at 25 1112       Consult Notes (most recent note)    No notes of this type exist for this encounter.          Physical Therapy Notes (most recent note)        Baljinder Martinez, PT at 25 1514  Version 1 of 1         Acute Care - Physical Therapy Treatment Note  [facility]     Patient Name: Ramsey Riley  : 1955  MRN: [id number]  Today's Date: 2025   Onset of Illness/Injury or Date of Surgery: 25 (admission date)  Visit Dx:   No diagnosis found.  Patient Active Problem List   Diagnosis    Coronary artery disease involving native coronary artery of native heart without angina pectoris    Essential hypertension    Bradycardia, sinus    Fatigue    Abnormal ECG    Precordial pain    LVH (left ventricular hypertrophy) due to hypertensive disease, without heart failure    Diabetes mellitus    PAD (peripheral artery disease)    Ischemic stroke    History of blood clots    Pulmonary embolus    Dyslipidemia    Class 1 obesity due to excess calories with serious comorbidity  and body mass index (BMI) of 34.0 to 34.9 in adult    Weakness    Frequent falls    Physical debility     Past Medical History:   Diagnosis Date    Arthritis     Clot     Coronary artery disease     Dementia 12/18/2024    Diabetes mellitus     Heart disease     History of blood clots     Hypertension     Impaired mobility     Low back pain     Thyroid disease     Type 2 diabetes mellitus      Past Surgical History:   Procedure Laterality Date    CHOLECYSTECTOMY  2015    COLONOSCOPY  2015    dr srivastava    CORONARY ARTERY BYPASS GRAFT      [place] - 3 BY PASS     ENDOSCOPY       PT Assessment (Last 12 Hours)       PT Evaluation and Treatment       Row Name 06/11/25 1510          Physical Therapy Time and Intention    Subjective Information no complaints  -KM     Document Type therapy note (daily note)  -KM     Mode of Treatment physical therapy  -KM     Patient Effort good  -KM     Symptoms Noted During/After Treatment fatigue  -KM       Row Name 06/11/25 1510          General Information    Patient Profile Reviewed yes  -KM     Patient Observations alert;cooperative;agree to therapy  -KM     Existing Precautions/Restrictions fall  -KM       Row Name 06/11/25 1510          Pain    Pretreatment Pain Rating 0/10 - no pain  -KM     Posttreatment Pain Rating 0/10 - no pain  -KM       Row Name 06/11/25 1510          Cognition    Affect/Mental Status (Cognition) confused  -KM     Orientation Status (Cognition) oriented to;person;place  -KM     Follows Commands (Cognition) follows one-step commands  -KM       Row Name 06/11/25 1510          Bed Mobility    Bed Mobility supine-sit  -KM     Supine-Sit [place] (Bed Mobility) minimum assist (75% patient effort)  -KM     Assistive Device (Bed Mobility) bed rails;head of bed elevated  -KM       Row Name 06/11/25 1510          Transfers    Transfers sit-stand transfer;stand-sit transfer;bed-chair transfer  -KM       Row Name 06/11/25 1510          Bed-Chair Transfer     Bed-Chair [place] (Transfers) minimum assist (75% patient effort)  -KM     Assistive Device (Bed-Chair Transfers) walker, front-wheeled  -KM       Row Name 06/11/25 1510          Sit-Stand Transfer    Sit-Stand [place] (Transfers) minimum assist (75% patient effort);1 person assist  -KM     Assistive Device (Sit-Stand Transfers) walker, front-wheeled  -KM       Row Name 06/11/25 1510          Stand-Sit Transfer    Stand-Sit [place] (Transfers) minimum assist (75% patient effort)  -KM     Assistive Device (Stand-Sit Transfers) walker, front-wheeled  -KM       Row Name 06/11/25 1510          Gait/Stairs (Locomotion)    Gait/Stairs Locomotion gait/ambulation independence;gait/ambulation assistive device;distance ambulated  -KM     [place] Level (Gait) minimum assist (75% patient effort);verbal cues;nonverbal cues (demo/gesture)  -KM     Assistive Device (Gait) walker, front-wheeled  -KM     Patient was able to Ambulate yes  -KM     Distance in Feet (Gait) 110  -KM     Pattern (Gait) step-to  -KM     Deviations/Abnormal Patterns (Gait) ataxic;base of support, narrow;gait speed decreased  -KM     Bilateral Gait Deviations forward flexed posture  -KM       Row Name 06/11/25 1510          Safety Issues/Impairments Affecting Functional Mobility    Impairments Affecting Function (Mobility) balance;cognition;coordination;endurance/activity tolerance;pain;strength  -KM       Row Name             Wound 05/26/25 1618 Left posterior heel Pressure Injury    Wound - Properties Group Placement Date: 05/26/25  -CW Placement Time: 1618 -CW Present on Original Admission: Y  -CW Side: Left  -CW Orientation: posterior  -CW Location: heel  -CW Primary Wound Type: Pressure Inj  -CW    Retired Wound - Properties Group Placement Date: 05/26/25  -CW Placement Time: 1618 -CW Present on Original Admission: Y  -CW Side: Left  -CW Orientation: posterior  -CW Location: heel  -CW    Retired Wound - Properties Group Placement  Date: 05/26/25  -CW Placement Time: 1618  -CW Present on Original Admission: Y  -CW Side: Left  -CW Orientation: posterior  -CW Location: heel  -CW    Retired Wound - Properties Group Date first assessed: 05/26/25  -CW Time first assessed: 1618  -CW Present on Original Admission: Y  -CW Side: Left  -CW Location: heel  -CW      Row Name             Wound 05/26/25 1622 Left anterior heel Pressure Injury    Wound - Properties Group Placement Date: 05/26/25  -CW Placement Time: 1622  -CW Side: Left  -CW Orientation: anterior  -CW Location: heel  -CW Primary Wound Type: Pressure Inj  -CW    Retired Wound - Properties Group Placement Date: 05/26/25  -CW Placement Time: 1622  -CW Side: Left  -CW Orientation: anterior  -CW Location: heel  -CW    Retired Wound - Properties Group Placement Date: 05/26/25  -CW Placement Time: 1622  -CW Side: Left  -CW Orientation: anterior  -CW Location: heel  -CW    Retired Wound - Properties Group Date first assessed: 05/26/25  -CW Time first assessed: 1622  -CW Side: Left  -CW Location: heel  -CW      Row Name             Wound 05/27/25 1007 medial parietal region Traumatic Abrasion    Wound - Properties Group Placement Date: 05/27/25  -LB Placement Time: 1007  -LB Present on Original Admission: Y  -LB Orientation: medial  -LB Location: parietal region  -LB Primary Wound Type: Traumatic  -LB Secondary Wound Type - Traumatic: Abrasion  -LB    Retired Wound - Properties Group Placement Date: 05/27/25  -LB Placement Time: 1007  -LB Present on Original Admission: Y  -LB Orientation: medial  -LB Location: parietal region  -LB    Retired Wound - Properties Group Placement Date: 05/27/25  -LB Placement Time: 1007  -LB Present on Original Admission: Y  -LB Orientation: medial  -LB Location: parietal region  -LB    Retired Wound - Properties Group Date first assessed: 05/27/25  -LB Time first assessed: 1007  -LB Present on Original Admission: Y  -LB Location: parietal region  -LB      Row Name              Wound 05/27/25 1008 Right distal arm Traumatic Skin Tear    Wound - Properties Group Placement Date: 05/27/25  -LB Placement Time: 1008  -LB Present on Original Admission: Y  -LB Side: Right  -LB Orientation: distal  -LB Location: arm  -LB Primary Wound Type: Traumatic  -LB Secondary Wound Type - Traumatic: Skin Tear  -LB    Retired Wound - Properties Group Placement Date: 05/27/25  -LB Placement Time: 1008  -LB Present on Original Admission: Y  -LB Side: Right  -LB Orientation: distal  -LB Location: arm  -LB    Retired Wound - Properties Group Placement Date: 05/27/25  -LB Placement Time: 1008  -LB Present on Original Admission: Y  -LB Side: Right  -LB Orientation: distal  -LB Location: arm  -LB    Retired Wound - Properties Group Date first assessed: 05/27/25  -LB Time first assessed: 1008  -LB Present on Original Admission: Y  -LB Side: Right  -LB Location: arm  -LB      Row Name 06/11/25 1510          Progress Summary (PT)    Daily Progress Summary (PT) Pt. was able to demonstrate improved functional mobility skills. He was able to ambulate improved distance w/ RW. He continues to show improvement w/ recent PT sessions. Pt. would continue to benefit from PT services.  -KM               User Key  (r) = Recorded By, (t) = Taken By, (c) = Cosigned By      Initials Name Provider Type    Chata Couch, RN Registered Nurse    Baljinder Reese, CARLOS Physical Therapist    LB Marysol Nxi, RN Registered Nurse                    Physical Therapy Education       Title: PT OT SLP Therapies (In Progress)       Topic: Physical Therapy (Not Started)       Point: Mobility training (Not Started)       Learner Progress:  Not documented in this visit.              Point: Home exercise program (Not Started)       Learner Progress:  Not documented in this visit.              Point: Body mechanics (Not Started)       Learner Progress:  Not documented in this visit.              Point: Precautions (Not Started)       Learner  Progress:  Not documented in this visit.                                  PT Recommendation and Plan     Progress Summary (PT)  Daily Progress Summary (PT): Pt. was able to demonstrate improved functional mobility skills. He was able to ambulate improved distance w/ RW. He continues to show improvement w/ recent PT sessions. Pt. would continue to benefit from PT services.       Time Calculation:    PT Charges       Row Name 25 1510             Time Calculation    PT Received On 25  -KM         Time Calculation- PT    Total Timed Code Minutes- PT 23 minute(s)  -KM                User Key  (r) = Recorded By, (t) = Taken By, (c) = Cosigned By      Initials Name Provider Type    KM Baljinder Martinez, PT Physical Therapist                  Therapy Charges for Today       Code Description Service Date Service Provider Modifiers Qty    93947026746 HC PT THERAPEUTIC ACT EA 15 MIN 6/10/2025 Baljinder Martinez, PT GP 1    51142214819 HC GAIT TRAINING EA 15 MIN 6/10/2025 Baljinder Martinez, PT GP 1    60934145474 HC PT THERAPEUTIC ACT EA 15 MIN 2025 Baljinder Martinez, PT GP 1    49514696669 HC GAIT TRAINING EA 15 MIN 2025 Baljinder Martinez, PT GP 1            PT G-Codes  Outcome Measure Options: AM-PAC 6 Clicks Daily Activity (OT)  AM-PAC 6 Clicks Score (PT): 12  AM-PAC 6 Clicks Score (OT): 17    Baljinder Martinez PT  2025      Electronically signed by Baljinder Martinez, PT at 25 1514          Occupational Therapy Notes (most recent note)        Vanesa Tse, OT at 25 1413          Patient Name: Ramsey Riley  : 1955    MRN: [id number]                              Today's Date: 2025       Admit Date: 2025    Visit Dx: No diagnosis found.  Patient Active Problem List   Diagnosis    Coronary artery disease involving native coronary artery of native heart without angina pectoris    Essential hypertension    Bradycardia, sinus    Fatigue    Abnormal ECG    Precordial pain    LVH (left ventricular  hypertrophy) due to hypertensive disease, without heart failure    Diabetes mellitus    PAD (peripheral artery disease)    Ischemic stroke    History of blood clots    Pulmonary embolus    Dyslipidemia    Class 1 obesity due to excess calories with serious comorbidity and body mass index (BMI) of 34.0 to 34.9 in adult    Weakness    Frequent falls    Physical debility     Past Medical History:   Diagnosis Date    Arthritis     Clot     Coronary artery disease     Dementia 12/18/2024    Diabetes mellitus     Heart disease     History of blood clots     Hypertension     Impaired mobility     Low back pain     Thyroid disease     Type 2 diabetes mellitus      Past Surgical History:   Procedure Laterality Date    CHOLECYSTECTOMY  2015    COLONOSCOPY  2015    dr srivastava    CORONARY ARTERY BYPASS GRAFT      [place] - 3 BY PASS     ENDOSCOPY        General Information       Row Name 06/11/25 1410          OT Time and Intention    Subjective Information no complaints  -     Document Type therapy note (daily note)  -     Mode of Treatment individual therapy;occupational therapy  -     Patient Effort adequate  -     Symptoms Noted During/After Treatment fatigue  -     Comment Patient agreeable to therapy. Improved cognition/orientatio, but fatigued requring cues to initiate participation in therapy.  -       Row Name 06/11/25 1410          General Information    Patient Profile Reviewed yes  -     Existing Precautions/Restrictions fall  -     Barriers to Rehab medically complex;previous functional deficit;cognitive status  -       Row Name 06/11/25 1410          Cognition    Orientation Status (Cognition) oriented to;person;place  -       Row Name 06/11/25 1410          Safety Issues/Impairments Affecting Functional Mobility    Safety Issues Affecting Function (Mobility) awareness of need for assistance;insight into deficits/self-awareness  -     Impairments Affecting Function (Mobility)  balance;cognition;coordination;endurance/activity tolerance;pain;strength  -     Cognitive Impairments, Mobility Safety/Performance awareness, need for assistance;insight into deficits/self-awareness  -               User Key  (r) = Recorded By, (t) = Taken By, (c) = Cosigned By      Initials Name Provider Type    Vanesa Aguillon OT Occupational Therapist                     Mobility/ADL's       Row Name 06/11/25 1411          Bed Mobility    Bed Mobility supine-sit  -     Supine-Sit [place] (Bed Mobility) minimum assist (75% patient effort);1 person assist  -     Assistive Device (Bed Mobility) bed rails;head of bed elevated  -       Row Name 06/11/25 1411          Transfers    Transfers sit-stand transfer;stand-sit transfer;bed-chair transfer  -     Comment, (Transfers) cues throughout due to decreased vision and cognition  -       Row Name 06/11/25 1411          Bed-Chair Transfer    Bed-Chair [place] (Transfers) minimum assist (75% patient effort)  -     Assistive Device (Bed-Chair Transfers) walker, front-wheeled  -       Row Name 06/11/25 1411          Sit-Stand Transfer    Sit-Stand [place] (Transfers) minimum assist (75% patient effort);1 person assist  -     Assistive Device (Sit-Stand Transfers) walker, front-wheeled  -KP       Row Name 06/11/25 1411          Stand-Sit Transfer    Stand-Sit [place] (Transfers) minimum assist (75% patient effort)  -     Assistive Device (Stand-Sit Transfers) walker, front-wheeled  -KP               User Key  (r) = Recorded By, (t) = Taken By, (c) = Cosigned By      Initials Name Provider Type    Vanesa Aguillon OT Occupational Therapist                   Obj/Interventions       Row Name 06/11/25 1412          Motor Skills    Motor Skills functional endurance  -     Functional Endurance fair minus  -       Row Name 06/11/25 1412          Balance    Balance Assessment sitting static balance;standing static balance  -      Static Sitting Balance contact guard  -     Static Standing Balance minimal assist  -     Position/Device Used, Standing Balance walker, rolling  -     Balance Interventions sitting;supported;static;minimal challenge;occupation based/functional task  -               User Key  (r) = Recorded By, (t) = Taken By, (c) = Cosigned By      Initials Name Provider Type    Vanesa Aguillon OT Occupational Therapist                   Goals/Plan    No documentation.                  Clinical Impression       Row Name 06/11/25 1412          Pain Assessment    Pretreatment Pain Rating 0/10 - no pain  -     Posttreatment Pain Rating 0/10 - no pain  -       Row Name 06/11/25 1412          Plan of Care Review    Plan of Care Reviewed With patient  -     Progress no change  -     Outcome Evaluation Patient seen for OT treatment. He was fatigued on this date, cues and assist to initiate participation, but improved with environmental stimuli and  out of bed activity. Continue plan of care.  -       Row Name 06/11/25 1412          Therapy Plan Review/Discharge Plan (OT)    Anticipated Discharge Disposition (OT) home with /7 care;home with home health  -[facility] Name 06/11/25 1412          Positioning and Restraints    Pre-Treatment Position in bed  -     Post Treatment Position chair  -     In Chair notified nsg;sitting;call light within reach;encouraged to call for assist;with nsg  -               User Key  (r) = Recorded By, (t) = Taken By, (c) = Cosigned By      Initials Name Provider Type    Vanesa Aguillon OT Occupational Therapist                   Outcome Measures       Row Name 06/11/25 1413          How much help from another is currently needed...    Putting on and taking off regular lower body clothing? 3  -KP     Bathing (including washing, rinsing, and drying) 3  -KP     Toileting (which includes using toilet bed pan or urinal) 2  -KP     Putting on and taking off regular upper body  clothing 3  -KP     Taking care of personal grooming (such as brushing teeth) 3  -KP     Eating meals 3  -KP     AM-PAC 6 Clicks Score (OT) 17  -KP       Row Name 06/11/25 0945          How much help from another person do you currently need...    Turning from your back to your side while in flat bed without using bedrails? 3  -OW     Moving from lying on back to sitting on the side of a flat bed without bedrails? 2  -OW     Moving to and from a bed to a chair (including a wheelchair)? 2  -OW     Standing up from a chair using your arms (e.g., wheelchair, bedside chair)? 2  -OW     Climbing 3-5 steps with a railing? 1  -OW     To walk in hospital room? 2  -OW     AM-PAC 6 Clicks Score (PT) 12  -OW       Row Name 06/11/25 1413          Functional Assessment    Outcome Measure Options AM-PAC 6 Clicks Daily Activity (OT)  -               User Key  (r) = Recorded By, (t) = Taken By, (c) = Cosigned By      Initials Name Provider Type     Vanesa Tse OT Occupational Therapist    Ceci Mar RN Registered Nurse                      OT Recommendation and Plan     Plan of Care Review  Plan of Care Reviewed With: patient  Progress: no change  Outcome Evaluation: Patient seen for OT treatment. He was fatigued on this date, cues and assist to initiate participation, but improved with environmental stimuli and  out of bed activity. Continue plan of care.     Time Calculation:         Time Calculation- OT       Row Name 06/11/25 1413             Time Calculation- OT    Total Timed Code Minutes- OT 15 minute(s)  -      OT Received On 06/11/25  -                User Key  (r) = Recorded By, (t) = Taken By, (c) = Cosigned By      Initials Name Provider Type    Vanesa Aguillon OT Occupational Therapist                  Therapy Charges for Today       Code Description Service Date Service Provider Modifiers Qty    17772692224  OT THERAPEUTIC ACT EA 15 MIN 6/10/2025 Vanesa Tse OT GO 1    [id number]  OT  THERAPEUTIC ACT EA 15 MIN 2025 Vanesa Tse OT GO 1                 Vanesa Tse OT  2025    Electronically signed by Vanesa Tse OT at 25 1414       Baljinder Martinez, CARLOS   Physical Therapist  Physical Therapy     Therapy Treatment Note     Signed     Date of Service: 06/10/25 1401  Creation Time: 06/10/25 1401     Signed       Expand All Collapse All[]Expand All by Default    Acute Care - Physical Therapy Treatment Note  TA [facility]     Patient Name: Ramsey Riley                 : 1955                      MRN: [id number]  Today's Date: 6/10/2025                               Onset of Illness/Injury or Date of Surgery: 25 (admission date)  Visit Dx:   Visit Diagnosis   No diagnosis found.     Problem List       Patient Active Problem List   Diagnosis    Coronary artery disease involving native coronary artery of native heart without angina pectoris    Essential hypertension    Bradycardia, sinus    Fatigue    Abnormal ECG    Precordial pain    LVH (left ventricular hypertrophy) due to hypertensive disease, without heart failure    Diabetes mellitus    PAD (peripheral artery disease)    Ischemic stroke    History of blood clots    Pulmonary embolus    Dyslipidemia    Class 1 obesity due to excess calories with serious comorbidity and body mass index (BMI) of 34.0 to 34.9 in adult    Weakness    Frequent falls    Physical debility         Medical History        Past Medical History:   Diagnosis Date    Arthritis      Clot      Coronary artery disease      Dementia 2024    Diabetes mellitus      Heart disease      History of blood clots      Hypertension      Impaired mobility      Low back pain      Thyroid disease      Type 2 diabetes mellitus           Surgical History         Past Surgical History:   Procedure Laterality Date    CHOLECYSTECTOMY       COLONOSCOPY        dr srivastava    CORONARY ARTERY BYPASS GRAFT         [place] -  BY PASS     ENDOSCOPY              PT Assessment (Last 12 Hours)            PT Evaluation and Treatment         Row Name 06/10/25 1357                 Physical Therapy Time and Intention     Symptoms Noted During/After Treatment fatigue  -KM          Row Name 06/10/25 1357                 General Information     Patient Observations alert;cooperative;agree to therapy  -KM       Limitations/Impairments safety/cognitive  -KM          Row Name 06/10/25 1357                 Pain     Pretreatment Pain Rating 0/10 - no pain  -KM       Posttreatment Pain Rating 0/10 - no pain  -KM          Row Name 06/10/25 135                 Cognition     Affect/Mental Status (Cognition) confused  -KM       Follows Commands (Cognition) follows one-step commands  -KM          Row Name 06/10/25 1357                 Bed Mobility     Bed Mobility bed mobility (all) activities  -KM       All Activities, [place] (Bed Mobility) moderate assist (50% patient effort)  -KM       Assistive Device (Bed Mobility) bed rails;head of bed elevated  -          Row Name 06/10/25 1357                 Transfers     Transfers sit-stand transfer;stand-sit transfer;bed-chair transfer  -KM          Row Name 06/10/25 [facility]                 Bed-Chair Transfer     Bed-Chair [place] (Transfers) minimum assist (75% patient effort)  -KM       Assistive Device (Bed-Chair Transfers) walker, front-wheeled  -KM          Row Name 06/10/25 [facility]                 Sit-Stand Transfer     Sit-Stand [place] (Transfers) minimum assist (75% patient effort)  -KM       Assistive Device (Sit-Stand Transfers) walker, front-wheeled  -KM          Row Name 06/10/25 [facility]                 Stand-Sit Transfer     Stand-Sit [place] (Transfers) minimum assist (75% patient effort)  -KM       Assistive Device (Stand-Sit Transfers) walker, front-wheeled  -KM          Row Name 06/10/25 1356                 Gait/Stairs (Locomotion)     Gait/Stairs Locomotion gait/ambulation independence;gait/ambulation  assistive device;distance ambulated  -KM       [place] Level (Gait) minimum assist (75% patient effort)  -KM       Assistive Device (Gait) walker, front-wheeled  -KM       Patient was able to Ambulate yes  -KM       Distance in Feet (Gait) 80  -KM       Pattern (Gait) step-to  -KM       Deviations/Abnormal Patterns (Gait) ataxic;base of support, narrow;gait speed decreased  -KM       Bilateral Gait Deviations forward flexed posture  -KM       Left Sided Gait Deviations weight shift ability decreased  -KM          Row Name                      Wound 05/26/25 1618 Left posterior heel Pressure Injury     Wound - Properties Group Placement Date: 05/26/25  -CW Placement Time: 1618  -CW Present on Original Admission: Y  -CW Side: Left  -CW Orientation: posterior  -CW Location: heel  -CW Primary Wound Type: Pressure Inj  -CW     Retired Wound - Properties Group Placement Date: 05/26/25  -CW Placement Time: 1618  -CW Present on Original Admission: Y  -CW Side: Left  -CW Orientation: posterior  -CW Location: heel  -CW     Retired Wound - Properties Group Placement Date: 05/26/25  -CW Placement Time: 1618  -CW Present on Original Admission: Y  -CW Side: Left  -CW Orientation: posterior  -CW Location: heel  -CW     Retired Wound - Properties Group Date first assessed: 05/26/25  -CW Time first assessed: 1618  -CW Present on Original Admission: Y  -CW Side: Left  -CW Location: heel  -CW        Row Name                      Wound 05/26/25 1622 Left anterior heel Pressure Injury     Wound - Properties Group Placement Date: 05/26/25  -CW Placement Time: 1622  -CW Side: Left  -CW Orientation: anterior  -CW Location: heel  -CW Primary Wound Type: Pressure Inj  -CW     Retired Wound - Properties Group Placement Date: 05/26/25  -CW Placement Time: 1622  -CW Side: Left  -CW Orientation: anterior  -CW Location: heel  -CW     Retired Wound - Properties Group Placement Date: 05/26/25  -CW Placement Time: 1622  -CW Side: Left  -CW  Orientation: anterior  -CW Location: heel  -CW     Retired Wound - Properties Group Date first assessed: 05/26/25  -CW Time first assessed: 1622  -CW Side: Left  -CW Location: heel  -CW        Row Name                      Wound 05/27/25 1007 medial parietal region Traumatic Abrasion     Wound - Properties Group Placement Date: 05/27/25  -LB Placement Time: 1007  -LB Present on Original Admission: Y  -LB Orientation: medial  -LB Location: parietal region  -LB Primary Wound Type: Traumatic  -LB Secondary Wound Type - Traumatic: Abrasion  -LB     Retired Wound - Properties Group Placement Date: 05/27/25  -LB Placement Time: 1007  -LB Present on Original Admission: Y  -LB Orientation: medial  -LB Location: parietal region  -LB     Retired Wound - Properties Group Placement Date: 05/27/25  -LB Placement Time: 1007  -LB Present on Original Admission: Y  -LB Orientation: medial  -LB Location: parietal region  -LB     Retired Wound - Properties Group Date first assessed: 05/27/25  -LB Time first assessed: 1007  -LB Present on Original Admission: Y  -LB Location: parietal region  -LB        Row Name                      Wound 05/27/25 1008 Right distal arm Traumatic Skin Tear     Wound - Properties Group Placement Date: 05/27/25  -LB Placement Time: 1008  -LB Present on Original Admission: Y  -LB Side: Right  -LB Orientation: distal  -LB Location: arm  -LB Primary Wound Type: Traumatic  -LB Secondary Wound Type - Traumatic: Skin Tear  -LB     Retired Wound - Properties Group Placement Date: 05/27/25  -LB Placement Time: 1008  -LB Present on Original Admission: Y  -LB Side: Right  -LB Orientation: distal  -LB Location: arm  -LB     Retired Wound - Properties Group Placement Date: 05/27/25  -LB Placement Time: 1008  -LB Present on Original Admission: Y  -LB Side: Right  -LB Orientation: distal  -LB Location: arm  -LB     Retired Wound - Properties Group Date first assessed: 05/27/25  -LB Time first assessed: 1008  -LB Present  on Original Admission: Y  -LB Side: Right  -LB Location: arm  -LB        Row Name 06/10/25 1281                 Progress Summary (PT)     Daily Progress Summary (PT) Pt. was able to demonstrate improved functional mobility skills during PT session. He was able to improve ambulation distance and quality. He tolerated increased activity. Pt. would continue to benefit from PT services.  -KM                    User Key  (r) = Recorded By, (t) = Taken By, (c) = Cosigned By        Initials Name Provider Type     Chata Couch, RN Registered Nurse     Baljinder Reese, PT Physical Therapist     Marysol Rodriguez, RN Registered Nurse                             Physical Therapy Education            Title: PT OT SLP Therapies (In Progress)         Topic: Physical Therapy (Not Started)         Point: Mobility training (Not Started)         Learner Progress:  Not documented in this visit.                  Point: Home exercise program (Not Started)         Learner Progress:  Not documented in this visit.                  Point: Body mechanics (Not Started)         Learner Progress:  Not documented in this visit.                  Point: Precautions (Not Started)         Learner Progress:  Not documented in this visit.                                              PT Recommendation and Plan  Progress Summary (PT)  Daily Progress Summary (PT): Pt. was able to demonstrate improved functional mobility skills during PT session. He was able to improve ambulation distance and quality. He tolerated increased activity. Pt. would continue to benefit from PT services.               Time Calculation:     PT Charges         Row Name 06/10/25 2005                       Time Calculation     PT Received On 06/10/25  -KM                 Time Calculation- PT     Total Timed Code Minutes- PT 23 minute(s)  -KM                      User Key  (r) = Recorded By, (t) = Taken By, (c) = Cosigned By        Initials Name Provider Type     KEVIN Martinez  Baljinder, PT Physical Therapist                       Therapy Charges for Today         Code Description Service Date Service Provider Modifiers Qty     82628598643 HC PT THERAPEUTIC ACT EA 15 MIN 2025 Baljinder Martinez, PT GP 1     76774525280 HC GAIT TRAINING EA 15 MIN 2025 Baljinder Martinez, PT GP 1     03250303271 HC PT THERAPEUTIC ACT EA 15 MIN 6/10/2025 Baljinder Martinez, PT GP 1     35224383261 HC GAIT TRAINING EA 15 MIN 6/10/2025 Baljinder Martinez, PT GP 1                PT G-Codes  Outcome Measure Options: AM-PAC 6 Clicks Daily Activity (OT)  AM-PAC 6 Clicks Score (PT): 12  AM-PAC 6 Clicks Score (OT): 14     Baljinder Martinez PT                   6/10/2025                                   Vanesa Tse, PATRICIA   Occupational Therapist  Specialty: Occupational Therapy     Therapy Treatment Note     Signed     Date of Service: 06/10/25 1403  Creation Time: 06/10/25 1403     Signed       Expand All Collapse All[]Expand All by Default    Patient Name: Ramsey Riley                 : 1955                        MRN: [id number]                              Today's Date: 6/10/2025                                   Admit Date: 2025               Visit Dx:   Visit Diagnosis   No diagnosis found.     Problem List       Patient Active Problem List   Diagnosis    Coronary artery disease involving native coronary artery of native heart without angina pectoris    Essential hypertension    Bradycardia, sinus    Fatigue    Abnormal ECG    Precordial pain    LVH (left ventricular hypertrophy) due to hypertensive disease, without heart failure    Diabetes mellitus    PAD (peripheral artery disease)    Ischemic stroke    History of blood clots    Pulmonary embolus    Dyslipidemia    Class 1 obesity due to excess calories with serious comorbidity and body mass index (BMI) of 34.0 to 34.9 in adult    Weakness    Frequent falls    Physical debility         Medical History        Past Medical History:   Diagnosis Date    Arthritis       Clot      Coronary artery disease      Dementia 12/18/2024    Diabetes mellitus      Heart disease      History of blood clots      Hypertension      Impaired mobility      Low back pain      Thyroid disease      Type 2 diabetes mellitus           Surgical History         Past Surgical History:   Procedure Laterality Date    CHOLECYSTECTOMY   2015    COLONOSCOPY   2015     dr srivastava    CORONARY ARTERY BYPASS GRAFT         [facility] - 3 BY PASS     ENDOSCOPY               General Information         Row Name 06/10/25 0468                 OT Time and Intention     Subjective Information no complaints  Simultaneous filing. User may be unaware of other data.  -       Document Type therapy note (daily note)  Simultaneous filing. User may be unaware of other data.  -       Mode of Treatment individual therapy;occupational therapy  Simultaneous filing. User may be unaware of other data.  -       Patient Effort good  Simultaneous filing. User may be unaware of other data.  -       Comment Patient agreeable to therapy.  -          Row Name 06/10/25 1760                 General Information     Patient Profile Reviewed yes  Simultaneous filing. User may be unaware of other data.  -       Existing Precautions/Restrictions fall  Simultaneous filing. User may be unaware of other data.  -       Barriers to Rehab medically complex;previous functional deficit;cognitive status  -          Row Name 06/10/25 9671                 Cognition     Orientation Status (Cognition) oriented to;person;place  Simultaneous filing. User may be unaware of other data.  -          Row Name 06/10/25 2814                 Safety Issues/Impairments Affecting Functional Mobility     Safety Issues Affecting Function (Mobility) awareness of need for assistance;insight into deficits/self-awareness  -       Impairments Affecting Function (Mobility) balance;cognition;coordination;endurance/activity tolerance;pain;strength  Simultaneous  filing. User may be unaware of other data.  -                       User Key  (r) = Recorded By, (t) = Taken By, (c) = Cosigned By        Initials Name Provider Type     Vanesa Aguillon OT Occupational Therapist                                     Mobility/ADL's         Row Name 06/10/25 1358                 Transfers     Transfers sit-stand transfer;stand-sit transfer;bed-chair transfer  -       Comment, (Transfers) cues required throughout due to decreased vision and cognition (although improved)  -          Row Name 06/10/25 1358                 Bed-Chair Transfer     Bed-Chair [place] (Transfers) minimum assist (75% patient effort)  -       Assistive Device (Bed-Chair Transfers) walker, front-wheeled  -KP          Row Name 06/10/25 1358                 Sit-Stand Transfer     Sit-Stand [place] (Transfers) minimum assist (75% patient effort);1 person assist  -       Assistive Device (Sit-Stand Transfers) walker, front-wheeled  -KP          Row Name 06/10/25 1358                 Stand-Sit Transfer     Stand-Sit [place] (Transfers) minimum assist (75% patient effort)  -       Assistive Device (Stand-Sit Transfers) walker, front-wheeled  -KP          Row Name 06/10/25 1358                 Self-Feeding Assessment/Training     [place] Level (Feeding) feeding skills;minimum assist (75% patient effort);verbal cues;nonverbal cues (demo/gesture)  -                       User Key  (r) = Recorded By, (t) = Taken By, (c) = Cosigned By        Initials Name Provider Type     Vanesa Aguillon OT Occupational Therapist                            Obj/Interventions         Row Name 06/10/25 1351                 Motor Skills     Motor Skills functional endurance  -KP       Functional Endurance fair  -                       User Key  (r) = Recorded By, (t) = Taken By, (c) = Cosigned By        Initials Name Provider Type     Vanesa Aguillon OT Occupational Therapist                             Goals/Plan    No documentation.                        Clinical Impression         Row Name 06/10/25 1402                 Pain Assessment     Pretreatment Pain Rating 0/10 - no pain  -KP       Posttreatment Pain Rating 0/10 - no pain  -KP          Row Name 06/10/25 1402                 Plan of Care Review     Plan of Care Reviewed With patient  -       Progress no change  -       Outcome Evaluation Patient seen for OT treatment. Improved functional mobility, but still requires consistent cues related to decreased vision and cognition. Continue plan of care.  -KP          Row Name 06/10/25 1402                 Positioning and Restraints     Pre-Treatment Position in bed  -KP       Post Treatment Position chair  -KP       In Chair sitting;call light within reach;encouraged to call for assist;exit alarm on;with other staff  -                       User Key  (r) = Recorded By, (t) = Taken By, (c) = Cosigned By        Initials Name Provider Type     Vanesa Aguillon, OT Occupational Therapist                            Outcome Measures         Row Name 06/10/25 1402                 How much help from another is currently needed...     Putting on and taking off regular lower body clothing? 3  -KP       Bathing (including washing, rinsing, and drying) 3  -KP       Toileting (which includes using toilet bed pan or urinal) 2  -KP       Putting on and taking off regular upper body clothing 3  -KP       Taking care of personal grooming (such as brushing teeth) 3  -KP       Eating meals 3  -KP       AM-PAC 6 Clicks Score (OT) 17  -KP          Row Name 06/10/25 0822                 How much help from another person do you currently need...     Turning from your back to your side while in flat bed without using bedrails? 3  -RH       Moving from lying on back to sitting on the side of a flat bed without bedrails? 2  -RH       Moving to and from a bed to a chair (including a wheelchair)? 2  -RH       Standing up from a  chair using your arms (e.g., wheelchair, bedside chair)? 2  -RH       Climbing 3-5 steps with a railing? 1  -RH       To walk in hospital room? 2  -RH       AM-PAC 6 Clicks Score (PT) 12  -RH          Row Name 06/10/25 1402                 Functional Assessment     Outcome Measure Options AM-PAC 6 Clicks Daily Activity (OT)  -                       User Key  (r) = Recorded By, (t) = Taken By, (c) = Cosigned By        Initials Name Provider Type     Vanesa Aguillon OT Occupational Therapist      Niecy Hughes RN Registered Nurse                                      OT Recommendation and Plan  Plan of Care Review  Plan of Care Reviewed With: patient  Progress: no change  Outcome Evaluation: Patient seen for OT treatment. Improved functional mobility, but still requires consistent cues related to decreased vision and cognition. Continue plan of care.      Time Calculation:        Time Calculation- OT         Row Name 06/10/25 1403                       Time Calculation- OT     Total Timed Code Minutes- OT 17 minute(s)  -         OT Received On 06/10/25  -                      User Key  (r) = Recorded By, (t) = Taken By, (c) = Cosigned By        Initials Name Provider Type     Vanesa Aguillon OT Occupational Therapist                       Therapy Charges for Today         Code Description Service Date Service Provider Modifiers Qty     83768329565  OT THERAPEUTIC ACT EA 15 MIN 6/10/2025 Vanesa Tse OT GO 1                   Vanesa Tse OT                   6/10/2025               Marsha Harrell BSW     Case Management     Case Management/Social Work     Signed     Date of Service: 06/11/25 1710  Creation Time: 06/11/25 1710     Signed         Discharge Planning Assessment  TA [place]     Patient Name: Ramsey Riley                      MRN: [id number]  Today's Date: 6/11/2025                Admit Date: 6/4/2025          Discharge Plan         Row Name 06/11/25 1710           Plan      Plan Pt was admitted to swing bed on 6/4 to continue therapy. Per swing bed RN, Pt's swing bed days are approved and clinical updates are due on 06/12/25. Pt has a sitter at bedside. Pt lives with spouse, Jina and she plans for pt to return home at discharge. Pt does not utilize home health services. Pt has a rolling walker, wheelchair, and scooter via unknown provider. SS to follow.                             MARIUSZ Rai Kristy, RN   Registered Nurse  Nursing     Plan of Care     Signed     Date of Service: 06/10/25 0333  Creation Time: 06/10/25 0333     Signed         Goal Outcome Evaluation:  Outcome Evaluation: Patient has rested in bed this shift, PRN medication given for pain and anxiety, RA, Wound care completed per order, VSS, sitter at bedside, Will continue plan of care.

## 2025-06-12 NOTE — PLAN OF CARE
Goal Outcome Evaluation:  Plan of Care Reviewed With: patient        Progress: no change  Outcome Evaluation: Patient resting in bed. VSS on room air. Sitter at bedside. Patient A/Ox3 at time of assessment but became confused throughout the night. Patient restless but easily reoriented. Patient ambulated to [facility] x2 assist this shift. PRN pain medication given per MAR. Patient voices no concerns at this time, will continue with POC.

## 2025-06-12 NOTE — SIGNIFICANT NOTE
06/12/25 1327   OTHER   Discipline physical therapist   Rehab Time/Intention   Session Not Performed patient/family declined, not feeling well

## 2025-06-12 NOTE — PLAN OF CARE
Goal Outcome Evaluation:  Plan of Care Reviewed With: patient        Progress: no change  Outcome Evaluation: VSS on RA. Pt alert to self and place. Sitter at bedside. Patient ambulated x2 assist to BSC. Wound care completed per orders. No signs of distress noted at this time. Will continue POC.

## 2025-06-12 NOTE — CASE MANAGEMENT/SOCIAL WORK
Case Management/Social Work    Patient Name:  Rmasey Riley  YOB: 1955  MRN: [id number]  Admit Date:  6/4/2025        Patient is approved for 5 additional swing bed days with next clinical updates due 6/18/25. SS and provider notified via secure chat.       Electronically signed by:  Zhanna Au RN  06/12/25 12:13 EDT

## 2025-06-13 LAB
ANION GAP SERPL CALCULATED.3IONS-SCNC: 14.3 MMOL/L (ref 5–15)
BUN SERPL-MCNC: 33.4 MG/DL (ref 8–23)
BUN/CREAT SERPL: 23.5 (ref 7–25)
CALCIUM SPEC-SCNC: 8.6 MG/DL (ref 8.6–10.5)
CHLORIDE SERPL-SCNC: 101 MMOL/L (ref 98–107)
CO2 SERPL-SCNC: 19.7 MMOL/L (ref 22–29)
CREAT SERPL-MCNC: 1.42 MG/DL (ref 0.76–1.27)
DEPRECATED RDW RBC AUTO: 40.5 FL (ref 37–54)
EGFRCR SERPLBLD CKD-EPI 2021: 53.8 ML/MIN/1.73
ERYTHROCYTE [DISTWIDTH] IN BLOOD BY AUTOMATED COUNT: 12 % (ref 12.3–15.4)
GLUCOSE BLDC GLUCOMTR-MCNC: 136 MG/DL (ref 70–130)
GLUCOSE BLDC GLUCOMTR-MCNC: 138 MG/DL (ref 70–130)
GLUCOSE BLDC GLUCOMTR-MCNC: 181 MG/DL (ref 70–130)
GLUCOSE SERPL-MCNC: 159 MG/DL (ref 65–99)
HCT VFR BLD AUTO: 46.9 % (ref 37.5–51)
HGB BLD-MCNC: 14.9 G/DL (ref 13–17.7)
MCH RBC QN AUTO: 29 PG (ref 26.6–33)
MCHC RBC AUTO-ENTMCNC: 31.8 G/DL (ref 31.5–35.7)
MCV RBC AUTO: 91.2 FL (ref 79–97)
PLATELET # BLD AUTO: 176 10*3/MM3 (ref 140–450)
PMV BLD AUTO: 10.5 FL (ref 6–12)
POTASSIUM SERPL-SCNC: 4.1 MMOL/L (ref 3.5–5.2)
RBC # BLD AUTO: 5.14 10*6/MM3 (ref 4.14–5.8)
SODIUM SERPL-SCNC: 135 MMOL/L (ref 136–145)
WBC NRBC COR # BLD AUTO: 7.27 10*3/MM3 (ref 3.4–10.8)

## 2025-06-13 PROCEDURE — 82948 REAGENT STRIP/BLOOD GLUCOSE: CPT

## 2025-06-13 PROCEDURE — 99308 SBSQ NF CARE LOW MDM 20: CPT | Performed by: INTERNAL MEDICINE

## 2025-06-13 PROCEDURE — 97535 SELF CARE MNGMENT TRAINING: CPT

## 2025-06-13 PROCEDURE — 97110 THERAPEUTIC EXERCISES: CPT

## 2025-06-13 PROCEDURE — 97116 GAIT TRAINING THERAPY: CPT

## 2025-06-13 PROCEDURE — 97530 THERAPEUTIC ACTIVITIES: CPT

## 2025-06-13 PROCEDURE — 80048 BASIC METABOLIC PNL TOTAL CA: CPT | Performed by: INTERNAL MEDICINE

## 2025-06-13 PROCEDURE — 63710000001 INSULIN LISPRO PROTAMINE-INSULIN LISPRO (75-25) 100 UNIT/ML SUSPENSION: Performed by: STUDENT IN AN ORGANIZED HEALTH CARE EDUCATION/TRAINING PROGRAM

## 2025-06-13 PROCEDURE — 85027 COMPLETE CBC AUTOMATED: CPT | Performed by: INTERNAL MEDICINE

## 2025-06-13 RX ADMIN — INSULIN LISPRO 20 UNITS: 100 INJECTION, SUSPENSION SUBCUTANEOUS at 09:29

## 2025-06-13 RX ADMIN — Medication 1 APPLICATION: at 09:31

## 2025-06-13 RX ADMIN — FAMOTIDINE 20 MG: 20 TABLET ORAL at 09:29

## 2025-06-13 RX ADMIN — INSULIN LISPRO 20 UNITS: 100 INJECTION, SUSPENSION SUBCUTANEOUS at 18:16

## 2025-06-13 RX ADMIN — ACETAMINOPHEN 1000 MG: 500 TABLET ORAL at 22:42

## 2025-06-13 RX ADMIN — LOSARTAN POTASSIUM 100 MG: 50 TABLET, FILM COATED ORAL at 09:29

## 2025-06-13 RX ADMIN — DOCUSATE SODIUM 50MG AND SENNOSIDES 8.6MG 2 TABLET: 8.6; 5 TABLET, FILM COATED ORAL at 19:57

## 2025-06-13 RX ADMIN — AMLODIPINE BESYLATE 10 MG: 10 TABLET ORAL at 09:29

## 2025-06-13 RX ADMIN — FAMOTIDINE 20 MG: 20 TABLET ORAL at 19:57

## 2025-06-13 RX ADMIN — PANTOPRAZOLE SODIUM 40 MG: 40 TABLET, DELAYED RELEASE ORAL at 09:30

## 2025-06-13 RX ADMIN — TIMOLOL MALEATE: 5 SOLUTION OPHTHALMIC at 09:31

## 2025-06-13 RX ADMIN — Medication 10 ML: at 09:31

## 2025-06-13 RX ADMIN — ALPRAZOLAM 0.5 MG: 0.5 TABLET ORAL at 21:53

## 2025-06-13 RX ADMIN — TIMOLOL MALEATE: 5 SOLUTION OPHTHALMIC at 19:58

## 2025-06-13 RX ADMIN — APIXABAN 5 MG: 5 TABLET, FILM COATED ORAL at 09:30

## 2025-06-13 RX ADMIN — Medication 10 ML: at 19:57

## 2025-06-13 RX ADMIN — ACETAMINOPHEN 1000 MG: 500 TABLET ORAL at 13:17

## 2025-06-13 RX ADMIN — LEVOTHYROXINE SODIUM 137.5 MCG: 0.12 TABLET ORAL at 09:29

## 2025-06-13 RX ADMIN — QUETIAPINE FUMARATE 100 MG: 100 TABLET ORAL at 18:16

## 2025-06-13 RX ADMIN — APIXABAN 5 MG: 5 TABLET, FILM COATED ORAL at 19:57

## 2025-06-13 RX ADMIN — MIRTAZAPINE 30 MG: 15 TABLET, FILM COATED ORAL at 18:15

## 2025-06-13 RX ADMIN — Medication 1 APPLICATION: at 19:57

## 2025-06-13 RX ADMIN — DOCUSATE SODIUM 50MG AND SENNOSIDES 8.6MG 2 TABLET: 8.6; 5 TABLET, FILM COATED ORAL at 09:29

## 2025-06-13 RX ADMIN — HYDROCODONE BITARTRATE AND ACETAMINOPHEN 1 TABLET: 7.5; 325 TABLET ORAL at 16:54

## 2025-06-13 RX ADMIN — EMPAGLIFLOZIN 10 MG: 10 TABLET, FILM COATED ORAL at 09:29

## 2025-06-13 NOTE — PLAN OF CARE
Goal Outcome Evaluation:              Outcome Evaluation: Patient resting in bed at this time with sitter at bedside. Patient ambulated with assistance this shift. Patient c/o pain this shift, prn medication given per MAR. Wound care completed per orders. VSS on room air. No acute changes noted at this time. Will continue with plan of care.

## 2025-06-13 NOTE — THERAPY TREATMENT NOTE
Acute Care - Physical Therapy Treatment Note   [facility]     Patient Name: Ramsey Riley  : 1955  MRN: [id number]  Today's Date: 2025   Onset of Illness/Injury or Date of Surgery: 25 (admission date)  Visit Dx:   No diagnosis found.  Patient Active Problem List   Diagnosis    Coronary artery disease involving native coronary artery of native heart without angina pectoris    Essential hypertension    Bradycardia, sinus    Fatigue    Abnormal ECG    Precordial pain    LVH (left ventricular hypertrophy) due to hypertensive disease, without heart failure    Diabetes mellitus    PAD (peripheral artery disease)    Ischemic stroke    History of blood clots    Pulmonary embolus    Dyslipidemia    Class 1 obesity due to excess calories with serious comorbidity and body mass index (BMI) of 34.0 to 34.9 in adult    Weakness    Frequent falls    Physical debility     Past Medical History:   Diagnosis Date    Arthritis     Clot     Coronary artery disease     Dementia 2024    Diabetes mellitus     Heart disease     History of blood clots     Hypertension     Impaired mobility     Low back pain     Thyroid disease     Type 2 diabetes mellitus      Past Surgical History:   Procedure Laterality Date    CHOLECYSTECTOMY      COLONOSCOPY      dr srivastava    CORONARY ARTERY BYPASS GRAFT      [facility] -  BY PASS     ENDOSCOPY       PT Assessment (Last 12 Hours)       PT Evaluation and Treatment       Row Name 25 1424          Physical Therapy Time and Intention    Subjective Information no complaints  -KM     Document Type therapy note (daily note)  -KM     Mode of Treatment physical therapy  -KM     Patient Effort good  -KM     Symptoms Noted During/After Treatment fatigue  -KM       Row Name 25 1424          General Information    Patient Profile Reviewed yes  -KM     Patient Observations alert;cooperative;agree to therapy  -KM     Existing Precautions/Restrictions fall  -KM       Row  Name 06/13/25 1424          Pain    Pretreatment Pain Rating 0/10 - no pain  -KM     Posttreatment Pain Rating 0/10 - no pain  -KM       Row Name 06/13/25 1424          Cognition    Affect/Mental Status (Cognition) confused  -KM     Orientation Status (Cognition) oriented to;person;place  -KM     Follows Commands (Cognition) follows one-step commands  -KM       Row Name 06/13/25 1424          Bed Mobility    Bed Mobility supine-sit  -KM     Supine-Sit [place] (Bed Mobility) moderate assist (50% patient effort)  -KM     Assistive Device (Bed Mobility) bed rails;head of bed elevated  -KM       Row Name 06/13/25 1424          Transfers    Transfers sit-stand transfer;stand-sit transfer;bed-chair transfer  -       Row Name 06/13/25 1424          Bed-Chair Transfer    Bed-Chair [place] (Transfers) minimum assist (75% patient effort)  -KM     Assistive Device (Bed-Chair Transfers) walker, front-wheeled  -       Row Name 06/13/25 1424          Sit-Stand Transfer    Sit-Stand [place] (Transfers) minimum assist (75% patient effort);1 person assist  -     Assistive Device (Sit-Stand Transfers) walker, front-wheeled  -KM       Row Name 06/13/25 1424          Stand-Sit Transfer    Stand-Sit [place] (Transfers) minimum assist (75% patient effort)  -     Assistive Device (Stand-Sit Transfers) walker, front-wheeled  -       Row Name 06/13/25 1424          Gait/Stairs (Locomotion)    Gait/Stairs Locomotion gait/ambulation independence;gait/ambulation assistive device;distance ambulated  -KM     [place] Level (Gait) contact guard;minimum assist (75% patient effort);verbal cues;nonverbal cues (demo/gesture)  -     Assistive Device (Gait) walker, front-wheeled  -     Patient was able to Ambulate yes  -KM     Distance in Feet (Gait) 200  -KM     Pattern (Gait) step-to  -KM     Deviations/Abnormal Patterns (Gait) base of support, narrow;gait speed decreased  -KM       Row Name 06/13/25 142           Safety Issues/Impairments Affecting Functional Mobility    Impairments Affecting Function (Mobility) balance;cognition;coordination;endurance/activity tolerance;pain;strength  -KM     Cognitive Impairments, Mobility Safety/Performance awareness, need for assistance;insight into deficits/self-awareness  -KM       Row Name 06/13/25 1424          Motor Skills    Comments, Therapeutic Exercise seated ther-ex  -KM       Row Name             Wound 05/26/25 1618 Left posterior heel Pressure Injury    Wound - Properties Group Placement Date: 05/26/25  -CW Placement Time: 1618  -CW Present on Original Admission: Y  -CW Side: Left  -CW Orientation: posterior  -CW Location: heel  -CW Primary Wound Type: Pressure Inj  -CW    Retired Wound - Properties Group Placement Date: 05/26/25  -CW Placement Time: 1618  -CW Present on Original Admission: Y  -CW Side: Left  -CW Orientation: posterior  -CW Location: heel  -CW    Retired Wound - Properties Group Placement Date: 05/26/25  -CW Placement Time: 1618  -CW Present on Original Admission: Y  -CW Side: Left  -CW Orientation: posterior  -CW Location: heel  -CW    Retired Wound - Properties Group Date first assessed: 05/26/25  -CW Time first assessed: 1618  -CW Present on Original Admission: Y  -CW Side: Left  -CW Location: heel  -CW      Row Name             Wound 05/26/25 1622 Left anterior heel Pressure Injury    Wound - Properties Group Placement Date: 05/26/25  -CW Placement Time: 1622  -CW Side: Left  -CW Orientation: anterior  -CW Location: heel  -CW Primary Wound Type: Pressure Inj  -CW    Retired Wound - Properties Group Placement Date: 05/26/25  -CW Placement Time: 1622  -CW Side: Left  -CW Orientation: anterior  -CW Location: heel  -CW    Retired Wound - Properties Group Placement Date: 05/26/25  -CW Placement Time: 1622  -CW Side: Left  -CW Orientation: anterior  -CW Location: heel  -CW    Retired Wound - Properties Group Date first assessed: 05/26/25  -CW Time first  assessed: 1622  -CW Side: Left  -CW Location: heel  -CW      Row Name             Wound 05/27/25 1007 medial parietal region Traumatic Abrasion    Wound - Properties Group Placement Date: 05/27/25  -LB Placement Time: 1007  -LB Present on Original Admission: Y  -LB Orientation: medial  -LB Location: parietal region  -LB Primary Wound Type: Traumatic  -LB Secondary Wound Type - Traumatic: Abrasion  -LB    Retired Wound - Properties Group Placement Date: 05/27/25  -LB Placement Time: 1007  -LB Present on Original Admission: Y  -LB Orientation: medial  -LB Location: parietal region  -LB    Retired Wound - Properties Group Placement Date: 05/27/25  -LB Placement Time: 1007  -LB Present on Original Admission: Y  -LB Orientation: medial  -LB Location: parietal region  -LB    Retired Wound - Properties Group Date first assessed: 05/27/25  -LB Time first assessed: 1007  -LB Present on Original Admission: Y  -LB Location: parietal region  -LB      Row Name             Wound 05/27/25 1008 Right distal arm Traumatic Skin Tear    Wound - Properties Group Placement Date: 05/27/25  -LB Placement Time: 1008  -LB Present on Original Admission: Y  -LB Side: Right  -LB Orientation: distal  -LB Location: arm  -LB Primary Wound Type: Traumatic  -LB Secondary Wound Type - Traumatic: Skin Tear  -LB    Retired Wound - Properties Group Placement Date: 05/27/25  -LB Placement Time: 1008  -LB Present on Original Admission: Y  -LB Side: Right  -LB Orientation: distal  -LB Location: arm  -LB    Retired Wound - Properties Group Placement Date: 05/27/25  -LB Placement Time: 1008  -LB Present on Original Admission: Y  -LB Side: Right  -LB Orientation: distal  -LB Location: arm  -LB    Retired Wound - Properties Group Date first assessed: 05/27/25  -LB Time first assessed: 1008  -LB Present on Original Admission: Y  -LB Side: Right  -LB Location: arm  -LB      Row Name             Wound 06/11/25 1704 medial gluteal Other (Comments)    Wound -  Properties Group Placement Date: 06/11/25  -OW Placement Time: 1704 -OW Present on Original Admission: N  -OW Orientation: medial  -OW Location: gluteal  -OW Primary Wound Type: Other  -OW Secondary Wound Type - Other: --  -OW, fissure     Retired Wound - Properties Group Placement Date: 06/11/25  -OW Placement Time: 1704  -OW Present on Original Admission: N  -OW Orientation: medial  -OW Location: gluteal  -OW    Retired Wound - Properties Group Placement Date: 06/11/25  -OW Placement Time: 1704 -OW Present on Original Admission: N  -OW Orientation: medial  -OW Location: gluteal  -OW    Retired Wound - Properties Group Date first assessed: 06/11/25  -OW Time first assessed: 1704 -OW Present on Original Admission: N  -OW Location: gluteal  -OW      Row Name 06/13/25 1424          Progress Summary (PT)    Daily Progress Summary (PT) Pt. was able to demonstrate functional mobility skills w/ Teddy-modA. He was able to ambulate inceased distance w/ RW. He required continued cueing for mobility skills. Pt. would continue to benefit from PT services.  -KM               User Key  (r) = Recorded By, (t) = Taken By, (c) = Cosigned By      Initials Name Provider Type    Chata Couch, RN Registered Nurse    Baljinder Reese, CARLOS Physical Therapist    LB Marysol Nix, RN Registered Nurse    Ceci Mar, RN Registered Nurse                    Physical Therapy Education       Title: PT OT SLP Therapies (In Progress)       Topic: Physical Therapy (Not Started)       Point: Mobility training (Not Started)       Learner Progress:  Not documented in this visit.              Point: Home exercise program (Not Started)       Learner Progress:  Not documented in this visit.              Point: Body mechanics (Not Started)       Learner Progress:  Not documented in this visit.              Point: Precautions (Not Started)       Learner Progress:  Not documented in this visit.                                  PT Recommendation  and Plan     Progress Summary (PT)  Daily Progress Summary (PT): Pt. was able to demonstrate functional mobility skills w/ Teddy-modA. He was able to ambulate inceased distance w/ RW. He required continued cueing for mobility skills. Pt. would continue to benefit from PT services.       Time Calculation:    PT Charges       Row Name 06/13/25 1423             Time Calculation    PT Received On 06/13/25  -KM         Time Calculation- PT    Total Timed Code Minutes- PT 38 minute(s)  -KM                User Key  (r) = Recorded By, (t) = Taken By, (c) = Cosigned By      Initials Name Provider Type    Baljinder Reese, PT Physical Therapist                  Therapy Charges for Today       Code Description Service Date Service Provider Modifiers Qty    51358828738 HC PT THERAPEUTIC ACT EA 15 MIN 6/13/2025 Baljinder Martinez, PT GP 1    47354820309 HC PT THER PROC EA 15 MIN 6/13/2025 Baljinder Martinez, PT GP 1    62792293834 HC GAIT TRAINING EA 15 MIN 6/13/2025 Baljinder Martinez, PT GP 1            PT G-Codes  Outcome Measure Options: AM-PAC 6 Clicks Daily Activity (OT)  AM-PAC 6 Clicks Score (PT): 12  AM-PAC 6 Clicks Score (OT): 17    Baljinder Martinez PT  6/13/2025

## 2025-06-13 NOTE — PLAN OF CARE
Goal Outcome Evaluation:  Plan of Care Reviewed With: patient        Progress: no change  Outcome Evaluation: Patient resting in bed during shift with sitter at bedside. Patient became agitated with staff during shift, patient received x1 dose of geodon IM  per orders. Patient is alert to self and place during shift. Patient complained of pain, PRN meds given. Wound care complete per orders. Patient ambulated x2 assist during shift. No acute changes noted at this time. Will continue with plan of care.

## 2025-06-13 NOTE — CASE MANAGEMENT/SOCIAL WORK
Discharge Planning Assessment   Ilya     Patient Name: Ramsey Riley  MRN: [id number]  Today's Date: 6/13/2025    Admit Date: 6/4/2025         Discharge Plan       Row Name 06/13/25 1614       Plan    Plan Pt was admitted to swing bed on 6/4 to continue therapy. Per swing bed RN, Pt's swing bed days are approved and clinical updates are due on 06/12/25. Pt has a sitter at bedside. Pt lives with spouse, Jina and she plans for pt to return home at discharge. Pt does not utilize home health services. Pt has a rolling walker, wheelchair, and scooter via unknown provider. SS to follow.                  GERARD RaiW

## 2025-06-13 NOTE — THERAPY TREATMENT NOTE
Acute Care - Occupational Therapy Treatment Note  TA [facility]     Patient Name: Ramsey Riley  : 1955  MRN: [id number]  Today's Date: 2025  Onset of Illness/Injury or Date of Surgery: 25 (admission date)          Admit Date: 2025     No diagnosis found.  Patient Active Problem List   Diagnosis    Coronary artery disease involving native coronary artery of native heart without angina pectoris    Essential hypertension    Bradycardia, sinus    Fatigue    Abnormal ECG    Precordial pain    LVH (left ventricular hypertrophy) due to hypertensive disease, without heart failure    Diabetes mellitus    PAD (peripheral artery disease)    Ischemic stroke    History of blood clots    Pulmonary embolus    Dyslipidemia    Class 1 obesity due to excess calories with serious comorbidity and body mass index (BMI) of 34.0 to 34.9 in adult    Weakness    Frequent falls    Physical debility     Past Medical History:   Diagnosis Date    Arthritis     Clot     Coronary artery disease     Dementia 2024    Diabetes mellitus     Heart disease     History of blood clots     Hypertension     Impaired mobility     Low back pain     Thyroid disease     Type 2 diabetes mellitus      Past Surgical History:   Procedure Laterality Date    CHOLECYSTECTOMY      COLONOSCOPY      dr srivastava    CORONARY ARTERY BYPASS GRAFT      [place] -  BY PASS     ENDOSCOPY           OT ASSESSMENT FLOWSHEET (Last 12 Hours)       OT Evaluation and Treatment       Row Name 25 1245                   OT Time and Intention    Subjective Information complains of;weakness;fatigue;pain  -LM        Document Type therapy note (daily note)  -LM        Mode of Treatment occupational therapy  -LM        Patient Effort good  -LM        Comment Patient seen for adl retraining/education and light BUE arom therex.  Patient wife present.  Educated patient and wife on basic adl recommendations, BUE arom therex/HEP.  Spouse verbalized  concern for BUE weakness.  Patient continues to demonstrate decreased RUE shoulder arom as noted from previous shoulder surgery.  patient was initially able to perform LUE shoulder arom WNL.  Unable to follow commands so formal MMT limited.  When cueing patient on LUE arom, patient was then unable to achieve LUE shoulder arom flexion past 90 degress although patient was able to hold resistance against flexion.  Inconsistency attributed to decreased cognition.  Educated patient and spouse on light BUE towel therex with frequent rest breaks and max verbal/tactile cues.  Education spouse on importance of max cues for basic self-care activities.  Patient c/o of mild discomfort with BUE shoulder arom.  Recommend continued encouragement for light bue arom therex as tolerated.  Patient primary barrier is cognition and visual impairment.  Will continue to follow.  -LM           General Information    Existing Precautions/Restrictions fall  -LM        Limitations/Impairments safety/cognitive  -LM        Barriers to Rehab cognitive status;visual deficit  -LM           Wound 05/26/25 1618 Left posterior heel Pressure Injury    Wound - Properties Group Placement Date: 05/26/25  -CW Placement Time: 1618  -CW Present on Original Admission: Y  -CW Side: Left  -CW Orientation: posterior  -CW Location: heel  -CW Primary Wound Type: Pressure Inj  -CW    Retired Wound - Properties Group Placement Date: 05/26/25  -CW Placement Time: 1618  -CW Present on Original Admission: Y  -CW Side: Left  -CW Orientation: posterior  -CW Location: heel  -CW    Retired Wound - Properties Group Placement Date: 05/26/25  -CW Placement Time: 1618  -CW Present on Original Admission: Y  -CW Side: Left  -CW Orientation: posterior  -CW Location: heel  -CW    Retired Wound - Properties Group Date first assessed: 05/26/25  -CW Time first assessed: 1618  -CW Present on Original Admission: Y  -CW Side: Left  -CW Location: heel  -CW       Wound 05/26/25 1622 Left  anterior heel Pressure Injury    Wound - Properties Group Placement Date: 05/26/25  -CW Placement Time: 1622  -CW Side: Left  -CW Orientation: anterior  -CW Location: heel  -CW Primary Wound Type: Pressure Inj  -CW    Retired Wound - Properties Group Placement Date: 05/26/25  -CW Placement Time: 1622  -CW Side: Left  -CW Orientation: anterior  -CW Location: heel  -CW    Retired Wound - Properties Group Placement Date: 05/26/25  -CW Placement Time: 1622  -CW Side: Left  -CW Orientation: anterior  -CW Location: heel  -CW    Retired Wound - Properties Group Date first assessed: 05/26/25  -CW Time first assessed: 1622  -CW Side: Left  -CW Location: heel  -CW       Wound 05/27/25 1007 medial parietal region Traumatic Abrasion    Wound - Properties Group Placement Date: 05/27/25  -LB Placement Time: 1007  -LB Present on Original Admission: Y  -LB Orientation: medial  -LB Location: parietal region  -LB Primary Wound Type: Traumatic  -LB Secondary Wound Type - Traumatic: Abrasion  -LB    Retired Wound - Properties Group Placement Date: 05/27/25  -LB Placement Time: 1007  -LB Present on Original Admission: Y  -LB Orientation: medial  -LB Location: parietal region  -LB    Retired Wound - Properties Group Placement Date: 05/27/25  -LB Placement Time: 1007  -LB Present on Original Admission: Y  -LB Orientation: medial  -LB Location: parietal region  -LB    Retired Wound - Properties Group Date first assessed: 05/27/25  -LB Time first assessed: 1007  -LB Present on Original Admission: Y  -LB Location: parietal region  -LB       Wound 05/27/25 1008 Right distal arm Traumatic Skin Tear    Wound - Properties Group Placement Date: 05/27/25  -LB Placement Time: 1008  -LB Present on Original Admission: Y  -LB Side: Right  -LB Orientation: distal  -LB Location: arm  -LB Primary Wound Type: Traumatic  -LB Secondary Wound Type - Traumatic: Skin Tear  -LB    Retired Wound - Properties Group Placement Date: 05/27/25  -LB Placement Time:  1008  -LB Present on Original Admission: Y  -LB Side: Right  -LB Orientation: distal  -LB Location: arm  -LB    Retired Wound - Properties Group Placement Date: 05/27/25  -LB Placement Time: 1008  -LB Present on Original Admission: Y  -LB Side: Right  -LB Orientation: distal  -LB Location: arm  -LB    Retired Wound - Properties Group Date first assessed: 05/27/25  -LB Time first assessed: 1008  -LB Present on Original Admission: Y  -LB Side: Right  -LB Location: arm  -LB       Wound 06/11/25 1704 medial gluteal Other (Comments)    Wound - Properties Group Placement Date: 06/11/25  -OW Placement Time: 1704  -OW Present on Original Admission: N  -OW Orientation: medial  -OW Location: gluteal  -OW Primary Wound Type: Other  -OW Secondary Wound Type - Other: --  -OW, fissure     Retired Wound - Properties Group Placement Date: 06/11/25  -OW Placement Time: 1704  -OW Present on Original Admission: N  -OW Orientation: medial  -OW Location: gluteal  -OW    Retired Wound - Properties Group Placement Date: 06/11/25  -OW Placement Time: 1704  -OW Present on Original Admission: N  -OW Orientation: medial  -OW Location: gluteal  -OW    Retired Wound - Properties Group Date first assessed: 06/11/25  -OW Time first assessed: 1704  -OW Present on Original Admission: N  -OW Location: gluteal  -OW       Positioning and Restraints    Post Treatment Position chair  -LM        In Bed with family/caregiver;patient within staff view;encouraged to call for assist;call light within reach  -LM                  User Key  (r) = Recorded By, (t) = Taken By, (c) = Cosigned By      Initials Name Effective Dates    CW Chata Pulido RN 10/05/21 -     LM Yuliya Sharma OT 06/16/21 -     LB Marysol Nix RN 10/20/21 -     OW Ceci Andrade RN 06/06/23 -                            OT Recommendation and Plan              Time Calculation:     Therapy Charges for Today       Code Description Service Date Service Provider Modifiers Qty     11966208744  OT SELF CARE/MGMT/TRAIN EA 15 MIN 6/13/2025 Yuliya Sharma, OT GO 1    98360644144 HC OT THER PROC EA 15 MIN 6/13/2025 Yuliya Sharma, OT GO 1                 Yuliya Sharma OT  6/13/2025

## 2025-06-13 NOTE — PROGRESS NOTES
[facility] HOSPITALIST PROGRESS NOTE     Patient Identification:  Name:  Ramsey Riley  Age:  70 y.o.  Sex:  male  :  1955  MRN:  [id number]  Visit Number:  [id number]  ROOM: [address] [place] [address]     Primary Care Provider:  Margret Purvis MD    Length of stay:  9    Subjective     CC: Decondition swing bed for rehab    Patient seen and examined today 1 dose of Geodon last night, was pleasant earlier this morning when I saw him.  Remained to be intermittently confused.      Objective     Current Hospital Meds:amLODIPine, 10 mg, Oral, Daily  apixaban, 5 mg, Oral, Q12H  castor oil-balsam peru, 1 Application, Topical, Q12H  cholecalciferol, 50,000 Units, Oral, Q7 Days  dorzolamide (TRUSOPT) 2 % 1 drop, timolol (TIMOPTIC) 0.5 % 1 drop for Cosopt 22.3-6.8 mg/mL, , Both Eyes, BID  empagliflozin, 10 mg, Oral, Daily  famotidine, 20 mg, Oral, BID  insulin lispro protamine-insulin lispro, 20 Units, Subcutaneous, BID With Meals  levothyroxine, 137.5 mcg, Oral, QAM AC  losartan, 100 mg, Oral, Daily  mirtazapine, 30 mg, Oral, Nightly  pantoprazole, 40 mg, Oral, QAM AC  QUEtiapine, 100 mg, Oral, Nightly  senna-docusate sodium, 2 tablet, Oral, BID  sodium chloride, 10 mL, Intravenous, Q12H  [START ON 2025] tuberculin, 5 Units, Intradermal, Once       ----------------------------------------------------------------------------------------------------------------------  Vital Signs:  Temp:  [97.7 °F (36.5 °C)] 97.7 °F (36.5 °C)  Heart Rate:  [67] 67  Resp:  [22] 22  BP: (150)/(74) 150/74     on   ;   Device (Oxygen Therapy): room air  Body mass index is 30.05 kg/m².    Wt Readings from Last 3 Encounters:   25 97.7 kg (215 lb 7 oz)   25 97.7 kg (215 lb 7 oz)   25 104 kg (230 lb)     Intake/Output Summary (Last 24 hours) at 2025 1129  Last data filed at 2025 1126  Gross per 24 hour   Intake 1577 ml   Output 1825 ml   Net -248 ml     Diet: Cardiac, Diabetic; Healthy Heart (2-3 Na+); Consistent  Carbohydrate; Fluid Consistency: Thin (IDDSI 0)  ----------------------------------------------------------------------------------------------------------------------      Physical exam:  Constitutional:  Well-developed and well-nourished.  No respiratory distress.      HENT:  Head:  Normocephalic and atraumatic.  Mouth:  Moist mucous membranes.    Eyes:  Conjunctivae and EOM are normal.  Pupils are equal, round, and reactive to light.  No scleral icterus.    Neck:  Neck supple.  No JVD present.    Cardiovascular:  Normal rate, regular rhythm and normal heart sounds with no murmur.  Pulmonary/Chest:  No respiratory distress, no wheezes, no crackles, with normal breath sounds and good air movement.  Abdominal:  Soft.  Bowel sounds are normal.  No distension and no tenderness.   Musculoskeletal:  No edema, no tenderness, and no deformity.  No red or swollen joints anywhere.    Neurological: Proximal muscle weakness which has improved, conscious oriented x 3, nonlateralizing gross neuroexam,   Peripheral vascular:  Strong pulses in all 4 extremities with no clubbing, no cyanosis, no edema.        ----------------------------------------------------------------------------------------------------------------------  Results from last 7 days   Lab Units 06/13/25  0120   WBC 10*3/mm3 7.27   HEMOGLOBIN g/dL 14.9   HEMATOCRIT % 46.9   MCV fL 91.2   MCHC g/dL 31.8   PLATELETS 10*3/mm3 176     Results from last 7 days   Lab Units 06/13/25  0120 06/09/25  0139   SODIUM mmol/L 135* 134*   POTASSIUM mmol/L 4.1 4.2   CHLORIDE mmol/L 101 100   CO2 mmol/L 19.7* 20.7*   BUN mg/dL 33.4* 20.4   CREATININE mg/dL 1.42* 1.28*   CALCIUM mg/dL 8.6 8.6   GLUCOSE mg/dL 159* 150*   Estimated Creatinine Clearance: 59.4 mL/min (A) (by C-G formula based on SCr of 1.42 mg/dL (H)).      Glucose   Date/Time Value Ref Range Status   06/13/2025 1054 136 (H) 70 - 130 mg/dL Final   06/13/2025 0928 138 (H) 70 - 130 mg/dL Final   06/12/2025 2037 238 (H)  "70 - 130 mg/dL Final   06/12/2025 1612 223 (H) 70 - 130 mg/dL Final   06/12/2025 1030 179 (H) 70 - 130 mg/dL Final   06/12/2025 0640 149 (H) 70 - 130 mg/dL Final   06/11/2025 2004 202 (H) 70 - 130 mg/dL Final   06/11/2025 1623 225 (H) 70 - 130 mg/dL Final     No results found for: \"AMMONIA\"          No results found for: \"BLOODCX\"No results found for: \"RESPCX\"No results found for: \"URINECX\"No results found for: \"WOUNDCX\"No results found for: \"BODYFLDCX\"No results found for: \"STOOLCX\"  I have personally looked at the labs and they are summarized above.  ----------------------------------------------------------------------------------------------------------------------  Imaging Results (Last 24 Hours)       ** No results found for the last 24 hours. **          I have personally reviewed the radiology images and read the final radiology report.          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  Just the dose to be at 7 PM set of 9  -- keep off citalopram for now.    -- vitamin D replacement  -- Aggressive PT OT  -- Keep off statin  -- Continue all other therapies, no changes.  Will check labs in the morning          Lisbeth Gonzalez MD  06/13/25  11:29 EDT    "

## 2025-06-14 ENCOUNTER — READMISSION MANAGEMENT (OUTPATIENT)
Dept: CALL CENTER | Facility: HOSPITAL | Age: 70
End: 2025-06-14
Payer: MEDICARE

## 2025-06-14 VITALS
TEMPERATURE: 97 F | HEIGHT: 71 IN | OXYGEN SATURATION: 94 % | WEIGHT: 213.63 LBS | HEART RATE: 66 BPM | BODY MASS INDEX: 29.91 KG/M2 | SYSTOLIC BLOOD PRESSURE: 149 MMHG | DIASTOLIC BLOOD PRESSURE: 77 MMHG | RESPIRATION RATE: 16 BRPM

## 2025-06-14 LAB
GLUCOSE BLDC GLUCOMTR-MCNC: 144 MG/DL (ref 70–130)
GLUCOSE BLDC GLUCOMTR-MCNC: 213 MG/DL (ref 70–130)

## 2025-06-14 PROCEDURE — 63710000001 INSULIN LISPRO PROTAMINE-INSULIN LISPRO (75-25) 100 UNIT/ML SUSPENSION: Performed by: STUDENT IN AN ORGANIZED HEALTH CARE EDUCATION/TRAINING PROGRAM

## 2025-06-14 PROCEDURE — 97116 GAIT TRAINING THERAPY: CPT

## 2025-06-14 PROCEDURE — 99316 NF DSCHRG MGMT 30 MIN+: CPT | Performed by: INTERNAL MEDICINE

## 2025-06-14 PROCEDURE — 97530 THERAPEUTIC ACTIVITIES: CPT

## 2025-06-14 PROCEDURE — 82948 REAGENT STRIP/BLOOD GLUCOSE: CPT

## 2025-06-14 RX ORDER — HYDROCODONE BITARTRATE AND ACETAMINOPHEN 7.5; 325 MG/1; MG/1
1 TABLET ORAL EVERY 12 HOURS PRN
Qty: 60 TABLET | Refills: 0 | Status: SHIPPED | OUTPATIENT
Start: 2025-06-14 | End: 2025-06-14 | Stop reason: HOSPADM

## 2025-06-14 RX ORDER — INSULIN HUMAN 100 [IU]/ML
20 INJECTION, SUSPENSION SUBCUTANEOUS EVERY EVENING
Start: 2025-06-14

## 2025-06-14 RX ORDER — INSULIN HUMAN 100 [IU]/ML
20 INJECTION, SUSPENSION SUBCUTANEOUS EVERY MORNING
Start: 2025-06-14

## 2025-06-14 RX ORDER — LEVOTHYROXINE SODIUM 137 UG/1
137 TABLET ORAL DAILY
Qty: 30 TABLET | Refills: 0 | Status: SHIPPED | OUTPATIENT
Start: 2025-06-14

## 2025-06-14 RX ORDER — QUETIAPINE FUMARATE 100 MG/1
150 TABLET, FILM COATED ORAL NIGHTLY
Qty: 30 TABLET | Refills: 4 | Status: SHIPPED | OUTPATIENT
Start: 2025-06-14

## 2025-06-14 RX ADMIN — TIMOLOL MALEATE: 5 SOLUTION OPHTHALMIC at 09:27

## 2025-06-14 RX ADMIN — FAMOTIDINE 20 MG: 20 TABLET ORAL at 09:26

## 2025-06-14 RX ADMIN — AMLODIPINE BESYLATE 10 MG: 10 TABLET ORAL at 09:26

## 2025-06-14 RX ADMIN — LEVOTHYROXINE SODIUM 137.5 MCG: 0.12 TABLET ORAL at 09:25

## 2025-06-14 RX ADMIN — INSULIN LISPRO 20 UNITS: 100 INJECTION, SUSPENSION SUBCUTANEOUS at 09:26

## 2025-06-14 RX ADMIN — PANTOPRAZOLE SODIUM 40 MG: 40 TABLET, DELAYED RELEASE ORAL at 09:25

## 2025-06-14 RX ADMIN — Medication 10 ML: at 09:26

## 2025-06-14 RX ADMIN — APIXABAN 5 MG: 5 TABLET, FILM COATED ORAL at 09:26

## 2025-06-14 RX ADMIN — DOCUSATE SODIUM 50MG AND SENNOSIDES 8.6MG 2 TABLET: 8.6; 5 TABLET, FILM COATED ORAL at 09:25

## 2025-06-14 RX ADMIN — EMPAGLIFLOZIN 10 MG: 10 TABLET, FILM COATED ORAL at 09:25

## 2025-06-14 RX ADMIN — Medication 1 APPLICATION: at 09:27

## 2025-06-14 RX ADMIN — LOSARTAN POTASSIUM 100 MG: 50 TABLET, FILM COATED ORAL at 09:26

## 2025-06-14 NOTE — NURSING NOTE
Contacted [facility] & spoke with Becca & faxed over the patient's referral to [phone] fax showed it was successful.

## 2025-06-14 NOTE — THERAPY DISCHARGE NOTE
Acute Care - Physical Therapy Treatment Note/Discharge  [facility] [place]     Patient Name: Ramsey Riley  : 1955  MRN: [id number]  Today's Date: 2025   Onset of Illness/Injury or Date of Surgery: 25 (admission date)            Admit Date: 2025    Visit Dx:    ICD-10-CM ICD-9-CM   1. Lumbosacral pain  M54.50 724.2     724.6   2. Adult onset hypothyroidism  E03.8 244.8   3. Insomnia, psychophysiological  F51.04 307.42     Patient Active Problem List   Diagnosis    Coronary artery disease involving native coronary artery of native heart without angina pectoris    Essential hypertension    Bradycardia, sinus    Fatigue    Abnormal ECG    Precordial pain    LVH (left ventricular hypertrophy) due to hypertensive disease, without heart failure    Diabetes mellitus    PAD (peripheral artery disease)    Ischemic stroke    History of blood clots    Pulmonary embolus    Dyslipidemia    Class 1 obesity due to excess calories with serious comorbidity and body mass index (BMI) of 34.0 to 34.9 in adult    Weakness    Frequent falls    Physical debility     Past Medical History:   Diagnosis Date    Arthritis     Clot     Coronary artery disease     Dementia 2024    Diabetes mellitus     Heart disease     History of blood clots     Hypertension     Impaired mobility     Low back pain     Thyroid disease     Type 2 diabetes mellitus      Past Surgical History:   Procedure Laterality Date    CHOLECYSTECTOMY      COLONOSCOPY      dr srivastava    CORONARY ARTERY BYPASS GRAFT      [place] -  BY PASS     ENDOSCOPY         PT Assessment (Last 12 Hours)       PT Evaluation and Treatment       Row Name 25 1210          Physical Therapy Time and Intention    Subjective Information no complaints  -KM     Document Type therapy note (daily note)  -KM     Mode of Treatment individual therapy;physical therapy  -KM     Patient Effort good  -KM     Symptoms Noted During/After Treatment none  -KM       Row Name  06/14/25 1210          General Information    Patient Profile Reviewed yes  -KM     Patient Observations alert;cooperative;agree to therapy  -KM     Existing Precautions/Restrictions fall  -KM     Limitations/Impairments safety/cognitive  -KM       Row Name 06/14/25 1210          Pain    Pretreatment Pain Rating 0/10 - no pain  -KM     Posttreatment Pain Rating 0/10 - no pain  -KM       Row Name 06/14/25 1210          Cognition    Affect/Mental Status (Cognition) confused  -KM     Orientation Status (Cognition) oriented x 3;verbal cues/prompts needed for orientation  -KM     Follows Commands (Cognition) follows one-step commands  -KM       Row Name 06/14/25 1210          Bed Mobility    Bed Mobility supine-sit  -KM     Supine-Sit [place] (Bed Mobility) moderate assist (50% patient effort)  -KM     Assistive Device (Bed Mobility) bed rails;head of bed elevated  -       Row Name 06/14/25 1210          Transfers    Transfers sit-stand transfer;stand-sit transfer;bed-chair transfer  -       Row Name 06/14/25 1210          Bed-Chair Transfer    Bed-Chair [place] (Transfers) contact guard;minimum assist (75% patient effort)  -KM     Assistive Device (Bed-Chair Transfers) walker, front-wheeled  -       Row Name 06/14/25 1210          Sit-Stand Transfer    Sit-Stand [place] (Transfers) contact guard;minimum assist (75% patient effort)  -KM     Assistive Device (Sit-Stand Transfers) walker, front-wheeled  -       Row Name 06/14/25 1210          Stand-Sit Transfer    Stand-Sit [place] (Transfers) contact guard  -     Assistive Device (Stand-Sit Transfers) walker, front-wheeled  -       Row Name 06/14/25 1210          Gait/Stairs (Locomotion)    Gait/Stairs Locomotion gait/ambulation independence;gait/ambulation assistive device;distance ambulated  -KM     [place] Level (Gait) contact guard;minimum assist (75% patient effort);verbal cues  -     Assistive Device (Gait) walker,  front-wheeled  -KM     Patient was able to Ambulate yes  -KM     Distance in Feet (Gait) 200  -KM     Pattern (Gait) step-to  -KM     Deviations/Abnormal Patterns (Gait) base of support, narrow;gait speed decreased  -KM     Bilateral Gait Deviations forward flexed posture  -KM       Row Name 06/14/25 1210          Safety Issues/Impairments Affecting Functional Mobility    Impairments Affecting Function (Mobility) balance;cognition;coordination;endurance/activity tolerance;pain;strength  -KM       Row Name 06/14/25 1210          Motor Skills    Comments, Therapeutic Exercise seated ther-ex  -KM       Row Name             Wound 05/26/25 1618 Left posterior heel Pressure Injury    Wound - Properties Group Placement Date: 05/26/25  -CW Placement Time: 1618  -CW Present on Original Admission: Y  -CW Side: Left  -CW Orientation: posterior  -CW Location: heel  -CW Primary Wound Type: Pressure Inj  -CW    Retired Wound - Properties Group Placement Date: 05/26/25  -CW Placement Time: 1618  -CW Present on Original Admission: Y  -CW Side: Left  -CW Orientation: posterior  -CW Location: heel  -CW    Retired Wound - Properties Group Placement Date: 05/26/25  -CW Placement Time: 1618  -CW Present on Original Admission: Y  -CW Side: Left  -CW Orientation: posterior  -CW Location: heel  -CW    Retired Wound - Properties Group Date first assessed: 05/26/25  -CW Time first assessed: 1618  -CW Present on Original Admission: Y  -CW Side: Left  -CW Location: heel  -CW      Row Name             Wound 05/26/25 1622 Left anterior heel Pressure Injury    Wound - Properties Group Placement Date: 05/26/25  -CW Placement Time: 1622  -CW Side: Left  -CW Orientation: anterior  -CW Location: heel  -CW Primary Wound Type: Pressure Inj  -CW    Retired Wound - Properties Group Placement Date: 05/26/25  -CW Placement Time: 1622  -CW Side: Left  -CW Orientation: anterior  -CW Location: heel  -CW    Retired Wound - Properties Group Placement Date:  05/26/25  -CW Placement Time: 1622  -CW Side: Left  -CW Orientation: anterior  -CW Location: heel  -CW    Retired Wound - Properties Group Date first assessed: 05/26/25  -CW Time first assessed: 1622  -CW Side: Left  -CW Location: heel  -CW      Row Name             Wound 05/27/25 1007 medial parietal region Traumatic Abrasion    Wound - Properties Group Placement Date: 05/27/25  -LB Placement Time: 1007  -LB Present on Original Admission: Y  -LB Orientation: medial  -LB Location: parietal region  -LB Primary Wound Type: Traumatic  -LB Secondary Wound Type - Traumatic: Abrasion  -LB    Retired Wound - Properties Group Placement Date: 05/27/25  -LB Placement Time: 1007  -LB Present on Original Admission: Y  -LB Orientation: medial  -LB Location: parietal region  -LB    Retired Wound - Properties Group Placement Date: 05/27/25  -LB Placement Time: 1007  -LB Present on Original Admission: Y  -LB Orientation: medial  -LB Location: parietal region  -LB    Retired Wound - Properties Group Date first assessed: 05/27/25  -LB Time first assessed: 1007  -LB Present on Original Admission: Y  -LB Location: parietal region  -LB      Row Name             Wound 05/27/25 1008 Right distal arm Traumatic Skin Tear    Wound - Properties Group Placement Date: 05/27/25  -LB Placement Time: 1008  -LB Present on Original Admission: Y  -LB Side: Right  -LB Orientation: distal  -LB Location: arm  -LB Primary Wound Type: Traumatic  -LB Secondary Wound Type - Traumatic: Skin Tear  -LB    Retired Wound - Properties Group Placement Date: 05/27/25  -LB Placement Time: 1008  -LB Present on Original Admission: Y  -LB Side: Right  -LB Orientation: distal  -LB Location: arm  -LB    Retired Wound - Properties Group Placement Date: 05/27/25  -LB Placement Time: 1008  -LB Present on Original Admission: Y  -LB Side: Right  -LB Orientation: distal  -LB Location: arm  -LB    Retired Wound - Properties Group Date first assessed: 05/27/25  -LB Time first  assessed: 1008  -LB Present on Original Admission: Y  -LB Side: Right  -LB Location: arm  -LB      Row Name             Wound 06/11/25 1704 medial gluteal Other (Comments)    Wound - Properties Group Placement Date: 06/11/25  -OW Placement Time: 1704 -OW Present on Original Admission: N  -OW Orientation: medial  -OW Location: gluteal  -OW Primary Wound Type: Other  -OW Secondary Wound Type - Other: --  -OW, fissure     Retired Wound - Properties Group Placement Date: 06/11/25  -OW Placement Time: 1704  -OW Present on Original Admission: N  -OW Orientation: medial  -OW Location: gluteal  -OW    Retired Wound - Properties Group Placement Date: 06/11/25  -OW Placement Time: 1704 -OW Present on Original Admission: N  -OW Orientation: medial  -OW Location: gluteal  -OW    Retired Wound - Properties Group Date first assessed: 06/11/25  -OW Time first assessed: 1704  -OW Present on Original Admission: N  -OW Location: gluteal  -OW      Row Name 06/14/25 1210          Progress Summary (PT)    Daily Progress Summary (PT) Pt. was able to demonstrate functional mobility skills w/ Teddy-modA. He was able to ambulate increased distance w/ RW. He tolerated session well. He demonstrates skills appropriate to return home w/ assistance. Pt. was able to partially meet goals by discharge.  -KM       Row Name 06/14/25 1210          Physical Therapy Goals    Bed Mobility Goal Selection (PT) bed mobility, PT goal 1  -KM     Transfer Goal Selection (PT) transfer, PT goal 1  -KM     Gait Training Goal Selection (PT) gait training, PT goal 1  -KM       Row Name 06/14/25 1210          Bed Mobility Goal 1 (PT)    Activity/Assistive Device (Bed Mobility Goal 1, PT) bed mobility activities, all  -KM     [place] Level/Cues Needed (Bed Mobility Goal 1, PT) standby assist  -KM     Time Frame (Bed Mobility Goal 1, PT) by discharge  -KM     Progress/Outcomes (Bed Mobility Goal 1, PT) goal partially met;discharged from facility  -KM       Row  Name 06/14/25 1210          Transfer Goal 1 (PT)    Activity/Assistive Device (Transfer Goal 1, PT) transfers, all;walker, rolling  -KM     [place] Level/Cues Needed (Transfer Goal 1, PT) standby assist  -KM     Time Frame (Transfer Goal 1, PT) by discharge  -KM     Progress/Outcome (Transfer Goal 1, PT) goal partially met;discharged from facility  -       Row Name 06/14/25 1210          Gait Training Goal 1 (PT)    Activity/Assistive Device (Gait Training Goal 1, PT) gait (walking locomotion);assistive device use;decrease fall risk;improve balance and speed;walker, rolling;cane, quad  -KM     [place] Level (Gait Training Goal 1, PT) standby assist  -KM     Distance (Gait Training Goal 1, PT) 50'  -KM     Time Frame (Gait Training Goal 1, PT) long term goal (LTG)  -KM     Progress/Outcome (Gait Training Goal 1, PT) goal partially met;discharged from [facility]  [facility] Name 06/14/25 1210          Discharge Summary (PT)    Additional Documentation Discharge Summary (PT) (Group)  [facility] Name 06/14/25 1210          Discharge Summary (PT)    Reason for Discharge (PT Discharge Summary) no further expectation of functional progress;patient discharged from this facility  -     Outcomes Achieved Upon Discharge (PT Discharge Summary) goals partially achieved prior to discharge  -KM     Transfer to Another Level of Care or Facility (PT Discharge Summary) home with assist recommended;home with home health recommended  -     Discharge Summary Statement (PT) Pt. was able to demonstrate functional mobility skills w/ Teddy-modA. He was able to ambulate increased distance w/ RW. He tolerated session well. He demonstrates skills appropriate to return home w/ assistance. Pt. was able to partially meet goals by discharge.  -KM               User Key  (r) = Recorded By, (t) = Taken By, (c) = Cosigned By      Initials Name Provider Type    Chata Couch, RN Registered Nurse    Baljinder Reese, PT Physical  Therapist    Marysol Rodriguez, RN Registered Nurse    Ceci Mar RN Registered Nurse                      Physical Therapy Education       Title: PT OT SLP Therapies (Resolved)       Topic: Physical Therapy (Resolved)       Point: Mobility training (Resolved)       Learning Progress Summary            Patient Acceptance, E,TB, NR by  at 6/13/2025 1501                      Point: Home exercise program (Resolved)       Learning Progress Summary            Patient Acceptance, E,TB, NR by  at 6/13/2025 1501                      Point: Body mechanics (Resolved)       Learning Progress Summary            Patient Acceptance, E,TB, NR by  at 6/13/2025 1501                      Point: Precautions (Resolved)       Learning Progress Summary            Patient Acceptance, E,TB, NR by  at 6/13/2025 1501                                      User Key       Initials Effective Dates Name Provider Type Discipline     06/06/23 -  Caity Bonilla, PATY Registered Nurse Nurse                    PT Recommendation and Plan     Progress Summary (PT)  Daily Progress Summary (PT): Pt. was able to demonstrate functional mobility skills w/ Teddy-modA. He was able to ambulate increased distance w/ RW. He tolerated session well. He demonstrates skills appropriate to return home w/ assistance. Pt. was able to partially meet goals by discharge.         Time Calculation:    PT Charges       Row Name 06/14/25 1207             Time Calculation    PT Received On 06/14/25  -KM         Time Calculation- PT    Total Timed Code Minutes- PT 23 minute(s)  -KM                User Key  (r) = Recorded By, (t) = Taken By, (c) = Cosigned By      Initials Name Provider Type    Baljinder Reese PT Physical Therapist                  Therapy Charges for Today       Code Description Service Date Service Provider Modifiers Qty    77214827306  PT THERAPEUTIC ACT EA 15 MIN 6/13/2025 Baljinder Martinez PT GP 1    52436248001  PT THER PROC EA 15 MIN 6/13/2025  Baljinder Martinez, PT GP 1    63646549170 HC GAIT TRAINING EA 15 MIN 6/13/2025 Baljinder Martinez, PT GP 1    15964722729 HC PT THERAPEUTIC ACT EA 15 MIN 6/14/2025 Baljinder Martinez, PT GP 1    16115152907 HC GAIT TRAINING EA 15 MIN 6/14/2025 Baljinder Martinez, PT GP 1            PT G-Codes  Outcome Measure Options: AM-PAC 6 Clicks Daily Activity (OT)  AM-PAC 6 Clicks Score (PT): 12  AM-PAC 6 Clicks Score (OT): 17         Baljinder Martinez PT  6/14/2025

## 2025-06-14 NOTE — OUTREACH NOTE
Prep Survey      Flowsheet Row Responses   [facility], [place] patient discharged from? [place]   Is LACE score < 7 ? No   Eligibility [facility]   Date of Admission 06/04/25   Date of Discharge 06/14/25   Discharge diagnosis Physical debility   Does the patient have one of the following disease processes/diagnoses(primary or secondary)? Other   Prep survey completed? Yes            Herminia ARMSTRONG - Registered Nurse

## 2025-06-14 NOTE — NURSING NOTE
Contacted [facility] contacted for the bedside commode with fixed arms. Patient wife prefers for it to be delivered to the house. Faxed Patient's order to [phone].

## 2025-06-14 NOTE — DISCHARGE INSTRUCTIONS
[facility] HOSPITALISTS DISCHARGE SUMMARY    Patient Identification:  Name:  Ramsey Riley  Age:  70 y.o.  Sex:  male  :  1955  MRN:  [id number]  Visit Number:  [id number]    Date of Admission: 2025  Date of Discharge:  ***    PCP: Margret Purvis MD    DISCHARGE DIAGNOSIS      CONSULTS       PROCEDURES PERFORMED      HOSPITAL COURSE  Patient is a 70 y.o. male presented on *** to [facility] complaining of ***.  Please see the admitting history and physical for further details.       signout :    in swing bed for PT/OT.  Mentation/agitation has relatively stabilized with quetiapine nightly 100 mg and mirtazapine 30 mg.  Both to be given at 7 PM.  Aggressive PT OT..  Edited by: Lisbeth Gonzalez MD at 2025 1134    VITAL SIGNS:  Temp:  [97 °F (36.1 °C)-98.9 °F (37.2 °C)] 97 °F (36.1 °C)  Heart Rate:  [60-79] 66  Resp:  [16-20] 16  BP: (131-149)/(65-78) 149/77  SpO2:  [94 %-95 %] 94 %  on   ;   Device (Oxygen Therapy): room air    Body mass index is 29.79 kg/m².  Wt Readings from Last 3 Encounters:   25 96.9 kg (213 lb 10 oz)   25 97.7 kg (215 lb 7 oz)   25 104 kg (230 lb)       PHYSICAL EXAM:  Constitutional:  Well-developed and well-nourished.  No respiratory distress.      HENT:  Head:  Normocephalic and atraumatic.  Mouth:  Moist mucous membranes.    Eyes:  Conjunctivae and EOM are normal.  Pupils are equal, round, and reactive to light.  No scleral icterus.    Cardiovascular:  Normal rate, regular rhythm and normal heart sounds with no murmur.  Pulmonary/Chest:  No respiratory distress, no wheezes, no crackles, with normal breath sounds and good air movement.  Abdominal:  Soft.  Bowel sounds are normal.  No distension and no tenderness.   Musculoskeletal:  No edema, no tenderness, and no deformity.  No red or swollen joints anywhere.    Neurological:  Alert and oriented to person, place, and time.  No gross neurological deficit.   Skin:  Skin is  warm and dry. No rash noted. No pallor.   Peripheral vascular:  Strong pulses in all 4 extremities with no clubbing, no cyanosis, no edema.    DISCHARGE DISPOSITION   Stable    DISCHARGE MEDICATIONS:     Discharge Medications        ASK your doctor about these medications        Instructions Start Date   ALPRAZolam 0.5 MG tablet  Commonly known as: XANAX   0.5 mg, Nightly PRN      amLODIPine 10 MG tablet  Commonly known as: NORVASC   10 mg, Oral, Daily      apixaban 5 MG tablet tablet  Commonly known as: Eliquis   5 mg, Oral, Every 12 Hours      atorvastatin 80 MG tablet  Commonly known as: Lipitor   80 mg, Oral, Nightly      citalopram 40 MG tablet  Commonly known as: CeleXA   40 mg, Oral, Daily      dapagliflozin 5 MG tablet tablet  Commonly known as: Farxiga   5 mg, Oral, Daily      dorzolamide-timolol 2-0.5 % ophthalmic solution  Commonly known as: COSOPT   1 drop, Both Eyes, 2 Times Daily      famotidine 20 MG tablet  Commonly known as: Pepcid   20 mg, Oral, 2 Times Daily      HumuLIN 70/30 KwikPen (70-30) 100 UNIT/ML suspension pen-injector  Generic drug: Insulin NPH Isophane & Regular   84 Units, Every Morning      HumuLIN 70/30 KwikPen (70-30) 100 UNIT/ML suspension pen-injector  Generic drug: Insulin NPH Isophane & Regular   64 Units, Every Evening      HYDROcodone-acetaminophen 7.5-325 MG per tablet  Commonly known as: NORCO   1 tablet, Oral, Every 12 Hours PRN      levothyroxine 137 MCG tablet  Commonly known as: SYNTHROID, LEVOTHROID  Ask about: Which instructions should I use?   137 mcg, Oral, Daily      losartan 100 MG tablet  Commonly known as: COZAAR   100 mg, Oral, Daily      pantoprazole 40 MG EC tablet  Commonly known as: PROTONIX   40 mg, Oral, Daily      QUEtiapine 100 MG tablet  Commonly known as: SEROquel   100 mg, Oral, Nightly      QUEtiapine 50 MG tablet  Commonly known as: SEROquel   50 mg, Oral, 2 Times Daily                      TEST  RESULTS PENDING AT DISCHARGE       CODE STATUS  Code  Status and Medical Interventions: CPR (Attempt to Resuscitate); Full Support   Ordered at: 06/04/25 1958     Code Status (Patient has no pulse and is not breathing):    CPR (Attempt to Resuscitate)     Medical Interventions (Patient has pulse or is breathing):    Full Support       The ASCVD Risk score (Navya COHEN, et al., 2019) failed to calculate for the following reasons:    Risk score cannot be calculated because patient has a medical history suggesting prior/existing ASCVD     Omer Spencer MD  [facility]  06/14/25  11:51 EDT    Please note that this discharge summary required more than 30 minutes to complete.        Wound Locations L heel wounds x's 2   Cleanse Wound Cleanser   Intervention Venelex - Thin Layer   Dressing: Silicone Border Dressing

## 2025-06-14 NOTE — DISCHARGE SUMMARY
[facility] HOSPITALISTS DISCHARGE SUMMARY    Patient Identification:  Name:  Ramsey Riley  Age:  70 y.o.  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 male presented on  to [facility] swing bed for continued PT/OT.  Please see the admitting history and physical for further details.      Mr. Riley is our 69 yo M with hx of vascular dementia, CAD, HTN, PAD, hyperlipidemia, diabetes mellitus, chronically anticoagulated with Eliquis, thyroid disease who presented with foot pain, debility, and falls. Patient was evaluated for confusion and frequent falls during his acute hospitalization and MRI brain was negative for evidence of acute ischemia, focal mass, or midline shift. Other lab work such as TSH, vitamin B12, and folate was also unrevealing. Decline in function likely related to his progressive vascular dementia. He was evaluated by PT/OT. Inpatient rehab facility or skilled nursing facility was recommended. Unfortunately he was declined by skilled nursing facility due to his home seroquel which is used for insomnia and agitation in the setting of vascular dementia. He was accepted to swing bed for continued physical therapy services to improve strength and balance before returning home, to reduce risk of further falls and injury.       On day of discharge patient's wife was supportive  bedside requesting patient to be discharged home. She noted she believed patient would do better at  home in his normal settings. Patient already has walking aids but she requested home health and commode chair. Wife is aware of progressive nature of vacular dementia and notes if it becomes too much for her she would consider placement. She notes that patient has been up in chair earlier this morning and believes he will do better at home. Main concern wife had was nocturnal activity and restlessness. Increased QHS seroquel from 100 to 150 mg nightly. No PRNs needed overnight last night. HHPT ordered. PCP f/u.           VITAL SIGNS:  Temp:  [97 °F (36.1 °C)-98.9 °F (37.2 °C)] 97 °F (36.1 °C)  Heart Rate:  [60-79] 66  Resp:  [16-20] 16  BP: (131-149)/(65-78) 149/77  SpO2:  [94 %-95 %] 94 %  on   ;   Device (Oxygen Therapy): room air    Body mass index is 29.79 kg/m².  Wt Readings from Last 3 Encounters:   06/14/25 96.9 kg (213 lb 10 oz)   06/04/25 97.7 kg (215 lb 7 oz)   05/19/25 104 kg (230 lb)       PHYSICAL EXAM:  Constitutional:  Elderly appearing male. Was resting but woke up and was interactive.       HENT:  Head:  Normocephalic and atraumatic.  Mouth:  Moist mucous membranes.    Eyes:  Conjunctivae and EOM are normal.  Pupils are equal, round, and reactive to light.  No scleral icterus.    Cardiovascular:  Normal rate, regular rhythm and normal heart sounds with no murmur.  Pulmonary/Chest:  No respiratory distress, no wheezes, no crackles, with normal breath sounds and good air movement.  Abdominal:  Soft.  Bowel sounds are normal.  No distension and no tenderness.   Musculoskeletal:  No edema, no tenderness, and no deformity.  No red or swollen joints anywhere.    Neurological:  Alert and oriented to person. Pleasantly confused.    Peripheral vascular:  Strong pulses in all 4 extremities with no clubbing, no cyanosis, Superficial ulcer on left heel.     DISCHARGE DISPOSITION   Stable    DISCHARGE  MEDICATIONS:     Discharge Medications        Changes to Medications        Instructions Start Date   HumuLIN 70/30 KwikPen (70-30) 100 UNIT/ML suspension pen-injector  Generic drug: Insulin NPH Isophane & Regular  What changed: how much to take   20 Units, Subcutaneous, Every Morning      HumuLIN 70/30 KwikPen (70-30) 100 UNIT/ML suspension pen-injector  Generic drug: Insulin NPH Isophane & Regular  What changed: how much to take   20 Units, Subcutaneous, Every Evening      levothyroxine 137 MCG tablet  Commonly known as: SYNTHROID, LEVOTHROID  What changed: Another medication with the same name was added. Make sure you understand how and when to take each.   137 mcg, Oral, Daily      levothyroxine 137 MCG tablet  Commonly known as: SYNTHROID, LEVOTHROID  What changed: You were already taking a medication with the same name, and this prescription was added. Make sure you understand how and when to take each.   137 mcg, Oral, Daily      QUEtiapine 100 MG tablet  Commonly known as: SEROquel  What changed:   how much to take  Another medication with the same name was removed. Continue taking this medication, and follow the directions you see here.   150 mg, Oral, Nightly             Continue These Medications        Instructions Start Date   ALPRAZolam 0.5 MG tablet  Commonly known as: XANAX   0.5 mg, Nightly PRN      amLODIPine 10 MG tablet  Commonly known as: NORVASC   10 mg, Oral, Daily      apixaban 5 MG tablet tablet  Commonly known as: Eliquis   5 mg, Oral, Every 12 Hours      atorvastatin 80 MG tablet  Commonly known as: Lipitor   80 mg, Oral, Nightly      citalopram 40 MG tablet  Commonly known as: CeleXA   40 mg, Oral, Daily      dapagliflozin 5 MG tablet tablet  Commonly known as: Farxiga   5 mg, Oral, Daily      dorzolamide-timolol 2-0.5 % ophthalmic solution  Commonly known as: COSOPT   1 drop, Both Eyes, 2 Times Daily      famotidine 20 MG tablet  Commonly known as: Pepcid   20 mg, Oral, 2 Times Daily       HYDROcodone-acetaminophen 7.5-325 MG per tablet  Commonly known as: NORCO   1 tablet, Oral, Every 12 Hours PRN      losartan 100 MG tablet  Commonly known as: COZAAR   100 mg, Oral, Daily      pantoprazole 40 MG EC tablet  Commonly known as: PROTONIX   40 mg, Oral, Daily               Diet Instructions    Cardiac,  heart healthy (2-3 Na+), consistent carbohydrate.          Activity Instructions    As Tolerated.            Follow-up Information       Margret Purvis MD .    Specialties: Internal Medicine, Geriatric Medicine  Contact information:  [address] [place] [address] [phone]                              TEST  RESULTS PENDING AT DISCHARGE       CODE STATUS  Code Status and Medical Interventions: CPR (Attempt to Resuscitate); Full Support   Ordered at: 06/04/25 1958     Code Status (Patient has no pulse and is not breathing):    CPR (Attempt to Resuscitate)     Medical Interventions (Patient has pulse or is breathing):    Full Support       The ASCVD Risk score (Navya COHEN, et al., 2019) failed to calculate for the following reasons:    Risk score cannot be calculated because patient has a medical history suggesting prior/existing ASCVD     Omer Spencer MD  [facility]  06/14/25  12:38 EDT    Please note that this discharge summary required more than 30 minutes to complete.

## 2025-06-14 NOTE — PLAN OF CARE
Goal Outcome Evaluation:  Plan of Care Reviewed With: patient        Progress: no change  Outcome Evaluation: Patient resting in bed at this time with sitter at bedside. Patient is alert to self this shift. Patient complained of pain, PRN meds given. Wound care complete per orders. Patient ambulated this shift with assistance. VSS on room air. No acute changes noted at this time, will continue with plan of care.

## 2025-06-16 ENCOUNTER — TRANSITIONAL CARE MANAGEMENT TELEPHONE ENCOUNTER (OUTPATIENT)
Dept: CALL CENTER | Facility: HOSPITAL | Age: 70
End: 2025-06-16
Payer: MEDICARE

## 2025-06-16 ENCOUNTER — TELEPHONE (OUTPATIENT)
Dept: MEDSURG UNIT | Facility: HOSPITAL | Age: 70
End: 2025-06-16

## 2025-06-16 NOTE — TELEPHONE ENCOUNTER
Provider: ABIGAIL ROSENBAUM    Caller: [facility]    Relationship to Patient:     Pharmacy: NA    Phone Number: [phone]     Reason for Call: HOME HEALTH WAS DECLINED BY FAMILY. IF THEY NEED ASSISTANCE IN THE FUTURE THEY WILL NEED A NEW REFERRAL SENT.

## 2025-06-16 NOTE — OUTREACH NOTE
Call Center TCM Note      Flowsheet Row Responses   [facility] patient discharged from? [facility]   Does the patient have one of the following disease processes/diagnoses(primary or secondary)? Other   TCM attempt successful? Yes   Call start time 1428   Discharge diagnosis Physical debility   Person spoke with today (if not patient) and relationship Spouse- Jina   Meds reviewed with patient/caregiver? Yes   Prescription comments CHANGE how you take:  HumuLIN 70/30 KwikPen (Insulin NPH Isophane &  Regular) -- multiple changes  QUEtiapine (SEROquel) -- how much to take, Another  medication with the same name was removed. Continue  taking this medication, and follow the directions you see  here.   Comments 6/24/2025  3:30 PM Arrive by 3:15 PM HOSPITAL FOLLOW UP 15 min [facility] PRIMARY CARE Margret Purvis MD   Does the patient have an appointment with their PCP within 7-14 days of discharge? Yes   Has home health visited the patient within 72 hours of discharge? N/A   What DME was ordered? Commode Chair   Has all DME been delivered? Yes   Psychosocial issues? No   Did the patient receive a copy of their discharge instructions? Yes   Nursing interventions Reviewed instructions with patient   What is the patient's perception of their health status since discharge? Improving   Is the patient/caregiver able to teach back signs and symptoms related to disease process for when to call PCP? Yes   Is the patient/caregiver able to teach back signs and symptoms related to disease process for when to call 911? Yes   Is the patient/caregiver able to teach back the hierarchy of who to call/visit for symptoms/problems? PCP, Specialist, Home health nurse, Urgent Care, ED, 911 Yes   TCM call completed? Yes   Wrap up additional comments Spouse states patient is doing well. No concerns or questions noted.   Would this patient benefit from a Referral to Amb Social Work? No   Is the patient interested in additional  calls from an ambulatory ? Jodi ARMSTRONG - Registered Nurse    6/16/2025, 14:29 EDT

## 2025-06-16 NOTE — PAYOR COMM NOTE
"Zhanna Au RN /Swing Bed  [email or web address]  Phone: [phone] Fax: [phone]  -NPI [id number]  [facility] [id number]  Authorization No: [id number]     Patient was discharged home with spouse from post acute swing bed on 6/14/2025. DME at discharge was bedside commode.      ICD 10  R29.6  R53.81    Cici Riley (70 y.o. Male)       Date of Birth   1955    Social Security Number       Address   [address] [place] [address]    Home Phone   [phone]    MRN   [id number]       Latter day   Patient Refused    Marital Status                               Admission Date   6/4/2025    Admission Type   Urgent    Admitting Provider   Harini Urbina DO    Attending Provider       Department, Room/Bed   [address] 3344/2S       Discharge Date   6/14/2025    Discharge Disposition   Home or Self Care    Discharge Destination                                 Attending Provider: (none)   Allergies: Penicillins, Trazodone    Isolation: None   Infection: None   Code Status: Prior    Ht: 180.3 cm (71\")   Wt: 96.9 kg (213 lb 10 oz)    Admission Cmt: None   Principal Problem: Physical debility [R53.81]                   Active Insurance as of 6/4/2025       Primary Coverage       Payor Plan Insurance Group Employer/Plan Group    ANTHEM MEDICARE REPLACEMENT ANTH MEDICARE ADVANTAGE HMO [id number]       Payor Plan Address Payor Plan Phone Number Payor Plan Fax Number Effective Dates    [address] [phone]  1/1/2024 - None Entered    [facility] [id number]         Subscriber Name Subscriber Birth Date Member ID       CICI RILEY 1955 [id number]                     Emergency Contacts        (Rel.) Home Phone Work Phone Mobile Phone    Jina Riley (Spouse) [phone] -- [phone]          Omer Spencer MD   Physician  Hospitalist     Discharge Summary     Signed     Date of Service: 06/14/25 1237  Creation Time: 06/14/25 1237   "   Signed              [facility] HOSPITALISTS DISCHARGE SUMMARY     Patient Identification:  Name:  Ramsey Riley  Age:  70 y.o.  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 male presented on  to [facility] swing bed for continued PT/OT.  Please see the admitting history and physical for further details.       Mr. Riley is our 71 yo M with hx of vascular dementia, CAD, HTN, PAD, hyperlipidemia, diabetes mellitus, chronically anticoagulated with Eliquis, thyroid disease who presented with foot pain, debility, and falls. Patient was evaluated for confusion and frequent falls during his acute hospitalization and MRI brain was negative for evidence of acute ischemia, focal mass, or midline shift. Other lab work such as TSH, vitamin B12, and folate was also unrevealing. Decline in function likely related to his progressive vascular dementia. He was evaluated by PT/OT. Inpatient rehab facility or skilled nursing facility was recommended. Unfortunately he was declined by skilled nursing facility due to his home seroquel which is used for insomnia and agitation in the setting of vascular dementia. He was accepted to swing bed for continued physical therapy services to improve strength and balance before returning home, to reduce risk of further falls and injury.         On day of discharge  patient's wife was supportive bedside requesting patient to be discharged home. She noted she believed patient would do better at  home in his normal settings. Patient already has walking aids but she requested home health and commode chair. Wife is aware of progressive nature of vacular dementia and notes if it becomes too much for her she would consider placement. She notes that patient has been up in chair earlier this morning and believes he will do better at home. Main concern wife had was nocturnal activity and restlessness. Increased QHS seroquel from 100 to 150 mg nightly. No PRNs needed overnight last night. HHPT ordered. PCP f/u.               VITAL SIGNS:  Temp:  [97 °F (36.1 °C)-98.9 °F (37.2 °C)] 97 °F (36.1 °C)  Heart Rate:  [60-79] 66  Resp:  [16-20] 16  BP: (131-149)/(65-78) 149/77  SpO2:  [94 %-95 %] 94 %  on   ;   Device (Oxygen Therapy): room air     Body mass index is 29.79 kg/m².      Wt Readings from Last 3 Encounters:   06/14/25 96.9 kg (213 lb 10 oz)   06/04/25 97.7 kg (215 lb 7 oz)   05/19/25 104 kg (230 lb)         PHYSICAL EXAM:  Constitutional:  Elderly appearing male. Was resting but woke up and was interactive.       HENT:  Head:  Normocephalic and atraumatic.  Mouth:  Moist mucous membranes.    Eyes:  Conjunctivae and EOM are normal.  Pupils are equal, round, and reactive to light.  No scleral icterus.    Cardiovascular:  Normal rate, regular rhythm and normal heart sounds with no murmur.  Pulmonary/Chest:  No respiratory distress, no wheezes, no crackles, with normal breath sounds and good air movement.  Abdominal:  Soft.  Bowel sounds are normal.  No distension and no tenderness.   Musculoskeletal:  No edema, no tenderness, and no deformity.  No red or swollen joints anywhere.    Neurological:  Alert and oriented to person. Pleasantly confused.    Peripheral vascular:  Strong pulses in all 4 extremities with no clubbing, no cyanosis, Superficial ulcer on left heel.      DISCHARGE  DISPOSITION   Stable     DISCHARGE MEDICATIONS:      Discharge Medications          Changes to Medications         Instructions Start Date   HumuLIN 70/30 KwikPen (70-30) 100 UNIT/ML suspension pen-injector  Generic drug: Insulin NPH Isophane & Regular  What changed: how much to take    20 Units, Subcutaneous, Every Morning        HumuLIN 70/30 KwikPen (70-30) 100 UNIT/ML suspension pen-injector  Generic drug: Insulin NPH Isophane & Regular  What changed: how much to take    20 Units, Subcutaneous, Every Evening        levothyroxine 137 MCG tablet  Commonly known as: SYNTHROID, LEVOTHROID  What changed: Another medication with the same name was added. Make sure you understand how and when to take each.    137 mcg, Oral, Daily        levothyroxine 137 MCG tablet  Commonly known as: SYNTHROID, LEVOTHROID  What changed: You were already taking a medication with the same name, and this prescription was added. Make sure you understand how and when to take each.    137 mcg, Oral, Daily        QUEtiapine 100 MG tablet  Commonly known as: SEROquel  What changed:   how much to take  Another medication with the same name was removed. Continue taking this medication, and follow the directions you see here.    150 mg, Oral, Nightly                  Continue These Medications         Instructions Start Date   ALPRAZolam 0.5 MG tablet  Commonly known as: XANAX    0.5 mg, Nightly PRN        amLODIPine 10 MG tablet  Commonly known as: NORVASC    10 mg, Oral, Daily        apixaban 5 MG tablet tablet  Commonly known as: Eliquis    5 mg, Oral, Every 12 Hours        atorvastatin 80 MG tablet  Commonly known as: Lipitor    80 mg, Oral, Nightly        citalopram 40 MG tablet  Commonly known as: CeleXA    40 mg, Oral, Daily        dapagliflozin 5 MG tablet tablet  Commonly known as: Farxiga    5 mg, Oral, Daily        dorzolamide-timolol 2-0.5 % ophthalmic solution  Commonly known as: COSOPT    1 drop, Both Eyes, 2 Times Daily         famotidine 20 MG tablet  Commonly known as: Pepcid    20 mg, Oral, 2 Times Daily        HYDROcodone-acetaminophen 7.5-325 MG per tablet  Commonly known as: NORCO    1 tablet, Oral, Every 12 Hours PRN        losartan 100 MG tablet  Commonly known as: COZAAR    100 mg, Oral, Daily        pantoprazole 40 MG EC tablet  Commonly known as: PROTONIX    40 mg, Oral, Daily                     Diet Instructions    Cardiac,  heart healthy (2-3 Na+), consistent carbohydrate.             Activity Instructions    As Tolerated.                 Follow-up Information         Margret Purvis MD .    Specialties: Internal Medicine, Geriatric Medicine  Contact information:  [address] [place] [address] [phone]                                      TEST  RESULTS PENDING AT DISCHARGE        CODE STATUS      Code Status and Medical Interventions: CPR (Attempt to Resuscitate); Full Support   Ordered at: 06/04/25 1958     Code Status (Patient has no pulse and is not breathing):     CPR (Attempt to Resuscitate)     Medical Interventions (Patient has pulse or is breathing):     Full Support         The ASCVD Risk score (Navya COHEN, et al., 2019) failed to calculate for the following reasons:    Risk score cannot be calculated because patient has a medical history suggesting prior/existing ASCVD      Omer Spencer MD  [facility] Hospitalist  06/14/25  12:38 EDT     Please note that this discharge summary required more than 30 minutes to complete.

## 2025-06-16 NOTE — CASE MANAGEMENT/SOCIAL WORK
Discharge Planning Assessment   [facility]     Patient Name: Ramsey Riley  MRN: [id number]  Today's Date: 6/16/2025    Admit Date: 6/4/2025            Discharge Plan       Row Name 06/16/25 0957       Plan    Final Discharge Disposition Code 01 - home or self-care    Final Note Pt was discharged home with home health services. SS contacted [facility] per [facility] who states Pt's family refused for HH to start care, states they want someone in the home that can stay with Pt around the clock. SS contacted Pt's spouse who states she cannot pay for private caregiver services and plans on trying to get Pt more insurance that will cover more services in the home. No other needs identified.                    GERARD RaiW

## 2025-06-18 NOTE — PAYOR COMM NOTE
"Zhanna Au RN CM/Swing Bed  [email or web address]  Phone: [phone] Fax: [phone]  [facility] [id number]  [facility] [id number]  Authorization No: [id number]    Cici Riley (70 y.o. Male)       Date of Birth   1955    Social Security Number       Address   [address] [place] [address]    Home Phone   [phone]    MRN   [id number]       Taoism   Patient Refused    Marital Status                               Admission Date   2025    Admission Type   Urgent    Admitting Provider   Harini Urbina DO    Attending Provider       Department, Room/Bed   [place] [address]/       Discharge Date   2025    Discharge Disposition   Home or Self Care    Discharge Destination                                 Attending Provider: (none)   Allergies: Penicillins, Trazodone    Isolation: None   Infection: None   Code Status: Prior    Ht: 180.3 cm (71\")   Wt: 96.9 kg (213 lb 10 oz)    Admission Cmt: None   Principal Problem: Physical debility [R53.81]                   Active Insurance as of 2025       Primary Coverage       Payor Plan Insurance Group Employer/Plan Group    ANTHEM MEDICARE REPLACEMENT ANTHEM MEDICARE ADVANTAGE HMO [id number]       Payor Plan Address Payor Plan Phone Number Payor Plan Fax Number Effective Dates    [address] [phone]  2024 - None Entered    [facility] [id number]         Subscriber Name Subscriber Birth Date Member ID       CICI RILEY 1955 [id number]                     Emergency Contacts        (Rel.) Home Phone Work Phone Mobile Phone    Jina Riley (Spouse) [phone] --                  Discharge Summary        Omer Spencer MD at [facility]              [facility] DISCHARGE SUMMARY    Patient Identification:  Name:  Cici Riley  Age:  70 y.o.  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 male presented on 6/4 to [facility] swing bed for continued PT/OT.  Please see the admitting history and physical for further details.      Mr. Riley is our 69 yo M with hx of vascular dementia, CAD, HTN, PAD, hyperlipidemia, diabetes mellitus, chronically anticoagulated with Eliquis, thyroid disease who presented with foot pain, debility, and falls. Patient was evaluated for confusion and frequent falls during his acute hospitalization and MRI brain was negative for evidence of acute ischemia, focal mass, or midline shift. Other lab work such as TSH, vitamin B12, and folate was also unrevealing. Decline in function likely related to his progressive vascular dementia. He was evaluated by PT/OT. Inpatient rehab facility or skilled nursing facility was recommended. Unfortunately he was declined by skilled nursing facility due to his home seroquel which is used for insomnia and agitation in the setting of vascular dementia. He was accepted to swing bed for continued physical therapy services to improve strength and balance before returning home, to reduce risk of further falls and injury.       On day of discharge patient's wife was supportive bedside requesting patient to be discharged home. She noted she believed patient would do better at  home in his normal settings. Patient already has walking aids but she requested home health and commode chair.  Wife is aware of progressive nature of vacular dementia and notes if it becomes too much for her she would consider placement. She notes that patient has been up in chair earlier this morning and believes he will do better at home. Main concern wife had was nocturnal activity and restlessness. Increased QHS seroquel from 100 to 150 mg nightly. No PRNs needed overnight last night. HHPT ordered. PCP f/u.           VITAL SIGNS:  Temp:  [97 °F (36.1 °C)-98.9 °F (37.2 °C)] 97 °F (36.1 °C)  Heart Rate:  [60-79] 66  Resp:  [16-20] 16  BP: (131-149)/(65-78) 149/77  SpO2:  [94 %-95 %] 94 %  on   ;   Device (Oxygen Therapy): room air    Body mass index is 29.79 kg/m².  Wt Readings from Last 3 Encounters:   06/14/25 96.9 kg (213 lb 10 oz)   06/04/25 97.7 kg (215 lb 7 oz)   05/19/25 104 kg (230 lb)       PHYSICAL EXAM:  Constitutional:  Elderly appearing male. Was resting but woke up and was interactive.       HENT:  Head:  Normocephalic and atraumatic.  Mouth:  Moist mucous membranes.    Eyes:  Conjunctivae and EOM are normal.  Pupils are equal, round, and reactive to light.  No scleral icterus.    Cardiovascular:  Normal rate, regular rhythm and normal heart sounds with no murmur.  Pulmonary/Chest:  No respiratory distress, no wheezes, no crackles, with normal breath sounds and good air movement.  Abdominal:  Soft.  Bowel sounds are normal.  No distension and no tenderness.   Musculoskeletal:  No edema, no tenderness, and no deformity.  No red or swollen joints anywhere.    Neurological:  Alert and oriented to person. Pleasantly confused.    Peripheral vascular:  Strong pulses in all 4 extremities with no clubbing, no cyanosis, Superficial ulcer on left heel.     DISCHARGE DISPOSITION   Stable    DISCHARGE MEDICATIONS:     Discharge Medications        Changes to Medications        Instructions Start Date   HumuLIN 70/30 KwikPen (70-30) 100 UNIT/ML suspension pen-injector  Generic drug: Insulin NPH Isophane & Regular  What  changed: how much to take   20 Units, Subcutaneous, Every Morning      HumuLIN 70/30 KwikPen (70-30) 100 UNIT/ML suspension pen-injector  Generic drug: Insulin NPH Isophane & Regular  What changed: how much to take   20 Units, Subcutaneous, Every Evening      levothyroxine 137 MCG tablet  Commonly known as: SYNTHROID, LEVOTHROID  What changed: Another medication with the same name was added. Make sure you understand how and when to take each.   137 mcg, Oral, Daily      levothyroxine 137 MCG tablet  Commonly known as: SYNTHROID, LEVOTHROID  What changed: You were already taking a medication with the same name, and this prescription was added. Make sure you understand how and when to take each.   137 mcg, Oral, Daily      QUEtiapine 100 MG tablet  Commonly known as: SEROquel  What changed:   how much to take  Another medication with the same name was removed. Continue taking this medication, and follow the directions you see here.   150 mg, Oral, Nightly             Continue These Medications        Instructions Start Date   ALPRAZolam 0.5 MG tablet  Commonly known as: XANAX   0.5 mg, Nightly PRN      amLODIPine 10 MG tablet  Commonly known as: NORVASC   10 mg, Oral, Daily      apixaban 5 MG tablet tablet  Commonly known as: Eliquis   5 mg, Oral, Every 12 Hours      atorvastatin 80 MG tablet  Commonly known as: Lipitor   80 mg, Oral, Nightly      citalopram 40 MG tablet  Commonly known as: CeleXA   40 mg, Oral, Daily      dapagliflozin 5 MG tablet tablet  Commonly known as: Farxiga   5 mg, Oral, Daily      dorzolamide-timolol 2-0.5 % ophthalmic solution  Commonly known as: COSOPT   1 drop, Both Eyes, 2 Times Daily      famotidine 20 MG tablet  Commonly known as: Pepcid   20 mg, Oral, 2 Times Daily      HYDROcodone-acetaminophen 7.5-325 MG per tablet  Commonly known as: NORCO   1 tablet, Oral, Every 12 Hours PRN      losartan 100 MG tablet  Commonly known as: COZAAR   100 mg, Oral, Daily      pantoprazole 40 MG EC  tablet  Commonly known as: PROTONIX   40 mg, Oral, Daily               Diet Instructions    Cardiac,  heart healthy (2-3 Na+), consistent carbohydrate.          Activity Instructions    As Tolerated.            Follow-up Information       Margret Purvis MD .    Specialties: Internal Medicine, Geriatric Medicine  Contact information:  [address] [place] [address] [phone]                              TEST  RESULTS PENDING AT DISCHARGE       CODE STATUS  Code Status and Medical Interventions: CPR (Attempt to Resuscitate); Full Support   Ordered at: 06/04/25 1958     Code Status (Patient has no pulse and is not breathing):    CPR (Attempt to Resuscitate)     Medical Interventions (Patient has pulse or is breathing):    Full Support       The ASCVD Risk score (Navya COHEN, et al., 2019) failed to calculate for the following reasons:    Risk score cannot be calculated because patient has a medical history suggesting prior/existing ASCVD     Omer Spencer MD  [facility]  06/14/25  12:38 EDT    Please note that this discharge summary required more than 30 minutes to complete.      Electronically signed by Omer Spencer MD at 06/14/25 2670

## 2025-06-25 ENCOUNTER — READMISSION MANAGEMENT (OUTPATIENT)
Dept: CALL CENTER | Facility: HOSPITAL | Age: 70
End: 2025-06-25
Payer: MEDICARE

## 2025-06-25 NOTE — OUTREACH NOTE
Medical Week 2 Survey      Flowsheet Row Responses   [facility] facility patient discharged from? [facility]   Does the patient have one of the following disease processes/diagnoses(primary or secondary)? Other   Week 2 attempt successful? No   Unsuccessful attempts Attempt 1            Renate Christina Registered Nurse

## 2025-06-27 ENCOUNTER — TRANSCRIBE ORDERS (OUTPATIENT)
Dept: ADMINISTRATIVE | Facility: HOSPITAL | Age: 70
End: 2025-06-27
Payer: MEDICARE

## 2025-06-27 DIAGNOSIS — I73.9 PERIPHERAL VASCULAR DISEASE, UNSPECIFIED: Primary | ICD-10-CM

## 2025-06-27 DIAGNOSIS — E11.621 TYPE 2 DIABETES MELLITUS WITH FOOT ULCER (CODE): ICD-10-CM

## 2025-06-30 ENCOUNTER — HOSPITAL ENCOUNTER (OUTPATIENT)
Dept: MRI IMAGING | Facility: HOSPITAL | Age: 70
Discharge: HOME | End: 2025-06-30
Payer: MEDICARE

## 2025-06-30 ENCOUNTER — TRANSCRIBE ORDERS (OUTPATIENT)
Dept: ADMINISTRATIVE | Facility: HOSPITAL | Age: 70
End: 2025-06-30
Payer: MEDICARE

## 2025-06-30 ENCOUNTER — READMISSION MANAGEMENT (OUTPATIENT)
Dept: CALL CENTER | Facility: HOSPITAL | Age: 70
End: 2025-06-30
Payer: MEDICARE

## 2025-06-30 ENCOUNTER — HOSPITAL ENCOUNTER (OUTPATIENT)
Dept: ULTRASOUND IMAGING | Facility: HOSPITAL | Age: 70
Discharge: HOME | End: 2025-06-30
Payer: MEDICARE

## 2025-06-30 ENCOUNTER — LAB (OUTPATIENT)
Dept: LAB | Facility: HOSPITAL | Age: 70
End: 2025-06-30
Payer: MEDICARE

## 2025-06-30 DIAGNOSIS — M79.672 LEFT FOOT PAIN: Primary | ICD-10-CM

## 2025-06-30 DIAGNOSIS — E11.621 TYPE 2 DIABETES MELLITUS WITH FOOT ULCER (CODE): ICD-10-CM

## 2025-06-30 DIAGNOSIS — I73.9 PERIPHERAL VASCULAR DISEASE, UNSPECIFIED: ICD-10-CM

## 2025-06-30 DIAGNOSIS — M79.672 LEFT FOOT PAIN: ICD-10-CM

## 2025-06-30 NOTE — OUTREACH NOTE
Medical Week 2 Survey      Flowsheet Row Responses   [facility] facility patient discharged from? [facility]   Does the patient have one of the following disease processes/diagnoses(primary or secondary)? Other   Week 2 attempt successful? No   Unsuccessful attempts Attempt 2   Revoke Maddy Christina Registered Nurse

## 2025-07-01 ENCOUNTER — HOSPITAL ENCOUNTER (EMERGENCY)
Facility: HOSPITAL | Age: 70
Discharge: HOME | End: 2025-07-01
Attending: STUDENT IN AN ORGANIZED HEALTH CARE EDUCATION/TRAINING PROGRAM | Admitting: STUDENT IN AN ORGANIZED HEALTH CARE EDUCATION/TRAINING PROGRAM
Payer: MEDICARE

## 2025-07-01 ENCOUNTER — TELEPHONE (OUTPATIENT)
Dept: INTERNAL MEDICINE | Facility: CLINIC | Age: 70
End: 2025-07-01
Payer: MEDICARE

## 2025-07-01 ENCOUNTER — APPOINTMENT (OUTPATIENT)
Dept: GENERAL RADIOLOGY | Facility: HOSPITAL | Age: 70
End: 2025-07-01
Payer: MEDICARE

## 2025-07-01 VITALS
WEIGHT: 240 LBS | RESPIRATION RATE: 15 BRPM | SYSTOLIC BLOOD PRESSURE: 125 MMHG | TEMPERATURE: 97 F | BODY MASS INDEX: 33.6 KG/M2 | DIASTOLIC BLOOD PRESSURE: 78 MMHG | OXYGEN SATURATION: 94 % | HEART RATE: 63 BPM | HEIGHT: 71 IN

## 2025-07-01 DIAGNOSIS — B34.8 INFECTION DUE TO PARAINFLUENZA VIRUS 3: Primary | ICD-10-CM

## 2025-07-01 LAB
ALBUMIN SERPL-MCNC: 3.3 G/DL (ref 3.5–5.2)
ALBUMIN/GLOB SERPL: 1.3 G/DL
ALP SERPL-CCNC: 104 U/L (ref 39–117)
ALT SERPL W P-5'-P-CCNC: 15 U/L (ref 1–41)
ANION GAP SERPL CALCULATED.3IONS-SCNC: 12.2 MMOL/L (ref 5–15)
AST SERPL-CCNC: 24 U/L (ref 1–40)
B PARAPERT DNA SPEC QL NAA+PROBE: NOT DETECTED
B PERT DNA SPEC QL NAA+PROBE: NOT DETECTED
BASOPHILS # BLD AUTO: 0.03 10*3/MM3 (ref 0–0.2)
BASOPHILS NFR BLD AUTO: 0.6 % (ref 0–1.5)
BILIRUB SERPL-MCNC: 0.3 MG/DL (ref 0–1.2)
BUN SERPL-MCNC: 15.7 MG/DL (ref 8–23)
BUN/CREAT SERPL: 12.7 (ref 7–25)
C PNEUM DNA NPH QL NAA+NON-PROBE: NOT DETECTED
CALCIUM SPEC-SCNC: 8.4 MG/DL (ref 8.6–10.5)
CHLORIDE SERPL-SCNC: 102 MMOL/L (ref 98–107)
CO2 SERPL-SCNC: 21.8 MMOL/L (ref 22–29)
CREAT SERPL-MCNC: 1.24 MG/DL (ref 0.76–1.27)
DEPRECATED RDW RBC AUTO: 42.2 FL (ref 37–54)
EGFRCR SERPLBLD CKD-EPI 2021: 63.3 ML/MIN/1.73
EOSINOPHIL # BLD AUTO: 0.38 10*3/MM3 (ref 0–0.4)
EOSINOPHIL NFR BLD AUTO: 7.2 % (ref 0.3–6.2)
ERYTHROCYTE [DISTWIDTH] IN BLOOD BY AUTOMATED COUNT: 12.4 % (ref 12.3–15.4)
FLUAV SUBTYP SPEC NAA+PROBE: NOT DETECTED
FLUBV RNA NPH QL NAA+NON-PROBE: NOT DETECTED
GLOBULIN UR ELPH-MCNC: 2.6 GM/DL
GLUCOSE SERPL-MCNC: 212 MG/DL (ref 65–99)
HADV DNA SPEC NAA+PROBE: NOT DETECTED
HCOV 229E RNA SPEC QL NAA+PROBE: NOT DETECTED
HCOV HKU1 RNA SPEC QL NAA+PROBE: NOT DETECTED
HCOV NL63 RNA SPEC QL NAA+PROBE: NOT DETECTED
HCOV OC43 RNA SPEC QL NAA+PROBE: NOT DETECTED
HCT VFR BLD AUTO: 43.9 % (ref 37.5–51)
HGB BLD-MCNC: 14.1 G/DL (ref 13–17.7)
HMPV RNA NPH QL NAA+NON-PROBE: NOT DETECTED
HOLD SPECIMEN: NORMAL
HOLD SPECIMEN: NORMAL
HPIV1 RNA ISLT QL NAA+PROBE: NOT DETECTED
HPIV2 RNA SPEC QL NAA+PROBE: NOT DETECTED
HPIV3 RNA NPH QL NAA+PROBE: DETECTED
HPIV4 P GENE NPH QL NAA+PROBE: NOT DETECTED
IMM GRANULOCYTES # BLD AUTO: 0.01 10*3/MM3 (ref 0–0.05)
IMM GRANULOCYTES NFR BLD AUTO: 0.2 % (ref 0–0.5)
LYMPHOCYTES # BLD AUTO: 1.75 10*3/MM3 (ref 0.7–3.1)
LYMPHOCYTES NFR BLD AUTO: 33 % (ref 19.6–45.3)
M PNEUMO IGG SER IA-ACNC: NOT DETECTED
MCH RBC QN AUTO: 29.4 PG (ref 26.6–33)
MCHC RBC AUTO-ENTMCNC: 32.1 G/DL (ref 31.5–35.7)
MCV RBC AUTO: 91.6 FL (ref 79–97)
MONOCYTES # BLD AUTO: 0.55 10*3/MM3 (ref 0.1–0.9)
MONOCYTES NFR BLD AUTO: 10.4 % (ref 5–12)
NEUTROPHILS NFR BLD AUTO: 2.58 10*3/MM3 (ref 1.7–7)
NEUTROPHILS NFR BLD AUTO: 48.6 % (ref 42.7–76)
NRBC BLD AUTO-RTO: 0 /100 WBC (ref 0–0.2)
PLATELET # BLD AUTO: 140 10*3/MM3 (ref 140–450)
PMV BLD AUTO: 10.2 FL (ref 6–12)
POTASSIUM SERPL-SCNC: 4.4 MMOL/L (ref 3.5–5.2)
PROT SERPL-MCNC: 5.9 G/DL (ref 6–8.5)
RBC # BLD AUTO: 4.79 10*6/MM3 (ref 4.14–5.8)
RHINOVIRUS RNA SPEC NAA+PROBE: NOT DETECTED
RSV RNA NPH QL NAA+NON-PROBE: NOT DETECTED
SARS-COV-2 RNA RESP QL NAA+PROBE: NOT DETECTED
SODIUM SERPL-SCNC: 136 MMOL/L (ref 136–145)
WBC NRBC COR # BLD AUTO: 5.3 10*3/MM3 (ref 3.4–10.8)
WHOLE BLOOD HOLD COAG: NORMAL
WHOLE BLOOD HOLD SPECIMEN: NORMAL

## 2025-07-01 PROCEDURE — 80053 COMPREHEN METABOLIC PANEL: CPT | Performed by: PHYSICIAN ASSISTANT

## 2025-07-01 PROCEDURE — 25010000002 DEXAMETHASONE PER 1 MG: Performed by: PHYSICIAN ASSISTANT

## 2025-07-01 PROCEDURE — 99283 EMERGENCY DEPT VISIT LOW MDM: CPT

## 2025-07-01 PROCEDURE — 36415 COLL VENOUS BLD VENIPUNCTURE: CPT

## 2025-07-01 PROCEDURE — 85025 COMPLETE CBC W/AUTO DIFF WBC: CPT | Performed by: PHYSICIAN ASSISTANT

## 2025-07-01 PROCEDURE — 0202U NFCT DS 22 TRGT SARS-COV-2: CPT | Performed by: PHYSICIAN ASSISTANT

## 2025-07-01 PROCEDURE — 96372 THER/PROPH/DIAG INJ SC/IM: CPT

## 2025-07-01 PROCEDURE — 71045 X-RAY EXAM CHEST 1 VIEW: CPT | Performed by: RADIOLOGY

## 2025-07-01 PROCEDURE — 71045 X-RAY EXAM CHEST 1 VIEW: CPT

## 2025-07-01 RX ORDER — METHYLPREDNISOLONE 4 MG/1
TABLET ORAL
Qty: 21 TABLET | Refills: 0 | Status: SHIPPED | OUTPATIENT
Start: 2025-07-01

## 2025-07-01 RX ORDER — BENZONATATE 100 MG/1
100 CAPSULE ORAL ONCE
Status: COMPLETED | OUTPATIENT
Start: 2025-07-01 | End: 2025-07-01

## 2025-07-01 RX ORDER — DEXAMETHASONE SODIUM PHOSPHATE 10 MG/ML
10 INJECTION, SOLUTION INTRA-ARTICULAR; INTRALESIONAL; INTRAMUSCULAR; INTRAVENOUS; SOFT TISSUE ONCE
Status: COMPLETED | OUTPATIENT
Start: 2025-07-01 | End: 2025-07-01

## 2025-07-01 RX ORDER — AZITHROMYCIN 250 MG/1
250 TABLET, FILM COATED ORAL DAILY
Qty: 6 TABLET | Refills: 0 | Status: SHIPPED | OUTPATIENT
Start: 2025-07-01

## 2025-07-01 RX ORDER — BENZONATATE 100 MG/1
100 CAPSULE ORAL 3 TIMES DAILY PRN
Qty: 15 CAPSULE | Refills: 0 | Status: SHIPPED | OUTPATIENT
Start: 2025-07-01

## 2025-07-01 RX ORDER — ONDANSETRON 4 MG/1
4 TABLET, FILM COATED ORAL EVERY 8 HOURS PRN
Qty: 30 TABLET | Refills: 1 | Status: SHIPPED | OUTPATIENT
Start: 2025-07-01

## 2025-07-01 RX ADMIN — DEXAMETHASONE SODIUM PHOSPHATE 10 MG: 10 INJECTION INTRAMUSCULAR; INTRAVENOUS at 15:48

## 2025-07-01 RX ADMIN — BENZONATATE 100 MG: 100 CAPSULE ORAL at 15:48

## 2025-07-01 NOTE — ED PROVIDER NOTES
Subjective   History of Present Illness  70-year-old male with past medical history of type 2 diabetes, thyroid disease, impaired mobility, hypertension, blood clots, diabetes, coronary artery disease, dementia, and arthritis presents to the emergency room with a runny nose, nonproductive cough, and sneezing x 3 days.  Patient states he has been around a sick family member who has had similar symptoms however reports that she is now better.  He denies any fevers, chills, body aches, shortness of breath, chest pain, tightness, pressure, back pain, abdominal pain, or dysuria.  He denies any specific aggravating or alleviating factors.  Denies any other complaints or concerns at this time.    History provided by:  Patient   used: No        Review of Systems   Constitutional: Negative.  Negative for fever.   HENT:  Positive for rhinorrhea and sneezing.    Respiratory:  Positive for cough.    Cardiovascular: Negative.  Negative for chest pain.   Gastrointestinal: Negative.  Negative for abdominal pain.   Endocrine: Negative.    Genitourinary: Negative.  Negative for dysuria.   Skin: Negative.    Neurological: Negative.    Psychiatric/Behavioral: Negative.     All other systems reviewed and are negative.      Past Medical History:   Diagnosis Date    Arthritis     Clot     Coronary artery disease     Dementia 12/18/2024    Diabetes mellitus     Heart disease     History of blood clots     Hypertension     Impaired mobility     Low back pain     Thyroid disease     Type 2 diabetes mellitus        Allergies   Allergen Reactions    Penicillins Unknown - High Severity    Trazodone Other (See Comments)     agitation       Past Surgical History:   Procedure Laterality Date    CHOLECYSTECTOMY  2015    COLONOSCOPY  2015    dr srivastava    CORONARY ARTERY BYPASS GRAFT      [facility] - 3 BY PASS     ENDOSCOPY         Family History   Problem Relation Age of Onset    Diabetes Mother     Heart disease Mother          STENTING    COPD Mother     Heart attack Father     Diabetes Father     Heart defect Son         Ross procedure    Heart murmur Son     Hypertension Son        Social History     Socioeconomic History    Marital status:    Tobacco Use    Smoking status: Never     Passive exposure: Never    Smokeless tobacco: Current     Types: Chew   Vaping Use    Vaping status: Never Used   Substance and Sexual Activity    Alcohol use: No    Drug use: No    Sexual activity: Defer           Objective   Physical Exam  Vitals and nursing note reviewed.   Constitutional:       General: He is not in acute distress.     Appearance: He is well-developed. He is not diaphoretic.   HENT:      Head: Normocephalic and atraumatic.      Right Ear: External ear normal.      Left Ear: External ear normal.      Nose: Nose normal.   Eyes:      Conjunctiva/sclera: Conjunctivae normal.      Pupils: Pupils are equal, round, and reactive to light.   Neck:      Vascular: No JVD.      Trachea: No tracheal deviation.   Cardiovascular:      Rate and Rhythm: Normal rate and regular rhythm.      Heart sounds: Normal heart sounds. No murmur heard.  Pulmonary:      Effort: Pulmonary effort is normal. No respiratory distress.      Breath sounds: Normal breath sounds. No wheezing.   Abdominal:      General: Bowel sounds are normal.      Palpations: Abdomen is soft.      Tenderness: There is no abdominal tenderness.   Musculoskeletal:         General: No deformity. Normal range of motion.      Cervical back: Normal range of motion and neck supple.   Skin:     General: Skin is warm and dry.      Coloration: Skin is not pale.      Findings: No erythema or rash.   Neurological:      Mental Status: He is alert and oriented to person, place, and time.      Cranial Nerves: No cranial nerve deficit.   Psychiatric:         Behavior: Behavior normal.         Thought Content: Thought content normal.         Procedures           ED Course  ED Course as of 07/01/25  1640   Tue Jul 01, 2025   1526 Parainfluenza Virus 3(!): Detected [TK]   1537 XR Chest 1 View [TK]      ED Course User Index  [TK] Evelyn Vance PA-C                                           Results for orders placed or performed during the hospital encounter of 07/01/25   Respiratory Panel PCR w/COVID-19(SARS-CoV-2) [facility]/CORY/RINKU/PAD/COR/MAR In-House, NP Swab in UTM/VTM, 2 HR TAT - Swab, Nasopharynx    Collection Time: 07/01/25  2:12 PM    Specimen: Nasopharynx; Swab   Result Value Ref Range    ADENOVIRUS, PCR Not Detected Not Detected    Coronavirus 229E Not Detected Not Detected    Coronavirus HKU1 Not Detected Not Detected    Coronavirus NL63 Not Detected Not Detected    Coronavirus OC43 Not Detected Not Detected    COVID19 Not Detected Not Detected - Ref. Range    Human Metapneumovirus Not Detected Not Detected    Human Rhinovirus/Enterovirus Not Detected Not Detected    Influenza A PCR Not Detected Not Detected    Influenza B PCR Not Detected Not Detected    Parainfluenza Virus 1 Not Detected Not Detected    Parainfluenza Virus 2 Not Detected Not Detected    Parainfluenza Virus 3 Detected (A) Not Detected    Parainfluenza Virus 4 Not Detected Not Detected    RSV, PCR Not Detected Not Detected    Bordetella pertussis pcr Not Detected Not Detected    Bordetella parapertussis PCR Not Detected Not Detected    Chlamydophila pneumoniae PCR Not Detected Not Detected    Mycoplasma pneumo by PCR Not Detected Not Detected   Comprehensive Metabolic Panel    Collection Time: 07/01/25  2:12 PM    Specimen: Blood   Result Value Ref Range    Glucose 212 (H) 65 - 99 mg/dL    BUN 15.7 8.0 - 23.0 mg/dL    Creatinine 1.24 0.76 - 1.27 mg/dL    Sodium 136 136 - 145 mmol/L    Potassium 4.4 3.5 - 5.2 mmol/L    Chloride 102 98 - 107 mmol/L    CO2 21.8 (L) 22.0 - 29.0 mmol/L    Calcium 8.4 (L) 8.6 - 10.5 mg/dL    Total Protein 5.9 (L) 6.0 - 8.5 g/dL    Albumin 3.3 (L) 3.5 - 5.2 g/dL    ALT (SGPT) 15 1 - 41 U/L    AST (SGOT) 24 1  - 40 U/L    Alkaline Phosphatase 104 39 - 117 U/L    Total Bilirubin 0.3 0.0 - 1.2 mg/dL    Globulin 2.6 gm/dL    A/G Ratio 1.3 g/dL    BUN/Creatinine Ratio 12.7 7.0 - 25.0    Anion Gap 12.2 5.0 - 15.0 mmol/L    eGFR 63.3 >60.0 mL/min/1.73   CBC Auto Differential    Collection Time: 07/01/25  2:12 PM    Specimen: Blood   Result Value Ref Range    WBC 5.30 3.40 - 10.80 10*3/mm3    RBC 4.79 4.14 - 5.80 10*6/mm3    Hemoglobin 14.1 13.0 - 17.7 g/dL    Hematocrit 43.9 37.5 - 51.0 %    MCV 91.6 79.0 - 97.0 fL    MCH 29.4 26.6 - 33.0 pg    MCHC 32.1 31.5 - 35.7 g/dL    RDW 12.4 12.3 - 15.4 %    RDW-SD 42.2 37.0 - 54.0 fl    MPV 10.2 6.0 - 12.0 fL    Platelets 140 140 - 450 10*3/mm3    Neutrophil % 48.6 42.7 - 76.0 %    Lymphocyte % 33.0 19.6 - 45.3 %    Monocyte % 10.4 5.0 - 12.0 %    Eosinophil % 7.2 (H) 0.3 - 6.2 %    Basophil % 0.6 0.0 - 1.5 %    Immature Grans % 0.2 0.0 - 0.5 %    Neutrophils, Absolute 2.58 1.70 - 7.00 10*3/mm3    Lymphocytes, Absolute 1.75 0.70 - 3.10 10*3/mm3    Monocytes, Absolute 0.55 0.10 - 0.90 10*3/mm3    Eosinophils, Absolute 0.38 0.00 - 0.40 10*3/mm3    Basophils, Absolute 0.03 0.00 - 0.20 10*3/mm3    Immature Grans, Absolute 0.01 0.00 - 0.05 10*3/mm3    nRBC 0.0 0.0 - 0.2 /100 WBC   Green Top (Gel)    Collection Time: 07/01/25  2:12 PM   Result Value Ref Range    Extra Tube Hold for add-ons.    Lavender Top    Collection Time: 07/01/25  2:12 PM   Result Value Ref Range    Extra Tube hold for add-on    Gold Top - SST    Collection Time: 07/01/25  2:12 PM   Result Value Ref Range    Extra Tube Hold for add-ons.    Light Blue Top    Collection Time: 07/01/25  2:12 PM   Result Value Ref Range    Extra Tube Hold for add-ons.        XR Chest 1 View   Final Result   1.  Coarsened interstitial markings noted throughout the lungs.   2.  Mild cardiomegaly.       This report was finalized on 7/1/2025 3:33 PM by Dr. Chadd Mcconnell MD.                          Medical Decision Making  - Uncomplicated ED  course.  -Patient remained hemodynamically stable through entire duration of ED course.  -Patient's labs unremarkable.  Respiratory PCR positive for parainfluenza 3.  -Chest x-ray negative for any acute cardiopulmonary disease.  -Patient was given dose of dexamethasone and a Tessalon Perle capsule in the ED.  -Patient to be discharged with steroid pack, Tessalon Perles, and azithromycin (previous EKG QTc was evaluated).  - Patient encouraged to return to the emergency room with new or worsening symptomatology.    Problems Addressed:  Infection due to parainfluenza virus 3: complicated acute illness or injury    Amount and/or Complexity of Data Reviewed  Labs: ordered. Decision-making details documented in ED Course.  Radiology: ordered. Decision-making details documented in ED Course.    Risk  Prescription drug management.        Final diagnoses:   Infection due to parainfluenza virus 3       ED Disposition  ED Disposition       ED Disposition   Discharge    Condition   Stable    Comment   --               Margret Purvis MD  [address] [facility] [address]  [facility] [address] [phone]    In 2 days           Medication List        New Prescriptions      azithromycin 250 MG tablet  Commonly known as: ZITHROMAX  Take 1 tablet by mouth Daily.     benzonatate 100 MG capsule  Commonly known as: TESSALON  Take 1 capsule by mouth 3 (Three) Times a Day As Needed for Cough.     methylPREDNISolone 4 MG dose pack  Commonly known as: MEDROL  Take as directed on package instructions.     ondansetron 4 MG tablet  Commonly known as: ZOFRAN  Take 1 tablet by mouth Every 8 (Eight) Hours As Needed for Nausea or Vomiting.               Where to Get Your Medications        These medications were sent to [address] - [phone] [facility] [phone] [address] [address]      Phone: [phone]   azithromycin 250 MG tablet  benzonatate 100 MG capsule  methylPREDNISolone  4 MG dose pack  ondansetron 4 MG tablet            Evelyn Vance PA-C  07/01/25 1640

## 2025-07-01 NOTE — TELEPHONE ENCOUNTER
Called pt, wife Jina answered the phone.  She states that they are at the emergency room now for treatment.

## 2025-07-01 NOTE — TELEPHONE ENCOUNTER
Caller: Jina Riley    Relationship: Emergency Contact    Best call back number: [phone]    What medication are you requesting: Z VANNA AND SOMETHING FOR NAUSEA    What are your current symptoms: CONGESTION AND VOMITING   ( WIFE HAD AND NOW HE HAS GOTTEN )     How long have you been experiencing symptoms: 3 DAYS, STARTED OUT WITH SORE THROAT    Have you had these symptoms before:  [x] Yes  [] No    Have you been treated for these symptoms before:  [x] Yes  [] No    If a prescription is needed, what is your preferred pharmacy and phone number: [facility] [place], [place] - [address] [place] [address] [phone] [facility] [phone]

## 2025-07-02 ENCOUNTER — HOSPITAL ENCOUNTER (OUTPATIENT)
Dept: ULTRASOUND IMAGING | Facility: HOSPITAL | Age: 70
Discharge: HOME | End: 2025-07-02
Payer: MEDICARE

## 2025-07-02 ENCOUNTER — HOSPITAL ENCOUNTER (OUTPATIENT)
Dept: MRI IMAGING | Facility: HOSPITAL | Age: 70
Discharge: HOME | End: 2025-07-02
Payer: MEDICARE

## 2025-07-02 PROCEDURE — 93923 UPR/LXTR ART STDY 3+ LVLS: CPT

## 2025-07-02 PROCEDURE — 80053 COMPREHEN METABOLIC PANEL: CPT

## 2025-07-02 PROCEDURE — 85652 RBC SED RATE AUTOMATED: CPT

## 2025-07-02 PROCEDURE — 36415 COLL VENOUS BLD VENIPUNCTURE: CPT

## 2025-07-02 PROCEDURE — 93925 LOWER EXTREMITY STUDY: CPT

## 2025-07-02 PROCEDURE — 73721 MRI JNT OF LWR EXTRE W/O DYE: CPT

## 2025-07-02 PROCEDURE — 85025 COMPLETE CBC W/AUTO DIFF WBC: CPT

## 2025-07-02 PROCEDURE — 86141 C-REACTIVE PROTEIN HS: CPT

## 2025-07-03 LAB
ALBUMIN SERPL-MCNC: 3.5 G/DL (ref 3.5–5.2)
ALBUMIN/GLOB SERPL: 1.2 G/DL
ALP SERPL-CCNC: 96 U/L (ref 39–117)
ALT SERPL W P-5'-P-CCNC: 19 U/L (ref 1–41)
ANION GAP SERPL CALCULATED.3IONS-SCNC: 14.1 MMOL/L (ref 5–15)
AST SERPL-CCNC: 20 U/L (ref 1–40)
BASOPHILS # BLD AUTO: 0.01 10*3/MM3 (ref 0–0.2)
BASOPHILS NFR BLD AUTO: 0.2 % (ref 0–1.5)
BILIRUB SERPL-MCNC: 0.3 MG/DL (ref 0–1.2)
BUN SERPL-MCNC: 21 MG/DL (ref 8–23)
BUN/CREAT SERPL: 15.8 (ref 7–25)
CALCIUM SPEC-SCNC: 9.3 MG/DL (ref 8.6–10.5)
CHLORIDE SERPL-SCNC: 99 MMOL/L (ref 98–107)
CO2 SERPL-SCNC: 21.9 MMOL/L (ref 22–29)
CREAT SERPL-MCNC: 1.33 MG/DL (ref 0.76–1.27)
CRP SERPL-MCNC: 0.11 MG/DL (ref 0.01–0.5)
DEPRECATED RDW RBC AUTO: 40.7 FL (ref 37–54)
EGFRCR SERPLBLD CKD-EPI 2021: 58.2 ML/MIN/1.73
EOSINOPHIL # BLD AUTO: 0 10*3/MM3 (ref 0–0.4)
EOSINOPHIL NFR BLD AUTO: 0 % (ref 0.3–6.2)
ERYTHROCYTE [DISTWIDTH] IN BLOOD BY AUTOMATED COUNT: 12 % (ref 12.3–15.4)
ERYTHROCYTE [SEDIMENTATION RATE] IN BLOOD: 23 MM/HR (ref 0–20)
GLOBULIN UR ELPH-MCNC: 3 GM/DL
GLUCOSE SERPL-MCNC: 406 MG/DL (ref 65–99)
HCT VFR BLD AUTO: 44.5 % (ref 37.5–51)
HGB BLD-MCNC: 14.1 G/DL (ref 13–17.7)
IMM GRANULOCYTES # BLD AUTO: 0.01 10*3/MM3 (ref 0–0.05)
IMM GRANULOCYTES NFR BLD AUTO: 0.2 % (ref 0–0.5)
LYMPHOCYTES # BLD AUTO: 1.03 10*3/MM3 (ref 0.7–3.1)
LYMPHOCYTES NFR BLD AUTO: 18.2 % (ref 19.6–45.3)
MCH RBC QN AUTO: 29.4 PG (ref 26.6–33)
MCHC RBC AUTO-ENTMCNC: 31.7 G/DL (ref 31.5–35.7)
MCV RBC AUTO: 92.7 FL (ref 79–97)
MONOCYTES # BLD AUTO: 0.39 10*3/MM3 (ref 0.1–0.9)
MONOCYTES NFR BLD AUTO: 6.9 % (ref 5–12)
NEUTROPHILS NFR BLD AUTO: 4.21 10*3/MM3 (ref 1.7–7)
NEUTROPHILS NFR BLD AUTO: 74.5 % (ref 42.7–76)
NRBC BLD AUTO-RTO: 0 /100 WBC (ref 0–0.2)
PLATELET # BLD AUTO: 153 10*3/MM3 (ref 140–450)
PMV BLD AUTO: 11.1 FL (ref 6–12)
POTASSIUM SERPL-SCNC: 4.5 MMOL/L (ref 3.5–5.2)
PROT SERPL-MCNC: 6.5 G/DL (ref 6–8.5)
RBC # BLD AUTO: 4.8 10*6/MM3 (ref 4.14–5.8)
SODIUM SERPL-SCNC: 135 MMOL/L (ref 136–145)
WBC NRBC COR # BLD AUTO: 5.65 10*3/MM3 (ref 3.4–10.8)

## 2025-07-08 RX ORDER — ACYCLOVIR 800 MG/1
TABLET ORAL
Qty: 3 EACH | Refills: 2 | OUTPATIENT
Start: 2025-07-08

## 2025-07-14 ENCOUNTER — TELEPHONE (OUTPATIENT)
Dept: ENDOCRINOLOGY | Facility: CLINIC | Age: 70
End: 2025-07-14
Payer: MEDICARE

## 2025-07-14 RX ORDER — HYDROCHLOROTHIAZIDE 12.5 MG/1
CAPSULE ORAL
Qty: 6 EACH | Refills: 0 | Status: SHIPPED | OUTPATIENT
Start: 2025-07-14

## 2025-07-14 NOTE — TELEPHONE ENCOUNTER
Last office visit with prescribing clinician: 3/26/2025       Next office visit with prescribing clinician: 7/23/2025     {

## 2025-07-14 NOTE — TELEPHONE ENCOUNTER
Patients wife Jina called office, stated that patient had to have an MRI and that he had to take his kaleb sensor off. He is completely out. He is needing a refill sent to [facility] in [place], [place].

## 2025-07-16 ENCOUNTER — OFFICE VISIT (OUTPATIENT)
Dept: NEUROLOGY | Facility: CLINIC | Age: 70
End: 2025-07-16
Payer: MEDICARE

## 2025-07-16 VITALS
TEMPERATURE: 97.7 F | DIASTOLIC BLOOD PRESSURE: 70 MMHG | BODY MASS INDEX: 33.47 KG/M2 | OXYGEN SATURATION: 97 % | HEART RATE: 82 BPM | HEIGHT: 71 IN | SYSTOLIC BLOOD PRESSURE: 110 MMHG

## 2025-07-16 DIAGNOSIS — F03.911 AGITATION DUE TO DEMENTIA: ICD-10-CM

## 2025-07-16 DIAGNOSIS — R41.3 MEMORY LOSS: Primary | ICD-10-CM

## 2025-07-16 DIAGNOSIS — R41.89 COGNITIVE DECLINE: ICD-10-CM

## 2025-07-16 DIAGNOSIS — G47.00 INSOMNIA, UNSPECIFIED TYPE: ICD-10-CM

## 2025-07-16 DIAGNOSIS — Z86.73 HISTORY OF CVA (CEREBROVASCULAR ACCIDENT): ICD-10-CM

## 2025-07-16 DIAGNOSIS — F05 SUNDOWNING: ICD-10-CM

## 2025-07-16 DIAGNOSIS — F41.9 ANXIETY: ICD-10-CM

## 2025-07-16 RX ORDER — DONEPEZIL HYDROCHLORIDE 5 MG/1
5 TABLET, FILM COATED ORAL DAILY
Qty: 30 TABLET | Refills: 2 | Status: SHIPPED | OUTPATIENT
Start: 2025-07-16

## 2025-07-16 NOTE — PROGRESS NOTES
"     New Patient Office Visit      Patient Name: Ramsey Riley  : 1955   MRN: [id number]     Referring Physician: Dao Mckinney APRN    Chief Complaint:    Chief Complaint   Patient presents with    Establish Care     Memory concerns; h/o stroke       History of Present Illness: Ramsey Riley is a 70 y.o. male who is here today to establish care with Neurology.  He is accompanied to the OV today by his wife.  Patient has been referred for memory loss and his wife reports that he was told in the past that he has \"vascular dementia \".  He has had multiple strokes.  Lacunar and left temporal infarct in  and a prior right anterior thalamic infarct.  Patient right-hand-dominant and also suffers from right sided deficit from his stroke.  He lives at home with his wife who works from the home as a caregiver for client.  They have 2 Alexander dogs who live with them as well.  At home patient is mainly sedentary.  He uses a cane and a walker.  Today he comes via wheelchair.  He has weakness in his lower extremities.  His wife reports some sundowning and he becomes argumentative and agitated in the evening and at night.  She says he is up and down all night long and does not sleep even though he is taking Xanax at night and 150 mg of Seroquel.  - He forgets recent conversations and often repeats himself.  His wife esqueda him and helps him to the bathroom.  He has poor vision due to his diabetic retinopathy so has difficulty with navigating the remote control and she says she always has to help him with this even with his lift chair control.  He is able to eat finger foods but has difficulty with anything else because of his right side deficit after his stroke.  He does not have a good appetite and eats a lot of snacks.  - He has seen podiatry and was sent to vascular as he does have wounds on the lower extremities that are being watched.  He has a wound on the right great toe and 1 on the back of the left " heel.    -Patient is able to read and write.  He completed the 10th grade.      MRI of the brain without contrast on 5/27/2025:   FINDINGS:  Study is degraded by patient motion.  The diffusion weighted images show no focal signal abnormality. There is  no evidence of acute ischemia     The remaining pulse sequences show no mass, hemorrhage, or midline  shift.  The ventricles, cisterns, and sulci are unremarkable. There is no  hydrocephalus.     The sagittal view show no sellar or parasellar mass.     There is no tectal or pineal lesion.     Coronal imaging demonstrates a symmetric appearance of the visualized  portions of the optic nerves and extraocular muscles.     IMPRESSION:     1. Study markedly degraded by patient motion  2. No convincing evidence of acute ischemia, focal mass, or midline  shift         Pertinent Medical History: Type 2 diabetes, CVA,  diabetic retinopathy, hypertension, blood clots/Pe's and DVT Rt leg -on Eliquis, CAD, PVD- wound on rt great toe and back of left heel-seeing podiatry and vascular doctor, status post CABG, frequent falls, dementia, arthritis    Subjective      Review of Systems:   Review of Systems   Musculoskeletal:  Positive for gait problem.   Neurological:  Positive for weakness, memory problem and confusion.   Psychiatric/Behavioral:  Positive for agitation.        Past Medical History:   Past Medical History:   Diagnosis Date    Arthritis     Clot     Coronary artery disease     Dementia 12/18/2024    Diabetes mellitus     Heart disease     History of blood clots     Hypertension     Impaired mobility     Low back pain     Stroke     Thyroid disease     Type 2 diabetes mellitus        Past Surgical History:   Past Surgical History:   Procedure Laterality Date    CHOLECYSTECTOMY  2015    COLONOSCOPY  2015    dr srivastava    CORONARY ARTERY BYPASS GRAFT      [facility] - 3 BY PASS     ENDOSCOPY         Family History:   Family History   Problem Relation Age of Onset     Diabetes Mother     Heart disease Mother         STENTING    COPD Mother     Heart attack Father     Diabetes Father     Heart defect Son         Ross procedure    Heart murmur Son     Hypertension Son        Social History:   Social History     Socioeconomic History    Marital status:    Tobacco Use    Smoking status: Never     Passive exposure: Never    Smokeless tobacco: Current     Types: Chew   Vaping Use    Vaping status: Never Used   Substance and Sexual Activity    Alcohol use: No    Drug use: No    Sexual activity: Defer       Medications:     Current Outpatient Medications:     ALPRAZolam (XANAX) 0.5 MG tablet, Take 1 tablet by mouth At Night As Needed for Anxiety., Disp: , Rfl:     amLODIPine (NORVASC) 10 MG tablet, Take 1 tablet by mouth Daily., Disp: 90 tablet, Rfl: 2    apixaban (Eliquis) 5 MG tablet tablet, Take 1 tablet by mouth Every 12 (Twelve) Hours., Disp: 60 tablet, Rfl: 5    atorvastatin (Lipitor) 80 MG tablet, Take 1 tablet by mouth Every Night., Disp: 90 tablet, Rfl: 1    benzonatate (TESSALON) 100 MG capsule, Take 1 capsule by mouth 3 (Three) Times a Day As Needed for Cough., Disp: 15 capsule, Rfl: 0    citalopram (CeleXA) 40 MG tablet, Take 1 tablet by mouth Daily., Disp: 90 tablet, Rfl: 1    Continuous Glucose Sensor (FreeStyle Rina 3 Plus Sensor), Use Every 14 (Fourteen) Days., Disp: 6 each, Rfl: 0    dapagliflozin (Farxiga) 5 MG tablet tablet, Take 1 tablet by mouth Daily., Disp: 30 tablet, Rfl: 5    dorzolamide-timolol (COSOPT) 2-0.5 % ophthalmic solution, Administer 1 drop to both eyes 2 (Two) Times a Day., Disp: , Rfl:     famotidine (Pepcid) 20 MG tablet, Take 1 tablet by mouth 2 (Two) Times a Day., Disp: 180 tablet, Rfl: 1    HYDROcodone-acetaminophen (NORCO) 7.5-325 MG per tablet, Take 1 tablet by mouth Every 12 (Twelve) Hours As Needed for Moderate Pain., Disp: 60 tablet, Rfl: 0    Insulin NPH Isophane & Regular (HumuLIN 70/30 KwikPen) (70-30) 100 UNIT/ML suspension  "pen-injector, Inject 20 Units under the skin into the appropriate area as directed Every Morning., Disp: , Rfl:     levothyroxine (SYNTHROID, LEVOTHROID) 137 MCG tablet, Take 1 tablet by mouth Daily., Disp: 30 tablet, Rfl: 0    losartan (COZAAR) 100 MG tablet, Take 1 tablet by mouth Daily., Disp: 90 tablet, Rfl: 1    ondansetron (ZOFRAN) 4 MG tablet, Take 1 tablet by mouth Every 8 (Eight) Hours As Needed for Nausea or Vomiting., Disp: 30 tablet, Rfl: 1    pantoprazole (PROTONIX) 40 MG EC tablet, Take 1 tablet by mouth Daily., Disp: 90 tablet, Rfl: 1    QUEtiapine (SEROquel) 100 MG tablet, Take 1.5 tablets by mouth Every Night., Disp: 30 tablet, Rfl: 4    azithromycin (ZITHROMAX) 250 MG tablet, Take 1 tablet by mouth Daily., Disp: 6 tablet, Rfl: 0    donepezil (Aricept) 5 MG tablet, Take 1 tablet by mouth Daily., Disp: 30 tablet, Rfl: 2    methylPREDNISolone (MEDROL) 4 MG dose pack, Take as directed on package instructions., Disp: 21 tablet, Rfl: 0    Allergies:   Allergies   Allergen Reactions    Penicillins Unknown - High Severity    Trazodone Other (See Comments)     agitation       Objective     Physical Exam:  Vital Signs:   Vitals:    07/16/25 1415   BP: 110/70   Pulse: 82   Temp: 97.7 °F (36.5 °C)   SpO2: 97%   Weight: Comment: wheelchair   Height: 180.3 cm (71\")   PainSc: 0-No pain     Body mass index is 33.47 kg/m².     Physical Exam  Constitutional:       Appearance: Normal appearance.   HENT:      Head: Normocephalic.   Eyes:      Conjunctiva/sclera: Conjunctivae normal.   Pulmonary:      Effort: Pulmonary effort is normal.   Musculoskeletal:         General: Normal range of motion.   Skin:     General: Skin is warm and dry.   Neurological:      General: No focal deficit present.      Mental Status: He is alert. He is disoriented.      Cranial Nerves: Cranial nerves 2-12 are intact. No dysarthria.      Motor: Motor function is intact. No tremor.      Comments: Patient is alert and oriented to person and " place.  He knows that it is summer but unable to state day of the week, month or year.  He can complete 2-3 step tasks.  He is unable to write due to his inability to see well.  MMSE score today was 18   Psychiatric:         Attention and Perception: Attention normal.         Mood and Affect: Mood and affect normal.         Speech: Speech normal.         Behavior: Behavior normal. Behavior is cooperative.         Thought Content: Thought content normal.         Cognition and Memory: Cognition is impaired. Memory is impaired.         Judgment: Judgment normal.         Neurological Exam  Mental Status  Alert. Oriented only to person, place and situation. Speech is normal. no dysarthria present.    Coordination  No tremor      Assessment / Plan      Assessment/Plan:   Diagnoses and all orders for this visit:    1. Memory loss (Primary)  -     donepezil (Aricept) 5 MG tablet; Take 1 tablet by mouth Daily.  Dispense: 30 tablet; Refill: 2    2. History of CVA (cerebrovascular accident)  -     Ambulatory Referral to Behavioral Health    3. Cognitive decline    4. Agitation due to dementia  -     Ambulatory Referral to Behavioral Health    5. Insomnia, unspecified type    6. Sundowning  -     Ambulatory Referral to Behavioral Health    7. Anxiety  -     Ambulatory Referral to Behavioral Health         This is a pleasant 70-year-old male patient here to establish care for memory and cognitive decline.  Patient has also been having insomnia, anxiety, agitation and sundowning.  Will refer to behavioral health for medication management for anxiety and agitation.  Will start patient on donepezil 5 mg daily.  Instructed to start on this in the morning as it could cause insomnia and patient already battles this.  Indications and side effects discussed with patient wife and she verbalizes understanding.  They will call in the interim if they have any questions or concerns or changes.  Total time of visit today: 45minutes.  As part  of the visit, I reviewed prior diagnostic results, physical examination, interview, , discussion of treatment options and management.   Follow Up:   Return in about 2 months (around 9/16/2025) for telehealth.    BOUCHRA Hall, [facility]

## 2025-07-21 ENCOUNTER — PATIENT ROUNDING (BHMG ONLY) (OUTPATIENT)
Dept: NEUROLOGY | Facility: CLINIC | Age: 70
End: 2025-07-21
Payer: MEDICARE

## 2025-07-23 ENCOUNTER — OFFICE VISIT (OUTPATIENT)
Dept: ENDOCRINOLOGY | Facility: CLINIC | Age: 70
End: 2025-07-23
Payer: MEDICARE

## 2025-07-23 VITALS
DIASTOLIC BLOOD PRESSURE: 60 MMHG | SYSTOLIC BLOOD PRESSURE: 110 MMHG | HEART RATE: 77 BPM | BODY MASS INDEX: 29.68 KG/M2 | WEIGHT: 212 LBS | HEIGHT: 71 IN

## 2025-07-23 DIAGNOSIS — Z79.4 TYPE 2 DIABETES MELLITUS WITH HYPERGLYCEMIA, WITH LONG-TERM CURRENT USE OF INSULIN: Primary | ICD-10-CM

## 2025-07-23 DIAGNOSIS — E03.9 HYPOTHYROIDISM, UNSPECIFIED TYPE: ICD-10-CM

## 2025-07-23 DIAGNOSIS — E11.65 TYPE 2 DIABETES MELLITUS WITH HYPERGLYCEMIA, WITH LONG-TERM CURRENT USE OF INSULIN: Primary | ICD-10-CM

## 2025-07-23 DIAGNOSIS — E78.5 HYPERLIPIDEMIA, UNSPECIFIED HYPERLIPIDEMIA TYPE: ICD-10-CM

## 2025-07-23 LAB
EXPIRATION DATE: ABNORMAL
EXPIRATION DATE: ABNORMAL
GLUCOSE BLDC GLUCOMTR-MCNC: 184 MG/DL (ref 70–130)
HBA1C MFR BLD: 8.6 % (ref 4.5–5.7)
Lab: ABNORMAL
Lab: ABNORMAL

## 2025-07-23 PROCEDURE — 82570 ASSAY OF URINE CREATININE: CPT | Performed by: INTERNAL MEDICINE

## 2025-07-23 PROCEDURE — 84439 ASSAY OF FREE THYROXINE: CPT | Performed by: INTERNAL MEDICINE

## 2025-07-23 PROCEDURE — 84443 ASSAY THYROID STIM HORMONE: CPT | Performed by: INTERNAL MEDICINE

## 2025-07-23 PROCEDURE — 82043 UR ALBUMIN QUANTITATIVE: CPT | Performed by: INTERNAL MEDICINE

## 2025-07-23 RX ORDER — INSULIN LISPRO 100 [IU]/ML
INJECTION, SOLUTION INTRAVENOUS; SUBCUTANEOUS
Qty: 15 ML | Refills: 5 | Status: SHIPPED | OUTPATIENT
Start: 2025-07-23

## 2025-07-23 NOTE — PROGRESS NOTES
"Chief Complaint   Patient presents with   • Diabetes        HPI   Ramsey Riley is a 70 y.o. male had concerns including Diabetes.      Patient presents for follow-up of diabetes.  He was last seen in March 2025.  Patient has been hospitalized in the interim since last visit and family reports significant changes to his sleep schedule as well as his appetite since returning home.    Patient is currently monitoring glucose using freestyle kaleb.  He does not have  available for download during visit.  Family reports intermittent fasting hypoglycemia.    Family reports that patient was receiving less insulin when in the hospital and thus did have fairly frequent hyperglycemia.  Due to appetite changes, they have been administering insulin differently since returning home.  Family reports patient is taking 40 units of 70/30 of glucose in the 200s, 60 units if glucose values in the 300s.  Notably, they are basing dosing on postmeal sugars and administering after patient has had a meal.  On average family estimates he is taking 100 units total daily.  He is frequently eating only 1 meal per day.    Patient is taking Farxiga 5 mg daily, patient denies adverse effects.    Patient is taking levothyroxine 137 mcg daily for hypothyroidism.  Dose was increased during hospitalization.    Patient is taking atorvastatin 80 mg daily for hyperlipidemia.    The following portions of the patient's history were reviewed and updated as appropriate: allergies, current medications, and past social history.    Review of Systems   Constitutional:  Positive for appetite change.        /60   Pulse 77   Ht 180.3 cm (70.98\")   Wt 96.2 kg (212 lb)   BMI 29.58 kg/m²      Physical Exam      Constitutional:  well developed; well nourished  no acute distress  appears stated age  obese - Body mass index is 29.58 kg/m².   ENT/Thyroid: not examined   Eyes: Conjunctiva: clear   Respiratory:  breathing is unlabored  clear to " auscultation bilaterally   Cardiovascular:  regular rate and rhythm   Chest:  Not performed.   Abdomen: Not performed.   : Not performed.   Musculoskeletal: Not performed   Skin: not performed.   Neuro: mental status, speech normal   Psych: mood and affect are within normal limits       Labs/Imaging  A1C:  Lab Results   Component Value Date    HGBA1C 8.6 (A) 07/23/2025    HGBA1C 8.30 (H) 05/05/2025      Glucose:  Lab Results   Component Value Date    POCGLU 184 (A) 07/23/2025      CMP:  Lab Results   Component Value Date    GLUCOSE 406 (C) 07/02/2025    BUN 21.0 07/02/2025    CREATININE 1.33 (H) 07/02/2025     (L) 07/02/2025    K 4.5 07/02/2025    CL 99 07/02/2025    CALCIUM 9.3 07/02/2025    PROTEINTOT 6.5 07/02/2025    ALBUMIN 3.5 07/02/2025    ALT 19 07/02/2025    AST 20 07/02/2025    ALKPHOS 96 07/02/2025    BILITOT 0.3 07/02/2025    GLOB 3.0 07/02/2025    AGRATIO 1.2 07/02/2025    BCR 15.8 07/02/2025    ANIONGAP 14.1 07/02/2025    EGFR 58.2 (L) 07/02/2025      Lipid Panel:  Lab Results   Component Value Date    CHOL 149 12/11/2024    CHLPL 133 05/05/2025    TRIG 232 (H) 05/05/2025    HDL 38 (L) 05/05/2025    LDL 58 05/05/2025      Urine Microalbumin:  Lab Results   Component Value Date    MICROALBUR 3,698.7 05/05/2025      TSH:  Lab Results   Component Value Date    TSH 4.970 (H) 05/26/2025         Diagnoses and all orders for this visit:    1. Type 2 diabetes mellitus with hyperglycemia, with long-term current use of insulin (Primary)  -     POC Glucose, Blood  -     POC Glycosylated Hemoglobin (Hb A1C)  -     Microalbumin / Creatinine Urine Ratio - Urine, Clean Catch; Future  -     Microalbumin / Creatinine Urine Ratio - Urine, Clean Catch  Diabetes is uncontrolled with hemoglobin A1c 8.6%.  No home glucose data is available for review.  Patient is monitoring glucose using freestyle kaleb but  is at home.  Family reports both hyperglycemia and hypoglycemia.  Plan to discontinue premixed  insulin given need for increased dosing flexibility.  Plan to reduce total daily dose by approximately 20% on split into long and short acting insulin.  Patient will start Lantus 40 units daily.  He was given parameters to when to contact the clinic if fasting sugars not at target.  Patient will start Humalog 15 units prior to meals plus correction 3 units per 50 greater than 150.  Patient will continue Farxiga 5 mg daily  CMP reviewed from July 2025  Lipid panel, urine microalbumin reviewed from May 2025  Discussed goals for glycemic control.  Patient is at elevated risk of complications due to inadequate glycemic control.    2. Hypothyroidism, unspecified type  -     TSH; Future  -     T4, Free; Future  Levothyroxine dose was increased in the interim since last visit given elevated TSH in May 2025.  Patient now taking 137 mcg daily.  Update thyroid function testing today.    3. Hyperlipidemia, unspecified hyperlipidemia type  Lipid panel is up-to-date from May 2025, continue current lipid-lowering therapy    Other orders  -     Insulin Glargine (LANTUS SOLOSTAR) 100 UNIT/ML injection pen; Inject 40 Units under the skin into the appropriate area as directed Daily.  Dispense: 15 mL; Refill: 5  -     Insulin Lispro, 1 Unit Dial, (HumaLOG KwikPen) 100 UNIT/ML solution pen-injector; For use at mealtimes and per correctional scale, MDD 50 units  Dispense: 15 mL; Refill: 5         Return in about 3 months (around 10/23/2025) for Next scheduled follow up. The patient was instructed to contact the clinic with any interval questions or concerns.    Electronically signed by: Michelle King MD   Endocrinologist    Dictated Utilizing Dragon Dictation

## 2025-07-24 LAB
ALBUMIN UR-MCNC: 277.3 MG/DL
CREAT UR-MCNC: 120.8 MG/DL
MICROALBUMIN/CREAT UR: 2295.5 MG/G (ref 0–29)
T4 FREE SERPL-MCNC: 1.22 NG/DL (ref 0.92–1.68)
TSH SERPL DL<=0.05 MIU/L-ACNC: 1.82 UIU/ML (ref 0.27–4.2)

## 2025-08-11 DIAGNOSIS — I26.99 ACUTE PULMONARY EMBOLISM, UNSPECIFIED PULMONARY EMBOLISM TYPE, UNSPECIFIED WHETHER ACUTE COR PULMONALE PRESENT: ICD-10-CM

## 2025-08-11 RX ORDER — APIXABAN 5 MG/1
5 TABLET, FILM COATED ORAL EVERY 12 HOURS SCHEDULED
Qty: 60 TABLET | Refills: 2 | Status: SHIPPED | OUTPATIENT
Start: 2025-08-11

## 2025-08-15 RX ORDER — ALPRAZOLAM 0.5 MG
0.5 TABLET ORAL NIGHTLY PRN
Qty: 30 TABLET | Refills: 4 | OUTPATIENT
Start: 2025-08-15

## 2025-08-20 DIAGNOSIS — M54.50 LUMBOSACRAL PAIN: ICD-10-CM

## 2025-08-20 RX ORDER — ALPRAZOLAM 0.5 MG
0.5 TABLET ORAL NIGHTLY PRN
OUTPATIENT
Start: 2025-08-20

## 2025-08-20 RX ORDER — HYDROCODONE BITARTRATE AND ACETAMINOPHEN 7.5; 325 MG/1; MG/1
1 TABLET ORAL EVERY 12 HOURS PRN
Qty: 60 TABLET | Refills: 0 | Status: SHIPPED | OUTPATIENT
Start: 2025-08-20

## 2025-08-21 RX ORDER — ALPRAZOLAM 0.5 MG
0.5 TABLET ORAL NIGHTLY PRN
OUTPATIENT
Start: 2025-08-21

## 2025-08-27 ENCOUNTER — OFFICE VISIT (OUTPATIENT)
Dept: INTERNAL MEDICINE | Facility: CLINIC | Age: 70
End: 2025-08-27
Payer: MEDICARE

## 2025-08-27 VITALS
DIASTOLIC BLOOD PRESSURE: 58 MMHG | OXYGEN SATURATION: 98 % | SYSTOLIC BLOOD PRESSURE: 129 MMHG | BODY MASS INDEX: 29.57 KG/M2 | HEART RATE: 53 BPM | RESPIRATION RATE: 18 BRPM | TEMPERATURE: 97.3 F | HEIGHT: 71 IN

## 2025-08-27 DIAGNOSIS — Z79.4 TYPE 2 DIABETES MELLITUS WITH HYPERGLYCEMIA, WITH LONG-TERM CURRENT USE OF INSULIN: ICD-10-CM

## 2025-08-27 DIAGNOSIS — I26.99 ACUTE PULMONARY EMBOLISM, UNSPECIFIED PULMONARY EMBOLISM TYPE, UNSPECIFIED WHETHER ACUTE COR PULMONALE PRESENT: ICD-10-CM

## 2025-08-27 DIAGNOSIS — E03.8 ADULT ONSET HYPOTHYROIDISM: ICD-10-CM

## 2025-08-27 DIAGNOSIS — N18.31 STAGE 3A CHRONIC KIDNEY DISEASE: ICD-10-CM

## 2025-08-27 DIAGNOSIS — F51.04 INSOMNIA, PSYCHOPHYSIOLOGICAL: ICD-10-CM

## 2025-08-27 DIAGNOSIS — E11.65 TYPE 2 DIABETES MELLITUS WITH HYPERGLYCEMIA, WITH LONG-TERM CURRENT USE OF INSULIN: ICD-10-CM

## 2025-08-27 DIAGNOSIS — F01.54 VASCULAR DEMENTIA WITH ANXIETY, UNSPECIFIED DEMENTIA SEVERITY: ICD-10-CM

## 2025-08-27 DIAGNOSIS — E78.2 MIXED HYPERLIPIDEMIA: ICD-10-CM

## 2025-08-27 DIAGNOSIS — I10 BENIGN ESSENTIAL HYPERTENSION: Primary | ICD-10-CM

## 2025-08-27 DIAGNOSIS — F41.9 ANXIETY: ICD-10-CM

## 2025-08-27 RX ORDER — QUETIAPINE FUMARATE 100 MG/1
150 TABLET, FILM COATED ORAL 2 TIMES DAILY
Qty: 60 TABLET | Refills: 5 | Status: SHIPPED | OUTPATIENT
Start: 2025-08-27

## 2025-08-27 RX ORDER — ATORVASTATIN CALCIUM 80 MG/1
80 TABLET, FILM COATED ORAL NIGHTLY
Qty: 90 TABLET | Refills: 1 | Status: SHIPPED | OUTPATIENT
Start: 2025-08-27

## 2025-08-27 RX ORDER — LEVOTHYROXINE SODIUM 137 UG/1
137 TABLET ORAL DAILY
Qty: 90 TABLET | Refills: 1 | Status: SHIPPED | OUTPATIENT
Start: 2025-08-27

## 2025-08-27 RX ORDER — ALPRAZOLAM 0.5 MG
0.5 TABLET ORAL
COMMUNITY
End: 2025-08-27

## 2025-08-27 RX ORDER — ALPRAZOLAM 0.5 MG
0.5 TABLET ORAL
Status: CANCELLED | OUTPATIENT
Start: 2025-08-27

## 2025-08-27 RX ORDER — LOSARTAN POTASSIUM 100 MG/1
100 TABLET ORAL DAILY
Qty: 90 TABLET | Refills: 1 | Status: SHIPPED | OUTPATIENT
Start: 2025-08-27